# Patient Record
Sex: MALE | Race: WHITE | HISPANIC OR LATINO | Employment: UNEMPLOYED | URBAN - METROPOLITAN AREA
[De-identification: names, ages, dates, MRNs, and addresses within clinical notes are randomized per-mention and may not be internally consistent; named-entity substitution may affect disease eponyms.]

---

## 2017-01-09 ENCOUNTER — COMMUNICATION - HEALTHEAST (OUTPATIENT)
Dept: INTERNAL MEDICINE | Facility: CLINIC | Age: 47
End: 2017-01-09

## 2017-01-09 ENCOUNTER — OFFICE VISIT - HEALTHEAST (OUTPATIENT)
Dept: INTERNAL MEDICINE | Facility: CLINIC | Age: 47
End: 2017-01-09

## 2017-01-09 DIAGNOSIS — F07.81 POST CONCUSSION SYNDROME: ICD-10-CM

## 2017-01-09 DIAGNOSIS — S83.207S UNSPECIFIED TEAR OF UNSPECIFIED MENISCUS, CURRENT INJURY, LEFT KNEE, SEQUELA: ICD-10-CM

## 2017-01-09 DIAGNOSIS — S46.211S BICEPS TENDON RUPTURE, RIGHT, SEQUELA: ICD-10-CM

## 2017-01-09 DIAGNOSIS — I10 HYPERTENSION: ICD-10-CM

## 2017-01-09 DIAGNOSIS — F31.60 BIPOLAR 1 DISORDER, MIXED (H): ICD-10-CM

## 2017-01-09 ASSESSMENT — MIFFLIN-ST. JEOR: SCORE: 1767.32

## 2017-01-10 ENCOUNTER — HOSPITAL ENCOUNTER (OUTPATIENT)
Dept: PHYSICAL THERAPY | Age: 47
Setting detail: THERAPIES SERIES
Discharge: STILL ADMITTED OR ADMINISTRATIVE | End: 2017-01-10
Attending: NURSE PRACTITIONER

## 2017-01-10 ENCOUNTER — HOSPITAL ENCOUNTER (OUTPATIENT)
Dept: SPEECH THERAPY | Age: 47
Setting detail: THERAPIES SERIES
Discharge: STILL ADMITTED OR ADMINISTRATIVE | End: 2017-01-10
Attending: NURSE PRACTITIONER

## 2017-01-10 DIAGNOSIS — F43.10 PTSD (POST-TRAUMATIC STRESS DISORDER): ICD-10-CM

## 2017-01-10 DIAGNOSIS — R47.89 WORD FINDING PROBLEM: ICD-10-CM

## 2017-01-10 DIAGNOSIS — R42 DIZZINESS: ICD-10-CM

## 2017-01-10 DIAGNOSIS — R41.89 COGNITIVE AND BEHAVIORAL CHANGES: ICD-10-CM

## 2017-01-10 DIAGNOSIS — F07.81 POST CONCUSSION SYNDROME: ICD-10-CM

## 2017-01-10 DIAGNOSIS — S09.90XS HEADACHES DUE TO OLD HEAD INJURY: ICD-10-CM

## 2017-01-10 DIAGNOSIS — R46.89 COGNITIVE AND BEHAVIORAL CHANGES: ICD-10-CM

## 2017-01-10 DIAGNOSIS — G44.309 HEADACHES DUE TO OLD HEAD INJURY: ICD-10-CM

## 2017-01-17 ENCOUNTER — HOSPITAL ENCOUNTER (OUTPATIENT)
Dept: SPEECH THERAPY | Age: 47
Setting detail: THERAPIES SERIES
Discharge: STILL ADMITTED OR ADMINISTRATIVE | End: 2017-01-17
Attending: NURSE PRACTITIONER

## 2017-01-17 DIAGNOSIS — R47.89 WORD FINDING PROBLEM: ICD-10-CM

## 2017-01-17 DIAGNOSIS — R41.89 COGNITIVE AND BEHAVIORAL CHANGES: ICD-10-CM

## 2017-01-17 DIAGNOSIS — R46.89 COGNITIVE AND BEHAVIORAL CHANGES: ICD-10-CM

## 2017-01-24 ENCOUNTER — HOSPITAL ENCOUNTER (OUTPATIENT)
Dept: SPEECH THERAPY | Age: 47
Setting detail: THERAPIES SERIES
Discharge: STILL ADMITTED OR ADMINISTRATIVE | End: 2017-01-24
Attending: NURSE PRACTITIONER

## 2017-01-24 ENCOUNTER — AMBULATORY - HEALTHEAST (OUTPATIENT)
Dept: LAB | Facility: CLINIC | Age: 47
End: 2017-01-24

## 2017-01-24 DIAGNOSIS — R46.89 COGNITIVE AND BEHAVIORAL CHANGES: ICD-10-CM

## 2017-01-24 DIAGNOSIS — R41.89 COGNITIVE AND BEHAVIORAL CHANGES: ICD-10-CM

## 2017-01-24 DIAGNOSIS — Z79.899 ENCOUNTER FOR LONG-TERM (CURRENT) USE OF OTHER MEDICATIONS: ICD-10-CM

## 2017-01-25 ENCOUNTER — COMMUNICATION - HEALTHEAST (OUTPATIENT)
Dept: INTERNAL MEDICINE | Facility: CLINIC | Age: 47
End: 2017-01-25

## 2017-01-31 ENCOUNTER — HOSPITAL ENCOUNTER (OUTPATIENT)
Dept: SPEECH THERAPY | Age: 47
Setting detail: THERAPIES SERIES
Discharge: STILL ADMITTED OR ADMINISTRATIVE | End: 2017-01-31
Attending: NURSE PRACTITIONER

## 2017-01-31 DIAGNOSIS — R46.89 COGNITIVE AND BEHAVIORAL CHANGES: ICD-10-CM

## 2017-01-31 DIAGNOSIS — R41.89 COGNITIVE AND BEHAVIORAL CHANGES: ICD-10-CM

## 2017-02-06 ENCOUNTER — AMBULATORY - HEALTHEAST (OUTPATIENT)
Dept: ADMINISTRATIVE | Facility: REHABILITATION | Age: 47
End: 2017-02-06

## 2017-02-06 ENCOUNTER — OFFICE VISIT - HEALTHEAST (OUTPATIENT)
Dept: INTERNAL MEDICINE | Facility: CLINIC | Age: 47
End: 2017-02-06

## 2017-02-06 ENCOUNTER — RECORDS - HEALTHEAST (OUTPATIENT)
Dept: ADMINISTRATIVE | Facility: OTHER | Age: 47
End: 2017-02-06

## 2017-02-06 DIAGNOSIS — F07.81 POST CONCUSSION SYNDROME: ICD-10-CM

## 2017-02-06 DIAGNOSIS — S83.207S UNSPECIFIED TEAR OF UNSPECIFIED MENISCUS, CURRENT INJURY, LEFT KNEE, SEQUELA: ICD-10-CM

## 2017-02-06 DIAGNOSIS — G89.21 CHRONIC PAIN DUE TO TRAUMA: ICD-10-CM

## 2017-02-06 DIAGNOSIS — M25.511 RIGHT SHOULDER PAIN: ICD-10-CM

## 2017-02-06 DIAGNOSIS — M54.2 CERVICALGIA: ICD-10-CM

## 2017-02-06 DIAGNOSIS — M75.41 SHOULDER IMPINGEMENT SYNDROME, RIGHT: ICD-10-CM

## 2017-02-06 ASSESSMENT — MIFFLIN-ST. JEOR: SCORE: 1776.39

## 2017-02-10 ENCOUNTER — OFFICE VISIT - HEALTHEAST (OUTPATIENT)
Dept: PHYSICAL THERAPY | Facility: REHABILITATION | Age: 47
End: 2017-02-10

## 2017-02-10 DIAGNOSIS — M53.82 DECREASED ROM OF INTERVERTEBRAL DISCS OF CERVICAL SPINE: ICD-10-CM

## 2017-02-10 DIAGNOSIS — M89.9 SCAPULAR DYSFUNCTION: ICD-10-CM

## 2017-02-10 DIAGNOSIS — M25.611 DECREASED ROM OF RIGHT SHOULDER: ICD-10-CM

## 2017-02-10 DIAGNOSIS — M62.81 MUSCLE WEAKNESS (GENERALIZED): ICD-10-CM

## 2017-02-10 DIAGNOSIS — M54.2 CERVICALGIA: ICD-10-CM

## 2017-02-10 DIAGNOSIS — G89.29 CHRONIC RIGHT SHOULDER PAIN: ICD-10-CM

## 2017-02-10 DIAGNOSIS — M25.511 CHRONIC RIGHT SHOULDER PAIN: ICD-10-CM

## 2017-02-13 ENCOUNTER — OFFICE VISIT - HEALTHEAST (OUTPATIENT)
Dept: PHYSICAL THERAPY | Facility: REHABILITATION | Age: 47
End: 2017-02-13

## 2017-02-13 DIAGNOSIS — G89.29 CHRONIC RIGHT SHOULDER PAIN: ICD-10-CM

## 2017-02-13 DIAGNOSIS — M89.9 SCAPULAR DYSFUNCTION: ICD-10-CM

## 2017-02-13 DIAGNOSIS — M62.81 MUSCLE WEAKNESS (GENERALIZED): ICD-10-CM

## 2017-02-13 DIAGNOSIS — M54.2 CERVICALGIA: ICD-10-CM

## 2017-02-13 DIAGNOSIS — M53.82 DECREASED ROM OF INTERVERTEBRAL DISCS OF CERVICAL SPINE: ICD-10-CM

## 2017-02-13 DIAGNOSIS — M25.611 DECREASED ROM OF RIGHT SHOULDER: ICD-10-CM

## 2017-02-13 DIAGNOSIS — M25.511 CHRONIC RIGHT SHOULDER PAIN: ICD-10-CM

## 2017-02-14 ENCOUNTER — HOSPITAL ENCOUNTER (OUTPATIENT)
Dept: SPEECH THERAPY | Age: 47
Setting detail: THERAPIES SERIES
Discharge: STILL ADMITTED OR ADMINISTRATIVE | End: 2017-02-14
Attending: NURSE PRACTITIONER

## 2017-02-14 DIAGNOSIS — R41.89 COGNITIVE AND BEHAVIORAL CHANGES: ICD-10-CM

## 2017-02-14 DIAGNOSIS — R46.89 COGNITIVE AND BEHAVIORAL CHANGES: ICD-10-CM

## 2017-02-16 ENCOUNTER — OFFICE VISIT - HEALTHEAST (OUTPATIENT)
Dept: PHYSICAL THERAPY | Facility: REHABILITATION | Age: 47
End: 2017-02-16

## 2017-02-16 DIAGNOSIS — M54.2 CERVICALGIA: ICD-10-CM

## 2017-02-16 DIAGNOSIS — M62.81 MUSCLE WEAKNESS (GENERALIZED): ICD-10-CM

## 2017-02-16 DIAGNOSIS — M53.82 DECREASED ROM OF INTERVERTEBRAL DISCS OF CERVICAL SPINE: ICD-10-CM

## 2017-02-16 DIAGNOSIS — G89.29 CHRONIC RIGHT SHOULDER PAIN: ICD-10-CM

## 2017-02-16 DIAGNOSIS — M25.611 DECREASED ROM OF RIGHT SHOULDER: ICD-10-CM

## 2017-02-16 DIAGNOSIS — M25.511 CHRONIC RIGHT SHOULDER PAIN: ICD-10-CM

## 2017-02-16 DIAGNOSIS — M89.9 SCAPULAR DYSFUNCTION: ICD-10-CM

## 2017-02-20 ENCOUNTER — OFFICE VISIT - HEALTHEAST (OUTPATIENT)
Dept: PHYSICAL THERAPY | Facility: REHABILITATION | Age: 47
End: 2017-02-20

## 2017-02-20 DIAGNOSIS — M53.82 DECREASED ROM OF INTERVERTEBRAL DISCS OF CERVICAL SPINE: ICD-10-CM

## 2017-02-20 DIAGNOSIS — M54.2 CERVICALGIA: ICD-10-CM

## 2017-02-20 DIAGNOSIS — M62.81 MUSCLE WEAKNESS (GENERALIZED): ICD-10-CM

## 2017-02-20 DIAGNOSIS — G89.29 CHRONIC RIGHT SHOULDER PAIN: ICD-10-CM

## 2017-02-20 DIAGNOSIS — M25.511 CHRONIC RIGHT SHOULDER PAIN: ICD-10-CM

## 2017-02-20 DIAGNOSIS — M25.611 DECREASED ROM OF RIGHT SHOULDER: ICD-10-CM

## 2017-02-20 DIAGNOSIS — M89.9 SCAPULAR DYSFUNCTION: ICD-10-CM

## 2017-02-23 ENCOUNTER — COMMUNICATION - HEALTHEAST (OUTPATIENT)
Dept: INTERNAL MEDICINE | Facility: CLINIC | Age: 47
End: 2017-02-23

## 2017-02-23 ENCOUNTER — OFFICE VISIT - HEALTHEAST (OUTPATIENT)
Dept: PHYSICAL THERAPY | Facility: REHABILITATION | Age: 47
End: 2017-02-23

## 2017-02-23 ENCOUNTER — HOSPITAL ENCOUNTER (OUTPATIENT)
Dept: SPEECH THERAPY | Age: 47
Setting detail: THERAPIES SERIES
Discharge: STILL ADMITTED OR ADMINISTRATIVE | End: 2017-02-23
Attending: NURSE PRACTITIONER

## 2017-02-23 DIAGNOSIS — R47.89 WORD FINDING PROBLEM: ICD-10-CM

## 2017-02-23 DIAGNOSIS — M89.9 SCAPULAR DYSFUNCTION: ICD-10-CM

## 2017-02-23 DIAGNOSIS — R41.89 COGNITIVE AND BEHAVIORAL CHANGES: ICD-10-CM

## 2017-02-23 DIAGNOSIS — M54.2 CERVICALGIA: ICD-10-CM

## 2017-02-23 DIAGNOSIS — M53.82 DECREASED ROM OF INTERVERTEBRAL DISCS OF CERVICAL SPINE: ICD-10-CM

## 2017-02-23 DIAGNOSIS — M25.611 DECREASED ROM OF RIGHT SHOULDER: ICD-10-CM

## 2017-02-23 DIAGNOSIS — G89.29 CHRONIC RIGHT SHOULDER PAIN: ICD-10-CM

## 2017-02-23 DIAGNOSIS — M62.81 MUSCLE WEAKNESS (GENERALIZED): ICD-10-CM

## 2017-02-23 DIAGNOSIS — M25.511 CHRONIC RIGHT SHOULDER PAIN: ICD-10-CM

## 2017-02-23 DIAGNOSIS — R46.89 COGNITIVE AND BEHAVIORAL CHANGES: ICD-10-CM

## 2017-02-24 ENCOUNTER — HOSPITAL ENCOUNTER (OUTPATIENT)
Dept: NEUROLOGY | Facility: CLINIC | Age: 47
Setting detail: THERAPIES SERIES
Discharge: STILL ADMITTED OR ADMINISTRATIVE | End: 2017-02-24
Attending: NURSE PRACTITIONER

## 2017-02-24 DIAGNOSIS — G31.84 COGNITIVE IMPAIRMENT, MILD, SO STATED: ICD-10-CM

## 2017-02-24 DIAGNOSIS — G89.21 CHRONIC PAIN DUE TO TRAUMA: ICD-10-CM

## 2017-02-24 DIAGNOSIS — S09.90XS HEADACHES DUE TO OLD HEAD INJURY: ICD-10-CM

## 2017-02-24 DIAGNOSIS — F07.81 POST CONCUSSION SYNDROME: ICD-10-CM

## 2017-02-24 DIAGNOSIS — F31.11 BIPOLAR I DISORDER, MOST RECENT EPISODE (OR CURRENT) MANIC, MILD (H): ICD-10-CM

## 2017-02-24 DIAGNOSIS — G44.309 HEADACHES DUE TO OLD HEAD INJURY: ICD-10-CM

## 2017-02-24 DIAGNOSIS — S06.9X1S: ICD-10-CM

## 2017-02-24 DIAGNOSIS — F43.10 PTSD (POST-TRAUMATIC STRESS DISORDER): ICD-10-CM

## 2017-02-24 DIAGNOSIS — F31.60 BIPOLAR 1 DISORDER, MIXED (H): ICD-10-CM

## 2017-02-24 DIAGNOSIS — F41.1 ANXIETY STATE: ICD-10-CM

## 2017-02-27 ENCOUNTER — OFFICE VISIT - HEALTHEAST (OUTPATIENT)
Dept: PHYSICAL THERAPY | Facility: REHABILITATION | Age: 47
End: 2017-02-27

## 2017-02-27 DIAGNOSIS — M54.2 CERVICALGIA: ICD-10-CM

## 2017-02-27 DIAGNOSIS — M25.511 CHRONIC RIGHT SHOULDER PAIN: ICD-10-CM

## 2017-02-27 DIAGNOSIS — M62.81 MUSCLE WEAKNESS (GENERALIZED): ICD-10-CM

## 2017-02-27 DIAGNOSIS — M25.611 DECREASED ROM OF RIGHT SHOULDER: ICD-10-CM

## 2017-02-27 DIAGNOSIS — M53.82 DECREASED ROM OF INTERVERTEBRAL DISCS OF CERVICAL SPINE: ICD-10-CM

## 2017-02-27 DIAGNOSIS — M89.9 SCAPULAR DYSFUNCTION: ICD-10-CM

## 2017-02-27 DIAGNOSIS — G89.29 CHRONIC RIGHT SHOULDER PAIN: ICD-10-CM

## 2017-03-02 ENCOUNTER — OFFICE VISIT - HEALTHEAST (OUTPATIENT)
Dept: PHYSICAL THERAPY | Facility: REHABILITATION | Age: 47
End: 2017-03-02

## 2017-03-02 DIAGNOSIS — M53.82 DECREASED ROM OF INTERVERTEBRAL DISCS OF CERVICAL SPINE: ICD-10-CM

## 2017-03-02 DIAGNOSIS — G89.29 CHRONIC RIGHT SHOULDER PAIN: ICD-10-CM

## 2017-03-02 DIAGNOSIS — M25.511 CHRONIC RIGHT SHOULDER PAIN: ICD-10-CM

## 2017-03-02 DIAGNOSIS — M54.2 CERVICALGIA: ICD-10-CM

## 2017-03-02 DIAGNOSIS — M25.611 DECREASED ROM OF RIGHT SHOULDER: ICD-10-CM

## 2017-03-02 DIAGNOSIS — M62.81 MUSCLE WEAKNESS (GENERALIZED): ICD-10-CM

## 2017-03-02 DIAGNOSIS — M89.9 SCAPULAR DYSFUNCTION: ICD-10-CM

## 2017-03-06 ENCOUNTER — COMMUNICATION - HEALTHEAST (OUTPATIENT)
Dept: INTERNAL MEDICINE | Facility: CLINIC | Age: 47
End: 2017-03-06

## 2017-03-06 ENCOUNTER — OFFICE VISIT - HEALTHEAST (OUTPATIENT)
Dept: PHYSICAL THERAPY | Facility: REHABILITATION | Age: 47
End: 2017-03-06

## 2017-03-06 ENCOUNTER — OFFICE VISIT - HEALTHEAST (OUTPATIENT)
Dept: INTERNAL MEDICINE | Facility: CLINIC | Age: 47
End: 2017-03-06

## 2017-03-06 ENCOUNTER — AMBULATORY - HEALTHEAST (OUTPATIENT)
Dept: INTERNAL MEDICINE | Facility: CLINIC | Age: 47
End: 2017-03-06

## 2017-03-06 DIAGNOSIS — S06.9X9S: ICD-10-CM

## 2017-03-06 DIAGNOSIS — Z01.818 PREOP EXAM FOR INTERNAL MEDICINE: ICD-10-CM

## 2017-03-06 DIAGNOSIS — M89.9 SCAPULAR DYSFUNCTION: ICD-10-CM

## 2017-03-06 DIAGNOSIS — M22.40 CHONDROMALACIA OF PATELLA, UNSPECIFIED LATERALITY: ICD-10-CM

## 2017-03-06 DIAGNOSIS — M25.611 DECREASED ROM OF RIGHT SHOULDER: ICD-10-CM

## 2017-03-06 DIAGNOSIS — M25.511 CHRONIC RIGHT SHOULDER PAIN: ICD-10-CM

## 2017-03-06 DIAGNOSIS — I89.0 LYMPHEDEMA: ICD-10-CM

## 2017-03-06 DIAGNOSIS — M54.2 CERVICALGIA: ICD-10-CM

## 2017-03-06 DIAGNOSIS — G89.29 CHRONIC RIGHT SHOULDER PAIN: ICD-10-CM

## 2017-03-06 DIAGNOSIS — M53.82 DECREASED ROM OF INTERVERTEBRAL DISCS OF CERVICAL SPINE: ICD-10-CM

## 2017-03-06 DIAGNOSIS — M62.81 MUSCLE WEAKNESS (GENERALIZED): ICD-10-CM

## 2017-03-06 ASSESSMENT — MIFFLIN-ST. JEOR: SCORE: 1821.75

## 2017-03-08 ENCOUNTER — AMBULATORY - HEALTHEAST (OUTPATIENT)
Dept: VASCULAR SURGERY | Facility: CLINIC | Age: 47
End: 2017-03-08

## 2017-03-08 DIAGNOSIS — G57.90 ILIOINGUINAL NEURALGIA: ICD-10-CM

## 2017-03-08 DIAGNOSIS — I89.0 ACQUIRED LYMPHEDEMA OF LEG: ICD-10-CM

## 2017-03-09 ENCOUNTER — COMMUNICATION - HEALTHEAST (OUTPATIENT)
Dept: VASCULAR SURGERY | Facility: CLINIC | Age: 47
End: 2017-03-09

## 2017-03-10 ENCOUNTER — COMMUNICATION - HEALTHEAST (OUTPATIENT)
Dept: INTERNAL MEDICINE | Facility: CLINIC | Age: 47
End: 2017-03-10

## 2017-03-10 DIAGNOSIS — T78.40XA ALLERGY: ICD-10-CM

## 2017-03-13 ENCOUNTER — HOSPITAL ENCOUNTER (OUTPATIENT)
Dept: NEUROLOGY | Facility: CLINIC | Age: 47
Setting detail: THERAPIES SERIES
Discharge: STILL ADMITTED OR ADMINISTRATIVE | End: 2017-03-13
Attending: NURSE PRACTITIONER

## 2017-03-13 DIAGNOSIS — F09 COGNITIVE DISORDER: ICD-10-CM

## 2017-03-14 ENCOUNTER — OFFICE VISIT - HEALTHEAST (OUTPATIENT)
Dept: PHYSICAL THERAPY | Facility: REHABILITATION | Age: 47
End: 2017-03-14

## 2017-03-14 DIAGNOSIS — M54.2 CERVICALGIA: ICD-10-CM

## 2017-03-14 DIAGNOSIS — G89.29 CHRONIC RIGHT SHOULDER PAIN: ICD-10-CM

## 2017-03-14 DIAGNOSIS — M89.9 SCAPULAR DYSFUNCTION: ICD-10-CM

## 2017-03-14 DIAGNOSIS — M53.82 DECREASED ROM OF INTERVERTEBRAL DISCS OF CERVICAL SPINE: ICD-10-CM

## 2017-03-14 DIAGNOSIS — M62.81 MUSCLE WEAKNESS (GENERALIZED): ICD-10-CM

## 2017-03-14 DIAGNOSIS — M25.511 CHRONIC RIGHT SHOULDER PAIN: ICD-10-CM

## 2017-03-14 DIAGNOSIS — M25.611 DECREASED ROM OF RIGHT SHOULDER: ICD-10-CM

## 2017-03-16 ENCOUNTER — OFFICE VISIT - HEALTHEAST (OUTPATIENT)
Dept: PHYSICAL THERAPY | Facility: REHABILITATION | Age: 47
End: 2017-03-16

## 2017-03-16 DIAGNOSIS — M89.9 SCAPULAR DYSFUNCTION: ICD-10-CM

## 2017-03-16 DIAGNOSIS — M25.511 CHRONIC RIGHT SHOULDER PAIN: ICD-10-CM

## 2017-03-16 DIAGNOSIS — M62.81 MUSCLE WEAKNESS (GENERALIZED): ICD-10-CM

## 2017-03-16 DIAGNOSIS — M25.611 DECREASED ROM OF RIGHT SHOULDER: ICD-10-CM

## 2017-03-16 DIAGNOSIS — M53.82 DECREASED ROM OF INTERVERTEBRAL DISCS OF CERVICAL SPINE: ICD-10-CM

## 2017-03-16 DIAGNOSIS — G89.29 CHRONIC RIGHT SHOULDER PAIN: ICD-10-CM

## 2017-03-16 DIAGNOSIS — M54.2 CERVICALGIA: ICD-10-CM

## 2017-03-22 ENCOUNTER — RECORDS - HEALTHEAST (OUTPATIENT)
Dept: ADMINISTRATIVE | Facility: OTHER | Age: 47
End: 2017-03-22

## 2017-03-28 ENCOUNTER — AMBULATORY - HEALTHEAST (OUTPATIENT)
Dept: ADMINISTRATIVE | Facility: REHABILITATION | Age: 47
End: 2017-03-28

## 2017-03-28 DIAGNOSIS — M17.12 PRIMARY OSTEOARTHRITIS OF LEFT KNEE: ICD-10-CM

## 2017-03-29 ENCOUNTER — RECORDS - HEALTHEAST (OUTPATIENT)
Dept: ADMINISTRATIVE | Facility: OTHER | Age: 47
End: 2017-03-29

## 2017-03-30 ENCOUNTER — OFFICE VISIT - HEALTHEAST (OUTPATIENT)
Dept: VASCULAR SURGERY | Facility: CLINIC | Age: 47
End: 2017-03-30

## 2017-03-30 DIAGNOSIS — Z96.652 STATUS POST TOTAL LEFT KNEE REPLACEMENT: ICD-10-CM

## 2017-03-30 DIAGNOSIS — M79.89 LEFT LEG SWELLING: ICD-10-CM

## 2017-03-30 DIAGNOSIS — Q82.0 PRIMARY (CONGENITAL) LYMPHEDEMA: ICD-10-CM

## 2017-03-31 ENCOUNTER — OFFICE VISIT - HEALTHEAST (OUTPATIENT)
Dept: PHYSICAL THERAPY | Facility: REHABILITATION | Age: 47
End: 2017-03-31

## 2017-03-31 DIAGNOSIS — R26.9 ABNORMALITY OF GAIT: ICD-10-CM

## 2017-03-31 DIAGNOSIS — M25.462 PAIN AND SWELLING OF LEFT KNEE: ICD-10-CM

## 2017-03-31 DIAGNOSIS — M79.89 LEFT LEG SWELLING: ICD-10-CM

## 2017-03-31 DIAGNOSIS — M62.81 MUSCLE WEAKNESS (GENERALIZED): ICD-10-CM

## 2017-03-31 DIAGNOSIS — Z96.652 STATUS POST TOTAL LEFT KNEE REPLACEMENT: ICD-10-CM

## 2017-03-31 DIAGNOSIS — M25.562 PAIN AND SWELLING OF LEFT KNEE: ICD-10-CM

## 2017-04-03 ENCOUNTER — OFFICE VISIT - HEALTHEAST (OUTPATIENT)
Dept: INTERNAL MEDICINE | Facility: CLINIC | Age: 47
End: 2017-04-03

## 2017-04-03 ENCOUNTER — COMMUNICATION - HEALTHEAST (OUTPATIENT)
Dept: INTERNAL MEDICINE | Facility: CLINIC | Age: 47
End: 2017-04-03

## 2017-04-03 DIAGNOSIS — F43.10 PTSD (POST-TRAUMATIC STRESS DISORDER): ICD-10-CM

## 2017-04-03 DIAGNOSIS — Z96.652 STATUS POST TOTAL LEFT KNEE REPLACEMENT: ICD-10-CM

## 2017-04-03 DIAGNOSIS — S06.9X9S: ICD-10-CM

## 2017-04-03 ASSESSMENT — MIFFLIN-ST. JEOR: SCORE: 1858.04

## 2017-04-04 ENCOUNTER — COMMUNICATION - HEALTHEAST (OUTPATIENT)
Dept: INTERNAL MEDICINE | Facility: CLINIC | Age: 47
End: 2017-04-04

## 2017-04-05 ENCOUNTER — OFFICE VISIT - HEALTHEAST (OUTPATIENT)
Dept: PHYSICAL THERAPY | Facility: REHABILITATION | Age: 47
End: 2017-04-05

## 2017-04-05 DIAGNOSIS — M25.462 PAIN AND SWELLING OF LEFT KNEE: ICD-10-CM

## 2017-04-05 DIAGNOSIS — M25.611 DECREASED ROM OF RIGHT SHOULDER: ICD-10-CM

## 2017-04-05 DIAGNOSIS — Z96.652 STATUS POST TOTAL LEFT KNEE REPLACEMENT: ICD-10-CM

## 2017-04-05 DIAGNOSIS — M54.2 CERVICALGIA: ICD-10-CM

## 2017-04-05 DIAGNOSIS — R26.9 ABNORMALITY OF GAIT: ICD-10-CM

## 2017-04-05 DIAGNOSIS — M89.9 SCAPULAR DYSFUNCTION: ICD-10-CM

## 2017-04-05 DIAGNOSIS — M53.82 DECREASED ROM OF INTERVERTEBRAL DISCS OF CERVICAL SPINE: ICD-10-CM

## 2017-04-05 DIAGNOSIS — M79.89 LEFT LEG SWELLING: ICD-10-CM

## 2017-04-05 DIAGNOSIS — G89.29 CHRONIC RIGHT SHOULDER PAIN: ICD-10-CM

## 2017-04-05 DIAGNOSIS — M25.562 PAIN AND SWELLING OF LEFT KNEE: ICD-10-CM

## 2017-04-05 DIAGNOSIS — M62.81 MUSCLE WEAKNESS (GENERALIZED): ICD-10-CM

## 2017-04-05 DIAGNOSIS — M25.511 CHRONIC RIGHT SHOULDER PAIN: ICD-10-CM

## 2017-04-07 ENCOUNTER — COMMUNICATION - HEALTHEAST (OUTPATIENT)
Dept: INTERNAL MEDICINE | Facility: CLINIC | Age: 47
End: 2017-04-07

## 2017-04-07 ENCOUNTER — OFFICE VISIT - HEALTHEAST (OUTPATIENT)
Dept: PHYSICAL THERAPY | Facility: REHABILITATION | Age: 47
End: 2017-04-07

## 2017-04-07 ENCOUNTER — COMMUNICATION - HEALTHEAST (OUTPATIENT)
Dept: NURSING | Facility: CLINIC | Age: 47
End: 2017-04-07

## 2017-04-07 DIAGNOSIS — M79.89 LEFT LEG SWELLING: ICD-10-CM

## 2017-04-07 DIAGNOSIS — M62.81 MUSCLE WEAKNESS (GENERALIZED): ICD-10-CM

## 2017-04-07 DIAGNOSIS — M54.2 CERVICALGIA: ICD-10-CM

## 2017-04-07 DIAGNOSIS — M54.12 BRACHIAL NEURITIS OR RADICULITIS: ICD-10-CM

## 2017-04-07 DIAGNOSIS — M25.562 PAIN AND SWELLING OF LEFT KNEE: ICD-10-CM

## 2017-04-07 DIAGNOSIS — G89.29 CHRONIC RIGHT SHOULDER PAIN: ICD-10-CM

## 2017-04-07 DIAGNOSIS — R26.9 ABNORMALITY OF GAIT: ICD-10-CM

## 2017-04-07 DIAGNOSIS — M53.82 DECREASED ROM OF INTERVERTEBRAL DISCS OF CERVICAL SPINE: ICD-10-CM

## 2017-04-07 DIAGNOSIS — M25.462 PAIN AND SWELLING OF LEFT KNEE: ICD-10-CM

## 2017-04-07 DIAGNOSIS — M89.9 SCAPULAR DYSFUNCTION: ICD-10-CM

## 2017-04-07 DIAGNOSIS — M25.511 CHRONIC RIGHT SHOULDER PAIN: ICD-10-CM

## 2017-04-07 DIAGNOSIS — M25.611 DECREASED ROM OF RIGHT SHOULDER: ICD-10-CM

## 2017-04-07 DIAGNOSIS — Z96.652 STATUS POST TOTAL LEFT KNEE REPLACEMENT: ICD-10-CM

## 2017-04-10 ENCOUNTER — COMMUNICATION - HEALTHEAST (OUTPATIENT)
Dept: ADMINISTRATIVE | Facility: CLINIC | Age: 47
End: 2017-04-10

## 2017-04-10 ENCOUNTER — OFFICE VISIT - HEALTHEAST (OUTPATIENT)
Dept: PHYSICAL THERAPY | Facility: REHABILITATION | Age: 47
End: 2017-04-10

## 2017-04-10 ENCOUNTER — COMMUNICATION - HEALTHEAST (OUTPATIENT)
Dept: NURSING | Facility: CLINIC | Age: 47
End: 2017-04-10

## 2017-04-10 DIAGNOSIS — Z96.652 STATUS POST TOTAL LEFT KNEE REPLACEMENT: ICD-10-CM

## 2017-04-10 DIAGNOSIS — R26.9 ABNORMALITY OF GAIT: ICD-10-CM

## 2017-04-10 DIAGNOSIS — G89.29 CHRONIC RIGHT SHOULDER PAIN: ICD-10-CM

## 2017-04-10 DIAGNOSIS — M79.89 LEFT LEG SWELLING: ICD-10-CM

## 2017-04-10 DIAGNOSIS — M89.9 SCAPULAR DYSFUNCTION: ICD-10-CM

## 2017-04-10 DIAGNOSIS — M25.611 DECREASED ROM OF RIGHT SHOULDER: ICD-10-CM

## 2017-04-10 DIAGNOSIS — M25.562 PAIN AND SWELLING OF LEFT KNEE: ICD-10-CM

## 2017-04-10 DIAGNOSIS — M53.82 DECREASED ROM OF INTERVERTEBRAL DISCS OF CERVICAL SPINE: ICD-10-CM

## 2017-04-10 DIAGNOSIS — M25.511 CHRONIC RIGHT SHOULDER PAIN: ICD-10-CM

## 2017-04-10 DIAGNOSIS — M54.2 CERVICALGIA: ICD-10-CM

## 2017-04-10 DIAGNOSIS — M25.462 PAIN AND SWELLING OF LEFT KNEE: ICD-10-CM

## 2017-04-10 DIAGNOSIS — M62.81 MUSCLE WEAKNESS (GENERALIZED): ICD-10-CM

## 2017-04-13 ENCOUNTER — OFFICE VISIT - HEALTHEAST (OUTPATIENT)
Dept: PHYSICAL THERAPY | Facility: REHABILITATION | Age: 47
End: 2017-04-13

## 2017-04-13 DIAGNOSIS — G89.29 CHRONIC RIGHT SHOULDER PAIN: ICD-10-CM

## 2017-04-13 DIAGNOSIS — M89.9 SCAPULAR DYSFUNCTION: ICD-10-CM

## 2017-04-13 DIAGNOSIS — M25.562 PAIN AND SWELLING OF LEFT KNEE: ICD-10-CM

## 2017-04-13 DIAGNOSIS — M79.89 LEFT LEG SWELLING: ICD-10-CM

## 2017-04-13 DIAGNOSIS — M53.82 DECREASED ROM OF INTERVERTEBRAL DISCS OF CERVICAL SPINE: ICD-10-CM

## 2017-04-13 DIAGNOSIS — Z96.652 STATUS POST TOTAL LEFT KNEE REPLACEMENT: ICD-10-CM

## 2017-04-13 DIAGNOSIS — M62.81 MUSCLE WEAKNESS (GENERALIZED): ICD-10-CM

## 2017-04-13 DIAGNOSIS — M25.462 PAIN AND SWELLING OF LEFT KNEE: ICD-10-CM

## 2017-04-13 DIAGNOSIS — M25.611 DECREASED ROM OF RIGHT SHOULDER: ICD-10-CM

## 2017-04-13 DIAGNOSIS — R26.9 ABNORMALITY OF GAIT: ICD-10-CM

## 2017-04-13 DIAGNOSIS — M25.511 CHRONIC RIGHT SHOULDER PAIN: ICD-10-CM

## 2017-04-13 DIAGNOSIS — M54.2 CERVICALGIA: ICD-10-CM

## 2017-04-17 ENCOUNTER — COMMUNICATION - HEALTHEAST (OUTPATIENT)
Dept: PHYSICAL THERAPY | Facility: REHABILITATION | Age: 47
End: 2017-04-17

## 2017-04-20 ENCOUNTER — OFFICE VISIT - HEALTHEAST (OUTPATIENT)
Dept: PHYSICAL THERAPY | Facility: REHABILITATION | Age: 47
End: 2017-04-20

## 2017-04-20 DIAGNOSIS — M25.562 PAIN AND SWELLING OF LEFT KNEE: ICD-10-CM

## 2017-04-20 DIAGNOSIS — M62.81 MUSCLE WEAKNESS (GENERALIZED): ICD-10-CM

## 2017-04-20 DIAGNOSIS — M54.2 CERVICALGIA: ICD-10-CM

## 2017-04-20 DIAGNOSIS — M25.462 PAIN AND SWELLING OF LEFT KNEE: ICD-10-CM

## 2017-04-20 DIAGNOSIS — M89.9 SCAPULAR DYSFUNCTION: ICD-10-CM

## 2017-04-20 DIAGNOSIS — M25.511 CHRONIC RIGHT SHOULDER PAIN: ICD-10-CM

## 2017-04-20 DIAGNOSIS — Z96.652 STATUS POST TOTAL LEFT KNEE REPLACEMENT: ICD-10-CM

## 2017-04-20 DIAGNOSIS — G89.29 CHRONIC RIGHT SHOULDER PAIN: ICD-10-CM

## 2017-04-20 DIAGNOSIS — M53.82 DECREASED ROM OF INTERVERTEBRAL DISCS OF CERVICAL SPINE: ICD-10-CM

## 2017-04-20 DIAGNOSIS — M79.89 LEFT LEG SWELLING: ICD-10-CM

## 2017-04-20 DIAGNOSIS — M25.611 DECREASED ROM OF RIGHT SHOULDER: ICD-10-CM

## 2017-04-20 DIAGNOSIS — R26.9 ABNORMALITY OF GAIT: ICD-10-CM

## 2017-04-24 ENCOUNTER — OFFICE VISIT - HEALTHEAST (OUTPATIENT)
Dept: PHYSICAL THERAPY | Facility: REHABILITATION | Age: 47
End: 2017-04-24

## 2017-04-24 DIAGNOSIS — M89.9 SCAPULAR DYSFUNCTION: ICD-10-CM

## 2017-04-24 DIAGNOSIS — M54.2 CERVICALGIA: ICD-10-CM

## 2017-04-24 DIAGNOSIS — M62.81 MUSCLE WEAKNESS (GENERALIZED): ICD-10-CM

## 2017-04-24 DIAGNOSIS — M25.562 PAIN AND SWELLING OF LEFT KNEE: ICD-10-CM

## 2017-04-24 DIAGNOSIS — R26.9 ABNORMALITY OF GAIT: ICD-10-CM

## 2017-04-24 DIAGNOSIS — M25.511 CHRONIC RIGHT SHOULDER PAIN: ICD-10-CM

## 2017-04-24 DIAGNOSIS — M25.611 DECREASED ROM OF RIGHT SHOULDER: ICD-10-CM

## 2017-04-24 DIAGNOSIS — M53.82 DECREASED ROM OF INTERVERTEBRAL DISCS OF CERVICAL SPINE: ICD-10-CM

## 2017-04-24 DIAGNOSIS — M79.89 LEFT LEG SWELLING: ICD-10-CM

## 2017-04-24 DIAGNOSIS — M25.462 PAIN AND SWELLING OF LEFT KNEE: ICD-10-CM

## 2017-04-24 DIAGNOSIS — Z96.652 STATUS POST TOTAL LEFT KNEE REPLACEMENT: ICD-10-CM

## 2017-04-24 DIAGNOSIS — G89.29 CHRONIC RIGHT SHOULDER PAIN: ICD-10-CM

## 2017-04-27 ENCOUNTER — OFFICE VISIT - HEALTHEAST (OUTPATIENT)
Dept: PHYSICAL THERAPY | Facility: REHABILITATION | Age: 47
End: 2017-04-27

## 2017-04-27 DIAGNOSIS — R26.9 ABNORMALITY OF GAIT: ICD-10-CM

## 2017-04-27 DIAGNOSIS — M62.81 MUSCLE WEAKNESS (GENERALIZED): ICD-10-CM

## 2017-04-27 DIAGNOSIS — M25.511 CHRONIC RIGHT SHOULDER PAIN: ICD-10-CM

## 2017-04-27 DIAGNOSIS — M25.562 PAIN AND SWELLING OF LEFT KNEE: ICD-10-CM

## 2017-04-27 DIAGNOSIS — Z96.652 STATUS POST TOTAL LEFT KNEE REPLACEMENT: ICD-10-CM

## 2017-04-27 DIAGNOSIS — M79.89 LEFT LEG SWELLING: ICD-10-CM

## 2017-04-27 DIAGNOSIS — G89.29 CHRONIC RIGHT SHOULDER PAIN: ICD-10-CM

## 2017-04-27 DIAGNOSIS — M89.9 SCAPULAR DYSFUNCTION: ICD-10-CM

## 2017-04-27 DIAGNOSIS — M54.2 CERVICALGIA: ICD-10-CM

## 2017-04-27 DIAGNOSIS — M25.611 DECREASED ROM OF RIGHT SHOULDER: ICD-10-CM

## 2017-04-27 DIAGNOSIS — M53.82 DECREASED ROM OF INTERVERTEBRAL DISCS OF CERVICAL SPINE: ICD-10-CM

## 2017-04-27 DIAGNOSIS — M25.462 PAIN AND SWELLING OF LEFT KNEE: ICD-10-CM

## 2017-05-01 ENCOUNTER — OFFICE VISIT - HEALTHEAST (OUTPATIENT)
Dept: PHYSICAL THERAPY | Facility: REHABILITATION | Age: 47
End: 2017-05-01

## 2017-05-01 ENCOUNTER — OFFICE VISIT - HEALTHEAST (OUTPATIENT)
Dept: INTERNAL MEDICINE | Facility: CLINIC | Age: 47
End: 2017-05-01

## 2017-05-01 ENCOUNTER — COMMUNICATION - HEALTHEAST (OUTPATIENT)
Dept: INTERNAL MEDICINE | Facility: CLINIC | Age: 47
End: 2017-05-01

## 2017-05-01 DIAGNOSIS — M25.511 CHRONIC RIGHT SHOULDER PAIN: ICD-10-CM

## 2017-05-01 DIAGNOSIS — Z96.652 STATUS POST TOTAL LEFT KNEE REPLACEMENT: ICD-10-CM

## 2017-05-01 DIAGNOSIS — M79.89 LEFT LEG SWELLING: ICD-10-CM

## 2017-05-01 DIAGNOSIS — T78.40XA ALLERGY: ICD-10-CM

## 2017-05-01 DIAGNOSIS — R26.9 ABNORMALITY OF GAIT: ICD-10-CM

## 2017-05-01 DIAGNOSIS — M25.462 PAIN AND SWELLING OF LEFT KNEE: ICD-10-CM

## 2017-05-01 DIAGNOSIS — M53.82 DECREASED ROM OF INTERVERTEBRAL DISCS OF CERVICAL SPINE: ICD-10-CM

## 2017-05-01 DIAGNOSIS — J01.40 ACUTE NON-RECURRENT PANSINUSITIS: ICD-10-CM

## 2017-05-01 DIAGNOSIS — M25.562 PAIN AND SWELLING OF LEFT KNEE: ICD-10-CM

## 2017-05-01 DIAGNOSIS — M54.2 CERVICALGIA: ICD-10-CM

## 2017-05-01 DIAGNOSIS — M25.611 DECREASED ROM OF RIGHT SHOULDER: ICD-10-CM

## 2017-05-01 DIAGNOSIS — M62.81 MUSCLE WEAKNESS (GENERALIZED): ICD-10-CM

## 2017-05-01 DIAGNOSIS — M54.12 BRACHIAL NEURITIS OR RADICULITIS: ICD-10-CM

## 2017-05-01 DIAGNOSIS — G89.29 CHRONIC RIGHT SHOULDER PAIN: ICD-10-CM

## 2017-05-01 DIAGNOSIS — M89.9 SCAPULAR DYSFUNCTION: ICD-10-CM

## 2017-05-01 DIAGNOSIS — M19.019 AC (ACROMIOCLAVICULAR) ARTHRITIS: ICD-10-CM

## 2017-05-01 ASSESSMENT — MIFFLIN-ST. JEOR: SCORE: 1839.89

## 2017-05-03 ENCOUNTER — COMMUNICATION - HEALTHEAST (OUTPATIENT)
Dept: INTERNAL MEDICINE | Facility: CLINIC | Age: 47
End: 2017-05-03

## 2017-05-04 ENCOUNTER — OFFICE VISIT - HEALTHEAST (OUTPATIENT)
Dept: PHYSICAL THERAPY | Facility: REHABILITATION | Age: 47
End: 2017-05-04

## 2017-05-04 DIAGNOSIS — M25.511 CHRONIC RIGHT SHOULDER PAIN: ICD-10-CM

## 2017-05-04 DIAGNOSIS — M53.82 DECREASED ROM OF INTERVERTEBRAL DISCS OF CERVICAL SPINE: ICD-10-CM

## 2017-05-04 DIAGNOSIS — M25.462 PAIN AND SWELLING OF LEFT KNEE: ICD-10-CM

## 2017-05-04 DIAGNOSIS — R26.9 ABNORMALITY OF GAIT: ICD-10-CM

## 2017-05-04 DIAGNOSIS — M79.89 LEFT LEG SWELLING: ICD-10-CM

## 2017-05-04 DIAGNOSIS — Z96.652 STATUS POST TOTAL LEFT KNEE REPLACEMENT: ICD-10-CM

## 2017-05-04 DIAGNOSIS — M25.562 PAIN AND SWELLING OF LEFT KNEE: ICD-10-CM

## 2017-05-04 DIAGNOSIS — M25.611 DECREASED ROM OF RIGHT SHOULDER: ICD-10-CM

## 2017-05-04 DIAGNOSIS — G89.29 CHRONIC RIGHT SHOULDER PAIN: ICD-10-CM

## 2017-05-04 DIAGNOSIS — M89.9 SCAPULAR DYSFUNCTION: ICD-10-CM

## 2017-05-04 DIAGNOSIS — M54.2 CERVICALGIA: ICD-10-CM

## 2017-05-04 DIAGNOSIS — M62.81 MUSCLE WEAKNESS (GENERALIZED): ICD-10-CM

## 2017-05-09 ENCOUNTER — OFFICE VISIT - HEALTHEAST (OUTPATIENT)
Dept: PHYSICAL THERAPY | Facility: REHABILITATION | Age: 47
End: 2017-05-09

## 2017-05-09 DIAGNOSIS — M54.2 CERVICALGIA: ICD-10-CM

## 2017-05-09 DIAGNOSIS — M89.9 SCAPULAR DYSFUNCTION: ICD-10-CM

## 2017-05-09 DIAGNOSIS — G89.29 CHRONIC RIGHT SHOULDER PAIN: ICD-10-CM

## 2017-05-09 DIAGNOSIS — M25.462 PAIN AND SWELLING OF LEFT KNEE: ICD-10-CM

## 2017-05-09 DIAGNOSIS — M25.511 CHRONIC RIGHT SHOULDER PAIN: ICD-10-CM

## 2017-05-09 DIAGNOSIS — Z96.652 STATUS POST TOTAL LEFT KNEE REPLACEMENT: ICD-10-CM

## 2017-05-09 DIAGNOSIS — M62.81 MUSCLE WEAKNESS (GENERALIZED): ICD-10-CM

## 2017-05-09 DIAGNOSIS — R26.9 ABNORMALITY OF GAIT: ICD-10-CM

## 2017-05-09 DIAGNOSIS — M53.82 DECREASED ROM OF INTERVERTEBRAL DISCS OF CERVICAL SPINE: ICD-10-CM

## 2017-05-09 DIAGNOSIS — M25.611 DECREASED ROM OF RIGHT SHOULDER: ICD-10-CM

## 2017-05-09 DIAGNOSIS — M25.562 PAIN AND SWELLING OF LEFT KNEE: ICD-10-CM

## 2017-05-09 DIAGNOSIS — M79.89 LEFT LEG SWELLING: ICD-10-CM

## 2017-05-10 ENCOUNTER — COMMUNICATION - HEALTHEAST (OUTPATIENT)
Dept: INTERNAL MEDICINE | Facility: CLINIC | Age: 47
End: 2017-05-10

## 2017-05-10 DIAGNOSIS — I10 ESSENTIAL HYPERTENSION: ICD-10-CM

## 2017-05-11 ENCOUNTER — OFFICE VISIT - HEALTHEAST (OUTPATIENT)
Dept: PHYSICAL THERAPY | Facility: REHABILITATION | Age: 47
End: 2017-05-11

## 2017-05-11 ENCOUNTER — OFFICE VISIT - HEALTHEAST (OUTPATIENT)
Dept: VASCULAR SURGERY | Facility: CLINIC | Age: 47
End: 2017-05-11

## 2017-05-11 DIAGNOSIS — M25.611 DECREASED ROM OF RIGHT SHOULDER: ICD-10-CM

## 2017-05-11 DIAGNOSIS — M25.462 PAIN AND SWELLING OF LEFT KNEE: ICD-10-CM

## 2017-05-11 DIAGNOSIS — M25.562 PAIN AND SWELLING OF LEFT KNEE: ICD-10-CM

## 2017-05-11 DIAGNOSIS — R26.9 ABNORMALITY OF GAIT: ICD-10-CM

## 2017-05-11 DIAGNOSIS — M54.2 CERVICALGIA: ICD-10-CM

## 2017-05-11 DIAGNOSIS — M25.511 CHRONIC RIGHT SHOULDER PAIN: ICD-10-CM

## 2017-05-11 DIAGNOSIS — Q82.0 PRIMARY (CONGENITAL) LYMPHEDEMA: ICD-10-CM

## 2017-05-11 DIAGNOSIS — G57.90 ILIOINGUINAL NEURALGIA: ICD-10-CM

## 2017-05-11 DIAGNOSIS — M62.81 MUSCLE WEAKNESS (GENERALIZED): ICD-10-CM

## 2017-05-11 DIAGNOSIS — M89.9 SCAPULAR DYSFUNCTION: ICD-10-CM

## 2017-05-11 DIAGNOSIS — M79.89 LEFT LEG SWELLING: ICD-10-CM

## 2017-05-11 DIAGNOSIS — M53.82 DECREASED ROM OF INTERVERTEBRAL DISCS OF CERVICAL SPINE: ICD-10-CM

## 2017-05-11 DIAGNOSIS — G89.29 CHRONIC RIGHT SHOULDER PAIN: ICD-10-CM

## 2017-05-11 DIAGNOSIS — Z96.652 STATUS POST TOTAL LEFT KNEE REPLACEMENT: ICD-10-CM

## 2017-05-18 ENCOUNTER — COMMUNICATION - HEALTHEAST (OUTPATIENT)
Dept: INTERNAL MEDICINE | Facility: CLINIC | Age: 47
End: 2017-05-18

## 2017-05-18 ENCOUNTER — OFFICE VISIT - HEALTHEAST (OUTPATIENT)
Dept: PHYSICAL THERAPY | Facility: REHABILITATION | Age: 47
End: 2017-05-18

## 2017-05-18 DIAGNOSIS — G89.29 CHRONIC RIGHT SHOULDER PAIN: ICD-10-CM

## 2017-05-18 DIAGNOSIS — M89.9 SCAPULAR DYSFUNCTION: ICD-10-CM

## 2017-05-18 DIAGNOSIS — M25.562 PAIN AND SWELLING OF LEFT KNEE: ICD-10-CM

## 2017-05-18 DIAGNOSIS — Z96.652 STATUS POST TOTAL LEFT KNEE REPLACEMENT: ICD-10-CM

## 2017-05-18 DIAGNOSIS — M54.12 BRACHIAL NEURITIS OR RADICULITIS: ICD-10-CM

## 2017-05-18 DIAGNOSIS — M25.511 CHRONIC RIGHT SHOULDER PAIN: ICD-10-CM

## 2017-05-18 DIAGNOSIS — M79.89 LEFT LEG SWELLING: ICD-10-CM

## 2017-05-18 DIAGNOSIS — R26.9 ABNORMALITY OF GAIT: ICD-10-CM

## 2017-05-18 DIAGNOSIS — M62.81 MUSCLE WEAKNESS (GENERALIZED): ICD-10-CM

## 2017-05-18 DIAGNOSIS — M25.611 DECREASED ROM OF RIGHT SHOULDER: ICD-10-CM

## 2017-05-18 DIAGNOSIS — M25.462 PAIN AND SWELLING OF LEFT KNEE: ICD-10-CM

## 2017-05-18 DIAGNOSIS — M53.82 DECREASED ROM OF INTERVERTEBRAL DISCS OF CERVICAL SPINE: ICD-10-CM

## 2017-05-18 DIAGNOSIS — M54.2 CERVICALGIA: ICD-10-CM

## 2017-05-23 ENCOUNTER — OFFICE VISIT - HEALTHEAST (OUTPATIENT)
Dept: PHYSICAL THERAPY | Facility: REHABILITATION | Age: 47
End: 2017-05-23

## 2017-05-23 DIAGNOSIS — M25.511 CHRONIC RIGHT SHOULDER PAIN: ICD-10-CM

## 2017-05-23 DIAGNOSIS — G89.29 CHRONIC RIGHT SHOULDER PAIN: ICD-10-CM

## 2017-05-23 DIAGNOSIS — M25.611 DECREASED ROM OF RIGHT SHOULDER: ICD-10-CM

## 2017-05-23 DIAGNOSIS — M54.2 CERVICALGIA: ICD-10-CM

## 2017-05-23 DIAGNOSIS — M62.81 MUSCLE WEAKNESS (GENERALIZED): ICD-10-CM

## 2017-05-23 DIAGNOSIS — R26.9 ABNORMALITY OF GAIT: ICD-10-CM

## 2017-05-23 DIAGNOSIS — M89.9 SCAPULAR DYSFUNCTION: ICD-10-CM

## 2017-05-23 DIAGNOSIS — M53.82 DECREASED ROM OF INTERVERTEBRAL DISCS OF CERVICAL SPINE: ICD-10-CM

## 2017-05-23 DIAGNOSIS — M25.462 PAIN AND SWELLING OF LEFT KNEE: ICD-10-CM

## 2017-05-23 DIAGNOSIS — M79.89 LEFT LEG SWELLING: ICD-10-CM

## 2017-05-23 DIAGNOSIS — Z96.652 STATUS POST TOTAL LEFT KNEE REPLACEMENT: ICD-10-CM

## 2017-05-23 DIAGNOSIS — M25.562 PAIN AND SWELLING OF LEFT KNEE: ICD-10-CM

## 2017-05-24 ENCOUNTER — OFFICE VISIT - HEALTHEAST (OUTPATIENT)
Dept: INTERNAL MEDICINE | Facility: CLINIC | Age: 47
End: 2017-05-24

## 2017-05-24 ENCOUNTER — COMMUNICATION - HEALTHEAST (OUTPATIENT)
Dept: ADMINISTRATIVE | Facility: CLINIC | Age: 47
End: 2017-05-24

## 2017-05-24 ENCOUNTER — COMMUNICATION - HEALTHEAST (OUTPATIENT)
Dept: SCHEDULING | Facility: CLINIC | Age: 47
End: 2017-05-24

## 2017-05-24 ENCOUNTER — COMMUNICATION - HEALTHEAST (OUTPATIENT)
Dept: NURSING | Facility: CLINIC | Age: 47
End: 2017-05-24

## 2017-05-24 DIAGNOSIS — R19.7 ACUTE DIARRHEA: ICD-10-CM

## 2017-05-24 ASSESSMENT — MIFFLIN-ST. JEOR: SCORE: 1826.28

## 2017-05-25 ENCOUNTER — OFFICE VISIT - HEALTHEAST (OUTPATIENT)
Dept: PHYSICAL THERAPY | Facility: REHABILITATION | Age: 47
End: 2017-05-25

## 2017-05-25 DIAGNOSIS — M89.9 SCAPULAR DYSFUNCTION: ICD-10-CM

## 2017-05-25 DIAGNOSIS — M54.2 CERVICALGIA: ICD-10-CM

## 2017-05-25 DIAGNOSIS — R26.9 ABNORMALITY OF GAIT: ICD-10-CM

## 2017-05-25 DIAGNOSIS — M25.462 PAIN AND SWELLING OF LEFT KNEE: ICD-10-CM

## 2017-05-25 DIAGNOSIS — M25.611 DECREASED ROM OF RIGHT SHOULDER: ICD-10-CM

## 2017-05-25 DIAGNOSIS — M62.81 MUSCLE WEAKNESS (GENERALIZED): ICD-10-CM

## 2017-05-25 DIAGNOSIS — M25.511 CHRONIC RIGHT SHOULDER PAIN: ICD-10-CM

## 2017-05-25 DIAGNOSIS — M53.82 DECREASED ROM OF INTERVERTEBRAL DISCS OF CERVICAL SPINE: ICD-10-CM

## 2017-05-25 DIAGNOSIS — G89.29 CHRONIC RIGHT SHOULDER PAIN: ICD-10-CM

## 2017-05-25 DIAGNOSIS — Z96.652 STATUS POST TOTAL LEFT KNEE REPLACEMENT: ICD-10-CM

## 2017-05-25 DIAGNOSIS — M25.562 PAIN AND SWELLING OF LEFT KNEE: ICD-10-CM

## 2017-05-25 DIAGNOSIS — M79.89 LEFT LEG SWELLING: ICD-10-CM

## 2017-05-26 ENCOUNTER — AMBULATORY - HEALTHEAST (OUTPATIENT)
Dept: LAB | Facility: CLINIC | Age: 47
End: 2017-05-26

## 2017-05-26 ENCOUNTER — RECORDS - HEALTHEAST (OUTPATIENT)
Dept: ADMINISTRATIVE | Facility: OTHER | Age: 47
End: 2017-05-26

## 2017-05-26 DIAGNOSIS — R19.7 ACUTE DIARRHEA: ICD-10-CM

## 2017-05-30 ENCOUNTER — OFFICE VISIT - HEALTHEAST (OUTPATIENT)
Dept: PHYSICAL THERAPY | Facility: REHABILITATION | Age: 47
End: 2017-05-30

## 2017-05-30 DIAGNOSIS — M54.2 CERVICALGIA: ICD-10-CM

## 2017-05-30 DIAGNOSIS — M79.89 LEFT LEG SWELLING: ICD-10-CM

## 2017-05-30 DIAGNOSIS — R26.9 ABNORMALITY OF GAIT: ICD-10-CM

## 2017-05-30 DIAGNOSIS — M89.9 SCAPULAR DYSFUNCTION: ICD-10-CM

## 2017-05-30 DIAGNOSIS — M53.82 DECREASED ROM OF INTERVERTEBRAL DISCS OF CERVICAL SPINE: ICD-10-CM

## 2017-05-30 DIAGNOSIS — M62.81 MUSCLE WEAKNESS (GENERALIZED): ICD-10-CM

## 2017-05-30 DIAGNOSIS — Z96.652 STATUS POST TOTAL LEFT KNEE REPLACEMENT: ICD-10-CM

## 2017-05-30 DIAGNOSIS — M25.511 CHRONIC RIGHT SHOULDER PAIN: ICD-10-CM

## 2017-05-30 DIAGNOSIS — M25.462 PAIN AND SWELLING OF LEFT KNEE: ICD-10-CM

## 2017-05-30 DIAGNOSIS — G89.29 CHRONIC RIGHT SHOULDER PAIN: ICD-10-CM

## 2017-05-30 DIAGNOSIS — M25.611 DECREASED ROM OF RIGHT SHOULDER: ICD-10-CM

## 2017-05-30 DIAGNOSIS — M25.562 PAIN AND SWELLING OF LEFT KNEE: ICD-10-CM

## 2017-05-31 ENCOUNTER — COMMUNICATION - HEALTHEAST (OUTPATIENT)
Dept: INTERNAL MEDICINE | Facility: CLINIC | Age: 47
End: 2017-05-31

## 2017-06-01 ENCOUNTER — OFFICE VISIT - HEALTHEAST (OUTPATIENT)
Dept: PHYSICAL THERAPY | Facility: REHABILITATION | Age: 47
End: 2017-06-01

## 2017-06-01 DIAGNOSIS — M25.611 DECREASED ROM OF RIGHT SHOULDER: ICD-10-CM

## 2017-06-01 DIAGNOSIS — M25.511 CHRONIC RIGHT SHOULDER PAIN: ICD-10-CM

## 2017-06-01 DIAGNOSIS — M54.2 CERVICALGIA: ICD-10-CM

## 2017-06-01 DIAGNOSIS — M25.462 PAIN AND SWELLING OF LEFT KNEE: ICD-10-CM

## 2017-06-01 DIAGNOSIS — M53.82 DECREASED ROM OF INTERVERTEBRAL DISCS OF CERVICAL SPINE: ICD-10-CM

## 2017-06-01 DIAGNOSIS — M79.89 LEFT LEG SWELLING: ICD-10-CM

## 2017-06-01 DIAGNOSIS — Z96.652 STATUS POST TOTAL LEFT KNEE REPLACEMENT: ICD-10-CM

## 2017-06-01 DIAGNOSIS — M62.81 MUSCLE WEAKNESS (GENERALIZED): ICD-10-CM

## 2017-06-01 DIAGNOSIS — M25.562 PAIN AND SWELLING OF LEFT KNEE: ICD-10-CM

## 2017-06-01 DIAGNOSIS — G89.29 CHRONIC RIGHT SHOULDER PAIN: ICD-10-CM

## 2017-06-01 DIAGNOSIS — R26.9 ABNORMALITY OF GAIT: ICD-10-CM

## 2017-06-01 DIAGNOSIS — M89.9 SCAPULAR DYSFUNCTION: ICD-10-CM

## 2017-06-05 ENCOUNTER — OFFICE VISIT - HEALTHEAST (OUTPATIENT)
Dept: PHYSICAL THERAPY | Facility: REHABILITATION | Age: 47
End: 2017-06-05

## 2017-06-05 DIAGNOSIS — M53.82 DECREASED ROM OF INTERVERTEBRAL DISCS OF CERVICAL SPINE: ICD-10-CM

## 2017-06-05 DIAGNOSIS — M25.462 PAIN AND SWELLING OF LEFT KNEE: ICD-10-CM

## 2017-06-05 DIAGNOSIS — M89.9 SCAPULAR DYSFUNCTION: ICD-10-CM

## 2017-06-05 DIAGNOSIS — Z96.652 STATUS POST TOTAL LEFT KNEE REPLACEMENT: ICD-10-CM

## 2017-06-05 DIAGNOSIS — R26.9 ABNORMALITY OF GAIT: ICD-10-CM

## 2017-06-05 DIAGNOSIS — M62.81 MUSCLE WEAKNESS (GENERALIZED): ICD-10-CM

## 2017-06-05 DIAGNOSIS — M54.2 CERVICALGIA: ICD-10-CM

## 2017-06-05 DIAGNOSIS — M79.89 LEFT LEG SWELLING: ICD-10-CM

## 2017-06-05 DIAGNOSIS — M25.611 DECREASED ROM OF RIGHT SHOULDER: ICD-10-CM

## 2017-06-05 DIAGNOSIS — M25.562 PAIN AND SWELLING OF LEFT KNEE: ICD-10-CM

## 2017-06-05 DIAGNOSIS — G89.29 CHRONIC RIGHT SHOULDER PAIN: ICD-10-CM

## 2017-06-05 DIAGNOSIS — M25.511 CHRONIC RIGHT SHOULDER PAIN: ICD-10-CM

## 2017-06-11 ENCOUNTER — COMMUNICATION - HEALTHEAST (OUTPATIENT)
Dept: INTERNAL MEDICINE | Facility: CLINIC | Age: 47
End: 2017-06-11

## 2017-06-11 DIAGNOSIS — R52 PAIN: ICD-10-CM

## 2017-06-12 ENCOUNTER — OFFICE VISIT - HEALTHEAST (OUTPATIENT)
Dept: PHYSICAL THERAPY | Facility: REHABILITATION | Age: 47
End: 2017-06-12

## 2017-06-12 DIAGNOSIS — M53.82 DECREASED ROM OF INTERVERTEBRAL DISCS OF CERVICAL SPINE: ICD-10-CM

## 2017-06-12 DIAGNOSIS — M25.462 PAIN AND SWELLING OF LEFT KNEE: ICD-10-CM

## 2017-06-12 DIAGNOSIS — M25.562 PAIN AND SWELLING OF LEFT KNEE: ICD-10-CM

## 2017-06-12 DIAGNOSIS — Z96.652 STATUS POST TOTAL LEFT KNEE REPLACEMENT: ICD-10-CM

## 2017-06-12 DIAGNOSIS — M54.2 CERVICALGIA: ICD-10-CM

## 2017-06-12 DIAGNOSIS — R26.9 ABNORMALITY OF GAIT: ICD-10-CM

## 2017-06-12 DIAGNOSIS — M89.9 SCAPULAR DYSFUNCTION: ICD-10-CM

## 2017-06-12 DIAGNOSIS — M79.89 LEFT LEG SWELLING: ICD-10-CM

## 2017-06-12 DIAGNOSIS — M25.511 CHRONIC RIGHT SHOULDER PAIN: ICD-10-CM

## 2017-06-12 DIAGNOSIS — M25.611 DECREASED ROM OF RIGHT SHOULDER: ICD-10-CM

## 2017-06-12 DIAGNOSIS — G89.29 CHRONIC RIGHT SHOULDER PAIN: ICD-10-CM

## 2017-06-12 DIAGNOSIS — M62.81 MUSCLE WEAKNESS (GENERALIZED): ICD-10-CM

## 2017-06-14 ENCOUNTER — HOSPITAL ENCOUNTER (OUTPATIENT)
Dept: NEUROLOGY | Facility: CLINIC | Age: 47
Setting detail: THERAPIES SERIES
Discharge: STILL ADMITTED OR ADMINISTRATIVE | End: 2017-06-14
Attending: NURSE PRACTITIONER

## 2017-06-15 ENCOUNTER — OFFICE VISIT - HEALTHEAST (OUTPATIENT)
Dept: PHYSICAL THERAPY | Facility: REHABILITATION | Age: 47
End: 2017-06-15

## 2017-06-15 DIAGNOSIS — M62.81 MUSCLE WEAKNESS (GENERALIZED): ICD-10-CM

## 2017-06-15 DIAGNOSIS — M25.562 PAIN AND SWELLING OF LEFT KNEE: ICD-10-CM

## 2017-06-15 DIAGNOSIS — Z96.652 STATUS POST TOTAL LEFT KNEE REPLACEMENT: ICD-10-CM

## 2017-06-15 DIAGNOSIS — R26.9 ABNORMALITY OF GAIT: ICD-10-CM

## 2017-06-15 DIAGNOSIS — M25.462 PAIN AND SWELLING OF LEFT KNEE: ICD-10-CM

## 2017-06-15 DIAGNOSIS — M79.89 LEFT LEG SWELLING: ICD-10-CM

## 2017-06-16 ENCOUNTER — AMBULATORY - HEALTHEAST (OUTPATIENT)
Dept: ADMINISTRATIVE | Facility: REHABILITATION | Age: 47
End: 2017-06-16

## 2017-06-16 DIAGNOSIS — M25.562 LEFT KNEE PAIN: ICD-10-CM

## 2017-06-26 ENCOUNTER — OFFICE VISIT - HEALTHEAST (OUTPATIENT)
Dept: INTERNAL MEDICINE | Facility: CLINIC | Age: 47
End: 2017-06-26

## 2017-06-26 ENCOUNTER — OFFICE VISIT - HEALTHEAST (OUTPATIENT)
Dept: PHYSICAL THERAPY | Facility: REHABILITATION | Age: 47
End: 2017-06-26

## 2017-06-26 DIAGNOSIS — F31.81 BIPOLAR 2 DISORDER (H): ICD-10-CM

## 2017-06-26 DIAGNOSIS — M25.462 PAIN AND SWELLING OF LEFT KNEE: ICD-10-CM

## 2017-06-26 DIAGNOSIS — M25.562 PAIN AND SWELLING OF LEFT KNEE: ICD-10-CM

## 2017-06-26 DIAGNOSIS — R26.9 ABNORMALITY OF GAIT: ICD-10-CM

## 2017-06-26 DIAGNOSIS — F07.81 POST CONCUSSION SYNDROME: ICD-10-CM

## 2017-06-26 DIAGNOSIS — G89.4 CHRONIC PAIN SYNDROME: ICD-10-CM

## 2017-06-26 DIAGNOSIS — M79.89 LEFT LEG SWELLING: ICD-10-CM

## 2017-06-26 DIAGNOSIS — Z96.652 STATUS POST TOTAL LEFT KNEE REPLACEMENT: ICD-10-CM

## 2017-06-26 DIAGNOSIS — M62.81 MUSCLE WEAKNESS (GENERALIZED): ICD-10-CM

## 2017-06-26 DIAGNOSIS — I10 HYPERTENSION: ICD-10-CM

## 2017-06-26 ASSESSMENT — MIFFLIN-ST. JEOR: SCORE: 1855.77

## 2017-07-03 ENCOUNTER — OFFICE VISIT - HEALTHEAST (OUTPATIENT)
Dept: PHYSICAL THERAPY | Facility: REHABILITATION | Age: 47
End: 2017-07-03

## 2017-07-03 DIAGNOSIS — M79.89 LEFT LEG SWELLING: ICD-10-CM

## 2017-07-03 DIAGNOSIS — M62.81 MUSCLE WEAKNESS (GENERALIZED): ICD-10-CM

## 2017-07-03 DIAGNOSIS — R26.9 ABNORMALITY OF GAIT: ICD-10-CM

## 2017-07-03 DIAGNOSIS — M25.462 PAIN AND SWELLING OF LEFT KNEE: ICD-10-CM

## 2017-07-03 DIAGNOSIS — M25.562 PAIN AND SWELLING OF LEFT KNEE: ICD-10-CM

## 2017-07-03 DIAGNOSIS — Z96.652 STATUS POST TOTAL LEFT KNEE REPLACEMENT: ICD-10-CM

## 2017-07-06 ENCOUNTER — OFFICE VISIT - HEALTHEAST (OUTPATIENT)
Dept: PHYSICAL THERAPY | Facility: REHABILITATION | Age: 47
End: 2017-07-06

## 2017-07-06 DIAGNOSIS — Z96.652 STATUS POST TOTAL LEFT KNEE REPLACEMENT: ICD-10-CM

## 2017-07-06 DIAGNOSIS — M62.81 MUSCLE WEAKNESS (GENERALIZED): ICD-10-CM

## 2017-07-06 DIAGNOSIS — M79.89 LEFT LEG SWELLING: ICD-10-CM

## 2017-07-06 DIAGNOSIS — M25.462 PAIN AND SWELLING OF LEFT KNEE: ICD-10-CM

## 2017-07-06 DIAGNOSIS — R26.9 ABNORMALITY OF GAIT: ICD-10-CM

## 2017-07-06 DIAGNOSIS — M25.562 PAIN AND SWELLING OF LEFT KNEE: ICD-10-CM

## 2017-07-17 ENCOUNTER — RECORDS - HEALTHEAST (OUTPATIENT)
Dept: ADMINISTRATIVE | Facility: OTHER | Age: 47
End: 2017-07-17

## 2017-07-17 ENCOUNTER — AMBULATORY - HEALTHEAST (OUTPATIENT)
Dept: ADMINISTRATIVE | Facility: REHABILITATION | Age: 47
End: 2017-07-17

## 2017-07-17 DIAGNOSIS — M25.511 RIGHT SHOULDER PAIN: ICD-10-CM

## 2017-07-23 ENCOUNTER — COMMUNICATION - HEALTHEAST (OUTPATIENT)
Dept: INTERNAL MEDICINE | Facility: CLINIC | Age: 47
End: 2017-07-23

## 2017-07-24 ENCOUNTER — OFFICE VISIT - HEALTHEAST (OUTPATIENT)
Dept: INTERNAL MEDICINE | Facility: CLINIC | Age: 47
End: 2017-07-24

## 2017-07-24 DIAGNOSIS — I10 HYPERTENSION: ICD-10-CM

## 2017-07-24 DIAGNOSIS — G89.4 CHRONIC PAIN SYNDROME: ICD-10-CM

## 2017-07-24 ASSESSMENT — MIFFLIN-ST. JEOR: SCORE: 1818.91

## 2017-08-18 ENCOUNTER — COMMUNICATION - HEALTHEAST (OUTPATIENT)
Dept: INTERNAL MEDICINE | Facility: CLINIC | Age: 47
End: 2017-08-18

## 2017-08-21 ENCOUNTER — OFFICE VISIT - HEALTHEAST (OUTPATIENT)
Dept: INTERNAL MEDICINE | Facility: CLINIC | Age: 47
End: 2017-08-21

## 2017-08-21 DIAGNOSIS — K21.9 GASTROESOPHAGEAL REFLUX DISEASE WITHOUT ESOPHAGITIS: ICD-10-CM

## 2017-08-21 DIAGNOSIS — G89.4 CHRONIC PAIN SYNDROME: ICD-10-CM

## 2017-08-22 ENCOUNTER — OFFICE VISIT - HEALTHEAST (OUTPATIENT)
Dept: PHYSICAL THERAPY | Facility: REHABILITATION | Age: 47
End: 2017-08-22

## 2017-08-22 DIAGNOSIS — M89.9 SCAPULAR DYSFUNCTION: ICD-10-CM

## 2017-08-22 DIAGNOSIS — M25.511 CHRONIC RIGHT SHOULDER PAIN: ICD-10-CM

## 2017-08-22 DIAGNOSIS — Z96.652 STATUS POST TOTAL LEFT KNEE REPLACEMENT: ICD-10-CM

## 2017-08-22 DIAGNOSIS — M62.81 MUSCLE WEAKNESS (GENERALIZED): ICD-10-CM

## 2017-08-22 DIAGNOSIS — G89.29 CHRONIC RIGHT SHOULDER PAIN: ICD-10-CM

## 2017-08-22 DIAGNOSIS — M25.462 PAIN AND SWELLING OF LEFT KNEE: ICD-10-CM

## 2017-08-22 DIAGNOSIS — M25.562 PAIN AND SWELLING OF LEFT KNEE: ICD-10-CM

## 2017-08-24 ENCOUNTER — AMBULATORY - HEALTHEAST (OUTPATIENT)
Dept: ADMINISTRATIVE | Facility: REHABILITATION | Age: 47
End: 2017-08-24

## 2017-08-24 DIAGNOSIS — Z98.890 S/P LEFT KNEE SURGERY: ICD-10-CM

## 2017-08-26 ENCOUNTER — RECORDS - HEALTHEAST (OUTPATIENT)
Dept: ADMINISTRATIVE | Facility: OTHER | Age: 47
End: 2017-08-26

## 2017-08-30 ENCOUNTER — OFFICE VISIT - HEALTHEAST (OUTPATIENT)
Dept: PHYSICAL THERAPY | Facility: REHABILITATION | Age: 47
End: 2017-08-30

## 2017-08-30 DIAGNOSIS — M89.9 SCAPULAR DYSFUNCTION: ICD-10-CM

## 2017-08-30 DIAGNOSIS — Z96.652 STATUS POST TOTAL LEFT KNEE REPLACEMENT: ICD-10-CM

## 2017-08-30 DIAGNOSIS — M25.511 CHRONIC RIGHT SHOULDER PAIN: ICD-10-CM

## 2017-08-30 DIAGNOSIS — M25.462 PAIN AND SWELLING OF LEFT KNEE: ICD-10-CM

## 2017-08-30 DIAGNOSIS — M62.81 MUSCLE WEAKNESS (GENERALIZED): ICD-10-CM

## 2017-08-30 DIAGNOSIS — G89.29 CHRONIC RIGHT SHOULDER PAIN: ICD-10-CM

## 2017-08-30 DIAGNOSIS — M25.562 PAIN AND SWELLING OF LEFT KNEE: ICD-10-CM

## 2017-09-01 ENCOUNTER — OFFICE VISIT - HEALTHEAST (OUTPATIENT)
Dept: PHYSICAL THERAPY | Facility: REHABILITATION | Age: 47
End: 2017-09-01

## 2017-09-01 DIAGNOSIS — M25.462 PAIN AND SWELLING OF LEFT KNEE: ICD-10-CM

## 2017-09-01 DIAGNOSIS — Z96.652 STATUS POST TOTAL LEFT KNEE REPLACEMENT: ICD-10-CM

## 2017-09-01 DIAGNOSIS — M62.81 MUSCLE WEAKNESS (GENERALIZED): ICD-10-CM

## 2017-09-01 DIAGNOSIS — M25.511 CHRONIC RIGHT SHOULDER PAIN: ICD-10-CM

## 2017-09-01 DIAGNOSIS — G89.29 CHRONIC RIGHT SHOULDER PAIN: ICD-10-CM

## 2017-09-01 DIAGNOSIS — M25.562 PAIN AND SWELLING OF LEFT KNEE: ICD-10-CM

## 2017-09-01 DIAGNOSIS — M89.9 SCAPULAR DYSFUNCTION: ICD-10-CM

## 2017-09-06 ENCOUNTER — OFFICE VISIT - HEALTHEAST (OUTPATIENT)
Dept: PHYSICAL THERAPY | Facility: REHABILITATION | Age: 47
End: 2017-09-06

## 2017-09-06 ENCOUNTER — COMMUNICATION - HEALTHEAST (OUTPATIENT)
Dept: INTERNAL MEDICINE | Facility: CLINIC | Age: 47
End: 2017-09-06

## 2017-09-06 DIAGNOSIS — M25.511 CHRONIC RIGHT SHOULDER PAIN: ICD-10-CM

## 2017-09-06 DIAGNOSIS — M89.9 SCAPULAR DYSFUNCTION: ICD-10-CM

## 2017-09-06 DIAGNOSIS — M25.462 PAIN AND SWELLING OF LEFT KNEE: ICD-10-CM

## 2017-09-06 DIAGNOSIS — G89.29 CHRONIC RIGHT SHOULDER PAIN: ICD-10-CM

## 2017-09-06 DIAGNOSIS — Z96.652 STATUS POST TOTAL LEFT KNEE REPLACEMENT: ICD-10-CM

## 2017-09-06 DIAGNOSIS — M25.562 PAIN AND SWELLING OF LEFT KNEE: ICD-10-CM

## 2017-09-06 DIAGNOSIS — M62.81 MUSCLE WEAKNESS (GENERALIZED): ICD-10-CM

## 2017-09-08 ENCOUNTER — COMMUNICATION - HEALTHEAST (OUTPATIENT)
Dept: INTERNAL MEDICINE | Facility: CLINIC | Age: 47
End: 2017-09-08

## 2017-09-12 ENCOUNTER — OFFICE VISIT - HEALTHEAST (OUTPATIENT)
Dept: PHYSICAL THERAPY | Facility: REHABILITATION | Age: 47
End: 2017-09-12

## 2017-09-12 DIAGNOSIS — M25.562 PAIN AND SWELLING OF LEFT KNEE: ICD-10-CM

## 2017-09-12 DIAGNOSIS — M62.81 MUSCLE WEAKNESS (GENERALIZED): ICD-10-CM

## 2017-09-12 DIAGNOSIS — M89.9 SCAPULAR DYSFUNCTION: ICD-10-CM

## 2017-09-12 DIAGNOSIS — G89.29 CHRONIC RIGHT SHOULDER PAIN: ICD-10-CM

## 2017-09-12 DIAGNOSIS — M25.462 PAIN AND SWELLING OF LEFT KNEE: ICD-10-CM

## 2017-09-12 DIAGNOSIS — Z96.652 STATUS POST TOTAL LEFT KNEE REPLACEMENT: ICD-10-CM

## 2017-09-12 DIAGNOSIS — M25.511 CHRONIC RIGHT SHOULDER PAIN: ICD-10-CM

## 2017-09-14 ENCOUNTER — OFFICE VISIT - HEALTHEAST (OUTPATIENT)
Dept: PHYSICAL THERAPY | Facility: REHABILITATION | Age: 47
End: 2017-09-14

## 2017-09-14 DIAGNOSIS — M25.562 PAIN AND SWELLING OF LEFT KNEE: ICD-10-CM

## 2017-09-14 DIAGNOSIS — M62.81 MUSCLE WEAKNESS (GENERALIZED): ICD-10-CM

## 2017-09-14 DIAGNOSIS — M25.462 PAIN AND SWELLING OF LEFT KNEE: ICD-10-CM

## 2017-09-14 DIAGNOSIS — Z96.652 STATUS POST TOTAL LEFT KNEE REPLACEMENT: ICD-10-CM

## 2017-09-14 DIAGNOSIS — G89.29 CHRONIC RIGHT SHOULDER PAIN: ICD-10-CM

## 2017-09-14 DIAGNOSIS — M89.9 SCAPULAR DYSFUNCTION: ICD-10-CM

## 2017-09-14 DIAGNOSIS — M25.511 CHRONIC RIGHT SHOULDER PAIN: ICD-10-CM

## 2017-09-18 ENCOUNTER — OFFICE VISIT - HEALTHEAST (OUTPATIENT)
Dept: INTERNAL MEDICINE | Facility: CLINIC | Age: 47
End: 2017-09-18

## 2017-09-18 DIAGNOSIS — G89.4 CHRONIC PAIN SYNDROME: ICD-10-CM

## 2017-09-18 DIAGNOSIS — Z23 NEED FOR INFLUENZA VACCINATION: ICD-10-CM

## 2017-09-18 DIAGNOSIS — M54.12 BRACHIAL NEURITIS OR RADICULITIS: ICD-10-CM

## 2017-09-18 DIAGNOSIS — S83.207S UNSPECIFIED TEAR OF UNSPECIFIED MENISCUS, CURRENT INJURY, LEFT KNEE, SEQUELA: ICD-10-CM

## 2017-09-18 DIAGNOSIS — Q82.0 PRIMARY (CONGENITAL) LYMPHEDEMA: ICD-10-CM

## 2017-09-18 DIAGNOSIS — M75.41 SHOULDER IMPINGEMENT SYNDROME, RIGHT: ICD-10-CM

## 2017-09-18 DIAGNOSIS — M62.08 DIASTASIS RECTI: ICD-10-CM

## 2017-09-19 ENCOUNTER — COMMUNICATION - HEALTHEAST (OUTPATIENT)
Dept: INTERNAL MEDICINE | Facility: CLINIC | Age: 47
End: 2017-09-19

## 2017-09-20 ENCOUNTER — OFFICE VISIT - HEALTHEAST (OUTPATIENT)
Dept: PHYSICAL THERAPY | Facility: REHABILITATION | Age: 47
End: 2017-09-20

## 2017-09-20 DIAGNOSIS — M25.511 CHRONIC RIGHT SHOULDER PAIN: ICD-10-CM

## 2017-09-20 DIAGNOSIS — M62.81 MUSCLE WEAKNESS (GENERALIZED): ICD-10-CM

## 2017-09-20 DIAGNOSIS — Z96.652 STATUS POST TOTAL LEFT KNEE REPLACEMENT: ICD-10-CM

## 2017-09-20 DIAGNOSIS — M25.462 PAIN AND SWELLING OF LEFT KNEE: ICD-10-CM

## 2017-09-20 DIAGNOSIS — M89.9 SCAPULAR DYSFUNCTION: ICD-10-CM

## 2017-09-20 DIAGNOSIS — G89.29 CHRONIC RIGHT SHOULDER PAIN: ICD-10-CM

## 2017-09-20 DIAGNOSIS — M25.562 PAIN AND SWELLING OF LEFT KNEE: ICD-10-CM

## 2017-09-21 ENCOUNTER — COMMUNICATION - HEALTHEAST (OUTPATIENT)
Dept: INTERNAL MEDICINE | Facility: CLINIC | Age: 47
End: 2017-09-21

## 2017-09-22 ENCOUNTER — OFFICE VISIT - HEALTHEAST (OUTPATIENT)
Dept: PHYSICAL THERAPY | Facility: REHABILITATION | Age: 47
End: 2017-09-22

## 2017-09-22 DIAGNOSIS — M25.562 PAIN AND SWELLING OF LEFT KNEE: ICD-10-CM

## 2017-09-22 DIAGNOSIS — G89.29 CHRONIC RIGHT SHOULDER PAIN: ICD-10-CM

## 2017-09-22 DIAGNOSIS — M25.462 PAIN AND SWELLING OF LEFT KNEE: ICD-10-CM

## 2017-09-22 DIAGNOSIS — Z96.652 STATUS POST TOTAL LEFT KNEE REPLACEMENT: ICD-10-CM

## 2017-09-22 DIAGNOSIS — M62.81 MUSCLE WEAKNESS (GENERALIZED): ICD-10-CM

## 2017-09-22 DIAGNOSIS — M89.9 SCAPULAR DYSFUNCTION: ICD-10-CM

## 2017-09-22 DIAGNOSIS — M25.511 CHRONIC RIGHT SHOULDER PAIN: ICD-10-CM

## 2017-09-25 ENCOUNTER — RECORDS - HEALTHEAST (OUTPATIENT)
Dept: ADMINISTRATIVE | Facility: OTHER | Age: 47
End: 2017-09-25

## 2017-09-26 ENCOUNTER — OFFICE VISIT - HEALTHEAST (OUTPATIENT)
Dept: PHYSICAL THERAPY | Facility: REHABILITATION | Age: 47
End: 2017-09-26

## 2017-09-26 DIAGNOSIS — M25.562 PAIN AND SWELLING OF LEFT KNEE: ICD-10-CM

## 2017-09-26 DIAGNOSIS — G89.29 CHRONIC RIGHT SHOULDER PAIN: ICD-10-CM

## 2017-09-26 DIAGNOSIS — M25.511 CHRONIC RIGHT SHOULDER PAIN: ICD-10-CM

## 2017-09-26 DIAGNOSIS — Z96.652 STATUS POST TOTAL LEFT KNEE REPLACEMENT: ICD-10-CM

## 2017-09-26 DIAGNOSIS — M89.9 SCAPULAR DYSFUNCTION: ICD-10-CM

## 2017-09-26 DIAGNOSIS — M62.81 MUSCLE WEAKNESS (GENERALIZED): ICD-10-CM

## 2017-09-26 DIAGNOSIS — M25.462 PAIN AND SWELLING OF LEFT KNEE: ICD-10-CM

## 2017-09-27 ENCOUNTER — OFFICE VISIT - HEALTHEAST (OUTPATIENT)
Dept: VASCULAR SURGERY | Facility: CLINIC | Age: 47
End: 2017-09-27

## 2017-09-27 DIAGNOSIS — Q82.0 PRIMARY (CONGENITAL) LYMPHEDEMA: ICD-10-CM

## 2017-10-03 ENCOUNTER — RECORDS - HEALTHEAST (OUTPATIENT)
Dept: ADMINISTRATIVE | Facility: OTHER | Age: 47
End: 2017-10-03

## 2017-10-03 ENCOUNTER — OFFICE VISIT - HEALTHEAST (OUTPATIENT)
Dept: PHYSICAL THERAPY | Facility: REHABILITATION | Age: 47
End: 2017-10-03

## 2017-10-03 DIAGNOSIS — M25.462 PAIN AND SWELLING OF LEFT KNEE: ICD-10-CM

## 2017-10-03 DIAGNOSIS — M89.9 SCAPULAR DYSFUNCTION: ICD-10-CM

## 2017-10-03 DIAGNOSIS — Z96.652 STATUS POST TOTAL LEFT KNEE REPLACEMENT: ICD-10-CM

## 2017-10-03 DIAGNOSIS — M62.81 MUSCLE WEAKNESS (GENERALIZED): ICD-10-CM

## 2017-10-03 DIAGNOSIS — M25.511 CHRONIC RIGHT SHOULDER PAIN: ICD-10-CM

## 2017-10-03 DIAGNOSIS — R26.9 ABNORMALITY OF GAIT: ICD-10-CM

## 2017-10-03 DIAGNOSIS — M25.562 PAIN AND SWELLING OF LEFT KNEE: ICD-10-CM

## 2017-10-03 DIAGNOSIS — G89.29 CHRONIC RIGHT SHOULDER PAIN: ICD-10-CM

## 2017-10-05 ENCOUNTER — OFFICE VISIT - HEALTHEAST (OUTPATIENT)
Dept: PHYSICAL THERAPY | Facility: REHABILITATION | Age: 47
End: 2017-10-05

## 2017-10-05 DIAGNOSIS — M25.462 PAIN AND SWELLING OF LEFT KNEE: ICD-10-CM

## 2017-10-05 DIAGNOSIS — M62.81 MUSCLE WEAKNESS (GENERALIZED): ICD-10-CM

## 2017-10-05 DIAGNOSIS — M25.562 PAIN AND SWELLING OF LEFT KNEE: ICD-10-CM

## 2017-10-05 DIAGNOSIS — M89.9 SCAPULAR DYSFUNCTION: ICD-10-CM

## 2017-10-05 DIAGNOSIS — G89.29 CHRONIC RIGHT SHOULDER PAIN: ICD-10-CM

## 2017-10-05 DIAGNOSIS — R26.9 ABNORMALITY OF GAIT: ICD-10-CM

## 2017-10-05 DIAGNOSIS — Z96.652 STATUS POST TOTAL LEFT KNEE REPLACEMENT: ICD-10-CM

## 2017-10-05 DIAGNOSIS — M25.511 CHRONIC RIGHT SHOULDER PAIN: ICD-10-CM

## 2017-10-06 ENCOUNTER — RECORDS - HEALTHEAST (OUTPATIENT)
Dept: ADMINISTRATIVE | Facility: OTHER | Age: 47
End: 2017-10-06

## 2017-10-10 ENCOUNTER — OFFICE VISIT - HEALTHEAST (OUTPATIENT)
Dept: PHYSICAL THERAPY | Facility: REHABILITATION | Age: 47
End: 2017-10-10

## 2017-10-10 DIAGNOSIS — M25.562 PAIN AND SWELLING OF LEFT KNEE: ICD-10-CM

## 2017-10-10 DIAGNOSIS — G89.29 CHRONIC RIGHT SHOULDER PAIN: ICD-10-CM

## 2017-10-10 DIAGNOSIS — M79.89 LEFT LEG SWELLING: ICD-10-CM

## 2017-10-10 DIAGNOSIS — M89.9 SCAPULAR DYSFUNCTION: ICD-10-CM

## 2017-10-10 DIAGNOSIS — R26.9 ABNORMALITY OF GAIT: ICD-10-CM

## 2017-10-10 DIAGNOSIS — Z96.652 STATUS POST TOTAL LEFT KNEE REPLACEMENT: ICD-10-CM

## 2017-10-10 DIAGNOSIS — M25.511 CHRONIC RIGHT SHOULDER PAIN: ICD-10-CM

## 2017-10-10 DIAGNOSIS — M62.81 MUSCLE WEAKNESS (GENERALIZED): ICD-10-CM

## 2017-10-10 DIAGNOSIS — M25.462 PAIN AND SWELLING OF LEFT KNEE: ICD-10-CM

## 2017-10-12 ENCOUNTER — OFFICE VISIT - HEALTHEAST (OUTPATIENT)
Dept: PHYSICAL THERAPY | Facility: REHABILITATION | Age: 47
End: 2017-10-12

## 2017-10-12 DIAGNOSIS — M25.562 PAIN AND SWELLING OF LEFT KNEE: ICD-10-CM

## 2017-10-12 DIAGNOSIS — M25.511 CHRONIC RIGHT SHOULDER PAIN: ICD-10-CM

## 2017-10-12 DIAGNOSIS — M62.81 MUSCLE WEAKNESS (GENERALIZED): ICD-10-CM

## 2017-10-12 DIAGNOSIS — M89.9 SCAPULAR DYSFUNCTION: ICD-10-CM

## 2017-10-12 DIAGNOSIS — R26.9 ABNORMALITY OF GAIT: ICD-10-CM

## 2017-10-12 DIAGNOSIS — G89.29 CHRONIC RIGHT SHOULDER PAIN: ICD-10-CM

## 2017-10-12 DIAGNOSIS — M25.462 PAIN AND SWELLING OF LEFT KNEE: ICD-10-CM

## 2017-10-12 DIAGNOSIS — Z96.652 STATUS POST TOTAL LEFT KNEE REPLACEMENT: ICD-10-CM

## 2017-10-16 ENCOUNTER — COMMUNICATION - HEALTHEAST (OUTPATIENT)
Dept: INTERNAL MEDICINE | Facility: CLINIC | Age: 47
End: 2017-10-16

## 2017-10-16 ENCOUNTER — OFFICE VISIT - HEALTHEAST (OUTPATIENT)
Dept: INTERNAL MEDICINE | Facility: CLINIC | Age: 47
End: 2017-10-16

## 2017-10-16 ENCOUNTER — COMMUNICATION - HEALTHEAST (OUTPATIENT)
Dept: SCHEDULING | Facility: CLINIC | Age: 47
End: 2017-10-16

## 2017-10-16 DIAGNOSIS — G89.4 CHRONIC PAIN SYNDROME: ICD-10-CM

## 2017-10-16 DIAGNOSIS — I10 ESSENTIAL HYPERTENSION: ICD-10-CM

## 2017-10-16 DIAGNOSIS — F43.10 PTSD (POST-TRAUMATIC STRESS DISORDER): ICD-10-CM

## 2017-10-16 DIAGNOSIS — M54.12 BRACHIAL NEURITIS OR RADICULITIS: ICD-10-CM

## 2017-10-17 ENCOUNTER — RECORDS - HEALTHEAST (OUTPATIENT)
Dept: ADMINISTRATIVE | Facility: OTHER | Age: 47
End: 2017-10-17

## 2017-10-19 ENCOUNTER — OFFICE VISIT - HEALTHEAST (OUTPATIENT)
Dept: PHYSICAL THERAPY | Facility: REHABILITATION | Age: 47
End: 2017-10-19

## 2017-10-19 DIAGNOSIS — G89.29 CHRONIC RIGHT SHOULDER PAIN: ICD-10-CM

## 2017-10-19 DIAGNOSIS — M25.462 PAIN AND SWELLING OF LEFT KNEE: ICD-10-CM

## 2017-10-19 DIAGNOSIS — Z96.652 STATUS POST TOTAL LEFT KNEE REPLACEMENT: ICD-10-CM

## 2017-10-19 DIAGNOSIS — M89.9 SCAPULAR DYSFUNCTION: ICD-10-CM

## 2017-10-19 DIAGNOSIS — R26.9 ABNORMALITY OF GAIT: ICD-10-CM

## 2017-10-19 DIAGNOSIS — M25.562 PAIN AND SWELLING OF LEFT KNEE: ICD-10-CM

## 2017-10-19 DIAGNOSIS — M62.81 MUSCLE WEAKNESS (GENERALIZED): ICD-10-CM

## 2017-10-19 DIAGNOSIS — M25.511 CHRONIC RIGHT SHOULDER PAIN: ICD-10-CM

## 2017-10-24 ENCOUNTER — RECORDS - HEALTHEAST (OUTPATIENT)
Dept: ADMINISTRATIVE | Facility: OTHER | Age: 47
End: 2017-10-24

## 2017-10-26 ENCOUNTER — OFFICE VISIT - HEALTHEAST (OUTPATIENT)
Dept: PHYSICAL THERAPY | Facility: REHABILITATION | Age: 47
End: 2017-10-26

## 2017-10-26 DIAGNOSIS — M89.9 SCAPULAR DYSFUNCTION: ICD-10-CM

## 2017-10-26 DIAGNOSIS — M25.562 PAIN AND SWELLING OF LEFT KNEE: ICD-10-CM

## 2017-10-26 DIAGNOSIS — G89.29 CHRONIC RIGHT SHOULDER PAIN: ICD-10-CM

## 2017-10-26 DIAGNOSIS — M25.462 PAIN AND SWELLING OF LEFT KNEE: ICD-10-CM

## 2017-10-26 DIAGNOSIS — M62.81 MUSCLE WEAKNESS (GENERALIZED): ICD-10-CM

## 2017-10-26 DIAGNOSIS — M25.511 CHRONIC RIGHT SHOULDER PAIN: ICD-10-CM

## 2017-10-27 ENCOUNTER — RECORDS - HEALTHEAST (OUTPATIENT)
Dept: ADMINISTRATIVE | Facility: OTHER | Age: 47
End: 2017-10-27

## 2017-10-30 ENCOUNTER — OFFICE VISIT - HEALTHEAST (OUTPATIENT)
Dept: PHYSICAL THERAPY | Facility: REHABILITATION | Age: 47
End: 2017-10-30

## 2017-10-30 DIAGNOSIS — M89.9 SCAPULAR DYSFUNCTION: ICD-10-CM

## 2017-10-30 DIAGNOSIS — Z96.652 STATUS POST TOTAL LEFT KNEE REPLACEMENT: ICD-10-CM

## 2017-10-30 DIAGNOSIS — R26.9 ABNORMALITY OF GAIT: ICD-10-CM

## 2017-10-30 DIAGNOSIS — M62.81 MUSCLE WEAKNESS (GENERALIZED): ICD-10-CM

## 2017-10-30 DIAGNOSIS — M25.511 CHRONIC RIGHT SHOULDER PAIN: ICD-10-CM

## 2017-10-30 DIAGNOSIS — G89.29 CHRONIC RIGHT SHOULDER PAIN: ICD-10-CM

## 2017-11-03 ENCOUNTER — COMMUNICATION - HEALTHEAST (OUTPATIENT)
Dept: INTERNAL MEDICINE | Facility: CLINIC | Age: 47
End: 2017-11-03

## 2017-11-08 ENCOUNTER — COMMUNICATION - HEALTHEAST (OUTPATIENT)
Dept: INTERNAL MEDICINE | Facility: CLINIC | Age: 47
End: 2017-11-08

## 2017-11-08 DIAGNOSIS — I10 ESSENTIAL HYPERTENSION: ICD-10-CM

## 2017-11-13 ENCOUNTER — OFFICE VISIT - HEALTHEAST (OUTPATIENT)
Dept: INTERNAL MEDICINE | Facility: CLINIC | Age: 47
End: 2017-11-13

## 2017-11-13 DIAGNOSIS — S06.9X9S TRAUMATIC BRAIN INJURY WITH LOSS OF CONSCIOUSNESS, SEQUELA (H): ICD-10-CM

## 2017-11-13 DIAGNOSIS — F41.0 PANIC ATTACKS: ICD-10-CM

## 2017-11-13 DIAGNOSIS — F43.10 PTSD (POST-TRAUMATIC STRESS DISORDER): ICD-10-CM

## 2017-11-13 DIAGNOSIS — F11.90 CHRONIC, CONTINUOUS USE OF OPIOIDS: ICD-10-CM

## 2017-11-13 DIAGNOSIS — M54.12 BRACHIAL NEURITIS OR RADICULITIS: ICD-10-CM

## 2017-11-13 DIAGNOSIS — F31.81 BIPOLAR 2 DISORDER (H): ICD-10-CM

## 2017-11-13 DIAGNOSIS — G89.4 CHRONIC PAIN SYNDROME: ICD-10-CM

## 2017-11-13 DIAGNOSIS — M54.12 C7 RADICULOPATHY: ICD-10-CM

## 2017-11-13 DIAGNOSIS — G56.21 ENTRAPMENT OF RIGHT ULNAR NERVE: ICD-10-CM

## 2017-11-13 DIAGNOSIS — F41.1 GENERALIZED ANXIETY DISORDER: ICD-10-CM

## 2017-11-17 ENCOUNTER — RECORDS - HEALTHEAST (OUTPATIENT)
Dept: ADMINISTRATIVE | Facility: OTHER | Age: 47
End: 2017-11-17

## 2017-11-21 ENCOUNTER — RECORDS - HEALTHEAST (OUTPATIENT)
Dept: ADMINISTRATIVE | Facility: OTHER | Age: 47
End: 2017-11-21

## 2017-11-21 ENCOUNTER — COMMUNICATION - HEALTHEAST (OUTPATIENT)
Dept: INTERNAL MEDICINE | Facility: CLINIC | Age: 47
End: 2017-11-21

## 2017-11-22 ENCOUNTER — HOSPITAL ENCOUNTER (OUTPATIENT)
Dept: INTERVENTIONAL RADIOLOGY/VASCULAR | Facility: CLINIC | Age: 47
Discharge: HOME | End: 2017-11-22
Attending: ORTHOPAEDIC SURGERY

## 2017-12-07 ENCOUNTER — HOSPITAL ENCOUNTER (OUTPATIENT)
Dept: INTERVENTIONAL RADIOLOGY/VASCULAR | Facility: CLINIC | Age: 47
Discharge: HOME | End: 2017-12-07
Attending: ORTHOPAEDIC SURGERY

## 2017-12-07 DIAGNOSIS — M54.2 NECK PAIN: ICD-10-CM

## 2017-12-07 ASSESSMENT — MIFFLIN-ST. JEOR: SCORE: 1859.45

## 2017-12-11 ENCOUNTER — OFFICE VISIT - HEALTHEAST (OUTPATIENT)
Dept: INTERNAL MEDICINE | Facility: CLINIC | Age: 47
End: 2017-12-11

## 2017-12-11 DIAGNOSIS — F31.81 BIPOLAR 2 DISORDER (H): ICD-10-CM

## 2017-12-11 DIAGNOSIS — G89.4 CHRONIC PAIN SYNDROME: ICD-10-CM

## 2017-12-11 DIAGNOSIS — F11.90 CHRONIC, CONTINUOUS USE OF OPIOIDS: ICD-10-CM

## 2017-12-11 ASSESSMENT — MIFFLIN-ST. JEOR: SCORE: 1878.22

## 2017-12-12 ENCOUNTER — RECORDS - HEALTHEAST (OUTPATIENT)
Dept: ADMINISTRATIVE | Facility: OTHER | Age: 47
End: 2017-12-12

## 2017-12-18 ENCOUNTER — COMMUNICATION - HEALTHEAST (OUTPATIENT)
Dept: INTERNAL MEDICINE | Facility: CLINIC | Age: 47
End: 2017-12-18

## 2017-12-18 DIAGNOSIS — Z00.00 HEALTHCARE MAINTENANCE: ICD-10-CM

## 2018-01-03 ENCOUNTER — RECORDS - HEALTHEAST (OUTPATIENT)
Dept: ADMINISTRATIVE | Facility: OTHER | Age: 48
End: 2018-01-03

## 2018-01-08 ENCOUNTER — OFFICE VISIT - HEALTHEAST (OUTPATIENT)
Dept: INTERNAL MEDICINE | Facility: CLINIC | Age: 48
End: 2018-01-08

## 2018-01-08 DIAGNOSIS — Z79.899 ENCOUNTER FOR MEDICATION MANAGEMENT: ICD-10-CM

## 2018-01-08 DIAGNOSIS — G89.4 CHRONIC PAIN SYNDROME: ICD-10-CM

## 2018-01-08 DIAGNOSIS — G56.21 ENTRAPMENT OF RIGHT ULNAR NERVE: ICD-10-CM

## 2018-01-08 DIAGNOSIS — M54.12 C7 RADICULOPATHY: ICD-10-CM

## 2018-01-24 ENCOUNTER — COMMUNICATION - HEALTHEAST (OUTPATIENT)
Dept: INTERNAL MEDICINE | Facility: CLINIC | Age: 48
End: 2018-01-24

## 2018-01-24 DIAGNOSIS — Z79.899 MEDICATION MANAGEMENT: ICD-10-CM

## 2018-02-05 ENCOUNTER — OFFICE VISIT - HEALTHEAST (OUTPATIENT)
Dept: INTERNAL MEDICINE | Facility: CLINIC | Age: 48
End: 2018-02-05

## 2018-02-05 DIAGNOSIS — G56.21 ENTRAPMENT OF RIGHT ULNAR NERVE: ICD-10-CM

## 2018-02-05 DIAGNOSIS — M54.12 C7 RADICULOPATHY: ICD-10-CM

## 2018-02-05 DIAGNOSIS — G89.4 CHRONIC PAIN SYNDROME: ICD-10-CM

## 2018-02-05 DIAGNOSIS — F11.90 CHRONIC, CONTINUOUS USE OF OPIOIDS: ICD-10-CM

## 2018-02-07 ENCOUNTER — COMMUNICATION - HEALTHEAST (OUTPATIENT)
Dept: INTERNAL MEDICINE | Facility: CLINIC | Age: 48
End: 2018-02-07

## 2018-02-07 DIAGNOSIS — I10 HYPERTENSION: ICD-10-CM

## 2018-02-26 ENCOUNTER — HOSPITAL ENCOUNTER (OUTPATIENT)
Dept: NEUROLOGY | Facility: CLINIC | Age: 48
Setting detail: THERAPIES SERIES
Discharge: STILL ADMITTED OR ADMINISTRATIVE | End: 2018-02-26
Attending: NURSE PRACTITIONER

## 2018-02-26 DIAGNOSIS — F06.70 MILD NEUROCOGNITIVE DISORDER DUE TO MULTIPLE ETIOLOGIES: ICD-10-CM

## 2018-03-05 ENCOUNTER — OFFICE VISIT - HEALTHEAST (OUTPATIENT)
Dept: INTERNAL MEDICINE | Facility: CLINIC | Age: 48
End: 2018-03-05

## 2018-03-05 DIAGNOSIS — G89.4 CHRONIC PAIN SYNDROME: ICD-10-CM

## 2018-03-05 DIAGNOSIS — M54.12 C7 RADICULOPATHY: ICD-10-CM

## 2018-03-05 DIAGNOSIS — S06.9X9S TRAUMATIC BRAIN INJURY WITH LOSS OF CONSCIOUSNESS, SEQUELA (H): ICD-10-CM

## 2018-03-05 DIAGNOSIS — F31.81 BIPOLAR 2 DISORDER (H): ICD-10-CM

## 2018-03-05 DIAGNOSIS — M54.12 CERVICAL RADICULOPATHY AT C8: ICD-10-CM

## 2018-03-13 ENCOUNTER — RECORDS - HEALTHEAST (OUTPATIENT)
Dept: ADMINISTRATIVE | Facility: OTHER | Age: 48
End: 2018-03-13

## 2018-03-16 ENCOUNTER — COMMUNICATION - HEALTHEAST (OUTPATIENT)
Dept: SCHEDULING | Facility: CLINIC | Age: 48
End: 2018-03-16

## 2018-03-28 ENCOUNTER — AMBULATORY - HEALTHEAST (OUTPATIENT)
Dept: INTENSIVE CARE | Facility: CLINIC | Age: 48
End: 2018-03-28

## 2018-03-28 DIAGNOSIS — M54.2 NECK PAIN: ICD-10-CM

## 2018-04-02 ENCOUNTER — COMMUNICATION - HEALTHEAST (OUTPATIENT)
Dept: INTERNAL MEDICINE | Facility: CLINIC | Age: 48
End: 2018-04-02

## 2018-04-02 ENCOUNTER — OFFICE VISIT - HEALTHEAST (OUTPATIENT)
Dept: INTERNAL MEDICINE | Facility: CLINIC | Age: 48
End: 2018-04-02

## 2018-04-02 DIAGNOSIS — G89.4 CHRONIC PAIN SYNDROME: ICD-10-CM

## 2018-04-02 DIAGNOSIS — F11.90 CHRONIC, CONTINUOUS USE OF OPIOIDS: ICD-10-CM

## 2018-04-02 DIAGNOSIS — I10 ESSENTIAL HYPERTENSION: ICD-10-CM

## 2018-04-03 ENCOUNTER — RECORDS - HEALTHEAST (OUTPATIENT)
Dept: ADMINISTRATIVE | Facility: OTHER | Age: 48
End: 2018-04-03

## 2018-04-05 ENCOUNTER — HOSPITAL ENCOUNTER (OUTPATIENT)
Dept: INTERVENTIONAL RADIOLOGY/VASCULAR | Facility: CLINIC | Age: 48
Discharge: HOME | End: 2018-04-05
Admitting: RADIOLOGY

## 2018-04-05 DIAGNOSIS — M54.2 NECK PAIN: ICD-10-CM

## 2018-04-05 LAB
HGB BLD-MCNC: 13 G/DL (ref 14–18)
INR PPP: 0.89 (ref 0.9–1.1)
PLATELET # BLD AUTO: 281 THOU/UL (ref 140–440)

## 2018-04-05 ASSESSMENT — MIFFLIN-ST. JEOR: SCORE: 1868.69

## 2018-04-20 ENCOUNTER — COMMUNICATION - HEALTHEAST (OUTPATIENT)
Dept: INTERNAL MEDICINE | Facility: CLINIC | Age: 48
End: 2018-04-20

## 2018-04-20 ENCOUNTER — RECORDS - HEALTHEAST (OUTPATIENT)
Dept: ADMINISTRATIVE | Facility: OTHER | Age: 48
End: 2018-04-20

## 2018-04-20 DIAGNOSIS — T78.40XA ALLERGY: ICD-10-CM

## 2018-04-30 ENCOUNTER — OFFICE VISIT - HEALTHEAST (OUTPATIENT)
Dept: INTERNAL MEDICINE | Facility: CLINIC | Age: 48
End: 2018-04-30

## 2018-04-30 DIAGNOSIS — S06.9X9S TRAUMATIC BRAIN INJURY WITH LOSS OF CONSCIOUSNESS, SEQUELA (H): ICD-10-CM

## 2018-04-30 DIAGNOSIS — G89.4 CHRONIC PAIN SYNDROME: ICD-10-CM

## 2018-04-30 DIAGNOSIS — F31.81 BIPOLAR 2 DISORDER (H): ICD-10-CM

## 2018-04-30 DIAGNOSIS — M54.12 CERVICAL RADICULOPATHY AT C8: ICD-10-CM

## 2018-04-30 DIAGNOSIS — F11.90 CHRONIC, CONTINUOUS USE OF OPIOIDS: ICD-10-CM

## 2018-04-30 DIAGNOSIS — I10 ESSENTIAL HYPERTENSION: ICD-10-CM

## 2018-04-30 LAB
AMPHETAMINES UR QL SCN: ABNORMAL
BARBITURATES UR QL: ABNORMAL
BENZODIAZ UR QL: ABNORMAL
CANNABINOIDS UR QL SCN: ABNORMAL
COCAINE UR QL: ABNORMAL
CREAT UR-MCNC: 94.4 MG/DL
METHADONE UR QL SCN: ABNORMAL
OPIATES UR QL SCN: ABNORMAL
OXYCODONE UR QL: ABNORMAL
PCP UR QL SCN: ABNORMAL

## 2018-05-01 ENCOUNTER — COMMUNICATION - HEALTHEAST (OUTPATIENT)
Dept: INTERNAL MEDICINE | Facility: CLINIC | Age: 48
End: 2018-05-01

## 2018-05-04 ENCOUNTER — COMMUNICATION - HEALTHEAST (OUTPATIENT)
Dept: INTERNAL MEDICINE | Facility: CLINIC | Age: 48
End: 2018-05-04

## 2018-05-04 DIAGNOSIS — R06.2 WHEEZING: ICD-10-CM

## 2018-05-09 ENCOUNTER — COMMUNICATION - HEALTHEAST (OUTPATIENT)
Dept: INTERNAL MEDICINE | Facility: CLINIC | Age: 48
End: 2018-05-09

## 2018-05-09 ENCOUNTER — RECORDS - HEALTHEAST (OUTPATIENT)
Dept: ADMINISTRATIVE | Facility: OTHER | Age: 48
End: 2018-05-09

## 2018-05-09 DIAGNOSIS — T78.40XA ALLERGY: ICD-10-CM

## 2018-05-15 ENCOUNTER — HOSPITAL ENCOUNTER (OUTPATIENT)
Dept: NEUROLOGY | Facility: CLINIC | Age: 48
Setting detail: THERAPIES SERIES
Discharge: STILL ADMITTED OR ADMINISTRATIVE | End: 2018-05-15
Attending: NURSE PRACTITIONER

## 2018-05-25 ENCOUNTER — OFFICE VISIT - HEALTHEAST (OUTPATIENT)
Dept: INTERNAL MEDICINE | Facility: CLINIC | Age: 48
End: 2018-05-25

## 2018-05-25 DIAGNOSIS — F11.90 CHRONIC, CONTINUOUS USE OF OPIOIDS: ICD-10-CM

## 2018-05-25 DIAGNOSIS — G89.4 CHRONIC PAIN SYNDROME: ICD-10-CM

## 2018-05-25 DIAGNOSIS — F31.81 BIPOLAR 2 DISORDER (H): ICD-10-CM

## 2018-05-25 DIAGNOSIS — I10 ESSENTIAL HYPERTENSION: ICD-10-CM

## 2018-05-25 DIAGNOSIS — M54.12 CERVICAL RADICULOPATHY AT C8: ICD-10-CM

## 2018-05-25 DIAGNOSIS — S06.9X9S TRAUMATIC BRAIN INJURY WITH LOSS OF CONSCIOUSNESS, SEQUELA (H): ICD-10-CM

## 2018-05-30 ENCOUNTER — COMMUNICATION - HEALTHEAST (OUTPATIENT)
Dept: VASCULAR SURGERY | Facility: CLINIC | Age: 48
End: 2018-05-30

## 2018-06-12 ENCOUNTER — RECORDS - HEALTHEAST (OUTPATIENT)
Dept: ADMINISTRATIVE | Facility: OTHER | Age: 48
End: 2018-06-12

## 2018-06-21 ENCOUNTER — OFFICE VISIT - HEALTHEAST (OUTPATIENT)
Dept: INTERNAL MEDICINE | Facility: CLINIC | Age: 48
End: 2018-06-21

## 2018-06-21 DIAGNOSIS — F31.81 BIPOLAR 2 DISORDER (H): ICD-10-CM

## 2018-06-21 DIAGNOSIS — S06.9X9S TRAUMATIC BRAIN INJURY WITH LOSS OF CONSCIOUSNESS, SEQUELA (H): ICD-10-CM

## 2018-06-21 DIAGNOSIS — G89.4 CHRONIC PAIN SYNDROME: ICD-10-CM

## 2018-06-21 DIAGNOSIS — I10 ESSENTIAL HYPERTENSION: ICD-10-CM

## 2018-06-21 DIAGNOSIS — G56.21 ENTRAPMENT OF RIGHT ULNAR NERVE: ICD-10-CM

## 2018-07-10 ENCOUNTER — COMMUNICATION - HEALTHEAST (OUTPATIENT)
Dept: INTERNAL MEDICINE | Facility: CLINIC | Age: 48
End: 2018-07-10

## 2018-07-10 DIAGNOSIS — R52 PAIN: ICD-10-CM

## 2018-07-19 ENCOUNTER — OFFICE VISIT - HEALTHEAST (OUTPATIENT)
Dept: INTERNAL MEDICINE | Facility: CLINIC | Age: 48
End: 2018-07-19

## 2018-07-19 DIAGNOSIS — G89.4 CHRONIC PAIN SYNDROME: ICD-10-CM

## 2018-07-19 DIAGNOSIS — F31.81 BIPOLAR 2 DISORDER (H): ICD-10-CM

## 2018-07-19 DIAGNOSIS — S06.9X9S TRAUMATIC BRAIN INJURY WITH LOSS OF CONSCIOUSNESS, SEQUELA (H): ICD-10-CM

## 2018-07-23 ENCOUNTER — COMMUNICATION - HEALTHEAST (OUTPATIENT)
Dept: SCHEDULING | Facility: CLINIC | Age: 48
End: 2018-07-23

## 2018-07-25 ENCOUNTER — COMMUNICATION - HEALTHEAST (OUTPATIENT)
Dept: INTERNAL MEDICINE | Facility: CLINIC | Age: 48
End: 2018-07-25

## 2018-07-25 DIAGNOSIS — I10 HYPERTENSION: ICD-10-CM

## 2018-08-23 ENCOUNTER — OFFICE VISIT - HEALTHEAST (OUTPATIENT)
Dept: INTERNAL MEDICINE | Facility: CLINIC | Age: 48
End: 2018-08-23

## 2018-08-23 DIAGNOSIS — Z79.899 CONTROLLED SUBSTANCE AGREEMENT SIGNED: ICD-10-CM

## 2018-08-23 DIAGNOSIS — F11.90 CHRONIC, CONTINUOUS USE OF OPIOIDS: ICD-10-CM

## 2018-08-23 DIAGNOSIS — G89.4 CHRONIC PAIN SYNDROME: ICD-10-CM

## 2018-09-05 ENCOUNTER — COMMUNICATION - HEALTHEAST (OUTPATIENT)
Dept: INTERNAL MEDICINE | Facility: CLINIC | Age: 48
End: 2018-09-05

## 2018-09-05 DIAGNOSIS — R06.2 WHEEZING: ICD-10-CM

## 2018-09-20 ENCOUNTER — COMMUNICATION - HEALTHEAST (OUTPATIENT)
Dept: INTERNAL MEDICINE | Facility: CLINIC | Age: 48
End: 2018-09-20

## 2018-09-20 ENCOUNTER — OFFICE VISIT - HEALTHEAST (OUTPATIENT)
Dept: INTERNAL MEDICINE | Facility: CLINIC | Age: 48
End: 2018-09-20

## 2018-09-20 DIAGNOSIS — I10 ESSENTIAL HYPERTENSION: ICD-10-CM

## 2018-09-20 DIAGNOSIS — Z12.5 ENCOUNTER FOR SCREENING FOR MALIGNANT NEOPLASM OF PROSTATE: ICD-10-CM

## 2018-09-20 DIAGNOSIS — G89.4 CHRONIC PAIN SYNDROME: ICD-10-CM

## 2018-09-20 DIAGNOSIS — F11.90 CHRONIC, CONTINUOUS USE OF OPIOIDS: ICD-10-CM

## 2018-09-20 DIAGNOSIS — E55.9 VITAMIN D DEFICIENCY: ICD-10-CM

## 2018-09-20 LAB
ALBUMIN SERPL-MCNC: 4.2 G/DL (ref 3.5–5)
ALP SERPL-CCNC: 123 U/L (ref 45–120)
ALT SERPL W P-5'-P-CCNC: 52 U/L (ref 0–45)
ANION GAP SERPL CALCULATED.3IONS-SCNC: 10 MMOL/L (ref 5–18)
AST SERPL W P-5'-P-CCNC: 29 U/L (ref 0–40)
BILIRUB DIRECT SERPL-MCNC: <0.1 MG/DL
BILIRUB SERPL-MCNC: 0.3 MG/DL (ref 0–1)
BUN SERPL-MCNC: 11 MG/DL (ref 8–22)
CALCIUM SERPL-MCNC: 9.6 MG/DL (ref 8.5–10.5)
CHLORIDE BLD-SCNC: 103 MMOL/L (ref 98–107)
CHOLEST SERPL-MCNC: 183 MG/DL
CO2 SERPL-SCNC: 26 MMOL/L (ref 22–31)
CREAT SERPL-MCNC: 0.87 MG/DL (ref 0.7–1.3)
ERYTHROCYTE [DISTWIDTH] IN BLOOD BY AUTOMATED COUNT: 11.9 % (ref 11–14.5)
FASTING STATUS PATIENT QL REPORTED: NO
GFR SERPL CREATININE-BSD FRML MDRD: >60 ML/MIN/1.73M2
GLUCOSE BLD-MCNC: 98 MG/DL (ref 70–125)
HCT VFR BLD AUTO: 40.7 % (ref 40–54)
HDLC SERPL-MCNC: 36 MG/DL
HGB BLD-MCNC: 14.2 G/DL (ref 14–18)
LDLC SERPL CALC-MCNC: 78 MG/DL
LDLC SERPL CALC-MCNC: ABNORMAL MG/DL
MCH RBC QN AUTO: 28.8 PG (ref 27–34)
MCHC RBC AUTO-ENTMCNC: 34.8 G/DL (ref 32–36)
MCV RBC AUTO: 83 FL (ref 80–100)
PLATELET # BLD AUTO: 316 THOU/UL (ref 140–440)
PMV BLD AUTO: 7.4 FL (ref 7–10)
POTASSIUM BLD-SCNC: 4.7 MMOL/L (ref 3.5–5)
PROT SERPL-MCNC: 7 G/DL (ref 6–8)
PSA SERPL-MCNC: 0.5 NG/ML (ref 0–2.5)
RBC # BLD AUTO: 4.92 MILL/UL (ref 4.4–6.2)
SODIUM SERPL-SCNC: 139 MMOL/L (ref 136–145)
TRIGL SERPL-MCNC: 594 MG/DL
TSH SERPL DL<=0.005 MIU/L-ACNC: 0.53 UIU/ML (ref 0.3–5)
WBC: 8.4 THOU/UL (ref 4–11)

## 2018-09-21 ENCOUNTER — COMMUNICATION - HEALTHEAST (OUTPATIENT)
Dept: INTERNAL MEDICINE | Facility: CLINIC | Age: 48
End: 2018-09-21

## 2018-09-21 LAB — 25(OH)D3 SERPL-MCNC: 34.9 NG/ML (ref 30–80)

## 2018-10-12 ENCOUNTER — COMMUNICATION - HEALTHEAST (OUTPATIENT)
Dept: INTERNAL MEDICINE | Facility: CLINIC | Age: 48
End: 2018-10-12

## 2018-10-19 ENCOUNTER — OFFICE VISIT - HEALTHEAST (OUTPATIENT)
Dept: INTERNAL MEDICINE | Facility: CLINIC | Age: 48
End: 2018-10-19

## 2018-10-19 DIAGNOSIS — G89.4 CHRONIC PAIN SYNDROME: ICD-10-CM

## 2018-10-19 DIAGNOSIS — F31.81 BIPOLAR 2 DISORDER (H): ICD-10-CM

## 2018-10-19 DIAGNOSIS — I10 ESSENTIAL HYPERTENSION: ICD-10-CM

## 2018-10-19 DIAGNOSIS — F11.90 CHRONIC, CONTINUOUS USE OF OPIOIDS: ICD-10-CM

## 2018-10-31 ENCOUNTER — OFFICE VISIT - HEALTHEAST (OUTPATIENT)
Dept: VASCULAR SURGERY | Facility: CLINIC | Age: 48
End: 2018-10-31

## 2018-10-31 DIAGNOSIS — L90.5 SCAR CONDITION AND FIBROSIS OF SKIN: ICD-10-CM

## 2018-10-31 DIAGNOSIS — Q82.0 PRIMARY (CONGENITAL) LYMPHEDEMA: ICD-10-CM

## 2018-10-31 DIAGNOSIS — G57.90 ILIOINGUINAL NEURALGIA: ICD-10-CM

## 2018-10-31 ASSESSMENT — MIFFLIN-ST. JEOR: SCORE: 1881.85

## 2018-11-01 ENCOUNTER — OFFICE VISIT - HEALTHEAST (OUTPATIENT)
Dept: PHARMACY | Facility: CLINIC | Age: 48
End: 2018-11-01

## 2018-11-01 DIAGNOSIS — G89.4 CHRONIC PAIN SYNDROME: ICD-10-CM

## 2018-11-05 ENCOUNTER — COMMUNICATION - HEALTHEAST (OUTPATIENT)
Dept: PHARMACY | Facility: CLINIC | Age: 48
End: 2018-11-05

## 2018-11-14 ENCOUNTER — COMMUNICATION - HEALTHEAST (OUTPATIENT)
Dept: INTERNAL MEDICINE | Facility: CLINIC | Age: 48
End: 2018-11-14

## 2018-11-14 DIAGNOSIS — I10 ESSENTIAL HYPERTENSION: ICD-10-CM

## 2018-11-18 ENCOUNTER — COMMUNICATION - HEALTHEAST (OUTPATIENT)
Dept: INTERNAL MEDICINE | Facility: CLINIC | Age: 48
End: 2018-11-18

## 2018-11-18 ENCOUNTER — COMMUNICATION - HEALTHEAST (OUTPATIENT)
Dept: PHARMACY | Facility: CLINIC | Age: 48
End: 2018-11-18

## 2018-11-18 DIAGNOSIS — G89.4 CHRONIC PAIN SYNDROME: ICD-10-CM

## 2018-11-18 DIAGNOSIS — T78.40XA ALLERGY: ICD-10-CM

## 2018-11-19 ENCOUNTER — COMMUNICATION - HEALTHEAST (OUTPATIENT)
Dept: NURSING | Facility: CLINIC | Age: 48
End: 2018-11-19

## 2018-11-20 ENCOUNTER — RECORDS - HEALTHEAST (OUTPATIENT)
Dept: ADMINISTRATIVE | Facility: OTHER | Age: 48
End: 2018-11-20

## 2018-11-20 ENCOUNTER — COMMUNICATION - HEALTHEAST (OUTPATIENT)
Dept: PALLIATIVE MEDICINE | Facility: OTHER | Age: 48
End: 2018-11-20

## 2018-11-20 ENCOUNTER — HOSPITAL ENCOUNTER (OUTPATIENT)
Dept: PALLIATIVE MEDICINE | Facility: OTHER | Age: 48
Discharge: HOME | End: 2018-11-20
Attending: INTERNAL MEDICINE

## 2018-11-20 DIAGNOSIS — F11.90 CHRONIC, CONTINUOUS USE OF OPIOIDS: ICD-10-CM

## 2018-11-20 DIAGNOSIS — G89.4 CHRONIC PAIN SYNDROME: ICD-10-CM

## 2018-11-20 DIAGNOSIS — M54.12 CERVICAL RADICULOPATHY AT C8: ICD-10-CM

## 2018-11-20 ASSESSMENT — MIFFLIN-ST. JEOR: SCORE: 1881.85

## 2018-11-23 ENCOUNTER — OFFICE VISIT - HEALTHEAST (OUTPATIENT)
Dept: INTERNAL MEDICINE | Facility: CLINIC | Age: 48
End: 2018-11-23

## 2018-11-23 DIAGNOSIS — I10 ESSENTIAL HYPERTENSION: ICD-10-CM

## 2018-11-23 DIAGNOSIS — S06.9X9S TRAUMATIC BRAIN INJURY WITH LOSS OF CONSCIOUSNESS, SEQUELA (H): ICD-10-CM

## 2018-11-23 DIAGNOSIS — Z76.89 ENCOUNTER TO ESTABLISH CARE WITH NEW DOCTOR: ICD-10-CM

## 2018-11-23 DIAGNOSIS — F43.10 PTSD (POST-TRAUMATIC STRESS DISORDER): ICD-10-CM

## 2018-11-23 DIAGNOSIS — F11.90 CHRONIC, CONTINUOUS USE OF OPIOIDS: ICD-10-CM

## 2018-11-23 DIAGNOSIS — Z79.899 MEDICATION MANAGEMENT: ICD-10-CM

## 2018-11-23 DIAGNOSIS — T78.40XA ALLERGY: ICD-10-CM

## 2018-11-23 LAB — URATE SERPL-MCNC: 6.1 MG/DL (ref 3–8)

## 2018-11-23 ASSESSMENT — MIFFLIN-ST. JEOR: SCORE: 1869.37

## 2018-11-24 LAB — ARUP MISCELLANEOUS TEST: NORMAL

## 2018-11-25 LAB
MISCELLANEOUS TEST DEPT. - HE HISTORICAL: NORMAL
PERFORMING LAB: NORMAL
SPECIMEN STATUS: NORMAL
TEST NAME: NORMAL

## 2018-11-26 ENCOUNTER — COMMUNICATION - HEALTHEAST (OUTPATIENT)
Dept: INTERNAL MEDICINE | Facility: CLINIC | Age: 48
End: 2018-11-26

## 2018-11-26 ENCOUNTER — AMBULATORY - HEALTHEAST (OUTPATIENT)
Dept: INTERNAL MEDICINE | Facility: CLINIC | Age: 48
End: 2018-11-26

## 2018-11-26 ENCOUNTER — COMMUNICATION - HEALTHEAST (OUTPATIENT)
Dept: NURSING | Facility: CLINIC | Age: 48
End: 2018-11-26

## 2018-11-26 DIAGNOSIS — S06.9X9S TRAUMATIC BRAIN INJURY WITH LOSS OF CONSCIOUSNESS, SEQUELA (H): ICD-10-CM

## 2018-11-29 ENCOUNTER — COMMUNICATION - HEALTHEAST (OUTPATIENT)
Dept: NURSING | Facility: CLINIC | Age: 48
End: 2018-11-29

## 2018-11-29 ENCOUNTER — COMMUNICATION - HEALTHEAST (OUTPATIENT)
Dept: PALLIATIVE MEDICINE | Facility: OTHER | Age: 48
End: 2018-11-29

## 2018-11-29 DIAGNOSIS — G89.4 CHRONIC PAIN SYNDROME: ICD-10-CM

## 2018-12-02 ENCOUNTER — COMMUNICATION - HEALTHEAST (OUTPATIENT)
Dept: INTERNAL MEDICINE | Facility: CLINIC | Age: 48
End: 2018-12-02

## 2018-12-02 DIAGNOSIS — Z00.00 HEALTHCARE MAINTENANCE: ICD-10-CM

## 2018-12-04 ENCOUNTER — COMMUNICATION - HEALTHEAST (OUTPATIENT)
Dept: NURSING | Facility: CLINIC | Age: 48
End: 2018-12-04

## 2018-12-04 ENCOUNTER — AMBULATORY - HEALTHEAST (OUTPATIENT)
Dept: NURSING | Facility: CLINIC | Age: 48
End: 2018-12-04

## 2018-12-04 DIAGNOSIS — Z71.89 COUNSELING AND COORDINATION OF CARE: ICD-10-CM

## 2018-12-05 ENCOUNTER — COMMUNICATION - HEALTHEAST (OUTPATIENT)
Dept: CARE COORDINATION | Facility: CLINIC | Age: 48
End: 2018-12-05

## 2018-12-05 ENCOUNTER — HOME CARE/HOSPICE - HEALTHEAST (OUTPATIENT)
Dept: HOME HEALTH SERVICES | Facility: HOME HEALTH | Age: 48
End: 2018-12-05

## 2018-12-05 DIAGNOSIS — F31.81 BIPOLAR 2 DISORDER (H): ICD-10-CM

## 2018-12-05 DIAGNOSIS — S06.9X9S TRAUMATIC BRAIN INJURY WITH LOSS OF CONSCIOUSNESS, SEQUELA (H): ICD-10-CM

## 2018-12-05 DIAGNOSIS — F43.10 PTSD (POST-TRAUMATIC STRESS DISORDER): ICD-10-CM

## 2018-12-05 DIAGNOSIS — G89.4 CHRONIC PAIN SYNDROME: ICD-10-CM

## 2018-12-12 ENCOUNTER — AMBULATORY - HEALTHEAST (OUTPATIENT)
Dept: NURSING | Facility: CLINIC | Age: 48
End: 2018-12-12

## 2018-12-13 ENCOUNTER — AMBULATORY - HEALTHEAST (OUTPATIENT)
Dept: NURSING | Facility: CLINIC | Age: 48
End: 2018-12-13

## 2018-12-13 ENCOUNTER — COMMUNICATION - HEALTHEAST (OUTPATIENT)
Dept: TELEHEALTH | Facility: CLINIC | Age: 48
End: 2018-12-13

## 2018-12-13 ENCOUNTER — COMMUNICATION - HEALTHEAST (OUTPATIENT)
Dept: NURSING | Facility: CLINIC | Age: 48
End: 2018-12-13

## 2018-12-18 ENCOUNTER — COMMUNICATION - HEALTHEAST (OUTPATIENT)
Dept: INTERNAL MEDICINE | Facility: CLINIC | Age: 48
End: 2018-12-18

## 2018-12-18 DIAGNOSIS — Z79.899 MEDICATION MANAGEMENT: ICD-10-CM

## 2018-12-19 ENCOUNTER — AMBULATORY - HEALTHEAST (OUTPATIENT)
Dept: NURSING | Facility: CLINIC | Age: 48
End: 2018-12-19

## 2018-12-21 ENCOUNTER — HOSPITAL ENCOUNTER (OUTPATIENT)
Dept: PALLIATIVE MEDICINE | Facility: OTHER | Age: 48
Discharge: HOME | End: 2018-12-21
Attending: ANESTHESIOLOGY

## 2018-12-21 DIAGNOSIS — M75.41 SHOULDER IMPINGEMENT SYNDROME, RIGHT: ICD-10-CM

## 2018-12-21 DIAGNOSIS — G89.4 CHRONIC PAIN SYNDROME: ICD-10-CM

## 2018-12-21 ASSESSMENT — MIFFLIN-ST. JEOR: SCORE: 1868.24

## 2018-12-26 ENCOUNTER — OFFICE VISIT - HEALTHEAST (OUTPATIENT)
Dept: INTERNAL MEDICINE | Facility: CLINIC | Age: 48
End: 2018-12-26

## 2018-12-26 DIAGNOSIS — F11.90 CHRONIC, CONTINUOUS USE OF OPIOIDS: ICD-10-CM

## 2018-12-26 DIAGNOSIS — I10 ESSENTIAL HYPERTENSION: ICD-10-CM

## 2018-12-26 DIAGNOSIS — F43.10 PTSD (POST-TRAUMATIC STRESS DISORDER): ICD-10-CM

## 2018-12-26 DIAGNOSIS — R06.2 WHEEZING: ICD-10-CM

## 2018-12-26 DIAGNOSIS — S06.9X9S TRAUMATIC BRAIN INJURY WITH LOSS OF CONSCIOUSNESS, SEQUELA (H): ICD-10-CM

## 2018-12-26 DIAGNOSIS — R52 PAIN: ICD-10-CM

## 2018-12-26 ASSESSMENT — MIFFLIN-ST. JEOR: SCORE: 1859.17

## 2018-12-27 ENCOUNTER — COMMUNICATION - HEALTHEAST (OUTPATIENT)
Dept: NURSING | Facility: CLINIC | Age: 48
End: 2018-12-27

## 2018-12-27 ENCOUNTER — COMMUNICATION - HEALTHEAST (OUTPATIENT)
Dept: INTERNAL MEDICINE | Facility: CLINIC | Age: 48
End: 2018-12-27

## 2018-12-27 DIAGNOSIS — S06.9X9S TRAUMATIC BRAIN INJURY WITH LOSS OF CONSCIOUSNESS, SEQUELA (H): ICD-10-CM

## 2018-12-28 ENCOUNTER — COMMUNICATION - HEALTHEAST (OUTPATIENT)
Dept: INTERNAL MEDICINE | Facility: CLINIC | Age: 48
End: 2018-12-28

## 2018-12-28 ENCOUNTER — COMMUNICATION - HEALTHEAST (OUTPATIENT)
Dept: NURSING | Facility: CLINIC | Age: 48
End: 2018-12-28

## 2018-12-28 ENCOUNTER — AMBULATORY - HEALTHEAST (OUTPATIENT)
Dept: NURSING | Facility: CLINIC | Age: 48
End: 2018-12-28

## 2018-12-28 DIAGNOSIS — S06.9X9S TRAUMATIC BRAIN INJURY WITH LOSS OF CONSCIOUSNESS, SEQUELA (H): ICD-10-CM

## 2019-01-09 ENCOUNTER — AMBULATORY - HEALTHEAST (OUTPATIENT)
Dept: PALLIATIVE MEDICINE | Facility: OTHER | Age: 49
End: 2019-01-09

## 2019-01-09 ENCOUNTER — COMMUNICATION - HEALTHEAST (OUTPATIENT)
Dept: INTERNAL MEDICINE | Facility: CLINIC | Age: 49
End: 2019-01-09

## 2019-01-09 ENCOUNTER — COMMUNICATION - HEALTHEAST (OUTPATIENT)
Dept: PALLIATIVE MEDICINE | Facility: OTHER | Age: 49
End: 2019-01-09

## 2019-01-09 DIAGNOSIS — M75.41 SHOULDER IMPINGEMENT SYNDROME, RIGHT: ICD-10-CM

## 2019-01-09 DIAGNOSIS — G89.4 CHRONIC PAIN SYNDROME: ICD-10-CM

## 2019-01-10 ENCOUNTER — COMMUNICATION - HEALTHEAST (OUTPATIENT)
Dept: NURSING | Facility: CLINIC | Age: 49
End: 2019-01-10

## 2019-01-10 ENCOUNTER — COMMUNICATION - HEALTHEAST (OUTPATIENT)
Dept: CARE COORDINATION | Facility: CLINIC | Age: 49
End: 2019-01-10

## 2019-01-15 ENCOUNTER — COMMUNICATION - HEALTHEAST (OUTPATIENT)
Dept: PALLIATIVE MEDICINE | Facility: OTHER | Age: 49
End: 2019-01-15

## 2019-01-15 DIAGNOSIS — G89.4 CHRONIC PAIN SYNDROME: ICD-10-CM

## 2019-01-22 ENCOUNTER — HOSPITAL ENCOUNTER (OUTPATIENT)
Dept: PALLIATIVE MEDICINE | Facility: OTHER | Age: 49
Discharge: HOME | End: 2019-01-22
Attending: ANESTHESIOLOGY

## 2019-01-22 DIAGNOSIS — G89.4 CHRONIC PAIN SYNDROME: ICD-10-CM

## 2019-01-22 DIAGNOSIS — M54.12 CERVICAL RADICULOPATHY AT C8: ICD-10-CM

## 2019-01-22 DIAGNOSIS — M75.41 SHOULDER IMPINGEMENT SYNDROME, RIGHT: ICD-10-CM

## 2019-01-22 ASSESSMENT — MIFFLIN-ST. JEOR: SCORE: 1859.17

## 2019-01-23 ENCOUNTER — OFFICE VISIT - HEALTHEAST (OUTPATIENT)
Dept: INTERNAL MEDICINE | Facility: CLINIC | Age: 49
End: 2019-01-23

## 2019-01-23 DIAGNOSIS — G89.4 CHRONIC PAIN SYNDROME: ICD-10-CM

## 2019-01-23 DIAGNOSIS — F41.1 GENERALIZED ANXIETY DISORDER: ICD-10-CM

## 2019-01-23 DIAGNOSIS — F11.90 CHRONIC, CONTINUOUS USE OF OPIOIDS: ICD-10-CM

## 2019-01-23 DIAGNOSIS — J01.00 ACUTE MAXILLARY SINUSITIS, RECURRENCE NOT SPECIFIED: ICD-10-CM

## 2019-01-23 DIAGNOSIS — F31.81 BIPOLAR 2 DISORDER (H): ICD-10-CM

## 2019-01-23 DIAGNOSIS — S06.9X9S TRAUMATIC BRAIN INJURY WITH LOSS OF CONSCIOUSNESS, SEQUELA (H): ICD-10-CM

## 2019-01-23 DIAGNOSIS — I10 ESSENTIAL HYPERTENSION: ICD-10-CM

## 2019-01-24 ENCOUNTER — COMMUNICATION - HEALTHEAST (OUTPATIENT)
Dept: PALLIATIVE MEDICINE | Facility: OTHER | Age: 49
End: 2019-01-24

## 2019-01-24 DIAGNOSIS — M75.41 SHOULDER IMPINGEMENT SYNDROME, RIGHT: ICD-10-CM

## 2019-01-25 ENCOUNTER — COMMUNICATION - HEALTHEAST (OUTPATIENT)
Dept: NURSING | Facility: CLINIC | Age: 49
End: 2019-01-25

## 2019-01-29 ENCOUNTER — COMMUNICATION - HEALTHEAST (OUTPATIENT)
Dept: NURSING | Facility: CLINIC | Age: 49
End: 2019-01-29

## 2019-01-30 ENCOUNTER — OFFICE VISIT - HEALTHEAST (OUTPATIENT)
Dept: PHYSICAL THERAPY | Facility: REHABILITATION | Age: 49
End: 2019-01-30

## 2019-01-30 DIAGNOSIS — M79.18 MYOFASCIAL PAIN: ICD-10-CM

## 2019-01-30 DIAGNOSIS — M54.12 CERVICAL RADICULITIS: ICD-10-CM

## 2019-01-30 DIAGNOSIS — M54.2 CERVICALGIA: ICD-10-CM

## 2019-01-30 DIAGNOSIS — G89.29 CHRONIC RIGHT SHOULDER PAIN: ICD-10-CM

## 2019-01-30 DIAGNOSIS — M25.511 CHRONIC RIGHT SHOULDER PAIN: ICD-10-CM

## 2019-01-31 ENCOUNTER — COMMUNICATION - HEALTHEAST (OUTPATIENT)
Dept: NURSING | Facility: CLINIC | Age: 49
End: 2019-01-31

## 2019-01-31 ENCOUNTER — COMMUNICATION - HEALTHEAST (OUTPATIENT)
Dept: PALLIATIVE MEDICINE | Facility: OTHER | Age: 49
End: 2019-01-31

## 2019-01-31 DIAGNOSIS — G89.4 CHRONIC PAIN SYNDROME: ICD-10-CM

## 2019-01-31 DIAGNOSIS — M75.41 SHOULDER IMPINGEMENT SYNDROME, RIGHT: ICD-10-CM

## 2019-02-01 ENCOUNTER — OFFICE VISIT - HEALTHEAST (OUTPATIENT)
Dept: PHYSICAL THERAPY | Facility: REHABILITATION | Age: 49
End: 2019-02-01

## 2019-02-01 DIAGNOSIS — M25.511 CHRONIC RIGHT SHOULDER PAIN: ICD-10-CM

## 2019-02-01 DIAGNOSIS — M79.18 MYOFASCIAL PAIN: ICD-10-CM

## 2019-02-01 DIAGNOSIS — G89.29 CHRONIC RIGHT SHOULDER PAIN: ICD-10-CM

## 2019-02-01 DIAGNOSIS — M54.2 CERVICALGIA: ICD-10-CM

## 2019-02-01 DIAGNOSIS — M54.12 CERVICAL RADICULITIS: ICD-10-CM

## 2019-02-06 ENCOUNTER — OFFICE VISIT - HEALTHEAST (OUTPATIENT)
Dept: PHYSICAL THERAPY | Facility: REHABILITATION | Age: 49
End: 2019-02-06

## 2019-02-06 DIAGNOSIS — G89.29 CHRONIC RIGHT SHOULDER PAIN: ICD-10-CM

## 2019-02-06 DIAGNOSIS — M54.2 CERVICALGIA: ICD-10-CM

## 2019-02-06 DIAGNOSIS — M54.12 CERVICAL RADICULITIS: ICD-10-CM

## 2019-02-06 DIAGNOSIS — M79.18 MYOFASCIAL PAIN: ICD-10-CM

## 2019-02-06 DIAGNOSIS — M25.511 CHRONIC RIGHT SHOULDER PAIN: ICD-10-CM

## 2019-02-07 ENCOUNTER — COMMUNICATION - HEALTHEAST (OUTPATIENT)
Dept: NURSING | Facility: CLINIC | Age: 49
End: 2019-02-07

## 2019-02-15 ENCOUNTER — OFFICE VISIT - HEALTHEAST (OUTPATIENT)
Dept: PHYSICAL THERAPY | Facility: REHABILITATION | Age: 49
End: 2019-02-15

## 2019-02-15 DIAGNOSIS — M79.18 MYOFASCIAL PAIN: ICD-10-CM

## 2019-02-15 DIAGNOSIS — M54.12 CERVICAL RADICULITIS: ICD-10-CM

## 2019-02-15 DIAGNOSIS — M25.511 CHRONIC RIGHT SHOULDER PAIN: ICD-10-CM

## 2019-02-15 DIAGNOSIS — M54.2 CERVICALGIA: ICD-10-CM

## 2019-02-15 DIAGNOSIS — G89.29 CHRONIC RIGHT SHOULDER PAIN: ICD-10-CM

## 2019-02-18 ENCOUNTER — COMMUNICATION - HEALTHEAST (OUTPATIENT)
Dept: PALLIATIVE MEDICINE | Facility: OTHER | Age: 49
End: 2019-02-18

## 2019-02-18 DIAGNOSIS — G89.4 CHRONIC PAIN SYNDROME: ICD-10-CM

## 2019-02-19 ENCOUNTER — OFFICE VISIT - HEALTHEAST (OUTPATIENT)
Dept: PHYSICAL THERAPY | Facility: REHABILITATION | Age: 49
End: 2019-02-19

## 2019-02-19 DIAGNOSIS — M25.511 CHRONIC RIGHT SHOULDER PAIN: ICD-10-CM

## 2019-02-19 DIAGNOSIS — G89.29 CHRONIC RIGHT SHOULDER PAIN: ICD-10-CM

## 2019-02-19 DIAGNOSIS — M79.18 MYOFASCIAL PAIN: ICD-10-CM

## 2019-02-19 DIAGNOSIS — M54.12 CERVICAL RADICULITIS: ICD-10-CM

## 2019-02-19 DIAGNOSIS — M54.2 CERVICALGIA: ICD-10-CM

## 2019-02-26 ENCOUNTER — COMMUNICATION - HEALTHEAST (OUTPATIENT)
Dept: NURSING | Facility: CLINIC | Age: 49
End: 2019-02-26

## 2019-02-27 ENCOUNTER — COMMUNICATION - HEALTHEAST (OUTPATIENT)
Dept: INTERNAL MEDICINE | Facility: CLINIC | Age: 49
End: 2019-02-27

## 2019-02-27 DIAGNOSIS — F43.10 PTSD (POST-TRAUMATIC STRESS DISORDER): ICD-10-CM

## 2019-02-27 DIAGNOSIS — M54.12 CERVICAL RADICULOPATHY AT C8: ICD-10-CM

## 2019-03-01 ENCOUNTER — OFFICE VISIT - HEALTHEAST (OUTPATIENT)
Dept: PHYSICAL THERAPY | Facility: REHABILITATION | Age: 49
End: 2019-03-01

## 2019-03-01 DIAGNOSIS — M25.511 CHRONIC RIGHT SHOULDER PAIN: ICD-10-CM

## 2019-03-01 DIAGNOSIS — G89.29 CHRONIC RIGHT SHOULDER PAIN: ICD-10-CM

## 2019-03-01 DIAGNOSIS — M54.12 CERVICAL RADICULITIS: ICD-10-CM

## 2019-03-01 DIAGNOSIS — M54.2 CERVICALGIA: ICD-10-CM

## 2019-03-01 DIAGNOSIS — M79.18 MYOFASCIAL PAIN: ICD-10-CM

## 2019-03-03 ENCOUNTER — OFFICE VISIT - HEALTHEAST (OUTPATIENT)
Dept: FAMILY MEDICINE | Facility: CLINIC | Age: 49
End: 2019-03-03

## 2019-03-03 DIAGNOSIS — H69.91 DYSFUNCTION OF RIGHT EUSTACHIAN TUBE: ICD-10-CM

## 2019-03-03 DIAGNOSIS — J01.01 ACUTE RECURRENT MAXILLARY SINUSITIS: ICD-10-CM

## 2019-03-03 DIAGNOSIS — K05.30 PERIODONTITIS: ICD-10-CM

## 2019-03-04 ENCOUNTER — COMMUNICATION - HEALTHEAST (OUTPATIENT)
Dept: PALLIATIVE MEDICINE | Facility: OTHER | Age: 49
End: 2019-03-04

## 2019-03-04 ENCOUNTER — COMMUNICATION - HEALTHEAST (OUTPATIENT)
Dept: TELEHEALTH | Facility: CLINIC | Age: 49
End: 2019-03-04

## 2019-03-04 ENCOUNTER — COMMUNICATION - HEALTHEAST (OUTPATIENT)
Dept: NURSING | Facility: CLINIC | Age: 49
End: 2019-03-04

## 2019-03-04 ENCOUNTER — OFFICE VISIT - HEALTHEAST (OUTPATIENT)
Dept: INTERNAL MEDICINE | Facility: CLINIC | Age: 49
End: 2019-03-04

## 2019-03-04 DIAGNOSIS — J32.4 CHRONIC PANSINUSITIS: ICD-10-CM

## 2019-03-04 DIAGNOSIS — M75.41 SHOULDER IMPINGEMENT SYNDROME, RIGHT: ICD-10-CM

## 2019-03-04 DIAGNOSIS — J01.00 ACUTE MAXILLARY SINUSITIS, RECURRENCE NOT SPECIFIED: ICD-10-CM

## 2019-03-04 DIAGNOSIS — F11.90 CHRONIC, CONTINUOUS USE OF OPIOIDS: ICD-10-CM

## 2019-03-04 DIAGNOSIS — G89.4 CHRONIC PAIN SYNDROME: ICD-10-CM

## 2019-03-04 DIAGNOSIS — Z71.89 COUNSELING AND COORDINATION OF CARE: ICD-10-CM

## 2019-03-04 LAB
ALBUMIN SERPL-MCNC: 4.2 G/DL (ref 3.5–5)
ALP SERPL-CCNC: 91 U/L (ref 45–120)
ALT SERPL W P-5'-P-CCNC: 46 U/L (ref 0–45)
ANION GAP SERPL CALCULATED.3IONS-SCNC: 12 MMOL/L (ref 5–18)
AST SERPL W P-5'-P-CCNC: 30 U/L (ref 0–40)
BILIRUB SERPL-MCNC: 0.2 MG/DL (ref 0–1)
BUN SERPL-MCNC: 18 MG/DL (ref 8–22)
CALCIUM SERPL-MCNC: 9.8 MG/DL (ref 8.5–10.5)
CHLORIDE BLD-SCNC: 105 MMOL/L (ref 98–107)
CO2 SERPL-SCNC: 22 MMOL/L (ref 22–31)
CREAT SERPL-MCNC: 1.07 MG/DL (ref 0.7–1.3)
ERYTHROCYTE [DISTWIDTH] IN BLOOD BY AUTOMATED COUNT: 12.5 % (ref 11–14.5)
GFR SERPL CREATININE-BSD FRML MDRD: >60 ML/MIN/1.73M2
GLUCOSE BLD-MCNC: 102 MG/DL (ref 70–125)
HCT VFR BLD AUTO: 39.8 % (ref 40–54)
HGB BLD-MCNC: 13.6 G/DL (ref 14–18)
MCH RBC QN AUTO: 29.2 PG (ref 27–34)
MCHC RBC AUTO-ENTMCNC: 34 G/DL (ref 32–36)
MCV RBC AUTO: 86 FL (ref 80–100)
PLATELET # BLD AUTO: 396 THOU/UL (ref 140–440)
PMV BLD AUTO: 6.9 FL (ref 7–10)
POTASSIUM BLD-SCNC: 4 MMOL/L (ref 3.5–5)
PROT SERPL-MCNC: 7 G/DL (ref 6–8)
RBC # BLD AUTO: 4.65 MILL/UL (ref 4.4–6.2)
SODIUM SERPL-SCNC: 139 MMOL/L (ref 136–145)
URATE SERPL-MCNC: 3.4 MG/DL (ref 3–8)
WBC: 8.9 THOU/UL (ref 4–11)

## 2019-03-04 ASSESSMENT — MIFFLIN-ST. JEOR: SCORE: 1791.41

## 2019-03-05 ENCOUNTER — OFFICE VISIT - HEALTHEAST (OUTPATIENT)
Dept: PHYSICAL THERAPY | Facility: REHABILITATION | Age: 49
End: 2019-03-05

## 2019-03-05 DIAGNOSIS — G89.29 CHRONIC RIGHT SHOULDER PAIN: ICD-10-CM

## 2019-03-05 DIAGNOSIS — M25.511 CHRONIC RIGHT SHOULDER PAIN: ICD-10-CM

## 2019-03-05 DIAGNOSIS — M54.12 CERVICAL RADICULITIS: ICD-10-CM

## 2019-03-05 DIAGNOSIS — M79.18 MYOFASCIAL PAIN: ICD-10-CM

## 2019-03-05 DIAGNOSIS — M54.2 CERVICALGIA: ICD-10-CM

## 2019-03-06 ENCOUNTER — COMMUNICATION - HEALTHEAST (OUTPATIENT)
Dept: PALLIATIVE MEDICINE | Facility: OTHER | Age: 49
End: 2019-03-06

## 2019-03-06 DIAGNOSIS — G89.4 CHRONIC PAIN SYNDROME: ICD-10-CM

## 2019-03-08 ENCOUNTER — OFFICE VISIT - HEALTHEAST (OUTPATIENT)
Dept: PHYSICAL THERAPY | Facility: REHABILITATION | Age: 49
End: 2019-03-08

## 2019-03-08 DIAGNOSIS — M54.12 CERVICAL RADICULITIS: ICD-10-CM

## 2019-03-08 DIAGNOSIS — G89.29 CHRONIC RIGHT SHOULDER PAIN: ICD-10-CM

## 2019-03-08 DIAGNOSIS — M54.2 CERVICALGIA: ICD-10-CM

## 2019-03-08 DIAGNOSIS — M25.511 CHRONIC RIGHT SHOULDER PAIN: ICD-10-CM

## 2019-03-08 DIAGNOSIS — M79.18 MYOFASCIAL PAIN: ICD-10-CM

## 2019-03-12 ENCOUNTER — HOSPITAL ENCOUNTER (OUTPATIENT)
Dept: CT IMAGING | Facility: CLINIC | Age: 49
Discharge: HOME | End: 2019-03-12
Attending: INTERNAL MEDICINE

## 2019-03-12 ENCOUNTER — OFFICE VISIT - HEALTHEAST (OUTPATIENT)
Dept: PHYSICAL THERAPY | Facility: REHABILITATION | Age: 49
End: 2019-03-12

## 2019-03-12 DIAGNOSIS — M25.511 CHRONIC RIGHT SHOULDER PAIN: ICD-10-CM

## 2019-03-12 DIAGNOSIS — M54.12 CERVICAL RADICULITIS: ICD-10-CM

## 2019-03-12 DIAGNOSIS — M54.2 CERVICALGIA: ICD-10-CM

## 2019-03-12 DIAGNOSIS — J01.00 ACUTE MAXILLARY SINUSITIS, RECURRENCE NOT SPECIFIED: ICD-10-CM

## 2019-03-12 DIAGNOSIS — M79.18 MYOFASCIAL PAIN: ICD-10-CM

## 2019-03-12 DIAGNOSIS — G89.29 CHRONIC RIGHT SHOULDER PAIN: ICD-10-CM

## 2019-03-13 ENCOUNTER — COMMUNICATION - HEALTHEAST (OUTPATIENT)
Dept: INTERNAL MEDICINE | Facility: CLINIC | Age: 49
End: 2019-03-13

## 2019-03-13 ENCOUNTER — COMMUNICATION - HEALTHEAST (OUTPATIENT)
Dept: TELEHEALTH | Facility: CLINIC | Age: 49
End: 2019-03-13

## 2019-03-14 ENCOUNTER — COMMUNICATION - HEALTHEAST (OUTPATIENT)
Dept: NURSING | Facility: CLINIC | Age: 49
End: 2019-03-14

## 2019-03-14 ENCOUNTER — AMBULATORY - HEALTHEAST (OUTPATIENT)
Dept: CARE COORDINATION | Facility: CLINIC | Age: 49
End: 2019-03-14

## 2019-03-15 ENCOUNTER — COMMUNICATION - HEALTHEAST (OUTPATIENT)
Dept: NURSING | Facility: CLINIC | Age: 49
End: 2019-03-15

## 2019-03-18 ENCOUNTER — COMMUNICATION - HEALTHEAST (OUTPATIENT)
Dept: PALLIATIVE MEDICINE | Facility: OTHER | Age: 49
End: 2019-03-18

## 2019-03-18 DIAGNOSIS — G89.4 CHRONIC PAIN SYNDROME: ICD-10-CM

## 2019-03-19 ENCOUNTER — OFFICE VISIT - HEALTHEAST (OUTPATIENT)
Dept: PHYSICAL THERAPY | Facility: REHABILITATION | Age: 49
End: 2019-03-19

## 2019-03-19 DIAGNOSIS — G89.29 CHRONIC RIGHT SHOULDER PAIN: ICD-10-CM

## 2019-03-19 DIAGNOSIS — M54.12 CERVICAL RADICULITIS: ICD-10-CM

## 2019-03-19 DIAGNOSIS — M79.18 MYOFASCIAL PAIN: ICD-10-CM

## 2019-03-19 DIAGNOSIS — M25.511 CHRONIC RIGHT SHOULDER PAIN: ICD-10-CM

## 2019-03-19 DIAGNOSIS — M54.2 CERVICALGIA: ICD-10-CM

## 2019-03-22 ENCOUNTER — OFFICE VISIT - HEALTHEAST (OUTPATIENT)
Dept: PHYSICAL THERAPY | Facility: REHABILITATION | Age: 49
End: 2019-03-22

## 2019-03-22 ENCOUNTER — COMMUNICATION - HEALTHEAST (OUTPATIENT)
Dept: PALLIATIVE MEDICINE | Facility: OTHER | Age: 49
End: 2019-03-22

## 2019-03-22 DIAGNOSIS — G89.29 CHRONIC RIGHT SHOULDER PAIN: ICD-10-CM

## 2019-03-22 DIAGNOSIS — G89.4 CHRONIC PAIN SYNDROME: ICD-10-CM

## 2019-03-22 DIAGNOSIS — M79.18 MYOFASCIAL PAIN: ICD-10-CM

## 2019-03-22 DIAGNOSIS — M25.511 CHRONIC RIGHT SHOULDER PAIN: ICD-10-CM

## 2019-03-22 DIAGNOSIS — M54.12 CERVICAL RADICULITIS: ICD-10-CM

## 2019-03-22 DIAGNOSIS — M54.2 CERVICALGIA: ICD-10-CM

## 2019-03-25 ENCOUNTER — COMMUNICATION - HEALTHEAST (OUTPATIENT)
Dept: NURSING | Facility: CLINIC | Age: 49
End: 2019-03-25

## 2019-03-25 ENCOUNTER — COMMUNICATION - HEALTHEAST (OUTPATIENT)
Dept: INTERNAL MEDICINE | Facility: CLINIC | Age: 49
End: 2019-03-25

## 2019-03-25 DIAGNOSIS — S06.9X9A TRAUMATIC BRAIN INJURY WITH LOSS OF CONSCIOUSNESS (H): ICD-10-CM

## 2019-03-26 ENCOUNTER — OFFICE VISIT - HEALTHEAST (OUTPATIENT)
Dept: PHYSICAL THERAPY | Facility: REHABILITATION | Age: 49
End: 2019-03-26

## 2019-03-26 DIAGNOSIS — M25.511 CHRONIC RIGHT SHOULDER PAIN: ICD-10-CM

## 2019-03-26 DIAGNOSIS — G89.29 CHRONIC RIGHT SHOULDER PAIN: ICD-10-CM

## 2019-03-26 DIAGNOSIS — M54.2 CERVICALGIA: ICD-10-CM

## 2019-03-26 DIAGNOSIS — M79.18 MYOFASCIAL PAIN: ICD-10-CM

## 2019-03-26 DIAGNOSIS — M54.12 CERVICAL RADICULITIS: ICD-10-CM

## 2019-03-27 ENCOUNTER — COMMUNICATION - HEALTHEAST (OUTPATIENT)
Dept: NURSING | Facility: CLINIC | Age: 49
End: 2019-03-27

## 2019-03-28 ENCOUNTER — HOSPITAL ENCOUNTER (OUTPATIENT)
Dept: PALLIATIVE MEDICINE | Facility: OTHER | Age: 49
Discharge: HOME | End: 2019-03-28
Attending: ANESTHESIOLOGY

## 2019-03-28 DIAGNOSIS — G89.4 CHRONIC PAIN SYNDROME: ICD-10-CM

## 2019-03-28 DIAGNOSIS — M54.12 CERVICAL RADICULOPATHY AT C8: ICD-10-CM

## 2019-03-28 DIAGNOSIS — M75.41 SHOULDER IMPINGEMENT SYNDROME, RIGHT: ICD-10-CM

## 2019-03-28 ASSESSMENT — MIFFLIN-ST. JEOR: SCORE: 1791.13

## 2019-03-29 ENCOUNTER — OFFICE VISIT - HEALTHEAST (OUTPATIENT)
Dept: PHYSICAL THERAPY | Facility: REHABILITATION | Age: 49
End: 2019-03-29

## 2019-03-29 ENCOUNTER — COMMUNICATION - HEALTHEAST (OUTPATIENT)
Dept: INTERNAL MEDICINE | Facility: CLINIC | Age: 49
End: 2019-03-29

## 2019-03-29 DIAGNOSIS — M79.18 MYOFASCIAL PAIN: ICD-10-CM

## 2019-03-29 DIAGNOSIS — M25.511 CHRONIC RIGHT SHOULDER PAIN: ICD-10-CM

## 2019-03-29 DIAGNOSIS — F43.10 PTSD (POST-TRAUMATIC STRESS DISORDER): ICD-10-CM

## 2019-03-29 DIAGNOSIS — G89.29 CHRONIC RIGHT SHOULDER PAIN: ICD-10-CM

## 2019-03-29 DIAGNOSIS — M54.2 CERVICALGIA: ICD-10-CM

## 2019-03-29 DIAGNOSIS — M54.12 CERVICAL RADICULITIS: ICD-10-CM

## 2019-04-01 ENCOUNTER — COMMUNICATION - HEALTHEAST (OUTPATIENT)
Dept: TELEHEALTH | Facility: CLINIC | Age: 49
End: 2019-04-01

## 2019-04-01 ENCOUNTER — COMMUNICATION - HEALTHEAST (OUTPATIENT)
Dept: NURSING | Facility: CLINIC | Age: 49
End: 2019-04-01

## 2019-04-02 ENCOUNTER — OFFICE VISIT - HEALTHEAST (OUTPATIENT)
Dept: PHYSICAL THERAPY | Facility: REHABILITATION | Age: 49
End: 2019-04-02

## 2019-04-02 DIAGNOSIS — G89.29 CHRONIC RIGHT SHOULDER PAIN: ICD-10-CM

## 2019-04-02 DIAGNOSIS — M54.2 CERVICALGIA: ICD-10-CM

## 2019-04-02 DIAGNOSIS — M79.18 MYOFASCIAL PAIN: ICD-10-CM

## 2019-04-02 DIAGNOSIS — M25.511 CHRONIC RIGHT SHOULDER PAIN: ICD-10-CM

## 2019-04-02 DIAGNOSIS — M54.12 CERVICAL RADICULITIS: ICD-10-CM

## 2019-04-03 ENCOUNTER — COMMUNICATION - HEALTHEAST (OUTPATIENT)
Dept: PALLIATIVE MEDICINE | Facility: OTHER | Age: 49
End: 2019-04-03

## 2019-04-04 ENCOUNTER — OFFICE VISIT - HEALTHEAST (OUTPATIENT)
Dept: INTERNAL MEDICINE | Facility: CLINIC | Age: 49
End: 2019-04-04

## 2019-04-04 ENCOUNTER — AMBULATORY - HEALTHEAST (OUTPATIENT)
Dept: NURSING | Facility: CLINIC | Age: 49
End: 2019-04-04

## 2019-04-04 ENCOUNTER — COMMUNICATION - HEALTHEAST (OUTPATIENT)
Dept: NURSING | Facility: CLINIC | Age: 49
End: 2019-04-04

## 2019-04-04 DIAGNOSIS — J01.00 ACUTE MAXILLARY SINUSITIS, RECURRENCE NOT SPECIFIED: ICD-10-CM

## 2019-04-04 DIAGNOSIS — S06.9X9S TRAUMATIC BRAIN INJURY WITH LOSS OF CONSCIOUSNESS, SEQUELA (H): ICD-10-CM

## 2019-04-04 DIAGNOSIS — F41.1 GENERALIZED ANXIETY DISORDER: ICD-10-CM

## 2019-04-04 DIAGNOSIS — F31.81 BIPOLAR 2 DISORDER (H): ICD-10-CM

## 2019-04-04 DIAGNOSIS — Q82.0 PRIMARY (CONGENITAL) LYMPHEDEMA: ICD-10-CM

## 2019-04-04 DIAGNOSIS — I10 ESSENTIAL HYPERTENSION: ICD-10-CM

## 2019-04-04 DIAGNOSIS — F11.90 CHRONIC, CONTINUOUS USE OF OPIOIDS: ICD-10-CM

## 2019-04-04 ASSESSMENT — MIFFLIN-ST. JEOR: SCORE: 1774.97

## 2019-04-05 ENCOUNTER — OFFICE VISIT - HEALTHEAST (OUTPATIENT)
Dept: PHYSICAL THERAPY | Facility: REHABILITATION | Age: 49
End: 2019-04-05

## 2019-04-05 DIAGNOSIS — M54.12 CERVICAL RADICULITIS: ICD-10-CM

## 2019-04-05 DIAGNOSIS — M79.18 MYOFASCIAL PAIN: ICD-10-CM

## 2019-04-05 DIAGNOSIS — M54.2 CERVICALGIA: ICD-10-CM

## 2019-04-05 DIAGNOSIS — M25.511 CHRONIC RIGHT SHOULDER PAIN: ICD-10-CM

## 2019-04-05 DIAGNOSIS — G89.29 CHRONIC RIGHT SHOULDER PAIN: ICD-10-CM

## 2019-04-09 ENCOUNTER — OFFICE VISIT - HEALTHEAST (OUTPATIENT)
Dept: PHYSICAL THERAPY | Facility: REHABILITATION | Age: 49
End: 2019-04-09

## 2019-04-09 DIAGNOSIS — G89.29 CHRONIC RIGHT SHOULDER PAIN: ICD-10-CM

## 2019-04-09 DIAGNOSIS — M79.18 MYOFASCIAL PAIN: ICD-10-CM

## 2019-04-09 DIAGNOSIS — M54.2 CERVICALGIA: ICD-10-CM

## 2019-04-09 DIAGNOSIS — M54.12 CERVICAL RADICULITIS: ICD-10-CM

## 2019-04-09 DIAGNOSIS — M25.511 CHRONIC RIGHT SHOULDER PAIN: ICD-10-CM

## 2019-04-12 ENCOUNTER — OFFICE VISIT - HEALTHEAST (OUTPATIENT)
Dept: PHYSICAL THERAPY | Facility: REHABILITATION | Age: 49
End: 2019-04-12

## 2019-04-12 ENCOUNTER — COMMUNICATION - HEALTHEAST (OUTPATIENT)
Dept: PALLIATIVE MEDICINE | Facility: OTHER | Age: 49
End: 2019-04-12

## 2019-04-12 DIAGNOSIS — M54.2 CERVICALGIA: ICD-10-CM

## 2019-04-12 DIAGNOSIS — M75.41 SHOULDER IMPINGEMENT SYNDROME, RIGHT: ICD-10-CM

## 2019-04-12 DIAGNOSIS — M54.12 CERVICAL RADICULITIS: ICD-10-CM

## 2019-04-12 DIAGNOSIS — G89.29 CHRONIC RIGHT SHOULDER PAIN: ICD-10-CM

## 2019-04-12 DIAGNOSIS — M79.18 MYOFASCIAL PAIN: ICD-10-CM

## 2019-04-12 DIAGNOSIS — M25.511 CHRONIC RIGHT SHOULDER PAIN: ICD-10-CM

## 2019-04-15 ENCOUNTER — AMBULATORY - HEALTHEAST (OUTPATIENT)
Dept: NURSING | Facility: CLINIC | Age: 49
End: 2019-04-15

## 2019-04-16 ENCOUNTER — OFFICE VISIT - HEALTHEAST (OUTPATIENT)
Dept: PHYSICAL THERAPY | Facility: REHABILITATION | Age: 49
End: 2019-04-16

## 2019-04-16 DIAGNOSIS — G89.29 CHRONIC RIGHT SHOULDER PAIN: ICD-10-CM

## 2019-04-16 DIAGNOSIS — M54.12 CERVICAL RADICULITIS: ICD-10-CM

## 2019-04-16 DIAGNOSIS — M79.18 MYOFASCIAL PAIN: ICD-10-CM

## 2019-04-16 DIAGNOSIS — M25.511 CHRONIC RIGHT SHOULDER PAIN: ICD-10-CM

## 2019-04-16 DIAGNOSIS — M54.2 CERVICALGIA: ICD-10-CM

## 2019-04-17 ENCOUNTER — COMMUNICATION - HEALTHEAST (OUTPATIENT)
Dept: NURSING | Facility: CLINIC | Age: 49
End: 2019-04-17

## 2019-04-17 ENCOUNTER — COMMUNICATION - HEALTHEAST (OUTPATIENT)
Dept: TELEHEALTH | Facility: CLINIC | Age: 49
End: 2019-04-17

## 2019-04-18 ENCOUNTER — AMBULATORY - HEALTHEAST (OUTPATIENT)
Dept: NURSING | Facility: CLINIC | Age: 49
End: 2019-04-18

## 2019-04-18 ENCOUNTER — COMMUNICATION - HEALTHEAST (OUTPATIENT)
Dept: NURSING | Facility: CLINIC | Age: 49
End: 2019-04-18

## 2019-04-19 ENCOUNTER — COMMUNICATION - HEALTHEAST (OUTPATIENT)
Dept: PALLIATIVE MEDICINE | Facility: OTHER | Age: 49
End: 2019-04-19

## 2019-04-19 DIAGNOSIS — G89.4 CHRONIC PAIN SYNDROME: ICD-10-CM

## 2019-04-23 ENCOUNTER — COMMUNICATION - HEALTHEAST (OUTPATIENT)
Dept: INTERNAL MEDICINE | Facility: CLINIC | Age: 49
End: 2019-04-23

## 2019-04-23 DIAGNOSIS — S06.9X9A TRAUMATIC BRAIN INJURY WITH LOSS OF CONSCIOUSNESS (H): ICD-10-CM

## 2019-04-24 ENCOUNTER — AMBULATORY - HEALTHEAST (OUTPATIENT)
Dept: NURSING | Facility: CLINIC | Age: 49
End: 2019-04-24

## 2019-04-24 ENCOUNTER — OFFICE VISIT - HEALTHEAST (OUTPATIENT)
Dept: PHYSICAL THERAPY | Facility: REHABILITATION | Age: 49
End: 2019-04-24

## 2019-04-24 DIAGNOSIS — M79.18 MYOFASCIAL PAIN: ICD-10-CM

## 2019-04-24 DIAGNOSIS — M54.2 CERVICALGIA: ICD-10-CM

## 2019-04-24 DIAGNOSIS — M25.511 CHRONIC RIGHT SHOULDER PAIN: ICD-10-CM

## 2019-04-24 DIAGNOSIS — M54.12 CERVICAL RADICULITIS: ICD-10-CM

## 2019-04-24 DIAGNOSIS — G89.29 CHRONIC RIGHT SHOULDER PAIN: ICD-10-CM

## 2019-04-26 ENCOUNTER — OFFICE VISIT - HEALTHEAST (OUTPATIENT)
Dept: PHYSICAL THERAPY | Facility: REHABILITATION | Age: 49
End: 2019-04-26

## 2019-04-26 DIAGNOSIS — M54.12 CERVICAL RADICULITIS: ICD-10-CM

## 2019-04-26 DIAGNOSIS — G89.29 CHRONIC RIGHT SHOULDER PAIN: ICD-10-CM

## 2019-04-26 DIAGNOSIS — M25.511 CHRONIC RIGHT SHOULDER PAIN: ICD-10-CM

## 2019-04-26 DIAGNOSIS — M54.2 CERVICALGIA: ICD-10-CM

## 2019-04-26 DIAGNOSIS — M79.18 MYOFASCIAL PAIN: ICD-10-CM

## 2019-04-30 ENCOUNTER — COMMUNICATION - HEALTHEAST (OUTPATIENT)
Dept: PALLIATIVE MEDICINE | Facility: OTHER | Age: 49
End: 2019-04-30

## 2019-04-30 ENCOUNTER — OFFICE VISIT - HEALTHEAST (OUTPATIENT)
Dept: PHYSICAL THERAPY | Facility: REHABILITATION | Age: 49
End: 2019-04-30

## 2019-04-30 DIAGNOSIS — G89.4 CHRONIC PAIN SYNDROME: ICD-10-CM

## 2019-04-30 DIAGNOSIS — M75.41 SHOULDER IMPINGEMENT SYNDROME, RIGHT: ICD-10-CM

## 2019-04-30 DIAGNOSIS — M79.18 MYOFASCIAL PAIN: ICD-10-CM

## 2019-04-30 DIAGNOSIS — M54.12 CERVICAL RADICULITIS: ICD-10-CM

## 2019-04-30 DIAGNOSIS — M54.2 CERVICALGIA: ICD-10-CM

## 2019-04-30 DIAGNOSIS — M25.511 CHRONIC RIGHT SHOULDER PAIN: ICD-10-CM

## 2019-04-30 DIAGNOSIS — G89.29 CHRONIC RIGHT SHOULDER PAIN: ICD-10-CM

## 2019-05-03 ENCOUNTER — AMBULATORY - HEALTHEAST (OUTPATIENT)
Dept: NURSING | Facility: CLINIC | Age: 49
End: 2019-05-03

## 2019-05-07 ENCOUNTER — OFFICE VISIT - HEALTHEAST (OUTPATIENT)
Dept: PHYSICAL THERAPY | Facility: REHABILITATION | Age: 49
End: 2019-05-07

## 2019-05-07 DIAGNOSIS — M79.18 MYOFASCIAL PAIN: ICD-10-CM

## 2019-05-07 DIAGNOSIS — M54.12 CERVICAL RADICULITIS: ICD-10-CM

## 2019-05-07 DIAGNOSIS — M54.2 CERVICALGIA: ICD-10-CM

## 2019-05-07 DIAGNOSIS — M25.511 CHRONIC RIGHT SHOULDER PAIN: ICD-10-CM

## 2019-05-07 DIAGNOSIS — G89.29 CHRONIC RIGHT SHOULDER PAIN: ICD-10-CM

## 2019-05-10 ENCOUNTER — OFFICE VISIT - HEALTHEAST (OUTPATIENT)
Dept: PHYSICAL THERAPY | Facility: REHABILITATION | Age: 49
End: 2019-05-10

## 2019-05-10 DIAGNOSIS — G89.29 CHRONIC RIGHT SHOULDER PAIN: ICD-10-CM

## 2019-05-10 DIAGNOSIS — M79.18 MYOFASCIAL PAIN: ICD-10-CM

## 2019-05-10 DIAGNOSIS — M25.511 CHRONIC RIGHT SHOULDER PAIN: ICD-10-CM

## 2019-05-10 DIAGNOSIS — M54.2 CERVICALGIA: ICD-10-CM

## 2019-05-10 DIAGNOSIS — M54.12 CERVICAL RADICULITIS: ICD-10-CM

## 2019-05-14 ENCOUNTER — OFFICE VISIT - HEALTHEAST (OUTPATIENT)
Dept: PHYSICAL THERAPY | Facility: REHABILITATION | Age: 49
End: 2019-05-14

## 2019-05-14 ENCOUNTER — AMBULATORY - HEALTHEAST (OUTPATIENT)
Dept: NURSING | Facility: CLINIC | Age: 49
End: 2019-05-14

## 2019-05-14 ENCOUNTER — COMMUNICATION - HEALTHEAST (OUTPATIENT)
Dept: INTERNAL MEDICINE | Facility: CLINIC | Age: 49
End: 2019-05-14

## 2019-05-14 DIAGNOSIS — G89.29 CHRONIC RIGHT SHOULDER PAIN: ICD-10-CM

## 2019-05-14 DIAGNOSIS — M79.18 MYOFASCIAL PAIN: ICD-10-CM

## 2019-05-14 DIAGNOSIS — M54.12 CERVICAL RADICULITIS: ICD-10-CM

## 2019-05-14 DIAGNOSIS — R06.2 WHEEZING: ICD-10-CM

## 2019-05-14 DIAGNOSIS — M25.511 CHRONIC RIGHT SHOULDER PAIN: ICD-10-CM

## 2019-05-14 DIAGNOSIS — M54.2 CERVICALGIA: ICD-10-CM

## 2019-05-15 ENCOUNTER — COMMUNICATION - HEALTHEAST (OUTPATIENT)
Dept: PALLIATIVE MEDICINE | Facility: OTHER | Age: 49
End: 2019-05-15

## 2019-05-15 DIAGNOSIS — G89.4 CHRONIC PAIN SYNDROME: ICD-10-CM

## 2019-05-15 DIAGNOSIS — M54.12 CERVICAL RADICULOPATHY AT C8: ICD-10-CM

## 2019-05-17 ENCOUNTER — OFFICE VISIT - HEALTHEAST (OUTPATIENT)
Dept: PHYSICAL THERAPY | Facility: REHABILITATION | Age: 49
End: 2019-05-17

## 2019-05-17 ENCOUNTER — COMMUNICATION - HEALTHEAST (OUTPATIENT)
Dept: NURSING | Facility: CLINIC | Age: 49
End: 2019-05-17

## 2019-05-17 DIAGNOSIS — M54.2 CERVICALGIA: ICD-10-CM

## 2019-05-17 DIAGNOSIS — G89.29 CHRONIC RIGHT SHOULDER PAIN: ICD-10-CM

## 2019-05-17 DIAGNOSIS — M54.12 CERVICAL RADICULITIS: ICD-10-CM

## 2019-05-17 DIAGNOSIS — M25.511 CHRONIC RIGHT SHOULDER PAIN: ICD-10-CM

## 2019-05-17 DIAGNOSIS — M79.18 MYOFASCIAL PAIN: ICD-10-CM

## 2019-05-21 ENCOUNTER — AMBULATORY - HEALTHEAST (OUTPATIENT)
Dept: NURSING | Facility: CLINIC | Age: 49
End: 2019-05-21

## 2019-05-21 ENCOUNTER — OFFICE VISIT - HEALTHEAST (OUTPATIENT)
Dept: PHYSICAL THERAPY | Facility: REHABILITATION | Age: 49
End: 2019-05-21

## 2019-05-21 DIAGNOSIS — M25.511 CHRONIC RIGHT SHOULDER PAIN: ICD-10-CM

## 2019-05-21 DIAGNOSIS — M54.2 CERVICALGIA: ICD-10-CM

## 2019-05-21 DIAGNOSIS — M54.12 CERVICAL RADICULITIS: ICD-10-CM

## 2019-05-21 DIAGNOSIS — G89.29 CHRONIC RIGHT SHOULDER PAIN: ICD-10-CM

## 2019-05-21 DIAGNOSIS — M79.18 MYOFASCIAL PAIN: ICD-10-CM

## 2019-05-22 ENCOUNTER — COMMUNICATION - HEALTHEAST (OUTPATIENT)
Dept: INTERNAL MEDICINE | Facility: CLINIC | Age: 49
End: 2019-05-22

## 2019-05-22 DIAGNOSIS — S06.9X9A TRAUMATIC BRAIN INJURY WITH LOSS OF CONSCIOUSNESS (H): ICD-10-CM

## 2019-05-24 ENCOUNTER — COMMUNICATION - HEALTHEAST (OUTPATIENT)
Dept: NURSING | Facility: CLINIC | Age: 49
End: 2019-05-24

## 2019-05-24 ENCOUNTER — OFFICE VISIT - HEALTHEAST (OUTPATIENT)
Dept: PHYSICAL THERAPY | Facility: REHABILITATION | Age: 49
End: 2019-05-24

## 2019-05-24 DIAGNOSIS — M79.18 MYOFASCIAL PAIN: ICD-10-CM

## 2019-05-24 DIAGNOSIS — M54.2 CERVICALGIA: ICD-10-CM

## 2019-05-24 DIAGNOSIS — M54.12 CERVICAL RADICULITIS: ICD-10-CM

## 2019-05-24 DIAGNOSIS — G89.29 CHRONIC RIGHT SHOULDER PAIN: ICD-10-CM

## 2019-05-24 DIAGNOSIS — M25.511 CHRONIC RIGHT SHOULDER PAIN: ICD-10-CM

## 2019-05-26 ENCOUNTER — COMMUNICATION - HEALTHEAST (OUTPATIENT)
Dept: INTERNAL MEDICINE | Facility: CLINIC | Age: 49
End: 2019-05-26

## 2019-05-26 DIAGNOSIS — T78.40XA ALLERGY: ICD-10-CM

## 2019-05-28 ENCOUNTER — AMBULATORY - HEALTHEAST (OUTPATIENT)
Dept: NURSING | Facility: CLINIC | Age: 49
End: 2019-05-28

## 2019-05-28 ENCOUNTER — AMBULATORY - HEALTHEAST (OUTPATIENT)
Dept: PALLIATIVE MEDICINE | Facility: OTHER | Age: 49
End: 2019-05-28

## 2019-05-28 ENCOUNTER — COMMUNICATION - HEALTHEAST (OUTPATIENT)
Dept: PALLIATIVE MEDICINE | Facility: OTHER | Age: 49
End: 2019-05-28

## 2019-05-28 ENCOUNTER — OFFICE VISIT - HEALTHEAST (OUTPATIENT)
Dept: PHYSICAL THERAPY | Facility: REHABILITATION | Age: 49
End: 2019-05-28

## 2019-05-28 DIAGNOSIS — M79.18 MYOFASCIAL PAIN: ICD-10-CM

## 2019-05-28 DIAGNOSIS — M54.12 CERVICAL RADICULITIS: ICD-10-CM

## 2019-05-28 DIAGNOSIS — M54.12 CERVICAL RADICULOPATHY AT C8: ICD-10-CM

## 2019-05-28 DIAGNOSIS — M54.2 CERVICALGIA: ICD-10-CM

## 2019-05-28 DIAGNOSIS — M25.511 CHRONIC RIGHT SHOULDER PAIN: ICD-10-CM

## 2019-05-28 DIAGNOSIS — G89.29 CHRONIC RIGHT SHOULDER PAIN: ICD-10-CM

## 2019-06-03 ENCOUNTER — COMMUNICATION - HEALTHEAST (OUTPATIENT)
Dept: INTERNAL MEDICINE | Facility: CLINIC | Age: 49
End: 2019-06-03

## 2019-06-03 ENCOUNTER — COMMUNICATION - HEALTHEAST (OUTPATIENT)
Dept: PALLIATIVE MEDICINE | Facility: OTHER | Age: 49
End: 2019-06-03

## 2019-06-03 DIAGNOSIS — M75.41 SHOULDER IMPINGEMENT SYNDROME, RIGHT: ICD-10-CM

## 2019-06-03 DIAGNOSIS — M54.12 CERVICAL RADICULOPATHY AT C8: ICD-10-CM

## 2019-06-04 ENCOUNTER — COMMUNICATION - HEALTHEAST (OUTPATIENT)
Dept: NURSING | Facility: CLINIC | Age: 49
End: 2019-06-04

## 2019-06-04 ENCOUNTER — OFFICE VISIT - HEALTHEAST (OUTPATIENT)
Dept: PHYSICAL THERAPY | Facility: REHABILITATION | Age: 49
End: 2019-06-04

## 2019-06-04 ENCOUNTER — AMBULATORY - HEALTHEAST (OUTPATIENT)
Dept: NURSING | Facility: CLINIC | Age: 49
End: 2019-06-04

## 2019-06-04 DIAGNOSIS — M79.18 MYOFASCIAL PAIN: ICD-10-CM

## 2019-06-04 DIAGNOSIS — M54.12 CERVICAL RADICULITIS: ICD-10-CM

## 2019-06-04 DIAGNOSIS — M54.2 CERVICALGIA: ICD-10-CM

## 2019-06-04 DIAGNOSIS — G89.29 CHRONIC RIGHT SHOULDER PAIN: ICD-10-CM

## 2019-06-04 DIAGNOSIS — M25.511 CHRONIC RIGHT SHOULDER PAIN: ICD-10-CM

## 2019-06-07 ENCOUNTER — OFFICE VISIT - HEALTHEAST (OUTPATIENT)
Dept: PHYSICAL THERAPY | Facility: REHABILITATION | Age: 49
End: 2019-06-07

## 2019-06-07 DIAGNOSIS — M54.2 CERVICALGIA: ICD-10-CM

## 2019-06-07 DIAGNOSIS — M25.511 CHRONIC RIGHT SHOULDER PAIN: ICD-10-CM

## 2019-06-07 DIAGNOSIS — M54.12 CERVICAL RADICULITIS: ICD-10-CM

## 2019-06-07 DIAGNOSIS — G89.29 CHRONIC RIGHT SHOULDER PAIN: ICD-10-CM

## 2019-06-07 DIAGNOSIS — M79.18 MYOFASCIAL PAIN: ICD-10-CM

## 2019-06-10 ENCOUNTER — COMMUNICATION - HEALTHEAST (OUTPATIENT)
Dept: PALLIATIVE MEDICINE | Facility: OTHER | Age: 49
End: 2019-06-10

## 2019-06-10 ENCOUNTER — HOSPITAL ENCOUNTER (OUTPATIENT)
Dept: PALLIATIVE MEDICINE | Facility: OTHER | Age: 49
Discharge: HOME | End: 2019-06-10
Attending: ANESTHESIOLOGY

## 2019-06-10 DIAGNOSIS — M75.41 SHOULDER IMPINGEMENT SYNDROME, RIGHT: ICD-10-CM

## 2019-06-10 DIAGNOSIS — G89.4 CHRONIC PAIN SYNDROME: ICD-10-CM

## 2019-06-10 DIAGNOSIS — M54.12 CERVICAL RADICULOPATHY AT C8: ICD-10-CM

## 2019-06-10 ASSESSMENT — MIFFLIN-ST. JEOR: SCORE: 1772.98

## 2019-06-11 ENCOUNTER — COMMUNICATION - HEALTHEAST (OUTPATIENT)
Dept: NURSING | Facility: CLINIC | Age: 49
End: 2019-06-11

## 2019-06-12 ENCOUNTER — COMMUNICATION - HEALTHEAST (OUTPATIENT)
Dept: NURSING | Facility: CLINIC | Age: 49
End: 2019-06-12

## 2019-06-13 ENCOUNTER — COMMUNICATION - HEALTHEAST (OUTPATIENT)
Dept: INTERNAL MEDICINE | Facility: CLINIC | Age: 49
End: 2019-06-13

## 2019-06-13 DIAGNOSIS — I10 ESSENTIAL HYPERTENSION: ICD-10-CM

## 2019-06-17 ENCOUNTER — COMMUNICATION - HEALTHEAST (OUTPATIENT)
Dept: NURSING | Facility: CLINIC | Age: 49
End: 2019-06-17

## 2019-06-18 ENCOUNTER — COMMUNICATION - HEALTHEAST (OUTPATIENT)
Dept: INTERNAL MEDICINE | Facility: CLINIC | Age: 49
End: 2019-06-18

## 2019-06-18 DIAGNOSIS — F11.90 CHRONIC, CONTINUOUS USE OF OPIOIDS: ICD-10-CM

## 2019-06-21 ENCOUNTER — OFFICE VISIT - HEALTHEAST (OUTPATIENT)
Dept: PALLIATIVE MEDICINE | Facility: OTHER | Age: 49
End: 2019-06-21

## 2019-06-21 ENCOUNTER — COMMUNICATION - HEALTHEAST (OUTPATIENT)
Dept: INTERNAL MEDICINE | Facility: CLINIC | Age: 49
End: 2019-06-21

## 2019-06-21 ENCOUNTER — COMMUNICATION - HEALTHEAST (OUTPATIENT)
Dept: PALLIATIVE MEDICINE | Facility: OTHER | Age: 49
End: 2019-06-21

## 2019-06-21 ENCOUNTER — OFFICE VISIT - HEALTHEAST (OUTPATIENT)
Dept: PHYSICAL THERAPY | Facility: REHABILITATION | Age: 49
End: 2019-06-21

## 2019-06-21 ENCOUNTER — AMBULATORY - HEALTHEAST (OUTPATIENT)
Dept: NURSING | Facility: CLINIC | Age: 49
End: 2019-06-21

## 2019-06-21 ENCOUNTER — COMMUNICATION - HEALTHEAST (OUTPATIENT)
Dept: NURSING | Facility: CLINIC | Age: 49
End: 2019-06-21

## 2019-06-21 DIAGNOSIS — M79.18 MYOFASCIAL PAIN: ICD-10-CM

## 2019-06-21 DIAGNOSIS — G89.29 CHRONIC RIGHT SHOULDER PAIN: ICD-10-CM

## 2019-06-21 DIAGNOSIS — M54.2 CERVICALGIA: ICD-10-CM

## 2019-06-21 DIAGNOSIS — G89.4 CHRONIC PAIN SYNDROME: ICD-10-CM

## 2019-06-21 DIAGNOSIS — M54.12 CERVICAL RADICULITIS: ICD-10-CM

## 2019-06-21 DIAGNOSIS — M54.12 CERVICAL RADICULOPATHY AT C8: ICD-10-CM

## 2019-06-21 DIAGNOSIS — M75.41 SHOULDER IMPINGEMENT SYNDROME, RIGHT: ICD-10-CM

## 2019-06-21 DIAGNOSIS — S06.9X9A TRAUMATIC BRAIN INJURY WITH LOSS OF CONSCIOUSNESS (H): ICD-10-CM

## 2019-06-21 DIAGNOSIS — M25.511 CHRONIC RIGHT SHOULDER PAIN: ICD-10-CM

## 2019-06-27 ENCOUNTER — COMMUNICATION - HEALTHEAST (OUTPATIENT)
Dept: NURSING | Facility: CLINIC | Age: 49
End: 2019-06-27

## 2019-06-28 ENCOUNTER — COMMUNICATION - HEALTHEAST (OUTPATIENT)
Dept: NURSING | Facility: CLINIC | Age: 49
End: 2019-06-28

## 2019-06-28 ENCOUNTER — OFFICE VISIT - HEALTHEAST (OUTPATIENT)
Dept: INTERNAL MEDICINE | Facility: CLINIC | Age: 49
End: 2019-06-28

## 2019-06-28 DIAGNOSIS — Z71.89 COUNSELING AND COORDINATION OF CARE: ICD-10-CM

## 2019-06-28 DIAGNOSIS — F11.90 CHRONIC, CONTINUOUS USE OF OPIOIDS: ICD-10-CM

## 2019-06-28 DIAGNOSIS — F41.1 GENERALIZED ANXIETY DISORDER: ICD-10-CM

## 2019-06-28 DIAGNOSIS — F43.10 PTSD (POST-TRAUMATIC STRESS DISORDER): ICD-10-CM

## 2019-06-28 DIAGNOSIS — I10 ESSENTIAL HYPERTENSION: ICD-10-CM

## 2019-06-28 DIAGNOSIS — J01.00 ACUTE MAXILLARY SINUSITIS, RECURRENCE NOT SPECIFIED: ICD-10-CM

## 2019-06-28 DIAGNOSIS — G89.4 CHRONIC PAIN SYNDROME: ICD-10-CM

## 2019-06-28 LAB
ALBUMIN SERPL-MCNC: 3.8 G/DL (ref 3.5–5)
ALP SERPL-CCNC: 130 U/L (ref 45–120)
ALT SERPL W P-5'-P-CCNC: 55 U/L (ref 0–45)
AMPHETAMINES UR QL SCN: ABNORMAL
ANION GAP SERPL CALCULATED.3IONS-SCNC: 10 MMOL/L (ref 5–18)
AST SERPL W P-5'-P-CCNC: 33 U/L (ref 0–40)
BARBITURATES UR QL: ABNORMAL
BENZODIAZ UR QL: ABNORMAL
BILIRUB SERPL-MCNC: 0.1 MG/DL (ref 0–1)
BUN SERPL-MCNC: 16 MG/DL (ref 8–22)
CALCIUM SERPL-MCNC: 9.6 MG/DL (ref 8.5–10.5)
CANNABINOIDS UR QL SCN: ABNORMAL
CHLORIDE BLD-SCNC: 100 MMOL/L (ref 98–107)
CO2 SERPL-SCNC: 26 MMOL/L (ref 22–31)
COCAINE UR QL: ABNORMAL
CREAT SERPL-MCNC: 1.06 MG/DL (ref 0.7–1.3)
CREAT UR-MCNC: 140.6 MG/DL
ERYTHROCYTE [DISTWIDTH] IN BLOOD BY AUTOMATED COUNT: 10.8 % (ref 11–14.5)
GFR SERPL CREATININE-BSD FRML MDRD: >60 ML/MIN/1.73M2
GLUCOSE BLD-MCNC: 82 MG/DL (ref 70–125)
HCT VFR BLD AUTO: 37.5 % (ref 40–54)
HGB BLD-MCNC: 12.8 G/DL (ref 14–18)
MCH RBC QN AUTO: 29.2 PG (ref 27–34)
MCHC RBC AUTO-ENTMCNC: 34.2 G/DL (ref 32–36)
MCV RBC AUTO: 86 FL (ref 80–100)
OPIATES UR QL SCN: ABNORMAL
OXYCODONE UR QL: ABNORMAL
PCP UR QL SCN: ABNORMAL
PLATELET # BLD AUTO: 427 THOU/UL (ref 140–440)
PMV BLD AUTO: 6.1 FL (ref 7–10)
POTASSIUM BLD-SCNC: 3.9 MMOL/L (ref 3.5–5)
PROT SERPL-MCNC: 6.3 G/DL (ref 6–8)
RBC # BLD AUTO: 4.38 MILL/UL (ref 4.4–6.2)
SODIUM SERPL-SCNC: 136 MMOL/L (ref 136–145)
TSH SERPL DL<=0.005 MIU/L-ACNC: 0.5 UIU/ML (ref 0.3–5)
WBC: 9.1 THOU/UL (ref 4–11)

## 2019-06-28 ASSESSMENT — MIFFLIN-ST. JEOR: SCORE: 1756.54

## 2019-06-29 ENCOUNTER — AMBULATORY - HEALTHEAST (OUTPATIENT)
Dept: INTERNAL MEDICINE | Facility: CLINIC | Age: 49
End: 2019-06-29

## 2019-06-29 DIAGNOSIS — D64.9 ANEMIA, UNSPECIFIED TYPE: ICD-10-CM

## 2019-07-05 ENCOUNTER — OFFICE VISIT - HEALTHEAST (OUTPATIENT)
Dept: PHYSICAL THERAPY | Facility: REHABILITATION | Age: 49
End: 2019-07-05

## 2019-07-05 DIAGNOSIS — M54.2 CERVICALGIA: ICD-10-CM

## 2019-07-05 DIAGNOSIS — M25.511 CHRONIC RIGHT SHOULDER PAIN: ICD-10-CM

## 2019-07-05 DIAGNOSIS — M79.18 MYOFASCIAL PAIN: ICD-10-CM

## 2019-07-05 DIAGNOSIS — G89.29 CHRONIC RIGHT SHOULDER PAIN: ICD-10-CM

## 2019-07-05 DIAGNOSIS — M54.12 CERVICAL RADICULITIS: ICD-10-CM

## 2019-07-09 ENCOUNTER — OFFICE VISIT - HEALTHEAST (OUTPATIENT)
Dept: PHYSICAL THERAPY | Facility: REHABILITATION | Age: 49
End: 2019-07-09

## 2019-07-09 DIAGNOSIS — G89.29 CHRONIC RIGHT SHOULDER PAIN: ICD-10-CM

## 2019-07-09 DIAGNOSIS — M54.2 CERVICALGIA: ICD-10-CM

## 2019-07-09 DIAGNOSIS — M54.12 CERVICAL RADICULITIS: ICD-10-CM

## 2019-07-09 DIAGNOSIS — M25.511 CHRONIC RIGHT SHOULDER PAIN: ICD-10-CM

## 2019-07-09 DIAGNOSIS — M79.18 MYOFASCIAL PAIN: ICD-10-CM

## 2019-07-11 ENCOUNTER — COMMUNICATION - HEALTHEAST (OUTPATIENT)
Dept: CARE COORDINATION | Facility: CLINIC | Age: 49
End: 2019-07-11

## 2019-07-16 ENCOUNTER — AMBULATORY - HEALTHEAST (OUTPATIENT)
Dept: NURSING | Facility: CLINIC | Age: 49
End: 2019-07-16

## 2019-07-16 ENCOUNTER — OFFICE VISIT - HEALTHEAST (OUTPATIENT)
Dept: PHYSICAL THERAPY | Facility: REHABILITATION | Age: 49
End: 2019-07-16

## 2019-07-16 DIAGNOSIS — M79.18 MYOFASCIAL PAIN: ICD-10-CM

## 2019-07-16 DIAGNOSIS — G89.29 CHRONIC RIGHT SHOULDER PAIN: ICD-10-CM

## 2019-07-16 DIAGNOSIS — M54.12 CERVICAL RADICULITIS: ICD-10-CM

## 2019-07-16 DIAGNOSIS — M25.511 CHRONIC RIGHT SHOULDER PAIN: ICD-10-CM

## 2019-07-16 DIAGNOSIS — M54.2 CERVICALGIA: ICD-10-CM

## 2019-07-19 ENCOUNTER — COMMUNICATION - HEALTHEAST (OUTPATIENT)
Dept: PALLIATIVE MEDICINE | Facility: OTHER | Age: 49
End: 2019-07-19

## 2019-07-19 ENCOUNTER — OFFICE VISIT - HEALTHEAST (OUTPATIENT)
Dept: PHYSICAL THERAPY | Facility: REHABILITATION | Age: 49
End: 2019-07-19

## 2019-07-19 DIAGNOSIS — M54.12 CERVICAL RADICULOPATHY AT C8: ICD-10-CM

## 2019-07-19 DIAGNOSIS — G89.4 CHRONIC PAIN SYNDROME: ICD-10-CM

## 2019-07-19 DIAGNOSIS — G89.29 CHRONIC RIGHT SHOULDER PAIN: ICD-10-CM

## 2019-07-19 DIAGNOSIS — M25.511 CHRONIC RIGHT SHOULDER PAIN: ICD-10-CM

## 2019-07-19 DIAGNOSIS — M75.41 SHOULDER IMPINGEMENT SYNDROME, RIGHT: ICD-10-CM

## 2019-07-19 DIAGNOSIS — M79.18 MYOFASCIAL PAIN: ICD-10-CM

## 2019-07-19 DIAGNOSIS — M54.2 CERVICALGIA: ICD-10-CM

## 2019-07-19 DIAGNOSIS — M54.12 CERVICAL RADICULITIS: ICD-10-CM

## 2019-07-23 ENCOUNTER — OFFICE VISIT - HEALTHEAST (OUTPATIENT)
Dept: PHYSICAL THERAPY | Facility: REHABILITATION | Age: 49
End: 2019-07-23

## 2019-07-23 ENCOUNTER — AMBULATORY - HEALTHEAST (OUTPATIENT)
Dept: NURSING | Facility: CLINIC | Age: 49
End: 2019-07-23

## 2019-07-23 DIAGNOSIS — M79.18 MYOFASCIAL PAIN: ICD-10-CM

## 2019-07-23 DIAGNOSIS — M54.12 CERVICAL RADICULITIS: ICD-10-CM

## 2019-07-23 DIAGNOSIS — M25.511 CHRONIC RIGHT SHOULDER PAIN: ICD-10-CM

## 2019-07-23 DIAGNOSIS — M54.2 CERVICALGIA: ICD-10-CM

## 2019-07-23 DIAGNOSIS — G89.29 CHRONIC RIGHT SHOULDER PAIN: ICD-10-CM

## 2019-07-25 ENCOUNTER — COMMUNICATION - HEALTHEAST (OUTPATIENT)
Dept: INTERNAL MEDICINE | Facility: CLINIC | Age: 49
End: 2019-07-25

## 2019-07-25 DIAGNOSIS — S06.9X9A TRAUMATIC BRAIN INJURY WITH LOSS OF CONSCIOUSNESS (H): ICD-10-CM

## 2019-07-29 ENCOUNTER — COMMUNICATION - HEALTHEAST (OUTPATIENT)
Dept: PALLIATIVE MEDICINE | Facility: OTHER | Age: 49
End: 2019-07-29

## 2019-07-30 ENCOUNTER — AMBULATORY - HEALTHEAST (OUTPATIENT)
Dept: NURSING | Facility: CLINIC | Age: 49
End: 2019-07-30

## 2019-07-30 ENCOUNTER — OFFICE VISIT - HEALTHEAST (OUTPATIENT)
Dept: PHYSICAL THERAPY | Facility: REHABILITATION | Age: 49
End: 2019-07-30

## 2019-07-30 DIAGNOSIS — M54.12 CERVICAL RADICULITIS: ICD-10-CM

## 2019-07-30 DIAGNOSIS — M25.511 CHRONIC RIGHT SHOULDER PAIN: ICD-10-CM

## 2019-07-30 DIAGNOSIS — M54.2 CERVICALGIA: ICD-10-CM

## 2019-07-30 DIAGNOSIS — G89.29 CHRONIC RIGHT SHOULDER PAIN: ICD-10-CM

## 2019-07-30 DIAGNOSIS — M79.18 MYOFASCIAL PAIN: ICD-10-CM

## 2019-07-31 ENCOUNTER — COMMUNICATION - HEALTHEAST (OUTPATIENT)
Dept: NEUROLOGY | Facility: CLINIC | Age: 49
End: 2019-07-31

## 2019-08-01 ENCOUNTER — COMMUNICATION - HEALTHEAST (OUTPATIENT)
Dept: NURSING | Facility: CLINIC | Age: 49
End: 2019-08-01

## 2019-08-05 ENCOUNTER — AMBULATORY - HEALTHEAST (OUTPATIENT)
Dept: PALLIATIVE MEDICINE | Facility: OTHER | Age: 49
End: 2019-08-05

## 2019-08-06 ENCOUNTER — AMBULATORY - HEALTHEAST (OUTPATIENT)
Dept: NURSING | Facility: CLINIC | Age: 49
End: 2019-08-06

## 2019-08-12 ENCOUNTER — OFFICE VISIT - HEALTHEAST (OUTPATIENT)
Dept: PHYSICAL THERAPY | Facility: REHABILITATION | Age: 49
End: 2019-08-12

## 2019-08-12 ENCOUNTER — AMBULATORY - HEALTHEAST (OUTPATIENT)
Dept: NURSING | Facility: CLINIC | Age: 49
End: 2019-08-12

## 2019-08-12 DIAGNOSIS — M79.18 MYOFASCIAL PAIN: ICD-10-CM

## 2019-08-12 DIAGNOSIS — M54.2 CERVICALGIA: ICD-10-CM

## 2019-08-12 DIAGNOSIS — M54.12 CERVICAL RADICULITIS: ICD-10-CM

## 2019-08-12 DIAGNOSIS — G89.29 CHRONIC RIGHT SHOULDER PAIN: ICD-10-CM

## 2019-08-12 DIAGNOSIS — M25.511 CHRONIC RIGHT SHOULDER PAIN: ICD-10-CM

## 2019-08-14 ENCOUNTER — OFFICE VISIT - HEALTHEAST (OUTPATIENT)
Dept: PHYSICAL THERAPY | Facility: REHABILITATION | Age: 49
End: 2019-08-14

## 2019-08-14 ENCOUNTER — COMMUNICATION - HEALTHEAST (OUTPATIENT)
Dept: PALLIATIVE MEDICINE | Facility: OTHER | Age: 49
End: 2019-08-14

## 2019-08-14 DIAGNOSIS — G89.4 CHRONIC PAIN SYNDROME: ICD-10-CM

## 2019-08-14 DIAGNOSIS — G89.29 CHRONIC RIGHT SHOULDER PAIN: ICD-10-CM

## 2019-08-14 DIAGNOSIS — M75.41 SHOULDER IMPINGEMENT SYNDROME, RIGHT: ICD-10-CM

## 2019-08-14 DIAGNOSIS — M54.12 CERVICAL RADICULITIS: ICD-10-CM

## 2019-08-14 DIAGNOSIS — M54.2 CERVICALGIA: ICD-10-CM

## 2019-08-14 DIAGNOSIS — M25.511 CHRONIC RIGHT SHOULDER PAIN: ICD-10-CM

## 2019-08-14 DIAGNOSIS — M79.18 MYOFASCIAL PAIN: ICD-10-CM

## 2019-08-19 ENCOUNTER — OFFICE VISIT - HEALTHEAST (OUTPATIENT)
Dept: INTERNAL MEDICINE | Facility: CLINIC | Age: 49
End: 2019-08-19

## 2019-08-19 DIAGNOSIS — F11.90 CHRONIC, CONTINUOUS USE OF OPIOIDS: ICD-10-CM

## 2019-08-19 DIAGNOSIS — I10 ESSENTIAL HYPERTENSION: ICD-10-CM

## 2019-08-19 DIAGNOSIS — D50.0 IRON DEFICIENCY ANEMIA DUE TO CHRONIC BLOOD LOSS: ICD-10-CM

## 2019-08-19 DIAGNOSIS — F41.1 GENERALIZED ANXIETY DISORDER: ICD-10-CM

## 2019-08-19 DIAGNOSIS — K21.00 GASTROESOPHAGEAL REFLUX DISEASE WITH ESOPHAGITIS: ICD-10-CM

## 2019-08-19 LAB
ERYTHROCYTE [DISTWIDTH] IN BLOOD BY AUTOMATED COUNT: 10.7 % (ref 11–14.5)
ERYTHROCYTE [SEDIMENTATION RATE] IN BLOOD BY WESTERGREN METHOD: 12 MM/HR (ref 0–15)
HCT VFR BLD AUTO: 39.6 % (ref 40–54)
HGB BLD-MCNC: 13.3 G/DL (ref 14–18)
MCH RBC QN AUTO: 29 PG (ref 27–34)
MCHC RBC AUTO-ENTMCNC: 33.5 G/DL (ref 32–36)
MCV RBC AUTO: 86 FL (ref 80–100)
PLATELET # BLD AUTO: 489 THOU/UL (ref 140–440)
PMV BLD AUTO: 6.5 FL (ref 7–10)
RBC # BLD AUTO: 4.58 MILL/UL (ref 4.4–6.2)
WBC: 8.1 THOU/UL (ref 4–11)

## 2019-08-19 ASSESSMENT — MIFFLIN-ST. JEOR: SCORE: 1745.77

## 2019-08-20 LAB
C REACTIVE PROTEIN LHE: 0.3 MG/DL (ref 0–0.8)
FERRITIN SERPL-MCNC: 54 NG/ML (ref 27–300)
IRON SATN MFR SERPL: 10 % (ref 20–50)
IRON SERPL-MCNC: 39 UG/DL (ref 42–175)
TIBC SERPL-MCNC: 382 UG/DL (ref 313–563)
TRANSFERRIN SERPL-MCNC: 305 MG/DL (ref 212–360)

## 2019-08-23 ENCOUNTER — COMMUNICATION - HEALTHEAST (OUTPATIENT)
Dept: PALLIATIVE MEDICINE | Facility: OTHER | Age: 49
End: 2019-08-23

## 2019-08-23 DIAGNOSIS — M54.12 CERVICAL RADICULOPATHY AT C8: ICD-10-CM

## 2019-08-27 ENCOUNTER — OFFICE VISIT - HEALTHEAST (OUTPATIENT)
Dept: PHYSICAL THERAPY | Facility: REHABILITATION | Age: 49
End: 2019-08-27

## 2019-08-27 DIAGNOSIS — M54.2 CERVICALGIA: ICD-10-CM

## 2019-08-27 DIAGNOSIS — M25.511 CHRONIC RIGHT SHOULDER PAIN: ICD-10-CM

## 2019-08-27 DIAGNOSIS — G89.29 CHRONIC RIGHT SHOULDER PAIN: ICD-10-CM

## 2019-08-27 DIAGNOSIS — M54.12 CERVICAL RADICULITIS: ICD-10-CM

## 2019-08-27 DIAGNOSIS — M79.18 MYOFASCIAL PAIN: ICD-10-CM

## 2019-08-29 ENCOUNTER — COMMUNICATION - HEALTHEAST (OUTPATIENT)
Dept: VASCULAR SURGERY | Facility: CLINIC | Age: 49
End: 2019-08-29

## 2019-08-29 ENCOUNTER — AMBULATORY - HEALTHEAST (OUTPATIENT)
Dept: VASCULAR SURGERY | Facility: CLINIC | Age: 49
End: 2019-08-29

## 2019-08-29 DIAGNOSIS — Q82.0 PRIMARY (CONGENITAL) LYMPHEDEMA: ICD-10-CM

## 2019-08-30 ENCOUNTER — RECORDS - HEALTHEAST (OUTPATIENT)
Dept: ADMINISTRATIVE | Facility: OTHER | Age: 49
End: 2019-08-30

## 2019-08-30 ENCOUNTER — OFFICE VISIT - HEALTHEAST (OUTPATIENT)
Dept: PHYSICAL THERAPY | Facility: REHABILITATION | Age: 49
End: 2019-08-30

## 2019-08-30 ENCOUNTER — COMMUNICATION - HEALTHEAST (OUTPATIENT)
Dept: INTERNAL MEDICINE | Facility: CLINIC | Age: 49
End: 2019-08-30

## 2019-08-30 ENCOUNTER — AMBULATORY - HEALTHEAST (OUTPATIENT)
Dept: NURSING | Facility: CLINIC | Age: 49
End: 2019-08-30

## 2019-08-30 DIAGNOSIS — M79.18 MYOFASCIAL PAIN: ICD-10-CM

## 2019-08-30 DIAGNOSIS — G89.29 CHRONIC RIGHT SHOULDER PAIN: ICD-10-CM

## 2019-08-30 DIAGNOSIS — M54.2 CERVICALGIA: ICD-10-CM

## 2019-08-30 DIAGNOSIS — M25.511 CHRONIC RIGHT SHOULDER PAIN: ICD-10-CM

## 2019-08-30 DIAGNOSIS — S06.9X9A TRAUMATIC BRAIN INJURY WITH LOSS OF CONSCIOUSNESS (H): ICD-10-CM

## 2019-08-30 DIAGNOSIS — M54.12 CERVICAL RADICULITIS: ICD-10-CM

## 2019-09-03 ENCOUNTER — OFFICE VISIT - HEALTHEAST (OUTPATIENT)
Dept: PHYSICAL THERAPY | Facility: REHABILITATION | Age: 49
End: 2019-09-03

## 2019-09-03 DIAGNOSIS — M54.2 CERVICALGIA: ICD-10-CM

## 2019-09-03 DIAGNOSIS — G89.29 CHRONIC RIGHT SHOULDER PAIN: ICD-10-CM

## 2019-09-03 DIAGNOSIS — M25.511 CHRONIC RIGHT SHOULDER PAIN: ICD-10-CM

## 2019-09-03 DIAGNOSIS — M54.12 CERVICAL RADICULITIS: ICD-10-CM

## 2019-09-03 DIAGNOSIS — M79.18 MYOFASCIAL PAIN: ICD-10-CM

## 2019-09-05 ENCOUNTER — COMMUNICATION - HEALTHEAST (OUTPATIENT)
Dept: INTERNAL MEDICINE | Facility: CLINIC | Age: 49
End: 2019-09-05

## 2019-09-05 ENCOUNTER — COMMUNICATION - HEALTHEAST (OUTPATIENT)
Dept: PALLIATIVE MEDICINE | Facility: OTHER | Age: 49
End: 2019-09-05

## 2019-09-05 DIAGNOSIS — M54.12 CERVICAL RADICULOPATHY AT C8: ICD-10-CM

## 2019-09-05 DIAGNOSIS — M75.41 SHOULDER IMPINGEMENT SYNDROME, RIGHT: ICD-10-CM

## 2019-09-05 DIAGNOSIS — G89.4 CHRONIC PAIN SYNDROME: ICD-10-CM

## 2019-09-05 DIAGNOSIS — F07.81 POST CONCUSSION SYNDROME: ICD-10-CM

## 2019-09-05 DIAGNOSIS — S06.9X9A TRAUMATIC BRAIN INJURY WITH LOSS OF CONSCIOUSNESS (H): ICD-10-CM

## 2019-09-06 ENCOUNTER — OFFICE VISIT - HEALTHEAST (OUTPATIENT)
Dept: PHYSICAL THERAPY | Facility: REHABILITATION | Age: 49
End: 2019-09-06

## 2019-09-06 DIAGNOSIS — M54.2 CERVICALGIA: ICD-10-CM

## 2019-09-06 DIAGNOSIS — M54.12 CERVICAL RADICULITIS: ICD-10-CM

## 2019-09-06 DIAGNOSIS — G89.29 CHRONIC RIGHT SHOULDER PAIN: ICD-10-CM

## 2019-09-06 DIAGNOSIS — M79.18 MYOFASCIAL PAIN: ICD-10-CM

## 2019-09-06 DIAGNOSIS — M25.511 CHRONIC RIGHT SHOULDER PAIN: ICD-10-CM

## 2019-09-09 ENCOUNTER — COMMUNICATION - HEALTHEAST (OUTPATIENT)
Dept: INTERNAL MEDICINE | Facility: CLINIC | Age: 49
End: 2019-09-09

## 2019-09-10 ENCOUNTER — COMMUNICATION - HEALTHEAST (OUTPATIENT)
Dept: INTERNAL MEDICINE | Facility: CLINIC | Age: 49
End: 2019-09-10

## 2019-09-10 DIAGNOSIS — F07.81 POST CONCUSSION SYNDROME: ICD-10-CM

## 2019-09-10 DIAGNOSIS — S06.9X9A TRAUMATIC BRAIN INJURY WITH LOSS OF CONSCIOUSNESS (H): ICD-10-CM

## 2019-09-13 ENCOUNTER — OFFICE VISIT - HEALTHEAST (OUTPATIENT)
Dept: INTERNAL MEDICINE | Facility: CLINIC | Age: 49
End: 2019-09-13

## 2019-09-13 ENCOUNTER — OFFICE VISIT - HEALTHEAST (OUTPATIENT)
Dept: PHYSICAL THERAPY | Facility: REHABILITATION | Age: 49
End: 2019-09-13

## 2019-09-13 ENCOUNTER — COMMUNICATION - HEALTHEAST (OUTPATIENT)
Dept: INTERNAL MEDICINE | Facility: CLINIC | Age: 49
End: 2019-09-13

## 2019-09-13 ENCOUNTER — COMMUNICATION - HEALTHEAST (OUTPATIENT)
Dept: PALLIATIVE MEDICINE | Facility: OTHER | Age: 49
End: 2019-09-13

## 2019-09-13 DIAGNOSIS — M54.12 CERVICAL RADICULITIS: ICD-10-CM

## 2019-09-13 DIAGNOSIS — F07.81 POST CONCUSSION SYNDROME: ICD-10-CM

## 2019-09-13 DIAGNOSIS — M79.18 MYOFASCIAL PAIN: ICD-10-CM

## 2019-09-13 DIAGNOSIS — J01.00 ACUTE MAXILLARY SINUSITIS, RECURRENCE NOT SPECIFIED: ICD-10-CM

## 2019-09-13 DIAGNOSIS — G89.4 CHRONIC PAIN SYNDROME: ICD-10-CM

## 2019-09-13 DIAGNOSIS — M25.511 CHRONIC RIGHT SHOULDER PAIN: ICD-10-CM

## 2019-09-13 DIAGNOSIS — G89.29 CHRONIC RIGHT SHOULDER PAIN: ICD-10-CM

## 2019-09-13 DIAGNOSIS — M54.12 CERVICAL RADICULOPATHY AT C8: ICD-10-CM

## 2019-09-13 DIAGNOSIS — F11.90 CHRONIC, CONTINUOUS USE OF OPIOIDS: ICD-10-CM

## 2019-09-13 DIAGNOSIS — F41.1 GENERALIZED ANXIETY DISORDER: ICD-10-CM

## 2019-09-13 DIAGNOSIS — I10 ESSENTIAL HYPERTENSION: ICD-10-CM

## 2019-09-13 DIAGNOSIS — S06.9X9A TRAUMATIC BRAIN INJURY WITH LOSS OF CONSCIOUSNESS (H): ICD-10-CM

## 2019-09-13 DIAGNOSIS — M54.2 CERVICALGIA: ICD-10-CM

## 2019-09-13 DIAGNOSIS — S06.9X9S TRAUMATIC BRAIN INJURY WITH LOSS OF CONSCIOUSNESS, SEQUELA (H): ICD-10-CM

## 2019-09-13 DIAGNOSIS — D50.0 IRON DEFICIENCY ANEMIA DUE TO CHRONIC BLOOD LOSS: ICD-10-CM

## 2019-09-13 LAB
ANION GAP SERPL CALCULATED.3IONS-SCNC: 12 MMOL/L (ref 5–18)
BUN SERPL-MCNC: 13 MG/DL (ref 8–22)
C REACTIVE PROTEIN LHE: 0.4 MG/DL (ref 0–0.8)
CALCIUM SERPL-MCNC: 9.7 MG/DL (ref 8.5–10.5)
CHLORIDE BLD-SCNC: 100 MMOL/L (ref 98–107)
CO2 SERPL-SCNC: 23 MMOL/L (ref 22–31)
CREAT SERPL-MCNC: 0.87 MG/DL (ref 0.7–1.3)
ERYTHROCYTE [DISTWIDTH] IN BLOOD BY AUTOMATED COUNT: 10.7 % (ref 11–14.5)
ERYTHROCYTE [SEDIMENTATION RATE] IN BLOOD BY WESTERGREN METHOD: 10 MM/HR (ref 0–15)
FERRITIN SERPL-MCNC: 41 NG/ML (ref 27–300)
GFR SERPL CREATININE-BSD FRML MDRD: >60 ML/MIN/1.73M2
GLUCOSE BLD-MCNC: 137 MG/DL (ref 70–125)
HCT VFR BLD AUTO: 38 % (ref 40–54)
HGB BLD-MCNC: 12.8 G/DL (ref 14–18)
IRON SATN MFR SERPL: 20 % (ref 20–50)
IRON SERPL-MCNC: 71 UG/DL (ref 42–175)
MCH RBC QN AUTO: 29.3 PG (ref 27–34)
MCHC RBC AUTO-ENTMCNC: 33.6 G/DL (ref 32–36)
MCV RBC AUTO: 87 FL (ref 80–100)
PLATELET # BLD AUTO: 417 THOU/UL (ref 140–440)
PMV BLD AUTO: 6.4 FL (ref 7–10)
POTASSIUM BLD-SCNC: 4.1 MMOL/L (ref 3.5–5)
RBC # BLD AUTO: 4.35 MILL/UL (ref 4.4–6.2)
RHEUMATOID FACT SERPL-ACNC: <15 IU/ML (ref 0–30)
SODIUM SERPL-SCNC: 135 MMOL/L (ref 136–145)
TIBC SERPL-MCNC: 351 UG/DL (ref 313–563)
TRANSFERRIN SERPL-MCNC: 281 MG/DL (ref 212–360)
URATE SERPL-MCNC: 2.2 MG/DL (ref 3–8)
WBC: 8.3 THOU/UL (ref 4–11)

## 2019-09-13 ASSESSMENT — MIFFLIN-ST. JEOR: SCORE: 1747.18

## 2019-09-16 LAB
ANA SER QL: 0 U
B BURGDOR IGG+IGM SER QL: <0.01 INDEX VALUE

## 2019-09-17 ENCOUNTER — COMMUNICATION - HEALTHEAST (OUTPATIENT)
Dept: INTERNAL MEDICINE | Facility: CLINIC | Age: 49
End: 2019-09-17

## 2019-09-17 LAB — CCP AB SER IA-ACNC: <0.5 U/ML

## 2019-09-24 ENCOUNTER — OFFICE VISIT - HEALTHEAST (OUTPATIENT)
Dept: PHYSICAL THERAPY | Facility: REHABILITATION | Age: 49
End: 2019-09-24

## 2019-09-24 DIAGNOSIS — G89.29 CHRONIC RIGHT SHOULDER PAIN: ICD-10-CM

## 2019-09-24 DIAGNOSIS — M54.2 CERVICALGIA: ICD-10-CM

## 2019-09-24 DIAGNOSIS — M54.12 CERVICAL RADICULITIS: ICD-10-CM

## 2019-09-24 DIAGNOSIS — M25.511 CHRONIC RIGHT SHOULDER PAIN: ICD-10-CM

## 2019-09-24 DIAGNOSIS — M79.18 MYOFASCIAL PAIN: ICD-10-CM

## 2019-09-26 ENCOUNTER — COMMUNICATION - HEALTHEAST (OUTPATIENT)
Dept: SCHEDULING | Facility: CLINIC | Age: 49
End: 2019-09-26

## 2019-09-26 ENCOUNTER — HOSPITAL ENCOUNTER (OUTPATIENT)
Dept: PALLIATIVE MEDICINE | Facility: OTHER | Age: 49
Discharge: HOME | End: 2019-09-26
Attending: ANESTHESIOLOGY

## 2019-09-26 DIAGNOSIS — M75.41 SHOULDER IMPINGEMENT SYNDROME, RIGHT: ICD-10-CM

## 2019-09-26 DIAGNOSIS — M54.12 CERVICAL RADICULOPATHY AT C8: ICD-10-CM

## 2019-09-26 DIAGNOSIS — J01.90 ACUTE SINUSITIS, RECURRENCE NOT SPECIFIED, UNSPECIFIED LOCATION: ICD-10-CM

## 2019-09-26 DIAGNOSIS — G89.4 CHRONIC PAIN SYNDROME: ICD-10-CM

## 2019-09-26 ASSESSMENT — MIFFLIN-ST. JEOR: SCORE: 1747.18

## 2019-09-27 ENCOUNTER — HOSPITAL ENCOUNTER (OUTPATIENT)
Dept: NEUROLOGY | Facility: CLINIC | Age: 49
Setting detail: THERAPIES SERIES
Discharge: STILL ADMITTED OR ADMINISTRATIVE | End: 2019-09-27
Attending: PSYCHIATRY & NEUROLOGY

## 2019-09-27 DIAGNOSIS — F06.70 MILD NEUROCOGNITIVE DISORDER DUE TO MULTIPLE ETIOLOGIES: ICD-10-CM

## 2019-10-01 ENCOUNTER — OFFICE VISIT - HEALTHEAST (OUTPATIENT)
Dept: PHYSICAL THERAPY | Facility: REHABILITATION | Age: 49
End: 2019-10-01

## 2019-10-01 DIAGNOSIS — G89.29 CHRONIC RIGHT SHOULDER PAIN: ICD-10-CM

## 2019-10-01 DIAGNOSIS — M54.2 CERVICALGIA: ICD-10-CM

## 2019-10-01 DIAGNOSIS — M79.18 MYOFASCIAL PAIN: ICD-10-CM

## 2019-10-01 DIAGNOSIS — M54.12 CERVICAL RADICULITIS: ICD-10-CM

## 2019-10-01 DIAGNOSIS — M25.511 CHRONIC RIGHT SHOULDER PAIN: ICD-10-CM

## 2019-10-02 ENCOUNTER — COMMUNICATION - HEALTHEAST (OUTPATIENT)
Dept: TELEHEALTH | Facility: CLINIC | Age: 49
End: 2019-10-02

## 2019-10-02 ENCOUNTER — COMMUNICATION - HEALTHEAST (OUTPATIENT)
Dept: INTERNAL MEDICINE | Facility: CLINIC | Age: 49
End: 2019-10-02

## 2019-10-02 DIAGNOSIS — J01.00 ACUTE MAXILLARY SINUSITIS, RECURRENCE NOT SPECIFIED: ICD-10-CM

## 2019-10-03 ENCOUNTER — COMMUNICATION - HEALTHEAST (OUTPATIENT)
Dept: NURSING | Facility: CLINIC | Age: 49
End: 2019-10-03

## 2019-10-04 ENCOUNTER — OFFICE VISIT - HEALTHEAST (OUTPATIENT)
Dept: PHYSICAL THERAPY | Facility: REHABILITATION | Age: 49
End: 2019-10-04

## 2019-10-04 DIAGNOSIS — M54.2 CERVICALGIA: ICD-10-CM

## 2019-10-04 DIAGNOSIS — M54.12 CERVICAL RADICULITIS: ICD-10-CM

## 2019-10-04 DIAGNOSIS — G89.29 CHRONIC RIGHT SHOULDER PAIN: ICD-10-CM

## 2019-10-04 DIAGNOSIS — M79.18 MYOFASCIAL PAIN: ICD-10-CM

## 2019-10-04 DIAGNOSIS — M25.511 CHRONIC RIGHT SHOULDER PAIN: ICD-10-CM

## 2019-10-07 ENCOUNTER — COMMUNICATION - HEALTHEAST (OUTPATIENT)
Dept: INTERNAL MEDICINE | Facility: CLINIC | Age: 49
End: 2019-10-07

## 2019-10-07 DIAGNOSIS — J01.00 ACUTE MAXILLARY SINUSITIS, RECURRENCE NOT SPECIFIED: ICD-10-CM

## 2019-10-08 ENCOUNTER — COMMUNICATION - HEALTHEAST (OUTPATIENT)
Dept: NURSING | Facility: CLINIC | Age: 49
End: 2019-10-08

## 2019-10-10 ENCOUNTER — COMMUNICATION - HEALTHEAST (OUTPATIENT)
Dept: PALLIATIVE MEDICINE | Facility: OTHER | Age: 49
End: 2019-10-10

## 2019-10-10 DIAGNOSIS — G89.4 CHRONIC PAIN SYNDROME: ICD-10-CM

## 2019-10-10 DIAGNOSIS — M54.12 CERVICAL RADICULOPATHY AT C8: ICD-10-CM

## 2019-10-11 ENCOUNTER — OFFICE VISIT - HEALTHEAST (OUTPATIENT)
Dept: PHYSICAL THERAPY | Facility: REHABILITATION | Age: 49
End: 2019-10-11

## 2019-10-11 ENCOUNTER — COMMUNICATION - HEALTHEAST (OUTPATIENT)
Dept: NURSING | Facility: CLINIC | Age: 49
End: 2019-10-11

## 2019-10-11 ENCOUNTER — AMBULATORY - HEALTHEAST (OUTPATIENT)
Dept: INTERNAL MEDICINE | Facility: CLINIC | Age: 49
End: 2019-10-11

## 2019-10-11 ENCOUNTER — COMMUNICATION - HEALTHEAST (OUTPATIENT)
Dept: INTERNAL MEDICINE | Facility: CLINIC | Age: 49
End: 2019-10-11

## 2019-10-11 DIAGNOSIS — M54.2 CERVICALGIA: ICD-10-CM

## 2019-10-11 DIAGNOSIS — M79.18 MYOFASCIAL PAIN: ICD-10-CM

## 2019-10-11 DIAGNOSIS — M25.511 CHRONIC RIGHT SHOULDER PAIN: ICD-10-CM

## 2019-10-11 DIAGNOSIS — M54.12 CERVICAL RADICULITIS: ICD-10-CM

## 2019-10-11 DIAGNOSIS — G89.29 CHRONIC RIGHT SHOULDER PAIN: ICD-10-CM

## 2019-10-11 DIAGNOSIS — J01.00 ACUTE MAXILLARY SINUSITIS, RECURRENCE NOT SPECIFIED: ICD-10-CM

## 2019-10-16 ENCOUNTER — COMMUNICATION - HEALTHEAST (OUTPATIENT)
Dept: INTERNAL MEDICINE | Facility: CLINIC | Age: 49
End: 2019-10-16

## 2019-10-16 ENCOUNTER — COMMUNICATION - HEALTHEAST (OUTPATIENT)
Dept: NURSING | Facility: CLINIC | Age: 49
End: 2019-10-16

## 2019-10-16 DIAGNOSIS — S06.9X9A TRAUMATIC BRAIN INJURY WITH LOSS OF CONSCIOUSNESS (H): ICD-10-CM

## 2019-10-18 ENCOUNTER — OFFICE VISIT - HEALTHEAST (OUTPATIENT)
Dept: INTERNAL MEDICINE | Facility: CLINIC | Age: 49
End: 2019-10-18

## 2019-10-18 DIAGNOSIS — I10 ESSENTIAL HYPERTENSION: ICD-10-CM

## 2019-10-18 DIAGNOSIS — D50.0 IRON DEFICIENCY ANEMIA DUE TO CHRONIC BLOOD LOSS: ICD-10-CM

## 2019-10-18 DIAGNOSIS — S06.9X9A TRAUMATIC BRAIN INJURY WITH LOSS OF CONSCIOUSNESS (H): ICD-10-CM

## 2019-10-18 DIAGNOSIS — F41.1 GENERALIZED ANXIETY DISORDER: ICD-10-CM

## 2019-10-18 DIAGNOSIS — Z79.899 MEDICATION MANAGEMENT: ICD-10-CM

## 2019-10-18 DIAGNOSIS — Z11.4 ENCOUNTER FOR SCREENING FOR HIV: ICD-10-CM

## 2019-10-18 DIAGNOSIS — J01.00 ACUTE MAXILLARY SINUSITIS, RECURRENCE NOT SPECIFIED: ICD-10-CM

## 2019-10-18 DIAGNOSIS — F11.90 CHRONIC, CONTINUOUS USE OF OPIOIDS: ICD-10-CM

## 2019-10-18 LAB
AMPHETAMINES UR QL SCN: ABNORMAL
BARBITURATES UR QL: ABNORMAL
BENZODIAZ UR QL: ABNORMAL
CANNABINOIDS UR QL SCN: ABNORMAL
COCAINE UR QL: ABNORMAL
CREAT UR-MCNC: 75.3 MG/DL
ERYTHROCYTE [DISTWIDTH] IN BLOOD BY AUTOMATED COUNT: 10.6 % (ref 11–14.5)
FERRITIN SERPL-MCNC: 49 NG/ML (ref 27–300)
HCT VFR BLD AUTO: 35.4 % (ref 40–54)
HGB BLD-MCNC: 12 G/DL (ref 14–18)
HIV 1+2 AB+HIV1 P24 AG SERPL QL IA: NEGATIVE
MCH RBC QN AUTO: 29.4 PG (ref 27–34)
MCHC RBC AUTO-ENTMCNC: 33.9 G/DL (ref 32–36)
MCV RBC AUTO: 87 FL (ref 80–100)
OPIATES UR QL SCN: ABNORMAL
OXYCODONE UR QL: ABNORMAL
PCP UR QL SCN: ABNORMAL
PLATELET # BLD AUTO: 458 THOU/UL (ref 140–440)
PMV BLD AUTO: 6.4 FL (ref 7–10)
RBC # BLD AUTO: 4.09 MILL/UL (ref 4.4–6.2)
WBC: 12.8 THOU/UL (ref 4–11)

## 2019-10-18 ASSESSMENT — MIFFLIN-ST. JEOR: SCORE: 1739.25

## 2019-10-21 ENCOUNTER — COMMUNICATION - HEALTHEAST (OUTPATIENT)
Dept: INTERNAL MEDICINE | Facility: CLINIC | Age: 49
End: 2019-10-21

## 2019-10-22 ENCOUNTER — OFFICE VISIT - HEALTHEAST (OUTPATIENT)
Dept: PHYSICAL THERAPY | Facility: REHABILITATION | Age: 49
End: 2019-10-22

## 2019-10-22 ENCOUNTER — AMBULATORY - HEALTHEAST (OUTPATIENT)
Dept: NURSING | Facility: CLINIC | Age: 49
End: 2019-10-22

## 2019-10-22 DIAGNOSIS — G89.29 CHRONIC RIGHT SHOULDER PAIN: ICD-10-CM

## 2019-10-22 DIAGNOSIS — M25.511 CHRONIC RIGHT SHOULDER PAIN: ICD-10-CM

## 2019-10-22 DIAGNOSIS — M79.18 MYOFASCIAL PAIN: ICD-10-CM

## 2019-10-22 DIAGNOSIS — M54.12 CERVICAL RADICULITIS: ICD-10-CM

## 2019-10-22 DIAGNOSIS — M54.2 CERVICALGIA: ICD-10-CM

## 2019-10-25 ENCOUNTER — COMMUNICATION - HEALTHEAST (OUTPATIENT)
Dept: NURSING | Facility: CLINIC | Age: 49
End: 2019-10-25

## 2019-10-25 ENCOUNTER — COMMUNICATION - HEALTHEAST (OUTPATIENT)
Dept: INTERNAL MEDICINE | Facility: CLINIC | Age: 49
End: 2019-10-25

## 2019-10-25 ENCOUNTER — OFFICE VISIT - HEALTHEAST (OUTPATIENT)
Dept: PHYSICAL THERAPY | Facility: REHABILITATION | Age: 49
End: 2019-10-25

## 2019-10-25 DIAGNOSIS — G89.29 CHRONIC RIGHT SHOULDER PAIN: ICD-10-CM

## 2019-10-25 DIAGNOSIS — M54.12 CERVICAL RADICULITIS: ICD-10-CM

## 2019-10-25 DIAGNOSIS — M54.2 CERVICALGIA: ICD-10-CM

## 2019-10-25 DIAGNOSIS — M25.511 CHRONIC RIGHT SHOULDER PAIN: ICD-10-CM

## 2019-10-28 ENCOUNTER — COMMUNICATION - HEALTHEAST (OUTPATIENT)
Dept: PALLIATIVE MEDICINE | Facility: OTHER | Age: 49
End: 2019-10-28

## 2019-10-28 DIAGNOSIS — M75.41 SHOULDER IMPINGEMENT SYNDROME, RIGHT: ICD-10-CM

## 2019-10-28 DIAGNOSIS — G89.4 CHRONIC PAIN SYNDROME: ICD-10-CM

## 2019-10-28 DIAGNOSIS — M54.12 CERVICAL RADICULOPATHY AT C8: ICD-10-CM

## 2019-10-29 ENCOUNTER — OFFICE VISIT - HEALTHEAST (OUTPATIENT)
Dept: PHYSICAL THERAPY | Facility: REHABILITATION | Age: 49
End: 2019-10-29

## 2019-10-29 DIAGNOSIS — M25.511 CHRONIC RIGHT SHOULDER PAIN: ICD-10-CM

## 2019-10-29 DIAGNOSIS — G89.29 CHRONIC RIGHT SHOULDER PAIN: ICD-10-CM

## 2019-10-29 DIAGNOSIS — M54.12 CERVICAL RADICULITIS: ICD-10-CM

## 2019-10-29 DIAGNOSIS — M79.18 MYOFASCIAL PAIN: ICD-10-CM

## 2019-10-29 DIAGNOSIS — M54.2 CERVICALGIA: ICD-10-CM

## 2019-11-01 ENCOUNTER — AMBULATORY - HEALTHEAST (OUTPATIENT)
Dept: NURSING | Facility: CLINIC | Age: 49
End: 2019-11-01

## 2019-11-01 ENCOUNTER — COMMUNICATION - HEALTHEAST (OUTPATIENT)
Dept: INTERNAL MEDICINE | Facility: CLINIC | Age: 49
End: 2019-11-01

## 2019-11-01 ENCOUNTER — OFFICE VISIT - HEALTHEAST (OUTPATIENT)
Dept: PHYSICAL THERAPY | Facility: REHABILITATION | Age: 49
End: 2019-11-01

## 2019-11-01 DIAGNOSIS — M54.2 CERVICALGIA: ICD-10-CM

## 2019-11-01 DIAGNOSIS — M25.511 CHRONIC RIGHT SHOULDER PAIN: ICD-10-CM

## 2019-11-01 DIAGNOSIS — G89.29 CHRONIC RIGHT SHOULDER PAIN: ICD-10-CM

## 2019-11-01 DIAGNOSIS — J01.00 ACUTE MAXILLARY SINUSITIS, RECURRENCE NOT SPECIFIED: ICD-10-CM

## 2019-11-01 DIAGNOSIS — M79.18 MYOFASCIAL PAIN: ICD-10-CM

## 2019-11-01 DIAGNOSIS — M54.12 CERVICAL RADICULITIS: ICD-10-CM

## 2019-11-02 ENCOUNTER — COMMUNICATION - HEALTHEAST (OUTPATIENT)
Dept: INTERNAL MEDICINE | Facility: CLINIC | Age: 49
End: 2019-11-02

## 2019-11-02 DIAGNOSIS — J01.00 ACUTE MAXILLARY SINUSITIS, RECURRENCE NOT SPECIFIED: ICD-10-CM

## 2019-11-05 ENCOUNTER — COMMUNICATION - HEALTHEAST (OUTPATIENT)
Dept: INTERNAL MEDICINE | Facility: CLINIC | Age: 49
End: 2019-11-05

## 2019-11-05 DIAGNOSIS — J01.00 ACUTE MAXILLARY SINUSITIS, RECURRENCE NOT SPECIFIED: ICD-10-CM

## 2019-11-08 ENCOUNTER — COMMUNICATION - HEALTHEAST (OUTPATIENT)
Dept: INTERNAL MEDICINE | Facility: CLINIC | Age: 49
End: 2019-11-08

## 2019-11-08 DIAGNOSIS — F43.10 PTSD (POST-TRAUMATIC STRESS DISORDER): ICD-10-CM

## 2019-11-12 ENCOUNTER — OFFICE VISIT - HEALTHEAST (OUTPATIENT)
Dept: PALLIATIVE MEDICINE | Facility: OTHER | Age: 49
End: 2019-11-12

## 2019-11-12 ENCOUNTER — COMMUNICATION - HEALTHEAST (OUTPATIENT)
Dept: PALLIATIVE MEDICINE | Facility: OTHER | Age: 49
End: 2019-11-12

## 2019-11-12 DIAGNOSIS — G89.4 CHRONIC PAIN SYNDROME: ICD-10-CM

## 2019-11-12 DIAGNOSIS — M54.12 CERVICAL RADICULOPATHY AT C8: ICD-10-CM

## 2019-11-15 ENCOUNTER — COMMUNICATION - HEALTHEAST (OUTPATIENT)
Dept: INTERNAL MEDICINE | Facility: CLINIC | Age: 49
End: 2019-11-15

## 2019-11-15 DIAGNOSIS — S06.9X9A TRAUMATIC BRAIN INJURY WITH LOSS OF CONSCIOUSNESS (H): ICD-10-CM

## 2019-11-15 DIAGNOSIS — J01.00 ACUTE MAXILLARY SINUSITIS, RECURRENCE NOT SPECIFIED: ICD-10-CM

## 2019-11-21 ENCOUNTER — AMBULATORY - HEALTHEAST (OUTPATIENT)
Dept: NURSING | Facility: CLINIC | Age: 49
End: 2019-11-21

## 2019-11-22 ENCOUNTER — COMMUNICATION - HEALTHEAST (OUTPATIENT)
Dept: NURSING | Facility: CLINIC | Age: 49
End: 2019-11-22

## 2019-11-22 ENCOUNTER — AMBULATORY - HEALTHEAST (OUTPATIENT)
Dept: PALLIATIVE MEDICINE | Facility: OTHER | Age: 49
End: 2019-11-22

## 2019-11-26 ENCOUNTER — COMMUNICATION - HEALTHEAST (OUTPATIENT)
Dept: PALLIATIVE MEDICINE | Facility: OTHER | Age: 49
End: 2019-11-26

## 2019-11-26 DIAGNOSIS — G89.4 CHRONIC PAIN SYNDROME: ICD-10-CM

## 2019-11-26 DIAGNOSIS — M54.12 CERVICAL RADICULOPATHY AT C8: ICD-10-CM

## 2019-12-04 ENCOUNTER — OFFICE VISIT - HEALTHEAST (OUTPATIENT)
Dept: PHYSICAL THERAPY | Facility: REHABILITATION | Age: 49
End: 2019-12-04

## 2019-12-04 DIAGNOSIS — M79.18 MYOFASCIAL PAIN: ICD-10-CM

## 2019-12-04 DIAGNOSIS — M25.511 CHRONIC RIGHT SHOULDER PAIN: ICD-10-CM

## 2019-12-04 DIAGNOSIS — M54.12 CERVICAL RADICULITIS: ICD-10-CM

## 2019-12-04 DIAGNOSIS — M54.2 CERVICALGIA: ICD-10-CM

## 2019-12-04 DIAGNOSIS — G89.29 CHRONIC RIGHT SHOULDER PAIN: ICD-10-CM

## 2019-12-05 ENCOUNTER — COMMUNICATION - HEALTHEAST (OUTPATIENT)
Dept: NURSING | Facility: CLINIC | Age: 49
End: 2019-12-05

## 2019-12-05 ENCOUNTER — COMMUNICATION - HEALTHEAST (OUTPATIENT)
Dept: INTERNAL MEDICINE | Facility: CLINIC | Age: 49
End: 2019-12-05

## 2019-12-05 DIAGNOSIS — Z00.00 HEALTHCARE MAINTENANCE: ICD-10-CM

## 2019-12-05 DIAGNOSIS — F43.10 PTSD (POST-TRAUMATIC STRESS DISORDER): ICD-10-CM

## 2019-12-06 ENCOUNTER — COMMUNICATION - HEALTHEAST (OUTPATIENT)
Dept: SCHEDULING | Facility: CLINIC | Age: 49
End: 2019-12-06

## 2019-12-06 DIAGNOSIS — J01.00 ACUTE MAXILLARY SINUSITIS, RECURRENCE NOT SPECIFIED: ICD-10-CM

## 2019-12-09 ENCOUNTER — COMMUNICATION - HEALTHEAST (OUTPATIENT)
Dept: INTERNAL MEDICINE | Facility: CLINIC | Age: 49
End: 2019-12-09

## 2019-12-11 ENCOUNTER — COMMUNICATION - HEALTHEAST (OUTPATIENT)
Dept: INTERNAL MEDICINE | Facility: CLINIC | Age: 49
End: 2019-12-11

## 2019-12-11 DIAGNOSIS — T78.40XA ALLERGY: ICD-10-CM

## 2019-12-12 ENCOUNTER — OFFICE VISIT - HEALTHEAST (OUTPATIENT)
Dept: INTERNAL MEDICINE | Facility: CLINIC | Age: 49
End: 2019-12-12

## 2019-12-12 DIAGNOSIS — J01.00 ACUTE MAXILLARY SINUSITIS, RECURRENCE NOT SPECIFIED: ICD-10-CM

## 2019-12-12 DIAGNOSIS — Q82.0 PRIMARY (CONGENITAL) LYMPHEDEMA: ICD-10-CM

## 2019-12-12 DIAGNOSIS — D50.0 IRON DEFICIENCY ANEMIA DUE TO CHRONIC BLOOD LOSS: ICD-10-CM

## 2019-12-12 DIAGNOSIS — S06.9X9S TRAUMATIC BRAIN INJURY WITH LOSS OF CONSCIOUSNESS, SEQUELA (H): ICD-10-CM

## 2019-12-12 DIAGNOSIS — F41.1 GENERALIZED ANXIETY DISORDER: ICD-10-CM

## 2019-12-12 LAB
ERYTHROCYTE [DISTWIDTH] IN BLOOD BY AUTOMATED COUNT: 11.7 % (ref 11–14.5)
HCT VFR BLD AUTO: 40.4 % (ref 40–54)
HGB BLD-MCNC: 13.4 G/DL (ref 14–18)
MCH RBC QN AUTO: 28.7 PG (ref 27–34)
MCHC RBC AUTO-ENTMCNC: 33.2 G/DL (ref 32–36)
MCV RBC AUTO: 86 FL (ref 80–100)
PLATELET # BLD AUTO: 392 THOU/UL (ref 140–440)
PMV BLD AUTO: 6.8 FL (ref 7–10)
RBC # BLD AUTO: 4.68 MILL/UL (ref 4.4–6.2)
WBC: 13.8 THOU/UL (ref 4–11)

## 2019-12-12 ASSESSMENT — MIFFLIN-ST. JEOR: SCORE: 1702.96

## 2019-12-12 ASSESSMENT — PATIENT HEALTH QUESTIONNAIRE - PHQ9: SUM OF ALL RESPONSES TO PHQ QUESTIONS 1-9: 23

## 2019-12-13 LAB
ALBUMIN SERPL-MCNC: 3.6 G/DL (ref 3.5–5)
ALP SERPL-CCNC: 177 U/L (ref 45–120)
ALT SERPL W P-5'-P-CCNC: 29 U/L (ref 0–45)
ANION GAP SERPL CALCULATED.3IONS-SCNC: 10 MMOL/L (ref 5–18)
AST SERPL W P-5'-P-CCNC: 17 U/L (ref 0–40)
BILIRUB SERPL-MCNC: 0.2 MG/DL (ref 0–1)
BUN SERPL-MCNC: 18 MG/DL (ref 8–22)
CALCIUM SERPL-MCNC: 9.8 MG/DL (ref 8.5–10.5)
CHLORIDE BLD-SCNC: 104 MMOL/L (ref 98–107)
CO2 SERPL-SCNC: 25 MMOL/L (ref 22–31)
CREAT SERPL-MCNC: 0.89 MG/DL (ref 0.7–1.3)
GFR SERPL CREATININE-BSD FRML MDRD: >60 ML/MIN/1.73M2
GLUCOSE BLD-MCNC: 96 MG/DL (ref 70–125)
POTASSIUM BLD-SCNC: 4.8 MMOL/L (ref 3.5–5)
PROT SERPL-MCNC: 6.9 G/DL (ref 6–8)
SODIUM SERPL-SCNC: 139 MMOL/L (ref 136–145)

## 2019-12-18 ENCOUNTER — OFFICE VISIT - HEALTHEAST (OUTPATIENT)
Dept: PALLIATIVE MEDICINE | Facility: OTHER | Age: 49
End: 2019-12-18

## 2019-12-18 DIAGNOSIS — M54.12 CERVICAL RADICULOPATHY AT C8: ICD-10-CM

## 2019-12-18 DIAGNOSIS — G89.4 CHRONIC PAIN SYNDROME: ICD-10-CM

## 2019-12-19 ENCOUNTER — COMMUNICATION - HEALTHEAST (OUTPATIENT)
Dept: INTERNAL MEDICINE | Facility: CLINIC | Age: 49
End: 2019-12-19

## 2019-12-19 DIAGNOSIS — J01.00 ACUTE MAXILLARY SINUSITIS, RECURRENCE NOT SPECIFIED: ICD-10-CM

## 2019-12-23 ENCOUNTER — COMMUNICATION - HEALTHEAST (OUTPATIENT)
Dept: NURSING | Facility: CLINIC | Age: 49
End: 2019-12-23

## 2019-12-26 ENCOUNTER — COMMUNICATION - HEALTHEAST (OUTPATIENT)
Dept: INTERNAL MEDICINE | Facility: CLINIC | Age: 49
End: 2019-12-26

## 2019-12-26 ENCOUNTER — OFFICE VISIT - HEALTHEAST (OUTPATIENT)
Dept: VASCULAR SURGERY | Facility: CLINIC | Age: 49
End: 2019-12-26

## 2019-12-26 DIAGNOSIS — G57.92 ILIOINGUINAL NEURALGIA OF LEFT SIDE: ICD-10-CM

## 2019-12-26 DIAGNOSIS — J01.00 ACUTE MAXILLARY SINUSITIS, RECURRENCE NOT SPECIFIED: ICD-10-CM

## 2019-12-26 DIAGNOSIS — L90.5 SCAR CONDITION AND FIBROSIS OF SKIN: ICD-10-CM

## 2019-12-26 DIAGNOSIS — Q82.0 PRIMARY (CONGENITAL) LYMPHEDEMA: ICD-10-CM

## 2019-12-30 ENCOUNTER — HOSPITAL ENCOUNTER (OUTPATIENT)
Dept: PALLIATIVE MEDICINE | Facility: OTHER | Age: 49
Discharge: HOME | End: 2019-12-30
Attending: ANESTHESIOLOGY

## 2019-12-30 DIAGNOSIS — G89.4 CHRONIC PAIN SYNDROME: ICD-10-CM

## 2019-12-30 DIAGNOSIS — M54.12 CERVICAL RADICULOPATHY AT C8: ICD-10-CM

## 2019-12-30 ASSESSMENT — MIFFLIN-ST. JEOR: SCORE: 1704.94

## 2019-12-31 ENCOUNTER — OFFICE VISIT - HEALTHEAST (OUTPATIENT)
Dept: INTERNAL MEDICINE | Facility: CLINIC | Age: 49
End: 2019-12-31

## 2019-12-31 DIAGNOSIS — J01.00 ACUTE MAXILLARY SINUSITIS, RECURRENCE NOT SPECIFIED: ICD-10-CM

## 2019-12-31 DIAGNOSIS — F43.10 PTSD (POST-TRAUMATIC STRESS DISORDER): ICD-10-CM

## 2019-12-31 DIAGNOSIS — I10 ESSENTIAL HYPERTENSION: ICD-10-CM

## 2019-12-31 DIAGNOSIS — M77.8 TENDONITIS OF WRIST, LEFT: ICD-10-CM

## 2019-12-31 ASSESSMENT — MIFFLIN-ST. JEOR: SCORE: 1688.22

## 2020-01-02 LAB
6MAM UR QL: NOT DETECTED
7AMINOCLONAZEPAM UR QL: NOT DETECTED
A-OH ALPRAZ UR QL: NOT DETECTED
ALPRAZ UR QL: NOT DETECTED
AMPHET UR QL SCN: PRESENT
BARBITURATES UR QL: NOT DETECTED
BUPRENORPHINE UR QL: NOT DETECTED
BZE UR QL: NOT DETECTED
CARBOXYTHC UR QL: NOT DETECTED
CARISOPRODOL UR QL: NOT DETECTED
CLONAZEPAM UR QL: NOT DETECTED
CODEINE UR QL: NOT DETECTED
CREAT UR-MCNC: 30.8 MG/DL (ref 20–400)
DIAZEPAM UR QL: NOT DETECTED
ETHYL GLUCURONIDE UR QL: NOT DETECTED
FENTANYL UR QL: NOT DETECTED
HYDROCODONE UR QL: NOT DETECTED
HYDROMORPHONE UR QL: NOT DETECTED
LORAZEPAM UR QL: NOT DETECTED
MDA UR QL: NOT DETECTED
MDEA UR QL: NOT DETECTED
MDMA UR QL: NOT DETECTED
ME-PHENIDATE UR QL: NOT DETECTED
MEPERIDINE UR QL: NOT DETECTED
METHADONE UR QL: NOT DETECTED
METHAMPHET UR QL: NOT DETECTED
MIDAZOLAM UR QL SCN: NOT DETECTED
MORPHINE UR QL: NOT DETECTED
NORBUPRENORPHINE UR QL CFM: NOT DETECTED
NORDIAZEPAM UR QL: NOT DETECTED
NORFENTANYL UR QL: NOT DETECTED
NORHYDROCODONE UR QL CFM: NOT DETECTED
NOROXYCODONE UR QL CFM: PRESENT
NOROXYMORPHONE UR QL SCN: NOT DETECTED
OXAZEPAM UR QL: NOT DETECTED
OXYCODONE UR QL: PRESENT
OXYMORPHONE UR QL: NOT DETECTED
PATHOLOGY STUDY: NORMAL
PCP UR QL: NOT DETECTED
PHENTERMINE UR QL: NOT DETECTED
PROPOXYPH UR QL: NOT DETECTED
SERVICE CMNT-IMP: NORMAL
TAPENTADOL UR QL SCN: NOT DETECTED
TAPENTADOL UR QL SCN: NOT DETECTED
TEMAZEPAM UR QL: PRESENT
TRAMADOL UR QL: NOT DETECTED
ZOLPIDEM UR QL: NOT DETECTED

## 2020-01-08 ENCOUNTER — RECORDS - HEALTHEAST (OUTPATIENT)
Dept: ADMINISTRATIVE | Facility: OTHER | Age: 50
End: 2020-01-08

## 2020-01-10 ENCOUNTER — COMMUNICATION - HEALTHEAST (OUTPATIENT)
Dept: PALLIATIVE MEDICINE | Facility: OTHER | Age: 50
End: 2020-01-10

## 2020-01-10 DIAGNOSIS — M54.12 CERVICAL RADICULOPATHY AT C8: ICD-10-CM

## 2020-01-14 ENCOUNTER — COMMUNICATION - HEALTHEAST (OUTPATIENT)
Dept: PALLIATIVE MEDICINE | Facility: OTHER | Age: 50
End: 2020-01-14

## 2020-01-14 DIAGNOSIS — M54.12 CERVICAL RADICULOPATHY AT C8: ICD-10-CM

## 2020-01-14 DIAGNOSIS — G89.4 CHRONIC PAIN SYNDROME: ICD-10-CM

## 2020-01-17 ENCOUNTER — COMMUNICATION - HEALTHEAST (OUTPATIENT)
Dept: PALLIATIVE MEDICINE | Facility: OTHER | Age: 50
End: 2020-01-17

## 2020-01-17 ENCOUNTER — COMMUNICATION - HEALTHEAST (OUTPATIENT)
Dept: INTERNAL MEDICINE | Facility: CLINIC | Age: 50
End: 2020-01-17

## 2020-01-17 DIAGNOSIS — J01.00 ACUTE MAXILLARY SINUSITIS, RECURRENCE NOT SPECIFIED: ICD-10-CM

## 2020-01-20 ENCOUNTER — COMMUNICATION - HEALTHEAST (OUTPATIENT)
Dept: PALLIATIVE MEDICINE | Facility: OTHER | Age: 50
End: 2020-01-20

## 2020-01-20 DIAGNOSIS — G89.4 CHRONIC PAIN SYNDROME: ICD-10-CM

## 2020-01-20 DIAGNOSIS — M54.12 CERVICAL RADICULOPATHY AT C8: ICD-10-CM

## 2020-01-21 ENCOUNTER — COMMUNICATION - HEALTHEAST (OUTPATIENT)
Dept: NURSING | Facility: CLINIC | Age: 50
End: 2020-01-21

## 2020-01-24 ENCOUNTER — COMMUNICATION - HEALTHEAST (OUTPATIENT)
Dept: NURSING | Facility: CLINIC | Age: 50
End: 2020-01-24

## 2020-01-24 ENCOUNTER — COMMUNICATION - HEALTHEAST (OUTPATIENT)
Dept: INTERNAL MEDICINE | Facility: CLINIC | Age: 50
End: 2020-01-24

## 2020-01-24 DIAGNOSIS — S06.9X9S TRAUMATIC BRAIN INJURY WITH LOSS OF CONSCIOUSNESS, SEQUELA (H): ICD-10-CM

## 2020-01-27 ENCOUNTER — OFFICE VISIT - HEALTHEAST (OUTPATIENT)
Dept: INTERNAL MEDICINE | Facility: CLINIC | Age: 50
End: 2020-01-27

## 2020-01-27 ENCOUNTER — COMMUNICATION - HEALTHEAST (OUTPATIENT)
Dept: PALLIATIVE MEDICINE | Facility: OTHER | Age: 50
End: 2020-01-27

## 2020-01-27 DIAGNOSIS — F31.81 BIPOLAR 2 DISORDER (H): ICD-10-CM

## 2020-01-27 DIAGNOSIS — G57.92 ILIOINGUINAL NEURALGIA OF LEFT SIDE: ICD-10-CM

## 2020-01-27 DIAGNOSIS — F41.1 GENERALIZED ANXIETY DISORDER: ICD-10-CM

## 2020-01-27 DIAGNOSIS — I10 ESSENTIAL HYPERTENSION: ICD-10-CM

## 2020-01-27 DIAGNOSIS — G89.4 CHRONIC PAIN SYNDROME: ICD-10-CM

## 2020-01-27 DIAGNOSIS — J01.00 ACUTE MAXILLARY SINUSITIS, RECURRENCE NOT SPECIFIED: ICD-10-CM

## 2020-01-27 ASSESSMENT — MIFFLIN-ST. JEOR: SCORE: 1677.45

## 2020-01-28 ENCOUNTER — AMBULATORY - HEALTHEAST (OUTPATIENT)
Dept: NURSING | Facility: CLINIC | Age: 50
End: 2020-01-28

## 2020-01-28 LAB — LITHIUM SERPL-SCNC: 0.5 MMOL/L (ref 0.6–1.2)

## 2020-02-04 ENCOUNTER — COMMUNICATION - HEALTHEAST (OUTPATIENT)
Dept: NURSING | Facility: CLINIC | Age: 50
End: 2020-02-04

## 2020-02-04 ENCOUNTER — HOSPITAL ENCOUNTER (OUTPATIENT)
Dept: CT IMAGING | Facility: CLINIC | Age: 50
Discharge: HOME | End: 2020-02-04
Attending: INTERNAL MEDICINE

## 2020-02-04 DIAGNOSIS — G57.92 ILIOINGUINAL NEURALGIA OF LEFT SIDE: ICD-10-CM

## 2020-02-07 ENCOUNTER — COMMUNICATION - HEALTHEAST (OUTPATIENT)
Dept: INTERNAL MEDICINE | Facility: CLINIC | Age: 50
End: 2020-02-07

## 2020-02-11 ENCOUNTER — OFFICE VISIT - HEALTHEAST (OUTPATIENT)
Dept: PALLIATIVE MEDICINE | Facility: OTHER | Age: 50
End: 2020-02-11

## 2020-02-11 DIAGNOSIS — M54.12 CERVICAL RADICULOPATHY AT C8: ICD-10-CM

## 2020-02-12 ENCOUNTER — COMMUNICATION - HEALTHEAST (OUTPATIENT)
Dept: INTERNAL MEDICINE | Facility: CLINIC | Age: 50
End: 2020-02-12

## 2020-02-12 ENCOUNTER — COMMUNICATION - HEALTHEAST (OUTPATIENT)
Dept: PALLIATIVE MEDICINE | Facility: OTHER | Age: 50
End: 2020-02-12

## 2020-02-12 DIAGNOSIS — J01.00 ACUTE MAXILLARY SINUSITIS, RECURRENCE NOT SPECIFIED: ICD-10-CM

## 2020-02-12 DIAGNOSIS — G89.4 CHRONIC PAIN SYNDROME: ICD-10-CM

## 2020-02-17 ENCOUNTER — RECORDS - HEALTHEAST (OUTPATIENT)
Dept: ADMINISTRATIVE | Facility: OTHER | Age: 50
End: 2020-02-17

## 2020-02-18 ENCOUNTER — HOSPITAL ENCOUNTER (OUTPATIENT)
Dept: PALLIATIVE MEDICINE | Facility: OTHER | Age: 50
Discharge: HOME | End: 2020-02-18
Attending: ANESTHESIOLOGY

## 2020-02-18 DIAGNOSIS — F11.90 CHRONIC, CONTINUOUS USE OF OPIOIDS: ICD-10-CM

## 2020-02-18 DIAGNOSIS — M54.12 CERVICAL RADICULOPATHY AT C8: ICD-10-CM

## 2020-02-18 DIAGNOSIS — G89.4 CHRONIC PAIN SYNDROME: ICD-10-CM

## 2020-02-19 ENCOUNTER — RECORDS - HEALTHEAST (OUTPATIENT)
Dept: ADMINISTRATIVE | Facility: OTHER | Age: 50
End: 2020-02-19

## 2020-02-20 ENCOUNTER — RECORDS - HEALTHEAST (OUTPATIENT)
Dept: ADMINISTRATIVE | Facility: OTHER | Age: 50
End: 2020-02-20

## 2020-02-21 ENCOUNTER — COMMUNICATION - HEALTHEAST (OUTPATIENT)
Dept: INTERNAL MEDICINE | Facility: CLINIC | Age: 50
End: 2020-02-21

## 2020-02-21 DIAGNOSIS — S06.9X9S TRAUMATIC BRAIN INJURY WITH LOSS OF CONSCIOUSNESS, SEQUELA (H): ICD-10-CM

## 2020-02-21 DIAGNOSIS — J01.00 ACUTE MAXILLARY SINUSITIS, RECURRENCE NOT SPECIFIED: ICD-10-CM

## 2020-02-25 ENCOUNTER — HOSPITAL ENCOUNTER (OUTPATIENT)
Dept: ADMINISTRATIVE | Facility: OTHER | Age: 50
Discharge: HOME | End: 2020-02-25

## 2020-02-26 ENCOUNTER — OFFICE VISIT - HEALTHEAST (OUTPATIENT)
Dept: INTERNAL MEDICINE | Facility: CLINIC | Age: 50
End: 2020-02-26

## 2020-02-26 DIAGNOSIS — F41.1 GENERALIZED ANXIETY DISORDER: ICD-10-CM

## 2020-02-26 DIAGNOSIS — K83.8 DILATED CBD, ACQUIRED: ICD-10-CM

## 2020-02-26 DIAGNOSIS — S06.9X9S TRAUMATIC BRAIN INJURY WITH LOSS OF CONSCIOUSNESS, SEQUELA (H): ICD-10-CM

## 2020-02-26 DIAGNOSIS — F11.90 CHRONIC, CONTINUOUS USE OF OPIOIDS: ICD-10-CM

## 2020-02-26 DIAGNOSIS — K04.7 DENTAL ABSCESS: ICD-10-CM

## 2020-02-26 DIAGNOSIS — F31.81 BIPOLAR 2 DISORDER (H): ICD-10-CM

## 2020-02-26 ASSESSMENT — MIFFLIN-ST. JEOR: SCORE: 1687.65

## 2020-02-28 LAB
6MAM UR QL: NOT DETECTED
7AMINOCLONAZEPAM UR QL: NOT DETECTED
A-OH ALPRAZ UR QL: NOT DETECTED
ALPRAZ UR QL: NOT DETECTED
AMPHET UR QL SCN: PRESENT
BARBITURATES UR QL: NOT DETECTED
BUPRENORPHINE UR QL: NOT DETECTED
BZE UR QL: NOT DETECTED
CARBOXYTHC UR QL: NOT DETECTED
CARISOPRODOL UR QL: NOT DETECTED
CLONAZEPAM UR QL: NOT DETECTED
CODEINE UR QL: NOT DETECTED
CREAT UR-MCNC: 114.2 MG/DL (ref 20–400)
DIAZEPAM UR QL: NOT DETECTED
ETHYL GLUCURONIDE UR QL: NOT DETECTED
FENTANYL UR QL: NOT DETECTED
HYDROCODONE UR QL: NOT DETECTED
HYDROMORPHONE UR QL: NOT DETECTED
LORAZEPAM UR QL: NOT DETECTED
MDA UR QL: NOT DETECTED
MDEA UR QL: NOT DETECTED
MDMA UR QL: NOT DETECTED
ME-PHENIDATE UR QL: NOT DETECTED
MEPERIDINE UR QL: NOT DETECTED
METHADONE UR QL: NOT DETECTED
METHAMPHET UR QL: NOT DETECTED
MIDAZOLAM UR QL SCN: NOT DETECTED
MORPHINE UR QL: NOT DETECTED
NORBUPRENORPHINE UR QL CFM: NOT DETECTED
NORDIAZEPAM UR QL: NOT DETECTED
NORFENTANYL UR QL: NOT DETECTED
NORHYDROCODONE UR QL CFM: NOT DETECTED
NOROXYCODONE UR QL CFM: PRESENT
NOROXYMORPHONE UR QL SCN: PRESENT
OXAZEPAM UR QL: NOT DETECTED
OXYCODONE UR QL: PRESENT
OXYMORPHONE UR QL: NOT DETECTED
PATHOLOGY STUDY: NORMAL
PCP UR QL: NOT DETECTED
PHENTERMINE UR QL: NOT DETECTED
PROPOXYPH UR QL: NOT DETECTED
SERVICE CMNT-IMP: NORMAL
TAPENTADOL UR QL SCN: NOT DETECTED
TAPENTADOL UR QL SCN: NOT DETECTED
TEMAZEPAM UR QL: PRESENT
TRAMADOL UR QL: NOT DETECTED
ZOLPIDEM UR QL: NOT DETECTED

## 2020-03-02 ENCOUNTER — COMMUNICATION - HEALTHEAST (OUTPATIENT)
Dept: PALLIATIVE MEDICINE | Facility: OTHER | Age: 50
End: 2020-03-02

## 2020-03-02 DIAGNOSIS — F11.90 CHRONIC, CONTINUOUS USE OF OPIOIDS: ICD-10-CM

## 2020-03-09 ENCOUNTER — HOSPITAL ENCOUNTER (OUTPATIENT)
Dept: MRI IMAGING | Facility: HOSPITAL | Age: 50
Discharge: HOME | End: 2020-03-09
Attending: SURGERY

## 2020-03-09 DIAGNOSIS — K82.9 DISEASE OF GALLBLADDER, UNSPECIFIED: ICD-10-CM

## 2020-03-09 ASSESSMENT — MIFFLIN-ST. JEOR: SCORE: 1684.54

## 2020-03-13 ENCOUNTER — COMMUNICATION - HEALTHEAST (OUTPATIENT)
Dept: PALLIATIVE MEDICINE | Facility: OTHER | Age: 50
End: 2020-03-13

## 2020-03-13 DIAGNOSIS — F11.90 CHRONIC, CONTINUOUS USE OF OPIOIDS: ICD-10-CM

## 2020-03-13 DIAGNOSIS — M54.12 CERVICAL RADICULOPATHY AT C8: ICD-10-CM

## 2020-03-13 DIAGNOSIS — G89.4 CHRONIC PAIN SYNDROME: ICD-10-CM

## 2020-03-16 ENCOUNTER — RECORDS - HEALTHEAST (OUTPATIENT)
Dept: ADMINISTRATIVE | Facility: OTHER | Age: 50
End: 2020-03-16

## 2020-03-18 ENCOUNTER — COMMUNICATION - HEALTHEAST (OUTPATIENT)
Dept: INTERNAL MEDICINE | Facility: CLINIC | Age: 50
End: 2020-03-18

## 2020-03-20 ENCOUNTER — AMBULATORY - HEALTHEAST (OUTPATIENT)
Dept: INTERNAL MEDICINE | Facility: CLINIC | Age: 50
End: 2020-03-20

## 2020-03-20 DIAGNOSIS — R19.7 DIARRHEA OF PRESUMED INFECTIOUS ORIGIN: ICD-10-CM

## 2020-03-24 ENCOUNTER — COMMUNICATION - HEALTHEAST (OUTPATIENT)
Dept: INTERNAL MEDICINE | Facility: CLINIC | Age: 50
End: 2020-03-24

## 2020-03-24 DIAGNOSIS — S06.9X9S TRAUMATIC BRAIN INJURY WITH LOSS OF CONSCIOUSNESS, SEQUELA (H): ICD-10-CM

## 2020-03-25 ENCOUNTER — COMMUNICATION - HEALTHEAST (OUTPATIENT)
Dept: NURSING | Facility: CLINIC | Age: 50
End: 2020-03-25

## 2020-04-09 ENCOUNTER — COMMUNICATION - HEALTHEAST (OUTPATIENT)
Dept: NURSING | Facility: CLINIC | Age: 50
End: 2020-04-09

## 2020-04-10 ENCOUNTER — OFFICE VISIT - HEALTHEAST (OUTPATIENT)
Dept: PALLIATIVE MEDICINE | Facility: OTHER | Age: 50
End: 2020-04-10

## 2020-04-10 DIAGNOSIS — F11.90 CHRONIC, CONTINUOUS USE OF OPIOIDS: ICD-10-CM

## 2020-04-14 ENCOUNTER — COMMUNICATION - HEALTHEAST (OUTPATIENT)
Dept: INTERNAL MEDICINE | Facility: CLINIC | Age: 50
End: 2020-04-14

## 2020-04-23 ENCOUNTER — AMBULATORY - HEALTHEAST (OUTPATIENT)
Dept: LAB | Facility: CLINIC | Age: 50
End: 2020-04-23

## 2020-04-23 DIAGNOSIS — R19.7 DIARRHEA OF PRESUMED INFECTIOUS ORIGIN: ICD-10-CM

## 2020-04-23 LAB
C DIFF TOX B STL QL: NEGATIVE
RIBOTYPE 027/NAP1/BI: NORMAL

## 2020-04-27 ENCOUNTER — COMMUNICATION - HEALTHEAST (OUTPATIENT)
Dept: PALLIATIVE MEDICINE | Facility: OTHER | Age: 50
End: 2020-04-27

## 2020-04-27 ENCOUNTER — COMMUNICATION - HEALTHEAST (OUTPATIENT)
Dept: INTERNAL MEDICINE | Facility: CLINIC | Age: 50
End: 2020-04-27

## 2020-04-27 DIAGNOSIS — F11.90 CHRONIC, CONTINUOUS USE OF OPIOIDS: ICD-10-CM

## 2020-04-30 ENCOUNTER — OFFICE VISIT - HEALTHEAST (OUTPATIENT)
Dept: PALLIATIVE MEDICINE | Facility: OTHER | Age: 50
End: 2020-04-30

## 2020-04-30 ENCOUNTER — AMBULATORY - HEALTHEAST (OUTPATIENT)
Dept: PALLIATIVE MEDICINE | Facility: OTHER | Age: 50
End: 2020-04-30

## 2020-04-30 DIAGNOSIS — G89.4 CHRONIC PAIN SYNDROME: ICD-10-CM

## 2020-05-01 ENCOUNTER — OFFICE VISIT - HEALTHEAST (OUTPATIENT)
Dept: INTERNAL MEDICINE | Facility: CLINIC | Age: 50
End: 2020-05-01

## 2020-05-01 ENCOUNTER — COMMUNICATION - HEALTHEAST (OUTPATIENT)
Dept: NURSING | Facility: CLINIC | Age: 50
End: 2020-05-01

## 2020-05-01 ENCOUNTER — COMMUNICATION - HEALTHEAST (OUTPATIENT)
Dept: INTERNAL MEDICINE | Facility: CLINIC | Age: 50
End: 2020-05-01

## 2020-05-01 ENCOUNTER — COMMUNICATION - HEALTHEAST (OUTPATIENT)
Dept: PALLIATIVE MEDICINE | Facility: OTHER | Age: 50
End: 2020-05-01

## 2020-05-01 DIAGNOSIS — F11.90 CHRONIC, CONTINUOUS USE OF OPIOIDS: ICD-10-CM

## 2020-05-01 DIAGNOSIS — F43.10 PTSD (POST-TRAUMATIC STRESS DISORDER): ICD-10-CM

## 2020-05-01 DIAGNOSIS — S06.9X9S TRAUMATIC BRAIN INJURY WITH LOSS OF CONSCIOUSNESS, SEQUELA (H): ICD-10-CM

## 2020-05-01 DIAGNOSIS — K21.00 GASTROESOPHAGEAL REFLUX DISEASE WITH ESOPHAGITIS: ICD-10-CM

## 2020-05-01 DIAGNOSIS — J01.00 ACUTE MAXILLARY SINUSITIS, RECURRENCE NOT SPECIFIED: ICD-10-CM

## 2020-05-01 DIAGNOSIS — M54.12 CERVICAL RADICULOPATHY AT C8: ICD-10-CM

## 2020-05-01 DIAGNOSIS — K04.7 DENTAL ABSCESS: ICD-10-CM

## 2020-05-01 DIAGNOSIS — R19.7 DIARRHEA OF PRESUMED INFECTIOUS ORIGIN: ICD-10-CM

## 2020-05-01 DIAGNOSIS — F31.81 BIPOLAR 2 DISORDER (H): ICD-10-CM

## 2020-05-01 DIAGNOSIS — M54.41 ACUTE RIGHT-SIDED LOW BACK PAIN WITH RIGHT-SIDED SCIATICA: ICD-10-CM

## 2020-05-01 DIAGNOSIS — K83.8 DILATED CBD, ACQUIRED: ICD-10-CM

## 2020-05-05 ENCOUNTER — HOSPITAL ENCOUNTER (OUTPATIENT)
Dept: PALLIATIVE MEDICINE | Facility: OTHER | Age: 50
Discharge: HOME | End: 2020-05-05
Attending: ANESTHESIOLOGY

## 2020-05-05 DIAGNOSIS — M54.12 CERVICAL RADICULOPATHY AT C8: ICD-10-CM

## 2020-05-05 DIAGNOSIS — G89.4 CHRONIC PAIN SYNDROME: ICD-10-CM

## 2020-05-05 DIAGNOSIS — F11.90 CHRONIC, CONTINUOUS USE OF OPIOIDS: ICD-10-CM

## 2020-05-11 ENCOUNTER — COMMUNICATION - HEALTHEAST (OUTPATIENT)
Dept: NURSING | Facility: CLINIC | Age: 50
End: 2020-05-11

## 2020-05-13 ENCOUNTER — COMMUNICATION - HEALTHEAST (OUTPATIENT)
Dept: INTERNAL MEDICINE | Facility: CLINIC | Age: 50
End: 2020-05-13

## 2020-05-13 DIAGNOSIS — J01.00 ACUTE MAXILLARY SINUSITIS, RECURRENCE NOT SPECIFIED: ICD-10-CM

## 2020-05-14 ENCOUNTER — COMMUNICATION - HEALTHEAST (OUTPATIENT)
Dept: INTERNAL MEDICINE | Facility: CLINIC | Age: 50
End: 2020-05-14

## 2020-05-14 DIAGNOSIS — M54.12 CERVICAL RADICULOPATHY AT C8: ICD-10-CM

## 2020-05-14 DIAGNOSIS — G89.4 CHRONIC PAIN SYNDROME: ICD-10-CM

## 2020-05-19 ENCOUNTER — COMMUNICATION - HEALTHEAST (OUTPATIENT)
Dept: PALLIATIVE MEDICINE | Facility: OTHER | Age: 50
End: 2020-05-19

## 2020-05-19 DIAGNOSIS — M19.019 ACROMIOCLAVICULAR JOINT ARTHRITIS, UNSPECIFIED LATERALITY: ICD-10-CM

## 2020-05-20 ENCOUNTER — COMMUNICATION - HEALTHEAST (OUTPATIENT)
Dept: PALLIATIVE MEDICINE | Facility: OTHER | Age: 50
End: 2020-05-20

## 2020-05-20 DIAGNOSIS — M19.019 ACROMIOCLAVICULAR JOINT ARTHRITIS, UNSPECIFIED LATERALITY: ICD-10-CM

## 2020-05-21 ENCOUNTER — COMMUNICATION - HEALTHEAST (OUTPATIENT)
Dept: PALLIATIVE MEDICINE | Facility: OTHER | Age: 50
End: 2020-05-21

## 2020-05-21 ENCOUNTER — COMMUNICATION - HEALTHEAST (OUTPATIENT)
Dept: INTERNAL MEDICINE | Facility: CLINIC | Age: 50
End: 2020-05-21

## 2020-05-21 DIAGNOSIS — G89.4 CHRONIC PAIN SYNDROME: ICD-10-CM

## 2020-05-21 DIAGNOSIS — M54.12 CERVICAL RADICULOPATHY AT C8: ICD-10-CM

## 2020-05-21 DIAGNOSIS — F11.90 CHRONIC, CONTINUOUS USE OF OPIOIDS: ICD-10-CM

## 2020-05-27 ENCOUNTER — RECORDS - HEALTHEAST (OUTPATIENT)
Dept: ADMINISTRATIVE | Facility: OTHER | Age: 50
End: 2020-05-27

## 2020-05-27 ENCOUNTER — COMMUNICATION - HEALTHEAST (OUTPATIENT)
Dept: PALLIATIVE MEDICINE | Facility: OTHER | Age: 50
End: 2020-05-27

## 2020-05-27 DIAGNOSIS — M19.019 ACROMIOCLAVICULAR JOINT ARTHRITIS, UNSPECIFIED LATERALITY: ICD-10-CM

## 2020-05-29 ENCOUNTER — COMMUNICATION - HEALTHEAST (OUTPATIENT)
Dept: NURSING | Facility: CLINIC | Age: 50
End: 2020-05-29

## 2020-05-30 ENCOUNTER — AMBULATORY - HEALTHEAST (OUTPATIENT)
Dept: PALLIATIVE MEDICINE | Facility: OTHER | Age: 50
End: 2020-05-30

## 2020-05-30 DIAGNOSIS — M19.019 ACROMIOCLAVICULAR JOINT ARTHRITIS, UNSPECIFIED LATERALITY: ICD-10-CM

## 2020-06-01 ENCOUNTER — OFFICE VISIT - HEALTHEAST (OUTPATIENT)
Dept: PALLIATIVE MEDICINE | Facility: OTHER | Age: 50
End: 2020-06-01

## 2020-06-01 DIAGNOSIS — M19.019 ACROMIOCLAVICULAR JOINT ARTHRITIS, UNSPECIFIED LATERALITY: ICD-10-CM

## 2020-06-01 DIAGNOSIS — F11.90 CHRONIC, CONTINUOUS USE OF OPIOIDS: ICD-10-CM

## 2020-06-02 ENCOUNTER — OFFICE VISIT - HEALTHEAST (OUTPATIENT)
Dept: INTERNAL MEDICINE | Facility: CLINIC | Age: 50
End: 2020-06-02

## 2020-06-02 DIAGNOSIS — M62.81 GENERALIZED MUSCLE WEAKNESS: ICD-10-CM

## 2020-07-01 ENCOUNTER — HOSPITAL ENCOUNTER (OUTPATIENT)
Dept: PALLIATIVE MEDICINE | Facility: OTHER | Age: 50
Discharge: HOME | End: 2020-07-01
Attending: ANESTHESIOLOGY

## 2020-07-01 ENCOUNTER — COMMUNICATION - HEALTHEAST (OUTPATIENT)
Dept: NURSING | Facility: CLINIC | Age: 50
End: 2020-07-01

## 2020-07-01 DIAGNOSIS — G89.4 CHRONIC PAIN SYNDROME: ICD-10-CM

## 2020-07-15 ENCOUNTER — COMMUNICATION - HEALTHEAST (OUTPATIENT)
Dept: NURSING | Facility: CLINIC | Age: 50
End: 2020-07-15

## 2020-08-04 ENCOUNTER — COMMUNICATION - HEALTHEAST (OUTPATIENT)
Dept: NURSING | Facility: CLINIC | Age: 50
End: 2020-08-04

## 2020-08-18 ENCOUNTER — RECORDS - HEALTHEAST (OUTPATIENT)
Dept: ADMINISTRATIVE | Facility: OTHER | Age: 50
End: 2020-08-18

## 2020-08-25 ENCOUNTER — RECORDS - HEALTHEAST (OUTPATIENT)
Dept: ADMINISTRATIVE | Facility: OTHER | Age: 50
End: 2020-08-25

## 2020-08-28 ENCOUNTER — RECORDS - HEALTHEAST (OUTPATIENT)
Dept: ADMINISTRATIVE | Facility: OTHER | Age: 50
End: 2020-08-28

## 2020-09-14 ENCOUNTER — COMMUNICATION - HEALTHEAST (OUTPATIENT)
Dept: SCHEDULING | Facility: CLINIC | Age: 50
End: 2020-09-14

## 2020-09-14 ENCOUNTER — COMMUNICATION - HEALTHEAST (OUTPATIENT)
Dept: NURSING | Facility: CLINIC | Age: 50
End: 2020-09-14

## 2020-09-18 ENCOUNTER — COMMUNICATION - HEALTHEAST (OUTPATIENT)
Dept: INTERNAL MEDICINE | Facility: CLINIC | Age: 50
End: 2020-09-18

## 2020-10-07 ENCOUNTER — COMMUNICATION - HEALTHEAST (OUTPATIENT)
Dept: VASCULAR SURGERY | Facility: CLINIC | Age: 50
End: 2020-10-07

## 2020-10-08 ENCOUNTER — AMBULATORY - HEALTHEAST (OUTPATIENT)
Dept: VASCULAR SURGERY | Facility: CLINIC | Age: 50
End: 2020-10-08

## 2020-10-08 DIAGNOSIS — Q82.0 PRIMARY (CONGENITAL) LYMPHEDEMA: ICD-10-CM

## 2020-10-13 ENCOUNTER — COMMUNICATION - HEALTHEAST (OUTPATIENT)
Dept: OTHER | Facility: CLINIC | Age: 50
End: 2020-10-13

## 2020-10-14 ENCOUNTER — COMMUNICATION - HEALTHEAST (OUTPATIENT)
Dept: OTHER | Facility: CLINIC | Age: 50
End: 2020-10-14

## 2020-10-16 ENCOUNTER — AMBULATORY - HEALTHEAST (OUTPATIENT)
Dept: PALLIATIVE MEDICINE | Facility: OTHER | Age: 50
End: 2020-10-16

## 2020-10-21 ENCOUNTER — COMMUNICATION - HEALTHEAST (OUTPATIENT)
Dept: NURSING | Facility: CLINIC | Age: 50
End: 2020-10-21

## 2020-10-28 ENCOUNTER — AMBULATORY - HEALTHEAST (OUTPATIENT)
Dept: OTHER | Facility: CLINIC | Age: 50
End: 2020-10-28

## 2020-10-29 ENCOUNTER — RECORDS - HEALTHEAST (OUTPATIENT)
Dept: ADMINISTRATIVE | Facility: OTHER | Age: 50
End: 2020-10-29

## 2020-10-29 ENCOUNTER — AMBULATORY - HEALTHEAST (OUTPATIENT)
Dept: VASCULAR SURGERY | Facility: CLINIC | Age: 50
End: 2020-10-29

## 2020-11-02 ENCOUNTER — DOCUMENTATION ONLY (OUTPATIENT)
Dept: OTHER | Facility: CLINIC | Age: 50
End: 2020-11-02

## 2020-11-02 ENCOUNTER — AMBULATORY - HEALTHEAST (OUTPATIENT)
Dept: OTHER | Facility: CLINIC | Age: 50
End: 2020-11-02

## 2020-11-02 ENCOUNTER — COMMUNICATION - HEALTHEAST (OUTPATIENT)
Dept: OTHER | Facility: CLINIC | Age: 50
End: 2020-11-02

## 2020-11-03 ENCOUNTER — COMMUNICATION - HEALTHEAST (OUTPATIENT)
Dept: INTERNAL MEDICINE | Facility: CLINIC | Age: 50
End: 2020-11-03

## 2020-11-03 ENCOUNTER — RECORDS - HEALTHEAST (OUTPATIENT)
Dept: ADMINISTRATIVE | Facility: OTHER | Age: 50
End: 2020-11-03

## 2020-11-03 DIAGNOSIS — I10 ESSENTIAL HYPERTENSION: ICD-10-CM

## 2020-11-04 ENCOUNTER — HOSPITAL ENCOUNTER (OUTPATIENT)
Dept: PALLIATIVE MEDICINE | Facility: OTHER | Age: 50
Discharge: HOME | End: 2020-11-04
Attending: ANESTHESIOLOGY

## 2020-11-04 DIAGNOSIS — M19.019 ACROMIOCLAVICULAR JOINT ARTHRITIS, UNSPECIFIED LATERALITY: ICD-10-CM

## 2020-11-04 DIAGNOSIS — F41.0 PANIC ATTACKS: ICD-10-CM

## 2020-11-04 DIAGNOSIS — M54.12 CERVICAL RADICULOPATHY AT C8: ICD-10-CM

## 2020-11-04 ASSESSMENT — MIFFLIN-ST. JEOR: SCORE: 1698.14

## 2020-11-06 ENCOUNTER — COMMUNICATION - HEALTHEAST (OUTPATIENT)
Dept: PALLIATIVE MEDICINE | Facility: OTHER | Age: 50
End: 2020-11-06

## 2020-11-06 DIAGNOSIS — G89.4 CHRONIC PAIN SYNDROME: ICD-10-CM

## 2020-11-09 ENCOUNTER — COMMUNICATION - HEALTHEAST (OUTPATIENT)
Dept: NURSING | Facility: CLINIC | Age: 50
End: 2020-11-09

## 2020-11-10 ENCOUNTER — COMMUNICATION - HEALTHEAST (OUTPATIENT)
Dept: INTERNAL MEDICINE | Facility: CLINIC | Age: 50
End: 2020-11-10

## 2020-11-11 ENCOUNTER — AMBULATORY - HEALTHEAST (OUTPATIENT)
Dept: VASCULAR SURGERY | Facility: CLINIC | Age: 50
End: 2020-11-11

## 2020-11-11 DIAGNOSIS — Q82.0 PRIMARY (CONGENITAL) LYMPHEDEMA: ICD-10-CM

## 2020-11-11 DIAGNOSIS — L90.5 SCAR CONDITION AND FIBROSIS OF SKIN: ICD-10-CM

## 2020-11-13 ENCOUNTER — OFFICE VISIT - HEALTHEAST (OUTPATIENT)
Dept: INTERNAL MEDICINE | Facility: CLINIC | Age: 50
End: 2020-11-13

## 2020-11-13 DIAGNOSIS — F51.01 PRIMARY INSOMNIA: ICD-10-CM

## 2020-11-13 DIAGNOSIS — E79.0 ELEVATED URIC ACID IN BLOOD: ICD-10-CM

## 2020-11-13 DIAGNOSIS — F11.90 CHRONIC, CONTINUOUS USE OF OPIOIDS: ICD-10-CM

## 2020-11-13 DIAGNOSIS — F41.1 GENERALIZED ANXIETY DISORDER: ICD-10-CM

## 2020-11-13 DIAGNOSIS — Z98.890 S/P LUMBAR LAMINECTOMY: ICD-10-CM

## 2020-11-13 DIAGNOSIS — G62.9 NEUROPATHY: ICD-10-CM

## 2020-11-13 DIAGNOSIS — Z98.890 HISTORY OF CERVICAL DISCECTOMY: ICD-10-CM

## 2020-11-13 DIAGNOSIS — F43.10 PTSD (POST-TRAUMATIC STRESS DISORDER): ICD-10-CM

## 2020-11-13 DIAGNOSIS — I10 ESSENTIAL HYPERTENSION: ICD-10-CM

## 2020-11-13 DIAGNOSIS — F31.81 BIPOLAR 2 DISORDER (H): ICD-10-CM

## 2020-11-13 DIAGNOSIS — S06.9X9S TRAUMATIC BRAIN INJURY WITH LOSS OF CONSCIOUSNESS, SEQUELA (H): ICD-10-CM

## 2020-11-13 DIAGNOSIS — Q82.0 PRIMARY (CONGENITAL) LYMPHEDEMA: ICD-10-CM

## 2020-11-13 ASSESSMENT — ANXIETY QUESTIONNAIRES
7. FEELING AFRAID AS IF SOMETHING AWFUL MIGHT HAPPEN: SEVERAL DAYS
GAD7 TOTAL SCORE: 17
3. WORRYING TOO MUCH ABOUT DIFFERENT THINGS: NEARLY EVERY DAY
5. BEING SO RESTLESS THAT IT IS HARD TO SIT STILL: MORE THAN HALF THE DAYS
6. BECOMING EASILY ANNOYED OR IRRITABLE: NEARLY EVERY DAY
2. NOT BEING ABLE TO STOP OR CONTROL WORRYING: MORE THAN HALF THE DAYS
1. FEELING NERVOUS, ANXIOUS, OR ON EDGE: NEARLY EVERY DAY
IF YOU CHECKED OFF ANY PROBLEMS ON THIS QUESTIONNAIRE, HOW DIFFICULT HAVE THESE PROBLEMS MADE IT FOR YOU TO DO YOUR WORK, TAKE CARE OF THINGS AT HOME, OR GET ALONG WITH OTHER PEOPLE: VERY DIFFICULT
4. TROUBLE RELAXING: NEARLY EVERY DAY

## 2020-11-13 ASSESSMENT — MIFFLIN-ST. JEOR: SCORE: 1721.96

## 2020-11-16 ENCOUNTER — COMMUNICATION - HEALTHEAST (OUTPATIENT)
Dept: PALLIATIVE MEDICINE | Facility: OTHER | Age: 50
End: 2020-11-16

## 2020-11-16 DIAGNOSIS — M54.12 CERVICAL RADICULOPATHY AT C8: ICD-10-CM

## 2020-11-16 DIAGNOSIS — M19.019 ACROMIOCLAVICULAR JOINT ARTHRITIS, UNSPECIFIED LATERALITY: ICD-10-CM

## 2020-11-17 ENCOUNTER — AMBULATORY - HEALTHEAST (OUTPATIENT)
Dept: OTHER | Facility: CLINIC | Age: 50
End: 2020-11-17

## 2020-11-19 ENCOUNTER — COMMUNICATION - HEALTHEAST (OUTPATIENT)
Dept: INTERNAL MEDICINE | Facility: CLINIC | Age: 50
End: 2020-11-19

## 2020-11-19 DIAGNOSIS — K59.01 SLOW TRANSIT CONSTIPATION: ICD-10-CM

## 2020-11-19 DIAGNOSIS — F31.81 BIPOLAR 2 DISORDER (H): ICD-10-CM

## 2020-11-19 DIAGNOSIS — F41.1 GENERALIZED ANXIETY DISORDER: ICD-10-CM

## 2020-11-19 DIAGNOSIS — S06.9X9S TRAUMATIC BRAIN INJURY WITH LOSS OF CONSCIOUSNESS, SEQUELA (H): ICD-10-CM

## 2020-11-23 ENCOUNTER — COMMUNICATION - HEALTHEAST (OUTPATIENT)
Dept: PALLIATIVE MEDICINE | Facility: OTHER | Age: 50
End: 2020-11-23

## 2020-11-23 DIAGNOSIS — G89.4 CHRONIC PAIN SYNDROME: ICD-10-CM

## 2020-11-30 ENCOUNTER — COMMUNICATION - HEALTHEAST (OUTPATIENT)
Dept: NURSING | Facility: CLINIC | Age: 50
End: 2020-11-30

## 2020-11-30 ENCOUNTER — COMMUNICATION - HEALTHEAST (OUTPATIENT)
Dept: PALLIATIVE MEDICINE | Facility: OTHER | Age: 50
End: 2020-11-30

## 2020-12-02 ENCOUNTER — RECORDS - HEALTHEAST (OUTPATIENT)
Dept: ADMINISTRATIVE | Facility: OTHER | Age: 50
End: 2020-12-02

## 2020-12-04 ENCOUNTER — OFFICE VISIT - HEALTHEAST (OUTPATIENT)
Dept: PALLIATIVE MEDICINE | Facility: OTHER | Age: 50
End: 2020-12-04

## 2020-12-04 ENCOUNTER — COMMUNICATION - HEALTHEAST (OUTPATIENT)
Dept: INTERNAL MEDICINE | Facility: CLINIC | Age: 50
End: 2020-12-04

## 2020-12-04 DIAGNOSIS — G89.4 CHRONIC PAIN SYNDROME: ICD-10-CM

## 2020-12-04 DIAGNOSIS — S06.9X9S TRAUMATIC BRAIN INJURY WITH LOSS OF CONSCIOUSNESS, SEQUELA (H): ICD-10-CM

## 2020-12-14 ENCOUNTER — COMMUNICATION - HEALTHEAST (OUTPATIENT)
Dept: SCHEDULING | Facility: CLINIC | Age: 50
End: 2020-12-14

## 2020-12-14 DIAGNOSIS — F43.10 PTSD (POST-TRAUMATIC STRESS DISORDER): ICD-10-CM

## 2020-12-14 DIAGNOSIS — F31.81 BIPOLAR 2 DISORDER (H): ICD-10-CM

## 2020-12-18 ENCOUNTER — COMMUNICATION - HEALTHEAST (OUTPATIENT)
Dept: NURSING | Facility: CLINIC | Age: 50
End: 2020-12-18

## 2020-12-21 ENCOUNTER — OFFICE VISIT - HEALTHEAST (OUTPATIENT)
Dept: INTERNAL MEDICINE | Facility: CLINIC | Age: 50
End: 2020-12-21

## 2020-12-21 ENCOUNTER — COMMUNICATION - HEALTHEAST (OUTPATIENT)
Dept: PALLIATIVE MEDICINE | Facility: OTHER | Age: 50
End: 2020-12-21

## 2020-12-21 DIAGNOSIS — E79.0 ELEVATED URIC ACID IN BLOOD: ICD-10-CM

## 2020-12-21 DIAGNOSIS — G62.9 NEUROPATHY: ICD-10-CM

## 2020-12-21 DIAGNOSIS — F41.0 PANIC ATTACKS: ICD-10-CM

## 2020-12-21 DIAGNOSIS — Z98.890 S/P LUMBAR LAMINECTOMY: ICD-10-CM

## 2020-12-21 DIAGNOSIS — Z98.890 HISTORY OF CERVICAL DISCECTOMY: ICD-10-CM

## 2020-12-21 DIAGNOSIS — G89.4 CHRONIC PAIN SYNDROME: ICD-10-CM

## 2020-12-21 DIAGNOSIS — Q82.0 PRIMARY (CONGENITAL) LYMPHEDEMA: ICD-10-CM

## 2020-12-21 ASSESSMENT — PATIENT HEALTH QUESTIONNAIRE - PHQ9: SUM OF ALL RESPONSES TO PHQ QUESTIONS 1-9: 10

## 2020-12-22 ENCOUNTER — COMMUNICATION - HEALTHEAST (OUTPATIENT)
Dept: PALLIATIVE MEDICINE | Facility: OTHER | Age: 50
End: 2020-12-22

## 2020-12-23 ENCOUNTER — COMMUNICATION - HEALTHEAST (OUTPATIENT)
Dept: INTERNAL MEDICINE | Facility: CLINIC | Age: 50
End: 2020-12-23

## 2020-12-24 ENCOUNTER — COMMUNICATION - HEALTHEAST (OUTPATIENT)
Dept: PALLIATIVE MEDICINE | Facility: OTHER | Age: 50
End: 2020-12-24

## 2020-12-28 ENCOUNTER — COMMUNICATION - HEALTHEAST (OUTPATIENT)
Dept: PALLIATIVE MEDICINE | Facility: OTHER | Age: 50
End: 2020-12-28

## 2020-12-28 DIAGNOSIS — G89.4 CHRONIC PAIN SYNDROME: ICD-10-CM

## 2020-12-28 DIAGNOSIS — Z87.820 HISTORY OF TRAUMATIC BRAIN INJURY: ICD-10-CM

## 2020-12-29 ENCOUNTER — COMMUNICATION - HEALTHEAST (OUTPATIENT)
Dept: INTERNAL MEDICINE | Facility: CLINIC | Age: 50
End: 2020-12-29

## 2020-12-29 DIAGNOSIS — F31.81 BIPOLAR II DISORDER (H): ICD-10-CM

## 2020-12-31 ENCOUNTER — COMMUNICATION - HEALTHEAST (OUTPATIENT)
Dept: ADMINISTRATIVE | Facility: CLINIC | Age: 50
End: 2020-12-31

## 2021-01-03 ENCOUNTER — RECORDS - HEALTHEAST (OUTPATIENT)
Dept: ADMINISTRATIVE | Facility: OTHER | Age: 51
End: 2021-01-03

## 2021-01-04 ENCOUNTER — RECORDS - HEALTHEAST (OUTPATIENT)
Dept: ADMINISTRATIVE | Facility: OTHER | Age: 51
End: 2021-01-04

## 2021-01-05 ENCOUNTER — RECORDS - HEALTHEAST (OUTPATIENT)
Dept: ADMINISTRATIVE | Facility: OTHER | Age: 51
End: 2021-01-05

## 2021-01-06 ENCOUNTER — RECORDS - HEALTHEAST (OUTPATIENT)
Dept: ADMINISTRATIVE | Facility: OTHER | Age: 51
End: 2021-01-06

## 2021-01-06 ENCOUNTER — COMMUNICATION - HEALTHEAST (OUTPATIENT)
Dept: PALLIATIVE MEDICINE | Facility: OTHER | Age: 51
End: 2021-01-06

## 2021-01-06 DIAGNOSIS — G89.4 CHRONIC PAIN SYNDROME: ICD-10-CM

## 2021-01-11 ENCOUNTER — HOSPITAL ENCOUNTER (OUTPATIENT)
Dept: PALLIATIVE MEDICINE | Facility: OTHER | Age: 51
Discharge: HOME | End: 2021-01-11
Attending: ANESTHESIOLOGY

## 2021-01-11 DIAGNOSIS — G89.4 CHRONIC PAIN SYNDROME: ICD-10-CM

## 2021-01-11 ASSESSMENT — MIFFLIN-ST. JEOR: SCORE: 1693.61

## 2021-01-12 ENCOUNTER — HOSPITAL ENCOUNTER (OUTPATIENT)
Dept: ADMINISTRATIVE | Facility: OTHER | Age: 51
Discharge: HOME | End: 2021-01-12

## 2021-01-14 LAB
6MAM UR QL: NOT DETECTED
7AMINOCLONAZEPAM UR QL: NOT DETECTED
A-OH ALPRAZ UR QL: NOT DETECTED
ALPRAZ UR QL: NOT DETECTED
AMPHET UR QL SCN: PRESENT
BARBITURATES UR QL: NOT DETECTED
BUPRENORPHINE UR QL: NOT DETECTED
BZE UR QL: NOT DETECTED
CARBOXYTHC UR QL: NOT DETECTED
CARISOPRODOL UR QL: NOT DETECTED
CLONAZEPAM UR QL: NOT DETECTED
CODEINE UR QL: NOT DETECTED
CREAT UR-MCNC: <20 MG/DL (ref 20–400)
DIAZEPAM UR QL: NOT DETECTED
ETHYL GLUCURONIDE UR QL: NOT DETECTED
FENTANYL UR QL: NOT DETECTED
HYDROCODONE UR QL: NOT DETECTED
HYDROMORPHONE UR QL: NOT DETECTED
LORAZEPAM UR QL: NOT DETECTED
MDA UR QL: NOT DETECTED
MDEA UR QL: NOT DETECTED
MDMA UR QL: NOT DETECTED
ME-PHENIDATE UR QL: NOT DETECTED
MEPERIDINE UR QL: NOT DETECTED
METHADONE UR QL: NOT DETECTED
METHAMPHET UR QL: NOT DETECTED
MIDAZOLAM UR QL SCN: NOT DETECTED
MORPHINE UR QL: NOT DETECTED
NORBUPRENORPHINE UR QL CFM: NOT DETECTED
NORDIAZEPAM UR QL: PRESENT
NORFENTANYL UR QL: NOT DETECTED
NORHYDROCODONE UR QL CFM: NOT DETECTED
NOROXYCODONE UR QL CFM: PRESENT
NOROXYMORPHONE UR QL SCN: PRESENT
OXAZEPAM UR QL: PRESENT
OXYCODONE UR QL: PRESENT
OXYMORPHONE UR QL: NOT DETECTED
PATHOLOGY STUDY: ABNORMAL
PCP UR QL: NOT DETECTED
PHENTERMINE UR QL: NOT DETECTED
PROPOXYPH UR QL: NOT DETECTED
SERVICE CMNT-IMP: ABNORMAL
TAPENTADOL UR QL SCN: NOT DETECTED
TAPENTADOL UR QL SCN: NOT DETECTED
TEMAZEPAM UR QL: PRESENT
TRAMADOL UR QL: NOT DETECTED
ZOLPIDEM UR QL: NOT DETECTED

## 2021-01-21 ENCOUNTER — COMMUNICATION - HEALTHEAST (OUTPATIENT)
Dept: INTERNAL MEDICINE | Facility: CLINIC | Age: 51
End: 2021-01-21

## 2021-01-21 ENCOUNTER — COMMUNICATION - HEALTHEAST (OUTPATIENT)
Dept: FAMILY MEDICINE | Facility: CLINIC | Age: 51
End: 2021-01-21

## 2021-01-21 DIAGNOSIS — K59.01 SLOW TRANSIT CONSTIPATION: ICD-10-CM

## 2021-01-21 DIAGNOSIS — K21.00 GASTROESOPHAGEAL REFLUX DISEASE WITH ESOPHAGITIS: ICD-10-CM

## 2021-01-21 DIAGNOSIS — R79.89 LOW VITAMIN D LEVEL: ICD-10-CM

## 2021-01-21 DIAGNOSIS — K59.00 CONSTIPATION: ICD-10-CM

## 2021-01-25 ENCOUNTER — OFFICE VISIT - HEALTHEAST (OUTPATIENT)
Dept: INTERNAL MEDICINE | Facility: CLINIC | Age: 51
End: 2021-01-25

## 2021-01-25 DIAGNOSIS — M19.019 ACROMIOCLAVICULAR JOINT ARTHRITIS, UNSPECIFIED LATERALITY: ICD-10-CM

## 2021-01-25 DIAGNOSIS — G89.4 CHRONIC PAIN SYNDROME: ICD-10-CM

## 2021-01-25 DIAGNOSIS — G62.9 NEUROPATHY: ICD-10-CM

## 2021-01-25 DIAGNOSIS — F31.81 BIPOLAR 2 DISORDER (H): ICD-10-CM

## 2021-01-25 DIAGNOSIS — M54.12 CERVICAL RADICULOPATHY AT C8: ICD-10-CM

## 2021-01-25 DIAGNOSIS — I89.0 LYMPHEDEMA OF BOTH LOWER EXTREMITIES: ICD-10-CM

## 2021-01-25 DIAGNOSIS — K59.01 SLOW TRANSIT CONSTIPATION: ICD-10-CM

## 2021-01-27 ENCOUNTER — COMMUNICATION - HEALTHEAST (OUTPATIENT)
Dept: NURSING | Facility: CLINIC | Age: 51
End: 2021-01-27

## 2021-01-29 ENCOUNTER — COMMUNICATION - HEALTHEAST (OUTPATIENT)
Dept: FAMILY MEDICINE | Facility: CLINIC | Age: 51
End: 2021-01-29

## 2021-01-29 DIAGNOSIS — M19.019 ACROMIOCLAVICULAR JOINT ARTHRITIS, UNSPECIFIED LATERALITY: ICD-10-CM

## 2021-01-29 DIAGNOSIS — G62.9 NEUROPATHY: ICD-10-CM

## 2021-01-29 DIAGNOSIS — M54.12 CERVICAL RADICULOPATHY AT C8: ICD-10-CM

## 2021-01-29 DIAGNOSIS — F31.81 BIPOLAR 2 DISORDER (H): ICD-10-CM

## 2021-02-01 ENCOUNTER — COMMUNICATION - HEALTHEAST (OUTPATIENT)
Dept: PALLIATIVE MEDICINE | Facility: OTHER | Age: 51
End: 2021-02-01

## 2021-02-01 DIAGNOSIS — G89.4 CHRONIC PAIN SYNDROME: ICD-10-CM

## 2021-02-02 ENCOUNTER — COMMUNICATION - HEALTHEAST (OUTPATIENT)
Dept: NURSING | Facility: CLINIC | Age: 51
End: 2021-02-02

## 2021-02-03 ENCOUNTER — COMMUNICATION - HEALTHEAST (OUTPATIENT)
Dept: FAMILY MEDICINE | Facility: CLINIC | Age: 51
End: 2021-02-03

## 2021-02-03 DIAGNOSIS — F31.81 BIPOLAR 2 DISORDER (H): ICD-10-CM

## 2021-02-11 ENCOUNTER — COMMUNICATION - HEALTHEAST (OUTPATIENT)
Dept: INTERNAL MEDICINE | Facility: CLINIC | Age: 51
End: 2021-02-11

## 2021-02-11 DIAGNOSIS — F31.81 BIPOLAR 2 DISORDER (H): ICD-10-CM

## 2021-02-15 ENCOUNTER — COMMUNICATION - HEALTHEAST (OUTPATIENT)
Dept: PALLIATIVE MEDICINE | Facility: OTHER | Age: 51
End: 2021-02-15

## 2021-02-15 DIAGNOSIS — G89.4 CHRONIC PAIN SYNDROME: ICD-10-CM

## 2021-02-15 DIAGNOSIS — M19.019 ACROMIOCLAVICULAR JOINT ARTHRITIS, UNSPECIFIED LATERALITY: ICD-10-CM

## 2021-02-22 ENCOUNTER — RECORDS - HEALTHEAST (OUTPATIENT)
Dept: ADMINISTRATIVE | Facility: OTHER | Age: 51
End: 2021-02-22

## 2021-02-26 ENCOUNTER — COMMUNICATION - HEALTHEAST (OUTPATIENT)
Dept: ADMINISTRATIVE | Facility: CLINIC | Age: 51
End: 2021-02-26

## 2021-02-26 DIAGNOSIS — F11.90 CHRONIC, CONTINUOUS USE OF OPIOIDS: ICD-10-CM

## 2021-02-26 DIAGNOSIS — Z98.890 HISTORY OF CERVICAL DISCECTOMY: ICD-10-CM

## 2021-02-26 DIAGNOSIS — Z98.890 S/P LUMBAR LAMINECTOMY: ICD-10-CM

## 2021-03-05 ENCOUNTER — COMMUNICATION - HEALTHEAST (OUTPATIENT)
Dept: INTERNAL MEDICINE | Facility: CLINIC | Age: 51
End: 2021-03-05

## 2021-03-05 DIAGNOSIS — F31.81 BIPOLAR 2 DISORDER (H): ICD-10-CM

## 2021-03-08 ENCOUNTER — HOSPITAL ENCOUNTER (OUTPATIENT)
Dept: PALLIATIVE MEDICINE | Facility: OTHER | Age: 51
Discharge: HOME | End: 2021-03-08
Attending: ANESTHESIOLOGY

## 2021-03-08 DIAGNOSIS — G89.4 CHRONIC PAIN SYNDROME: ICD-10-CM

## 2021-03-08 DIAGNOSIS — M75.41 SHOULDER IMPINGEMENT SYNDROME, RIGHT: ICD-10-CM

## 2021-03-08 DIAGNOSIS — M19.019 ACROMIOCLAVICULAR JOINT ARTHRITIS, UNSPECIFIED LATERALITY: ICD-10-CM

## 2021-03-08 ASSESSMENT — MIFFLIN-ST. JEOR: SCORE: 1738.97

## 2021-03-19 ENCOUNTER — OFFICE VISIT - HEALTHEAST (OUTPATIENT)
Dept: INTERNAL MEDICINE | Facility: CLINIC | Age: 51
End: 2021-03-19

## 2021-03-19 DIAGNOSIS — M75.41 SHOULDER IMPINGEMENT SYNDROME, RIGHT: ICD-10-CM

## 2021-03-19 DIAGNOSIS — T40.2X5A CONSTIPATION DUE TO OPIOID THERAPY: ICD-10-CM

## 2021-03-19 DIAGNOSIS — K59.01 SLOW TRANSIT CONSTIPATION: ICD-10-CM

## 2021-03-19 DIAGNOSIS — M54.12 CERVICAL RADICULOPATHY AT C8: ICD-10-CM

## 2021-03-19 DIAGNOSIS — G62.9 NEUROPATHY: ICD-10-CM

## 2021-03-19 DIAGNOSIS — K59.03 CONSTIPATION DUE TO OPIOID THERAPY: ICD-10-CM

## 2021-03-19 DIAGNOSIS — I89.0 LYMPHEDEMA OF BOTH LOWER EXTREMITIES: ICD-10-CM

## 2021-03-19 ASSESSMENT — MIFFLIN-ST. JEOR: SCORE: 1748.04

## 2021-04-05 ENCOUNTER — COMMUNICATION - HEALTHEAST (OUTPATIENT)
Dept: INTERNAL MEDICINE | Facility: CLINIC | Age: 51
End: 2021-04-05

## 2021-04-05 DIAGNOSIS — F31.81 BIPOLAR 2 DISORDER (H): ICD-10-CM

## 2021-04-12 ENCOUNTER — COMMUNICATION - HEALTHEAST (OUTPATIENT)
Dept: PALLIATIVE MEDICINE | Facility: OTHER | Age: 51
End: 2021-04-12

## 2021-04-12 DIAGNOSIS — G89.4 CHRONIC PAIN SYNDROME: ICD-10-CM

## 2021-04-13 ENCOUNTER — COMMUNICATION - HEALTHEAST (OUTPATIENT)
Dept: INTERNAL MEDICINE | Facility: CLINIC | Age: 51
End: 2021-04-13

## 2021-04-13 ENCOUNTER — COMMUNICATION - HEALTHEAST (OUTPATIENT)
Dept: PALLIATIVE MEDICINE | Facility: OTHER | Age: 51
End: 2021-04-13

## 2021-04-13 DIAGNOSIS — G57.90 ILIOINGUINAL NEURALGIA, UNSPECIFIED LATERALITY: ICD-10-CM

## 2021-04-13 DIAGNOSIS — G62.9 NEUROPATHY: ICD-10-CM

## 2021-04-13 DIAGNOSIS — M19.019 ACROMIOCLAVICULAR JOINT ARTHRITIS, UNSPECIFIED LATERALITY: ICD-10-CM

## 2021-04-15 ENCOUNTER — COMMUNICATION - HEALTHEAST (OUTPATIENT)
Dept: PALLIATIVE MEDICINE | Facility: OTHER | Age: 51
End: 2021-04-15

## 2021-04-19 ENCOUNTER — HOSPITAL ENCOUNTER (OUTPATIENT)
Dept: PALLIATIVE MEDICINE | Facility: OTHER | Age: 51
Discharge: HOME | End: 2021-04-19
Attending: ANESTHESIOLOGY

## 2021-04-19 DIAGNOSIS — M19.019 ACROMIOCLAVICULAR JOINT ARTHRITIS, UNSPECIFIED LATERALITY: ICD-10-CM

## 2021-04-19 DIAGNOSIS — Z98.890 S/P LUMBAR LAMINECTOMY: ICD-10-CM

## 2021-04-19 DIAGNOSIS — F41.0 PANIC ATTACKS: ICD-10-CM

## 2021-04-19 DIAGNOSIS — F11.90 CHRONIC, CONTINUOUS USE OF OPIOIDS: ICD-10-CM

## 2021-04-19 DIAGNOSIS — Z98.890 HISTORY OF CERVICAL DISCECTOMY: ICD-10-CM

## 2021-04-19 DIAGNOSIS — G89.4 CHRONIC PAIN SYNDROME: ICD-10-CM

## 2021-04-19 ASSESSMENT — MIFFLIN-ST. JEOR: SCORE: 1775.26

## 2021-04-22 ENCOUNTER — RECORDS - HEALTHEAST (OUTPATIENT)
Dept: ADMINISTRATIVE | Facility: OTHER | Age: 51
End: 2021-04-22

## 2021-04-23 ENCOUNTER — COMMUNICATION - HEALTHEAST (OUTPATIENT)
Dept: PALLIATIVE MEDICINE | Facility: OTHER | Age: 51
End: 2021-04-23

## 2021-04-29 ENCOUNTER — COMMUNICATION - HEALTHEAST (OUTPATIENT)
Dept: INTERNAL MEDICINE | Facility: CLINIC | Age: 51
End: 2021-04-29

## 2021-04-29 DIAGNOSIS — F31.81 BIPOLAR 2 DISORDER (H): ICD-10-CM

## 2021-04-30 ENCOUNTER — OFFICE VISIT - HEALTHEAST (OUTPATIENT)
Dept: INTERNAL MEDICINE | Facility: CLINIC | Age: 51
End: 2021-04-30

## 2021-04-30 DIAGNOSIS — I89.0 LYMPHEDEMA OF BOTH LOWER EXTREMITIES: ICD-10-CM

## 2021-04-30 DIAGNOSIS — F43.10 PTSD (POST-TRAUMATIC STRESS DISORDER): ICD-10-CM

## 2021-04-30 DIAGNOSIS — F31.81 BIPOLAR 2 DISORDER (H): ICD-10-CM

## 2021-04-30 DIAGNOSIS — I89.0 LYMPHEDEMA OF LEFT LEG: ICD-10-CM

## 2021-04-30 DIAGNOSIS — G89.4 CHRONIC PAIN SYNDROME: ICD-10-CM

## 2021-04-30 DIAGNOSIS — Z98.890 S/P LUMBAR LAMINECTOMY: ICD-10-CM

## 2021-04-30 ASSESSMENT — MIFFLIN-ST. JEOR: SCORE: 1794.53

## 2021-05-04 ENCOUNTER — COMMUNICATION - HEALTHEAST (OUTPATIENT)
Dept: INTERNAL MEDICINE | Facility: CLINIC | Age: 51
End: 2021-05-04

## 2021-05-04 ENCOUNTER — COMMUNICATION - HEALTHEAST (OUTPATIENT)
Dept: PALLIATIVE MEDICINE | Facility: OTHER | Age: 51
End: 2021-05-04

## 2021-05-04 DIAGNOSIS — G89.4 CHRONIC PAIN SYNDROME: ICD-10-CM

## 2021-05-05 ENCOUNTER — RECORDS - HEALTHEAST (OUTPATIENT)
Dept: ADMINISTRATIVE | Facility: OTHER | Age: 51
End: 2021-05-05

## 2021-05-05 ENCOUNTER — COMMUNICATION - HEALTHEAST (OUTPATIENT)
Dept: PALLIATIVE MEDICINE | Facility: OTHER | Age: 51
End: 2021-05-05

## 2021-05-05 DIAGNOSIS — M19.019 ACROMIOCLAVICULAR JOINT ARTHRITIS, UNSPECIFIED LATERALITY: ICD-10-CM

## 2021-05-07 ENCOUNTER — COMMUNICATION - HEALTHEAST (OUTPATIENT)
Dept: INTERNAL MEDICINE | Facility: CLINIC | Age: 51
End: 2021-05-07

## 2021-05-07 DIAGNOSIS — I89.0 LYMPHEDEMA OF BOTH LOWER EXTREMITIES: ICD-10-CM

## 2021-05-11 ENCOUNTER — COMMUNICATION - HEALTHEAST (OUTPATIENT)
Dept: PALLIATIVE MEDICINE | Facility: OTHER | Age: 51
End: 2021-05-11

## 2021-05-14 ENCOUNTER — RECORDS - HEALTHEAST (OUTPATIENT)
Dept: INTERNAL MEDICINE | Facility: CLINIC | Age: 51
End: 2021-05-14

## 2021-05-17 ENCOUNTER — RECORDS - HEALTHEAST (OUTPATIENT)
Dept: ADMINISTRATIVE | Facility: OTHER | Age: 51
End: 2021-05-17

## 2021-05-17 ENCOUNTER — HOSPITAL ENCOUNTER (OUTPATIENT)
Dept: PALLIATIVE MEDICINE | Facility: OTHER | Age: 51
Discharge: HOME | End: 2021-05-17
Attending: ANESTHESIOLOGY
Payer: COMMERCIAL

## 2021-05-17 DIAGNOSIS — F41.0 PANIC ATTACKS: ICD-10-CM

## 2021-05-17 DIAGNOSIS — M19.019 ACROMIOCLAVICULAR JOINT ARTHRITIS, UNSPECIFIED LATERALITY: ICD-10-CM

## 2021-05-17 DIAGNOSIS — G89.4 CHRONIC PAIN SYNDROME: ICD-10-CM

## 2021-05-17 ASSESSMENT — MIFFLIN-ST. JEOR: SCORE: 1784.33

## 2021-05-18 ENCOUNTER — COMMUNICATION - HEALTHEAST (OUTPATIENT)
Dept: PALLIATIVE MEDICINE | Facility: OTHER | Age: 51
End: 2021-05-18

## 2021-05-19 ENCOUNTER — OFFICE VISIT - HEALTHEAST (OUTPATIENT)
Dept: INTERNAL MEDICINE | Facility: CLINIC | Age: 51
End: 2021-05-19

## 2021-05-19 DIAGNOSIS — R79.89 LOW VITAMIN D LEVEL: ICD-10-CM

## 2021-05-19 DIAGNOSIS — R60.1 GENERALIZED EDEMA: ICD-10-CM

## 2021-05-19 DIAGNOSIS — I89.0 LYMPHEDEMA OF BOTH LOWER EXTREMITIES: ICD-10-CM

## 2021-05-19 DIAGNOSIS — I89.0 LYMPHEDEMA OF LEFT LEG: ICD-10-CM

## 2021-05-24 ENCOUNTER — HOSPITAL ENCOUNTER (OUTPATIENT)
Dept: MRI IMAGING | Facility: HOSPITAL | Age: 51
Discharge: HOME | End: 2021-05-24
Attending: PHYSICAL MEDICINE & REHABILITATION
Payer: COMMERCIAL

## 2021-05-24 ENCOUNTER — RECORDS - HEALTHEAST (OUTPATIENT)
Dept: PALLIATIVE MEDICINE | Facility: OTHER | Age: 51
End: 2021-05-24

## 2021-05-24 DIAGNOSIS — G95.9 MYELOPATHY (H): ICD-10-CM

## 2021-05-24 DIAGNOSIS — M54.50 LUMBAR SPINE PAIN: ICD-10-CM

## 2021-05-25 ENCOUNTER — COMMUNICATION - HEALTHEAST (OUTPATIENT)
Dept: INTERNAL MEDICINE | Facility: CLINIC | Age: 51
End: 2021-05-25

## 2021-05-25 DIAGNOSIS — R52 PAIN: ICD-10-CM

## 2021-05-25 DIAGNOSIS — R50.9 FEVER: ICD-10-CM

## 2021-05-26 ASSESSMENT — PATIENT HEALTH QUESTIONNAIRE - PHQ9: SUM OF ALL RESPONSES TO PHQ QUESTIONS 1-9: 23

## 2021-05-26 NOTE — PROGRESS NOTES
[facility] Daily Progress     Patient Name: Bola Lyon  Date: 3/22/2019  Visit #: 11      PTA visit #:       Referral Diagnosis: Chronic pain      Referring provider: Dmitriy Cramer*     Visit Diagnosis:     ICD-10-CM    1. Chronic right shoulder pain M25.511     G89.29    2. Cervicalgia M54.2    3. Cervical radiculitis M54.12    4. Myofascial pain M79.18          Assessment:     Patient is benefitting from skilled physical therapy and is making steady progress toward functional goals.  Patient is appropriate to continue with skilled physical therapy intervention, as indicated by initial plan of care.  Improving the blood flow to his spinal cord may improve his sx. I will look more into this in the coming visits as well. Overall he is still showing good improvement from initial evaluation.        Goal Status:  Pt. will demonstrate/verbalize independence in self-management of condition in : 12 weeks - IMPROVING TOWARDS GOAL  Pt. will report decreased intensity, frequency of : Pain;in 12 weeks;Comment  Comment:: decrease pain from 6-8/10 to 4-7/10 with ADLs. - IMPROVING TOWARDS GOAL  Pt. will decrease use of medication for pain for improved quality of life in : 12 weeks - SLOW IMPROVEMENT  Pt. will have improved quality of sleep: waking less times/night;getting 75-90% of required amount;in 12 weeks - IMPROVING TOWARDS GOAL  Patient will return to: exercise;leisure;in 12 weeks;Comment  Comment: lifting weights withtout dropping anything with B hands - SLOW PROGRESSION  Patient Turn Head: for driving;for conversation;with less pain;with less difficulty;in 12 weeks;Comment  Comment: increase C-rot to >50 deg B and T-rot to >40 deg. - HAS NOT SHOWN IMPROVEMENT IN THESE ROM MEASUREMENTS  Patient will reach / maintain arm movement: forward;overhead;behind;for home chores;for dressing;with less pain;with less difficulty;in 12 weeks - SHOWING IMPROVEMENT   Patient will decrease : NDI score;by _  points;for improved quality of function;for improved quality of life;in 12 weeks  by ___ points: 15 -  NOT TESTED        Plan / Patient Education:     Plan to con't with manual therapy to decrease fascial tension/tone to normalize ROM/decrease inflammation/decrease mm tone and improve proprioception.      Subjective:     Pain Ratin now but more at a 6               The area in his R brachial plexus is still his main area. It is still forking down his arm and back to the scap. He is having some good days at times.   When he looks to the R he will get the feeling of the when he angles but up/down while rotating.     *Pt was 25' late for appt.     Objective:     Neural, art fascial tensions.      Treatment Today   3/22/2019   TREATMENT MINUTES COMMENTS   Evaluation     Self-care/ Home management     Manual therapy 30 Fascial release using Strain-Counterstrain of JUG-N, B cervical spinal vein E-art  MFR: brachial plexus to ulnar nerve   Neuromuscular Re-education     Therapeutic Activity     Therapeutic Exercises     Gait training     Modality__________________                Total 30    Blank areas are intentional and mean the treatment did not include these items.       Jb Ochoa  3/22/2019

## 2021-05-27 VITALS — WEIGHT: 214 LBS | BODY MASS INDEX: 33.59 KG/M2 | HEIGHT: 67 IN

## 2021-05-27 ASSESSMENT — PATIENT HEALTH QUESTIONNAIRE - PHQ9: SUM OF ALL RESPONSES TO PHQ QUESTIONS 1-9: 10

## 2021-05-27 NOTE — PROGRESS NOTES
[facility] Daily Progress     Patient Name: Bola Lyon  Date: 4/5/2019  Visit #: 14  PTA visit #:       Referral Diagnosis: Chronic pain      Referring provider: Dmitriy Cramer*     Visit Diagnosis:     ICD-10-CM    1. Chronic right shoulder pain M25.511     G89.29    2. Cervicalgia M54.2    3. Cervical radiculitis M54.12    4. Myofascial pain M79.18          Assessment:     Patient is benefitting from skilled physical therapy and is making steady progress toward functional goals.  Patient is appropriate to continue with skilled physical therapy intervention, as indicated by initial plan of care.  He continues to be habitiually late for his appointments and this has been discussed with him multiple times. He will try to make it more on time going forward.   Overall he does still have improvement in his sx but his current sx are not changing much with regards to area of pain.        Goal Status:  Pt. will demonstrate/verbalize independence in self-management of condition in : 12 weeks - IMPROVING TOWARDS GOAL  Pt. will report decreased intensity, frequency of : Pain;in 12 weeks;Comment  Comment:: decrease pain from 6-8/10 to 4-7/10 with ADLs. - IMPROVING TOWARDS GOAL  Pt. will decrease use of medication for pain for improved quality of life in : 12 weeks - SLOW IMPROVEMENT  Pt. will have improved quality of sleep: waking less times/night;getting 75-90% of required amount;in 12 weeks - IMPROVING TOWARDS GOAL  Patient will return to: exercise;leisure;in 12 weeks;Comment  Comment: lifting weights withtout dropping anything with B hands - SLOW PROGRESSION  Patient Turn Head: for driving;for conversation;with less pain;with less difficulty;in 12 weeks;Comment  Comment: increase C-rot to >50 deg B and T-rot to >40 deg. - HAS NOT SHOWN IMPROVEMENT IN THESE ROM MEASUREMENTS  Patient will reach / maintain arm movement: forward;overhead;behind;for home chores;for dressing;with less pain;with less  "difficulty;in 12 weeks - SHOWING IMPROVEMENT   Patient will decrease : NDI score;by _ points;for improved quality of function;for improved quality of life;in 12 weeks  by ___ points: 15 -  NOT TESTED        Plan / Patient Education:     Plan to con't with manual therapy to decrease fascial tension/tone to normalize ROM/decrease inflammation/decrease mm tone and improve proprioception.      Subjective:     Pain Rating: mid-high 7 due to the anxiety he is having  He did have some dental work done. He is taking meds for a dental infection as well. They did some cavity filling as well. He does feel like it affected his pain after all of this work..   He did see MD yesterday and they have switched up some more of his meds. He isn't going to have to have ear tubes.   There are some \"cold flashes\" in the arms and is still getting the radiating that he always have.   He has been wearing a compression stockings for his L knee due to some swelling.       *Pt was 40' late for appt today but due to an opening after his appt is able to be seen for longer period.      Objective:     Art fascial tensions.      Treatment Today   4/5/2019   TREATMENT MINUTES COMMENTS   Evaluation     Self-care/ Home management     Manual therapy 25 Fascial release using Strain-Counterstrain of B brachial plexus/cervical/scap/cranial-Art  MFR: R brachial plexus, jugular foramen   Neuromuscular Re-education 15 Recip inhib of art fascia   Therapeutic Activity     Therapeutic Exercises 15 AA/PROM to BUE, C-T spine, ribs, cranium   Gait training     Modality__________________                Total 55    Blank areas are intentional and mean the treatment did not include these items.       Jb Ochoa  4/5/2019  "

## 2021-05-27 NOTE — PROGRESS NOTES
[facility] Daily Progress     Patient Name: Bola Lyon  Date: 4/16/2019  Visit #: 18  PTA visit #:       Referral Diagnosis: Chronic pain      Referring provider: Dmitriy Cramer*     Visit Diagnosis:     ICD-10-CM    1. Chronic right shoulder pain M25.511     G89.29    2. Cervicalgia M54.2    3. Cervical radiculitis M54.12    4. Myofascial pain M79.18          Assessment:     Patient is benefitting from skilled physical therapy and is making steady progress toward functional goals.  Patient is appropriate to continue with skilled physical therapy intervention, as indicated by initial plan of care.  He does continue to feel better since evaluation.      Goal Status:  Pt. will demonstrate/verbalize independence in self-management of condition in : 12 weeks - IMPROVING TOWARDS GOAL  Pt. will report decreased intensity, frequency of : Pain;in 12 weeks;Comment  Comment:: decrease pain from 6-8/10 to 4-7/10 with ADLs. - IMPROVING TOWARDS GOAL  Pt. will decrease use of medication for pain for improved quality of life in : 12 weeks - SLOW IMPROVEMENT  Pt. will have improved quality of sleep: waking less times/night;getting 75-90% of required amount;in 12 weeks - IMPROVING TOWARDS GOAL  Patient will return to: exercise;leisure;in 12 weeks;Comment  Comment: lifting weights withtout dropping anything with B hands - SLOW PROGRESSION  Patient Turn Head: for driving;for conversation;with less pain;with less difficulty;in 12 weeks;Comment  Comment: increase C-rot to >50 deg B and T-rot to >40 deg. - HAS NOT SHOWN IMPROVEMENT IN THESE ROM MEASUREMENTS  Patient will reach / maintain arm movement: forward;overhead;behind;for home chores;for dressing;with less pain;with less difficulty;in 12 weeks - SHOWING IMPROVEMENT   Patient will decrease : NDI score;by _ points;for improved quality of function;for improved quality of life;in 12 weeks  by ___ points: 15 -  NOT TESTED        Plan / Patient Education:  "    Plan to con't with manual therapy to decrease fascial tension/tone to normalize ROM/decrease inflammation/decrease mm tone and improve proprioception.      Subjective:     Pain Ratin   He has been having some problems with some swelling in his legs and is due to get some new compression stockings pretty soon. The L knee does lock on him at times he has been noticing.   When he moves his head to the R (head to shoulder motion) he has been really feeling in the ulnar nerve area in the R hand. It is a shock down into the fingers.   He does still feel like the deltoid area is still very rigid and hard.   Since the last visit the upper trap area has been really overactive. He does feel like he has been more active as well with more \"fluid stretching\".     Objective:     BERNARDO, MS fascial tensions.      Treatment Today   2019   TREATMENT MINUTES COMMENTS   Evaluation     Self-care/ Home management     Manual therapy 30 Fascial release using Strain-Counterstrain of RUE/upper thoracic/scap-LV, R hand/wrist (P)-MS    Neuromuscular Re-education 15 Recip inhib of LV, MS fascia   Therapeutic Activity     Therapeutic Exercises     Gait training     Modality__________________                Total 45    Blank areas are intentional and mean the treatment did not include these items.       Jb Ochoa  2019  "

## 2021-05-27 NOTE — PROGRESS NOTES
"Assessment/Plan:     Problem List Items Addressed This Visit     Chronic pain syndrome - Primary (Chronic)    Relevant Medications    oxyCODONE (ROXICODONE) 5 MG immediate release tablet    Cervical radiculopathy at C8 (Chronic)    Relevant Medications    oxyCODONE (OXYCONTIN) 10 mg 12 hr tablet (Start on 4/18/2019)    baclofen (LIORESAL) 10 MG tablet    Shoulder impingement syndrome, right    Relevant Medications    ketamine HCl (KETAMINE, BULK,) 100 % Powd            No Follow-up on file.    Patient Instructions   PLAN:   Baclofen may use 10 mg one and one-half tab three times a day    Discussed Ketamine, 60 mg lozenges, may try 3/4 of lozenge per dose, after one week up to whole lozenge, to see if tolerated and helpful for pain    May decrease the oxycodone 5 mg as tolerated, next Oxycontin will decrease to 10 mg twice a day    Discussed Frequency Specific Microcurrent, may help with neck pain, shoulder muscle spasms, and traumatic brain injury, may call [facility] [phone], charging $60 /session    Return in 8 weeks        Subjective:       49 y.o. male presents for evaluation of pain involving neck, shoulders, radiating into hands and fingers, back pain.    Since last seen the physical therapist he feels is been the best it is ever worked with since football injuries.  He feels is been helpful for her neck, some of the nerve pain.    He notes his mood seems to be getting better, he will be starting group therapy as well as his individual therapy, notes that he is more involved in Buddhism and his victor m.    He has made significant changes in his diet, has lost 19 pounds which he notes will be less forces on his joints.  He is increased walking and time on flexibility.    Reviews concerns continue for his right \"brachial plexus, pain that radiates into his clavicle, around his shoulder blade, into his ear.    Pain continues to radiate down to both hands to his fourth and fifth fingers.    He " continues working on the lymphedema, will follow up with the clinic.    With addition of Topamax twice a day he has decreased gabapentin to 3 times a day and feels that the helpful regimen.    Baclofen 10 mg 1-1/2 3 times a day is been helpful for spasms.    He has been on OxyContin 15 mg twice a day, oxycodone 5 mg 5 tablets a day.  Feels he is ready to decrease his opioids again.    Is been using ketamine 60 mg lozenges one half up to 7 times a day.  He has decreased the medical cannabis due to cost.     is reviewed as expected      Current Outpatient Medications:      acetaminophen (TYLENOL) 650 MG CR tablet, Take 1,300 mg by mouth every 8 (eight) hours as needed for pain., Disp: , Rfl:      albuterol (VENTOLIN HFA) 90 mcg/actuation inhaler, INHALE 2 PUFFS BY MOUTH EVERY 6 HOURS AS NEEDED FOR WHEEZING, Disp: 18 each, Rfl: 3     allopurinol (ZYLOPRIM) 300 MG tablet, Take 1 tablet (300 mg total) by mouth daily., Disp: 90 tablet, Rfl: 3     baclofen (LIORESAL) 10 MG tablet, One and one-half tab three times a day, Disp: 126 tablet, Rfl: 3     calcium carbonate-vitamin D3 (CALTRATE 600 PLUS D3) 600 mg(1,500mg) -400 unit per tablet, TAKE THREE TABLETS BY MOUTH ONCE DAILY, Disp: 90 tablet, Rfl: 6     cetirizine (ZYRTEC) 10 MG tablet, TAKE ONE TABLET BY MOUTH ONCE DAILY, Disp: 90 tablet, Rfl: 3     dextroamphetamine-amphetamine (ADDERALL) 30 mg Tab, Take 30 mg by mouth daily., Disp: 30 tablet, Rfl: 0     DULoxetine (CYMBALTA) 30 MG capsule, Take 1 capsule (30 mg total) by mouth 3 (three) times a day., Disp: , Rfl: 0     fexofenadine (ALLEGRA) 180 MG tablet, Take 1 tablet (180 mg total) by mouth daily., Disp: 30 tablet, Rfl: 11     fluticasone (FLONASE) 50 mcg/actuation nasal spray, USE 2 SPRAY(S) IN EACH NOSTRIL ONCE DAILY., Disp: 18.2 g, Rfl: 5     gabapentin (NEURONTIN) 400 MG capsule, Take 1 capsule (400 mg total) by mouth 2 (two) times a day., Disp: 360 capsule, Rfl: 3     hydroCHLOROthiazide (HYDRODIURIL) 25 MG  tablet, Take 1 tablet (25 mg total) by mouth daily., Disp: 90 tablet, Rfl: 3     ibuprofen (ADVIL,MOTRIN) 400 MG tablet, Take 800 mg by mouth 2 (two) times a day., Disp: , Rfl:      ketamine HCl (KETAMINE, BULK,) 100 % Powd, 60 mg amarjit  Dissolve 3/4 to -1 amarjit up to 7 times daily, Disp: 90 Bottle, Rfl: 2     lamoTRIgine (LAMICTAL) 25 MG tablet, Take 25 mg by mouth 2 (two) times a day. , Disp: , Rfl: 0     lidocaine HCl 3 % Crea, Apply topically to affected areas twice daily, Disp: 1 Tube, Rfl: 5     losartan (COZAAR) 100 MG tablet, Take 1 tablet (100 mg total) by mouth daily., Disp: 90 tablet, Rfl: 5     Medical Cannabis, Use 1 Units As Directed. Take as instructed by the medical cannabis dispensary. The provider who enrolled the patient in the medical cannabis registry and certified this  patient will maintain ownership and liability of all provider reporting and registration requirements., Disp: , Rfl:      meloxicam (MOBIC) 15 MG tablet, Take 1 tablet (15 mg total) by mouth daily., Disp: 90 tablet, Rfl: 3     metoprolol succinate (TOPROL-XL) 100 MG 24 hr tablet, Take two tablets (200 mg total) daily., Disp: 180 tablet, Rfl: 3     montelukast (SINGULAIR) 10 mg tablet, Take 1 tablet (10 mg total) by mouth at bedtime., Disp: 30 tablet, Rfl: 11     NARCAN 4 mg/actuation nasal spray, , Disp: , Rfl: 0     nicotine (NICODERM CQ) 21 mg/24 hr, Place 1 patch on the skin daily., Disp: 30 patch, Rfl: 1     omeprazole (PRILOSEC) 40 MG capsule, TAKE 1 CAPSULE (40 MG TOTAL) BY MOUTH DAILY, please do a PA if needed, Disp: 90 capsule, Rfl: prn     oxyCODONE (OXYCONTIN) 15 mg 12 hr tablet, Take 1 tablet (15 mg total) by mouth every 12 (twelve) hours. Fill on 11/19/18, Disp: 60 tablet, Rfl: 0     prazosin (MINIPRESS) 2 MG capsule, Take 1 capsule (2 mg total) by mouth at bedtime., Disp: 90 capsule, Rfl: 3     predniSONE (DELTASONE) 10 mg tablet, Take 10 mg by mouth daily., Disp: , Rfl:      pseudoephedrine (SUDAFED) 120 mg 12 hr  "tablet, TAKE ONE TABLET BY MOUTH TWICE DAILY AS NEEDED FOR  CONGESTION, Disp: 180 tablet, Rfl: 5     QUEtiapine (SEROQUEL) 200 MG tablet, Take 200 mg by mouth bedtime., Disp: , Rfl:      QUEtiapine (SEROQUEL) 50 MG tablet, Take 50 mg by mouth 4 (four) times a day., Disp: , Rfl:      ranitidine (ZANTAC) 300 MG tablet, TAKE ONE TABLET BY MOUTH ONCE DAILY AT BEDTIME, Disp: 30 tablet, Rfl: 17     temazepam (RESTORIL) 30 mg capsule, , Disp: , Rfl: 1     THERA-M 9 mg iron-400 mcg Tab tablet, TAKE ONE TABLET BY MOUTH ONCE DAILY, Disp: 30 tablet, Rfl: 10     topiramate (TOPAMAX) 100 MG tablet, Take 1 tablet (100 mg total) by mouth 3 (three) times a day., Disp: 90 tablet, Rfl: 11     busPIRone (BUSPAR) 10 MG tablet, Take 1.5 tablets (15 mg total) by mouth 3 (three) times a day., Disp: 135 tablet, Rfl: 3     hydrOXYzine HCl (ATARAX) 25 MG tablet, Take 1 tablet (25 mg total) by mouth 4 (four) times a day., Disp: 120 tablet, Rfl: 3     [START ON 4/18/2019] oxyCODONE (OXYCONTIN) 10 mg 12 hr tablet, Take 1 tablet (10 mg total) by mouth every 12 (twelve) hours., Disp: 30 each, Rfl: 0     oxyCODONE (ROXICODONE) 5 MG immediate release tablet, Two tabs morning, one mid-day, two bedtime, Disp: 70 tablet, Rfl: 0    Current Facility-Administered Medications:      cyanocobalamin injection 1,000 mcg, 1,000 mcg, Intramuscular, Q30 Days, Devon Lund MD, 1,000 mcg at 03/04/19 1612           Objective:     Vitals:    03/28/19 1148   BP: 125/86   Pulse: 80   Resp: 18   Weight: 212 lb (96.2 kg)   Height: 5' 8\" (1.727 m)   PainSc:   7       Is alert, clear sensorium.  Thought process tight logical.  He has his journals which he contracts his information.    We discussed the plan to change the OxyContin to 10 mg twice a day, and will gradually decrease the opioids as able.    He will adjust the ketamine for optimal effect.    I discussed the modality of frequency specific microcurrent which may help with some of his symptoms.  Resources " were provided.    Time spent more than 25 minutes face-to-face, 50% counseling condition and coordination of treatment plan      This note has been dictated using voice recognition software. Any grammatical or context distortions are unintentional and inherent to the software

## 2021-05-27 NOTE — PROGRESS NOTES
Will meet with patient on 4/4/19 during SW visit to ensure patient is understanding of CG and SW roles.

## 2021-05-27 NOTE — PROGRESS NOTES
[facility] Daily Progress     Patient Name: Bola Lyon  Date: 4/9/2019  Visit #: 16  PTA visit #:       Referral Diagnosis: Chronic pain      Referring provider: Dmitriy Cramer*     Visit Diagnosis:     ICD-10-CM    1. Chronic right shoulder pain M25.511     G89.29    2. Cervicalgia M54.2    3. Cervical radiculitis M54.12    4. Myofascial pain M79.18          Assessment:     Patient is benefitting from skilled physical therapy and is making steady progress toward functional goals.  Patient is appropriate to continue with skilled physical therapy intervention, as indicated by initial plan of care.  He was on time today. He is still feeling like his mobility is improved overall. There was good release of fascial tensions with treatment today.      Goal Status:  Pt. will demonstrate/verbalize independence in self-management of condition in : 12 weeks - IMPROVING TOWARDS GOAL  Pt. will report decreased intensity, frequency of : Pain;in 12 weeks;Comment  Comment:: decrease pain from 6-8/10 to 4-7/10 with ADLs. - IMPROVING TOWARDS GOAL  Pt. will decrease use of medication for pain for improved quality of life in : 12 weeks - SLOW IMPROVEMENT  Pt. will have improved quality of sleep: waking less times/night;getting 75-90% of required amount;in 12 weeks - IMPROVING TOWARDS GOAL  Patient will return to: exercise;leisure;in 12 weeks;Comment  Comment: lifting weights withtout dropping anything with B hands - SLOW PROGRESSION  Patient Turn Head: for driving;for conversation;with less pain;with less difficulty;in 12 weeks;Comment  Comment: increase C-rot to >50 deg B and T-rot to >40 deg. - HAS NOT SHOWN IMPROVEMENT IN THESE ROM MEASUREMENTS  Patient will reach / maintain arm movement: forward;overhead;behind;for home chores;for dressing;with less pain;with less difficulty;in 12 weeks - SHOWING IMPROVEMENT   Patient will decrease : NDI score;by _ points;for improved quality of function;for improved  quality of life;in 12 weeks  by ___ points: 15 -  NOT TESTED        Plan / Patient Education:     Plan to con't with manual therapy to decrease fascial tension/tone to normalize ROM/decrease inflammation/decrease mm tone and improve proprioception.      Subjective:     Pain Ratin   Overall he does seem to be having a good week. The weekend went well as well.    He is still having the same pains in his arm/scap area R>L.     Objective:     Neural, MS fascial tensions.      Treatment Today   2019   TREATMENT MINUTES COMMENTS   Evaluation     Self-care/ Home management     Manual therapy 25 Fascial release using Strain-Counterstrain of B AINT-N, B thoracic/lumbar F (P)-MS, R SCAPAD (P)-MS, R MET (P)-MS, B cranail dura-N.   Neuromuscular Re-education 15 Recip inhib of neural, MS fascia   Therapeutic Activity     Therapeutic Exercises 15 AA/PROM to BUE, C-T-L spine, ribs, cranium   Gait training     Modality__________________                Total 55    Blank areas are intentional and mean the treatment did not include these items.       Jb cOhoa  2019

## 2021-05-27 NOTE — TELEPHONE ENCOUNTER
Controlled Substance Refill Request  Medication Name:   Requested Prescriptions     Pending Prescriptions Disp Refills     dextroamphetamine-amphetamine (ADDERALL) 30 mg Tab 30 tablet 0     Sig: Take 30 mg by mouth daily.     Date Last Fill: 02/27/19  Pharmacy: [facility] [address] [place] [address] ( listed)      Submit electronically to pharmacy  Controlled Substance Agreement Date Scanned:   Encounter-Level CSA Scan Date - 08/21/2017:    Scan on 8/24/2017  7:50 AM (below)    Scan on 8/23/2017  7:29 AM (below)             Encounter-Level CSA Scan Date - 06/27/2016:    Scan on 6/30/2016  8:08 AM (below)         Last office visit with prescriber/PCP: 3/4/2019 Devon Lund MD OR same dept: 3/4/2019 Devon Lund MD OR same specialty: 3/4/2019 Devon Lund MD  Last physical: Visit date not found Last MTM visit: Visit date not found

## 2021-05-27 NOTE — TELEPHONE ENCOUNTER
Per provider: ok to increase ketamine use to 9 troches per day as needed.  Will cue new RX with updated instructions for use.

## 2021-05-27 NOTE — PROGRESS NOTES
ASSESSMENT:  1. Acute maxillary sinusitis, recurrence not specified  Much better.  CT scan reviewed.  No infectious component.  Possibly allergic or inflammatory component.  Status post 2 surgeries.  Fast metabolizer.  Push Singulair.  Add back prednisone.  Push fexofenadine.  Push Sudafed  - montelukast (SINGULAIR) 10 mg tablet; Take 1 tablet (10 mg total) by mouth 2 (two) times a day.  Dispense: 60 tablet; Refill: 11  - predniSONE (DELTASONE) 10 mg tablet; Take 10 mg by mouth daily.  Dispense: 30 tablet; Refill: 11  - fexofenadine (ALLEGRA) 180 MG tablet; Take 1 tablet (180 mg total) by mouth 2 (two) times a day.  Dispense: 60 tablet; Refill: 11  - pseudoephedrine (SUDAFED) 120 mg 12 hr tablet; Take 1 tablet (120 mg total) by mouth 3 (three) times a day.  Dispense: 90 tablet; Refill: 5    2. Primary (congenital) lymphedema  Continues to improve.  Weight decreasing and reviewed.  Continue to attempt taper of gabapentin    3. Chronic, continuous use of opioids  Continue to encourage decrease in opioids.  Push topiramate.  Fast metabolizer.  Replace meloxicam with nabumetone.  Spread out allopurinol.  - predniSONE (DELTASONE) 10 mg tablet; Take 10 mg by mouth daily.  Dispense: 30 tablet; Refill: 11  - topiramate (TOPAMAX) 100 MG tablet; Take 1 tablet (100 mg total) by mouth 4 (four) times a day.  Dispense: 120 tablet; Refill: 11  - nabumetone (RELAFEN) 500 MG tablet; Take 1 tablet (500 mg total) by mouth 4 (four) times a day.  Dispense: 120 tablet; Refill: 1  - allopurinol (ZYLOPRIM) 300 MG tablet; Take 0.5 tablets (150 mg total) by mouth 2 (two) times a day.  Dispense: 90 tablet; Refill: 3    4. Essential hypertension  Stable    5. Generalized anxiety disorder  Would like to taper gabapentin.  May need increase in Lamictal to compensate    6. Bipolar 2 disorder (H)  As above    7. Traumatic brain injury with loss of consciousness, sequela (H)  B12 shots last approximately 1 week.  Increase frequency for the next few  months  - cyanocobalamin injection 1,000 mcg        PLAN:  Patient Instructions   Resume Prednisone 10 mgs each morning for sinus congestion and joint pain, which helped before.  The question is what is correct    Increase Montelukast to 10 mgs twice a day to help sinus congestion      Stop claritin and loratadine  and zyrtec    Increase sudafed to 3 times a day    Increase allegra fexofenadine to twice a day    Use afrin to right nostril at night, every night to keep open, or twice a day    Increase Vitamin B 12 shots to every week    Decrease Gabapentin to twice a day.  Further taper to your psychiatrist, but I would like to taper it off for better swelling control    Increase Topiramate to 100 mg 4 times a day for a total of 400 mgs daily, to help pain and mood stabilization and decrease appetite    Stop Meloxicam    Begin Nabumetone 500 mgs 4 times a day as stronger anti inflammatory to replace Meloxicam    Stop Ibuprofen because we are adding Nabumetone    I suggest that you could increase the Lamictal to 50 mgs twice a day to make up for the decrease in Gabapentin    You can take tylenol as needed    Increase allopurinol to twice a day but just one half pill twice a day      No orders of the defined types were placed in this encounter.    Medications Discontinued During This Encounter   Medication Reason     ranitidine (ZANTAC) 300 MG tablet Therapy completed     cetirizine (ZYRTEC) 10 MG tablet      ibuprofen (ADVIL,MOTRIN) 400 MG tablet      meloxicam (MOBIC) 15 MG tablet      cyanocobalamin injection 1,000 mcg      montelukast (SINGULAIR) 10 mg tablet      predniSONE (DELTASONE) 10 mg tablet      topiramate (TOPAMAX) 100 MG tablet      fexofenadine (ALLEGRA) 180 MG tablet      pseudoephedrine (SUDAFED) 120 mg 12 hr tablet      allopurinol (ZYLOPRIM) 300 MG tablet      Administrations This Visit     cyanocobalamin injection 1,000 mcg     Admin Date  04/04/2019 Action  Given Dose  1000 mcg  "Route  Intramuscular Administered By  Bernadette Guallpa CMA              Return in about 6 weeks (around 2019) for for a full review of everything we did today.    CHIEF COMPLAINT:  Chief Complaint   Patient presents with     Follow-up       HISTORY OF PRESENT ILLNESS:  Bola is a 49 y.o. male presenting to the clinic today for review of medication    Topiramate was increased to 3 times daily.  He notes improved pain control with this.  He feels that the meloxicam wears off after just a few hours.  He feels that the allopurinol lasts half the day.  He stopped the prednisone and can feel a major difference in his joint pain    He was treated for sinus infection with extended antibiotics and steroids.  He still has plugging of his right nares.  CT scan was done and was normal and this was reviewed.  He still feels congested with clicking and popping in his right ear    Mood has been good.  He feels the B12 shot lifts him, but only lasts about a week    REVIEW OF SYSTEMS:   Decreased weight.  Decreased appetite.  Improved mental clarity.  Decreased leg swelling.  Continued ear clicking and popping.  Improved pain all other systems are negative.    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2 kg)     Body mass index is 31.69 kg/m .     Was 232 in " november    PHYSICAL EXAM:  Constitutional:  Reveals a pleasant energetic talkative man.  Vitals:  Per nursing notes.    He walks carefully to the exam bench with some leg pain.  Scar over left knee  Cardiac: Regular rate and rhythm without murmurs, rubs, or gallops.     Legs trace edema on left  Palpation of the radial pulse regular.  Lungs: Clear.  Respiratory effort normal.    Neurologic:  Cranial nerves II-XII intact.     Psychiatric:  Mood appropriate, memory intact.       ADDITIONAL HISTORY SUMMARIZED (2): Reviewed ENT notes and pain clinic notes  CARE EVERYWHERE/ EXTRA INFORMATION (1): None.   RADIOLOGY TESTS (1): CT of sinus  LABS (1): Update today  CARDIOLOGY/MEDICINE TESTS (1):   INDEPENDENT REVIEW (2 each): None.     Total data points:4    Time in: 1151 am  Time out: 12:47 PM    The visit lasted a total of 56 minutes face to face with the patient. Over 50% of the time was spent counseling and educating the patient about medications, medication adjustments, medication side effects, and lab testing.        MEDICATIONS:  Current Outpatient Medications   Medication Sig Dispense Refill     acetaminophen (TYLENOL) 650 MG CR tablet Take 1,300 mg by mouth every 8 (eight) hours as needed for pain.       albuterol (VENTOLIN HFA) 90 mcg/actuation inhaler INHALE 2 PUFFS BY MOUTH EVERY 6 HOURS AS NEEDED FOR WHEEZING 18 each 3     allopurinol (ZYLOPRIM) 300 MG tablet Take 0.5 tablets (150 mg total) by mouth 2 (two) times a day. 90 tablet 3     baclofen (LIORESAL) 10 MG tablet One and one-half tab three times a day 126 tablet 3     busPIRone (BUSPAR) 10 MG tablet Take 1.5 tablets (15 mg total) by mouth 3 (three) times a day. 135 tablet 3     calcium carbonate-vitamin D3 (CALTRATE 600 PLUS D3) 600 mg(1,500mg) -400 unit per tablet TAKE THREE TABLETS BY MOUTH ONCE DAILY 90 tablet 6     chlorhexidine (PERIDEX) 0.12 % solution Apply 15 mL to the mouth or throat 2 (two) times a day.       dextroamphetamine-amphetamine  (ADDERALL) 30 mg Tab Take 30 mg by mouth daily. 30 tablet 0     DULoxetine (CYMBALTA) 30 MG capsule Take 1 capsule (30 mg total) by mouth 3 (three) times a day.  0     fexofenadine (ALLEGRA) 180 MG tablet Take 1 tablet (180 mg total) by mouth 2 (two) times a day. 60 tablet 11     fluticasone (FLONASE) 50 mcg/actuation nasal spray USE 2 SPRAY(S) IN EACH NOSTRIL ONCE DAILY. 18.2 g 5     gabapentin (NEURONTIN) 400 MG capsule Take 1 capsule (400 mg total) by mouth 2 (two) times a day. 360 capsule 3     hydroCHLOROthiazide (HYDRODIURIL) 25 MG tablet Take 1 tablet (25 mg total) by mouth daily. 90 tablet 3     hydrOXYzine HCl (ATARAX) 25 MG tablet Take 1 tablet (25 mg total) by mouth 4 (four) times a day. 120 tablet 3     ketamine HCl (KETAMINE, BULK,) 100 % Powd 60 mg amarjit  Dissolve 3/4 to -1 amarjit up to 7 times daily 90 Bottle 2     lamoTRIgine (LAMICTAL) 25 MG tablet Take 25 mg by mouth 2 (two) times a day.   0     lidocaine HCl 3 % Crea Apply topically to affected areas twice daily 1 Tube 5     losartan (COZAAR) 100 MG tablet Take 1 tablet (100 mg total) by mouth daily. 90 tablet 5     Medical Cannabis Use 1 Units As Directed. Take as instructed by the medical cannabis dispensary. The provider who enrolled the patient in the medical cannabis registry and certified this  patient will maintain ownership and liability of all provider reporting and registration requirements.       metoprolol succinate (TOPROL-XL) 100 MG 24 hr tablet Take two tablets (200 mg total) daily. 180 tablet 3     montelukast (SINGULAIR) 10 mg tablet Take 1 tablet (10 mg total) by mouth 2 (two) times a day. 60 tablet 11     NARCAN 4 mg/actuation nasal spray   0     nicotine (NICODERM CQ) 21 mg/24 hr Place 1 patch on the skin daily. 30 patch 1     omeprazole (PRILOSEC) 40 MG capsule TAKE 1 CAPSULE (40 MG TOTAL) BY MOUTH DAILY, please do a PA if needed 90 capsule prn     [START ON 4/18/2019] oxyCODONE (OXYCONTIN) 10 mg 12 hr tablet Take 1 tablet  (10 mg total) by mouth every 12 (twelve) hours. 30 each 0     oxyCODONE (OXYCONTIN) 15 mg 12 hr tablet Take 1 tablet (15 mg total) by mouth every 12 (twelve) hours. Fill on 11/19/18 60 tablet 0     oxyCODONE (ROXICODONE) 5 MG immediate release tablet Two tabs morning, one mid-day, two bedtime 70 tablet 0     prazosin (MINIPRESS) 2 MG capsule Take 1 capsule (2 mg total) by mouth at bedtime. 90 capsule 3     predniSONE (DELTASONE) 10 mg tablet Take 10 mg by mouth daily. 30 tablet 11     pseudoephedrine (SUDAFED) 120 mg 12 hr tablet Take 1 tablet (120 mg total) by mouth 3 (three) times a day. 90 tablet 5     QUEtiapine (SEROQUEL) 200 MG tablet Take 200 mg by mouth bedtime.       QUEtiapine (SEROQUEL) 50 MG tablet Take 50 mg by mouth 4 (four) times a day.       temazepam (RESTORIL) 30 mg capsule   1     THERA-M 9 mg iron-400 mcg Tab tablet TAKE ONE TABLET BY MOUTH ONCE DAILY 30 tablet 10     topiramate (TOPAMAX) 100 MG tablet Take 1 tablet (100 mg total) by mouth 4 (four) times a day. 120 tablet 11     nabumetone (RELAFEN) 500 MG tablet Take 1 tablet (500 mg total) by mouth 4 (four) times a day. 120 tablet 1     Current Facility-Administered Medications   Medication Dose Route Frequency Provider Last Rate Last Dose     cyanocobalamin injection 1,000 mcg  1,000 mcg Intramuscular Q7 Days Devon Lund MD   1,000 mcg at 04/04/19 1250

## 2021-05-27 NOTE — PROGRESS NOTES
Assessment    The pt was present for meeting with SW. Pt was 43 minutes late, SW scheduled f/u for 4/18/19. Pt has been struggling with the loss with a friend and experiencing insomnia lately, pt has a meeting with his therapist this afternoon. Pt was agreeable to SW connecting with his therapist to discuss needs.     At this time, pt is not eligible for any [facility] programs to provide assist with navigating housing, staying organized, etc. SW would like to discuss with pt s therapist if they feel mental health case management would be appropriate and if they would be willing to refer the pt for this.    SW clarified if pt felt he was able to manage his medications on his own or if he would like assist from [facility] RN. Pt reported at this time he does feel he s able to manage them independently.    Action Plan:    will:  1) Meet the pt again on 4/18/19  2) F/U with pt s psychotherapist      Care Guide will:  Care Guide Delegation:   1)  Due Date:  None at this time       Delegation:      Subjective     The pt was 43 minutes late to the appt. Pt reported struggling with the loss of a friend recently and experiencing insomnia the last week. SW empathized with pt and explained that due to time limitation and information SW would like to cover, SW will need to reschedule, pt understood. SW scheduled f/u for 4/18/19.     During today s appt, SW clarified with pt what CCC s role is (per pt s emails, pt seemed confused as to how CCC has been assisting and the what the role is).     Pt updated SW at the end of February he had lost his Medicare waiver benefit for MA. Due to the pt not having MA, pt does not qualify for ARMHS or PCA anymore which changes the recommendations SW had from the last meeting.    SW asked pt if he would be willing to allow SW to discuss needs further with his therapist, pt was agreeable to this and signed BRANDON. SW will f/u with pt s therapist to discuss if they feel mental health case  management would be appropriate or not---SW unsure as pt would need to be experiencing serious and persistent mental illness to qualify; therapist will have better insight as to if this is the case and if MH case management is appropriate. Pt is meeting his therapist today, SW encouraged the pt to bring this up during his meeting and let them know SW will be calling.    Pt updated CCC that he s addressed his dental needs.    Objective      Appearance: Casually and appropriately dressed, well-groomed, normal gait.      Behavior: Open      Speech: rambling, fast      Emotion/Affect: anxious, pleasant      Thought Processes: tangential       Orientation: observed x4      Memory: impaired       General Intellectual Abilities: adequate       Judgment: impaired       Insight: adequate     Clinical Recommendations: The pt would benefit from more assistance with follow through on tasks and assist with navigating needs, however, pt does not qualify for any [facility] programs that provide these services. Pt has already explored waiver services with the [facility] and didn t feel it was a good fit (could spend down to qualify if pt was willing to do this) as they reported he would also need to accept services for more than applying for housing and managing health care appts.

## 2021-05-27 NOTE — PROGRESS NOTES
Met with patient in clinic today. SW was present to discuss housing and answer insurance questions. Patient was given information from [employer]. CG will offer support with applications for wait lists and field questions for SW. Pt will have his roommates assist with using [email or web address].    Patient asked many questions and seemed to comprehend that the wait time for housing will be one year or longer. He is currently staying in [place] and willing to stay where he is until he can move into [address] housing.     Next outreach: 5/17/19

## 2021-05-27 NOTE — TELEPHONE ENCOUNTER
Patient calls with multiple questions about pain medications.  Ultimately that he has increased to 9 troches per day of ketamine.  He reports that this has been effective while still allowing him to reduce use of oxycodone.

## 2021-05-27 NOTE — PROGRESS NOTES
Assessment    The pt, CG and SW present for meeting with pt to discuss housing. SW reviewed current housing market and how to utilize [employer] to assist with navigating housing. Pt reported his roommates will assist him with using [employer] on-going. SW discussed having [facility] support with completing housing applications and any other supportive documentation for getting into housing, pt was agreeable to this idea.    SW will create a new goal around housing and will end the goal around case management. Pt reported he does not feel he needs additional supportive services in the community at this time--feels supported through his roommates and family; denied need for PCA and RN services.       Action Plan:    will:  1) Available as needed       Care Guide will:  Care Guide Delegation:   1)  Due Date:  None at this time       Delegation:      Subjective     The pt and CG were present for meeting with  to review housing again--SW had discussed on 12/12/18. The pt is hoping to live in the [facility] and would be open to [place], [place], [facility], [place], etc. Pt would be open to the [facility] area, would not be open to the [place] area.     SW reviewed the different types of housing with the pt and reviewed how to use [employer]. SW printed off current listings today and educated the pt to contact each option he s interested in to get the application.    Pt updated TAMI that pt no longer has to pay premiums for insurance, has been working with the [facility]. Pt reported the [facility] is still contacting him to discuss MA--[facility] told him he would be eligible for MA in June, wanting the pt to fill out the combined application. SW reviewed benefits that come with having MA--PCA, in-home RN, ARMHS, etc. Pt reported he doesn t feel he needs these services and doesn t want to pursue MA at this time if getting these services is the reason.     SW discussed goals,  will end goal around case management as pt is feeling he doesn t need this level of support. Pt reported there is no immediate rush to move out and his roommates can help him with navigating [email or web address].     SW will create a housing goal for continued CCC support with this need.      Objective      Appearance: Casually and appropriately dressed, well-groomed, normal gait.      Behavior: Open      Speech: Ordinary, clear      Emotion/Affect: Calm, pleasant       Thought Processes: tangential at times      Orientation: observed x3      Memory: adequate, some impairment      General Intellectual Abilities:       Judgment: adequate      Insight: adequate     Clinical Recommendations: The pt has a good support system at this time and feels that he is not needing more community supports.     Goals updated:    Goals        Patient Stated      II want to get on some affordable housing wailists within the next  6 months. (pt-stated)      Action steps to achieve this goal  1.  I will work with my friends and family to find affordable housing options with open waitlists to apply for.  2.  I will let my care guide know if I need any assistance with applying for affordable housing options I find.    Date goal set: 4/18/19

## 2021-05-27 NOTE — PROGRESS NOTES
[facility] Daily Progress     Patient Name: Bola Lyon  Date: 3/26/2019  Visit #: 12   PTA visit #:       Referral Diagnosis: Chronic pain      Referring provider: Dmitriy Cramer*     Visit Diagnosis:     ICD-10-CM    1. Chronic right shoulder pain M25.511     G89.29    2. Cervicalgia M54.2    3. Cervical radiculitis M54.12    4. Myofascial pain M79.18          Assessment:     Patient is benefitting from skilled physical therapy and is making steady progress toward functional goals.  Patient is appropriate to continue with skilled physical therapy intervention, as indicated by initial plan of care.  He really does seem to be doing much better. He is having much more infrequent spasming of his arms but is still having some in his upper trap/scalenes/levator scap. He is much more tolerant to palpation now as well compared to evaluation. His pain has improved and is not as intense and frequent as it was at evaluation.         Goal Status:  Pt. will demonstrate/verbalize independence in self-management of condition in : 12 weeks - IMPROVING TOWARDS GOAL  Pt. will report decreased intensity, frequency of : Pain;in 12 weeks;Comment  Comment:: decrease pain from 6-8/10 to 4-7/10 with ADLs. - IMPROVING TOWARDS GOAL  Pt. will decrease use of medication for pain for improved quality of life in : 12 weeks - SLOW IMPROVEMENT  Pt. will have improved quality of sleep: waking less times/night;getting 75-90% of required amount;in 12 weeks - IMPROVING TOWARDS GOAL  Patient will return to: exercise;leisure;in 12 weeks;Comment  Comment: lifting weights withtout dropping anything with B hands - SLOW PROGRESSION  Patient [place]: for driving;for conversation;with less pain;with less difficulty;in 12 weeks;Comment  Comment: increase C-rot to >50 deg B and T-rot to >40 deg. - HAS NOT SHOWN IMPROVEMENT IN THESE ROM MEASUREMENTS  Patient will reach / maintain arm movement: forward;overhead;behind;for home  "chores;for dressing;with less pain;with less difficulty;in 12 weeks - SHOWING IMPROVEMENT   Patient will decrease : NDI score;by _ points;for improved quality of function;for improved quality of life;in 12 weeks  by ___ points: 15 -  NOT TESTED        Plan / Patient Education:     Plan to con't with manual therapy to decrease fascial tension/tone to normalize ROM/decrease inflammation/decrease mm tone and improve proprioception.      Subjective:     Pain Ratin now but more at a 6               The biggest issue is still in the brachial plexus area. He feels like he feels a \"brick\" in the area of the upper trap area at the base of his neck. He can feel some pain going up towards his jaw/behind his ear.    The pain he has been having down the arm/shoulder blade has been more stable instead of getting worse from working on it in PT.     *Pt was 15' late for appt.     Objective:     Neural, visc, MS fascial tensions.      Treatment Today   3/26/2019   TREATMENT MINUTES COMMENTS   Evaluation     Self-care/ Home management     Manual therapy 40 Fascial release using Strain-Counterstrain of upper rib myochain-MS, B upper pleural anterior-V, lower cervical gray-N B   Neuromuscular Re-education     Therapeutic Activity     Therapeutic Exercises     Gait training     Modality__________________                Total 40    Blank areas are intentional and mean the treatment did not include these items.       Jb Ochoa  3/26/2019  "

## 2021-05-27 NOTE — PATIENT INSTRUCTIONS - HE
Resume Prednisone 10 mgs each morning for sinus congestion and joint pain, which helped before.  The question is what is correct    Increase Montelukast to 10 mgs twice a day to help sinus congestion      Stop claritin and loratadine  and zyrtec    Increase sudafed to 3 times a day    Increase allegra fexofenadine to twice a day    Use afrin to right nostril at night, every night to keep open, or twice a day    Increase Vitamin B 12 shots to every week    Decrease Gabapentin to twice a day.  Further taper to your psychiatrist, but I would like to taper it off for better swelling control    Increase Topiramate to 100 mg 4 times a day for a total of 400 mgs daily, to help pain and mood stabilization and decrease appetite    Stop Meloxicam    Begin Nabumetone 500 mgs 4 times a day as stronger anti inflammatory to replace Meloxicam    Stop Ibuprofen because we are adding Nabumetone    I suggest that you could increase the Lamictal to 50 mgs twice a day to make up for the decrease in Gabapentin    You can take tylenol as needed    Increase allopurinol to twice a day but just one half pill twice a day

## 2021-05-27 NOTE — PROGRESS NOTES
[facility] Daily Progress     Patient Name: Bola Lyon  Date: 4/2/2019  Visit #: 13  PTA visit #:       Referral Diagnosis: Chronic pain      Referring provider: Devon Lund MD     Visit Diagnosis:     ICD-10-CM    1. Chronic right shoulder pain M25.511     G89.29    2. Cervicalgia M54.2    3. Cervical radiculitis M54.12    4. Myofascial pain M79.18          Assessment:     Patient is benefitting from skilled physical therapy and is making steady progress toward functional goals.  Patient is appropriate to continue with skilled physical therapy intervention, as indicated by initial plan of care.  He did have a return of his arterial tensions which may be due to spinal arteries.       Goal Status:  Pt. will demonstrate/verbalize independence in self-management of condition in : 12 weeks - IMPROVING TOWARDS GOAL  Pt. will report decreased intensity, frequency of : Pain;in 12 weeks;Comment  Comment:: decrease pain from 6-8/10 to 4-7/10 with ADLs. - IMPROVING TOWARDS GOAL  Pt. will decrease use of medication for pain for improved quality of life in : 12 weeks - SLOW IMPROVEMENT  Pt. will have improved quality of sleep: waking less times/night;getting 75-90% of required amount;in 12 weeks - IMPROVING TOWARDS GOAL  Patient will return to: exercise;leisure;in 12 weeks;Comment  Comment: lifting weights withtout dropping anything with B hands - SLOW PROGRESSION  Patient Turn [place]: for driving;for conversation;with less pain;with less difficulty;in 12 weeks;Comment  Comment: increase C-rot to >50 deg B and T-rot to >40 deg. - HAS NOT SHOWN IMPROVEMENT IN THESE ROM MEASUREMENTS  Patient will reach / maintain arm movement: forward;overhead;behind;for home chores;for dressing;with less pain;with less difficulty;in 12 weeks - SHOWING IMPROVEMENT   Patient will decrease : NDI score;by _ points;for improved quality of function;for improved quality of life;in 12 weeks  by ___ points: 15 -  NOT TESTED         Plan / Patient Education:     Plan to con't with manual therapy to decrease fascial tension/tone to normalize ROM/decrease inflammation/decrease mm tone and improve proprioception.      Subjective:     Pain Rating: mid-high 7 due to the anxiety he is having  He hasnt' been sleeping but he has been really nervous and anxious about the dental work he is having done after today's visit. Last night was really difficult for him.   He is still having the main area of pain being in the brachial plexus.   He is able to work through the pain better than he has in the past which is good for him.    *Pt was 35' late for appt.     Objective:     Art fascial tensions.      Treatment Today   4/2/2019   TREATMENT MINUTES COMMENTS   Evaluation     Self-care/ Home management     Manual therapy 25 Fascial release using Strain-Counterstrain of R brachial plexus/cervical/scap-Art, AS-A.    Neuromuscular Re-education     Therapeutic Activity     Therapeutic Exercises     Gait training     Modality__________________                Total 25    Blank areas are intentional and mean the treatment did not include these items.       Jb Ochoa  4/2/2019

## 2021-05-27 NOTE — PROGRESS NOTES
[facility] Daily Progress     Patient Name: Bola Lyon  Date: 4/12/2019  Visit #: 17  PTA visit #:       Referral Diagnosis: Chronic pain      Referring provider: Dmitriy Cramer*     Visit Diagnosis:     ICD-10-CM    1. Chronic right shoulder pain M25.511     G89.29    2. Cervicalgia M54.2    3. Cervical radiculitis M54.12    4. Myofascial pain M79.18          Assessment:     Patient is benefitting from skilled physical therapy and is making steady progress toward functional goals.  Patient is appropriate to continue with skilled physical therapy intervention, as indicated by initial plan of care.        Goal Status:  Pt. will demonstrate/verbalize independence in self-management of condition in : 12 weeks - IMPROVING TOWARDS GOAL  Pt. will report decreased intensity, frequency of : Pain;in 12 weeks;Comment  Comment:: decrease pain from 6-8/10 to 4-7/10 with ADLs. - IMPROVING TOWARDS GOAL  Pt. will decrease use of medication for pain for improved quality of life in : 12 weeks - SLOW IMPROVEMENT  Pt. will have improved quality of sleep: waking less times/night;getting 75-90% of required amount;in 12 weeks - IMPROVING TOWARDS GOAL  Patient will return to: exercise;leisure;in 12 weeks;Comment  Comment: lifting weights withtout dropping anything with B hands - SLOW PROGRESSION  Patient Turn [place]: for driving;for conversation;with less pain;with less difficulty;in 12 weeks;Comment  Comment: increase C-rot to >50 deg B and T-rot to >40 deg. - HAS NOT SHOWN IMPROVEMENT IN THESE ROM MEASUREMENTS  Patient will reach / maintain arm movement: forward;overhead;behind;for home chores;for dressing;with less pain;with less difficulty;in 12 weeks - SHOWING IMPROVEMENT   Patient will decrease : NDI score;by _ points;for improved quality of function;for improved quality of life;in 12 weeks  by ___ points: 15 -  NOT TESTED        Plan / Patient Education:     Plan to con't with manual therapy to decrease  "fascial tension/tone to normalize ROM/decrease inflammation/decrease mm tone and improve proprioception.      Subjective:     Pain Ratin   It has been going alright.   The area that does still bother are the same areas of the \"3 pathways\".   The area that he does get some symptoms in his scap area can go down his lats and even wrap around his hip at times.     Objective:     Art, visc, MS fascial tensions.    C-rot: 34/50  R shoulder flexion: 159 deg    Treatment Today   2019   TREATMENT MINUTES COMMENTS   Evaluation     Self-care/ Home management     Manual therapy 25 Fascial release using Strain-Counterstrain of B heart valves-V, B upper thoracic-art, B TMJ myochain-MS   Neuromuscular Re-education 15 Recip inhib of visc, art, MS fascia   Therapeutic Activity     Therapeutic Exercises 15 AA/PROM to BUE, C-T-L spine, ribs, cranium   Gait training     Modality__________________                Total 55    Blank areas are intentional and mean the treatment did not include these items.       Jb Ochoa  2019  "

## 2021-05-27 NOTE — PROGRESS NOTES
Left message with appt reminder for tomorrow's SW/CG dual visit at 10 AM. Pt was reminded to arrive on time.

## 2021-05-27 NOTE — PATIENT INSTRUCTIONS - HE
PLAN:   Baclofen may use 10 mg one and one-half tab three times a day    Discussed Ketamine, 60 mg lozenges, may try 3/4 of lozenge per dose, after one week up to whole lozenge, to see if tolerated and helpful for pain    May decrease the oxycodone 5 mg as tolerated, next Oxycontin will decrease to 10 mg twice a day    Discussed Frequency Specific Microcurrent, may help with neck pain, shoulder muscle spasms, and traumatic brain injury, may call [facility] [phone], charging $60 /session    Return in 8 weeks

## 2021-05-27 NOTE — PROGRESS NOTES
[facility] Daily Progress     Patient Name: Bola Lyon  Date: 3/29/2019  Visit #: 13  PTA visit #:       Referral Diagnosis: Chronic pain      Referring provider: Dmitriy Cramer*     Visit Diagnosis:     ICD-10-CM    1. Chronic right shoulder pain M25.511     G89.29    2. Cervicalgia M54.2    3. Cervical radiculitis M54.12    4. Myofascial pain M79.18          Assessment:     Patient is benefitting from skilled physical therapy and is making steady progress toward functional goals.  Patient is appropriate to continue with skilled physical therapy intervention, as indicated by initial plan of care.  He did feel better after treatment today. He does continue to be chronically late to his appointments and he may get more out of our visits is he is more punctual.      Goal Status:  Pt. will demonstrate/verbalize independence in self-management of condition in : 12 weeks - IMPROVING TOWARDS GOAL  Pt. will report decreased intensity, frequency of : Pain;in 12 weeks;Comment  Comment:: decrease pain from 6-8/10 to 4-7/10 with ADLs. - IMPROVING TOWARDS GOAL  Pt. will decrease use of medication for pain for improved quality of life in : 12 weeks - SLOW IMPROVEMENT  Pt. will have improved quality of sleep: waking less times/night;getting 75-90% of required amount;in 12 weeks - IMPROVING TOWARDS GOAL  Patient will return to: exercise;leisure;in 12 weeks;Comment  Comment: lifting weights withtout dropping anything with B hands - SLOW PROGRESSION  Patient Turn [place]: for driving;for conversation;with less pain;with less difficulty;in 12 weeks;Comment  Comment: increase C-rot to >50 deg B and T-rot to >40 deg. - HAS NOT SHOWN IMPROVEMENT IN THESE ROM MEASUREMENTS  Patient will reach / maintain arm movement: forward;overhead;behind;for home chores;for dressing;with less pain;with less difficulty;in 12 weeks - SHOWING IMPROVEMENT   Patient will decrease : NDI score;by _ points;for improved quality of  function;for improved quality of life;in 12 weeks  by ___ points: 15 -  NOT TESTED        Plan / Patient Education:     Plan to con't with manual therapy to decrease fascial tension/tone to normalize ROM/decrease inflammation/decrease mm tone and improve proprioception.      Subjective:     Pain Rating: low 7              The pain is still on the R brachial plexus. It is still going in the same areas  He does still have L brachial plexus soreness from the deltoid down into the biceps. This isn't as defined as it is on the R side with a pattern.   He did meet with Dr. Cramer yesterday as well and they did discuss everything with regards to treatment. He is going to see about frequency treatments in the next few weeks.     *Pt was 25' late for appt.     Objective:     Art, neural fascial tensions.      Treatment Today   3/29/2019   TREATMENT MINUTES COMMENTS   Evaluation     Self-care/ Home management     Manual therapy 30 Fascial release using Strain-Counterstrain of R scap/upper thoracic-Art  MFR: scalenes, brachial plexus.    Neuromuscular Re-education     Therapeutic Activity     Therapeutic Exercises     Gait training     Modality__________________                Total 30    Blank areas are intentional and mean the treatment did not include these items.       Jb Ochoa  3/29/2019

## 2021-05-28 ASSESSMENT — ANXIETY QUESTIONNAIRES: GAD7 TOTAL SCORE: 17

## 2021-05-28 NOTE — TELEPHONE ENCOUNTER
Prior Authorization Request  Who s requesting: Dr Dmitriy Cramer  - Jackeline ARAYA CMA  Pharmacy Name and Location:   [facility]  Medication Name:   Oxycontin ER 10mg tabs  Insurance Plan:  [employer]  Insurance Member ID Number:   [id number]  Informed patient that prior authorizations can take up to 10 business days for response:   Yes  Okay to leave a detailed message: Yes

## 2021-05-28 NOTE — PROGRESS NOTES
Attempt 1: Care Guide called patient.  If this patient is returning my call, please transfer to [facility] at ext [phone].

## 2021-05-28 NOTE — TELEPHONE ENCOUNTER
Refill Approved    Rx renewed per Medication Renewal Policy. Medication was last renewed on 12/26/18  #18  R-3.    Last OV 4/4/19    Dhara Valdes, [facility] Connection Triage/Med Refill 5/14/2019     Requested Prescriptions   Pending Prescriptions Disp Refills     albuterol (PROAIR HFA;PROVENTIL HFA;VENTOLIN HFA) 90 mcg/actuation inhaler [Pharmacy Med Name: ALBUTEROL HFA 90MCG  AER] 18 each 3     Sig: INHALE 2 PUFFS BY MOUTH EVERY 6 HOURS AS NEEDED FOR WHEEZING       Albuterol/Levalbuterol Refill Protocol Passed - 5/14/2019  9:46 AM        Passed - PCP or prescribing provider visit in last year     Last office visit with prescriber/PCP: 4/4/2019 Deovn Lund MD OR same dept: 4/4/2019 Devon Lund MD OR same specialty: 4/4/2019 Devon Lund MD Last physical: Visit date not found       Next appt within 3 mo: Visit date not found  Next physical within 3 mo: Visit date not found  Prescriber OR PCP: Devon Lund MD  Last diagnosis associated with med order: 1. Wheezing  - albuterol (PROAIR HFA;PROVENTIL HFA;VENTOLIN HFA) 90 mcg/actuation inhaler [Pharmacy Med Name: ALBUTEROL HFA 90MCG  AER]; INHALE 2 PUFFS BY MOUTH EVERY 6 HOURS AS NEEDED FOR WHEEZING  Dispense: 18 each; Refill: 3    If protocol passes may refill for 6 months if within 3 months of last provider visit (or a total of 9 months). If patient requesting >1 inhaler per month refill x 6 months and have patient make appointment with provider.

## 2021-05-28 NOTE — PROGRESS NOTES
[facility] Daily Progress     Patient Name: Bola Lyon  Date: 4/26/2019  Visit #: 19  PTA visit #:       Referral Diagnosis: Chronic pain      Referring provider: Dmitriy Cramer*     Visit Diagnosis:     ICD-10-CM    1. Chronic right shoulder pain M25.511     G89.29    2. Cervicalgia M54.2    3. Cervical radiculitis M54.12    4. Myofascial pain M79.18          Assessment:     Patient is benefitting from skilled physical therapy and is making steady progress toward functional goals.  Patient is appropriate to continue with skilled physical therapy intervention, as indicated by initial plan of care.  There was good release of fascial tensions with treatment today. He did report feeling improved blood flow with treatment today into his L hand/fingers.       Goal Status:  Pt. will demonstrate/verbalize independence in self-management of condition in : 12 weeks - IMPROVING TOWARDS GOAL  Pt. will report decreased intensity, frequency of : Pain;in 12 weeks;Comment  Comment:: decrease pain from 6-8/10 to 4-7/10 with ADLs. - IMPROVING TOWARDS GOAL  Pt. will decrease use of medication for pain for improved quality of life in : 12 weeks - SLOW IMPROVEMENT  Pt. will have improved quality of sleep: waking less times/night;getting 75-90% of required amount;in 12 weeks - IMPROVING TOWARDS GOAL  Patient will return to: exercise;leisure;in 12 weeks;Comment  Comment: lifting weights withtout dropping anything with B hands - SLOW PROGRESSION  Patient Turn [place]: for driving;for conversation;with less pain;with less difficulty;in 12 weeks;Comment  Comment: increase C-rot to >50 deg B and T-rot to >40 deg. - HAS NOT SHOWN IMPROVEMENT IN THESE ROM MEASUREMENTS  Patient will reach / maintain arm movement: forward;overhead;behind;for home chores;for dressing;with less pain;with less difficulty;in 12 weeks - SHOWING IMPROVEMENT   Patient will decrease : NDI score;by _ points;for improved quality of function;for  improved quality of life;in 12 weeks  by ___ points: 15 -  NOT TESTED        Plan / Patient Education:     Plan to con't with manual therapy to decrease fascial tension/tone to normalize ROM/decrease inflammation/decrease mm tone and improve proprioception.      Subjective:     Pain Ratin   He did feel like the last Rx got at some areas that were troublesome. He does seem to have difficulty with straightening up his fingers/hands.   The central areas of pain are near the clavicle/first rib especially with neck rotation (R>L).    Pt 20' late for appt. He states there was bad traffic today    Objective:     MS, art fascial tensions.      Treatment Today   2019   TREATMENT MINUTES COMMENTS   Evaluation     Self-care/ Home management     Manual therapy 25 Fascial release using Strain-Counterstrain of eye myochain-MS, DCFP/A-MS, lower cervical/scap-Art   Neuromuscular Re-education 15 Recip inhib of MS, art fascia   Therapeutic Activity     Therapeutic Exercises     Gait training     Modality__________________                Total 40    Blank areas are intentional and mean the treatment did not include these items.       Jb Ochoa  2019

## 2021-05-28 NOTE — TELEPHONE ENCOUNTER
Called for refill of Oxycodone.  Wants Dr to know th Ketamine is not effective. Will need other refills next week, but will call for those.

## 2021-05-28 NOTE — PROGRESS NOTES
[facility] Daily Progress     Patient Name: Bola Lyon  Date: 5/7/2019  Visit #: 20  PTA visit #:       Referral Diagnosis: Chronic pain      Referring provider: Dmitriy Cramer*     Visit Diagnosis:     ICD-10-CM    1. Chronic right shoulder pain M25.511     G89.29    2. Cervicalgia M54.2    3. Cervical radiculitis M54.12    4. Myofascial pain M79.18          Assessment:     Patient is benefitting from skilled physical therapy and is making steady progress toward functional goals.  Patient is appropriate to continue with skilled physical therapy intervention, as indicated by initial plan of care.  He did have good release of fascial tensions with treatment today. His ROM was much worse than it has been in previous visits - possibly due to decreasing his pain meds but this should improve as his body regulates and gets used to less narcotics on board.     Goal Status:  Pt. will demonstrate/verbalize independence in self-management of condition in : 12 weeks - IMPROVING TOWARDS GOAL  Pt. will report decreased intensity, frequency of : Pain;in 12 weeks;Comment  Comment:: decrease pain from 6-8/10 to 4-7/10 with ADLs. - IMPROVING TOWARDS GOAL  Pt. will decrease use of medication for pain for improved quality of life in : 12 weeks - SLOW IMPROVEMENT  Pt. will have improved quality of sleep: waking less times/night;getting 75-90% of required amount;in 12 weeks - IMPROVING TOWARDS GOAL  Patient will return to: exercise;leisure;in 12 weeks;Comment  Comment: lifting weights withtout dropping anything with B hands - SLOW PROGRESSION  Patient Turn [place]: for driving;for conversation;with less pain;with less difficulty;in 12 weeks;Comment  Comment: increase C-rot to >50 deg B and T-rot to >40 deg. - HAS NOT SHOWN IMPROVEMENT IN THESE ROM MEASUREMENTS  Patient will reach / maintain arm movement: forward;overhead;behind;for home chores;for dressing;with less pain;with less difficulty;in 12 weeks - SHOWING  IMPROVEMENT   Patient will decrease : NDI score;by _ points;for improved quality of function;for improved quality of life;in 12 weeks  by ___ points: 15 -  NOT TESTED        Plan / Patient Education:     Plan to con't with manual therapy to decrease fascial tension/tone to normalize ROM/decrease inflammation/decrease mm tone and improve proprioception.      Subjective:     Pain Ratin   He is still reducing his meds periodically. It is tough to go down to 20 mg from 30 mg of Oxycontin. He is also working on adjusting other meds as well with his next visit with Dr. Cramer.   He does feel like his lymphedema has been acting up more on the LLE.   The areas of pain in the arms and upper traps are painful stiff. It is still both but R>L. Neck mobility does struggle as well.     Pt 20' late for appt    Objective:     Neural, art fascial tensions.    C-rot: 1330 deg     Treatment Today   2019   TREATMENT MINUTES COMMENTS   Evaluation     Self-care/ Home management     Manual therapy 25 Fascial release using Strain-Counterstrain of B thoracic pre/cervical post-gang distal gray-N, R upper posterior thoracic-A   Neuromuscular Re-education 15 Recip inhib of neural, art fascia   Therapeutic Activity     Therapeutic Exercises     Gait training     Modality__________________                Total 40    Blank areas are intentional and mean the treatment did not include these items.       Jb Ochoa  2019

## 2021-05-28 NOTE — PROGRESS NOTES
[facility] Daily Progress     Patient Name: Bola Lyon  Date: 5/10/2019  Visit #: 20  PTA visit #:       Referral Diagnosis: Chronic pain      Referring provider: Dmitriy Cramer*     Visit Diagnosis:     ICD-10-CM    1. Chronic right shoulder pain M25.511     G89.29    2. Cervicalgia M54.2    3. Cervical radiculitis M54.12    4. Myofascial pain M79.18          Assessment:     Patient is benefitting from skilled physical therapy and is making steady progress toward functional goals.  Patient is appropriate to continue with skilled physical therapy intervention, as indicated by initial plan of care.  He did have less guarding overall today from the last Rx. He is improving with his fascial tensions and getting into a proprioceptive routine would be very good for him. .     Goal Status:  Pt. will demonstrate/verbalize independence in self-management of condition in : 12 weeks - IMPROVING TOWARDS GOAL  Pt. will report decreased intensity, frequency of : Pain;in 12 weeks;Comment  Comment:: decrease pain from 6-8/10 to 4-7/10 with ADLs. - IMPROVING TOWARDS GOAL  Pt. will decrease use of medication for pain for improved quality of life in : 12 weeks - SLOW IMPROVEMENT  Pt. will have improved quality of sleep: waking less times/night;getting 75-90% of required amount;in 12 weeks - IMPROVING TOWARDS GOAL  Patient will return to: exercise;leisure;in 12 weeks;Comment  Comment: lifting weights withtout dropping anything with B hands - SLOW PROGRESSION  Patient Turn [place]: for driving;for conversation;with less pain;with less difficulty;in 12 weeks;Comment  Comment: increase C-rot to >50 deg B and T-rot to >40 deg. - HAS NOT SHOWN IMPROVEMENT IN THESE ROM MEASUREMENTS  Patient will reach / maintain arm movement: forward;overhead;behind;for home chores;for dressing;with less pain;with less difficulty;in 12 weeks - SHOWING IMPROVEMENT   Patient will decrease : NDI score;by _ points;for improved quality of  "function;for improved quality of life;in 12 weeks  by ___ points: 15 -  NOT TESTED        Plan / Patient Education:     Plan to con't with manual therapy to decrease fascial tension/tone to normalize ROM/decrease inflammation/decrease mm tone and improve proprioception.      Subjective:     Pain Ratin     This week has been an odd week and has been really tough. \"An outlier week or spike week.\" His mobility is really tough. It did improve after the last Rx session and he has been working on stretching and moving as much as he can.   He has been having \"cold\" nerve pain from his R upper trap down into his scapula.      Pt 15' late for appt    Objective:     Neural, LV fascial tensions.      Treatment Today   5/10/2019   TREATMENT MINUTES COMMENTS   Evaluation     Self-care/ Home management     Manual therapy 25 Fascial release using Strain-Counterstrain of B cervical anterior EPI-LV, SVE-LV cervical, R DS-N, SA-N  MFR: posterior brachial plexus.    Neuromuscular Re-education 15 Recip inhib of neural, LV fascia   Therapeutic Activity     Therapeutic Exercises     Gait training     Modality__________________                Total 40    Blank areas are intentional and mean the treatment did not include these items.       Jb Ochoa  5/10/2019  "

## 2021-05-28 NOTE — PROGRESS NOTES
[facility] Daily Progress     Patient Name: Bola Lyon  Date: 4/30/2019  Visit #: 20  PTA visit #:       Referral Diagnosis: Chronic pain      Referring provider: Dmitriy Cramer*     Visit Diagnosis:     ICD-10-CM    1. Chronic right shoulder pain M25.511     G89.29    2. Cervicalgia M54.2    3. Cervical radiculitis M54.12    4. Myofascial pain M79.18          Assessment:     Patient is benefitting from skilled physical therapy and is making steady progress toward functional goals.  Patient is appropriate to continue with skilled physical therapy intervention, as indicated by initial plan of care.  With work into his spinal vessels it should help to decrease his hypersensitivity and overactive neural response to stimulus.        Goal Status:  Pt. will demonstrate/verbalize independence in self-management of condition in : 12 weeks - IMPROVING TOWARDS GOAL  Pt. will report decreased intensity, frequency of : Pain;in 12 weeks;Comment  Comment:: decrease pain from 6-8/10 to 4-7/10 with ADLs. - IMPROVING TOWARDS GOAL  Pt. will decrease use of medication for pain for improved quality of life in : 12 weeks - SLOW IMPROVEMENT  Pt. will have improved quality of sleep: waking less times/night;getting 75-90% of required amount;in 12 weeks - IMPROVING TOWARDS GOAL  Patient will return to: exercise;leisure;in 12 weeks;Comment  Comment: lifting weights withtout dropping anything with B hands - SLOW PROGRESSION  Patient Turn [place]: for driving;for conversation;with less pain;with less difficulty;in 12 weeks;Comment  Comment: increase C-rot to >50 deg B and T-rot to >40 deg. - HAS NOT SHOWN IMPROVEMENT IN THESE ROM MEASUREMENTS  Patient will reach / maintain arm movement: forward;overhead;behind;for home chores;for dressing;with less pain;with less difficulty;in 12 weeks - SHOWING IMPROVEMENT   Patient will decrease : NDI score;by _ points;for improved quality of function;for improved quality of life;in 12  weeks  by ___ points: 15 -  NOT TESTED        Plan / Patient Education:     Plan to con't with manual therapy to decrease fascial tension/tone to normalize ROM/decrease inflammation/decrease mm tone and improve proprioception.      Subjective:     Pain Ratin   His pain has been pretty bad through the weekend. He is still reducing his OxyContin and has been relying more on the Ketamine. He feels like this change has contributed to his increased pain levels. He is also increasing his medicinal marijuana more as well which he is mostly only able to use at night to not use it while he is driving.     Pt 20' late for appt    Objective:     LV, art fascial tensions.      Treatment Today   2019   TREATMENT MINUTES COMMENTS   Evaluation     Self-care/ Home management     Manual therapy 25 Fascial release using Strain-Counterstrain of B cervical MED-A, B cervical spinal F/E-A, B cervical MED-LV   Neuromuscular Re-education 15 Recip inhib of LV, art fascia   Therapeutic Activity     Therapeutic Exercises     Gait training     Modality__________________                Total 40    Blank areas are intentional and mean the treatment did not include these items.       Jb Ochoa  2019

## 2021-05-28 NOTE — TELEPHONE ENCOUNTER
Central PA team  [phone]  Pool: HE PA MED ([id number])          PA has been initiated.       PA form completed and faxed insurance via Cover My Meds     Key:  [id number]     Medication:  OxyCONTIN 10MG OR T12A    Insurance:  [employer]        Response will be received via fax and may take up to 5-10 business days depending on plan

## 2021-05-28 NOTE — PROGRESS NOTES
[facility] Daily Progress     Patient Name: Bola Loyn  Date: 4/24/2019  Visit #: 19  PTA visit #:       Referral Diagnosis: Chronic pain      Referring provider: Dmitriy Cramer*     Visit Diagnosis:     ICD-10-CM    1. Chronic right shoulder pain M25.511     G89.29    2. Cervicalgia M54.2    3. Cervical radiculitis M54.12    4. Myofascial pain M79.18          Assessment:     Patient is benefitting from skilled physical therapy and is making steady progress toward functional goals.  Patient is appropriate to continue with skilled physical therapy intervention, as indicated by initial plan of care.  There was good release of fascial tensions with treatment today. He did report feeling improved blood flow with treatment today into his L hand/fingers.       Goal Status:  Pt. will demonstrate/verbalize independence in self-management of condition in : 12 weeks - IMPROVING TOWARDS GOAL  Pt. will report decreased intensity, frequency of : Pain;in 12 weeks;Comment  Comment:: decrease pain from 6-8/10 to 4-7/10 with ADLs. - IMPROVING TOWARDS GOAL  Pt. will decrease use of medication for pain for improved quality of life in : 12 weeks - SLOW IMPROVEMENT  Pt. will have improved quality of sleep: waking less times/night;getting 75-90% of required amount;in 12 weeks - IMPROVING TOWARDS GOAL  Patient will return to: exercise;leisure;in 12 weeks;Comment  Comment: lifting weights withtout dropping anything with B hands - SLOW PROGRESSION  Patient Turn [place]: for driving;for conversation;with less pain;with less difficulty;in 12 weeks;Comment  Comment: increase C-rot to >50 deg B and T-rot to >40 deg. - HAS NOT SHOWN IMPROVEMENT IN THESE ROM MEASUREMENTS  Patient will reach / maintain arm movement: forward;overhead;behind;for home chores;for dressing;with less pain;with less difficulty;in 12 weeks - SHOWING IMPROVEMENT   Patient will decrease : NDI score;by _ points;for improved quality of function;for  "improved quality of life;in 12 weeks  by ___ points: 15 -  NOT TESTED        Plan / Patient Education:     Plan to con't with manual therapy to decrease fascial tension/tone to normalize ROM/decrease inflammation/decrease mm tone and improve proprioception.      Subjective:     Pain Ratin   He is still feeling like he is feeling good improvement overall. There is continuing to be less \"posing\" with his mm activation.   The work on the hand last week was really \"rejuvenating\" and feels the difference between the right hand to the left hand.     Pt 15' late for appt.     Objective:     MS fascial tensions.      Treatment Today   2019   TREATMENT MINUTES COMMENTS   Evaluation     Self-care/ Home management     Manual therapy 30 Fascial release using Strain-Counterstrain of LUE/wrist/hand (P)-MS, UET3-MS L   Neuromuscular Re-education 15 Recip inhib of MS fascia   Therapeutic Activity     Therapeutic Exercises     Gait training     Modality__________________                Total 45    Blank areas are intentional and mean the treatment did not include these items.       Jb Ochoa  2019  "

## 2021-05-28 NOTE — TELEPHONE ENCOUNTER
Controlled Substance Refill Request  Medication Name:   Requested Prescriptions     Pending Prescriptions Disp Refills     dextroamphetamine-amphetamine (ADDERALL) 30 mg Tab 30 tablet 0     Sig: Take 30 mg by mouth daily.     Date Last Fill: 3/25/19  Pharmacy: [facility] [address]      Submit electronically to pharmacy  Controlled Substance Agreement Date Scanned:   Encounter-Level CSA Scan Date - 08/21/2017:    Scan on 8/24/2017  7:50 AM (below)    Scan on 8/23/2017  7:29 AM (below)             Encounter-Level CSA Scan Date - 06/27/2016:    Scan on 6/30/2016  8:08 AM (below)         Last office visit with prescriber/PCP: 4/4/2019 Devon Lund MD OR same dept: 4/4/2019 Devon Lund MD OR same specialty: 4/4/2019 Devon Lund MD  Last physical: Visit date not found Last MTM visit: Visit date not found      Patient reports he has 1 day left of medication.  Please send a new prescription asap!

## 2021-05-28 NOTE — PROGRESS NOTES
[facility] Daily Progress     Patient Name: Bola Lyon  Date: 5/17/2019  Visit #: 22  PTA visit #:       Referral Diagnosis: Chronic pain      Referring provider: Dmitriy Cramer*     Visit Diagnosis:     ICD-10-CM    1. Chronic right shoulder pain M25.511     G89.29    2. Cervicalgia M54.2    3. Cervical radiculitis M54.12    4. Myofascial pain M79.18          Assessment:     Patient is benefitting from skilled physical therapy and is making steady progress toward functional goals.  Patient is appropriate to continue with skilled physical therapy intervention, as indicated by initial plan of care.  He did feel a lot better after treatment today. There was god release of spinal artery tension as well as distal gray rami of his sympathetic chain.     Goal Status:  Pt. will demonstrate/verbalize independence in self-management of condition in : 12 weeks - IMPROVING TOWARDS GOAL  Pt. will report decreased intensity, frequency of : Pain;in 12 weeks;Comment  Comment:: decrease pain from 6-8/10 to 4-7/10 with ADLs. - IMPROVING TOWARDS GOAL  Pt. will decrease use of medication for pain for improved quality of life in : 12 weeks - SLOW IMPROVEMENT  Pt. will have improved quality of sleep: waking less times/night;getting 75-90% of required amount;in 12 weeks - IMPROVING TOWARDS GOAL  Patient will return to: exercise;leisure;in 12 weeks;Comment  Comment: lifting weights withtout dropping anything with B hands - SLOW PROGRESSION  Patient Turn [place]: for driving;for conversation;with less pain;with less difficulty;in 12 weeks;Comment  Comment: increase C-rot to >50 deg B and T-rot to >40 deg. - HAS NOT SHOWN IMPROVEMENT IN THESE ROM MEASUREMENTS  Patient will reach / maintain arm movement: forward;overhead;behind;for home chores;for dressing;with less pain;with less difficulty;in 12 weeks - SHOWING IMPROVEMENT   Patient will decrease : NDI score;by _ points;for improved quality of function;for improved  quality of life;in 12 weeks  by ___ points: 15 -  NOT TESTED        Plan / Patient Education:     Plan to con't with manual therapy to decrease fascial tension/tone to normalize ROM/decrease inflammation/decrease mm tone and improve proprioception.      Subjective:     Pain Rating: low 6 at best and upper 6 at worst prior to the last 2 weeks. Now it is 7 at best to a low 8 in the last 2 weeks for pain range. .   He is still having problems with the same areas he has for a while.   He was told that when he sleeps he does have a RUE/RLE muscle movement which is quite weird.     Pt 30' late for appt    Objective:     LV, art fascial tensions.      Treatment Today   5/17/2019   TREATMENT MINUTES COMMENTS   Evaluation     Self-care/ Home management     Manual therapy 25 Fascial release using Strain-Counterstrain of TC-A R, B anterior cranial-A, B cranial/cervical-LV   Neuromuscular Re-education     Therapeutic Activity     Therapeutic Exercises     Gait training     Modality__________________                Total 25    Blank areas are intentional and mean the treatment did not include these items.       Jb Ochoa  5/17/2019

## 2021-05-29 ENCOUNTER — RECORDS - HEALTHEAST (OUTPATIENT)
Dept: ADMINISTRATIVE | Facility: CLINIC | Age: 51
End: 2021-05-29

## 2021-05-29 NOTE — TELEPHONE ENCOUNTER
Controlled Substance Refill Request  Medication Name:   Requested Prescriptions     Pending Prescriptions Disp Refills     dextroamphetamine-amphetamine (ADDERALL) 30 mg Tab 30 tablet 0     Sig: Take 30 mg by mouth daily.     Date Last Fill: 05/23/2019  Pharmacy: [facility] [place]      Submit electronically to pharmacy  Controlled Substance Agreement Date Scanned:   Encounter-Level CSA Scan Date - 08/21/2017:    Scan on 8/24/2017  7:50 AM (below)    Scan on 8/23/2017  7:29 AM (below)             Encounter-Level CSA Scan Date - 06/27/2016:    Scan on 6/30/2016  8:08 AM (below)         Last office visit with prescriber/PCP: 4/4/2019 Devon Lund MD OR same dept: 4/4/2019 Devon Lund MD OR same specialty: 4/4/2019 Devon Lund MD  Last physical: Visit date not found Last MTM visit: Visit date not found

## 2021-05-29 NOTE — TELEPHONE ENCOUNTER
RN cannot approve Refill Request    RN can NOT refill this medication med is not covered by policy/route to provider.    Jb Sanchez, [facility] Triage/Med Refill 6/18/2019    Requested Prescriptions   Pending Prescriptions Disp Refills     nabumetone (RELAFEN) 500 MG tablet [Pharmacy Med Name: NABUMETONE 500MG    TAB] 120 tablet 1     Sig: TAKE 1 TABLET BY MOUTH 4 TIMES DAILY       There is no refill protocol information for this order

## 2021-05-29 NOTE — PROGRESS NOTES
[facility] Certification Request    June 4, 2019      Patient: Bola Lyon  MR Number: [id number]  YOB: 1970  Date of Visit: 6/4/2019      Dear Dmitriy Kirk*:    Thank you for this referral.   We are seeing Bola Lyon for Physical Therapy of chronic pain.    Medicare and/or Medicaid requires physician review and approval of the treatment plan. Please review the plan of care and verify that you agree with the therapy plan of care by co-signing this note.      Plan of Care  Authorization / Certification Start Date: 06/04/19  Authorization / Certification End Date: 09/03/19  Communication with: Referral Source  Patient Related Instruction: Nature of Condition;Treatment plan and rationale;Self Care instruction;Basis of treatment;Body mechanics;Posture;Precautions;Next steps;Expected outcome  Times per Week: 2  Number of Weeks: 12  Number of Visits: 20  Discharge Planning: dischareg with [facility] for self-management of sx.   Therapeutic Exercise: ROM;Strengthening;Stretching  Neuromuscular Reeducation: kinesio tape;posture;balance/proprioception;TNE;core  Manual Therapy: soft tissue mobilization;myofascial release;joint mobilization;muscle energy;strain counterstrain        Goal Status:  Pt. will demonstrate/verbalize independence in self-management of condition in : 12 weeks - IMPROVING TOWARDS GOAL  Pt. will report decreased intensity, frequency of : Pain;in 12 weeks;Comment  Comment:: decrease pain from 6-8/10 to 4-7/10 with ADLs. - IMPROVING TOWARDS GOAL  Pt. will decrease use of medication for pain for improved quality of life in : 12 weeks - SLOW IMPROVEMENT  Pt. will have improved quality of sleep: waking less times/night;getting 75-90% of required amount;in 12 weeks - IMPROVING TOWARDS GOAL  Patient will return to: exercise;leisure;in 12 weeks;Comment  Comment: lifting weights withtout dropping anything with B hands - SLOW PROGRESSION  Patient [place]: for  driving;for conversation;with less pain;with less difficulty;in 12 weeks;Comment  Comment: increase C-rot to >50 deg B and T-rot to >40 deg. - HAS NOT SHOWN IMPROVEMENT IN THESE ROM MEASUREMENTS  Patient will reach / maintain arm movement: forward;overhead;behind;for home chores;for dressing;with less pain;with less difficulty;in 12 weeks - SHOWING IMPROVEMENT   Patient will decrease : NDI score;by _ points;for improved quality of function;for improved quality of life;in 12 weeks  by ___ points: 15 -  NOT TESTED          If you have any questions or concerns, please don't hesitate to call.    Sincerely,      Jb Ochoa PT, ATC        Physician recommendation:     ___ Follow therapist's recommendation        ___ Modify therapy      *Physician co-signature indicates they certify the need for these services furnished within this plan and while under their care.       [facility] Daily Progress     Patient Name: Bola Lyon  Date: 6/4/2019  Visit #: 26  PTA visit #:       Referral Diagnosis: Chronic pain      Referring provider: Dmitriy Cramer*     Visit Diagnosis:     ICD-10-CM    1. Chronic right shoulder pain M25.511     G89.29    2. Cervicalgia M54.2    3. Cervical radiculitis M54.12    4. Myofascial pain M79.18          Assessment:     Patient is benefitting from skilled physical therapy and is making steady progress toward functional goals.  Patient is appropriate to continue with skilled physical therapy intervention, as indicated by initial plan of care.  He does show improvement overall but it is slow which is to be expected with his history of chronic pain as well as his decreasing his pain meds.     Goal Status:  Pt. will demonstrate/verbalize independence in self-management of condition in : 12 weeks - IMPROVING TOWARDS GOAL  Pt. will report decreased intensity, frequency of : Pain;in 12 weeks;Comment  Comment:: decrease pain from 6-8/10 to 4-7/10 with ADLs. - IMPROVING TOWARDS  "GOAL  Pt. will decrease use of medication for pain for improved quality of life in : 12 weeks - SLOW IMPROVEMENT  Pt. will have improved quality of sleep: waking less times/night;getting 75-90% of required amount;in 12 weeks - IMPROVING TOWARDS GOAL  Patient will return to: exercise;leisure;in 12 weeks;Comment  Comment: lifting weights withtout dropping anything with B hands - SLOW PROGRESSION  Patient Turn [place]: for driving;for conversation;with less pain;with less difficulty;in 12 weeks;Comment  Comment: increase C-rot to >50 deg B and T-rot to >40 deg. - HAS NOT SHOWN IMPROVEMENT IN THESE ROM MEASUREMENTS  Patient will reach / maintain arm movement: forward;overhead;behind;for home chores;for dressing;with less pain;with less difficulty;in 12 weeks - SHOWING IMPROVEMENT   Patient will decrease : NDI score;by _ points;for improved quality of function;for improved quality of life;in 12 weeks  by ___ points: 15 -  NOT TESTED        Plan / Patient Education:     Plan to con't with manual therapy to decrease fascial tension/tone to normalize ROM/decrease inflammation/decrease mm tone and improve proprioception.      Subjective:     Pain Ratin-8 pain range in the last week. This was more improved prior to recent stressors.   He really does feel like treatment thus far has been helpful during his reduction of his meds (they are getting closer to being cut in half since starting PT). He has been really stressed lately with changes in insurance, mental health, etc which has caused his pain levels to be a little higher. Prior to this he was really doing much better with sleep patterns, etc.   He hasn't had any of the Oxycodone since last Thursday (today is Tuesday).   He is not having the \"\" posing/mm spasms like he was when first starting PT.   With treatments he does have a good couple days with less pain and more function.     Pt 20' late for appt    Objective:     Neural, MS fascial tensions.    C-rot: " 30/30 (35/45 at eval - was measured at 34/50 on 4/12/19)  T-rot: 40/41 (27/40 at eval)  C-flex: 20 (5 at eval)  C-ext: 42 (25 at eval)    Treatment Today   6/4/2019   TREATMENT MINUTES COMMENTS   Evaluation     Self-care/ Home management     Manual therapy 25 Fascial release using Strain-Counterstrain of B rib outflare (P)-MS, B brachial plexus-N, B DCFA/P-MS   Neuromuscular Re-education     Therapeutic Activity     Therapeutic Exercises 15 AA/PROM to C-T spine, UE's ribs,    Gait training     Modality__________________                Total 40    Blank areas are intentional and mean the treatment did not include these items.       Jb Ochoa  6/4/2019

## 2021-05-29 NOTE — TELEPHONE ENCOUNTER
Refill Approved    Rx renewed per Medication Renewal Policy. Medication was last renewed on 11/23/18, last OV 4/4/19.    Rasheeda Hull, [facility] Triage/Med Refill 5/26/2019     Requested Prescriptions   Pending Prescriptions Disp Refills     fluticasone propionate (FLONASE) 50 mcg/actuation nasal spray [Pharmacy Med Name: FLUTICASONE 50MCG   SPR]  5     Sig: USE 2 SPRAY(S) IN EACH NOSTRIL ONCE DAILY       Nasal Steroid Refill Protocol Passed - 5/26/2019 10:06 AM        Passed - Patient has had office visit/physical in last 2 years     Last office visit with prescriber/PCP: 10/19/2018 OR same dept: 4/4/2019 Devon Lund MD OR same specialty: 4/4/2019 Devon Lund MD Last physical: Visit date not found Last MTM visit: Visit date not found    Next appt within 3 mo: Visit date not found  Next physical within 3 mo: Visit date not found  Prescriber OR PCP: Abena Gentile MD  Last diagnosis associated with med order: 1. Allergy  - fluticasone propionate (FLONASE) 50 mcg/actuation nasal spray [Pharmacy Med Name: FLUTICASONE 50MCG   SPR]; USE 2 SPRAY(S) IN EACH NOSTRIL ONCE DAILY.; Refill: 5     If protocol passes may refill for 12 months if within 3 months of last provider visit (or a total of 15 months).

## 2021-05-29 NOTE — TELEPHONE ENCOUNTER
Prior Authorization Request  Who s requesting:  Dmitriy Cramer MD/ Becca Figueroa CMA  Pharmacy Name and Location: [facility]  Medication Name: Oxycodone 10mg  Insurance Plan: [place] Medicaid  Insurance Member ID Number:  [id number]  Informed patient that prior authorizations can take up to 10 business days for response:   Yes  Okay to leave a detailed message: Yes
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
## 2021-05-29 NOTE — PROGRESS NOTES
Attempt 1: Care Guide called patient.  If this patient is returning my call, please transfer to [facility] at ext [phone].

## 2021-05-29 NOTE — PROGRESS NOTES
[facility] Daily Progress     Patient Name: Bola Lyon  Date: 5/28/2019  Visit #: 25  PTA visit #:       Referral Diagnosis: Chronic pain      Referring provider: Dmitriy Cramer*     Visit Diagnosis:     ICD-10-CM    1. Chronic right shoulder pain M25.511     G89.29    2. Cervicalgia M54.2    3. Cervical radiculitis M54.12    4. Myofascial pain M79.18          Assessment:     Patient is benefitting from skilled physical therapy and is making steady progress toward functional goals.  Patient is appropriate to continue with skilled physical therapy intervention, as indicated by initial plan of care.  There was good release of dural tensions with treatmnet today.     Goal Status:  Pt. will demonstrate/verbalize independence in self-management of condition in : 12 weeks - IMPROVING TOWARDS GOAL  Pt. will report decreased intensity, frequency of : Pain;in 12 weeks;Comment  Comment:: decrease pain from 6-8/10 to 4-7/10 with ADLs. - IMPROVING TOWARDS GOAL  Pt. will decrease use of medication for pain for improved quality of life in : 12 weeks - SLOW IMPROVEMENT  Pt. will have improved quality of sleep: waking less times/night;getting 75-90% of required amount;in 12 weeks - IMPROVING TOWARDS GOAL  Patient will return to: exercise;leisure;in 12 weeks;Comment  Comment: lifting weights withtout dropping anything with B hands - SLOW PROGRESSION  Patient Turn Head: for driving;for conversation;with less pain;with less difficulty;in 12 weeks;Comment  Comment: increase C-rot to >50 deg B and T-rot to >40 deg. - HAS NOT SHOWN IMPROVEMENT IN THESE ROM MEASUREMENTS  Patient will reach / maintain arm movement: forward;overhead;behind;for home chores;for dressing;with less pain;with less difficulty;in 12 weeks - SHOWING IMPROVEMENT   Patient will decrease : NDI score;by _ points;for improved quality of function;for improved quality of life;in 12 weeks  by ___ points: 15 -  NOT TESTED        Plan / Patient  "Education:     Plan to con't with manual therapy to decrease fascial tension/tone to normalize ROM/decrease inflammation/decrease mm tone and improve proprioception.      Subjective:     Pain Ratin today    He hasn't been sleeping much lately. He feels like it is a dual effect of his pain and the mental issues. He has been out of Ketamine as well and is up to get a new Rx today.   He has still been really stressed with this insurance issues he is having and all the running around.   There are some \"pins and needles\" into both arms and up into his pecs.     Pt 30' late for appt    Objective:     Neural fascial tensions.        Treatment Today   2019   TREATMENT MINUTES COMMENTS   Evaluation     Self-care/ Home management     Manual therapy 25 Fascial release using Strain-Counterstrain of cranial dura-N, OM   Neuromuscular Re-education     Therapeutic Activity     Therapeutic Exercises     Gait training     Modality__________________                Total 25    Blank areas are intentional and mean the treatment did not include these items.       Jb Ochoa  2019  "

## 2021-05-29 NOTE — PROGRESS NOTES
Received vm from patient:     has a new insurance with premium being taken out.  He was given social security disability for his TBI so [employer] will be helping with insurance social security disability.     Patient states he will follow up next week.    Next outreach: 6/11/19

## 2021-05-29 NOTE — TELEPHONE ENCOUNTER
Who is calling:  Patient  Reason for Call:  He has a new insurance but the [employer] is still the HMO.  He was given social security disability for his TBI so [employer] will be helping with insurance social security disability.   He then wanted to check his appointment date.  Next he was asking to be transferred to [facility].  No follow up needed.  FYI  Date of last appointment with primary care: NA  Okay to leave a detailed message: No call back needed.       
Spontaneous, unlabored and symmetrical

## 2021-05-29 NOTE — PROGRESS NOTES
[facility] Daily Progress     Patient Name: Bola Lyon  Date: 5/24/2019  Visit #: 24  PTA visit #:       Referral Diagnosis: Chronic pain      Referring provider: Devon Lund MD     Visit Diagnosis:     ICD-10-CM    1. Chronic right shoulder pain M25.511     G89.29    2. Cervicalgia M54.2    3. Cervical radiculitis M54.12    4. Myofascial pain M79.18          Assessment:     Patient is benefitting from skilled physical therapy and is making steady progress toward functional goals.  Patient is appropriate to continue with skilled physical therapy intervention, as indicated by initial plan of care.  He is overall spasming less but there is still the same distribution of pain. There was good release of fascial tensions with treatment.     Goal Status:  Pt. will demonstrate/verbalize independence in self-management of condition in : 12 weeks - IMPROVING TOWARDS GOAL  Pt. will report decreased intensity, frequency of : Pain;in 12 weeks;Comment  Comment:: decrease pain from 6-8/10 to 4-7/10 with ADLs. - IMPROVING TOWARDS GOAL  Pt. will decrease use of medication for pain for improved quality of life in : 12 weeks - SLOW IMPROVEMENT  Pt. will have improved quality of sleep: waking less times/night;getting 75-90% of required amount;in 12 weeks - IMPROVING TOWARDS GOAL  Patient will return to: exercise;leisure;in 12 weeks;Comment  Comment: lifting weights withtout dropping anything with B hands - SLOW PROGRESSION  Patient Turn Head: for driving;for conversation;with less pain;with less difficulty;in 12 weeks;Comment  Comment: increase C-rot to >50 deg B and T-rot to >40 deg. - HAS NOT SHOWN IMPROVEMENT IN THESE ROM MEASUREMENTS  Patient will reach / maintain arm movement: forward;overhead;behind;for home chores;for dressing;with less pain;with less difficulty;in 12 weeks - SHOWING IMPROVEMENT   Patient will decrease : NDI score;by _ points;for improved quality of function;for improved quality of  life;in 12 weeks  by ___ points: 15 -  NOT TESTED        Plan / Patient Education:     Plan to con't with manual therapy to decrease fascial tension/tone to normalize ROM/decrease inflammation/decrease mm tone and improve proprioception.      Subjective:     Pain Ratin today    He did get a letter from social security that his insurance is going to change how it is done but it shouldn't change his coverage.       Pt 20' late for appt    Objective:     Neural, art fascial tensions.        Treatment Today   2019   TREATMENT MINUTES COMMENTS   Evaluation     Self-care/ Home management     Manual therapy 40 Fascial release using Strain-Counterstrain of LISA-MARSHAL, B cervical MED-A,, B cervical SAF-A   Neuromuscular Re-education     Therapeutic Activity     Therapeutic Exercises     Gait training     Modality__________________                Total 40    Blank areas are intentional and mean the treatment did not include these items.       Jb Ochoa  2019

## 2021-05-29 NOTE — PROGRESS NOTES
Pt currently at PT appt. CHW will reach out to patient at next available day. MyChart and phone call communication have gone unanswered as of late.

## 2021-05-29 NOTE — PROGRESS NOTES
Unable to reach patient, patient is in an appt currently. Patient will be contact within one week.

## 2021-05-29 NOTE — PROGRESS NOTES
[facility] Daily Progress     Patient Name: Bola Lyon  Date: 6/21/2019  Visit #: 28  PTA visit #:       Referral Diagnosis: Chronic pain      Referring provider: Dmitriy Cramer*     Visit Diagnosis:     ICD-10-CM    1. Chronic right shoulder pain M25.511     G89.29    2. Cervicalgia M54.2    3. Cervical radiculitis M54.12    4. Myofascial pain M79.18          Assessment:     Patient is benefitting from skilled physical therapy and is making steady progress toward functional goals.  Patient is appropriate to continue with skilled physical therapy intervention, as indicated by initial plan of care.  He does feel better after treatments. He did have good release of fascial tensions treated today.     Goal Status:  Pt. will demonstrate/verbalize independence in self-management of condition in : 12 weeks - IMPROVING TOWARDS GOAL  Pt. will report decreased intensity, frequency of : Pain;in 12 weeks;Comment  Comment:: decrease pain from 6-8/10 to 4-7/10 with ADLs. - IMPROVING TOWARDS GOAL  Pt. will decrease use of medication for pain for improved quality of life in : 12 weeks - SLOW IMPROVEMENT  Pt. will have improved quality of sleep: waking less times/night;getting 75-90% of required amount;in 12 weeks - IMPROVING TOWARDS GOAL  Patient will return to: exercise;leisure;in 12 weeks;Comment  Comment: lifting weights withtout dropping anything with B hands - SLOW PROGRESSION  Patient Turn Head: for driving;for conversation;with less pain;with less difficulty;in 12 weeks;Comment  Comment: increase C-rot to >50 deg B and T-rot to >40 deg. - HAS NOT SHOWN IMPROVEMENT IN THESE ROM MEASUREMENTS  Patient will reach / maintain arm movement: forward;overhead;behind;for home chores;for dressing;with less pain;with less difficulty;in 12 weeks - SHOWING IMPROVEMENT   Patient will decrease : NDI score;by _ points;for improved quality of function;for improved quality of life;in 12 weeks  by ___ points: 15 -   NOT TESTED        Plan / Patient Education:     Plan to con't with manual therapy to decrease fascial tension/tone to normalize ROM/decrease inflammation/decrease mm tone and improve proprioception.      Subjective:     Pain Ratin-8 pain range .  He did see Dr. Cramer. He is back on a good regimen of his pain medicine management that was lacking.   He did see his Psychiatrist as well and he thinks that he is under a manic episode right now. This is causing him to be late. They did change his meds and hopefully this will help with his time management.     Pt 25' late for appt    Objective:     LV, MS, art fascial tensions.      Treatment Today   2019   TREATMENT MINUTES COMMENTS   Evaluation     Self-care/ Home management     Manual therapy 30 Fascial release using Strain-Counterstrain of R arterial row-A, R humerus (P)-MS, R AINT-LV   Neuromuscular Re-education     Therapeutic Activity     Therapeutic Exercises     Gait training     Modality__________________                Total 30    Blank areas are intentional and mean the treatment did not include these items.       Jb Ochoa  2019

## 2021-05-29 NOTE — PATIENT INSTRUCTIONS - HE
PLAN:    Oxycontin 10 mg twice a day, to end of June    Oxycodone 5 mg two morning, one mid-day, two bedtime to end of June    Ketamine change to 120 mg lozenge, one-half lozenge up to 11 times in a day, #120 will be at least 22 day supply      Call for refills before end of June    Return in 8 weeks

## 2021-05-29 NOTE — TELEPHONE ENCOUNTER
The patient called and left a 5 min long message stating he Is trying to get back on track, saw a psychiatrist who adjusted his manohar stablizer meds. He is out of ketamine and was told he cant fill them now. Wants a call to [facility] to give ok to fill ketamine now

## 2021-05-29 NOTE — PROGRESS NOTES
[facility] Daily Progress     Patient Name: Bola Lyon  Date: 5/21/2019  Visit #: 23  PTA visit #:       Referral Diagnosis: Chronic pain      Referring provider: Dmitriy Cramer*     Visit Diagnosis:     ICD-10-CM    1. Chronic right shoulder pain M25.511     G89.29    2. Cervicalgia M54.2    3. Cervical radiculitis M54.12    4. Myofascial pain M79.18          Assessment:     Patient is benefitting from skilled physical therapy and is making steady progress toward functional goals.  Patient is appropriate to continue with skilled physical therapy intervention, as indicated by initial plan of care.  His ROM is much improved from the last visit we checked this.   He continues to be late for appointments which does affect our overall treatments due to his complexity.    I do wonder if he would benefit from work with Andie ([facility]) for PNE to help him understand his chronic pain journey. I can begin this work but it may be better for him to receive this elsewhere so we can continue to manual work.     Goal Status:  Pt. will demonstrate/verbalize independence in self-management of condition in : 12 weeks - IMPROVING TOWARDS GOAL  Pt. will report decreased intensity, frequency of : Pain;in 12 weeks;Comment  Comment:: decrease pain from 6-8/10 to 4-7/10 with ADLs. - IMPROVING TOWARDS GOAL  Pt. will decrease use of medication for pain for improved quality of life in : 12 weeks - SLOW IMPROVEMENT  Pt. will have improved quality of sleep: waking less times/night;getting 75-90% of required amount;in 12 weeks - IMPROVING TOWARDS GOAL  Patient will return to: exercise;leisure;in 12 weeks;Comment  Comment: lifting weights withtout dropping anything with B hands - SLOW PROGRESSION  Patient Turn Head: for driving;for conversation;with less pain;with less difficulty;in 12 weeks;Comment  Comment: increase C-rot to >50 deg B and T-rot to >40 deg. - HAS NOT SHOWN IMPROVEMENT IN THESE ROM MEASUREMENTS  Patient will  "reach / maintain arm movement: forward;overhead;behind;for home chores;for dressing;with less pain;with less difficulty;in 12 weeks - SHOWING IMPROVEMENT   Patient will decrease : NDI score;by _ points;for improved quality of function;for improved quality of life;in 12 weeks  by ___ points: 15 -  NOT TESTED        Plan / Patient Education:     Plan to con't with manual therapy to decrease fascial tension/tone to normalize ROM/decrease inflammation/decrease mm tone and improve proprioception.      Subjective:     Pain Ratin today    He sees Dr. Cramer coming up this week.   He is still having some bilateral hand/arm feelings. He does see improvement after having treatment and it does seem to be maintaining and isn't worsening like it was for a while.   He is still working out/stretching at home everyday.   The nighttime \"episodes\" he was talking about before he is still doing per his roommates.   The sensations are still \"icy\" and not the \"stinger-jaime\" feeling he was having before.     Pt 20' late for appt    Objective:     Neural, art fascial tensions.    C-rot:     Treatment Today   2019   TREATMENT MINUTES COMMENTS   Evaluation     Self-care/ Home management     Manual therapy 40 Fascial release using Strain-Counterstrain of B cervical gray-N, PANDAE-Art   Neuromuscular Re-education     Therapeutic Activity     Therapeutic Exercises     Gait training     Modality__________________                Total 40    Blank areas are intentional and mean the treatment did not include these items.       Jb Ochoa  2019  "

## 2021-05-29 NOTE — TELEPHONE ENCOUNTER
Refill Approved    Rx renewed per Medication Renewal Policy. Medication was last renewed on 5/25/2018 for 90/5.  Last OV 4/4/2019  New PCP Dr. Ashely CARROLL [facility] Connection Triage/Med Refill 6/14/2019     Requested Prescriptions   Pending Prescriptions Disp Refills     losartan (COZAAR) 100 MG tablet [Pharmacy Med Name: LOSARTAN 100MG   TAB] 30 tablet 17     Sig: TAKE 1 TABLET BY MOUTH ONCE DAILY       Angiotensin Receptor Blocker Protocol Passed - 6/13/2019  1:11 PM        Passed - PCP or prescribing provider visit in past 12 months       Last office visit with prescriber/PCP: 10/19/2018 Abena Gentile MD OR same dept: 4/4/2019 Devon Lund MD OR same specialty: 4/4/2019 Devon Lund MD  Last physical: 3/6/2017 Last MTM visit: Visit date not found   Next visit within 3 mo: Visit date not found  Next physical within 3 mo: Visit date not found  Prescriber OR PCP: Abena Gentile MD  Last diagnosis associated with med order: 1. Essential hypertension  - losartan (COZAAR) 100 MG tablet [Pharmacy Med Name: LOSARTAN 100MG   TAB]; TAKE 1 TABLET BY MOUTH ONCE DAILY  Dispense: 30 tablet; Refill: 17    If protocol passes may refill for 12 months if within 3 months of last provider visit (or a total of 15 months).             Passed - Serum potassium within the past 12 months     Lab Results   Component Value Date    Potassium 4.0 03/04/2019             Passed - Blood pressure filed in past 12 months     BP Readings from Last 1 Encounters:   06/10/19 103/65             Passed - Serum creatinine within the past 12 months     Creatinine   Date Value Ref Range Status   03/04/2019 1.07 0.70 - 1.30 mg/dL Final

## 2021-05-29 NOTE — PROGRESS NOTES
Informed patient of [facility] 8 waitlist opening via his [employer] email. CHW will meet with patient on 6/14 if time allows or pt desires assistance navigating the [email or web address] site.    Next outreach: 6/14/19

## 2021-05-29 NOTE — TELEPHONE ENCOUNTER
Central PA team  [phone]  Pool: [facility] PA MED ([id number])          PA has been initiated.       PA form completed and faxed insurance via Cover My Meds     Key:   [id number] - PA Case ID: [id number]     Medication:  Oxycodone er 10     Insurance:  [employer] Part D        Response will be received via fax and may take up to 5-10 business days depending on plan

## 2021-05-29 NOTE — TELEPHONE ENCOUNTER
Controlled Substance Refill Request  Medication Name:   Requested Prescriptions     Pending Prescriptions Disp Refills     dextroamphetamine-amphetamine (ADDERALL) 30 mg Tab 30 tablet 0     Sig: Take 30 mg by mouth daily.     Date Last Fill: 4/25/2019  Pharmacy: [facility] [address] [place] [address]       Submit electronically to pharmacy  Controlled Substance Agreement Date Scanned:   Encounter-Level CSA Scan Date - 08/21/2017:    Scan on 8/24/2017  7:50 AM (below)    Scan on 8/23/2017  7:29 AM (below)             Encounter-Level CSA Scan Date - 06/27/2016:    Scan on 6/30/2016  8:08 AM (below)         Last office visit with prescriber/PCP: 4/4/2019 Devon Lund MD OR same dept: 4/4/2019 Devon Lund MD OR same specialty: 4/4/2019 Devon Lund MD  Last physical: Visit date not found Last MTM visit: Visit date not found

## 2021-05-29 NOTE — PROGRESS NOTES
[facility] Daily Progress     Patient Name: Bola Lyon  Date: 6/7/2019  Visit #: 27  PTA visit #:       Referral Diagnosis: Chronic pain      Referring provider: Devon Lund MD     Visit Diagnosis:     ICD-10-CM    1. Chronic right shoulder pain M25.511     G89.29    2. Cervicalgia M54.2    3. Cervical radiculitis M54.12    4. Myofascial pain M79.18          Assessment:     Patient is benefitting from skilled physical therapy and is making steady progress toward functional goals.  Patient is appropriate to continue with skilled physical therapy intervention, as indicated by initial plan of care.  He does have a good understanding of how narcotic use can affect his current pain levels and by decreasing them he is likely to have more pain until his body gets used to this.     Goal Status:  Pt. will demonstrate/verbalize independence in self-management of condition in : 12 weeks - IMPROVING TOWARDS GOAL  Pt. will report decreased intensity, frequency of : Pain;in 12 weeks;Comment  Comment:: decrease pain from 6-8/10 to 4-7/10 with ADLs. - IMPROVING TOWARDS GOAL  Pt. will decrease use of medication for pain for improved quality of life in : 12 weeks - SLOW IMPROVEMENT  Pt. will have improved quality of sleep: waking less times/night;getting 75-90% of required amount;in 12 weeks - IMPROVING TOWARDS GOAL  Patient will return to: exercise;leisure;in 12 weeks;Comment  Comment: lifting weights withtout dropping anything with B hands - SLOW PROGRESSION  Patient [place]: for driving;for conversation;with less pain;with less difficulty;in 12 weeks;Comment  Comment: increase C-rot to >50 deg B and T-rot to >40 deg. - HAS NOT SHOWN IMPROVEMENT IN THESE ROM MEASUREMENTS  Patient will reach / maintain arm movement: forward;overhead;behind;for home chores;for dressing;with less pain;with less difficulty;in 12 weeks - SHOWING IMPROVEMENT   Patient will decrease : NDI score;by _ points;for improved  quality of function;for improved quality of life;in 12 weeks  by ___ points: 15 -  NOT TESTED        Plan / Patient Education:     Plan to con't with manual therapy to decrease fascial tension/tone to normalize ROM/decrease inflammation/decrease mm tone and improve proprioception.      Subjective:     Pain Ratin-8 pain range in the last week.  He is seeing Dr. Cramer on Monday.  After his visits he does seem to drop his pain levels by about a point. With all of his current stressors he feels like he isn't able to maintain that level. He feels he was able to maintain this much better about a month ago prior to these stressors.     Pt 20' late for appt    Objective:     LV, art fascial tensions.      Treatment Today   2019   TREATMENT MINUTES COMMENTS   Evaluation     Self-care/ Home management     Manual therapy 25 Fascial release using Strain-Counterstrain of B cranial/upper cerv-LV, B cervical-Art   Neuromuscular Re-education     Therapeutic Activity     Therapeutic Exercises 15 AA/PROM to C-T spine, UE's ribs  Discussed narcotic use and opioid induced hyeralgesia and how this can be affecting his pain levels at this time.    Gait training     Modality__________________                Total 40    Blank areas are intentional and mean the treatment did not include these items.       Jb Ochoa  2019

## 2021-05-30 VITALS — HEIGHT: 67 IN | BODY MASS INDEX: 35.31 KG/M2 | WEIGHT: 225 LBS

## 2021-05-30 VITALS — WEIGHT: 224 LBS | BODY MASS INDEX: 35.16 KG/M2 | HEIGHT: 67 IN

## 2021-05-30 VITALS — BODY MASS INDEX: 36.41 KG/M2 | WEIGHT: 232 LBS | HEIGHT: 67 IN

## 2021-05-30 VITALS — HEIGHT: 67 IN | BODY MASS INDEX: 35.79 KG/M2 | WEIGHT: 228 LBS

## 2021-05-30 VITALS — HEIGHT: 67 IN | BODY MASS INDEX: 33.59 KG/M2 | WEIGHT: 214 LBS

## 2021-05-30 VITALS — WEIGHT: 225 LBS | BODY MASS INDEX: 35.77 KG/M2

## 2021-05-30 VITALS — WEIGHT: 212 LBS | BODY MASS INDEX: 33.27 KG/M2 | HEIGHT: 67 IN

## 2021-05-30 NOTE — PROGRESS NOTES
[facility] Daily Progress     Patient Name: Bola Lyon  Date: 7/16/2019  Visit #: 30  PTA visit #:       Referral Diagnosis: Chronic pain      Referring provider: Dmitriy Cramer*     Visit Diagnosis:     ICD-10-CM    1. Chronic right shoulder pain M25.511     G89.29    2. Cervicalgia M54.2    3. Cervical radiculitis M54.12    4. Myofascial pain M79.18          Assessment:     Patient is benefitting from skilled physical therapy and is making steady progress toward functional goals.  Patient is appropriate to continue with skilled physical therapy intervention, as indicated by initial plan of care.  He did have a good release of fascial tensions with treatment.     Goal Status:  Pt. will demonstrate/verbalize independence in self-management of condition in : 12 weeks - IMPROVING TOWARDS GOAL  Pt. will report decreased intensity, frequency of : Pain;in 12 weeks;Comment  Comment:: decrease pain from 6-8/10 to 4-7/10 with ADLs. - IMPROVING TOWARDS GOAL  Pt. will decrease use of medication for pain for improved quality of life in : 12 weeks - SLOW IMPROVEMENT  Pt. will have improved quality of sleep: waking less times/night;getting 75-90% of required amount;in 12 weeks - IMPROVING TOWARDS GOAL  Patient will return to: exercise;leisure;in 12 weeks;Comment  Comment: lifting weights withtout dropping anything with B hands - SLOW PROGRESSION  Patient [place]: for driving;for conversation;with less pain;with less difficulty;in 12 weeks;Comment  Comment: increase C-rot to >50 deg B and T-rot to >40 deg. - HAS NOT SHOWN IMPROVEMENT IN THESE ROM MEASUREMENTS  Patient will reach / maintain arm movement: forward;overhead;behind;for home chores;for dressing;with less pain;with less difficulty;in 12 weeks - SHOWING IMPROVEMENT   Patient will decrease : NDI score;by _ points;for improved quality of function;for improved quality of life;in 12 weeks  by ___ points: 15 -  NOT TESTED        Plan / Patient  Education:     Plan to con't with manual therapy to decrease fascial tension/tone to normalize ROM/decrease inflammation/decrease mm tone and improve proprioception.      Subjective:     Pain Rating:    He did do some yard work for his dad and recently he has been more in the 7 to 7.5 range.  It is in both sides in his deltoid and into his shoulder blades.   He has been pulling weeds and doing some raking with his yard work.     Pt 25' late for appt    Objective:     Art fascial tensions.      Treatment Today   7/16/2019   TREATMENT MINUTES COMMENTS   Evaluation     Self-care/ Home management     Manual therapy 30 Fascial release using Strain-Counterstrain of B lower cervical/upper thor/scap/shoulder-Art, B cervical MED-A   Neuromuscular Re-education     Therapeutic Activity     Therapeutic Exercises     Gait training     Modality__________________                Total 30    Blank areas are intentional and mean the treatment did not include these items.       Jb Ochoa  7/16/2019

## 2021-05-30 NOTE — PROGRESS NOTES
[facility] Daily Progress     Patient Name: Bola Lyon  Date: 7/30/2019  Visit #: 32  PTA visit #:       Referral Diagnosis: Chronic pain      Referring provider: Dmitriy Cramer*     Visit Diagnosis:     ICD-10-CM    1. Chronic right shoulder pain M25.511     G89.29    2. Cervicalgia M54.2    3. Cervical radiculitis M54.12    4. Myofascial pain M79.18          Assessment:     Patient is benefitting from skilled physical therapy and is making steady progress toward functional goals.  Patient is appropriate to continue with skilled physical therapy intervention, as indicated by initial plan of care.  He felt much better in his R hand after traetment today. There was very good release of superficial fascial tensions.      Goal Status:  Pt. will demonstrate/verbalize independence in self-management of condition in : 12 weeks - IMPROVING TOWARDS GOAL  Pt. will report decreased intensity, frequency of : Pain;in 12 weeks;Comment  Comment:: decrease pain from 6-8/10 to 4-7/10 with ADLs. - IMPROVING TOWARDS GOAL  Pt. will decrease use of medication for pain for improved quality of life in : 12 weeks - SLOW IMPROVEMENT  Pt. will have improved quality of sleep: waking less times/night;getting 75-90% of required amount;in 12 weeks - IMPROVING TOWARDS GOAL  Patient will return to: exercise;leisure;in 12 weeks;Comment  Comment: lifting weights withtout dropping anything with B hands - SLOW PROGRESSION  Patient [place]: for driving;for conversation;with less pain;with less difficulty;in 12 weeks;Comment  Comment: increase C-rot to >50 deg B and T-rot to >40 deg. - HAS NOT SHOWN IMPROVEMENT IN THESE ROM MEASUREMENTS  Patient will reach / maintain arm movement: forward;overhead;behind;for home chores;for dressing;with less pain;with less difficulty;in 12 weeks - SHOWING IMPROVEMENT   Patient will decrease : NDI score;by _ points;for improved quality of function;for improved quality of life;in 12  "weeks  by ___ points: 15 -  NOT TESTED        Plan / Patient Education:     Plan to con't with manual therapy to decrease fascial tension/tone to normalize ROM/decrease inflammation/decrease mm tone and improve proprioception.      Subjective:     Pain Rating:    He has really felt like his \" pose\" is substantially limited. It is only active from the top of his deltoids up into his neck on both sides.   He is feeling more going on in the nerves in his hands.      Pt 40' late for appt    Objective:     MS fascial tensions.      Treatment Today   7/30/2019   TREATMENT MINUTES COMMENTS   Evaluation     Self-care/ Home management     Manual therapy 15 Fascial release using Strain-Counterstrain of RUE chain 1-SF   Neuromuscular Re-education     Therapeutic Activity     Therapeutic Exercises     Gait training     Modality__________________                Total 15    Blank areas are intentional and mean the treatment did not include these items.       Jb Ochoa  7/30/2019  "

## 2021-05-30 NOTE — PROGRESS NOTES
Met with patient to discuss goal and action steps.    Assisted patient with applying to the [facility] Waitlist via  [email or web address]    Patient will need to provide annual income information to complete application process. Patient agrees to get this information into his application.     Patient talked about his insurance complications. CHW suggested a visit with SW to discuss his plan. Also gave patient [employer] contact info. Offered patient a visit on 7/16 at 11:00 AM.     Patient asked about success stories from [facility]. Pt wants to ensure this program is right for him and agreed to think of other goals to work on for himself besides housing.    Goals Addressed                 This Visit's Progress      II want to get on some affordable housing wailists within the next  6 months. (pt-stated)        Action steps to achieve this goal  1.  I will work with my friends and family to find affordable housing options with open waitlists to apply for.  2.  I will let my care guide know if I need any assistance with applying for affordable housing options I find.  3.  I have applied to the [facility] housing waitlist and will let my CCC team know if I need any assistance.    Date goal set: 4/18/19    Updated 6/28/19            Next outreach: 7/29/19

## 2021-05-30 NOTE — PROGRESS NOTES
Clinic Care Coordination Contact    Pt was NS1 for CCC SW visit to discuss insurance and check in on progress with housing.    [facility] Community Health Worker Delegation:  Due: at next outreach  Delegation: Please ask pt if they would like to reschedule.

## 2021-05-30 NOTE — PROGRESS NOTES
[facility] Daily Progress     Patient Name: Bola Lyon  Date: 7/9/2019  Visit #: 29  PTA visit #:       Referral Diagnosis: Chronic pain      Referring provider: Dmitriy Cramer*     Visit Diagnosis:     ICD-10-CM    1. Chronic right shoulder pain M25.511     G89.29    2. Cervicalgia M54.2    3. Cervical radiculitis M54.12    4. Myofascial pain M79.18          Assessment:     Patient is benefitting from skilled physical therapy and is making steady progress toward functional goals.  Patient is appropriate to continue with skilled physical therapy intervention, as indicated by initial plan of care.  There was good release of fascial tensions with treatment.     Goal Status:  Pt. will demonstrate/verbalize independence in self-management of condition in : 12 weeks - IMPROVING TOWARDS GOAL  Pt. will report decreased intensity, frequency of : Pain;in 12 weeks;Comment  Comment:: decrease pain from 6-8/10 to 4-7/10 with ADLs. - IMPROVING TOWARDS GOAL  Pt. will decrease use of medication for pain for improved quality of life in : 12 weeks - SLOW IMPROVEMENT  Pt. will have improved quality of sleep: waking less times/night;getting 75-90% of required amount;in 12 weeks - IMPROVING TOWARDS GOAL  Patient will return to: exercise;leisure;in 12 weeks;Comment  Comment: lifting weights withtout dropping anything with B hands - SLOW PROGRESSION  Patient [place]: for driving;for conversation;with less pain;with less difficulty;in 12 weeks;Comment  Comment: increase C-rot to >50 deg B and T-rot to >40 deg. - HAS NOT SHOWN IMPROVEMENT IN THESE ROM MEASUREMENTS  Patient will reach / maintain arm movement: forward;overhead;behind;for home chores;for dressing;with less pain;with less difficulty;in 12 weeks - SHOWING IMPROVEMENT   Patient will decrease : NDI score;by _ points;for improved quality of function;for improved quality of life;in 12 weeks  by ___ points: 15 -  NOT TESTED        Plan / Patient  "Education:     Plan to con't with manual therapy to decrease fascial tension/tone to normalize ROM/decrease inflammation/decrease mm tone and improve proprioception.      Subjective:     Pain Ratin-7 pain range .  He is probably about a .5 point better with the pain levels lately. He is starting to feel his mental health meds taking more effect now. He is starting to decrease a little of his \"manic-ness\".     Pt 30' late for appt    Objective:     MS, art fascial tensions.      Treatment Today   2019   TREATMENT MINUTES COMMENTS   Evaluation     Self-care/ Home management     Manual therapy 30 Fascial release using Strain-Counterstrain of B thoracic ALL-MS, R cervical/scap/UE-Art   Neuromuscular Re-education     Therapeutic Activity     Therapeutic Exercises     Gait training     Modality__________________                Total 30    Blank areas are intentional and mean the treatment did not include these items.       Jb Ochoa  2019  "

## 2021-05-30 NOTE — PROGRESS NOTES
[facility] Daily Progress     Patient Name: Bola Lyon  Date: 7/5/2019  Visit #: 28  PTA visit #:       Referral Diagnosis: Chronic pain      Referring provider: Dmitriy Cramer*     Visit Diagnosis:     ICD-10-CM    1. Chronic right shoulder pain M25.511     G89.29    2. Cervicalgia M54.2    3. Cervical radiculitis M54.12    4. Myofascial pain M79.18          Assessment:     Patient is benefitting from skilled physical therapy and is making steady progress toward functional goals.  Patient is appropriate to continue with skilled physical therapy intervention, as indicated by initial plan of care.  He did feel improvement in his breathing after today's treatment. There was moderate release of fascial tensions treated today.       Goal Status:  Pt. will demonstrate/verbalize independence in self-management of condition in : 12 weeks - IMPROVING TOWARDS GOAL  Pt. will report decreased intensity, frequency of : Pain;in 12 weeks;Comment  Comment:: decrease pain from 6-8/10 to 4-7/10 with ADLs. - IMPROVING TOWARDS GOAL  Pt. will decrease use of medication for pain for improved quality of life in : 12 weeks - SLOW IMPROVEMENT  Pt. will have improved quality of sleep: waking less times/night;getting 75-90% of required amount;in 12 weeks - IMPROVING TOWARDS GOAL  Patient will return to: exercise;leisure;in 12 weeks;Comment  Comment: lifting weights withtout dropping anything with B hands - SLOW PROGRESSION  Patient [place]: for driving;for conversation;with less pain;with less difficulty;in 12 weeks;Comment  Comment: increase C-rot to >50 deg B and T-rot to >40 deg. - HAS NOT SHOWN IMPROVEMENT IN THESE ROM MEASUREMENTS  Patient will reach / maintain arm movement: forward;overhead;behind;for home chores;for dressing;with less pain;with less difficulty;in 12 weeks - SHOWING IMPROVEMENT   Patient will decrease : NDI score;by _ points;for improved quality of function;for improved quality of  "life;in 12 weeks  by ___ points: 15 -  NOT TESTED        Plan / Patient Education:     Plan to con't with manual therapy to decrease fascial tension/tone to normalize ROM/decrease inflammation/decrease mm tone and improve proprioception.      Subjective:     Pain Ratin-8 pain range .  He had to get his meds readjusted again. He was really off from everything and was feeling really \"manic\" lately. He is hoping the change kicks in a little better soon.   He does feel like he is worse off when not being seen for quite some time (last visit was ). He did mow and that hurt his R shoulder blade area which makes it hard to take a deep breathe.     Pt 20' late for appt    Objective:     LV, neural, art fascial tensions.      Treatment Today   2019   TREATMENT MINUTES COMMENTS   Evaluation     Self-care/ Home management     Manual therapy 40 Fascial release using Strain-Counterstrain of B cerv MED-A/LV, R scap/upper thor-A, R BPA/P-N   Neuromuscular Re-education     Therapeutic Activity     Therapeutic Exercises     Gait training     Modality__________________                Total 40    Blank areas are intentional and mean the treatment did not include these items.       Jb Ochoa  2019  "

## 2021-05-30 NOTE — PROGRESS NOTES
ASSESSMENT:  1. Chronic pain syndrome  Reviewed changes.  Trial of low-dose amitriptyline.  Recommend increase Lamictal.  Split prednisone but continue  - Drugs of Abuse 1,Urine  - amitriptyline (ELAVIL) 25 MG tablet; Take 1 tablet (25 mg total) by mouth at bedtime.  Dispense: 30 tablet; Refill: 11    2. Essential hypertension  Stable.  Refill meds.  Update labs  - metoprolol succinate (TOPROL-XL) 100 MG 24 hr tablet; Take two tablets (200 mg total) daily.  Dispense: 180 tablet; Refill: 3  - Comprehensive Metabolic Panel  - HM2(CBC w/o Differential)    3. Generalized anxiety disorder  Stable.  Agree with Lamictal.  Consider increased dosage  - lamoTRIgine (LAMICTAL) 100 MG tablet; Take 1 tablet (100 mg total) by mouth daily.  - Thyroid Stimulating Hormone (TSH)    4. Acute maxillary sinusitis, recurrence not specified  Symptoms excellent.  Split prednisone  - predniSONE (DELTASONE) 5 MG tablet; Take 5 mg by mouth 2 (two) times a day.  Dispense: 60 tablet; Refill: 11    5. Chronic, continuous use of opioids  Continue to encourage taper  - predniSONE (DELTASONE) 5 MG tablet; Take 5 mg by mouth 2 (two) times a day.  Dispense: 60 tablet; Refill: 11  - oxytocin, bulk, Powd; Use 20 Units As Directed 3 (three) times a day as needed (anxiety or pain). Please supply 20 tabs as trial  Dispense: 1 g; Refill: 0    6. PTSD (post-traumatic stress disorder)  Discontinue gabapentin completely.  Trial of oxytocin lozenges        PLAN:  Patient Instructions   Allegra for sinus is twice a day    Change the prednisone from 10 mgs once a day to 5 mgs twice a day    Stop Gabapentin completely to help decrease and eliminate leg swelling    Discuss the use and dose of Adderall with Dr Almanzar     Amitriptyline 25 mgs at bedtime for better sleep, better mood, and pain control    I like the increase in Lamotrigine, but I think the proper dose for you might be 100 mgs TWICE a day    Oxytocin lozenges as needed for pain    Update blood  tests          Orders Placed This Encounter   Procedures     Drugs of Abuse 1,Urine     Comprehensive Metabolic Panel     HM2(CBC w/o Differential)     Thyroid Stimulating Hormone (TSH)     Medications Discontinued During This Encounter   Medication Reason     metoprolol succinate (TOPROL-XL) 100 MG 24 hr tablet Reorder     lamoTRIgine (LAMICTAL) 25 MG tablet      predniSONE (DELTASONE) 10 mg tablet      gabapentin (NEURONTIN) 400 MG capsule      Administrations This Visit     cyanocobalamin injection 1,000 mcg     Admin Date  06/28/2019 Action  Given Dose  1000 mcg Route  Intramuscular Administered By  Tejal Hernandez LPN              Return in about 2 months (around 8/28/2019) for for a full review of everything we did today.    CHIEF COMPLAINT:  Chief Complaint   Patient presents with     Follow-up     Medication Management     HISTORY OF PRESENT ILLNESS:  Jan is a 49 y o male presenting to the clinic today for follow up for multiple conditions. His last visit was 4/4/19.   Mental Health Issues: He has diagnoses of Bipolar II disorder, anxiety, and PTSD. He is followed by psychiatry and says his psychiatrist has recently told him he is in a manic episode. He did not recognize it as he says he has only had three episodes since diagnosis. The episode was exacerbated by family issues, insurance issues, and dealing with case workers. His psychiatrist increased lamotrigine to 100mg once daily, which he has been taking for the past 10 days. He has noticed some benefit to the increase. He says his sleep is poor but denies PTSD nightmares. He continues on prazosin 2mg for the nightmares and temazepam 30mg for sleep. He is also taking Seroquel at bedtime.   ADHD: He has been taking Adderall since March and notices a benefit. He says he is better able to concentrate and particularly notices improvement in mind racing.   Sinus Issues: At his last visit, prednisone 10mg was initiated to help with chronic sinus congestion. He  "is also taking montelukast 10mg twice daily, fexofenadine 180mg twice daily, and pseudoephedrine 120mg three times daily. He is also using Afrin. He reports his congestion is improved.   Chronic Pain: [facility]. He reports pain in his shoulders, right elbow, and left knee. He continues on gabapentin 400mg twice daily, which also helps with mood. He is also taking topiramate 100mg four times daily and nabumetone 500mg four times daily. Dr. Cramer increased ketamine recently and is working with him to decrease oxycontin and oxycodone.   Gout: He continues on allopurinol 150mg twice daily. He denies gout flares.   Edema: Left knee. Improved, though still present.   Joint Pain: Prednisone 10mg daily is helping. He does feel an increase would be helpful.   REVIEW OF SYSTEMS:   He is having increased pain from a tooth issue. He feels B12 injections have been helpful for nerve pain and will have another today.   All other systems are negative.        PFSH:      TOBACCO USE:  Social History     Tobacco Use   Smoking Status Current Some Day Smoker     Packs/day: 0.50     Years: 6.00     Pack years: 3.00     Types: Cigarettes     Start date: 2009     Last attempt to quit: 2017     Years since quittin.3   Smokeless Tobacco Former User       VITALS:  Vitals:    19 1247   BP: 108/62   Patient Site: Left Arm   Patient Position: Sitting   Cuff Size: Adult Large   Pulse: 70   Resp: 16   SpO2: 98%   Weight: 204 lb 6 oz (92.7 kg)   Height: 5' 8\" (1.727 m)     Wt Readings from Last 3 Encounters:   19 204 lb 6 oz (92.7 kg)   06/10/19 208 lb (94.3 kg)   19 208 lb 7 oz (94.5 kg)     Body mass index is 31.08 kg/m .    PHYSICAL EXAM:  Constitutional: Reveals an alert, talkative man with tattoos. Vitals: Per nursing notes.  Neurologic: Cranial nerves II-XII intact.   Psychiatric: Mood appropriate, memory intact.       ADDITIONAL HISTORY SUMMARIZED (2): Pain clinic notes " reviewed  DECISION TO OBTAIN EXTRA INFORMATION (1): None.   RADIOLOGY TESTS (1): None.  LABS (1): Labs ordered.  MEDICINE TESTS (1): None.  INDEPENDENT REVIEW (2 each): None.   The visit lasted a total of 38 minutes face to face with the patient. Over 50% of the time was spent counseling and educating the patient about mental health issues.  IBao, am scribing for and in the presence of, Dr. Lund.  I, Dr Lund, personally performed the services described in this documentation, as scribed by Bao Spangler in my presence, and it is both accurate and complete.  Total data points:    MEDICATIONS:  Current Outpatient Medications   Medication Sig Dispense Refill     acetaminophen (TYLENOL) 650 MG CR tablet Take 1,300 mg by mouth every 8 (eight) hours as needed for pain.       albuterol (PROAIR HFA;PROVENTIL HFA;VENTOLIN HFA) 90 mcg/actuation inhaler INHALE 2 PUFFS BY MOUTH EVERY 6 HOURS AS NEEDED FOR WHEEZING 3 Inhaler 3     allopurinol (ZYLOPRIM) 300 MG tablet Take 0.5 tablets (150 mg total) by mouth 2 (two) times a day. 90 tablet 3     amitriptyline (ELAVIL) 25 MG tablet Take 1 tablet (25 mg total) by mouth at bedtime. 30 tablet 11     baclofen (LIORESAL) 10 MG tablet One and one-half tab three times a day 126 tablet 3     busPIRone (BUSPAR) 10 MG tablet Take 1.5 tablets (15 mg total) by mouth 3 (three) times a day. 135 tablet 3     calcium carbonate-vitamin D3 (CALTRATE 600 PLUS D3) 600 mg(1,500mg) -400 unit per tablet TAKE THREE TABLETS BY MOUTH ONCE DAILY 90 tablet 6     chlorhexidine (PERIDEX) 0.12 % solution Apply 15 mL to the mouth or throat 2 (two) times a day.       dextroamphetamine-amphetamine (ADDERALL) 30 mg Tab Take 30 mg by mouth daily. 30 tablet 0     DULoxetine (CYMBALTA) 30 MG capsule Take 1 capsule (30 mg total) by mouth 3 (three) times a day.  0     fexofenadine (ALLEGRA) 180 MG tablet Take 1 tablet (180 mg total) by mouth 2 (two) times a day. 60 tablet 11     fluticasone (FLONASE) 50  mcg/actuation nasal spray USE 2 SPRAY(S) IN EACH NOSTRIL ONCE DAILY. 18.2 g 5     fluticasone propionate (FLONASE) 50 mcg/actuation nasal spray USE 2 SPRAY(S) IN EACH NOSTRIL ONCE DAILY 18.2 g 5     hydroCHLOROthiazide (HYDRODIURIL) 25 MG tablet Take 1 tablet (25 mg total) by mouth daily. 90 tablet 3     hydrOXYzine HCl (ATARAX) 25 MG tablet Take 1 tablet (25 mg total) by mouth 4 (four) times a day. 120 tablet 3     ketamine HCl (KETAMINE, BULK, MISC) DISSOLVE 1 LOZENGE UNDER THE TONGUE UP TO NINE TIMES DAILY AS NEEDED FOR PAIN  2     ketamine HCl (KETAMINE, BULK,) 100 % Powd Ketamine 120 mg amarjit, dissolve one amarjit under the tongue up to 11 times daily prn pain 120 Bottle 2     lamoTRIgine (LAMICTAL) 100 MG tablet Take 1 tablet (100 mg total) by mouth daily.       lidocaine HCl 3 % Crea Apply topically to affected areas twice daily 1 Tube 5     losartan (COZAAR) 100 MG tablet Take 1 tablet (100 mg total) by mouth daily. 90 tablet 3     Medical Cannabis Use 1 Units As Directed. Take as instructed by the medical cannabis dispensary. The provider who enrolled the patient in the medical cannabis registry and certified this  patient will maintain ownership and liability of all provider reporting and registration requirements.       metoprolol succinate (TOPROL-XL) 100 MG 24 hr tablet Take two tablets (200 mg total) daily. 180 tablet 3     montelukast (SINGULAIR) 10 mg tablet Take 1 tablet (10 mg total) by mouth 2 (two) times a day. 60 tablet 11     nabumetone (RELAFEN) 500 MG tablet TAKE 1 TABLET BY MOUTH 4 TIMES DAILY 120 tablet 1     NARCAN 4 mg/actuation nasal spray   0     nicotine (NICODERM CQ) 21 mg/24 hr Place 1 patch on the skin daily. 30 patch 1     omeprazole (PRILOSEC) 40 MG capsule TAKE 1 CAPSULE (40 MG TOTAL) BY MOUTH DAILY, please do a PA if needed 90 capsule prn     oxyCODONE (OXYCONTIN) 10 mg 12 hr tablet Take 1 tablet (10 mg total) by mouth every 12 (twelve) hours. 42 each 0     oxyCODONE  (ROXICODONE) 5 MG immediate release tablet Two tabs morning, one mid-day, two bedtime 100 tablet 0     oxytocin, bulk, Powd Use 20 Units As Directed 3 (three) times a day as needed (anxiety or pain). Please supply 20 tabs as trial 1 g 0     prazosin (MINIPRESS) 2 MG capsule Take 1 capsule (2 mg total) by mouth at bedtime. 90 capsule 3     predniSONE (DELTASONE) 5 MG tablet Take 5 mg by mouth 2 (two) times a day. 60 tablet 11     pseudoephedrine (SUDAFED) 120 mg 12 hr tablet Take 1 tablet (120 mg total) by mouth 3 (three) times a day. 90 tablet 5     QUEtiapine (SEROQUEL) 200 MG tablet Take 200 mg by mouth bedtime.       QUEtiapine (SEROQUEL) 50 MG tablet Take 50 mg by mouth 4 (four) times a day.       temazepam (RESTORIL) 30 mg capsule   1     THERA-M 9 mg iron-400 mcg Tab tablet TAKE ONE TABLET BY MOUTH ONCE DAILY 30 tablet 10     topiramate (TOPAMAX) 100 MG tablet Take 1 tablet (100 mg total) by mouth 4 (four) times a day. 120 tablet 11     Current Facility-Administered Medications   Medication Dose Route Frequency Provider Last Rate Last Dose     cyanocobalamin injection 1,000 mcg  1,000 mcg Intramuscular Q7 Days Devon Lund MD   1,000 mcg at 06/28/19 1344       Total data points:3

## 2021-05-30 NOTE — PATIENT INSTRUCTIONS - HE
Allegra for sinus is twice a day    Change the prednisone from 10 mgs once a day to 5 mgs twice a day    Stop Gabapentin completely to help decrease and eliminate leg swelling    Discuss the use and dose of Adderall with Dr Almanzar     Amitriptyline 25 mgs at bedtime for better sleep, better mood, and pain control    I like the increase in Lamotrigine, but I think the proper dose for you might be 100 mgs TWICE a day    Oxytocin lozenges as needed for pain    Update blood tests

## 2021-05-30 NOTE — PROGRESS NOTES
[facility] Daily Progress     Patient Name: Bola Lyon  Date: 7/23/2019  Visit #: 32  PTA visit #:       Referral Diagnosis: Chronic pain      Referring provider: Dmitriy Cramer*     Visit Diagnosis:     ICD-10-CM    1. Chronic right shoulder pain M25.511     G89.29    2. Cervicalgia M54.2    3. Cervical radiculitis M54.12    4. Myofascial pain M79.18          Assessment:     Patient is benefitting from skilled physical therapy and is making steady progress toward functional goals.  Patient is appropriate to continue with skilled physical therapy intervention, as indicated by initial plan of care.  He con't to be late for appts and was stuck in traffic today. He does tend to respond well to treatments despite not having full treatment times.     Goal Status:  Pt. will demonstrate/verbalize independence in self-management of condition in : 12 weeks - IMPROVING TOWARDS GOAL  Pt. will report decreased intensity, frequency of : Pain;in 12 weeks;Comment  Comment:: decrease pain from 6-8/10 to 4-7/10 with ADLs. - IMPROVING TOWARDS GOAL  Pt. will decrease use of medication for pain for improved quality of life in : 12 weeks - SLOW IMPROVEMENT  Pt. will have improved quality of sleep: waking less times/night;getting 75-90% of required amount;in 12 weeks - IMPROVING TOWARDS GOAL  Patient will return to: exercise;leisure;in 12 weeks;Comment  Comment: lifting weights withtout dropping anything with B hands - SLOW PROGRESSION  Patient Turn [place]: for driving;for conversation;with less pain;with less difficulty;in 12 weeks;Comment  Comment: increase C-rot to >50 deg B and T-rot to >40 deg. - HAS NOT SHOWN IMPROVEMENT IN THESE ROM MEASUREMENTS  Patient will reach / maintain arm movement: forward;overhead;behind;for home chores;for dressing;with less pain;with less difficulty;in 12 weeks - SHOWING IMPROVEMENT   Patient will decrease : NDI score;by _ points;for improved quality of function;for improved  quality of life;in 12 weeks  by ___ points: 15 -  NOT TESTED        Plan / Patient Education:     Plan to con't with manual therapy to decrease fascial tension/tone to normalize ROM/decrease inflammation/decrease mm tone and improve proprioception.      Subjective:     Pain Rating:    He is feeling it from his neck down into the shoulder blades today. It was 6.5 to 7 with his pain levels lately.   He does see Dr. Cramer in a week and a half.      Pt 40' late for appt    Objective:     MS fascial tensions.      Treatment Today   7/23/2019   TREATMENT MINUTES COMMENTS   Evaluation     Self-care/ Home management     Manual therapy 15 Fascial release using Strain-Counterstrain of R UEC7-MS, B upper thor F (P)-MS   MFR: thoracic inlet release   Neuromuscular Re-education     Therapeutic Activity     Therapeutic Exercises     Gait training     Modality__________________                Total 15    Blank areas are intentional and mean the treatment did not include these items.       Jb Ochoa  7/23/2019

## 2021-05-30 NOTE — TELEPHONE ENCOUNTER
Controlled Substance Refill Request  Medication Name:   Requested Prescriptions     Pending Prescriptions Disp Refills     dextroamphetamine-amphetamine (ADDERALL) 30 mg Tab 30 tablet 0     Sig: Take 30 mg by mouth daily.     Date Last Fill: 6/21/2019  Pharmacy: [id number]      Submit electronically to pharmacy  Controlled Substance Agreement Date Scanned:   Encounter-Level CSA Scan Date - 08/21/2017:    Scan on 8/24/2017  7:50 AM (below)    Scan on 8/23/2017  7:29 AM (below)             Encounter-Level CSA Scan Date - 06/27/2016:    Scan on 6/30/2016  8:08 AM (below)         Last office visit with prescriber/PCP: 6/28/2019 Devon Lund MD OR same dept: 6/28/2019 Devon Lund MD OR same specialty: 6/28/2019 Devon Lund MD  Last physical: Visit date not found Last MTM visit: Visit date not found

## 2021-05-30 NOTE — PROGRESS NOTES
[facility] Daily Progress     Patient Name: Bola Lyon  Date: 7/19/2019  Visit #: 31  PTA visit #:       Referral Diagnosis: Chronic pain      Referring provider: Dmitriy Cramer*     Visit Diagnosis:     ICD-10-CM    1. Chronic right shoulder pain M25.511     G89.29    2. Cervicalgia M54.2    3. Cervical radiculitis M54.12    4. Myofascial pain M79.18          Assessment:     Patient is benefitting from skilled physical therapy and is making steady progress toward functional goals.  Patient is appropriate to continue with skilled physical therapy intervention, as indicated by initial plan of care.  He would likely benefit from further education on PNE concepts but does respond well to fascial counterstrain work. He will need to be more punctual to begin more of the PNE work.     Goal Status:  Pt. will demonstrate/verbalize independence in self-management of condition in : 12 weeks - IMPROVING TOWARDS GOAL  Pt. will report decreased intensity, frequency of : Pain;in 12 weeks;Comment  Comment:: decrease pain from 6-8/10 to 4-7/10 with ADLs. - IMPROVING TOWARDS GOAL  Pt. will decrease use of medication for pain for improved quality of life in : 12 weeks - SLOW IMPROVEMENT  Pt. will have improved quality of sleep: waking less times/night;getting 75-90% of required amount;in 12 weeks - IMPROVING TOWARDS GOAL  Patient will return to: exercise;leisure;in 12 weeks;Comment  Comment: lifting weights withtout dropping anything with B hands - SLOW PROGRESSION  Patient Turn [place]: for driving;for conversation;with less pain;with less difficulty;in 12 weeks;Comment  Comment: increase C-rot to >50 deg B and T-rot to >40 deg. - HAS NOT SHOWN IMPROVEMENT IN THESE ROM MEASUREMENTS  Patient will reach / maintain arm movement: forward;overhead;behind;for home chores;for dressing;with less pain;with less difficulty;in 12 weeks - SHOWING IMPROVEMENT   Patient will decrease : NDI score;by _ points;for improved  quality of function;for improved quality of life;in 12 weeks  by ___ points: 15 -  NOT TESTED        Plan / Patient Education:     Plan to con't with manual therapy to decrease fascial tension/tone to normalize ROM/decrease inflammation/decrease mm tone and improve proprioception.      Subjective:     Pain Rating:    The shoulder blades are painful today but the pain levels are lower today. It is more the 6.5 to 7 level today.     Pt 25' late for appt    Objective:     Art, LV fascial tensions.      Treatment Today   7/19/2019   TREATMENT MINUTES COMMENTS   Evaluation     Self-care/ Home management     Manual therapy 30 Fascial release using Strain-Counterstrain of B scap/sternal-LV, B BR-A, R HUME (P)-MS    Neuromuscular Re-education     Therapeutic Activity     Therapeutic Exercises     Gait training     Modality__________________                Total 30    Blank areas are intentional and mean the treatment did not include these items.       Jb Ochoa  7/19/2019

## 2021-05-31 ENCOUNTER — RECORDS - HEALTHEAST (OUTPATIENT)
Dept: ADMINISTRATIVE | Facility: CLINIC | Age: 51
End: 2021-05-31

## 2021-05-31 VITALS — HEIGHT: 67 IN | BODY MASS INDEX: 36.34 KG/M2 | WEIGHT: 231.5 LBS

## 2021-05-31 VITALS — BODY MASS INDEX: 35.04 KG/M2 | WEIGHT: 231.2 LBS | HEIGHT: 68 IN

## 2021-05-31 VITALS — BODY MASS INDEX: 36.25 KG/M2 | WEIGHT: 228 LBS

## 2021-05-31 VITALS — WEIGHT: 229 LBS | BODY MASS INDEX: 34.82 KG/M2

## 2021-05-31 VITALS — BODY MASS INDEX: 35.56 KG/M2 | WEIGHT: 233.9 LBS

## 2021-05-31 VITALS — BODY MASS INDEX: 36.04 KG/M2 | WEIGHT: 226.7 LBS

## 2021-05-31 VITALS — WEIGHT: 227.25 LBS | BODY MASS INDEX: 34.55 KG/M2

## 2021-05-31 VITALS — WEIGHT: 228 LBS | BODY MASS INDEX: 36.25 KG/M2

## 2021-05-31 VITALS — BODY MASS INDEX: 35.06 KG/M2 | HEIGHT: 67 IN | WEIGHT: 223.38 LBS

## 2021-05-31 VITALS — HEIGHT: 68 IN | BODY MASS INDEX: 34.41 KG/M2 | WEIGHT: 227.06 LBS

## 2021-05-31 VITALS — WEIGHT: 220.6 LBS | BODY MASS INDEX: 35.07 KG/M2

## 2021-05-31 NOTE — PROGRESS NOTES
[facility] Daily Progress     Patient Name: Bola Lyon  Date: 8/14/2019  Visit #: 33  PTA visit #:       Referral Diagnosis: Chronic pain      Referring provider: Devon Lund MD     Visit Diagnosis:     ICD-10-CM    1. Chronic right shoulder pain M25.511     G89.29    2. Cervicalgia M54.2    3. Cervical radiculitis M54.12    4. Myofascial pain M79.18          Assessment:     Patient is benefitting from skilled physical therapy and is making steady progress toward functional goals.  Patient is appropriate to continue with skilled physical therapy intervention, as indicated by initial plan of care.  He did feel better after treatment and was able to tolerate more palpation in his abdomen without as much pain.       Goal Status:  Pt. will demonstrate/verbalize independence in self-management of condition in : 12 weeks - IMPROVING TOWARDS GOAL  Pt. will report decreased intensity, frequency of : Pain;in 12 weeks;Comment  Comment:: decrease pain from 6-8/10 to 4-7/10 with ADLs. - IMPROVING TOWARDS GOAL  Pt. will decrease use of medication for pain for improved quality of life in : 12 weeks - SLOW IMPROVEMENT  Pt. will have improved quality of sleep: waking less times/night;getting 75-90% of required amount;in 12 weeks - IMPROVING TOWARDS GOAL  Patient will return to: exercise;leisure;in 12 weeks;Comment  Comment: lifting weights withtout dropping anything with B hands - SLOW PROGRESSION  Patient [place]: for driving;for conversation;with less pain;with less difficulty;in 12 weeks;Comment  Comment: increase C-rot to >50 deg B and T-rot to >40 deg. - HAS NOT SHOWN IMPROVEMENT IN THESE ROM MEASUREMENTS  Patient will reach / maintain arm movement: forward;overhead;behind;for home chores;for dressing;with less pain;with less difficulty;in 12 weeks - SHOWING IMPROVEMENT   Patient will decrease : NDI score;by _ points;for improved quality of function;for improved quality of life;in 12 weeks  by  ___ points: 15 -  NOT TESTED         Plan / Patient Education:     Plan to con't with manual therapy to decrease fascial tension/tone to normalize ROM/decrease inflammation/decrease mm tone and improve proprioception.      Subjective:     Pain Ratin-8  He is still feeling it at the spot in his abdomen from before. If not for this he would be lower in his pain levels down to 6-7. The same area of the shoulder blades has been sore in the T3-5 area in his back.     Pt 15' late for appt    Objective:     Art, neural, LV fascial tensions.      Treatment Today   2019   TREATMENT MINUTES COMMENTS   Evaluation     Self-care/ Home management     Manual therapy 45 Fascial release using Strain-Counterstrain of B lower vagus-N, B lower thoracic anterior EPI-LV, B upper thor-A   Neuromuscular Re-education     Therapeutic Activity     Therapeutic Exercises     Gait training     Modality__________________                Total 45    Blank areas are intentional and mean the treatment did not include these items.       Jb Ochoa  2019

## 2021-05-31 NOTE — TELEPHONE ENCOUNTER
Patient would like a new order for compression stockings sent to [facility]. He thinks he needs to be remeasured as he has lost about 28 lbs since was last fitted.

## 2021-05-31 NOTE — PROGRESS NOTES
Clinic Care Coordination Contact    Follow Up Progress Note      Assessment:     Pt has Medicare for primary insurance both Part A and B, reported he has a [employer] plan as secondary for now, was unable to clarify when/if it secondary will end. TAMI informed pt the secondary will likely end at the end of the year as this is when most Medicare supplements are re-newed. TAMI [place] on [place], pt shows as inactive.     SW attempted to look up pt s information on Medicare.gov, website reporting no records found--pt may not be in the system yet as pt is reporting Part A became active July 1st, 2019. Pt reported that he is able to see his providers still and has not been having issues coverage for medical appointments.     SW asked if pt is struggling with medications since the insurance change, pt reported he has been. TAMI discussed the [facility] Extra Help program, pt reported he has not applied. TAMI assisted with applying for Extra Help and requested the [facility] send his information to the [facility] to be reviewed for Medicare Savings. TAMI educated the pt to be on the lookout for letters from both the [facility] and the [facility] for notice of approval or denial.    TAMI checked in on housing, pt reported he has not called the options provided at last TAMI meeting and thinks he has the listings at home. TAMI informed pt listings may be outdated now, TAMI ran a new search on [email or web address] and printed off options available today and encouraged pt to call the numbers and get applications. TAMI reviewed waitlist process with pt for housing.    Pt was unable to update TAMI if he got on the waitlist for [facility] Section 8 Voucher, pt thinks he has letters at home about this but has not opened them yet. TAMI encouraged the pt to do this.    TAMI created new goal around assisting the pt with applying for a Medicare supplement plan during open enrollment.      Goals addressed this encounter:   Goals Addressed                 This Visit's Progress       Patient  Stated      Psychosocial (pt-stated)        Goal Statement- I would like assistance applying for a Medicare supplement during open enrollment (after October 15th and before December 7th).     Action steps to achieve this goal  1.  I will request an appointment with the  during the open enrollment period.   2.  I will bring my Medicare card to the appointment with the .     Date goal set: 8/6/19                 Intervention/Education provided during outreach: Education on Medicare supplement, [employer] Extra Help program for prescription drug coverage and Medicare Savings program through the [facility].          Plan: Pt will receive notice via the mail regarding approval or denial for [employer]SA Extra Help, pt will receive mail from [facility] regarding eligibility for Medicare Savings.    Pt will meet with  during Medicare open enrollment to apply for Medicare supplement.    Pt will contact the housing listings from [email or web address] SW provided during today's visit.     Pt will open mail from [facility] to learn if he got on the waitlist for the [facility] Section 8 Voucher waitlist.     Care Coordinator will follow up: meet with pt again during Medicare open enrollment

## 2021-05-31 NOTE — PROGRESS NOTES
[facility] Daily Progress     Patient Name: Bola Lyon  Date: 8/12/2019  Visit #: 32  PTA visit #:       Referral Diagnosis: Chronic pain      Referring provider: Devon Lund MD     Visit Diagnosis:     ICD-10-CM    1. Chronic right shoulder pain M25.511     G89.29    2. Cervicalgia M54.2    3. Cervical radiculitis M54.12    4. Myofascial pain M79.18          Assessment:     Patient is benefitting from skilled physical therapy and is making steady progress toward functional goals.  Patient is appropriate to continue with skilled physical therapy intervention, as indicated by initial plan of care.  He continues to feel improvement after treatments. It was nice to have more time today as he was not as late as he has been in the past.       Goal Status:  Pt. will demonstrate/verbalize independence in self-management of condition in : 12 weeks - IMPROVING TOWARDS GOAL  Pt. will report decreased intensity, frequency of : Pain;in 12 weeks;Comment  Comment:: decrease pain from 6-8/10 to 4-7/10 with ADLs. - IMPROVING TOWARDS GOAL  Pt. will decrease use of medication for pain for improved quality of life in : 12 weeks - SLOW IMPROVEMENT  Pt. will have improved quality of sleep: waking less times/night;getting 75-90% of required amount;in 12 weeks - IMPROVING TOWARDS GOAL  Patient will return to: exercise;leisure;in 12 weeks;Comment  Comment: lifting weights withtout dropping anything with B hands - SLOW PROGRESSION  Patient [place]: for driving;for conversation;with less pain;with less difficulty;in 12 weeks;Comment  Comment: increase C-rot to >50 deg B and T-rot to >40 deg. - HAS NOT SHOWN IMPROVEMENT IN THESE ROM MEASUREMENTS  Patient will reach / maintain arm movement: forward;overhead;behind;for home chores;for dressing;with less pain;with less difficulty;in 12 weeks - SHOWING IMPROVEMENT   Patient will decrease : NDI score;by _ points;for improved quality of function;for improved quality  of life;in 12 weeks  by ___ points: 15 -  NOT TESTED        Plan / Patient Education:     Plan to con't with manual therapy to decrease fascial tension/tone to normalize ROM/decrease inflammation/decrease mm tone and improve proprioception.      Subjective:     Pain Rating:    He re-aggravated his abdominal pain last week and did end up having to to the ED. He was having some major issues with his abdomen. This did cause him to miss his last 2 appts.   He was really dehydrated and had very low levels of his electrolytes. This was affecting his nerve pains as well.   In the last 2-3 days he has been feeling a little better.   He can still tell that he is really sore in his biceps and in his shoulder blades.     Pt 15' late for appt    Objective:     MS fascial tensions.      Treatment Today   8/12/2019   TREATMENT MINUTES COMMENTS   Evaluation     Self-care/ Home management     Manual therapy 45 Fascial release using Strain-Counterstrain of B cervical/upper thoracic disk (C)-MS, B abd sphincters-V, MR-V    Neuromuscular Re-education     Therapeutic Activity     Therapeutic Exercises     Gait training     Modality__________________                Total 45    Blank areas are intentional and mean the treatment did not include these items.       Jb Ochoa  8/12/2019

## 2021-05-31 NOTE — TELEPHONE ENCOUNTER
Faxed over compression order to Jess, on the note sent - indicated the patient may need to be re measured due to his weight loss of 28 lbs

## 2021-05-31 NOTE — PATIENT INSTRUCTIONS - HE
We need to explore the low hemoglobin and find out why  Low iron means we need to check your stomach and esophagus and colon   If low vitamin B 12 we do shots, but you are already on them      Stop zantac    Add Proton pump inhibitor to treat acid reflux and possible stomach bleeding    Stop Nabumetone for now    No aspirin, or aleve, or advil    Tylenol is fine    Renew medical cannabis    No improvement on oxytocin    Stop HCTZ  Hydrochlorothiazide.  I think you will have no more leg swelling from fluid now that you are off the gabapentin, and it lower potassium and it raises uric acid.  Take it as needed for leg swelling    Increase allopurinol to 300 mgs twice a day    Continue iron pill but increase to twice a day

## 2021-05-31 NOTE — TELEPHONE ENCOUNTER
Medication: Adderall  Last Date Filled 7/26/19   pulled: YES    Only PCP Prescribing? : NO  Taken as prescribed from physician notes? YES    CSA in last year: YES  Random Utox in last year: NO  (if any of the above answer NO - schedule with PCP)     Opioids + benzodiazepines? NO  (if the above answer YES - schedule with PCP every 6 months)     Is patient on the Executive Review Team? NO      All responses suggest: Refilling prescription

## 2021-05-31 NOTE — TELEPHONE ENCOUNTER
Controlled Substance Refill Request  Medication Name:   Requested Prescriptions     Pending Prescriptions Disp Refills     dextroamphetamine-amphetamine (ADDERALL) 30 mg Tab 30 tablet 0     Sig: Take 30 mg by mouth daily.     Date Last Fill: 7/26/19  Pharmacy: [id number]      Submit electronically to pharmacy  Controlled Substance Agreement Date Scanned:   Encounter-Level CSA Scan Date - 08/21/2017:    Scan on 8/24/2017  7:50 AM (below)    Scan on 8/23/2017  7:29 AM (below)             Encounter-Level CSA Scan Date - 06/27/2016:    Scan on 6/30/2016  8:08 AM (below)         Last office visit with prescriber/PCP: 8/19/2019 Devon Lund MD OR same dept: 8/19/2019 Devon Lund MD OR same specialty: 8/19/2019 Devon Lund MD  Last physical: Visit date not found Last MTM visit: Visit date not found      Please expedite as patient is out of above medication.

## 2021-05-31 NOTE — PROGRESS NOTES
Called and spoke with patient. He has asked if he can call back in 10 minutes.    Patient returned call and schedule MSW appt for 8/6/19 at 2:00 PM to discuss insurance plan and housing follow up.

## 2021-05-31 NOTE — PROGRESS NOTES
ASSESSMENT:  1. Iron deficiency anemia due to chronic blood loss  New.  Highly suspicious for gastric bleeding with upper abdominal pain, high-dose anti-inflammatories, diarrhea, and worsening reflux.  Check etiology of anemia but begin treatment focused on iron.  Will need endoscopy presuming positive  - Ferritin  - Iron and Transferrin Iron Binding Capacity  - Erythrocyte Sedimentation Rate  - C-Reactive Protein  - HM2(CBC w/o Differential)  - Ambulatory Referral for Upper GI Endoscopy  - Ambulatory referral for Colonoscopy    2. Chronic, continuous use of opioids  Trial of increased allopurinol.  - allopurinol (ZYLOPRIM) 300 MG tablet; Take 1 tablet (300 mg total) by mouth 2 (two) times a day.  Dispense: 180 tablet; Refill: 3    3. Essential hypertension  Off gabapentin, suspect edema will not recur.  Trial off hydrochlorothiazide to minimize hypokalemia and hyperuricemia  - hydroCHLOROthiazide (HYDRODIURIL) 25 MG tablet; Take 1 tablet (25 mg total) by mouth daily as needed (leg swelling).  Dispense: 90 tablet; Refill: 3    4. Generalized anxiety disorder  Suggest increase Lamictal to twice daily    5. Gastroesophageal reflux disease with esophagitis  Increase intensity of treatment  - pantoprazole (PROTONIX) 40 MG tablet; Take 1 tablet (40 mg total) by mouth 2 (two) times a day.  Dispense: 60 tablet; Refill: 11  - Ambulatory Referral for Upper GI Endoscopy        PLAN:  Patient Instructions   We need to explore the low hemoglobin and find out why  Low iron means we need to check your stomach and esophagus and colon   If low vitamin B 12 we do shots, but you are already on them      Stop zantac    Add Proton pump inhibitor to treat acid reflux and possible stomach bleeding    Stop Nabumetone for now    No aspirin, or aleve, or advil    Tylenol is fine    Renew medical cannabis    No improvement on oxytocin    Stop HCTZ  Hydrochlorothiazide.  I think you will have no more leg swelling from fluid now that you are  off the gabapentin, and it lower potassium and it raises uric acid.  Take it as needed for leg swelling    Increase allopurinol to 300 mgs twice a day    Continue iron pill but increase to twice a day       Orders Placed This Encounter   Procedures     Ferritin     Iron and Transferrin Iron Binding Capacity     Erythrocyte Sedimentation Rate     C-Reactive Protein     HM2(CBC w/o Differential)     Ambulatory Referral for Upper GI Endoscopy     Referral Priority:   Routine     Referral Type:   Diagnostic Imaging     Referral Reason:   Evaluation and Treatment     Requested Specialty:   Gastroenterology     Number of Visits Requested:   1     Ambulatory referral for Colonoscopy     Referral Priority:   Routine     Referral Type:   Colonoscopy     Referral Reason:   Evaluation and Treatment     Requested Specialty:   Gastroenterology     Number of Visits Requested:   1     Medications Discontinued During This Encounter   Medication Reason     fluticasone propionate (FLONASE) 50 mcg/actuation nasal spray Duplicate order     ketamine HCl (KETAMINE, BULK, MISC) Duplicate order     oxytocin, bulk, Powd Therapy completed     nabumetone (RELAFEN) 500 MG tablet      omeprazole (PRILOSEC) 40 MG capsule      ranitidine (ZANTAC) 300 MG tablet      allopurinol (ZYLOPRIM) 300 MG tablet      hydroCHLOROthiazide (HYDRODIURIL) 25 MG tablet      Administrations This Visit     cyanocobalamin injection 1,000 mcg     Admin Date  08/19/2019 Action  Given Dose  1000 mcg Route  Intramuscular Administered By  Tejal Brennan CMA              Return in about 3 weeks (around 9/9/2019) for for a full review of medications and lab testing.      CHIEF COMPLAINT:  Chief Complaint   Patient presents with     Follow-up     Medication Management     CSA signed for Adderall        HISTORY OF PRESENT ILLNESS:  Bola is a 49 y.o. male presenting to the clinic today to follow up from an ED visit and a medication check. He went to the ED on 8/9 with  complaints of abdominal pain. He thinks he pulled an abdominal muscle while doing yard work in early July. He never gave it a good chance to heal and continued to aggravate it through activity. He has been seeing a physical therapist to work on the muscle strain. His abdominal pain is generally stable but occasionally gets worse in the evening. Eating seems to make it worse, so he is trying to eat more bland foods. He denies nausea or vomiting. He acknowledges that he could have an ulcer because he has been experiencing a lot of reflux.     Anxiety/PTSD: He would like to get back on medical cannabis. He noticed it to be helpful in the evenings in the past, but he has not had it for the past couple months. He takes it mostly for PTSD. He thinks amitriptyline helps his mood and sleep, but it makes him groggy in the morning if he takes it too late at night. He is taking Lamictal once daily. He is scheduled to meet with his psychiatrist in September and will discuss increasing to twice daily then.     GERD: He has been taking Zantac 300 mg at bedtime, but he does not think it has been working. His acid reflux has been bothering him quite a bit. He has not been taking omeprazole because his insurance would not cover it. He recalls his mother telling him he had problems with acid as a child as well. He is not taking aspirin.     Diarrhea: He thinks he had heat exhaustion two weeks ago. He had diarrhea and cramps that lasted for six days after doing some work outside in the heat. He just started the magnesium supplement one week ago, which was after his bout of diarrhea.     Anemia: His hemoglobin was noted to be a little low recently. He has never had an ulcer before. He denies blood in the stool or vomiting blood. He has been taking an iron supplement.     Pain: He has the sensation that his uric acid levels are starting to build up. He has been experiencing more pain in his ankles and knees. He did not notice any  "improvement with the oxytocin.    Health Maintenance: He completed [facility] last summer because he has a family history of colon cancer in his grandmother.     REVIEW OF SYSTEMS:   He has been taking Tylenol arthritis for during the day. He has been taking iron, magnesium, and potassium supplements. His leg swelling did not seem to change after stopping gabapentin. He thinks he is retaining fluid in bilateral legs.  He has a rotten tooth and think it has contributed to his sinus infections and pain. He has been getting vitamin B12 shots and has noticed some benefit. He has been taking a generic Mylanta. All other systems are negative.    PFSH:  His grandmother had colon cancer. His younger sister has iron deficiency anemia. He has three hernia meshes.     TOBACCO USE:  Social History     Tobacco Use   Smoking Status Current Some Day Smoker     Packs/day: 0.50     Years: 6.00     Pack years: 3.00     Types: Cigarettes     Start date: 2009     Last attempt to quit: 2017     Years since quittin.4   Smokeless Tobacco Former User       VITALS:  Vitals:    19 1449   BP: 104/70   Patient Site: Left Arm   Patient Position: Sitting   Cuff Size: Adult Large   Pulse: (!) 102   SpO2: 98%   Weight: 202 lb (91.6 kg)   Height: 5' 8\" (1.727 m)     Wt Readings from Last 3 Encounters:   19 202 lb (91.6 kg)   19 204 lb 6 oz (92.7 kg)   06/10/19 208 lb (94.3 kg)     Body mass index is 30.71 kg/m .    PHYSICAL EXAM:  Constitutional:  Reveals an alert, pleasant, talkative man. Affect appropriate. Does not appear acutely ill.  Vitals:  Per nursing notes.  Cardiac:  Legs without edema.   Abdomen: Tender in mid epigastric region. No signs of liver disease. No jaundice. No striae. No spider veins or varicosities on abdomen. Liver not palpable.   Neurologic:  Cranial nerves II-XII intact.  Psychiatric:  Mood appropriate, memory intact.       MEDICATIONS:  Current Outpatient Medications   Medication Sig " Dispense Refill     acetaminophen (TYLENOL) 650 MG CR tablet Take 1,300 mg by mouth every 8 (eight) hours as needed for pain.       albuterol (PROAIR HFA;PROVENTIL HFA;VENTOLIN HFA) 90 mcg/actuation inhaler INHALE 2 PUFFS BY MOUTH EVERY 6 HOURS AS NEEDED FOR WHEEZING 3 Inhaler 3     allopurinol (ZYLOPRIM) 300 MG tablet Take 1 tablet (300 mg total) by mouth 2 (two) times a day. 180 tablet 3     amitriptyline (ELAVIL) 25 MG tablet Take 1 tablet (25 mg total) by mouth at bedtime. 30 tablet 11     baclofen (LIORESAL) 10 MG tablet One and one-half tab three times a day 126 tablet 3     busPIRone (BUSPAR) 10 MG tablet Take 1.5 tablets (15 mg total) by mouth 3 (three) times a day. 135 tablet 3     calcium carbonate-vitamin D3 (CALTRATE 600 PLUS D3) 600 mg(1,500mg) -400 unit per tablet TAKE THREE TABLETS BY MOUTH ONCE DAILY 90 tablet 6     chlorhexidine (PERIDEX) 0.12 % solution Apply 15 mL to the mouth or throat 2 (two) times a day.       dextroamphetamine-amphetamine (ADDERALL) 30 mg Tab Take 30 mg by mouth daily. 30 tablet 0     DULoxetine (CYMBALTA) 30 MG capsule Take 1 capsule (30 mg total) by mouth 3 (three) times a day.  0     fexofenadine (ALLEGRA) 180 MG tablet Take 1 tablet (180 mg total) by mouth 2 (two) times a day. 60 tablet 11     fluticasone (FLONASE) 50 mcg/actuation nasal spray USE 2 SPRAY(S) IN EACH NOSTRIL ONCE DAILY. 18.2 g 5     hydroCHLOROthiazide (HYDRODIURIL) 25 MG tablet Take 1 tablet (25 mg total) by mouth daily as needed (leg swelling). 90 tablet 3     hydrOXYzine HCl (ATARAX) 25 MG tablet Take 1 tablet (25 mg total) by mouth 4 (four) times a day. 120 tablet 3     ketamine HCl (KETAMINE, BULK,) 100 % Powd Ketamine 120 mg amarjit, dissolve one amarjit under the tongue up to 11 times daily prn pain 120 Bottle 2     lamoTRIgine (LAMICTAL) 100 MG tablet Take 1 tablet (100 mg total) by mouth daily.       lidocaine HCl 3 % Crea Apply topically to affected areas twice daily 1 Tube 5     losartan (COZAAR)  100 MG tablet Take 1 tablet (100 mg total) by mouth daily. 90 tablet 3     Medical Cannabis Use 1 Units As Directed. Take as instructed by the medical cannabis dispensary. The provider who enrolled the patient in the medical cannabis registry and certified this  patient will maintain ownership and liability of all provider reporting and registration requirements.       metoprolol succinate (TOPROL-XL) 100 MG 24 hr tablet Take two tablets (200 mg total) daily. 180 tablet 3     montelukast (SINGULAIR) 10 mg tablet Take 1 tablet (10 mg total) by mouth 2 (two) times a day. 60 tablet 11     NARCAN 4 mg/actuation nasal spray   0     nicotine (NICODERM CQ) 21 mg/24 hr Place 1 patch on the skin daily. 30 patch 1     oxyCODONE (OXYCONTIN) 10 mg 12 hr tablet Take 1 tablet (10 mg total) by mouth every 12 (twelve) hours. 42 each 0     oxyCODONE (ROXICODONE) 5 MG immediate release tablet Two tabs morning, one mid-day, two bedtime 100 tablet 0     potassium 99 mg Tab Take by mouth daily.       potassium bicarb/mag combo 21 (MAGNESIUM FIZZ-PLUS ORAL) Take 500 mg by mouth daily.       prazosin (MINIPRESS) 2 MG capsule Take 1 capsule (2 mg total) by mouth at bedtime. 90 capsule 3     predniSONE (DELTASONE) 5 MG tablet Take 5 mg by mouth 2 (two) times a day. 60 tablet 11     pseudoephedrine (SUDAFED) 120 mg 12 hr tablet Take 1 tablet (120 mg total) by mouth 3 (three) times a day. 90 tablet 5     QUEtiapine (SEROQUEL) 200 MG tablet Take 200 mg by mouth bedtime.       QUEtiapine (SEROQUEL) 50 MG tablet Take 50 mg by mouth 4 (four) times a day.       temazepam (RESTORIL) 30 mg capsule Take 30 mg by mouth at bedtime.         1     THERA-M 9 mg iron-400 mcg Tab tablet TAKE ONE TABLET BY MOUTH ONCE DAILY 30 tablet 10     topiramate (TOPAMAX) 100 MG tablet Take 1 tablet (100 mg total) by mouth 4 (four) times a day. 120 tablet 11     pantoprazole (PROTONIX) 40 MG tablet Take 1 tablet (40 mg total) by mouth 2 (two) times a day. 60 tablet 11      Current Facility-Administered Medications   Medication Dose Route Frequency Provider Last Rate Last Dose     cyanocobalamin injection 1,000 mcg  1,000 mcg Intramuscular Q7 Days Devon Lund MD   1,000 mcg at 08/19/19 1449       ADDITIONAL HISTORY SUMMARIZED (2): Reviewed 8/9 ED note regarding abdominal pain.   DECISION TO OBTAIN EXTRA INFORMATION (1): None.   RADIOLOGY TESTS (1): None.  LABS (1): Labs from June and August reviewed - anemic. Labs ordered.   MEDICINE TESTS (1): No previous endoscopy or colonoscopy.  Cardiac function normal  INDEPENDENT REVIEW (2 each): None.     The visit lasted a total of 38 minutes face to face with the patient. Over 50% of the time was spent counseling and educating the patient about his abdominal pain, anxiety, and GERD.    IMontrell, am scribing for and in the presence of, Dr. Lund.    I, Dr. Lund, personally performed the services described in this documentation, as scribed by Montrell Reaves in my presence, and it is both accurate and complete.    Total data points: 4

## 2021-05-31 NOTE — PROGRESS NOTES
[facility] Daily Progress     Patient Name: Bola Lyon  Date: 8/27/2019  Visit #: 34  PTA visit #:       Referral Diagnosis: Chronic pain      Referring provider: Dmitriy Cramer*     Visit Diagnosis:     ICD-10-CM    1. Chronic right shoulder pain M25.511     G89.29    2. Cervicalgia M54.2    3. Cervical radiculitis M54.12    4. Myofascial pain M79.18          Assessment:     Patient is benefitting from skilled physical therapy and is making steady progress toward functional goals.  Patient is appropriate to continue with skilled physical therapy intervention, as indicated by initial plan of care.  He did have a significant drop in his L scap after work on the cartilage of the SC joint. There was good relaese of fascial tensions which should help to decrease tone in ANS due to gray/white rami communicante releases.     Goal Status:  Pt. will demonstrate/verbalize independence in self-management of condition in : 12 weeks - IMPROVING TOWARDS GOAL  Pt. will report decreased intensity, frequency of : Pain;in 12 weeks;Comment  Comment:: decrease pain from 6-8/10 to 4-7/10 with ADLs. - IMPROVING TOWARDS GOAL  Pt. will decrease use of medication for pain for improved quality of life in : 12 weeks - SLOW IMPROVEMENT  Pt. will have improved quality of sleep: waking less times/night;getting 75-90% of required amount;in 12 weeks - IMPROVING TOWARDS GOAL  Patient will return to: exercise;leisure;in 12 weeks;Comment  Comment: lifting weights withtout dropping anything with B hands - SLOW PROGRESSION  Patient Turn [place]: for driving;for conversation;with less pain;with less difficulty;in 12 weeks;Comment  Comment: increase C-rot to >50 deg B and T-rot to >40 deg. - HAS NOT SHOWN IMPROVEMENT IN THESE ROM MEASUREMENTS  Patient will reach / maintain arm movement: forward;overhead;behind;for home chores;for dressing;with less pain;with less difficulty;in 12 weeks - SHOWING IMPROVEMENT   Patient will  "decrease : NDI score;by _ points;for improved quality of function;for improved quality of life;in 12 weeks  by ___ points: 15 -  NOT TESTED         Plan / Patient Education:     Plan to con't with manual therapy to decrease fascial tension/tone to normalize ROM/decrease inflammation/decrease mm tone and improve proprioception.      Subjective:     Pain Ratin-8  He has felt like we have gotten him into a good enough \"mode\" to allow him to do things that he wouldn't have been able to do before. He is able to help his dad at their basement/garage. He feels he was able to work through some of the pain better even though there was pain.   He did miss having an appt last week.  He is going to see a gastrologist to check out his esophagus and colon to see if they can find the reason for his lowering RBC/hematocrit/hemoglobin levels.     Objective:     Art, neural, MS fascial tensions.    Elevated L scap into brachial plexus region.   Treatment Today   2019   TREATMENT MINUTES COMMENTS   Evaluation     Self-care/ Home management     Manual therapy 25 Fascial release using Strain-Counterstrain of B thor gray and white rami-N, SCI (C)-MS, B upper thor-A   Neuromuscular Re-education 15 Recip inhib of neural, art, MS fascia   Therapeutic Activity     Therapeutic Exercises 15 AA/PROM to AMYE, C-T spine, ribs   Gait training     Modality__________________                Total 55    Blank areas are intentional and mean the treatment did not include these items.       Jb Ochoa  2019  "

## 2021-05-31 NOTE — PROGRESS NOTES
[facility] Daily Progress     Patient Name: Bola Lyon  Date: 9/3/2019  Visit #: 34  PTA visit #:       Referral Diagnosis: Chronic pain      Referring provider: Dmitriy Cramer*     Visit Diagnosis:     ICD-10-CM    1. Chronic right shoulder pain M25.511     G89.29    2. Cervicalgia M54.2    3. Cervical radiculitis M54.12    4. Myofascial pain M79.18          Assessment:     Patient is benefitting from skilled physical therapy and is making steady progress toward functional goals.  Patient is appropriate to continue with skilled physical therapy intervention, as indicated by initial plan of care.  He did have good release of cervical mesentery. This should help to affect his overall pain due to the deep cervical fascia tension.       Goal Status:  Pt. will demonstrate/verbalize independence in self-management of condition in : 12 weeks - IMPROVING TOWARDS GOAL  Pt. will report decreased intensity, frequency of : Pain;in 12 weeks;Comment  Comment:: decrease pain from 6-8/10 to 4-7/10 with ADLs. - IMPROVING TOWARDS GOAL  Pt. will decrease use of medication for pain for improved quality of life in : 12 weeks - SLOW IMPROVEMENT  Pt. will have improved quality of sleep: waking less times/night;getting 75-90% of required amount;in 12 weeks - IMPROVING TOWARDS GOAL  Patient will return to: exercise;leisure;in 12 weeks;Comment  Comment: lifting weights withtout dropping anything with B hands - SLOW PROGRESSION  Patient Turn [place]: for driving;for conversation;with less pain;with less difficulty;in 12 weeks;Comment  Comment: increase C-rot to >50 deg B and T-rot to >40 deg. - HAS NOT SHOWN IMPROVEMENT IN THESE ROM MEASUREMENTS  Patient will reach / maintain arm movement: forward;overhead;behind;for home chores;for dressing;with less pain;with less difficulty;in 12 weeks - SHOWING IMPROVEMENT   Patient will decrease : NDI score;by _ points;for improved quality of function;for improved quality of  "life;in 12 weeks  by ___ points: 15 -  NOT TESTED         Plan / Patient Education:     Plan to con't with manual therapy to decrease fascial tension/tone to normalize ROM/decrease inflammation/decrease mm tone and improve proprioception.      Subjective:     Pain Ratin.5-7.5 range  He has had some difficulties sleeping still.   He feels like the sx are different on each side. On the left side it does seem to affect his 1st rib area and into his arm and hand (not as bad in middle finger as other fingers).   His right side he is more sore just on the lateral neck and goes into his R scap. The arm is more affected on the triceps side vs biceps on the left side.   He does still have a \"drunk\" type feeling on the LLE where it isn't that great.     Objective:     MS, visc fascial tensions.        Treatment Today   9/3/2019   TREATMENT MINUTES COMMENTS   Evaluation     Self-care/ Home management     Manual therapy 25 Fascial release using Strain-Counterstrain of B cerv/upper thor MES-V, B cranial dura-N, B TMJ myochain-MS, B DCFA/P-MS  Dural tube release   Neuromuscular Re-education 15 Recip inhib of visc, MS, neural fascia   Therapeutic Activity     Therapeutic Exercises 15 AA/PROM to BUE, C-T-L spine, ribs   Gait training     Modality__________________                Total 55    Blank areas are intentional and mean the treatment did not include these items.       Jb Ochoa  9/3/2019  "

## 2021-05-31 NOTE — PROGRESS NOTES
[facility] Re-Certification Request    August 30, 2019      Patient: Bola Lyon  MR Number: [id number]  YOB: 1970  Date of Visit: 8/30/2019      Dear Dmitriy Kirk*:    As you may recall, we have been seeing Bola Lyon for Physical Therapy for chronic pain.    For therapy to continue, Medicare and/or Medicaid requires periodic physician review of the treatment plan. Please review the summary of the patient's progress and our plan for continued therapy, and verify  that you agree therapy should continue by entering certification dates at the bottom of this note and co-signing this note.    Plan of Care  Authorization / Certification Start Date: 08/30/19  Authorization / Certification End Date: 11/29/19  Communication with: Referral Source  Patient Related Instruction: Nature of Condition;Treatment plan and rationale;Self Care instruction;Basis of treatment;Body mechanics;Posture;Precautions;Next steps;Expected outcome  Times per Week: 1-2  Number of Weeks: 12  Number of Visits: 20  Discharge Planning: dischareg with [facility] for self-management of sx.   Therapeutic Exercise: ROM;Strengthening;Stretching  Neuromuscular Reeducation: kinesio tape;posture;balance/proprioception;TNE;core  Manual Therapy: soft tissue mobilization;myofascial release;joint mobilization;muscle energy;strain counterstrain        Goal Status:  Pt. will demonstrate/verbalize independence in self-management of condition in : 12 weeks - IMPROVING TOWARDS GOAL  Pt. will report decreased intensity, frequency of : Pain;in 12 weeks;Comment  Comment:: decrease pain from 6-8/10 to 4-7/10 with ADLs. - IMPROVING TOWARDS GOAL  Pt. will decrease use of medication for pain for improved quality of life in : 12 weeks - SLOW IMPROVEMENT  Pt. will have improved quality of sleep: waking less times/night;getting 75-90% of required amount;in 12 weeks - IMPROVING TOWARDS GOAL  Patient will return to: exercise;leisure;in  12 weeks;Comment  Comment: lifting weights withtout dropping anything with B hands - SLOW PROGRESSION  Patient Turn [place]: for driving;for conversation;with less pain;with less difficulty;in 12 weeks;Comment  Comment: increase C-rot to >50 deg B and T-rot to >40 deg. - HAS NOT SHOWN IMPROVEMENT IN THESE ROM MEASUREMENTS  Patient will reach / maintain arm movement: forward;overhead;behind;for home chores;for dressing;with less pain;with less difficulty;in 12 weeks - SHOWING IMPROVEMENT   Patient will decrease : NDI score;by _ points;for improved quality of function;for improved quality of life;in 12 weeks  by ___ points: 15 -  NOT TESTED         If you have any questions or concerns, please don't hesitate to call.    Sincerely,      Jb Ochoa PT, ATC        Physician recommendation:     ___ Follow therapist's recommendation        ___ Modify therapy      *Physician co-signature indicates they certify the need for these services furnished within this plan and while under their care.             [facility] Daily Progress     Patient Name: Bola Lyon  Date: 8/30/2019  Visit #: 34  PTA visit #:       Referral Diagnosis: Chronic pain      Referring provider: Dmitriy Cramer*     Visit Diagnosis:     ICD-10-CM    1. Chronic right shoulder pain M25.511     G89.29    2. Cervicalgia M54.2    3. Cervical radiculitis M54.12    4. Myofascial pain M79.18          Assessment:     Patient is benefitting from skilled physical therapy and is making steady progress toward functional goals.  Patient is appropriate to continue with skilled physical therapy intervention, as indicated by initial plan of care.  He did have good release of mesentery today which does have connections from pelvis to C-spine. His ROM was steady today but has measured higher than this in previous visits. His subjective assessment is much better than it used to be and he does seem to be in a better frame of mind regarding his pain  "now. .      Goal Status:  Pt. will demonstrate/verbalize independence in self-management of condition in : 12 weeks - IMPROVING TOWARDS GOAL  Pt. will report decreased intensity, frequency of : Pain;in 12 weeks;Comment  Comment:: decrease pain from 6-8/10 to 4-7/10 with ADLs. - IMPROVING TOWARDS GOAL  Pt. will decrease use of medication for pain for improved quality of life in : 12 weeks - SLOW IMPROVEMENT  Pt. will have improved quality of sleep: waking less times/night;getting 75-90% of required amount;in 12 weeks - IMPROVING TOWARDS GOAL  Patient will return to: exercise;leisure;in 12 weeks;Comment  Comment: lifting weights withtout dropping anything with B hands - SLOW PROGRESSION  Patient Turn [place]: for driving;for conversation;with less pain;with less difficulty;in 12 weeks;Comment  Comment: increase C-rot to >50 deg B and T-rot to >40 deg. - HAS NOT SHOWN IMPROVEMENT IN THESE ROM MEASUREMENTS  Patient will reach / maintain arm movement: forward;overhead;behind;for home chores;for dressing;with less pain;with less difficulty;in 12 weeks - SHOWING IMPROVEMENT   Patient will decrease : NDI score;by _ points;for improved quality of function;for improved quality of life;in 12 weeks  by ___ points: 15 -  NOT TESTED         Plan / Patient Education:     Plan to con't with manual therapy to decrease fascial tension/tone to normalize ROM/decrease inflammation/decrease mm tone and improve proprioception.      Subjective:     Pain Ratin-8.5 pain range  Lately he has been doing ok. He really feels like overall he is doing much better with everything he has going on.   He con't to have less \"posing\" in his UE's and back.   There has been some low BP and decreased iron which is affecting him too with his musculoskeletal system.   Sleep has been affected due to his pain. This is a problem for both his legs and his arms/neck. The areas that have had surgery are \"affecting me.\" There is just pressure in his joints.   He " "does feel like his \"arthritis\" has been a problem for him.   He is really doing good with trying to decrease some of his meds and has been in the process of switching things around. He feels like PT has really helped him do this and allowed him to do this.     Objective:     MS, visc fascial tensions.    C-rot: 32/42     T-rot:  35/40  C-flex/ext:  15/35    B shoulder abduction: 133/130       Treatment Today   8/30/2019   TREATMENT MINUTES COMMENTS   Evaluation     Self-care/ Home management     Manual therapy 25 Fascial release using Strain-Counterstrain of B MES-V (complete), R knee (C)-MS  Dural tube release   Neuromuscular Re-education 15 Recip inhib of visc, MS fascia   Therapeutic Activity     Therapeutic Exercises 15 AA/PROM to BUE, C-T-L spine, ribs   Gait training     Modality__________________                Total 55    Blank areas are intentional and mean the treatment did not include these items.       Jb Ochoa  8/30/2019  "

## 2021-06-01 VITALS — WEIGHT: 226.25 LBS | BODY MASS INDEX: 34.4 KG/M2

## 2021-06-01 VITALS — BODY MASS INDEX: 34.84 KG/M2 | WEIGHT: 229.13 LBS

## 2021-06-01 VITALS — WEIGHT: 229.56 LBS | BODY MASS INDEX: 34.9 KG/M2

## 2021-06-01 VITALS — WEIGHT: 228 LBS | BODY MASS INDEX: 34.67 KG/M2

## 2021-06-01 VITALS — BODY MASS INDEX: 34.72 KG/M2 | WEIGHT: 229.1 LBS | HEIGHT: 68 IN

## 2021-06-01 VITALS — WEIGHT: 233.19 LBS | BODY MASS INDEX: 35.46 KG/M2

## 2021-06-01 VITALS — WEIGHT: 229.13 LBS | BODY MASS INDEX: 34.84 KG/M2

## 2021-06-01 VITALS — BODY MASS INDEX: 35.05 KG/M2 | WEIGHT: 230.5 LBS

## 2021-06-01 VITALS — WEIGHT: 233.13 LBS | BODY MASS INDEX: 35.45 KG/M2

## 2021-06-01 NOTE — TELEPHONE ENCOUNTER
Patient called to follow up on orders. Informed pt that order has been sent to [facility] and provided with phone number.

## 2021-06-01 NOTE — PROGRESS NOTES
[facility] Daily Progress     Patient Name: Bola Lyon  Date: 9/6/2019  Visit #: 34  PTA visit #:       Referral Diagnosis: Chronic pain      Referring provider: Dmitriy Cramer*     Visit Diagnosis:     ICD-10-CM    1. Chronic right shoulder pain M25.511     G89.29    2. Cervicalgia M54.2    3. Cervical radiculitis M54.12    4. Myofascial pain M79.18          Assessment:     Patient is benefitting from skilled physical therapy and is making steady progress toward functional goals.  Patient is appropriate to continue with skilled physical therapy intervention, as indicated by initial plan of care.  He did have good release of fascial tensions with treatment today.        Goal Status:  Pt. will demonstrate/verbalize independence in self-management of condition in : 12 weeks - IMPROVING TOWARDS GOAL  Pt. will report decreased intensity, frequency of : Pain;in 12 weeks;Comment  Comment:: decrease pain from 6-8/10 to 4-7/10 with ADLs. - IMPROVING TOWARDS GOAL  Pt. will decrease use of medication for pain for improved quality of life in : 12 weeks - SLOW IMPROVEMENT  Pt. will have improved quality of sleep: waking less times/night;getting 75-90% of required amount;in 12 weeks - IMPROVING TOWARDS GOAL  Patient will return to: exercise;leisure;in 12 weeks;Comment  Comment: lifting weights withtout dropping anything with B hands - SLOW PROGRESSION  Patient [place]: for driving;for conversation;with less pain;with less difficulty;in 12 weeks;Comment  Comment: increase C-rot to >50 deg B and T-rot to >40 deg. - HAS NOT SHOWN IMPROVEMENT IN THESE ROM MEASUREMENTS  Patient will reach / maintain arm movement: forward;overhead;behind;for home chores;for dressing;with less pain;with less difficulty;in 12 weeks - SHOWING IMPROVEMENT   Patient will decrease : NDI score;by _ points;for improved quality of function;for improved quality of life;in 12 weeks  by ___ points: 15 -  NOT TESTED         Plan /  "Patient Education:     Plan to con't with manual therapy to decrease fascial tension/tone to normalize ROM/decrease inflammation/decrease mm tone and improve proprioception.      Subjective:     Pain Ratin.5-7.5 range  He is more frustrated today with all of his pains. The arms are getting more tired and get some \"nerve fatigue\" more lately.   He is seeing his MD next Friday.     Objective:     Neural, MS fascial tensions.      Treatment Today   2019   TREATMENT MINUTES COMMENTS   Evaluation     Self-care/ Home management     Manual therapy 40 Fascial release using Strain-Counterstrain of BUE/brachial plexus-N, L knee (C)-MS    Neuromuscular Re-education     Therapeutic Activity     Therapeutic Exercises     Gait training     Modality__________________                Total 40    Blank areas are intentional and mean the treatment did not include these items.       Jb Ochoa  2019  "

## 2021-06-01 NOTE — PROGRESS NOTES
"Assessment/Plan:     Problem List Items Addressed This Visit     Chronic pain syndrome - Primary (Chronic)    Relevant Medications    oxyCODONE (ROXICODONE) 5 MG immediate release tablet    Cervical radiculopathy at C8 (Chronic)    Relevant Medications    oxyCODONE (OXYCONTIN) 10 mg 12 hr tablet    Shoulder impingement syndrome, right    Relevant Medications    ketamine HCl (KETAMINE, BULK,) 100 % Powd (Start on 10/6/2019)            No follow-ups on file.    Patient Instructions   PLAN:    Oxycontin 10 mg three times a day, may fill     Oxycodone 5 mg two morning, one mid-day, two bedime, two weeks supply of each    Resume ketamine    Discussed electromagnetic therapy, may call Dr. Jayson Murphy [phone]    Return in 8 weeks        Subjective:       49 y.o. male presents for evaluation of multiple pain generators.    He canceled day of an appointment on .  He arrives late today and seen later in the day.    Reviews in  he was in the midst of a \"manic episode\".  He describes not having one like that has since 5 years ago and he was .  He did not have a psychiatrist drinking then.  Reports subsequently diagnosed as bipolar type II.  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  He also describes interactions with his mother who is bipolar type I who were not supportive.  He also describes increased pain, associated with PTSD symptoms over the last year.  He was dealing at that time with pain in his \"C8 and ulnar area\".  He was having an injection to [facility].  Surgery had been offered to \"clean up\" so he declined.    Reviews his psychiatrist \"Dr. SKELTON\" made adjustments with medications increasing lamotrigine to 200 mg.  He continued Depakote.  He increase Seroquel to 50 mg 3 times a day and 200 bedtime.  He continues with his talk therapy once a week [facility].    Reviews frustrations with " "opioid refills.  He had been placed on smaller increments of refills given the canceled appointment.  There were apparently communication issues and is gone without his oxycodone on the last week.  Thus for a few days he increased his OxyContin for a few days.  He had been using OxyContin 10 mg 3 times a day and oxycodone 5 mg 2 in the morning 1 in the middle the day 2 at bedtime located that is done relatively well.  He is also run out of the ketamine over this week.  Thus he has been struggling.    He gone to the emergency room having's \"sprain\" abdominal muscles helping his father do some lifting.  He has been evaluated through his primary care provider with concerns for an ulcer and found to have reflux and polyps were removed.    Dr. Lund also did some studies concerning his knee and gout with some inflammatory markers.    He continues with his physical therapist  They are making some progress with \"nerves\".      Current Outpatient Medications:      baclofen (LIORESAL) 10 MG tablet, One and one-half tab three times a day, Disp: 126 tablet, Rfl: 3     [START ON 10/6/2019] ketamine HCl (KETAMINE, BULK,) 100 % Powd, Ketamine 120 mg amarjit, dissolve one amarjit under the tongue up to 11 times daily prn pain, Disp: 120 Bottle, Rfl: 2     Medical Cannabis, Use 1 Units As Directed. Take as instructed by the medical cannabis dispensary. The provider who enrolled the patient in the medical cannabis registry and certified this  patient will maintain ownership and liability of all provider reporting and registration requirements., Disp: , Rfl:      NARCAN 4 mg/actuation nasal spray, , Disp: , Rfl: 0     oxyCODONE (OXYCONTIN) 10 mg 12 hr tablet, Take 1 tablet (10 mg total) by mouth 3 (three) times a day., Disp: 42 each, Rfl: 0     oxyCODONE (ROXICODONE) 5 MG immediate release tablet, Two tabs morning, one mid-day, two bedtime, Disp: 70 tablet, Rfl: 0     acetaminophen (TYLENOL) 650 MG CR tablet, Take 1,300 mg by mouth " every 8 (eight) hours as needed for pain., Disp: , Rfl:      albuterol (PROAIR HFA;PROVENTIL HFA;VENTOLIN HFA) 90 mcg/actuation inhaler, INHALE 2 PUFFS BY MOUTH EVERY 6 HOURS AS NEEDED FOR WHEEZING, Disp: 3 Inhaler, Rfl: 3     allopurinol (ZYLOPRIM) 300 MG tablet, Take 1 tablet (300 mg total) by mouth 2 (two) times a day., Disp: 180 tablet, Rfl: 3     amitriptyline (ELAVIL) 25 MG tablet, Take 1 tablet (25 mg total) by mouth at bedtime., Disp: 30 tablet, Rfl: 11     amoxicillin-clavulanate (AUGMENTIN) 875-125 mg per tablet, Take 1 tablet by mouth 2 (two) times a day for 7 days., Disp: 14 tablet, Rfl: 0     busPIRone (BUSPAR) 10 MG tablet, Take 1.5 tablets (15 mg total) by mouth 3 (three) times a day., Disp: 135 tablet, Rfl: 3     calcium carbonate-vitamin D3 (CALTRATE 600 PLUS D3) 600 mg(1,500mg) -400 unit per tablet, TAKE THREE TABLETS BY MOUTH ONCE DAILY, Disp: 90 tablet, Rfl: 6     chlorhexidine (PERIDEX) 0.12 % solution, Apply 15 mL to the mouth or throat 2 (two) times a day., Disp: , Rfl:      dextroamphetamine-amphetamine (ADDERALL) 30 mg Tab, Once a day, Disp: 30 tablet, Rfl: 0     dextroamphetamine-amphetamine 20 mg Tab, Take 20 mg by mouth daily. Take with 10 mg tab for a total of 30 mg daily, Disp: 30 tablet, Rfl: 0     DULoxetine (CYMBALTA) 30 MG capsule, Take 1 capsule (30 mg total) by mouth 3 (three) times a day., Disp: , Rfl: 0     fexofenadine (ALLEGRA) 180 MG tablet, Take 1 tablet (180 mg total) by mouth 2 (two) times a day., Disp: 60 tablet, Rfl: 11     fluticasone (FLONASE) 50 mcg/actuation nasal spray, USE 2 SPRAY(S) IN EACH NOSTRIL ONCE DAILY., Disp: 18.2 g, Rfl: 5     hydroCHLOROthiazide (HYDRODIURIL) 25 MG tablet, Take 1 tablet (25 mg total) by mouth daily as needed (leg swelling)., Disp: 90 tablet, Rfl: 3     hydrOXYzine HCl (ATARAX) 25 MG tablet, Take 1 tablet (25 mg total) by mouth 4 (four) times a day., Disp: 120 tablet, Rfl: 3     lamoTRIgine (LAMICTAL) 100 MG tablet, Take 1 tablet (100 mg  total) by mouth daily., Disp: , Rfl:      lidocaine HCl 3 % Crea, Apply topically to affected areas twice daily, Disp: 1 Tube, Rfl: 5     losartan (COZAAR) 100 MG tablet, Take 1 tablet (100 mg total) by mouth daily., Disp: 90 tablet, Rfl: 3     metoprolol succinate (TOPROL-XL) 100 MG 24 hr tablet, Take two tablets (200 mg total) daily., Disp: 180 tablet, Rfl: 3     montelukast (SINGULAIR) 10 mg tablet, Take 1 tablet (10 mg total) by mouth 2 (two) times a day., Disp: 60 tablet, Rfl: 11     nabumetone (RELAFEN) 500 MG tablet, Take 1 tablet (500 mg total) by mouth 2 (two) times a day., Disp: 120 tablet, Rfl: 1     nicotine (NICODERM CQ) 21 mg/24 hr, Place 1 patch on the skin daily., Disp: 30 patch, Rfl: 1     pantoprazole (PROTONIX) 40 MG tablet, Take 1 tablet (40 mg total) by mouth 2 (two) times a day., Disp: 60 tablet, Rfl: 11     potassium 99 mg Tab, Take by mouth daily., Disp: , Rfl:      potassium bicarb/mag combo 21 (MAGNESIUM FIZZ-PLUS ORAL), Take 500 mg by mouth daily., Disp: , Rfl:      prazosin (MINIPRESS) 2 MG capsule, Take 1 capsule (2 mg total) by mouth at bedtime., Disp: 90 capsule, Rfl: 3     predniSONE (DELTASONE) 10 mg tablet, Take 10 mg by mouth 2 (two) times a day., Disp: 60 tablet, Rfl: 11     pseudoephedrine (SUDAFED) 120 mg 12 hr tablet, Take 1 tablet (120 mg total) by mouth 3 (three) times a day., Disp: 90 tablet, Rfl: 5     QUEtiapine (SEROQUEL) 200 MG tablet, Take 200 mg by mouth bedtime., Disp: , Rfl:      QUEtiapine (SEROQUEL) 50 MG tablet, Take 50 mg by mouth 4 (four) times a day., Disp: , Rfl:      temazepam (RESTORIL) 30 mg capsule, Take 30 mg by mouth at bedtime.    , Disp: , Rfl: 1     THERA-M 9 mg iron-400 mcg Tab tablet, TAKE ONE TABLET BY MOUTH ONCE DAILY, Disp: 30 tablet, Rfl: 10     topiramate (TOPAMAX) 100 MG tablet, Take 1 tablet (100 mg total) by mouth 4 (four) times a day., Disp: 120 tablet, Rfl: 11    Current Facility-Administered Medications:      cyanocobalamin injection  "1,000 mcg, 1,000 mcg, Intramuscular, Q7 Days, Devon Lund MD, 1,000 mcg at 09/13/19 1352  On exam he is alert, clear sensorium.  He is wearing shorts, with compression stocking over his entire left leg.  He did not bring his notes that he typically brings to organize his thoughts and was quite dysphoric and disorganized, and distressed about that.           Objective:     Vitals:    09/26/19 1119   BP: 125/65   Pulse: 72   Weight: 202 lb 5 oz (91.8 kg)   Height: 5' 8\" (1.727 m)   PainSc:   7   PainLoc: Neck       Plan: He will return to the previous opioid regimen which she had some stability.    We will resume the ketamine as well.    Reviewed the role of electromagnetic therapy to augment his above approaches.    Time spent more than 15 minutes face-to-face, more than 50% count about above condition coordination treatment plan          This note has been dictated using voice recognition software. Any grammatical or context distortions are unintentional and inherent to the software  "

## 2021-06-01 NOTE — TELEPHONE ENCOUNTER
Medication Request  Medication name:   1. dextroamphetamine-amphetamine (ADDERALL) 10 mg Tabs, 30 tabs for 1xs daily     2. dextroamphetamine-amphetamine (ADDERALL) 20 mg Tabs 30 Tabs for 1 xs daily       Pharmacy Name and Location:   [facility] [address] [place] [address].    Reason for request:   30 Mg tabs are on back order and insurance will NOT cover 90 tabs . Please send 2 new scripts to replace current - [facility] if Needed .     When did you use medication last?:    NA    Patient offered appointment:  No , Pharmacy Request       Okay to leave a detailed message: yes

## 2021-06-01 NOTE — PATIENT INSTRUCTIONS - HE
PLAN:    Oxycontin 10 mg three times a day, may fill 9/28    Oxycodone 5 mg two morning, one mid-day, two bedime, two weeks supply of each    Resume ketamine    Discussed electromagnetic therapy, may call Dr. Jayson Murphy [phone]    Return in 8 weeks

## 2021-06-01 NOTE — TELEPHONE ENCOUNTER
I spoke with the pharmacy.  There is a  back order on generic immediate release Adderall doses.  The 30 mg strength is still affected.  They do however have the ability to order and have stock of the extended release Adderall 30 mg capsules, if we decided to switch to that in the future to decrease pill burden.

## 2021-06-01 NOTE — PROGRESS NOTES
[facility] Daily Progress     Patient Name: Bola Lyon  Date: 9/13/2019  Visit #: 35  PTA visit #:       Referral Diagnosis: Chronic pain      Referring provider: Dmitriy Cramer*     Visit Diagnosis:     ICD-10-CM    1. Chronic right shoulder pain M25.511     G89.29    2. Cervicalgia M54.2    3. Cervical radiculitis M54.12    4. Myofascial pain M79.18          Assessment:     Patient is benefitting from skilled physical therapy and is making steady progress toward functional goals.  Patient is appropriate to continue with skilled physical therapy intervention, as indicated by initial plan of care.  He did have good release of fascial tensions with treatment today.        Goal Status:  Pt. will demonstrate/verbalize independence in self-management of condition in : 12 weeks - IMPROVING TOWARDS GOAL  Pt. will report decreased intensity, frequency of : Pain;in 12 weeks;Comment  Comment:: decrease pain from 6-8/10 to 4-7/10 with ADLs. - IMPROVING TOWARDS GOAL  Pt. will decrease use of medication for pain for improved quality of life in : 12 weeks - SLOW IMPROVEMENT  Pt. will have improved quality of sleep: waking less times/night;getting 75-90% of required amount;in 12 weeks - IMPROVING TOWARDS GOAL  Patient will return to: exercise;leisure;in 12 weeks;Comment  Comment: lifting weights withtout dropping anything with B hands - SLOW PROGRESSION  Patient Turn Head: for driving;for conversation;with less pain;with less difficulty;in 12 weeks;Comment  Comment: increase C-rot to >50 deg B and T-rot to >40 deg. - HAS NOT SHOWN IMPROVEMENT IN THESE ROM MEASUREMENTS  Patient will reach / maintain arm movement: forward;overhead;behind;for home chores;for dressing;with less pain;with less difficulty;in 12 weeks - SHOWING IMPROVEMENT   Patient will decrease : NDI score;by _ points;for improved quality of function;for improved quality of life;in 12 weeks  by ___ points: 15 -  NOT TESTED         Plan /  "Patient Education:     Plan to con't with manual therapy to decrease fascial tension/tone to normalize ROM/decrease inflammation/decrease mm tone and improve proprioception.      Subjective:     Pain Ratin.5-7.5 range  Things are going alright. He has had some anxiety from his scans since he was sick. They did find a few polyps in his stomach and esophagus which showed reflux. There was one lower in his bowel as well. This one was pre-cancerous.   He is also being tested for Lyme, Lupus, RA, etc.   He has not been able to really rest at all with all of this anxiety going on. This does affect his overall pain. He has been icing his knee at least every other night. The knee has really been bothering him from the knee joint and some \"nerves\" down his legs. He feels like these are \"growing pains\".      Objective:     Neural, MS, LV fascial tensions.      Treatment Today   2019   TREATMENT MINUTES COMMENTS   Evaluation     Self-care/ Home management     Manual therapy 25 Fascial release using Strain-Counterstrain of LLE/lumbopelvic-LV, B cerv sinu-vert-N, B cerv disk (C)-MS    Neuromuscular Re-education 15 Recip inhib of MS, neural, LV fascia   Therapeutic Activity     Therapeutic Exercises 15 AA/PROM to C-T-L spine, LLE.    Gait training     Modality__________________                Total 55    Blank areas are intentional and mean the treatment did not include these items.       Jb Ochoa  2019  "

## 2021-06-01 NOTE — PROGRESS NOTES
[facility] Daily Progress     Patient Name: Bola Lyon  Date: 10/1/2019  Visit #: 36  PTA visit #:       Referral Diagnosis: Chronic pain      Referring provider: Dmitriy Cramer*     Visit Diagnosis:     ICD-10-CM    1. Chronic right shoulder pain M25.511     G89.29    2. Cervicalgia M54.2    3. Cervical radiculitis M54.12    4. Myofascial pain M79.18          Assessment:     Patient is benefitting from skilled physical therapy and is making steady progress toward functional goals.  Patient is appropriate to continue with skilled physical therapy intervention, as indicated by initial plan of care.  There was good release of fascial tensions treated today which should improve his cervical pain and shoulder pain. .         Goal Status:  Pt. will demonstrate/verbalize independence in self-management of condition in : 12 weeks - IMPROVING TOWARDS GOAL  Pt. will report decreased intensity, frequency of : Pain;in 12 weeks;Comment  Comment:: decrease pain from 6-8/10 to 4-7/10 with ADLs. - IMPROVING TOWARDS GOAL  Pt. will decrease use of medication for pain for improved quality of life in : 12 weeks - SLOW IMPROVEMENT  Pt. will have improved quality of sleep: waking less times/night;getting 75-90% of required amount;in 12 weeks - IMPROVING TOWARDS GOAL  Patient will return to: exercise;leisure;in 12 weeks;Comment  Comment: lifting weights withtout dropping anything with B hands - SLOW PROGRESSION  Patient Turn [place]: for driving;for conversation;with less pain;with less difficulty;in 12 weeks;Comment  Comment: increase C-rot to >50 deg B and T-rot to >40 deg. - HAS NOT SHOWN IMPROVEMENT IN THESE ROM MEASUREMENTS  Patient will reach / maintain arm movement: forward;overhead;behind;for home chores;for dressing;with less pain;with less difficulty;in 12 weeks - SHOWING IMPROVEMENT   Patient will decrease : NDI score;by _ points;for improved quality of function;for improved quality of life;in 12  weeks  by ___ points: 15 -  NOT TESTED         Plan / Patient Education:     Plan to con't with manual therapy to decrease fascial tension/tone to normalize ROM/decrease inflammation/decrease mm tone and improve proprioception.      Subjective:     Pain Ratin.5-7.5 range  He is fighting a sinus and upper respiratory infection.   He did see Dr. Cramer recently and is good with everything with him.   The neck/1st rib and collar bone area is painful. He is still getting his BUE radicular type symptoms as well.   The little work on the knee did help it quite a bit. It is still pretty painful.     Pt 30' late for appt today    Objective:     MS fascial tensions.      Treatment Today   10/1/2019   TREATMENT MINUTES COMMENTS   Evaluation     Self-care/ Home management     Manual therapy 25 Fascial release using Strain-Counterstrain of B TMJ myochain-MS, L ENDO-SF, AR2-3-MS   Neuromuscular Re-education     Therapeutic Activity     Therapeutic Exercises     Gait training     Modality__________________                Total 25    Blank areas are intentional and mean the treatment did not include these items.       Jb Ochoa  10/1/2019

## 2021-06-01 NOTE — PROGRESS NOTES
Pt is being seen today by Dmitriy Cramer MD, for refills and f/u of 7-constant, shooting, throbbing multisite pain.   F8    Ketamine called to [facility]

## 2021-06-01 NOTE — TELEPHONE ENCOUNTER
Medication Question or Clarification  Who is calling: Patient  What medication are you calling about? (include dose and sig)   dextroamphetamine-amphetamine (ADDERALL) 30 mg Tab 30 tablet 0 8/30/2019     Sig - Route: Take 30 mg by mouth daily. - Oral    Who prescribed the medication?: Darlene Post  What is your question/concern?: patient states that Adderall  30 mg is  Back order pharmacy can fill Adderall 20 mg and 10 mg  Requesting  For provider suggestion. Please advise   Pharmacy: [facility] # [id number]  Okay to leave a detailed message?: No  Site CMT - Please call the pharmacy to obtain any additional needed information.

## 2021-06-01 NOTE — TELEPHONE ENCOUNTER
Medication Question or Clarification  Who is calling: Patient  What medication are you calling about? (include dose and sig)   dextroamphetamine-amphetamine (ADDERALL) 10 mg Tab tablet 90 tablet 0 9/13/2019 9/12/2020    Sig - Route: Take 1 tablet by mouth 3 (three) times a day. - Oral    Sent to pharmacy as: dextroamphetamine-amphetamine (ADDERALL) 10 mg Tab tablet    Earliest Fill Date: 9/13/2019    Notes to Pharmacy: Due to lack of availability of 30 mg tabs      Who prescribed the medication?:   Devon Lund MD  What is your question/concern?:   Pharmacy states insurance will not cover three, 10 mg tablets per day of above medication.  Please send script for 20 mg tablets.  Patient already has the 10 mg tablets of above medication for once daily.  Patient needs the 20 mg once daily to make the 30 mg total.  Pharmacy cannot get the 30 mg tablets at this time.  Pharmacy: [facility] #[id number]  Okay to leave a detailed message?: Yes  Site CMT - Please call the pharmacy to obtain any additional needed information.    Please expedite per pharmacy request.

## 2021-06-01 NOTE — TELEPHONE ENCOUNTER
Central PA team  [phone]  Pool: [facility] PA MED ([id number])          PA has been initiated.       PA form completed and faxed insurance via Cover My Meds     Key:  [id number] - PA Case ID: [id number]     Medication:    Amphetamine-Dextroamphetamine 10MG tablets    Insurance:  Aetna Medicare        Response will be received via fax and may take up to 5-10 business days depending on plan

## 2021-06-01 NOTE — PROGRESS NOTES
[facility] Daily Progress     Patient Name: Bola Lyon  Date: 9/24/2019  Visit #: 35  PTA visit #:       Referral Diagnosis: Chronic pain      Referring provider: Dmitriy Cramer*     Visit Diagnosis:     ICD-10-CM    1. Chronic right shoulder pain M25.511     G89.29    2. Cervicalgia M54.2    3. Cervical radiculitis M54.12    4. Myofascial pain M79.18          Assessment:     Patient is benefitting from skilled physical therapy and is making steady progress toward functional goals.  Patient is appropriate to continue with skilled physical therapy intervention, as indicated by initial plan of care.  He is doing alright overall at the moment. He does seem to be more anxious lately with his medication struggles which is brining up his pain levels. He was doing better prior to this.         Goal Status:  Pt. will demonstrate/verbalize independence in self-management of condition in : 12 weeks - IMPROVING TOWARDS GOAL  Pt. will report decreased intensity, frequency of : Pain;in 12 weeks;Comment  Comment:: decrease pain from 6-8/10 to 4-7/10 with ADLs. - IMPROVING TOWARDS GOAL  Pt. will decrease use of medication for pain for improved quality of life in : 12 weeks - SLOW IMPROVEMENT  Pt. will have improved quality of sleep: waking less times/night;getting 75-90% of required amount;in 12 weeks - IMPROVING TOWARDS GOAL  Patient will return to: exercise;leisure;in 12 weeks;Comment  Comment: lifting weights withtout dropping anything with B hands - SLOW PROGRESSION  Patient Turn [place]: for driving;for conversation;with less pain;with less difficulty;in 12 weeks;Comment  Comment: increase C-rot to >50 deg B and T-rot to >40 deg. - HAS NOT SHOWN IMPROVEMENT IN THESE ROM MEASUREMENTS  Patient will reach / maintain arm movement: forward;overhead;behind;for home chores;for dressing;with less pain;with less difficulty;in 12 weeks - SHOWING IMPROVEMENT   Patient will decrease : NDI score;by _ points;for  "improved quality of function;for improved quality of life;in 12 weeks  by ___ points: 15 -  NOT TESTED         Plan / Patient Education:     Plan to con't with manual therapy to decrease fascial tension/tone to normalize ROM/decrease inflammation/decrease mm tone and improve proprioception.      Subjective:     Pain Ratin.5-7.5 range  He is seeing Dr. Cramer later this week. He did see his PCP earlier this week.   He has still not with his intermediate meds which he has been without for almost 1 week. He only has his morning and later night meds. He does feel like this is affecting his overall pain levels.   He is still presenting with his nerve pains down his arms. His L arm is still having pains down his biceps which he feels is more after his manic episodes.   In his back he does get pains down around the shoulder blades.   He is still not doing his \"posing\". He does feel like his hands are tight and add to his overall neural stimulation.  He does get fatigue in his hands by the end of the day.   There also seems to be more \"coming up\" with his LLE swelling     Objective:     Neural, LV fascial tensions.      Treatment Today   2019   TREATMENT MINUTES COMMENTS   Evaluation     Self-care/ Home management     Manual therapy 25 Fascial release using Strain-Counterstrain of B upper vagus-N, B cervical MED-LV, B cervical SVE-LV   L fibular nerve release   Neuromuscular Re-education 15 Recip inhib of neural, LV fascia   Therapeutic Activity     Therapeutic Exercises 15 AA/PROM to C-T-L spine, LLE.    Gait training     Modality__________________                Total 55    Blank areas are intentional and mean the treatment did not include these items.       Jb Ochoa  2019  "

## 2021-06-01 NOTE — TELEPHONE ENCOUNTER
Fax received from [facility], they have started the Prior Authorization Process via Cover My Meds    CoverMyMeds Key: JORDAN    Medication Name:   montelukast (SINGULAIR) 10 mg tablet 60 tablet 11 4/4/2019 4/3/2020    Sig - Route: Take 1 tablet (10 mg total) by mouth 2 (two) times a day. - Oral    Sent to pharmacy as: montelukast (SINGULAIR) 10 mg tablet    E-Prescribing Status: Receipt confirmed by pharmacy (4/4/2019 12:46 PM CDT)          Insurance Plan: AETNA Part D  PBM:   Patient ID: [id number]    Please complete the PA process

## 2021-06-01 NOTE — TELEPHONE ENCOUNTER
Central PA team  [phone]  Pool: [facility] PA MED ([id number])          PA has been initiated.       PA form completed and faxed insurance via Cover My Meds     Key:  JORDAN     Medication:  montelukast     Insurance:  aetna part d        Response will be received via fax and may take up to 5-10 business days depending on plan

## 2021-06-01 NOTE — TELEPHONE ENCOUNTER
Prior Authorization Request  Who s requesting:  Pharmacy  Pharmacy Name and Location: [facility]  Medication Name: Amphetamin/dextro combo 10 mg  Insurance Plan: unknown  Insurance Member ID Number:  unknown  Informed patient that prior authorizations can take up to 10 business days for response:   No  Okay to leave a detailed message: Yes    10 mg tablets need prior authorization, max 2/day or DO 20 mg 1.5 daily

## 2021-06-01 NOTE — PATIENT INSTRUCTIONS - HE
Use Trish in future for any medication issues    You had a colon polyp, so repeat colonoscopy in 3 years    Mild anti inflammatory that will help with inflammation but least risk of stomach ulcer or bleeding    Continue pantoprazole twice a day to treat acid heartburn and esophagus and hiatal hernia for 2-4 months, then we will try once a day    Add back the Nabumetone twice a day.  Non steroid anti inflammatory    Update blood tests    Increase prednisone to 10 mgs twice a day.  Steroid anti inflammatory for better inflammation control    email me when you get home how much HCTZ you are taking

## 2021-06-01 NOTE — PROGRESS NOTES
ASSESSMENT:  1. Iron deficiency anemia due to chronic blood loss  Reviewed confirmed iron deficiency, mild esophagitis, no ulcer, and colon polyp.  Reviewed endoscopy, colonoscopy, and biopsies.  Recheck now on iron supplements.  Continue high-dose PPI.  Discussed risks and benefits of anti-inflammatories  - HM2(CBC w/o Differential)  - Iron and Transferrin Iron Binding Capacity  - Ferritin    2. Traumatic brain injury with loss of consciousness, sequela (H)  Discussed with patient and Pharm.D.  Reviewed notes.  Continue Adderall at 30 mg twice daily the best we can given some shortages at the pharmacy.  Discussed use of 10 mg 3 times daily    3. Essential hypertension  Stable.  Recheck kidney tests.  Confirm that he should be off hydrochlorothiazide  - Basic Metabolic Panel    4. Generalized anxiety disorder  Worsened with recent events    5. Chronic, continuous use of opioids  No improvement with increase allopurinol.  Marked worsening off nabumetone.  Increase prednisone.  Repeat nabumetone.  - Uric Acid  - predniSONE (DELTASONE) 10 mg tablet; Take 10 mg by mouth 2 (two) times a day.  Dispense: 60 tablet; Refill: 11  - nabumetone (RELAFEN) 500 MG tablet; Take 1 tablet (500 mg total) by mouth 2 (two) times a day.  Dispense: 120 tablet; Refill: 1    6. Chronic pain syndrome  Update evaluation for underlying inflammatory diseases  - C-Reactive Protein  - Erythrocyte Sedimentation Rate  - Lyme Antibody Cascade  - Antinuclear Antibodies Screen (TAD)  - CCP Antibodies  - Rheumatoid Factor Quant    7. Acute maxillary sinusitis, recurrence not specified  Trial of higher dose prednisone  - predniSONE (DELTASONE) 10 mg tablet; Take 10 mg by mouth 2 (two) times a day.  Dispense: 60 tablet; Refill: 11    8. Traumatic brain injury with loss of consciousness (H)  Discussed    9. Post concussion syndrome  Discussed with Pharm.D.  - dextroamphetamine-amphetamine (ADDERALL) 10 mg Tab tablet; Take 1 tablet by mouth 3 (three) times  a day.  Dispense: 90 tablet; Refill: 0      10.  Gastroesophageal reflux disease.  Markedly better on high-dose proton pump inhibitor.  Continue for at least a few more months then trial of once daily    PLAN:  Patient Instructions   Use Trish in future for any medication issues    You had a colon polyp, so repeat colonoscopy in 3 years    Mild anti inflammatory that will help with inflammation but least risk of stomach ulcer or bleeding    Continue pantoprazole twice a day to treat acid heartburn and esophagus and hiatal hernia for 2-4 months, then we will try once a day    Add back the Nabumetone twice a day.  Non steroid anti inflammatory    Update blood tests    Increase prednisone to 10 mgs twice a day.  Steroid anti inflammatory for better inflammation control    email me when you get home how much HCTZ you are taking          Orders Placed This Encounter   Procedures     Uric Acid     HM2(CBC w/o Differential)     Iron and Transferrin Iron Binding Capacity     Ferritin     Basic Metabolic Panel     C-Reactive Protein     Erythrocyte Sedimentation Rate     Lyme Antibody Cascade     Antinuclear Antibodies Screen (TAD)     CCP Antibodies     Rheumatoid Factor Quant     Medications Discontinued During This Encounter   Medication Reason     predniSONE (DELTASONE) 5 MG tablet      dextroamphetamine-amphetamine (ADDERALL) 10 mg Tab tablet      Administrations This Visit     cyanocobalamin injection 1,000 mcg     Admin Date  09/13/2019 Action  Given Dose  1000 mcg Route  Intramuscular Administered By  Gurwinder Kendrick CMA              Return in about 4 weeks (around 10/11/2019) for for a full review of everything we did today, and contact me through [employer].      CHIEF COMPLAINT:  Chief Complaint   Patient presents with     Follow-up       HISTORY OF PRESENT ILLNESS:  Bola is a 49 y.o. male presenting to the clinic today for three week follow up to mediation management     Medication Management: Jan is very pissed  "and upset about all that he has had to go through to get his controlled substance medication. He was very compliant with the paperwork with his pharmacist but he has been getting ridiculous push back. He notes that it was very bad timing because of his mental state.His he ketamine is doing a good job he just gets frustrated because when he has a big day planned he has to hold off because it builds up to drowsiness. The cannabis people did not send him his link to renew.    Arthritis: He would like to know what medications he is on because he has been suffering pain from arthritis. He said it feels like someone is going to explode his left knee or it will blow up on him. He thinks he has uric and lactic acid in his knee joint. He was told to stop the nabumetone. The uric acid has been keeping him up at night and he takes his medication twice a day. He does not think it is helping and would like an increase. B12 keeps him going and is doing good for him. He gets a lactic feel because he believes his nerves are over firing.      Health Maintenance: He does not want a flu shot because he feels his body is not up to it.     REVIEW OF SYSTEMS:   Denies ulcers   All other systems are negative.    PFSH:  He sees his psychiatrist on the  of this month as well as seeing Dr. Cramer  for oxy reduction  He goes to [facility] twice a week  His grand mother had colon cancer  His father had lymphoma in the lower stomach     TOBACCO USE:  Social History     Tobacco Use   Smoking Status Current Some Day Smoker     Packs/day: 0.50     Years: 6.00     Pack years: 3.00     Types: Cigarettes     Start date: 2009     Last attempt to quit: 2017     Years since quittin.5   Smokeless Tobacco Former User       VITALS:  Vitals:    19 1250   BP: 124/82   Patient Site: Left Arm   Patient Position: Sitting   Cuff Size: Adult Large   Pulse: 98   SpO2: 98%   Weight: 202 lb 5 oz (91.8 kg)   Height: 5' 8\" (1.727 m) "     Wt Readings from Last 3 Encounters:   09/13/19 202 lb 5 oz (91.8 kg)   08/19/19 202 lb (91.6 kg)   06/28/19 204 lb 6 oz (92.7 kg)     Body mass index is 30.76 kg/m .    PHYSICAL EXAM:  Constitutional:  Reveals alert, talkative male. Initially emotionally heightened,and anxious appearing. Vitals:  Per nursing notes.          MEDICATIONS:  Current Outpatient Medications   Medication Sig Dispense Refill     acetaminophen (TYLENOL) 650 MG CR tablet Take 1,300 mg by mouth every 8 (eight) hours as needed for pain.       albuterol (PROAIR HFA;PROVENTIL HFA;VENTOLIN HFA) 90 mcg/actuation inhaler INHALE 2 PUFFS BY MOUTH EVERY 6 HOURS AS NEEDED FOR WHEEZING 3 Inhaler 3     allopurinol (ZYLOPRIM) 300 MG tablet Take 1 tablet (300 mg total) by mouth 2 (two) times a day. 180 tablet 3     amitriptyline (ELAVIL) 25 MG tablet Take 1 tablet (25 mg total) by mouth at bedtime. 30 tablet 11     busPIRone (BUSPAR) 10 MG tablet Take 1.5 tablets (15 mg total) by mouth 3 (three) times a day. 135 tablet 3     calcium carbonate-vitamin D3 (CALTRATE 600 PLUS D3) 600 mg(1,500mg) -400 unit per tablet TAKE THREE TABLETS BY MOUTH ONCE DAILY 90 tablet 6     chlorhexidine (PERIDEX) 0.12 % solution Apply 15 mL to the mouth or throat 2 (two) times a day.       dextroamphetamine-amphetamine (ADDERALL) 30 mg Tab Once a day 30 tablet 0     DULoxetine (CYMBALTA) 30 MG capsule Take 1 capsule (30 mg total) by mouth 3 (three) times a day.  0     fexofenadine (ALLEGRA) 180 MG tablet Take 1 tablet (180 mg total) by mouth 2 (two) times a day. 60 tablet 11     fluticasone (FLONASE) 50 mcg/actuation nasal spray USE 2 SPRAY(S) IN EACH NOSTRIL ONCE DAILY. 18.2 g 5     hydroCHLOROthiazide (HYDRODIURIL) 25 MG tablet Take 1 tablet (25 mg total) by mouth daily as needed (leg swelling). 90 tablet 3     ketamine HCl (KETAMINE, BULK,) 100 % Powd Ketamine 120 mg amarjit, dissolve one amarjit under the tongue up to 11 times daily prn pain 120 Bottle 2     lamoTRIgine  (LAMICTAL) 100 MG tablet Take 1 tablet (100 mg total) by mouth daily.       lidocaine HCl 3 % Crea Apply topically to affected areas twice daily 1 Tube 5     losartan (COZAAR) 100 MG tablet Take 1 tablet (100 mg total) by mouth daily. 90 tablet 3     Medical Cannabis Use 1 Units As Directed. Take as instructed by the medical cannabis dispensary. The provider who enrolled the patient in the medical cannabis registry and certified this  patient will maintain ownership and liability of all provider reporting and registration requirements.       metoprolol succinate (TOPROL-XL) 100 MG 24 hr tablet Take two tablets (200 mg total) daily. 180 tablet 3     montelukast (SINGULAIR) 10 mg tablet Take 1 tablet (10 mg total) by mouth 2 (two) times a day. 60 tablet 11     NARCAN 4 mg/actuation nasal spray   0     nicotine (NICODERM CQ) 21 mg/24 hr Place 1 patch on the skin daily. 30 patch 1     oxyCODONE (ROXICODONE) 5 MG immediate release tablet Two tabs morning, one mid-day, two bedtime 70 tablet 0     pantoprazole (PROTONIX) 40 MG tablet Take 1 tablet (40 mg total) by mouth 2 (two) times a day. 60 tablet 11     potassium 99 mg Tab Take by mouth daily.       potassium bicarb/mag combo 21 (MAGNESIUM FIZZ-PLUS ORAL) Take 500 mg by mouth daily.       prazosin (MINIPRESS) 2 MG capsule Take 1 capsule (2 mg total) by mouth at bedtime. 90 capsule 3     predniSONE (DELTASONE) 10 mg tablet Take 10 mg by mouth 2 (two) times a day. 60 tablet 11     pseudoephedrine (SUDAFED) 120 mg 12 hr tablet Take 1 tablet (120 mg total) by mouth 3 (three) times a day. 90 tablet 5     QUEtiapine (SEROQUEL) 200 MG tablet Take 200 mg by mouth bedtime.       QUEtiapine (SEROQUEL) 50 MG tablet Take 50 mg by mouth 4 (four) times a day.       temazepam (RESTORIL) 30 mg capsule Take 30 mg by mouth at bedtime.         1     THERA-M 9 mg iron-400 mcg Tab tablet TAKE ONE TABLET BY MOUTH ONCE DAILY 30 tablet 10     topiramate (TOPAMAX) 100 MG tablet Take 1 tablet  (100 mg total) by mouth 4 (four) times a day. 120 tablet 11     baclofen (LIORESAL) 10 MG tablet One and one-half tab three times a day 126 tablet 3     dextroamphetamine-amphetamine 20 mg Tab Take 20 mg by mouth daily. Take with 10 mg tab for a total of 30 mg daily 30 tablet 0     hydrOXYzine HCl (ATARAX) 25 MG tablet Take 1 tablet (25 mg total) by mouth 4 (four) times a day. 120 tablet 3     nabumetone (RELAFEN) 500 MG tablet Take 1 tablet (500 mg total) by mouth 2 (two) times a day. 120 tablet 1     [START ON 9/19/2019] oxyCODONE (OXYCONTIN) 10 mg 12 hr tablet Take 1 tablet (10 mg total) by mouth every 12 (twelve) hours for 21 days. 42 each 0     Current Facility-Administered Medications   Medication Dose Route Frequency Provider Last Rate Last Dose     cyanocobalamin injection 1,000 mcg  1,000 mcg Intramuscular Q7 Days Devon Lund MD   1,000 mcg at 09/13/19 1352       ADDITIONAL HISTORY SUMMARIZED (2): Reviewed Mediation Management 9/10/2019   Reviewed colonoscopy Results 9/10/19  Reviewed Pharmacist note 11/1/18  Reviewed 4/4/2019 Internal Med Encounter   Reviewed 1/23/2019 Internal Med Encounter   DECISION TO OBTAIN EXTRA INFORMATION (1): None.   RADIOLOGY TESTS (1): None.  LABS (1): Labs reviewed and ordered.  MEDICINE TESTS (1): None.  INDEPENDENT REVIEW (2 each): None.     The visit lasted a total of 42 minutes face to face with the patient. Over 50% of the time was spent counseling and educating the patient about medication and arthritis management.    IEzequiel, am scribing for and in the presence of, Dr. Lund.    I, Dr. Lund, personally performed the services described in this documentation, as scribed by Ezequiel Khan in my presence, and it is both accurate and complete.    Total data points:2

## 2021-06-01 NOTE — TELEPHONE ENCOUNTER
Medication Request  Medication name:    1. Alternative Antibiotic     Pharmacy Name and Location: amoxicillin-clavulanate (AUGMENTIN) 875-125 mg per tablet    Reason for request: Current Antibiotic for URI and Acute sinusitis, recurrence not specified, unspecified location. Is not working . Patient states he still has symptoms.     When did you use medication last?:  10/02/19    Patient offered appointment:  yes     Okay to leave a detailed message: yes  Please call Pt. With update ASAP and have covering provider address if needed.

## 2021-06-01 NOTE — TELEPHONE ENCOUNTER
Please review, previously cued for incorrect date.  Oxycodone cued, patient is needing oxycontin.  Requested Prescriptions     Pending Prescriptions Disp Refills     oxyCODONE (OXYCONTIN) 10 mg 12 hr tablet 42 each 0     Sig: Take 1 tablet (10 mg total) by mouth every 12 (twelve) hours for 21 days.     Signed Prescriptions Disp Refills     baclofen (LIORESAL) 10 MG tablet 126 tablet 3     Sig: One and one-half tab three times a day     Authorizing Provider: JAYME MULLEN     oxyCODONE (OXYCONTIN) 10 mg 12 hr tablet 42 each 0     Sig: Take 1 tablet (10 mg total) by mouth every 12 (twelve) hours for 21 days.     Authorizing Provider: JAYME MULLEN

## 2021-06-01 NOTE — TELEPHONE ENCOUNTER
Spoke with pharmacist and cancelled adderall script below.  Pharmacist understanding.  Confirmed that pharmacy does not have 30 mg of Adderall in stock.  Pended script for 20 mg dose.

## 2021-06-01 NOTE — TELEPHONE ENCOUNTER
The patient was seen on 9/13/19.    This week his symptoms just got worse    Sinus pain and congestion    The congestion is green.    The drainage is thick.    He is also coughing up thick green mucus.    He is not able to check a temp but he is getting the sweats.    Has a sinus headache.    Is having to mouth breath    Says his tonsils are red and his throat is sore    Has ear pressure    The patient is wondering if he be treated over the phone?    Joslyn Cruz, RN   [facility]ication Refill and Triage Nurse  2:30 PM  9/26/2019      Reason for Disposition    Sinus congestion (pressure, fullness) present > 10 days    Protocols used: SINUS PAIN AND CONGESTION-A-OH

## 2021-06-01 NOTE — PROGRESS NOTES
"  Assessment / Impression   1.  Bipolar disorder  2.  Cognitive disorder multifactorial in etiology  3.  History of closed head injury secondary to contact sport involvement  4.  Other medical problems as noted  5.  Other psychiatric difficulties as previously noted      Plan:   1.  I would like to speak with the patient's treating psychiatrist and Dr. Lund  2.  Once I had conversation with above physicians, I will contact the patient by phone for further consultation  3.  Follow-up in 3 months    Long conversation held with the patient who presents unaccompanied today.  The patient is clearly at the very least hypomanic today and he informs me that he is receiving therapy with 2 mood stabilizing therapies both quetiapine and lamotrigine.  Lamotrigine has been recently up titrated.  The patient presented for consideration of reassessment regarding his cognitive condition.  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  At the time of prior assessment 18 months ago we believe cognitive impairments were largely secondary to ongoing challenges with mental illness.  Today the patient appears much less stable than that noted previously and I informed him that I was reluctant to seriously consider diagnostic assessment at this time.  I would, on the other hand, wish to speak with his doctors regarding current thoughts with respect to treatment.  Of interest, the patient is receiving psychostimulant therapy to manage \"attention\" (Adderall) and I would like to understand this strategy as well.    Total time for evaluation 30 minutes with majority time spent counseling.    Subjective:     HPI: Bola Lyon is a 49 y.o. male with above-noted diagnoses who presents for follow-up.  As best as can be determined, the patient appears to be presenting for consideration of yet " "another cognitive assessment.  I would refer the reader to my intake assessment for details of the patient's past medical history.  At the time of last assessment, we believe the patient to be experiencing cognitive difficulties secondary to ongoing mental health challenges.  Today the patient appears more unstable than that noted 18 months ago and he reports ongoing efforts to stabilize mood with higher doses of lamotrigine.  He also is currently receiving therapy with quetiapine.  Presently he reports from a cognitive perspective what he loosely refers to his \"mind blocks\" that are becoming more frequent than that noted previously.  This was a complaint noted at the time of intake assessment.  With respect to mood he reports his mood to be \"not good\" but he denies depression referring more to irritability and difficulty with mental focus focus.  He offers a slough of medical concerns including recently torn tendons, recent diagnosis of sinusitis, etc.  Given his pressured speech and some loosening of associations all of this is a bit difficult to follow.  Overall he appears to be medically stable and cognitively capable of attending to at least basic conversation.  He reports that emotionally he is had a very \"tough year\" particularly as it relates to his mental health challenges I understand he is currently living with his parents.  He is not been actively employed since 2016 or 2017 by report.          Review of Systems:          As noted.  The patient denies persistent insomnia and increased energy levels associate the loss of mental focus        Objective:   There were no vitals filed for this visit.    No results found for this or any previous visit (from the past 24 hour(s)).    Physical Exam: Patient is casually dressed and seated for evaluation.  He brings with him a large satchel filled with I believe medical records that he constantly shuffles and organizes during the interview.  Most notable today is " pressured speech with some loosening of associations noted.  Frequent fidgeting type movements are also noted.  I note no evidence of bizarre ideations or delusions and he denies visual as well as auditory hallucinations.  No thoughts of self-harm identified.  Mood is irritable at this time.  He is able to attend to conversation and he does allow for this examiner to direct him as he requires that I interrupt his conversation so as to attempt to engage him in a more structured interview.  Short-term recall appears to be reasonably good.  Insight appears to be somewhat  preserved.

## 2021-06-02 VITALS — BODY MASS INDEX: 34.75 KG/M2 | HEIGHT: 68 IN | WEIGHT: 229.25 LBS

## 2021-06-02 VITALS — WEIGHT: 227 LBS | HEIGHT: 68 IN | BODY MASS INDEX: 34.4 KG/M2

## 2021-06-02 VITALS — BODY MASS INDEX: 34.71 KG/M2 | WEIGHT: 229 LBS | HEIGHT: 68 IN

## 2021-06-02 VITALS — HEIGHT: 68 IN | WEIGHT: 212 LBS | BODY MASS INDEX: 32.13 KG/M2

## 2021-06-02 VITALS — HEIGHT: 68 IN | WEIGHT: 227 LBS | BODY MASS INDEX: 34.4 KG/M2

## 2021-06-02 VITALS — BODY MASS INDEX: 32.14 KG/M2 | WEIGHT: 212.06 LBS | HEIGHT: 68 IN

## 2021-06-02 VITALS — WEIGHT: 208 LBS | BODY MASS INDEX: 31.52 KG/M2 | HEIGHT: 68 IN

## 2021-06-02 VITALS — BODY MASS INDEX: 31.59 KG/M2 | HEIGHT: 68 IN | WEIGHT: 208.44 LBS

## 2021-06-02 VITALS — WEIGHT: 212.8 LBS | BODY MASS INDEX: 32.36 KG/M2

## 2021-06-02 VITALS — HEIGHT: 68 IN | WEIGHT: 232 LBS | BODY MASS INDEX: 35.16 KG/M2

## 2021-06-02 VITALS — WEIGHT: 232 LBS | BODY MASS INDEX: 35.28 KG/M2

## 2021-06-02 VITALS — WEIGHT: 232 LBS | HEIGHT: 68 IN | BODY MASS INDEX: 35.16 KG/M2

## 2021-06-02 NOTE — TELEPHONE ENCOUNTER
Controlled Substance Refill Request  Medication:   Requested Prescriptions     Pending Prescriptions Disp Refills     SUDOGEST 12-HOUR 120 mg 12 hr tablet [Pharmacy Med Name: SUDOGEST 12 HOUR    TAB] 90 tablet 5     Sig: TAKE 1 TABLET BY MOUTH THREE TIMES DAILY     Date Last Fill: 4/4/19 #90 tablets with 5 refills  Pharmacy: [facility]   Submit electronically to pharmacy  Controlled Substance Agreement on File:   Encounter-Level CSA Scan Date - 08/21/2017:    Scan on 8/24/2017  7:50 AM  Scan on 8/23/2017  7:29 AM           Encounter-Level CSA Scan Date - 06/27/2016:    Scan on 6/30/2016  8:08 AM       Last office visit: 10/18/2019 Devon Lund MD Jill Thielen, RN  Triage Nurse Advisor

## 2021-06-02 NOTE — PROGRESS NOTES
[facility] Daily Progress     Patient Name: Bola Lyon  Date: 10/22/2019  Visit #: 36  PTA visit #:       Referral Diagnosis: Chronic pain      Referring provider: Dmitriy Cramer*     Visit Diagnosis:     ICD-10-CM    1. Chronic right shoulder pain M25.511     G89.29    2. Cervicalgia M54.2    3. Cervical radiculitis M54.12    4. Myofascial pain M79.18          Assessment:     Patient is benefitting from skilled physical therapy and is making steady progress toward functional goals.  Patient is appropriate to continue with skilled physical therapy intervention, as indicated by initial plan of care.  He did have good release of fascial tensions with traetment today.         Goal Status:  Pt. will demonstrate/verbalize independence in self-management of condition in : 12 weeks - IMPROVING TOWARDS GOAL  Pt. will report decreased intensity, frequency of : Pain;in 12 weeks;Comment  Comment:: decrease pain from 6-8/10 to 4-7/10 with ADLs. - IMPROVING TOWARDS GOAL  Pt. will decrease use of medication for pain for improved quality of life in : 12 weeks - SLOW IMPROVEMENT  Pt. will have improved quality of sleep: waking less times/night;getting 75-90% of required amount;in 12 weeks - IMPROVING TOWARDS GOAL  Patient will return to: exercise;leisure;in 12 weeks;Comment  Comment: lifting weights withtout dropping anything with B hands - SLOW PROGRESSION  Patient [place]: for driving;for conversation;with less pain;with less difficulty;in 12 weeks;Comment  Comment: increase C-rot to >50 deg B and T-rot to >40 deg. - HAS NOT SHOWN IMPROVEMENT IN THESE ROM MEASUREMENTS  Patient will reach / maintain arm movement: forward;overhead;behind;for home chores;for dressing;with less pain;with less difficulty;in 12 weeks - SHOWING IMPROVEMENT   Patient will decrease : NDI score;by _ points;for improved quality of function;for improved quality of life;in 12 weeks  by ___ points: 15 -  NOT TESTED         Plan /  "Patient Education:     Plan to con't with manual therapy to decrease fascial tension/tone to normalize ROM/decrease inflammation/decrease mm tone and improve proprioception.      Subjective:     Pain Ratin-7.5 range  He ran into a problem last week with getting his dental package set up.   He is still holding pretty good with his 7-7.5 pain range.   The hands and elbow have been more painful. He is seeing some \"twitching\" at nights a couple days a week in his forearms and thumb which is new.   He is doing some exercises with the ATC after football practices which is going pretty good.     Pt 15' late for appt today    Objective:     MS, LV fascial tensions.      Treatment Today   10/22/2019   TREATMENT MINUTES COMMENTS   Evaluation     Self-care/ Home management     Manual therapy 40 Fascial release using Strain-Counterstrain of BUE/scap/sternal-LV, R wrist (P)-MS    Neuromuscular Re-education     Therapeutic Activity     Therapeutic Exercises     Gait training     Modality__________________                Total 40    Blank areas are intentional and mean the treatment did not include these items.       Jb Ochoa  10/22/2019  "

## 2021-06-02 NOTE — PROGRESS NOTES
[facility] Daily Progress     Patient Name: Bola Lyon  Date: 10/4/2019  Visit #: 36  PTA visit #:       Referral Diagnosis: Chronic pain      Referring provider: Dmitriy Cramer*     Visit Diagnosis:     ICD-10-CM    1. Chronic right shoulder pain M25.511     G89.29    2. Cervicalgia M54.2    3. Cervical radiculitis M54.12    4. Myofascial pain M79.18          Assessment:     Patient is benefitting from skilled physical therapy and is making steady progress toward functional goals.  Patient is appropriate to continue with skilled physical therapy intervention, as indicated by initial plan of care.  Work today should help to improve more central sensitization.          Goal Status:  Pt. will demonstrate/verbalize independence in self-management of condition in : 12 weeks - IMPROVING TOWARDS GOAL  Pt. will report decreased intensity, frequency of : Pain;in 12 weeks;Comment  Comment:: decrease pain from 6-8/10 to 4-7/10 with ADLs. - IMPROVING TOWARDS GOAL  Pt. will decrease use of medication for pain for improved quality of life in : 12 weeks - SLOW IMPROVEMENT  Pt. will have improved quality of sleep: waking less times/night;getting 75-90% of required amount;in 12 weeks - IMPROVING TOWARDS GOAL  Patient will return to: exercise;leisure;in 12 weeks;Comment  Comment: lifting weights withtout dropping anything with B hands - SLOW PROGRESSION  Patient Turn [place]: for driving;for conversation;with less pain;with less difficulty;in 12 weeks;Comment  Comment: increase C-rot to >50 deg B and T-rot to >40 deg. - HAS NOT SHOWN IMPROVEMENT IN THESE ROM MEASUREMENTS  Patient will reach / maintain arm movement: forward;overhead;behind;for home chores;for dressing;with less pain;with less difficulty;in 12 weeks - SHOWING IMPROVEMENT   Patient will decrease : NDI score;by _ points;for improved quality of function;for improved quality of life;in 12 weeks  by ___ points: 15 -  NOT TESTED         Plan /  "Patient Education:     Plan to con't with manual therapy to decrease fascial tension/tone to normalize ROM/decrease inflammation/decrease mm tone and improve proprioception.      Subjective:     Pain Ratin.5-7.5 range  He is still fighting this sinus infection/upper respiratory issues and is now on a second antibiotic.   He does feel like things are turning around and feeling better. The pain down the arms is getting better.    He does feel some \"cold\" feelings into his hands at times.   There is still bad pain but it isn't \"extreme\" as it has been in the past.     Pt 30' late for appt today    Objective:     MS, neural fascial tensions.      Treatment Today   10/4/2019   TREATMENT MINUTES COMMENTS   Evaluation     Self-care/ Home management     Manual therapy 25 Fascial release using Strain-Counterstrain of B cranial dura-N, L brachial plexus-N, L DCFP-MS   Neuromuscular Re-education     Therapeutic Activity     Therapeutic Exercises     Gait training     Modality__________________                Total 25    Blank areas are intentional and mean the treatment did not include these items.       Jb Ochoa  10/4/2019  "

## 2021-06-02 NOTE — PROGRESS NOTES
[facility] Daily Progress     Patient Name: Bola Lyon  Date: 10/25/2019  Visit #: 36  PTA visit #:       Referral Diagnosis: Chronic pain      Referring provider: Dmitriy Cramer*     Visit Diagnosis:     ICD-10-CM    1. Chronic right shoulder pain M25.511     G89.29    2. Cervicalgia M54.2    3. Cervical radiculitis M54.12          Assessment:     Patient is benefitting from skilled physical therapy and is making steady progress toward functional goals.  Patient is appropriate to continue with skilled physical therapy intervention, as indicated by initial plan of care.  He con't to have good results with treatment. With him starting to get more squared away with his meds this should help to improve symptoms further.          Goal Status:  Pt. will demonstrate/verbalize independence in self-management of condition in : 12 weeks - IMPROVING TOWARDS GOAL  Pt. will report decreased intensity, frequency of : Pain;in 12 weeks;Comment  Comment:: decrease pain from 6-8/10 to 4-7/10 with ADLs. - IMPROVING TOWARDS GOAL  Pt. will decrease use of medication for pain for improved quality of life in : 12 weeks - SLOW IMPROVEMENT  Pt. will have improved quality of sleep: waking less times/night;getting 75-90% of required amount;in 12 weeks - IMPROVING TOWARDS GOAL  Patient will return to: exercise;leisure;in 12 weeks;Comment  Comment: lifting weights withtout dropping anything with B hands - SLOW PROGRESSION  Patient Turn [place]: for driving;for conversation;with less pain;with less difficulty;in 12 weeks;Comment  Comment: increase C-rot to >50 deg B and T-rot to >40 deg. - HAS NOT SHOWN IMPROVEMENT IN THESE ROM MEASUREMENTS  Patient will reach / maintain arm movement: forward;overhead;behind;for home chores;for dressing;with less pain;with less difficulty;in 12 weeks - SHOWING IMPROVEMENT   Patient will decrease : NDI score;by _ points;for improved quality of function;for improved quality of life;in 12  weeks  by ___ points: 15 -  NOT TESTED         Plan / Patient Education:     Plan to con't with manual therapy to decrease fascial tension/tone to normalize ROM/decrease inflammation/decrease mm tone and improve proprioception.      Subjective:     Pain Ratin  He did feel good after the last Rx. He hasn't had the twitching in his arms since the last Rx.   There are some same areas of pain but it is better from the last Rx.     Pt 20' late for appt today    Objective:     MS fascial tensions.      Treatment Today   10/25/2019   TREATMENT MINUTES COMMENTS   Evaluation     Self-care/ Home management     Manual therapy 40 Fascial release using Strain-Counterstrain of BUE/scap-N, R hand (P)-MS     Neuromuscular Re-education     Therapeutic Activity     Therapeutic Exercises     Gait training     Modality__________________                Total 40    Blank areas are intentional and mean the treatment did not include these items.       Jb Ochoa  10/25/2019

## 2021-06-02 NOTE — PATIENT INSTRUCTIONS - HE
Stop Losartan for blood pressure    Decrease metoprolol to one pill in the morning    Continue Levofloxacin antibiotic for another 2 weeks    Increase Nabumetone to 2 pills twice a day for joint pain    I am glad you are on 200 Lamictal, and I want you even higher.  Max is 400 mgs    Update blood tests    Update urine    Refill adderall 30 mgs

## 2021-06-02 NOTE — PROGRESS NOTES
"CHW received message from patient  \"Hi Magalis. Could you please call me tomorrow, Wednesday, 10/16/19, at your convenience, anytime from 10am-2:30pm! I really, really need to speak with you! When I was recently enrolled into Medicare, my Dental plan was dropped. I just recently found a Dentist, [facility], that works specifically, with patients like me: Special Needs, Disorders, Disabilities, ECT...Just real briefly Magalis, I am feeling absolutely, HORRIBLE!!  know I am sick and that I am one not to complain. So if I do, I AM Really Sick! I'm on my 3rd round of Antibiotics now. And even with my Pain Mngt Regime, I still feel miserable. My Sinus Infection will not let up. Here is what's FASTLY, Deteriorating and feeding into my Sinus Infection: My Severly Untreated, Periodontal/Ulcer Gum Disease; Cracked Crown an Molar Root Canal Repair; Front Tooth Veneer cracked off/Root Canal Repair. My upper gums, alexei. the Molar and Front Tooth Root areas, which are deep into my Sinus Cavity are AFFECTED BY ALL THIS!!   Please leave me a msg if I don't  in time Magalis. Sincerely...Jan [phone] \"      CHW attempted to call patient twice today, left voicemail to return call and replied via [facility] to assist patient  "

## 2021-06-02 NOTE — PROGRESS NOTES
Scheduled Follow Up Call: Attempt 1 Community Health Worker called and left a message for the patient. If the patient is returning my call, please transfer the patient to [facility] at ext. [phone].    LMTCB- f/u phone call with chw  NEXT OUTREACH: 10/25/19

## 2021-06-02 NOTE — PROGRESS NOTES
"Scheduled Follow Up Call: Attempt 1 Community Health Worker called and left a message for the patient. If the patient is returning my call, please transfer the patient to [facility] at ext. [phone].    CHW was forwarded a message from patient that was sent po previous CHW.    CHW returned call to patient today and left voicemail.  Plan: to schedule appointment in October during open enrollment for patient to meet with [facility] SW to go over supplemental plans.    Next Outreach: 10/14/19      Message below received 10/2/19 from pt.  \"Maurilio Douglas. Hope you are doing well. I have been extremely busy as well as working thru so many obstacles that I have encountered along my path of self-care this year. As you can see in the system, my Medicare Insurance information has now been entered!! Also, I have been APPROVED for the [employer]ED Rx Savings Program, you signed me up for. [facility] is my Rx Plan provider. Thank You so much for all your help Stella! Could you please let me know when you would have the time to meet with me to point me in the right direction in choosing a Supplemental Plan for Dental, Vision, ECT...Like Open Enrollment??? Afternoons are best, and Tue, Thur, Fri are best. Gave a good week Stella. And Thank You again for all of your help!!  Sincerely..#44 Jan Lyon. \"    "

## 2021-06-02 NOTE — PROGRESS NOTES
[facility] Daily Progress     Patient Name: Bola Lyon  Date: 10/29/2019  Visit #: 37  PTA visit #:       Referral Diagnosis: Chronic pain      Referring provider: Dmitriy Cramer*     Visit Diagnosis:     ICD-10-CM    1. Chronic right shoulder pain M25.511     G89.29    2. Cervicalgia M54.2    3. Cervical radiculitis M54.12    4. Myofascial pain M79.18          Assessment:     Patient is benefitting from skilled physical therapy and is making steady progress toward functional goals.  Patient is appropriate to continue with skilled physical therapy intervention, as indicated by initial plan of care.  There was good release of fascial tensions treated today. This should help to improve radicular sx working in his C-spine and subclavian veins. .          Goal Status:  Pt. will demonstrate/verbalize independence in self-management of condition in : 12 weeks - IMPROVING TOWARDS GOAL  Pt. will report decreased intensity, frequency of : Pain;in 12 weeks;Comment  Comment:: decrease pain from 6-8/10 to 4-7/10 with ADLs. - IMPROVING TOWARDS GOAL  Pt. will decrease use of medication for pain for improved quality of life in : 12 weeks - SLOW IMPROVEMENT  Pt. will have improved quality of sleep: waking less times/night;getting 75-90% of required amount;in 12 weeks - IMPROVING TOWARDS GOAL  Patient will return to: exercise;leisure;in 12 weeks;Comment  Comment: lifting weights withtout dropping anything with B hands - SLOW PROGRESSION  Patient Turn [place]: for driving;for conversation;with less pain;with less difficulty;in 12 weeks;Comment  Comment: increase C-rot to >50 deg B and T-rot to >40 deg. - HAS NOT SHOWN IMPROVEMENT IN THESE ROM MEASUREMENTS  Patient will reach / maintain arm movement: forward;overhead;behind;for home chores;for dressing;with less pain;with less difficulty;in 12 weeks - SHOWING IMPROVEMENT   Patient will decrease : NDI score;by _ points;for improved quality of function;for  improved quality of life;in 12 weeks  by ___ points: 15 -  NOT TESTED         Plan / Patient Education:     Plan to con't with manual therapy to decrease fascial tension/tone to normalize ROM/decrease inflammation/decrease mm tone and improve proprioception.      Subjective:     Pain Ratin  He is sore in the most of the usual areas today. The mid-back area has also been more flared lately.   The last Rx was really helpful in the forearms and hands.     Pt 30' late for appt today    Objective:     LV fascial tensions.      Treatment Today   10/29/2019   TREATMENT MINUTES COMMENTS   Evaluation     Self-care/ Home management     Manual therapy 25 Fascial release using Strain-Counterstrain of B cervical/sternal/scap-LV   Neuromuscular Re-education     Therapeutic Activity     Therapeutic Exercises     Gait training     Modality__________________                Total 25    Blank areas are intentional and mean the treatment did not include these items.       Jb Ochoa  10/29/2019

## 2021-06-02 NOTE — PROGRESS NOTES
CHW received voicemail from patient 10/24/19- CHW sent [facility] message to patient per request to reschedule. CHW provided open times for Tuesday 10/29 to see TAMI Hutchinsie.  Jan apologized for missed appt this week as her overslept as he has not been sleeping well at night.    Note from [facility] SW 10/22/19  Pt was NS1 for [facility] SW visit to assist with navigating Medicare supplements.   was also going to discuss pt connecting with the [facility] ([facility]) Mentoring Program.  [facility] CHW delegation:  Due: at next outreach  Delegation: Please ask pt if he would like to reschedule.     Next Outreach: 10/30/19

## 2021-06-02 NOTE — PROGRESS NOTES
[facility] Daily Progress     Patient Name: Bola Lyon  Date: 11/1/2019  Visit #: 37  PTA visit #:       Referral Diagnosis: Chronic pain      Referring provider: Dmitriy Cramer*     Visit Diagnosis:     ICD-10-CM    1. Chronic right shoulder pain M25.511     G89.29    2. Cervicalgia M54.2    3. Cervical radiculitis M54.12    4. Myofascial pain M79.18          Assessment:     Patient is benefitting from skilled physical therapy and is making steady progress toward functional goals.  Patient is appropriate to continue with skilled physical therapy intervention, as indicated by initial plan of care.  Treatment into his neck should con't to help with his UE symptoms and there was good release of fascial tensions.          Goal Status:  Pt. will demonstrate/verbalize independence in self-management of condition in : 12 weeks - IMPROVING TOWARDS GOAL  Pt. will report decreased intensity, frequency of : Pain;in 12 weeks;Comment  Comment:: decrease pain from 6-8/10 to 4-7/10 with ADLs. - IMPROVING TOWARDS GOAL  Pt. will decrease use of medication for pain for improved quality of life in : 12 weeks - SLOW IMPROVEMENT  Pt. will have improved quality of sleep: waking less times/night;getting 75-90% of required amount;in 12 weeks - IMPROVING TOWARDS GOAL  Patient will return to: exercise;leisure;in 12 weeks;Comment  Comment: lifting weights withtout dropping anything with B hands - SLOW PROGRESSION  Patient Turn [place]: for driving;for conversation;with less pain;with less difficulty;in 12 weeks;Comment  Comment: increase C-rot to >50 deg B and T-rot to >40 deg. - HAS NOT SHOWN IMPROVEMENT IN THESE ROM MEASUREMENTS  Patient will reach / maintain arm movement: forward;overhead;behind;for home chores;for dressing;with less pain;with less difficulty;in 12 weeks - SHOWING IMPROVEMENT   Patient will decrease : NDI score;by _ points;for improved quality of function;for improved quality of life;in 12  weeks  by ___ points: 15 -  NOT TESTED         Plan / Patient Education:     Plan to con't with manual therapy to decrease fascial tension/tone to normalize ROM/decrease inflammation/decrease mm tone and improve proprioception.      Subjective:     Pain Ratin range  He is going to get his dental insurance and appts set up soon.   His arms are still a problem for him. His neck is still sore down into the first rib area.   There has been some relief with the treatments even though it is still sore/painful.     Pt 30' late for appt today    Objective:     MS, art fascial tensions.      Treatment Today   2019   TREATMENT MINUTES COMMENTS   Evaluation     Self-care/ Home management     Manual therapy 25 Fascial release using Strain-Counterstrain of BUE LF-MS, B cerv PLL-MS, B lower cerv-Art   Neuromuscular Re-education     Therapeutic Activity     Therapeutic Exercises     Gait training     Modality__________________                Total 25    Blank areas are intentional and mean the treatment did not include these items.       Jb Ochoa  2019

## 2021-06-02 NOTE — PROGRESS NOTES
ASSESSMENT:  1. Essential hypertension  Blood pressure too low.  Trial of lower dose metoprolol.  Trial off losartan.  Continue prazosin  - metoprolol succinate (TOPROL-XL) 100 MG 24 hr tablet; Take 1 tablet (100 mg total) by mouth daily.  Dispense: 90 tablet; Refill: 3    2. Medication management  Update routine screening  - Drugs of Abuse 1,Urine    3. Traumatic brain injury with loss of consciousness (H)  Clarify and refill Adderall  - dextroamphetamine-amphetamine (ADDERALL) 30 mg Tab; Take 0.5 tab (15mg) BID  Dispense: 30 tablet; Refill: 0    4. Acute maxillary sinusitis, recurrence not specified  Reviewed email exchange and initiation of levofloxacin.  Continue for another 2 weeks until teeth can be fixed.  Improved  - levoFLOXacin (LEVAQUIN) 750 MG tablet; Take 1 tablet (750 mg total) by mouth daily for 14 days.  Dispense: 14 tablet; Refill: 0    5. Encounter for screening for HIV  - HIV Antigen/Antibody Screening Cascade    6. Iron deficiency anemia due to chronic blood loss  Recheck now on twice daily iron supplements  - HM2(CBC w/o Differential)  - Ferritin    7. Generalized anxiety disorder  Clarify dose of Lamictal.  Encouraged to increase further through psychiatry  - lamoTRIgine (LAMICTAL) 100 MG tablet; Take 2 tablets (200 mg total) by mouth daily.    8. Chronic, continuous use of opioids  Balance possible GI bleeding.  Current stomach symptoms minimal.  Increase nabumetone.  Reviewed dose of narcotics a year ago versus now  - nabumetone (RELAFEN) 500 MG tablet; Take 2 tablets (1,000 mg total) by mouth 2 (two) times a day.  Dispense: 120 tablet; Refill: 11        PLAN:  Patient Instructions   Stop Losartan for blood pressure    Decrease metoprolol to one pill in the morning    Continue Levofloxacin antibiotic for another 2 weeks    Increase Nabumetone to 2 pills twice a day for joint pain    I am glad you are on 200 Lamictal, and I want you even higher.  Max is 400 mgs    Update blood  tests    Update urine    Refill adderall 30 mgs          Orders Placed This Encounter   Procedures     Influenza, Seasonal Quad, PF =/> 6months     Drugs of Abuse 1,Urine     HIV Antigen/Antibody Screening Cascade     HM2(CBC w/o Differential)     Ferritin     Medications Discontinued During This Encounter   Medication Reason     dextroamphetamine-amphetamine 20 mg Tab Duplicate order     losartan (COZAAR) 100 MG tablet      dextroamphetamine-amphetamine (ADDERALL) 30 mg Tab Reorder     levoFLOXacin (LEVAQUIN) 750 MG tablet Reorder     metoprolol succinate (TOPROL-XL) 100 MG 24 hr tablet      lamoTRIgine (LAMICTAL) 100 MG tablet      nabumetone (RELAFEN) 500 MG tablet      Administrations This Visit     cyanocobalamin injection 1,000 mcg     Admin Date  10/18/2019 Action  Given Dose  1000 mcg Route  Intramuscular Administered By  Bernadette Guallpa CMA              Return in about 4 weeks (around 11/15/2019) for for a full review of everything we did today.    CHIEF COMPLAINT:  Chief Complaint   Patient presents with     Follow-up     Medication Management       HISTORY OF PRESENT ILLNESS:  Bola is a 49 y.o. male presenting to the clinic today for a medication management follow up.     Mouth Infection: He meets with Magalis, the community health person, to help go with insurance. When he switched to medicare, his dental insurance was dropped. He has a paradental infection. He was suppose to get surgery to clean infection, but insurance dropped. The mouth rinse has helped. He started 750 MG of Levaquin on October 11, 2019 and this has been helping. He has not scheduled appointment yet. He likes going to his dentist at [facility] because they help with his PTSD. His upper respiratory problems have been getting better.With this infection he gets extreme light headedness all day long. He wakes up and is light headed until he goes to bed. He has been getting cold chills.     In Home doctor: He has his doctor home  appointment. His doctor was not prepared.The doctor did not review his chart. He will see the doctor again in a few weeks. He sees this at home doctor with his TBI. He was trying to review his medical health with him.      Blood pressure: His blood pressure has been dropping recently. He took a urine test that he would like Dr. Lund to look at. He takes all his blood pressure medications in the morning. He denies checking his blood pressure at home. He can started if needed.    Knee: He has lymphedema on his left knee. He has been doing his physical therapy.     Joint Pain: This has been going on for a little over 7 weeks. He feels like he has arthritis. He has been doing NanoAntibiotics. His joints ache many of the day.     Oxycontin: He planes to reduce the amount of oxycontin after his mouth surgery. He says in the last year he has reduced the amount of oxycontin taken by half.       REVIEW OF SYSTEMS:   All other systems are negative.    PFSH:  He has a bad mouth infection that he has been struggling with.     Past Medical History:   Diagnosis Date     AC (acromioclavicular) arthritis 10/24/2011     Aftercare following surgery of the musculoskeletal system 4/25/2012     Anxiety Disorder NOS     Created by Conversion      Bipolar 2 disorder (H)      Cervical Radiculopathy     Created by Conversion      Claustrophobia      Disturbance of skin sensation 11/30/2011     Encounter for chronic pain management 2/11/2015     GERD (gastroesophageal reflux disease)      Hypertension     Created by Conversion      Impingement Of The Right Shoulder     Created by Conversion       Lymphedema 02/11/2015    left     Pain in joint, shoulder region 10/10/2011     Panic attacks      PTSD (post-traumatic stress disorder)      TBI (traumatic brain injury) (H)     post concussion syndrome       Family History   Problem Relation Age of Onset     Hypertension Father      Lymphoma Father      Bipolar disorder Mother      Diabetes  Maternal Grandmother      Diabetes Maternal Grandfather      Colon cancer Paternal Grandmother      Diabetes Paternal Grandmother      Diabetes Paternal Grandfather      Anesthesia problems Neg Hx        Past Surgical History:   Procedure Laterality Date     ANTERIOR / POSTERIOR COMBINED FUSION CERVICAL SPINE  , redo in     4 levels     HAND SURGERY Left      HERNIA REPAIR Left 2006     HERNIA REPAIR Right 2008    and middle     KNEE ARTHROSCOPY  2014     NASAL POLYP SURGERY  2014    and septal repair, Dr. Arcos     AL ARTHRODESIS,ANKLE,OPEN Right     Ankle fusion     AL SHLDR ARTHROSCOP,SURG,W/ROTAT CUFF REPR Right 2015    Procedure: RIGHT SHOULDER ARTHROSCOPY, ROTATOR CUFF REPAIR, DECOMPRESSION, DEBRIDEMENT, DISTAL CLAVICLE EXCISION;  Surgeon: Pilo Bruce MD;  Location: [facility];  Service: Orthopedics     REPLACEMENT TOTAL KNEE Left 2017     SHOULDER ARTHROSCOPY DISTAL CLAVICLE EXCISION AND OPEN ROTATOR CUFF REPAIR Bilateral  and      VENTRAL HERNIA REPAIR         Social History     Socioeconomic History     Marital status:      Spouse name: Not on file     Number of children: Not on file     Years of education: Not on file     Highest education level: Not on file   Occupational History     Not on file   Social Needs     Financial resource strain: Not on file     Food insecurity:     Worry: Not on file     Inability: Not on file     Transportation needs:     Medical: Not on file     Non-medical: Not on file   Tobacco Use     Smoking status: Current Some Day Smoker     Packs/day: 0.50     Years: 6.00     Pack years: 3.00     Types: Cigarettes     Start date: 2009     Last attempt to quit: 2017     Years since quittin.6     Smokeless tobacco: Former User   Substance and Sexual Activity     Alcohol use: No     Drug use: No     Sexual activity: Not Currently     Partners: Female   Lifestyle     Physical activity:     Days per week: Not on  "file     Minutes per session: Not on file     Stress: Not on file   Relationships     Social connections:     Talks on phone: Not on file     Gets together: Not on file     Attends Anglican service: Not on file     Active member of club or organization: Not on file     Attends meetings of clubs or organizations: Not on file     Relationship status: Not on file     Intimate partner violence:     Fear of current or ex partner: Not on file     Emotionally abused: Not on file     Physically abused: Not on file     Forced sexual activity: Not on file   Other Topics Concern     Not on file   Social History Narrative    He is .  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  He smokes and does not drink alcohol.  He is now on disability due to his brain injury and bipolar disease.       TOBACCO USE:  Social History     Tobacco Use   Smoking Status Current Some Day Smoker     Packs/day: 0.50     Years: 6.00     Pack years: 3.00     Types: Cigarettes     Start date: 2009     Last attempt to quit: 2017     Years since quittin.6   Smokeless Tobacco Former User       VITALS:  Vitals:    10/18/19 1226   BP: 90/50   Patient Site: Left Arm   Patient Position: Sitting   Cuff Size: Adult Large   Pulse: (!) 58   Resp: 16   SpO2: 96%   Weight: 200 lb 9 oz (91 kg)   Height: 5' 8\" (1.727 m)     Wt Readings from Last 3 Encounters:   10/18/19 200 lb 9 oz (91 kg)   19 202 lb 5 oz (91.8 kg)   19 202 lb 5 oz (91.8 kg)     Body mass index is 30.5 kg/m .    PHYSICAL EXAM:  General: Alert, no acute distress.   Eyes: PERRL, EOMI, Conjunctivae clear.  Ears: TMs are without erythema, pus or fluid. Position and landmarks are normal.    Nose: Clear.    Throat: Oropharynx is moist and clear, without tonsillar hypertrophy, asymmetry, exudate or lesions.  Neck: Supple without lymphadenopathy or tenderness. No thyromegaly or nodules.  Lungs: Clear to " auscultation bilaterally. No wheezes, rhonchi, or rales. No prolongation of expiratory phase. No tachypnea, retractions, or increased work of breathing.  Cardiac: Regular rate and rhythm, no murmur audible.  Abdomen: Soft, nontender, nondistended. Bowel sounds present. No hepatosplenomegaly or mass palpable.  Musculoskeletal: Normal ROM. No tenderness in the extremities.  Skin: Clear without rashes or lesions.    ADDITIONAL HISTORY SUMMARIZED (2): Reviewed email exchanges and pain clinic notes  DECISION TO OBTAIN EXTRA INFORMATION (1): None.   RADIOLOGY TESTS (1): CT scan showed mild frontal disease of sinus but no maxillary  LABS (1): Labs ordered.  MEDICINE TESTS (1): None.  INDEPENDENT REVIEW (2 each): None.       The visit lasted a total of 25 minutes that I spent face to face with the patient. Over 50% of the time was spent counseling and educating the patient about wellness.    I, Dasha Morocho, am scribing for and in the presence of, Dr. Lund.    I, Dr. Lund, personally performed the services described in this documentation, as scribed by Dasha Morocho in my presence, and it is both accurate and complete.    MEDICATIONS:  Current Outpatient Medications   Medication Sig Dispense Refill     acetaminophen (TYLENOL) 650 MG CR tablet Take 1,300 mg by mouth every 8 (eight) hours as needed for pain.       albuterol (PROAIR HFA;PROVENTIL HFA;VENTOLIN HFA) 90 mcg/actuation inhaler INHALE 2 PUFFS BY MOUTH EVERY 6 HOURS AS NEEDED FOR WHEEZING 3 Inhaler 3     allopurinol (ZYLOPRIM) 300 MG tablet Take 1 tablet (300 mg total) by mouth 2 (two) times a day. 180 tablet 3     amitriptyline (ELAVIL) 25 MG tablet Take 1 tablet (25 mg total) by mouth at bedtime. 30 tablet 11     baclofen (LIORESAL) 10 MG tablet One and one-half tab three times a day 126 tablet 3     busPIRone (BUSPAR) 10 MG tablet Take 1.5 tablets (15 mg total) by mouth 3 (three) times a day. 135 tablet 3     calcium carbonate-vitamin D3 (CALTRATE 600  PLUS D3) 600 mg(1,500mg) -400 unit per tablet TAKE THREE TABLETS BY MOUTH ONCE DAILY 90 tablet 6     chlorhexidine (PERIDEX) 0.12 % solution Apply 15 mL to the mouth or throat 2 (two) times a day. 473 mL 3     DULoxetine (CYMBALTA) 30 MG capsule Take 1 capsule (30 mg total) by mouth 3 (three) times a day.  0     fexofenadine (ALLEGRA) 180 MG tablet Take 1 tablet (180 mg total) by mouth 2 (two) times a day. 60 tablet 11     fluticasone (FLONASE) 50 mcg/actuation nasal spray USE 2 SPRAY(S) IN EACH NOSTRIL ONCE DAILY. 18.2 g 5     hydroCHLOROthiazide (HYDRODIURIL) 25 MG tablet Take 1 tablet (25 mg total) by mouth daily as needed (leg swelling). 90 tablet 3     ketamine HCl (KETAMINE, BULK,) 100 % Powd Ketamine 120 mg amarjit, dissolve one amarjit under the tongue up to 11 times daily prn pain 120 Bottle 2     lamoTRIgine (LAMICTAL) 100 MG tablet Take 2 tablets (200 mg total) by mouth daily.       lidocaine HCl 3 % Crea Apply topically to affected areas twice daily 1 Tube 5     Medical Cannabis Use 1 Units As Directed. Take as instructed by the medical cannabis dispensary. The provider who enrolled the patient in the medical cannabis registry and certified this  patient will maintain ownership and liability of all provider reporting and registration requirements.       metoprolol succinate (TOPROL-XL) 100 MG 24 hr tablet Take 1 tablet (100 mg total) by mouth daily. 90 tablet 3     montelukast (SINGULAIR) 10 mg tablet Take 1 tablet (10 mg total) by mouth 2 (two) times a day. 60 tablet 11     nabumetone (RELAFEN) 500 MG tablet Take 2 tablets (1,000 mg total) by mouth 2 (two) times a day. 120 tablet 11     NARCAN 4 mg/actuation nasal spray   0     nicotine (NICODERM CQ) 21 mg/24 hr Place 1 patch on the skin daily. 30 patch 1     oxyCODONE (OXYCONTIN) 10 mg 12 hr tablet Take 1 tablet (10 mg total) by mouth 3 (three) times a day for 14 days. 42 each 0     oxyCODONE (ROXICODONE) 5 MG immediate release tablet Two tabs morning,  one mid-day, two bedtime 80 tablet 0     pantoprazole (PROTONIX) 40 MG tablet Take 1 tablet (40 mg total) by mouth 2 (two) times a day. 60 tablet 11     potassium 99 mg Tab Take by mouth daily.       potassium bicarb/mag combo 21 (MAGNESIUM FIZZ-PLUS ORAL) Take 500 mg by mouth daily.       prazosin (MINIPRESS) 2 MG capsule Take 1 capsule (2 mg total) by mouth at bedtime. 90 capsule 3     predniSONE (DELTASONE) 10 mg tablet Take 10 mg by mouth 2 (two) times a day. 60 tablet 11     pseudoephedrine (SUDAFED) 120 mg 12 hr tablet Take 1 tablet (120 mg total) by mouth 3 (three) times a day. 90 tablet 5     QUEtiapine (SEROQUEL) 200 MG tablet Take 200 mg by mouth bedtime.       QUEtiapine (SEROQUEL) 50 MG tablet Take 50 mg by mouth 4 (four) times a day.       temazepam (RESTORIL) 30 mg capsule Take 30 mg by mouth at bedtime.         1     THERA-M 9 mg iron-400 mcg Tab tablet TAKE ONE TABLET BY MOUTH ONCE DAILY 30 tablet 10     topiramate (TOPAMAX) 100 MG tablet Take 1 tablet (100 mg total) by mouth 4 (four) times a day. 120 tablet 11     dextroamphetamine-amphetamine (ADDERALL) 30 mg Tab Take 0.5 tab (15mg) BID 30 tablet 0     hydrOXYzine HCl (ATARAX) 25 MG tablet Take 1 tablet (25 mg total) by mouth 4 (four) times a day. 120 tablet 3     levoFLOXacin (LEVAQUIN) 750 MG tablet Take 1 tablet (750 mg total) by mouth daily for 14 days. 14 tablet 0     Current Facility-Administered Medications   Medication Dose Route Frequency Provider Last Rate Last Dose     cyanocobalamin injection 1,000 mcg  1,000 mcg Intramuscular Q7 Days Devon Lund MD   1,000 mcg at 10/18/19 1329       Total data points:4

## 2021-06-02 NOTE — TELEPHONE ENCOUNTER
RN cannot approve Refill Request    RN can NOT refill this medication med is not covered by policy/route to provider. Last office visit: 10/18/2019 Devon Lund MD Last Physical: Visit date not found Last MTM visit: Visit date not found Last visit same specialty: 10/18/2019 Devon Lund MD.  Next visit within 3 mo: Visit date not found  Next physical within 3 mo: Visit date not found      Lisette Alegria, [facility] Triage/Med Refill 11/1/2019    Requested Prescriptions   Pending Prescriptions Disp Refills     levoFLOXacin (LEVAQUIN) 750 MG tablet 14 tablet 0     Sig: Take 1 tablet (750 mg total) by mouth daily for 14 days.       There is no refill protocol information for this order

## 2021-06-02 NOTE — PROGRESS NOTES
Clinic Care Coordination Contact    Pt was NS1 for [facility] SW visit to assist with navigating Medicare supplements.    SW was also going to discuss pt connecting with the [facility] ([facility]) Mentoring Program.    [facility] CHW delegation:  Due: at next outreach  Delegation: Please ask pt if he would like to reschedule.   
I would recommend scheduling an appt earlier than 11 as with the Medicare enrollment visits they have been taking more than an hour--maybe he could try for 9 AM?
no abrasions, no jaundice, no lesions, no pruritis, and no rashes.

## 2021-06-02 NOTE — TELEPHONE ENCOUNTER
Patient called to check the status of Levaquin refill.  RN advised him was sent 10/18/19 to [facility].  He will follow up with pharmacy.  Loree Viera RN, [facility] Med Refill/Triage, 10/21/2019 3:15 PM

## 2021-06-02 NOTE — PROGRESS NOTES
CHW received message from Jan bland regarding setting up a follow up appointment with [facility] TAMI Jules  CHW set up [facility] TAMI 10/22/19 @ 11am at the [facility] to discuss disability.  CHW will plan to connect with patient this week to discuss and update current CCC goals.  Current Goals:  1. I want to get on some affordable housing wailists     within the next  6 months.  2. I would like assistance with applying for Medicare     Supplements during open enrollment.    Next Outreach: 10/14/19

## 2021-06-02 NOTE — TELEPHONE ENCOUNTER
Medication Request  Medication name: chlorhexidine (PERIDEX) 0.12 % solution  Pharmacy Name and Location: [facility]  Reason for request: Patient requesting a refill, reports he is out of medication and medication is listed as historical. Please advise asap!  When did you use medication last?: Saturday  Patient offered appointment:  patient declined  Okay to leave a detailed message: yes

## 2021-06-02 NOTE — PROGRESS NOTES
[facility] Daily Progress     Patient Name: Bola Lyon  Date: 10/11/2019  Visit #: 36  PTA visit #:       Referral Diagnosis: Chronic pain      Referring provider: Dmitriy Cramer*     Visit Diagnosis:     ICD-10-CM    1. Chronic right shoulder pain M25.511     G89.29    2. Cervicalgia M54.2    3. Cervical radiculitis M54.12    4. Myofascial pain M79.18          Assessment:     Patient is benefitting from skilled physical therapy and is making steady progress toward functional goals.  Patient is appropriate to continue with skilled physical therapy intervention, as indicated by initial plan of care.  He is likely having a popping of his fibular nerve over his fibular head in his knee. He did have release of cevical mm tone with work done today.        Goal Status:  Pt. will demonstrate/verbalize independence in self-management of condition in : 12 weeks - IMPROVING TOWARDS GOAL  Pt. will report decreased intensity, frequency of : Pain;in 12 weeks;Comment  Comment:: decrease pain from 6-8/10 to 4-7/10 with ADLs. - IMPROVING TOWARDS GOAL  Pt. will decrease use of medication for pain for improved quality of life in : 12 weeks - SLOW IMPROVEMENT  Pt. will have improved quality of sleep: waking less times/night;getting 75-90% of required amount;in 12 weeks - IMPROVING TOWARDS GOAL  Patient will return to: exercise;leisure;in 12 weeks;Comment  Comment: lifting weights withtout dropping anything with B hands - SLOW PROGRESSION  Patient [place]: for driving;for conversation;with less pain;with less difficulty;in 12 weeks;Comment  Comment: increase C-rot to >50 deg B and T-rot to >40 deg. - HAS NOT SHOWN IMPROVEMENT IN THESE ROM MEASUREMENTS  Patient will reach / maintain arm movement: forward;overhead;behind;for home chores;for dressing;with less pain;with less difficulty;in 12 weeks - SHOWING IMPROVEMENT   Patient will decrease : NDI score;by _ points;for improved quality of function;for  improved quality of life;in 12 weeks  by ___ points: 15 -  NOT TESTED         Plan / Patient Education:     Plan to con't with manual therapy to decrease fascial tension/tone to normalize ROM/decrease inflammation/decrease mm tone and improve proprioception.      Subjective:     Pain Ratin range  He is feeling better with this round of antibiotics with his sinus infection.   He is having a rough time with his mouth lately. There are some periodontal things going on that are seeming to give him cold sweats and even making his joints hurt more.  He is using a medicated rinse but that isn't helping.   Even with that feeling he is overall feeling pretty good. His neck has been pretty stiff lately. It is also sore from his elbow down to his wrist on both sides.   He did hear a little pop in his L knee and the nerve feeling on the outside of the knee is sore again.     Pt 25' late for appt today    Objective:     Art, neural fascial tensions.      Treatment Today   10/11/2019   TREATMENT MINUTES COMMENTS   Evaluation     Self-care/ Home management     Manual therapy 30 Fascial release using Strain-Counterstrain of B cervical-A, B cerv/upper thor post-gang-N   Neuromuscular Re-education     Therapeutic Activity     Therapeutic Exercises     Gait training     Modality__________________                Total 30    Blank areas are intentional and mean the treatment did not include these items.       Jb Ochoa  10/11/2019

## 2021-06-02 NOTE — TELEPHONE ENCOUNTER
Controlled Substance Refill Request  Medication Name:   Requested Prescriptions     Pending Prescriptions Disp Refills     dextroamphetamine-amphetamine (ADDERALL) 30 mg Tab 30 tablet 0     Sig: Once a day     Date Last Fill: 9/10/19  Pharmacy: [id number]      Submit electronically to pharmacy  Controlled Substance Agreement Date Scanned:   Encounter-Level CSA Scan Date - 08/21/2017:    Scan on 8/24/2017  7:50 AM  Scan on 8/23/2017  7:29 AM           Encounter-Level CSA Scan Date - 06/27/2016:    Scan on 6/30/2016  8:08 AM       Last office visit with prescriber/PCP: 9/13/2019 Devon Lund MD OR same dept: 9/13/2019 Devon Lund MD OR same specialty: 9/13/2019 Devon Lund MD  Last physical: Visit date not found Last MTM visit: Visit date not found        Patient stated that he was informed by the pharmacy that they have this medication dose on stock.

## 2021-06-02 NOTE — TELEPHONE ENCOUNTER
Fine    Clarify complete sig with pharmacy and set up for me, as I do not think we ordered this initially

## 2021-06-03 ENCOUNTER — RECORDS - HEALTHEAST (OUTPATIENT)
Dept: INTERNAL MEDICINE | Facility: CLINIC | Age: 51
End: 2021-06-03

## 2021-06-03 VITALS
BODY MASS INDEX: 29.18 KG/M2 | HEIGHT: 68 IN | SYSTOLIC BLOOD PRESSURE: 100 MMHG | HEART RATE: 76 BPM | OXYGEN SATURATION: 98 % | DIASTOLIC BLOOD PRESSURE: 62 MMHG | WEIGHT: 192.56 LBS | RESPIRATION RATE: 18 BRPM

## 2021-06-03 VITALS — DIASTOLIC BLOOD PRESSURE: 72 MMHG | SYSTOLIC BLOOD PRESSURE: 112 MMHG | WEIGHT: 191 LBS | BODY MASS INDEX: 29.04 KG/M2

## 2021-06-03 VITALS
OXYGEN SATURATION: 98 % | HEIGHT: 67 IN | HEART RATE: 68 BPM | SYSTOLIC BLOOD PRESSURE: 98 MMHG | WEIGHT: 194.56 LBS | BODY MASS INDEX: 30.54 KG/M2 | DIASTOLIC BLOOD PRESSURE: 56 MMHG

## 2021-06-03 VITALS — HEIGHT: 68 IN | WEIGHT: 193 LBS | BODY MASS INDEX: 29.25 KG/M2

## 2021-06-03 VITALS
HEART RATE: 60 BPM | DIASTOLIC BLOOD PRESSURE: 64 MMHG | TEMPERATURE: 98.9 F | WEIGHT: 193 LBS | SYSTOLIC BLOOD PRESSURE: 120 MMHG | RESPIRATION RATE: 18 BRPM | BODY MASS INDEX: 29.35 KG/M2

## 2021-06-03 VITALS
BODY MASS INDEX: 30.4 KG/M2 | DIASTOLIC BLOOD PRESSURE: 50 MMHG | HEART RATE: 58 BPM | SYSTOLIC BLOOD PRESSURE: 90 MMHG | WEIGHT: 200.56 LBS | HEIGHT: 68 IN | RESPIRATION RATE: 16 BRPM | OXYGEN SATURATION: 96 %

## 2021-06-03 VITALS
OXYGEN SATURATION: 98 % | HEART RATE: 98 BPM | HEIGHT: 68 IN | BODY MASS INDEX: 30.66 KG/M2 | SYSTOLIC BLOOD PRESSURE: 124 MMHG | DIASTOLIC BLOOD PRESSURE: 82 MMHG | WEIGHT: 202.31 LBS

## 2021-06-03 VITALS — WEIGHT: 202 LBS | BODY MASS INDEX: 30.62 KG/M2 | HEIGHT: 68 IN

## 2021-06-03 VITALS — BODY MASS INDEX: 30.66 KG/M2 | HEIGHT: 68 IN | WEIGHT: 202.31 LBS

## 2021-06-03 VITALS — HEIGHT: 68 IN | BODY MASS INDEX: 30.98 KG/M2 | WEIGHT: 204.38 LBS

## 2021-06-03 NOTE — TELEPHONE ENCOUNTER
FYI - Status Update  Who is Calling: Patient  Update: Still waiting for supplemental dental insurance to begin on 11.18.2019 - patient requesting a refill while he waits for insurance and needs this medication to cover until his oral health while waiting for the root canals and dental work to be completed.   Patient's last dose will be Sunday of current prescription.   Okay to leave a detailed message?:  Yes

## 2021-06-03 NOTE — TELEPHONE ENCOUNTER
Patient Returning Call  Reason for call:  Patient is returning call  Information relayed to patient:  Message below questioning how the patient was taking his medication was asked of the patient.     The patient stated he was taking the Sudogest 12 hour, 1 tablet two times per day.      Patient stated he has taken the 24 hour type in the past as well.  Patient stated he requested the 12 hour tablet due to cost.      Patient stated he is fine with which ever form Devon Lund MD would like him to take.    Patient has additional questions:  No  If YES, what are your questions/concerns:  N/a  Okay to leave a detailed message?: No

## 2021-06-03 NOTE — TELEPHONE ENCOUNTER
Refill Approved    Rx renewed per Medication Renewal Policy. Medication was last renewed on 3/29/19.    Amairani Crespo, [facility] Triage/Med Refill 11/8/2019     Requested Prescriptions   Pending Prescriptions Disp Refills     hydrOXYzine HCl (ATARAX) 25 MG tablet [Pharmacy Med Name: HYDROXYZINE HCL 25MG TAB] 120 tablet 3     Sig: TAKE 1 TABLET BY MOUTH 4 TIMES DAILY       Antihistamine Refill Protocol Passed - 11/8/2019  4:09 PM        Passed - Patient has had office visit/physical in last year     Last office visit with prescriber/PCP: 10/18/2019 Devon Lund MD OR same dept: 10/18/2019 Devon Lund MD OR same specialty: 10/18/2019 Devon Lund MD  Last physical: Visit date not found Last MTM visit: Visit date not found   Next visit within 3 mo: Visit date not found  Next physical within 3 mo: Visit date not found  Prescriber OR PCP: Devon Lund MD  Last diagnosis associated with med order: 1. PTSD (post-traumatic stress disorder)  - hydrOXYzine HCl (ATARAX) 25 MG tablet [Pharmacy Med Name: HYDROXYZINE HCL 25MG TAB]; TAKE 1 TABLET BY MOUTH 4 TIMES DAILY  Dispense: 120 tablet; Refill: 3    If protocol passes may refill for 12 months if within 3 months of last provider visit (or a total of 15 months).

## 2021-06-03 NOTE — TELEPHONE ENCOUNTER
Last dose will be Sunday 11.7.2019    Controlled Substance Refill Request  Medication Name:   Requested Prescriptions     Pending Prescriptions Disp Refills     dextroamphetamine-amphetamine (ADDERALL) 30 mg Tab 30 tablet 0     Sig: Take 0.5 tab (15mg) BID     Date Last Fill: 10.18.2019  Pharmacy: [id number]     Submit electronically to pharmacy  Controlled Substance Agreement Date Scanned:   Encounter-Level CSA Scan Date - 08/21/2017:    Scan on 8/24/2017  7:50 AM  Scan on 8/23/2017  7:29 AM           Encounter-Level CSA Scan Date - 06/27/2016:    Scan on 6/30/2016  8:08 AM       Last office visit with prescriber/PCP: 10/18/2019 Devon Lund MD OR same dept: 10/18/2019 Devon Lund MD OR same specialty: 10/18/2019 Devon Lund MD  Last physical: Visit date not found Last MTM visit: Visit date not found

## 2021-06-03 NOTE — TELEPHONE ENCOUNTER
Medication Question or Clarification  Who is calling: Pharmacy: Jam[facility] from [facility]  What medication are you calling about? (include dose and sig)   SUDOGEST 12-HOUR 120 mg 12 hr tablet 90 tablet 5 11/4/2019  No   Sig: TAKE 1 TABLET BY MOUTH THREE TIMES DAILY   Sent to pharmacy as: Sudogest 12-hour 120 mg tablet,extended release (pseudoephedrine)   E-Prescribing Status: Receipt confirmed by pharmacy (11/4/2019  5:40 PM CST)     Who prescribed the medication?: Devon Lund MD   What is your question/concern?: Caller stated he needs the sig clarified. Caller stated the dose is usually prescribed for a 1 tab twice a day dose and not a 1 tab three times a day. Caller stated the patient has filled it once with them before for 1 tab twice a day. Caller just needs a confirmation that Devon Lund MD intended to send over the three times a day dose. Please send in a new Rx if appropriate.  Pharmacy: [facility] [address]  Okay to leave a detailed message?: No  Site CMT - Please call the pharmacy to obtain any additional needed information.

## 2021-06-03 NOTE — PROGRESS NOTES
CHW reached out to patient today for outreach call.  Patient was unable talk as he was at a [facility] service, Jan also stated that he had cancelled his appointment at the pain center due to service lasting longer than expected today. He plans to reschedule pain center appointment soon.    CHW and patient will connect again next week to discuss and update goals.    Next Outreach:11/27/19

## 2021-06-03 NOTE — TELEPHONE ENCOUNTER
RN cannot approve Refill Request    RN can NOT refill this medication med is not covered by policy/route to provider. Last office visit: 10/18/2019 Devon Lund MD Last Physical: Visit date not found Last MTM visit: Visit date not found Last visit same specialty: 10/18/2019 Devon Lund MD.  Next visit within 3 mo: Visit date not found  Next physical within 3 mo: Visit date not found      Niki Polo, [facility] Triage/Med Refill 11/15/2019    Requested Prescriptions   Pending Prescriptions Disp Refills     levoFLOXacin (LEVAQUIN) 750 MG tablet 14 tablet 0     Sig: Take 1 tablet (750 mg total) by mouth daily for 14 days.       There is no refill protocol information for this order

## 2021-06-04 VITALS
TEMPERATURE: 98.1 F | OXYGEN SATURATION: 98 % | WEIGHT: 192.19 LBS | HEIGHT: 67 IN | BODY MASS INDEX: 30.17 KG/M2 | DIASTOLIC BLOOD PRESSURE: 60 MMHG | SYSTOLIC BLOOD PRESSURE: 100 MMHG | HEART RATE: 76 BPM | RESPIRATION RATE: 18 BRPM

## 2021-06-04 VITALS
SYSTOLIC BLOOD PRESSURE: 84 MMHG | WEIGHT: 194.44 LBS | RESPIRATION RATE: 16 BRPM | DIASTOLIC BLOOD PRESSURE: 60 MMHG | HEIGHT: 67 IN | HEART RATE: 66 BPM | OXYGEN SATURATION: 98 % | BODY MASS INDEX: 30.52 KG/M2

## 2021-06-04 VITALS
SYSTOLIC BLOOD PRESSURE: 120 MMHG | BODY MASS INDEX: 31.43 KG/M2 | WEIGHT: 200.25 LBS | HEIGHT: 67 IN | DIASTOLIC BLOOD PRESSURE: 60 MMHG | OXYGEN SATURATION: 99 % | RESPIRATION RATE: 20 BRPM | HEART RATE: 84 BPM

## 2021-06-04 VITALS — WEIGHT: 195 LBS | BODY MASS INDEX: 30.61 KG/M2 | HEIGHT: 67 IN

## 2021-06-04 VITALS — HEIGHT: 67 IN | BODY MASS INDEX: 30.07 KG/M2

## 2021-06-04 VITALS — BODY MASS INDEX: 30.13 KG/M2 | HEIGHT: 67 IN | WEIGHT: 192 LBS

## 2021-06-04 VITALS — BODY MASS INDEX: 30.56 KG/M2 | HEIGHT: 67 IN

## 2021-06-04 NOTE — TELEPHONE ENCOUNTER
"Triage call:   Treating chronic sinus infection and severe peridontal bacterial infection in his mouth-    Dental insurance and medicare tomorrow- certified disabled     Levofloxacin- states that he needs a refill of this medication     Reports that he is very sick with the infection- fever, chills (hot and cold)- swollen glands- nauseated- discharge from his gums- two root canals infected- inflamed sinus cavity- both ears through his eye - sees PCP every month- has had for 7 weeks. Yellow thick mucous. Fatigue and achy     Offered to triage patient's symptoms but patient declined stating \"Dr FERNANDEZ has been treating me for this and he has told me to just call for refills. This has been going on for quite a while.\"    PCP please advise- RX attached to this encounter for provider.      Irma Belcher RN BSBA [facility] Triage/Med Refill 12/6/2019 1:55 PM    Reason for Disposition    Nursing judgment    Protocols used: NO PROTOCOL AVAILABLE - INFORMATION ONLY-A-[place]      "

## 2021-06-04 NOTE — PROGRESS NOTES
ASSESSMENT:  1. Tendonitis of wrist, left  Initially suspect simple tendinitis with overuse of left wrist, however with sustained levofloxacin use risk of tendon rupture increased.  Conservative treatment for a few more days then referral directly to hand surgery.  We will let them get MRI if needed  - Ambulatory referral to Orthopedic Surgery    2. PTSD (post-traumatic stress disorder)  Stable.  Continue prazosin  - prazosin (MINIPRESS) 2 MG capsule; Take 1 capsule (2 mg total) by mouth at bedtime.  Dispense: 30 capsule; Refill: 11    3. Essential hypertension  Stable  - hydroCHLOROthiazide (HYDRODIURIL) 25 MG tablet; Take 1 tablet (25 mg total) by mouth daily as needed (leg swelling).  Dispense: 30 tablet; Refill: 11    4. Acute maxillary sinusitis, recurrence not specified  Continue until dental work completed within the next week  - levoFLOXacin (LEVAQUIN) 750 MG tablet; Take 1 tablet (750 mg total) by mouth daily for 14 days.  Dispense: 14 tablet; Refill: 0        PLAN:  Patient Instructions   Tendonitis or bursitis    With Levofloxacin I worry about tendon rupture as part of this     Most direct way to sort all this out would be to see a hand surgeon for exam and MRI, [facility] [place]    Wear wrist splint    CBD cream can help on wrist               Orders Placed This Encounter   Procedures     Ambulatory referral to Orthopedic Surgery     Referral Priority:   Routine     Referral Type:   Surgical     Referral Reason:   Evaluation and Treatment     Requested Specialty:   [facility]     Number of Visits Requested:   1     Medications Discontinued During This Encounter   Medication Reason     prazosin (MINIPRESS) 2 MG capsule Reorder     hydroCHLOROthiazide (HYDRODIURIL) 25 MG tablet Reorder     levoFLOXacin (LEVAQUIN) 750 MG tablet Reorder     Administrations This Visit     cyanocobalamin injection 1,000 mcg     Admin Date  12/31/2019 Action  Given Dose  1000 mcg Route  Intramuscular  "Administered By  Heladio Monique CMA              Return for for a full review of everything we did today.      CHIEF COMPLAINT:  Chief Complaint   Patient presents with     Wrist Injury     Wrist Pain     Wears brace , ices at bed time, States that pain meds are well controlled per his doc and he agrees     Joint Swelling     Facial Pain     Sinus Problem       HISTORY OF PRESENT ILLNESS:  Bola is a 49 y.o. male presenting to the clinic today for a possible wrist injury and pain. He states the week before  he was doing yard work and was chopping ice with spade shuffle.Jan confirms he did that for about four hours going off of adrenaline and he admits a little anger. Immediately following activity he did not feel discomfort but into the night his arm became stiff and the writ became \"swollen like a bicep\". For relief, Jan has been wearing an old brace ,and  icing at night. He does not believe the area is sprained and thinks something more is going on. He confirms tomorrow will be two weeks since the pain began. At this point he cannot control how his fingers move with twsiting the radius.     REVIEW OF SYSTEMS:   Admits to wrist pain.   All other systems are negative.    PFSH:  Tomorrow starts his supplemental [employer] plan and Jan notes he wants to get on the book and everything worked up.     TOBACCO USE:  Social History     Tobacco Use   Smoking Status Current Some Day Smoker     Packs/day: 0.50     Years: 11.00     Pack years: 5.50     Types: Cigarettes     Start date: 2009     Last attempt to quit: 2017     Years since quittin.8   Smokeless Tobacco Former User       VITALS:  Vitals:    19 1459   BP: 98/56   Patient Site: Left Arm   Patient Position: Sitting   Cuff Size: Adult Large   Pulse: 68   SpO2: 98%   Weight: 194 lb 9 oz (88.3 kg)   Height: 5' 6.5\" (1.689 m)     Wt Readings from Last 3 Encounters:   19 194 lb 9 oz (88.3 kg)   19 193 lb (87.5 kg) "   12/26/19 193 lb (87.5 kg)     Body mass index is 30.93 kg/m .    PHYSICAL EXAM:  Constitutional:  Reveals alert, pleasant male. Affect appropriate.  Vitals:  Per nursing notes.  HEENT: Atraumatic.  Right wrist: Visibly swollen on radial side of forearm . Radial side of wrist very slightly swollen. About 10cm along wrist going proximally visibly swollen, tender, raised, and elevated.   Ulnar side and non tender. Positive finkelstein's test.   Neurologic:  Cranial nerves II-XII intact.     Psychiatric:  Mood appropriate, memory intact.       MEDICATIONS:  Current Outpatient Medications   Medication Sig Dispense Refill     acetaminophen (TYLENOL) 650 MG CR tablet Take 1,300 mg by mouth every 8 (eight) hours as needed for pain.       albuterol (PROAIR HFA;PROVENTIL HFA;VENTOLIN HFA) 90 mcg/actuation inhaler INHALE 2 PUFFS BY MOUTH EVERY 6 HOURS AS NEEDED FOR WHEEZING 3 Inhaler 3     allopurinol (ZYLOPRIM) 300 MG tablet Take 1 tablet (300 mg total) by mouth 2 (two) times a day. 180 tablet 3     amitriptyline (ELAVIL) 25 MG tablet Take 1 tablet (25 mg total) by mouth at bedtime. 30 tablet 11     baclofen (LIORESAL) 10 MG tablet One and one-half tab three times a day 126 tablet 3     busPIRone (BUSPAR) 10 MG tablet TAKE 1 & 1/2 (ONE & ONE-HALF) TABLETS BY MOUTH THREE TIMES DAILY 135 tablet 3     calcium carbonate-vitamin D3 (CALTRATE 600 PLUS D3) 600 mg(1,500mg) -400 unit per tablet TAKE THREE TABLETS BY MOUTH ONCE DAILY 90 tablet 6     chlorhexidine (PERIDEX) 0.12 % solution RINSE WITH 15ML FOR 30 SECONDS AND SPIT, USE TWICE DAILY AFTER BRUSHING AND FLOSSING . 473 mL 11     dextroamphetamine-amphetamine (ADDERALL) 30 mg Tab Take 0.5 tab (15mg) BID 30 tablet 0     DULoxetine (CYMBALTA) 30 MG capsule Take 1 capsule (30 mg total) by mouth 3 (three) times a day.  0     fexofenadine (ALLEGRA) 180 MG tablet Take 1 tablet (180 mg total) by mouth 2 (two) times a day. 60 tablet 11     fluticasone propionate (FLONASE) 50  mcg/actuation nasal spray USE 2 SPRAY(S) IN EACH NOSTRIL ONCE DAILY 18.2 g 4     hydrOXYzine HCl (ATARAX) 25 MG tablet TAKE 1 TABLET BY MOUTH 4 TIMES DAILY 360 tablet 3     ketamine HCl (KETAMINE SL) 120 mg.       lamoTRIgine (LAMICTAL) 100 MG tablet Take 0.5 tablets (50 mg total) by mouth 2 (two) times a day.  0     lidocaine HCl 3 % Crea Apply topically to affected areas twice daily 1 Tube 5     lithium 300 MG capsule Take 300 mg by mouth 2 (two) times a day.  1     Medical Cannabis Use 1 Units As Directed. Take as instructed by the medical cannabis dispensary. The provider who enrolled the patient in the medical cannabis registry and certified this  patient will maintain ownership and liability of all provider reporting and registration requirements.       metoprolol succinate (TOPROL-XL) 100 MG 24 hr tablet Take 1 tablet (100 mg total) by mouth daily. 90 tablet 3     montelukast (SINGULAIR) 10 mg tablet Take 1 tablet (10 mg total) by mouth 2 (two) times a day. 60 tablet 11     nabumetone (RELAFEN) 500 MG tablet Take 2 tablets (1,000 mg total) by mouth 2 (two) times a day. 120 tablet 11     NARCAN 4 mg/actuation nasal spray   0     nicotine (NICODERM CQ) 21 mg/24 hr Place 1 patch on the skin daily. 30 patch 1     oxyCODONE (OXYCONTIN) 10 mg 12 hr tablet Take 1 tablet (10 mg total) by mouth 3 (three) times a day for 14 days. 42 each 0     oxyCODONE (ROXICODONE) 5 MG immediate release tablet Two tabs morning, one mid-day, two bedtime 70 tablet 0     pantoprazole (PROTONIX) 40 MG tablet Take 1 tablet (40 mg total) by mouth 2 (two) times a day. 60 tablet 11     potassium 99 mg Tab Take by mouth daily.       predniSONE (DELTASONE) 10 mg tablet Take 10 mg by mouth 2 (two) times a day. 60 tablet 11     pseudoephedrine (SUDOGEST 12-HOUR) 120 mg 12 hr tablet Take 1 tablet (120 mg total) by mouth every 12 (twelve) hours. 180 tablet 3     QUEtiapine (SEROQUEL) 200 MG tablet Take 200 mg by mouth bedtime.       QUEtiapine  (SEROQUEL) 50 MG tablet Take 50 mg by mouth 4 (four) times a day.       temazepam (RESTORIL) 30 mg capsule Take 30 mg by mouth at bedtime.         1     THERA-M 9 mg iron-400 mcg Tab tablet TAKE 1 TABLET BY MOUTH ONCE DAILY 60 tablet 2     topiramate (TOPAMAX) 100 MG tablet Take 1 tablet (100 mg total) by mouth 4 (four) times a day. 120 tablet 11     hydroCHLOROthiazide (HYDRODIURIL) 25 MG tablet Take 1 tablet (25 mg total) by mouth daily as needed (leg swelling). 30 tablet 11     [START ON 1/3/2020] levoFLOXacin (LEVAQUIN) 750 MG tablet Take 1 tablet (750 mg total) by mouth daily for 14 days. 14 tablet 0     prazosin (MINIPRESS) 2 MG capsule Take 1 capsule (2 mg total) by mouth at bedtime. 30 capsule 11     Current Facility-Administered Medications   Medication Dose Route Frequency Provider Last Rate Last Dose     cyanocobalamin injection 1,000 mcg  1,000 mcg Intramuscular Q7 Days Devon Lund MD   1,000 mcg at 12/31/19 1603     ADDITIONAL HISTORY SUMMARIZED (2): None.  DECISION TO OBTAIN EXTRA INFORMATION (1): None.   RADIOLOGY TESTS (1): None.  LABS (1): None.  MEDICINE TESTS (1): None.  INDEPENDENT REVIEW (2 each): None.     The visit lasted a total of 26 minutes face to face with the patient. Over 50% of the time was spent counseling and educating the patient about wrist injury.    I, Ezequiel Khan, am scribing for and in the presence of, Dr. Lund.    I, Dr. Lund, personally performed the services described in this documentation, as scribed by Ezequiel Khan in my presence, and it is both accurate and complete.    Total data points: 0

## 2021-06-04 NOTE — PATIENT INSTRUCTIONS - HE
"Call the clinic at [phone] for compression stocking refills when needed.    Swelling in the legs can be caused by many reasons. No matter what the reason, treatment usually includes some type of compression. You should wear your compression socks as much as you can. Your compression should be put on first thing in the morning. Take the compression off at night. It is especially important to wear them with long periods of sitting/standing, long car rides or if you will be flying. Going without compression for even brief periods of time can be damaging to your legs and your health.  Compression socks should get replaced usually every 4-6 months. They do not need to be worn at night while in bed. If we have seen you in the last year, we can refill your sock prescription, otherwise your primary care provider is able to refill them for you. Call us with any problems or questions.   Compression Velcro may need to be replaced every 9-12 months.  Often the liners and socks need to be replaced every three or four months.  The neoprene velcro may last up to 2 years.  If the velcro becomes worn a tailor may be able to repair it for you inexpensively.    If you do a lot of standing, it is good to do calf raises to help keep the blood pumping. If you sit a lot at work, it is good to get up periodically to walk around. Elevation of the foot of your bed 4-6\" helps the blood return back to where it is needed.   Please call us if you have any questions [phone]    Thank you for choosing [facility].      [facility]  [facility]  [address], [address]  [place], [place].  Phone: [phone]              "

## 2021-06-04 NOTE — PATIENT INSTRUCTIONS - HE
Tendonitis or bursitis    With Levofloxacin I worry about tendon rupture as part of this     Most direct way to sort all this out would be to see a hand surgeon for exam and MRI, [facility] at [place]    Wear wrist splint    CBD cream can help on wrist

## 2021-06-04 NOTE — TELEPHONE ENCOUNTER
RN cannot approve Refill Request    RN can NOT refill this medication med is not covered by policy/route to provider. Last office visit: 12/12/2019 Devon Lund MD Last Physical: Visit date not found Last MTM visit: Visit date not found Last visit same specialty: 12/12/2019 Devon Lund MD.  Next visit within 3 mo: Visit date not found  Next physical within 3 mo: Visit date not found      Radha Eubanks, [facility] Triage/Med Refill 12/19/2019    Requested Prescriptions   Pending Prescriptions Disp Refills     levoFLOXacin (LEVAQUIN) 750 MG tablet 14 tablet 0     Sig: Take 1 tablet (750 mg total) by mouth daily for 14 days.       There is no refill protocol information for this order

## 2021-06-04 NOTE — PATIENT INSTRUCTIONS - HE
PLAN:    May fill Oxycodone and oxycontin 12/31    Discussed you are working Dr. Jayson Murphy for electromagnetic therapy    You will see Dr. Lund tomorrow , and may see Orthopedics to evaluate left arm pan     Return in 8 weeks

## 2021-06-04 NOTE — PROGRESS NOTES
ASSESSMENT:  1. Acute maxillary sinusitis, recurrence not specified  Continue Levaquin continuously x2 months until dental work can be accomplished    2. Traumatic brain injury with loss of consciousness, sequela (H)  Refill Adderall  - dextroamphetamine-amphetamine (ADDERALL) 30 mg Tab; Take 0.5 tab (15mg) BID  Dispense: 30 tablet; Refill: 0    3. Iron deficiency anemia due to chronic blood loss  Continue iron and recheck labs  - HM2(CBC w/o Differential)    4. Generalized anxiety disorder  Seems better with initiation of lithium.  I would prefer Lamictal for chronic pain also.  Will resume at lower dose    5. Primary (congenital) lymphedema  Worsening.  Clarify that he is taking hydrochlorothiazide.  Recheck labs.  Question side effect of lithium, nabumetone, or others  - Comprehensive Metabolic Panel        PLAN:  Patient Instructions   Could you take a lower dose of Lamictal (50 mgs daily) with the Lithium.  I believe it was helping your nerve pain a bit    Add back Lamictal one half pill of 100 (50) each morning to help with pain.  Watch for too sleepy    Did the lamictal help your nerve pain?  If so I want it back     Update me in 1-2 weeks if helpful, and if so, I will recommend 50 twice a day    Update blood work          Orders Placed This Encounter   Procedures     HM2(CBC w/o Differential)     Comprehensive Metabolic Panel     Medications Discontinued During This Encounter   Medication Reason     dextroamphetamine-amphetamine (ADDERALL) 30 mg Tab Reorder     Administrations This Visit     cyanocobalamin injection 1,000 mcg     Admin Date  12/12/2019 Action  Given Dose  1000 mcg Route  Intramuscular Administered By  Tejal Hernandez LPN              Return in about 6 weeks (around 1/23/2020) for for a full review of everything we did today.      CHIEF COMPLAINT:  Chief Complaint   Patient presents with     Follow-up     4 wk ck       HISTORY OF PRESENT ILLNESS:  Bola is a 49 y.o. male presenting to the  clinic today to follow up on his sinusitis, bipolar disorder, and edema.    Sinusitis: He has continued to deal with a persistent sinus infection related to an infected tooth. He states he only has about four hours every day that he feels well enough to run errands and get things done, otherwise he feels nauseous, sick, and fatigued the rest of the day. Every day is exactly the same. He is finalizing some insurance details but hopes they will be able to schedule his required dental procedures soon. They are planning to pull a couple teeth, clear the infection, and then put in implants. He thinks a lot of his other symptoms could be related to this infection, so he is very excited to get rid of it.     Bipolar Disorder Type II: He met with his psychiatrist, and they decided to start him on lithium instead of Lamictal. His mother takes lithium. This has been a good move for him. He felt like the lithium helped stabilize his mood and helped with frustration. He could feel the difference within 7-10 days. He is taking one pill of the lithium twice daily. He states it is possible that the Lamictal was helping his pain, but he cannot say for sure.     Edema: He is going to see Dr. Vásquez soon as his lymphedema has been worse. He is experiencing a hot and swollen sensation in his left groin, behind his knee, and in his calf. It is a very similar feeling to when he had cellulitis in the past, which was after his TKA in 2017. His compression stockings do not fit, but he is going to  new ones. He is back to taking HCTZ once daily.     Elevated Uric Acid: He thinks the allopurinol has helped his knee and ankle pain. He has had more pain lately, but he thinks it is related to his infection.     REVIEW OF SYSTEMS:   His ears and head get veery hot at night, like he has a fever. He also develops sweats. He does not drink, but his eyes feel pressurized, like they would with a hangover. All other systems are  "negative.    PFSH:  He does not drink. His mother has bipolar type 1.     TOBACCO USE:  Social History     Tobacco Use   Smoking Status Current Some Day Smoker     Packs/day: 0.50     Years: 6.00     Pack years: 3.00     Types: Cigarettes     Start date: 2009     Last attempt to quit: 2017     Years since quittin.8   Smokeless Tobacco Former User       VITALS:  Vitals:    19 1616   BP: 100/62   Patient Site: Left Arm   Patient Position: Sitting   Cuff Size: Adult Large   Pulse: 76   Resp: 18   SpO2: 98%   Weight: 192 lb 9 oz (87.3 kg)   Height: 5' 8\" (1.727 m)     Wt Readings from Last 3 Encounters:   19 192 lb 9 oz (87.3 kg)   19 191 lb (86.6 kg)   10/18/19 200 lb 9 oz (91 kg)     Body mass index is 29.28 kg/m .    PHYSICAL EXAM:  Constitutional:  Reveals an alert, pleasant, talkative male. Affect appropriate. Does not appear acutely ill.  Vitals:  Per nursing notes.  Neurologic:  Cranial nerves II-XII intact.     Psychiatric:  Mood appropriate, memory intact.     MEDICATIONS:  Current Outpatient Medications   Medication Sig Dispense Refill     acetaminophen (TYLENOL) 650 MG CR tablet Take 1,300 mg by mouth every 8 (eight) hours as needed for pain.       albuterol (PROAIR HFA;PROVENTIL HFA;VENTOLIN HFA) 90 mcg/actuation inhaler INHALE 2 PUFFS BY MOUTH EVERY 6 HOURS AS NEEDED FOR WHEEZING 3 Inhaler 3     allopurinol (ZYLOPRIM) 300 MG tablet Take 1 tablet (300 mg total) by mouth 2 (two) times a day. 180 tablet 3     amitriptyline (ELAVIL) 25 MG tablet Take 1 tablet (25 mg total) by mouth at bedtime. 30 tablet 11     baclofen (LIORESAL) 10 MG tablet One and one-half tab three times a day 126 tablet 3     busPIRone (BUSPAR) 10 MG tablet TAKE 1 & 1/2 (ONE & ONE-HALF) TABLETS BY MOUTH THREE TIMES DAILY 135 tablet 3     calcium carbonate-vitamin D3 (CALTRATE 600 PLUS D3) 600 mg(1,500mg) -400 unit per tablet TAKE THREE TABLETS BY MOUTH ONCE DAILY 90 tablet 6     chlorhexidine (PERIDEX) 0.12 " % solution Apply 15 mL to the mouth or throat 2 (two) times a day. 473 mL 3     [START ON 12/16/2019] dextroamphetamine-amphetamine (ADDERALL) 30 mg Tab Take 0.5 tab (15mg) BID 30 tablet 0     DULoxetine (CYMBALTA) 30 MG capsule Take 1 capsule (30 mg total) by mouth 3 (three) times a day.  0     fexofenadine (ALLEGRA) 180 MG tablet Take 1 tablet (180 mg total) by mouth 2 (two) times a day. 60 tablet 11     fluticasone propionate (FLONASE) 50 mcg/actuation nasal spray USE 2 SPRAY(S) IN EACH NOSTRIL ONCE DAILY 18.2 g 4     hydroCHLOROthiazide (HYDRODIURIL) 25 MG tablet Take 1 tablet (25 mg total) by mouth daily as needed (leg swelling). 90 tablet 3     hydrOXYzine HCl (ATARAX) 25 MG tablet TAKE 1 TABLET BY MOUTH 4 TIMES DAILY 360 tablet 3     ketamine HCl (KETAMINE, BULK,) 100 % Powd Ketamine 120 mg amarjit, dissolve 1 amarjit under the tongue up to 11 times daily as needed 120 Bottle 2     lamoTRIgine (LAMICTAL) 100 MG tablet Take 2 tablets (200 mg total) by mouth daily.       levoFLOXacin (LEVAQUIN) 750 MG tablet Take 1 tablet (750 mg total) by mouth daily for 14 days. 14 tablet 0     lidocaine HCl 3 % Crea Apply topically to affected areas twice daily 1 Tube 5     lithium 300 MG capsule Take 300 mg by mouth 2 (two) times a day.  1     Medical Cannabis Use 1 Units As Directed. Take as instructed by the medical cannabis dispensary. The provider who enrolled the patient in the medical cannabis registry and certified this  patient will maintain ownership and liability of all provider reporting and registration requirements.       metoprolol succinate (TOPROL-XL) 100 MG 24 hr tablet Take 1 tablet (100 mg total) by mouth daily. 90 tablet 3     montelukast (SINGULAIR) 10 mg tablet Take 1 tablet (10 mg total) by mouth 2 (two) times a day. 60 tablet 11     nabumetone (RELAFEN) 500 MG tablet Take 2 tablets (1,000 mg total) by mouth 2 (two) times a day. 120 tablet 11     NARCAN 4 mg/actuation nasal spray   0     nicotine  (NICODERM CQ) 21 mg/24 hr Place 1 patch on the skin daily. 30 patch 1     oxyCODONE (OXYCONTIN) 10 mg 12 hr tablet Take 1 tablet (10 mg total) by mouth 3 (three) times a day for 14 days. 42 each 0     oxyCODONE (ROXICODONE) 5 MG immediate release tablet Two tabs morning, one mid-day, two bedtime 70 tablet 0     pantoprazole (PROTONIX) 40 MG tablet Take 1 tablet (40 mg total) by mouth 2 (two) times a day. 60 tablet 11     potassium 99 mg Tab Take by mouth daily.       potassium bicarb/mag combo 21 (MAGNESIUM FIZZ-PLUS ORAL) Take 500 mg by mouth daily.       prazosin (MINIPRESS) 2 MG capsule Take 1 capsule (2 mg total) by mouth at bedtime. 90 capsule 3     predniSONE (DELTASONE) 10 mg tablet Take 10 mg by mouth 2 (two) times a day. 60 tablet 11     pseudoephedrine (SUDOGEST 12-HOUR) 120 mg 12 hr tablet Take 1 tablet (120 mg total) by mouth every 12 (twelve) hours. 180 tablet 3     QUEtiapine (SEROQUEL) 200 MG tablet Take 200 mg by mouth bedtime.       QUEtiapine (SEROQUEL) 50 MG tablet Take 50 mg by mouth 4 (four) times a day.       temazepam (RESTORIL) 30 mg capsule Take 30 mg by mouth at bedtime.         1     THERA-M 9 mg iron-400 mcg Tab tablet TAKE 1 TABLET BY MOUTH ONCE DAILY 60 tablet 2     topiramate (TOPAMAX) 100 MG tablet Take 1 tablet (100 mg total) by mouth 4 (four) times a day. 120 tablet 11     Current Facility-Administered Medications   Medication Dose Route Frequency Provider Last Rate Last Dose     cyanocobalamin injection 1,000 mcg  1,000 mcg Intramuscular Q7 Days Devon Lund MD   1,000 mcg at 12/12/19 1611       ADDITIONAL HISTORY SUMMARIZED (2): None.  DECISION TO OBTAIN EXTRA INFORMATION (1): None.   RADIOLOGY TESTS (1): None.  LABS (1): Labs from September and October reviewed. Labs ordered.   MEDICINE TESTS (1): None.  INDEPENDENT REVIEW (2 each): None.     The visit lasted a total of 19 minutes face to face with the patient. Over 50% of the time was spent counseling and educating the  patient about his sinusitis, pain, and bipolar.    I, Montrell Reaves, am scribing for and in the presence of, Dr. Lund.    I, Dr. Lund, personally performed the services described in this documentation, as scribed by Montrell Reaves in my presence, and it is both accurate and complete.    Total data points: 1

## 2021-06-04 NOTE — PROGRESS NOTES
Date of Service: 12/26/19    Date last seen:  10/31/18    PCP: Devon Lund MD    Impression:   1. Left leg swelling and testicular swelling - stable  2. Left leg primary lymphedema superimposed on secondary lymphedema-doing well   3. Testicular pain with ilioinguinal neuralgia s/p hernia repair - stable    Plan:   1. Questions were answered.   2. Bandaging at night to keep left leg swelling down as needed.  3. Continue under armour compression trunks and compression socks. Will call when due for new compression.  4. Knows when to use antibiotics.  5. Follow up 1 year or when needed.  If any problems to let us know.   6. Applauded exercise and weight loss.     Time spent with patient 25 minutes with greater than 50% time in consultation, education and coordination of care.   ---------------------------------------------------------------------------------------------------------------------     Chief Complaint: Left leg swelling and left lower abdomen testicular swelling and pain     History of Present Illness:   Bola Lyon returns to the [facility] for eft leg swelling with testicular swelling and left lower abdominal pain due to lymphedema with ilioinguinal neuralgia on the left side after surgery.  Treatment has included MLD, education, compression bandaging, lymphatic exercise, range of motion work and exercise. He continues to use compression trunks and compression stockings with exercise and self massage.   He has been working out more with martial arts and has cut back on his use of pop.  The groin swelling is under good control.  He needs new compression and wants to order it after January 1st.  He has lost over 30 pounds.  He has had problems with his teeth and is undergoing treatment for infection and gingivitis.  He has not been able to exercise as regularly as he would like.  There has been no new numbness, tingling, weakness, masses, rashes, shortness  of breath or chest pain. There have not been any new areas of ulceration. There has been no new fevers. There are no new or changing skin lesions. His lymphatic studies showed hyperplasia of the left side lymphatics.       Past Medical History:   Diagnosis Date     AC (acromioclavicular) arthritis 10/24/2011     Aftercare following surgery of the musculoskeletal system 4/25/2012     Anxiety Disorder NOS     Created by Conversion      Bipolar 2 disorder (H)      Cervical Radiculopathy     Created by Conversion      Claustrophobia      Disturbance of skin sensation 11/30/2011     Encounter for chronic pain management 2/11/2015     GERD (gastroesophageal reflux disease)      Hypertension     Created by Conversion      Impingement Of The Right Shoulder     Created by Conversion       Lymphedema 02/11/2015    left     Pain in joint, shoulder region 10/10/2011     Panic attacks      PTSD (post-traumatic stress disorder)      TBI (traumatic brain injury) (H)     post concussion syndrome       Past Surgical History:   Procedure Laterality Date     ANTERIOR / POSTERIOR COMBINED FUSION CERVICAL SPINE  2013, redo in 2014    4 levels     HAND SURGERY Left 1997     HERNIA REPAIR Left 2006     HERNIA REPAIR Right 2008    and middle     KNEE ARTHROSCOPY  2014     NASAL POLYP SURGERY  2014    and septal repair, Dr. Arcos     CA ARTHRODESIS,ANKLE,OPEN Right 2007    Ankle fusion     CA SHLDR ARTHROSCOP,SURG,W/ROTAT CUFF REPR Right 11/25/2015    Procedure: RIGHT SHOULDER ARTHROSCOPY, ROTATOR CUFF REPAIR, DECOMPRESSION, DEBRIDEMENT, DISTAL CLAVICLE EXCISION;  Surgeon: Pilo Bruce MD;  Location: [facility] [place];  Service: Orthopedics     REPLACEMENT TOTAL KNEE Left 03/20/2017     SHOULDER ARTHROSCOPY DISTAL CLAVICLE EXCISION AND OPEN ROTATOR CUFF REPAIR Bilateral 2005 and 2013     VENTRAL HERNIA REPAIR  2008       Current Outpatient Medications   Medication Sig Dispense Refill     acetaminophen (TYLENOL) 650 MG CR tablet Take  1,300 mg by mouth every 8 (eight) hours as needed for pain.       albuterol (PROAIR HFA;PROVENTIL HFA;VENTOLIN HFA) 90 mcg/actuation inhaler INHALE 2 PUFFS BY MOUTH EVERY 6 HOURS AS NEEDED FOR WHEEZING 3 Inhaler 3     allopurinol (ZYLOPRIM) 300 MG tablet Take 1 tablet (300 mg total) by mouth 2 (two) times a day. 180 tablet 3     amitriptyline (ELAVIL) 25 MG tablet Take 1 tablet (25 mg total) by mouth at bedtime. 30 tablet 11     baclofen (LIORESAL) 10 MG tablet One and one-half tab three times a day 126 tablet 3     busPIRone (BUSPAR) 10 MG tablet TAKE 1 & 1/2 (ONE & ONE-HALF) TABLETS BY MOUTH THREE TIMES DAILY 135 tablet 3     calcium carbonate-vitamin D3 (CALTRATE 600 PLUS D3) 600 mg(1,500mg) -400 unit per tablet TAKE THREE TABLETS BY MOUTH ONCE DAILY 90 tablet 6     chlorhexidine (PERIDEX) 0.12 % solution Apply 15 mL to the mouth or throat 2 (two) times a day. 473 mL 3     dextroamphetamine-amphetamine (ADDERALL) 30 mg Tab Take 0.5 tab (15mg) BID 30 tablet 0     DULoxetine (CYMBALTA) 30 MG capsule Take 1 capsule (30 mg total) by mouth 3 (three) times a day.  0     fexofenadine (ALLEGRA) 180 MG tablet Take 1 tablet (180 mg total) by mouth 2 (two) times a day. 60 tablet 11     fluticasone propionate (FLONASE) 50 mcg/actuation nasal spray USE 2 SPRAY(S) IN EACH NOSTRIL ONCE DAILY 18.2 g 4     hydroCHLOROthiazide (HYDRODIURIL) 25 MG tablet Take 1 tablet (25 mg total) by mouth daily as needed (leg swelling). 90 tablet 3     hydrOXYzine HCl (ATARAX) 25 MG tablet TAKE 1 TABLET BY MOUTH 4 TIMES DAILY 360 tablet 3     ketamine HCl (KETAMINE SL) 120 mg.       lamoTRIgine (LAMICTAL) 100 MG tablet Take 0.5 tablets (50 mg total) by mouth 2 (two) times a day.  0     levoFLOXacin (LEVAQUIN) 750 MG tablet Take 1 tablet (750 mg total) by mouth daily for 14 days. 14 tablet 0     lidocaine HCl 3 % Crea Apply topically to affected areas twice daily 1 Tube 5     lithium 300 MG capsule Take 300 mg by mouth 2 (two) times a day.  1      Medical Cannabis Use 1 Units As Directed. Take as instructed by the medical cannabis dispensary. The provider who enrolled the patient in the medical cannabis registry and certified this  patient will maintain ownership and liability of all provider reporting and registration requirements.       metoprolol succinate (TOPROL-XL) 100 MG 24 hr tablet Take 1 tablet (100 mg total) by mouth daily. 90 tablet 3     montelukast (SINGULAIR) 10 mg tablet Take 1 tablet (10 mg total) by mouth 2 (two) times a day. 60 tablet 11     nabumetone (RELAFEN) 500 MG tablet Take 2 tablets (1,000 mg total) by mouth 2 (two) times a day. 120 tablet 11     NARCAN 4 mg/actuation nasal spray   0     nicotine (NICODERM CQ) 21 mg/24 hr Place 1 patch on the skin daily. 30 patch 1     oxyCODONE (OXYCONTIN) 10 mg 12 hr tablet Take 1 tablet (10 mg total) by mouth 3 (three) times a day for 14 days. 42 each 0     oxyCODONE (ROXICODONE) 5 MG immediate release tablet Two tabs morning, one mid-day, two bedtime 70 tablet 0     pantoprazole (PROTONIX) 40 MG tablet Take 1 tablet (40 mg total) by mouth 2 (two) times a day. 60 tablet 11     potassium 99 mg Tab Take by mouth daily.       prazosin (MINIPRESS) 2 MG capsule Take 1 capsule (2 mg total) by mouth at bedtime. 90 capsule 3     predniSONE (DELTASONE) 10 mg tablet Take 10 mg by mouth 2 (two) times a day. 60 tablet 11     pseudoephedrine (SUDOGEST 12-HOUR) 120 mg 12 hr tablet Take 1 tablet (120 mg total) by mouth every 12 (twelve) hours. 180 tablet 3     QUEtiapine (SEROQUEL) 200 MG tablet Take 200 mg by mouth bedtime.       QUEtiapine (SEROQUEL) 50 MG tablet Take 50 mg by mouth 4 (four) times a day.       temazepam (RESTORIL) 30 mg capsule Take 30 mg by mouth at bedtime.         1     THERA-M 9 mg iron-400 mcg Tab tablet TAKE 1 TABLET BY MOUTH ONCE DAILY 60 tablet 2     topiramate (TOPAMAX) 100 MG tablet Take 1 tablet (100 mg total) by mouth 4 (four) times a day. 120 tablet 11     Current  Facility-Administered Medications   Medication Dose Route Frequency Provider Last Rate Last Dose     cyanocobalamin injection 1,000 mcg  1,000 mcg Intramuscular Q7 Days Devon Lund MD   1,000 mcg at 12/12/19 1611       No Known Allergies    History     Social History     Marital status:      Spouse name: N/A     Number of children: N/A     Years of education: N/A     Occupational History     Not on file.     Social History Main Topics     Smoking status: Current Every Day Smoker     Packs/day: 0.50     Years: 6.00     Types: Cigarettes     Start date: 8/20/2009     Smokeless tobacco: Former User     Alcohol use: No     Drug use: No     Sexual activity: Not Currently     Partners: Female     Other Topics Concern     Not on file     Social History Narrative    He is . He is currently staying with his parents as he is recuperating from multiple recent surgeries. 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 He rarely drinks alcohol and has been cutting back on his cigarette use.           Family History   Problem Relation Age of Onset     Hypertension Father      Lymphoma Father      Bipolar disorder Mother      Diabetes Maternal Grandmother      Diabetes Maternal Grandfather      Colon cancer Paternal Grandmother      Diabetes Paternal Grandmother      Diabetes Paternal Grandfather      Anesthesia problems Neg Hx        Review of Systems:    Bola MCDONALD Camron no new numbess, tingling or weakness, redness or rashes, fevers, new masses, abdominal bloating or discomfort, unexplained weight loss, new ulcers, shortness of breath and chest pain  Full 12 point review of systems was completed.    Labs:    I personally reviewed the following lab results today and those on care everywhere, if indicated    Lab Results   Component Value Date    SEDRATE 10 09/13/2019         Lab Results   Component Value Date    CRP 0.4 09/13/2019           Lab Results    Component Value Date    CREATININE 0.89 12/12/2019      No results found for: HGBA1C        Lab Results   Component Value Date    BUN 18 12/12/2019              Lab Results   Component Value Date    ALBUMIN 3.6 12/12/2019       Vitamin D, Total (25-Hydroxy)   Date Value Ref Range Status   09/20/2018 34.9 30.0 - 80.0 ng/mL Final       Lab Results   Component Value Date    TSH 0.50 06/28/2019     Lab Results   Component Value Date    WBC 13.8 (H) 12/12/2019    HGB 13.4 (L) 12/12/2019    HCT 40.4 12/12/2019    MCV 86 12/12/2019     12/12/2019     Imaging:    I personally reviewed the following imaging results today and those on care everywhere, if indicated    Nothing new to review.    Physical Exam:  Vitals:    12/26/19 1142   BP: 120/64   Pulse: 60   Resp: 18   Temp: 98.9  F (37.2  C)     BMI 29.35 decreased 193 pounds    Circumferential measures:    Vasc Edema 3/30/2017 5/11/2017 9/27/2017 10/31/2018 12/26/2019   Right just above MTP 23 23 23.5 22.7 22.8   Right Ankle 25 23.2 23.3 23.9 22   Right Widest Calf 39.6 40.5 42.0 41.6 37.7   Right Thigh Up 10cm 46 46 52.5 51.2 45.4   Left - just above MTP 24 23 24.0 23.4 24.5   Left Ankle 26.5 24 23.8 24.2 22.7   Left Widest Calf 44 41 43.0 43.1 39   Left Thigh Up 10cm 55.5 52.5 54.5 51.1 47.4     Measures down with weight loss.    General:  49 y.o. male in no apparent distress.      Psych:  Alert and oriented x 3.  Cooperative. Affect normal.    HEENT: Atraumatic and normocephalic.    Abdominal:  Normal bowel sounds.  No masses, tenderness, guarding or rigidity.  No inguinal lymphadenopathy, but continued slight left inguinal scarring and fullness palpated.  This has decreased and is under excellent control.    Musculoskeletal: Range of motion knees and ankles are normal bilaterally. No pain to palpation.  No rubor or calor.     Neuromuscular skeletal:  Sensation slightly decreased to pinprick and light touch in the left leg which is old and unchanged.  Strength  testing is normal in hip flexion, hip extension, knee flexion/extension and ankle dorsiflexion and great toe extension bilaterally.        Vascular: Dorsalis pedis and posterior tibialis pulses are strong and equal bilaterally. There are no significant telangietasias, medial ankle venous flares, venous varicosities  and spider veins .      Integumentary: Skin of the bilateral lower extremities is normal. Nails are thickened.     Yaa Vásquez MD, ABWMS, FACCWS, [facility]  Medical Director Wound Care and Lymphedema  [facility]  [phone]

## 2021-06-04 NOTE — PROGRESS NOTES
"Assessment/Plan:     Problem List Items Addressed This Visit     Chronic pain syndrome - Primary (Chronic)    Relevant Medications    oxyCODONE (ROXICODONE) 5 MG immediate release tablet (Start on 12/31/2019)    Other Relevant Orders    Pain Management Drug Panel, Urine    Cervical radiculopathy at C8 (Chronic)    Relevant Medications    oxyCODONE (OXYCONTIN) 10 mg 12 hr tablet (Start on 12/31/2019)            No follow-ups on file.    Patient Instructions   PLAN:    May fill Oxycodone and oxycontin 12/31    Discussed you are working Dr. Jayson Murphy for electromagnetic therapy    You will see Dr. Lund tomorrow , and may see Orthopedics to evaluate left arm pan     Return in 8 weeks        Subjective:       49 y.o. male presents for management of multiple pain generators.    He describes is doing well with his \"normal pain\" with a good set up.  Describes the physical therapy has been very helpful over the last year, particularly noticed changes in his neck and upper arms.    More recently he has been focusing a dental concerns, his left top incisor notes has an infection, will be having surgery on that in a couple weeks to cleared up.  He notes ulcers in his lower gumline infection related to chewing tobacco and will be having gingivitis addressed.  He has been on antibiotics for several weeks.  He is aware the dental infections make his overall pain worse.    Another issue is 10 days ago was helping his parents move some boxes, describes using ice .  Demonstrates this flared up his history of right arm ulnar nerve impingement, and left forearm is significantly flared up.  He has had a history of a C6 impingement noting pinch  has been impaired on the left.  He describes it is been inflamed, feels there is some soft tissue problems.   See his primary care provider tomorrow, and then would like to see orthopedics.    He has been active in the lymphedema clinic, demonstrates has lower extremity " compression garments.    He continues to try to eat well and has lost 25 pounds monitoring his diet.    He reports with the flared up upper extremity pain he used his medications couple times more often.  He has been using oxycodone 10 mg 3 times a day extended release, and oxycodone 5 mg 5 times in a day.  He is 2 tablets remaining of each of them, would be due for refill on 1/1.      Current Outpatient Medications:      acetaminophen (TYLENOL) 650 MG CR tablet, Take 1,300 mg by mouth every 8 (eight) hours as needed for pain., Disp: , Rfl:      albuterol (PROAIR HFA;PROVENTIL HFA;VENTOLIN HFA) 90 mcg/actuation inhaler, INHALE 2 PUFFS BY MOUTH EVERY 6 HOURS AS NEEDED FOR WHEEZING, Disp: 3 Inhaler, Rfl: 3     allopurinol (ZYLOPRIM) 300 MG tablet, Take 1 tablet (300 mg total) by mouth 2 (two) times a day., Disp: 180 tablet, Rfl: 3     amitriptyline (ELAVIL) 25 MG tablet, Take 1 tablet (25 mg total) by mouth at bedtime., Disp: 30 tablet, Rfl: 11     baclofen (LIORESAL) 10 MG tablet, One and one-half tab three times a day, Disp: 126 tablet, Rfl: 3     busPIRone (BUSPAR) 10 MG tablet, TAKE 1 & 1/2 (ONE & ONE-HALF) TABLETS BY MOUTH THREE TIMES DAILY, Disp: 135 tablet, Rfl: 3     calcium carbonate-vitamin D3 (CALTRATE 600 PLUS D3) 600 mg(1,500mg) -400 unit per tablet, TAKE THREE TABLETS BY MOUTH ONCE DAILY, Disp: 90 tablet, Rfl: 6     chlorhexidine (PERIDEX) 0.12 % solution, RINSE WITH 15ML FOR 30 SECONDS AND SPIT, USE TWICE DAILY AFTER BRUSHING AND FLOSSING ., Disp: 473 mL, Rfl: 11     dextroamphetamine-amphetamine (ADDERALL) 30 mg Tab, Take 0.5 tab (15mg) BID, Disp: 30 tablet, Rfl: 0     DULoxetine (CYMBALTA) 30 MG capsule, Take 1 capsule (30 mg total) by mouth 3 (three) times a day., Disp: , Rfl: 0     fexofenadine (ALLEGRA) 180 MG tablet, Take 1 tablet (180 mg total) by mouth 2 (two) times a day., Disp: 60 tablet, Rfl: 11     fluticasone propionate (FLONASE) 50 mcg/actuation nasal spray, USE 2 SPRAY(S) IN EACH NOSTRIL  ONCE DAILY, Disp: 18.2 g, Rfl: 4     hydroCHLOROthiazide (HYDRODIURIL) 25 MG tablet, Take 1 tablet (25 mg total) by mouth daily as needed (leg swelling)., Disp: 90 tablet, Rfl: 3     hydrOXYzine HCl (ATARAX) 25 MG tablet, TAKE 1 TABLET BY MOUTH 4 TIMES DAILY, Disp: 360 tablet, Rfl: 3     ketamine HCl (KETAMINE SL), 120 mg., Disp: , Rfl:      lamoTRIgine (LAMICTAL) 100 MG tablet, Take 0.5 tablets (50 mg total) by mouth 2 (two) times a day., Disp: , Rfl: 0     levoFLOXacin (LEVAQUIN) 750 MG tablet, Take 1 tablet (750 mg total) by mouth daily for 14 days., Disp: 14 tablet, Rfl: 0     lidocaine HCl 3 % Crea, Apply topically to affected areas twice daily, Disp: 1 Tube, Rfl: 5     lithium 300 MG capsule, Take 300 mg by mouth 2 (two) times a day., Disp: , Rfl: 1     Medical Cannabis, Use 1 Units As Directed. Take as instructed by the medical cannabis dispensary. The provider who enrolled the patient in the medical cannabis registry and certified this  patient will maintain ownership and liability of all provider reporting and registration requirements., Disp: , Rfl:      metoprolol succinate (TOPROL-XL) 100 MG 24 hr tablet, Take 1 tablet (100 mg total) by mouth daily., Disp: 90 tablet, Rfl: 3     montelukast (SINGULAIR) 10 mg tablet, Take 1 tablet (10 mg total) by mouth 2 (two) times a day., Disp: 60 tablet, Rfl: 11     nabumetone (RELAFEN) 500 MG tablet, Take 2 tablets (1,000 mg total) by mouth 2 (two) times a day., Disp: 120 tablet, Rfl: 11     NARCAN 4 mg/actuation nasal spray, , Disp: , Rfl: 0     nicotine (NICODERM CQ) 21 mg/24 hr, Place 1 patch on the skin daily., Disp: 30 patch, Rfl: 1     [START ON 12/31/2019] oxyCODONE (OXYCONTIN) 10 mg 12 hr tablet, Take 1 tablet (10 mg total) by mouth 3 (three) times a day for 14 days., Disp: 42 each, Rfl: 0     [START ON 12/31/2019] oxyCODONE (ROXICODONE) 5 MG immediate release tablet, Two tabs morning, one mid-day, two bedtime, Disp: 70 tablet, Rfl: 0     pantoprazole  "(PROTONIX) 40 MG tablet, Take 1 tablet (40 mg total) by mouth 2 (two) times a day., Disp: 60 tablet, Rfl: 11     potassium 99 mg Tab, Take by mouth daily., Disp: , Rfl:      predniSONE (DELTASONE) 10 mg tablet, Take 10 mg by mouth 2 (two) times a day., Disp: 60 tablet, Rfl: 11     pseudoephedrine (SUDOGEST 12-HOUR) 120 mg 12 hr tablet, Take 1 tablet (120 mg total) by mouth every 12 (twelve) hours., Disp: 180 tablet, Rfl: 3     QUEtiapine (SEROQUEL) 200 MG tablet, Take 200 mg by mouth bedtime., Disp: , Rfl:      QUEtiapine (SEROQUEL) 50 MG tablet, Take 50 mg by mouth 4 (four) times a day., Disp: , Rfl:      temazepam (RESTORIL) 30 mg capsule, Take 30 mg by mouth at bedtime.    , Disp: , Rfl: 1     THERA-M 9 mg iron-400 mcg Tab tablet, TAKE 1 TABLET BY MOUTH ONCE DAILY, Disp: 60 tablet, Rfl: 2     topiramate (TOPAMAX) 100 MG tablet, Take 1 tablet (100 mg total) by mouth 4 (four) times a day., Disp: 120 tablet, Rfl: 11     prazosin (MINIPRESS) 2 MG capsule, Take 1 capsule (2 mg total) by mouth at bedtime., Disp: 90 capsule, Rfl: 3    Current Facility-Administered Medications:      cyanocobalamin injection 1,000 mcg, 1,000 mcg, Intramuscular, Q7 Days, Devon Lund MD, 1,000 mcg at 12/12/19 1611  Is alert with a clear sensorium good eye contact.  Thought process tight logical.  Affect is fairly full range.    Left forearm indeed appears edematous and erythematous, brachioradialis.           Objective:     Vitals:    12/30/19 1542   BP: 146/84   Pulse: 85   Weight: 193 lb (87.5 kg)   Height: 5' 8\" (1.727 m)   PainSc:   8   PainLoc: Neck       Time spent more than 15 minutes face-to-face, 50% count about above condition coordination treatment plan          This note has been dictated using voice recognition software. Any grammatical or context distortions are unintentional and inherent to the software  "

## 2021-06-04 NOTE — TELEPHONE ENCOUNTER
Patient states today is the last dose of levofloxacin requesting for refill as soon as possible.  Refill Request  Did you contact pharmacy: No  Medication name:   Requested Prescriptions     Pending Prescriptions Disp Refills     levoFLOXacin (LEVAQUIN) 750 MG tablet 14 tablet 0     Sig: Take 1 tablet (750 mg total) by mouth daily for 14 days.   Who prescribed the medication: Devon Lund MD  Pharmacy Name and Location: [facility] # [id number]  Is patient out of medication: Yes  Patient notified refills processed in 72 hours:  yes  Okay to leave a detailed message: no

## 2021-06-04 NOTE — PROGRESS NOTES
[facility] Re-Certification Request    December 4, 2019      Patient: Bola Lyon  MR Number: [id number]  YOB: 1970  Date of Visit: 12/4/2019      Dear Dmitriy Kirk*:    As you may recall, we have been seeing Bola Lyon for Physical Therapy of chronic pain/cervical pain with radiculopathy.    For therapy to continue, Medicare and/or Medicaid requires periodic physician review of the treatment plan. Please review the summary of the patient's progress and our plan for continued therapy, and verify  that you agree therapy should continue by entering certification dates at the bottom of this note and co-signing this note.    Plan of Care  Authorization / Certification Start Date: 11/29/19  Authorization / Certification End Date: 02/28/20  Communication with: Referral Source  Patient Related Instruction: Nature of Condition;Treatment plan and rationale;Self Care instruction;Basis of treatment;Posture;Body mechanics;Precautions;Next steps;Expected outcome  Times per Week: 1-2  Number of Weeks: 12  Number of Visits: 20  Discharge Planning: discharge with [facility] once goals are met.   Therapeutic Exercise: ROM;Stretching;Strengthening  Neuromuscular Reeducation: kinesio tape;posture;balance/proprioception;TNE;core  Manual Therapy: soft tissue mobilization;myofascial release;joint mobilization;muscle energy;strain counterstrain      Goal Status:  Pt. will demonstrate/verbalize independence in self-management of condition in : 12 weeks - IMPROVING TOWARDS GOAL  Pt. will report decreased intensity, frequency of : Pain;in 12 weeks;Comment  Comment:: decrease pain from 6-8/10 to 4-7/10 with ADLs. - IMPROVING TOWARDS GOAL  Pt. will decrease use of medication for pain for improved quality of life in : 12 weeks - SLOW IMPROVEMENT  Pt. will have improved quality of sleep: waking less times/night;getting 75-90% of required amount;in 12 weeks - IMPROVING TOWARDS GOAL  Patient will return  to: exercise;leisure;in 12 weeks;Comment  Comment: lifting weights withtout dropping anything with B hands - SLOW PROGRESSION  Patient Turn Head: for driving;for conversation;with less pain;with less difficulty;in 12 weeks;Comment  Comment: increase C-rot to >50 deg B and T-rot to >40 deg. - HAS NOT SHOWN IMPROVEMENT IN THESE ROM MEASUREMENTS  Patient will reach / maintain arm movement: forward;overhead;behind;for home chores;for dressing;with less pain;with less difficulty;in 12 weeks - SHOWING IMPROVEMENT   Patient will decrease : NDI score;by _ points;for improved quality of function;for improved quality of life;in 12 weeks  by ___ points: 15 -  NOT TESTED         If you have any questions or concerns, please don't hesitate to call.    Sincerely,      Jb Ochoa PT, ATC        Physician recommendation:     ___ Follow therapist's recommendation        ___ Modify therapy      *Physician co-signature indicates they certify the need for these services furnished within this plan and while under their care.        [facility] Daily Progress     Patient Name: Bola Lyon  Date: 12/4/2019  Visit #: 37  PTA visit #:       Referral Diagnosis: Chronic pain      Referring provider: Dmitriy Cramer*     Visit Diagnosis:     ICD-10-CM    1. Chronic right shoulder pain M25.511     G89.29    2. Cervicalgia M54.2    3. Cervical radiculitis M54.12    4. Myofascial pain M79.18          Assessment:     Patient is benefitting from skilled physical therapy and is making steady progress toward functional goals.  Patient is appropriate to continue with skilled physical therapy intervention, as indicated by initial plan of care.  He did go backwards with not being seen due to access issues. He did seem to maintain much of his ROM from the last time it was checked which is greater than at initial evaluation.     Goal Status:  Pt. will demonstrate/verbalize independence in self-management of condition in : 12  "weeks - IMPROVING TOWARDS GOAL  Pt. will report decreased intensity, frequency of : Pain;in 12 weeks;Comment  Comment:: decrease pain from 6-8/10 to 4-7/10 with ADLs. - IMPROVING TOWARDS GOAL  Pt. will decrease use of medication for pain for improved quality of life in : 12 weeks - SLOW IMPROVEMENT  Pt. will have improved quality of sleep: waking less times/night;getting 75-90% of required amount;in 12 weeks - IMPROVING TOWARDS GOAL  Patient will return to: exercise;leisure;in 12 weeks;Comment  Comment: lifting weights withtout dropping anything with B hands - SLOW PROGRESSION  Patient Turn Head: for driving;for conversation;with less pain;with less difficulty;in 12 weeks;Comment  Comment: increase C-rot to >50 deg B and T-rot to >40 deg. - HAS NOT SHOWN IMPROVEMENT IN THESE ROM MEASUREMENTS  Patient will reach / maintain arm movement: forward;overhead;behind;for home chores;for dressing;with less pain;with less difficulty;in 12 weeks - SHOWING IMPROVEMENT   Patient will decrease : NDI score;by _ points;for improved quality of function;for improved quality of life;in 12 weeks  by ___ points: 15 -  NOT TESTED         Plan / Patient Education:     Plan to con't with manual therapy to decrease fascial tension/tone to normalize ROM/decrease inflammation/decrease mm tone and improve proprioception.      Subjective:     Pain Ratin.5 to 8.5 pain range lately.    He is still on antibiotics due to the sinus/mouth issues. He has still been really sick and lack of energy from this.   He can feel it all in his head/neck. He can feel the heat in his head and even into his LLE.   He is still not getting the \"posing\" he was having at the initial evaluation.  The pain levels are definitely up more since he has been having this infection going on.  He feels more stiff due to this as well.     Objective:     art fascial tensions.    C-rot:  (R/L)  T-rot:  (R/L)  Cflex: 25  Cext: 32   Shoulder abduction: 138/137 " (R/L)    Treatment Today   12/4/2019   TREATMENT MINUTES COMMENTS   Evaluation     Self-care/ Home management     Manual therapy 40 Fascial release using Strain-Counterstrain of B cervical/scap/sternal-Art   Neuromuscular Re-education     Therapeutic Activity     Therapeutic Exercises     Gait training     Modality__________________                Total 40    Blank areas are intentional and mean the treatment did not include these items.       Jb Ochoa  12/4/2019

## 2021-06-04 NOTE — TELEPHONE ENCOUNTER
RN cannot approve Refill Request    RN can NOT refill this medication med is not covered by policy/route to provider. Last office visit: 10/18/2019 Devon Lund MD Last Physical: Visit date not found Last MTM visit: Visit date not found Last visit same specialty: 10/18/2019 Devon Lund MD.  Next visit within 3 mo: Visit date not found  Next physical within 3 mo: Visit date not found      Grisel Edwards, [facility] Triage/Med Refill 12/6/2019    Requested Prescriptions   Pending Prescriptions Disp Refills     THERA-M 9 mg iron-400 mcg Tab tablet [Pharmacy Med Name: THERA-M TAB] 60 tablet 2     Sig: TAKE 1 TABLET BY MOUTH ONCE DAILY       There is no refill protocol information for this order        busPIRone (BUSPAR) 10 MG tablet [Pharmacy Med Name: BUSPIRONE HCL 10MG  TAB] 135 tablet 3     Sig: TAKE 1 & 1/2 (ONE & ONE-HALF) TABLETS BY MOUTH THREE TIMES DAILY       Tricyclics/Misc Antidepressant/Antianxiety Meds Refill Protocol Passed - 12/5/2019  5:03 PM        Passed - PCP or prescribing provider visit in last year     Last office visit with prescriber/PCP: 10/18/2019 Devon Lund MD OR same dept: 10/18/2019 Devon Lund MD OR same specialty: 10/18/2019 Devon Lund MD  Last physical: Visit date not found Last MTM visit: Visit date not found   Next visit within 3 mo: Visit date not found  Next physical within 3 mo: Visit date not found  Prescriber OR PCP: Devon Lund MD  Last diagnosis associated with med order: 1. Healthcare maintenance  - THERA-M 9 mg iron-400 mcg Tab tablet [Pharmacy Med Name: THERA-M TAB]; TAKE 1 TABLET BY MOUTH ONCE DAILY  Dispense: 60 tablet; Refill: 2    2. PTSD (post-traumatic stress disorder)  - busPIRone (BUSPAR) 10 MG tablet [Pharmacy Med Name: BUSPIRONE HCL 10MG  TAB]; TAKE 1 & 1/2 (ONE & ONE-HALF) TABLETS BY MOUTH THREE TIMES DAILY  Dispense: 135 tablet; Refill: 3    If protocol passes may refill for 12 months if within 3 months of  last provider visit (or a total of 15 months).

## 2021-06-04 NOTE — TELEPHONE ENCOUNTER
Requested medication sent by PCP   Disp Refills Start End     levoFLOXacin (LEVAQUIN) 750 MG tablet 14 tablet 0 12/6/2019 12/20/2019    Sig - Route: Take 1 tablet (750 mg total) by mouth daily for 14 days. - Oral    Sent to pharmacy as: levoFLOXacin 750 mg tablet (Levaquin)    E-Prescribing Status: Receipt confirmed by pharmacy (12/6/2019  2:49 PM CST)      Closing encounter at this time    Irma Belcher RN BSBA [facility] Triage/Med Refill 12/9/2019 7:13 AM

## 2021-06-04 NOTE — PROGRESS NOTES
Scheduled Follow Up Call: Attempt 1 Community Health Worker called and left a message for the patient. If the patient is returning my call, please transfer the patient to [facility] at ext. [phone].    CHW returned call to Jan today after receiving a voicemail from him on 12/21/2019.    Patient was into see his PCP Dr. Lund on 12/12/2019. PCP recommended  f/u visit in 6 weeks around 1/23/2020. Visit with PCP not scheduled at this time.    Patient is currently attending PT outpatient appointments 2x(Tuesdays/Fridays) weekly with [facility] for shoulder pain.    Upcoming appts:  12/26 [facility]  [facility]/30 [facility]    Next Outreach: 1/7/2020  Plan: Update CCC goal progression

## 2021-06-04 NOTE — PROGRESS NOTES
Scheduled Follow Up Call: Attempt 1 Community Health Worker called and left a message for the patient. If the patient is returning my call, please transfer the patient to [facility] at ext. [phone].    Following up with patient on [facility] appt, questions/concerns.    Next Outreach: 12/18/19

## 2021-06-04 NOTE — TELEPHONE ENCOUNTER
RN cannot approve Refill Request    RN can NOT refill this medication med is not covered by policy/route to provider     . Last office visit: 12/12/2019 Devon Lund MD Last Physical: Visit date not found Last MTM visit: Visit date not found Last visit same specialty: 12/12/2019 Devon Lund MD.  Next visit within 3 mo: Visit date not found  Next physical within 3 mo: Visit date not found      Mary Jo Ramirez, [facility] Triage/Med Refill 12/28/2019    Requested Prescriptions   Pending Prescriptions Disp Refills     chlorhexidine (PERIDEX) 0.12 % solution [Pharmacy Med Name: Chlorhexidine Gluconate 0.12 % Mouth/Throat Solution] 473 mL 0     Sig: RINSE WITH 15ML FOR 30 SECONDS AND SPIT, USE TWICE DAILY AFTER BRUSHING AND FLOSSING .       There is no refill protocol information for this order

## 2021-06-04 NOTE — PATIENT INSTRUCTIONS - HE
Could you take a lower dose of Lamictal (50 mgs daily) with the Lithium.  I believe it was helping your nerve pain a bit    Add back Lamictal one half pill of 100 (50) each morning to help with pain.  Watch for too sleepy    Did the lamictal help your nerve pain?  If so I want it back     Update me in 1-2 weeks if helpful, and if so, I will recommend 50 twice a day    Update blood work

## 2021-06-04 NOTE — TELEPHONE ENCOUNTER
Medication Question or Clarification  Who is calling: Pharmacy: [facility]  What medication are you calling about? (include dose and sig)   levoFLOXacin (LEVAQUIN) 750 MG tablet 14 tablet 0 12/6/2019 12/20/2019 No   Sig - Route: Take 1 tablet (750 mg total) by mouth daily for 14 days. - Oral     Who prescribed the medication?: Devon Lund MD   What is your question/concern?: Pharmacy note - While reviewing this medication, it appears that the patient has been using it almost continuously since October, DX acute sinusitis. Please clarify.  Pharmacy: [id number]  Okay to leave a detailed message?: Yes  Site CMT - Please call the pharmacy to obtain any additional needed information.

## 2021-06-05 ENCOUNTER — RECORDS - HEALTHEAST (OUTPATIENT)
Dept: SCHEDULING | Facility: CLINIC | Age: 51
End: 2021-06-05

## 2021-06-05 ENCOUNTER — RECORDS - HEALTHEAST (OUTPATIENT)
Dept: ADMINISTRATIVE | Facility: OTHER | Age: 51
End: 2021-06-05

## 2021-06-05 ENCOUNTER — HEALTH MAINTENANCE LETTER (OUTPATIENT)
Age: 51
End: 2021-06-05

## 2021-06-05 VITALS
SYSTOLIC BLOOD PRESSURE: 116 MMHG | BODY MASS INDEX: 33.94 KG/M2 | HEIGHT: 67 IN | HEART RATE: 74 BPM | WEIGHT: 216.25 LBS | RESPIRATION RATE: 20 BRPM | OXYGEN SATURATION: 97 % | DIASTOLIC BLOOD PRESSURE: 68 MMHG

## 2021-06-05 VITALS — HEIGHT: 67 IN | WEIGHT: 194 LBS | BODY MASS INDEX: 30.45 KG/M2

## 2021-06-05 VITALS — WEIGHT: 212 LBS | HEIGHT: 67 IN | BODY MASS INDEX: 33.27 KG/M2

## 2021-06-05 VITALS
HEART RATE: 74 BPM | BODY MASS INDEX: 32.33 KG/M2 | WEIGHT: 206 LBS | DIASTOLIC BLOOD PRESSURE: 60 MMHG | SYSTOLIC BLOOD PRESSURE: 108 MMHG | HEIGHT: 67 IN | OXYGEN SATURATION: 98 %

## 2021-06-05 VITALS — WEIGHT: 204 LBS | HEIGHT: 67 IN | BODY MASS INDEX: 32.02 KG/M2

## 2021-06-05 DIAGNOSIS — K21.9 ESOPHAGEAL REFLUX: ICD-10-CM

## 2021-06-05 DIAGNOSIS — R22.1 SWELLING, MASS, OR LUMP IN HEAD AND NECK: ICD-10-CM

## 2021-06-05 DIAGNOSIS — D37.09 NEOPLASM OF UNCERTAIN BEHAVIOR OF LIP, ORAL CAVITY, AND PHARYNX: ICD-10-CM

## 2021-06-05 DIAGNOSIS — R22.0 SWELLING, MASS, OR LUMP IN HEAD AND NECK: ICD-10-CM

## 2021-06-05 DIAGNOSIS — D37.05 NEOPLASM OF UNCERTAIN BEHAVIOR OF LIP, ORAL CAVITY, AND PHARYNX: ICD-10-CM

## 2021-06-05 DIAGNOSIS — D37.01 NEOPLASM OF UNCERTAIN BEHAVIOR OF LIP, ORAL CAVITY, AND PHARYNX: ICD-10-CM

## 2021-06-05 NOTE — PROGRESS NOTES
Jan:  I am responding for Dr. Lund since he is out of the clinic.  There is no evidence for hernia by CT scan.  The gallbladder and bile ducts are distended.  This is a nonspecific finding. I would advise that you review results in greater detail with Dr. Lund next week.    Chris Benoit MD

## 2021-06-05 NOTE — PROGRESS NOTES
Clinic Care Coordination Contact    Follow Up Progress Note      Assessment: Pt has Medicare and a BCBS supplement, pt reported his father (who has the same name) also has a BCBS supplement but then switched plans and cancelled the BCBS plan. BCBS mistook pt for his father and cancelled his plan as well. Pt has called [facility] and corrected this a week ago and all of this is active again.    Pt did qualify for Extra Help for prescription drug coverage from the [facility] and had this, however, since pt's plans got mixed up with his father his Extra Help program got cancelled as well. Pt has been trying to verify what the status of his Extra Help is now that is BCBS is active again (has tried calling the [facility], [facility] access hospital and Medicare and has not been able to get help). TAMI assisted with contacting the Senior Linkage Line (SLL) to check pt's Extra Help status via a Medicare Specialist.     SLL worker suggested following up with the SSA to learn more, however, due to time limitation SW was unable to do this during the visit. Worker informed pt she would follow up and report back to the pt, pt in agreement. SW informed pt to update the CHW when he hears from the SLL worker, otherwise, SW will call and check-in in 1 week to review what the SLL worker found out, pt understood.     SW informed pt that pt may need to re-apply, if pt needs assist with this, SW can assist, pt understood.    Pt also asked about applying for Medicare Savings and had discussed this with the [facility]. SW encouraged pt to apply and informed pt if he needs assist, the FRW can help, pt understood. Pt has worked with the Critical access hospital before and is familiar with the process, SW will wait to see if pt finds he needs additional assist from the FRW.     Goals addressed this encounter:   Goals Addressed                 This Visit's Progress       Patient Stated      Financial Wellbeing (pt-stated)        Goal Statement- I would like assistance navigating the status of my Medicare  Extra Help prescription coverage within the next month.    Action steps to achieve this goal  1.  I will follow up with the Senior Linkage Line (SLL) worker when they call me and update me on what the SSA told them.  2.  I will update the CCC team on what the SLL worker updated me with when they contact me/follow up with the  when they contact me on 02/04/2020.    Date goal set:  01/28/2020          COMPLETED: Psychosocial (pt-stated)        Goal Statement- I would like assistance applying for a Medicare supplement during open enrollment (after October 15th and before December 7th).     Action steps to achieve this goal  1.  I will request an appointment with the  during the open enrollment period. Scheduled 10/22 @ 11am  2.  I will bring my Medicare card to the appointment with the .     Date goal set: 8/6/19    Updated: 10/8/19  Updated: NS 10/22, CHW attempting to reschedule             Intervention/Education provided during outreach: Assist navigating SSA Extra Help status.          Plan: Pt will f/u with the SLL worker to learn what the SSA says about his Extra Help; Pt will update CCC what he finds out.    Care Coordinator will follow up in 1 week.

## 2021-06-05 NOTE — TELEPHONE ENCOUNTER
Central PA team  [phone]  Pool: HE PA MED ([id number])          PA has been initiated.       PA form completed and faxed insurance via Cover My Meds     Key:  [id number]      Medication:  oxycontin 10mg    Insurance:  [employer]        Response will be received via fax and may take up to 5-10 business days depending on plan

## 2021-06-05 NOTE — TELEPHONE ENCOUNTER
Medication being requested: Baclofen  Last visit date: 12/30 Provider: RAYNE  Next visit date: 02/18 Provider: RAYNE  Expected follow up: 8 wks  MTM visit ([facility]) date: N/A  Pertinent between visit information about requested medication (telephone, mychart, prior authorization, concerns): None apparent  Last date prescribed: 09/13 with 3 RF  Provider responsible: BE  Spoke with patient: No  Script being sent to provider - dates and quantity:   Requested Prescriptions     Pending Prescriptions Disp Refills     baclofen (LIORESAL) 10 MG tablet [Pharmacy Med Name: Baclofen 10 MG Oral Tablet] 126 tablet 3     Sig: TAKE 1 & 1/2 (ONE & ONE-HALF) TABLETS BY MOUTH THREE TIMES DAILY     Pharmacy cued: [facility]

## 2021-06-05 NOTE — TELEPHONE ENCOUNTER
Bridge to appointment, 4.5 tabs for 22 days sent to pharmacy, #[phone], covering for provider out of office.     Mallorie Null, CNP

## 2021-06-05 NOTE — PROGRESS NOTES
Received call from patient, after he received [facility] [employer] [facility].  CHW assisted patient with scheduling follow up visit with [facility] SW on   Tuesday 1/28/2020 @ 9:00am    Patient has expressed frustration with his recent situation with his prescription saving plan and the affect of the [employer] error with his insurance.    Patient has PCP appt 1/27/2020    Next Outreach: 1 week,f/u delegation from CCC appt 1/31/2020

## 2021-06-05 NOTE — TELEPHONE ENCOUNTER
Prior Authorization Request  Who s requesting:  Pharmacy/Cramer  Pharmacy Name and Location: [facility]  Medication Name: oxycontin 10mg, 3/day  Insurance Plan:   Insurance Member ID Number:    CoverMyMeds Key: [id number]  Informed patient that prior authorizations can take up to 10 business days for response:   Yes  Okay to leave a detailed message: Yes

## 2021-06-05 NOTE — TELEPHONE ENCOUNTER
RN cannot approve Refill Request    RN can NOT refill this medication med is not covered by policy/route to provider. Last office visit: 12/31/2019 Devon Lund MD Last Physical: Visit date not found Last MTM visit: Visit date not found Last visit same specialty: 12/31/2019 Devon Lund MD.  Next visit within 3 mo: Visit date not found  Next physical within 3 mo: Visit date not found      Joslyn Cruz, [facility] Triage/Med Refill 1/17/2020    Requested Prescriptions   Pending Prescriptions Disp Refills     levoFLOXacin (LEVAQUIN) 750 MG tablet 14 tablet 0     Sig: Take 1 tablet (750 mg total) by mouth daily for 14 days.       There is no refill protocol information for this order

## 2021-06-05 NOTE — PROGRESS NOTES
CHW sent patient MyChart message to patient today,   Patient missed 1/21/2020 appt at clinic.    CCC Plan:  Reschedule PCP appointment  Discuss CCC Goal Progression, schedule f/u  visit if desired.    Next Outreach: 1/29/2020

## 2021-06-05 NOTE — PROGRESS NOTES
ASSESSMENT:  1. Generalized anxiety disorder  Benefit with addition of lithium per psychiatrist.  Better with resumption of Lamictal.  Continue to push Lamictal with goal dose approximately 150 mg twice daily  - lamoTRIgine (LAMICTAL) 100 MG tablet; Take 1 tablet (100 mg total) by mouth 2 (two) times a day.  Dispense: 60 tablet; Refill: 11    2. Acute maxillary sinusitis, recurrence not specified  Dental insurance fell through with insurance snafu.  Continue levofloxacin and prednisone until we can get this fixed  - levoFLOXacin (LEVAQUIN) 750 MG tablet; Take 1 tablet (750 mg total) by mouth daily for 14 days.  Dispense: 14 tablet; Refill: 0    3. Ilioinguinal neuralgia of left side  Previous history of bilateral inguinal and umbilical hernia repair with feeling of new hernia.  Check CT scan to be definitive especially with complaints of testicular retraction  - CT Abdomen Pelvis With Oral Without IV Contrast; Future  - Ambulatory referral to General Surgery    4. Bipolar 2 disorder (H)  Check lithium level per psychiatry request  - Lithium    5. Essential hypertension  Resume, standardized, and increase intensity  - hydroCHLOROthiazide (HYDRODIURIL) 25 MG tablet; Take 1 tablet (25 mg total) by mouth 2 (two) times a day at 9am and 6pm.  Dispense: 60 tablet; Refill: 11      Tendinitis.  Still strongly suspicious of rupture.  Orthopedic note reviewed    PLAN:  Patient Instructions   You are now on Lamictal 50 mgs twice a day.   Increase now to Lamictal 100 mgs twice a day, and after 2 weeks of that, we can increase to 150 mgs twice     CT of the lower abdomen to examine hernias and testicle    Go back and see Dr Daly after the CT scan    Take HCTZ but increase to twice a day while you are on the Prednisone twice     Refill levofloxacin    Blood tests up to date    I still think the tendon ruptured      Orders Placed This Encounter   Procedures     CT Abdomen Pelvis With Oral Without IV Contrast     Standing Status:  "  Future     Standing Expiration Date:   1/26/2021     Order Specific Question:   Can the procedure be changed per Radiologist protocol?     Answer:   Yes     Order Specific Question:   If this is a diagnostic procedure, have the patient's age and recent imaging history been considered?     Answer:   Yes     Lapel     Ambulatory referral to General Surgery     Referral Priority:   Routine     Referral Type:   Surgical     Referral Reason:   Evaluation and Treatment     Referred to Provider:   Leodan Daly MD     Requested Specialty:   General Surgery     Number of Visits Requested:   1     Medications Discontinued During This Encounter   Medication Reason     levoFLOXacin (LEVAQUIN) 750 MG tablet Reorder     lamoTRIgine (LAMICTAL) 100 MG tablet      hydroCHLOROthiazide (HYDRODIURIL) 25 MG tablet      Administrations This Visit     cyanocobalamin injection 1,000 mcg     Admin Date  01/27/2020 Action  Given Dose  1000 mcg Route  Intramuscular Administered By  Tejal Hernandez LPN              Return in about 4 weeks (around 2/24/2020) for for a full review of medications and lab testing.      CHIEF COMPLAINT:  Chief Complaint   Patient presents with     Follow-up     Medication Management       HISTORY OF PRESENT ILLNESS:  Bola is a 50 y.o. male presenting to the clinic today for medication management    Wrist Pain  He followed up at [facility] about his left wrist on 1/8/2020. He had an xray performed. There was no bone damage, but it showed that the ligament and tendon were crossed and became inflamed. He was given a cortisone in the area. This helped with the pain. He says that he also has pain in his left forearm and elbow.    Anxiety:  His is in need of a refill for his 50 mg Lamictal twice daily. He increased to this dose about two weeks ago. He says the improvement was \"better than minimal, but not night and day\". It has been helpful for his disposition and for some of his pain. He says that " "lithium has been very helpful compared to Lamictal. He takes amitriptyline daily since he has been having more problems with falling asleep. He says that it has been helpful.     Hernias:  The patient says he has three hernia patches. One right, one left, and one mid. He says the having the left sided one fixed caused his lymphemia. He also reports nerve damage from that surgery. The patient says his left testicle has been in \"protective mode\" lately. He says that it seems to sit higher up than usual and straining while lifting things causes it to pull up higher. He is concerned that this could have been caused by tear in the mesh used to repair the hernias. He has had three hernia surgeries in the past.     REVIEW OF SYSTEMS:   He is taking 25 mg HCTZ daily.   He is taking prednisone twice daily. He does not think he is ready to go back down to once per day until after he has dental work done.   All other systems are negative.    PFSH:  His father is retired.   He has had three hernia surgeries from between  and .     TOBACCO USE:  Social History     Tobacco Use   Smoking Status Current Some Day Smoker     Packs/day: 0.50     Years: 11.00     Pack years: 5.50     Types: Cigarettes     Start date: 2009     Last attempt to quit: 2017     Years since quittin.9   Smokeless Tobacco Former User       VITALS:  Vitals:    20 1559   BP: 100/60   Patient Site: Left Arm   Patient Position: Sitting   Cuff Size: Adult Large   Pulse: 76   Resp: 18   Temp: 98.1  F (36.7  C)   TempSrc: Tympanic   SpO2: 98%   Weight: 192 lb 3 oz (87.2 kg)   Height: 5' 6.5\" (1.689 m)     Wt Readings from Last 3 Encounters:   20 192 lb 3 oz (87.2 kg)   19 194 lb 9 oz (88.3 kg)   19 193 lb (87.5 kg)     Body mass index is 30.56 kg/m .    PHYSICAL EXAM:  Constitutional:  Reveals alert, talkative man.  Vitals:  Per nursing notes.  Neurologic:  Cranial nerves II-XII intact.     Psychiatric:  Mood appropriate, " memory intact.     MEDICATIONS:  Current Outpatient Medications   Medication Sig Dispense Refill     acetaminophen (TYLENOL) 650 MG CR tablet Take 1,300 mg by mouth every 8 (eight) hours as needed for pain.       albuterol (PROAIR HFA;PROVENTIL HFA;VENTOLIN HFA) 90 mcg/actuation inhaler INHALE 2 PUFFS BY MOUTH EVERY 6 HOURS AS NEEDED FOR WHEEZING 3 Inhaler 3     allopurinol (ZYLOPRIM) 300 MG tablet Take 1 tablet (300 mg total) by mouth 2 (two) times a day. 180 tablet 3     amitriptyline (ELAVIL) 25 MG tablet Take 1 tablet (25 mg total) by mouth at bedtime. 30 tablet 11     baclofen (LIORESAL) 10 MG tablet TAKE 1 & 1/2 (ONE & ONE-HALF) TABLETS BY MOUTH THREE TIMES DAILY 126 tablet 3     busPIRone (BUSPAR) 10 MG tablet TAKE 1 & 1/2 (ONE & ONE-HALF) TABLETS BY MOUTH THREE TIMES DAILY 135 tablet 3     calcium carbonate-vitamin D3 (CALTRATE 600 PLUS D3) 600 mg(1,500mg) -400 unit per tablet TAKE THREE TABLETS BY MOUTH ONCE DAILY 90 tablet 6     chlorhexidine (PERIDEX) 0.12 % solution RINSE WITH 15ML FOR 30 SECONDS AND SPIT, USE TWICE DAILY AFTER BRUSHING AND FLOSSING . 473 mL 11     dextroamphetamine-amphetamine (ADDERALL) 30 mg Tab Take 0.5 tab (15mg) BID 30 tablet 0     DULoxetine (CYMBALTA) 30 MG capsule Take 1 capsule (30 mg total) by mouth 3 (three) times a day.  0     fexofenadine (ALLEGRA) 180 MG tablet Take 1 tablet (180 mg total) by mouth 2 (two) times a day. 60 tablet 11     fluticasone propionate (FLONASE) 50 mcg/actuation nasal spray USE 2 SPRAY(S) IN EACH NOSTRIL ONCE DAILY 18.2 g 4     hydroCHLOROthiazide (HYDRODIURIL) 25 MG tablet Take 1 tablet (25 mg total) by mouth 2 (two) times a day at 9am and 6pm. 60 tablet 11     hydrOXYzine HCl (ATARAX) 25 MG tablet TAKE 1 TABLET BY MOUTH 4 TIMES DAILY 360 tablet 3     ketamine HCl (KETAMINE SL) 120 mg.       ketamine HCl (KETAMINE, BULK,) 100 % Powd Ketamine 120 mg amarjit, dissolve 1 amarjit under the tongue up to 11 times daily as needed 120 Bottle 2     lamoTRIgine  (LAMICTAL) 100 MG tablet Take 1 tablet (100 mg total) by mouth 2 (two) times a day. 60 tablet 11     levoFLOXacin (LEVAQUIN) 750 MG tablet Take 1 tablet (750 mg total) by mouth daily for 14 days. 14 tablet 0     lidocaine HCl 3 % Crea Apply topically to affected areas twice daily 1 Tube 5     lithium 300 MG capsule Take 300 mg by mouth 2 (two) times a day.  1     Medical Cannabis Use 1 Units As Directed. Take as instructed by the medical cannabis dispensary. The provider who enrolled the patient in the medical cannabis registry and certified this  patient will maintain ownership and liability of all provider reporting and registration requirements.       metoprolol succinate (TOPROL-XL) 100 MG 24 hr tablet Take 1 tablet (100 mg total) by mouth daily. 90 tablet 3     montelukast (SINGULAIR) 10 mg tablet Take 1 tablet (10 mg total) by mouth 2 (two) times a day. 60 tablet 11     nabumetone (RELAFEN) 500 MG tablet Take 2 tablets (1,000 mg total) by mouth 2 (two) times a day. 120 tablet 11     NARCAN 4 mg/actuation nasal spray   0     nicotine (NICODERM CQ) 21 mg/24 hr Place 1 patch on the skin daily. 30 patch 1     oxyCODONE (OXYCONTIN) 10 mg 12 hr tablet Take 1 tablet (10 mg total) by mouth 3 (three) times a day for 14 days. 42 each 0     oxyCODONE (ROXICODONE) 10 mg immediate release tablet Two tabs morning, one/half tablet mid-day, two bedtime. Max 4.5 tabs per day 99 tablet 0     pantoprazole (PROTONIX) 40 MG tablet Take 1 tablet (40 mg total) by mouth 2 (two) times a day. 60 tablet 11     potassium 99 mg Tab Take by mouth daily.       prazosin (MINIPRESS) 2 MG capsule Take 1 capsule (2 mg total) by mouth at bedtime. 30 capsule 11     predniSONE (DELTASONE) 10 mg tablet Take 10 mg by mouth 2 (two) times a day. 60 tablet 11     pseudoephedrine (SUDOGEST 12-HOUR) 120 mg 12 hr tablet Take 1 tablet (120 mg total) by mouth every 12 (twelve) hours. 180 tablet 3     QUEtiapine (SEROQUEL) 200 MG tablet Take 200 mg by mouth  bedtime.       QUEtiapine (SEROQUEL) 50 MG tablet Take 50 mg by mouth 4 (four) times a day.       temazepam (RESTORIL) 30 mg capsule Take 30 mg by mouth at bedtime.         1     THERA-M 9 mg iron-400 mcg Tab tablet TAKE 1 TABLET BY MOUTH ONCE DAILY 60 tablet 2     topiramate (TOPAMAX) 100 MG tablet Take 1 tablet (100 mg total) by mouth 4 (four) times a day. 120 tablet 11     Current Facility-Administered Medications   Medication Dose Route Frequency Provider Last Rate Last Dose     cyanocobalamin injection 1,000 mcg  1,000 mcg Intramuscular Q7 Days Devon Lund MD   1,000 mcg at 01/27/20 1603       ADDITIONAL HISTORY SUMMARIZED (2): Reviewed orthopedic note regarding tendinitis and injection with normal x-rays  DECISION TO OBTAIN EXTRA INFORMATION (1): None.   RADIOLOGY TESTS (1): CT abdomen pelvis ordered.   LABS (1): Labs ordered.  MEDICINE TESTS (1): None.  INDEPENDENT REVIEW (2 each): None.     The visit lasted a total of 28 minutes face to face with the patient. Over 50% of the time was spent counseling and educating the patient about wrist pain, anxiety, and hernias.    I, Frank Jackson, am scribing for and in the presence of, Dr. Lund.    I, Dr. Lund, personally performed the services described in this documentation, as scribed by Frank Jackson in my presence, and it is both accurate and complete.    Total data points:4

## 2021-06-05 NOTE — TELEPHONE ENCOUNTER
Patient had to reschedule his 1/20/20 appointment due to a conflict.           Controlled Substance Refill Request  Medication Name:   Requested Prescriptions     Pending Prescriptions Disp Refills     dextroamphetamine-amphetamine (ADDERALL) 30 mg Tab 30 tablet 0     Sig: Take 0.5 tab (15mg) BID     Date Last Fill: 12/16/19  Is patient out of medication?:  Yes  Patient notified refills processed within 3 business days:  Yes  Requested Pharmacy: [facility]  Submit electronically to pharmacy  Controlled Substance Agreement on file:   Encounter-Level CSA Scan Date - 12/30/2019:    Scan on 12/30/2019  5:20 PM by Amairani Moeller: Psychiatric NARCOTIC AGREEMENT           Encounter-Level CSA Scan Date - 11/20/2018:    Scan on 12/27/2018  3:43 PM: CHRONIC PAIN MANAGEMENT           Encounter-Level CSA Scan Date - 08/21/2017:    Scan on 8/24/2017  7:50 AM  Scan on 8/23/2017  7:29 AM           Encounter-Level CSA Scan Date - 06/27/2016:    Scan on 6/30/2016  8:08 AM        Last office visit:  12/31/19

## 2021-06-05 NOTE — PROGRESS NOTES
Clinic Care Coordination Contact    Follow Up Progress Note      Assessment: Pt came to appt in-person vs phone due to the pt reporting he needed assist with paperwork.      Pt reported he found out what had happened with his Extra Help is that the SSA reviewed pt's gross income vs adjusted and pt did not qualify based on gross. Pt had consulted with the Senior Linkage Line ([facility]) and had discussed MA-EPD as an option and pt reported he is interested in this program. Pt was mailed the Certain Population application and the Combined Application to apply for MA-EPD per the [facility] worker's recommendation and SNAP per [facility] worker's recommendation.     Due to pt reporting he needs to get the Certain Pop. Submitted ASAP for MA-EPD, SW assisted with completing the application and educated pt on what supporting documents to provide. Pt was unable to clarify what his gross income was for FRW referral for Combined application assist, SW informed pt to call CHW and report income and CHW will f/u with FRW to determine if pt is eligible to apply.     Pt reported he was also needing dental resources, SW printed off a list of dental resources that accept MA and sliding fee scale.     SW educated pt that once his MA-EPD is active, he will be eligible for community services such as [facility] (SW had recommended this service in the past, however, since pt did not have MA pt was not eligible). TAMI recommended pt reach out to CHW and request a meeting with SW to review any community service needs if he feels he's needing additional support, pt understood.    Goals addressed this encounter:   Goals Addressed                 This Visit's Progress       Patient Stated      COMPLETED: Financial Wellbeing (pt-stated)        Goal Statement- I would like assistance navigating the status of my Medicare Extra Help prescription coverage within the next month.    Action steps to achieve this goal  1.  I will follow up with the Senior Linkage Line  (SLL) worker when they call me and update me on what the SSA told them.  2.  I will update the CCC team on what the SLL worker updated me with when they contact me/follow up with the  when they contact me on 02/04/2020.    Date goal set:  01/28/2020               Intervention/Education provided during outreach: Assist pt with paperwork for MA-EPD.      Plan: Pt will connect with the county to turn in the Certain Pop. Application, pt will f/u with CHW to report gross income for FRW to determine if pt should apply for SNAP.    Care Coordinator will follow up as needed.

## 2021-06-05 NOTE — PATIENT INSTRUCTIONS - HE
You are now on Lamictal 50 mgs twice a day.   Increase now to Lamictal 100 mgs twice a day, and after 2 weeks of that, we can increase to 150 mgs twice     CT of the lower abdomen to examine hernias and testicle    Go back and see Dr Daly after the CT scan    Take HCTZ but increase to twice a day while you are on the Prednisone twice     Refill levofloxacin    Blood tests up to date    I still think the tendon ruptured

## 2021-06-06 NOTE — PATIENT INSTRUCTIONS - HE
I agree with MRCP on march 9    If abnormal and no stones, you will need endoscopic ultrasound    If stones, you need your gallbladder out from Dr Daly    See Urologist about bladder and testicle march 16    Stop Levofloxacin    Begin Augmentin 875 mgs twice a day for dental and sinus    recertify for cannibis    Increase Lamotrigine from 100 mgs twice a day to 150 mgs twice a day    Assume that Dr bonilla will adjust your Lithium

## 2021-06-06 NOTE — TELEPHONE ENCOUNTER
Patient left a message stating the he went to  the Rx from Dr. Cramer and they did not have Oxycodone 20mg in stock.  Pharmacist (Jamari) told him that they have 10mg in stock sand would like to pick them up right away in the morning when the pharmacy opens at 0900.  Patient is requesting a new Rx be sent in for 10mg tablets instead of 20mg tablets.    Dr. Cramer, I have cued up the 10mg tablets please review and approve or change as appropriate.    Requested Prescriptions     Pending Prescriptions Disp Refills     oxyCODONE (ROXICODONE) 10 mg immediate release tablet 48 tablet 0     Sig: Take 2 tablets (20 mg total) by mouth 4 (four) times a day for 6 days.

## 2021-06-06 NOTE — PROGRESS NOTES
Rounding completed on patient. Plan of care reviewed with patient. Side rails up. Call light within reach.

## 2021-06-06 NOTE — PROGRESS NOTES
Prior to having MRI patient is anxious. Patient informed writer he does not want to be in the machine and wants to be sedated prior. Patient is connected to the vital sign machines and sedation is started. Informed Dr. Acuna. Patient was ready to enter MRI machine after one hour of sedation. MD was informed during the process and informed of medications given. Patient was awake but relax with movement of hands and feet. Patient would speak out during MRI to make sure writer was giving medications. Patient is reassured. Vitals sign are documented on the paper record. End tidal was recorded at times, patient oxygen saturations and respirations within normal limits. When patient in the machine, did ask about pain. Patient would let writer know when he was having discomforts. Patient did express he has a hard time going to sleep with sedation. Informed radiologist. After MRI, patient was thankful and was glad he was able to complete the procedure.

## 2021-06-06 NOTE — TELEPHONE ENCOUNTER
Medication Request  Medication name:   levoFLOXacin (LEVAQUIN) 750 MG tablet 14 tablet 0 1/27/2020 2/10/2020 No   Sig - Route: Take 1 tablet (750 mg total) by mouth daily for 14 days. - Oral   Sent to pharmacy as: levoFLOXacin 750 mg tablet (Levaquin)   E-Prescribing Status: Receipt confirmed by pharmacy (1/27/2020  4:35 PM CST)       Requested Pharmacy: [facility]  Reason for request: I have a sinus cavity and peridonal infection and I need to have this treated until my dental insurance starts in March.    When did you use medication last?:  2/10/20  Patient offered appointment:  patient declined  Okay to leave a detailed message: yes

## 2021-06-06 NOTE — TELEPHONE ENCOUNTER
FYI - Status Update  Who is Calling: Patient  Update:  Dr. Lund is keeping patient on antibiotic until his dental insurance problem is resolved.  He will a refill of this by 2/22/20.  Okay to leave a detailed message?:  No return call needed

## 2021-06-06 NOTE — TELEPHONE ENCOUNTER
Controlled Substance Refill Request  Medication Name:   Requested Prescriptions     Pending Prescriptions Disp Refills     dextroamphetamine-amphetamine (ADDERALL) 30 mg Tab 30 tablet 0     Sig: Take 0.5 tab (15mg) BID     Date Last Fill: 1/24/20  Is patient out of medication?: No, One days left  Patient notified refills processed within 3 business days:  Yes  Requested Pharmacy: [facility]  Submit electronically to pharmacy  Controlled Substance Agreement on file:   Encounter-Level CSA Scan Date - 02/18/2020:    Scan on 2/19/2020 10:23 AM by Amairani Moeller: [facility] NARCOTIC AGREEMENT           Encounter-Level CSA Scan Date - 12/30/2019:    Scan on 12/30/2019  5:20 PM by Amairani Moeller: [facility] NARCOTIC AGREEMENT           Encounter-Level CSA Scan Date - 11/20/2018:    Scan on 12/27/2018  3:43 PM: CHRONIC PAIN MANAGEMENT           Encounter-Level CSA Scan Date - 08/21/2017:    Scan on 8/24/2017  7:50 AM  Scan on 8/23/2017  7:29 AM           Encounter-Level CSA Scan Date - 06/27/2016:    Scan on 6/30/2016  8:08 AM        Last office visit:  1/27/20

## 2021-06-06 NOTE — PROGRESS NOTES
Assessment/Plan:     Problem List Items Addressed This Visit     Chronic pain syndrome - Primary (Chronic)    Relevant Medications    ketamine HCl (KETAMINE, BULK,) 100 % Powd    ketamine (KETALAR) 150 mg/mL nasal spray    Other Relevant Orders    Pain Management Drug Panel, Urine    Chronic, continuous use of opioids (Chronic)    Relevant Medications    oxyCODONE (ROXICODONE) 10 mg immediate release tablet    Cervical radiculopathy at C8 (Chronic)    Relevant Medications    ketamine HCl (KETAMINE, BULK,) 100 % Powd            No follow-ups on file.    Patient Instructions   PLAN:    Oxycodone 10 mg two tabs four times a day, two week supplies    May take extra Baclofen after dinner for increased pain    Ketamine lozenges one six times a day    Ketamine nasal spray one to two sprays in nose up to six times a day, to allow mouth to heal    Continue work with doctors for abdominal pain    Continue work with therapy    Return in 8 weeks        Subjective:       50 y.o. male presents for management of multiple pain generators, with traumatic brain injury and cognitive impairment, posttraumatic stress disorder.    He arrives late, as he went to the [facility].  He describes when he gets overwhelmed, to his TBI symptoms, he can make mistakes and knows he recently went there to see a doctor.    There are significant issues around his insurance being canceled, OxyContin not being covered, we have changed oxycodone 10 mg 2 tablets 4 times a day.  With this there is been some reasonable control.    He has been working with a dentist so with a broken tooth, working to establish that to be addressed.  Describes has developed some changes in his mouth, shows irritation under his tongue, as such when he uses the ketamine lozenge up to 11 times a day there is been irritation.    He has been working with orthopedics over his left knee.  There is an injury this spring as he was chipping ice and twisted.  He is  wearing a left knee brace.  Is relatively swollen.  He had an x-ray and told symptoms are most likely to a sprain of the quadriceps.  Notes the patella is not tracking well and is given some exercises to strengthen this.  He will follow-up in 3 months.    Review swelling his left arm, working with another orthopedist as he has had some left ligament problems.    He is also been actively involved with abdominal concerns.  He has had a history of inguinal surgery and mesh placement.  Describes beginning to have his left testicle and penis having some retraction.  Also concerned about gallbladder symptoms.  It is radiating into his shoulder and low back.  He is concerned as his father had lymphoma..    He continues seeing a psychotherapist through the [facility], sees her tomorrow.     is reviewed, as expected.      Current Outpatient Medications:      acetaminophen (TYLENOL) 650 MG CR tablet, Take 1,300 mg by mouth every 8 (eight) hours as needed for pain., Disp: , Rfl:      albuterol (PROAIR HFA;PROVENTIL HFA;VENTOLIN HFA) 90 mcg/actuation inhaler, INHALE 2 PUFFS BY MOUTH EVERY 6 HOURS AS NEEDED FOR WHEEZING, Disp: 3 Inhaler, Rfl: 3     allopurinol (ZYLOPRIM) 300 MG tablet, Take 1 tablet (300 mg total) by mouth 2 (two) times a day., Disp: 180 tablet, Rfl: 3     amitriptyline (ELAVIL) 25 MG tablet, Take 1 tablet (25 mg total) by mouth at bedtime., Disp: 30 tablet, Rfl: 11     baclofen (LIORESAL) 10 MG tablet, TAKE 1 & 1/2 (ONE & ONE-HALF) TABLETS BY MOUTH THREE TIMES DAILY, Disp: 126 tablet, Rfl: 3     busPIRone (BUSPAR) 10 MG tablet, TAKE 1 & 1/2 (ONE & ONE-HALF) TABLETS BY MOUTH THREE TIMES DAILY, Disp: 135 tablet, Rfl: 3     calcium carbonate-vitamin D3 (CALTRATE 600 PLUS D3) 600 mg(1,500mg) -400 unit per tablet, TAKE THREE TABLETS BY MOUTH ONCE DAILY, Disp: 90 tablet, Rfl: 6     chlorhexidine (PERIDEX) 0.12 % solution, RINSE WITH 15ML FOR 30 SECONDS AND SPIT, USE TWICE DAILY AFTER BRUSHING AND FLOSSING .,  Disp: 473 mL, Rfl: 11     dextroamphetamine-amphetamine (ADDERALL) 30 mg Tab, Take 0.5 tab (15mg) BID, Disp: 30 tablet, Rfl: 0     DULoxetine (CYMBALTA) 30 MG capsule, Take 1 capsule (30 mg total) by mouth 3 (three) times a day., Disp: , Rfl: 0     hydroCHLOROthiazide (HYDRODIURIL) 25 MG tablet, Take 1 tablet (25 mg total) by mouth 2 (two) times a day at 9am and 6pm., Disp: 60 tablet, Rfl: 11     hydrOXYzine HCl (ATARAX) 25 MG tablet, TAKE 1 TABLET BY MOUTH 4 TIMES DAILY, Disp: 360 tablet, Rfl: 3     ketamine HCl (KETAMINE SL), 120 mg., Disp: , Rfl:      ketamine HCl (KETAMINE, BULK,) 100 % Powd, Ketamine 120 mg amarjit, dissolve 1 amarjit under the tongue up to 6 times daily as needed, Disp: 120 Bottle, Rfl: 2     lamoTRIgine (LAMICTAL) 100 MG tablet, Take 1 tablet (100 mg total) by mouth 2 (two) times a day., Disp: 60 tablet, Rfl: 11     levoFLOXacin (LEVAQUIN) 750 MG tablet, Take 1 tablet (750 mg total) by mouth daily for 10 days., Disp: 10 tablet, Rfl: 0     lidocaine HCl 3 % Crea, Apply topically to affected areas twice daily, Disp: 1 Tube, Rfl: 5     lithium 300 MG capsule, Take 300 mg by mouth 2 (two) times a day., Disp: , Rfl: 1     Medical Cannabis, Use 1 Units As Directed. Take as instructed by the medical cannabis dispensary. The provider who enrolled the patient in the medical cannabis registry and certified this  patient will maintain ownership and liability of all provider reporting and registration requirements., Disp: , Rfl:      metoprolol succinate (TOPROL-XL) 100 MG 24 hr tablet, Take 1 tablet (100 mg total) by mouth daily., Disp: 90 tablet, Rfl: 3     montelukast (SINGULAIR) 10 mg tablet, Take 1 tablet (10 mg total) by mouth 2 (two) times a day., Disp: 60 tablet, Rfl: 11     nabumetone (RELAFEN) 500 MG tablet, Take 2 tablets (1,000 mg total) by mouth 2 (two) times a day., Disp: 120 tablet, Rfl: 11     NARCAN 4 mg/actuation nasal spray, , Disp: , Rfl: 0     nicotine (NICODERM CQ) 21 mg/24 hr, Place  1 patch on the skin daily., Disp: 30 patch, Rfl: 1     oxyCODONE (ROXICODONE) 10 mg immediate release tablet, Take 2 tablets (20 mg total) by mouth 4 (four) times a day for 6 days., Disp: 48 tablet, Rfl: 0     pantoprazole (PROTONIX) 40 MG tablet, Take 1 tablet (40 mg total) by mouth 2 (two) times a day., Disp: 60 tablet, Rfl: 11     potassium 99 mg Tab, Take by mouth daily., Disp: , Rfl:      prazosin (MINIPRESS) 2 MG capsule, Take 1 capsule (2 mg total) by mouth at bedtime., Disp: 30 capsule, Rfl: 11     predniSONE (DELTASONE) 10 mg tablet, Take 10 mg by mouth 2 (two) times a day., Disp: 60 tablet, Rfl: 11     pseudoephedrine (SUDOGEST 12-HOUR) 120 mg 12 hr tablet, Take 1 tablet (120 mg total) by mouth every 12 (twelve) hours., Disp: 180 tablet, Rfl: 3     QUEtiapine (SEROQUEL) 200 MG tablet, Take 200 mg by mouth bedtime., Disp: , Rfl:      QUEtiapine (SEROQUEL) 50 MG tablet, Take 50 mg by mouth 4 (four) times a day., Disp: , Rfl:      temazepam (RESTORIL) 30 mg capsule, Take 30 mg by mouth at bedtime.    , Disp: , Rfl: 1     THERA-M 9 mg iron-400 mcg Tab tablet, TAKE 1 TABLET BY MOUTH ONCE DAILY, Disp: 60 tablet, Rfl: 2     topiramate (TOPAMAX) 100 MG tablet, Take 1 tablet (100 mg total) by mouth 4 (four) times a day., Disp: 120 tablet, Rfl: 11     fexofenadine (ALLEGRA) 180 MG tablet, Take 1 tablet (180 mg total) by mouth 2 (two) times a day., Disp: 60 tablet, Rfl: 11     fluticasone propionate (FLONASE) 50 mcg/actuation nasal spray, USE 2 SPRAY(S) IN EACH NOSTRIL ONCE DAILY, Disp: 18.2 g, Rfl: 4     ketamine (KETALAR) 150 mg/mL nasal spray, 1 spray into each nostril every 4 (four) hours as needed., Disp: 30 mL, Rfl: 3     oxyCODONE (ROXICODONE) 10 mg immediate release tablet, Take 2 tablets (20 mg total) by mouth 4 (four) times a day., Disp: 112 tablet, Rfl: 0     Oxycodone HCl 20 mg Tab, Take 20 mg by mouth 4 (four) times a day., Disp: 40 each, Rfl: 0    Current Facility-Administered Medications:       "cyanocobalamin injection 1,000 mcg, 1,000 mcg, Intramuscular, Q7 Days, Devon Lund MD, 1,000 mcg at 01/27/20 1603  He is alert with a clear sensorium.  Quite verbal, clearly overwhelmed and distracted with the recent stressors.  He keeps a written notes for him to be more organized.         Objective:     Vitals:    02/18/20 1607   BP: (!) 89/55   Pulse: 84   Height: 5' 6.5\" (1.689 m)   PainSc:   9   PainLoc: Back         Dav, discussed changing from the ketamine lozenges to some ketamine nasal spray to allow some of his tongue lesions to heal.    He describes in the afternoon he tends to be more overwhelmed with pain after a day of activity, rather than increase in the opioids he will try and extra baclofen at that time to help relax and settle.    We will continue actively working with his other providers for the problems above.    Time spent more than 25 minutes face-to-face, 50% counseling about above condition coordination treatment plan          This note has been dictated using voice recognition software. Any grammatical or context distortions are unintentional and inherent to the software  "

## 2021-06-06 NOTE — PROGRESS NOTES
Patient is here for a follow up appointment with Dr. Cramer. Patient has back, abdominal, neck, bilateral shoulders, left knee, nerve pain from neck to both hands,jaw and mouth pain, describes the pain as constant and intermittent, sharp and rates the pain as 9/10. Patient wants to talk about update on his abdominal pain. Patient needs refills for Oxycodone. Completed CSA/ UDT.

## 2021-06-06 NOTE — TELEPHONE ENCOUNTER
Completed 3/2-3/17 - chronic pain - covering for Dr. Cramer. Thank you  Requested Prescriptions     Signed Prescriptions Disp Refills     oxyCODONE (ROXICODONE) 10 mg immediate release tablet 112 tablet 0     Sig: Take 2 tablets (20 mg total) by mouth 4 (four) times a day for 14 days.     Authorizing Provider: IGNACIO PAZ

## 2021-06-06 NOTE — PATIENT INSTRUCTIONS - HE
PLAN:    Oxycodone 10 mg two tabs four times a day, two week supplies    May take extra Baclofen after dinner for increased pain    Ketamine lozenges one six times a day    Ketamine nasal spray one to two sprays in nose up to six times a day, to allow mouth to heal    Continue work with doctors for abdominal pain    Continue work with therapy    Return in 8 weeks

## 2021-06-06 NOTE — H&P (VIEW-ONLY)
ASSESSMENT:  1. Dilated cbd, acquired  Reviewed dilated ducts by CT scan and presence of gallbladder without stones.  Subjective feeling of blockage.  Reviewed surgical note, referral to GI, GI note, and plans for MRCP.  Liver function tests normal    2. Traumatic brain injury with loss of consciousness, sequela (H)  Mood slowly improving despite ongoing infection, dental work, and new abdominal trauma.  Push lamotrigine.  Expect psychiatry will push lithium  - lamoTRIgine (LAMICTAL) 150 MG tablet; Take 1 tablet (150 mg total) by mouth 2 (two) times a day.  Dispense: 60 tablet; Refill: 11    3. Bipolar 2 disorder (H)  Push lamotrigine.  Leave lithium for psychiatry  - lamoTRIgine (LAMICTAL) 150 MG tablet; Take 1 tablet (150 mg total) by mouth 2 (two) times a day.  Dispense: 60 tablet; Refill: 11    4. Dental abscess  Dental conversation later this week.  Would change levofloxacin to Augmentin  - amoxicillin-clavulanate (AUGMENTIN) 875-125 mg per tablet; Take 1 tablet by mouth 2 (two) times a day.  Dispense: 60 tablet; Refill: 0    5. Generalized anxiety disorder  - lamoTRIgine (LAMICTAL) 150 MG tablet; Take 1 tablet (150 mg total) by mouth 2 (two) times a day.  Dispense: 60 tablet; Refill: 11    6. Chronic, continuous use of opioids  Clarify topiramate.  Adjust prescription.  No long-term opiates the last few weeks  - topiramate (TOPAMAX) 200 MG tablet; Take 1 tablet (200 mg total) by mouth 2 (two) times a day.  Dispense: 60 tablet; Refill: 11        PLAN:  Patient Instructions   I agree with MRCP on march 9    If abnormal and no stones, you will need endoscopic ultrasound    If stones, you need your gallbladder out from Dr Daly    See Urologist about bladder and testicle march 16    Stop Levofloxacin    Begin Augmentin 875 mgs twice a day for dental and sinus    recertify for cannibis    Increase Lamotrigine from 100 mgs twice a day to 150 mgs twice a day    Assume that Dr bonilla will adjust your  Lithium      No orders of the defined types were placed in this encounter.    Medications Discontinued During This Encounter   Medication Reason     ketamine HCl (KETAMINE SL) Duplicate order     levoFLOXacin (LEVAQUIN) 750 MG tablet      lamoTRIgine (LAMICTAL) 100 MG tablet      topiramate (TOPAMAX) 100 MG tablet        Return in about 4 weeks (around 3/25/2020) for for a full review of medications and lab testing.      CHIEF COMPLAINT:  Chief Complaint   Patient presents with     Follow-up       HISTORY OF PRESENT ILLNESS:  Bola is a 50 y.o. male presenting to the clinic today for a follow up.   Bile Ducts:  The patient had a CT performed on 2/4/2020. He says that the doctor that performed his CT called him the day after the scan to say that he had dilated bile ducts and a decompressed urinary bladder. He is following up with a gastroenterologist and a urologist in the upcoming weeks. He is also planning on getting an MR MRCP with contrast.    Infection:  He wants to know if the antibiotic he is taking for a periodontal infection would be better treated with another medication. He is currently taking levofloxacin which helped with his sinus infection, but did not fix the periodontal infection.     Medical Cannabis:  He says that he needs a recertification for his medical cannabis. The patient says he notices a difference in his pain when he takes it because it is longer acting than oxycodone. He says the pain this helps with is mostly in his neck. He says the medical cannabis also helps with his PTSD.     Bipolar Disorder:  The patient reports feeling more manic lately because of his recent problems with his gallbladder. He is currently taking 300 mg lithium twice per day. He also takes 100 mg lamotrigine twice a day. He would like to increase the dose of these medications to help with his soy. He takes 200 mg topiramate twice per day.     REVIEW OF SYSTEMS:   He is going to meet with the gastroenterologist  "soon.   All other systems are negative.    PFSH:  His father had bladder cancer.     TOBACCO USE:  Social History     Tobacco Use   Smoking Status Current Some Day Smoker     Packs/day: 0.50     Years: 11.00     Pack years: 5.50     Types: Cigarettes     Start date: 8/20/2009     Last attempt to quit: 2/21/2017     Years since quitting: 3.0   Smokeless Tobacco Former User       VITALS:  Vitals:    02/26/20 1543   BP: (!) 84/60   Patient Site: Left Arm   Patient Position: Sitting   Cuff Size: Adult Large   Pulse: 66   Resp: 16   SpO2: 98%   Weight: 194 lb 7 oz (88.2 kg)   Height: 5' 6.5\" (1.689 m)     Wt Readings from Last 3 Encounters:   02/26/20 194 lb 7 oz (88.2 kg)   01/27/20 192 lb 3 oz (87.2 kg)   12/31/19 194 lb 9 oz (88.3 kg)     Body mass index is 30.91 kg/m .    PHYSICAL EXAM:  Constitutional:  Reveals an alert, pleasant, talkative man.  Vitals:  Per nursing notes.  Neurologic:  Cranial nerves II-XII intact.     Psychiatric:  Mood appropriate, memory intact.     MEDICATIONS:  Current Outpatient Medications   Medication Sig Dispense Refill     acetaminophen (TYLENOL) 650 MG CR tablet Take 1,300 mg by mouth every 8 (eight) hours as needed for pain.       albuterol (PROAIR HFA;PROVENTIL HFA;VENTOLIN HFA) 90 mcg/actuation inhaler INHALE 2 PUFFS BY MOUTH EVERY 6 HOURS AS NEEDED FOR WHEEZING 3 Inhaler 3     allopurinol (ZYLOPRIM) 300 MG tablet Take 1 tablet (300 mg total) by mouth 2 (two) times a day. 180 tablet 3     amitriptyline (ELAVIL) 25 MG tablet Take 1 tablet (25 mg total) by mouth at bedtime. 30 tablet 11     baclofen (LIORESAL) 10 MG tablet TAKE 1 & 1/2 (ONE & ONE-HALF) TABLETS BY MOUTH THREE TIMES DAILY 126 tablet 3     busPIRone (BUSPAR) 10 MG tablet TAKE 1 & 1/2 (ONE & ONE-HALF) TABLETS BY MOUTH THREE TIMES DAILY 135 tablet 3     calcium carbonate-vitamin D3 (CALTRATE 600 PLUS D3) 600 mg(1,500mg) -400 unit per tablet TAKE THREE TABLETS BY MOUTH ONCE DAILY 90 tablet 6     chlorhexidine (PERIDEX) 0.12 " % solution RINSE WITH 15ML FOR 30 SECONDS AND SPIT, USE TWICE DAILY AFTER BRUSHING AND FLOSSING . 473 mL 11     dextroamphetamine-amphetamine (ADDERALL) 30 mg Tab Take 0.5 tab (15mg) BID 30 tablet 0     DULoxetine (CYMBALTA) 30 MG capsule Take 1 capsule (30 mg total) by mouth 3 (three) times a day.  0     fexofenadine (ALLEGRA) 180 MG tablet Take 1 tablet (180 mg total) by mouth 2 (two) times a day. 60 tablet 11     fluticasone propionate (FLONASE) 50 mcg/actuation nasal spray USE 2 SPRAY(S) IN EACH NOSTRIL ONCE DAILY 18.2 g 4     hydroCHLOROthiazide (HYDRODIURIL) 25 MG tablet Take 1 tablet (25 mg total) by mouth 2 (two) times a day at 9am and 6pm. 60 tablet 11     hydrOXYzine HCl (ATARAX) 25 MG tablet TAKE 1 TABLET BY MOUTH 4 TIMES DAILY 360 tablet 3     ketamine (KETALAR) 150 mg/mL nasal spray 1 spray into each nostril every 4 (four) hours as needed. 30 mL 3     ketamine HCl (KETAMINE, BULK,) 100 % Powd Ketamine 120 mg amarjit, dissolve 1 amarjit under the tongue up to 6 times daily as needed 120 Bottle 2     lamoTRIgine (LAMICTAL) 150 MG tablet Take 1 tablet (150 mg total) by mouth 2 (two) times a day. 60 tablet 11     lidocaine HCl 3 % Crea Apply topically to affected areas twice daily 1 Tube 5     lithium 300 MG capsule Take 300 mg by mouth 2 (two) times a day.  1     Medical Cannabis Use 1 Units As Directed. Take as instructed by the medical cannabis dispensary. The provider who enrolled the patient in the medical cannabis registry and certified this  patient will maintain ownership and liability of all provider reporting and registration requirements.       metoprolol succinate (TOPROL-XL) 100 MG 24 hr tablet Take 1 tablet (100 mg total) by mouth daily. 90 tablet 3     montelukast (SINGULAIR) 10 mg tablet Take 1 tablet (10 mg total) by mouth 2 (two) times a day. 60 tablet 11     nabumetone (RELAFEN) 500 MG tablet Take 2 tablets (1,000 mg total) by mouth 2 (two) times a day. 120 tablet 11     NARCAN 4  mg/actuation nasal spray   0     nicotine (NICODERM CQ) 21 mg/24 hr Place 1 patch on the skin daily. 30 patch 1     oxyCODONE (ROXICODONE) 10 mg immediate release tablet Take 2 tablets (20 mg total) by mouth 4 (four) times a day. 112 tablet 0     Oxycodone HCl 20 mg Tab Take 20 mg by mouth 4 (four) times a day. 40 each 0     pantoprazole (PROTONIX) 40 MG tablet Take 1 tablet (40 mg total) by mouth 2 (two) times a day. 60 tablet 11     potassium 99 mg Tab Take by mouth daily.       prazosin (MINIPRESS) 2 MG capsule Take 1 capsule (2 mg total) by mouth at bedtime. 30 capsule 11     predniSONE (DELTASONE) 10 mg tablet Take 10 mg by mouth 2 (two) times a day. 60 tablet 11     pseudoephedrine (SUDOGEST 12-HOUR) 120 mg 12 hr tablet Take 1 tablet (120 mg total) by mouth every 12 (twelve) hours. 180 tablet 3     QUEtiapine (SEROQUEL) 200 MG tablet Take 200 mg by mouth bedtime.       QUEtiapine (SEROQUEL) 50 MG tablet Take 50 mg by mouth 4 (four) times a day.       temazepam (RESTORIL) 30 mg capsule Take 30 mg by mouth at bedtime.         1     THERA-M 9 mg iron-400 mcg Tab tablet TAKE 1 TABLET BY MOUTH ONCE DAILY 60 tablet 2     topiramate (TOPAMAX) 200 MG tablet Take 1 tablet (200 mg total) by mouth 2 (two) times a day. 60 tablet 11     amoxicillin-clavulanate (AUGMENTIN) 875-125 mg per tablet Take 1 tablet by mouth 2 (two) times a day. 60 tablet 0     oxyCODONE (ROXICODONE) 10 mg immediate release tablet Take 2 tablets (20 mg total) by mouth 4 (four) times a day for 6 days. 48 tablet 0     Current Facility-Administered Medications   Medication Dose Route Frequency Provider Last Rate Last Dose     cyanocobalamin injection 1,000 mcg  1,000 mcg Intramuscular Q7 Days Devon Lund MD   1,000 mcg at 01/27/20 1603       ADDITIONAL HISTORY SUMMARIZED (2): Reviewed 2/19/2020 visit to [facility]. He was instructed to get an MRCP to investigate his dilated bile ducts.   DECISION TO OBTAIN EXTRA INFORMATION (1): None.    RADIOLOGY TESTS (1): Reviewed 2/4/2020 CT abdomen pelvis. He has dilated proper and common bile ducts and a distended gallbladder. He has a decompressed urinary bladder.  LABS (1): Old labs reviewed.   MEDICINE TESTS (1): None.  INDEPENDENT REVIEW (2 each): None.     The visit lasted a total of 27 minutes face to face with the patient. Over 50% of the time was spent counseling and educating the patient about infections, medical cannabis, bipolar disorder, and .    IFrank, am scribing for and in the presence of, Dr. Lund.    IDr. Lund, personally performed the services described in this documentation, as scribed by Frank Jackson in my presence, and it is both accurate and complete.    Total data points:4

## 2021-06-06 NOTE — TELEPHONE ENCOUNTER
Please call pt back    Has questions about results of most recent MR- results    Pt very concerned:  [phone] VEDA

## 2021-06-06 NOTE — TELEPHONE ENCOUNTER
Patient left a message stating that he was returning someone's, but also would like to check on refills of Ketamine Nasal spray and Oxycodone 10mg.    RN returned call to let patient know that there were refills at [facility] for Ketamine and the Oxycodone was sent to [facility] for a  tomorrow.    Patient also wanted to ask Dr. Cramer to call [facility] to let them know that he can  the Ketamine Nasal spray as he needs it instead of only every 25 days which was recommended.  Patient does not have insurance so that is not a factor, the pharmacist needs to get approval as to how often the patient is allowed to refill the Rx.  Also, patient is not able to take the lozenges, per patient Dr. Cramer is aware of this.  Patient is requesting that Dr. Cramer approves 3 sprays every 4 hours instead of 1-2 sprays instead of taking lozenges since he is not able to.    Dr. Cramer, please advise regarding how often refills are able to happen as well as recommendations for dosage.

## 2021-06-06 NOTE — PROGRESS NOTES
ASSESSMENT:  1. Dilated cbd, acquired  Reviewed dilated ducts by CT scan and presence of gallbladder without stones.  Subjective feeling of blockage.  Reviewed surgical note, referral to GI, GI note, and plans for MRCP.  Liver function tests normal    2. Traumatic brain injury with loss of consciousness, sequela (H)  Mood slowly improving despite ongoing infection, dental work, and new abdominal trauma.  Push lamotrigine.  Expect psychiatry will push lithium  - lamoTRIgine (LAMICTAL) 150 MG tablet; Take 1 tablet (150 mg total) by mouth 2 (two) times a day.  Dispense: 60 tablet; Refill: 11    3. Bipolar 2 disorder (H)  Push lamotrigine.  Leave lithium for psychiatry  - lamoTRIgine (LAMICTAL) 150 MG tablet; Take 1 tablet (150 mg total) by mouth 2 (two) times a day.  Dispense: 60 tablet; Refill: 11    4. Dental abscess  Dental conversation later this week.  Would change levofloxacin to Augmentin  - amoxicillin-clavulanate (AUGMENTIN) 875-125 mg per tablet; Take 1 tablet by mouth 2 (two) times a day.  Dispense: 60 tablet; Refill: 0    5. Generalized anxiety disorder  - lamoTRIgine (LAMICTAL) 150 MG tablet; Take 1 tablet (150 mg total) by mouth 2 (two) times a day.  Dispense: 60 tablet; Refill: 11    6. Chronic, continuous use of opioids  Clarify topiramate.  Adjust prescription.  No long-term opiates the last few weeks  - topiramate (TOPAMAX) 200 MG tablet; Take 1 tablet (200 mg total) by mouth 2 (two) times a day.  Dispense: 60 tablet; Refill: 11        PLAN:  Patient Instructions   I agree with MRCP on march 9    If abnormal and no stones, you will need endoscopic ultrasound    If stones, you need your gallbladder out from Dr aDly    See Urologist about bladder and testicle march 16    Stop Levofloxacin    Begin Augmentin 875 mgs twice a day for dental and sinus    recertify for cannibis    Increase Lamotrigine from 100 mgs twice a day to 150 mgs twice a day    Assume that Dr bonilla will adjust your  Lithium      No orders of the defined types were placed in this encounter.    Medications Discontinued During This Encounter   Medication Reason     ketamine HCl (KETAMINE SL) Duplicate order     levoFLOXacin (LEVAQUIN) 750 MG tablet      lamoTRIgine (LAMICTAL) 100 MG tablet      topiramate (TOPAMAX) 100 MG tablet        Return in about 4 weeks (around 3/25/2020) for for a full review of medications and lab testing.      CHIEF COMPLAINT:  Chief Complaint   Patient presents with     Follow-up       HISTORY OF PRESENT ILLNESS:  Bola is a 50 y.o. male presenting to the clinic today for a follow up.   Bile Ducts:  The patient had a CT performed on 2/4/2020. He says that the doctor that performed his CT called him the day after the scan to say that he had dilated bile ducts and a decompressed urinary bladder. He is following up with a gastroenterologist and a urologist in the upcoming weeks. He is also planning on getting an MR MRCP with contrast.    Infection:  He wants to know if the antibiotic he is taking for a periodontal infection would be better treated with another medication. He is currently taking levofloxacin which helped with his sinus infection, but did not fix the periodontal infection.     Medical Cannabis:  He says that he needs a recertification for his medical cannabis. The patient says he notices a difference in his pain when he takes it because it is longer acting than oxycodone. He says the pain this helps with is mostly in his neck. He says the medical cannabis also helps with his PTSD.     Bipolar Disorder:  The patient reports feeling more manic lately because of his recent problems with his gallbladder. He is currently taking 300 mg lithium twice per day. He also takes 100 mg lamotrigine twice a day. He would like to increase the dose of these medications to help with his soy. He takes 200 mg topiramate twice per day.     REVIEW OF SYSTEMS:   He is going to meet with the gastroenterologist  "soon.   All other systems are negative.    PFSH:  His father had bladder cancer.     TOBACCO USE:  Social History     Tobacco Use   Smoking Status Current Some Day Smoker     Packs/day: 0.50     Years: 11.00     Pack years: 5.50     Types: Cigarettes     Start date: 8/20/2009     Last attempt to quit: 2/21/2017     Years since quitting: 3.0   Smokeless Tobacco Former User       VITALS:  Vitals:    02/26/20 1543   BP: (!) 84/60   Patient Site: Left Arm   Patient Position: Sitting   Cuff Size: Adult Large   Pulse: 66   Resp: 16   SpO2: 98%   Weight: 194 lb 7 oz (88.2 kg)   Height: 5' 6.5\" (1.689 m)     Wt Readings from Last 3 Encounters:   02/26/20 194 lb 7 oz (88.2 kg)   01/27/20 192 lb 3 oz (87.2 kg)   12/31/19 194 lb 9 oz (88.3 kg)     Body mass index is 30.91 kg/m .    PHYSICAL EXAM:  Constitutional:  Reveals an alert, pleasant, talkative man.  Vitals:  Per nursing notes.  Neurologic:  Cranial nerves II-XII intact.     Psychiatric:  Mood appropriate, memory intact.     MEDICATIONS:  Current Outpatient Medications   Medication Sig Dispense Refill     acetaminophen (TYLENOL) 650 MG CR tablet Take 1,300 mg by mouth every 8 (eight) hours as needed for pain.       albuterol (PROAIR HFA;PROVENTIL HFA;VENTOLIN HFA) 90 mcg/actuation inhaler INHALE 2 PUFFS BY MOUTH EVERY 6 HOURS AS NEEDED FOR WHEEZING 3 Inhaler 3     allopurinol (ZYLOPRIM) 300 MG tablet Take 1 tablet (300 mg total) by mouth 2 (two) times a day. 180 tablet 3     amitriptyline (ELAVIL) 25 MG tablet Take 1 tablet (25 mg total) by mouth at bedtime. 30 tablet 11     baclofen (LIORESAL) 10 MG tablet TAKE 1 & 1/2 (ONE & ONE-HALF) TABLETS BY MOUTH THREE TIMES DAILY 126 tablet 3     busPIRone (BUSPAR) 10 MG tablet TAKE 1 & 1/2 (ONE & ONE-HALF) TABLETS BY MOUTH THREE TIMES DAILY 135 tablet 3     calcium carbonate-vitamin D3 (CALTRATE 600 PLUS D3) 600 mg(1,500mg) -400 unit per tablet TAKE THREE TABLETS BY MOUTH ONCE DAILY 90 tablet 6     chlorhexidine (PERIDEX) 0.12 " % solution RINSE WITH 15ML FOR 30 SECONDS AND SPIT, USE TWICE DAILY AFTER BRUSHING AND FLOSSING . 473 mL 11     dextroamphetamine-amphetamine (ADDERALL) 30 mg Tab Take 0.5 tab (15mg) BID 30 tablet 0     DULoxetine (CYMBALTA) 30 MG capsule Take 1 capsule (30 mg total) by mouth 3 (three) times a day.  0     fexofenadine (ALLEGRA) 180 MG tablet Take 1 tablet (180 mg total) by mouth 2 (two) times a day. 60 tablet 11     fluticasone propionate (FLONASE) 50 mcg/actuation nasal spray USE 2 SPRAY(S) IN EACH NOSTRIL ONCE DAILY 18.2 g 4     hydroCHLOROthiazide (HYDRODIURIL) 25 MG tablet Take 1 tablet (25 mg total) by mouth 2 (two) times a day at 9am and 6pm. 60 tablet 11     hydrOXYzine HCl (ATARAX) 25 MG tablet TAKE 1 TABLET BY MOUTH 4 TIMES DAILY 360 tablet 3     ketamine (KETALAR) 150 mg/mL nasal spray 1 spray into each nostril every 4 (four) hours as needed. 30 mL 3     ketamine HCl (KETAMINE, BULK,) 100 % Powd Ketamine 120 mg amarjit, dissolve 1 amarjit under the tongue up to 6 times daily as needed 120 Bottle 2     lamoTRIgine (LAMICTAL) 150 MG tablet Take 1 tablet (150 mg total) by mouth 2 (two) times a day. 60 tablet 11     lidocaine HCl 3 % Crea Apply topically to affected areas twice daily 1 Tube 5     lithium 300 MG capsule Take 300 mg by mouth 2 (two) times a day.  1     Medical Cannabis Use 1 Units As Directed. Take as instructed by the medical cannabis dispensary. The provider who enrolled the patient in the medical cannabis registry and certified this  patient will maintain ownership and liability of all provider reporting and registration requirements.       metoprolol succinate (TOPROL-XL) 100 MG 24 hr tablet Take 1 tablet (100 mg total) by mouth daily. 90 tablet 3     montelukast (SINGULAIR) 10 mg tablet Take 1 tablet (10 mg total) by mouth 2 (two) times a day. 60 tablet 11     nabumetone (RELAFEN) 500 MG tablet Take 2 tablets (1,000 mg total) by mouth 2 (two) times a day. 120 tablet 11     NARCAN 4  mg/actuation nasal spray   0     nicotine (NICODERM CQ) 21 mg/24 hr Place 1 patch on the skin daily. 30 patch 1     oxyCODONE (ROXICODONE) 10 mg immediate release tablet Take 2 tablets (20 mg total) by mouth 4 (four) times a day. 112 tablet 0     Oxycodone HCl 20 mg Tab Take 20 mg by mouth 4 (four) times a day. 40 each 0     pantoprazole (PROTONIX) 40 MG tablet Take 1 tablet (40 mg total) by mouth 2 (two) times a day. 60 tablet 11     potassium 99 mg Tab Take by mouth daily.       prazosin (MINIPRESS) 2 MG capsule Take 1 capsule (2 mg total) by mouth at bedtime. 30 capsule 11     predniSONE (DELTASONE) 10 mg tablet Take 10 mg by mouth 2 (two) times a day. 60 tablet 11     pseudoephedrine (SUDOGEST 12-HOUR) 120 mg 12 hr tablet Take 1 tablet (120 mg total) by mouth every 12 (twelve) hours. 180 tablet 3     QUEtiapine (SEROQUEL) 200 MG tablet Take 200 mg by mouth bedtime.       QUEtiapine (SEROQUEL) 50 MG tablet Take 50 mg by mouth 4 (four) times a day.       temazepam (RESTORIL) 30 mg capsule Take 30 mg by mouth at bedtime.         1     THERA-M 9 mg iron-400 mcg Tab tablet TAKE 1 TABLET BY MOUTH ONCE DAILY 60 tablet 2     topiramate (TOPAMAX) 200 MG tablet Take 1 tablet (200 mg total) by mouth 2 (two) times a day. 60 tablet 11     amoxicillin-clavulanate (AUGMENTIN) 875-125 mg per tablet Take 1 tablet by mouth 2 (two) times a day. 60 tablet 0     oxyCODONE (ROXICODONE) 10 mg immediate release tablet Take 2 tablets (20 mg total) by mouth 4 (four) times a day for 6 days. 48 tablet 0     Current Facility-Administered Medications   Medication Dose Route Frequency Provider Last Rate Last Dose     cyanocobalamin injection 1,000 mcg  1,000 mcg Intramuscular Q7 Days eDvon Lund MD   1,000 mcg at 01/27/20 1603       ADDITIONAL HISTORY SUMMARIZED (2): Reviewed 2/19/2020 visit to [facility]. He was instructed to get an MRCP to investigate his dilated bile ducts.   DECISION TO OBTAIN EXTRA INFORMATION (1): None.    RADIOLOGY TESTS (1): Reviewed 2/4/2020 CT abdomen pelvis. He has dilated proper and common bile ducts and a distended gallbladder. He has a decompressed urinary bladder.  LABS (1): Old labs reviewed.   MEDICINE TESTS (1): None.  INDEPENDENT REVIEW (2 each): None.     The visit lasted a total of 27 minutes face to face with the patient. Over 50% of the time was spent counseling and educating the patient about infections, medical cannabis, bipolar disorder, and .    IFrank, am scribing for and in the presence of, Dr. Lund.    IDr. Lund, personally performed the services described in this documentation, as scribed by Frank Jackson in my presence, and it is both accurate and complete.    Total data points:4

## 2021-06-07 NOTE — TELEPHONE ENCOUNTER
Who is calling:  Patient   Reason for Call:  Patient states  ordered  Stool test for him patient is going to come clinic to   Stool test kit  Today . Please see the telephone encounter form 03/18/20  Date of last appointment with primary care: 02/26/20  Okay to leave a detailed message: No

## 2021-06-07 NOTE — TELEPHONE ENCOUNTER
Controlled Substance Refill Request  Medication Name:   Requested Prescriptions     Pending Prescriptions Disp Refills     dextroamphetamine-amphetamine (ADDERALL) 30 mg Tab 30 tablet 0     Sig: Take 0.5 tab (15mg) BID     Date Last Fill: 2/24/2020  Requested Pharmacy: [id number]  Submit electronically to pharmacy  Controlled Substance Agreement on file:   Encounter-Level CSA Scan Date - 02/18/2020:    Scan on 2/19/2020 10:23 AM by Amairani Moeller: [facility] NARCOTIC AGREEMENT           Encounter-Level CSA Scan Date - 12/30/2019:    Scan on 12/30/2019  5:20 PM by Amairani Moeller: [facility] NARCOTIC AGREEMENT           Encounter-Level CSA Scan Date - 11/20/2018:    Scan on 12/27/2018  3:43 PM: [facility]           Encounter-Level CSA Scan Date - 08/21/2017:    Scan on 8/24/2017  7:50 AM  Scan on 8/23/2017  7:29 AM           Encounter-Level CSA Scan Date - 06/27/2016:    Scan on 6/30/2016  8:08 AM        Last office visit:  2/26/2020

## 2021-06-07 NOTE — PROGRESS NOTES
Patient replied to CHW's [place] message that was sent last month, Jan states he is not doing very well. Patient has televisit with PCP 5/1/2020 @3pm    CHW sent [employer] message today to Jan, requesting to set up a time for a phone visit with CCC TAMI Milian.  Plan to schedule f/u visit patient and update CCC goals

## 2021-06-07 NOTE — PROGRESS NOTES
Patient would like the video invitation sent by: Text to cell phone: [phone]      ASSESSMENT:  1. Acute right-sided low back pain with right-sided sciatica  Very difficult in this man with new pain, probable narcotic induced hyperalgesia, and chronic narcotic use already on high-dose anticonvulsants and anti-inflammatories.  Trial of oxcarbazepine.  Agree with structural approach through [facility].  Consider lumbar hernia versus herniated lumbar disc  - OXcarbazepine (TRILEPTAL) 300 MG tablet; Take 1 tablet (300 mg total) by mouth 2 (two) times a day.  Dispense: 60 tablet; Refill: 11    2. Traumatic brain injury with loss of consciousness, sequela (H)  Routine refill  - dextroamphetamine-amphetamine (ADDERALL) 30 mg Tab; Take 0.5 tab (15mg) BID  Dispense: 30 tablet; Refill: 0    3. Acute maxillary sinusitis, recurrence not specified  Refill prednisone.  - predniSONE (DELTASONE) 10 mg tablet; Take 10 mg by mouth 2 (two) times a day.  Dispense: 60 tablet; Refill: 11    4. Chronic, continuous use of opioids  - predniSONE (DELTASONE) 10 mg tablet; Take 10 mg by mouth 2 (two) times a day.  Dispense: 60 tablet; Refill: 11    5. Bipolar 2 disorder (H)  Trial of oxcarbazepine.  Continue lamotrigine  - OXcarbazepine (TRILEPTAL) 300 MG tablet; Take 1 tablet (300 mg total) by mouth 2 (two) times a day.  Dispense: 60 tablet; Refill: 11    6. Gastroesophageal reflux disease with esophagitis  Resume pantoprazole  - pantoprazole (PROTONIX) 40 MG tablet; Take 1 tablet (40 mg total) by mouth 2 (two) times a day.  Dispense: 60 tablet; Refill: 11    7. Dental abscess  Resume antibiotics.  Metronidazole to treat anaerobes and due to diarrhea despite negative C. difficile.  Doxycycline  - metroNIDAZOLE (FLAGYL) 500 MG tablet; Take 1 tablet (500 mg total) by mouth 2 (two) times a day for 21 days.  Dispense: 42 tablet; Refill: 0  - doxycycline (MONODOX) 100 MG capsule; Take 1 capsule (100 mg total) by mouth 2 (two) times  a day.  Dispense: 60 capsule; Refill: 1    8. Diarrhea of presumed infectious origin  Resume proton pump inhibitors since cessation did not help    9. Dilated cbd, acquired  Trial of ursodiol to detoxify bile  - ursodioL (ACTIGALL) 300 mg capsule; Take 1 capsule (300 mg total) by mouth 2 (two) times a day.  Dispense: 60 capsule; Refill: 11      Diarrhea discussed.  C. difficile negative.    Preventive Health Care:      PLAN:  Patient Instructions   4 hepatobiliary toxicity begin ursodiol 300 mg twice daily    Resume antibiotics for dental abscess and diarrhea  Metronidazole 500 mg twice daily    Doxycycline 100 mg twice daily    Resume pantoprazole    Oxcarbazepine 300 mg twice daily for neuropathy and anxiety    Refill prednisone and Adderall    See [facility] for presumed MRI to evaluate possible lumbar hernia versus ruptured disc    May need sedation intravenously for MRI    May need support for missed appointments    Video follow-up 1 month      No orders of the defined types were placed in this encounter.    Return in about 4 weeks (around 5/29/2020) for a phone/video follow up visit.    CHIEF COMPLAINT:  Chief Complaint   Patient presents with     Follow-up     medication check, gum and mouth infection, lower abdominal pain, Gallbladder pain       HISTORY OF PRESENT ILLNESS:  Bola is a 50 y.o. male calling in to the clinic today via video phone, and his car, parked.  When we talked last he was suffering from diarrhea.  He was advised to discontinue his antibiotics completely.  C. difficile was checked and was negative.  With cessation of antibiotics his dental infection has worsened    He suffered a new back injury a few weeks ago.  He feels a large bulge in his lower lumbar area feels shooting pain down into his big toe.  This is worse on the right but he feels it slightly on the left also    Mood has been anxious    REVIEW OF SYSTEMS:   Worsening acid reflux.  No improvement in diarrhea off  "pantoprazole.  Worsening signs symptoms to worsening pain.  All other systems are negative.    PFSH:  Visits his parents to help with chores when he injured his back.  Played football at [employer]    TOBACCO USE:  Social History     Tobacco Use   Smoking Status Current Some Day Smoker     Packs/day: 0.50     Years: 11.00     Pack years: 5.50     Types: Cigarettes     Start date: 8/20/2009     Last attempt to quit: 2/21/2017     Years since quitting: 3.1   Smokeless Tobacco Former User       VITALS:  Stated Blood Pressure    Stated Pulse    Stated Weight     PHYSICAL EXAM:  (observations via Video)  Missing left front tooth by video.  Hypomanic.  Sitting in car in [employer] lot.  No shortness of breath      ADDITIONAL HISTORY SUMMARIZED (2): Reviewed pain clinic note and email exchanges  CARE EVERYWHERE/ EXTRA INFORMATION (1):   RADIOLOGY TESTS (1): MRI lumbar spine.  CT scan showing elevated bile ducts  LABS (1): C. difficile negative  CARDIOLOGY/MEDICINE TESTS (1):   INDEPENDENT REVIEW (2 each):     Total data points: 4    Video Start time: 4:35 PM  Video End time: 5:16 PM    The visit lasted a total of 41 minutes face to face    CA intake time  12:46 minutes      The patient has been notified of following:     \"This video visit will be conducted via a call between you and your physician/provider. We have found that certain health care needs can be provided without the need for an in-person physical exam.  This service lets us provide the care you need with a video conversation.  If a prescription is necessary we can send it directly to your pharmacy.  If lab work is needed we can place an order for that and you can then stop by our lab to have the test done at a later time.    Video visits are billed at different rates depending on your insurance coverage. Please reach out to your insurance provider with any questions.    If during the course of the call the physician/provider feels a video " "visit is not appropriate, you will not be charged for this service.\"    Patient has given verbal consent to a Video visit? Yes    Patient would like to receive their AVS by AVS Preference: [employer]ach[employer].        Video-Visit Details    Type of service:  Video Visit    Originating Location (pt. Location): His car    Distant Location (provider location):  [facility] INTERNAL MEDICINE     Mode of Communication:  Video Conference via [employer]    Devon Lund MD    "

## 2021-06-07 NOTE — TELEPHONE ENCOUNTER
"Medication being requested: baclofen 10 mg  Last visit date: 2/18 Provider: RAYNE  Next visit date: 5/5 Provider: RAYNE  Expected follow up: 8 weeks  MTM visit ([facility]) date: no  Pertinent between visit information about requested medication (telephone, mychart, prior authorization, concerns): recently increased   Last date prescribed: 1/10  Provider responsible: BE  Spoke with patient: no  Script being sent to provider - dates and quantity: please adjust if ok with changing the dosing as patient is asking for a dose increase with \"the current situation that I have discussed with Dr. Cramer\"  Pharmacy cued: [facility]      "

## 2021-06-07 NOTE — TELEPHONE ENCOUNTER
Medication Request    Medication name:   amoxicillin-clavulanate (AUGMENTIN) 875-125 mg per tablet  60 tablet  0  2/26/2020  3/27/2020  --    Sig - Route: Take 1 tablet by mouth 2 (two) times a day.        Requested Pharmacy: [address]      Reason for request:   Patient states per his interpretation of the My chart that his PCP may change his antibiotic therapy to a different med do to possible side effects and possibly causing an intestinal bacteria infection.    When did you use medication last?:  04/02/2020    Patient offered appointment:  patient declined     Okay to leave a detailed message: yes    Please call patient with a plan of care and treatment options.

## 2021-06-07 NOTE — PATIENT INSTRUCTIONS - HE
4 hepatobiliary toxicity begin ursodiol 300 mg twice daily    Resume antibiotics for dental abscess and diarrhea  Metronidazole 500 mg twice daily    Doxycycline 100 mg twice daily    Resume pantoprazole    Oxcarbazepine 300 mg twice daily for neuropathy and anxiety    Refill prednisone and Adderall    See [facility] for presumed MRI to evaluate possible lumbar hernia versus ruptured disc    May need sedation intravenously for MRI    May need support for missed appointments    Video follow-up 1 month

## 2021-06-07 NOTE — PROGRESS NOTES
"And left 2 messages today indicating that he was out of ketamine for several weeks and needed a refill.  Also described he had back injury over the last 2 weeks.  Describes having a lump.  I did call him back.  Reviewed he should contact our nursing staff directly for refills of ketamine and I sent that in.  He described having a \"knot\" which he attributes to disc, familiar when he has had disc problems in his spine.  He has worked with Dr. Samayoa he in the past to [facility].  He sees Dr. Lund tomorrow.  We were interrupted in the call and when I returned got his voicemail.  I instructed him to directly call Dr. Danita lorenzo at [facility] to see how he would like to proceed in terms of any imaging, injections, physical therapy, with further assessment.  "

## 2021-06-08 NOTE — PROGRESS NOTES
Speech Language/Pathology  Speech Therapy Outpatient Daily Visit Note    Bola Lyon  1970     [id number]    Session 2 of 6  Interventions provided during this session:speech/language 55 minutes    Subjective  Patient presents as alert and cooperative during this session.  An  wasnot required  for this session.  Patient reports generalized chronic pain 6/10    Pt came to session with report from eye doctor that he saw on 01/12/2017 - educated pt on vision vs. Visuoperception.     Objective  V/E strategies   - Educated about strategies for verbal expression/word-finding x9. Gave written instruction and discussed functional implementation.    - Pt indicated that he notices he is holding his breath when searching for correct word (which was also reportedly noticed by PT); unclear if this is due to chronic pain or as a secondary behavior out of frustration.     Cognitive resources:   -Educated pt on the concept of conservation of cognitive resources. Gave general advice about sleep hygiene, structure, and maximizing treatment of mental health and physical concerns for the best cognitive outcome.     Assessment  Pt motivated for improvement; he does ask a lot of questions about prognosis and appears to accept that this tx is focused on compensation (rather than remediation) of cognitive skills.     Plan   Current goals remain appropriate

## 2021-06-08 NOTE — TELEPHONE ENCOUNTER
Can patient be changed to 60 mg capsules? 30 mg capsule require him to get 120 per month, he is exceeding what the insurance allows per month. If he is changed to 60 mg capsule he would have a better change of getting coverage.

## 2021-06-08 NOTE — PROGRESS NOTES
"Speech Language/Pathology  Speech Therapy Outpatient Daily Visit Note    Bola Lyon  1970     [id number]    Session 5 of 6  Interventions provided during this session:speech/language 45 minutes (pt was 15 minutes late, but called to notify)    Subjective  Patient presents as motivated and engaged during this session.  An  was not required  for this session.  Patient reports chronic pain.    Objective  Pt c/o difficulty with irritability, especially when frustrated about the things he calls his \"buttons\" (confusion, lack of clarity, not being able to explain himself well). Discussed contributing factors, particularly anxiety. Framed the discussion in terms of cognitive skills and how structure is helpful. Demonstrated breathing, visualization, progressive relaxation exercises, gave written education about them. Also practiced \"scripting\" how to talk about these moments with others (e.g. In a socially appropriate manner). Incorporated education about TBI into this discussion; pt verbalized understanding of education provided.     Goal setting - pt has been working on the 4 areas identified at previous appt, but did not bring in written plan.     Assessment  POC is appropriate.     Plan   Current goals remain appropriate        "

## 2021-06-08 NOTE — PROGRESS NOTES
"Speech Language/Pathology  Speech Therapy Outpatient Daily Visit Note    Bola Lyon  1970     [id number]    Session 4 of 6  Interventions provided during this session:speech/language 55 minutes    Subjective  Patient presents as motivated and engaged during this session.  An  was not required  for this session.  Patient reports chronic pain (multiple orthopedic sites)    Objective  Pt reports that, the more he participates in therapy, the more he realizes he has been compensating for cognitive difficulty for several years (more-so in the last 1.5 years). He brainstormed memory techniques he already uses x3   - re-stating what he heard   - breaking info into smaller pieces   - being more descriptive (when he can't find the exact right word)   - txpist identified x2 more, then discussed the potential drawbacks to some of these techniques    Goal setting   - Pt cued to set goals x2 to address his desire for \"[place]-based\" work   - Cognitive practice - demonstrated activities x3 (Spot it, puzzles, finding differences among pictures). Encouraged pt to schedule time daily.    W/E   - Pt reports he is \"stressed out\" whenever he needs to write because he \"can't put the words together.\"   - Practiced some short phrases - functional    External Aids   - Pt reports he is using a paper planner well, despite the fact that he reports keeping it up to date is \"hard.\"    Assessment  Pt continues to be engaged in tx; mildy distracted by/perseverative on tx for orthopedic injuries - encouraged him to discuss recommendations with surgeon he will see for his right shoulder next week.     Plan   Current goals remain appropriate        "

## 2021-06-08 NOTE — PROGRESS NOTES
"Speech Language/Pathology  Speech Therapy Outpatient Daily Visit Note    Bola Lyon  1970     [id number]    Session 3 of 6  Interventions provided during this session:speech/language 55 minutes    Subjective  Patient presents as motivated and engaged during this session.  An  was not required  for this session.  Patient reports chronic pain     Objective  Pt reports increase in anxiety and insomnia. Spent entire session introducing structure to help pt prioritize the energy he invests in different stressors. Started by brainstorming a list of 11 \"stressors\" or situations pt feels he needs to address. Then discussed strategy for choosing priorities, based primarily on the things pt can actually do something about. Chose 4, then made a written list (a single sheet of paper folded into 4 quadrants), where pt was instructed to identify x2 action items. Pt was sent home with this tool to complete at home. Did provide some resources for first calls for pursuing disability (e.g. Disability Linkage Line).     Assessment  Pt engaged in tx tasks, but anxiety is a barrier.     Plan   Current goals remain appropriate        "

## 2021-06-08 NOTE — PROGRESS NOTES
Patient would like to be contacted via the following phone number [phone]    ASSESSMENT:  1. Generalized muscle weakness  Heading to the hospital for assessment    Preventive Health Care:      PLAN:  There are no Patient Instructions on file for this visit.    No orders of the defined types were placed in this encounter.      No follow-ups on file.    CHIEF COMPLAINT:  Chief Complaint   Patient presents with     Fall     Has fallen down 2 times in the past week Mom and roommate are concerned,Also patient is stating he has bad nerve pain in back radiating to arm's and leg's       HISTORY OF PRESENT ILLNESS:  Bola is a 50 y.o. male calling in to the clinic today via phone.  He was contacted at 3:29 PM, a woman answered reporting that the paramedics were there and taking him to the hospital due to extreme weakness.  No further conversation    REVIEW OF SYSTEMS:     All other systems are negative.    PFSH:  Reviewed phone note with concerns of mother and roommate    TOBACCO USE:  Social History     Tobacco Use   Smoking Status Current Some Day Smoker     Packs/day: 0.50     Years: 11.00     Pack years: 5.50     Types: Cigarettes     Start date: 8/20/2009     Last attempt to quit: 2/21/2017     Years since quitting: 3.2   Smokeless Tobacco Former User   Tobacco Comment    0.5 pack per day       VITALS:  Stated Blood Pressure  N/A  Stated Pulse  N/A  Stated Weight N/A    PHYSICAL EXAM:  (observations via phone)        ADDITIONAL HISTORY SUMMARIZED (2):   CARE EVERYWHERE/ EXTRA INFORMATION (1):   RADIOLOGY TESTS (1):   LABS (1):   CARDIOLOGY/MEDICINE TESTS (1):   INDEPENDENT REVIEW (2 each):     Total data points:      The visit lasted a total of 2 minutes   CA intake time  13:25 minutes    Telephone Consent was completed by  staff and confirmed by Jocy Parker CMA  2:09 PM  6/2/2020      I have reviewed and updated the patient's Past Medical History, Social History, Family History as  appropriate.    MEDICATIONS: Reviewed and updated per CA and MD  Current Outpatient Medications   Medication Sig Dispense Refill     acetaminophen (TYLENOL) 650 MG CR tablet Take 1,300 mg by mouth every 8 (eight) hours as needed for pain.       albuterol (PROAIR HFA;PROVENTIL HFA;VENTOLIN HFA) 90 mcg/actuation inhaler INHALE 2 PUFFS BY MOUTH EVERY 6 HOURS AS NEEDED FOR WHEEZING 3 Inhaler 3     allopurinol (ZYLOPRIM) 300 MG tablet Take 1 tablet (300 mg total) by mouth 2 (two) times a day. 180 tablet 3     amitriptyline (ELAVIL) 25 MG tablet Take 2 tablets (50 mg total) by mouth at bedtime. 180 tablet 3     baclofen (LIORESAL) 10 MG tablet TAKE 1 & 1/2 (ONE & ONE-HALF) TABLETS BY MOUTH THREE TIMES DAILY 90 tablet 3     busPIRone (BUSPAR) 10 MG tablet TAKE 1 & 1/2 (ONE & ONE-HALF) TABLETS BY MOUTH THREE TIMES DAILY 135 tablet 0     calcium carbonate-vitamin D3 (CALTRATE 600 PLUS D3) 600 mg(1,500mg) -400 unit per tablet TAKE THREE TABLETS BY MOUTH ONCE DAILY 90 tablet 6     chlorhexidine (PERIDEX) 0.12 % solution RINSE WITH 15ML FOR 30 SECONDS AND SPIT, USE TWICE DAILY AFTER BRUSHING AND FLOSSING . 473 mL 11     dextroamphetamine-amphetamine (ADDERALL) 30 mg Tab Take 0.5 tab (15mg) BID 30 tablet 0     diazePAM (VALIUM) 5 MG tablet One tab every four hours as needed 42 tablet 1     doxycycline (MONODOX) 100 MG capsule Take 1 capsule (100 mg total) by mouth 2 (two) times a day. 60 capsule 1     DULoxetine (CYMBALTA) 60 MG capsule Take 1 capsule (60 mg total) by mouth 2 (two) times a day. 60 capsule 11     eszopiclone (LUNESTA) 3 mg tablet Take 3 mg by mouth at bedtime.        fluticasone propionate (FLONASE) 50 mcg/actuation nasal spray USE 2 SPRAY(S) IN EACH NOSTRIL ONCE DAILY 18.2 g 4     hydroCHLOROthiazide (HYDRODIURIL) 25 MG tablet Take 1 tablet (25 mg total) by mouth 2 (two) times a day at 9am and 6pm. 60 tablet 11     hydrOXYzine HCl (ATARAX) 25 MG tablet TAKE 1 TABLET BY MOUTH 4 TIMES DAILY 360 tablet 3      ketamine (KETALAR) 150 mg/mL nasal spray 4 sprays into each nostril every 4 (four) hours as needed. 60 mL 3     lamoTRIgine (LAMICTAL) 150 MG tablet Take 1 tablet (150 mg total) by mouth 2 (two) times a day. 60 tablet 11     lidocaine HCl 3 % Crea Apply topically to affected areas twice daily 1 Tube 5     lithium 300 MG capsule Take 300 mg by mouth 2 (two) times a day.  1     Medical Cannabis Use 1 Units As Directed. Take as instructed by the medical cannabis dispensary. The provider who enrolled the patient in the medical cannabis registry and certified this  patient will maintain ownership and liability of all provider reporting and registration requirements.       metoprolol succinate (TOPROL-XL) 100 MG 24 hr tablet Take 1 tablet (100 mg total) by mouth daily. 90 tablet 3     montelukast (SINGULAIR) 10 mg tablet Take 1 tablet by mouth twice daily 180 tablet 3     nabumetone (RELAFEN) 500 MG tablet Take 2 tablets (1,000 mg total) by mouth 2 (two) times a day. 120 tablet 11     naloxone (NARCAN) 4 mg/actuation nasal spray 1 spray (4 mg dose) into one nostril for opioid reversal. Call 911. May repeat if no response in 3 minutes. 1 Box 0     nicotine (NICODERM CQ) 21 mg/24 hr Place 1 patch on the skin daily. 30 patch 1     [START ON 6/6/2020] oxyCODONE (ROXICODONE) 10 mg immediate release tablet Take 2 tablets (20 mg total) by mouth 5 (five) times a day for 14 days. 140 tablet 0     prazosin (MINIPRESS) 2 MG capsule Take 1 capsule (2 mg total) by mouth at bedtime. 30 capsule 11     predniSONE (DELTASONE) 10 mg tablet Take 10 mg by mouth 2 (two) times a day. 60 tablet 11     QUEtiapine (SEROQUEL) 200 MG tablet Take 200 mg by mouth bedtime.       QUEtiapine (SEROQUEL) 50 MG tablet Take 50 mg by mouth 4 (four) times a day.       temazepam (RESTORIL) 30 mg capsule Take 30 mg by mouth at bedtime.         1     ursodioL (ACTIGALL) 300 mg capsule Take 1 capsule (300 mg total) by mouth 2 (two) times a day. 60 capsule 11      "NARCAN 4 mg/actuation nasal spray 1 spray by Intranasal route as needed for opioid reversal.   0     OXcarbazepine (TRILEPTAL) 300 MG tablet Take 1 tablet (300 mg total) by mouth 2 (two) times a day. 60 tablet 11     pantoprazole (PROTONIX) 40 MG tablet Take 1 tablet (40 mg total) by mouth 2 (two) times a day. 60 tablet 11     potassium 99 mg Tab Take by mouth daily.       pseudoephedrine (SUDOGEST 12-HOUR) 120 mg 12 hr tablet Take 1 tablet (120 mg total) by mouth every 12 (twelve) hours. 180 tablet 3     THERA-M 9 mg iron-400 mcg Tab tablet TAKE 1 TABLET BY MOUTH ONCE DAILY (Patient taking differently: 1 tablet daily. ) 60 tablet 2     topiramate (TOPAMAX) 200 MG tablet Take 1 tablet (200 mg total) by mouth 2 (two) times a day. 60 tablet 11     Current Facility-Administered Medications   Medication Dose Route Frequency Provider Last Rate Last Dose     cyanocobalamin injection 1,000 mcg  1,000 mcg Intramuscular Q7 Days Devon Lund MD   1,000 mcg at 01/27/20 1603           The patient has been notified of following:     \"This telephone visit will be conducted via a call between you and your physician/provider. We have found that certain health care needs can be provided without the need for a physical exam.  This service lets us provide the care you need with a short phone conversation.  If a prescription is necessary we can send it directly to your pharmacy.  If lab work is needed we can place an order for that and you can then stop by our lab to have the test done at a later time.    If during the course of the call the physician/provider feels a telephone visit is not appropriate, you will not be charged for this service.\"   "

## 2021-06-08 NOTE — TELEPHONE ENCOUNTER
Who is calling:  Patient   Reason for Call:  Patient stated he was informed by LORAINE Barajas that a medication for muscle relaxer was going to be sent as well. Patient stated he was informed by the pharmacy that did not receive this. Patient would like a call back.  Date of last appointment with primary care: n/a  Okay to leave a detailed message: Yes  [phone]

## 2021-06-08 NOTE — PROGRESS NOTES
[facility] Daily Progress     Patient Name: Bola Lyon  Date: 2017  Visit #: 2  PTA visit #:  0  Referral Diagnosis: Right shoulder pain, cervicalgia   Referring provider: Abena Gentile MD  Visit Diagnosis:     ICD-10-CM    1. Cervicalgia M54.2    2. Decreased ROM of intervertebral discs of cervical spine M53.82    3. Chronic right shoulder pain M25.511     G89.29    4. Decreased ROM of right shoulder M25.611    5. Scapular dysfunction M89.9    6. Muscle weakness (generalized) M62.81          Assessment:     HEP/POC compliance is  good .  Response to Intervention Pt reports relief of neck pain after SOR. Pt demonstrates increased mobility of the upper thoracic spine with limited mobility mid-thoracic spine most likely due to his fusion from C3-C7.   Patient is benefitting from skilled physical therapy and is making steady progress toward functional goals.  Patient is appropriate to continue with skilled physical therapy intervention, as indicated by initial plan of care.    Goal Status:  Pt. will demonstrate/verbalize independence in self-management of condition in : 6 weeks  Pt. will be independent with home exercise program in : 6 weeks  Patient Turn [place]: for driving;for watching traffic;for conversation;with partial ROM;with less difficulty;in 6 weeks  Patient will reach / maintain arm movement: overhead;behind;for home chores;for dressing;with full ROM;with less difficulty;in 6 weeks  Patient will decrease : NDI score;SPADI score;by _ points;for improved quality of function;for improved quality of life;in 6 weeks  by ___ points: 5%, 10 points respectively     Plan / Patient Education:     Add chin tucks (if patient is not already doing at home), mid-thoracic mobilizations as tolerated, (R) scapular mobilizations, may trial KTape for (R) scapula stabilization and UT inhibition      Subjective:     Pain Ratin  Pt reports more pain in the neck than the right shoulder. Pt reports  pain never seems to go below a 5/10.       Objective:   Moderate TTP suboccipitals, pt reports relief after SOR   Decreased mobility T5-T7 with moderate TTP medial border of (R) scapula     Treatment Today     TREATMENT MINUTES COMMENTS   Evaluation     Self-care/ Home management     Manual therapy 15 -SOR   -Grade II PA mobilizations to T5-T7   -STM (R) thoracic paraspinals along medial border of scapula    Neuromuscular Re-education     Therapeutic Activity     Therapeutic Exercises 10 See exercise flow sheet    Gait training     Modality__________________                Total 25    Blank areas are intentional and mean the treatment did not include these items.       Jackeline Mosher  2/13/2017

## 2021-06-08 NOTE — PROGRESS NOTES
[facility] Certification Request    February 10, 2017      Patient: Bola Lyon  MR Number: [id number]  YOB: 1970  Date of Visit: 2/10/2017      Dear Dr. Bruce and Dr. Gentile:    Thank you for this referral.   We are seeing Bola Lyon for Physical Therapy of Right shoulder pain and cervicalgia.    Medicare and/or Medicaid requires physician review and approval of the treatment plan. Please review the plan of care and verify that you agree with the therapy plan of care by co-signing this note.      Plan of Care  Communication with: Referral Source  Patient Related Instruction: Nature of Condition;Treatment plan and rationale;Self Care instruction;Basis of treatment;Body mechanics;Posture;Precautions;Next steps;Expected outcome  Times per Week: 1-2 times  Number of Weeks: 6 weeks  Number of Visits: up to 12 visits  Therapeutic Exercise: ROM;Stretching;Strengthening  Neuromuscular Reeducation: kinesio tape;posture  Manual Therapy: soft tissue mobilization;myofascial release;muscle energy;joint mobilization  Modalities: electrical stimulation;TENS;cold pack;hot pack  Equipment: theraband    Goals:  Pt. will demonstrate/verbalize independence in self-management of condition in : 6 weeks  Pt. will be independent with home exercise program in : 6 weeks  Patient Turn Head: for driving;for watching traffic;for conversation;with partial ROM;with less difficulty;in 6 weeks  Patient will reach / maintain arm movement: overhead;behind;for home chores;for dressing;with full ROM;with less difficulty;in 6 weeks  Patient will decrease : NDI score;SPADI score;by _ points;for improved quality of function;for improved quality of life;in 6 weeks  by ___ points: 5%, 10 points respectively       If you have any questions or concerns, please don't hesitate to call.    Sincerely,      Jackeline Mosher, PT        Physician recommendation:     ___ Follow therapist's recommendation        ___ Modify  therapy      *Physician co-signature indicates they certify the need for these services furnished within this plan and while under their care.        [facility] Initial Evaluation    Patient Name: Bola Lyon  Date of evaluation: 2/10/2017  Referral Diagnosis: Cervicalgia, Right shoulder pain  Referring provider: Abena Gentile MD; Pilo Bruce MD  Visit Diagnosis:     ICD-10-CM    1. Cervicalgia M54.2    2. Decreased ROM of intervertebral discs of cervical spine M53.82    3. Chronic right shoulder pain M25.511     G89.29    4. Decreased ROM of right shoulder M25.611    5. Scapular dysfunction M89.9    6. Muscle weakness (generalized) M62.81        Assessment:    Bola is a 47 year old male presenting with complaints of neck and right shoulder pain. Pt has history of multiple orthopedic surgeries including 3 cervical surgeries and 3 right shoulder surgeries. His most recent neck surgery was a fusion in May 2014 (per report of patient) and most recent shoulder surgery was a rotator cuff and pectoral repair in November 2015. Examination reveals significant decreased and pain with cervical ROM and moderately limited (R) shoulder ROM with pain. Pt demonstrates poor movement patterns of the right scapula, abnormal posturing with rounded shoulders and mild scapular winging. Pt has diffuse tenderness around the cervical musculature, (R) shoulder, and (R) scapula. Functional limitations include looking down for reading, turning head for driving, sleeping, reaching overhead and behind for home chores and dressing, and lifting. Pt would benefit from skilled therapy in order to decrease pain and improve his mobility, stability, and posture for improved tolerance to his functional activities.   Barriers to achieving goals as noted in the assessment section may affect outcome.  Prognosis to achieve goals is  fair   Pt. is appropriate for skilled PT intervention as outlined in the Plan of Care  (POC).  Pt. is a good candidate for skilled PT services to improve pain levels and function.    Goals:  Pt. will demonstrate/verbalize independence in self-management of condition in : 6 weeks  Pt. will be independent with home exercise program in : 6 weeks  Patient Turn Head: for driving;for watching traffic;for conversation;with partial ROM;with less difficulty;in 6 weeks  Patient will reach / maintain arm movement: overhead;behind;for home chores;for dressing;with full ROM;with less difficulty;in 6 weeks  Patient will decrease : NDI score;SPADI score;by _ points;for improved quality of function;for improved quality of life;in 6 weeks  by ___ points: 5%, 10 points respectively     Patient's expectations/goals are realistic.    Barriers to Learning or Achieving Goals:  Chronicity of the problem.       Plan / Patient Instructions:        Plan of Care:   Communication with: Referral Source  Patient Related Instruction: Nature of Condition;Treatment plan and rationale;Self Care instruction;Basis of treatment;Body mechanics;Posture;Precautions;Next steps;Expected outcome  Times per Week: 1-2 times  Number of Weeks: 6 weeks  Number of Visits: up to 12 visits  Therapeutic Exercise: ROM;Stretching;Strengthening  Neuromuscular Reeducation: kinesio tape;posture  Manual Therapy: soft tissue mobilization;myofascial release;muscle energy;joint mobilization  Modalities: electrical stimulation;TENS;cold pack;hot pack  Equipment: theraband    POC and pathology of condition were reviewed with patient.  Pt. is in agreement with the Plan of Care  A Home Exercise Program (HEP) was initiated today.  Pt. was instructed in exercises by PT and patient was given a handout with detailed instructions.  Plan for next visit: Continue with POC, progress cervical, RC, and scapular stabilization as tolerated. Address cervical and shoulder mobility with manual techniques include deep tissue and mobilizations as tolerated  .   Subjective:      "    History of Present Illness:    Bola is a 47 y.o. male who presents to therapy today with complaints of neck and right shoulder pain. Pt reports he has had two surgeries on the neck. Last surgery was May 2014. Pt states he had a \"quadruple neck fusion and bilateral axillary repair\". Pt reports he did do a year of therapy afterwards and has been doing his home exercise program but feels like in late / his exercises program stopping helping. Pt reports difficulty looking down for reading and using his phone, turning the head left and right for driving, lying flat to fall asleep. Pt has sleeping in a recliner for the past year because of his neck.     Pt reports he has had 3 surgeries on the right shoulder. Most recent was 2015. Pt states it was a RC repair and pectoral repair. History of biceps long head repair on the right shoulder. Pt completed physical therapy for 6 months afterwards. Pt reports he feels like his also reached a point that he hasn't been able to get the stretch he needed. He's tried acupuncture, dry needling, deep tissue which has helped but temporarily. Pt reports difficulty with relaxing because of nerve pain and spasming in the right arm. Pt reports problems pushing or pulling with the right arm, reaching behind, and reaching overhead.     Pt reports he has an MRI of the right shoulder in 2016 which showed nerve damage around his right scapula and he had an EMG which shows nerve damage in the right arm most likley from C6.     As a side note, pt is scheduled for a (L) knee replacement on     Pain Ratin  Pain rating at best: 4  Pain rating at worst: 8    Functional limitations are described as occurring with:   pushing or pulling with right arm  lifting  looking down and turning head  personal cares donning shirt or jacket, combing hair, washing hair and washing body  reaching overhead and behind back  performing routine daily " activities  sleeping    Patient reports benefit from:  movement or exercise , pain medication, Home e-stim machine       Objective:      Note: Items left blank indicates the item was not performed or not indicated at the time of the evaluation.    Patient Outcome Measures :    Shoulder Pain and Disability Index (SPADI) in %: 56.9   Scores range from 0-100%, where a score of 0% represents minimal pain and maximal function. The minimal clincically important difference is a score reduction of 10%.  Neck Disability Score in %: 70   Scores range from 0-100%, where a score of 0% represents minimal pain and maximal function. The minmal clinically important difference is a score reduction of 10%.    Shoulder Examination  1. Cervicalgia     2. Decreased ROM of intervertebral discs of cervical spine     3. Chronic right shoulder pain     4. Decreased ROM of right shoulder     5. Scapular dysfunction     6. Muscle weakness (generalized)       Involved side: Bilateral neck, right shoulder   Posture Observation:  Moderate rounded shoulders bilaterally, moderate scapular protraction with mild (R) scapular winging          Cervical ROM  Date: 2/10/2017     *Indicate scale AROM AROM AROM   Cervical Flexion 27     Cervical Extension 20      Right Left Right Left Right Left   Cervical Sidebending 10 15       Cervical Rotation 25 36       Cervical Protraction      Cervical Retraction      Thoracic Flexion      Thoracic Extension      Thoracic Sidebending         Thoracic Rotation             Shoulder/Elbow ROM    Date: 2/10/2017     Shoulder and Elbow ROM ( )   AROM in degrees AROM in degrees AROM in degrees    Right Left Right Left Right Left   Shoulder Flexion (0-180 ) 150 with pain t/o 150       Shoulder Abduction (0-180 ) 170 with pain t/o 170       Shoulder Extension (0-60 )         Shoulder ER (0-90 )         Shoulder IR (0-70 ) Thumb to (R) buttock         Shoulder IR/Ext         Elbow Flexion (150 )         Elbow Extension  (0 )          PROM in degrees PROM in degrees PROM in degrees    Right Left Right Left Right Left   Shoulder Flexion (0-180 )         Shoulder Abduction (0-180 )         Shoulder Extension (0-60 )         Shoulder ER (0-90 )         Shoulder IR (0-70 )         Elbow Flexion (150 )         Elbow Extension (0 )           Shoulder/Elbow Strength: MMT 5/5, but patient c/o pain (R) shoulder for all MMT     Flexibility: mat to (R) acromion 6 inches, mat to (L) acromion 5 inches indicating pectoral tightness R>L    Palpation: Significant TTP diffusely across (R) and (L) cervical musculature, (R) shoulder musculature as well as along medial border of (R) scapula.     Joint Assessment:  Sternoclavicular Joint Assessment: Not Indicated  Acromioclavicular Joint Assessment: Not Indicated  Scapulothoracic Joint Assessment: Hypermobile., Asymmetrical lowering.  Glenohumeral Joint Assessment:Hypomobile.    Shoulder Special Tests   NT due to time contraints  Impingement Cluster Right (+/-) Left (+/-) Rotator Cuff Tests Right (+/-) Left (+/-)   Coats-Bereket   Drop Arm Sign     Painful Arc   Hornblowers     Infraspinatus Test   ERLS     AC Tests Right (+/-) Left (+/-) IRLS     Active Compression   Labral Tests Right (+/-) Left (+/-)   Crossbody Adduction   Biceps Load Test II     AC Resisted Extension   Jerk Test     GH Instability Tests Right (+/-) Left (+/-) Sharlene Test     Sulcus Sign   Biceps Tests Right (+/-) Left (+/-)   Apprehension   Speed     Relocation   Tammie burnett     Other:   Other:     Other:   Other:         Treatment Today    TREATMENT MINUTES COMMENTS   Evaluation 35 Cervical and (R) shoulder    Self-care/ Home management     Manual therapy     Neuromuscular Re-education     Therapeutic Activity     Therapeutic Exercises     Gait training     Modality__________________                Total 35    Blank areas are intentional and mean the treatment did not include these items.     PT Evaluation Code: (Please list  factors)  Patient History/Comorbidities: Hypertension, anxiety disorder, post-concussion syndrome, chronic pain due to trauma   Examination: Cervical and Thoracic region, (R) shoulder   Clinical Presentation: Symptoms appear to be evolving due to recent discovery of new nerve damage in the (R) UE and scapular region   Clinical Decision Making: Moderate     Patient History/  Comorbidities Examination  (body structures and functions, activity limitations, and/or participation restrictions) Clinical Presentation Clinical Decision Making (Complexity)   No documented Comorbidities or personal factors 1-2 Elements Stable and/or uncomplicated Low   1-2 documented comorbidities or personal factor 3 Elements Evolving clinical presentation with changing characteristics Moderate   3-4 documented comorbidities or personal factors 4 or more Unstable and unpredictable High                Jackeline Mosher  2/10/2017  11:16 AM

## 2021-06-08 NOTE — TELEPHONE ENCOUNTER
[place] APPROVED:    Approval start date: 05/20/2020  Approval end date:  05/20/2021    Pharmacy has been notified of approval and will contact patient when medication is ready for pickup.
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
## 2021-06-08 NOTE — TELEPHONE ENCOUNTER
Left pt detailed message that Dr. Cramer had prescribed baclofen (a muscle relaxer) on 5/5/20.  How is that working for pt?

## 2021-06-08 NOTE — PROGRESS NOTES
CHW sent [employer] message to patient today, plan to f/u with pt and schedule social work follow up phone call with CCC TAMI Milian and patient

## 2021-06-08 NOTE — PROGRESS NOTES
[facility] Daily Progress     Patient Name: Bola Lyon  Date: 2017  Visit #: 3  PTA visit #:  0  Referral Diagnosis: Right shoulder pain, cervicalgia   Referring provider: Abena Gentile MD  Visit Diagnosis:     ICD-10-CM    1. Cervicalgia M54.2    2. Decreased ROM of intervertebral discs of cervical spine M53.82    3. Chronic right shoulder pain M25.511     G89.29    4. Decreased ROM of right shoulder M25.611    5. Scapular dysfunction M89.9    6. Muscle weakness (generalized) M62.81          Assessment:     HEP/POC compliance is  good .  Patient is benefitting from skilled physical therapy and is making steady progress toward functional goals.  Patient is appropriate to continue with skilled physical therapy intervention, as indicated by initial plan of care.    Goal Status:  Pt. will demonstrate/verbalize independence in self-management of condition in : 6 weeks  Pt. will be independent with home exercise program in : 6 weeks  Patient Turn Head: for driving;for watching traffic;for conversation;with partial ROM;with less difficulty;in 6 weeks  Patient will reach / maintain arm movement: overhead;behind;for home chores;for dressing;with full ROM;with less difficulty;in 6 weeks  Patient will decrease : NDI score;SPADI score;by _ points;for improved quality of function;for improved quality of life;in 6 weeks  by ___ points: 5%, 10 points respectively     Plan / Patient Education:     Continue per POC with emphasis on manual therapy to improve mobility restrictions.  Trial KTape for (R) scapula stabilization and UT inhibition      Subjective:     Pain Ratin  Doing exercises regularly and without any questions there.  Finding manual therapy to be helpful.    Objective:     Several palpable releases felt with MFR to thoracic area.  Continued hypomobility noted with central PAs to mid thoracic spine.    Treatment Today     TREATMENT MINUTES COMMENTS   Evaluation     Self-care/ Home  "management     Manual therapy 27 -Prone MFR to posterior thoracic area sup>inferior and medial>lateral   -Prone grade II>III PAs to T5-T8 3x 30 reps each  -L sidelying: R scapulothoracic mobilizations superior<>inferior and medial<>lateral x12 reps each  -Supine SOR 60\" hold, 10\" rest   Neuromuscular Re-education     Therapeutic Activity     Therapeutic Exercises     Gait training     Modality__________________                Total 27    Blank areas are intentional and mean the treatment did not include these items.       John Tello  2/16/2017    "

## 2021-06-08 NOTE — PROGRESS NOTES
CHW sent MyChart today following up with status of f/u phone SW visit.  Currently patient has 1 goal around housing resources.    Patient may be due for reassessment  Next outreach: 6/12/2020

## 2021-06-08 NOTE — PROGRESS NOTES
"   [facility] Follow Up Note    Bola Lyon   46 y.o. male    Date of Visit: 1/9/2017    Chief Complaint   Patient presents with     Follow-up     Subjective  Bola comes in today to discuss his current situation.  He has been established now in the concussion clinic and it's thought that over his life, he has had somewhere between 3-6 concussions.  It's been slowly affect in his train of thought and's ability to function.  That, combined with his chronic left knee tear, his chronic right shoulder impingement, chronic neck pain with cervical radiculopathy at the, and worsening Bipolar disorder has now brought him to the conclusion that he is going to file for disability.  He is currently functioning on 7 oxycodone daily.  We did a urine test in 7 September.  He is planning on doing surgery in March.  He recently had an MRI scan of his brain but I do not have that result.    ROS A comprehensive review of systems was performed and was otherwise negative    Social History:   Social History     Social History Narrative    He is .  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  He smokes and does not drink alcohol.       Medications were reconciled.  Allergies, social and family history, and the problem list were all reviewed and updated.    Old records reviewed:  Please see above.    Exam  General Appearance: Pleasant and alert  Vitals:    01/09/17 1333   BP: 132/72   Pulse: 80   Resp: 16   Weight: 212 lb (96.2 kg)   Height: 5' 6.5\" (1.689 m)      Body mass index is 33.71 kg/(m^2).  Wt Readings from Last 3 Encounters:   01/09/17 212 lb (96.2 kg)   12/27/16 219 lb (99.3 kg)   12/21/16 220 lb (99.8 kg)     HEENT: Sclera are clear.   Lungs: Normal respirations  Abdomen: Soft and nondistended  Extremities: No edema  Skin: No rashes  Neuro: Moves all extremities and has facial symmetry  Gait: Ambulates with a normal " gait    Assessment/Plan  1. Post concussion syndrome  He is currently being having some neuropsych testing done and is pending and getting a .    2. Bipolar 1 disorder, mixed  He is planning to discuss with his psychiatrist the disability as well.    3. Unspecified tear of unspecified meniscus, current injury, left knee, sequela  He is going to be having surgery in March.    4. Biceps tendon rupture, right, sequela  He is can be just dealing with injections for now without further surgery planned.  Oxycodone prescription is renewed and after March and is planned surgery, will hopefully be able to slowly taper him off.      Abena Gentile MD  1/9/2017    Much or all of the text in this note was generated through the use of Dragon Dictate voice-to-text software. Errors in spelling or words which seem out of context are unintentional. Sound alike errors, in particular, may have escaped editing.

## 2021-06-08 NOTE — TELEPHONE ENCOUNTER
RN cannot approve Refill Request    RN can NOT refill this medication med is not covered by policy/route to provider     . Last office visit: 2/26/2020 Devon Lund MD Last Physical: Visit date not found Last MTM visit: Visit date not found Last visit same specialty: 2/26/2020 Devon Lund MD.  Next visit within 3 mo: Visit date not found  Next physical within 3 mo: Visit date not found      Mary Jo Ramirez, [facility] Triage/Med Refill 5/14/2020    Requested Prescriptions   Pending Prescriptions Disp Refills     montelukast (SINGULAIR) 10 mg tablet [Pharmacy Med Name: Montelukast Sodium 10 MG Oral Tablet] 180 tablet 3     Sig: Take 1 tablet by mouth twice daily       There is no refill protocol information for this order

## 2021-06-08 NOTE — PROGRESS NOTES
"   [facility] Follow Up Note    Bola Lyon   47 y.o. male    Date of Visit: 2/6/2017    Chief Complaint   Patient presents with     Medication Management     Follow-up     Subjective  Bola comes in today to discuss his ongoing issues.  He is being treated at the concussion clinic and does have post concussion syndrome.  He is going be applying for disability for this along with his multiple orthopedic injuries and his underlying bipolar disorder.  He is going to be having surgery next month on his knee and eventually may require more surgery on his right shoulder.  He changed pharmacies and for whatever reason [facility] only filled his pseudo-ephedrine for 20 days.  He needs a new prescription for this.  He continues to struggle with a lot of pain and now cannot get injections in his knee as he is can he be having surgery.  He would like to use up to 8 Percocet daily instead of the 7 that he is currently been getting.  He is also would like to explore the idea of medical marijuana in hopes of decreasing his narcotic dependence.    ROS A comprehensive review of systems was performed and was otherwise negative    Social History:   Social History     Social History Narrative    He is .  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  He smokes and does not drink alcohol.       Medications were reconciled.  Allergies, social and family history, and the problem list were all reviewed and updated.    Old records reviewed:  Recent MRI scan done, notes from the concussion clinic    Exam  General Appearance: Pleasant and alert  Vitals:    02/06/17 1115   BP: 132/78   Pulse: 84   Resp: 16   Weight: 214 lb (97.1 kg)   Height: 5' 6.5\" (1.689 m)      Body mass index is 34.02 kg/(m^2).  Wt Readings from Last 3 Encounters:   02/06/17 214 lb (97.1 kg)   01/09/17 212 lb (96.2 kg)   12/27/16 219 lb (99.3 kg)     HEENT: Sclera are clear.   Lungs: " Normal respirations  Abdomen: Soft and nondistended  Extremities: No edema  Skin: No rashes  Neuro: Moves all extremities and has facial symmetry  Gait: Ambulates with a normal gait    Assessment/Plan  1. Post concussion syndrome  Discussed this diagnosis.  He is going to continue to follow-up with them.    2. Shoulder impingement syndrome, right  This continues to be a source of pain.    3. Unspecified tear of unspecified meniscus, current injury, left knee, sequela  This continues to be a source of pain.    4. Chronic pain due to trauma  He fits into chronic pain syndrome due to multiple sources, we'll register him for medical marijuana and he will explore this further.    5. Cervicalgia  He like to do some physical therapy.  He has had previous surgery and feels there is scar tissue built up.  - Ambulatory referral to PT/OT      Abena Gentile MD  2/6/2017    Much or all of the text in this note was generated through the use of Dragon Dictate voice-to-text software. Errors in spelling or words which seem out of context are unintentional. Sound alike errors, in particular, may have escaped editing.

## 2021-06-08 NOTE — TELEPHONE ENCOUNTER
2020 Prior Authorization Request  Who's requesting PA: Pharmacy  Provider: Shoaib  Pharmacy Name, Location & Phone # : [facility] [phone]  Medication Name: diazePAM (VALIUM) 5 MG tablet  Quantity: 21  Days Supply: 7  Medication Instructions: Take 1 tablet (5 mg total) by mouth 3 (three) times a day as needed for other (muscle spasms).  Insurance Plan: [employer] [place] Medicare  Insurance Member ID # : [id number]  CoverMyMeds Key: [id number]  Informed patient that prior authorizations can take up to 10 business days for response: YES  Okay to leave a detailed message: YES    Route to: [facility] PA MED ([id number])

## 2021-06-08 NOTE — TELEPHONE ENCOUNTER
Who is calling:  Patient  Reason for Call:  Pt has a post concussion TBI and suffered a  recent injury hernaited disc on nerves so he is unable to type a DCITS message. Pt has an herniating L4 and L5 and something is out of alignment the injury is now causing bilateral arm pain and limitation specifically right arm.  Pt is experiencing around the clock pain it will spasm to the point where he looks like he has has arthritis or cerebral palsy. This gets him frightenend to where what if this proceeds to chest? Or when will it calm down.  This also affects legs and affects how he walks he is not in good shape its something that is serious and he just wants to make sure that he is not further damaging something that might be permanent, its kind of scary.  He knows you provider gave new nerve medication, but its not working and provider knows he doesnt complain it must be bad if he's bringing it up. Pt has an upcoming appointment at [facility] 05/27 with Dr Jj cronin the surgeon that did his neck fusion. He wants provider to know symptoms he's experiencing are enough to make him worried and it's been painful since the injury 1 month ago.  Please advice  Date of last appointment with primary care: N/A  Okay to leave a detailed message: Yes

## 2021-06-08 NOTE — TELEPHONE ENCOUNTER
So, I am assuming that the oxcarbazepine started last time had no effect on anything?  If it did not help pain or mood, he can stop it    Options to adjust current medication already taking:    We could increase amitriptyline from 25 mg at bedtime to 50 mg at bedtime    We can increase duloxetine from 90 mg daily to 120 mg daily    We could add a muscle relaxant like Flexeril or baclofen if he feels like there is muscle spasm    He can contact Dr. Cramer at the pain clinic, his primary pain specialist, to see if any other medications can be added or adjusted
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
## 2021-06-08 NOTE — PATIENT INSTRUCTIONS - HE
Plan:  We will contact Dr. Lund about ordering an MRI.    Increase ketamine to 4 sprays every 3-4 hours.    Change the oxycodone to 10 mg 5 times in a day due to this time a flareup with your back.  Follow-up with Dr. Cramer in several weeks.

## 2021-06-08 NOTE — TELEPHONE ENCOUNTER
Telephone call is quite distressed.  Significant muscle spasms.  Describes needs some rest.  Has tried baclofen up to 30 mg 3 times a day without benefit and made him nauseous.  Graph he does see his spine surgeons next week.    We discussed other options.  He has taken diazepam in the hospital.  He thinks it was helpful to calm his muscles.  Did not have any respiratory problems he was aware of presumably while on opioids.  We discussed the risk with the opioids.  He is to remains at work from home.  He will have a trial of the diazepam 5 mg every 8 hours as needed, informing his roommates he is taking the medicine in case there is any respiratory suppression.  We will discontinue the baclofen.  He will call in a few days with an update

## 2021-06-08 NOTE — TELEPHONE ENCOUNTER
Spoke with pt and relayed pcp message.      Pt reports the oxcarbazepine had no affect. Pt will stop it.    Pt agreeable to pcp's recommendations, would like to increase the amitriptyline and duloxetine.  Reports he feels liek he is having muscle spasms and would like to try muscle relaxer-appears from chart that Dr. Cramer prescribed baclofen on 5/5/20.  Pt unsure if he is taking this.  Will double check.     Pt will contact Dr. Cramer's office as well, to keep him informed and to further adjust any meds.

## 2021-06-08 NOTE — TELEPHONE ENCOUNTER
Central PA team  [phone]  Pool: [facility] PA MED ([id number])          PA has been initiated.       PA form completed and faxed insurance via Cover My Meds     Key:  [id number] - Rx #: [id number]     Medication:    diazePAM 5MG tablets    Insurance:  [employer] Part D        Response will be received via fax and may take up to 5-10 business days depending on plan

## 2021-06-08 NOTE — TELEPHONE ENCOUNTER
"Patient calls, request for a nurse to call back. \"it is difficult for me to relay my messages due to my TBI so if a nurse could just call me I would really appreciate it\"  Call back to patient: reviewed his recent injury of herniation of two discs, nerve damage thats related.  Patient reports ongoing issues with permanent nerve damage in legs and arms and this is worsening with recent herniation.  Patient reports that he was taking oxcarbazipine which has been stopped(by primary care) as was not effective, he explains increasing muscle spasms to the point of feeling constant tremors of the arms and legs. He reports his muscles are so fatigued from all of the spasms, he explains feelings of coldness so bad that has to get into warm water. He explains difficulty walking with spasms and fatigue. He reports taking up to 9 baclofen daily which still is not managing the spasms. He explains nausea from the constant muscle movement.  Patient is looking for some additional treatment for this, he reports this to be getting increasingly worse for at least a few weeks.  "

## 2021-06-08 NOTE — TELEPHONE ENCOUNTER
Approved 1 week refill until usual provider to address questions upon return.  Verified narcan has been sent to pharmacy due to combo of benzos and opioids.

## 2021-06-09 NOTE — PROGRESS NOTES
Persons accompanying you (the patient) today: Self     How have you been doing since we saw you last? Please note any concerns.  F/u appt from concussion clinic     Please list any surgeries or procedures you have had since we saw you last:  Will have knee replacement this coming up week.     Have you had any falls since your last visit? No    Do you have any pain today? Yes:     With whom do you currently reside? (alone, spouse, family, assisted living, nursing home)  With family in single family home.   If assisted living or nursing home, please note name and location of facility: same

## 2021-06-09 NOTE — PROGRESS NOTES
[facility]   Knee Initial Evaluation    Patient Name: Bola Lyon  Date of evaluation: 3/31/2017  Referral Diagnosis: Left knee replacement   Referring provider: Eliel Fitzgerald MD  Visit Diagnosis:     ICD-10-CM    1. Pain and swelling of left knee M25.562     M25.462    2. Muscle weakness (generalized) M62.81    3. Abnormality of gait R26.9    4. Left leg swelling M79.89    5. Status post total left knee replacement Z96.652        Assessment:      Impairments in  pain, posture, ROM, joint mobility, strength, motor function, ADL's, gait/locomotion and balance, swelling  Patient's signs and symptoms are consistent with left knee pain, decreased ROM, and decreased strength following a left total knee replacement on 3/20/17.  Barriers to achieving goals as noted in the assessment section may affect outcome.  Prognosis to achieve goals is  good   Skilled PT is required to decrease pain and improve left knee function for return to his functional activities   Pt. is appropriate for skilled PT intervention as outlined in the Plan of Care (POC).  Pt. is a good candidate for skilled PT services to improve pain levels and function.    Goals:  Pt. will demonstrate/verbalize independence in self-management of condition in : 6 weeks  Pt. will be independent with home exercise program in : 6 weeks  Pt. will be able to walk : 20 minutes;with FWB;with less pain;with less difficulty;for household mobility;for community mobility;in 6 weeks  Patient will ascend / descend: stairs;with railing;with recipricol gait;with less pain;with less difficulty;in 6 weeks  Patient [place]: for driving;for watching traffic;for conversation;with partial ROM;with less difficulty;in 6 weeks  Patient will reach / maintain arm movement: overhead;behind;for home chores;for dressing;with less pain;with less difficulty;in 6 weeks  Patient will decrease : NDI score;SPADI score;by _ points;for improved quality of function;for improved  quality of life;in 6 weeks  by ___ points: 5%, 10 points respectively   Pt will: demonstrate (L) knee ROM 0-110 degrees in 6 weeks     Patient's expectations/goals are realistic.    Barriers to Learning or Achieving Goals:  Co-morbidities or other medical factors.  Primary lymphedema resulting in increased swelling throughout (L) LE       Plan / Patient Instructions:      Plan of Care:   Communication with: Referral Source  Patient Related Instruction: Nature of Condition;Treatment plan and rationale;Self Care instruction;Basis of treatment;Body mechanics;Posture;Precautions;Next steps;Expected outcome  Times per Week: 1-2 times  Number of Weeks: 6 weeks  Number of Visits: up to 12 visits  Therapeutic Exercise: ROM;Stretching;Strengthening  Neuromuscular Reeducation: kinesio tape;posture;balance/proprioception  Manual Therapy: soft tissue mobilization;myofascial release;muscle energy;joint mobilization;lymphatic drainage massage  Modalities: electrical stimulation;TENS;cold pack;hot pack  Equipment: theraband    Plan for next visit: Adding (L) knee to current neck and shoulder POC. Will continue to focus on (L) knee ROM and strength. Use of manual therapy for swelling and pain control. Will also continue to address cervical and shoulder pain with exercises and manual therapy as needed      Subjective:        Social information:   Living Situation:Pt is currenlty living with his parents on the main floor. 5 steps into the home with 2 railings. Has assistance    Occupation:unemployed   Work Status:NA   Equipment Available: walker FWW    History of Present Illness:    Bola is a 47 y.o. male who presents to therapy today with complaints of left knee pain s/p left TKA on 3/20/17. Pt was just discharged on 3/29/17. He is currently staying with his parents. Pt reports pain has been significant since surgery. Pt reports pain around the knee from surgery but also pain in the thigh from the swelling. Pt reports significant  "diffculty with transfers, stairs, ambulation, dressing, and sleeping. Pain relief with ice, elevation, rest, and his pain medication. Pt reports he is also using compression bandaging and self-massage to help with his lymphedema. Pt's goal for therapy is to \"increase ROM and decrease pain and swelling\".     Pain Ratin  Pain rating at best: 7  Pain rating at worst: 9    Functional limitations are described as occurring with:   ascending and descending stairs or curbs  personal cares dressing lower body, donning shoes and socks and washing body  performing routine daily activities  sitting > 5 minutes  sleeping  standing > 2 minutes  transitional movements getting in  bed, chair, tub and car and getting out of  bed, chair, tub and car  walking > 5 minutes    Patient reports benefit from:  rest  , pain medication, cold, elevation        Objective:      Note: Items left blank indicates the item was not performed or not indicated at the time of the evaluation.      Knee Examination  1. Pain and swelling of left knee     2. Muscle weakness (generalized)     3. Abnormality of gait     4. Left leg swelling     5. Status post total left knee replacement       Involved Side: Left  Assistive Device: 2WW  Gait Observation: Slow. Pt not fully WB on (L) LE with decreased stance time.   Knee ROM:   Date: 3/31/2017      Knee ROM ( ) AROM in degrees AROM in degrees AROM in degrees    Right Left Right Left Right Left   Knee Flexion (0-130 ) WNL 58       Knee extension (0 ) WNL -7        PROM in degrees PROM in degrees PROM in degrees    Right Left Right Left Right Left   Knee Flexion (0-130 )         Knee extension (0 )           (L) LE visibly swollen throughout compared to (R). Did not measure right due to time constraints   Skin taut and shiny on (L) LE, pitting edema 2+    Date  3/31/2017   Side right left   Toe     10 cm from tip of second toe  24.6   20 cm from tip of second toe  25.6   30 cm from tip of second toe  32.1 "   40 cm from tip of second toe  43.5    50 cm from tip of second toe  43.7   60 cm from tip of second toe  50.3   70 cm from tip of second toe  58                       segment 1 estimated volume 0 [id number]   segment 2 estimated volume 0 [id number]   segment 3 estimated volume 0 [id number]   segment 4 estimated volume 0 [id number]   segment 5 estimated volume 0 [id number]   segment 6 estimated volume 0 [id number]        Total estimated volume 0 [phone]     Palpable (L) quad contraction during quad set. Unable to complete SLR without quad lag.       Treatment Today     TREATMENT MINUTES COMMENTS   Evaluation 15    Self-care/ Home management     Manual therapy     Neuromuscular Re-education     Therapeutic Activity     Therapeutic Exercises 25 educated patient about PT diagnosis, prognosis, POC, and HEP; see exercise flow sheet for HEP   Gait training     Modality__________________                Total 40    Blank areas are intentional and mean the treatment did not include these items.       PT Evaluation Code: (Please list factors)  Patient History/Comorbidities: Lymphedema, anxiety   Examination: Left knee   Clinical Presentation: Stable   Clinical Decision Making: Low     Patient History/  Comorbidities Examination  (body structures and functions, activity limitations, and/or participation restrictions) Clinical Presentation Clinical Decision Making (Complexity)   No documented Comorbidities or personal factors 1-2 Elements Stable and/or uncomplicated Low   1-2 documented comorbidities or personal factor 3 Elements Evolving clinical presentation with changing characteristics Moderate   3-4 documented comorbidities or personal factors 4 or more Unstable and unpredictable High              Jackeline M Nomi  3/31/2017  11:34 AM

## 2021-06-09 NOTE — PROGRESS NOTES
[facility] Daily Progress     Patient Name: Bola Lyon  Date: 4/5/2017  Visit #: 2  PTA visit #:  0  Referral Diagnosis: Left knee replacement   Referring provider: Eliel Fitzgerald MD  Visit Diagnosis:     ICD-10-CM    1. Pain and swelling of left knee M25.562     M25.462    2. Muscle weakness (generalized) M62.81    3. Abnormality of gait R26.9    4. Left leg swelling M79.89    5. Status post total left knee replacement Z96.652    6. Cervicalgia M54.2    7. Decreased ROM of intervertebral discs of cervical spine M53.82    8. Chronic right shoulder pain M25.511     G89.29    9. Decreased ROM of right shoulder M25.611    10. Scapular dysfunction M89.9          Assessment:     HEP/POC compliance is  good .  Response to Intervention Improved (L) knee flexion to 73 degrees today compared to 58 degrees at initial visit.   Patient is benefitting from skilled physical therapy and is making steady progress toward functional goals.  Patient is appropriate to continue with skilled physical therapy intervention, as indicated by initial plan of care.    Goal Status:  Pt. will demonstrate/verbalize independence in self-management of condition in : 6 weeks  Pt. will be independent with home exercise program in : 6 weeks  Pt. will be able to walk : 20 minutes;with FWB;with less pain;with less difficulty;for household mobility;for community mobility;in 6 weeks  Patient will ascend / descend: stairs;with railing;with recipricol gait;with less pain;with less difficulty;in 6 weeks  Patient Turn Head: for driving;for watching traffic;for conversation;with partial ROM;with less difficulty;in 6 weeks  Patient will reach / maintain arm movement: overhead;behind;for home chores;for dressing;with less pain;with less difficulty;in 6 weeks  Patient will decrease : NDI score;SPADI score;by _ points;for improved quality of function;for improved quality of life;in 6 weeks  by ___ points: 5%, 10 points respectively   Pt will:  demonstrate (L) knee ROM 0-110 degrees in 6 weeks     Plan / Patient Education:     Continue with initial plan of care.  Progress with home program as tolerated.  Try SLR next time, will also do some manual lymph drainage massage techniues to (L) knee to help with swelling     Subjective:     Pain Ratin, left knee   Pt reports he saw his surgeons PA this morning. Goes back for another follow up in 4 weeks.   Pt reports feels like his whole leg just feels really tight. Swelling in his lower leg feels better, but feels like the thigh just feels so tight and hard       Objective:   (L) knee flexion: 73 degrees  Added supine knee extension stretch on bolster and gastroc stretch in long sitting to HEP     Treatment Today     TREATMENT MINUTES COMMENTS   Evaluation     Self-care/ Home management     Manual therapy     Neuromuscular Re-education     Therapeutic Activity     Therapeutic Exercises 25 See exercise flow sheet    Gait training     Modality__________________                Total 25    Blank areas are intentional and mean the treatment did not include these items.       Jackeline Mosher  2017

## 2021-06-09 NOTE — PROGRESS NOTES
[facility] Daily Progress     Patient Name: Bola Lyon  Date: 3/2/2017  Visit #: 7  PTA visit #:  0  Referral Diagnosis: Right shoulder pain, cervicalgia   Referring provider: Abena Gentile MD  Visit Diagnosis:     ICD-10-CM    1. Cervicalgia M54.2    2. Decreased ROM of intervertebral discs of cervical spine M53.82    3. Chronic right shoulder pain M25.511     G89.29    4. Decreased ROM of right shoulder M25.611    5. Scapular dysfunction M89.9    6. Muscle weakness (generalized) M62.81          Assessment:     HEP/POC compliance is  good .  Pt reports relief of tension in his UT and (R) medial scapular area immediately after MT. Significant tightness of (R) pectoral region- will plan to spend more time addressing next visit   Patient is benefitting from skilled physical therapy and is making steady progress toward functional goals.  Patient is appropriate to continue with skilled physical therapy intervention, as indicated by initial plan of care.    Goal Status:  Pt. will demonstrate/verbalize independence in self-management of condition in : 6 weeks  Pt. will be independent with home exercise program in : 6 weeks  Patient Turn Head: for driving;for watching traffic;for conversation;with partial ROM;with less difficulty;in 6 weeks  Patient will reach / maintain arm movement: overhead;behind;for home chores;for dressing;with full ROM;with less difficulty;in 6 weeks  Patient will decrease : NDI score;SPADI score;by _ points;for improved quality of function;for improved quality of life;in 6 weeks  by ___ points: 5%, 10 points respectively     Plan / Patient Education:     Continue per POC with emphasis on manual therapy to improve mobility restrictions. Review pectoral stretches next visit. May add cat/cow pose and S/L reach and roll     Subjective:     Pain Ratin  Pt reports his pain has been really bad in the right upper trap and shoulder blade region. Pt reports increased nerve pain  into both arm. Significant trouble sleeping with only a few hours of sleep over the last couple of nights.     Objective:     Several palpable trigger points with spasm along (R) UT. Palpable release during DTM and TPR.   Single trigger point with spasm noted on the superior/medial border of the (R) scapula. Good tolerance to TPR with decreased spasm and tenderness afterwards       Treatment Today     TREATMENT MINUTES COMMENTS   Evaluation     Self-care/ Home management     Manual therapy 20  -STM followed by deep tissue mobilizations and TPR to (R) UT and levator   -TPR to medial border of (R) scapula       Neuromuscular Re-education     Therapeutic Activity     Therapeutic Exercises 5 Added (B) median nerve glide to HEP    Gait training     Modality__________________                Total 25    Blank areas are intentional and mean the treatment did not include these items.       Jackeline Mosher  3/2/2017

## 2021-06-09 NOTE — PROGRESS NOTES
"Speech Language/Pathology  Speech Therapy Outpatient Daily Visit Note    oBla Lyon  1970     [id number]    Session 6 of 6  Interventions provided during this session:speech/language 50 minutes    Subjective  Patient presents as engaged during this session.  An  was not required  for this session.  Patient reports chronic pain.  Pt also newly reported today that he is experiencing some tinnitus. Encouraged him to see an ENT, if it is not bearable, but did tell him that it is relatively common in brain injury.     Objective  Structure: Reviewed developing a structured time of day/week for both cognitive practice and pt's other goals (e.g. Spiritual reading). He indicated he also getting some help from his  to identify and structure his readings (e.g. The order).     Discussed several symptoms pt is experiencing and attempted to identify which are related to anxiety and which may represent cognitive deficits. Had discussed breathing exercises last session and pt mentioned this to his psycho-therapist and she gave pt several handouts with background and direction. We reviewed these.     Information processing: Pt reports that his parents sometimes ask open-ended ?s or seek clarification when pt is talking and he identified this as a source of frustration (which, can lead to irritability or outbursts for him). We discussed way to explain to his parents that giving more limited options (e.g. Options of two, more leading ?s) may be helpful. He reports he has already talked to them about answering more directly (e.g. Straight \"yes\" or \"no\").     V/E: Reviewed word-finding strategies previously discussed, especially \"being concise\" (e.g. Getting to the point quickly) and the social implications of tangential or press of speech.     Assessment  Goals met    Plan    Discontinue speech therapy due to goals met        "

## 2021-06-09 NOTE — PROGRESS NOTES
"[facility] Follow Up Note    Bola Lyon   47 y.o. male    Date of Visit: 4/3/2017    Chief Complaint   Patient presents with     Medication Management     Subjective  Bola comes in today after he had a left total knee replacement last month.  His surgery did go well and he did spend some time in the inpatient rehab facility there at [facility] in [place].  He has seen Dr. Vásquez due to his lymphedema and is finishing a course of antibiotics.  They started him on MS Contin 15 mg 3 times daily in addition to his oxycodone for which he is taking between 10-12 tablets daily.  He now needs to get his pain medications from me.  The goal is to eventually get him off of chronic pain medications now that he has had his surgeries performed.  They gave him some lorazepam due to panic attacks and he does not wish to take this long-term.    ROS A comprehensive review of systems was performed and was otherwise negative    Social History:   Social History     Social History Narrative    He is .  He has been a  and also a counselor, and worked with [employer].  He smokes and does not drink alcohol.       Medications were reconciled.  Allergies, social and family history, and the problem list were all reviewed and updated.      Exam  General Appearance: Pleasant and alert  Vitals:    04/03/17 1343   BP: 120/80   Pulse: 80   Resp: 14   Weight: (!) 232 lb (105.2 kg)   Height: 5' 6.5\" (1.689 m)      Body mass index is 36.88 kg/(m^2).  Wt Readings from Last 3 Encounters:   04/03/17 (!) 232 lb (105.2 kg)   03/13/17 (!) 225 lb (102.1 kg)   03/06/17 (!) 224 lb (101.6 kg)     HEENT: Sclera are clear.   Lungs: Normal respirations  Abdomen: Soft and nondistended  Extremities: Left leg is swollen compared to the right, discoloration of the left foot due to reabsorbing blood  Skin: No rashes  Neuro: Moves all extremities and has facial symmetry  Gait: Ambulates with a " normal gait    Assessment/Plan  1. Status post total left knee replacement  Doing well after knee replacement.  Will have him go to OxyContin 20 mg every 8 hours instead of the MS Contin and continue with oxycodone as needed.  We will slowly wean him from these medications over the next few months.    2. PTSD (post-traumatic stress disorder)  Try him on BuSpar instead of lorazepam.    3. Traumatic brain injury with loss of consciousness, unspecified duration, sequela  Is working with the brain injury clinic.      Abena Gentile MD  4/3/2017    Much or all of the text in this note was generated through the use of Dragon Dictate voice-to-text software. Errors in spelling or words which seem out of context are unintentional. Sound alike errors, in particular, may have escaped editing.

## 2021-06-09 NOTE — PROGRESS NOTES
[facility] Outpatient Consultation    Assessment / Impression:   1.  Cognitive disorder NOS  2.  History of multiple closed head injuries secondary to participation in football  3.  Bipolar 1 disorder  4.  Generalized anxiety disorder  5.  Panic disorder without agoraphobia  6.  Alcohol dependence currently in remission  7.  PTSD  8.  Primary lymphedema  9.  Chondromalacia patella  10.  History of meniscal injury  11.  Impingement of right shoulder  12.  Rupture of proximal bicipital tendon of left arm  13.  Spinal stenosis and cervical  radiculopathy, status post cervical reconstruction in 2014 with C4 and C5 corpectomies, decompression of C5-C6, C4 through C7 and spinal fusion  14.  History of chronic pain  15.  Status post multiple orthopedic procedures secondary to injury sustained secondary to football participation including ankle arthrodesis, nasal polyp surgery, knee arthroscopic, bilateral hernia repair, shoulder arthroscopy, distal clavicle excision and open rotator cuff repair bilaterally, left hand surgery   16.  DJD  17.  Hypertension  18.  Pseudobulbar affect per medical record  19.  History of prior tobacco abuse      Plan:   1.  Repeat neuropsychometric testing in one year  2.  Continue intensive mental health treatment  3.  Consider further diagnostic testing depending on clinical circumstances    A long conversation was held with the patient regarding his multiple concerns today.  I explained to the patient that his neuropsychometric testing was largely invalid secondary to variable effort.  Certainly, he performed quite well in a number of cognitive domains.  His history exam and results of neuropsychometric testing are a bit more suggestive of a psychiatric basis for many of his concerns.  During the course of evaluation I developed the distinct impression that many of the patient's personality traits and struggles with anxiety and mood are, in fact, bearing on his current clinical  "condition.  I did, however, mention to the patient that the exact interaction between primary mental illness and traumatic brain injury is unknown and that it is quite likely that we will gain clarity in time regarding the exact etiologies of his cognitive concerns.  In the meantime, it is imperative that he continue to intensively manage his mental health challenges.  In addition, continuing a wellness program is of utmost importance as well.  I did discuss with the patient the option of additional diagnostic testing however I believe that the utility of such tests given what we currently know about the patient is questionable and therefore likely not advisable.  Certainly all of this could change depending on the patient's clinical circumstances moving forward.    With respect to the patient's application for Social Security disability, I advised that it would likely be more appropriate to approach the matter of disability on a mental health basis as opposed to an acquired cognitive basis.    Total time for this evaluation was 1 hour with the majority of time spent in counseling.  All questions answered.    Subjective:   HPI: Bola Lyon is a 47 y.o. male with above-noted past medical history who presents for further cognitive evaluation.  I would refer the reader to multiple prior notes regarding the history of present illness.  In brief, the patient reports having developed over the past 2 years subjective changes in cognition that he is concerned may be related to multiple concussions he sustained during his years playing football.  He proved to be a difficult historian but he would appear that he believes his cognitive capabilities proved to be variable over time.  He reports \"becoming more repetitive\" in speech and having \"robotic\" speech.  It is unclear whether these problems are perceived as worsening over time.  It is also unclear whether there is any other concern that the patient presents with other " "than understanding the nature of these perceived changes.  I specifically asked the patient whether this visit and his interest in being seen in our clinic had anything to do with his application for disability.  To this he initially responded \"no\" but later seemed to indicate that this evaluation may be, on his part, sought out to assist in disability application.    Past Medical History:   As above    Social History:   As noted previously.  Born in [place] the patient completed a high school education.  He describes himself as a very focused and determined student and athlete.  He apparently spent time in [facility] following an assault charge prior to entering college.  He did play football at [employer] during which he sustained multiple concussions.  He reports having achieved and maintained the record for the most caries (58 to be exact) during a single game.  He suffered significant trauma as a result of his sports participation resulting in a need for multiple orthopedic procedures.  He graduated in 1994.    Following college, the patient worked as an athletic  an admissions counselor.  By the late 1990s he was experiencing increasing levels of anxiety and possibly depression.  Alcohol use became more frequent resulting in at least 2 DWIs.  A brief marriage I believe ended in divorce.  The patient entered chemical dependency treatment.  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  The patient has been unemployed over the past 2 years during which time he is undergone a number of surgeries including very extensive cervical surgery as outlined above.  At this point in time I believe he is attempting to obtain Social Security disability on a mental health basis.    Past Psychiatric History:   The patient denies lifelong mental health problems however it would appear that difficulties with dental health may have surfaced during adolescence.  These problems have been more actively " treated since graduating from college.  The patient is currently seeing a psychotherapist and psychiatrist with diagnoses as listed above.  He is quite proactive in seeking out help for these challenges.  He remains sober.    Family History:   Remarkable for manic depressive illness in the patient's mother and maternal grandmother.  There is also a history of alcoholism in her grandfather    Review of Systems:  Pertinent items are noted in HPI.    Objective:   The patient is a well-developed middle-aged male who appears in no acute physical distress.  I note the patient to be intensely focused imprecise in his use of language during the course of interview.    Vitals:    03/13/17 1505 03/13/17 1506 03/13/17 1507   BP: (!) 153/91 (!) 150/97 151/90   Pulse: 82 82 83   Weight: (!) 225 lb (102.1 kg)       Physical Exam:    Largely deferred.  The patient demonstrates no evidence of acute physical distress.    Neurological Exam:     Largely deferred however no gross focality noted at this time.  The patient demonstrates no evidence of focal weakness or incoordination.  I note no evidence of significant cranial nerve abnormalities, peripheral tonal abnormalities.  No tremors are noted.  No fasciculations.    Cognitive Evaluation:     The patient was neatly groomed casually dressed and on time for his evaluation.  Despite reports noted above I noted no clear evidence of difficulties with speech or language.  Thoughts appear to generate fairly normally and I noted the patient to demonstrate at least functional attentional capabilities during the course of exam.  Many of his responses to questions appeared overly deliberate and precise and I noted rather intense focus during the course of evaluation.  No fluctuation in level of consciousness or distractibility.  The patient notes his mood to be under good control and he denies significant anxiety at this time.  I noted no unusual ideation.  He denies delusions as well as  hallucinations.  No unusual or more unusual speech or motor activity.    Medications:  Scheduled Meds:  Continuous Infusions:  PRN Meds:.    Evangelist Almanzar MD  3/13/2017

## 2021-06-09 NOTE — PROGRESS NOTES
CHW sent patient Mychart message checking in.  Plan: schedule re-assessment    Next outreach within 2 weeks= 7/13/2020

## 2021-06-09 NOTE — PROGRESS NOTES
[facility] Daily Progress     Patient Name: Bola Lyon  Date: 2017  Visit #: 6  PTA visit #:  0  Referral Diagnosis: Right shoulder pain, cervicalgia   Referring provider: Abena Gentile MD  Visit Diagnosis:     ICD-10-CM    1. Cervicalgia M54.2    2. Decreased ROM of intervertebral discs of cervical spine M53.82    3. Chronic right shoulder pain M25.511     G89.29    4. Decreased ROM of right shoulder M25.611    5. Scapular dysfunction M89.9    6. Muscle weakness (generalized) M62.81          Assessment:     HEP/POC compliance is  good .  Pt reports relief of tension in his UT and (R) medial scapular area immediately after MT. Significant tightness of (R) pectoral region- will plan to spend more time addressing next visit   Patient is benefitting from skilled physical therapy and is making steady progress toward functional goals.  Patient is appropriate to continue with skilled physical therapy intervention, as indicated by initial plan of care.    Goal Status:  Pt. will demonstrate/verbalize independence in self-management of condition in : 6 weeks  Pt. will be independent with home exercise program in : 6 weeks  Patient Turn Head: for driving;for watching traffic;for conversation;with partial ROM;with less difficulty;in 6 weeks  Patient will reach / maintain arm movement: overhead;behind;for home chores;for dressing;with full ROM;with less difficulty;in 6 weeks  Patient will decrease : NDI score;SPADI score;by _ points;for improved quality of function;for improved quality of life;in 6 weeks  by ___ points: 5%, 10 points respectively     Plan / Patient Education:     Continue per POC with emphasis on manual therapy to improve mobility restrictions. Review pectoral stretches next visit. May add cat/cow pose and S/L reach and roll     Subjective:     Pain Ratin  Pt reports just overall soreness today in the right upper trap, shoulder blade region, and pectoral/biceps area.      Objective:     Several palpable trigger points with spasm along (B) UT and levator. Palpable release during DTM and TPR.   Single trigger point with spasm noted on the medial boarder of the (R) scapula. Good tolerance to TPR with decreased spasm and tenderness afterwards       Treatment Today     TREATMENT MINUTES COMMENTS   Evaluation     Self-care/ Home management     Manual therapy 25  -STM followed by deep tissue mobilizations and TPR to (R) UT and levator   -TPR to medial border of (R) scapula   -Horizontal MFR to pectorals in supine   -Long axis distraction to (R) UE at 120 degrees flexion with MFR to (R) pectorals    Neuromuscular Re-education     Therapeutic Activity     Therapeutic Exercises      Gait training     Modality__________________                Total 25    Blank areas are intentional and mean the treatment did not include these items.       Jackeline Mosher  2/27/2017

## 2021-06-09 NOTE — PROGRESS NOTES
[facility] Daily Progress     Patient Name: Bola Lyon  Date: 3/14/2017  Visit #: 9  PTA visit #:  0  Referral Diagnosis: Right shoulder pain, cervicalgia   Referring provider: Abena Gentile MD  Visit Diagnosis:     ICD-10-CM    1. Cervicalgia M54.2    2. Decreased ROM of intervertebral discs of cervical spine M53.82    3. Chronic right shoulder pain M25.511     G89.29    4. Decreased ROM of right shoulder M25.611    5. Scapular dysfunction M89.9    6. Muscle weakness (generalized) M62.81          Assessment:     HEP/POC compliance is  good .  Overall limited progress since starting therapy. Pt presenting today with less pain compared to previous session. Reports overall improvement in pain around his right scapular region. Continues to demonstrate significant tenderness and trigger points along the (R) UT, rhomboids, and (R) pectoral area    Patient is benefitting from skilled physical therapy and is making steady progress toward functional goals.  Patient is appropriate to continue with skilled physical therapy intervention, as indicated by initial plan of care.    Goal Status:  Pt. will demonstrate/verbalize independence in self-management of condition in : 6 weeks  Pt. will be independent with home exercise program in : 6 weeks  Patient Turn Head: for driving;for watching traffic;for conversation;with partial ROM;with less difficulty;in 6 weeks  Patient will reach / maintain arm movement: overhead;behind;for home chores;for dressing;with full ROM;with less difficulty;in 6 weeks  Patient will decrease : NDI score;SPADI score;by _ points;for improved quality of function;for improved quality of life;in 6 weeks  by ___ points: 5%, 10 points respectively     Plan / Patient Education:     Continue per POC with emphasis on manual therapy to improve mobility restrictions.   Reassess progress NV       Subjective:     Pain Ratin   Pt reports the area around his right shoulder blade feels okay,  not worse. Pt reports some pain around the right upper trap area. Pt reports his right pec area feels more agitated than normal. Pt feels like maybe he has been overdoing some of his exercises at home.      Objective:     Significant TTP to (R) pectoral major tendon with palpable fibrosis and scar tissue. Mild softening after MFR and muscle play to pectoralis major  Significant TTP to (R) rhomboids along medial border of scapula with palpable trigger points and visible spasm. Palpable release with TPR       Treatment Today     TREATMENT MINUTES COMMENTS   Evaluation     Self-care/ Home management     Manual therapy 20  -MFR to (R) pectoral followed by muscle play and TPR to pectoral major tendon    -TPR to medial border of (R) scapula       Neuromuscular Re-education 5 KTape to (R) scapula. Twp I strips starting at inferior border of scapula to shoulder    Therapeutic Activity     Therapeutic Exercises      Gait training     Modality__________________                Total 25    Blank areas are intentional and mean the treatment did not include these items.       Jackeline Mosher  3/14/2017

## 2021-06-09 NOTE — PROGRESS NOTES
Preoperative Consultation    Bola Lyon   47 y.o.  male    Date of visit: 3/7/2017    This is a preoperative consultation requested by Dr. Zheng in preparation for knee replacement surgery on March 20 at [facility].    HPI:  Bola has been having chronic problems with left knee pain and has failed conservative management.  He has end-stage arthritis and is now scheduled for a left knee replacement.  He is on chronic narcotics because of his pain.  He also needs to have shoulder surgery eventually which contributes to his chronic pain.  We had discussed medical cannabis and he still has not gotten a hold of the Department of Health to pursue this although he has been certified to have chronic pain.  He has been working with brain injured clinic at [facility] and has learned he has a pseudobulbar affect and a moderate TBI.  He is planning on quitting smoking and would like to have nicotine patches.  He has chronic lymphedema in the past has required antibiotics prior to surgery to avoid cellulitis.      Review of systems:  A comprehensive review of systems was performed and was otherwise negative    No Known Allergies    Recent anticoagulant use: no    Personal or family history of clotting disorder: no    Prolonged steroid use in the past year: no    Past difficulty with anesthesia:  no    Family history of difficulty with anesthesia: no    Current cardiopulmonary symptoms: no    Patient Active Problem List   Diagnosis     Anxiety Disorder NOS     Shoulder impingement syndrome, right     Hypertension     Cervical Radiculopathy     Chondromalacia of patella     Acquired lymphedema of leg     Ilioinguinal neuralgia     Lymphedema     Biceps tendon rupture     Unspecified tear of unspecified meniscus, current injury, left knee, sequela     AC (acromioclavicular) arthritis     Synovitis of knee     PTSD (post-traumatic stress disorder)     Post concussion syndrome     Bipolar 1 disorder, mixed      Chronic pain due to trauma     Traumatic brain injury with loss of consciousness       Past Surgical History:   Procedure Laterality Date     ANTERIOR / POSTERIOR COMBINED FUSION CERVICAL SPINE  2013, redo in 2014     HAND SURGERY Left 1997     HERNIA REPAIR Left 2006     HERNIA REPAIR Right 2008    and middle     KNEE ARTHROSCOPY  2014     NASAL POLYP SURGERY  2014     MD ARTHRODESIS,ANKLE,OPEN  2007    Description: Ankle Arthrodesis Right;  Recorded: 02/24/2012;     MD SHLDR ARTHROSCOP,SURG,W/ROTAT CUFF REPR Right 11/25/2015    Procedure: RIGHT SHOULDER ARTHROSCOPY, ROTATOR CUFF REPAIR, DECOMPRESSION, DEBRIDEMENT, DISTAL CLAVICLE EXCISION;  Surgeon: Pilo Bruce MD;  Location: [facility];  Service: Orthopedics     SHOULDER ARTHROSCOPY DISTAL CLAVICLE EXCISION AND OPEN ROTATOR CUFF REPAIR Bilateral 2005 and 2013     Family History   Problem Relation Age of Onset     Hypertension Father      Lymphoma Father      Bipolar disorder Mother      Diabetes Maternal Grandmother      Diabetes Maternal Grandfather      Colon cancer Paternal Grandmother      Diabetes Paternal Grandmother      Diabetes Paternal Grandfather      Anesthesia problems Neg Hx      Social History   Substance Use Topics     Smoking status: Current Every Day Smoker     Packs/day: 0.50     Years: 6.00     Types: Cigarettes     Start date: 8/20/2009     Smokeless tobacco: Former User     Alcohol use No     Social History     Social History Narrative    He is .  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  He smokes and does not drink alcohol.     Current Medications:  Current Outpatient Prescriptions   Medication Sig     amLODIPine (NORVASC) 10 MG tablet TAKE 1 TABLET (10 MG TOTAL) BY MOUTH DAILY.     calcium carbonate-vitamin D3 (CALTRATE 600 PLUS D3) 600 mg(1,500mg) -400 unit per tablet Take 3 tablets by mouth daily.     cetirizine (ZYRTEC) 10 MG tablet Take 1 tablet (10  "mg total) by mouth daily.     divalproex (DEPAKOTE) 500 MG 24 hr tablet Take 500 mg by mouth 2 (two) times a day.      fluticasone (FLONASE) 50 mcg/actuation nasal spray 2 SPRAYS EACH NOSTROL ONCE DAILY     gabapentin (NEURONTIN) 400 MG capsule Take 1,600 mg by mouth daily.     hydrOXYzine (ATARAX) 25 MG tablet Take 1 tablet (25 mg total) by mouth 4 (four) times a day.     ketoconazole (NIZORAL) 2 % cream Apply topically 2 (two) times a day.     losartan (COZAAR) 50 MG tablet TAKE 1 TABLET (50 MG TOTAL) BY MOUTH DAILY.     meloxicam (MOBIC) 15 MG tablet TAKE 1 TABLET (15 MG TOTAL) BY MOUTH DAILY.     metoprolol succinate (TOPROL-XL) 100 MG 24 hr tablet TAKE 2 TABLETS (200 MG TOTAL) BY MOUTH DAILY.     nystatin (MYCOSTATIN) powder Apply to affected area 3 times daily     omeprazole (PRILOSEC) 40 MG capsule TAKE 1 CAPSULE (40 MG TOTAL) BY MOUTH DAILY.     oxyCODONE (ROXICODONE) 5 MG immediate release tablet Take 1-2 tablets (5-10 mg total) by mouth every 4 (four) hours as needed for pain.     pseudoephedrine (SUDAFED) 120 mg 12 hr tablet Take 1 tablet (120 mg total) by mouth 2 (two) times a day as needed for congestion.     QUEtiapine (SEROQUEL) 200 MG tablet Take 200 mg by mouth bedtime.     QUEtiapine (SEROQUEL) 50 MG tablet Take 50 mg by mouth 4 (four) times a day.     THEREMS-M 27-0.4 mg Tab 1 daily     traZODone (DESYREL) 50 MG tablet Take  mg by mouth bedtime as needed.      lidocaine (LIDODERM) 5 % 1-3 patches daily     nicotine (NICODERM CQ) 14 mg/24 hr Place 1 patch on the skin daily.       Physical Exam:    General Appearance:   Pleasant and alert    Vitals:    03/06/17 1043   BP: 136/70   Pulse: 68   Resp: 10   Weight: (!) 224 lb (101.6 kg)   Height: 5' 6.5\" (1.689 m)     Body mass index is 35.61 kg/(m^2).    EYES: Eyelids, conjunctiva, and sclera were normal. Pupils were normal.   HEAD, EARS, NOSE, MOUTH, AND THROAT: Head is atraumatic, ears and canals are normal with normal tympanic membranes.  Nose " mouth and throat are without lesions.  NECK: Neck appearance was normal. There were no neck masses and the thyroid was not enlarged.  RESPIRATORY: Normal respirations.  Lung sounds were equal bilaterally.  CARDIOVASCULAR: Heart rate and rhythm were normal.  S1 and S2 were normal and there were no extra sounds or murmurs. There was no peripheral edema.  GASTROINTESTINAL: The abdomen was soft and nondistended.     MUSCULOSKELETAL: Skeletal configuration was normal and muscle mass was normal for age. Joint appearance was overall normal.  LYMPHATIC: There were no enlarged nodes palpable.  SKIN/HAIR/NAILS: Skin color was normal.  No rashes.  NEUROLOGIC: The patient was alert and oriented.  Speech was normal.  There is no facial asymmetry.   PSYCHIATRIC:  Mood and affect were normal.       Results for orders placed or performed in visit on 03/06/17   Basic Metabolic Panel   Result Value Ref Range    Sodium 137 136 - 145 mmol/L    Potassium 3.9 3.5 - 5.0 mmol/L    Chloride 100 98 - 107 mmol/L    CO2 28 22 - 31 mmol/L    Anion Gap, Calculation 9 5 - 18 mmol/L    Glucose 95 70 - 125 mg/dL    Calcium 9.5 8.5 - 10.5 mg/dL    BUN 12 8 - 22 mg/dL    Creatinine 0.92 0.70 - 1.30 mg/dL    GFR MDRD Af Amer >60 >60 mL/min/1.73m2    GFR MDRD Non Af Amer >60 >60 mL/min/1.73m2   HM2(CBC w/o Differential)   Result Value Ref Range    WBC 8.4 4.0 - 11.0 thou/uL    RBC 4.69 4.40 - 6.20 mill/uL    Hemoglobin 13.8 (L) 14.0 - 18.0 g/dL    Hematocrit 40.2 40.0 - 54.0 %    MCV 86 80 - 100 fL    MCH 29.5 27.0 - 34.0 pg    MCHC 34.4 32.0 - 36.0 g/dL    RDW 11.9 11.0 - 14.5 %    Platelets 282 140 - 440 thou/uL    MPV 7.0 7.0 - 10.0 fL       Assessment and Plan:  Bola Lyon was seen in preoperative consultation in preparation for replacement surgery.  This is a intermediate risk surgery and the patient has no increased risk for major cardiac complications based on exam and history and appears to be medically stable and an acceptable candidate  for the proposed surgery/procedure with appropriate anesthesia.    I have recommended the following medication adjustments preoperatively:    Patient Instructions     Hold losartan the AM of surgery, take rest of your prescription medications.    Hold supplements, NSAIDs for 1 week prior to surgery.    Ask Dr. Vásquez about antibiotics for lymphedema prior to surgery.      Also discussed during this visit:    1. Preop exam for internal medicine  He may need preoperative antibiotics, will ask Dr. Vásquez regarding his lymphedema and prophylactic recommendations.  - Basic Metabolic Panel  - HM2(CBC w/o Differential)    2. Traumatic brain injury with loss of consciousness, unspecified duration, sequela  Continues to follow at [place].    3. Chondromalacia of patella, unspecified laterality  End-stage arthritis as well, knee replacement surgery should help relieve a lot of his pain issues.    4. Lymphedema  As above.        Abena Gentile MD  Internal Medicine  [facility]  Contact me at [phone]    Much or all of the text in this note was generated through the use of Dragon Dictate voice-to-text software. Errors in spelling or words which seem out of context are unintentional. Sound alike errors, in particular, may have escaped editing.

## 2021-06-09 NOTE — PROGRESS NOTES
[facility] Daily Progress     Patient Name: Bola Lyon  Date: 3/16/2017  Visit #: 10  PTA visit #:  0  Referral Diagnosis: Right shoulder pain, cervicalgia   Referring provider: Abena Gentile MD  Visit Diagnosis:     ICD-10-CM    1. Cervicalgia M54.2    2. Decreased ROM of intervertebral discs of cervical spine M53.82    3. Chronic right shoulder pain M25.511     G89.29    4. Decreased ROM of right shoulder M25.611    5. Scapular dysfunction M89.9    6. Muscle weakness (generalized) M62.81          Assessment:     HEP/POC compliance is  good .  Improved cervical and shoulder ROM since starting therapy. Significant improvement in NDI score, minimal improvement in SPADI score.   Patient is benefitting from skilled physical therapy and is making steady progress toward functional goals.  Patient is appropriate to continue with skilled physical therapy intervention, as indicated by initial plan of care.    Goal Status:  Pt. will demonstrate/verbalize independence in self-management of condition in : 6 weeks  Pt. will be independent with home exercise program in : 6 weeks  Patient Turn [place]: for driving;for watching traffic;for conversation;with partial ROM;with less difficulty;in 6 weeks  Patient will reach / maintain arm movement: overhead;behind;for home chores;for dressing;with full ROM;with less difficulty;in 6 weeks  Patient will decrease : NDI score;SPADI score;by _ points;for improved quality of function;for improved quality of life;in 6 weeks  by ___ points: 5%, 10 points respectively     Plan / Patient Education:     Pt is scheduled for a left TKA on 3/20/17. Pt will return to the clinic in 2-3 weeks to continue POC for his shoulder and neck and begin therapy for his left knee.       Subjective:     Pain Ratin   Pt reports he feels like the most progress has been made around his right scapular region. Pt reports his right upper trap continues to feel very tight and limited his range  of motion.     Shoulder Pain and Disability Index (SPADI) in %: 51.5 (56.9% at initial evaluation)   Neck Disability Score in %: 52 (70% at initial evaluation)        Objective:   Cervical ROM  Date: 2/10/2017 3/16/2017    *Indicate scale AROM AROM AROM   Cervical Flexion 27 25    Cervical Extension 20 25     Right Left Right Left Right Left   Cervical Sidebending 10 15 15 17     Cervical Rotation 25 36 40 35     Cervical Protraction      Cervical Retraction      Thoracic Flexion      Thoracic Extension      Thoracic Sidebending         Thoracic Rotation             Shoulder/Elbow ROM    Date: 2/10/2017 3/16/2017    Shoulder and Elbow ROM ( )   AROM in degrees AROM in degrees AROM in degrees    Right Left Right Left Right Left   Shoulder Flexion (0-180 ) 150 with pain t/o 150 166 with pain t/o 166     Shoulder Abduction (0-180 ) 170 with pain t/o 170 140 with pain t/o 140     Shoulder Extension (0-60 )         Shoulder ER (0-90 )         Shoulder IR (0-70 ) Thumb to (R) buttock   Same      Shoulder IR/Ext         Elbow Flexion (150 )         Elbow Extension (0 )          PROM in degrees PROM in degrees PROM in degrees    Right Left Right Left Right Left   Shoulder Flexion (0-180 )         Shoulder Abduction (0-180 )         Shoulder Extension (0-60 )         Shoulder ER (0-90 )         Shoulder IR (0-70 )         Elbow Flexion (150 )         Elbow Extension (0 )               Treatment Today     TREATMENT MINUTES COMMENTS   Evaluation     Self-care/ Home management     Manual therapy 10 STM and TPR to (R) UT and levator    Neuromuscular Re-education      Therapeutic Activity     Therapeutic Exercises 13 ROM and outcome measure reassessment    Gait training     Modality__________________                Total 23    Blank areas are intentional and mean the treatment did not include these items.       Jackeline Mosher  3/16/2017

## 2021-06-09 NOTE — PROGRESS NOTES
[facility] Daily Progress     Patient Name: Bola Lyon  Date: 3/6/2017  Visit #: 8  PTA visit #:  0  Referral Diagnosis: Right shoulder pain, cervicalgia   Referring provider: Abena Gentile MD  Visit Diagnosis:     ICD-10-CM    1. Cervicalgia M54.2    2. Decreased ROM of intervertebral discs of cervical spine M53.82    3. Chronic right shoulder pain M25.511     G89.29    4. Decreased ROM of right shoulder M25.611    5. Scapular dysfunction M89.9    6. Muscle weakness (generalized) M62.81          Assessment:     HEP/POC compliance is  good .  Significant tenderness and fibrosis/scarring of (R) pectoral major tendon. Pt reports moderate relief of pain after MT to area. Also palpable release of (R) rhomboids with MT   Patient is benefitting from skilled physical therapy and is making steady progress toward functional goals.  Patient is appropriate to continue with skilled physical therapy intervention, as indicated by initial plan of care.    Goal Status:  Pt. will demonstrate/verbalize independence in self-management of condition in : 6 weeks  Pt. will be independent with home exercise program in : 6 weeks  Patient Turn [place]: for driving;for watching traffic;for conversation;with partial ROM;with less difficulty;in 6 weeks  Patient will reach / maintain arm movement: overhead;behind;for home chores;for dressing;with full ROM;with less difficulty;in 6 weeks  Patient will decrease : NDI score;SPADI score;by _ points;for improved quality of function;for improved quality of life;in 6 weeks  by ___ points: 5%, 10 points respectively     Plan / Patient Education:     Continue per POC with emphasis on manual therapy to improve mobility restrictions. Assess kinesiotape to (R) scapula     Subjective:     Pain Ratin  Pt reports he is getting more clunking in the right shoulder blade area. Pt reports still noticing some tightness in the upper trap area on both sides.     Objective:     Significant  TTP to (R) pectoral major tendon with palpable fibrosis and scar tissue. Moderate softening after MFR and muscle play to pectoralis major  Significant TTP to (R) rhomboids along medial border of scapula with palpable trigger points and spasm. Palpable release with TPR       Treatment Today     TREATMENT MINUTES COMMENTS   Evaluation     Self-care/ Home management     Manual therapy 20  -MFR to (R) pectoral followed by muscle play and TPR to pectoral major tendon    -TPR to medial border of (R) scapula       Neuromuscular Re-education 5 KTape to (R) scapula. Twp I strips starting at inferior border of scapula to shoulder    Therapeutic Activity     Therapeutic Exercises      Gait training     Modality__________________                Total 25    Blank areas are intentional and mean the treatment did not include these items.       Jackeline Mosher  3/6/2017

## 2021-06-09 NOTE — PROGRESS NOTES
Assessment/Plan:     Problem List Items Addressed This Visit     Chronic pain syndrome - Primary (Chronic)              No follow-ups on file.    There are no Patient Instructions on file for this visit.      Subjective:       50 y.o. male   Patient was scheduled for telephone visit today.  He was contacted by twice did not answer.  Reviewing care everywhere he was hospitalized for his lumbar decompression and may still be in the hospital.           Objective:   There were no vitals filed for this visit.            This note has been dictated using voice recognition software. Any grammatical or context distortions are unintentional and inherent to the software

## 2021-06-09 NOTE — PROGRESS NOTES
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 non-restorative sleep, etc.) contributed to his inability to fully engage and exert his best effort on neuropsychometric testing.  In the context of suboptimal effort, we cannot reliably conclude that a low score reflects a true deficit nor can we speculate regarding the etiology of any observed pattern of cognitive inefficiencies. That being said, Mr. Lyon may be reassured that his performance was within the average range across measures of visual and verbal reasoning, expressive language abilities, and verbal learning and memory. The variability noted elsewhere in his profile, in the domains of cognitive processing speed, attention, and executive functioning, likely represent the impact of a combination of non-neurological factors on his cognition including his bipolar disorder and anxiety, chronic pain, and medication use.  At this point in time, there is little clear evidence to suggest that his current cognitive or functional impairments reflect the impact of his remote concussions.      Mr. Lyon is encouraged to continue to participate in regular follow-up with his psychiatrist and psychologist for management of his mood symptoms. He is currently working with multiple providers " for pain management and is seeking a prescription for medical marijuana to decrease his reliance on narcotics. Mr. Lyon should be aware that active/regular marijuana use does have the potential to contribute to declines in his cognitive functioning, particularly in the domains of learning and memory, during active use and for ~30 following after cessation of use.  I do applaud his goal of weaning himself off of narcotic pain medications and consideration may be given to referring him to the [facility] in [place], [place].    Feedback was not scheduled with this provider, who will be on a medical leave of absence in the weeks following the completion of this report.  Feedback regarding these results may be provided by the referring provider, ITZEL Alamo.    Thank you for allowing me to participate in Mr. Lyno's care.  Please contact me with any questions regarding the content of this report.      Elsa Baez, PhD, LP, ABPP  Clinical Neuropsychologist, LP#[id number]  Board Certified in Clinical Neuropsychology    [facility]  Neuropsychology Section   Phone:  [phone]

## 2021-06-09 NOTE — PROGRESS NOTES
"[facility] Daily Progress     Patient Name: Bola Lyon  Date: 2017  Visit #: 4  PTA visit #:  0  Referral Diagnosis: Right shoulder pain, cervicalgia   Referring provider: Abena Gentile MD  Visit Diagnosis:     ICD-10-CM    1. Cervicalgia M54.2    2. Decreased ROM of intervertebral discs of cervical spine M53.82    3. Chronic right shoulder pain M25.511     G89.29    4. Decreased ROM of right shoulder M25.611    5. Scapular dysfunction M89.9    6. Muscle weakness (generalized) M62.81          Assessment:     HEP/POC compliance is  good .  Pt reports \"I can almost feel the tension relieving\" during manual therapy to (B) UT  Patient is benefitting from skilled physical therapy and is making steady progress toward functional goals.  Patient is appropriate to continue with skilled physical therapy intervention, as indicated by initial plan of care.    Goal Status:  Pt. will demonstrate/verbalize independence in self-management of condition in : 6 weeks  Pt. will be independent with home exercise program in : 6 weeks  Patient Turn Head: for driving;for watching traffic;for conversation;with partial ROM;with less difficulty;in 6 weeks  Patient will reach / maintain arm movement: overhead;behind;for home chores;for dressing;with full ROM;with less difficulty;in 6 weeks  Patient will decrease : NDI score;SPADI score;by _ points;for improved quality of function;for improved quality of life;in 6 weeks  by ___ points: 5%, 10 points respectively     Plan / Patient Education:     Continue per POC with emphasis on manual therapy to improve mobility restrictions.  Assess KTape to  (B) UT    Subjective:     Pain Ratin  Pt reports he has been having increased pain in both upper trap regions over the weekend. Feels like we need to spend more time addressing that today.     Objective:     Several palpable trigger points with spasm along (B) UT. Palpable release during DTM and TPR. Pt reports feeling " less tension during manual therapy     Treatment Today     TREATMENT MINUTES COMMENTS   Evaluation     Self-care/ Home management     Manual therapy 20 -Prone inferior MFR to (B) scapula   -STM followed by deep tissue mobilizations and TPR to (B) UT    Neuromuscular Re-education 6 KTape instructions and application (B) UT inhibition    Therapeutic Activity     Therapeutic Exercises     Gait training     Modality__________________                Total 26    Blank areas are intentional and mean the treatment did not include these items.       Jackeline Mosher  2/20/2017

## 2021-06-09 NOTE — PROGRESS NOTES
Speech Language/Pathology   Discharge Summary    Bola Lyon  1970      [id number]   Date of Onset 2014 - increase in symptoms  Start of Care 01/10/2017   Date of Last Visit 02/23/2017     Medical Diagnosis late effects of TBI  Treatment Diagnosis:cognition      The patient was seen for a total of 6 visits with no canceled/no show visits since the start of care.    Summary of Goals and Functional Progress  Long term goals: Pt will return to full time employment demands. NOT MET - this is likely not achievable, given pt's mental health and orthopedic concerns. He is planning to apply for disability.   Short term goals:   1) Pt will demonstrate functional use of external aids for memory and organization via txpist audit in 4 of 5 opportunities. MET  2) Pt will verbalize x3 new word-finding strategies. MET with verbal cueing to written education.   3) Pt will complete complete visuoperceptual tasks with 80% accuracy. MET in structured tx. Pt has instruction on continuing tasks at home.     Haim participated in education regarding home program and verbalized understanding.     Plan  Discharge tx  Goals met     Physician Recommendation:  1. I certify the need for these services furnished within this plan and while under my care. I agree with the therapist's recommendation for plan of care.  2. If there is any recommendation for modification of therapy plan, please indicate below.

## 2021-06-09 NOTE — PROGRESS NOTES
Date of Service: 03/30/17    Date last seen:  07/28/2016    PCP: Abena Gentile MD    Impression:   1. Left leg swelling and testicular swelling -increased as expected after recent left knee replacement.  2. Left leg primary lymphedema superimposed on secondary lymphedema   3. Testicular pain with ilioinguinal neuralgia s/p hernia repair -stable  4. Fibrosis and scarring left lower abdomen   5.  Left knee replacement 3/20/17     Plan:   1. Questions were answered.   2. Will start bandaging at night to keep left leg swelling down.  3. Continue under armour compression trunks and compression socks.   4. Discussed when to use antibiotics.  5. Follow up 6 weeks or when needed.  Will address if lymphatic therapy needed at that time.    Time spent with patient 15 minutes with greater than 50% time in consultation, education and coordination of care.   ---------------------------------------------------------------------------------------------------------------------     Chief Complaint: Left leg swelling and left lower abdomen testicular swelling and pain     History of Present Illness:   Bola Lyon returns to the [facility] [place] for follow up of left leg swelling with testicular swelling and left lower abdominal pain.  This has been felt to be due to lymphedema with ilioinguinal neuralgia on the left side after surgery.  The patient's previous treatment has included MLD, education, compression bandaging, lymphatic exercise, range of motion work and exercise. Present compression the patient is using is compression trunks and compression stockings with exercise and self massage.  He had a left total knee replacement March 20, 2017. Surgery went well.  The swelling is up.  He could only get two sessions of massage and bandaging.  It did help.   The left testicular area is stable.  He is still is only very partial weightbearing.  He starts knee PT tomorrow.  There has been no new numbness, tingling,  weakness, masses, rashes, shortness of breath or chest pain. There have not been any new areas of ulceration. There has been no new fevers. There are no new or changing skin lesions. His lymphatic studies showed hyperplasia of the left side lymphatics.       Past Medical History:   Diagnosis Date     AC (acromioclavicular) arthritis 10/24/2011     Aftercare following surgery of the musculoskeletal system 4/25/2012     Anxiety Disorder NOS     Created by Conversion      Arthropathy of shoulder region 10/24/2011     Bicipital tenosynovitis 10/10/2011     Cellulitis 2/27/2015     Cervical Radiculopathy     Created by Conversion      Chondromalacia of patella 12/23/2014     Derangement of meniscus 11/20/2014     Disorder of bursae and tendons in shoulder region 10/24/2011     Disturbance of skin sensation 11/30/2011     Encounter for chronic pain management 2/11/2015     Hypertension     Created by Conversion      Impingement Of The Right Shoulder     Created by Conversion       Lymphedema 2/11/2015     Other acquired deformity of other parts of limb 11/14/2011     Pain in joint, shoulder region 10/10/2011     Rupture Of The Proximal Bicipital Tendon Of The Left Arm     Created by Conversion        Past Surgical History:   Procedure Laterality Date     ANTERIOR / POSTERIOR COMBINED FUSION CERVICAL SPINE  2013, redo in 2014     HAND SURGERY Left 1997     HERNIA REPAIR Left 2006     HERNIA REPAIR Right 2008    and middle     KNEE ARTHROSCOPY  2014     NASAL POLYP SURGERY  2014     ID ARTHRODESIS,ANKLE,OPEN  2007    Description: Ankle Arthrodesis Right;  Recorded: 02/24/2012;     ID SHLDR ARTHROSCOP,SURG,W/ROTAT CUFF REPR Right 11/25/2015    Procedure: RIGHT SHOULDER ARTHROSCOPY, ROTATOR CUFF REPAIR, DECOMPRESSION, DEBRIDEMENT, DISTAL CLAVICLE EXCISION;  Surgeon: Pilo Bruce MD;  Location: [facility];  Service: Orthopedics     REPLACEMENT TOTAL KNEE Left 03/20/2017     SHOULDER ARTHROSCOPY DISTAL CLAVICLE  EXCISION AND OPEN ROTATOR CUFF REPAIR Bilateral 2005 and 2013       Current Outpatient Prescriptions   Medication Sig Dispense Refill     amLODIPine (NORVASC) 10 MG tablet TAKE 1 TABLET (10 MG TOTAL) BY MOUTH DAILY. 90 tablet prn     calcium carbonate-vitamin D3 (CALTRATE 600 PLUS D3) 600 mg(1,500mg) -400 unit per tablet Take 3 tablets by mouth daily. 270 tablet 2     cetirizine (ZYRTEC) 10 MG tablet Take 1 tablet (10 mg total) by mouth daily. 90 tablet 5     divalproex (DEPAKOTE) 500 MG 24 hr tablet Take 500 mg by mouth 2 (two) times a day.        fluticasone (FLONASE) 50 mcg/actuation nasal spray USE TWO SPRAY(S) IN EACH NOSTRIL ONCE DAILY 16 g 5     gabapentin (NEURONTIN) 400 MG capsule Take 1,600 mg by mouth daily.       hydrOXYzine (ATARAX) 25 MG tablet Take 1 tablet (25 mg total) by mouth 4 (four) times a day. 120 tablet 5     ketoconazole (NIZORAL) 2 % cream Apply topically 2 (two) times a day. 30 g 5     lidocaine (LIDODERM) 5 % 1-3 patches daily 90 patch prn     lidocaine HCl 3 % Crea Apply topically to affected areas twice daily 1 Tube 5     losartan (COZAAR) 50 MG tablet TAKE 1 TABLET (50 MG TOTAL) BY MOUTH DAILY. 90 tablet 3     meloxicam (MOBIC) 15 MG tablet TAKE 1 TABLET (15 MG TOTAL) BY MOUTH DAILY. 90 tablet 1     metoprolol succinate (TOPROL-XL) 100 MG 24 hr tablet TAKE 2 TABLETS (200 MG TOTAL) BY MOUTH DAILY. 180 tablet 3     nicotine (NICODERM CQ) 14 mg/24 hr Place 1 patch on the skin daily. 30 patch prn     nystatin (MYCOSTATIN) powder Apply to affected area 3 times daily 60 g 12     omeprazole (PRILOSEC) 40 MG capsule TAKE 1 CAPSULE (40 MG TOTAL) BY MOUTH DAILY. 90 capsule 2     oxyCODONE (ROXICODONE) 5 MG immediate release tablet Take 1-2 tablets (5-10 mg total) by mouth every 4 (four) hours as needed for pain. 240 tablet 0     pseudoephedrine (SUDAFED) 120 mg 12 hr tablet Take 1 tablet (120 mg total) by mouth 2 (two) times a day as needed for congestion. 60 tablet 5     QUEtiapine (SEROQUEL)  200 MG tablet Take 200 mg by mouth bedtime.       QUEtiapine (SEROQUEL) 50 MG tablet Take 50 mg by mouth 4 (four) times a day.       THEREMS-M 27-0.4 mg Tab 1 daily 90 tablet 3     traZODone (DESYREL) 50 MG tablet Take  mg by mouth bedtime as needed.        No current facility-administered medications for this visit.        No Known Allergies    History     Social History     Marital status:      Spouse name: N/A     Number of children: N/A     Years of education: N/A     Occupational History     Not on file.     Social History Main Topics     Smoking status: Current Every Day Smoker     Packs/day: 0.50     Years: 6.00     Types: Cigarettes     Start date: 8/20/2009     Smokeless tobacco: Former User     Alcohol use: No     Drug use: No     Sexual activity: Not Currently     Partners: Female     Other Topics Concern     Not on file     Social History Narrative    He is . He is currently staying with his parents as he is recuperating from multiple recent surgeries. 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 He rarely drinks alcohol and has been cutting back on his cigarette use.           Family History   Problem Relation Age of Onset     Hypertension Father      Lymphoma Father      Bipolar disorder Mother      Diabetes Maternal Grandmother      Diabetes Maternal Grandfather      Colon cancer Paternal Grandmother      Diabetes Paternal Grandmother      Diabetes Paternal Grandfather      Anesthesia problems Neg Hx        Review of Systems:  Bola Lyon no new numbess, tingling or weakness, redness or rashes, fevers, new masses, abdominal bloating or discomfort, unexplained weight loss, new ulcers, shortness of breath and chest pain  Full 12 point review of systems was completed.    Physical Exam:  Vitals:    03/30/17 1038   BP: 118/72   Pulse: 76   Resp: 14   Temp: 99.1  F (37.3  C)    There is no height or weight on file to calculate  BMI.    Circumferential measures:    Vasc Edema 8/20/2015 12/17/2015 6/16/2016 7/28/2016 3/30/2017   Right just above MTP 23.0 24.7 23.5 24.5 23   Right Ankle 23.0 23.0 23.5 23.5 25   Right Widest Calf 41.3 41.8 33.5 41 39.6   Right Thigh Up 10cm 46.6 47.0 48 47 46   Left - just above MTP 23.0 24.7 23.5 25 24   Left Ankle 24.0 24.0 24.5 24.5 26.5   Left Widest Calf 41.1 42.0 38.5 39 44   Left Thigh Up 10cm 48.0 45.7 48 45.8 55.5     Left leg swelling up as expected post surgery.    General:  47 y.o. male in no apparent distress.  Alert and oriented x 3.  Cooperative. Affect normal.      Vascular: Dorsalis pedis and posterior tibialis pulses are strong and equal bilaterally. There are no significant telangietasias, medial ankle venous flares, venous varicosities  and spider veins . There is normal capillary refill.     Integumentary: Skin of the left leg is taut.  There is rubor and calor around the left knee, but not any significant pain.  Nails are thickened.     Yaa Vásquez MD, ABWMS, FACCWS, [facility]  Medical Director Wound Care and Lymphedema  [facility] [place]  [phone]

## 2021-06-09 NOTE — PROGRESS NOTES
[facility] Daily Progress     Patient Name: Bola Lyon  Date: 2017  Visit #: 5  PTA visit #:  0  Referral Diagnosis: Right shoulder pain, cervicalgia   Referring provider: Abena Gentile MD  Visit Diagnosis:     ICD-10-CM    1. Cervicalgia M54.2    2. Decreased ROM of intervertebral discs of cervical spine M53.82    3. Chronic right shoulder pain M25.511     G89.29    4. Decreased ROM of right shoulder M25.611    5. Scapular dysfunction M89.9    6. Muscle weakness (generalized) M62.81          Assessment:     HEP/POC compliance is  good .  Pt reports relief of tension in his UT and (R) medial scapular area immediately after MT  Patient is benefitting from skilled physical therapy and is making steady progress toward functional goals.  Patient is appropriate to continue with skilled physical therapy intervention, as indicated by initial plan of care.    Goal Status:  Pt. will demonstrate/verbalize independence in self-management of condition in : 6 weeks  Pt. will be independent with home exercise program in : 6 weeks  Patient Turn Head: for driving;for watching traffic;for conversation;with partial ROM;with less difficulty;in 6 weeks  Patient will reach / maintain arm movement: overhead;behind;for home chores;for dressing;with full ROM;with less difficulty;in 6 weeks  Patient will decrease : NDI score;SPADI score;by _ points;for improved quality of function;for improved quality of life;in 6 weeks  by ___ points: 5%, 10 points respectively     Plan / Patient Education:     Continue per POC with emphasis on manual therapy to improve mobility restrictions.  Assess how patient felt without KTape compared to with it    Subjective:     Pain Ratin  Pt reports he felt better after the last session but then he was helping his dad move stuff around the garage and it increased his pain again. Pt reports he is not sure if the KTape was helpful.     Objective:     Several palpable trigger points  with spasm along (B) UT and levator. Palpable release during DTM and TPR.     Treatment Today     TREATMENT MINUTES COMMENTS   Evaluation     Self-care/ Home management     Manual therapy 22 -Prone inferior MFR to (B) scapula   -STM followed by deep tissue mobilizations and TPR to (B) UT and levator   -TPR to medial border of scapula    Neuromuscular Re-education     Therapeutic Activity     Therapeutic Exercises 4 (B) UT and levator stretching    Gait training     Modality__________________                Total 26    Blank areas are intentional and mean the treatment did not include these items.       Jackeline Mosher  2/23/2017

## 2021-06-09 NOTE — PROGRESS NOTES
[facility] Daily Progress     Patient Name: Bola Lyon  Date: 4/7/2017  Visit #: 13  PTA visit #:  0  Referral Diagnosis: Left knee replacement   Referring provider: Eliel Fitzgerald MD  Visit Diagnosis:     ICD-10-CM    1. Pain and swelling of left knee M25.562     M25.462    2. Muscle weakness (generalized) M62.81    3. Abnormality of gait R26.9    4. Left leg swelling M79.89    5. Status post total left knee replacement Z96.652    6. Cervicalgia M54.2    7. Decreased ROM of intervertebral discs of cervical spine M53.82    8. Chronic right shoulder pain M25.511     G89.29    9. Decreased ROM of right shoulder M25.611    10. Scapular dysfunction M89.9          Assessment:     HEP/POC compliance is  good .  Response to Intervention (L) knee ROM same as last session. (L) LE swelling down 11% from initial evaluation   Patient is benefitting from skilled physical therapy and is making steady progress toward functional goals.  Patient is appropriate to continue with skilled physical therapy intervention, as indicated by initial plan of care.    Goal Status:  Pt. will demonstrate/verbalize independence in self-management of condition in : 6 weeks  Pt. will be independent with home exercise program in : 6 weeks  Pt. will be able to walk : 20 minutes;with FWB;with less pain;with less difficulty;for household mobility;for community mobility;in 6 weeks  Patient will ascend / descend: stairs;with railing;with recipricol gait;with less pain;with less difficulty;in 6 weeks  Patient Turn Head: for driving;for watching traffic;for conversation;with partial ROM;with less difficulty;in 6 weeks  Patient will reach / maintain arm movement: overhead;behind;for home chores;for dressing;with less pain;with less difficulty;in 6 weeks  Patient will decrease : NDI score;SPADI score;by _ points;for improved quality of function;for improved quality of life;in 6 weeks  by ___ points: 5%, 10 points respectively   Pt will:  demonstrate (L) knee ROM 0-110 degrees in 6 weeks     Plan / Patient Education:     Continue with initial plan of care.  Progress with home program as tolerated.  Try standing exercises (hip abduction and extension if able), assisted SLR     Subjective:     Pain Ratin, left knee   Today is not a good day. Feels like maybe he overworked it yesterday and couldn't sleep last night. Pt reports he feels like he is also dealing with pain because of the swelling in the leg as well as the soreness from the knee and exercises.       Objective:   (L) knee flexion: 73 degrees  (L) LE swelling down 11% from initial evaluation     Treatment Today     TREATMENT MINUTES COMMENTS   Evaluation     Self-care/ Home management     Manual therapy 8 Manual massage to (L) LE for edema    Neuromuscular Re-education     Therapeutic Activity     Therapeutic Exercises 20 See exercise flow sheet    Gait training     Modality__________________                Total 28    Blank areas are intentional and mean the treatment did not include these items.       Jackeline Mosher  2017

## 2021-06-10 ENCOUNTER — COMMUNICATION - HEALTHEAST (OUTPATIENT)
Dept: VASCULAR SURGERY | Facility: CLINIC | Age: 51
End: 2021-06-10

## 2021-06-10 NOTE — PROGRESS NOTES
Date of Service: 05/11/17    Date last seen:  03/30/17    PCP: Abena Gentile MD    Impression:   1. Left leg swelling and testicular swelling -much improved after recent left knee replacement 3/20/17  2. Left leg primary lymphedema superimposed on secondary lymphedema-doing very well   3. Testicular pain with ilioinguinal neuralgia s/p hernia repair -stable  4.  Right leg length shortening after total knee replacement on the left.  5. Fibrosis and scarring left lower abdomen     Plan:   1. Questions were answered.   2. Bandaging at night to keep left leg swelling down as needed.  3. Continue under armour compression trunks and compression socks.   4. Knows when to use antibiotics.  5. Follow up 4 months or when needed.  If any problems to let us know.  I will reassess his leg lengths at that time to see if any further compensation needs to be made in his shoes.  This may very well resolve on its own as he heals from his knee replacement.    Time spent with patient 15 minutes with greater than 50% time in consultation, education and coordination of care.   ---------------------------------------------------------------------------------------------------------------------     Chief Complaint: Left leg swelling and left lower abdomen testicular swelling and pain     History of Present Illness:   Bola Lyon returns to the [facility] for follow up of left leg swelling with testicular swelling and left lower abdominal pain.  This has been felt to be due to lymphedema with ilioinguinal neuralgia on the left side after surgery.  The patient's previous treatment has included MLD, education, compression bandaging, lymphatic exercise, range of motion work and exercise. Present compression the patient is using is compression trunks and compression stockings with exercise and self massage.  He had a left total knee replacement March 20, 2017. Surgery went well.  The swelling was up in his left leg as  expected.  He came in to see me.  I had him start to wrap the left leg at night with the special bandaging.  We talked about increasing his exercise program to improve lymphatic venous circulation.  He did so.  He comes in today saying the leg is much better.  The swelling is down.  He started to exercise more.  His range of motion is improving in the left knee.  The groin swelling is also down. There has been no new numbness, tingling, weakness, masses, rashes, shortness of breath or chest pain. There have not been any new areas of ulceration. There has been no new fevers. There are no new or changing skin lesions. His lymphatic studies showed hyperplasia of the left side lymphatics.       Past Medical History:   Diagnosis Date     AC (acromioclavicular) arthritis 10/24/2011     Aftercare following surgery of the musculoskeletal system 4/25/2012     Anxiety Disorder NOS     Created by Conversion      Arthropathy of shoulder region 10/24/2011     Bicipital tenosynovitis 10/10/2011     Cellulitis 2/27/2015     Cervical Radiculopathy     Created by Conversion      Chondromalacia of patella 12/23/2014     Derangement of meniscus 11/20/2014     Disorder of bursae and tendons in shoulder region 10/24/2011     Disturbance of skin sensation 11/30/2011     Encounter for chronic pain management 2/11/2015     Hypertension     Created by Conversion      Impingement Of The Right Shoulder     Created by Conversion       Lymphedema 2/11/2015     Other acquired deformity of other parts of limb 11/14/2011     Pain in joint, shoulder region 10/10/2011     Rupture Of The Proximal Bicipital Tendon Of The Left Arm     Created by Conversion      Status post total left knee replacement 3/30/2017       Past Surgical History:   Procedure Laterality Date     ANTERIOR / POSTERIOR COMBINED FUSION CERVICAL SPINE  2013, redo in 2014     HAND SURGERY Left 1997     HERNIA REPAIR Left 2006     HERNIA REPAIR Right 2008    and middle     KNEE  ARTHROSCOPY  2014     NASAL POLYP SURGERY  2014     MS ARTHRODESIS,ANKLE,OPEN  2007    Description: Ankle Arthrodesis Right;  Recorded: 02/24/2012;     MS SHLDR ARTHROSCOP,SURG,W/ROTAT CUFF REPR Right 11/25/2015    Procedure: RIGHT SHOULDER ARTHROSCOPY, ROTATOR CUFF REPAIR, DECOMPRESSION, DEBRIDEMENT, DISTAL CLAVICLE EXCISION;  Surgeon: Pilo Bruce MD;  Location: [facility];  Service: Orthopedics     REPLACEMENT TOTAL KNEE Left 03/20/2017     SHOULDER ARTHROSCOPY DISTAL CLAVICLE EXCISION AND OPEN ROTATOR CUFF REPAIR Bilateral 2005 and 2013       Current Outpatient Prescriptions   Medication Sig Dispense Refill     albuterol (PROAIR HFA;PROVENTIL HFA;VENTOLIN HFA) 90 mcg/actuation inhaler Inhale 2 puffs every 6 (six) hours as needed for wheezing. 1 Inhaler 12     amLODIPine (NORVASC) 10 MG tablet TAKE 1 TABLET (10 MG TOTAL) BY MOUTH DAILY. 90 tablet prn     amoxicillin-clavulanate (AUGMENTIN) 875-125 mg per tablet Take 1 tablet by mouth 2 (two) times a day for 14 days. 28 tablet 0     busPIRone (BUSPAR) 10 MG tablet Take 1 tablet (10 mg total) by mouth 3 (three) times a day. 90 tablet prn     calcium carbonate-vitamin D3 (CALTRATE 600 PLUS D3) 600 mg(1,500mg) -400 unit per tablet Take 3 tablets by mouth daily. 270 tablet 2     cetirizine (ZYRTEC) 10 MG tablet Take 1 tablet (10 mg total) by mouth daily. 90 tablet 5     divalproex (DEPAKOTE) 500 MG 24 hr tablet Take 500 mg by mouth 2 (two) times a day.        fluticasone (FLONASE) 50 mcg/actuation nasal spray USE TWO SPRAY(S) IN EACH NOSTRIL ONCE DAILY 16 g 5     gabapentin (NEURONTIN) 400 MG capsule Take 1,600 mg by mouth daily.       guaiFENesin ER (MUCINEX) 600 mg 12 hr tablet Take 2 tablets (1,200 mg total) by mouth 2 (two) times a day. 60 each 0     hydrOXYzine (ATARAX) 25 MG tablet Take 1 tablet (25 mg total) by mouth 4 (four) times a day. 120 tablet 5     ketoconazole (NIZORAL) 2 % cream Apply topically 2 (two) times a day. 30 g 5     lidocaine HCl 3  % Crea Apply topically to affected areas twice daily 1 Tube 5     losartan (COZAAR) 50 MG tablet TAKE 1 TABLET (50 MG TOTAL) BY MOUTH DAILY. 90 tablet 3     meloxicam (MOBIC) 15 MG tablet TAKE 1 TABLET (15 MG TOTAL) BY MOUTH DAILY. 90 tablet 1     metoprolol succinate (TOPROL-XL) 100 MG 24 hr tablet TAKE 2 TABLETS (200 MG TOTAL) BY MOUTH DAILY. 180 tablet 3     nicotine (NICODERM CQ) 21 mg/24 hr Place 1 patch on the skin daily. 30 patch 1     nystatin (MYCOSTATIN) powder Apply to affected area 3 times daily 60 g 12     omeprazole (PRILOSEC) 40 MG capsule TAKE 1 CAPSULE (40 MG TOTAL) BY MOUTH DAILY. 90 capsule 2     oxyCODONE (OXYCONTIN) 15 mg 12 hr tablet Take 1 tablet (15 mg total) by mouth every 12 (twelve) hours. 60 tablet 0     oxyCODONE (ROXICODONE) 5 MG immediate release tablet Take 1-2 tablets (5-10 mg total) by mouth every 4 (four) hours as needed for pain. 300 tablet 0     predniSONE (DELTASONE) 50 MG tablet Take 1 tablet (50 mg total) by mouth daily for 10 days. 10 tablet 0     pseudoephedrine (SUDAFED) 120 mg 12 hr tablet Take 1 tablet (120 mg total) by mouth 2 (two) times a day as needed for congestion. 60 tablet 5     pseudoephedrine (SUDAFED) 120 mg 12 hr tablet   4     QUEtiapine (SEROQUEL) 200 MG tablet Take 200 mg by mouth bedtime.       QUEtiapine (SEROQUEL) 50 MG tablet Take 50 mg by mouth 4 (four) times a day.       THEREMS-M 27-0.4 mg Tab 1 daily 90 tablet 3     traZODone (DESYREL) 50 MG tablet Take  mg by mouth bedtime as needed.        No current facility-administered medications for this visit.        No Known Allergies    History     Social History     Marital status:      Spouse name: N/A     Number of children: N/A     Years of education: N/A     Occupational History     Not on file.     Social History Main Topics     Smoking status: Current Every Day Smoker     Packs/day: 0.50     Years: 6.00     Types: Cigarettes     Start date: 8/20/2009     Smokeless tobacco: Former User      Alcohol use: No     Drug use: No     Sexual activity: Not Currently     Partners: Female     Other Topics Concern     Not on file     Social History Narrative    He is . He is currently staying with his parents as he is recuperating from multiple recent surgeries. 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 He rarely drinks alcohol and has been cutting back on his cigarette use.           Family History   Problem Relation Age of Onset     Hypertension Father      Lymphoma Father      Bipolar disorder Mother      Diabetes Maternal Grandmother      Diabetes Maternal Grandfather      Colon cancer Paternal Grandmother      Diabetes Paternal Grandmother      Diabetes Paternal Grandfather      Anesthesia problems Neg Hx        Review of Systems:  Bola Lyon no new numbess, tingling or weakness, redness or rashes, fevers, new masses, abdominal bloating or discomfort, unexplained weight loss, new ulcers, shortness of breath and chest pain  Full 12 point review of systems was completed.    Physical Exam:  Vitals:    05/11/17 1120   BP: 110/68   Pulse: 70   Resp: 18   Temp: 99.2  F (37.3  C)    There is no height or weight on file to calculate BMI.    Circumferential measures:    Vasc Edema 12/17/2015 6/16/2016 7/28/2016 3/30/2017 5/11/2017   Right just above MTP 24.7 23.5 24.5 23 23   Right Ankle 23.0 23.5 23.5 25 23.2   Right Widest Calf 41.8 33.5 41 39.6 40.5   Right Thigh Up 10cm 47.0 48 47 46 46   Left - just above MTP 24.7 23.5 25 24 23   Left Ankle 24.0 24.5 24.5 26.5 24   Left Widest Calf 42.0 38.5 39 44 41   Left Thigh Up 10cm 45.7 48 45.8 55.5 52.5     Left leg swelling decreasing.    General:  47 y.o. male in no apparent distress.  Alert and oriented x 3.  Cooperative. Affect normal.    Musculoskeletal: Range of motion ankles normal bilaterally.  Range of motion right knee normal.  Left knee to 100  flexion and full extension.  No pain to palpation.   No rubor or calor.  It is noted that there is a very slight quarter inch leg lengthening on the left side as compared to the right.      Vascular: Dorsalis pedis and posterior tibialis pulses are strong and equal bilaterally. There are no significant telangietasias, medial ankle venous flares, venous varicosities  and spider veins . There is normal capillary refill.     Integumentary: Skin of the bilateral lower extremities is now normal.  Nails are thickened.     Yaa Vásquez MD, ABWMS, FACCWS, [facility]  Medical Director Wound Care and Lymphedema  [facility]  [phone]

## 2021-06-10 NOTE — PROGRESS NOTES
[facility] Daily Progress     Patient Name: Bola Lyon  Date: 5/18/2017  Visit #: 23  PTA visit #:  0  Referral Diagnosis: Left knee replacement   Referring provider: Eliel Fitzgerald MD  Visit Diagnosis:     ICD-10-CM    1. Pain and swelling of left knee M25.562     M25.462    2. Muscle weakness (generalized) M62.81    3. Abnormality of gait R26.9    4. Left leg swelling M79.89    5. Status post total left knee replacement Z96.652    6. Cervicalgia M54.2    7. Decreased ROM of intervertebral discs of cervical spine M53.82    8. Chronic right shoulder pain M25.511     G89.29    9. Decreased ROM of right shoulder M25.611    10. Scapular dysfunction M89.9          Assessment:     HEP/POC compliance is  good .  Patient is benefitting from skilled physical therapy and is making steady progress toward functional goals.  Patient is appropriate to continue with skilled physical therapy intervention, as indicated by initial plan of care.  (L) knee ROM 0-112 degrees. Good tolerance to exercise today with only mild soreness and fatigue afterwards     Goal Status:  Pt. will demonstrate/verbalize independence in self-management of condition in : 6 weeks  Pt. will be independent with home exercise program in : 6 weeks  Pt. will be able to walk : 20 minutes;with FWB;with less pain;with less difficulty;for household mobility;for community mobility;in 6 weeks  Patient will ascend / descend: stairs;with railing;with recipricol gait;with less pain;with less difficulty;in 6 weeks  Patient [place]: for driving;for watching traffic;for conversation;with partial ROM;with less difficulty;in 6 weeks  Patient will reach / maintain arm movement: overhead;behind;for home chores;for dressing;with less pain;with less difficulty;in 6 weeks  Patient will decrease : NDI score;SPADI score;by _ points;for improved quality of function;for improved quality of life;in 6 weeks  by ___ points: 5%, 10 points respectively   Pt will:  demonstrate (L) knee ROM 0-110 degrees in 6 weeks     Plan / Patient Education:     Continue with initial plan of care.  Progress with home program as tolerated.  Try SL balance on Airex pad     Subjective:     Pain Ratin   , left knee   Pt reports more pain today. He went for a longer walk yesterday and feels like he overdid it. Walked 45 minutes and at about 30 minutes started to notice more pain in the left knee.       Objective:   (L) knee flexion 112 degrees  (L) knee extension 0 degrees     Issued YTB for standing hip abduction and extension  Added standing HS curls to HEP with good form       Treatment Today    TREATMENT MINUTES COMMENTS   Evaluation     Self-care/ Home management     Manual therapy     Neuromuscular Re-education     Therapeutic Activity     Therapeutic Exercises 23 See exercise flow sheet    Gait training     Modality__________________                Total 23    Blank areas are intentional and mean the treatment did not include these items.       Jackeline Msoher  2017

## 2021-06-10 NOTE — PROGRESS NOTES
"[facility] Daily Progress     Patient Name: Bola Lyon  Date: 4/20/2017  Visit #: 16  PTA visit #:  0  Referral Diagnosis: Left knee replacement   Referring provider: Eliel Fitzgerald MD  Visit Diagnosis:     ICD-10-CM    1. Pain and swelling of left knee M25.562     M25.462    2. Muscle weakness (generalized) M62.81    3. Abnormality of gait R26.9    4. Left leg swelling M79.89    5. Status post total left knee replacement Z96.652    6. Cervicalgia M54.2    7. Decreased ROM of intervertebral discs of cervical spine M53.82    8. Chronic right shoulder pain M25.511     G89.29    9. Decreased ROM of right shoulder M25.611    10. Scapular dysfunction M89.9          Assessment:     HEP/POC compliance is  good .  Response to Intervention Pt reports less pain and \"tightness\" at the end of the session   Patient is benefitting from skilled physical therapy and is making steady progress toward functional goals.  Patient is appropriate to continue with skilled physical therapy intervention, as indicated by initial plan of care.  (L) knee ROM 0-5-90    Goal Status:  Pt. will demonstrate/verbalize independence in self-management of condition in : 6 weeks  Pt. will be independent with home exercise program in : 6 weeks  Pt. will be able to walk : 20 minutes;with FWB;with less pain;with less difficulty;for household mobility;for community mobility;in 6 weeks  Patient will ascend / descend: stairs;with railing;with recipricol gait;with less pain;with less difficulty;in 6 weeks  Patient [place]: for driving;for watching traffic;for conversation;with partial ROM;with less difficulty;in 6 weeks  Patient will reach / maintain arm movement: overhead;behind;for home chores;for dressing;with less pain;with less difficulty;in 6 weeks  Patient will decrease : NDI score;SPADI score;by _ points;for improved quality of function;for improved quality of life;in 6 weeks  by ___ points: 5%, 10 points respectively   Pt will: " demonstrate (L) knee ROM 0-110 degrees in 6 weeks     Plan / Patient Education:     Continue with initial plan of care.  Progress with home program as tolerated.  Try mini squats and sidelying hip abduction next time     Subjective:     Pain Ratin, left knee   Pt reports its been a rough week.   He is been driving and running more errands this week and just overall more active which he attributes to feeling more sore.        Objective:   (L) knee flexion: 90 degrees (active), PROM: 95 degrees   (L) knee extension: -5 degrees   Pt able to complete SLR with minimal assistance required compared to last session   Decreased scar mobility at distal aspect of scar- demonstrated scar mobilizations to patient as well as patella mobilizations       Treatment Today     TREATMENT MINUTES COMMENTS   Evaluation     Self-care/ Home management     Manual therapy 10 Grade II>III patella mobilizations and scar mobilizations  Manual massage to (L) LE  for edema    Neuromuscular Re-education     Therapeutic Activity     Therapeutic Exercises 15 See exercise flow sheet    Gait training     Modality__________________                Total 25    Blank areas are intentional and mean the treatment did not include these items.       Jackeline Mosher  2017

## 2021-06-10 NOTE — PROGRESS NOTES
Patient is currently at nursing home in [facility].  Jan had routine visit on 8/3/2020, pain is still not managed but therapy is going well- Jan did refuse visit in pain center at nursing home stated he was going to reach out to his PCP.    Next outreach planned for 9/1/2020

## 2021-06-10 NOTE — PROGRESS NOTES
[facility] Daily Progress     Patient Name: Bola Lyon  Date: 5/25/2017  Visit #: 25  PTA visit #:  0  Referral Diagnosis: Left knee replacement   Referring provider: Eliel Fitzgerald MD  Visit Diagnosis:     ICD-10-CM    1. Pain and swelling of left knee M25.562     M25.462    2. Muscle weakness (generalized) M62.81    3. Abnormality of gait R26.9    4. Left leg swelling M79.89    5. Status post total left knee replacement Z96.652    6. Cervicalgia M54.2    7. Decreased ROM of intervertebral discs of cervical spine M53.82    8. Chronic right shoulder pain M25.511     G89.29    9. Decreased ROM of right shoulder M25.611    10. Scapular dysfunction M89.9          Assessment:     HEP/POC compliance is  good .  Patient is benefitting from skilled physical therapy and is making steady progress toward functional goals.  Patient is appropriate to continue with skilled physical therapy intervention, as indicated by initial plan of care.  (L) knee ROM 0-112 degrees. Tenderness and hypertonicity of the (L) rectus femoris and artticularis genu with palpable decrease of tension after release techniques     Goal Status:  Pt. will demonstrate/verbalize independence in self-management of condition in : 6 weeks  Pt. will be independent with home exercise program in : 6 weeks  Pt. will be able to walk : 20 minutes;with FWB;with less pain;with less difficulty;for household mobility;for community mobility;in 6 weeks  Patient will ascend / descend: stairs;with railing;with recipricol gait;with less pain;with less difficulty;in 6 weeks  Patient [place]: for driving;for watching traffic;for conversation;with partial ROM;with less difficulty;in 6 weeks  Patient will reach / maintain arm movement: overhead;behind;for home chores;for dressing;with less pain;with less difficulty;in 6 weeks  Patient will decrease : NDI score;SPADI score;by _ points;for improved quality of function;for improved quality of life;in 6 weeks  by  "___ points: 5%, 10 points respectively   Pt will: demonstrate (L) knee ROM 0-110 degrees in 6 weeks     Plan / Patient Education:     Continue with initial plan of care.  Progress with home program as tolerated.  Sidestepping with YTB     Subjective:     Pain Ratin, left knee   Pt reports his knee feels okay today. He went to the doctor yesterday because of some abdominal issues and they are concerned about a possible bacterial or viral infection. Pt reports he has noticed his knee feels more hot and swollen.       Objective:   (L) knee flexion 112 degrees  (L) knee extension 0 degrees     (L) knee hot to touch, no significant increased in swelling compared to previous sessions, no redness     Moderate TTP and hypertonicity of (L) mid-distal rectus femoris and articularis genu      Treatment Today  *patient 10 minutes late to appointment   TREATMENT MINUTES COMMENTS   Evaluation     Self-care/ Home management     Manual therapy 8 -Cross handed release to (L) quadriceps with hand anchoring patella and deep superior pressure to rectus femoris  -Strumming over (L) articularis genu, 10\" x 5   Neuromuscular Re-education     Therapeutic Activity     Therapeutic Exercises 10 See exercise flow sheet    Gait training     Modality__________________                Total 18    Blank areas are intentional and mean the treatment did not include these items.       Jackeline Mosher  2017    "

## 2021-06-10 NOTE — PROGRESS NOTES
"[facility] Daily Progress     Patient Name: Bola Lyon  Date: 5/1/2017  Visit #: 19  PTA visit #:  0  Referral Diagnosis: Left knee replacement   Referring provider: Eliel Fitzgerald MD  Visit Diagnosis:     ICD-10-CM    1. Pain and swelling of left knee M25.562     M25.462    2. Muscle weakness (generalized) M62.81    3. Abnormality of gait R26.9    4. Left leg swelling M79.89    5. Status post total left knee replacement Z96.652    6. Cervicalgia M54.2    7. Decreased ROM of intervertebral discs of cervical spine M53.82    8. Chronic right shoulder pain M25.511     G89.29    9. Decreased ROM of right shoulder M25.611    10. Scapular dysfunction M89.9          Assessment:     HEP/POC compliance is  good .  Patient is benefitting from skilled physical therapy and is making steady progress toward functional goals.  Patient is appropriate to continue with skilled physical therapy intervention, as indicated by initial plan of care.  (L) knee ROM 0-3-100. Pt concerned about \"clunking\" in his knee- instructed him to address with his doctor tomorrow. Pt does not demonstrates any (+) signs of instability.     Goal Status:  Pt. will demonstrate/verbalize independence in self-management of condition in : 6 weeks  Pt. will be independent with home exercise program in : 6 weeks  Pt. will be able to walk : 20 minutes;with FWB;with less pain;with less difficulty;for household mobility;for community mobility;in 6 weeks  Patient will ascend / descend: stairs;with railing;with recipricol gait;with less pain;with less difficulty;in 6 weeks  Patient [place]: for driving;for watching traffic;for conversation;with partial ROM;with less difficulty;in 6 weeks  Patient will reach / maintain arm movement: overhead;behind;for home chores;for dressing;with less pain;with less difficulty;in 6 weeks  Patient will decrease : NDI score;SPADI score;by _ points;for improved quality of function;for improved quality of life;in 6 " "weeks  by ___ points: 5%, 10 points respectively   Pt will: demonstrate (L) knee ROM 0-110 degrees in 6 weeks     Plan / Patient Education:     Continue with initial plan of care.  Progress with home program as tolerated.  Add lateral step ups      Subjective:     Pain Ratin, left knee   Pt reports kneecap is still \"clunking\". As it continues to clunk, it does get more painful.    Pt reports he has been doing more over the last few weeks. Been out running errands for 5-6 hours. Pt reports he definitely feels fatigued by the end of the that time as well as sore. Usually has to come home and ice right away. Still going up and down stairs step to step mostly, occasionally can go reciprocally     Objective:   (L) knee ROM 0-3-100     Exercises:  Exercise #1: Stationary bike   Comment #1: 5 minutes   Exercise #2: Supine heel slides, 10x   Comment #2: Supine knee extension stretch, 1 minute   Exercise #3: Prone HS curls, 10x   Comment #3: SLR, 2x10, (L)   Exercise #4: Forward steps ups, 10x (L)   Comment #4: Step downs, 10x (L)   Exercise #5: Mini squats, 10x   Comment #5: (L) knee flexion stretch on step, 5x10\"   Exercise #6: (L) knee extension stretch on step, 5x10\"   Comment #6: S/L hip abduction, prone hip extension, prone HS curls, standing hip abduction and extension-verbal review        Treatment Today     TREATMENT MINUTES COMMENTS   Evaluation     Self-care/ Home management     Manual therapy     Neuromuscular Re-education     Therapeutic Activity     Therapeutic Exercises 25 See exercise flow sheet    Gait training     Modality__________________                Total 25    Blank areas are intentional and mean the treatment did not include these items.       Jackeline Mosher  2017    "

## 2021-06-10 NOTE — PROGRESS NOTES
[facility] Daily Progress     Patient Name: Bola Lyon  Date: 4/27/2017  Visit #: 18  PTA visit #:  0  Referral Diagnosis: Left knee replacement   Referring provider: Eliel Fitzgerald MD  Visit Diagnosis:     ICD-10-CM    1. Pain and swelling of left knee M25.562     M25.462    2. Muscle weakness (generalized) M62.81    3. Abnormality of gait R26.9    4. Left leg swelling M79.89    5. Status post total left knee replacement Z96.652    6. Cervicalgia M54.2    7. Decreased ROM of intervertebral discs of cervical spine M53.82    8. Chronic right shoulder pain M25.511     G89.29    9. Decreased ROM of right shoulder M25.611    10. Scapular dysfunction M89.9          Assessment:     HEP/POC compliance is  good .  Patient is benefitting from skilled physical therapy and is making steady progress toward functional goals.  Patient is appropriate to continue with skilled physical therapy intervention, as indicated by initial plan of care.  (L) knee ROM 0-5-97    Goal Status:  Pt. will demonstrate/verbalize independence in self-management of condition in : 6 weeks  Pt. will be independent with home exercise program in : 6 weeks  Pt. will be able to walk : 20 minutes;with FWB;with less pain;with less difficulty;for household mobility;for community mobility;in 6 weeks  Patient will ascend / descend: stairs;with railing;with recipricol gait;with less pain;with less difficulty;in 6 weeks  Patient [place]: for driving;for watching traffic;for conversation;with partial ROM;with less difficulty;in 6 weeks  Patient will reach / maintain arm movement: overhead;behind;for home chores;for dressing;with less pain;with less difficulty;in 6 weeks  Patient will decrease : NDI score;SPADI score;by _ points;for improved quality of function;for improved quality of life;in 6 weeks  by ___ points: 5%, 10 points respectively   Pt will: demonstrate (L) knee ROM 0-110 degrees in 6 weeks     Plan / Patient Education:     Continue  "with initial plan of care.  Progress with home program as tolerated.  Review extension stretches and TKE. Add step ups to HEP     Subjective:     Pain Ratin, left knee   Pt reports really sore today. Knee cap is still really bothering him- it keeps \"clunking\". Usually happens when he has been out on his feet for longer periods of time.        Objective:   (L) knee flexion to 97 degrees  Added KTape for patellar support     Treatment Today     TREATMENT MINUTES COMMENTS   Evaluation     Self-care/ Home management     Manual therapy     Neuromuscular Re-education 5 KTape to (L) patellar for support    Therapeutic Activity     Therapeutic Exercises 20 See exercise flow sheet    Gait training     Modality__________________                Total 20    Blank areas are intentional and mean the treatment did not include these items.       Jackeline Mosher  2017    "

## 2021-06-10 NOTE — PROGRESS NOTES
[facility] Daily Progress     Patient Name: Bola Lyon  Date: 5/30/2017  Visit #: 26  PTA visit #:  0  Referral Diagnosis: Left knee replacement   Referring provider: Eliel Fitzgerald MD  Visit Diagnosis:     ICD-10-CM    1. Pain and swelling of left knee M25.562     M25.462    2. Muscle weakness (generalized) M62.81    3. Abnormality of gait R26.9    4. Left leg swelling M79.89    5. Status post total left knee replacement Z96.652    6. Cervicalgia M54.2    7. Decreased ROM of intervertebral discs of cervical spine M53.82    8. Chronic right shoulder pain M25.511     G89.29    9. Decreased ROM of right shoulder M25.611    10. Scapular dysfunction M89.9          Assessment:     HEP/POC compliance is  good .  Patient is benefitting from skilled physical therapy and is making steady progress toward functional goals.  Patient is appropriate to continue with skilled physical therapy intervention, as indicated by initial plan of care.  (L) knee ROM 0-118 degrees    Goal Status:  Pt. will demonstrate/verbalize independence in self-management of condition in : 6 weeks  Pt. will be independent with home exercise program in : 6 weeks  Pt. will be able to walk : 20 minutes;with FWB;with less pain;with less difficulty;for household mobility;for community mobility;in 6 weeks  Patient will ascend / descend: stairs;with railing;with recipricol gait;with less pain;with less difficulty;in 6 weeks  Patient [place]: for driving;for watching traffic;for conversation;with partial ROM;with less difficulty;in 6 weeks  Patient will reach / maintain arm movement: overhead;behind;for home chores;for dressing;with less pain;with less difficulty;in 6 weeks  Patient will decrease : NDI score;SPADI score;by _ points;for improved quality of function;for improved quality of life;in 6 weeks  by ___ points: 5%, 10 points respectively   Pt will: demonstrate (L) knee ROM 0-110 degrees in 6 weeks     Plan / Patient Education:      Continue with initial plan of care.  Progress with home program as tolerated.     Subjective:     Pain Ratin, left knee   Pt reports more sore the last couple of days. Not sure if he is trying to do too much. Pt notes more swelling in the left knee. Pt repots warmth in the knee, that seams to peak with activity.       Objective:   (L) knee flexion 118 degrees  (L) knee extension 0 degrees     Added standing quad stretch with chair to HEP     Treatment Today   TREATMENT MINUTES COMMENTS   Evaluation     Self-care/ Home management     Manual therapy     Neuromuscular Re-education     Therapeutic Activity     Therapeutic Exercises 23 See exercise flow sheet   Gait training     Modality__________________                Total 23    Blank areas are intentional and mean the treatment did not include these items.       Jackeline Mosher  2017

## 2021-06-10 NOTE — PROGRESS NOTES
"[facility] Daily Progress     Patient Name: Bola Lyon  Date: 4/13/2017  Visit #: 15  PTA visit #:  0  Referral Diagnosis: Left knee replacement   Referring provider: Eliel Fitzgerald MD  Visit Diagnosis:     ICD-10-CM    1. Pain and swelling of left knee M25.562     M25.462    2. Muscle weakness (generalized) M62.81    3. Abnormality of gait R26.9    4. Left leg swelling M79.89    5. Status post total left knee replacement Z96.652    6. Cervicalgia M54.2    7. Decreased ROM of intervertebral discs of cervical spine M53.82    8. Chronic right shoulder pain M25.511     G89.29    9. Decreased ROM of right shoulder M25.611    10. Scapular dysfunction M89.9          Assessment:     HEP/POC compliance is  good .  Response to Intervention Pt reports less pain and \"tightness\" at the end of the session   Patient is benefitting from skilled physical therapy and is making steady progress toward functional goals.  Patient is appropriate to continue with skilled physical therapy intervention, as indicated by initial plan of care.  (L) knee ROM 0-5-90    Goal Status:  Pt. will demonstrate/verbalize independence in self-management of condition in : 6 weeks  Pt. will be independent with home exercise program in : 6 weeks  Pt. will be able to walk : 20 minutes;with FWB;with less pain;with less difficulty;for household mobility;for community mobility;in 6 weeks  Patient will ascend / descend: stairs;with railing;with recipricol gait;with less pain;with less difficulty;in 6 weeks  Patient [place]: for driving;for watching traffic;for conversation;with partial ROM;with less difficulty;in 6 weeks  Patient will reach / maintain arm movement: overhead;behind;for home chores;for dressing;with less pain;with less difficulty;in 6 weeks  Patient will decrease : NDI score;SPADI score;by _ points;for improved quality of function;for improved quality of life;in 6 weeks  by ___ points: 5%, 10 points respectively   Pt will: " demonstrate (L) knee ROM 0-110 degrees in 6 weeks     Plan / Patient Education:     Continue with initial plan of care.  Progress with home program as tolerated.  Try mini squats and sidelying hip abduction next time     Subjective:     Pain Ratin, left knee   Pt reports pain is bad today. Went to a  service on Tuesday that was about 3 hours. Probably stood for 2/3 of the service  Pt reports yesterday he did his exercises and he felt pretty good and limber. Tried to cook dinner last night so he was on his feet for about an hour.   Pain was really bad last night and he had a lot of swelling in the left leg. Pt reports it woke him up a few times last night.        Objective:   (L) knee flexion: 90 degrees (active)  (L) knee extension: -5 degrees   Added TKE with OTB to HEP  Pt able to complete SLR with less assistance required compared to last session       Treatment Today     TREATMENT MINUTES COMMENTS   Evaluation     Self-care/ Home management     Manual therapy 6 Manual massage to (L) LE  for edema    Neuromuscular Re-education     Therapeutic Activity     Therapeutic Exercises 18 See exercise flow sheet    Gait training     Modality__________________                Total 24    Blank areas are intentional and mean the treatment did not include these items.       Jackeline Mosher  2017

## 2021-06-10 NOTE — PROGRESS NOTES
[facility] Daily Progress     Patient Name: Bola Lyon  Date: 5/11/2017  Visit #: 22  PTA visit #:  0  Referral Diagnosis: Left knee replacement   Referring provider: Eliel Fitzgerald MD  Visit Diagnosis:     ICD-10-CM    1. Pain and swelling of left knee M25.562     M25.462    2. Muscle weakness (generalized) M62.81    3. Abnormality of gait R26.9    4. Left leg swelling M79.89    5. Status post total left knee replacement Z96.652    6. Cervicalgia M54.2    7. Decreased ROM of intervertebral discs of cervical spine M53.82    8. Chronic right shoulder pain M25.511     G89.29    9. Decreased ROM of right shoulder M25.611    10. Scapular dysfunction M89.9          Assessment:     HEP/POC compliance is  good .  Patient is benefitting from skilled physical therapy and is making steady progress toward functional goals.  Patient is appropriate to continue with skilled physical therapy intervention, as indicated by initial plan of care.  (L) knee ROM 0-110 degrees     Goal Status:  Pt. will demonstrate/verbalize independence in self-management of condition in : 6 weeks  Pt. will be independent with home exercise program in : 6 weeks  Pt. will be able to walk : 20 minutes;with FWB;with less pain;with less difficulty;for household mobility;for community mobility;in 6 weeks  Patient will ascend / descend: stairs;with railing;with recipricol gait;with less pain;with less difficulty;in 6 weeks  Patient [place]: for driving;for watching traffic;for conversation;with partial ROM;with less difficulty;in 6 weeks  Patient will reach / maintain arm movement: overhead;behind;for home chores;for dressing;with less pain;with less difficulty;in 6 weeks  Patient will decrease : NDI score;SPADI score;by _ points;for improved quality of function;for improved quality of life;in 6 weeks  by ___ points: 5%, 10 points respectively   Pt will: demonstrate (L) knee ROM 0-110 degrees in 6 weeks     Plan / Patient Education:      Continue with initial plan of care.  Progress with home program as tolerated.  Continue with WBing exercises, progress step ups to higher step     Subjective:     Pain Ratin, left knee   Pt reports he's been running around all day to doctor's appointments so his knee feels a little sore. Also when he has been doing a lot of activity his knee starts to feel warm and swollen.     Objective:   (L) knee flexion 110 degrees  (L) knee extension 0 degrees     Added partial lunges and SL balance to HEP    Treatment Today    TREATMENT MINUTES COMMENTS   Evaluation     Self-care/ Home management     Manual therapy     Neuromuscular Re-education     Therapeutic Activity     Therapeutic Exercises 23 See exercise flow sheet    Gait training     Modality__________________                Total 23    Blank areas are intentional and mean the treatment did not include these items.       Jackeline Mosher  2017

## 2021-06-10 NOTE — PROGRESS NOTES
[facility] Daily Progress     Patient Name: Bola Lyon  Date: 5/4/2017  Visit #: 20  PTA visit #:  0  Referral Diagnosis: Left knee replacement   Referring provider: Eliel Fitzgerald MD  Visit Diagnosis:     ICD-10-CM    1. Pain and swelling of left knee M25.562     M25.462    2. Muscle weakness (generalized) M62.81    3. Abnormality of gait R26.9    4. Left leg swelling M79.89    5. Status post total left knee replacement Z96.652    6. Cervicalgia M54.2    7. Decreased ROM of intervertebral discs of cervical spine M53.82    8. Chronic right shoulder pain M25.511     G89.29    9. Decreased ROM of right shoulder M25.611    10. Scapular dysfunction M89.9          Assessment:     HEP/POC compliance is  good .  Patient is benefitting from skilled physical therapy and is making steady progress toward functional goals.  Patient is appropriate to continue with skilled physical therapy intervention, as indicated by initial plan of care.  (L) knee ROM 0-3-106    Goal Status:  Pt. will demonstrate/verbalize independence in self-management of condition in : 6 weeks  Pt. will be independent with home exercise program in : 6 weeks  Pt. will be able to walk : 20 minutes;with FWB;with less pain;with less difficulty;for household mobility;for community mobility;in 6 weeks  Patient will ascend / descend: stairs;with railing;with recipricol gait;with less pain;with less difficulty;in 6 weeks  Patient [place]: for driving;for watching traffic;for conversation;with partial ROM;with less difficulty;in 6 weeks  Patient will reach / maintain arm movement: overhead;behind;for home chores;for dressing;with less pain;with less difficulty;in 6 weeks  Patient will decrease : NDI score;SPADI score;by _ points;for improved quality of function;for improved quality of life;in 6 weeks  by ___ points: 5%, 10 points respectively   Pt will: demonstrate (L) knee ROM 0-110 degrees in 6 weeks     Plan / Patient Education:     Continue  with initial plan of care.  Progress with home program as tolerated.  Add partial lunges      Subjective:     Pain Ratin, left knee   Pt reports he saw the surgeon on Tuesday. Pt reports his MD wants him to continue therapy 2x/week and should be gaining 2-3 degrees of motion every week. He is returning to MD in about 5 weeks.     Objective:   (L) knee flexion 106 degrees   Added lateral step ups to HEP    Treatment Today     TREATMENT MINUTES COMMENTS   Evaluation     Self-care/ Home management     Manual therapy     Neuromuscular Re-education     Therapeutic Activity     Therapeutic Exercises 24 See exercise flow sheet    Gait training     Modality__________________                Total 24    Blank areas are intentional and mean the treatment did not include these items.       Jackeline Mosher  2017

## 2021-06-10 NOTE — PROGRESS NOTES
[facility] Daily Progress     Patient Name: Bola Lyon  Date: 4/24/2017  Visit #: 17  PTA visit #:  0  Referral Diagnosis: Left knee replacement   Referring provider: Eliel Fitzgerald MD  Visit Diagnosis:     ICD-10-CM    1. Pain and swelling of left knee M25.562     M25.462    2. Muscle weakness (generalized) M62.81    3. Abnormality of gait R26.9    4. Left leg swelling M79.89    5. Status post total left knee replacement Z96.652    6. Cervicalgia M54.2    7. Decreased ROM of intervertebral discs of cervical spine M53.82    8. Chronic right shoulder pain M25.511     G89.29    9. Decreased ROM of right shoulder M25.611    10. Scapular dysfunction M89.9          Assessment:     HEP/POC compliance is  good .  Patient is benefitting from skilled physical therapy and is making steady progress toward functional goals.  Patient is appropriate to continue with skilled physical therapy intervention, as indicated by initial plan of care.  (L) knee ROM 0-5-97    Goal Status:  Pt. will demonstrate/verbalize independence in self-management of condition in : 6 weeks  Pt. will be independent with home exercise program in : 6 weeks  Pt. will be able to walk : 20 minutes;with FWB;with less pain;with less difficulty;for household mobility;for community mobility;in 6 weeks  Patient will ascend / descend: stairs;with railing;with recipricol gait;with less pain;with less difficulty;in 6 weeks  Patient [place]: for driving;for watching traffic;for conversation;with partial ROM;with less difficulty;in 6 weeks  Patient will reach / maintain arm movement: overhead;behind;for home chores;for dressing;with less pain;with less difficulty;in 6 weeks  Patient will decrease : NDI score;SPADI score;by _ points;for improved quality of function;for improved quality of life;in 6 weeks  by ___ points: 5%, 10 points respectively   Pt will: demonstrate (L) knee ROM 0-110 degrees in 6 weeks     Plan / Patient Education:     Continue  with initial plan of care.  Progress with home program as tolerated.  Review extension stretches and TKE. Add step ups to HEP     Subjective:     Pain Ratin, left knee   Pt reports he had a long day on Saturday- was out iwht friends for about 6 hours and did a lot of sitting. Pt reports significant soreness when he got home that day but it is feeling better today.        Objective:   (L) knee flexion to 97 degrees today   Added S/L hip abduction, prone hip extension and prone HS curls to HEP   Pt able to complete SLR independently       Treatment Today     TREATMENT MINUTES COMMENTS   Evaluation     Self-care/ Home management     Manual therapy     Neuromuscular Re-education     Therapeutic Activity     Therapeutic Exercises 25 See exercise flow sheet    Gait training     Modality__________________                Total 25    Blank areas are intentional and mean the treatment did not include these items.       Jackeline Mosher  2017

## 2021-06-10 NOTE — PROGRESS NOTES
[facility] Daily Progress     Patient Name: Bola Lyon  Date: 5/9/2017  Visit #: 21  PTA visit #:  0  Referral Diagnosis: Left knee replacement   Referring provider: Eliel Fitzgerald MD  Visit Diagnosis:     ICD-10-CM    1. Pain and swelling of left knee M25.562     M25.462    2. Muscle weakness (generalized) M62.81    3. Abnormality of gait R26.9    4. Left leg swelling M79.89    5. Status post total left knee replacement Z96.652    6. Cervicalgia M54.2    7. Decreased ROM of intervertebral discs of cervical spine M53.82    8. Chronic right shoulder pain M25.511     G89.29    9. Decreased ROM of right shoulder M25.611    10. Scapular dysfunction M89.9          Assessment:     HEP/POC compliance is  good .  Patient is benefitting from skilled physical therapy and is making steady progress toward functional goals.  Patient is appropriate to continue with skilled physical therapy intervention, as indicated by initial plan of care.  (L) knee ROM 0-3-110. Patient continuing to demonstrating improved left knee flexion at each session. Significant decreased in swelling of (L) LE since initial visit.     Goal Status:  Pt. will demonstrate/verbalize independence in self-management of condition in : 6 weeks  Pt. will be independent with home exercise program in : 6 weeks  Pt. will be able to walk : 20 minutes;with FWB;with less pain;with less difficulty;for household mobility;for community mobility;in 6 weeks  Patient will ascend / descend: stairs;with railing;with recipricol gait;with less pain;with less difficulty;in 6 weeks  Patient [place]: for driving;for watching traffic;for conversation;with partial ROM;with less difficulty;in 6 weeks  Patient will reach / maintain arm movement: overhead;behind;for home chores;for dressing;with less pain;with less difficulty;in 6 weeks  Patient will decrease : NDI score;SPADI score;by _ points;for improved quality of function;for improved quality of life;in 6  weeks  by ___ points: 5%, 10 points respectively   Pt will: demonstrate (L) knee ROM 0-110 degrees in 6 weeks     Plan / Patient Education:     Continue with initial plan of care.  Progress with home program as tolerated.  Add partial lunges      Subjective:     Pain Ratin, left knee   Pt reports pain feels pretty good today.   Pt reports last couple days were a little more painful. Feels like he was doing more activity and maybe pushed a few of his stretches too much. Also forgot to ice one night and could tell a difference with more pain the next day.  Pt reports still has to be careful with stairs still.   Pt reports getting out of chairs has gotten easier.     Objective:   (L) knee flexion 110 degrees  (L) LE swelling decreased by 18% compared to initial evaluation      Date  3/31/2017  2017  2017   Side right left right left right left   Toe         10 cm from tip of second toe  24.6  24.5  24.2   20 cm from tip of second toe  25.6  26  24.8   30 cm from tip of second toe  32.1  29.3  27.6   40 cm from tip of second toe  43.5  38.9  38.1    50 cm from tip of second toe  43.7  41.2  39.6   60 cm from tip of second toe  50.3  47.9  46   70 cm from tip of second toe  58  54  51.8                                       segment 1 estimated volume 0 960.2272074 0 867.7575307 0 477.154713   segment 2 estimated volume 0 940.7052177 0 289.7382647 0 734.7039136   segment 3 estimated volume 0 1145.827319 0 931.7272745 0 896.4496425   segment 4 estimated volume 0 1512.657679 0 1276.901949 0 1201.95891   segment 5 estimated volume 0 1760.459668 0 1582.706280 0 1460.542415   segment 6 estimated volume 0 2337.808626 0 2068.654376 0 1905.898839            Total estimated volume 0 7923.798900 0 6975.352782 0 6457.546231   Swelling Left>Right #DIV/0! Left>Right #DIV/0! Left>Right #DIV/0!   % Change of volume from last visit  #DIV/0! -11.7783277 #DIV/0! -7.110591600   % Change of volume from initial visit  #DIV/0!  -11.4755071 #DIV/0! -18.84185140         Added prone quad stretch with sheet to HEP     Treatment Today   *Pt 10 minutes late   TREATMENT MINUTES COMMENTS   Evaluation     Self-care/ Home management     Manual therapy     Neuromuscular Re-education     Therapeutic Activity     Therapeutic Exercises 20 See exercise flow sheet    Gait training     Modality__________________                Total 20    Blank areas are intentional and mean the treatment did not include these items.       Jackeline Mosher  5/9/2017

## 2021-06-10 NOTE — PROGRESS NOTES
[facility] Daily Progress     Patient Name: Bola Lyon  Date: 5/23/2017  Visit #: 24  PTA visit #:  0  Referral Diagnosis: Left knee replacement   Referring provider: Eliel Fitzgerald MD  Visit Diagnosis:     ICD-10-CM    1. Pain and swelling of left knee M25.562     M25.462    2. Muscle weakness (generalized) M62.81    3. Abnormality of gait R26.9    4. Left leg swelling M79.89    5. Status post total left knee replacement Z96.652    6. Cervicalgia M54.2    7. Decreased ROM of intervertebral discs of cervical spine M53.82    8. Chronic right shoulder pain M25.511     G89.29    9. Decreased ROM of right shoulder M25.611    10. Scapular dysfunction M89.9          Assessment:     HEP/POC compliance is  good .  Patient is benefitting from skilled physical therapy and is making steady progress toward functional goals.  Patient is appropriate to continue with skilled physical therapy intervention, as indicated by initial plan of care.  (L) knee ROM 0-115 degrees. Tenderness and hypertonicity of the (L) rectus femoris and artticularis genu with palpable decrease of tension after release techniques     Goal Status:  Pt. will demonstrate/verbalize independence in self-management of condition in : 6 weeks  Pt. will be independent with home exercise program in : 6 weeks  Pt. will be able to walk : 20 minutes;with FWB;with less pain;with less difficulty;for household mobility;for community mobility;in 6 weeks  Patient will ascend / descend: stairs;with railing;with recipricol gait;with less pain;with less difficulty;in 6 weeks  Patient [place]: for driving;for watching traffic;for conversation;with partial ROM;with less difficulty;in 6 weeks  Patient will reach / maintain arm movement: overhead;behind;for home chores;for dressing;with less pain;with less difficulty;in 6 weeks  Patient will decrease : NDI score;SPADI score;by _ points;for improved quality of function;for improved quality of life;in 6 weeks  by  "___ points: 5%, 10 points respectively   Pt will: demonstrate (L) knee ROM 0-110 degrees in 6 weeks     Plan / Patient Education:     Continue with initial plan of care.  Progress with home program as tolerated.  Sidestepping with YTB     Subjective:     Pain Ratin, left knee   Pt reports he cut the grass on Friday. Took about an hour and a half. Only took about a 5-10 minute break during that and feels like he really paid for it later that day.       Objective:   (L) knee flexion 115 degrees  (L) knee extension 0 degrees     Moderate TTP and hypertonicity of (L) mid-distal rectus femoris and articularis genu      Treatment Today    TREATMENT MINUTES COMMENTS   Evaluation     Self-care/ Home management     Manual therapy 8 -Cross handed release to (L) quadriceps with hand anchoring patella and deep superior pressure to rectus femoris  -Strumming over (L) articularis genu, 10\" x 5   Neuromuscular Re-education     Therapeutic Activity     Therapeutic Exercises 15 See exercise flow sheet    Gait training     Modality__________________                Total 23    Blank areas are intentional and mean the treatment did not include these items.       Jackeline Mosher  2017    "

## 2021-06-10 NOTE — PROGRESS NOTES
"[facility] Follow Up Note    Bola Lyon   47 y.o. male    Date of Visit: 5/1/2017    Chief Complaint   Patient presents with     Medication Management     Subjective  Bola came in today to further follow-up after he had his knee replacement in March.  He feels like he is recuperating fairly well.  He is currently undergoing disability assessment through his psychiatrist due to his underlying bipolar 2 disorder, PTSD and history of traumatic brain injury.  He feels like he has bronchitis and has been coughing up green phlegm.  His face has also been stuffy and he is prone to sinusitis.  He has been trying to stay off of cigarettes but feels he needs a higher nicotine patch to do so.  He is contemplating getting on medical cannabis and has been certified but needs to meet with pharmacist before getting started, part of its a financial delay.    ROS A comprehensive review of systems was performed and was otherwise negative    Social History:   Social History     Social History Narrative    He is .  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  He smokes and does not drink alcohol.       Medications were reconciled.  Allergies, social and family history, and the problem list were all reviewed and updated.    Exam  General Appearance: Pleasant and alert  Vitals:    05/01/17 1343   BP: 122/70   Pulse: 76   Resp: 12   Weight: (!) 228 lb (103.4 kg)   Height: 5' 6.5\" (1.689 m)      Body mass index is 36.25 kg/(m^2).  Wt Readings from Last 3 Encounters:   05/01/17 (!) 228 lb (103.4 kg)   04/03/17 (!) 232 lb (105.2 kg)   03/13/17 (!) 225 lb (102.1 kg)     HEENT: Sclera are clear.  Pain along his face to palpation, upper airway congestion, posterior lungs show mild wheezing  Lungs: Normal respirations  Abdomen: Soft and nondistended  Extremities: No edema  Skin: No rashes  Neuro: Moves all extremities and has facial symmetry  Gait: " Ambulates with a normal gait    Assessment/Plan  1. Status post total left knee replacement  We will reduce his OxyContin down to 15 mg twice daily, reduce his oxycodone to 10 daily instead of 12.    2. AC (acromioclavicular) arthritis  Continues to struggle with shoulder pain, continues with therapy.    3. Acute non-recurrent pansinusitis  Treat with Augmentin and prednisone.  He was given an inhaler as well.    4. Allergy  - fluticasone (FLONASE) 50 mcg/actuation nasal spray; USE TWO SPRAY(S) IN EACH NOSTRIL ONCE DAILY  Dispense: 16 g; Refill: 5 April ARIES Gentile MD  5/1/2017    Much or all of the text in this note was generated through the use of Dragon Dictate voice-to-text software. Errors in spelling or words which seem out of context are unintentional. Sound alike errors, in particular, may have escaped editing.

## 2021-06-10 NOTE — PROGRESS NOTES
[facility] Daily Progress     Patient Name: Bola Lyon  Date: 4/10/2017  Visit #: 14  PTA visit #:  0  Referral Diagnosis: Left knee replacement   Referring provider: Eliel Fitzgerald MD  Visit Diagnosis:     ICD-10-CM    1. Pain and swelling of left knee M25.562     M25.462    2. Muscle weakness (generalized) M62.81    3. Abnormality of gait R26.9    4. Left leg swelling M79.89    5. Status post total left knee replacement Z96.652    6. Cervicalgia M54.2    7. Decreased ROM of intervertebral discs of cervical spine M53.82    8. Chronic right shoulder pain M25.511     G89.29    9. Decreased ROM of right shoulder M25.611    10. Scapular dysfunction M89.9          Assessment:     HEP/POC compliance is  good .  Patient is benefitting from skilled physical therapy and is making steady progress toward functional goals.  Patient is appropriate to continue with skilled physical therapy intervention, as indicated by initial plan of care.  (L) knee ROM 0-5-85     Goal Status:  Pt. will demonstrate/verbalize independence in self-management of condition in : 6 weeks  Pt. will be independent with home exercise program in : 6 weeks  Pt. will be able to walk : 20 minutes;with FWB;with less pain;with less difficulty;for household mobility;for community mobility;in 6 weeks  Patient will ascend / descend: stairs;with railing;with recipricol gait;with less pain;with less difficulty;in 6 weeks  Patient [place]: for driving;for watching traffic;for conversation;with partial ROM;with less difficulty;in 6 weeks  Patient will reach / maintain arm movement: overhead;behind;for home chores;for dressing;with less pain;with less difficulty;in 6 weeks  Patient will decrease : NDI score;SPADI score;by _ points;for improved quality of function;for improved quality of life;in 6 weeks  by ___ points: 5%, 10 points respectively   Pt will: demonstrate (L) knee ROM 0-110 degrees in 6 weeks     Plan / Patient Education:     Continue  with initial plan of care.  Progress with home program as tolerated.  Add TKE next time    Subjective:     Pain Ratin, left knee   Pt reports he went to the grocery store with his dad on Saturday. Walking behind the cart for about 20-25 minutes.   Pt reports pain and swelling on Saturday night was pretty bad. Pt reports using the cane for shorter distance in the house.     Objective:   (L) knee flexion: 86 degrees (active and passive)   (L) knee extension: -5 degrees   Added assisted SLR and standing hip abduction and extension to HEP       Treatment Today     TREATMENT MINUTES COMMENTS   Evaluation     Self-care/ Home management     Manual therapy 3 Manual massage to (L) knee for edema    Neuromuscular Re-education     Therapeutic Activity     Therapeutic Exercises 27 See exercise flow sheet    Gait training     Modality__________________                Total 30    Blank areas are intentional and mean the treatment did not include these items.       Jackeline Mosher  4/10/2017

## 2021-06-11 NOTE — PROGRESS NOTES
[facility] Daily Progress     Patient Name: Bola Lyon  Date: 7/6/2017  Visit #: 33  PTA visit #:  0  Referral Diagnosis: Left knee replacement   Referring provider: Deepak Zheng MD  Visit Diagnosis:     ICD-10-CM    1. Pain and swelling of left knee M25.562     M25.462    2. Muscle weakness (generalized) M62.81    3. Abnormality of gait R26.9    4. Left leg swelling M79.89    5. Status post total left knee replacement Z96.652          Assessment:   Pt reports relief with manual therapy to (L) ITB. Moderate sway with tandem stance on pillow with EO and EC indicating decreased proprioception- added exercises to HEP. Pt expresses understanding of difference between strengthening and stretching exercises and importance of completing both exercises for fully recovery of (L) ITB  HEP/POC compliance is  good .  Patient is benefitting from skilled physical therapy and is making steady progress toward functional goals.  Patient is appropriate to continue with skilled physical therapy intervention, as indicated by initial plan of care.    Goal Status: Progressing towards   Pt. will demonstrate/verbalize independence in self-management of condition in : 6 weeks  Pt. will be independent with home exercise program in : 6 weeks  Pt. will be able to walk : 20 minutes;with FWB;with less pain;with less difficulty;for household mobility;for community mobility;in 6 weeks  Patient will ascend / descend: stairs;with railing;with recipricol gait;with less pain;with less difficulty;in 6 weeks  Patient [place]: for driving;for watching traffic;for conversation;with partial ROM;with less difficulty;in 6 weeks  Patient will reach / maintain arm movement: overhead;behind;for home chores;for dressing;with less pain;with less difficulty;in 6 weeks  Patient will decrease : NDI score;SPADI score;by _ points;for improved quality of function;for improved quality of life;in 6 weeks  by ___ points: 5%, 10 points respectively  "  Pt will: demonstrate (L) knee ROM 0-110 degrees in 6 weeks     Plan / Patient Education:     Continue with initial plan of care.  Progress with home program as tolerated.  Plan next visit:  Review HEP, review balance exercises and progress to dynamic balance exercises      Subjective:     Pt reports he feels like he is maybe doing too much. Last session, he had down some yard work before and then came to the session and felt really sore afterwards. Still feels like the left leg is \"asleep\". Wondering if he should be doing some stretching instead of just all of the strengthening exercises for his ITB    Objective:   Significant TTP and hypertonicity of (R) mid-distal ITB    Added tandem stance on pillow with EO and EC to HEP- patient demonstrating mod sway during both exercises during the session   Reviewed supine ITB stretch- pt reports he hasn't been doing for the last 2-3 weeks. Clarified and reviewed difference between stretching and strengthening exercises     Treatment Today   TREATMENT MINUTES COMMENTS   Evaluation     Self-care/ Home management     Manual therapy 8 -IANSM to (L) ITB with Rockblades. Gliding>grade IV-VI for relaxation   -CFM to distal (L) ITB    Neuromuscular Re-education     Therapeutic Activity     Therapeutic Exercises 17 See exercise flow sheet   Gait training     Modality__________________                Total 25    Blank areas are intentional and mean the treatment did not include these items.         Jackeline Mosher, PT, DPT   7/6/2017    [facility] Discharge Summary  Patient Name: Bola Lyon  Date: 8/8/2017  Referral Diagnosis: Left knee replacement  Referring provider:  Deepak Zheng MD  Visit Diagnosis:   1. Pain and swelling of left knee     2. Muscle weakness (generalized)     3. Abnormality of gait     4. Left leg swelling     5. Status post total left knee replacement         Goals:  Pt. will demonstrate/verbalize independence in self-management of " condition in : 6 weeks  Pt. will be independent with home exercise program in : 6 weeks  Pt. will be able to walk : 20 minutes;with FWB;with less pain;with less difficulty;for household mobility;for community mobility;in 6 weeks; MET  Patient will ascend / descend: stairs;with railing;with recipricol gait;with less pain;with less difficulty;in 6 weeks; MET  Patient [place]: for driving;for watching traffic;for conversation;with partial ROM;with less difficulty;in 6 weeks  Patient will reach / maintain arm movement: overhead;behind;for home chores;for dressing;with less pain;with less difficulty;in 6 weeks  Patient will decrease : NDI score;SPADI score;by _ points;for improved quality of function;for improved quality of life;in 6 weeks  by ___ points: 5%, 10 points respectively   Pt will: demonstrate (L) knee ROM 0-110 degrees in 6 weeks; MET    Patient was seen for 33 visits from 2/10/17 to 7/6/17 with 4 cancelled appointments.  Pt was progressing well towards his goals at the time of the last session and was independent with his home exercise program focusing on ITB stretching and gluteal medius strengthening to improve overall knee stability and should continue with this program independently. The patient cancelled his last two scheduled appointments and has not called to reschedule at this time.     Therapy will be discontinued at this time.  The patient will need a new referral to resume.    Thank you for your referral.  Jackeline Mosher  8/8/2017  1:37 PM

## 2021-06-11 NOTE — PROGRESS NOTES
Patient is currently at [facility] in [place]-  Patient was in the ED 8/7 and was hospitalized at [facility] 8/18-8/25 with urinary retention.  Jan's last office visit with 9/4/2020    Next outreach/chart review will be in 1 month

## 2021-06-11 NOTE — PROGRESS NOTES
[facility] Daily Progress     Patient Name: oBla Lyon  Date: 6/12/2017  Visit #: 29  PTA visit #:  0  Referral Diagnosis: Left knee replacement   Referring provider: Eliel Fitzgerald MD  Visit Diagnosis:     ICD-10-CM    1. Pain and swelling of left knee M25.562     M25.462    2. Muscle weakness (generalized) M62.81    3. Abnormality of gait R26.9    4. Left leg swelling M79.89    5. Status post total left knee replacement Z96.652    6. Cervicalgia M54.2    7. Decreased ROM of intervertebral discs of cervical spine M53.82    8. Chronic right shoulder pain M25.511     G89.29    9. Decreased ROM of right shoulder M25.611    10. Scapular dysfunction M89.9          Assessment:     HEP/POC compliance is  good .  Patient is benefitting from skilled physical therapy and is making steady progress toward functional goals.  Patient is appropriate to continue with skilled physical therapy intervention, as indicated by initial plan of care.  (L) knee ROM 0-118 degrees. Patient demonstrates good strength of the (L) LE, though he still lacks full strength of the gluteal medius and tiffany which may be contributing to increased tension of the left ITB.     Goal Status: Progressing towards   Pt. will demonstrate/verbalize independence in self-management of condition in : 6 weeks  Pt. will be independent with home exercise program in : 6 weeks  Pt. will be able to walk : 20 minutes;with FWB;with less pain;with less difficulty;for household mobility;for community mobility;in 6 weeks  Patient will ascend / descend: stairs;with railing;with recipricol gait;with less pain;with less difficulty;in 6 weeks  Patient [place]: for driving;for watching traffic;for conversation;with partial ROM;with less difficulty;in 6 weeks  Patient will reach / maintain arm movement: overhead;behind;for home chores;for dressing;with less pain;with less difficulty;in 6 weeks  Patient will decrease : NDI score;SPADI score;by _ points;for  "improved quality of function;for improved quality of life;in 6 weeks  by ___ points: 5%, 10 points respectively   Pt will: demonstrate (L) knee ROM 0-110 degrees in 6 weeks     Plan / Patient Education:     Continue with initial plan of care.  Progress with home program as tolerated.     Subjective:     Pain Ratin, left knee   Pt reports pain on the outside of the knee is still the worst. Pt reports it feels like something snapping over the bone and then reports it starts to feel \"shocky, almost like as if you had hit your funny bone\". Also feeling really full feeling in the left leg, worst from the knee down. Pt has been walking daily. Still feels like he can only walk about 30 minutes and then feels like he is pushing it too. much.     Objective:   (L) knee flexion 118 degrees  (L) knee extension 0 degrees     (L) LE MMT   Knee extension 5/5  Knee flexion 5/5   Hip extension 4/5  Hip abduction: 4-/5    Significant TTP and hypertonicity of (R) mid-distal ITB  Moderate TTP and hypertonicity of (R) mid-distal HS     Exercises:  Exercise #1: Stationary bike   Comment #1: 5 minutes, level 2    Exercise #2: SLR, HEP  Comment #2: PROM (L) knee in prone x 4 minutes   Exercise #3: Partial lunges, HEP  Comment #3: SL balance, HEP  Exercise #4: Forward step ups- HEP  Comment #4: Lateral step ups- review  Exercise #5: Standing HS curls,  HEP  Comment #5: Standing hip abduction and extension, HEP  Exercise #6: SL balance on Airex, 30\" humberto  Comment #6: Sidestepping, 10 steps left and right x 2 with YTB  Exercise #7: Standing (L) quad stretch with chair, HEP    Patient also doing knee flexion stretch on a step, ITB stretch, HS stretch, and prone quad stretch for HEP     Treatment Today   TREATMENT MINUTES COMMENTS   Evaluation     Self-care/ Home management     Manual therapy 10 -IANSM to (L) ITB with Rockblades. Gliding>grade IV-VI for relaxation   -CFM to distal (L) ITB    Neuromuscular Re-education 5 KTape for (L) ITB " inhibition    Therapeutic Activity     Therapeutic Exercises 10 See exercise flow sheet   Gait training     Modality__________________                Total 25    Blank areas are intentional and mean the treatment did not include these items.       Jackeline Mosher  6/12/2017

## 2021-06-11 NOTE — PROGRESS NOTES
[facility] Daily Progress     Patient Name: Bola Lyon  Date: 6/5/2017  Visit #: 28  PTA visit #:  0  Referral Diagnosis: Left knee replacement   Referring provider: Eliel Fitzgerald MD  Visit Diagnosis:     ICD-10-CM    1. Pain and swelling of left knee M25.562     M25.462    2. Muscle weakness (generalized) M62.81    3. Abnormality of gait R26.9    4. Left leg swelling M79.89    5. Status post total left knee replacement Z96.652    6. Cervicalgia M54.2    7. Decreased ROM of intervertebral discs of cervical spine M53.82    8. Chronic right shoulder pain M25.511     G89.29    9. Decreased ROM of right shoulder M25.611    10. Scapular dysfunction M89.9          Assessment:     HEP/POC compliance is  good .  Patient is benefitting from skilled physical therapy and is making steady progress toward functional goals.  Patient is appropriate to continue with skilled physical therapy intervention, as indicated by initial plan of care.  (L) knee ROM 0-118 degrees    Goal Status:  Pt. will demonstrate/verbalize independence in self-management of condition in : 6 weeks  Pt. will be independent with home exercise program in : 6 weeks  Pt. will be able to walk : 20 minutes;with FWB;with less pain;with less difficulty;for household mobility;for community mobility;in 6 weeks  Patient will ascend / descend: stairs;with railing;with recipricol gait;with less pain;with less difficulty;in 6 weeks  Patient [place]: for driving;for watching traffic;for conversation;with partial ROM;with less difficulty;in 6 weeks  Patient will reach / maintain arm movement: overhead;behind;for home chores;for dressing;with less pain;with less difficulty;in 6 weeks  Patient will decrease : NDI score;SPADI score;by _ points;for improved quality of function;for improved quality of life;in 6 weeks  by ___ points: 5%, 10 points respectively   Pt will: demonstrate (L) knee ROM 0-110 degrees in 6 weeks     Plan / Patient Education:      Continue with initial plan of care.  Progress with home program as tolerated.     Subjective:     Pain Ratin, left knee   Pt reports he mowed the lawn on Friday. [place] like he is knee was more pliable and moved better then when he tried it the first time. Pt reports still had some of the increased warmth and swelling in left knee afterwards. Pt reports he was still able to do his exercises and walking over the weekend, but reports that his knee definitely feels sore today.     Objective:   (L) knee flexion 118 degrees  (L) knee extension 0 degrees     Added sidestepping with YTB to HEP     Significant TTP and hypertonicity of (R) distal ITB    Treatment Today *pt was 15 minutes late to appointment   TREATMENT MINUTES COMMENTS   Evaluation     Self-care/ Home management     Manual therapy 5 CFM to distal (R) ITB    Neuromuscular Re-education     Therapeutic Activity     Therapeutic Exercises 10 See exercise flow sheet   Gait training     Modality__________________                Total 15    Blank areas are intentional and mean the treatment did not include these items.       Jackeline Mosher  2017

## 2021-06-11 NOTE — PROGRESS NOTES
[facility] Daily Progress     Patient Name: Bola Lyon  Date: 6/15/2017  Visit #: 30  PTA visit #:  0  Referral Diagnosis: Left knee replacement   Referring provider: Deepak Zheng MD  Visit Diagnosis:     ICD-10-CM    1. Pain and swelling of left knee M25.562     M25.462    2. Muscle weakness (generalized) M62.81    3. Abnormality of gait R26.9    4. Left leg swelling M79.89    5. Status post total left knee replacement Z96.652          Assessment:     HEP/POC compliance is  good .  Patient is benefitting from skilled physical therapy and is making steady progress toward functional goals.  Patient is appropriate to continue with skilled physical therapy intervention, as indicated by initial plan of care.  Added theraband around the knees to squats for increased gluteal medius activation.     Goal Status: Progressing towards   Pt. will demonstrate/verbalize independence in self-management of condition in : 6 weeks  Pt. will be independent with home exercise program in : 6 weeks  Pt. will be able to walk : 20 minutes;with FWB;with less pain;with less difficulty;for household mobility;for community mobility;in 6 weeks  Patient will ascend / descend: stairs;with railing;with recipricol gait;with less pain;with less difficulty;in 6 weeks  Patient [place]: for driving;for watching traffic;for conversation;with partial ROM;with less difficulty;in 6 weeks  Patient will reach / maintain arm movement: overhead;behind;for home chores;for dressing;with less pain;with less difficulty;in 6 weeks  Patient will decrease : NDI score;SPADI score;by _ points;for improved quality of function;for improved quality of life;in 6 weeks  by ___ points: 5%, 10 points respectively   Pt will: demonstrate (L) knee ROM 0-110 degrees in 6 weeks     Plan / Patient Education:     Continue with initial plan of care.  Progress with home program as tolerated.  Will continue per MDs new PT orders 2x/week for 8 more weeks. Will  focus on gluteal strengthening in weightbearing to decreased tension/restriction of (R) ITB      Subjective:     Pain Ratin, left knee   Pt reports he saw the MD on Tuesday. Wants him to continue therapy 2x/week for 8 more weeks.     Objective:   (L) knee flexion 118 degrees  (L) knee extension 0 degrees       Significant TTP and hypertonicity of (R) mid-distal ITB    Added OTB to squats and bridge with A/L leg extension to HEP    Treatment Today   TREATMENT MINUTES COMMENTS   Evaluation     Self-care/ Home management     Manual therapy 12 -IANSM to (L) ITB with Rockblades. Gliding>grade IV-VI for relaxation   -CFM to distal (L) ITB    Neuromuscular Re-education     Therapeutic Activity     Therapeutic Exercises 15 See exercise flow sheet   Gait training     Modality__________________                Total 27    Blank areas are intentional and mean the treatment did not include these items.       Jackeline Mosher  6/15/2017

## 2021-06-11 NOTE — PROGRESS NOTES
Persons accompanying you (the patient) today: Self    How have you been doing since we saw you last? Please note any concerns.  10 weeks post left knee replacement.  Working on right shoulder and ruptured bicep. Working with psychiatrist and therapist with increased ptsd.  Insomnia.     Please list any surgeries or procedures you have had since we saw you last:  Left knee total replacement    Have you had any falls since your last visit? No    Do you have any pain today? Yes: Chronic pain. Knee surgery    With whom do you currently reside? (alone, spouse, family, assisted living, nursing home)  Home  If assisted living or nursing home, please note name and location of facility:              Pt. Is more alert at this time and playing on his tablet.   Provided patient with an orange slushie

## 2021-06-11 NOTE — PROGRESS NOTES
[facility] Daily Progress     Patient Name: Bola Lyon  Date: 6/26/2017  Visit #: 31  PTA visit #:  0  Referral Diagnosis: Left knee replacement   Referring provider: Deepak Zheng MD  Visit Diagnosis:     ICD-10-CM    1. Pain and swelling of left knee M25.562     M25.462    2. Muscle weakness (generalized) M62.81    3. Abnormality of gait R26.9    4. Left leg swelling M79.89    5. Status post total left knee replacement Z96.652          Assessment:     HEP/POC compliance is  good .  Patient is benefitting from skilled physical therapy and is making steady progress toward functional goals.  Patient is appropriate to continue with skilled physical therapy intervention, as indicated by initial plan of care.  Patient reports decreased knee pain after manual therapy to (L) ITB    Goal Status: Progressing towards   Pt. will demonstrate/verbalize independence in self-management of condition in : 6 weeks  Pt. will be independent with home exercise program in : 6 weeks  Pt. will be able to walk : 20 minutes;with FWB;with less pain;with less difficulty;for household mobility;for community mobility;in 6 weeks  Patient will ascend / descend: stairs;with railing;with recipricol gait;with less pain;with less difficulty;in 6 weeks  Patient [place]: for driving;for watching traffic;for conversation;with partial ROM;with less difficulty;in 6 weeks  Patient will reach / maintain arm movement: overhead;behind;for home chores;for dressing;with less pain;with less difficulty;in 6 weeks  Patient will decrease : NDI score;SPADI score;by _ points;for improved quality of function;for improved quality of life;in 6 weeks  by ___ points: 5%, 10 points respectively   Pt will: demonstrate (L) knee ROM 0-110 degrees in 6 weeks     Plan / Patient Education:     Continue with initial plan of care.  Progress with home program as tolerated.  Sidestepping with squats and FW/BW monster walking     Subjective:     Pain Rating:  6, left knee   Pt reports he has still been doing his daily walks for 30 minutes   He has had the feeling of being full and heavy in the left leg.     Objective:   Significant TTP and hypertonicity of (R) mid-distal ITB    Progressed sidestepping to OTB for HEP    Treatment Today   TREATMENT MINUTES COMMENTS   Evaluation     Self-care/ Home management     Manual therapy 12 -IANSM to (L) ITB with Rockblades. Gliding>grade IV-VI for relaxation   -CFM to distal (L) ITB    Neuromuscular Re-education     Therapeutic Activity     Therapeutic Exercises 15 See exercise flow sheet   Gait training     Modality__________________                Total 27    Blank areas are intentional and mean the treatment did not include these items.       Jackeline Mosher  6/26/2017

## 2021-06-11 NOTE — PROGRESS NOTES
[facility] Daily Progress     Patient Name: Bola Lyon  Date: 6/1/2017  Visit #: 27  PTA visit #:  0  Referral Diagnosis: Left knee replacement   Referring provider: Eliel Fitzgerald MD  Visit Diagnosis:     ICD-10-CM    1. Pain and swelling of left knee M25.562     M25.462    2. Muscle weakness (generalized) M62.81    3. Abnormality of gait R26.9    4. Left leg swelling M79.89    5. Status post total left knee replacement Z96.652    6. Cervicalgia M54.2    7. Decreased ROM of intervertebral discs of cervical spine M53.82    8. Chronic right shoulder pain M25.511     G89.29    9. Decreased ROM of right shoulder M25.611    10. Scapular dysfunction M89.9          Assessment:     HEP/POC compliance is  good .  Patient is benefitting from skilled physical therapy and is making steady progress toward functional goals.  Patient is appropriate to continue with skilled physical therapy intervention, as indicated by initial plan of care.  (L) knee ROM 0-119 degrees. Mild decrease in hypertonicity of (R) ITB and HS distally after manual therapy     Goal Status:  Pt. will demonstrate/verbalize independence in self-management of condition in : 6 weeks  Pt. will be independent with home exercise program in : 6 weeks  Pt. will be able to walk : 20 minutes;with FWB;with less pain;with less difficulty;for household mobility;for community mobility;in 6 weeks  Patient will ascend / descend: stairs;with railing;with recipricol gait;with less pain;with less difficulty;in 6 weeks  Patient [place]: for driving;for watching traffic;for conversation;with partial ROM;with less difficulty;in 6 weeks  Patient will reach / maintain arm movement: overhead;behind;for home chores;for dressing;with less pain;with less difficulty;in 6 weeks  Patient will decrease : NDI score;SPADI score;by _ points;for improved quality of function;for improved quality of life;in 6 weeks  by ___ points: 5%, 10 points respectively   Pt will:  demonstrate (L) knee ROM 0-110 degrees in 6 weeks     Plan / Patient Education:     Continue with initial plan of care.  Progress with home program as tolerated.     Subjective:     Pain Ratin, left knee   Pt reports knee is feeling a little better today. Still feeling the warmth in the left knee, seems to be associated with increased activity. Can do up and down stairs reciprocally but still some difficulty. Has been walking 30 minutes around his neighborhood.     Objective:   (L) knee flexion 119 degrees  (L) knee extension 0 degrees     Mild-mod sway R-L tandem stance. Mod-significant sway L-R tandem stance   Mod-significant sway SL stance on Airex pad   Added tandem stance to HEP    Significant TTP and hypertonicity of (R) distal ITB and HS     Treatment Today   TREATMENT MINUTES COMMENTS   Evaluation     Self-care/ Home management     Manual therapy 8 IANSM with Rockblades to (R) distal ITB and HS, grade IV-VI for relaxation    Neuromuscular Re-education     Therapeutic Activity     Therapeutic Exercises 18 See exercise flow sheet   Gait training     Modality__________________                Total 26    Blank areas are intentional and mean the treatment did not include these items.       Jackeline Mosher  2017

## 2021-06-11 NOTE — PROGRESS NOTES
"[facility] Follow Up Note    Bola Lyon   47 y.o. male    Date of Visit: 6/26/2017    Chief Complaint   Patient presents with     Medication Management     Follow-up     Subjective  Bola comes in today for follow-up of his chronic pain problems.  He has yet to get into the registry for medical cannabis.  He has been having problems with his email account as it has been frozen.  He is feeling overwhelmed with the paperwork that he is going through with regards to applying for disability due to his bipolar disease and underlying post concussion syndrome with brain injury.  He does have a  that is helping him through this process.  He continues to recover from previous surgeries, latest knee replacement was done earlier this year.  He is decreasing his narcotic use and is hoping to get off of them completely with the help of medical cannabis.    ROS A comprehensive review of systems was performed and was otherwise negative    Social History:   Social History     Social History Narrative    He is .  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  He smokes and does not drink alcohol.       Medications were reconciled.  Allergies, social and family history, and the problem list were all reviewed and updated.    Old records reviewed: Dr. Reese records    Exam  General Appearance: Pleasant and alert  Vitals:    06/26/17 1041   BP: 130/82   Pulse: 70   Resp: (!) 97   Weight: (!) 231 lb 8 oz (105 kg)   Height: 5' 6.5\" (1.689 m)      Body mass index is 36.81 kg/(m^2).  Wt Readings from Last 3 Encounters:   06/26/17 (!) 231 lb 8 oz (105 kg)   06/14/17 (!) 228 lb (103.4 kg)   05/24/17 (!) 225 lb (102.1 kg)     HEENT: Sclera are clear.   Lungs: Normal respirations  Abdomen: Soft and nondistended  Extremities: No edema  Skin: No rashes  Neuro: Moves all extremities and has facial symmetry  Gait: Ambulates with a normal " gait    Assessment/Plan  1. Chronic pain syndrome  Continue with the OxyContin, but will reduce the oxycodone down to 7 tablets maximum per day.  Next month, will plan on reducing the OxyContin from 15 down to 10 mg twice daily and consider decreasing the amount of oxycodone use per day as well.  He is going to let me know if we need to re-certify him for medical cannabis as he has not been able to complete the process due to email problems.  - oxyCODONE (OXYCONTIN) 15 mg 12 hr tablet; Take 1 tablet (15 mg total) by mouth every 12 (twelve) hours.  Dispense: 60 tablet; Refill: 0    2. Hypertension  Well-controlled.  - metoprolol succinate (TOPROL-XL) 100 MG 24 hr tablet; TAKE 2 TABLETS (200 MG TOTAL) BY MOUTH DAILY.  Dispense: 180 tablet; Refill: 3    3. Post concussion syndrome  He follows up with the concussion clinic.    4. Bipolar 2 disorder  Main reason for his disability, but the brain injury and chronic pain issues also contribute.          Abena Gentile MD  6/26/2017    Much or all of the text in this note was generated through the use of Dragon Dictate voice-to-text software. Errors in spelling or words which seem out of context are unintentional. Sound alike errors, in particular, may have escaped editing.

## 2021-06-11 NOTE — PROGRESS NOTES
[facility] Daily Progress     Patient Name: Bola Lyon  Date: 7/3/2017  Visit #: 32  PTA visit #:  0  Referral Diagnosis: Left knee replacement   Referring provider: Deepak Zheng MD  Visit Diagnosis:     ICD-10-CM    1. Pain and swelling of left knee M25.562     M25.462    2. Muscle weakness (generalized) M62.81    3. Abnormality of gait R26.9    4. Left leg swelling M79.89    5. Status post total left knee replacement Z96.652          Assessment:   PT was able to complete each exercise with little to no discomfort.  When he did feel discomfort it was when he got too low in his squat so PT was educated on how to not go as low.  PT will continue to benefit with strengthening exercises that involve multiple areas of the body especially glutes, quad, and core strength.    HEP/POC compliance is  good .  Patient is benefitting from skilled physical therapy and is making steady progress toward functional goals.  Patient is appropriate to continue with skilled physical therapy intervention, as indicated by initial plan of care.  Patient reports decreased knee pain after manual therapy to (L) ITB    Goal Status: Progressing towards   Pt. will demonstrate/verbalize independence in self-management of condition in : 6 weeks  Pt. will be independent with home exercise program in : 6 weeks  Pt. will be able to walk : 20 minutes;with FWB;with less pain;with less difficulty;for household mobility;for community mobility;in 6 weeks  Patient will ascend / descend: stairs;with railing;with recipricol gait;with less pain;with less difficulty;in 6 weeks  Patient [place]: for driving;for watching traffic;for conversation;with partial ROM;with less difficulty;in 6 weeks  Patient will reach / maintain arm movement: overhead;behind;for home chores;for dressing;with less pain;with less difficulty;in 6 weeks  Patient will decrease : NDI score;SPADI score;by _ points;for improved quality of function;for improved  quality of life;in 6 weeks  by ___ points: 5%, 10 points respectively   Pt will: demonstrate (L) knee ROM 0-110 degrees in 6 weeks     Plan / Patient Education:     Continue with initial plan of care.  Progress with home program as tolerated.  Plan next visit:  Review HEP, Side stepping with squat, monster walks FW and BW      Subjective:     PT reports that he has been hurting and he attributes the pain to the SL bridge.  During the exercise he feels discomfort in his neck and ankle. He reports good adherence to his exercise program.  He stated that he is worried about his left legs proprioception and numbness that is a result of the surgery.  PT stated that his goal is to be able to hike and hunt in the fall.        Objective:   Significant TTP and hypertonicity of (R) mid-distal ITB    PT was able to complete one set of each HEP in gym with moderate fatigue     Treatment Today   TREATMENT MINUTES COMMENTS   Evaluation     Self-care/ Home management     Manual therapy 10 -IANSM to (L) ITB with Rockblades. Gliding>grade IV-VI for relaxation   -CFM to distal (L) ITB    Neuromuscular Re-education     Therapeutic Activity     Therapeutic Exercises 20 See exercise flow sheet   Gait training     Modality__________________                Total 30    Blank areas are intentional and mean the treatment did not include these items.     Car Frias SPT    I attest that I was present in the room, making skilled assessments and directing the service provided today.  I was responsible for the assessment and treatment of the patient.  During this time, I was not engaged in treating another patient or doing other tasks    Jackeline Mosher, PT, DPT   7/3/2017

## 2021-06-11 NOTE — PROGRESS NOTES
"  Assessment / Impression   1.  Cognitive disorder NOS likely multifactorial in etiology  2.  History of multiple concussions  3.  Bipolar 1 disorder  4.  Generalized anxiety disorder  5.  Panic disorder without agoraphobia  6.  Alcohol dependence currently in remission  7.  PTSD      Plan:   1.  Continue wellness program  2.  Reattempt neuropsychometric testing February 2018    A long conversation held with the patient present today.  The patient continues his disability application.  He has been scheduled for neuropsychometric testing in February of next year.  As to the matter of the etiology of his cognitive complaints, it is reasonable to believe that a multifactorial process including effects of mental illness and repeated traumatic brain injuries is operative.  I should mention that prior neuropsychometric testing was invalid but did tend to support the idea of the mental illness was a greater player presently.  I explained to the patient the absence of effective treatments and limited utility of further diagnostic testing.  Because of this, we have agreed to proceed with the an attempt to repeat neuropsychometric testing early next year.  In the meantime we continue with wellness programming and mental health follow-up.    Total time for evaluation one hour with majority of time spent in counseling.      Subjective:     HPI: Bola Lyon is a 47 y.o. male with above-noted diagnoses who returns for follow-up.  The patient appears to be fairly stable with respect to his emotional and cognitive as well as functional status.  He continues to be plagued by what he perceives to be significant changes in cognition including \"pauses\" in speech and thought processing, robotic speech and frustration intolerance.  Clearly changes have been experienced by this patient given his background.  Again, it is uncertain as to whether these changes are principally secondary to mental health challenges, multiple past " concussions, adverse effects of current treatment or combination of any of these 3 or all of these 3 factors.  As above          Review of Systems:          As noted        Objective:     Vitals:    06/14/17 1342   BP: 152/85   Pulse: 81   Weight: (!) 228 lb (103.4 kg)       No results found for this or any previous visit (from the past 24 hour(s)).    Physical Exam: Largely deferred.  The patient is neatly groomed casually dressed and seated for evaluation.  He remains engaged in determined patient in the exam room.  I do note what appears to be some abnormality of speech teofilo and stress as noted previously.  The patient does remain socially appropriate and attentive.  Affect is a bit constricted however.

## 2021-06-12 NOTE — TELEPHONE ENCOUNTER
Jan called in because he is in need of compression stockings.     He had a neck injury which caused some paralysis in his arms and legs, currently using a walker and going to PT 3 times a day, making improvements.    When he previously saw Dr. Vásquez, he was having swelling only in the left leg, but now since his injury he is having bilateral leg swelling.  He has also lost 25lbs since he last saw Dr. Vásquez.    He is not currently wearing any compression. He would like to get stockings through [employer]. He has an appointment to see Dr. Vásquez in December. He thinks he will be able to put stockings on/off independently.

## 2021-06-12 NOTE — PROGRESS NOTES
[facility] Daily Progress     Patient Name: Bola Lyon  Date: 9/6/2017  Visit #: 4  PTA visit #:  0  Referral Diagnosis: Right shoulder pain, left knee s/p TKA  Referring provider: Pilo Bruce MD; Deepak Zheng MD  Visit Diagnosis:     ICD-10-CM    1. Chronic right shoulder pain M25.511     G89.29    2. Scapular dysfunction M89.9    3. Muscle weakness (generalized) M62.81    4. Pain and swelling of left knee M25.562     M25.462    5. Status post total left knee replacement Z96.652          Assessment:     HEP/POC compliance is  good .  Response to Intervention Palpable decrease in tone and spasm of (R) periscapular and cervical musculuatre after trigger point release.   Patient is benefitting from skilled physical therapy and is making steady progress toward functional goals.  Patient is appropriate to continue with skilled physical therapy intervention, as indicated by initial plan of care.    Goal Status:  Pt. will demonstrate/verbalize independence in self-management of condition in : 6 weeks  Pt. will be independent with home exercise program in : 6 weeks  Patient will reach / maintain arm movement: overhead;behind;for dressing;for home chores;with full ROM;with less pain;with less difficulty;in 6 weeks  Comment: pain < 3/10  Patient will be able to: gripping;holding;for lifting;for writing;for dressing;for housework;for meal preparation;with less pain;with no difficulty;in 6 weeks;Comment  Comment: pain < 3/10  Patient will decrease : SPADI score;by _ points;for improved quality of function;for improved quality of life;in 6 weeks  by ___ points: 8    Plan / Patient Education:     Continue with initial plan of care.  Progress with home program as tolerated.  Assess KTape, add prone rows and ball circles on the wall    Subjective:     Pt reports he has been doing his neck stretches and he feels like they help that day but then he wakes up the next morning and feels like he is back to  square one.     Objective:   Significant TTP and hypertonicity of (R) UT, scalenes, levator, and rhomboids      Treatment Today     TREATMENT MINUTES COMMENTS   Evaluation     Self-care/ Home management     Manual therapy 16 -SOR  -TPR to (R) UT and scalenes with patient in supine   -TPR to (R) levator scapula and rhomboids with patient prone   Neuromuscular Re-education 8 KTape instructions and application to (R) UT and rhomboids    Therapeutic Activity     Therapeutic Exercises     Gait training     Modality__________________                Total 24    Blank areas are intentional and mean the treatment did not include these items.       Jackeline Mosher  9/6/2017

## 2021-06-12 NOTE — TELEPHONE ENCOUNTER
Central PA team  [phone]  Pool: [facility] ([id number])          PA has been initiated.       PA form completed and faxed insurance via Cover My Meds     Key:  [id number]     Medication:  Lidocaine patches     Insurance:  [employer] medicare        Response will be received via fax and may take up to 5-10 business days depending on plan

## 2021-06-12 NOTE — PROGRESS NOTES
[facility] Daily Progress     Patient Name: Bola Lyon  Date: 8/30/2017  Visit #: 2  PTA visit #:  0  Referral Diagnosis: Right shoulder pain, left knee s/p TKA  Referring provider: Pilo Bruce MD; Deepak Zheng MD  Visit Diagnosis:     ICD-10-CM    1. Chronic right shoulder pain M25.511     G89.29    2. Scapular dysfunction M89.9    3. Muscle weakness (generalized) M62.81    4. Pain and swelling of left knee M25.562     M25.462    5. Status post total left knee replacement Z96.652          Assessment:     HEP/POC compliance is  good .  Response to Intervention Significant increased in neural tension on the (R) UE, but good tolerance to manual and self nerve glides with reports of relief afterwards   Patient is benefitting from skilled physical therapy and is making steady progress toward functional goals.  Patient is appropriate to continue with skilled physical therapy intervention, as indicated by initial plan of care.    Goal Status:  Pt. will demonstrate/verbalize independence in self-management of condition in : 6 weeks  Pt. will be independent with home exercise program in : 6 weeks  Patient will reach / maintain arm movement: overhead;behind;for dressing;for home chores;with full ROM;with less pain;with less difficulty;in 6 weeks  Comment: pain < 3/10  Patient will be able to: gripping;holding;for lifting;for writing;for dressing;for housework;for meal preparation;with less pain;with no difficulty;in 6 weeks;Comment  Comment: pain < 3/10  Patient will decrease : SPADI score;by _ points;for improved quality of function;for improved quality of life;in 6 weeks  by ___ points: 8    Plan / Patient Education:     Continue with initial plan of care.  Progress with home program as tolerated.  Review nerve glides, add stretches for cervical spine     Subjective:     Pt reports shoulder feels about the same. Noticing he is dropping things a lot with the right hand       Objective:   Pt reports  increased median nerve tension during initial ER of shoulder. Pt reports relief of tension from hand and forearm after manual nerve glides.   Significant TTP and scar tissue along (R) pectoral     Treatment Today     TREATMENT MINUTES COMMENTS   Evaluation     Self-care/ Home management     Manual therapy 10 TPR/CFM to (R) pectoral region   TPR to (R) superior aspect of scapula    Neuromuscular Re-education 15 Manual median nerve glides focusing on shoulder ER and elbow extension  Medial nerve slider x 10   Radial nerve slider x 10    Therapeutic Activity     Therapeutic Exercises 5 Reviewed exercises from previous episodes of care    Gait training     Modality__________________                Total 30    Blank areas are intentional and mean the treatment did not include these items.       Jackeline Mosher  8/30/2017

## 2021-06-12 NOTE — PROGRESS NOTES
[facility] Daily Progress     Patient Name: oBla Lyon  Date: 9/1/2017  Visit #: 3  PTA visit #:  0  Referral Diagnosis: Right shoulder pain, left knee s/p TKA  Referring provider: Pilo Bruce MD; Deepak Zheng MD  Visit Diagnosis:     ICD-10-CM    1. Chronic right shoulder pain M25.511     G89.29    2. Scapular dysfunction M89.9    3. Muscle weakness (generalized) M62.81    4. Pain and swelling of left knee M25.562     M25.462    5. Status post total left knee replacement Z96.652          Assessment:     HEP/POC compliance is  good .  Response to Intervention Palpable decrease in tone and spasm of (R) levator scapula and rhomobids after trigger point release. Pt initially demonstrating poor activation of rhomboids and LT with prone I exercises, but improved after cues were given.   Patient is benefitting from skilled physical therapy and is making steady progress toward functional goals.  Patient is appropriate to continue with skilled physical therapy intervention, as indicated by initial plan of care.    Goal Status:  Pt. will demonstrate/verbalize independence in self-management of condition in : 6 weeks  Pt. will be independent with home exercise program in : 6 weeks  Patient will reach / maintain arm movement: overhead;behind;for dressing;for home chores;with full ROM;with less pain;with less difficulty;in 6 weeks  Comment: pain < 3/10  Patient will be able to: gripping;holding;for lifting;for writing;for dressing;for housework;for meal preparation;with less pain;with no difficulty;in 6 weeks;Comment  Comment: pain < 3/10  Patient will decrease : SPADI score;by _ points;for improved quality of function;for improved quality of life;in 6 weeks  by ___ points: 8    Plan / Patient Education:     Continue with initial plan of care.  Progress with home program as tolerated.  Review prone I's, add prone rows and ball circles on wall     Subjective:     Pt reports the fingers in his right hand  "were numb the next morning after the day we worked on his shoulder but it did go away.     Objective:   Significant TTP and hypertonicity (B) suboccipitals, (R) levator scapula, and (R) rhomboids  Increased UT activation during prone I's but patient able to correct with verbal and tactile cues     Treatment Today     TREATMENT MINUTES COMMENTS   Evaluation     Self-care/ Home management     Manual therapy 20 -SOR  -TPR to (R) levator scapula and rhomboids    Neuromuscular Re-education     Therapeutic Activity     Therapeutic Exercises 10 Seated cervical rotation stretch with towel x 30\" (B)  Seated cervical flexion stretch with towel x 30\"   Prone I's x 8   Gait training     Modality__________________                Total 30    Blank areas are intentional and mean the treatment did not include these items.       Jackeline Mosher  9/1/2017    "

## 2021-06-12 NOTE — PROGRESS NOTES
[facility] Daily Progress     Patient Name: Bola Lyon  Date: 9/12/2017  Visit #: 5  PTA visit #:  0  Referral Diagnosis: Right shoulder pain, left knee s/p TKA  Referring provider: Pilo Bruce MD; Deepak Zheng MD  Visit Diagnosis:     ICD-10-CM    1. Chronic right shoulder pain M25.511     G89.29    2. Scapular dysfunction M89.9    3. Muscle weakness (generalized) M62.81    4. Pain and swelling of left knee M25.562     M25.462    5. Status post total left knee replacement Z96.652          Assessment:     HEP/POC compliance is  good .  Response to Intervention Palpable decrease in tone and spasm of (R) periscapular musculuatre after trigger point release.   Patient is benefitting from skilled physical therapy and is making steady progress toward functional goals.  Patient is appropriate to continue with skilled physical therapy intervention, as indicated by initial plan of care.    Goal Status:  Pt. will demonstrate/verbalize independence in self-management of condition in : 6 weeks  Pt. will be independent with home exercise program in : 6 weeks  Patient will reach / maintain arm movement: overhead;behind;for dressing;for home chores;with full ROM;with less pain;with less difficulty;in 6 weeks  Comment: pain < 3/10  Patient will be able to: gripping;holding;for lifting;for writing;for dressing;for housework;for meal preparation;with less pain;with no difficulty;in 6 weeks;Comment  Comment: pain < 3/10  Patient will decrease : SPADI score;by _ points;for improved quality of function;for improved quality of life;in 6 weeks  by ___ points: 8    Plan / Patient Education:     Continue with initial plan of care.  Progress with home program as tolerated.  Continue with manual therapy to (R) shoulder and upper back. May trial Rockblade tools next time. Assess Ktape to (R) shoulder blade    Subjective:   Pain level: 6/10, right shoulder/upper back   Pt reports he is having more spasm in the upper  "back area between the spine and right shoulder blade.     Objective:   Significant TTP and hypertonicity of (R) levator and rhomboids     HEP: Prone I's and Quadruped A/L arm lifts      Treatment Today     TREATMENT MINUTES COMMENTS   Evaluation     Self-care/ Home management     Manual therapy 14  -TPR to (R) levator scapula and rhomboids with patient L S/L and then prone   Neuromuscular Re-education 5 KTape instructions and application to (R) scapula for retraction    Therapeutic Activity     Therapeutic Exercises 8 -Cat/camel, 3x each direction hold 5\"  -Quadruped A/L arm lifts x 10    Gait training     Modality__________________                Total 27    Blank areas are intentional and mean the treatment did not include these items.       Jackeline Mosher  9/12/2017    "

## 2021-06-12 NOTE — PROGRESS NOTES
Clinic Care Coordination Contact  [facility]/Voicemail       Clinical Data: Care Coordinator Outreach  Outreach attempted x 2.  Left message on patient's voicemail with call back information and requested return call.  Plan: Care Coordinator will send unable to contact letter with care coordinator contact information via [employer]. Care Coordinator will try to reach patient again in 2 weeks.  11/23

## 2021-06-12 NOTE — TELEPHONE ENCOUNTER
Refill Request  Did you contact pharmacy: Yes  Medication name:   Requested Prescriptions     Pending Prescriptions Disp Refills     hydroCHLOROthiazide (HYDRODIURIL) 25 MG tablet 60 tablet 11     Sig: Take 1 tablet (25 mg total) by mouth 2 (two) times a day at 9am and 6pm.     Who prescribed the medication: Devon Lund MD   Requested Pharmacy: [facility]  Is patient out of medication: Yes  Patient notified refills processed in 3 business days:  no  Okay to leave a detailed message: yes  [phone]    FYI: Patient stated that he is in a facility where they only use [place] [facility] for medications. Patient stated that he would like a prescription sent to [place]. Patient stated that he haven't been needing to use this medication for some time and now he needs this medication because he's got swelling on both legs. Patient stated that he is scheduled to see Devon Lund MD next week.

## 2021-06-12 NOTE — PROGRESS NOTES
"[facility] Follow Up Note    Bola Lyon   47 y.o. male    Date of Visit: 2017    Chief Complaint   Patient presents with     Med check     Subjective  Bola comes in today to discuss his ongoing chronic pain issues.  His certification for medical cannabis has  and he needs to be recertified.  He is now able to afford the program and he would like to try it.  He continues to struggle with postoperative pain issues, chronic pain syndrome and problems related to his brain injury.  He would like to get off of the OxyContin and oxycodone at some time but needs a substitute.  He is in the process of getting disability for his brain injuries.    ROS A comprehensive review of systems was performed and was otherwise negative    Social History:   Social History     Social History Narrative    He is .  He has been a  and also a counselor, and worked with [employer].  He smokes and does not drink alcohol.       Medications were reconciled.  Allergies, social and family history, and the problem list were all reviewed and updated.    Exam  General Appearance: Pleasant and alert  Vitals:    17 1217   BP: 132/62   Patient Site: Left Arm   Patient Position: Sitting   Cuff Size: Adult Large   Pulse: 76   Weight: (!) 223 lb 6 oz (101.3 kg)   Height: 5' 6.5\" (1.689 m)      Body mass index is 35.51 kg/(m^2).  Wt Readings from Last 3 Encounters:   17 (!) 223 lb 6 oz (101.3 kg)   17 (!) 231 lb 8 oz (105 kg)   17 (!) 228 lb (103.4 kg)     HEENT: Sclera are clear.   Lungs: Normal respirations  Abdomen: Soft and nondistended  Extremities: No edema  Skin: No rashes  Neuro: Moves all extremities and has facial symmetry  Gait: Ambulates with a normal gait    Assessment/Plan  1. Chronic pain syndrome  Meds are renewed.  Recertification for medical cannabis under a different email was done.  - oxyCODONE (OXYCONTIN) 15 mg 12 hr tablet; Take " 1 tablet (15 mg total) by mouth every 12 (twelve) hours.  Dispense: 60 tablet; Refill: 0    2. Hypertension  Blood pressure is currently stable.  - metoprolol succinate (TOPROL-XL) 100 MG 24 hr tablet; TAKE 2 TABLETS (200 MG TOTAL) BY MOUTH DAILY.  Dispense: 180 tablet; Refill: 3          April D MD [place]  7/24/2017    Much or all of the text in this note was generated through the use of Dragon Dictate voice-to-text software. Errors in spelling or words which seem out of context are unintentional. Sound alike errors, in particular, may have escaped editing.

## 2021-06-12 NOTE — TELEPHONE ENCOUNTER
I called Jan on 10/13 after receiving a message from Dr. Vásquez's RN, Loree Lundberg, that he had an order in to come get fit for compression stockings. I called his number listed in Epic and left a VM with the scheduling line for him to call back to get an appnt set up.

## 2021-06-12 NOTE — PROGRESS NOTES
S: Patient was seen in [place] to be measured for closed toe knee high compression stockings 20-30 mmHg but the patient insists he has been wearing open toe thigh highs so this order modification was requested.    O: Goal is to evaluate and measure patient for a compression garment that will help control his lymphedema. Observations show there is swelling present from lymphatic fluid. The expected outcome with compliant use is to reduce swelling and control the patient s condition.     A: Measurements were taken for thigh highs he used to wear- Medi Plus thigh highs 20-30 mmHe. He would like one pair in black and one pair in beige. Once Dr. Vásquez gives approval and once Jan calls back to confirm the address he would like his items shipped to, they will be ordered from [facility] for drop shipping at his address. He is happy with this plan and is familiar with use, care, and how the garments should fit/feel.    P: Jan is to call with any other concerns or questions.  Goal is to maintain a home program.

## 2021-06-12 NOTE — TELEPHONE ENCOUNTER
I called Jan on 11/2/2020 since I did not get a call from him with his address he would like his stocking shipped to. I left a VM and let him know that I heard back from Dr. Vásquez and that she wants him in closed toe unless he agrees to checking his feet twice per day to ensure he doesn't have any redness or ulcers forming at the base of his toes. I told him why it would be safer for him to wear closed toe. I asked him to call me back with his address he would like his stockings shipped to, as well as his preference for closed or open toe.

## 2021-06-12 NOTE — PROGRESS NOTES
"Bola Lyon is a 50 y.o. male who is being evaluated via a billable video visit.      The patient has been notified of following:     \"This video visit will be conducted via a call between you and your physician/provider. We have found that certain health care needs can be provided without the need for an in-person physical exam.  This service lets us provide the care you need with a video conversation.  If a prescription is necessary we can send it directly to your pharmacy.  If lab work is needed we can place an order for that and you can then stop by our lab to have the test done at a later time.    Video visits are billed at different rates depending on your insurance coverage. Please reach out to your insurance provider with any questions.    If during the course of the call the physician/provider feels a video visit is not appropriate, you will not be charged for this service.\"    Patient has given verbal consent to a Video visit? Yes  How would you like to obtain your AVS? AVS Preference: MyChart.  If dropped by the video visit, the video invitation should be sent to: Text to cell phone: [phone]  Will anyone else be joining your video visit? No    Patient is here for a follow up appointment with Dr. Cramer. Patient has neck, low back, bilateral legs but right leg is worse pain, describes the pain as constant, aching and shooting,rates the pain as 10/10. Patient needs refills for baclofen, gabapentin and Vistaril.  CSA and UDT are deferred due to COVID 19. Functionality is 7. Patient states their pain interferes with sleep, work, ADL's and relationships.    Mary Jo Irvin LPN  "

## 2021-06-12 NOTE — PATIENT INSTRUCTIONS - HE
PLAN:  Dr. Cramer to contact Jamari Da Silva NP about ongoing management.    Follow-up Dr. Cramer in 8 weeks.

## 2021-06-12 NOTE — TELEPHONE ENCOUNTER
I called Jan on 10/14 after receiving a VM from him last night to get an appnt scheduled. I called him back and left another VM in return with the scheduling line for him to call back and get an appnt set up.

## 2021-06-12 NOTE — PROGRESS NOTES
Clinic Care Coordination Contact  [facility]/Voicemail       Clinical Data: Care Coordinator Outreach  Outreach attempted x 1.  Left message on patient's voicemail with call back information and requested return call.  Plan: Care Coordinator follow up on [facility] and to update goal progression Care Coordinator will try to reach patient again in 10 business days.  11/3/2020

## 2021-06-12 NOTE — PROGRESS NOTES
"Assessment/Plan:     Problem List Items Addressed This Visit     AC (acromioclavicular) arthritis (Chronic)    Cervical radiculopathy at C8 (Chronic)              No follow-ups on file.    There are no Patient Instructions on file for this visit.      Subjective:       50 y.o. male Bola Lyon is a 50 y.o. male who is being evaluated via a billable video visit.      The patient has been notified of following:     \"This video visit will be conducted via a call between you and your physician/provider. We have found that certain health care needs can be provided without the need for an in-person physical exam.  This service lets us provide the care you need with a video conversation.  If a prescription is necessary we can send it directly to your pharmacy.  If lab work is needed we can place an order for that and you can then stop by our lab to have the test done at a later time.    Video visits are billed at different rates depending on your insurance coverage. Please reach out to your insurance provider with any questions.    If during the course of the call the physician/provider feels a video visit is not appropriate, you will not be charged for this service.\"    Patient has given verbal consent to a Video visit? yes  How would you like to obtain your AVS?   If dropped by the video visit, the video invitation should be sent to:   Will anyone else be joining your video visit?         Video Start Time:     Additional provider notes:       Video-Visit Details    Type of service:  Video Visit    Video End Time (time video stopped):   Originating Location (pt. Location):     Distant Location (provider location):  [facility]     Platform used for Video Visit:         Bola had called last week to review his circumstances and reviews it again today.    Describes in early June he had fallen out of bed and landed on his right side.  He was concerned he had damage to his neck with a history of " "cervical fusion and history of lumbar problems.  He follow-up with [facility] orthopedics.  He ended up needing a another surgery at the cervical area and eventually lumbar area.  He described doing well in physical therapy first 5 weeks then began to deteriorate.  Describes being found there was a problem with his cervical surgery with compression.  He did return to surgery.  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  He describes he is trying to walk with a walker and a cane and can do some by himself but will need a wheelchair for longer term activity.  He has been doing outpatient physical therapy at [place].  He left the hospital and went to a group home recently.    Reviews concerns with his previous history of PTSD, has been in touch with his psychiatrist Dr. Sheridan who has been adjusting Seroquel lithium and has had lamotrigine added back.    He describes presently is on hydromorphone 4 mg 3 times a day and 2 to 4 mg at bedtime.  Ports each dose last 2 hours.  This was used as initially he was the only agent he can have IV and down the G-tube.  He is now eating well.  Describes wakes every night with pain.  Has significant pain down his legs.  He has developed lymphedema in his right leg now both are swollen and red and he is working with Dr. Vásquez with compression stockings.    He has had some rigidity in his back for which he uses diazepam 5 mg 3 times a day.    Describes he has not yet resumed ketamine which is been helpful for the past, in part because of the different pharmacies used.      Current Outpatient Medications:      acetaminophen (TYLENOL) 650 MG CR tablet, Take 1,300 mg by mouth every 8 (eight) hours as needed for pain., Disp: , Rfl:      albuterol (PROAIR HFA;PROVENTIL HFA;VENTOLIN HFA) 90 mcg/actuation inhaler, INHALE 2 PUFFS BY MOUTH EVERY 6 HOURS AS NEEDED FOR WHEEZING, Disp: 3 Inhaler, Rfl: 3     baclofen (LIORESAL) 10 MG tablet, TAKE 1 & 1/2 (ONE & ONE-HALF) TABLETS BY MOUTH " THREE TIMES DAILY, Disp: 90 tablet, Rfl: 3     calcium citrate (CALCITRATE) 200 mg (950 mg) tablet, Take 950 mg by mouth 2 (two) times a day., Disp: , Rfl:      dextroamphetamine-amphetamine (ADDERALL) 30 mg Tab, Take 0.5 tab (15mg) BID, Disp: 30 tablet, Rfl: 0     diazePAM (VALIUM) 5 MG tablet, One tab every four hours as needed, Disp: 42 tablet, Rfl: 1     ergocalciferol (ERGOCALCIFEROL) 1,250 mcg (50,000 unit) capsule, Take 50,000 Units by mouth every 7 days., Disp: , Rfl:      fexofenadine (ALLEGRA) 180 MG tablet, Take 180 mg by mouth daily as needed., Disp: , Rfl:      gabapentin (NEURONTIN) 300 MG capsule, Take 2 capsules (600mg) by mouth every afternoon and 2 caps (600mg) before bed., Disp: 120 capsule, Rfl: 2     HYDROmorphone (DILAUDID) 2 MG tablet, Take 1 tab (2 mg) for pain 1-5/10 and 2 tabs (4mg) for pain 6-10 and one time overnight between 8002-7009. Hold if sedated, AMS, Disp: , Rfl:      HYDROmorphone (DILAUDID) 4 MG tablet, Take 1 tablet by mouth 3 times daily., Disp: , Rfl:      hydrOXYzine pamoate (VISTARIL) 50 MG capsule, Take 1 capsule (50 mg total) by mouth every 6 (six) hours as needed., Disp: 60 capsule, Rfl: 0     lamoTRIgine (LAMICTAL) 150 MG tablet, Take 1 tablet (150 mg total) by mouth 2 (two) times a day., Disp: 60 tablet, Rfl: 11     lansoprazole (PREVACID) 30 MG capsule, Take 30 mg by mouth daily., Disp: , Rfl:      lidocaine (LIDODERM) 5 %, Apply 1 patch topically to painful area of skin once daily and remove per schedule., Disp: , Rfl:      lithium 300 MG capsule, Take 300 mg by mouth 2 (two) times a day., Disp: , Rfl: 1     methocarbamoL (ROBAXIN) 750 MG tablet, Take 750 mg by mouth every 4 (four) hours., Disp: , Rfl:      miscellaneous medical supply Misc, For home use., Disp: , Rfl:      miscellaneous medical supply Misc, Hospital bed with mattress and 1/2 rails. Semi-electric bed. Length of need 99 months. Bed extender:no. Group 1 mattress, Disp: , Rfl:      polyethylene glycol  "(GLYCOLAX) 17 gram/dose powder, Take 17 g by mouth daily., Disp: , Rfl:      QUEtiapine (SEROQUEL) 200 MG tablet, Take 200 mg by mouth bedtime., Disp: , Rfl:      QUEtiapine (SEROQUEL) 50 MG tablet, Take 50 mg by mouth 4 (four) times a day., Disp: , Rfl:      senna-docusate (PERICOLACE) 8.6-50 mg tablet, Take 2 tablets by mouth 2 (two) times a day., Disp: , Rfl:      zolpidem (AMBIEN) 10 mg tablet, Take 10 mg by mouth at bedtime., Disp: , Rfl:      hydroCHLOROthiazide (HYDRODIURIL) 25 MG tablet, Take 1 tablet (25 mg total) by mouth 2 (two) times a day at 9am and 6pm., Disp: 60 tablet, Rfl: 11     metoprolol succinate (TOPROL-XL) 100 MG 24 hr tablet, Take 1 tablet (100 mg total) by mouth daily., Disp: 90 tablet, Rfl: 3     nicotine (NICODERM CQ) 21 mg/24 hr, Place 1 patch on the skin daily., Disp: 30 patch, Rfl: 1     nicotine polacrilex (COMMIT) 2 MG lozenge, Apply 2 mg to cheek every 2 (two) hours as needed., Disp: , Rfl:      oxyCODONE (ROXICODONE) 10 mg immediate release tablet, Take 2 tablets (20 mg total) by mouth 5 (five) times a day for 14 days., Disp: 140 tablet, Rfl: 0    Current Facility-Administered Medications:      cyanocobalamin injection 1,000 mcg, 1,000 mcg, Intramuscular, Q7 Days, Devon Lund MD, 1,000 mcg at 01/27/20 1603    Video he is alert, wearing a cervical collar, clear sensorium thought process is logical.  Affect is congruent.  He is not quite as rapid verbal he has other times he is seen.                 Objective:     Vitals:    11/04/20 1407   Weight: 195 lb (88.5 kg)   Height: 5' 7\" (1.702 m)   PainSc: 10-Worst pain ever   PainLoc: Neck         Assessment: Previous chronic pain, now with cervical fusion after cervical myelopathy and lumbar stenosis.    Reports a lengthy recovery now is moved to a group home where he will continue with physical therapy.    He reports inadequate pain control, noting prior to the surgery had been taking  oxycodone 10 mg 8  times a day.   " reviewed.    Plan: I will discuss with his nurse practitioner Jamari Da Silva the ongoing plan for management, pain.  Patient prefers a longer acting opioid.  We will also see if he can resume ketamine which should provide some benefit.    Total time more than 25 minutes.      11/5, discussed with Jamari Da Silva, patient is a longer out of his patient is no longer under his care given he has moved into the group home.  He understands Dr. Lund the primary care doctors involved.    He reports that pain for the call was started by the pain service while he was in the hospital.  The did not make changes, pain complaints were not a limiting factor in his recovery.    I have sent in a prescription for oxycodone 10 mg 4 times a day.    Discussed with the patient yesterday ketamine, he is concerned that may be complicated to do with the pharmacies involved.    Attempted to call patient, his voicemail is not set up.    This note has been dictated using voice recognition software. Any grammatical or context distortions are unintentional and inherent to the software

## 2021-06-12 NOTE — TELEPHONE ENCOUNTER
2020 Prior Authorization Request  Who's requesting PA: Pharmacy  Provider: Shoaib  Pharmacy Name, Location & Phone # : [facility] - [place] - [address]  [phone]  Medication Name: Lidocaine 5% patches, 1/day  Quantity: 30  Refills: 4  Days Supply: 30  Medication Instructions: Apply 1 patch topically to painful area of skin once daily and remove per schedule  Insurance Plan: [employer] [place] MEDIC[employer]VANTAGE  Insurance Member ID & GRP # : [id number]   CoverMyMeds Key: [id number]  Informed patient that prior authorizations can take up to 10 business days for response: YES  Okay to leave a detailed message: YES    Route to: [facility] PA MED ([id number])

## 2021-06-12 NOTE — PROGRESS NOTES
[facility]   Initial Evaluation    Patient Name: Bola Lyon  Date of evaluation: 8/22/2017  Referral Diagnosis:  Right shoulder pain, left knee pain s/p TKA  Referring provider: Pilo Bruce MD; Deepak Zheng MD   Visit Diagnosis:     ICD-10-CM    1. Chronic right shoulder pain M25.511     G89.29    2. Scapular dysfunction M89.9    3. Muscle weakness (generalized) M62.81    4. Pain and swelling of left knee M25.562     M25.462    5. Status post total left knee replacement Z96.652        Assessment:    Bola is a 47 year old male presenting with complaints of right shoulder and left knee pain. Pt's pain is chronic in nature with previous history of multiple surgeries on the right shoulder and s/p left TKA on 3/20/17. Examination reveals painful right shoulder ROM with scapular dyskinesia. Pt also demonstrate significantly decreased cervical ROM with tenderness and increased tone of the (R) UT and levator. Mild limitations in (R) shoulder strength. Pt also demonstrates mild decreased (L) knee ROM with mild decrease in (L) gluteal strength. Due to impairments, the patient has increased difficulty with reaching overhead or behind, gripping or holding objects, lifting, and walking > 1 mile. Pt will benefit from skilled therapy in order to improve impairments and improve overall tolerance to his functional activities.   Barriers to achieving goals as noted in the assessment section may affect outcome.  Prognosis to achieve goals is  fair   Pt. is appropriate for skilled PT intervention as outlined in the Plan of Care (POC).  Pt. is a good candidate for skilled PT services to improve pain levels and function.    Goals:  Pt. will demonstrate/verbalize independence in self-management of condition in : 6 weeks  Pt. will be independent with home exercise program in : 6 weeks  Patient will reach / maintain arm movement: overhead;behind;for dressing;for home chores;with full ROM;with less pain;with  less difficulty;in 6 weeks  Comment: pain < 3/10  Patient will be able to: gripping;holding;for lifting;for writing;for dressing;for housework;for meal preparation;with less pain;with no difficulty;in 6 weeks;Comment  Comment: pain < 3/10  Patient will decrease : SPADI score;by _ points;for improved quality of function;for improved quality of life;in 6 weeks  by ___ points: 8    Patient's expectations/goals are realistic.    Barriers to Learning or Achieving Goals:  Chronicity of the problem.  Co-morbidities or other medical factors.  Chronic pain syndrome, primary lymphedema, PTSD       Plan / Patient Instructions:        Plan of Care:   Communication with: Referral Source  Patient Related Instruction: Treatment plan and rationale;Nature of Condition;Basis of treatment;Self Care instruction;Body mechanics;Posture;Precautions;Next steps;Expected outcome  Times per Week: 2  Number of Weeks: 6  Number of Visits: 12  Therapeutic Exercise: ROM;Strengthening;Stretching  Neuromuscular Reeducation: posture;balance/proprioception;kinesio tape;TNE;core  Manual Therapy: soft tissue mobilization;myofascial release;joint mobilization;muscle energy  Modalities: TENS;electrical stimulation;cold pack;hot pack  Equipment: theraband    Plan for next visit: Add RC strengthening and scapular stabilization exercises next time (TB ER, ball circles on wall, supine SA punches), cervical ROM and flexibility, manual therapy to (R) shoulder as needed for pain management, gluteal strengthening for (L) knee      Subjective:         Social information:   Living Situation:single family home   Occupation:unemployed   Work Status:NA   Equipment Available: None    History of Present Illness:    Boal is a 47 y.o. male who presents to therapy today with complaints of right shoulder pain. Pain is chronic in nature with previous history of a RC repair, biceps tendon repair, and pectoral repair. Pt has had previous PT for shoulder pain, but recently  having difficulty managing pain on his own. He did receive injections in the right shoulder at the end of July. Pt reports he feels like the pain is a little more dull since the injections, but those usually only last about 3 months.   Most recent MRI showed signs of impingement of the right RC musculature as well as some of the nerves   Pt c/o numbness and tingling in the right thumb, index, and middle finger   Pt reports he can't reach behind his back. Difficulty with trying to wash or do his hair or washing his back. Also difficult to reach overhead.  Pt reports weakness with his . Does notice he drops things more frequently with his right hand. States his hands cramps up if he is using it a lot.     Pt reports the knee has been feeling overall good. Does get pain and more tired after walking or standing long periods of time.   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 Walking probably about 2.5 miles on  and Sundays and having to do some squatting. Notices significant fatigue in the left leg by the end of his shifts.       Pain Ratin  Pain rating at best: 3  Pain rating at worst: 7  Pain description: aching, burning, dull, numbness and tingling    Functional limitations are described as occurring with:   gripping, holding and manipulating fingers  lifting  looking up, looking down and turning head  personal cares donning shirt or jacket, combing hair, washing hair and washing body  reaching overhead and behind back  performing routine daily activities  walking > 1 mile    Patient reports benefit from:  anti-inflammatory, pain medication, cold, TENS       Objective:      Patient Outcome Measures :    Shoulder Pain and Disability Index (SPADI) in %: 46.9   Scores range from 0-100%, where a score of 0% represents minimal pain and maximal function. The minimal clincically important difference is a score reduction  of 10%.  Lower Extremity Functional Scale (_/80): 41   Scores range from 0-80, where a score of 80 represents maximum function. The minimal clinically important difference is a positive change of 9 points.    Examination  1. Chronic right shoulder pain     2. Scapular dysfunction     3. Muscle weakness (generalized)     4. Pain and swelling of left knee     5. Status post total left knee replacement       Involved side: Right shoulder, left knee   Posture Observation:      Cervical:  Decreased natural cervical lordosis   Shoulder/Thoracic complex: Severe right scapular protraction  Mild left scapular protraction   Moderate right scapular winging      Cervical ROM  Date: 8/22/2017     *Indicate scale AROM AROM AROM   Cervical Flexion Mod restrict, tight     Cervical Extension Mod restrict, tight and mild pain      Right Left Right Left Right Left   Cervical Sidebending Max restrict, pain Mod restrict, pain       Cervical Rotation Mod restrict, pain Min restrict, pain       Cervical Protraction      Cervical Retraction      Thoracic Flexion      Thoracic Extension      Thoracic Sidebending         Thoracic Rotation           Shoulder/Elbow ROM  Date: 8/22/2017     Shoulder and Elbow ROM ( )   AROM in degrees AROM in degrees AROM in degrees    Right Left Right Left Right Left   Shoulder Flexion (0-180 ) WNL, pain at 90 to end range WNL       Shoulder Abduction (0-180 ) WNL, pain at 90 to end range WNL       Shoulder Extension (0-60 )         Shoulder ER (0-90 ) Hand to back of head, pain Hand to T2       Shoulder IR (0-70 ) Hand to (R) buttock, pain Thumb to T7       Shoulder IR/EXT         Elbow Flexion (150 )         Elbow Extension (0 )          PROM in degrees PROM in degrees PROM in degrees    Right Left Right Left Right Left   Shoulder Flexion (0-180 ) WNL        Shoulder Abduction (0-180 ) WNL        Shoulder Extension (0-60 )         Shoulder ER (0-90 ) WNL        Shoulder IR (0-70 ) WNL        Elbow Flexion  (150 )         Elbow Extension (0 )           Shoulder/Elbow Strength  Date: 8/22/2017     Shoulder/Elbow Strength (/5)  Manual Muscle Test (MMT) MMT MMT MMT    Right Left Right Left Right Left   Shoulder Flexion 5- 5       Supraspinatus         Shoulder Abduction 5 5       Shoulder Extension         Shoulder External Rotation 5- 5       Shoulder Internal Rotation 5 5       Elbow Flexion         Elbow Extension         Other:         Other:           (L)  strength: 48 kg  (R)  strength: 60 kg, with pain     Subjectively diminished sensation (R) C5, C7, C8, and T1 dermatome    Palpation: Significant TTP medial border of (R) scapula, (R) UT and levator, (R) subscapular, infraspinatus, and teres minor as well as (R) pectorals       Knee ROM:    Date: 8/22/2017      Knee ROM ( ) AROM in degrees AROM in degrees AROM in degrees    Right Left Right Left Right Left   Knee Flexion (0-130 )  115       Knee extension (0 )  0        PROM in degrees PROM in degrees PROM in degrees    Right Left Right Left Right Left   Knee Flexion (0-130 )         Knee extension (0 )           (L) glute med 4+/5   (L) glute max 4/5          Treatment Today     TREATMENT MINUTES COMMENTS   Evaluation 37    Self-care/ Home management     Manual therapy     Neuromuscular Re-education     Therapeutic Activity     Therapeutic Exercises 8 Educated patient about PT diagnosis, prognosis, POC, and HEP; see exercise flow sheet    Gait training     Modality__________________                Total 45    Blank areas are intentional and mean the treatment did not include these items.     PT Evaluation Code: (Please list factors)  Patient History/Comorbidities: Chronicity of problem, chronic pain syndrome, primary lymphedema, PTSD  Examination: Right shoulder, left knee   Clinical Presentation: Stable  Clinical Decision Making: Low    Patient History/  Comorbidities Examination  (body structures and functions, activity limitations, and/or participation  restrictions) Clinical Presentation Clinical Decision Making (Complexity)   No documented Comorbidities or personal factors 1-2 Elements Stable and/or uncomplicated Low   1-2 documented comorbidities or personal factor 3 Elements Evolving clinical presentation with changing characteristics Moderate   3-4 documented comorbidities or personal factors 4 or more Unstable and unpredictable High              Jackeline Mosher  8/22/2017  2:06 PM

## 2021-06-12 NOTE — PROGRESS NOTES
[facility] Follow Up Note    Bola Lyon   47 y.o. male    Date of Visit: 8/21/2017    Chief Complaint   Patient presents with     Follow-up     Subjective  Bola comes in today to discuss his chronic pain issues.  He is trying to get registered with the medical cannabis and is also in the process of getting long-term disability due to his history of brain injury and chronic physical impairments.  He continues to use OxyContin and oxycodone until he can get on medical cannabis and is then hoping to be able to decrease his use.  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    ROS A comprehensive review of systems was performed and was otherwise negative    Social History:   Social History     Social History Narrative    He is .  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  He smokes and does not drink alcohol.       Medications were reconciled.  Allergies, social and family history, and the problem list were all reviewed and updated.    Exam  General Appearance: Pleasant and alert  Vitals:    08/21/17 1125   BP: 126/72   Patient Site: Left Arm   Patient Position: Sitting   Cuff Size: Adult Regular   Pulse: 78   Weight: 220 lb 9.6 oz (100.1 kg)      Body mass index is 35.07 kg/(m^2).  Wt Readings from Last 3 Encounters:   08/21/17 220 lb 9.6 oz (100.1 kg)   07/24/17 (!) 223 lb 6 oz (101.3 kg)   06/26/17 (!) 231 lb 8 oz (105 kg)     HEENT: Sclera are clear.   Lungs: Normal respirations  Abdomen: Soft and nondistended  Extremities: No edema  Skin: No rashes  Neuro: Moves all extremities and has facial symmetry  Gait: Ambulates with a normal gait    Assessment/Plan  1. Chronic pain syndrome  His medications are renewed, he is in the process of getting on medical cannabis, new CSA is signed.  - oxyCODONE (OXYCONTIN) 15 mg 12 hr tablet; Take 1 tablet (15 mg total) by mouth every 12  (twelve) hours.  Dispense: 60 tablet; Refill: 0  - Pain Clinic Survey, Urine    2. Gastroesophageal reflux disease without esophagitis  We will do a prior authorization if necessary.  - omeprazole (PRILOSEC) 40 MG capsule; TAKE 1 CAPSULE (40 MG TOTAL) BY MOUTH DAILY, please do a PA if needed  Dispense: 90 capsule; Refill: prn          Abena Gentile MD  8/21/2017    Much or all of the text in this note was generated through the use of Dragon Dictate voice-to-text software. Errors in spelling or words which seem out of context are unintentional. Sound alike errors, in particular, may have escaped editing.

## 2021-06-13 NOTE — PROGRESS NOTES
[facility] Daily Progress     Patient Name: Bola Montoya  Date: 9/26/2017  Visit #: 9/12 with updated order for 2x/week for 8 weeks  PTA visit #:  0  Referral Diagnosis: Right shoulder pain, left knee s/p TKA  Referring provider: Pilo Bruce MD; Deepak Zheng MD  Visit Diagnosis:     ICD-10-CM    1. Chronic right shoulder pain M25.511     G89.29    2. Scapular dysfunction M89.9    3. Muscle weakness (generalized) M62.81    4. Pain and swelling of left knee M25.562     M25.462    5. Status post total left knee replacement Z96.652          Assessment:     HEP/POC compliance is  good .  Response to Intervention Slight reduction in knee flexion AROM and educated pt to increase hold times on stretch for knee flexion. Tolerated MT with tenderness and hypertonicity still noted. Updated order to continue 2x/week for 8 weeks.  Patient is benefitting from skilled physical therapy and is making steady progress toward functional goals.  Patient is appropriate to continue with skilled physical therapy intervention, as indicated by initial plan of care.    Goal Status:  Pt. will demonstrate/verbalize independence in self-management of condition in : 6 weeks  Pt. will be independent with home exercise program in : 6 weeks  Patient will reach / maintain arm movement: overhead;behind;for dressing;for home chores;with full ROM;with less pain;with less difficulty;in 6 weeks  Comment: pain < 3/10  Patient will be able to: gripping;holding;for lifting;for writing;for dressing;for housework;for meal preparation;with less pain;with no difficulty;in 6 weeks;Comment  Comment: pain < 3/10  Patient will decrease : SPADI score;by _ points;for improved quality of function;for improved quality of life;in 6 weeks  by ___ points: 8    Plan / Patient Education:     Continue with initial plan of care.  Progress with home program as tolerated.  Continue with use of Rockblades to (R) shoulder/cervical region. Review prone  I's and ball cirlces on wall. Add SA wall slides  Continue to address (R) scapular stability as tolerated   Also manual therapy cervical spine to improve ROM and mobility (patient is fused from C3-7, so limited cervical ROM contributes to his shoulder pain)   L knee mobilizations. Patient had a (L) TKA on 3/20/17 and continues to have some limited ROM and weakness.       Subjective:   Pain level: 6/10, right shoulder/upper back   Pt has new order from [facility] to continue at 2x/week for 8 weeks. He feels the pain is stuck at about 6x/week. He feels the instrument assisted STM helps maintain his ROM. New exercises are going well. Is going to follow up with Spine next week about nerve damage from his neck. Is still having knee issues and would like to start that again.    Objective:   Significant TTP and tone along medial border of (R) scapula as well as along (R) pectoral region with hypertonicity noted    HEP: patient already completeing TB pull-downs, rows, IR and ER at home. Added median and radial nerve glides, cervical flexion and rotation stretches with at towel, prone I's and ball circles on wall  CURRENT KNEE HEP: stretch IT band with towel, hamstring stretch, knee flexion in standing along with hip flexor stretch in standing, squats    Knee flexion/ext: 3-109 deg     Treatment Today   TREATMENT MINUTES COMMENTS   Evaluation     Self-care/ Home management     Manual therapy 18  IANSM with Rockblades to (R) UT, levator, and along medial border of (R) scapular in prone Grade II feathering > Grade III-IV for relaxation     IANSM with Rockblades to (R) pectorals in supine, Grade II feathering > Grade III_IV for relaxation followed by CFM/MFR to pectorals    Neuromuscular Re-education 0- not today KTape instructions and application to (R) scapula for retraction    Therapeutic Activity     Therapeutic Exercises 10 -verbal review of HEP  -increased time for subjective as this is a new provider for the pt with  significant PMH   Gait training     Modality__________________                Total 28    Blank areas are intentional and mean the treatment did not include these items.       Jackeline Zuniga  9/26/2017

## 2021-06-13 NOTE — PROGRESS NOTES
[facility] Follow Up Note    Bola Lyon   47 y.o. male    Date of Visit: 9/18/2017    Chief Complaint   Patient presents with     Follow-up     medication follow up     Paperwork     Subjective  Bola comes in today for follow-up.  He is in the process of getting disability approved.  He has underlying traumatic brain injury, bipolar disorder and has multiple physical ailments as well.  He has had multiple shoulder, cervical spine and knee surgeries and has chronic lymphedema and gets chronic pain issues associated with all of these surgeries and underlying osteoarthritis.  He is unable to do a lot of physical activity because of this and hold her normal job along with his underlying brain injury and mental illness.  He is on chronic narcotics and is in the process of getting medical cannabis approved when he can afford to pay for the registration fee to help treat the pain-all of this renders it very challenging for him to hold down a normal job.  He is concerned about a bulge she discovered in his abdomen with certain positions.    ROS A comprehensive review of systems was performed and was otherwise negative    Social History:   Social History     Social History Narrative    He is .  He has been a  and also a counselor, and worked with [employer].  He smokes and does not drink alcohol.       Medications were reconciled.  Allergies, social and family history, and the problem list were all reviewed and updated.      Exam  General Appearance: Pleasant and alert  There were no vitals filed for this visit.   There is no height or weight on file to calculate BMI.  Wt Readings from Last 3 Encounters:   08/21/17 220 lb 9.6 oz (100.1 kg)   07/24/17 (!) 223 lb 6 oz (101.3 kg)   06/26/17 (!) 231 lb 8 oz (105 kg)     HEENT: Sclera are clear.   Lungs: Normal respirations  Abdomen: Soft and nondistended, diastasis of the abdominal muscles.  Extremities: No  edema  Skin: No rashes  Neuro: Moves all extremities and has facial symmetry  Gait: Ambulates with a normal gait    Assessment/Plan  1. Chronic pain syndrome  Medications are renewed.  Once he gets on the medical cannabis, hopefully will be able to reduce his narcotics.  - oxyCODONE (OXYCONTIN) 15 mg 12 hr tablet; Take 1 tablet (15 mg total) by mouth every 12 (twelve) hours.  Dispense: 60 tablet; Refill: 0    2. Need for influenza vaccination  - Influenza, Seasonal,Quad Inj, 36+ MOS    3. Cervical Radiculopathy  As above, continues to get occasional neck pain.    4. Shoulder impingement syndrome, right  As above, continues to require chronic narcotics and is being looked at regarding medical cannabis.    5. Primary (congenital) lymphedema  He continues to see Dr. Vásquez.    6. Unspecified tear of unspecified meniscus, current injury, left knee, sequela  Follows up with ortho.    7. Diastasis recti  He was reassured.          Abena Gentile MD  9/18/2017    Much or all of the text in this note was generated through the use of Dragon Dictate voice-to-text software. Errors in spelling or words which seem out of context are unintentional. Sound alike errors, in particular, may have escaped editing.

## 2021-06-13 NOTE — TELEPHONE ENCOUNTER
Refill request: oxycontin, diazepam and hydroxyzine  Patient is in a long term care facility.  [facility] [place]""  Ph: [phone]  Fax: [phone]    Last ov: 11/4 with [facility], to follow up in 8 weeks and provider to contact NP about ongoing pain management.    UDT/CSA - 2/25/2020    Oxycodone 10 mg last filled on 11/5-10 days  Gabapentin 300 mg last filled on 11/4-27 days    Please review,who is managing the pain medications at this time.

## 2021-06-13 NOTE — TELEPHONE ENCOUNTER
Patient calling back, sounds as though he is wanting a refill of hydrocodone 60 mg ER.  Unclear what medication patient is to be taking at this time.  Unclear if patient to be taking oxycontin or hydrocodone ER.

## 2021-06-13 NOTE — TELEPHONE ENCOUNTER
Orders being requested:   Order/Prescription for:    lamoTRIgine (LAMICTAL) 150 MG tablet 60 tablet 11 2/26/2020 2/25/2021    Sig - Route: Take 1 tablet (150 mg total) by mouth 2 (two) times a day. - Oral    Sent to pharmacy as: lamoTRIgine 150 mg tablet (LaMICtal)    E-Prescribing Status: Receipt confirmed by pharmacy (2/26/2020  4:45 PM CST)      polyethylene glycol (GLYCOLAX) 17 gram/dose powder   8/26/2020     Sig - Route: Take 17 g by mouth daily. - Oral    Class: Historical Med        Reason service is needed/diagnosis:   Patient Medication Administration Record    When are orders needed by: As soon as possible.    Where to send Orders:   Fax: Attn: Nursing, [phone]     [facility] [phone]    Okay to leave detailed message?  Yes

## 2021-06-13 NOTE — PROGRESS NOTES
[facility] Daily Progress     Patient Name: Bola Montoya  Date: 10/3/2017  Visit #: 9/12 with updated order for 2x/week for 8 weeks  PTA visit #:  0  Referral Diagnosis: Right shoulder pain, left knee s/p TKA  Referring provider: Pilo Bruce MD; Deepak Zheng MD  Visit Diagnosis:     ICD-10-CM    1. Chronic right shoulder pain M25.511     G89.29    2. Scapular dysfunction M89.9    3. Muscle weakness (generalized) M62.81    4. Pain and swelling of left knee M25.562     M25.462    5. Status post total left knee replacement Z96.652    6. Abnormality of gait R26.9          Assessment:     HEP/POC compliance is  good .  Patient demonstrates understanding/independence with home program.  Response to Intervention Continued with KTape and MFR with instrument assisted to address restrictions.  Patient is benefitting from skilled physical therapy and is making steady progress toward functional goals.  Patient is appropriate to continue with skilled physical therapy intervention, as indicated by initial plan of care.    Goal Status:  Pt. will demonstrate/verbalize independence in self-management of condition in : 6 weeks  Pt. will be independent with home exercise program in : 6 weeks  Patient will reach / maintain arm movement: overhead;behind;for dressing;for home chores;with full ROM;with less pain;with less difficulty;in 6 weeks  Comment: pain < 3/10  Patient will be able to: gripping;holding;for lifting;for writing;for dressing;for housework;for meal preparation;with less pain;with no difficulty;in 6 weeks;Comment  Comment: pain < 3/10  Patient will decrease : SPADI score;by _ points;for improved quality of function;for improved quality of life;in 6 weeks  by ___ points: 8    Plan / Patient Education:     Continue with initial plan of care.  Progress with home program as tolerated.  Continue with use of Rockblades to (R) shoulder/cervical region. Review prone I's and ball cirlces on wall. Add  SA wall slides  Continue to address (R) scapular stability as tolerated   Also manual therapy cervical spine to improve ROM and mobility (patient is fused from C3-7, so limited cervical ROM contributes to his shoulder pain)   L knee mobilizations. Patient had a (L) TKA on 3/20/17 and continues to have some limited ROM and weakness.     Address knee flexion limitations- try in prone at next session      Subjective:   Pain level: 6/10, right shoulder/upper back   Pt arrived 10 min late.  R arm pain has increased recently- over the weekend the elbow is flared up and causes nerve pain. He has had an EMG that did show nerve damage.    Objective:   Significant TTP and tone along medial border of (R) scapula as well as along (R) pectoral region with hypertonicity noted    HEP: patient already completeing TB pull-downs, rows, IR and ER at home. Added median and radial nerve glides, cervical flexion and rotation stretches with at towel, prone I's and ball circles on wall  CURRENT KNEE HEP: stretch IT band with towel, hamstring stretch, knee flexion in standing along with hip flexor stretch in standing, squats    Knee flexion/ext: 3-109 deg     Treatment Today   TREATMENT MINUTES COMMENTS   Evaluation     Self-care/ Home management     Manual therapy 9  IANSM with Rockblades to (R) UT, levator, and along medial border of (R) scapular in prone Grade II feathering > Grade III-IV for relaxation     IANSM with Rockblades to (R) pectorals in supine, Grade II feathering > Grade III-IV for relaxation followed by inter-layed gliding    Neuromuscular Re-education 5 KTape instructions and application to (R) scapula for retraction with Y strip    Therapeutic Activity     Therapeutic Exercises 4 -UBE x4 min F, WL 3.5   Gait training     Modality__________________                Total 18    Blank areas are intentional and mean the treatment did not include these items.       Jackeline Zuniga, PT, DPT  10/3/2017

## 2021-06-13 NOTE — PROGRESS NOTES
[facility] Daily Progress     Patient Name: Bola Montoya  Date: 10/10/2017  Visit #: 11/12 with updated order for 2x/week for 8 weeks on 9/26/17  PTA visit #:  0  Referral Diagnosis: Right shoulder pain, left knee s/p TKA  Referring provider: Pilo Bruce MD; Deepak Zheng MD  Visit Diagnosis:     ICD-10-CM    1. Chronic right shoulder pain M25.511     G89.29    2. Scapular dysfunction M89.9    3. Muscle weakness (generalized) M62.81    4. Pain and swelling of left knee M25.562     M25.462    5. Status post total left knee replacement Z96.652    6. Abnormality of gait R26.9    7. Left leg swelling M79.89          Assessment:     HEP/POC compliance is  good .  Patient demonstrates understanding/independence with home program.  Response to Intervention Spent increased time discussing POC to determine pt's current goals with PT. Modified R UE nerve glides and continued with IASTM to R UE/cervical region to address impairments.  Patient is benefitting from skilled physical therapy and is making steady progress toward functional goals.  Patient is appropriate to continue with skilled physical therapy intervention, as indicated by initial plan of care.    Goal Status:  Pt. will demonstrate/verbalize independence in self-management of condition in : 6 weeks  Pt. will be independent with home exercise program in : 6 weeks  Patient will reach / maintain arm movement: overhead;behind;for dressing;for home chores;with full ROM;with less pain;with less difficulty;in 6 weeks  Comment: pain < 3/10  Patient will be able to: gripping;holding;for lifting;for writing;for dressing;for housework;for meal preparation;with less pain;with no difficulty;in 6 weeks;Comment  Comment: pain < 3/10  Patient will decrease : SPADI score;by _ points;for improved quality of function;for improved quality of life;in 6 weeks  by ___ points: 8    Plan / Patient Education:     Continue with initial plan of care.  Progress with  home program as tolerated.  Continue with use of Rockblades to (R) shoulder/cervical region. Review HEP for shoulder and add SA wall slides  Continue to address (R) scapular stability as tolerated   Also manual therapy cervical spine to improve ROM and mobility (patient is fused from C3-7, so limited cervical ROM contributes to his shoulder pain)     L knee mobilizations. Patient had a (L) TKA on 3/20/17 and continues to have some limited ROM and weakness- pt to continue with HEP and ice daily.        Subjective:   Pain level: 7/10, right shoulder/upper back; 6/10 L knee pain    *Pt arrived 6 min late.    Pt is not doing well today. His knee has been really sore since this past weekend. Was walking for 2-3 hours each day. He feels he has been overstretching it.     Had his follow up with spine doctor who feels he might have impingement in his neck that is causing his nerve symptoms.     Objective:     HEP: patient already completeing TB pull-downs, rows, IR , ER, median and radial nerve glides (sliders and tensioners), cervical flexion and rotation stretches with at towel, prone I's and ball circles on wall    CURRENT KNEE HEP: stretch IT band with towel, hamstring stretch, knee flexion in standing along with hip flexor stretch in standing, squats, bridges- SL , abdominal set with twist with legs in straddle    Knee flexion/ext: 3-115 deg (120 deg PROM)    MMT: humberto knee flexion: 5/5 ; 4+/5 hip ext L, 5/5 R    Mild limitations in patellar medial glide mobility     Treatment Today   TREATMENT MINUTES COMMENTS   Evaluation     Self-care/ Home management     Manual therapy 10  IANSM with Rockblades to (R) UT, cervical paraspinals and lateral shoulder: Grade II feathering > Grade III-IV for relaxation    Neuromuscular Re-education 0- not today KTape instructions and application to (R) scapula for retraction with Y strip    Therapeutic Activity     Therapeutic Exercises 15 -see exercise flow sheet  -bike x7 min,  WL4.0  -discussed POC- pt to continue with knee exercises as above and start icing daily; discussed POC for R shoulder/neck- pt to continue follow up with spine specialist- in PT will work on nerve glides, stretches and addressing soft tissue restrictions       Gait training     Modality__________________                Total 25    Blank areas are intentional and mean the treatment did not include these items.       Jackeline Zuniga, PT, DPT  10/10/2017

## 2021-06-13 NOTE — TELEPHONE ENCOUNTER
Patient called to discuss opioid medication.  Has been off the hydromorphone  for 2 weeks.  Using oxycodone 10 mg 4 times a day.    Reports that this does not last dose to dose.  Recalls prior to these injuries was using 5 mg for breakthrough.  Ports using 10 mg long-acting 4 times a day does not feel would be adequate.    We discussed the plan to use oxycodone 15 mg extended release 4 times a day and assess.  He is concerned he wakes several times during the night and would need breakthrough or a higher dose at bedtime and this will be monitored.

## 2021-06-13 NOTE — TELEPHONE ENCOUNTER
Call from Aleisha, director of nursing at [facility], asking if Dr Cramer plans to continue Dilaudid 4 mg three times a day and one during the night for pain level 6-10 and Dilaudid 2 mg x 1 during the night prn for pain up to level 5. Difficult to understand message. Tried to call back and got vm. Pt also left a msg and states they need a hard copy at the home if Dilaudid is to be ordered.

## 2021-06-13 NOTE — PROGRESS NOTES
[facility] Daily Progress     Patient Name: Bola Montoya  Date: 10/5/2017  Visit #: 10/12 with updated order for 2x/week for 8 weeks  PTA visit #:  0  Referral Diagnosis: Right shoulder pain, left knee s/p TKA  Referring provider: Pilo Bruce MD; Deepak Zheng MD  Visit Diagnosis:     ICD-10-CM    1. Chronic right shoulder pain M25.511     G89.29    2. Scapular dysfunction M89.9    3. Muscle weakness (generalized) M62.81    4. Pain and swelling of left knee M25.562     M25.462    5. Status post total left knee replacement Z96.652    6. Abnormality of gait R26.9          Assessment:     HEP/POC compliance is  good .  Patient demonstrates understanding/independence with home program.  Response to Intervention Improvements in L knee flexion after manual stretches.  Patient is benefitting from skilled physical therapy and is making steady progress toward functional goals.  Patient is appropriate to continue with skilled physical therapy intervention, as indicated by initial plan of care.    Goal Status:  Pt. will demonstrate/verbalize independence in self-management of condition in : 6 weeks  Pt. will be independent with home exercise program in : 6 weeks  Patient will reach / maintain arm movement: overhead;behind;for dressing;for home chores;with full ROM;with less pain;with less difficulty;in 6 weeks  Comment: pain < 3/10  Patient will be able to: gripping;holding;for lifting;for writing;for dressing;for housework;for meal preparation;with less pain;with no difficulty;in 6 weeks;Comment  Comment: pain < 3/10  Patient will decrease : SPADI score;by _ points;for improved quality of function;for improved quality of life;in 6 weeks  by ___ points: 8    Plan / Patient Education:     Continue with initial plan of care.  Progress with home program as tolerated.  Continue with use of Rockblades to (R) shoulder/cervical region. Review prone I's and ball cirlces on wall. Add SA wall slides  Continue  to address (R) scapular stability as tolerated   Also manual therapy cervical spine to improve ROM and mobility (patient is fused from C3-7, so limited cervical ROM contributes to his shoulder pain)   L knee mobilizations. Patient had a (L) TKA on 3/20/17 and continues to have some limited ROM and weakness.  ADD clamshell        Subjective:   Pain level: 6/10, right shoulder/upper back; 5/10 L knee pain    Pt still trying to get the knee loosened up with the exercises. Pt was seen by Dr. De Guzman and will likely get an injection next week. They wanted him to follow up with another cervical spine specialist (Dr. Galvan).    Objective:   Significant TTP and tone along medial border of (R) scapula as well as along (R) pectoral region with hypertonicity noted    HEP: patient already completeing TB pull-downs, rows, IR and ER at home. Added median and radial nerve glides, cervical flexion and rotation stretches with at towel, prone I's and ball circles on wall  CURRENT KNEE HEP: stretch IT band with towel, hamstring stretch, knee flexion in standing along with hip flexor stretch in standing, squats, bridges, abdominal set with twist with legs in straddle    Knee flexion/ext: 3-115 deg (120 deg PROM)    Mild limitations in patellar medial glide mobility     Treatment Today   TREATMENT MINUTES COMMENTS   Evaluation     Self-care/ Home management     Manual therapy 0- not today  IANSM with Rockblades to (R) UT, levator, and along medial border of (R) scapular in prone Grade II feathering > Grade III-IV for relaxation     IANSM with Rockblades to (R) pectorals in supine, Grade II feathering > Grade III-IV for relaxation followed by inter-layed gliding    Neuromuscular Re-education 0- not today KTape instructions and application to (R) scapula for retraction with Y strip    Therapeutic Activity     Therapeutic Exercises 28 -see exercise flow sheet  -bike x7 min, WL4.0  -verbal review of HEP  -objective measures taken     Gait  training     Modality__________________                Total 28    Blank areas are intentional and mean the treatment did not include these items.       Jackeline Zuniga, PT, DPT  10/5/2017

## 2021-06-13 NOTE — PROGRESS NOTES
[facility] sent patient MyChart message today to check in with patient while he is at [facility].  Patient has one goal around housing which continues to be on hold.    Next [facility] outreach 1/7/2020

## 2021-06-13 NOTE — TELEPHONE ENCOUNTER
Requested Prescriptions     Pending Prescriptions Disp Refills     oxyCODONE (OXYCONTIN) 20 mg 12 hr tablet 40 each 0     Sig: Take 1 tablet (20 mg total) by mouth 4 (four) times a day for 10 days.     Removed ambien

## 2021-06-13 NOTE — PROGRESS NOTES
Patient would like the video invitation sent by: Text to cell phone: [phone]       ASSESSMENT:  1. Neuropathy  Better with topiramate.  Year ago was on 200 twice daily.  Push dose.  Decrease gabapentin due to swelling    2. Chronic pain syndrome  Stable on narcotics through pain clinic.  Push topiramate    3. S/P lumbar laminectomy  In contact with surgeons regarding worsening of back pain  - topiramate (TOPAMAX) 100 MG tablet; Take 1 tablet (100 mg total) by mouth 2 (two) times a day.  Dispense: 60 tablet; Refill: 11    4. History of cervical discectomy  - topiramate (TOPAMAX) 100 MG tablet; Take 1 tablet (100 mg total) by mouth 2 (two) times a day.  Dispense: 60 tablet; Refill: 11    5. Elevated uric acid in blood  Push allopurinol, was on 600 mg 1 year ago prior to the hospital  - allopurinoL (ZYLOPRIM) 300 MG tablet; Take 1 tablet (300 mg total) by mouth 2 (two) times a day.  Dispense: 180 tablet; Refill: 3    6. Panic attacks  Would use topiramate and decrease gabapentin  - topiramate (TOPAMAX) 100 MG tablet; Take 1 tablet (100 mg total) by mouth 2 (two) times a day.  Dispense: 60 tablet; Refill: 11  - gabapentin (NEURONTIN) 300 MG capsule; Take 1 capsule (300 mg total) by mouth 2 (two) times a day.  Dispense: 120 capsule; Refill: 2    7. Primary (congenital) lymphedema  Minimize gabapentin due to swelling tendency    Preventive Health Care:      PLAN:  Patient Instructions   Increase topiramate to 100 mg twice daily    Increase allopurinol to 300 mg twice daily    Decrease gabapentin to 300 mg twice daily    No orders of the defined types were placed in this encounter.    Return in about 4 weeks (around 1/18/2021) for a phone/video follow up visit.      CHIEF COMPLAINT:  Chief Complaint   Patient presents with     Follow-up     Medication Management       HISTORY OF PRESENT ILLNESS:  Bola is a 50 y.o. male contacting the clinic today via video for review of medication.  Last month, amitriptyline,  prazosin, topiramate, and allopurinol were resumed at slightly lower doses.  Pain and sleeping have improved.  He wishes higher doses for pain.  1 year ago he was taking topiramate 200 mg twice daily    Gabapentin has been started through the pain left for panic attacks.  I had eliminated this before due to his tendency to worsen his lymphedema.  He thinks his ankles and knees are more swollen than normal    He thinks his back pain is starting to worsen and wonders whether he needs surgery again.  He has contacted the pain clinic    REVIEW OF SYSTEMS:   Swelling of knees and ankles.  Slight swelling of feet.  All other systems are negative.    PFSH:  Social History     Social History Narrative    He is .  He has been a  and also a counselor, and worked with [employer].  He smokes and does not drink alcohol.  He is now on disability due to his brain injury and bipolar disease.     Still in assisted living    TOBACCO USE:  Social History     Tobacco Use   Smoking Status Current Some Day Smoker     Packs/day: 0.50     Years: 11.00     Pack years: 5.50     Types: Cigarettes     Start date: 8/20/2009     Last attempt to quit: 2/21/2017     Years since quitting: 3.8   Smokeless Tobacco Former User   Tobacco Comment    0.5 pack per day       VITALS:  There were no vitals filed for this visit.  Wt Readings from Last 3 Encounters:   11/13/20 200 lb 4 oz (90.8 kg)   11/04/20 195 lb (88.5 kg)   03/09/20 192 lb (87.1 kg)       PHYSICAL EXAM:  (observations via Video)  Question intoxication.  Slow to answer.  Certainly not manic.      ADDITIONAL HISTORY SUMMARIZED (2): Reviewed pain clinic notes regarding gabapentin  CARE EVERYWHERE/ EXTRA INFORMATION (1):   RADIOLOGY TESTS (1):   LABS (1): Updated last month  CARDIOLOGY/MEDICINE TESTS (1):   INDEPENDENT REVIEW (2 each):     Total data points: 3    Video Start time: 2:30 PM  Video End time: 2:56 PM    The visit lasted a total  of 26 minutes     CA Intake Time: 5 MIN    I have reviewed and updated the patient's Past Medical History, Social History, Family History as appropriate.    MEDICATIONS: Reviewed and updated per CA and MD  Current Outpatient Medications   Medication Sig Dispense Refill     acetaminophen (TYLENOL) 650 MG CR tablet Take 3 tablets (1,950 mg total) by mouth 2 (two) times a day. 540 tablet 3     albuterol (PROAIR HFA;PROVENTIL HFA;VENTOLIN HFA) 90 mcg/actuation inhaler INHALE 2 PUFFS BY MOUTH EVERY 6 HOURS AS NEEDED FOR WHEEZING 3 Inhaler 3     allopurinoL (ZYLOPRIM) 300 MG tablet Take 1 tablet (300 mg total) by mouth 2 (two) times a day. 180 tablet 3     amitriptyline (ELAVIL) 25 MG tablet Take 1 tablet (25 mg total) by mouth at bedtime. 90 tablet 3     baclofen (LIORESAL) 10 MG tablet TAKE 1 & 1/2 (ONE & ONE-HALF) TABLETS BY MOUTH THREE TIMES DAILY 90 tablet 3     calcium citrate (CALCITRATE) 200 mg (950 mg) tablet Take 950 mg by mouth 2 (two) times a day.       dextroamphetamine-amphetamine (ADDERALL) 30 mg Tab Take 0.5 tab (15mg) BID 30 tablet 0     diazePAM (VALIUM) 5 MG tablet Take 1 tablet (5 mg total) by mouth 3 (three) times a day. One tab every four hours as needed 42 tablet 1     ergocalciferol (ERGOCALCIFEROL) 1,250 mcg (50,000 unit) capsule Take 50,000 Units by mouth every 7 days.       fexofenadine (ALLEGRA) 180 MG tablet Take 180 mg by mouth daily as needed.       gabapentin (NEURONTIN) 300 MG capsule Take 1 capsule (300 mg total) by mouth 2 (two) times a day. 120 capsule 2     hydroCHLOROthiazide (HYDRODIURIL) 25 MG tablet Take 1 tablet (25 mg total) by mouth 2 (two) times a day at 9am and 6pm. 60 tablet 11     hydrOXYzine pamoate (VISTARIL) 50 MG capsule Take 1 capsule (50 mg total) by mouth every 6 (six) hours as needed. 60 capsule 0     lamoTRIgine (LAMICTAL) 150 MG tablet Take 1 tablet (150 mg total) by mouth 2 (two) times a day. 60 tablet 11     lansoprazole (PREVACID) 30 MG capsule Take 30 mg by  mouth daily.       lidocaine (LIDODERM) 5 % Apply 1 patch topically to painful area of skin once daily and remove per schedule. 30 patch 4     lithium 300 MG capsule Take 300 mg by mouth 2 (two) times a day.  1     medical cannabis (Patient's own supply) by Other route see administration instructions. Take as instructed by the medical cannabis dispensary.  0     metoprolol succinate (TOPROL-XL) 100 MG 24 hr tablet Take 1 tablet (100 mg total) by mouth daily. 90 tablet 3     miscellaneous medical supply Misc For home use.       miscellaneous medical supply [facility] bed with mattress and 1/2 rails. Semi-electric bed. Length of need 99 months. Bed extender:no. Group 1 mattress       naloxone (NARCAN) 4 mg/actuation nasal spray 1 spray (4 mg dose) into one nostril for opioid reversal. Call 911. May repeat if no response in 3 minutes. 1 Box 0     nicotine (NICODERM CQ) 21 mg/24 hr Place 1 patch on the skin daily. 30 patch 1     nicotine polacrilex (COMMIT) 2 MG lozenge Apply 2 mg to cheek every 2 (two) hours as needed.       oxyCODONE (OXYCONTIN) 20 mg 12 hr tablet Take 1 tablet (20 mg total) by mouth 4 (four) times a day. 40 each 0     polyethylene glycol (GLYCOLAX) 17 gram/dose powder Take 17 g by mouth daily. 595 g 11     prazosin (MINIPRESS) 2 MG capsule Take 1 capsule (2 mg total) by mouth at bedtime. 90 capsule 3     QUEtiapine (SEROQUEL) 200 MG tablet Take 200 mg by mouth bedtime.       QUEtiapine (SEROQUEL) 50 MG tablet Take 50 mg by mouth 4 (four) times a day.       senna-docusate (PERICOLACE) 8.6-50 mg tablet Take 2 tablets by mouth 2 (two) times a day.       topiramate (TOPAMAX) 100 MG tablet Take 1 tablet (100 mg total) by mouth 2 (two) times a day. 60 tablet 11     zolpidem (AMBIEN) 10 mg tablet Take 10 mg by mouth at bedtime.       Current Facility-Administered Medications   Medication Dose Route Frequency Provider Last Rate Last Admin     cyanocobalamin injection 1,000 mcg  1,000 mcg Intramuscular Q30  "Devon Mascorro MD   1,000 mcg at 11/13/20 2536         The patient has been notified of following:     \"This video visit will be conducted via a call between you and your physician/provider. We have found that certain health care needs can be provided without the need for an in-person physical exam.  This service lets us provide the care you need with a video conversation.  If a prescription is necessary we can send it directly to your pharmacy.  If lab work is needed we can place an order for that and you can then stop by our lab to have the test done at a later time.    Video visits are billed at different rates depending on your insurance coverage. Please reach out to your insurance provider with any questions.    If during the course of the call the physician/provider feels a video visit is not appropriate, you will not be charged for this service.\"    Patient has given verbal consent to a Video visit? Yes  How would you like to obtain your AVS? AVS Preference: MyChart.  If dropped by the video visit, the video invitation should be sent to: Text to cell phone: [phone]  Will anyone else be joining your video visit? No     Video-Visit Details    Type of service:  Video Visit    Originating Location (pt. Location): Assisted Living    Distant Location (provider location):   [facility]     Platform used for Video Visit: [facility]        Devon Lund MD    "

## 2021-06-13 NOTE — PROGRESS NOTES
[facility] Daily Progress     Patient Name: Bola Lyon  Date: 9/22/2017  Visit #: 8  PTA visit #:  0  Referral Diagnosis: Right shoulder pain, left knee s/p TKA  Referring provider: Pilo Bruce MD; Deepak Zheng MD  Visit Diagnosis:     ICD-10-CM    1. Chronic right shoulder pain M25.511     G89.29    2. Scapular dysfunction M89.9    3. Muscle weakness (generalized) M62.81    4. Pain and swelling of left knee M25.562     M25.462    5. Status post total left knee replacement Z96.652          Assessment:     HEP/POC compliance is  good .  Response to Intervention Pt reports relief of pain along medial border of (R) scapula after IANSM with Rockblades. Good tolerance to DTM and CFM to (R) pectoral region to help break up significant scar tissue. Pt tolerated additional scapular stabilization exercsies well with cues required to maintain proper positioning of scapula throughout   Patient is benefitting from skilled physical therapy and is making steady progress toward functional goals.  Patient is appropriate to continue with skilled physical therapy intervention, as indicated by initial plan of care.    Goal Status:  Pt. will demonstrate/verbalize independence in self-management of condition in : 6 weeks  Pt. will be independent with home exercise program in : 6 weeks  Patient will reach / maintain arm movement: overhead;behind;for dressing;for home chores;with full ROM;with less pain;with less difficulty;in 6 weeks  Comment: pain < 3/10  Patient will be able to: gripping;holding;for lifting;for writing;for dressing;for housework;for meal preparation;with less pain;with no difficulty;in 6 weeks;Comment  Comment: pain < 3/10  Patient will decrease : SPADI score;by _ points;for improved quality of function;for improved quality of life;in 6 weeks  by ___ points: 8    Plan / Patient Education:     Continue with initial plan of care.  Progress with home program as tolerated.  Continue with use of  Rockblades to (R) shoulder/cervical region. Review prone I's and ball cirlces on wall. Add SA wall slides  Continue to address (R) scapular stability as tolerated   Also manual therapy cervical spine to improve ROM and mobility (patient is fused from C3-7, so limited cervical ROM contributes to his shoulder pain)   Address left knee as needed. Patient had a (L) TKA on 3/20/17 and continues to have some limited ROM and weakness.     Subjective:   Pain level: 6/10, right shoulder/upper back   Pt reports he does feel parth the shoulder is slowly improving. In more pain today because he was doing some yardwork this morning.     Objective:   Significant TTP and tone along medial border of (R) scapula as well as along (R) pectoral region   Cues during exercises to maintain scapular retraction to prevent winging of (R) scapula     HEP: patient already completeing TB pull-downs, rows, IR and ER at home. Added median and radial nerve glides, cervical flexion and rotation stretches with at towel, prone I's and ball circles on wall      Treatment Today   TREATMENT MINUTES COMMENTS   Evaluation     Self-care/ Home management     Manual therapy 12  IANSM with Rockblades to (R) UT, levator, and along medial border of (R) scapular in prone Grade II feathering > Grade III-IV for relaxation     IANSM with Rockblades to (R) pectorals in supine, Grade II feathering > Grade III_IV for relaxation followed by CFM to pectorals    Neuromuscular Re-education 5 KTape instructions and application to (R) scapula for retraction    Therapeutic Activity     Therapeutic Exercises 8 Prone I's x 10 with cues to avoid excessive UT activation   Ball circles on wall, 10x CW 10x CCW (repeated 2x) cues to maintain shoulder retraction to prevent winging.    Gait training     Modality__________________                Total 25    Blank areas are intentional and mean the treatment did not include these items.       Jackeline Mosher  9/22/2017

## 2021-06-13 NOTE — PROGRESS NOTES
Date of Service: 09/27/17    Date last seen:  05/11/17    PCP: Abena Gentile MD    Impression:   1. Left leg swelling and testicular swelling - stable  2. Left leg primary lymphedema superimposed on secondary lymphedema-doing very well   3. Testicular pain with ilioinguinal neuralgia s/p hernia repair -stable    Plan:   1. Questions were answered.   2. Bandaging at night to keep left leg swelling down as needed.  3. Continue under armour compression trunks and compression socks.  He will call when he needs new compression ordered.  4. Knows when to use antibiotics.  5. Follow up 8 months or when needed.  If any problems to let us know.     Time spent with patient 15 minutes with greater than 50% time in consultation, education and coordination of care.   ---------------------------------------------------------------------------------------------------------------------     Chief Complaint: Left leg swelling and left lower abdomen testicular swelling and pain     History of Present Illness:   Bola Lyon returns to the [facility] for follow up of left leg swelling with testicular swelling and left lower abdominal pain.  This has been felt to be due to lymphedema with ilioinguinal neuralgia on the left side after surgery.  The patient's previous treatment has included MLD, education, compression bandaging, lymphatic exercise, range of motion work and exercise. Present compression the patient is using is compression trunks and compression stockings with exercise and self massage.  He had a left total knee replacement March 20, 2017. Surgery went well. He is now fully recovered from the knee replacement.   The swelling was up in his left leg as expected and is now under good control.   The groin swelling is under good control. There has been no new numbness, tingling, weakness, masses, rashes, shortness of breath or chest pain. There have not been any new areas of ulceration. There has been no  new fevers. There are no new or changing skin lesions. His lymphatic studies showed hyperplasia of the left side lymphatics.       Past Medical History:   Diagnosis Date     AC (acromioclavicular) arthritis 10/24/2011     Aftercare following surgery of the musculoskeletal system 4/25/2012     Anxiety Disorder NOS     Created by Conversion      Arthropathy of shoulder region 10/24/2011     Bicipital tenosynovitis 10/10/2011     Cellulitis 2/27/2015     Cervical Radiculopathy     Created by Conversion      Chondromalacia of patella 12/23/2014     Derangement of meniscus 11/20/2014     Disorder of bursae and tendons in shoulder region 10/24/2011     Disturbance of skin sensation 11/30/2011     Encounter for chronic pain management 2/11/2015     Hypertension     Created by Conversion      Impingement Of The Right Shoulder     Created by Conversion       Lymphedema 2/11/2015     Other acquired deformity of other parts of limb 11/14/2011     Pain in joint, shoulder region 10/10/2011     Rupture Of The Proximal Bicipital Tendon Of The Left Arm     Created by Conversion      Status post total left knee replacement 3/30/2017       Past Surgical History:   Procedure Laterality Date     ANTERIOR / POSTERIOR COMBINED FUSION CERVICAL SPINE  2013, redo in 2014     HAND SURGERY Left 1997     HERNIA REPAIR Left 2006     HERNIA REPAIR Right 2008    and middle     KNEE ARTHROSCOPY  2014     NASAL POLYP SURGERY  2014     [place] ARTHRODESIS,ANKLE,OPEN  2007    Description: Ankle Arthrodesis Right;  Recorded: 02/24/2012;     [place] SHLDR ARTHROSCOP,SURG,W/ROTAT CUFF REPR Right 11/25/2015    Procedure: RIGHT SHOULDER ARTHROSCOPY, ROTATOR CUFF REPAIR, DECOMPRESSION, DEBRIDEMENT, DISTAL CLAVICLE EXCISION;  Surgeon: Pilo Bruce MD;  [facility]: [facility];  Service: Orthopedics     REPLACEMENT TOTAL KNEE Left 03/20/2017     SHOULDER ARTHROSCOPY DISTAL CLAVICLE EXCISION AND OPEN ROTATOR CUFF REPAIR Bilateral 2005 and 2013       Current  Outpatient Prescriptions   Medication Sig Dispense Refill     albuterol (PROAIR HFA;PROVENTIL HFA;VENTOLIN HFA) 90 mcg/actuation inhaler Inhale 2 puffs every 6 (six) hours as needed for wheezing. 1 Inhaler 12     amLODIPine (NORVASC) 10 MG tablet TAKE ONE TABLET BY MOUTH ONCE DAILY 90 tablet 1     calcium carbonate-vitamin D3 (CALTRATE 600 PLUS D3) 600 mg(1,500mg) -400 unit per tablet Take 3 tablets by mouth daily. 270 tablet 2     cetirizine (ZYRTEC) 10 MG tablet Take 1 tablet (10 mg total) by mouth daily. 90 tablet 5     divalproex (DEPAKOTE) 500 MG 24 hr tablet Take 500 mg by mouth 2 (two) times a day.        fluticasone (FLONASE) 50 mcg/actuation nasal spray USE TWO SPRAY(S) IN EACH NOSTRIL ONCE DAILY 16 g 5     gabapentin (NEURONTIN) 400 MG capsule Take 1,600 mg by mouth daily.       ketoconazole (NIZORAL) 2 % cream Apply topically 2 (two) times a day. 30 g 5     lidocaine HCl 3 % Crea Apply topically to affected areas twice daily 1 Tube 5     losartan (COZAAR) 50 MG tablet TAKE 1 TABLET (50 MG TOTAL) BY MOUTH DAILY. 90 tablet 3     meloxicam (MOBIC) 15 MG tablet TAKE ONE TABLET BY MOUTH ONCE DAILY 90 tablet 5     metoprolol succinate (TOPROL-XL) 100 MG 24 hr tablet TAKE 2 TABLETS (200 MG TOTAL) BY MOUTH DAILY. 180 tablet 3     nicotine (NICODERM CQ) 21 mg/24 hr Place 1 patch on the skin daily. 30 patch 1     nystatin (MYCOSTATIN) powder Apply to affected area 3 times daily 60 g 12     omeprazole (PRILOSEC) 40 MG capsule TAKE 1 CAPSULE (40 MG TOTAL) BY MOUTH DAILY, please do a PA if needed 90 capsule prn     oxyCODONE (OXYCONTIN) 15 mg 12 hr tablet Take 1 tablet (15 mg total) by mouth every 12 (twelve) hours. 60 tablet 0     oxyCODONE (ROXICODONE) 5 MG immediate release tablet Take 1-2 tablets (5-10 mg total) by mouth every 4 (four) hours as needed for pain. 210 tablet 0     pseudoephedrine (SUDAFED) 120 mg 12 hr tablet Take 1 tablet (120 mg total) by mouth 2 (two) times a day as needed for congestion. 60  tablet 5     QUEtiapine (SEROQUEL) 200 MG tablet Take 200 mg by mouth bedtime.       QUEtiapine (SEROQUEL) 50 MG tablet Take 50 mg by mouth 4 (four) times a day.       ranitidine (ZANTAC) 300 MG tablet Take 1 tablet (300 mg total) by mouth at bedtime. 90 tablet 5     THEREMS-M 27-0.4 mg Tab 1 daily 90 tablet 3     vortioxetine (TRINTELLIX) 10 mg Tab tablet Take 10 mg by mouth daily with breakfast.       zaleplon (SONATA) 10 MG capsule Take 10 mg by mouth at bedtime.       No current facility-administered medications for this visit.        No Known Allergies    History     Social History     Marital status:      Spouse name: N/A     Number of children: N/A     Years of education: N/A     Occupational History     Not on file.     Social History Main Topics     Smoking status: Current Every Day Smoker     Packs/day: 0.50     Years: 6.00     Types: Cigarettes     Start date: 8/20/2009     Smokeless tobacco: Former User     Alcohol use: No     Drug use: No     Sexual activity: Not Currently     Partners: Female     Other Topics Concern     Not on file     Social History Narrative    He is . He is currently staying with his parents as he is recuperating from multiple recent surgeries. 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 He rarely drinks alcohol and has been cutting back on his cigarette use.           Family History   Problem Relation Age of Onset     Hypertension Father      Lymphoma Father      Bipolar disorder Mother      Diabetes Maternal Grandmother      Diabetes Maternal Grandfather      Colon cancer Paternal Grandmother      Diabetes Paternal Grandmother      Diabetes Paternal Grandfather      Anesthesia problems Neg Hx        Review of Systems:  Bola Lyon no new numbess, tingling or weakness, redness or rashes, fevers, new masses, abdominal bloating or discomfort, unexplained weight loss, new ulcers, shortness of breath and chest  pain  Full 12 point review of systems was completed.    Physical Exam:  Vitals:    09/27/17 1311   BP: 136/79   Pulse: 66   Resp: 16   Temp: 98.8  F (37.1  C)     BMI 35.07    Circumferential measures:    Vasc Edema 6/16/2016 7/28/2016 3/30/2017 5/11/2017 9/27/2017   Right just above MTP 23.5 24.5 23 23 23.5   Right Ankle 23.5 23.5 25 23.2 23.3   Right Widest Calf 33.5 41 39.6 40.5 42.0   Right Thigh Up 10cm 48 47 46 46 52.5   Left - just above MTP 23.5 25 24 23 24.0   Left Ankle 24.5 24.5 26.5 24 23.8   Left Widest Calf 38.5 39 44 41 43.0   Left Thigh Up 10cm 48 45.8 55.5 52.5 54.5     Left leg swelling under good control.    General:  47 y.o. male in no apparent distress.  Alert and oriented x 3.  Cooperative. Affect normal.    Musculoskeletal: Range of motion knees and ankles are normal bilaterally. No pain to palpation.  No rubor or calor.     Neuromuscular skeletal:  Sensation decreased to pinprick and light touch in the left leg which is old and unchanged.  Strength testing is normal in hip flexion, hip extension, knee flexion/extension and ankle dorsiflexion and great toe extension bilaterally.  No shortening noticed today.      Vascular: Dorsalis pedis and posterior tibialis pulses are strong and equal bilaterally. There are no significant telangietasias, medial ankle venous flares, venous varicosities  and spider veins .  There is normal capillary refill.     Integumentary: Skin of the bilateral lower extremities is normal.  Nails are thickened.     Yaa Vásquez MD, ABWMS, FACCWS, [facility]  Medical Director Wound Care and Lymphedema  [facility]  [phone]

## 2021-06-13 NOTE — TELEPHONE ENCOUNTER
Medication being requested: Ambien zolpidem and Oxycontin   Last visit date: 11/4/2020.  Provider: BE  Next visit date: 1/11/2021.  Provider: BE  Expected follow up: 8 weeks  MTM visit (Pain Center) date: n/a  UDT date: 2/2020  Agreement date: 2/2020   (Last fill date; name; strength; provider; MME; quantity):  Oxycontin Er 20 mg 12/4/2020 for 40 tabs and 10 days; Ambien 11/2/2020 for 30 tabs and 30 days   Pertinent between visit information about requested medication (telephone, mychart, prior authorization, concerns, comments): Patient goes to [facility] Approved for Lidocaine 5% patch; [facility] appt  Script being sent to provider by nurse- dates and quantity: Oxycontin Er 20 mg 40 tabs for 10 days and Ambien 30 tabs for 30 days  Pharmacy cued: [facility]   Standing orders for withdrawal protocol implemented: n/a

## 2021-06-13 NOTE — TELEPHONE ENCOUNTER
Call from Heather at [facility] pharmacy for refill of Hydromorphone 4 mg. Currently transitioning to Oxycodone.

## 2021-06-13 NOTE — PROGRESS NOTES
ASSESSMENT:  1. Neuropathy  Reviewed complicated 6 months, neck surgery x3, and lumbar surgery x1.  3 intensify treatment for generalized neuropathy but specific focal pain much improved    2. Chronic, continuous use of opioids  Resume medical cannabis.  Clarify Tylenol.,  Amitriptyline, and allopurinol as supplements as before  - medical cannabis (Patient's own supply); by Other route see administration instructions. Take as instructed by the medical cannabis dispensary.; Refill: 0  - acetaminophen (TYLENOL) 650 MG CR tablet; Take 3 tablets (1,950 mg total) by mouth 2 (two) times a day.  Dispense: 540 tablet; Refill: 3    3. Essential hypertension  Stable    4. Bipolar 2 disorder (H)  Stable on Seroquel and 600 mg of lithium    5. S/P lumbar laminectomy  Reviewed surgery in June  - acetaminophen (TYLENOL) 650 MG CR tablet; Take 3 tablets (1,950 mg total) by mouth 2 (two) times a day.  Dispense: 540 tablet; Refill: 3  - topiramate (TOPAMAX) 50 MG tablet; Take 1 tablet (50 mg total) by mouth 2 (two) times a day.  Dispense: 60 tablet; Refill: 2  - cyanocobalamin injection 1,000 mcg    6. History of cervical discectomy  Reviewed June and August surgery.  Resume B12 shots  - acetaminophen (TYLENOL) 650 MG CR tablet; Take 3 tablets (1,950 mg total) by mouth 2 (two) times a day.  Dispense: 540 tablet; Refill: 3  - topiramate (TOPAMAX) 50 MG tablet; Take 1 tablet (50 mg total) by mouth 2 (two) times a day.  Dispense: 60 tablet; Refill: 2  - cyanocobalamin injection 1,000 mcg    7. Generalized anxiety disorder  Relatively stable all things considered    8. Primary (congenital) lymphedema  .  Caution regarding gabapentin  - prazosin (MINIPRESS) 2 MG capsule; Take 1 capsule (2 mg total) by mouth at bedtime.  Dispense: 90 capsule; Refill: 3    9. PTSD (post-traumatic stress disorder)  Resume prazosin    10. Primary insomnia  Resume prazosin and amitriptyline  - prazosin (MINIPRESS) 2 MG capsule; Take 1 capsule (2 mg total)  by mouth at bedtime.  Dispense: 90 capsule; Refill: 3  - amitriptyline (ELAVIL) 25 MG tablet; Take 1 tablet (25 mg total) by mouth at bedtime.  Dispense: 90 tablet; Refill: 3    11. Elevated uric acid in blood  Resume allopurinol  - allopurinoL (ZYLOPRIM) 300 MG tablet; Take 1 tablet (300 mg total) by mouth daily.  Dispense: 90 tablet; Refill: 3    12. Traumatic brain injury with loss of consciousness, sequela (H)  - cyanocobalamin injection 1,000 mcg    Current assisted living status noted.  Hospital records reviewed.  Medicine list up-to-date    Preventive Health Care: Flu shot done.  Pharm.D. to assist with med reconciliation    PLAN:  Patient Instructions   Resume vitamin B 12 shots every month    No steroids, which can interfere with healing after neck and back surgery    Resume Topiramate 50 mgs twice a day for nerve pain    Resume amitriptyline 25 mgs at bedtime for sleep and nerve pain    Resume Prazosin 2 mgs at bedtime for PTSD and sleep    Resume allopurinol 300 mgs once a day to lower uric acid and help joint pain    I agree with medical cannabis    Refill tylenol    Stop Robaxin because it does not help you          No orders of the defined types were placed in this encounter.    Administrations This Visit     cyanocobalamin injection 1,000 mcg     Admin Date  11/13/2020 Action  Given Dose  1,000 mcg Route  Intramuscular Administered By  Tejal Brennan CMA              Return in about 4 weeks (around 12/11/2020) for a phone/video follow up visit.    [facility]    CHIEF COMPLAINT:  Chief Complaint   Patient presents with     Hospital Visit Follow Up     Fall 6/1 - admitted to [facility]  6/2 - Spinal Cord injury with surgeries and subsequant infection       HISTORY OF PRESENT ILLNESS:  Bola is a 50 y.o. male presenting to the clinic today for complicated review.  Since we last talked he was in the hospital 2 times.  He has had neck surgery twice and lumbar surgery once.  Decompressive  "laminectomies were performed.  He has had marked improvement in his radicular neck and back pain but still has neuropathy and pain.  Several of his medications were discontinued in the hospital including amitriptyline, allopurinol, baclofen, gabapentin, topiramate, steroids, and narcotics were adjusted.  Since he is now back on his own narcotics have been adjusted by Dr. Cramer.  Gabapentin has been resumed.  He does not think Robaxin is helpful.  He has not had a B12 shot in the 6 months    He is sleeping poorly.  Pain seems to be controlled    He lost weight down to 175 pounds but is now increasing again    REVIEW OF SYSTEMS:   Increasing peripheral edema.  Persisting right arm and elbow pain.  Reasonable mood.  Normal bowels.  No difficulty with urination.  All other systems are negative.    PFSH:  Social History     Social History Narrative    He is .  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  He smokes and does not drink alcohol.  He is now on disability due to his brain injury and bipolar disease.     Still in assisted living    TOBACCO USE:  Social History     Tobacco Use   Smoking Status Current Some Day Smoker     Packs/day: 0.50     Years: 11.00     Pack years: 5.50     Types: Cigarettes     Start date: 8/20/2009     Last attempt to quit: 2/21/2017     Years since quitting: 3.7   Smokeless Tobacco Former User   Tobacco Comment    0.5 pack per day       VITALS:  Vitals:    11/13/20 1412   BP: 120/60   Patient Site: Right Arm   Patient Position: Sitting   Cuff Size: Adult Regular   Pulse: 84   Resp: 20   SpO2: 99%   Weight: 200 lb 4 oz (90.8 kg)   Height: 5' 7\" (1.702 m)     Wt Readings from Last 3 Encounters:   11/13/20 200 lb 4 oz (90.8 kg)   11/04/20 195 lb (88.5 kg)   03/09/20 192 lb (87.1 kg)     Body mass index is 31.36 kg/m .    PHYSICAL EXAM:  Constitutional:  Reveals nervous pleasant man in a wheelchair wearing a " mask  Vitals:  Per nursing notes.  Cardiac: Regular rate and rhythm without murmurs, rubs, or gallops.   Palpation of radial pulse regular  Legs no edema  Lungs: Clear.  Respiratory effort normal.  Psychiatric: Anxious but control      ADDITIONAL HISTORY SUMMARIZED (2): Hospital discharge summaries reviewed.  Surgical notes reviewed.  DECISION TO OBTAIN EXTRA INFORMATION (1): Care everywhere accessed  RADIOLOGY TESTS (1): MRI of neck thoracic spine and lumbar reviewed  LABS (1): Up-to-date including lithium level  Cardiology/MEDICINE TESTS (1):   INDEPENDENT REVIEW (2 each): None.     Total data points: 4    Time In: 251 pm  Time Out: 331 p.m.    The visit lasted a total of 40 minutes face to face with the patient. Over 50% of the time was spent counseling and educating the patient about medications, medication adjustments, medication side effects, and lab testing.        MEDICATIONS:  Current Outpatient Medications   Medication Sig Dispense Refill     acetaminophen (TYLENOL) 650 MG CR tablet Take 3 tablets (1,950 mg total) by mouth 2 (two) times a day. 540 tablet 3     albuterol (PROAIR HFA;PROVENTIL HFA;VENTOLIN HFA) 90 mcg/actuation inhaler INHALE 2 PUFFS BY MOUTH EVERY 6 HOURS AS NEEDED FOR WHEEZING 3 Inhaler 3     allopurinoL (ZYLOPRIM) 300 MG tablet Take 1 tablet (300 mg total) by mouth daily. 90 tablet 3     amitriptyline (ELAVIL) 25 MG tablet Take 1 tablet (25 mg total) by mouth at bedtime. 90 tablet 3     baclofen (LIORESAL) 10 MG tablet TAKE 1 & 1/2 (ONE & ONE-HALF) TABLETS BY MOUTH THREE TIMES DAILY 90 tablet 3     calcium citrate (CALCITRATE) 200 mg (950 mg) tablet Take 950 mg by mouth 2 (two) times a day.       dextroamphetamine-amphetamine (ADDERALL) 30 mg Tab Take 0.5 tab (15mg) BID 30 tablet 0     diazePAM (VALIUM) 5 MG tablet One tab every four hours as needed 42 tablet 1     ergocalciferol (ERGOCALCIFEROL) 1,250 mcg (50,000 unit) capsule Take 50,000 Units by mouth every 7 days.       fexofenadine  (ALLEGRA) 180 MG tablet Take 180 mg by mouth daily as needed.       gabapentin (NEURONTIN) 300 MG capsule Take 2 capsules (600mg) by mouth every afternoon and 2 caps (600mg) before bed. 120 capsule 2     hydroCHLOROthiazide (HYDRODIURIL) 25 MG tablet Take 1 tablet (25 mg total) by mouth 2 (two) times a day at 9am and 6pm. 60 tablet 11     HYDROmorphone (DILAUDID) 2 MG tablet Take 1 tab (2 mg) for pain 1-5/10 and 2 tabs (4mg) for pain 6-10 and one time overnight between [id number]. Hold if sedated, AMS       HYDROmorphone (DILAUDID) 4 MG tablet Take 1 tablet by mouth 3 times daily.       hydrOXYzine pamoate (VISTARIL) 50 MG capsule Take 1 capsule (50 mg total) by mouth every 6 (six) hours as needed. 60 capsule 0     lamoTRIgine (LAMICTAL) 150 MG tablet Take 1 tablet (150 mg total) by mouth 2 (two) times a day. 60 tablet 11     lansoprazole (PREVACID) 30 MG capsule Take 30 mg by mouth daily.       lidocaine (LIDODERM) 5 % Apply 1 patch topically to painful area of skin once daily and remove per schedule. 30 patch 4     lithium 300 MG capsule Take 300 mg by mouth 2 (two) times a day.  1     medical cannabis (Patient's own supply) by Other route see administration instructions. Take as instructed by the medical cannabis dispensary.  0     metoprolol succinate (TOPROL-XL) 100 MG 24 hr tablet Take 1 tablet (100 mg total) by mouth daily. 90 tablet 3     miscellaneous medical supply Misc For home use.       miscellaneous medical supply [facility] bed with mattress and 1/2 rails. Semi-electric bed. Length of need 99 months. Bed extender:no. Group 1 mattress       naloxone (NARCAN) 4 mg/actuation nasal spray 1 spray (4 mg dose) into one nostril for opioid reversal. Call 911. May repeat if no response in 3 minutes. 1 Box 0     nicotine (NICODERM CQ) 21 mg/24 hr Place 1 patch on the skin daily. 30 patch 1     nicotine polacrilex (COMMIT) 2 MG lozenge Apply 2 mg to cheek every 2 (two) hours as needed.       oxyCODONE  (ROXICODONE) 10 mg immediate release tablet Take 2 tablets (20 mg total) by mouth 5 (five) times a day for 14 days. 140 tablet 0     oxyCODONE (ROXICODONE) 10 mg immediate release tablet Take 1 tablet (10 mg total) by mouth 4 (four) times a day. 40 tablet 0     polyethylene glycol (GLYCOLAX) 17 gram/dose powder Take 17 g by mouth daily.       prazosin (MINIPRESS) 2 MG capsule Take 1 capsule (2 mg total) by mouth at bedtime. 90 capsule 3     QUEtiapine (SEROQUEL) 200 MG tablet Take 200 mg by mouth bedtime.       QUEtiapine (SEROQUEL) 50 MG tablet Take 50 mg by mouth 4 (four) times a day.       senna-docusate (PERICOLACE) 8.6-50 mg tablet Take 2 tablets by mouth 2 (two) times a day.       topiramate (TOPAMAX) 50 MG tablet Take 1 tablet (50 mg total) by mouth 2 (two) times a day. 60 tablet 2     zolpidem (AMBIEN) 10 mg tablet Take 10 mg by mouth at bedtime.       Current Facility-Administered Medications   Medication Dose Route Frequency Provider Last Rate Last Dose     [START ON 11/14/2020] cyanocobalamin injection 1,000 mcg  1,000 mcg Intramuscular Q30 Days Devon Lund MD   1,000 mcg at 11/13/20 1538

## 2021-06-13 NOTE — PROGRESS NOTES
[facility] Daily Progress     Patient Name: Bola Montoya  Date: 10/12/2017  Visit #: 12/12 with updated order for 2x/week for 8 weeks on 9/26/17  PTA visit #:  0  Referral Diagnosis: Right shoulder pain, left knee s/p TKA  Referring provider: Pilo Bruce MD; Deepak Zheng MD  Visit Diagnosis:     ICD-10-CM    1. Chronic right shoulder pain M25.511     G89.29    2. Scapular dysfunction M89.9    3. Muscle weakness (generalized) M62.81    4. Pain and swelling of left knee M25.562     M25.462    5. Status post total left knee replacement Z96.652    6. Abnormality of gait R26.9          Assessment:     HEP/POC compliance is  good .  Patient demonstrates understanding/independence with home program.  Response to Intervention Scapular weakness noted with prone Is and encouraged daily completion of exercise to address impairment.  Patient is benefitting from skilled physical therapy and is making steady progress toward functional goals.  Patient is appropriate to continue with skilled physical therapy intervention, as indicated by initial plan of care.    Goal Status:  Pt. will demonstrate/verbalize independence in self-management of condition in : 6 weeks  Pt. will be independent with home exercise program in : 6 weeks  Patient will reach / maintain arm movement: overhead;behind;for dressing;for home chores;with full ROM;with less pain;with less difficulty;in 6 weeks  Comment: pain < 3/10  Patient will be able to: gripping;holding;for lifting;for writing;for dressing;for housework;for meal preparation;with less pain;with no difficulty;in 6 weeks;Comment  Comment: pain < 3/10  Patient will decrease : SPADI score;by _ points;for improved quality of function;for improved quality of life;in 6 weeks  by ___ points: 8    Plan / Patient Education:     Continue with initial plan of care.  Progress with home program as tolerated.  Continue with use of Rockblades to (R) shoulder/cervical region. Review  HEP for shoulder and add SA wall slides  Continue to address (R) scapular stability as tolerated   Also manual therapy cervical spine to improve ROM and mobility (patient is fused from C3-7, so limited cervical ROM contributes to his shoulder pain)      Patient had a (L) TKA on 3/20/17 and continues to have some limited ROM and weakness- pt to continue with HEP and ice daily.    Trial supine pec stretch with foam roller and foam rolling to increase thoracic mobility    Communication with: Referral Source  Patient Related Instruction: Treatment plan and rationale;Nature of Condition;Basis of treatment;Self Care instruction;Body mechanics;Posture;Precautions;Next steps;Expected outcome  Times per Week: 1-2  Number of Weeks: 8  Number of Visits: 16  Therapeutic Exercise: ROM;Strengthening;Stretching  Neuromuscular Reeducation: posture;balance/proprioception;kinesio tape;TNE;core  Manual Therapy: soft tissue mobilization;myofascial release;joint mobilization;muscle energy  Modalities: TENS;electrical stimulation;cold pack;hot pack  Equipment: theraband          Subjective:   Pain level: 5/10, right shoulder/upper back; 5/10 L knee pain    Pt is having an MRI next Tuesday for his neck/arm pain. He got some relief with the modifications to the nerve exercises and the instrument assisted STM.    Knee has been feeling a little bit better. Goal: get down to 3/10 pain in both knee and R shoulder/neck/upper back    Objective:     HEP: patient already completeing TB pull-downs, rows, IR , ER, median and radial nerve glides (sliders and tensioners), cervical flexion and rotation stretches with at towel, prone I's and ball circles on wall    CURRENT KNEE HEP: stretch IT band with towel, hamstring stretch, knee flexion in standing along with hip flexor stretch in standing, squats, bridges- SL , abdominal set with twist with legs in straddle    Knee flexion/ext: 3-115 deg (120 deg PROM) on 10/10/17    MMT: humberto knee flexion: 5/5 ;  4+/5 hip ext L, 5/5 R on 10/10/17         Treatment Today   TREATMENT MINUTES COMMENTS   Evaluation     Self-care/ Home management     Manual therapy 10  IANSM with Rockblades to (R) UT, cervical paraspinals, thoracic paraspinals, levator scap, and lateral shoulder: Grade II feathering > Grade III-IV for relaxation    Neuromuscular Re-education 5 -median nerve sliders 20x, radial nerve sliders without wrist flexion and ~30 deg 20x on R  -limited cervical rotation due to fusion   Therapeutic Activity     Therapeutic Exercises 15 -see exercise flow sheet  -prone Is 2x10 reps with cues to increase hold to 3-5 sec  -standing shoulder IR/ER with L4 band, 30x  -verbal review of remaining shoulder/upper back HEP     Gait training     Modality__________________                Total 30    Blank areas are intentional and mean the treatment did not include these items.       Jackeline Zuniga, PT, DPT  10/12/2017

## 2021-06-13 NOTE — PROGRESS NOTES
[facility] Daily Progress     Patient Name: Bola Lyon  Date: 9/20/2017  Visit #: 7  PTA visit #:  0  Referral Diagnosis: Right shoulder pain, left knee s/p TKA  Referring provider: Pilo Bruce MD; Deepak Zheng MD  Visit Diagnosis:     ICD-10-CM    1. Chronic right shoulder pain M25.511     G89.29    2. Scapular dysfunction M89.9    3. Muscle weakness (generalized) M62.81    4. Pain and swelling of left knee M25.562     M25.462    5. Status post total left knee replacement Z96.652          Assessment:     HEP/POC compliance is  good .  Response to Intervention Pt reports relief of pain along medial border of (R) scapula after IANSM with Rockblades. Good tolerance to DTM and CFM to (R) pectoral region to help break up significant scar tissue. Pt tolerated additional scapular stabilization exercsies well with cues required to maintain proper positioning of scapula throughout   Patient is benefitting from skilled physical therapy and is making steady progress toward functional goals.  Patient is appropriate to continue with skilled physical therapy intervention, as indicated by initial plan of care.    Goal Status:  Pt. will demonstrate/verbalize independence in self-management of condition in : 6 weeks  Pt. will be independent with home exercise program in : 6 weeks  Patient will reach / maintain arm movement: overhead;behind;for dressing;for home chores;with full ROM;with less pain;with less difficulty;in 6 weeks  Comment: pain < 3/10  Patient will be able to: gripping;holding;for lifting;for writing;for dressing;for housework;for meal preparation;with less pain;with no difficulty;in 6 weeks;Comment  Comment: pain < 3/10  Patient will decrease : SPADI score;by _ points;for improved quality of function;for improved quality of life;in 6 weeks  by ___ points: 8    Plan / Patient Education:     Continue with initial plan of care.  Progress with home program as tolerated.  Continue with use of  Rockblades to (R) shoulder/cervical region. Review prone I's and ball cirlces on wall. Add SA wall slides     Subjective:   Pain level: 6/10, right shoulder/upper back   Pt reports he is noticing more neurological things like difficulty with writing due to discomfort and weakness in the right hand     Objective:   Significant TTP and tone along medial border of (R) scapula as well as along (R) pectoral region   Significant (R) scapular winging initially during ball circles on wall exercises- able to correct with verbal cues to pull shoulders blades down and back           Treatment Today   TREATMENT MINUTES COMMENTS   Evaluation     Self-care/ Home management     Manual therapy 12  IANSM with Rockblades to (R) UT, levator, and along medial border of (R) scapular in prone Grade II feathering > Grade III-IV for relaxation     IANSM with Rockblades to (R) pectorals in supine, Grade II feathering > Grade III_IV for relaxation followed by DTM and CFM to pectorals    Neuromuscular Re-education 5 KTape instructions and application to (R) scapula for retraction    Therapeutic Activity     Therapeutic Exercises 8 Prone I's x 10 with cues to avoid excessive UT activation   Ball circles on wall, 10x CW 10x CCW (repeated 2x) cues to maintain shoulder retraction to prevent winging.    Gait training     Modality__________________                Total 25    Blank areas are intentional and mean the treatment did not include these items.       Jackeline Mosher  9/20/2017

## 2021-06-13 NOTE — TELEPHONE ENCOUNTER
Patient calling back today, asking about diazepam and who will be prescribing this medication.  He reports needing this for muscle spasms and tightness in the shoulder and upper back.    According to the  this has been currently prescribed by an outside provider.    Please advise.

## 2021-06-13 NOTE — TELEPHONE ENCOUNTER
Patient called inquiring on the status of this request and states that the only medication that Dr. Cramer needs to refill at this time is the Oxycodone. Patient states his psychiatrist prescribes the Zolpidem. Patient states that he is out of Oxycodone and would like this sent to his pharmacy ASAP

## 2021-06-13 NOTE — TELEPHONE ENCOUNTER
Per provider, will continue diazepam.  Please order as appropriate, last time ordered by this provider was quite some time ago.  Will need a new order.

## 2021-06-13 NOTE — TELEPHONE ENCOUNTER
Medication Request  Medication name:   zolpidem (AMBIEN) 10 mg tablet   11/2/2020     Sig - Route: Take 10 mg by mouth at bedtime. - Oral    Class: Historical Med       AND    QUEtiapine (SEROQUEL) 50 MG tablet        Sig - Route: Take 50 mg by mouth 4 (four) times a day. - Oral    Class: Historical Med        Requested Pharmacy: [facility]  Reason for request: Historical medications needs new prescriptions.  When did you use medication last?:  unknown  Patient offered appointment:  N/A - electronic request  Okay to leave a detailed message: yes

## 2021-06-13 NOTE — TELEPHONE ENCOUNTER
I am happy to prescribe quetiapine, zolpidem, and anything other than his narcotics or benzodiazepines    His medication list correct?  If so, set up what ever prescriptions he needs

## 2021-06-13 NOTE — PROGRESS NOTES
S: Bola called and left a message stating he would like his items shipped to him. Rx on-file is current from Dr. Vásquez.     A: No assessment was made. Items will be shipped to his current address verified at [facility]. Items will ship out tomorrow on 11/18/2020    P: Items will be shipped since he is familiar with the use, care, and fit of the items.   Goal is to maintain a home program.

## 2021-06-13 NOTE — PROGRESS NOTES
[facility] Daily Progress     Patient Name: Bola Lyon  Date: 9/14/2017  Visit #: 6  PTA visit #:  0  Referral Diagnosis: Right shoulder pain, left knee s/p TKA  Referring provider: Pilo Bruce MD; Deepak Zheng MD  Visit Diagnosis:     ICD-10-CM    1. Chronic right shoulder pain M25.511     G89.29    2. Scapular dysfunction M89.9    3. Muscle weakness (generalized) M62.81    4. Pain and swelling of left knee M25.562     M25.462    5. Status post total left knee replacement Z96.652          Assessment:     HEP/POC compliance is  good .  Response to Intervention Pt reports relief with use of Rockblades   Patient is benefitting from skilled physical therapy and is making steady progress toward functional goals.  Patient is appropriate to continue with skilled physical therapy intervention, as indicated by initial plan of care.    Goal Status:  Pt. will demonstrate/verbalize independence in self-management of condition in : 6 weeks  Pt. will be independent with home exercise program in : 6 weeks  Patient will reach / maintain arm movement: overhead;behind;for dressing;for home chores;with full ROM;with less pain;with less difficulty;in 6 weeks  Comment: pain < 3/10  Patient will be able to: gripping;holding;for lifting;for writing;for dressing;for housework;for meal preparation;with less pain;with no difficulty;in 6 weeks;Comment  Comment: pain < 3/10  Patient will decrease : SPADI score;by _ points;for improved quality of function;for improved quality of life;in 6 weeks  by ___ points: 8    Plan / Patient Education:     Continue with initial plan of care.  Progress with home program as tolerated.  Continue with use of Rockblades to (R) shoulder/cervical region. Add ball circles on the wall next time     Subjective:   Pain level: 6/10, right shoulder/upper back   Pt reports pain about the same. Did state the kinesiotape was helpful.     Objective:   Significant TTP and hypertonicity of (R)  levator and rhomboids        Treatment Today   *pt 15 minutes late   TREATMENT MINUTES COMMENTS   Evaluation     Self-care/ Home management     Manual therapy 10  IANSM with Rockblades to (R) UT, levator, and rhomboids. Grade II feathering > Grade III-IV for relaxation    Neuromuscular Re-education 5 KTape instructions and application to (R) scapula for retraction    Therapeutic Activity     Therapeutic Exercises     Gait training     Modality__________________                Total 15    Blank areas are intentional and mean the treatment did not include these items.       Jackeline Mosher  9/14/2017

## 2021-06-13 NOTE — PROGRESS NOTES
[facility] Daily Progress     Patient Name: Bola Montoya  Date: 10/30/2017  Visit #: 16 total with updated order for 2x/week for 8 weeks on 9/26/17  PTA visit #:  0  Referral Diagnosis: Right shoulder pain, left knee s/p TKA  Referring provider: Pilo Bruce MD; Deepak Zheng MD  Visit Diagnosis:     ICD-10-CM    1. Chronic right shoulder pain M25.511     G89.29    2. Scapular dysfunction M89.9    3. Muscle weakness (generalized) M62.81    4. Status post total left knee replacement Z96.652    5. Abnormality of gait R26.9          Assessment:     HEP/POC compliance is  poor.  Patient demonstrates understanding/independence with home program.  Response to Intervention Pt will report mild improvements in pain/ROM after MT that lasts for 1-3 hours after treatment session.  Patient is benefitting from skilled physical therapy and is making steady progress toward functional goals.  Patient is appropriate to continue with skilled physical therapy intervention, as indicated by initial plan of care.   Spent increased time discussing importance of arriving on-time for appt. Pt is chronically 5-15 min late to PT appts. Provider discussed how this leaves little time to progress HEP and complete MT. Also, discussed the limited progress with PT recently. Provider is unable to progress HEP and 1-3 hours of relief is minimal after MT completed. Pt will come to appt Thursday, then take 1 week off as he will be receiving an injection. Then, return for 1-2 weeks and see if additional PT is appropriate.    Goal Status:  Pt. will demonstrate/verbalize independence in self-management of condition in : 6 weeks  Pt. will be independent with home exercise program in : 6 weeks  Patient will reach / maintain arm movement: overhead;behind;for dressing;for home chores;with full ROM;with less pain;with less difficulty;in 6 weeks  Comment: pain < 3/10  Patient will be able to: gripping;holding;for lifting;for  "writing;for dressing;for housework;for meal preparation;with less pain;with no difficulty;in 6 weeks;Comment  Comment: pain < 3/10  Patient will decrease : SPADI score;by _ points;for improved quality of function;for improved quality of life;in 6 weeks  by ___ points: 8    Plan / Patient Education:     Continue with initial plan of care.  Progress with home program as tolerated.  Continue with use of Rockblades to (R) shoulder/cervical region. Review HEP for shoulder and add SA wall slides  Reassess goals     Patient had a (L) TKA on 3/20/17 and continues to have some limited ROM and weakness- pt to continue with HEP and ice daily.    Subjective:   Pain level: 7/10, right shoulder/upper back; 5/10 L knee pain    Pt arrived 12 min late. Pt has a lot of unexpected appts and things that will come up during the day. Pt will have an injection on Tuesday, Nov 7. Pt reports \"relief\" after MT for a few hours after. 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 Pt is working 3-4 hours a day.    Objective:     HEP: patient already completeing TB pull-downs, rows, IR , ER, median and radial nerve glides (sliders and tensioners)- REDUCED TO JUST \"waving at ground\" as other nerve glides were increasing symptoms, cervical flexion and rotation stretches with at towel, prone I's and ball circles on wall    CURRENT KNEE HEP: stretch IT band with towel, hamstring stretch, knee flexion in standing along with hip flexor stretch in standing, squats, bridges- SL , abdominal set with twist with legs in straddle    Knee flexion/ext: 3-115 deg (120 deg PROM) on 10/10/17    MMT: humberto knee flexion: 5/5 ; 4+/5 hip ext L, 5/5 R on 10/10/17    Cervical AROM:  Flexion: 20 deg  Ext: 15 deg  Rotation: R 32 deg ;  L 20 deg   Lateral flexion: R 15 deg L 10 deg     Treatment Today *Pt arrived 12 min late.  TREATMENT MINUTES COMMENTS   Evaluation     Self-care/ Home management     Manual therapy 10 -seated STM to R " levator scapula and thoracic paraspinals  -seated L rotation with R unilateral PAs to C1-2 3x10 sec oscillations, grade III   Neuromuscular Re-education     Therapeutic Activity     Therapeutic Exercises 5 -see exercise flow sheet     Gait training     Modality__________________                Total 15    Blank areas are intentional and mean the treatment did not include these items.       Jackeline Zuniga, PT, DPT  10/30/2017     [facility] Discharge Summary    Patient was seen for 16 visits from 8/22/17 to 10/30/17 with several late cancels, late arrivals or no show appts.  Pt had reached his maximum PT visits allowed per calender year. He received approval for 4 additional follow up visits and was contacted to schedule. Pt did not schedule remaining visits. Pt's progress in PT had been limited and see above note for complete assessment.    Therapy will be discontinued at this time.  The patient will need a new referral to resume.    Thank you for your referral.  Jackeline Zuniga, PT, DPT  12/11/2017  1:11 PM

## 2021-06-13 NOTE — PATIENT INSTRUCTIONS - HE
Resume vitamin B 12 shots every month    No steroids, which can interfere with healing after neck and back surgery    Resume Topiramate 50 mgs twice a day for nerve pain    Resume amitriptyline 25 mgs at bedtime for sleep and nerve pain    Resume Prazosin 2 mgs at bedtime for PTSD and sleep    Resume allopurinol 300 mgs once a day to lower uric acid and help joint pain    I agree with medical cannabis    Refill tylenol    Stop Robaxin because it does not help you

## 2021-06-13 NOTE — TELEPHONE ENCOUNTER
According to provider: patient is transitioning from hydromorphone to oxycodone 10 mg four times daily.  Call placed to patient:  Reviewed current plan of care, to be taking oxycodone 10 mg four times daily, patient explains that he is in need of something in addition and when he had previously taken the oxycodone with the oxycontin this worked better.  He also explains that he was taking hydromorphone for not only breakthrough pain but also on a consistent schedule throughout the day but has been without this for approximately 1 week and is struggling.    Please review and send new RX as appropriate, will also need to fax new directions for use the [facility].  [phone]    Please route back to the pool once reviewed and completed.

## 2021-06-13 NOTE — PROGRESS NOTES
[facility] Daily Progress     Patient Name: Bola Montoya  Date: 10/26/2017  Visit #: 12/12 with updated order for 2x/week for 8 weeks on 9/26/17  PTA visit #:  0  Referral Diagnosis: Right shoulder pain, left knee s/p TKA  Referring provider: Pilo Bruce MD; Deepak Zheng MD  Visit Diagnosis:     ICD-10-CM    1. Chronic right shoulder pain M25.511     G89.29    2. Scapular dysfunction M89.9    3. Muscle weakness (generalized) M62.81    4. Pain and swelling of left knee M25.562     M25.462          Assessment:     HEP/POC compliance is  fair .  Patient demonstrates understanding/independence with home program.  Response to Intervention Modifications made to nerve glides to prevent flare up in neural symptoms. Pt continues to be aggrassive with HEP although, provider continues to educate on importance of gentle nerve glides to prevent increase in symptoms.  Patient is benefitting from skilled physical therapy and is making steady progress toward functional goals.  Patient is appropriate to continue with skilled physical therapy intervention, as indicated by initial plan of care.    Goal Status:  Pt. will demonstrate/verbalize independence in self-management of condition in : 6 weeks  Pt. will be independent with home exercise program in : 6 weeks  Patient will reach / maintain arm movement: overhead;behind;for dressing;for home chores;with full ROM;with less pain;with less difficulty;in 6 weeks  Comment: pain < 3/10  Patient will be able to: gripping;holding;for lifting;for writing;for dressing;for housework;for meal preparation;with less pain;with no difficulty;in 6 weeks;Comment  Comment: pain < 3/10  Patient will decrease : SPADI score;by _ points;for improved quality of function;for improved quality of life;in 6 weeks  by ___ points: 8    Plan / Patient Education:     Continue with initial plan of care.  Progress with home program as tolerated.  Continue with use of Rockblades to (R)  "shoulder/cervical region. Review HEP for shoulder and add SA wall slides  Continue to address (R) scapular stability as tolerated   Also manual therapy cervical spine to improve ROM and mobility (patient is fused from C3-7, so limited cervical ROM contributes to his shoulder pain)      Patient had a (L) TKA on 3/20/17 and continues to have some limited ROM and weakness- pt to continue with HEP and ice daily.    Trial supine pec stretch with foam roller and foam rolling to increase thoracic mobility- (standing cat/cow), hooklying thoracic ext        Subjective:   Pain level: 5/10, right shoulder/upper back; 5/10 L knee pain    Pt had an MRI last week and went over results, he reports spurling and decreased foraminal space throughout the cervical and thoracic spine. They are going to try non-surgical approach with an injection next year.    Objective:     HEP: patient already completeing TB pull-downs, rows, IR , ER, median and radial nerve glides (sliders and tensioners)- REDUCED TO JUST \"waving at ground\" as other nerve glides were increasing symptoms, cervical flexion and rotation stretches with at towel, prone I's and ball circles on wall    CURRENT KNEE HEP: stretch IT band with towel, hamstring stretch, knee flexion in standing along with hip flexor stretch in standing, squats, bridges- SL , abdominal set with twist with legs in straddle    Knee flexion/ext: 3-115 deg (120 deg PROM) on 10/10/17    MMT: humberto knee flexion: 5/5 ; 4+/5 hip ext L, 5/5 R on 10/10/17    Cervical AROM:  Flexion: 20 deg  Ext: 15 deg  Rotation: R 30 deg ; L 35 deg   Lateral flexion: R 15 deg L 10 deg         Treatment Today *Pt arrived 17 min late but provider able to accommodate   TREATMENT MINUTES COMMENTS   Evaluation     Self-care/ Home management     Manual therapy 10 -prone STM to R thoracic paraspinals and levator scapula   Neuromuscular Re-education 5 -berve glide modifications- increased neural symptoms with current nerve glides, " reduced to just waving at ground    Therapeutic Activity     Therapeutic Exercises 8 -see exercise flow sheet     Gait training     Modality__________________                Total 23    Blank areas are intentional and mean the treatment did not include these items.       Jackeline Zuniga, PT, DPT  10/26/2017

## 2021-06-13 NOTE — PROGRESS NOTES
CHW sent patient [employer] message today-   Next outreach: 2 weeks= 12/14/2020  Patient is currently staying at [facility]  Goals        Patient Stated      II want to get on some affordable housing wailists within the next  6 months. (pt-stated)      Action steps to achieve this goal  1.  I will work with my friends and family to find affordable housing options with open waitlists to apply for.  2.  I will let my care guide know if I need any assistance with applying for affordable housing options I find.  3.  I have applied to the [facility] housing waitlist and will let my [facility] team know if I need any assistance.    5/29/2020- [employer] message sent to pt, to f/u on scheduling SW appt  Date goal set: 4/18/19    Updated 6/28/19

## 2021-06-13 NOTE — TELEPHONE ENCOUNTER
Patient is currently taking a long acting oxycodone and short acting oxycodone. Hydromorphone no longer being prescribed at this time.

## 2021-06-13 NOTE — PROGRESS NOTES
[facility] Daily Progress     Patient Name: Bola Montoya  Date: 10/19/2017  Visit #: 12/12 with updated order for 2x/week for 8 weeks on 9/26/17  PTA visit #:  0  Referral Diagnosis: Right shoulder pain, left knee s/p TKA  Referring provider: Pilo Bruce MD; Deepak Zheng MD  Visit Diagnosis:     ICD-10-CM    1. Chronic right shoulder pain M25.511     G89.29    2. Scapular dysfunction M89.9    3. Muscle weakness (generalized) M62.81    4. Pain and swelling of left knee M25.562     M25.462    5. Status post total left knee replacement Z96.652    6. Abnormality of gait R26.9          Assessment:     HEP/POC compliance is  good .  Patient demonstrates understanding/independence with home program.  Response to Intervention Severe limitations in thoracic mobility.  Patient is benefitting from skilled physical therapy and is making steady progress toward functional goals.  Patient is appropriate to continue with skilled physical therapy intervention, as indicated by initial plan of care.    Goal Status:  Pt. will demonstrate/verbalize independence in self-management of condition in : 6 weeks  Pt. will be independent with home exercise program in : 6 weeks  Patient will reach / maintain arm movement: overhead;behind;for dressing;for home chores;with full ROM;with less pain;with less difficulty;in 6 weeks  Comment: pain < 3/10  Patient will be able to: gripping;holding;for lifting;for writing;for dressing;for housework;for meal preparation;with less pain;with no difficulty;in 6 weeks;Comment  Comment: pain < 3/10  Patient will decrease : SPADI score;by _ points;for improved quality of function;for improved quality of life;in 6 weeks  by ___ points: 8    Plan / Patient Education:     Continue with initial plan of care.  Progress with home program as tolerated.  Continue with use of Rockblades to (R) shoulder/cervical region. Review HEP for shoulder and add SA wall slides  Continue to address (R)  "scapular stability as tolerated   Also manual therapy cervical spine to improve ROM and mobility (patient is fused from C3-7, so limited cervical ROM contributes to his shoulder pain)      Patient had a (L) TKA on 3/20/17 and continues to have some limited ROM and weakness- pt to continue with HEP and ice daily.    Trial supine pec stretch with foam roller and foam rolling to increase thoracic mobility- (standing cat/cow), hooklying thoracic ext        Subjective:   Pain level: 5/10, right shoulder/upper back; 5/10 L knee pain    Pt missed his last appt - forgot with other sedated MRI and US to the elbow. He received an injection into the R ulnar nerve and it is still sore in that area. Limited neck mobility.     Knee has been sore later in the day and into the next day- maybe \"pushed\" it too far. Goal: get down to 3/10 pain in both knee and R shoulder/neck/upper back    Objective:     HEP: patient already completeing TB pull-downs, rows, IR , ER, median and radial nerve glides (sliders and tensioners), cervical flexion and rotation stretches with at towel, prone I's and ball circles on wall    CURRENT KNEE HEP: stretch IT band with towel, hamstring stretch, knee flexion in standing along with hip flexor stretch in standing, squats, bridges- SL , abdominal set with twist with legs in straddle    Knee flexion/ext: 3-115 deg (120 deg PROM) on 10/10/17    MMT: humberto knee flexion: 5/5 ; 4+/5 hip ext L, 5/5 R on 10/10/17    Cervical AROM:  Flexion: 20 deg  Ext: 15 deg  Rotation: R 20 deg (30 deg after MT) L 30 deg (35 deg after MT)  Lateral flexion: R 15 deg L 10 deg         Treatment Today   TREATMENT MINUTES COMMENTS   Evaluation     Self-care/ Home management     Manual therapy 8 -seated L and R cervical AROM rotation with end range mobilizations, C1-2 unilateral mobs, grade III-IV 2x10 sec oscillations completed 3 times   Neuromuscular Re-education 4 -verbal review to reduce intensity of nerve glides to prevent flare " up   Therapeutic Activity     Therapeutic Exercises 15 -see exercise flow sheet  -trialed S/L reach and roll- arm limited to directly above shoulder and pt unable to obtain any rotation from thoracic spine, trialed modified and pt requiring mod A to obtain any rotation from thoracic spine and reported increased pain along posterior scapula     Gait training     Modality__________________                Total 27    Blank areas are intentional and mean the treatment did not include these items.       Jackeline Zuniga, PT, DPT  10/19/2017

## 2021-06-13 NOTE — PATIENT INSTRUCTIONS - HE
Increase topiramate to 100 mg twice daily    Increase allopurinol to 300 mg twice daily    Decrease gabapentin to 300 mg twice daily

## 2021-06-13 NOTE — PROGRESS NOTES
[facility] Follow Up Note    Bola Lyon   47 y.o. male    Date of Visit: 10/16/2017    Chief Complaint   Patient presents with     Follow-up     medication follow up     Pre-op Exam     for MRI     Subjective  Bola comes in today to go over his chronic pain issues.  He is going to be having cervical MRI scan with IV sedation tomorrow and needs an okay for this as well.  He continues to struggle with cervical radiculopathy, chronic pain disorder related to his biceps tendon rupture with shoulder impingement syndrome, chronic knee problems and underlying postconcussion syndrome with PTSD.  He has been trying to get into medicinal cannabis but it is too expensive for him right now.  He is in the process of getting on disability due to his history of traumatic brain injury and mental illness.  He continues to take 7 oxycodone a day and OxyContin twice daily and his pain is still not as well-controlled as he would like.    ROS A comprehensive review of systems was performed and was otherwise negative    Social History:   Social History     Social History Narrative    He is .  He has been a  and also a counselor, and worked with [employer].  He smokes and does not drink alcohol.       Medications were reconciled.  Allergies, social and family history, and the problem list were all reviewed and updated.    Old records reviewed: Records from [facility]    Exam  General Appearance: Pleasant and alert  Vitals:    10/16/17 1226   BP: 130/82   Patient Site: Left Arm   Patient Position: Sitting   Cuff Size: Adult Regular   Pulse: 71   Weight: (!) 228 lb (103.4 kg)      Body mass index is 36.25 kg/(m^2).  Wt Readings from Last 3 Encounters:   10/16/17 (!) 228 lb (103.4 kg)   08/21/17 220 lb 9.6 oz (100.1 kg)   07/24/17 (!) 223 lb 6 oz (101.3 kg)     HEENT: Sclera are clear.   Lungs: Normal respirations  Cardiac: Regular rate and rhythm  Abdomen: Soft  and nondistended  Extremities: No edema  Skin: No rashes  Neuro: Moves all extremities and has facial symmetry  Gait: Ambulates with a normal gait    Assessment/Plan  1. Hypertension  Blood pressure is stable on medication.  - Basic Metabolic Panel  - HM2(CBC w/o Differential)    2. Cervical Radiculopathy  He is going to be undergoing an MRI scan tomorrow.  He is okay for IV sedation for this.    3. Chronic pain syndrome  Meds were renewed.  - oxyCODONE (OXYCONTIN) 15 mg 12 hr tablet; Take 1 tablet (15 mg total) by mouth every 12 (twelve) hours.  Dispense: 60 tablet; Refill: 0    4. PTSD (post-traumatic stress disorder)  He gets very claustrophobic and needs IV sedation in order to undergo an MRI scan.          Abena Gentile MD  10/16/2017    Much or all of the text in this note was generated through the use of Dragon Dictate voice-to-text software. Errors in spelling or words which seem out of context are unintentional. Sound alike errors, in particular, may have escaped editing.

## 2021-06-13 NOTE — TELEPHONE ENCOUNTER
Pt calling again checking on status.  Will be out of all meds by tomorrow morning.  Please call to let him know once done.

## 2021-06-14 NOTE — TELEPHONE ENCOUNTER
Medication being requested: ZOLPIDEM 10 MG TAB  Last visit date: 11/4/20 Provider: RAYNE  Next visit date: . 01/11/21 Provider: RAYNE  Expected followup: 8 WEEKS  [facility] visit ([facility]) date: NONE  UDT date:2/25/20   (Last fill date; name/strength: ZOLPIDEM 10 MG TAB ; provider BE; 0.50 LME; quantity 30.00):  Pertinent between visit information about requested medication (telephone, mychart, prior authorization, concerns, comments): NONE  Script being sent to provider by nurse- dates and quantity:   Requested Prescriptions     Pending Prescriptions Disp Refills     zolpidem (AMBIEN) 10 mg tablet   0     Sig: Take 1 tablet (10 mg total) by mouth at bedtime.       Pharmacy cued: [facility]  Standing orders for withdrawal protocol implemented: [facility]

## 2021-06-14 NOTE — TELEPHONE ENCOUNTER
Reason for Call:  Other needing prescription  Faxed over please.    Detailed comments: hospital bed E60 case ref [id number]  Fax is [phone]     Phone Number Patient can be reached at: Other phone number:  [phone]  Best Time: any    Can we leave a detailed message on this number?: Yes    Call taken on 12/31/2020 at 4:41 PM by Pamela Behr

## 2021-06-14 NOTE — PROGRESS NOTES
[facility] Follow Up Note    Bola Lyon   47 y.o. male    Date of Visit: 11/13/2017    Chief Complaint   Patient presents with     Follow-up     pt wanting to make sure his record is up to date with all problems listed on problem list for FMLA paperwork     Subjective  Bola over several concerns.  He continues to struggle with a lot of pain issues due to multiple problems.  He is now been diagnosed with some ulnar nerve entrapment and C7 radiculopathy and is under the care of physicians at [facility].  His insurance denied him OxyContin for a while and he was without it for 12 days and describes how miserable that was.  Barely gets through his days while on OxyContin and oxycodone.  He is in the process of applying for long-term disability and Social Security due to his recurrent brain injuries, bipolar and PTSD along with all of his physical ailments.  He is frustrated as he would like to get on medical cannabis but cannot afford it.    ROS A comprehensive review of systems was performed and was otherwise negative    Social History:   Social History     Social History Narrative    He is .  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  He smokes and does not drink alcohol.       Medications were reconciled.  Allergies, social and family history, and the problem list were all reviewed and updated.    Old records reviewed: Records from [facility] including recent MRI scans    Exam  General Appearance: Pleasant and alert  Vitals:    11/13/17 1219   BP: 130/80   Patient Site: Left Arm   Patient Position: Sitting   Cuff Size: Adult Regular   Pulse: 81   Weight: (!) 226 lb 11.2 oz (102.8 kg)      Body mass index is 36.04 kg/(m^2).  Wt Readings from Last 3 Encounters:   11/13/17 (!) 226 lb 11.2 oz (102.8 kg)   10/16/17 (!) 228 lb (103.4 kg)   08/21/17 220 lb 9.6 oz (100.1 kg)     HEENT: Sclera are clear.   Lungs: Normal  respirations  Abdomen: Soft and nondistended  Extremities: No edema  Skin: No rashes  Neuro: Moves all extremities and has facial symmetry  Gait: Ambulates with a normal gait    Assessment/Plan  1. Chronic pain syndrome  We discussed at length and I think he would do well with the referral to see Dr. Cramer to discuss other alternatives to help treat his pain better without increasing narcotics.  He would like to get on the medical cannabis but cannot afford it.  - Ambulatory referral to Pain Clinic  - oxyCODONE (OXYCONTIN) 15 mg 12 hr tablet; Take 1 tablet (15 mg total) by mouth every 12 (twelve) hours.  Dispense: 60 tablet; Refill: 0    2. Chronic, continuous use of opioids  He has an updated CSA and a urine tox screen.  - Ambulatory referral to Pain Clinic    3. Traumatic brain injury with loss of consciousness, sequela  Continues to work with psychiatry and brain injury services.  - Ambulatory referral to Pain Clinic    4. Bipolar 2 disorder  Again, would appreciate Dr. Cramer's expertise in suggesting in helping to manage his pain medication regimen.  - Ambulatory referral to Pain Clinic    5. PTSD (post-traumatic stress disorder)  - Ambulatory referral to Pain Clinic    6. C7 radiculopathy  He remains under the care of orthopedics.    7. Cervical Radiculopathy    8. Entrapment of right ulnar nerve  Been seeing Dr. Gamez.    9. Panic attacks  He wants this added to his list of diagnoses.    10. Generalized anxiety disorder          Abena Gentile MD  11/13/2017    Much or all of the text in this note was generated through the use of Dragon Dictate voice-to-text software. Errors in spelling or words which seem out of context are unintentional. Sound alike errors, in particular, may have escaped editing.

## 2021-06-14 NOTE — TELEPHONE ENCOUNTER
Aleisha calls, the DON from the [facility], patient has returned to this facility from the hospital.  While in the hospital his oxycontin 20mg, four times daily had been reduced as upon admission patient presented with some sedation and unsteady gait.    The hospital then discharged the patient on oxycontin 20 mg three times a day and patient will need this new dosing refilled.    Last ov with BE: 11/04/20  Upcoming apt on 1/11/21 with BE    Please review and cue as requested or as determined appropriate by the provider to cover patient until he is in for follow up apt.      is not currently loading.    Please review and prescribe to Corazon

## 2021-06-14 NOTE — TELEPHONE ENCOUNTER
Reason for Call:  Medication or medication refill:    Do you use a [facility]?  Name of the pharmacy and phone number for the current request: [facility] [place]  FAX:  [phone]  Name of the medication requested:   SENNA   MIRALAX  ERGOCALCIFERO   LANSOPRARDE        Other request:     Can we leave a detailed message on this number? Yes    Phone number patient can be reached at: Cell number on file:    Telephone Information:   Mobile [phone]       Best Time: ANYTIME    Call taken on 1/21/2021 at 4:23 PM by Helen Bowen

## 2021-06-14 NOTE — PROCEDURES
[facility] Radiology Post Procedure    Procedure: Right C7 transforaminal GALLITO    Radiologist: Kevon Warner    Contrast: 1 ml omnipaque  Fluoro Time: 4.5 minutes  Air Kerma: 222 mGy    Medications: Versed 5 mg IV                        Fentanyl 250 mcg IV  Sedation Time: 30 minutes    EBL: minimal  Complications: none    Preliminary findings: (see dictation for full detail)  Technically successful right C7 transforaminal GALLITO    Assess/Plan:   Bedrest for 1-2 hours then discharge home    Kevon Warner

## 2021-06-14 NOTE — TELEPHONE ENCOUNTER
Reason for Call:  Other call back      Detailed comments: ADMITTED TO [facility] ON 01/03/2021  ADMITTED FOR:  INCREASED IN CONFUSION AND HALLUCINATING    Phone Number Patient can be reached at: Other phone number:  QUESTION OR CONCERNS CAN CALL:  [phone]    Best Time: ANYTIME    Can we leave a detailed message on this number?: Yes    Call taken on 1/4/2021 at 4:06 PM by Helen Bowen

## 2021-06-14 NOTE — TELEPHONE ENCOUNTER
"Patient had a visit with provider on 1/25/21.  Did provider intend to send Baclofen, Adderall, Valium and gabapentin? (These prescriptions were set as \"no print\" while others did go to the pharmacy the dame day.  "

## 2021-06-14 NOTE — PROGRESS NOTES
"Assessment/Plan:     Problem List Items Addressed This Visit     Chronic pain syndrome - Primary (Chronic)    Relevant Orders    Pain Management Drug Panel, Urine              No follow-ups on file.    Patient Instructions   PLAN:  Dr. Cramer to discuss with.  Nursing staff Aleisha, about the hospitalization and how you are doing since then.  May consider increasing the OxyContin to 20 mg 4 times a day.    Continue working with physical therapy.    Return to Dr. Cramer in 8 to 12 weeks.        Subjective:       50 y.o. male   Chart reviews discharge summary 1/6, patient has been admitted with mental status changes.  Reported visual hallucinations.  There was concern he was sedated from his OxyContin it was changed from 20 mg 4 times a day to 3 times a day.  He was seen by psychiatry, recommended changes to his try cyclics, prazosin, Adderall, Ambien, and decrease of baclofen.    On 12/21 saw his primary care provider.  Discussed changing from gabapentin to Topamax due to lower extremity swelling.  Was on allopurinol for increased uric acid.    At her last visit early December we changed OxyContin 15 mg 4 times a day to 20 mg 4 times a day as each dose was lasting only 2 to 3 hours.     On interview he reviews having increase in pain, the wheelchair that he is using is unsteady, he described reaching his arm and felt on the right side, arm was extended, injured his right eighth rib.    Reviews he had a few falls.  He reports hospitalization which she attributed to drinking too much coffee and becoming dehydrated, and being sleep deprived staying up to watch movies.  He described having the experience where he is \"seeing little entities\".  It is never happened till that night.  Describes he was moving to demonstrate to staff coming to the room where he was seen these when he fell.  He describes resolving in the hospital.  Inquired about the comment that his falls were to get attention and he described that was " misinterpreted, he was trying to get the attention of the staff to where he was seeing these entities and he fell.    He describes since the OxyContin decreased to 3 times a day, he woke up this morning at 330 in pain.  His rib pain is worse.  When he has to wait 8 to 9 hours with each dose he has significant increase with pain.    He notes that the amitriptyline and Minipress have been stopped and the Adderall was decreased to 15 mg.  Continues on gabapentin 600 mg twice a day.    Reviews his goal is to eventually get out of the wheelchair.  He has been actively addressing himself to physical therapy.    Reports calling the nurse at [phone] this week collaborates.     reviewed, as expected.      Current Outpatient Medications:      acetaminophen (TYLENOL) 650 MG CR tablet, Take 3 tablets (1,950 mg total) by mouth 2 (two) times a day., Disp: 540 tablet, Rfl: 3     albuterol (PROAIR HFA;PROVENTIL HFA;VENTOLIN HFA) 90 mcg/actuation inhaler, INHALE 2 PUFFS BY MOUTH EVERY 6 HOURS AS NEEDED FOR WHEEZING, Disp: 3 Inhaler, Rfl: 3     allopurinoL (ZYLOPRIM) 300 MG tablet, Take 1 tablet (300 mg total) by mouth 2 (two) times a day., Disp: 180 tablet, Rfl: 3     ammonium lactate (LAC-HYDRIN) 12 % lotion, Apply topically., Disp: , Rfl:      baclofen (LIORESAL) 10 MG tablet, TAKE 1 & 1/2 (ONE & ONE-HALF) TABLETS BY MOUTH THREE TIMES DAILY (Patient taking differently: Take 5 mg by mouth 3 (three) times a day. ), Disp: 90 tablet, Rfl: 3     calcium citrate (CALCITRATE) 200 mg (950 mg) tablet, Take 950 mg by mouth 2 (two) times a day., Disp: , Rfl:      dextroamphetamine-amphetamine (ADDERALL) 30 mg Tab, Take 0.5 tab (15mg) BID (Patient taking differently: Take 0.5 tab (15mg) daily), Disp: 30 tablet, Rfl: 0     diazePAM (VALIUM) 5 MG tablet, Take 1 tablet (5 mg total) by mouth 3 (three) times a day. One tab every four hours as needed (Patient taking differently: Take 5 mg by mouth. One tab every four hours as needed), Disp:  42 tablet, Rfl: 1     escitalopram oxalate (LEXAPRO) 10 MG tablet, Take 10 mg by mouth daily., Disp: , Rfl:      fexofenadine (ALLEGRA) 180 MG tablet, Take 180 mg by mouth daily as needed., Disp: , Rfl:      gabapentin (NEURONTIN) 300 MG capsule, Take 1 capsule (300 mg total) by mouth 2 (two) times a day. (Patient taking differently: Take 600 mg by mouth 2 (two) times a day. ), Disp: 120 capsule, Rfl: 2     hydroCHLOROthiazide (HYDRODIURIL) 25 MG tablet, Take 1 tablet (25 mg total) by mouth 2 (two) times a day at 9am and 6pm., Disp: 60 tablet, Rfl: 11     hydrOXYzine pamoate (VISTARIL) 50 MG capsule, Take 1 capsule (50 mg total) by mouth every 6 (six) hours as needed., Disp: 60 capsule, Rfl: 0     lamoTRIgine (LAMICTAL) 150 MG tablet, Take 1 tablet (150 mg total) by mouth 2 (two) times a day., Disp: 60 tablet, Rfl: 11     lansoprazole (PREVACID) 30 MG capsule, Take 30 mg by mouth daily., Disp: , Rfl:      lidocaine (LIDODERM) 5 %, Apply 1 patch topically to painful area of skin once daily and remove per schedule., Disp: 30 patch, Rfl: 4     lithium 300 MG capsule, Take 300 mg by mouth 2 (two) times a day., Disp: , Rfl: 1     miconazole nitrate (CRITIC-AID AF) 2 % Oint ointment, Apply topically., Disp: , Rfl:      miscellaneous medical supply Misc, Wheel chair, Disp: , Rfl:      miscellaneous medical supply Misc, Hospital bed with mattress and 1/2 rails. Semi-electric bed. Length of need 99 months. Bed extender:no. Group 1 mattress, Disp: , Rfl:      nicotine (NICODERM CQ) 21 mg/24 hr, Place 1 patch on the skin daily., Disp: 30 patch, Rfl: 1     nicotine polacrilex (COMMIT) 2 MG lozenge, Apply 2 mg to cheek every 2 (two) hours as needed., Disp: , Rfl:      oxyCODONE (OXYCONTIN) 20 mg 12 hr tablet, Take 1 tablet (20 mg total) by mouth 3 (three) times a day., Disp: 42 each, Rfl: 0     polyethylene glycol (GLYCOLAX) 17 gram/dose powder, Take 17 g by mouth daily., Disp: 595 g, Rfl: 11     QUEtiapine (SEROQUEL) 200 MG  "tablet, Take 200 mg by mouth bedtime., Disp: , Rfl:      QUEtiapine (SEROQUEL) 50 MG tablet, Take 1 tablet (50 mg total) by mouth 4 (four) times a day., Disp: 120 tablet, Rfl: 0     senna-docusate (PERICOLACE) 8.6-50 mg tablet, Take 2 tablets by mouth 2 (two) times a day., Disp: , Rfl:      amitriptyline (ELAVIL) 25 MG tablet, Take 1 tablet (25 mg total) by mouth at bedtime., Disp: 90 tablet, Rfl: 3     ergocalciferol (ERGOCALCIFEROL) 1,250 mcg (50,000 unit) capsule, Take 50,000 Units by mouth every 7 days., Disp: , Rfl:      medical cannabis (Patient's own supply), by Other route see administration instructions. Take as instructed by the medical cannabis dispensary., Disp: , Rfl: 0     metoprolol succinate (TOPROL-XL) 100 MG 24 hr tablet, Take 1 tablet (100 mg total) by mouth daily., Disp: 90 tablet, Rfl: 3     naloxone (NARCAN) 4 mg/actuation nasal spray, 1 spray (4 mg dose) into one nostril for opioid reversal. Call 911. May repeat if no response in 3 minutes., Disp: 1 Box, Rfl: 0     prazosin (MINIPRESS) 2 MG capsule, Take 1 capsule (2 mg total) by mouth at bedtime., Disp: 90 capsule, Rfl: 3     topiramate (TOPAMAX) 100 MG tablet, Take 1 tablet (100 mg total) by mouth 2 (two) times a day., Disp: 60 tablet, Rfl: 11     zolpidem (AMBIEN) 10 mg tablet, Take 1 tablet (10 mg total) by mouth at bedtime., Disp: 30 tablet, Rfl: 2    Current Facility-Administered Medications:      cyanocobalamin injection 1,000 mcg, 1,000 mcg, Intramuscular, Q30 Days, Devon Lund MD, 1,000 mcg at 11/13/20 1538    He is alert with a clear sensorium good eye contact.  Thought process tight logical.  He makes references to notes.  He is actually less verbal and perseverative than other times.  Affect is full range.  He does wince as he stretches or twists regarding his rib.       Objective:     Vitals:    01/11/21 1124   BP: 117/65   Pulse: 80   Weight: 194 lb (88 kg)   Height: 5' 7\" (1.702 m)   PainSc: 10-Worst pain ever "   PainLoc: Neck       Assessment: History of cervical and lumbar laminectomies, cognitive changes since the surgery and recovery.    Comorbid anxiety disorder.    Hospitalization with mental status changes which appear to resolve quickly possibly with the medication changes.  There were several changes made.  He also attributes sleep deprivation and dehydration.    Since the decrease in the [place] [facility] he reports significant flareup with pain.    Plan: I will contact his nurse Aleisha for her observations and we may resume the medication if there are other concerns and he will continue off some of the other medication changes and assess.    Total time 30 minutes.    1/13 addendum discussed with nurse Moraes at his facility he had observed him up.  Sedated for a few days before you went to the hospital.  May have correlated with the increase of the oxycodone to 20 mg 4 times a day.  Appears more clear since he is returned and there are several other changes made.  She had thought he was doing fairly well on the 15 mg 4 times a day.  He is complaining now about the rib fracture.  We discussed the plan to go back to the OxyContin 20 mg 4 times a day observing how he does for sedation or any hallucinations, with a concern for the rib fracture.  If there is any changes she will call      This note has been dictated using voice recognition software. Any grammatical or context distortions are unintentional and inherent to the software

## 2021-06-14 NOTE — TELEPHONE ENCOUNTER
Reason for Call: medication refill    Do you use a [facility]?  No  Name of the pharmacy and phone number for the current request: [facility], Phone: [phone]    Name of the medication requested: -baclofen (LIORESAL) 5 mg tablet  -dextroamphetamine-amphetamine (ADDERALL) 15 mg Tab  -diazePAM (VALIUM) 5 MG tablet  -gabapentin (NEURONTIN) 300 MG capsule    Other request: Heather calling regarding refill requests. These were prescribed on 1/25/21 but doesn't look like it was successfully sent to pharmacy. Did PCP intend to send these or not? Per Heather, ok to send electronically.    Can we leave a detailed message on this number? No call back needed    Phone number patient can be reached at: Cell number on file:    Telephone Information:   Mobile [phone]       Best Time: anytime    Call taken on 1/29/2021 at 12:55 PM by August Samuel

## 2021-06-14 NOTE — PROGRESS NOTES
"[facility] Follow Up Note    Bola Lyon   47 y.o. male    Date of Visit: 12/11/2017    Chief Complaint   Patient presents with     Follow-up     monthly med check     Subjective  Bola comes in today for ongoing issues due to chronic pain.  He had a cervical injection that apparently did not go so well and did not help relieve his neuropathic pain in his right arm.  He is eventually going to need surgery for this.  He has been having some occasion changes with his psychiatrist and is been placed on Lamictal.  There is still not able to get him into the pain clinic for a while.  He thinks his sister is going to be able to help him out with payment on medical cannabis starting next year as this is been a hold up for him.    ROS A comprehensive review of systems was performed and was otherwise negative    Social History:   Social History     Social History Narrative    He is .  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  He smokes and does not drink alcohol.       Medications were reconciled.  Allergies, social and family history, and the problem list were all reviewed and updated.      Exam  General Appearance: Pleasant and alert  Vitals:    12/11/17 1227   BP: 124/88   Patient Site: Left Arm   Patient Position: Sitting   Cuff Size: Adult Regular   Pulse: 100   Weight: (!) 231 lb 3.2 oz (104.9 kg)   Height: 5' 8\" (1.727 m)      Body mass index is 35.15 kg/(m^2).  Wt Readings from Last 3 Encounters:   12/11/17 (!) 231 lb 3.2 oz (104.9 kg)   12/07/17 (!) 227 lb 1 oz (103 kg)   11/13/17 (!) 226 lb 11.2 oz (102.8 kg)     HEENT: Sclera are clear.   Lungs: Normal respirations  Abdomen: Soft and nondistended  Extremities: No edema  Skin: No rashes  Neuro: Moves all extremities and has facial symmetry  Gait: Ambulates with a normal gait    Assessment/Plan  1. Chronic pain syndrome  Conditions were renewed.  We discussed at length " that he needs to use his new sleeping pill with caution due to potentiation of the narcotics and potential for overdose.  He understands this.  - oxyCODONE (OXYCONTIN) 15 mg 12 hr tablet; Take 1 tablet (15 mg total) by mouth every 12 (twelve) hours.  Dispense: 60 tablet; Refill: 0    2. Bipolar 2 disorder  He has been started on Lamictal and continues to work closely with his psychiatrist.    3. Chronic, continuous use of opioids  Up-to-date on CSA and urinary tox.          Abean Gentile MD  12/11/2017    Much or all of the text in this note was generated through the use of Dragon Dictate voice-to-text software. Errors in spelling or words which seem out of context are unintentional. Sound alike errors, in particular, may have escaped editing.

## 2021-06-14 NOTE — PROGRESS NOTES
Bola Lyon is 50 y.o. and presents to clinic today for the following health issues       Patient is here for a follow up appointment with Dr. Cramer. Patient has neck, low back, bilateral legs but right leg is worse pain, describes the pain as constant, sharp, achy, intermittent, radiating, sharp and stabbing,rates the pain as 10/10. No refills are needed. CSA and UDT were completed at today's appointment. Functionality is 8. Patient states their pain interferes with sleep, walking, work, ADL's and relationships.    I faxed signed rx OxyContin to [facility] Nurse Manager at [phone].    Mary Jo Irvin LPN

## 2021-06-14 NOTE — TELEPHONE ENCOUNTER
Requested Prescriptions     Pending Prescriptions Disp Refills     QUEtiapine (SEROQUEL) 50 MG tablet 120 tablet 0     Sig: Take 1 tablet (50 mg total) by mouth 4 (four) times a day.     Last office visit:  12/21/20    Last refill:  12/19/20  Diagnosis: Bipolar II Disorder    Future appointment: 1/25/21 with Dr. Lund

## 2021-06-14 NOTE — PROGRESS NOTES
Bola is a 51 y.o. male contacting the clinic today via video    Patient would like the video invitation sent by: Text to cell phone: [phone] [employer]       ASSESSMENT:  1. Neuropathy  Severe and worsened now off topiramate.  Gabapentin worsening lymphedema.  Trial of alternative medication to allow tapering off gabapentin.  May well retry topiramate since he has been on this for at least 2 years  - phenytoin (DILANTIN KAPSEAL) 100 MG ER capsule; Take 1 capsule (100 mg total) by mouth 2 (two) times a day.  Dispense: 60 capsule; Refill: 11  - gabapentin (NEURONTIN) 300 MG capsule; Take 1 capsule (300 mg total) by mouth 2 (two) times a day.  Dispense: 120 capsule; Refill: 2    2. Slow transit constipation  Okay to refill  - polyethylene glycol (GLYCOLAX) 17 gram/dose powder; Take 17 g by mouth daily.  Dispense: 595 g; Refill: 11  - senna-docusate (PERICOLACE) 8.6-50 mg tablet; Take 2 tablets by mouth 2 (two) times a day.  Dispense: 180 tablet; Refill: 3    3. Chronic pain syndrome  Heartily endorse decrease in OxyContin however this was increased back by pain clinic.  Focus on neuropathy    4. Cervical radiculopathy at C8  Okay for decrease baclofen  - calcium citrate (CALCITRATE) 200 mg (950 mg) tablet; Take 5 tablets (1,000 mg total) by mouth 2 (two) times a day.  Dispense: 180 tablet; Refill: 3  - baclofen (LIORESAL) 5 mg tablet; Take 1 tablet (5 mg total) by mouth 3 (three) times a day.  Dispense: 90 tablet; Refill: 11    5. Acromioclavicular joint arthritis, unspecified laterality  Agree with decrease diazepam through pain clinic  - diazePAM (VALIUM) 5 MG tablet; Take 1 tablet (5 mg total) by mouth every 4 (four) hours as needed.    6. Bipolar 2 disorder (H)  Agree with decreased stimulants.  Ask his primary psychiatrist to review and possibly take over.  Suggest increase further in lamotrigine or lithium  - dextroamphetamine-amphetamine (ADDERALL) 15 mg Tab; Take 15 mg by mouth daily.  Dispense: 30 tablet;  Refill: 0    7.  Lymphedema.  Gabapentin worsening.  Continue twice daily hydrochlorothiazide and taper gabapentin    Preventive Health Care:      PLAN:  Patient Instructions   Medicine list clarified    Decrease gabapentin to 300 mg twice daily    Phenytoin 100 mg twice daily    Consider increase in lamotrigine to 400 mg total daily    Continue Adderall at 15 mg daily    Consider increasing lithium per psychiatry    Clarify cessation of amitriptyline, topiramate, and Minipress    Return in about 4 weeks (around 2/22/2021) for a phone/video follow up visit.      CHIEF COMPLAINT:  Chief Complaint   Patient presents with     Follow-up     1 Month        HISTORY OF PRESENT ILLNESS:  Bola is a 51 y.o. male contacting the clinic today via video for review of hospitalization from January 3 through January 6 at [facility].  He developed hallucinations and was admitted to the hospital.  Adjustments in medications are summarized in the paragraph below    Medication regimen changes: I advise reduction of Oxycontin from four times daily to three times daily as a first step if his medications are responsible for his falls and or mental status changes. Psychiatry went on to advise cessation of Elavil, prazosin, Topamax and Ambien. They also reduced hs Adderall to 15 mg SR daily and his Baclofen to 5 mg TID    Since decreasing OxyContin, they have been increased back to 4 times daily.  He is having no trouble with worsening PTSD or sleep off Elavil and Minipress.  Neuropathy pain is worse off topiramate    He has chronic lymphedema.  We have attempted to taper gabapentin in the past as a contributing factor.  He has been on topiramate for 2 years    He thinks a decrease amount of Adderall is making his thoughts race.  Dose of lithium and lamotrigine and Seroquel are clarified.  He will meet with his psychiatrist next week and I suggest that his psychiatrist take over stimulant or advise clearly.  I would also like to  suggest increase in lamotrigine or lithium      REVIEW OF SYSTEMS:   Chronic constipation.  Poor dentition    PFSH:  Social History     Social History Narrative    He is .  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  He smokes and does not drink alcohol.  He is now on disability due to his brain injury and bipolar disease.     Social Determinants:  Living in [facility].  Chronic pain    TOBACCO USE:  Social History     Tobacco Use   Smoking Status Current Some Day Smoker     Packs/day: 0.50     Years: 11.00     Pack years: 5.50     Types: Cigarettes     Start date: 8/20/2009     Last attempt to quit: 2/21/2017     Years since quitting: 3.9   Smokeless Tobacco Former User   Tobacco Comment    0.5 pack per day       VITALS:  There were no vitals filed for this visit.  Wt Readings from Last 3 Encounters:   01/11/21 194 lb (88 kg)   11/13/20 200 lb 4 oz (90.8 kg)   11/04/20 195 lb (88.5 kg)       PHYSICAL EXAM:  (observations via Video)  Obvious missing teeth top left.  Disheveled.  Otherwise alert      Notes summarized: Hospital discharge summary and pain clinic notes  Labs, x-rays, cardiology, GI tests reviewed: CT of neck.  Labs from hospital  New orders: No orders of the defined types were placed in this encounter.      Independent review of:  Supplemental history by:      Video Start time: 2:13 PM  Video End time: 2:50 PM  Face to face plus orders: 37 minutes  Documentation time: 5 minutes    The visit lasted a total of 42 minutes       MEDICATIONS:   Current Outpatient Medications   Medication Sig Dispense Refill     acetaminophen (TYLENOL) 650 MG CR tablet Take 3 tablets (1,950 mg total) by mouth 2 (two) times a day. 540 tablet 3     albuterol (PROAIR HFA;PROVENTIL HFA;VENTOLIN HFA) 90 mcg/actuation inhaler INHALE 2 PUFFS BY MOUTH EVERY 6 HOURS AS NEEDED FOR WHEEZING 3 Inhaler 3     allopurinoL (ZYLOPRIM) 300 MG tablet Take 1 tablet (300 mg total)  by mouth 2 (two) times a day. 180 tablet 3     ammonium lactate (LAC-HYDRIN) 12 % lotion Apply topically.       baclofen (LIORESAL) 5 mg tablet Take 1 tablet (5 mg total) by mouth 3 (three) times a day. 90 tablet 11     diazePAM (VALIUM) 5 MG tablet Take 1 tablet (5 mg total) by mouth every 4 (four) hours as needed.       ergocalciferol (ERGOCALCIFEROL) 1,250 mcg (50,000 unit) capsule Take 1 capsule (50,000 Units total) by mouth every 7 days. 4 capsule 0     escitalopram oxalate (LEXAPRO) 10 MG tablet Take 10 mg by mouth daily.       fexofenadine (ALLEGRA) 180 MG tablet Take 180 mg by mouth daily as needed.       gabapentin (NEURONTIN) 300 MG capsule Take 1 capsule (300 mg total) by mouth 2 (two) times a day. 120 capsule 2     hydroCHLOROthiazide (HYDRODIURIL) 25 MG tablet Take 1 tablet (25 mg total) by mouth 2 (two) times a day at 9am and 6pm. 60 tablet 11     hydrOXYzine pamoate (VISTARIL) 50 MG capsule Take 1 capsule (50 mg total) by mouth every 6 (six) hours as needed. 60 capsule 0     lamoTRIgine (LAMICTAL) 150 MG tablet Take 1 tablet (150 mg total) by mouth 2 (two) times a day. 60 tablet 11     lansoprazole (PREVACID) 30 MG capsule Take 1 capsule (30 mg total) by mouth daily. 30 capsule 1     lidocaine (LIDODERM) 5 % Apply 1 patch topically to painful area of skin once daily and remove per schedule. 30 patch 4     lithium 300 MG capsule Take 600 mg by mouth 2 (two) times a day.  1     medical cannabis (Patient's own supply) by Other route see administration instructions. Take as instructed by the medical cannabis dispensary.  0     metoprolol succinate (TOPROL-XL) 100 MG 24 hr tablet Take 1 tablet (100 mg total) by mouth daily. 90 tablet 3     miconazole nitrate (CRITIC-AID AF) 2 % Oint ointment Apply topically.       miscellaneous medical supply Misc Wheel chair       miscellaneous medical supply [facility] Hospital bed with mattress and 1/2 rails. Semi-electric bed. Length of need 99 months. Bed extender:no. Group  1 mattress       montelukast (SINGULAIR) 10 mg tablet Take 1 tablet by mouth daily.       naloxone (NARCAN) 4 mg/actuation nasal spray 1 spray (4 mg dose) into one nostril for opioid reversal. Call 911. May repeat if no response in 3 minutes. 1 Box 0     oxyCODONE (OXYCONTIN) 20 mg 12 hr tablet Take 1 tablet (20 mg total) by mouth 4 (four) times a day. 56 each 0     QUEtiapine (SEROQUEL) 200 MG tablet Take 250 mg by mouth at bedtime.        calcium citrate (CALCITRATE) 200 mg (950 mg) tablet Take 5 tablets (1,000 mg total) by mouth 2 (two) times a day. 180 tablet 3     dextroamphetamine-amphetamine (ADDERALL) 15 mg Tab Take 15 mg by mouth daily. 30 tablet 0     phenytoin (DILANTIN KAPSEAL) 100 MG ER capsule Take 1 capsule (100 mg total) by mouth 2 (two) times a day. 60 capsule 11     polyethylene glycol (GLYCOLAX) 17 gram/dose powder Take 17 g by mouth daily. 595 g 11     QUEtiapine (SEROQUEL) 50 MG tablet Take 1 tablet (50 mg total) by mouth 4 (four) times a day. 120 tablet 0     senna-docusate (PERICOLACE) 8.6-50 mg tablet Take 2 tablets by mouth 2 (two) times a day. 180 tablet 3     Current Facility-Administered Medications   Medication Dose Route Frequency Provider Last Rate Last Admin     cyanocobalamin injection 1,000 mcg  1,000 mcg Intramuscular Q30 Days Devon Lund MD   1,000 mcg at 11/13/20 1538         Patient has given verbal consent to a Video visit? Yes  How would you like to obtain your AVS? AVS Preference: MyChart.  If dropped by the video visit, the video invitation should be sent to: Text to cell phone: [phone]   Will anyone else be joining your video visit? No     Video-Visit Details    Type of service:  Video Visit    Originating Location (pt. Location): Home    Distant Location (provider location):   [facility]     Platform used for Video Visit: [facility]          Devon Lund MD

## 2021-06-14 NOTE — TELEPHONE ENCOUNTER
Medication being requested: Ambien/Zolpidem and OxyContin   Last visit date: 11/4/2020.  Provider: BE  Next visit date: 1/11/2021.  Provider: BE  Expected follow up: 8 weeks  MTM visit (Pain Center) date: n/a  UDT date: 2/2020  Agreement date: 2/2020   (Last fill date; name; strength; provider; MME; quantity):  Oxycontin Er 20 mg 12/4/2020 for 40 tabs and 10 days; Ambien 11/2/2020 for 30 tabs and 30 days  Pertinent between visit information about requested medication (telephone, mychart, prior authorization, concerns, comments): Patient goes to [facility] Approved for Lidocaine 5%patch; [facility] appt.  Script being sent to provider by nurse- dates and quantity: Ambien 30  Tabs for 30 days; Oxycontin Er 20 mg 40 tabs for 10 days  Pharmacy cued: [facility]  Standing orders for withdrawal protocol implemented: n/a

## 2021-06-14 NOTE — PROGRESS NOTES
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  Previous neck and shoulder GALLITO's without incident.  Current Rt neck pain with radiation to Rt shoulder and down arm to hand has been present since 2015.  He has worked with ortho, PT, and is on multiple pain medications    History and physical reviewed with no changes    No Known Allergies  Labs: INR 0.96    Exam:   /81 (Patient Position: Sitting)  Pulse 67  Temp 98.3  F (36.8  C) (Oral)   Resp 20  Ht 5' 8\" (1.727 m)  Wt (!) 227 lb 1 oz (103 kg)  SpO2 97%  BMI 34.52 kg/m2  Gen: lying in bed, NAD  Neuro: A/O x4, speech clear. LEYVA, some restricted movement of the RUE related to pain  Cardiac: S1S2  Lungs: clear  Mallampati: 3    Impression: Okay to proceed with planned procedure using moderate IV sedation    Plan: cervical GALLITO procedure its risks, benefits, and alternatives including but not limited to bleeding, infection, nerve or spinal canal compromise, medication or contrast reaction were discussed with patient.  Questions answered and patient gives consent to proceed.    KENDRICK Watkins, CNP        "

## 2021-06-14 NOTE — TELEPHONE ENCOUNTER
Medication being requested: LIDOCAINE PAD 5 %   Last visit date:  11/4/20Provider: BE  Next visit date: 01/11/21 Provider: RAYNE  Expected follow up: 8 WEEKS  MTM visit (Pain Center) date: NONE  Pertinent between visit information about requested medication (telephone, mychart, prior authorization, concerns):   Last date prescribed: 11/6/20  Provider responsible: BE  Spoke with patient: NA  Script being sent to provider - dates and quantity:   Requested Prescriptions     Pending Prescriptions Disp Refills     lidocaine (LIDODERM) 5 % 30 patch 4     Sig: Apply 1 patch topically to painful area of skin once daily and remove per schedule.     Pharmacy cued: [facility]

## 2021-06-14 NOTE — PATIENT INSTRUCTIONS - HE
Medicine list clarified    Decrease gabapentin to 300 mg twice daily    Phenytoin 100 mg twice daily    Consider increase in lamotrigine to 400 mg total daily    Continue Adderall at 15 mg daily    Consider increasing lithium per psychiatry    Clarify cessation of amitriptyline, topiramate, and Minipress

## 2021-06-14 NOTE — PATIENT INSTRUCTIONS - HE
PLAN:  Dr. Cramer to discuss with.  Nursing staff Aleisha, about the hospitalization and how you are doing since then.  May consider increasing the OxyContin to 20 mg 4 times a day.    Continue working with physical therapy.    Return to Dr. Cramer in 8 to 12 weeks.

## 2021-06-14 NOTE — TELEPHONE ENCOUNTER
Heather from [facility] left a message stating that patient needs a refill of Ambien which is not prescribed by this facility.  Heather needs to call the correct providers office.

## 2021-06-14 NOTE — PROGRESS NOTES
CHW has been unable to reach patient at last attempts, CHW sent [employer] message to patient on 12/18/2021.   Care team discussed patient at case review today and will proceed with un-enrolling patient today from . Patient may be referred back to [facility] at any time.  Patient is currently living at [facility].  CHW will resolve episode today, mail patient letter via [employer] and update care team.

## 2021-06-14 NOTE — TELEPHONE ENCOUNTER
----- Message from Devon Lund MD sent at 12/21/2020  2:51 PM CST -----  Please review med changes and contact assisted living

## 2021-06-14 NOTE — TELEPHONE ENCOUNTER
"Patient left message on triage line stating that \"things are going good and his rib is healing well\".  \" I am still having my neck and low back pain and need a refill on my OxyContin 20 mg ER.   Last visit: 1/11/21  Next visit: 3/8/21  Expected follow up: 8-12 weeks   MTJESSE BOCANEGRA  UDT/CSA  1/2021   reviewed: Last filled 1/16, # 56 tablets, 14 day supply, refill due 1/30  Pertinent information:  We discussed the plan to go back to the OxyContin 20 mg 4 times a day.  Pharmacy: [facility] in [place]  Requested Prescriptions     Pending Prescriptions Disp Refills     oxyCODONE (OXYCONTIN) 20 mg 12 hr tablet 56 each 0     Sig: Take 1 tablet (20 mg total) by mouth 4 (four) times a day for 14 days.            "

## 2021-06-15 NOTE — PROGRESS NOTES
[facility] Follow Up Note    Bola Lyon   48 y.o. male    Date of Visit: 2/5/2018    Chief Complaint   Patient presents with     Follow-up     med check, nicotine patch     Subjective  Bola comes in today for follow-up of his chronic pain issues.  He tells me that he has resumed smoking again I would really like to get back on a patch.  Continues to struggle with a lot of arm pain on the right side and has been undergoing some injections and treatment and further evaluation for this.  He remains on chronic narcotics as he is unable to afford medical cannabis.    ROS A comprehensive review of systems was performed and was otherwise negative    Social History:   Social History     Social History Narrative    He is .  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  He smokes and does not drink alcohol.       Medications were reconciled.  Allergies, social and family history, and the problem list were all reviewed and updated.    Old records reviewed: Records from ortho are all reviewed    Exam  General Appearance: Pleasant and alert  Vitals:    02/05/18 1318   BP: 124/82   Patient Site: Left Arm   Patient Position: Sitting   Cuff Size: Adult Regular   Pulse: 74   SpO2: 98%   Weight: (!) 229 lb (103.9 kg)      Body mass index is 34.82 kg/(m^2).  Wt Readings from Last 3 Encounters:   02/05/18 (!) 229 lb (103.9 kg)   01/08/18 (!) 233 lb 14.4 oz (106.1 kg)   12/11/17 (!) 231 lb 3.2 oz (104.9 kg)     HEENT: Sclera are clear.   Lungs: Normal respirations  Abdomen: Soft and nondistended  Extremities: No edema  Skin: No rashes  Neuro: Moves all extremities and has facial symmetry  Gait: Ambulates with a normal gait    Assessment/Plan  1. Chronic pain syndrome  Renewals on his medications were done.  He is hoping once he gets on permanent disability that he will have enough funds to be able to afford medical cannabis.  - oxyCODONE  (OXYCONTIN) 15 mg 12 hr tablet; Take 1 tablet (15 mg total) by mouth every 12 (twelve) hours.  Dispense: 60 tablet; Refill: 0    2. Entrapment of right ulnar nerve  He follows up with ortho.    3. C7 radiculopathy    4. Chronic, continuous use of opioids          Abena Gentile MD  Internal and Geriatric Medicine      Much or all of the text in this note was generated through the use of Dragon Dictate voice-to-text software. Errors in spelling or words which seem out of context are unintentional. Sound alike errors, in particular, may have escaped editing.

## 2021-06-15 NOTE — TELEPHONE ENCOUNTER
Reason for Call:  Medication or medication refill:    Do you use a [facility]?  Name of the pharmacy and phone number for the current request: [facility] and pt is out of meds.  [facility] pharm has tried to contact several calls    Name of the medication requested:   acetaminophen (TYLENOL) 650 MG CR tablet 540 tablet 3 11/13/2020 11/13/2021 No   Sig - Route: Take 3 tablets (1,950 mg total) by mouth 2 (two) times a day. - Oral         Other request: na    Can we leave a detailed message on this number? Yes    Phone number patient can be reached at: Home number on file [phone] (home)    Best Time: any    Call taken on 2/26/2021 at 8:51 AM by Pamela Behr

## 2021-06-15 NOTE — TELEPHONE ENCOUNTER
Patient calls, quite upset that he was notified by the facility nurse at bedtime that he is out of oxycontin 20 mg.  Patient will be notifying the DON of the facility to resolve this issue.  Chart review:  No refill requests have been documented.  Will adjust the RX to reflect a 28 day supply as patient appears to be stable on this dose for some time.  Requested Prescriptions     Pending Prescriptions Disp Refills     oxyCODONE (OXYCONTIN) 20 mg 12 hr tablet 112 each 0     Sig: Take 1 tablet (20 mg total) by mouth 4 (four) times a day.     Signed Prescriptions Disp Refills     diazePAM (VALIUM) 5 MG tablet 60 tablet 2     Sig: Take 1 tablet (5 mg total) by mouth 2 (two) times a day.     Authorizing Provider: JAYME MULLEN [facility]

## 2021-06-15 NOTE — PROGRESS NOTES
Assessment/Plan:     Problem List Items Addressed This Visit     AC (acromioclavicular) arthritis (Chronic)    Relevant Medications    diazePAM (VALIUM) 5 MG tablet (Start on 3/17/2021)    Chronic pain syndrome (Chronic)    Relevant Medications    oxyCODONE (OXYCONTIN) 20 mg 12 hr tablet (Start on 3/17/2021)    Shoulder impingement syndrome, right - Primary    Relevant Medications    memantine (NAMENDA) 10 MG tablet              No follow-ups on file.    Patient Instructions   PLAN:  Namenda to help augment the oxycodone, and help with nerve pain.  10 mg tablets, 1/2 tablet daily for 1 week, one half twice a day for 1 week, then 1 whole tablet twice a day    The Namenda may be helpful with nerve pain, allowing you to decrease or stop the gabapentin, which may be associated with the lower extremity swelling.    Continue with the OxyContin 20 mg 4 times a day, and diazepam 5 mg twice a day.    Continue with your physical therapy and work with Dr. Sipple for injections.    Follow-up with Dr. Cramer in 6 weeks.        Subjective:       51 y.o. male with cervical and lumbar laminectomies, cognitive changes since surgery, comorbid anxiety disorder.    Reviewing care everywhere saw primary care provider recently, noting neuropathy was worse off Topamax, concern for swelling and fluid retention from gabapentin, transitioning to Dilantin.    On interview he indicates he is trying to walk more, has a walker while here here today, uses a cane, goal of for the summer is to be walking more independently.    Pain continues in his legs though may be improving.  They have done imaging from his right side of low back, described as starting to collapse impinging the nerve, working with Dr. Sipple to do injections.    Describes pain in his shoulders, between her shoulder blades down to his fingers.  He is finding less pain and gradually developing a biceps upper arms.  His right rib area is healing.    Describes sleep is disturbed,  he wakes 4 times a night due to pain, hard to lay in a 1 position very long.  He notes a very frustrated and recalls with the oxycodone 20 mg, before his injury and surgeries at 1 time was up to 120 mg daily.    Reviewed concerned he had been in the hospital because of mental status changes and the oxycodone was decreased.  He attributed that to being on amitriptyline and other meds.  Denies any troubles with mental status changes, any risks of falling since that time.    The lymphedema lower extremity is being addressed by decreasing gabapentin changed to diuretics and he is followed up with Dr. Lund next week.    He goes to physical therapy 2-3 times a week encourage [facility].    He indicates the right shoulder which has had surgery has pain in the right AC area.    He does see the psychiatrist next month.  They are making changes in the Adderall.  He has not feel that leads to stimulation or sleep problems.  Continues on lithium and Seroquel.    Is on the diazepam 5 mg twice a day for muscle spasms more so than for anxiety.    He has not taking nonsteroidals.     reviewed, as expected.      Current Outpatient Medications:      acetaminophen (TYLENOL) 650 MG CR tablet, Take 3 tablets (1,950 mg total) by mouth 2 (two) times a day., Disp: 540 tablet, Rfl: 3     albuterol (PROAIR HFA;PROVENTIL HFA;VENTOLIN HFA) 90 mcg/actuation inhaler, INHALE 2 PUFFS BY MOUTH EVERY 6 HOURS AS NEEDED FOR WHEEZING, Disp: 3 Inhaler, Rfl: 3     allopurinoL (ZYLOPRIM) 300 MG tablet, Take 1 tablet (300 mg total) by mouth 2 (two) times a day., Disp: 180 tablet, Rfl: 3     ammonium lactate (LAC-HYDRIN) 12 % lotion, Apply topically., Disp: , Rfl:      baclofen (LIORESAL) 5 mg tablet, Take 1 tablet (5 mg total) by mouth 3 (three) times a day., Disp: 90 tablet, Rfl: 11     calcium citrate (CALCITRATE) 200 mg (950 mg) tablet, Take 5 tablets (1,000 mg total) by mouth 2 (two) times a day., Disp: 180 tablet, Rfl: 3      dextroamphetamine-amphetamine (ADDERALL) 15 mg Tab, Take 15 mg by mouth daily., Disp: 30 tablet, Rfl: 0     ergocalciferol (ERGOCALCIFEROL) 1,250 mcg (50,000 unit) capsule, Take 1 capsule (50,000 Units total) by mouth every 7 days., Disp: 4 capsule, Rfl: 0     escitalopram oxalate (LEXAPRO) 10 MG tablet, Take 10 mg by mouth daily., Disp: , Rfl:      fexofenadine (ALLEGRA) 180 MG tablet, Take 180 mg by mouth daily as needed., Disp: , Rfl:      gabapentin (NEURONTIN) 300 MG capsule, Take 1 capsule (300 mg total) by mouth 2 (two) times a day., Disp: 120 capsule, Rfl: 2     hydroCHLOROthiazide (HYDRODIURIL) 25 MG tablet, Take 1 tablet (25 mg total) by mouth 2 (two) times a day at 9am and 6pm., Disp: 60 tablet, Rfl: 11     lamoTRIgine (LAMICTAL) 150 MG tablet, Take 1 tablet (150 mg total) by mouth 2 (two) times a day., Disp: 60 tablet, Rfl: 11     lansoprazole (PREVACID) 30 MG capsule, Take 1 capsule (30 mg total) by mouth daily., Disp: 30 capsule, Rfl: 1     lidocaine (LIDODERM) 5 %, Apply 1 patch topically to painful area of skin once daily and remove per schedule., Disp: 30 patch, Rfl: 4     lithium 300 MG capsule, Take 600 mg by mouth 2 (two) times a day., Disp: , Rfl: 1     medical cannabis (Patient's own supply), by Other route see administration instructions. Take as instructed by the medical cannabis dispensary., Disp: , Rfl: 0     miconazole nitrate (CRITIC-AID AF) 2 % Oint ointment, Apply topically., Disp: , Rfl:      miscellaneous medical supply Misc, Wheel chair, Disp: , Rfl:      miscellaneous medical supply Misc, Hospital bed with mattress and 1/2 rails. Semi-electric bed. Length of need 99 months. Bed extender:no. Group 1 mattress, Disp: , Rfl:      montelukast (SINGULAIR) 10 mg tablet, Take 1 tablet by mouth daily., Disp: , Rfl:      naloxone (NARCAN) 4 mg/actuation nasal spray, 1 spray (4 mg dose) into one nostril for opioid reversal. Call 911. May repeat if no response in 3 minutes., Disp: 1 Box, Rfl:  "0     phenytoin (DILANTIN KAPSEAL) 100 MG ER capsule, Take 1 capsule (100 mg total) by mouth 2 (two) times a day., Disp: 60 capsule, Rfl: 11     polyethylene glycol (GLYCOLAX) 17 gram/dose powder, Take 17 g by mouth daily., Disp: 595 g, Rfl: 11     QUEtiapine (SEROQUEL) 200 MG tablet, Take 250 mg by mouth at bedtime. , Disp: , Rfl:      QUEtiapine (SEROQUEL) 50 MG tablet, Take 1 tablet (50 mg total) by mouth 4 (four) times a day., Disp: 120 tablet, Rfl: 0     senna-docusate (PERICOLACE) 8.6-50 mg tablet, Take 2 tablets by mouth 2 (two) times a day., Disp: 180 tablet, Rfl: 3     [START ON 3/17/2021] diazePAM (VALIUM) 5 MG tablet, Take 1 tablet (5 mg total) by mouth 2 (two) times a day., Disp: 60 tablet, Rfl: 2     fentaNYL pf (SUBLIMAZE) 50 mcg/mL injection, Infuse 50 mcg into a venous catheter., Disp: , Rfl:      hydrOXYzine pamoate (VISTARIL) 50 MG capsule, Take 1 capsule (50 mg total) by mouth every 6 (six) hours as needed., Disp: 60 capsule, Rfl: 0     memantine (NAMENDA) 10 MG tablet, One-half tab daily for one week, one-half twice a day one week, then one twice a day, Disp: 60 tablet, Rfl: 2     metoprolol succinate (TOPROL-XL) 100 MG 24 hr tablet, Take 1 tablet (100 mg total) by mouth daily., Disp: 90 tablet, Rfl: 3     midazolam (VERSED) 1 mg/mL Soln, Infuse 0.5-6 mg into a venous catheter., Disp: , Rfl:      [START ON 3/17/2021] oxyCODONE (OXYCONTIN) 20 mg 12 hr tablet, Take 1 tablet (20 mg total) by mouth 4 (four) times a day., Disp: 112 each, Rfl: 0    Current Facility-Administered Medications:      cyanocobalamin injection 1,000 mcg, 1,000 mcg, Intramuscular, Q30 Days, Devon Lund MD, 1,000 mcg at 11/13/20 1538    He is alert with clear sensorium.  Good eye contact.  Thought process logical.  Affect is fairly full range.  Walker is present.         Objective:     Vitals:    03/08/21 1058   Weight: 204 lb (92.5 kg)   Height: 5' 7\" (1.702 m)   PainSc:   9       Assessment: History of cervical " lumbar laminectomies, traumatic brain injuries with cognitive impairment.    Reviews frustrations with sleep, and indicates has responded to in been on higher doses of opioids even before the last injury and surgeries.    Plan: Discussed augmenting strategies, I had recommended Namenda in the past though insurance did not cover it.  Reviewed also has neuropathic properties which may help as he is trying to decrease gabapentin with concern for lower extremity edema.    Reviewed its off label use and side effects.    Continue with the oxycodone Contin 20 mg 4 daily.    Follow-up several weeks.    Total time more than 20 minutes.            This note has been dictated using voice recognition software. Any grammatical or context distortions are unintentional and inherent to the software

## 2021-06-15 NOTE — PROGRESS NOTES
Formerly [facility] Follow Up Note    Bola Lyon   47 y.o. male    Date of Visit: 1/8/2018    Chief Complaint   Patient presents with     Follow-up     medication management     Subjective  Bola has underlying traumatic brain injury, bipolar disorder, hypertension and has multiple chronic pain issues including cervical disc disease, ulnar nerve entrapment, ileal inguinal neuralgia, biceps tendon rupture, chronic knee pain and is currently trying to get onto disability which has been a long process for him.  He is followed by psychiatry as well as [facility] and multiple orthopedic specialists.  He is currently undergoing evaluation for treatment of his ulnar entrapment pathology.  He has been unable to get onto the medicinal marijuana program in hopes of decreasing his narcotic use/dependence.    ROS A comprehensive review of systems was performed and was otherwise negative    Social History:   Social History     Social History Narrative    He is .  He has been a  and also a counselor, and worked with [employer].  He smokes and does not drink alcohol.       Medications were reconciled.  Allergies, social and family history, and the problem list were all reviewed and updated.      Exam  General Appearance: Pleasant and alert  Vitals:    01/08/18 1321   BP: 130/84   Patient Site: Left Arm   Patient Position: Sitting   Cuff Size: Adult Regular   Pulse: 72   Weight: (!) 233 lb 14.4 oz (106.1 kg)      Body mass index is 35.56 kg/(m^2).  Wt Readings from Last 3 Encounters:   01/08/18 (!) 233 lb 14.4 oz (106.1 kg)   12/11/17 (!) 231 lb 3.2 oz (104.9 kg)   12/07/17 (!) 227 lb 1 oz (103 kg)     HEENT: Sclera are clear.   Lungs: Normal respirations  Abdomen: Soft and nondistended  Extremities: No edema  Skin: No rashes  Neuro: Moves all extremities and has facial symmetry  Gait: Ambulates with a normal gait    Assessment/Plan  1. Chronic pain  syndrome  Meds are renewed.  CSA was updated last August.  - oxyCODONE (OXYCONTIN) 15 mg 12 hr tablet; Take 1 tablet (15 mg total) by mouth every 12 (twelve) hours.  Dispense: 60 tablet; Refill: 0    2. Encounter for medication management    3. Entrapment of right ulnar nerve  He is to follow up with ortho.    4. C7 radiculopathy  As above.          Abena Gentile MD  1/8/2018    Much or all of the text in this note was generated through the use of Dragon Dictate voice-to-text software. Errors in spelling or words which seem out of context are unintentional. Sound alike errors, in particular, may have escaped editing.

## 2021-06-15 NOTE — TELEPHONE ENCOUNTER
Patient nursing care staff calls, voicemail requesting refill of diazepam.  Please review if patient still taking diazepam.  Last ov with BE: 1/11/21  Diazepam is not mentioned in the after visit plan.

## 2021-06-15 NOTE — PROGRESS NOTES
Pain score: 9  Constant or intermittent: constant and intermittent  What does your pain feel like: throbbing, aching, burning, radiates and exacerbates  Does the pain interfere with:   Work: yes  Walking/distance: yes  Sleep: yes  Daily activities: yes  Relationships/social life: yes  Mood: yes  F= 8    Mary Jo Irvin LPN

## 2021-06-15 NOTE — PATIENT INSTRUCTIONS - HE
PLAN:  Namenda to help augment the oxycodone, and help with nerve pain.  10 mg tablets, 1/2 tablet daily for 1 week, one half twice a day for 1 week, then 1 whole tablet twice a day    The Namenda may be helpful with nerve pain, allowing you to decrease or stop the gabapentin, which may be associated with the lower extremity swelling.    Continue with the OxyContin 20 mg 4 times a day, and diazepam 5 mg twice a day.    Continue with your physical therapy and work with Dr. Sipple for injections.    Follow-up with Dr. Cramer in 6 weeks.

## 2021-06-16 NOTE — TELEPHONE ENCOUNTER
Telephone Encounter by Marline Lindquist at 9/11/2019  1:45 PM     Author: Marline Lindquist Service: -- Author Type: --    Filed: 9/11/2019  1:45 PM Encounter Date: 9/5/2019 Status: Signed    : Marline Lindquist APPROVED:    Approval start date:1/1/19  Approval end date:12/31/19    Pharmacy has been notified of approval and will contact patient when medication is ready for pickup.

## 2021-06-16 NOTE — TELEPHONE ENCOUNTER
Pt is calling in and wondering where we are.  He is out of medication and has been since 4/3/21.    Please call pt to let him know status update.

## 2021-06-16 NOTE — PROGRESS NOTES
NEUROPSYCHOLOGICAL EVALUATION  [facility]: [facility]    NAME: Bola Lyon    YOB: 1970   AGE: 48  EDU: 16  DATE OF EVALUATION: 2/26/2018    REASON FOR REFERRAL:  Mr. Lyon is a 48 y.o., right-handed, male presenting with concerns about cognitive functioning in the context of bipolar I disorder, PTSD, chronic pain secondary to multiple orthopedic injuries, and a series of at least five concussions between 2957-6047. He was referred for updated neurocognitive evaluation by his provider in [facility], Dr. Almanzar to assist with differential diagnosis and care planning.      DIAGNOSTIC SUMMARY:  Unfortunately, there are multiple indicators to suggest that Mr. Lyon gave suboptimal effort during today's evaluation. Thus, the following results are interpreted with extreme caution and very likely underestimate his true cognitive capabilities.  With this caveat in mind, results indicate that Mr. Lyon is a gentleman of estimated average premorbid intellectual functioning whose performance is notable for moderately impaired basic attention, moderately impaired nonverbal memory, mildly impaired complex word list learning, and mild to severe impairments on various aspects of executive functioning (complex attention, deductive reasoning, working memory).  In addition, significant variability was evident on measures of cognitive efficiency, ranging from severely impaired to high average. These weaknesses were evident in the context of otherwise intact (i.e., at least low average) performance on measures of prose learning and memory, nonverbal learning, complex wordlist memory, visuospatial reasoning skills, language skills (confrontation naming, verbal abstraction, phonemic fluency, semantic fluency), and some aspects of executive functioning (visual-spatial planning and organization, inhibition).  Finally, fine motor dexterity was severely impaired for the dominant right hand, but mildly  impaired for the nondominant hand.  Fine motor speed was severely impaired bilaterally. On a self-report questionnaire he endorsed significant elevations on all 9 clinical scales with a profile suggestive of generalized emotional distress.    Again, suboptimal effort makes it difficult to interpret fluctuations in scores, but for comparison purposes, the following is offered as a description of how Mr. Lyon's performance compares to that from a year ago. Performance stability was evident on measures of verbal abstraction (average), confrontation naming (average), phonemic fluency (average), block construction (average), prose learning and memory (average), wordlist memory (average), visuospatial planning and organization (within normal limits), attention and working memory (moderately impaired), and deductive reasoning (moderate to severely impaired). He continues to exhibit highly variable performance on measures of cognitive efficiency (ranging from severely impaired to high average with evidence of both improvements and declines, previously, cognitive efficiency had varied from moderately impaired to average.) Performance improvements were evident on measures of fund of knowledge (from average to high average), nonverbal learning (from mildly impaired to low average), and inhibition (from borderline to average). Finally, performance declines were evident on measures of semantic fluency (from superior to low average), pattern completion (from high average to low average), wordlist learning (from low average to mildly impaired), nonverbal memory (from mild to moderately impaired) and complex attention (from average to mildly impaired). Fine motor dexterity was stable and severely impaired for the dominant right hand, but improved from severe to mild impairment for the nondominant hand.  In contrast, fine motor speed was severely impaired bilaterally representing a significant decline from previous average  performance bilaterally.    Mr. Lyon's current cognitive profile cannot be clearly interpreted due to evidence of suboptimal effort. In addition to poor performance on both stand alone and embedded effort measures, there is notable variability both within current testing and across the two evaluations that reinforce concerns about the validity of Mr. Lyon's scores. In particular, current testing measured cognitive efficiency anywhere from the 84th percentile to less than the 1st percentile. As compared to 2017, one of his scores on a cognitive efficiency task improved three standard deviations, while another declined almost three standard deviations. This level of fluctuation is highly atypical. His memory profile is notable for stronger performance on free recall vs. recognition on one task, which is inconsistent with typical memory retrieval patterns. His current motor profile is notable for severely impaired motor speed in the context of only mildly impaired nondominant hand dexterity. In addition, as compared to last year, there was a notable decline in motor speed bilaterally from previous average to severely impaired performance (although dexterity actually improved for the non-dominant hand). It is highly unusual for motor speed to be worse than dexterity, and for dexterity to improve while speed declines.     Mr. Lyon's reduced effort could be due to a number of factors including mood symptoms, poor sleep, poly-pharmacy, chronic pain as well as fluctuating attention. With that being said, Mr. Lyon does demonstrate evidence of intact performances on at least one measure of learning and memory, cognitive efficiency, language, visual-spatial skills, and executive functioning. Thus, he, his family and providers should be reassured that he does retain the capacity to perform within normal limits across a number of cognitive domains. He just does not do so consistently.     As Dr. Baez previously  articulated, Mr. Lyon's test results with evidence of intact performance across a number of domains (even within the context of suboptimal effort) indicate that there is no clear evidence of cognitive impact of his history of multiple concussions.  Mr. Lyon had been able to successfully work for many years at, per his words, a rather high level in a position that required strong cognitive skills. This work history occurred after his concussions. As was previously highlighted, Mr. 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 Lyon including multiple surgeries for orthopedic injuries, psychosocial stress (a divorce) and new onset of psychiatric symptoms (anxiety, bipolar disorder). Onset of cognitive symptoms so far after head injury is inconsistent with the typical cognitive sequela or recovery course of concussion.  Thus, rather than his history of head injury, it is Mr. Lyon's nonorganic risk factors (as noted above) that provide the most parsimonious explanation for his current day-to-day cognitive and functional difficulties,     Continued mental health interventions (with both psychiatry and psychology), and referral to a comprehensive pain clinic (Dr. Baez previously recommended the [facility] in [place], [place]) are recommended. A pain clinic would be particularly helpful in assisting Mr. Lyon in weaning off of narcotic medication.     DIAGNOSTIC IMPRESSIONS:  No Cognitive Diagnosis  Bipolar Disorder and PTSD (by history)     FEEDBACK:  Mr. Lyon will receive the results of this evaluation from his referring provider at the time of an upcoming follow-up appointment; however, he was encouraged to schedule a formal feedback appointment with me after that appointment should he have any  "additional questions.     Of note, Mr. Lyon asked that a copy of this report be sent to his primary care provider, Dr. Gentile, as well as he psychiatrist and his psychologist.  Release of information was obtained for all of these providers today and reports will be sent out accordingly.    Thank you for allowing me to participate in Mr. Lyon's care.  Please contact me with any questions regarding the content of this report.      --------------------------------EXTENDED REPORT--------------------------------  Verbal consent for neuropsychological testing was received following the provision of information about the nature of the evaluation, and the opportunity to ask questions.     HISTORY OF PRESENTING PROBLEM:  Relevant Background  Mr. Lyon underwent previous neuropsychological evaluation with Dr. Elsa Baez on February 24, 2017. Please see that report for a full and well detailed background of Mr. Lyon's history. But in brief,  \"Mr. 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  He stated that the rest of his injuries resulted in brief alterations in mental status but that he did not lose consciousness...  In addition, he has a history of multiple orthopedic issues presumably related to his football career.  He reported some mild symptoms immediately after ending his college career but stated that over the course of the last several years his symptoms have gotten progressively worse.  He has undergone 16 surgeries many of which were on his lower body including a right ankle fusion, 3 hernia surgeries, as well as multiple upper body surgeries in his upper extremities.  His most notable " "surgery was a quadruple cervical fusion for C3-C7 and he noted that he has had \"a titanium brace\" since 2014.  He has participated regularly in physical therapy for both his knee and his neck injuries and describes extreme physical pain.  His baseline level of pain is a \"6\" on scale of 1-10 and then when he gets to a \"7 or 8\" that is the equivalent of \"excruciating pain for a normal person\" (he reports that he has a very high pain tolerance)... He has also been struggling with worsening anxiety (originally diagnosed as such in 1997) but that diagnosis was revised and his symptoms were felt to be more consistent with bipolar disorder in 2014.  It is at that time when he began experiencing a cyclical pattern of manic and depressive episodes and was initiated on Depakote and Seroquel. Around that same period of time (2014), Mr. Lyon began experiencing concern about declines in his cognition.  He only brought these up to his physician in the last 2 years but feels that they have progressively worsened over time.  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  He had 2 surgeries back to back in 2015 in 2016 that resulted in a leave of absence from his position and he never returned. \"    Current Interview  Mr. Lyon was accompanied today by his parents and was an adequate historian. His parents stayed for the initial part of the interview, but I asked that they leave for the second half of the interview when we covered his psychiatric history. Together they provided the following information as un update over the last year.    With regard to current cognitive symptoms, Mr. Lyon " "indicated that he has experienced progressive declines in his thinking over the last year.  Specifically, he indicated that he feels he is experiencing continued problems with what he calls \"word search,\" in that he knows what he wants to say but his speech is more halting and he must pause more frequently as he cannot figure out how exactly to articulate his thoughts, and when he does, the words do not come out the way he intended.  He feels that he had been very articulate and fluent before but his speech is worse than it was a year ago. He notes that in the past, he used to give presentations to large groups and never had trouble articulating himself. Mr. Lyon also described difficulties with forgetting intended tasks, noting that he \"[has] become a hoarder of little notebooks.\".  He indicates that he has to write everything down or he will lose track of the information. He described continued difficulties with attention noting that he had always been a good listener but now he feels \"like a big field of wheat that is endless,\" noting that in conversations he often loses his train of thought and struggles to stay focused.  Mr. Lyon further described how in the last year, he has reached the point where he has \"avoided and detested,\" being on the phone.  He statted that it is very difficult for him not to be able to see the person he is speaking with, as he has a harder time paying attention and gets increasingly frustrated when having conversations on the phone. Mr. Lyon feels his thinking has been \"off\" for the last 5 years and has been getting progressively worse over time. He noted further that he is becoming increasingly frustrated with himself, particularly when he cannot articulate himself or perform tasks the way he used to.     His  father noted that he generally agrees with his son's descriptions noting that he does seem much more forgetful and is not quite as talkative as he once was.  He described " "for example how they used to talk about football in great detail, but now his son hardly has anything to say about it which is very unusual for him.  At times when they are having conversations, his son seems disinterested and almost looks dazed.  His mother noted that she has observed the biggest changes more with regard to his personality. She described how her son used to be very neat and clean cut, both in his hairstyle and clothing. While there are no problems with self-care per se at this point, he just seems not quite as put together as he once did.  She feels like this is very telling in terms of the way he feels about himself and in general described a concern in that \"his view of himself has changed.\"  She also noted that he tends to get frustrated much more easily and that it can be hard to reason with him as she is never sure whether he is listening or taking it in.  He seems to get very defensive which is not typical for him. She also noted that he has \"become quite the hoarder,\" and while the things that he tends to keep \"are useful, not trash\" including items like \"figurines, knick knacks, books\" and various items that he might be able to sell, his mothers states that they \"have been overrun.\"  She notes that he keeps these items in their basement and she does not know how much longer they can do that, and feels that may encourage him to try and find some other place to store these items.     Mr. Lyon's mother noted that it is hard to tell whether there have been any major thinking changes lately as he has not been living with them recently.  She did note that he often calls to check about details, but she is not sure if he is double checking/ confirming or if he has actually forgotten the information.  When asked about her son's speech, she notes that he \"thinks very hard before he answers.\"  Mr. Lyon's mother acknowledged that she also had trouble speaking on the phone and being frustrated with " "it about 2 years ago and also has a history of bipolar disorder.  Both she and her  agree that Mr. Lyon has been having difficulties with his thinking for the last 5 years. His father expressed concern about \"how much his body can go through... how many surgeries he can have.... how many medications he can take.\" His mother worries about how Mr. Lyon is good to manage in the future and how he would be able to have a home, \"is he that bad, does he need assisted living?\".    Mr. Lyon indicated that for the last 5 months he has been living with his best friend.  He noted that this is an old girlfriend who had recently lost her parents; she was feeling lonely and asked if he could come stay with her.  He notes that he does not really feel this is his home and he does not feel that he has his own home right now.  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 He did note that he does have trouble paying some bills, describing this as the type of intended task he tends to forget. He did note that he has no trouble managing his medications and remembering to take them on a regular basis. Mr. Lyon indicated that he used to love to cook but does not do so now; he is no longer interested.  He noted that he continues to drive with no recent tickets or accidents, but does notice some absentmindedness while driving noting that he will often miss a turn or go to a place that he had been to previously as opposed to where he was supposed to go.  He also noted that he does not like driving in traffic speculating that this activates his claustrophobia as he feels \"boxed in,\".  He further noted that because of his physical difficulties, he can only drive for so long, sometimes only 20 minutes, before he needs a break.    MEDICAL HISTORY:  Mr. Lyon's medical history is significant for   Past Medical History:   Diagnosis Date     AC (acromioclavicular) arthritis 10/24/2011     " Aftercare following surgery of the musculoskeletal system 4/25/2012     Anxiety Disorder NOS     Created by Conversion      Bipolar 2 disorder      Cervical Radiculopathy     Created by Conversion      Claustrophobia      Disturbance of skin sensation 11/30/2011     Encounter for chronic pain management 2/11/2015     GERD (gastroesophageal reflux disease)      Hypertension     Created by Conversion      Impingement Of The Right Shoulder     Created by Conversion       Lymphedema 02/11/2015    left     Pain in joint, shoulder region 10/10/2011     Panic attacks      PTSD (post-traumatic stress disorder)      TBI (traumatic brain injury)     post concussion syndrome     Mr. Lyon has an extensive surgical history as described above. He noted that within the last year, he has had a couple of additional procedures including a left knee replacement, right elbow ulnar nerve block, and a C7-8 nerve block.  As documented in the previous report, he continues to describe a typical pain level of a 6 out of 10 and notes that he is still on pain medications including opiates.  He indicated that he would like to try medical marijuana and has been approved; however, he cannot afford it at this time.    In the last year, Mr. Lyon denies any new stroke, seizure, or head injury with loss of consciousness. Please see the description above regarding his concussion history. Of note, he describes the concussions as occurring between [id number]. He indicated that he has been experiencing progressive difficulties with nerve control in his right arm and notes that he continues to struggle with insomnia.  More recently he has been waking up approximately 4 days a week with a burning sensation in his right arm. He noted that he has long-standing struggles with insomnia and as per the previous report, now continues to only sleep 5-6 hours a night.  He did note that he can only sleep in a recliner, he has been doing so for the last 3 years  "and describes his sleep as \"horrible.\"    Diagnostic studies:  No updated brain imaging has been conducted.    Mr. Lyon's current problem list includes   Patient Active Problem List   Diagnosis     Shoulder impingement syndrome, right     Hypertension     Cervical Radiculopathy     Chondromalacia of patella     Primary (congenital) lymphedema     Ilioinguinal neuralgia     Biceps tendon rupture     Unspecified tear of unspecified meniscus, current injury, left knee, sequela     AC (acromioclavicular) arthritis     PTSD (post-traumatic stress disorder)     Post concussion syndrome     Bipolar 2 disorder     Traumatic brain injury with loss of consciousness     Allergy     Chronic pain syndrome     C7 radiculopathy     Panic attacks     Generalized anxiety disorder     Chronic, continuous use of opioids     Entrapment of right ulnar nerve     Past Surgical History:   Procedure Laterality Date     ANTERIOR / POSTERIOR COMBINED FUSION CERVICAL SPINE  2013, redo in 2014    4 levels     HAND SURGERY Left 1997     HERNIA REPAIR Left 2006     HERNIA REPAIR Right 2008    and middle     KNEE ARTHROSCOPY  2014     NASAL POLYP SURGERY  2014    and septal repair, Dr. Arcos     DE ARTHRODESIS,ANKLE,OPEN Right 2007    Ankle fusion     DE SHLDR ARTHROSCOP,SURG,W/ROTAT CUFF REPR Right 11/25/2015    Procedure: RIGHT SHOULDER ARTHROSCOPY, ROTATOR CUFF REPAIR, DECOMPRESSION, DEBRIDEMENT, DISTAL CLAVICLE EXCISION;  Surgeon: Pilo Bruce MD;  Location: [facility];  Service: Orthopedics     REPLACEMENT TOTAL KNEE Left 03/20/2017     SHOULDER ARTHROSCOPY DISTAL CLAVICLE EXCISION AND OPEN ROTATOR CUFF REPAIR Bilateral 2005 and 2013     VENTRAL HERNIA REPAIR  2008     Current medications include (per medical record):   Current Outpatient Prescriptions:      albuterol (PROAIR HFA;PROVENTIL HFA;VENTOLIN HFA) 90 mcg/actuation inhaler, Inhale 2 puffs every 6 (six) hours as needed for wheezing., Disp: 1 Inhaler, Rfl: 12     " amLODIPine (NORVASC) 10 MG tablet, Take 1 tablet (10 mg total) by mouth daily., Disp: 90 tablet, Rfl: 3     busPIRone (BUSPAR) 10 MG tablet, Take 1 tablet (10 mg total) by mouth 3 (three) times a day as needed., Disp: 90 tablet, Rfl: prn     calcium carbonate-vitamin D3 (CALTRATE 600 PLUS D3) 600 mg(1,500mg) -400 unit per tablet, TAKE THREE TABLETS BY MOUTH ONCE DAILY, Disp: 90 tablet, Rfl: 6     cetirizine (ZYRTEC) 10 MG tablet, Take 1 tablet (10 mg total) by mouth daily., Disp: 90 tablet, Rfl: 5     divalproex (DEPAKOTE) 500 MG 24 hr tablet, Take 500 mg by mouth daily. , Disp: , Rfl:      FLUoxetine (PROZAC) 20 MG capsule, Take 20 mg by mouth daily., Disp: , Rfl:      fluticasone (FLONASE) 50 mcg/actuation nasal spray, USE TWO SPRAY(S) IN EACH NOSTRIL ONCE DAILY, Disp: 16 g, Rfl: 5     gabapentin (NEURONTIN) 400 MG capsule, Take 1,600 mg by mouth daily., Disp: , Rfl:      hydrOXYzine HCl (ATARAX) 25 MG tablet, Take 1 tablet (25 mg total) by mouth 4 (four) times a day., Disp: 120 tablet, Rfl: prn     lamoTRIgine (LAMICTAL) 25 MG tablet, , Disp: , Rfl: 0     lidocaine HCl 3 % Crea, Apply topically to affected areas twice daily, Disp: 1 Tube, Rfl: 5     losartan (COZAAR) 50 MG tablet, TAKE ONE TABLET BY MOUTH ONCE DAILY, Disp: 90 tablet, Rfl: 3     meloxicam (MOBIC) 15 MG tablet, TAKE ONE TABLET BY MOUTH ONCE DAILY, Disp: 90 tablet, Rfl: 5     metoprolol succinate (TOPROL-XL) 100 MG 24 hr tablet, TAKE 2 TABLETS (200 MG TOTAL) BY MOUTH DAILY., Disp: 180 tablet, Rfl: 3     nicotine (NICODERM CQ) 21 mg/24 hr, Place 1 patch on the skin daily., Disp: 30 patch, Rfl: 1     omeprazole (PRILOSEC) 40 MG capsule, TAKE 1 CAPSULE (40 MG TOTAL) BY MOUTH DAILY, please do a PA if needed, Disp: 90 capsule, Rfl: prn     oxyCODONE (OXYCONTIN) 15 mg 12 hr tablet, Take 1 tablet (15 mg total) by mouth every 12 (twelve) hours., Disp: 60 tablet, Rfl: 0     oxyCODONE (ROXICODONE) 5 MG immediate release tablet, Take 1-2 tablets (5-10 mg  "total) by mouth every 4 (four) hours as needed for pain., Disp: 210 tablet, Rfl: 0     pseudoephedrine (SUDAFED) 120 mg 12 hr tablet, TAKE ONE TABLET BY MOUTH TWICE DAILY AS NEEDED FOR  CONGESTION, Disp: 180 tablet, Rfl: 5     QUEtiapine (SEROQUEL) 200 MG tablet, Take 200 mg by mouth bedtime., Disp: , Rfl:      QUEtiapine (SEROQUEL) 50 MG tablet, Take 50 mg by mouth 4 (four) times a day., Disp: , Rfl:      ranitidine (ZANTAC) 300 MG tablet, Take 1 tablet (300 mg total) by mouth at bedtime. (Patient taking differently: Take 300 mg by mouth at bedtime. Hasn't started yet), Disp: 90 tablet, Rfl: 5     THERA-M 9 mg iron-400 mcg Tab tablet, TAKE ONE TABLET BY MOUTH ONCE DAILY, Disp: 30 tablet, Rfl: 10     THEREMS-M 27-0.4 mg Tab, 1 daily, Disp: 90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

## 2021-06-16 NOTE — TELEPHONE ENCOUNTER
Telephone Encounter by Brittany Aranda RN at 3/22/2019  9:44 AM     Author: Brittany Aranda RN Service: -- Author Type: Registered Nurse    Filed: 3/22/2019  9:48 AM Encounter Date: 3/22/2019 Status: Signed    : Brittany Aranda RN (Registered Nurse)       Refill request: oxycodone 5 mg  Last ov:1/22  Next ov:3/28  Cued for: Dr. Cramer      Uds: 11/20/2018  Agreement: 11/20/2018    No red flags

## 2021-06-16 NOTE — TELEPHONE ENCOUNTER
Telephone Encounter by Jenn Baker LPN at 11/4/2019  3:41 PM     Author: Jenn Baker LPN Service: -- Author Type: Licensed Nurse    Filed: 11/4/2019  3:48 PM Encounter Date: 11/2/2019 Status: Signed    : Jenn Baker LPN (Licensed Nurse)       Medication: Sudogest 12 hr  Last Date Filled 5/2/19   pulled: YES               Only PCP Prescribing? : NO  Taken as prescribed from physician notes? YES OV 10/18/19    CSA in last year: YES  Random Utox in last year: YES  (if any of the above answer NO - schedule with PCP)     Opioids + benzodiazepines? YES  (if the above answer YES - schedule with PCP every 6 months)     Is patient on the Executive Review Team? no      All responses suggest: Refilling prescription

## 2021-06-16 NOTE — PROGRESS NOTES
ASSESSMENT:  1. Constipation due to opioid therapy  Continue senna.  Trial of Linzess    2. Slow transit constipation  Trial of Linzess  - senna-docusate (PERICOLACE) 8.6-50 mg tablet; Take 2 tablets by mouth 2 (two) times a day.  Dispense: 180 tablet; Refill: 3  - linaCLOtide (LINZESS) 145 mcg cap capsule; Take 1 capsule (145 mcg total) by mouth daily.  Dispense: 30 capsule; Refill: 11    3. Shoulder impingement syndrome, right  Reviewed pain clinic note.  Agree with Namenda.  Push dosing more rapidly  - memantine (NAMENDA) 10 MG tablet; Take 1 tablet (10 mg total) by mouth 2 (two) times a day.  Dispense: 60 tablet; Refill: 11    4. Cervical radiculopathy at C8  Push Namenda.  Continue Dilantin.  If successful decrease gabapentin  - memantine (NAMENDA) 10 MG tablet; Take 1 tablet (10 mg total) by mouth 2 (two) times a day.  Dispense: 60 tablet; Refill: 11    5. Neuropathy  Decrease gabapentin as able    6. Lymphedema of both lower extremities  Improved with decreased gabapentin      Preventive Health Care:      PLAN:  Patient Instructions   Add Linzess 145 mcg daily for bowel stimulant    If that helps, but not enough, we can increase to 290 mcg which is the max    Increase Memantine to 10 mgs twice a day    If this helps neuropathy, we can decrease gabapentin further           Return in about 4 weeks (around 4/16/2021) for a phone/video follow up visit.    CHIEF COMPLAINT:  Chief Complaint   Patient presents with     Follow-up     Medication Management       HISTORY OF PRESENT ILLNESS:  Bola is a 51 y.o. male presenting to the clinic today for review of medication adjustment.  Primary complaint is constipation.  He feels bloated with upper abdominal pain improved when he finally has a bowel movement.  This is been ongoing for months and he takes 40 mg of oxycodone per day.  Senna has not been completely effective    At our last visit gabapentin was decreased and phenytoin was added.  Pain clinic added Namenda.   "All of this has helped and lymphedema has decreased    Slowly improving back pain    REVIEW OF SYSTEMS:   In assisted living TCU at the moment    PFSH:  Social History     Social History Narrative    He is .  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  He smokes and does not drink alcohol.  He is now on disability due to his brain injury and bipolar disease.     In assisted living TCU    TOBACCO USE:  Social History     Tobacco Use   Smoking Status Current Some Day Smoker     Packs/day: 0.50     Years: 11.00     Pack years: 5.50     Types: Cigarettes     Start date: 2009     Last attempt to quit: 2017     Years since quittin.0   Smokeless Tobacco Former User   Tobacco Comment    0.5 pack per day       VITALS:  Vitals:    21 1619   BP: 108/60   Patient Site: Left Arm   Patient Position: Sitting   Cuff Size: Adult Large   Pulse: 74   SpO2: 98%   Weight: 206 lb (93.4 kg)   Height: 5' 7\" (1.702 m)     Wt Readings from Last 3 Encounters:   21 206 lb (93.4 kg)   21 204 lb (92.5 kg)   21 194 lb (88 kg)     Body mass index is 32.26 kg/m .    PHYSICAL EXAM:  Friendly.  Using a walker    MEDICATIONS:  Current Outpatient Medications   Medication Sig Dispense Refill     acetaminophen (TYLENOL) 650 MG CR tablet Take 3 tablets (1,950 mg total) by mouth 2 (two) times a day. 540 tablet 3     albuterol (PROAIR HFA;PROVENTIL HFA;VENTOLIN HFA) 90 mcg/actuation inhaler INHALE 2 PUFFS BY MOUTH EVERY 6 HOURS AS NEEDED FOR WHEEZING 3 Inhaler 3     allopurinoL (ZYLOPRIM) 300 MG tablet Take 1 tablet (300 mg total) by mouth 2 (two) times a day. 180 tablet 3     ammonium lactate (LAC-HYDRIN) 12 % lotion Apply topically.       baclofen (LIORESAL) 5 mg tablet Take 1 tablet (5 mg total) by mouth 3 (three) times a day. 90 tablet 11     calcium citrate (CALCITRATE) 200 mg (950 mg) tablet Take 5 tablets (1,000 mg total) by mouth 2 (two) " times a day. 180 tablet 3     dextroamphetamine-amphetamine (ADDERALL) 15 mg Tab Take 15 mg by mouth daily. 30 tablet 0     diazePAM (VALIUM) 5 MG tablet Take 1 tablet (5 mg total) by mouth 2 (two) times a day. 60 tablet 2     ergocalciferol (ERGOCALCIFEROL) 1,250 mcg (50,000 unit) capsule Take 1 capsule (50,000 Units total) by mouth every 7 days. 4 capsule 0     escitalopram oxalate (LEXAPRO) 10 MG tablet Take 10 mg by mouth daily.       fentaNYL pf (SUBLIMAZE) 50 mcg/mL injection Infuse 50 mcg into a venous catheter.       fexofenadine (ALLEGRA) 180 MG tablet Take 180 mg by mouth daily as needed.       gabapentin (NEURONTIN) 300 MG capsule Take 1 capsule (300 mg total) by mouth 2 (two) times a day. 120 capsule 2     hydroCHLOROthiazide (HYDRODIURIL) 25 MG tablet Take 1 tablet (25 mg total) by mouth 2 (two) times a day at 9am and 6pm. 60 tablet 11     hydrOXYzine pamoate (VISTARIL) 50 MG capsule Take 1 capsule (50 mg total) by mouth every 6 (six) hours as needed. 60 capsule 0     lamoTRIgine (LAMICTAL) 150 MG tablet Take 1 tablet (150 mg total) by mouth 2 (two) times a day. 60 tablet 11     lansoprazole (PREVACID) 30 MG capsule Take 1 capsule (30 mg total) by mouth daily. 30 capsule 1     LATUDA 40 mg Tab tablet        lidocaine (LIDODERM) 5 % Apply 1 patch topically to painful area of skin once daily and remove per schedule. 30 patch 4     linaCLOtide (LINZESS) 145 mcg cap capsule Take 1 capsule (145 mcg total) by mouth daily. 30 capsule 11     lithium 300 MG capsule Take 600 mg by mouth 2 (two) times a day.  1     medical cannabis (Patient's own supply) by Other route see administration instructions. Take as instructed by the medical cannabis dispensary.  0     memantine (NAMENDA) 10 MG tablet Take 1 tablet (10 mg total) by mouth 2 (two) times a day. 60 tablet 11     metoprolol succinate (TOPROL-XL) 100 MG 24 hr tablet Take 1 tablet (100 mg total) by mouth daily. 90 tablet 3     miconazole nitrate (CRITIC-AID AF)  2 % Oint ointment Apply topically.       midazolam (VERSED) 1 mg/mL Soln Infuse 0.5-6 mg into a venous catheter.       miscellaneous medical supply Misc Wheel chair       miscellaneous medical supply [facility] bed with mattress and 1/2 rails. Semi-electric bed. Length of need 99 months. Bed extender:no. Group 1 mattress       montelukast (SINGULAIR) 10 mg tablet Take 1 tablet by mouth daily.       naloxone (NARCAN) 4 mg/actuation nasal spray 1 spray (4 mg dose) into one nostril for opioid reversal. Call 911. May repeat if no response in 3 minutes. 1 Box 0     oxyCODONE (OXYCONTIN) 20 mg 12 hr tablet Take 1 tablet (20 mg total) by mouth 4 (four) times a day. 112 each 0     phenytoin (DILANTIN KAPSEAL) 100 MG ER capsule Take 1 capsule (100 mg total) by mouth 2 (two) times a day. 60 capsule 11     polyethylene glycol (GLYCOLAX) 17 gram/dose powder Take 17 g by mouth daily. 595 g 11     QUEtiapine (SEROQUEL) 200 MG tablet Take 250 mg by mouth at bedtime.        QUEtiapine (SEROQUEL) 50 MG tablet Take 1 tablet (50 mg total) by mouth 4 (four) times a day. 120 tablet 0     senna-docusate (PERICOLACE) 8.6-50 mg tablet Take 2 tablets by mouth 2 (two) times a day. 180 tablet 3     Current Facility-Administered Medications   Medication Dose Route Frequency Provider Last Rate Last Admin     cyanocobalamin injection 1,000 mcg  1,000 mcg Intramuscular Q30 Days Devon Lund MD   1,000 mcg at 11/13/20 1538         Notes summarized: Reviewed pain clinic note  Labs, x-rays, cardiology, GI tests reviewed: Labs up-to-date  New orders: No orders of the defined types were placed in this encounter.      Independent review of:  Supplemental history by:     Start time:  4:34 PM  End time:  4:46 PM PM  Face to face plus orders: 12 minutes  Documentation time: 2 minutes    The visit lasted a total of 14 minutes       Devon Lund MD

## 2021-06-16 NOTE — TELEPHONE ENCOUNTER
Telephone Encounter by Brittany Aranda RN at 5/15/2019  2:52 PM     Author: Brittany Aranda RN Service: -- Author Type: Registered Nurse    Filed: 5/15/2019  2:58 PM Encounter Date: 5/15/2019 Status: Signed    : Brittany Aranda RN (Registered Nurse)       Medication being requested: oxycodone 5 mg and oxycontin 10 mg 12 hr  Last visit date: 3/28.  Provider: BE  Next visit date: 5/23.  Provider: BE  Expected follow up: 8 weeks  MTM visit (Pain Center) date: no  UDT date: 11/20/2018  Agreement date: 11/20/2018   snipped in:    Red Flags/Comments identified on call: no  Pertinent between visit information about requested medication (telephone, mychart, prior authorization): no  Script being sent to provider by nurse- dates and quantity:     [facility]  Standing orders for withdrawal protocol implemented: NA

## 2021-06-16 NOTE — TELEPHONE ENCOUNTER
Prior Authorization Request  Who s requesting:  Pharmacy  Pharmacy Name and Location: [facility]  Medication Name: oxyCODONE (OXYCONTIN) 20 mg 12 hr tablet  Insurance Plan: BCBS Medicare Advantage  Insurance Member ID Number:  [id number]   CoverMyMeds Key: [id number]  Informed patient that prior authorizations can take up to 10 business days for response:   Yes  Okay to leave a detailed message: Yes

## 2021-06-16 NOTE — TELEPHONE ENCOUNTER
Telephone Encounter by Jenn Baker LPN at 3/24/2020  4:52 PM     Author: Jenn Baker LPN Service: -- Author Type: Licensed Nurse    Filed: 3/24/2020  4:59 PM Encounter Date: 3/24/2020 Status: Signed    : Jenn Baker LPN (Licensed Nurse)       Medication: Adderall  Last Date Filled 2/24/20   pulled: YES      Only PCP Prescribing? : NO  Taken as prescribed from physician notes? YES OV 12/12/19       2. Traumatic brain injury with loss of consciousness, sequela (H)  Refill Adderall  - dextroamphetamine-amphetamine (ADDERALL) 30 mg Tab; Take 0.5 tab (15mg) BID  Dispense: 30 tablet; Refill: 0    CSA in last year: YES  Random Utox in last year: YES  (if any of the above answer NO - schedule with PCP)     Opioids + benzodiazepines? YES  (if the above answer YES - schedule with PCP every 6 months)     Is patient on the Executive Review Team? no      All responses suggest: Refilling prescription

## 2021-06-16 NOTE — TELEPHONE ENCOUNTER
Telephone Encounter by Brittany Aranda RN at 5/3/2019  2:48 PM     Author: Brittany Aranda RN Service: -- Author Type: Registered Nurse    Filed: 5/3/2019  2:51 PM Encounter Date: 4/30/2019 Status: Signed    : Brittany Aranda RN (Registered Nurse)       Refill request: oxycodone 5 mg IR  Please see other encounter for further template information.  Requested Prescriptions     Pending Prescriptions Disp Refills   ? oxyCODONE (ROXICODONE) 5 MG immediate release tablet 70 tablet 0     Sig: Two tabs morning, one mid-day, two bedtime

## 2021-06-16 NOTE — TELEPHONE ENCOUNTER
Telephone Encounter by Tejal Brennan CMA at 1/24/2020  1:04 PM     Author: Tejal Brennan CMA Service: -- Author Type: Certified Medical Assistant    Filed: 1/24/2020  1:11 PM Encounter Date: 1/24/2020 Status: Signed    : Tejal Brennan CMA (Certified Medical Assistant)       Medication: Adderall   Last Date Filled 12/16/19   pulled: YES            Only PCP Prescribing? : YES  Taken as prescribed from physician notes? YES    CSA in last year: YES  Random Utox in last year: YES  (if any of the above answer NO - schedule with PCP)     Opioids + benzodiazepines? YES  (if the above answer YES - schedule with PCP every 6 months)     Is patient on the Executive Review Team? NO      All responses suggest: Scheduling with PCP for further intervention      Scheduled with pcp 1/27

## 2021-06-16 NOTE — TELEPHONE ENCOUNTER
Telephone Encounter by Brittany Aranda RN at 7/19/2019  2:02 PM     Author: Brittany Aranda RN Service: -- Author Type: Registered Nurse    Filed: 7/19/2019  2:11 PM Encounter Date: 7/19/2019 Status: Signed    : Brittany Aranda RN (Registered Nurse)       Medication being requested: oxycodone 10 mg 12 hr and ketamine 120 mg amarjit  Last visit date: 6/10.  Provider: BE  Next visit date: 8/5.  Provider: BE  Expected follow up: 8 weeks  MTM visit (Pain Center) date: no  UDS and CSA - 11/20/18   snipped in:    Pertinent between visit information about requested medication (telephone, mychart, prior authorization, concerns, comments):   Script being sent to provider by nurse- dates and quantity:   Requested Prescriptions     Pending Prescriptions Disp Refills   ? oxyCODONE (OXYCONTIN) 10 mg 12 hr tablet 42 each 0     Sig: Take 1 tablet (10 mg total) by mouth every 12 (twelve) hours.   ? ketamine HCl (KETAMINE, BULK,) 100 % Powd 120 Bottle 2     Sig: Ketamine 120 mg amarjit, dissolve one amarjit under the tongue up to 11 times daily prn pain   ? oxyCODONE (ROXICODONE) 5 MG immediate release tablet 100 tablet 0     Sig: Two tabs morning, one mid-day, two bedtime     [facility]  Standing orders for withdrawal protocol implemented: NA

## 2021-06-16 NOTE — TELEPHONE ENCOUNTER
Telephone Encounter by Ann Walter, RN at 3/18/2019  9:44 AM     Author: Ann Walter RN Service: -- Author Type: Registered Nurse    Filed: 3/18/2019 10:03 AM Encounter Date: 3/18/2019 Status: Signed    : Ann Walter RN (Registered Nurse)       Medication being requested: Oxycontin  Last filled 2/19 # 60 30 day supply  Due 3/21  Last visit date: 1/22 .  Provider:Shoaib  Next visit date: 3/28.  Provider: Shoaib  Expected follow up:  8 weeks  MTM visit (Pain Center) date: na  UDT date: 11/20/18  Agreement date: 11/20/18   snipped in:        Red Flags/Comments identified on call: na  Pertinent between visit information about requested medication (telephone, mychart, prior authorization): na  Script being sent to provider by nurse- dates and quantity:   Requested Prescriptions     Pending Prescriptions Disp Refills   ? oxyCODONE (OXYCONTIN) 15 mg 12 hr tablet 60 tablet 0     Sig: Take 1 tablet (15 mg total) by mouth every 12 (twelve) hours. Fill on 11/19/18     Pharmacy cued: [facility]  Standing orders for withdrawal protocol implemented: na

## 2021-06-16 NOTE — TELEPHONE ENCOUNTER
Telephone Encounter by Aleisha Pyle RN at 6/3/2019  1:43 PM     Author: Aleisha Pyle RN Service: -- Author Type: Registered Nurse    Filed: 6/3/2019  1:48 PM Encounter Date: 6/3/2019 Status: Signed    : Aleisha Pyle RN (Registered Nurse)       Pt calls with request for Ketamine and Oxycontin refills.   States Dr Cramer gave permission for increase in Ketamine to fill every 8 days instead of every 10. Is requesting a refill of Ketamine and states he is out as he overused a bit while waiting for his Oxycodone refill, which appears to have been pended.   Please advise if ok to fill Ketamine today for 8 days.

## 2021-06-16 NOTE — TELEPHONE ENCOUNTER
Telephone Encounter by Ann Walter RN at 3/2/2020  9:12 AM     Author: Ann Walter RN Service: -- Author Type: Registered Nurse    Filed: 3/2/2020  9:36 AM Encounter Date: 3/2/2020 Status: Signed    : Ann Walter RN (Registered Nurse)       Medication being requested:Oxycodone  Last visit date: 2/18/20   Provider: Shoaib  Next visit date:  5/5/20  Provider: Shoaib  Expected follow up: 8 weeks  MTM visit (Pain Center) date: na  UDT date: 2/2020  Agreement date: 2/2020   snipped in:      Pertinent between visit information about requested medication (telephone, mychart, prior authorization, concerns, comments): Oxycodone 10 mg two tabs four times a day, two week supplies Last visit note 2/18.  Script being sent to provider by nurse- dates and quantity:   Requested Prescriptions     Pending Prescriptions Disp Refills   ? oxyCODONE (ROXICODONE) 10 mg immediate release tablet 112 tablet 0     Sig: Take 2 tablets (20 mg total) by mouth 4 (four) times a day for 14 days.     Pharmacy cued: [facility]  Standing orders for withdrawal protocol implemented: balaji

## 2021-06-16 NOTE — TELEPHONE ENCOUNTER
Telephone Encounter by Brittany Aranda RN at 1/27/2020  2:57 PM     Author: Brittany Aranda RN Service: -- Author Type: Registered Nurse    Filed: 1/27/2020  3:16 PM Encounter Date: 1/27/2020 Status: Signed    : Brittany Aranda RN (Registered Nurse)       Patient calls, quite upset and pressured speech on voicemail.  Apparently there is insurance issues and patient insurance was cancelled as though this was his fathers(same name) he is now without insurance and cannot afford oxycontin.  Therefor he has used an increase amount of oxycodone short acting.  Patient reports speaking with someone at this clinic about this entire situation.  There is an encounter on 1/20 that does report this issue as well as provider approving the oxycodone 10 mg 4.5 daily.  Current dosing is oxycodone 5 mg 5 tabs daily (was accompanied by the oxycontin 10 mg three times a day, which patient cannot currently afford)      UDS and CSA - 12/19  Last ov: 12/30  Next ov: 02/18 with BE    PA was approved for oxycontin 10 mg on 1/17(once insurance is re-instated)    Requested Prescriptions     Pending Prescriptions Disp Refills   ? oxyCODONE (ROXICODONE) 10 mg immediate release tablet 70 tablet 0     Sig: Two tabs morning, one/half tablet mid-day, two bedtime     [facility]

## 2021-06-16 NOTE — TELEPHONE ENCOUNTER
Telephone Encounter by Agustina Noriega at 6/10/2019 12:38 PM     Author: Agustina Noriega Service: -- Author Type: --    Filed: 6/10/2019 12:39 PM Encounter Date: 6/10/2019 Status: Signed    : Agustina Noriega APPROVED:    Approval start date:12/30/2018  Approval end date:12/31/2019    Pharmacy has been notified of approval and will contact patient when medication is ready for pickup.

## 2021-06-16 NOTE — TELEPHONE ENCOUNTER
Refill request: oxycontin 20 mg  Last ov with BE: 3/8/21  Follow up in 8 weeks  Next ov with BE: 4/19/21  UDT/CSA - 1/2021     Requested Prescriptions     Pending Prescriptions Disp Refills     oxyCODONE (OXYCONTIN) 20 mg 12 hr tablet 112 each 0     Sig: Take 1 tablet (20 mg total) by mouth 4 (four) times a day.     [facility]

## 2021-06-16 NOTE — TELEPHONE ENCOUNTER
Telephone Encounter by Juana Suggs RN at 3/13/2020  9:43 AM     Author: Juana Suggs RN Service: -- Author Type: Registered Nurse    Filed: 3/13/2020 10:03 AM Encounter Date: 3/13/2020 Status: Signed    : Juana Suggs RN (Registered Nurse)       Medication being requested: Oxycodone 10mg  Last visit date: 2/18/20  Provider: BE  Next visit date: 5/5/20.  Provider: BE  Expected follow up: 8 weeks  MTM visit (Pain Center) date: No  UDT date: 2/2020  Agreement date: 2/2020   snipped in:    Pertinent between visit information about requested medication (telephone, mychart, prior authorization, concerns, comments): No  Script being sent to provider by nurse- dates and quantity:   Requested Prescriptions     Pending Prescriptions Disp Refills   ? oxyCODONE (ROXICODONE) 10 mg immediate release tablet 112 tablet 0     Sig: Take 2 tablets (20 mg total) by mouth 4 (four) times a day for 14 days.     Pharmacy cued: [facility]  Standing orders for withdrawal protocol implemented: NA

## 2021-06-16 NOTE — TELEPHONE ENCOUNTER
Telephone Encounter by Ann Walter RN at 5/28/2019  4:12 PM     Author: Ann Walter RN Service: -- Author Type: Registered Nurse    Filed: 5/28/2019  4:25 PM Encounter Date: 5/28/2019 Status: Signed    : Ann Walter RN (Registered Nurse)       Medication being requested: Oxycontin  Last visit date: 3/28  Provider: Shoaib  Next visit date: 6/10  Provider: Shoaib  Expected follow up: 8 weeks  MTM visit (Pain Center) date: na  UDT date: 11/20/18  Agreement date: 11/20/18   snipped in      Pertinent between visit information about requested medication (telephone, mychart, prior authorization, concerns, comments):na  Script being sent to provider by nurse- dates and quantity:   Requested Prescriptions     Pending Prescriptions Disp Refills   ? oxyCODONE (OXYCONTIN) 10 mg 12 hr tablet 30 each 0     Sig: Take 1 tablet (10 mg total) by mouth every 12 (twelve) hours.   Pharmacy cued: [facility]  Standing orders for withdrawal protocol implemented: balaji

## 2021-06-16 NOTE — TELEPHONE ENCOUNTER
Telephone Encounter by Trupti Sandhu, RN at 8/23/2019 10:39 AM     Author: Trupti Sandhu, RN Service: -- Author Type: Registered Nurse    Filed: 8/23/2019 10:48 AM Encounter Date: 8/23/2019 Status: Signed    : Trupti Sandhu RN (Registered Nurse)       Medication being requested: Oxycontin   Last visit date: 6/10.  Provider: PAZ  Next visit date: 9/26.  Provider: PAZ  Expected follow up: 8 WEEKS  MTM visit (Pain Center) date: NO  UDT date: 6/2019  Agreement date: 11/2018   snipped in:    Pertinent between visit information about requested medication (telephone, mychart, prior authorization, concerns, comments): cx appt with Paz 8/5, In [place] ED ON 8/9 WITH ABDOMINAL PAIN  Script being sent to provider by nurse- dates and quantity:   Requested Prescriptions     Pending Prescriptions Disp Refills   ? oxyCODONE (OXYCONTIN) 10 mg 12 hr tablet 42 each 0     Sig: Take 1 tablet (10 mg total) by mouth every 12 (twelve) hours for 21 days.     Pharmacy cued: [facility]  Standing orders for withdrawal protocol implemented: NA

## 2021-06-16 NOTE — PROGRESS NOTES
Pain score: 8  Constant or intermittent: constant  What does your pain feel like: throbbing, burning, and stinging  Does the pain interfere with:   Work: yes  Walking/distance: yes  Sleep: yes  Daily activities: yes  Relationships/social life: yes  Mood: yes  F= 8    Mary Jo Irvin LPN

## 2021-06-16 NOTE — TELEPHONE ENCOUNTER
Call placed to patient to notify that the medication has been approved.  Call placed to pharmacy to ensure:  1. Medication is in stock: YES  2. PA approval information necessary has been received: YES    Pharmacy staff report that this will go out to the patient today.

## 2021-06-16 NOTE — TELEPHONE ENCOUNTER
Prior Authorization Request  Who s requesting:  Pharmacy  Pharmacy Name and Location: [facility]  Medication Name: Xtampza ER 18 mg  Insurance Plan: BC/ Medicare A and B  Insurance Member ID Number:  [id number]  CoverMyMeds Key: [id number]  Informed patient that prior authorizations can take up to 10 business days for response:   No  Okay to leave a detailed message: Yes

## 2021-06-16 NOTE — TELEPHONE ENCOUNTER
Central PA team  [phone]  Pool: HE PA MED ([id number])          PA has been initiated.       PA form completed and faxed insurance via Cover My Meds     Noriega:  RIAZ GARZA (Noriega: [id number])     Medication:  oxyCODONE (OXYCONTIN) 20 mg 12 hr tablet    Insurance:  [facility] Medicare Advantage        Response will be received via fax and may take up to 5-10 business days depending on plan

## 2021-06-16 NOTE — TELEPHONE ENCOUNTER
Telephone Encounter by Juana Suggs RN at 1/14/2020  9:00 AM     Author: Juana Suggs RN Service: -- Author Type: Registered Nurse    Filed: 1/14/2020  9:07 AM Encounter Date: 1/14/2020 Status: Signed    : Juana Suggs RN (Registered Nurse)       Medication being requested: Oxycontin and Oxycodone  Last visit date: 12/30/19  Provider: BE  Next visit date: 2/18/20.  Provider: BE  Expected follow up: 8 weeks  MTM visit (Pain Center) date: No  UDT date: 12/2019  Agreement date: 12/2019   snipped in:    Pertinent between visit information about requested medication (telephone, mychart, prior authorization, concerns, comments):   Had an appt with Dr. Luevano about left arm on 12/31/19  Script being sent to provider by nurse- dates and quantity:   Requested Prescriptions     Pending Prescriptions Disp Refills   ? oxyCODONE (OXYCONTIN) 10 mg 12 hr tablet 42 each 0     Sig: Take 1 tablet (10 mg total) by mouth 3 (three) times a day for 14 days.   ? oxyCODONE (ROXICODONE) 5 MG immediate release tablet 70 tablet 0     Sig: Two tabs morning, one mid-day, two bedtime     Pharmacy cued: [facility]  Standing orders for withdrawal protocol implemented: SAHRA

## 2021-06-16 NOTE — TELEPHONE ENCOUNTER
Telephone Encounter by Brittany Aranda RN at 9/13/2019  4:04 PM     Author: Brittany Aranda RN Service: -- Author Type: Registered Nurse    Filed: 9/13/2019  4:15 PM Encounter Date: 9/13/2019 Status: Signed    : Brittany Aranda RN (Registered Nurse)       Medication being requested: oxycontin 10 mg 12 hr and baclofen 10 mg  Last visit date: 8/9  Provider: BE  Next visit date: 9/26  Provider: BE  Expected follow up: 8 weeks  [facility] ([facility]) date: np  UDS and CSA - 11/20/18   snipped in:      Pertinent between visit information about requested medication (telephone, mychart, prior authorization, concerns, comments):   Patient is overdue for follow up apt  Script being sent to provider by nurse- dates and quantity:   Requested Prescriptions     Pending Prescriptions Disp Refills   ? baclofen (LIORESAL) 10 MG tablet 126 tablet 3     Sig: One and one-half tab three times a day   ? oxyCODONE (OXYCONTIN) 10 mg 12 hr tablet 42 each 0     Sig: Take 1 tablet (10 mg total) by mouth every 12 (twelve) hours for 21 days.     Pharmacy cued: [facility]  Standing orders for withdrawal protocol implemented: NA

## 2021-06-16 NOTE — TELEPHONE ENCOUNTER
Telephone Encounter by Tejal Hernandez LPN at 11/16/2019 11:58 AM     Author: Tejal Hernandez LPN Service: -- Author Type: Licensed Nurse    Filed: 11/16/2019 12:18 PM Encounter Date: 11/15/2019 Status: Signed    : Tejal Hernandez LPN (Licensed Nurse)       Medication: dextroamphetamine-amphetamine  Last Date Filled 10/18/19   pulled: YES              Only PCP Prescribing? : YES  Taken as prescribed from physician notes? YES    CSA in last year: YES  Random Utox in last year: YES  (if any of the above answer NO - schedule with PCP)     Opioids + benzodiazepines? YES  (if the above answer YES - schedule with PCP every 6 months)     Is patient on the Executive Review Team? No      All responses suggest: Refilling prescription

## 2021-06-16 NOTE — TELEPHONE ENCOUNTER
Telephone Encounter by Gurwinder Kendrick CMA at 9/10/2019 11:20 AM     Author: Gurwinder Kendrick CMA Service: -- Author Type: Certified Medical Assistant    Filed: 9/10/2019 11:36 AM Encounter Date: 9/10/2019 Status: Signed    : Gurwinder Kendrick CMA (Certified Medical Assistant)       Medication: Adderall  Last Date Filled 047/26/19   pulled: YES    Only PCP Prescribing? : YES  Taken as prescribed from physician notes? YES    CSA in last year: YES  Random Utox in last year: YES  (if any of the above answer NO - schedule with PCP)     Opioids + benzodiazepines? NO  (if the above answer YES - schedule with PCP every 6 months)     Is patient on the Executive Review Team? No      All responses suggest: Refilling prescription

## 2021-06-16 NOTE — TELEPHONE ENCOUNTER
"Telephone Encounter by Mary Reina CMA at 2019  3:19 PM     Author: Mary Reina CMA Service: -- Author Type: Certified Medical Assistant    Filed: 2019  3:32 PM Encounter Date: 2019 Status: Signed    : Mary Reina CMA (Certified Medical Assistant)       Medication: Adderall  Last Date Filled 2019 for 30 tablets   pulled: YES           Only PCP Prescribing? : YES for Adderall  Taken as prescribed from physician notes? YES- 3/4/2019- Inattentiveness: He had his dose of Adderall increased to 15 mg twice daily, which has been the \"perfect dosage.\" The dose change made a big difference, and he is very happy with it.     CSA in last year: NO  Random Utox in last year: NO-  2019, pending drug abuse order in chart review  (if any of the above answer NO - schedule with PCP)     Opioids + benzodiazepines? YES  (if the above answer YES - schedule with PCP every 6 months)     Is patient on the Executive Review Team? NO      All responses suggest: Scheduling with PCP for further intervention- appt 2019 with PCP       "

## 2021-06-16 NOTE — TELEPHONE ENCOUNTER
Telephone Encounter by Tejal Brennan CMA at 2/24/2020  1:30 PM     Author: Tejal Brennan CMA Service: -- Author Type: Certified Medical Assistant    Filed: 2/24/2020  1:36 PM Encounter Date: 2/21/2020 Status: Signed    : Tejal Brennan CMA (Certified Medical Assistant)       Medication: Adderall  Last Date Filled 1/24/2020   pulled: YES      Only PCP Prescribing? : YES  Taken as prescribed from physician notes? YES    CSA in last year: YES  Random Utox in last year: Not needed  (if any of the above answer NO - schedule with PCP)     Opioids + benzodiazepines? YES  (if the above answer YES - schedule with PCP every 6 months)     Is patient on the Executive Review Team? NO        All responses suggest: Refilling prescription      Return in about 4 weeks (around 2/24/2020) for for a full review of medications and lab testing.     Scheduled 2/26/2020

## 2021-06-16 NOTE — PATIENT INSTRUCTIONS - HE
Add Linzess 145 mcg daily for bowel stimulant    If that helps, but not enough, we can increase to 290 mcg which is the max    Increase Memantine to 10 mgs twice a day    If this helps neuropathy, we can decrease gabapentin further

## 2021-06-16 NOTE — TELEPHONE ENCOUNTER
Telephone Encounter by Marline Lindquist at 1/17/2020  3:26 PM     Author: Marline Lindquist Service: -- Author Type: --    Filed: 1/17/2020  3:27 PM Encounter Date: 1/17/2020 Status: Signed    : Marline Lindquist APPROVED:    Approval start date: 1/1/2020  Approval end date:  1/17/2021    Pharmacy has been notified of approval and will contact patient when medication is ready for pickup.

## 2021-06-16 NOTE — TELEPHONE ENCOUNTER
Telephone Encounter by Brittany Aranda RN at 11/27/2019  9:06 AM     Author: Brittany Aranda RN Service: -- Author Type: Registered Nurse    Filed: 11/27/2019  9:23 AM Encounter Date: 11/26/2019 Status: Signed    : Brittany Aranda RN (Registered Nurse)       Medication being requested: oxycodone 10 mg 12 hr and oxycodone 5 mg  Last visit date: 9/26  Provider: BE  Next visit date: 12/30  Provider: BE  Expected follow up: 8 weeks  [facility] visit ([facility]) date: no  UDS and CSA - 11/20/18   snipped in:    Pertinent between visit information about requested medication (telephone, mychart, prior authorization, concerns, comments): NA  Script being sent to provider by nurse- dates and quantity:   Requested Prescriptions     Pending Prescriptions Disp Refills   ? oxyCODONE (OXYCONTIN) 10 mg 12 hr tablet 42 each 0     Sig: Take 1 tablet (10 mg total) by mouth 3 (three) times a day for 14 days.   ? oxyCODONE (ROXICODONE) 5 MG immediate release tablet 70 tablet 0     Sig: Two tabs morning, one mid-day, two bedtime     Pharmacy cued: [facility]  Standing orders for withdrawal protocol implemented: SAHRA

## 2021-06-16 NOTE — TELEPHONE ENCOUNTER
Telephone Encounter by Brittany Aranda RN at 11/12/2019  9:33 AM     Author: Brittany Aranda RN Service: -- Author Type: Registered Nurse    Filed: 11/12/2019  9:36 AM Encounter Date: 11/12/2019 Status: Signed    : Brittany Aranda RN (Registered Nurse)       Medication being requested: oxycontin 10 mg and oxycodone 5 mg  Last visit date: 9/26  Provider: BE  Next visit date: 11/22  Provider: BE  Expected follow up: 8 weeks  [facility] visit ([facility]) date: no  UDS and CSA - 11/20/18   snipped in:    Pertinent between visit information about requested medication (telephone, mychart, prior authorization, concerns, comments):   Script being sent to provider by nurse- dates and quantity:   Requested Prescriptions     Pending Prescriptions Disp Refills   ? oxyCODONE (OXYCONTIN) 10 mg 12 hr tablet 42 each 0     Sig: Take 1 tablet (10 mg total) by mouth 3 (three) times a day for 14 days.   ? oxyCODONE (ROXICODONE) 5 MG immediate release tablet 70 tablet 0     Sig: Two tabs morning, one mid-day, two bedtime     [facility] c[facility]  Standing orders for withdrawal protocol implemented: NA

## 2021-06-16 NOTE — TELEPHONE ENCOUNTER
Telephone Encounter by Agustina Noriega at 9/16/2019  8:38 AM     Author: Agustina Noriega Service: -- Author Type: --    Filed: 9/16/2019  8:40 AM Encounter Date: 9/9/2019 Status: Signed    : Agustina Noriega       PA is not needed for medication is it covered.  Gave pharmacy insurance help desk information if they continue to have issues processing claim.

## 2021-06-16 NOTE — TELEPHONE ENCOUNTER
Telephone Encounter by Mary Reina CMA at 3/25/2019  2:34 PM     Author: Mary Reina CMA Service: -- Author Type: Certified Medical Assistant    Filed: 3/25/2019  2:40 PM Encounter Date: 3/25/2019 Status: Signed    : Mary Reina CMA (Certified Medical Assistant)       Medication: Adderall  Last Date Filled 2/27/2019 for 30 tablets   pulled: YES     Only PCP Prescribing? : YES for Adderall  Taken as prescribed from physician notes? YES- 11/23/2018- 4. Traumatic brain injury with loss of consciousness, sequela (H)  Discussed surreptitious use of roommates Adderall.  Formal trial of Adderall with follow-up through psychiatry in 2 weeks  - dextroamphetamine-amphetamine (ADDERALL XR) 10 MG 24 hr capsule; Take 1 capsule (10 mg total) by mouth daily.  Dispense: 10 capsule; Refill: 0    CSA in last year: NO  Random Utox in last year: YES  (if any of the above answer NO - schedule with PCP)     Opioids + benzodiazepines? YES  (if the above answer YES - schedule with PCP every 6 months)     All responses suggest: Refilling prescription

## 2021-06-16 NOTE — TELEPHONE ENCOUNTER
Telephone Encounter by Jenn Baker LPN at 9/6/2019  8:35 AM     Author: Jenn Baker LPN Service: -- Author Type: Licensed Nurse    Filed: 9/6/2019  9:12 AM Encounter Date: 9/5/2019 Status: Signed    : Jenn Baker LPN (Licensed Nurse)        pulled to verify script hasn't been filled. Contacted pharmacy and cancelled Adderall script for 30 mg. Pharmacist confirmed 30 mg is on backorder  Pharmacist suggested sending in 10mg dose.

## 2021-06-16 NOTE — TELEPHONE ENCOUNTER
[facility] PA team  [phone]  Pool: [facility] PA MED ([id number])          PA has been initiated.       PA form completed and faxed insurance via Cover My Meds     Noriega:  RIAZ GARZA (Key: [id number])     Medication:  Xtampza ER 18 mg    Insurance:  [place]        Response will be received via fax and may take up to 5-10 business days depending on plan

## 2021-06-16 NOTE — TELEPHONE ENCOUNTER
Telephone Encounter by Aleisha Pyle RN at 1/20/2020  3:52 PM     Author: Aleisha Pyle RN Service: -- Author Type: Registered Nurse    Filed: 1/20/2020  4:22 PM Encounter Date: 1/20/2020 Status: Signed    : Aleisha Pyle RN (Registered Nurse)       Pt has problem with insurance. States his insurance dropped him. States his father had BC/BS insurance and switched in the new year to a different insurance. BC/BS dropped the pt from insurance, as they share the same first name, but father is Bola Alexandra.  Pt states insurance is trying to fix the problem. Pt couldn't afford to  Oxycontin script as cost is $183 for 2 wk script. States he increased Oxycodone to 9/day while waiting for insurance problem to be resolved. States he has Oxycodone # 34  Tabs left from his last script. Is concerned he'll run out before insurance problem is resolved. Also asking for refill of Ketamine.  Requested Prescriptions     Pending Prescriptions Disp Refills   ? ketamine HCl (KETAMINE, BULK,) 100 % Powd 120 Bottle 2     Sig: Ketamine 120 mg amarjit, dissolve 1 amarjit under the tongue up to 11 times daily as needed         Last appt12/30  Next appt 02/18  Plan was to follow-up in 8 wks  UDS and CSA- 12/2019             Pertinent between visit information about requested medication (telephone, mychart, prior authorization, concerns, comments): As above     Pharmacy cued: [place]  Standing orders for withdrawal protocol implemented: N/A

## 2021-06-16 NOTE — TELEPHONE ENCOUNTER
Telephone Encounter by Brittany Aranda RN at 12/9/2019  8:49 AM     Author: Brittany Aranda RN Service: -- Author Type: Registered Nurse    Filed: 12/9/2019  9:17 AM Encounter Date: 11/26/2019 Status: Signed    : Brittany Aranda RN (Registered Nurse)       Refill request: ketamine 120 mg amarjit    Appears to be a remaining refill on current script  Call placed to [facility]:  Apparently patient filling an old RX from 6/10 and will need a new RX.    No significant chart findings since last template completed.    Requested Prescriptions     Pending Prescriptions Disp Refills   ? ketamine HCl (KETAMINE, BULK,) 100 % Powd 120 Bottle 2     Sig: Ketamine 120 mg amarjit, dissolve 1 amarjit under the tongue up to 11 times daily as needed     Signed Prescriptions Disp Refills   ? oxyCODONE (OXYCONTIN) 10 mg 12 hr tablet 42 each 0     Sig: Take 1 tablet (10 mg total) by mouth 3 (three) times a day for 14 days.     Authorizing Provider: JAYME MULLEN   ? oxyCODONE (ROXICODONE) 5 MG immediate release tablet 70 tablet 0     Sig: Two tabs morning, one mid-day, two bedtime     Authorizing Provider: JAYME MULLEN     [facility]

## 2021-06-16 NOTE — TELEPHONE ENCOUNTER
"Telephone Encounter by Gurwinder Kendrick CMA at 5/23/2019 11:26 AM     Author: Gurwinder Kendrick CMA Service: -- Author Type: Certified Medical Assistant    Filed: 5/23/2019 11:37 AM Encounter Date: 5/22/2019 Status: Signed    : Gurwinder Kendrick CMA (Certified Medical Assistant)       Medication: Adderall  Last Date Filled 4/25/19   pulled: YES    Only PCP Prescribing? : YES  Taken as prescribed from physician notes? YES He had his dose of Adderall increased to 15 mg twice daily, which has been the \"perfect dosage.\" The dose change made a big difference, and he is very happy with it.     CSA in last year: yes 11/20/18  Random Utox in last year: NO  last one was 4/30/18  (if any of the above answer NO - schedule with PCP)     Opioids + benzodiazepines? YES  (if the above answer YES - schedule with PCP every 6 months)     Is patient on the Executive Review Team? No      All responses suggest: pend the med and routing to covering provider             "

## 2021-06-16 NOTE — TELEPHONE ENCOUNTER
Telephone Encounter by Brittany Aranda RN at 4/30/2019  3:55 PM     Author: Brittany Aranda RN Service: -- Author Type: Registered Nurse    Filed: 4/30/2019  4:25 PM Encounter Date: 4/30/2019 Status: Signed    : Brittany Aranda RN (Registered Nurse)       Refill request: oxycontin 15 mg 12 hr  Last ov: 3/28  Next ov: 5/23    Appears patient would have been out of medication for some time.  Unclear why just calling today for a refill.    Plan from last office visit 3/28:  May decrease the oxycodone 5 mg as tolerated, next Oxycontin will decrease to 10 mg twice a day    Appears patient is reducing dosing of this medication.  Will send to provider for review and ordering as appropriate.

## 2021-06-16 NOTE — TELEPHONE ENCOUNTER
Telephone Encounter by Brittany Aranda RN at 10/28/2019  2:25 PM     Author: Brittany Aranda RN Service: -- Author Type: Registered Nurse    Filed: 10/28/2019  2:38 PM Encounter Date: 10/28/2019 Status: Signed    : Brittany Aranda RN (Registered Nurse)       Medication being requested: oxycodone 5 mg and oxycontin 10 mg and ketamine   Last visit date: 9/26  Provider: BE  Next visit date: 11/22.  Provider: BE  Expected follow up: 8 weeks  [facility] visit ([facility]) date: no  UDS and CSA - 11/20/18   snipped in:      Pertinent between visit information about requested medication (telephone, mychart, prior authorization, concerns, comments):     Script being sent to provider by nurse- dates and quantity:   Requested Prescriptions     Pending Prescriptions Disp Refills   ? oxyCODONE (OXYCONTIN) 10 mg 12 hr tablet 42 each 0     Sig: Take 1 tablet (10 mg total) by mouth 3 (three) times a day for 14 days.   ? oxyCODONE (ROXICODONE) 5 MG immediate release tablet 80 tablet 0     Sig: Two tabs morning, one mid-day, two bedtime   ? ketamine HCl (KETAMINE, BULK,) 100 % Powd 120 Bottle 2     Sig: Ketamine 120 mg amarjit, dissolve one amarjit under the tongue up to 11 times daily prn pain     [facility] cued: [facility]  Standing orders for withdrawal protocol implemented: NA

## 2021-06-16 NOTE — TELEPHONE ENCOUNTER
Telephone Encounter by Mary Reina CMA at 4/23/2019  3:05 PM     Author: Mary Reina CMA Service: -- Author Type: Certified Medical Assistant    Filed: 4/23/2019  3:15 PM Encounter Date: 4/23/2019 Status: Signed    : Mary Reina CMA (Certified Medical Assistant)       Medication: Adderall  Last Date Filled 3/28/2019   pulled: YES         Only PCP Prescribing? : YES  Taken as prescribed from physician notes? YES- 11/23/2018- 4. Traumatic brain injury with loss of consciousness, sequela (H)  Discussed surreptitious use of roommates Adderall.  Formal trial of Adderall with follow-up through psychiatry in 2 weeks  - dextroamphetamine-amphetamine (ADDERALL XR) 10 MG 24 hr capsule; Take 1 capsule (10 mg total) by mouth daily.  Dispense: 10 capsule; Refill: 0    CSA in last year: NO  Random Utox in last year: YES- DUE 4/30/2019  (if any of the above answer NO - schedule with PCP)     Opioids + benzodiazepines? YES  (if the above answer YES - schedule with PCP every 6 months)     Is patient on the Executive Review Team? NO      All responses suggest: Refilling prescription with start date of 4/25/2019 per CSA

## 2021-06-16 NOTE — PROGRESS NOTES
"Assessment/Plan:     Problem List Items Addressed This Visit     AC (acromioclavicular) arthritis (Chronic)    Relevant Medications    diazePAM (VALIUM) 5 MG tablet (Start on 5/10/2021)    oxyCODONE myristate (XTAMPZA ER) 18 mg CSpT    Chronic pain syndrome (Chronic)    Relevant Medications    gabapentin (NEURONTIN) 600 MG tablet    Panic attacks (Chronic)    Relevant Medications    hydrOXYzine pamoate (VISTARIL) 50 MG capsule    Chronic, continuous use of opioids (Chronic)    Relevant Medications    acetaminophen (TYLENOL) 650 MG CR tablet    S/P lumbar laminectomy-L3 to S1    Relevant Medications    acetaminophen (TYLENOL) 650 MG CR tablet    History of cervical abmbmjbqiy-T2-V8, repeat C3-C5    Relevant Medications    acetaminophen (TYLENOL) 650 MG CR tablet            No follow-ups on file.    Patient Instructions   PLAN:  Increase gabapentin to 600 mg 4 times a day.    Increase hydroxyzine to 50 mg 4 times a day.    We will increase the Xtampza ER 18 mg every 4 hours or 6 times a day.  Continue this increase until your surgery.    Call if not covered by insurance, discussed may add in some morphine with the extensive if needed.    Follow-up with Dr. Cramer in 4 weeks        Subjective:       51 y.o. male followed for cervical lumbar laminectomies, brain injury, comorbid anxiety disorder, history of shoulder surgery.    Reviews today plan for surgery in a month, told his L4-5 disc is \"destroyed\" with nerve is pinched.  Anticipated fusion.  He describes Dr. Eber bennett and staff say \"how are you walking\" with concern for impingement.  He acknowledges having significant radicular symptoms.    He has recently had epidural steroid injections which helped the pain radiating to his shoulders.  Lumbar injection did not help.    Reviewed frequent indication as his new insurance did not cover OxyContin.  He did prefer stands up.  Over the last 4 days he has been using the 18 mg, no change dose only last 3-1/2 to 4 " hours, whereas the OxyContin 10 the last 6 hours.  Graph reviews taking morphine in the past.    Has been using gabapentin 300 mg 4 times a day has been on much higher doses.    Recalls hydroxyzine in the past was helpful presently taking 25 mg 4 times a day but did better at 50 mg 4 times a day.    He continues on the Namenda and the baclofen.    Reviews the [place] of the agents were associated with his falls with more cognitive changes while in the hospital.     is reviewed, as expected.  Urine drug test January.      Current Outpatient Medications:      albuterol (PROAIR HFA;PROVENTIL HFA;VENTOLIN HFA) 90 mcg/actuation inhaler, INHALE 2 PUFFS BY MOUTH EVERY 6 HOURS AS NEEDED FOR WHEEZING, Disp: 3 Inhaler, Rfl: 3     allopurinoL (ZYLOPRIM) 300 MG tablet, Take 1 tablet (300 mg total) by mouth 2 (two) times a day., Disp: 180 tablet, Rfl: 3     ammonium lactate (LAC-HYDRIN) 12 % lotion, Apply topically., Disp: , Rfl:      baclofen (LIORESAL) 5 mg tablet, Take 1 tablet (5 mg total) by mouth 3 (three) times a day., Disp: 90 tablet, Rfl: 11     calcium citrate (CALCITRATE) 200 mg (950 mg) tablet, Take 5 tablets (1,000 mg total) by mouth 2 (two) times a day., Disp: 180 tablet, Rfl: 3     dextroamphetamine-amphetamine (ADDERALL) 15 mg Tab, Take 15 mg by mouth daily., Disp: 30 tablet, Rfl: 0     ergocalciferol (ERGOCALCIFEROL) 1,250 mcg (50,000 unit) capsule, Take 1 capsule (50,000 Units total) by mouth every 7 days., Disp: 4 capsule, Rfl: 0     escitalopram oxalate (LEXAPRO) 10 MG tablet, Take 10 mg by mouth daily., Disp: , Rfl:      fexofenadine (ALLEGRA) 180 MG tablet, Take 180 mg by mouth daily as needed., Disp: , Rfl:      hydroCHLOROthiazide (HYDRODIURIL) 25 MG tablet, Take 1 tablet (25 mg total) by mouth 2 (two) times a day at 9am and 6pm., Disp: 60 tablet, Rfl: 11     lamoTRIgine (LAMICTAL) 150 MG tablet, Take 1 tablet (150 mg total) by mouth 2 (two) times a day., Disp: 60 tablet, Rfl: 11     lansoprazole  (PREVACID) 30 MG capsule, Take 1 capsule (30 mg total) by mouth daily., Disp: 30 capsule, Rfl: 1     LATUDA 40 mg Tab tablet, , Disp: , Rfl:      lidocaine (LIDODERM) 5 %, Apply 1 patch topically to painful area of skin once daily and remove per schedule., Disp: 30 patch, Rfl: 4     linaCLOtide (LINZESS) 145 mcg cap capsule, Take 1 capsule (145 mcg total) by mouth daily., Disp: 30 capsule, Rfl: 11     lithium 300 MG capsule, Take 600 mg by mouth 2 (two) times a day., Disp: , Rfl: 1     medical cannabis (Patient's own supply), by Other route see administration instructions. Take as instructed by the medical cannabis dispensary., Disp: , Rfl: 0     memantine (NAMENDA) 10 MG tablet, Take 1 tablet (10 mg total) by mouth 2 (two) times a day., Disp: 60 tablet, Rfl: 11     miconazole nitrate (CRITIC-AID AF) 2 % Oint ointment, Apply topically., Disp: , Rfl:      midazolam (VERSED) 1 mg/mL Soln, Infuse 0.5-6 mg into a venous catheter., Disp: , Rfl:      miscellaneous medical supply Misc, Wheel chair, Disp: , Rfl:      miscellaneous medical supply Misc, Hospital bed with mattress and 1/2 rails. Semi-electric bed. Length of need 99 months. Bed extender:no. Group 1 mattress, Disp: , Rfl:      montelukast (SINGULAIR) 10 mg tablet, Take 1 tablet by mouth daily., Disp: , Rfl:      naloxone (NARCAN) 4 mg/actuation nasal spray, 1 spray (4 mg dose) into one nostril for opioid reversal. Call 911. May repeat if no response in 3 minutes., Disp: 1 Box, Rfl: 0     oxyCODONE myristate (XTAMPZA ER) 18 mg CSpT, Take 18 mg by mouth 6 (six) times a day., Disp: 84 each, Rfl: 0     phenytoin (DILANTIN KAPSEAL) 100 MG ER capsule, Take 1 capsule (100 mg total) by mouth 2 (two) times a day., Disp: 60 capsule, Rfl: 11     polyethylene glycol (GLYCOLAX) 17 gram/dose powder, Take 17 g by mouth daily., Disp: 595 g, Rfl: 11     QUEtiapine (SEROQUEL) 200 MG tablet, Take 250 mg by mouth at bedtime. , Disp: , Rfl:      QUEtiapine (SEROQUEL) 50 MG tablet,  "Take 1 tablet (50 mg total) by mouth 4 (four) times a day., Disp: 120 tablet, Rfl: 0     senna-docusate (PERICOLACE) 8.6-50 mg tablet, Take 2 tablets by mouth 2 (two) times a day., Disp: 180 tablet, Rfl: 3     acetaminophen (TYLENOL) 650 MG CR tablet, Take 3 tablets (1,950 mg total) by mouth 2 (two) times a day., Disp: 540 tablet, Rfl: 3     [START ON 5/10/2021] diazePAM (VALIUM) 5 MG tablet, Take 1 tablet (5 mg total) by mouth 2 (two) times a day., Disp: 60 tablet, Rfl: 2     gabapentin (NEURONTIN) 600 MG tablet, Take 1 tablet (600 mg total) by mouth 4 (four) times a day., Disp: 120 tablet, Rfl: 2     hydrOXYzine pamoate (VISTARIL) 50 MG capsule, Take 1 capsule (50 mg total) by mouth 4 (four) times a day., Disp: 120 capsule, Rfl: 1     metoprolol succinate (TOPROL-XL) 100 MG 24 hr tablet, Take 1 tablet (100 mg total) by mouth daily., Disp: 90 tablet, Rfl: 3     oxyCODONE (OXYCONTIN) 20 mg 12 hr tablet, Take 1 tablet (20 mg total) by mouth 4 (four) times a day., Disp: 112 each, Rfl: 0    Current Facility-Administered Medications:      cyanocobalamin injection 1,000 mcg, 1,000 mcg, Intramuscular, Q30 Days, Devon Lund MD, 1,000 mcg at 11/13/20 1538  Is alert with a clear sensorium.  Has a very organized notebook.  Ambulates with 4 wheeled walker.         Objective:     Vitals:    04/19/21 1100   BP: 120/68   Pulse: 80   Weight: 212 lb (96.2 kg)   Height: 5' 7\" (1.702 m)   PainSc:   8   PainLoc: Neck       Discussed strategies to help manage neuropathic pain until his surgery.    We will increase the Xtampza ER to see if that gives better coverage.  If the insurance does not cover may need to add in some lower dose oxycodone or possibly morphine.    Total time more than 25 minutes.            This note has been dictated using voice recognition software. Any grammatical or context distortions are unintentional and inherent to the software  "

## 2021-06-16 NOTE — PROGRESS NOTES
[facility] Follow Up Note    Bola Lyon   48 y.o. male    Date of Visit: 3/5/2018    Chief Complaint   Patient presents with     Follow-up     medications     Subjective  Bola comes in today due to ongoing issues with pain.  He tells me he recently had an EMG showing cervical radiculopathy at C8 and T1.  He has had known other cervical disc disease as well as entrapment of the right ulnar and median nerves.  He also has underlying shoulder pathology.  He continues to require high doses of narcotics in order to function throughout the day.  He has been unable to afford medical cannabis.  He continues to work with both the brain injury center as well as psychiatrist due to underlying TBI, PTSD and bipolar disorder.    ROS A comprehensive review of systems was performed and was otherwise negative    Social History:   Social History     Social History Narrative    He is .  He has been a  and also a counselor, and worked with [employer].  He smokes and does not drink alcohol.       Medications were reconciled.  Allergies, social and family history, and the problem list were all reviewed and updated.      Exam  General Appearance: Pleasant and alert  Vitals:    03/05/18 1303   BP: 106/70   Patient Site: Left Arm   Patient Position: Sitting   Cuff Size: Adult Large   Pulse: 71   SpO2: 96%   Weight: (!) 227 lb 4 oz (103.1 kg)      Body mass index is 34.55 kg/(m^2).  Wt Readings from Last 3 Encounters:   03/05/18 (!) 227 lb 4 oz (103.1 kg)   02/05/18 (!) 229 lb (103.9 kg)   01/08/18 (!) 233 lb 14.4 oz (106.1 kg)     HEENT: Sclera are clear.   Lungs: Normal respirations  Abdomen: Soft and nondistended  Extremities: No edema  Skin: No rashes  Neuro: Moves all extremities and has facial symmetry  Gait: Ambulates with a normal gait    Assessment/Plan  1. Cervical radiculopathy at C8  Continues to follow-up with orthopedics.    2. C7 radiculopathy  As  above.    3. Chronic pain syndrome  Oxycodone and long-acting OxyContin are both renewed, he had a urine toxicology test done last August, controlled substance agreement is up-to-date,  is reviewed.  He is being followed by multiple specialists and therapists he is unable to afford medical cannabis and has exhausted all conservative measures to control his pain.  - oxyCODONE (OXYCONTIN) 15 mg 12 hr tablet; Take 1 tablet (15 mg total) by mouth every 12 (twelve) hours.  Dispense: 60 tablet; Refill: 0    4. Traumatic brain injury with loss of consciousness, sequela  Follows up with [facility] soon.    5. Bipolar 2 disorder  Sees a psychiatrist regularly.          Abena Gentile MD  Internal and Geriatric Medicine      Much or all of the text in this note was generated through the use of Dragon Dictate voice-to-text software. Errors in spelling or words which seem out of context are unintentional. Sound alike errors, in particular, may have escaped editing.

## 2021-06-16 NOTE — TELEPHONE ENCOUNTER
Telephone Encounter by Ann Walter RN at 8/14/2019  9:13 AM     Author: Ann Walter RN Service: -- Author Type: Registered Nurse    Filed: 8/14/2019  9:48 AM Encounter Date: 8/14/2019 Status: Signed    : Ann Wlater RN (Registered Nurse)       Medication being requested: Ketamine, & Oxy  Last visit date: 6/10   Provider: Shoaib  Next visit date: 9/26.  Provider: Shoaib  Expected follow up: 8 weeks  MTM visit (Pain Center) date: na  UDT date: 11/20/18  Agreement date: 11/20/18   snipped in:        Pertinent between visit information about requested medication (telephone, mychart, prior authorization, concerns, comments): cxc'd appointment 8/5. [facility] on 8/9 with abdominal pain  Script being sent to provider by nurse- dates and quantity:   Requested Prescriptions     Pending Prescriptions Disp Refills   ? ketamine HCl (KETAMINE, BULK,) 100 % Powd 120 Bottle 2     Sig: Ketamine 120 mg amarjit, dissolve one amarjit under the tongue up to 11 times daily prn pain   ? oxyCODONE (ROXICODONE) 5 MG immediate release tablet 100 tablet 0     Sig: Two tabs morning, one mid-day, two bedtime     Pharmacy cued: [facility]  Standing orders for withdrawal protocol implemented: balaji

## 2021-06-16 NOTE — TELEPHONE ENCOUNTER
Telephone Encounter by Trupti Sandhu, RN at 9/5/2019 12:00 PM     Author: Trupti Sandhu, RN Service: -- Author Type: Registered Nurse    Filed: 9/5/2019 12:09 PM Encounter Date: 9/5/2019 Status: Signed    : Trupti Sandhu RN (Registered Nurse)       Calling for refill of Oxycodone.    Last appt 6/10,  Next appt 9/26.   cx appt 8/5.    Per

## 2021-06-16 NOTE — TELEPHONE ENCOUNTER
Telephone Encounter by Mary Reina CMA at 2/27/2019 12:01 PM     Author: Mary Reina CMA Service: -- Author Type: Certified Medical Assistant    Filed: 2/27/2019 12:06 PM Encounter Date: 2/27/2019 Status: Signed    : Mary Reina CMA (Certified Medical Assistant)       Medication: Adderall  Last Date Filled 1/23/2019   pulled: YES     Only PCP Prescribing? : YES  Taken as prescribed from physician notes? YES- 1/23/2019- 1. Chronic pain syndrome  Reviewed adjustments.  Improved symptoms with increased topiramate.  Trial of further increase in topiramate.  Improved symptoms with B12 shot.  Continue B12 shot.  Narcotics through pain clinic and Dr. Cramer.  It should be noted that he used to receive 210 oxycodone per month.  He is now receiving 106 every 2-3 weeks and on January 9 received 84 tablets.  He continues to take OxyContin 15 mg twice daily in the background.  Continue low-dose prednisone for at least one more month    CSA in last year: YES  Random Utox in last year: YES due 4/30/2019     Opioids + benzodiazepines? YES  (if the above answer YES - schedule with PCP every 6 months)     All responses suggest: Refilling prescription

## 2021-06-16 NOTE — TELEPHONE ENCOUNTER
Telephone Encounter by Trupti Sandhu RN at 7/1/2019  9:31 AM     Author: Trupti Sandhu RN Service: -- Author Type: Registered Nurse    Filed: 7/1/2019  9:44 AM Encounter Date: 6/21/2019 Status: Signed    : Trupti Sandhu RN (Registered Nurse)       Patient left 2 messages after hours totaling 6 min. Has had a hectic week. Wants Dr To know that the increase strength of the Ketamine does seem to work. Long explanation of need for refills of Oxycodone, and Oxycontin.    Per        Cued refill for appropriate dates.

## 2021-06-16 NOTE — TELEPHONE ENCOUNTER
Telephone Encounter by Agustina Noriega at 5/2/2019  3:08 PM     Author: Agustina Noriega Service: -- Author Type: --    Filed: 5/2/2019  3:09 PM Encounter Date: 4/30/2019 Status: Signed    : Agustina Noriega APPROVED:    Approval start date:02/01/2019  Approval end date:10/01/2019    Pharmacy has been notified of approval and will contact patient when medication is ready for pickup.

## 2021-06-16 NOTE — TELEPHONE ENCOUNTER
Duplicate request, please see the telephone encounter dated 04/13/21. The medication was recently submitted for authorization and unfortunately denied. The provider can appeal or change to the formulary coverage item. If the provider/patient would like to appeal, please see that encounter as it will contain information in regards to the appeal process.

## 2021-06-16 NOTE — PATIENT INSTRUCTIONS - HE
PLAN:  Increase gabapentin to 600 mg 4 times a day.    Increase hydroxyzine to 50 mg 4 times a day.    We will increase the Xtampza ER 18 mg every 4 hours or 6 times a day.  Continue this increase until your surgery.    Call if not covered by insurance, discussed may add in some morphine with the extensive if needed.    Follow-up with Dr. Cramer in 4 weeks

## 2021-06-16 NOTE — TELEPHONE ENCOUNTER
Central PA team  [phone]  Pool: HE PA MED ([id number])          PA has been initiated.       PA form completed and faxed insurance via Cover My Meds     Noriega:  RIAZ GARZA (Noriega: [id number])     Medication:  hydrOXYzine pamoate (VISTARIL) 50 MG capsule    Insurance:  [place] [employer]        Response will be received via fax and may take up to 5-10 business days depending on plan

## 2021-06-16 NOTE — TELEPHONE ENCOUNTER
Patient is calling, voicemail asking the status of the PA, he is currently out of pain medication.

## 2021-06-16 NOTE — TELEPHONE ENCOUNTER
Telephone Encounter by Brittany Aranda RN at 3/27/2020  3:58 PM     Author: Brittany Aranda RN Service: -- Author Type: Registered Nurse    Filed: 3/27/2020  4:01 PM Encounter Date: 3/13/2020 Status: Signed    : Brittany Aranda RN (Registered Nurse)       No significant encounters since last template completed.  Requested Prescriptions     Pending Prescriptions Disp Refills   ? oxyCODONE (ROXICODONE) 10 mg immediate release tablet 112 tablet 0     Sig: Take 2 tablets (20 mg total) by mouth 4 (four) times a day for 14 days.     Signed Prescriptions Disp Refills   ? oxyCODONE (ROXICODONE) 10 mg immediate release tablet 112 tablet 0     Sig: Take 2 tablets (20 mg total) by mouth 4 (four) times a day for 14 days.     Authorizing Provider: JAYME MULLEN   ? ketamine (KETALAR) 150 mg/mL nasal spray 60 mL 3     Sig: 3 sprays into each nostril every 4 (four) hours as needed.     Authorizing Provider: JAYME MULLEN

## 2021-06-16 NOTE — TELEPHONE ENCOUNTER
Telephone Encounter by Brittany Aranda RN at 10/10/2019  3:19 PM     Author: Brittany Aranda RN Service: -- Author Type: Registered Nurse    Filed: 10/10/2019  3:29 PM Encounter Date: 10/10/2019 Status: Signed    : Brittany Aranda RN (Registered Nurse)       Medication being requested: oxycodone 5 mg and oxycontin 10 mg 12 hr  Last visit date: 9/26  Provider: BE  Next visit date: 11/22.  Provider: BE  Expected follow up: 8 weeks  MTM visit (Pain Center) date: no  UDS and CSA - 11/20/18   snipped in:      Pertinent between visit information about requested medication (telephone, mychart, prior authorization, concerns, comments):   Script being sent to provider by nurse- dates and quantity: quantities and fill dates adjusted to match up refills for future fills  Requested Prescriptions     Pending Prescriptions Disp Refills   ? oxyCODONE (OXYCONTIN) 10 mg 12 hr tablet 42 each 0     Sig: Take 1 tablet (10 mg total) by mouth 3 (three) times a day for 14 days.   ? oxyCODONE (ROXICODONE) 5 MG immediate release tablet 80 tablet 0     Sig: Two tabs morning, one mid-day, two bedtime     [facility]  Standing orders for withdrawal protocol implemented: na

## 2021-06-17 NOTE — PROGRESS NOTES
[facility]ventional Ne[facility]oradiology Pre-Sedation Assessment    Bola Lyon is a 48 year old male who presents today for C8 nerve root injection.    HPI: He suffers from PTSD, bipolar disorder and TBI. 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 Previous C7 GALLITO in December 2017. Seen by spine and recommended to have C8 nerve root injection and presents today for this procedure.    History and Physical Reviewed: yes     Past Medical History:   Diagnosis Date     AC (acromioclavicular) arthritis 10/24/2011     Aftercare following surgery of the musculoskeletal system 4/25/2012     Anxiety Disorder NOS     Created by Conversion      Bipolar 2 disorder      Cervical Radiculopathy     Created by Conversion      Claustrophobia      Disturbance of skin sensation 11/30/2011     Encounter for chronic pain management 2/11/2015     GERD (gastroesophageal reflux disease)      Hypertension     Created by Conversion      Impingement Of The Right Shoulder     Created by Conversion       Lymphedema 02/11/2015    left     Pain in joint, shoulder region 10/10/2011     Panic attacks      PTSD (post-traumatic stress disorder)      TBI (traumatic brain injury)     post concussion syndrome       Past Surgical History:   Procedure Laterality Date     ANTERIOR / POSTERIOR COMBINED FUSION CERVICAL SPINE  2013, redo in 2014    4 levels     HAND SURGERY Left 1997     HERNIA REPAIR Left 2006     HERNIA REPAIR Right 2008    and middle     KNEE ARTHROSCOPY  2014     NASAL POLYP SURGERY  2014    and septal repair, Dr. Arcos     RI ARTHRODESIS,ANKLE,OPEN Right 2007    Ankle fusion     RI SHLDR ARTHROSCOP,SURG,W/ROTAT CUFF REPR Right 11/25/2015    Procedure: RIGHT SHOULDER ARTHROSCOPY, ROTATOR CUFF REPAIR, DECOMPRESSION, DEBRIDEMENT, DISTAL CLAVICLE EXCISION;  Surgeon: Pilo Bruce MD;  Location:  "[facility];  Service: Orthopedics     REPLACEMENT TOTAL KNEE Left 03/20/2017     SHOULDER ARTHROSCOPY DISTAL CLAVICLE EXCISION AND OPEN ROTATOR CUFF REPAIR Bilateral 2005 and 2013     VENTRAL HERNIA REPAIR  2008       Allergies:  No Known Allergies    Exam:   /75 (Patient Position: Sitting)  Pulse 73  Temp 99.4  F (37.4  C) (Oral)   Resp 18  Ht 5' 8\" (1.727 m)  Wt (!) 229 lb 1.6 oz (103.9 kg)  SpO2 97%  BMI 34.83 kg/m2  General: awake, resting in bed, expresses concern for claustrophobia   Neuro: alert and oriented, speech is clear, moves all extremities, reports numbness and tingling on right elbow and fingers  Cardiac: regular rate  Lungs: clear  Mallampati score: Class 1. Full visibility of tonsils, uvula, and soft palate  ASA: ASA III A patient with severe systemic disease    Previous reaction to sedation/anesthesia: no  Sedation based on assessment: moderate  NPO since: midnight       Labs:   Lab Results   Component Value Date    HGB 13.0 (L) 04/05/2018     Lab Results   Component Value Date     04/05/2018     Lab Results   Component Value Date    INR 0.89 (L) 04/05/2018    INR 0.96 12/07/2017       Impression: Patient is adequately NPO and medically stable to proceed with planned procedure using moderate intravenous sedation.    Plan: C8 nerve root injection    The procedure its risks, benefits, and alternatives including but not limited to bleeding, infection, medication reaction, spinal nerve or vessel injury were discussed with the patient. Questions answered and the patient gives consent to proceed.      Mariam Baca CNP        "

## 2021-06-17 NOTE — TELEPHONE ENCOUNTER
Telephone Encounter by Denilson Rios at 4/14/2021  9:50 AM     Author: Denilson Rios Service: -- Author Type: Patient Access    Filed: 4/14/2021 10:00 AM Encounter Date: 4/13/2021 Status: Signed    : Denilson Rios (Patient Access)       PRIOR AUTHORIZATION DENIED    Denial Rational: The patient needs to have tried/failed 1 additional preferred medication: Xtampza ER or have medical necessity as to why it is not appropriate for the patient.        Appeal Information: If the provider would like to appeal this denial, please provide a letter of medical necessity and once it has been completed and placed in the patient's chart, notify the Central PA Team ([facility] MED [id number]) and the appeal can be initiated on behalf of the patient and provider.  Please also include any therapies that the patient has tried and their outcomes.    Has the patient tried and failed Xtampza ER    Or is the preferred medication: Xtampza ER not an appropriate therapy choice due another reason, please provide medical clarification as to why not

## 2021-06-17 NOTE — TELEPHONE ENCOUNTER
Telephone Encounter by Tejal Hernandez LPN at 4/5/2021  2:10 PM     Author: Tejal Hernandez LPN Service: -- Author Type: Licensed Nurse    Filed: 4/5/2021  2:53 PM Encounter Date: 4/5/2021 Status: Signed    : Tejal Hernandez LPN (Licensed Nurse)       Requested Prescriptions     Pending Prescriptions Disp Refills   ? dextroamphetamine-amphetamine (ADDERALL) 15 mg Tab 30 tablet 0     Sig: Take 15 mg by mouth daily.     Medication: Adderall  Last Date Filled 3/10/21   pulled: YES            Only PCP Prescribing? : YES  Taken as prescribed from physician notes? YES    CSA in last year: NO  Random Utox in last year: YES  (if any of the above answer NO - schedule with PCP)     Opioids + benzodiazepines? YES  (if the above answer YES - schedule with PCP every 6 months)     Is patient on the Executive Review Team? NO        All responses suggest: PCP to review

## 2021-06-17 NOTE — PROCEDURES
[facility] Radiology Post Procedure    Procedure: C8 nerve root injection    Radiologist: Kevon Warner    Contrast: 1 ml omnipaque 180  Fluoro Time: 7.2 minutes  Air Kerma: 692 mGy    Medications:  Versed 4 mg IV                        Fentanyl 200 mcg IV  Sedation Time: 30 minutes    EBL: minimal  Complications: none     Preliminary findings: (see dictation for full detail)  Technically successful C8 nerve root injection  10 mg dexamethasone and 1 cc 1% lidocaine injected    Assess/Plan:   Routine post procedure monitoring   Bedrest for 2 hours   Discharge when meets criteria    Kevon Warner

## 2021-06-17 NOTE — PROGRESS NOTES
ASSESSMENT:  1. Lymphedema of both lower extremities  Challenge diagnosis.  Suspicious this is fluid retention effects of gabapentin.  Hydrochlorothiazide ineffective.  Increase intensity of diuresis through surgery  - furosemide (LASIX) 40 MG tablet; Take 1 tablet (40 mg total) by mouth 2 (two) times a day.  Dispense: 60 tablet; Refill: 11  - potassium chloride (K-DUR,KLOR-CON) 20 MEQ tablet; Take 1 tablet (20 mEq total) by mouth 2 (two) times a day.  Dispense: 60 tablet; Refill: 11    2. Chronic pain syndrome  Stable on narcotics.  Gabapentin increased by pain clinic.  Recertified for medical cannabis today    3. Bipolar 2 disorder (H)  Stable mood    4. PTSD (post-traumatic stress disorder)  Stable.  Recertify for medical cannabis    5. S/P lumbar laminectomy-L3 to S1  Due for updated MRI and back surgery in the next month or 2    6. Lymphedema of left leg  Chronic left leg lymphedema understandable.  Right leg due to gabapentin  - furosemide (LASIX) 40 MG tablet; Take 1 tablet (40 mg total) by mouth 2 (two) times a day.  Dispense: 60 tablet; Refill: 11  - potassium chloride (K-DUR,KLOR-CON) 20 MEQ tablet; Take 1 tablet (20 mEq total) by mouth 2 (two) times a day.  Dispense: 60 tablet; Refill: 11    Preventive Health Care:      PLAN:  Patient Instructions   Stop hydrochlorothiazide twice daily, since it is too weak    Furosemide 40 mgs twice a day to eliminate swelling, much stronger    Potassium supplement twice a day with each dose of Furosemide    recertify for medical cannabis    You have gained 20 lbs since December since the Gabapentin was increased    Goal weight 197    When you reach 200, decrease to once a day, both furosemide and potassium            Administrations This Visit     cyanocobalamin injection 1,000 mcg     Admin Date  04/30/2021 Action  Given Dose  1,000 mcg Route  Intramuscular Administered By  Tejal Hernandez LPN              Return in about 4 weeks (around 5/28/2021) for a phone/video  "follow up visit.    CHIEF COMPLAINT:  Chief Complaint   Patient presents with     Follow-up     Needs Rec ert of Medical Cannabis     Edema       HISTORY OF PRESENT ILLNESS:  Bola is a 51 y.o. male presenting to the clinic today complaining of edema.  When last seen weight increased several pounds.  He was 196 pounds in December when he was taking gabapentin 600 mg daily.  Since then gabapentin has been increased to 2400 mg daily and weight has increased to current 216 pounds.  He urinates once per night.  He feels bloated in the mid abdomen    Severe back pain will be operated on in the next few months after an updated MRI    REVIEW OF SYSTEMS:   No trouble breathing    PFSH:  Social History     Social History Narrative    He is .  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  He smokes and does not drink alcohol.  He is now on disability due to his brain injury and bipolar disease.     Currently in assisted living    TOBACCO USE:  Social History     Tobacco Use   Smoking Status Current Some Day Smoker     Packs/day: 0.50     Years: 11.00     Pack years: 5.50     Types: Cigarettes     Start date: 2009     Last attempt to quit: 2017     Years since quittin.1   Smokeless Tobacco Former User   Tobacco Comment    0.5 pack per day       VITALS:  Vitals:    21 1449   BP: 116/68   Patient Site: Left Arm   Patient Position: Sitting   Cuff Size: Adult Large   Pulse: 74   Resp: 20   SpO2: 97%   Weight: 216 lb 4 oz (98.1 kg)   Height: 5' 7\" (1.702 m)     Wt Readings from Last 3 Encounters:   21 216 lb 4 oz (98.1 kg)   21 212 lb (96.2 kg)   21 206 lb (93.4 kg)     Body mass index is 33.87 kg/m .    PHYSICAL EXAM:  Bloated abdomen.  2+ edema both legs    MEDICATIONS:  Current Outpatient Medications   Medication Sig Dispense Refill     acetaminophen (TYLENOL) 650 MG CR tablet Take 3 tablets (1,950 mg total) by mouth 2 " (two) times a day. 540 tablet 3     albuterol (PROAIR HFA;PROVENTIL HFA;VENTOLIN HFA) 90 mcg/actuation inhaler INHALE 2 PUFFS BY MOUTH EVERY 6 HOURS AS NEEDED FOR WHEEZING 3 Inhaler 3     allopurinoL (ZYLOPRIM) 300 MG tablet Take 1 tablet (300 mg total) by mouth 2 (two) times a day. 180 tablet 3     ammonium lactate (LAC-HYDRIN) 12 % lotion Apply topically.       baclofen (LIORESAL) 5 mg tablet Take 1 tablet (5 mg total) by mouth 3 (three) times a day. 90 tablet 11     calcium citrate (CALCITRATE) 200 mg (950 mg) tablet Take 5 tablets (1,000 mg total) by mouth 2 (two) times a day. 180 tablet 3     dextroamphetamine-amphetamine (ADDERALL) 15 mg Tab Take 15 mg by mouth daily. 30 tablet 0     [START ON 5/10/2021] diazePAM (VALIUM) 5 MG tablet Take 1 tablet (5 mg total) by mouth 2 (two) times a day. 60 tablet 2     ergocalciferol (ERGOCALCIFEROL) 1,250 mcg (50,000 unit) capsule Take 1 capsule (50,000 Units total) by mouth every 7 days. 4 capsule 0     escitalopram oxalate (LEXAPRO) 10 MG tablet Take 10 mg by mouth daily.       fexofenadine (ALLEGRA) 180 MG tablet Take 180 mg by mouth daily as needed.       gabapentin (NEURONTIN) 600 MG tablet Take 1 tablet (600 mg total) by mouth 4 (four) times a day. 120 tablet 2     hydroCHLOROthiazide (HYDRODIURIL) 25 MG tablet Take 1 tablet (25 mg total) by mouth 2 (two) times a day at 9am and 6pm. 60 tablet 11     hydrOXYzine pamoate (VISTARIL) 50 MG capsule Take 1 capsule (50 mg total) by mouth 4 (four) times a day. 120 capsule 1     lamoTRIgine (LAMICTAL) 150 MG tablet Take 1 tablet (150 mg total) by mouth 2 (two) times a day. 60 tablet 11     lansoprazole (PREVACID) 30 MG capsule Take 1 capsule (30 mg total) by mouth daily. 30 capsule 1     LATUDA 40 mg Tab tablet        lidocaine (LIDODERM) 5 % Apply 1 patch topically to painful area of skin once daily and remove per schedule. 30 patch 4     linaCLOtide (LINZESS) 145 mcg cap capsule Take 1 capsule (145 mcg total) by mouth  daily. 30 capsule 11     lithium 300 MG capsule Take 600 mg by mouth 2 (two) times a day.  1     medical cannabis (Patient's own supply) by Other route see administration instructions. Take as instructed by the medical cannabis dispensary.  0     memantine (NAMENDA) 10 MG tablet Take 1 tablet (10 mg total) by mouth 2 (two) times a day. 60 tablet 11     miconazole nitrate (CRITIC-AID AF) 2 % Oint ointment Apply topically.       midazolam (VERSED) 1 mg/mL Soln Infuse 0.5-6 mg into a venous catheter.       miscellaneous medical supply Misc Wheel chair       miscellaneous medical supply [facility] bed with mattress and 1/2 rails. Semi-electric bed. Length of need 99 months. Bed extender:no. Group 1 mattress       montelukast (SINGULAIR) 10 mg tablet Take 1 tablet by mouth daily.       naloxone (NARCAN) 4 mg/actuation nasal spray 1 spray (4 mg dose) into one nostril for opioid reversal. Call 911. May repeat if no response in 3 minutes. 1 Box 0     oxyCODONE (OXYCONTIN) 20 mg 12 hr tablet Take 1 tablet (20 mg total) by mouth 4 (four) times a day. 112 each 0     oxyCODONE myristate (XTAMPZA ER) 18 mg CSpT Take 18 mg by mouth 6 (six) times a day. 84 each 0     phenytoin (DILANTIN KAPSEAL) 100 MG ER capsule Take 1 capsule (100 mg total) by mouth 2 (two) times a day. 60 capsule 11     polyethylene glycol (GLYCOLAX) 17 gram/dose powder Take 17 g by mouth daily. 595 g 11     QUEtiapine (SEROQUEL) 200 MG tablet Take 250 mg by mouth at bedtime.        QUEtiapine (SEROQUEL) 50 MG tablet Take 1 tablet (50 mg total) by mouth 4 (four) times a day. 120 tablet 0     senna-docusate (PERICOLACE) 8.6-50 mg tablet Take 2 tablets by mouth 2 (two) times a day. 180 tablet 3     furosemide (LASIX) 40 MG tablet Take 1 tablet (40 mg total) by mouth 2 (two) times a day. 60 tablet 11     metoprolol succinate (TOPROL-XL) 100 MG 24 hr tablet Take 1 tablet (100 mg total) by mouth daily. 90 tablet 3     potassium chloride (K-DUR,KLOR-CON) 20 MEQ  tablet Take 1 tablet (20 mEq total) by mouth 2 (two) times a day. 60 tablet 11     Current Facility-Administered Medications   Medication Dose Route Frequency Provider Last Rate Last Admin     cyanocobalamin injection 1,000 mcg  1,000 mcg Intramuscular Q30 Days Devon Lund MD   1,000 mcg at 04/30/21 1529         Notes summarized: Pain clinic  Labs, x-rays, cardiology, GI tests reviewed: Up-to-date  New orders: No orders of the defined types were placed in this encounter.      Independent review of:  Supplemental history by:      Start time:  2:56 PM  End time:  3:25 PM  Face to face plus orders:  29 minutes  Documentation time: 3 minutes    The visit lasted a total of 32 minutes       Devon Lund MD

## 2021-06-17 NOTE — TELEPHONE ENCOUNTER
Jan has been taking Lasix since 4/30/21. Pt was instructed to call back for update. Pt states he needs to increase. It is not having the effects that Dr Lund said should happen. Please call pt with any further questions. If increase is needed, please send new prescription to pts pharmacy.

## 2021-06-17 NOTE — TELEPHONE ENCOUNTER
Reason for Call:  Other call back      Detailed comments: Pt facility requesting a rx for:  Tyelonol  Pt has a fever, aches, headache    Pharm:  [facility] in [place]  Phone:  [phone]  Fax:  [phone]    Phone Number Patient can be reached at: Other phone number:  [phone]    Best Time: anytime    Can we leave a detailed message on this number?: Yes    Call taken on 5/25/2021 at 9:32 AM by Helen Bowen

## 2021-06-17 NOTE — TELEPHONE ENCOUNTER
For Bola Nixonez 1970.    Gabapentin 600 mg tablets 1 3 times a day for 4 days, 1 twice a day for 4 days, 1 daily for days and stop.    Begin now Lyrica 100 mg twice a day, call after 7 days to discuss possibly increasing dose.        Dmitriy Cramer MD

## 2021-06-17 NOTE — PROGRESS NOTES
[facility] Clinic Follow Up Note    Bola Lyon   48 y.o. male    Date of Visit: 4/30/2018    Chief Complaint   Patient presents with     Follow-up     1 mo. med check     Subjective  Bola comes in today to discuss his ongoing chronic pain issues.  He finally has his disability that has gone through and so he can afford to potentially pay for medical cannabis and needs me to update his certification.  He is in the process of filling out paperwork in order to hopefully become a patient at the pain clinic with Dr. Dmitriy Cramer to discuss other pain management options as he would like to get off of chronic opioids.  He recently had a cervical injection done which he thinks was marginally helpful for his C8 radiculopathy.  He continues to work with his psychiatrist and has recently been tried on some Adderall which has helped him to focus.  He is also working with Dr. Almanzar in the brain injury clinic.    ROS A comprehensive review of systems was performed and was otherwise negative.    Social History     Social History Narrative    He is .  He has been a  and also a counselor, and worked with [employer].  He smokes and does not drink alcohol.       Medications were reconciled.  Allergies, social and family history, and the problem list were all reviewed and updated.    Old records reviewed: Interventional radiology records    Exam  General Appearance: Pleasant and alert   Vitals:    04/30/18 1335 04/30/18 1339   BP: 146/86 122/84   Patient Site: Left Arm    Patient Position: Sitting    Cuff Size: Adult Large    Pulse: 92    SpO2: 97%    Weight: (!) 226 lb 4 oz (102.6 kg)       Body mass index is 34.4 kg/(m^2).  Wt Readings from Last 3 Encounters:   04/30/18 (!) 226 lb 4 oz (102.6 kg)   04/05/18 (!) 229 lb 1.6 oz (103.9 kg)   04/02/18 (!) 229 lb 9 oz (104.1 kg)     HEENT: Sclera are clear.   Lungs: Normal respirations  Cardiac: Regular rate and rhythm    Abdomen: Soft and nondistended  Extremities: No edema  Skin: No rashes  Neuro: Moves all extremities and has facial symmetry  Gait: Ambulates with a normal gait    Assessment/Plan  1. Chronic, continuous use of opioids  CSA was updated last August.  Continue to recommend he try to get established with the pain clinic.  He may be a good Suboxone candidate.  He is going to be trying medical cannabis hopefully soon and his certification was updated today.  - Drug Abuse 1+, Urine    2. Hypertension  Stable on medication.    3. Chronic pain syndrome  As above.  - oxyCODONE (OXYCONTIN) 15 mg 12 hr tablet; Take 1 tablet (15 mg total) by mouth every 12 (twelve) hours.  Dispense: 60 tablet; Refill: 0  - Drug Abuse 1+, Urine    4. Traumatic brain injury with loss of consciousness, sequela  Continues to follow-up in the brain injury clinic.    5. Cervical radiculopathy at C8  Recently had an epidural steroid injection with marginal help.    6. Bipolar 2 disorder  He continues to work with his psychiatrist with regards to medications.          Abena Gentile MD  Internal and Geriatric Medicine  [facility]    Much or all of the text in this note was generated through the use of Dragon Dictate voice-to-text software. Errors in spelling or words which seem out of context are unintentional. Sound alike errors, in particular, may have escaped editing.

## 2021-06-17 NOTE — TELEPHONE ENCOUNTER
Telephone Encounter by Denilson Rios at 4/15/2021  3:21 PM     Author: Denilson Rios Service: -- Author Type: Patient Access    Filed: 4/15/2021  3:25 PM Encounter Date: 4/13/2021 Status: Signed    : Denilson Rios (Patient Access)       PRIOR AUTHORIZATION DENIED    Denial Rational: The patient needs to try/failure 1 other drug covered by their formulary. The covered formulary is: Hydroxyzine Hydrochloride (tablet, syrup (generic Atarax)          Appeal Information: If the provider would like to appeal this denial, please provide a letter of medical necessity and once it has been completed and placed in the patient's chart, notify the [facility] ([facility] MED [id number]) and the appeal can be initiated on behalf of the patient and provider.  Please also include any therapies that the patient has tried and their outcomes.

## 2021-06-17 NOTE — PRE-PROCEDURE
Procedure Name: sedation for mri  Date/Time: 5/24/2021 7:50 AM  Written consent obtained?: Yes  Risks and benefits: Risks, benefits and alternatives were discussed  Consent given by: patient  Expected level of sedation: moderate  ASA Class: Class 1- healthy patient  Mallampati: Grade 1- soft palate, uvula, tonsillar pillars, and posterior pharyngeal wall visible  Patient states understanding of procedure being performed: Yes  Patient's understanding of procedure matches consent: Yes  Appropriately NPO: yes  Lungs: lungs clear with good breath sounds bilaterally  Heart: normal heart sounds and rate  History & Physical reviewed: History and physical reviewed and no updates needed  Statement of review: I have reviewed the lab findings, diagnostic data, medications, and the plan for sedation

## 2021-06-17 NOTE — TELEPHONE ENCOUNTER
Trupti Miller # [phone]    Left message stating that Bola was started on Oxymorphone 10 mg three times a day and his Xtampza was ordered to be decreased to 2 times/day which did not get changed in his MAR. He received his Xtampza 6 times/ day along with the Oxymorphone.   She reported that she did an assessment on him and he was alert and oriented and his vital signs were stable  The staff has been instructed to monitor him closely and to call 911 with any changes in his vitals signs or level of consciousness..

## 2021-06-17 NOTE — TELEPHONE ENCOUNTER
Telephone Encounter by Brittany Aranda RN at 5/27/2020  3:28 PM     Author: Brittany Aranda RN Service: -- Author Type: Registered Nurse    Filed: 5/27/2020  3:37 PM Encounter Date: 5/27/2020 Status: Signed    : Brittany Aranda RN (Registered Nurse)       Patient calls, reports update as to the muscle relaxer medication added last week.  He reports that this has only been minimally effective for muscle spasms, wondering if he would be able to take any additional doses throughout the day.    Provider can address increasing this when back in the clinic, at this time patient will need a refill    Medication being requested: diazepam 5 mg  Last visit date: 5/5  Provider: RAYNE  Next visit date: 07/01  Provider: RAYNE  Expected follow up: 8 weeks  MTM visit (Pain Center) date: no  UDT 2/2020  CSA - 2/2020   snipped in:    Pertinent between visit information about requested medication (telephone, mychart, prior authorization, concerns, comments):  Script being sent to provider by nurse- dates and quantity:   Requested Prescriptions     Pending Prescriptions Disp Refills   ? diazePAM (VALIUM) 5 MG tablet 21 tablet 0     Sig: Take 1 tablet (5 mg total) by mouth 3 (three) times a day as needed for other (muscle spasms).     Pharmacy cued: [facility]  Standing orders for withdrawal protocol implemented: SAHRA

## 2021-06-17 NOTE — TELEPHONE ENCOUNTER
Patient left message that Dr Cramer increased his Hydroxyzine to 50 mg 4 times per day and insurance denied. [facility] contacted and they report that JES[facility] [employer] [place] submitted a prior authorization that was denied. Chart review does not show request and denial. [facility] to fax to clinic documentation so that we can proceed with an appeal.  Patient updated.

## 2021-06-17 NOTE — TELEPHONE ENCOUNTER
I see this note that was communicated on 5/12 in the 5/7/2021 telephone encounter     I couldn't find anything from yesterday?

## 2021-06-17 NOTE — TELEPHONE ENCOUNTER
Telephone Encounter by Denilson Rios at 5/19/2021 10:33 AM     Author: Denilson Rios Service: -- Author Type: Patient Access    Filed: 5/19/2021 10:35 AM Encounter Date: 5/18/2021 Status: Signed    : Denilson Rios (Patient Access)       PA APPROVED:    Approval start date: 02/18/21  Approval end date:  05/19/22    Pharmacy has been notified of approval and will contact patient when medication is ready for pickup.

## 2021-06-17 NOTE — PROGRESS NOTES
"Assessment/Plan:     Problem List Items Addressed This Visit     AC (acromioclavicular) arthritis (Chronic)    Relevant Medications    diazePAM (VALIUM) 5 MG tablet (Start on 6/6/2021)    oxyCODONE myristate (XTAMPZA ER) 18 mg CSpT (Start on 5/23/2021)    Chronic pain syndrome (Chronic)    Relevant Medications    pregabalin (LYRICA) 100 MG capsule    lidocaine (LIDODERM) 5 %    oxyMORphone (OPANA) 10 MG tablet    Panic attacks (Chronic)    Relevant Medications    hydrOXYzine pamoate (VISTARIL) 50 MG capsule              No follow-ups on file.    Patient Instructions   PLAN:  Oxymorphone (Opana) 10 mg 3 times a day, Dr. Cramer with update in 1 week    When starting oxymorphone, decrease the Xtampza ER to 18 mg twice a day as needed.    May discontinue the gabapentin and continue with Lyrica to help with nerve pain and see if the gabapentin relates to fluid retention    He had an MRI next week and will follow up with your spine surgeon.    Continue with the diuretics with your primary care provider    Follow-up with Dr. Cramer in 4 weeks            Subjective:       51 y.o. male followed most recently for multiple skeletal surgeries.    Reviewing the record he is actively working with his primary care provider with diuretics.    Today he reviews concern over the last 2 months he has had 17 pound of fluid retention.  He indicates his abdomen where much of this feels center.  He also indicates his joints such as his fingers and knees seem to be swelling.    Presently his feet and ankles do not appear too bad.  He describes it as a \"hindrance\" and painful.    He has tapered down the gabapentin almost off of it.  Have changed to Lyrica which she reports some benefit at 100 mg twice a day.  We discussed whether the gabapentin is also contributing to some fluid retention and will see if the Lyrica may help in the same way without the fluid problems.    He describes sometimes getting some spasms in his R's, feels a sense " "of \"jolts electrical.  Can occur in his shoulders chest.    He is scheduled next week to have MRI of his full spine, and then follow-up with Dr. Tyrel irwin in the L4-5 area.    He describes Dr. Sipple tells him based on his MRI with \"smashed disks\" and most of the high pain tolerance.    He continues with the Xtampza ER 18 mg 6 times a day.  Each dose seems to only be helpful for the first hour then falls off.  And using oxycodone 10 mg immediate release each dosing only help for an hour and a half.  He recalls we have discussed transitioning to oxymorphone.    Reviews hydromorphone has been helpful after his surgeries but again only short acting.     reviewed, as expected.      Current Outpatient Medications:      acetaminophen (TYLENOL) 650 MG CR tablet, Take 3 tablets (1,950 mg total) by mouth 2 (two) times a day. (Patient taking differently: Take 1,950 mg by mouth 2 (two) times a day. OTC), Disp: 540 tablet, Rfl: 3     albuterol (PROAIR HFA;PROVENTIL HFA;VENTOLIN HFA) 90 mcg/actuation inhaler, INHALE 2 PUFFS BY MOUTH EVERY 6 HOURS AS NEEDED FOR WHEEZING, Disp: 3 Inhaler, Rfl: 3     allopurinoL (ZYLOPRIM) 300 MG tablet, Take 1 tablet (300 mg total) by mouth 2 (two) times a day., Disp: 180 tablet, Rfl: 3     ammonium lactate (LAC-HYDRIN) 12 % lotion, Apply topically., Disp: , Rfl:      baclofen (LIORESAL) 5 mg tablet, Take 1 tablet (5 mg total) by mouth 3 (three) times a day., Disp: 90 tablet, Rfl: 11     calcium citrate (CALCITRATE) 200 mg (950 mg) tablet, Take 5 tablets (1,000 mg total) by mouth 2 (two) times a day., Disp: 180 tablet, Rfl: 3     dextroamphetamine-amphetamine (ADDERALL) 15 mg Tab, Take 15 mg by mouth daily., Disp: 30 tablet, Rfl: 0     [START ON 6/6/2021] diazePAM (VALIUM) 5 MG tablet, Take 1 tablet (5 mg total) by mouth 2 (two) times a day., Disp: 60 tablet, Rfl: 2     ergocalciferol (ERGOCALCIFEROL) 1,250 mcg (50,000 unit) capsule, Take 1 capsule (50,000 Units total) by mouth every 7 days., " Disp: 4 capsule, Rfl: 0     escitalopram oxalate (LEXAPRO) 10 MG tablet, Take 10 mg by mouth daily., Disp: , Rfl:      fexofenadine (ALLEGRA) 180 MG tablet, Take 180 mg by mouth daily as needed., Disp: , Rfl:      furosemide (LASIX) 40 MG tablet, Take 1 tablet (40 mg total) by mouth 2 (two) times a day., Disp: 60 tablet, Rfl: 11     gabapentin (NEURONTIN) 600 MG tablet, Take 1 tablet (600 mg total) by mouth 4 (four) times a day., Disp: 120 tablet, Rfl: 2     hydroCHLOROthiazide (HYDRODIURIL) 25 MG tablet, Take 1 tablet (25 mg total) by mouth 2 (two) times a day at 9am and 6pm., Disp: 60 tablet, Rfl: 11     hydrOXYzine pamoate (VISTARIL) 50 MG capsule, Take 1 capsule (50 mg total) by mouth 4 (four) times a day., Disp: 120 capsule, Rfl: 1     lamoTRIgine (LAMICTAL) 150 MG tablet, Take 1 tablet (150 mg total) by mouth 2 (two) times a day., Disp: 60 tablet, Rfl: 11     lansoprazole (PREVACID) 30 MG capsule, Take 1 capsule (30 mg total) by mouth daily., Disp: 30 capsule, Rfl: 1     LATUDA 40 mg Tab tablet, , Disp: , Rfl:      lidocaine (LIDODERM) 5 %, Apply 1 patch topically to painful area of skin once daily and remove per schedule., Disp: 30 patch, Rfl: 4     linaCLOtide (LINZESS) 145 mcg cap capsule, Take 1 capsule (145 mcg total) by mouth daily., Disp: 30 capsule, Rfl: 11     lithium 300 MG capsule, Take 600 mg by mouth 2 (two) times a day., Disp: , Rfl: 1     memantine (NAMENDA) 10 MG tablet, Take 1 tablet (10 mg total) by mouth 2 (two) times a day., Disp: 60 tablet, Rfl: 11     miconazole nitrate (CRITIC-AID AF) 2 % Oint ointment, Apply topically., Disp: , Rfl:      midazolam (VERSED) 1 mg/mL Soln, Infuse 0.5-6 mg into a venous catheter., Disp: , Rfl:      miscellaneous medical supply Misc, Wheel chair, Disp: , Rfl:      miscellaneous medical supply Misc, Hospital bed with mattress and 1/2 rails. Semi-electric bed. Length of need 99 months. Bed extender:no. Group 1 mattress, Disp: , Rfl:      montelukast  (SINGULAIR) 10 mg tablet, Take 1 tablet by mouth daily., Disp: , Rfl:      naloxone (NARCAN) 4 mg/actuation nasal spray, 1 spray (4 mg dose) into one nostril for opioid reversal. Call 911. May repeat if no response in 3 minutes., Disp: 1 Box, Rfl: 0     [START ON 5/23/2021] oxyCODONE myristate (XTAMPZA ER) 18 mg CSpT, Take 18 mg by mouth 6 (six) times a day for 10 days., Disp: 60 each, Rfl: 0     phenytoin (DILANTIN KAPSEAL) 100 MG ER capsule, Take 1 capsule (100 mg total) by mouth 2 (two) times a day., Disp: 60 capsule, Rfl: 11     polyethylene glycol (GLYCOLAX) 17 gram/dose powder, Take 17 g by mouth daily., Disp: 595 g, Rfl: 11     potassium chloride (K-DUR,KLOR-CON) 20 MEQ tablet, Take 1 tablet (20 mEq total) by mouth 2 (two) times a day., Disp: 60 tablet, Rfl: 11     predniSONE (DELTASONE) 20 MG tablet, Take 20 mg by mouth daily for 7 days., Disp: 7 tablet, Rfl: 0     pregabalin (LYRICA) 100 MG capsule, Take 1 capsule (100 mg total) by mouth 2 (two) times a day., Disp: 60 capsule, Rfl: 0     QUEtiapine (SEROQUEL) 200 MG tablet, Take 250 mg by mouth at bedtime. , Disp: , Rfl:      QUEtiapine (SEROQUEL) 50 MG tablet, Take 1 tablet (50 mg total) by mouth 4 (four) times a day., Disp: 120 tablet, Rfl: 0     senna-docusate (PERICOLACE) 8.6-50 mg tablet, Take 2 tablets by mouth 2 (two) times a day., Disp: 180 tablet, Rfl: 3     medical cannabis (Patient's own supply), by Other route see administration instructions. Take as instructed by the medical cannabis dispensary., Disp: , Rfl: 0     metoprolol succinate (TOPROL-XL) 100 MG 24 hr tablet, Take 1 tablet (100 mg total) by mouth daily., Disp: 90 tablet, Rfl: 3     oxyMORphone (OPANA) 10 MG tablet, Take 1 tablet (10 mg total) by mouth 3 (three) times a day., Disp: 30 tablet, Rfl: 0    Current Facility-Administered Medications:      cyanocobalamin injection 1,000 mcg, 1,000 mcg, Intramuscular, Q30 Days, Devon Lund MD, 1,000 mcg at 04/30/21 1529    He is alert,  "clear sensorium.  Thought process is logical.  His left leg is jittery.  He ambulates with 4 wheeled walker.  As noted lower extremities do not appear particularly swollen today though he clearly indicates his abdominal girth is present and distressing.    Asks what can be done now until his surgery to help as he is very uncomfortable and the present regimen is not working.          Objective:     Vitals:    05/17/21 1419   BP: 126/74   Pulse: 87   Weight: 214 lb (97.1 kg)   Height: 5' 7\" (1.702 m)   PainSc:   8   PainLoc: Neck       Plan: We discussed he may not be metabolizing oxycodone well, and is clearly not getting a long-acting benefit out of the Xtampza ER.    Reviewed the role of oxymorphone.  We will start with 10 mg 3 times a day.  We will change the Xtampza ER to 2 times a day as needed for breakthrough.  After observing for 5 to 7 days, some patients require the oxymorphone 4 times a day.    Total time more than 25-minute          This note has been dictated using voice recognition software. Any grammatical or context distortions are unintentional and inherent to the software  "

## 2021-06-17 NOTE — TELEPHONE ENCOUNTER
Prior Authorization Request  Who s requesting:  Pharmacy  Pharmacy Name and Location: [facility] pharmacy  Medication Name: hydrOXYzine Pamoate 50 mg Capsules  Insurance Plan: [employer]  Insurance Member ID Number:  [id number]  CoverMyMeds Key: [id number]  Informed patient that prior authorizations can take up to 10 business days for response:   Yes  Okay to leave a detailed message: Yes

## 2021-06-17 NOTE — TELEPHONE ENCOUNTER
Telephone Encounter by Juana Suggs RN at 11/6/2020  8:58 AM     Author: Juana Suggs RN Service: -- Author Type: Registered Nurse    Filed: 11/6/2020  9:00 AM Encounter Date: 11/6/2020 Status: Signed    : Juana Suggs RN (Registered Nurse)       Patient is calling requesting Gabapentin and Lidocaine patched to be refilled.  Patient just had a visit with Dr. Cramer on 11/4/20 with no significant events since.  Gabapentin was ordered at visit, but Lidocaine shows historical provider and below is the plan from the visit.    Dr. Cramer, please advise.

## 2021-06-17 NOTE — TELEPHONE ENCOUNTER
Telephone Encounter by Jenn Baker LPN at 12/14/2020  9:17 AM     Author: Jenn Baker LPN Service: -- Author Type: Licensed Nurse    Filed: 12/14/2020  9:32 AM Encounter Date: 12/14/2020 Status: Signed    : Jenn Baker LPN (Licensed Nurse)       Review of  shows Zolpidem has been prescribed by Dr. Jamari Da Silva with [facility].  Dr. Lund has not prescribed Zolpidem before. Also from chart notes it appears pt's psychiatrist has been prescribing/adjusting his seroquel dose.     [facility] sent request for below meds, contact [facility] and made them aware PCP does not prescribe either for pt.     [facility] pharmacist stated she had contacted Dr. Da Silva's office who stated pt is no longer seen at their office so Dr. Da Silva will no longer prescribe for him.  Also stated they contacted pt's psychiatrist regarding seroquel and are waiting to hear back    Will route to pcp to advise if he would be willing to fill zolpidem? Should pt have a virtual visit?   below.

## 2021-06-17 NOTE — TELEPHONE ENCOUNTER
Prior Authorization Request  Who s requesting:  Pharmacy  Pharmacy Name and Location: [facility] pharmacy  Medication Name: oxyMORphone HCl 10 mg  Insurance Plan: [employer]icare  Insurance Member ID Number:  [id number]/ [id number]  CoverMyMeds Key: [id number]  Informed patient that prior authorizations can take up to 10 business days for response:   No  Okay to leave a detailed message: Yes

## 2021-06-17 NOTE — TELEPHONE ENCOUNTER
"Pt calls to report he'll have an MRI 5/24, then see Dr Al 6/3 to discuss surgery. Had a follow-up appt with Dr Lund and reports 20lb weight gain. Was started on Lasix. He is asking for \"help for my back pain, structural damage and increased nerve pain.\"  Follow-up with Dr Cramer is 5/17.  "

## 2021-06-17 NOTE — H&P (VIEW-ONLY)
Bola is a 51 y.o. male contacting the clinic today via video, who will use the platform: [employer] for the visit.  Phone # for [employer], or if Amwell drops:   Telephone Information:   Mobile [phone]       ASSESSMENT:  1. Lymphedema of both lower extremities  Refractory to furosemide 40 twice daily and 80 twice daily.  Still possible gabapentin and now Lyrica contributing to edema but with prolonged hospitalizations and surgeries must rule out DVT prior to escalating diuretics  - furosemide (LASIX) 40 MG tablet; Take 2 tablets (80 mg total) by mouth 2 (two) times a day at 9am and 6pm.  - US Venous Legs Bilateral; Future  - Basic Metabolic Panel; Future    2. Low vitamin D level  Refill  - ergocalciferol (ERGOCALCIFEROL) 1,250 mcg (50,000 unit) capsule; Take 1 capsule (50,000 Units total) by mouth every 7 days.  Dispense: 4 capsule; Refill: 11    3. Lymphedema of left leg  Hold furosemide same after phone increase.  Check labs  - furosemide (LASIX) 40 MG tablet; Take 2 tablets (80 mg total) by mouth 2 (two) times a day at 9am and 6pm.    4. Generalized edema   Rule out DVT  - US Venous Legs Bilateral; Future    5.  Spinal stenosis.  Due for MRI next week    Preventive Health Care:      PLAN:  Patient Instructions   Increase furosemide to 80 mg twice daily-done May 4 by phone    Discontinue gabapentin May 17-done    Lyrica noted at 100 mg twice daily    Discontinue phenytoin    Bilateral venous ultrasounds to rule out DVT    Pending results either treat with anticoagulants or increase intensity of diuretics    Basic metabolic profile to reassess kidney function    Prednisone 20 mg daily for 1 week-done May 12    Discontinue hydrochlorothiazide    Refill vitamin D    Return in about 1 week (around 5/26/2021) for a phone/video follow up visit.      CHIEF COMPLAINT:  Chief Complaint   Patient presents with     Follow-up     Fluid retention; lymphedema       HISTORY OF PRESENT ILLNESS:  Bola is a 51 y.o. male  contacting the clinic today via video for complaints of swelling.  When we talked last on  he was placed on furosemide to replace hydrochlorothiazide.  With this he noticed no effect and by phone furosemide dose was increased from 40 mg to 80 mg twice daily.  He again had no effect and on May 12 prednisone was added to see if this was inflammatory.  With all of this he reports that the edema has not improved and has actually worsened, progressing to his lower abdomen    At the same time the pain clinic has tapered off the gabapentin and started on Lyrica.  Pain is no different.  He is scheduled to have an updated MRI of his lumbar spine next week with plans for surgery ASAP    He has undergone surgery in  and August of last year and has been in a TCU.  Although he reports working hard at physical therapy, he has been immobile for large chunks of time    REVIEW OF SYSTEMS:   No acute dyspnea but progressing pressure against breathing    PFSH:  Social History     Social History Narrative    He is .  He has been a  and also a counselor, and worked with [email or web address].  He smokes and does not drink alcohol.  He is now on disability due to his brain injury and bipolar disease.       TOBACCO USE:  Social History     Tobacco Use   Smoking Status Current Some Day Smoker     Packs/day: 0.50     Years: 11.00     Pack years: 5.50     Types: Cigarettes     Start date: 2009     Last attempt to quit: 2017     Years since quittin.2   Smokeless Tobacco Former User   Tobacco Comment    0.5 pack per day       VITALS:  There were no vitals filed for this visit.  Wt Readings from Last 3 Encounters:   21 214 lb (97.1 kg)   21 216 lb 4 oz (98.1 kg)   21 212 lb (96.2 kg)       PHYSICAL EXAM:  (observations via Video)  Irritated.  No shortness of breath    MEDICATIONS:   Current Outpatient Medications   Medication Sig Dispense Refill      acetaminophen (TYLENOL) 650 MG CR tablet Take 3 tablets (1,950 mg total) by mouth 2 (two) times a day. (Patient taking differently: Take 1,950 mg by mouth 2 (two) times a day. OTC) 540 tablet 3     albuterol (PROAIR HFA;PROVENTIL HFA;VENTOLIN HFA) 90 mcg/actuation inhaler INHALE 2 PUFFS BY MOUTH EVERY 6 HOURS AS NEEDED FOR WHEEZING 3 Inhaler 3     allopurinoL (ZYLOPRIM) 300 MG tablet Take 1 tablet (300 mg total) by mouth 2 (two) times a day. 180 tablet 3     ammonium lactate (LAC-HYDRIN) 12 % lotion Apply topically.       baclofen (LIORESAL) 5 mg tablet Take 1 tablet (5 mg total) by mouth 3 (three) times a day. 90 tablet 11     calcium citrate (CALCITRATE) 200 mg (950 mg) tablet Take 5 tablets (1,000 mg total) by mouth 2 (two) times a day. 180 tablet 3     dextroamphetamine-amphetamine (ADDERALL) 15 mg Tab Take 15 mg by mouth daily. 30 tablet 0     [START ON 6/6/2021] diazePAM (VALIUM) 5 MG tablet Take 1 tablet (5 mg total) by mouth 2 (two) times a day. 60 tablet 2     ergocalciferol (ERGOCALCIFEROL) 1,250 mcg (50,000 unit) capsule Take 1 capsule (50,000 Units total) by mouth every 7 days. 4 capsule 11     escitalopram oxalate (LEXAPRO) 10 MG tablet Take 10 mg by mouth daily.       fexofenadine (ALLEGRA) 180 MG tablet Take 180 mg by mouth daily as needed.       furosemide (LASIX) 40 MG tablet Take 2 tablets (80 mg total) by mouth 2 (two) times a day at 9am and 6pm.       hydrOXYzine pamoate (VISTARIL) 50 MG capsule Take 1 capsule (50 mg total) by mouth 4 (four) times a day. 120 capsule 1     lamoTRIgine (LAMICTAL) 150 MG tablet Take 1 tablet (150 mg total) by mouth 2 (two) times a day. 60 tablet 11     lansoprazole (PREVACID) 30 MG capsule Take 1 capsule (30 mg total) by mouth daily. 30 capsule 1     LATUDA 40 mg Tab tablet        lidocaine (LIDODERM) 5 % Apply 1 patch topically to painful area of skin once daily and remove per schedule. 30 patch 4     linaCLOtide (LINZESS) 145 mcg cap capsule Take 1 capsule (145  mcg total) by mouth daily. 30 capsule 11     lithium 300 MG capsule Take 600 mg by mouth 2 (two) times a day.  1     medical cannabis (Patient's own supply) by Other route see administration instructions. Take as instructed by the medical cannabis dispensary.  0     memantine (NAMENDA) 10 MG tablet Take 1 tablet (10 mg total) by mouth 2 (two) times a day. 60 tablet 11     miconazole nitrate (CRITIC-AID AF) 2 % Oint ointment Apply topically.       midazolam (VERSED) 1 mg/mL Soln Infuse 0.5-6 mg into a venous catheter.       miscellaneous medical supply Misc Wheel chair       miscellaneous medical supply [facility] bed with mattress and 1/2 rails. Semi-electric bed. Length of need 99 months. Bed extender:no. Group 1 mattress       montelukast (SINGULAIR) 10 mg tablet Take 1 tablet by mouth daily.       naloxone (NARCAN) 4 mg/actuation nasal spray 1 spray (4 mg dose) into one nostril for opioid reversal. Call 911. May repeat if no response in 3 minutes. 1 Box 0     [START ON 5/23/2021] oxyCODONE myristate (XTAMPZA ER) 18 mg CSpT Take 18 mg by mouth 6 (six) times a day for 10 days. 60 each 0     oxyMORphone (OPANA) 10 MG tablet Take 1 tablet (10 mg total) by mouth 3 (three) times a day. 30 tablet 0     polyethylene glycol (GLYCOLAX) 17 gram/dose powder Take 17 g by mouth daily. 595 g 11     potassium chloride (K-DUR,KLOR-CON) 20 MEQ tablet Take 1 tablet (20 mEq total) by mouth 2 (two) times a day. 60 tablet 11     pregabalin (LYRICA) 100 MG capsule Take 1 capsule (100 mg total) by mouth 2 (two) times a day. 60 capsule 0     QUEtiapine (SEROQUEL) 200 MG tablet Take 250 mg by mouth at bedtime.        QUEtiapine (SEROQUEL) 50 MG tablet Take 1 tablet (50 mg total) by mouth 4 (four) times a day. 120 tablet 0     senna-docusate (PERICOLACE) 8.6-50 mg tablet Take 2 tablets by mouth 2 (two) times a day. 180 tablet 3     metoprolol succinate (TOPROL-XL) 100 MG 24 hr tablet Take 1 tablet (100 mg total) by mouth daily. 90  tablet 3     predniSONE (DELTASONE) 20 MG tablet Take 20 mg by mouth daily for 7 days. 7 tablet 0     Current Facility-Administered Medications   Medication Dose Route Frequency Provider Last Rate Last Admin     cyanocobalamin injection 1,000 mcg  1,000 mcg Intramuscular Q30 Days Devon Lund MD   1,000 mcg at 04/30/21 1529       Outside Notes summarized: Pain clinic notes regarding gabapentin and Lyrica, phone notes regarding furosemide and prednisone  Mychart messages x ,  Interim orders x   Labs, x-rays, cardiology, GI tests reviewed:   New orders:   Orders Placed This Encounter   Procedures     US Venous Legs Bilateral     Standing Status:   Future     Standing Expiration Date:   5/19/2022     Order Specific Question:   Can the procedure be changed per Radiologist protocol?     Answer:   Yes     Basic Metabolic Panel     Standing Status:   Future     Standing Expiration Date:   5/19/2022       Independent review of:    Supplemental history by:      Video Start Time: 5:23 PM  Video End time:  5:48 PM  Face to face plus orders: 25 minutes  Documentation time: 3 minutes    The visit lasted a total of 28 minutes     Patient has given verbal consent to a Video visit?  Yes  How would you like to obtain your AVS?  MyChart  Will anyone else be joining your video visit? No       Video-Visit Details  Type of service:  Video Visit  Originating Location (pt. Location): Home  Distant Location (provider location):   [facility]     Devon Lund MD

## 2021-06-17 NOTE — TELEPHONE ENCOUNTER
Telephone Encounter by Tejal Hernandez LPN at 4/29/2021  3:13 PM     Author: Tejal Hernandez LPN Service: -- Author Type: Licensed Nurse    Filed: 4/29/2021  3:40 PM Encounter Date: 4/29/2021 Status: Signed    : Tejal Hernandez LPN (Licensed Nurse)       Requested Prescriptions     Pending Prescriptions Disp Refills   ? dextroamphetamine-amphetamine (ADDERALL) 15 mg Tab 30 tablet 0     Sig: Take 15 mg by mouth daily.     Medication: Adderall  Last Date Filled 4/6/21   pulled: YES                Only PCP Prescribing? : YES  Taken as prescribed from physician notes? YES    CSA in last year: NO  Random Utox in last year: YES  (if any of the above answer NO - schedule with PCP)     Opioids + benzodiazepines? YES  (if the above answer YES - schedule with PCP every 6 months)     Is patient on the Executive Review Team? No      All responses suggest: Refilling prescription

## 2021-06-17 NOTE — TELEPHONE ENCOUNTER
Central PA team  [phone]  Pool: HE PA MED ([id number])          PA has been initiated.       PA form completed and faxed insurance via Cover My Meds     Noriega:  RIAZ GARZA (Noriega: [id number])     Medication:  hydrOXYzine Pamoate 50 mg Capsules    Insurance:  [facility] Medicare Advantage.        Response will be received via fax and may take up to 5-10 business days depending on plan

## 2021-06-17 NOTE — TELEPHONE ENCOUNTER
Telephone Encounter by Marline Lindquist at 11/10/2020  2:42 PM     Author: Marline Lindquist Service: -- Author Type: --    Filed: 11/10/2020  2:42 PM Encounter Date: 11/6/2020 Status: Signed    : Marline Lindquist APPROVED:    Approval start date: 8/12/2020  Approval end date:  11/1/2021    Pharmacy has been notified of approval and will contact patient when medication is ready for pickup.

## 2021-06-17 NOTE — TELEPHONE ENCOUNTER
Spoke to Jan again.  He states that he misspoke this morning.  He has been taking furosemide 80 mg two times a day.     Yes, care facility was faxed an order for this as well.

## 2021-06-17 NOTE — TELEPHONE ENCOUNTER
I see that Dr. Cramer is changing the gabapentin to Lyrica.  That may help    What dose of the furosemide is he taking now?    Is it on an empty stomach or a full stomach    What happens in the first 30 to 60 minutes after taking the furosemide each time?

## 2021-06-17 NOTE — TELEPHONE ENCOUNTER
See patient's call, concerned about how to help with pain.  He has had a 20 pound weight gain and started on Lasix.    Reviewing the record he and his primary care provider Dr. Lund discussed possible increase weight gain with the gabapentin.    I reviewed with Jan that weight gain can indeed occur with the gabapentin with fluid retention.  It sometimes does not respond very well to Lasix in my experience.  We discussed tapering the gabapentin.  He thinks he tried Lyrica several years ago though insurance did not cover.  Discussed better coverage now.  On gabapentin and Lyrica on the same family, they do not always both lead to peripheral edema.  We will begin tapering the gabapentin and adding in Lyrica.

## 2021-06-17 NOTE — TELEPHONE ENCOUNTER
Increase furosemide to 80 mg twice daily    Continue potassium same dose twice daily    When weight decreases to 200 pounds decrease furosemide to 80 mg once daily and potassium once daily

## 2021-06-17 NOTE — TELEPHONE ENCOUNTER
"Spoke to Jan.    He has been taking furosemide 40 mg at 8:00 a.m. and 5:00 p.m. for 8 to 9 days.    He is taking medication on empty stomach.    Within 20 minutes of taking furosemide he does urinate a moderate amount of urine.  His weight is still at 216.  His back and joints hurt \"very much.\"  "

## 2021-06-17 NOTE — PROGRESS NOTES
Pain score: 8  Constant or intermittent: constant  What does your pain feel like: aching, expands, electrical shocks, pulsating and swelling  Does the pain interfere with:   Work: yes  Walking/distance: yes  Sleep: yes  Daily activities: yes  Relationships/social life: yes  Mood: yes  F= 8    Mary Jo Irvin LPN

## 2021-06-17 NOTE — TELEPHONE ENCOUNTER
I printed this off and Darron will sign it and it will be faxed to [facility] [phone].

## 2021-06-17 NOTE — TELEPHONE ENCOUNTER
Continue furosemide 80 mg twice daily    I would like to try prednisone 20 mg once daily for 7 days to see if any of the swelling is inflammatory instead of fluid    Prescription sent    Please notify his nurses

## 2021-06-17 NOTE — TELEPHONE ENCOUNTER
Telephone Encounter by Tejal Brennan CMA at 5/14/2021  3:13 PM     Author: Tejal Brennan CMA Service: -- Author Type: Certified Medical Assistant    Filed: 5/14/2021  3:16 PM Encounter Date: 5/14/2021 Status: Signed    : Tejal Brennan CMA (Certified Medical Assistant)       Devon Lund MD         4:06 PM  Note     Continue furosemide 80 mg twice daily     I would like to try prednisone 20 mg once daily for 7 days to see if any of the swelling is inflammatory instead of fluid     Prescription sent     Please notify his nurses

## 2021-06-17 NOTE — TELEPHONE ENCOUNTER
Reason for Call:  Other call back      Detailed comments: pt calling stating that the Fursomide is not working  - pt stating still at 216 pounds - urinating only 5-6 times daily  Still dealing with fluid retention and still having pain from that    Wondering if there will a medication change or a new med    Please advise    Phone Number Patient can be reached at: Cell number on file:    Telephone Information:   Mobile [phone]       Best Time: anytime    Can we leave a detailed message on this number?: Yes    Call taken on 5/10/2021 at 1:57 PM by Helen Bowen

## 2021-06-17 NOTE — TELEPHONE ENCOUNTER
Telephone Encounter by Denilson Rios at 5/18/2021  4:21 PM     Author: Denilson Rios Service: -- Author Type: Patient Access    Filed: 5/18/2021  4:24 PM Encounter Date: 5/11/2021 Status: Signed    : Denilson Rios (Patient Access)       [place] APPROVED:    Approval start date:   Approval end date:      Pharmacy has been notified of approval and will contact patient when medication is ready for pickup.

## 2021-06-17 NOTE — TELEPHONE ENCOUNTER
Telephone Encounter by Tejal Hernandez LPN at 3/5/2021  5:31 PM     Author: Tejal Hernandez LPN Service: -- Author Type: Licensed Nurse    Filed: 3/5/2021  5:41 PM Encounter Date: 3/5/2021 Status: Signed    : Tejal Hernandez LPN (Licensed Nurse)       Requested Prescriptions     Pending Prescriptions Disp Refills   ? dextroamphetamine-amphetamine (ADDERALL) 15 mg Tab 30 tablet 0     Sig: Take 15 mg by mouth daily.        Last Date Filled 2/11/21   pulled: YES                  Only PCP Prescribing? : YES  Taken as prescribed from physician notes? YES    CSA in last year: NO  Random Utox in last year: YES  (if any of the above answer NO - schedule with PCP)     Opioids + benzodiazepines? YES  (if the above answer YES - schedule with PCP every 6 months)     Is patient on the Executive Review Team? No        All responses suggest: Refilling prescription

## 2021-06-17 NOTE — PATIENT INSTRUCTIONS - HE
Patient Instructions by Earnest Pelletier MD at 3/3/2019 12:50 PM     Author: Earnest Pelletier MD Service: -- Author Type: Physician    Filed: 3/3/2019  2:16 PM Encounter Date: 3/3/2019 Status: Addendum    : Earnest Pelletier MD (Physician)    Related Notes: Original Note by Earnest Pelletier MD (Physician) filed at 3/3/2019  2:16 PM       - Take the full course of Augmentin as prescribed.   - Take a probiotic while taking this antibiotic. This can be purchased over the counter at your local pharmacy.   - The cetirizine and fluticasone nasal spray that already take / use should be helping with the fluid in your right ear.   - See Dr. Lund for follow up tomorrow. Discuss whether or not an ENT consultation would be appropriate at this time.       Patient Education     Sinusitis (Antibiotic Treatment)    The sinuses are air-filled spaces within the bones of the face. They connect to the inside of the nose. Sinusitis is an inflammation of the tissue that lines the sinuses. Sinusitis can occur during a cold. It can also happen due to allergies to pollens and other particles in the air. Sinusitis can cause symptoms of sinus congestion and a feeling of fullness. A sinus infection causes fever, headache, and facial pain. There is often green or yellow fluid draining from the nose or into the back of the throat (post-nasal drip). You have been given antibiotics to treat this condition.  Home care    Take the full course of antibiotics as instructed. Do not stop taking them, even when you feel better.    Drink plenty of water, hot tea, and other liquids. This may help thin nasal mucus. It also may help your sinuses drain fluids.    Heat may help soothe painful areas of your face. Use a towel soaked in hot water. Or,  the shower and direct the warm spray onto your face. Using a vaporizer along with a menthol rub at night may also help soothe symptoms.     An expectorant with guaifenesin may help thin  nasal mucus and help your sinuses drain fluids.    You can use an over-the-counter decongestant, unless a similar medicine was prescribed to you. Nasal sprays work the fastest. Use one that contains phenylephrine or oxymetazoline. First blow your nose gently. Then use the spray. Do not use these medicines more often than directed on the label. If you do, your symptoms may get worse. You may also take pills that contain pseudoephedrine. Dont use products that combine multiple medicines. This is because side effects may be increased. Read labels. You can also ask the pharmacist for help. (People with high blood pressure should not use decongestants. They can raise blood pressure.)    Over-the-counter antihistamines may help if allergies contributed to your sinusitis.      Do not use nasal rinses or irrigation during an acute sinus infection, unless your healthcare provider tells you to. Rinsing may spread the infection to other areas in your sinuses.    Use acetaminophen or ibuprofen to control pain, unless another pain medicine was prescribed to you. If you have chronic liver or kidney disease or ever had a stomach ulcer, talk with your healthcare provider before using these medicines. (Aspirin should never be taken by anyone under age 18 who is ill with a fever. It may cause severe liver damage.)    Don't smoke. This can make symptoms worse.  Follow-up care  Follow up with your healthcare provider or our staff if you are better in 1 week.  When to seek medical advice  Call your healthcare provider if any of these occur:    Facial pain or headache that gets worse    Stiff neck    Unusual drowsiness or confusion    Swelling of your forehead or eyelids    Vision problems, such as blurred or double vision    Fever of 100.4 F (38 C) or higher, or as directed by your healthcare provider    Seizure    Breathing problems    Symptoms don't go away in 10 days  Prevention  Here are steps you can take to help prevent an  infection:    Keep good hand washing habits.    Dont have close contact with people who have sore throats, colds, or other upper respiratory infections.    Dont smoke, and stay away from secondhand smoke.    Stay up to date with of your vaccines.  Date Last Reviewed: 11/1/2017 2000-2017 [employer]. [address] [place] [address], [place] [address]. All rights reserved. This information is not intended as a substitute for professional medical care. Always follow your healthcare professional's instructions.           Patient Education     Earache, No Infection (Adult)  Earaches can happen without an infection. This occurs when air and fluid build up behind the eardrum causing a feeling of fullness and discomfort and reduced hearing. This is called otitis media with effusion (OME) or serous otitis media. It means there is fluid in the middle ear. It is not the same as acute otitis media, which is typically from infection.  OME can happen when you have a cold if congestion blocks the passage that drains the middle ear. This passage is called the eustachian tube. OME may also occur with nasal allergies or after a bacterial middle ear infection.    The pain or discomfort may come and go. You may hear clicking or popping sounds when you chew or swallow. You may feel that your balance is off. Or you may hear ringing in the ear.  It often takes from several weeks up to 3 months for the fluid to clear on its own. Oral pain relievers and ear drops help if there is pain. Decongestants and antihistamines sometimes help. Antibiotics don't help since there is no infection. Your doctor may prescribe a nasal spray to help reduce swelling in the nose and eustachian tube. This can allow the ear to drain.  If your OME doesn't improve after 3 months, surgery may be used to drain the fluid and insert a small tube in the eardrum to allow continued drainage.  Because the middle ear fluid can become infected, it is important to  watch for signs of an ear infection which may develop later. These signs include increased ear pain, fever, or drainage from the ear.  Home care  The following guidelines will help you care for yourself at home:    You may use over-the-counter medicine as directed to control pain, unless another medicine was prescribed. If you have chronic liver or kidney disease or ever had a stomach ulcer or GI bleeding, talk with your doctor before using these medicines. Aspirin should never be used in anyone under 18 years of age who is ill with a fever. It may cause severe liver damage.    You may use over-the-counter decongestants such as phenylephrine or pseudoephedrine. But they are not always helpful. Don't use nasal spray decongestants more than 3 days. Longer use can make congestion worse. Prescription nasal sprays from your doctor don't typically have those restrictions.    Antihistamines may help if you are also having allergy symptoms.    You may use medicines such as guaifenesin to thin mucus and promote drainage.  Follow-up care  Follow up with your healthcare provider or as advised if you are not feeling better after 3 days.  When to seek medical advice  Call your healthcare provider right away if any of the following occur:    Your ear pain gets worse or does not start to improve     Fever of 100.4 F (38 C) or higher, or as directed by your healthcare provider    Fluid or blood draining from the ear    Headache or sinus pain    Stiff neck    Unusual drowsiness or confusion  Date Last Reviewed: 10/1/2016    9996-2022 [employer]. [address] [place] [address]. All rights reserved. This information is not intended as a substitute for professional medical care. Always follow your healthcare professional's instructions.           Patient Education     Stages of Periodontal Disease  Periodontal disease is an infection of the gums and tissues supporting the teeth. If not treated, it often gets  worse. Bone damage and tooth loss can occur. Regular self-care and dental visits can help prevent or control periodontal disease.     Gingivitis       Periodontitis       Advanced periodontitis      Gingivitis  This is the mildest form of periodontal disease. The gum becomes irritated and swollen (inflamed). The space between the gum and tooth gets deeper, forming a pocket. Gums may become red and may bleed. Or there may be no symptoms. Left untreated, it can progress to periodontitis.  Periodontitis  Infection and inflammation spread to the bone supporting the teeth. Gums may shrink back (recede) from the teeth. Pockets between the teeth can get deeper and harder to clean. Redness, swelling, and bleeding may develop or get worse. Infection begins to destroy the bone. As bone is destroyed, teeth may start to feel loose.  Advanced periodontitis  As periodontitis advances, pockets deepen even more and can fill with pus. Around the roots of the teeth, the gums may start to swell. Bone loss continues. The teeth may feel sensitive to heat or cold and may hurt when brushed. Teeth loosen more. In some cases, teeth may need to be removed to keep the disease from spreading.  Date Last Reviewed: 7/1/2017 2000-2017 [employer]. [address] [place] [address], [place] [address]. All rights reserved. This information is not intended as a substitute for professional medical care. Always follow your healthcare professional's instructions.

## 2021-06-17 NOTE — TELEPHONE ENCOUNTER
Telephone Encounter by Tejal Acosta RN at 5/21/2020  3:50 PM     Author: Tejal Acosta RN Service: -- Author Type: Registered Nurse    Filed: 5/21/2020  4:01 PM Encounter Date: 5/21/2020 Status: Signed    : Tejal Acosta RN (Registered Nurse)       Medication being requested: oxycodone 10  Last visit date: 5/5.  Provider: Dr. MENENDEZ  Next visit date: 7/1.  Provider: BE  Expected follow up: several weeks  [facility] visit ([facility]) date: no  UDT date: 2/20  Agreement date: 2/20   snipped in:    Pertinent between visit information about requested medication (telephone, mychart, prior authorization, concerns, comments): patient stated in phone call that per your conversation with him, he adjusted his dosing of oxycodone to 2 tablets 5xday the quantity should be 140 according to pt and will be out Saturday.  Script being sent to provider by nurse- dates and quantity: this is due 5/24 unchanged from 2 weeks script dated appropriately  Requested Prescriptions     Pending Prescriptions Disp Refills   ? oxyCODONE (ROXICODONE) 10 mg immediate release tablet 140 tablet 0     Sig: Take 2 tablets (20 mg total) by mouth 5 (five) times a day for 14 days.       Pharmacy cued: [facility]

## 2021-06-17 NOTE — TELEPHONE ENCOUNTER
Central PA team  [phone]  Pool: HE PA MED ([id number])          PA has been initiated.       PA form completed and faxed insurance via Cover My Meds     Noriega:  RIAZ GARZA (Noriega: [id number])     Medication:   oxyMORphone HCl 10 mg    Insurance:  [employer] [place]        Response will be received via fax and may take up to 5-10 business days depending on plan

## 2021-06-17 NOTE — PATIENT INSTRUCTIONS - HE
Stop hydrochlorothiazide twice daily, since it is too weak    Furosemide 40 mgs twice a day to eliminate swelling, much stronger    Potassium supplement twice a day with each dose of Furosemide    recertify for medical cannabis    You have gained 20 lbs since December since the Gabapentin was increased    Goal weight 197    When you reach 200, decrease to once a day, both furosemide and potassium

## 2021-06-17 NOTE — PROGRESS NOTES
Bola is a 51 y.o. male contacting the clinic today via video, who will use the platform: Inuvo for the visit.  Phone # for Doximity, or if Amwell drops:   Telephone Information:   Mobile [phone]       ASSESSMENT:  1. Lymphedema of both lower extremities  Refractory to furosemide 40 twice daily and 80 twice daily.  Still possible gabapentin and now Lyrica contributing to edema but with prolonged hospitalizations and surgeries must rule out DVT prior to escalating diuretics  - furosemide (LASIX) 40 MG tablet; Take 2 tablets (80 mg total) by mouth 2 (two) times a day at 9am and 6pm.  - US Venous Legs Bilateral; Future  - Basic Metabolic Panel; Future    2. Low vitamin D level  Refill  - ergocalciferol (ERGOCALCIFEROL) 1,250 mcg (50,000 unit) capsule; Take 1 capsule (50,000 Units total) by mouth every 7 days.  Dispense: 4 capsule; Refill: 11    3. Lymphedema of left leg  Hold furosemide same after phone increase.  Check labs  - furosemide (LASIX) 40 MG tablet; Take 2 tablets (80 mg total) by mouth 2 (two) times a day at 9am and 6pm.    4. Generalized edema   Rule out DVT  - US Venous Legs Bilateral; Future    5.  Spinal stenosis.  Due for MRI next week    Preventive Health Care:      PLAN:  Patient Instructions   Increase furosemide to 80 mg twice daily-done May 4 by phone    Discontinue gabapentin May 17-done    Lyrica noted at 100 mg twice daily    Discontinue phenytoin    Bilateral venous ultrasounds to rule out DVT    Pending results either treat with anticoagulants or increase intensity of diuretics    Basic metabolic profile to reassess kidney function    Prednisone 20 mg daily for 1 week-done May 12    Discontinue hydrochlorothiazide    Refill vitamin D    Return in about 1 week (around 5/26/2021) for a phone/video follow up visit.      CHIEF COMPLAINT:  Chief Complaint   Patient presents with     Follow-up     Fluid retention; lymphedema       HISTORY OF PRESENT ILLNESS:  Bola is a 51 y.o. male  contacting the clinic today via video for complaints of swelling.  When we talked last on  he was placed on furosemide to replace hydrochlorothiazide.  With this he noticed no effect and by phone furosemide dose was increased from 40 mg to 80 mg twice daily.  He again had no effect and on May 12 prednisone was added to see if this was inflammatory.  With all of this he reports that the edema has not improved and has actually worsened, progressing to his lower abdomen    At the same time the pain clinic has tapered off the gabapentin and started on Lyrica.  Pain is no different.  He is scheduled to have an updated MRI of his lumbar spine next week with plans for surgery ASAP    He has undergone surgery in  and August of last year and has been in a TCU.  Although he reports working hard at physical therapy, he has been immobile for large chunks of time    REVIEW OF SYSTEMS:   No acute dyspnea but progressing pressure against breathing    PFSH:  Social History     Social History Narrative    He is .  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  He smokes and does not drink alcohol.  He is now on disability due to his brain injury and bipolar disease.       TOBACCO USE:  Social History     Tobacco Use   Smoking Status Current Some Day Smoker     Packs/day: 0.50     Years: 11.00     Pack years: 5.50     Types: Cigarettes     Start date: 2009     Last attempt to quit: 2017     Years since quittin.2   Smokeless Tobacco Former User   Tobacco Comment    0.5 pack per day       VITALS:  There were no vitals filed for this visit.  Wt Readings from Last 3 Encounters:   21 214 lb (97.1 kg)   21 216 lb 4 oz (98.1 kg)   21 212 lb (96.2 kg)       PHYSICAL EXAM:  (observations via Video)  Irritated.  No shortness of breath    MEDICATIONS:   Current Outpatient Medications   Medication Sig Dispense Refill      acetaminophen (TYLENOL) 650 MG CR tablet Take 3 tablets (1,950 mg total) by mouth 2 (two) times a day. (Patient taking differently: Take 1,950 mg by mouth 2 (two) times a day. OTC) 540 tablet 3     albuterol (PROAIR HFA;PROVENTIL HFA;VENTOLIN HFA) 90 mcg/actuation inhaler INHALE 2 PUFFS BY MOUTH EVERY 6 HOURS AS NEEDED FOR WHEEZING 3 Inhaler 3     allopurinoL (ZYLOPRIM) 300 MG tablet Take 1 tablet (300 mg total) by mouth 2 (two) times a day. 180 tablet 3     ammonium lactate (LAC-HYDRIN) 12 % lotion Apply topically.       baclofen (LIORESAL) 5 mg tablet Take 1 tablet (5 mg total) by mouth 3 (three) times a day. 90 tablet 11     calcium citrate (CALCITRATE) 200 mg (950 mg) tablet Take 5 tablets (1,000 mg total) by mouth 2 (two) times a day. 180 tablet 3     dextroamphetamine-amphetamine (ADDERALL) 15 mg Tab Take 15 mg by mouth daily. 30 tablet 0     [START ON 6/6/2021] diazePAM (VALIUM) 5 MG tablet Take 1 tablet (5 mg total) by mouth 2 (two) times a day. 60 tablet 2     ergocalciferol (ERGOCALCIFEROL) 1,250 mcg (50,000 unit) capsule Take 1 capsule (50,000 Units total) by mouth every 7 days. 4 capsule 11     escitalopram oxalate (LEXAPRO) 10 MG tablet Take 10 mg by mouth daily.       fexofenadine (ALLEGRA) 180 MG tablet Take 180 mg by mouth daily as needed.       furosemide (LASIX) 40 MG tablet Take 2 tablets (80 mg total) by mouth 2 (two) times a day at 9am and 6pm.       hydrOXYzine pamoate (VISTARIL) 50 MG capsule Take 1 capsule (50 mg total) by mouth 4 (four) times a day. 120 capsule 1     lamoTRIgine (LAMICTAL) 150 MG tablet Take 1 tablet (150 mg total) by mouth 2 (two) times a day. 60 tablet 11     lansoprazole (PREVACID) 30 MG capsule Take 1 capsule (30 mg total) by mouth daily. 30 capsule 1     LATUDA 40 mg Tab tablet        lidocaine (LIDODERM) 5 % Apply 1 patch topically to painful area of skin once daily and remove per schedule. 30 patch 4     linaCLOtide (LINZESS) 145 mcg cap capsule Take 1 capsule (145  mcg total) by mouth daily. 30 capsule 11     lithium 300 MG capsule Take 600 mg by mouth 2 (two) times a day.  1     medical cannabis (Patient's own supply) by Other route see administration instructions. Take as instructed by the medical cannabis dispensary.  0     memantine (NAMENDA) 10 MG tablet Take 1 tablet (10 mg total) by mouth 2 (two) times a day. 60 tablet 11     miconazole nitrate (CRITIC-AID AF) 2 % Oint ointment Apply topically.       midazolam (VERSED) 1 mg/mL Soln Infuse 0.5-6 mg into a venous catheter.       miscellaneous medical supply Misc Wheel chair       miscellaneous medical supply [facility] bed with mattress and 1/2 rails. Semi-electric bed. Length of need 99 months. Bed extender:no. Group 1 mattress       montelukast (SINGULAIR) 10 mg tablet Take 1 tablet by mouth daily.       naloxone (NARCAN) 4 mg/actuation nasal spray 1 spray (4 mg dose) into one nostril for opioid reversal. Call 911. May repeat if no response in 3 minutes. 1 Box 0     [START ON 5/23/2021] oxyCODONE myristate (XTAMPZA ER) 18 mg CSpT Take 18 mg by mouth 6 (six) times a day for 10 days. 60 each 0     oxyMORphone (OPANA) 10 MG tablet Take 1 tablet (10 mg total) by mouth 3 (three) times a day. 30 tablet 0     polyethylene glycol (GLYCOLAX) 17 gram/dose powder Take 17 g by mouth daily. 595 g 11     potassium chloride (K-DUR,KLOR-CON) 20 MEQ tablet Take 1 tablet (20 mEq total) by mouth 2 (two) times a day. 60 tablet 11     pregabalin (LYRICA) 100 MG capsule Take 1 capsule (100 mg total) by mouth 2 (two) times a day. 60 capsule 0     QUEtiapine (SEROQUEL) 200 MG tablet Take 250 mg by mouth at bedtime.        QUEtiapine (SEROQUEL) 50 MG tablet Take 1 tablet (50 mg total) by mouth 4 (four) times a day. 120 tablet 0     senna-docusate (PERICOLACE) 8.6-50 mg tablet Take 2 tablets by mouth 2 (two) times a day. 180 tablet 3     metoprolol succinate (TOPROL-XL) 100 MG 24 hr tablet Take 1 tablet (100 mg total) by mouth daily. 90  tablet 3     predniSONE (DELTASONE) 20 MG tablet Take 20 mg by mouth daily for 7 days. 7 tablet 0     Current Facility-Administered Medications   Medication Dose Route Frequency Provider Last Rate Last Admin     cyanocobalamin injection 1,000 mcg  1,000 mcg Intramuscular Q30 Days Devon Lund MD   1,000 mcg at 04/30/21 1529       Outside Notes summarized: Pain clinic notes regarding gabapentin and Lyrica, phone notes regarding furosemide and prednisone  Mychart messages x ,  Interim orders x   Labs, x-rays, cardiology, GI tests reviewed:   New orders:   Orders Placed This Encounter   Procedures     US Venous Legs Bilateral     Standing Status:   Future     Standing Expiration Date:   5/19/2022     Order Specific Question:   Can the procedure be changed per Radiologist protocol?     Answer:   Yes     Basic Metabolic Panel     Standing Status:   Future     Standing Expiration Date:   5/19/2022       Independent review of:    Supplemental history by:      Video Start Time: 5:23 PM  Video End time:  5:48 PM  Face to face plus orders: 25 minutes  Documentation time: 3 minutes    The visit lasted a total of 28 minutes     Patient has given verbal consent to a Video visit?  Yes  How would you like to obtain your AVS?  MyChart  Will anyone else be joining your video visit? No       Video-Visit Details  Type of service:  Video Visit  Originating Location (pt. Location): Home  Distant Location (provider location):   [facility] Internal Medicine     Devon Lund MD

## 2021-06-17 NOTE — PATIENT INSTRUCTIONS - HE
PLAN:  Oxymorphone (Opana) 10 mg 3 times a day, Dr. Cramer with update in 1 week    When starting oxymorphone, decrease the Xtampza ER to 18 mg twice a day as needed.    May discontinue the gabapentin and continue with Lyrica to help with nerve pain and see if the gabapentin relates to fluid retention    He had an MRI next week and will follow up with your spine surgeon.    Continue with the diuretics with your primary care provider    Follow-up with Dr. Cramer in 4 weeks

## 2021-06-17 NOTE — TELEPHONE ENCOUNTER
Reason for Call:  Other call back      Detailed comments: Pt stating taking Lasix for fluid retention and says it is not working - pt not urinating as much as he should be  Pt stating has a lot of fluid retention in his abdomen area, legs and feet    Please advise    Phone Number Patient can be reached at:   Cell number on file:    Telephone Information:   Mobile [phone]       Best Time: anytime    Can we leave a detailed message on this number?: Yes    Call taken on 5/14/2021 at 2:52 PM by Helen Bowen

## 2021-06-17 NOTE — TELEPHONE ENCOUNTER
On May 4 by telephone note I advised increasing the furosemide to 80 mg twice a day due to a report of poor urine response and stable weight    Was that message transmitted to the staff?  Was it done?

## 2021-06-17 NOTE — PATIENT INSTRUCTIONS - HE
Increase furosemide to 80 mg twice daily-done May 4 by phone    Discontinue gabapentin May 17-done    Lyrica noted at 100 mg twice daily    Discontinue phenytoin    Bilateral venous ultrasounds to rule out DVT    Pending results either treat with anticoagulants or increase intensity of diuretics    Basic metabolic profile to reassess kidney function    Prednisone 20 mg daily for 1 week-done May 12    Discontinue hydrochlorothiazide    Refill vitamin D

## 2021-06-17 NOTE — TELEPHONE ENCOUNTER
Refill request: xtampza 18 mg  Last ov with BE: 4/19/21  Follow up in 8 weeks  Last ov with BE: 5/17/21    UDT/CSA - 1/2021     Per : 4/19/21, #84, 14 days    Requested Prescriptions     Pending Prescriptions Disp Refills     oxyCODONE myristate (XTAMPZA ER) 18 mg CSpT 168 each 0     Sig: Take 18 mg by mouth 6 (six) times a day.     [facility]

## 2021-06-17 NOTE — PROGRESS NOTES
[facility] Follow Up Note    Bola Lyon   48 y.o. male    Date of Visit: 4/2/2018    Chief Complaint   Patient presents with     Medication Management     Subjective  Bola in today for follow-up of his chronic medications.  He suffers from PTSD, bipolar disorder and TBI.  He informs me he recently did get full disability.  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  He has been certified for medical cannabis but has not been able to afford it.  He is asking if there is something that would work better for him than his current OxyContin that would last longer, he needs to have a prior authorization from his insurance for this again because his current prior authorization ends at the end of April.    ROS A comprehensive review of systems was performed and was otherwise negative.    Social History     Social History Narrative    He is .  He has been a  and also a counselor, and worked with [employer] maintenance.  He smokes and does not drink alcohol.       Medications were reconciled.  Allergies, social and family history, and the problem list were all reviewed and updated.      Exam  General Appearance: Pleasant and alert   Vitals:    04/02/18 1339   BP: 126/74   Patient Site: Left Arm   Patient Position: Sitting   Cuff Size: Adult Regular   Pulse: (!) 58   Weight: (!) 229 lb 9 oz (104.1 kg)      Body mass index is 34.9 kg/(m^2).  Wt Readings from Last 3 Encounters:   04/02/18 (!) 229 lb 9 oz (104.1 kg)   03/05/18 (!) 227 lb 4 oz (103.1 kg)   02/05/18 (!) 229 lb (103.9 kg)     HEENT: Sclera are clear.   Lungs: Normal  respirations  Abdomen: Soft and nondistended  Extremities: No edema  Skin: No rashes  Neuro: Moves all extremities and has facial symmetry  Gait: Ambulates with a normal gait    Assessment/Plan  1. Chronic, continuous use of opioids  Because it does not appear there is going to be any surgical solution in which we can then get him off chronic opioids I would really like to get another opinion from Dr. Cramer at the pain clinic with regards to his chronic use of medications and if there may be other alternatives such as Suboxone or something else for him.  Will be due for a urine drug screen next visit.  If the pain clinic accepts him, hopefully they will take over his prescribing (previously he was too overwhelmed to fill out the packets due to brain injury and other issues we were dealing with were short staffed).  Is now hoping that he will able to pay for the medical cannabis since he got onto disability.  - Ambulatory referral to Pain Clinic    2. Hypertension  Stable on medication.    3. Chronic pain syndrome  OxyContin and oxycodone are both renewed.  He needs a new prior authorization for the OxyContin.  - oxyCODONE (OXYCONTIN) 15 mg 12 hr tablet; Take 1 tablet (15 mg total) by mouth every 12 (twelve) hours.  Dispense: 60 tablet; Refill: 0  - Ambulatory referral to Pain Clinic          Abena Gentile MD  Internal and Geriatric Medicine  [facility]    Much or all of the text in this note was generated through the use of Dragon Dictate voice-to-text software. Errors in spelling or words which seem out of context are unintentional. Sound alike errors, in particular, may have escaped editing.

## 2021-06-17 NOTE — TELEPHONE ENCOUNTER
Telephone Encounter by Brittany Aranda RN at 4/27/2020 12:19 PM     Author: Brittany Aranda RN Service: -- Author Type: Registered Nurse    Filed: 4/27/2020 12:33 PM Encounter Date: 4/27/2020 Status: Signed    : Brittany Aranda RN (Registered Nurse)       Medication being requested: oxycodone 10 mg IR  Last visit date: 2/18  Provider: BE  Next visit date: 5/5 Provider: BE  Expected follow up: 8 weeks  MTM visit (Pain Center) date: no  UDT 2/2020  CSA - 2/2020   snipped in:    Pertinent between visit information about requested medication (telephone, mychart, prior authorization, concerns, comments): NA  Script being sent to provider by nurse- dates and quantity:   Requested Prescriptions     Pending Prescriptions Disp Refills   ? oxyCODONE (ROXICODONE) 10 mg immediate release tablet 112 tablet 0     Sig: Take 2 tablets (20 mg total) by mouth 4 (four) times a day for 14 days.     Pharmacy cued: [facility]  Standing orders for withdrawal protocol implemented: SAHRA

## 2021-06-17 NOTE — TELEPHONE ENCOUNTER
Spoke to Jan.  Patient advised to continue furosemide 80 mg two times a day and take Prednisone 20 mg, one daily for seven days.  Patient to report back to Dr. Lund if his symptoms do not improve.    Order faxed to care facility at [phone].

## 2021-06-17 NOTE — TELEPHONE ENCOUNTER
Telephone Encounter by Denilson Rios at 4/15/2021  3:15 PM     Author: Denilson Rios Service: -- Author Type: Patient Access    Filed: 4/15/2021  3:19 PM Encounter Date: 4/15/2021 Status: Signed    : Denilson Rios (Patient Access)       PA APPROVED:    Approval start date: 01/15/21  Approval end date:  04/15/22    I called [facility] and was transferred to [facility] that handles the prior authorization. I did leave a voicemail for her with my direct number that the medication has been approved. I called [employer] to check the status and I was informed they were going to approve the request. The Prime Rep finished up put it place while I was on phone and she let me know that she was faxing the approval letter to the [employer]'s fax. The patient may reach out to [facility] directly or they usually do reach out to the patient in regards to delivery.

## 2021-06-18 NOTE — PROGRESS NOTES
Assessment / Impression   1.  Cognitive disorder not otherwise specified, undoubtedly multifactorial in etiology  2.  Mental health diagnoses as noted previously  3.  Other health problems as noted      Plan:   1.  Wellness programming  2.  Focus on continuing to modify and improve mental illness treatments  3.  Follow-up as needed changes in clinical status  4.  Follow-up in 1 year to discuss appropriateness of further evaluation    Long conversation held with the patient who presents unaccompanied today.  I did review results of neuropsychometric testing.  Although variable effort was noted likely invalidating definite conclusions, the patient's testing year to year appears essentially unchanged.  Many aspects of the patient's less than optimal test results could be explained by ongoing mental health challenges as well as adverse effects from medication therapies.  Presently I informed the patient that I was not suspicious regarding the possibility of an emerging neurodegenerative illness here but rather feel fairly confident that mental health challenges and their treatment are playing significant role in the patient's current subjective experiences.  Having said this, however, I impressed on the patient the reality that such conclusions always need to be made with a degree of circumspection realizing that further diagnostic evaluation, depending on clinical circumstances, may be appropriate in the future.  I have invited Bola to call me at any time should he develop new concerns otherwise will get together in 1 year to discuss ongoing treatments and whether further diagnostic assessment is warranted.    Total time for evaluation 45 minutes with majority time spent in counseling.      Subjective:     HPI: Bola Lyon is a 48 y.o. male with above-noted diagnoses who returns for follow-up.  The patient did undergo left total knee replacement in April of this year.  He did undergo neuropsychometric testing  "as well.  Results of current tests remain largely invalid secondary to variable effort but overall test results appear quite similar to those obtained previously.  Although definite conclusions cannot be reached, I informed the patient that I believe that known  health conditions that could impair cognitive functioning are likely largely if not entirely related to test results obtained.  In other words, there does not appear to be strong evidence for neurodegenerative illness at least another this time.          Review of Systems:          No new complaints        Objective:     Vitals:    05/15/18 1007 05/15/18 1010   BP: 150/87 (!) 153/98   Pulse: 81 83       No results found for this or any previous visit (from the past 24 hour(s)).    Physical Exam: As noted previously.  The patient from a cognitive perspective continues to refer to difficulties with concentration described as \"the brakes coming on\" that allow the patient not to be able to continue a train of thought.  He believes that this difficulty was in large part the cause for impaired performance on neuropsychometric testing.  Clearly no evidence of confusion noted.  The patient appears to be doing well with respect to anxiety and depression at this time.              "

## 2021-06-18 NOTE — PROGRESS NOTES
Persons accompanying you (the patient) today: PT by self     How have you been doing since we saw you last? Please note any concerns.  Has some difficulty getting his words out. Getting very frustrated with it.      Please list any recent hospitalizations/surgeries/procedures you've had since we saw you last:   Yea left knee was replaced. Has had some nerve blocks as well 3 of them.   Have you had any falls since your last visit? No    Do you have any pain today? Yes: knee and back

## 2021-06-18 NOTE — PROGRESS NOTES
On license of [facility] Follow Up Note    Bola Lyon   48 y.o. male    Date of Visit: 6/21/2018    Chief Complaint   Patient presents with     Follow-up     Subjective  Bola comes in today to discuss his chronic pain issues.  He is now been on medical cannabis and has a two-week appointment coming up soon.  He feels like it is helping.  He is not yet ready to try to start tapering the opioids.  He is hopefully going to finalize his appointment with Dr. Dmitriy Cramer at the pain clinic with help and guidance regarding his chronic pain meds soon.  He continues to work with his psychiatrist and has been seen at the concussion clinic with Dr. Almanzar as well.  He continues to require nerve blocks due to his ulnar nerve entrapment.  Has an upcoming trip and may be gone up to 3 weeks starting in mid July.    ROS A comprehensive review of systems was performed and was otherwise negative.    Social History     Social History Narrative    He is .  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  He smokes and does not drink alcohol.  He is now on disability due to his brain injury and bipolar disease.       Medications were reconciled.  Allergies, social and family history, and the problem list were all reviewed and updated.    Old records reviewed: As above    Exam  General Appearance: Pleasant and alert   Vitals:    06/21/18 1211   BP: 118/72   Patient Site: Left Arm   Patient Position: Sitting   Cuff Size: Adult Regular   Pulse: 70   SpO2: 97%   Weight: (!) 233 lb 2 oz (105.7 kg)      Body mass index is 35.45 kg/(m^2).  Wt Readings from Last 3 Encounters:   06/21/18 (!) 233 lb 2 oz (105.7 kg)   05/25/18 (!) 228 lb (103.4 kg)   04/30/18 (!) 226 lb 4 oz (102.6 kg)     HEENT: Sclera are clear.   Lungs: Normal respirations  Abdomen: Soft and nondistended  Extremities: No edema  Skin: No rashes  Neuro: Moves all extremities and has facial  symmetry  Gait: Ambulates with a normal gait    Assessment/Plan  1. Chronic pain syndrome  Medical cannabis has recently been added.  He is going to see Dr. Dmitriy Cramer and hopefully can find some other alternative medications to get him off of opioids.  - oxyCODONE (OXYCONTIN) 15 mg 12 hr tablet; Take 1 tablet (15 mg total) by mouth every 12 (twelve) hours.  Dispense: 60 tablet; Refill: 0    2. Bipolar 2 disorder (H)  He continues to work with his psychiatrist.    3. Traumatic brain injury with loss of consciousness, sequela (H)  Is on chronic disability.    4. Entrapment of right ulnar nerve  He continues to get ulnar blocks.    5. Hypertension  Blood pressure is stable on his current medications.          Abena Gentile MD  Internal and Geriatric Medicine  [facility]    Much or all of the text in this note was generated through the use of Dragon Dictate voice-to-text software. Errors in spelling or words which seem out of context are unintentional. Sound alike errors, in particular, may have escaped editing.

## 2021-06-18 NOTE — PROGRESS NOTES
[facility] Follow Up Note    Bola Lyon   48 y.o. male    Date of Visit: 5/25/2018    Chief Complaint   Patient presents with     Medication Management      one month follow up     Subjective  Bola comes in today for refills of medications.  He is very pleased that he has finally gotten an appointment to see if he can get medical cannabis next week.  He also has his paperwork and in order to meet with Dr. Dmitriy Cramer hopefully soon at the pain clinic.  He remains on chronic opioid therapy.  We have been unable to taper him.  He has a lot of potential surgeries and other things that are still in the works with regards to his cervical disc disease, arm disease and knee problems.  Is been working with the brain injury clinic, Dr. Almanzar, he has a psychiatrist and has multiple orthopedic specialist as well.    ROS A comprehensive review of systems was performed and was otherwise negative.    Social History     Social History Narrative    He is .  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  He smokes and does not drink alcohol.       Medications were reconciled.  Allergies, social and family history, and the problem list were all reviewed and updated.      Exam  General Appearance: Pleasant and alert   Vitals:    05/25/18 1327 05/25/18 1343   BP: 148/76 138/78   Patient Site: Left Arm    Patient Position: Sitting    Cuff Size: Adult Large    Pulse: 92    SpO2: 98%    Weight: (!) 228 lb (103.4 kg)       Body mass index is 34.67 kg/(m^2).  Wt Readings from Last 3 Encounters:   05/25/18 (!) 228 lb (103.4 kg)   04/30/18 (!) 226 lb 4 oz (102.6 kg)   04/05/18 (!) 229 lb 1.6 oz (103.9 kg)     HEENT: Sclera are clear.   Lungs: Normal respirations  Cardiac: Regular rate and rhythm   Abdomen: Soft and nondistended  Extremities: No edema  Skin: No rashes  Neuro: Moves all extremities and has facial symmetry  Gait: Ambulates with a normal  gait    Assessment/Plan  1. Chronic, continuous use of opioids  Meds are refilled.  CSA will need to be updated in August.  He has had urine drug testing.  Continue to encourage him to work with medical cannabis to see if we may be able to reduce his opioids and to get established in the pain clinic with Dr. Cramer for help regarding chronic meds and the best choices for him with his other medications and his history of brain injury and psychiatric disorder.    2. Cervical radiculopathy at C8  Has had injections.    3. Traumatic brain injury with loss of consciousness, sequela  Works with the brain injury center and is on disability.    4. Bipolar 2 disorder  He does have a psychiatrist and is on chronic medications.    5. Chronic pain syndrome  - oxyCODONE (OXYCONTIN) 15 mg 12 hr tablet; Take 1 tablet (15 mg total) by mouth every 12 (twelve) hours.  Dispense: 60 tablet; Refill: 0    6. Essential hypertension  Pressure is not at goal.  Will increase his losartan from  mg daily.  - losartan (COZAAR) 100 MG tablet; Take 1 tablet (100 mg total) by mouth daily.  Dispense: 90 tablet; Refill: 5 April D MD Krupa  Internal and Geriatric Medicine  [facility]    Much or all of the text in this note was generated through the use of Dragon Dictate voice-to-text software. Errors in spelling or words which seem out of context are unintentional. Sound alike errors, in particular, may have escaped editing.

## 2021-06-19 NOTE — PROGRESS NOTES
[facility] Follow Up Note    Bola Lyon   48 y.o. male    Date of Visit: 7/19/2018    Chief Complaint   Patient presents with     Follow-up     Subjective  Bola comes in today for chronic pain management.  He has been started on medical cannabis and it is improving his pain but he has yet to try cutting back on his chronic opioids.  He is going to call and make an appointment to see Dr. Dmitriy Cramer for when he returns from a trip that is planned at the end of the month for about 3-6 weeks.  He may be needing to request extra pain medication.    ROS A comprehensive review of systems was performed and was otherwise negative.    Social History     Social History Narrative    He is .  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  He smokes and does not drink alcohol.  He is now on disability due to his brain injury and bipolar disease.       Medications were reconciled.  Allergies, social and family history, and the problem list were all reviewed and updated.    Exam  General Appearance: Pleasant and alert   Vitals:    07/19/18 1034   BP: 110/68   Patient Site: Left Arm   Patient Position: Sitting   Cuff Size: Adult Large   Pulse: 82   SpO2: 97%   Weight: (!) 229 lb 2 oz (103.9 kg)      Body mass index is 34.84 kg/(m^2).  Wt Readings from Last 3 Encounters:   07/19/18 (!) 229 lb 2 oz (103.9 kg)   06/21/18 (!) 233 lb 2 oz (105.7 kg)   05/25/18 (!) 228 lb (103.4 kg)     HEENT: Sclera are clear.   Lungs: Normal respirations  Abdomen: Soft and nondistended  Extremities: No edema  Skin: No rashes  Neuro: Moves all extremities and has facial symmetry  Gait: Ambulates with a normal gait    Assessment/Plan  1. Chronic pain syndrome  Meds are renewed.  He has an active controlled substance agreement.  He is going to be meeting with Dr. Dmitriy Cramer, pain specialist and psychiatrist.  Complicating problems are his chronic bipolar  disorder and traumatic brain injury.  Unsure if he would benefit from ketamine or not.  - oxyCODONE (OXYCONTIN) 15 mg 12 hr tablet; Take 1 tablet (15 mg total) by mouth every 12 (twelve) hours.  Dispense: 60 tablet; Refill: 0    2. Traumatic brain injury with loss of consciousness, sequela (H)  As up with Dr. Almanzar, Dr. Cramer might be able to provide insight with medications best for this.    3. Bipolar 2 disorder (H)  Sees psychiatry.          Abena Gentile MD  Internal and Geriatric Medicine  [facility]    Much or all of the text in this note was generated through the use of Dragon Dictate voice-to-text software. Errors in spelling or words which seem out of context are unintentional. Sound alike errors, in particular, may have escaped editing.

## 2021-06-19 NOTE — LETTER
Letter by Devon Lund MD at      Author: Devon Lund MD Service: -- Author Type: --    Filed:  Encounter Date: 10/18/2019 Status: Signed         [facility]  10/18/19    Patient: Bola Lyon  YOB: 1970  Medical Record Number: [id number]  CSN: [id number]                                                                              Non-opioid Controlled Substance Agreement    I understand that my care provider has prescribed a controlled substance to help manage my condition(s). I am taking this medicine to help me function or work. I know this is strong medicine, and that it can cause serious side effects. Controlled substances can be sedating, addicting and may cause a dependency on the drug. They can affect my ability to drive or think, and cause depression. They need to be taken exactly as prescribed. Combining controlled substances with certain medicines or chemicals (such as cocaine, sedatives and tranquilizers, sleeping pills, meth) can be dangerous or even fatal. Also, if I stop controlled substances suddenly, I may have severe withdrawal symptoms.  If not helpful, I may be asked to stop them.    The risks, benefits, and side effects of these medicine(s) were explained to me. I agree that:    1. I will take part in other treatments as advised by my care team. This may be psychiatry or counseling, physical therapy, behavioral therapy, group treatment or a referral to a pain clinic. I will reduce or stop my medicine when my care team tells me to do so.  2. I will take my medicines as prescribed. I will not change the dose or schedule unless my care team tells me to. There will be no refills if I run out early.  I may be contactedwithout warning and asked to complete a urine drug test or pill count at any time.   3. I will keep all my appointments, and understand this is part of the monitoring of controlled substances. My care team may require an office visit for EVERY  controlled substance refill. If I miss appointments or dont follow instructions, my care team may stop my medicine.  4. I will not ask other providers to prescribe controlled substances, and I will not accept controlled substances from other people. If I need another prescribed controlled substance for a new reason, I will tell my care team within 1 business day.  5. I will use one pharmacy to fill all of my controlled substance prescriptions, and it is up to me to make sure that I do not run out of my medicines on weekends or holidays. If my care team is willing to refill my controlled substance prescription without a visit, I must request refills only during office hours, refills may take up to 3 days to process, and it may take up to 5 to 7 days for my medicine to be mailed and ready at my pharmacy. Prescriptions will not be mailed anywhere except my pharmacy.    6. I am responsible for my prescriptions. If the medicine/prescription is lost or stolen, it will not be replaced. I also agree not to share controlled substance medicines with anyone.          [facility] INTERNAL MEDICINE  10/18/19  Patient:  Bola Lyon  YOB: 1970  Medical Record Number: [id number]  CSN: [id number]    7. I agree to not use ANY illegal or recreational drugs. This includes marijuana, cocaine, bath salts or other drugs. I agree not to use alcohol unless my care team says I may. I agree to give urine samples whenever asked. If I dont give a urine sample, the care team may stop my medicine.    8. If I enroll in the [place] Medical Marijuana program, I will tell my care team. I will also sign an agreement to share my medical records with my care team.    9. I will bring in my list of medicines (or my medicine bottles) each time I come to the clinic.   10. I will tell my care team right away if I become pregnant or have a new medical problem treated outside of my regular clinic.  11. I understand that this medicine can affect my  thinking and judgment. It may be unsafe for me to drive, use machinery and do dangerous tasks. I will not do any of these things until I know how the medicine affects me. If my dose changes, I will wait to see how it affects me. I will contact my care team if I have concerns about medicine side effects.    I understand that if I do not follow any of the conditions above, my prescriptions or treatment may be stopped.      I agree that my provider, clinic care team, and pharmacy may work with any city, state or federal law enforcement agency that investigates the misuse, sale, or other diversion of my controlled medicine. I will allow my provider to discuss my care with or share a copy of this agreement with any other treating provider, pharmacy or emergency room where I receive care. I agree to give up (waive) any right of privacy or confidentiality with respect to these consents.   I have read this agreement and have asked questions about anything I did not understand.    ___________________________________________________________________________  Patient signature - Date/Time  -Bola Lyon                                      ___________________________________________________________________________  Witness signature                                                                    ___________________________________________________________________________  Provider signature- Devon Lund MD

## 2021-06-19 NOTE — LETTER
Letter by Devon Lund MD at      Author: Devon Lund MD Service: -- Author Type: --    Filed:  Encounter Date: 8/19/2019 Status: (Other)         [facility] INTERNAL MEDICINE  08/19/19    Patient: Bola Lyon  YOB: 1970  Medical Record Number: [id number]  CSN: [id number]                                                                              Non-opioid Controlled Substance Agreement    I understand that my care provider has prescribed a controlled substance to help manage my condition(s). I am taking this medicine to help me function or work. I know this is strong medicine, and that it can cause serious side effects. Controlled substances can be sedating, addicting and may cause a dependency on the drug. They can affect my ability to drive or think, and cause depression. They need to be taken exactly as prescribed. Combining controlled substances with certain medicines or chemicals (such as cocaine, sedatives and tranquilizers, sleeping pills, meth) can be dangerous or even fatal. Also, if I stop controlled substances suddenly, I may have severe withdrawal symptoms.  If not helpful, I may be asked to stop them.    The risks, benefits, and side effects of these medicine(s) were explained to me. I agree that:    1. I will take part in other treatments as advised by my care team. This may be psychiatry or counseling, physical therapy, behavioral therapy, group treatment or a referral to a pain clinic. I will reduce or stop my medicine when my care team tells me to do so.  2. I will take my medicines as prescribed. I will not change the dose or schedule unless my care team tells me to. There will be no refills if I run out early.  I may be contactedwithout warning and asked to complete a urine drug test or pill count at any time.   3. I will keep all my appointments, and understand this is part of the monitoring of controlled substances. My care team may require an office visit for EVERY  controlled substance refill. If I miss appointments or dont follow instructions, my care team may stop my medicine.  4. I will not ask other providers to prescribe controlled substances, and I will not accept controlled substances from other people. If I need another prescribed controlled substance for a new reason, I will tell my care team within 1 business day.  5. I will use one pharmacy to fill all of my controlled substance prescriptions, and it is up to me to make sure that I do not run out of my medicines on weekends or holidays. If my care team is willing to refill my controlled substance prescription without a visit, I must request refills only during office hours, refills may take up to 3 days to process, and it may take up to 5 to 7 days for my medicine to be mailed and ready at my pharmacy. Prescriptions will not be mailed anywhere except my pharmacy.    6. I am responsible for my prescriptions. If the medicine/prescription is lost or stolen, it will not be replaced. I also agree not to share controlled substance medicines with anyone.          [facility] INTERNAL MEDICINE  08/19/19  Patient:  Bola Lyon  YOB: 1970  Medical Record Number: [id number]  CSN: [id number]    7. I agree to not use ANY illegal or recreational drugs. This includes marijuana, cocaine, bath salts or other drugs. I agree not to use alcohol unless my care team says I may. I agree to give urine samples whenever asked. If I dont give a urine sample, the care team may stop my medicine.    8. If I enroll in the [place] Medical Marijuana program, I will tell my care team. I will also sign an agreement to share my medical records with my care team.    9. I will bring in my list of medicines (or my medicine bottles) each time I come to the clinic.   10. I will tell my care team right away if I become pregnant or have a new medical problem treated outside of my regular clinic.  11. I understand that this medicine can affect my  thinking and judgment. It may be unsafe for me to drive, use machinery and do dangerous tasks. I will not do any of these things until I know how the medicine affects me. If my dose changes, I will wait to see how it affects me. I will contact my care team if I have concerns about medicine side effects.    I understand that if I do not follow any of the conditions above, my prescriptions or treatment may be stopped.      I agree that my provider, clinic care team, and pharmacy may work with any city, state or federal law enforcement agency that investigates the misuse, sale, or other diversion of my controlled medicine. I will allow my provider to discuss my care with or share a copy of this agreement with any other treating provider, pharmacy or emergency room where I receive care. I agree to give up (waive) any right of privacy or confidentiality with respect to these consents.   I have read this agreement and have asked questions about anything I did not understand.    ___________________________________________________________________________  Patient signature - Date/Time  -Bola Lyon                                      ___________________________________________________________________________  Witness signature                                                                    ___________________________________________________________________________  Provider signature- Devon uLnd MD

## 2021-06-19 NOTE — LETTER
Letter by Agustina Moeller at      Author: Agustina Moeller Service: -- Author Type: --    Filed:  Encounter Date: 7/31/2019 Status: (Other)         Bola MCDONALD Camron  [address] [place] [address]             Dear Bola:    This is a reminder about your upcoming appointment at [facility].  You are scheduled with Dr. Almanzar on September 9th at 11:30AM.    Enclosed may be forms to be filled out specific to your appointment with us, along with added information that may be helpful, such as a map, brochures regarding your appointment, fliers for support groups or activities, also information on [employer] discounts, which includes reduced parking.  Please bring completed forms, insurance cards, photo ID and a current list of medications with you to your appointment.  It will also be helpful to wear glasses and/or hearing aids, if prescribed, as there are more forms to be signed at check-in.    To prepare for your appointment, please obtain prior records from other facilities such as scans on disc, consult reports and/or testing.  This will help our providers ensure a more accurate approach to serve your, or your loved one's needs.  It may be helpful to have family and/or friends to accompany you to your appointment, if possible, given the amount of new information you may receive.    Our clinic is located inside of [facility] on the [facility].  You can park in our parking ramp that is attached to the hospital by the skyway.  Take the skyway across to the hospital.  You will take the first set of elevators down to the [facility], take a right out of the elevators and down the mejia, on the left, you will see our waiting room and the check-in desk.  You will check-in at that desk.  If you have any questions or concerns, please do not hesitate to call.    Sincerely,    The staff at [facility]

## 2021-06-20 NOTE — PROGRESS NOTES
Formerly [facility] Follow Up Note    Bola Lyon   48 y.o. male    Date of Visit: 9/20/2018    Chief Complaint   Patient presents with     Follow-up     meds     Subjective  Bola comes in today for follow-up of chronic medical issues including hypertension and his chronic pain syndrome.  He is still waiting to get in to see Dr. Dmitriy Cramer from the pain clinic and for psychiatric issues.  He is also followed by Dr. Almanzar regarding his traumatic brain injury.  We have been unable to reduce or get him off of opioids and he is hoping that Dr. Cramer can help with that and come up with some alternatives.  He is using some medical cannabis.  Would like to hold off on introduction of anything else until he meets Dr. Cramer.  His underlying bipolar disease complicates his medication and therapy choice.    ROS A comprehensive review of systems was performed and was otherwise negative.    Social History     Social History Narrative    He is .  He has been a  and also a counselor, and worked with [employer].  He smokes and does not drink alcohol.  He is now on disability due to his brain injury and bipolar disease.       Medications were reconciled.  Allergies, social and family history, and the problem list were all reviewed and updated.    Exam  General Appearance: Pleasant and alert   Vitals:    09/20/18 1121   BP: 100/66   Patient Site: Left Arm   Patient Position: Sitting   Cuff Size: Adult Large   Pulse: 82   SpO2: 98%   Weight: (!) 230 lb 8 oz (104.6 kg)      Body mass index is 35.05 kg/(m^2).  Wt Readings from Last 3 Encounters:   09/20/18 (!) 230 lb 8 oz (104.6 kg)   08/23/18 (!) 229 lb 2 oz (103.9 kg)   07/19/18 (!) 229 lb 2 oz (103.9 kg)     HEENT: Sclera are clear.   Lungs: Normal respirations  Abdomen: Soft and nondistended  Extremities: No edema  Skin: No rashes  Neuro: Moves all extremities and has facial symmetry  Gait: Ambulates with a  normal gait    Assessment/Plan  1. Chronic, continuous use of opioids  Updated CSA was done last month.   has been reviewed.  Turns of methods including medical cannabis, multiple therapies have all been done as well and he is going to be establishing with the pain clinic with Dr. Dmitriy Cramer in the near future.  - Ambulatory referral to Pain Clinic    2. Chronic pain syndrome  - oxyCODONE (OXYCONTIN) 15 mg 12 hr tablet; Take 1 tablet (15 mg total) by mouth every 12 (twelve) hours.  Dispense: 60 tablet; Refill: 0  - Ambulatory referral to Pain Clinic    3. Hypertension  Stable on current medications, he is due for labs.  - Basic Metabolic Panel  - HM2(CBC w/o Differential)  - Thyroid Stimulating Hormone (TSH)  - Hepatic Profile  - Lipid Cascade    4. Vitamin D deficiency  - Vitamin D, Total (25-Hydroxy)    5. Encounter for screening for malignant neoplasm of prostate  - PSA (Prostatic-Specific Antigen), Annual Screen          April D MD Krupa  Internal and Geriatric Medicine  [facility]    Much or all of the text in this note was generated through the use of Dragon Dictate voice-to-text software. Errors in spelling or words which seem out of context are unintentional. Sound alike errors, in particular, may have escaped editing.

## 2021-06-20 NOTE — PROGRESS NOTES
[facility] Follow Up Note    Bola Lyon   48 y.o. male    Date of Visit: 8/23/2018    Chief Complaint   Patient presents with     Follow-up     meds     Subjective  Bola has chronic pain syndrome related to multiple surgeries, spine disease with radiculopathy knees and also has underlying bipolar disease and traumatic brain injury.  He is still trying to get established with Dr. Dmitriy Cramer at the pain clinic, he sees a psychiatrist and gets a lot of his medications through them.  His blood pressures been good.  Is a bit disappointed as a long trip he had planned fell through and now he is going to be planning on a weeklong trip next month.  He is using medical cannabis with some relief and is hoping to work with Dr. Cramer getting off of some of the opioids and onto different regimens.    ROS A comprehensive review of systems was performed and was otherwise negative.    Social History     Social History Narrative    He is .  He has been a  and also a counselor, and worked with [employer].  He smokes and does not drink alcohol.  He is now on disability due to his brain injury and bipolar disease.       Medications were reconciled.  Allergies, social and family history, and the problem list were all reviewed and updated.      Exam  General Appearance: Pleasant and alert   Vitals:    08/23/18 1038   BP: 114/76   Patient Site: Left Arm   Patient Position: Sitting   Cuff Size: Adult Large   Pulse: 80   SpO2: 97%   Weight: (!) 229 lb 2 oz (103.9 kg)      Body mass index is 34.84 kg/(m^2).  Wt Readings from Last 3 Encounters:   08/23/18 (!) 229 lb 2 oz (103.9 kg)   07/19/18 (!) 229 lb 2 oz (103.9 kg)   06/21/18 (!) 233 lb 2 oz (105.7 kg)     HEENT: Sclera are clear.   Lungs: Normal respirations  Abdomen: Soft and nondistended  Extremities: No edema  Skin: No rashes  Neuro: Moves all extremities and has facial symmetry  Gait: Ambulates with a  normal gait    Assessment/Plan  1. Chronic pain syndrome  Updated CSA is signed.  He had a U tox couple months ago.  Continues to try and use medical cannabis in hopes of decreasing his opioid dependence and is going to be establishing with Dr. Dmitriy Cramer in the pain clinic soon.  - oxyCODONE (OXYCONTIN) 15 mg 12 hr tablet; Take 1 tablet (15 mg total) by mouth every 12 (twelve) hours.  Dispense: 60 tablet; Refill: 0    2. Chronic, continuous use of opioids    3. Controlled substance agreement signed      Current treatment plan: OxyContin with breakthrough oxycodone, medical cannabis  Reviewed goals of care.  Benefits continue to outweigh the risks of continued use of opioid analgesia.  Prescription monitoring database has been reviewed and shows no aberrant use.  PEG Pain Screening Tool completed and reviewed and patient continues to demonstrate functional improvement as a result of opioid analgesia.  Reviewed side effects from current therapy.  Plan to follow-up in 1 months  Number of refills allowed prior to follow-up visit: 0 April D MD Krupa  Internal and Geriatric Medicine  [facility]    Much or all of the text in this note was generated through the use of Dragon Dictate voice-to-text software. Errors in spelling or words which seem out of context are unintentional. Sound alike errors, in particular, may have escaped editing.

## 2021-06-21 ENCOUNTER — RECORDS - HEALTHEAST (OUTPATIENT)
Dept: ADMINISTRATIVE | Facility: OTHER | Age: 51
End: 2021-06-21

## 2021-06-21 NOTE — LETTER
Letter by Joslyn Zhu CHW at      Author: Joslyn Zhu CHW Service: -- Author Type: --    Filed:  Encounter Date: 11/9/2020 Status: (Other)         November 9, 2020            Bola Lyon  [address] [place] [address]        Dear Bola,    After you enrolled with Clinic Care Coordination we established your goals and how as a Care Guide I can work to keep you connected to your provider and care team, we both agreed that in order to best help give you the access, support and resources you need to reach your goals; and most importantly, for you to get and stay healthy, we would connect monthly.    I have been unable to reach you for 1.5 months.      I am writing to ask you, a family member or your representative to call me at [phone].  If you reach my voicemail, please leave a message with your daytime telephone number and a date and time that I can call you.      Please call me as soon as you receive this letter.  I look forward to speaking with you.          Sincerely,  Magalis Zhu- CHW  Community Health Worker  [phone]

## 2021-06-21 NOTE — PROGRESS NOTES
"Replaced by [facility] Follow Up Note    Bola Lyon   48 y.o. male    Date of Visit: 10/19/2018    Chief Complaint   Patient presents with     Follow-up     meds     Subjective  Bola \"Jan\" comes in today for follow-up of his chronic pain issues with continuous opioid use which is related to knee injuries, osteoarthritis, cervical disc disease, shoulder disease and injuries and also has underlying traumatic brain injury, PTSD and bipolar disorder.  He is on chronic disability related to all of these issues.  He has a hard time focusing and filling out appropriate paperwork and following through on tasks due to feelings of being overwhelmed.  He is quite nervous about my upcoming departure and feels as though he may \"fall through the cracks\" and end up in the hospital on the psychiatric betancourt.  He still does not have an appointment to see Dr. Carmer due to missing \"paperwork\" and he is planning on going there today after this appointment to personally make an appointment and take care of leftover paperwork.    ROS A comprehensive review of systems was performed and was otherwise negative.    Social History     Social History Narrative    He is .  He has been a  and also a counselor, and worked with [employer].  He smokes and does not drink alcohol.  He is now on disability due to his brain injury and bipolar disease.       Medications were reconciled.  Allergies, social and family history, and the problem list were all reviewed and updated.      Exam  General Appearance: Pleasant and alert   Vitals:    10/19/18 0839   BP: 128/82   Patient Site: Left Arm   Patient Position: Sitting   Cuff Size: Adult Large   Pulse: 88   SpO2: 97%   Weight: (!) 233 lb 3 oz (105.8 kg)      Body mass index is 35.46 kg/(m^2).  Wt Readings from Last 3 Encounters:   10/19/18 (!) 233 lb 3 oz (105.8 kg)   09/20/18 (!) 230 lb 8 oz (104.6 kg)   08/23/18 (!) 229 lb 2 oz (103.9 kg) "     HEENT: Sclera are clear.   Lungs: Normal respirations  Abdomen: Soft and nondistended  Extremities: No edema  Skin: No rashes  Neuro: Moves all extremities and has facial symmetry  Gait: Ambulates with a normal gait    Assessment/Plan  1. Chronic, continuous use of opioids  Has an updated CSA and U tox.  Needs updated prior authorization for insurance at the end of the month.  He is going to see our Pharm.D. again and will establish with Dr. Lund upon my departure.  He is going to see Dr. Dmitriy Cramer for consultation and hopefully the pain clinic can take over his prescriptions.  He has failed trials of reducing his opioids but is on medical cannabis and is open to other alternative medications which need a bit more expertise in administering due to his underlying brain injury and psychiatric illness with multiple current medications.  - Ambulatory referral to Medication Management    2. Chronic pain syndrome  - oxyCODONE (OXYCONTIN) 15 mg 12 hr tablet; Take 1 tablet (15 mg total) by mouth every 12 (twelve) hours.  Dispense: 60 tablet; Refill: 0    3. Bipolar 2 disorder (H)  He is followed by Dr. Perez from psychiatry    4. Hypertension  Stable on medication.          Abena Gentile MD  Internal and Geriatric Medicine  [facility]    Much or all of the text in this note was generated through the use of Dragon Dictate voice-to-text software. Errors in spelling or words which seem out of context are unintentional. Sound alike errors, in particular, may have escaped editing.

## 2021-06-21 NOTE — PROGRESS NOTES
Date of Service: 10/31/18    Date last seen:  09/27/17    PCP: Abena Gentile MD    Impression:   1. Left leg swelling and testicular swelling - stable  2. Left leg primary lymphedema superimposed on secondary lymphedema-doing very well   3. Testicular pain with ilioinguinal neuralgia s/p hernia repair - stable    Plan:   1. Questions were answered.   2. Bandaging at night to keep left leg swelling down as needed.  3. Continue under armour compression trunks and compression socks. New compression ordered.  Discussed importance of replacing regularly.  4. Knows when to use antibiotics.  5. Follow up 1 year or when needed.  If any problems to let us know.   6.  Discussed ways to stretch the left inguinal scarred area.    Time spent with patient 25 minutes with greater than 50% time in consultation, education and coordination of care.   ---------------------------------------------------------------------------------------------------------------------     Chief Complaint: Left leg swelling and left lower abdomen testicular swelling and pain     History of Present Illness:   Bola Lyon returns to the [facility] for follow up of left leg swelling with testicular swelling and left lower abdominal pain.  This has been felt to be due to lymphedema with ilioinguinal neuralgia on the left side after surgery.  The patient's previous treatment has included MLD, education, compression bandaging, lymphatic exercise, range of motion work and exercise. He continues to use compression trunks and compression stockings with exercise and self massage.     The groin swelling is under good control.  He still notices tightness in the left groin if he does not continue to stretch it regularly.  There has been no new numbness, tingling, weakness, masses, rashes, shortness of breath or chest pain. There have not been any new areas of ulceration. There has been no new fevers. There  are no new or changing skin lesions. His lymphatic studies showed hyperplasia of the left side lymphatics.       Past Medical History:   Diagnosis Date     AC (acromioclavicular) arthritis 10/24/2011     Aftercare following surgery of the musculoskeletal system 4/25/2012     Anxiety Disorder NOS     Created by Conversion      Bipolar 2 disorder (H)      Cervical Radiculopathy     Created by Conversion      Claustrophobia      Disturbance of skin sensation 11/30/2011     Encounter for chronic pain management 2/11/2015     GERD (gastroesophageal reflux disease)      Hypertension     Created by Conversion      Impingement Of The Right Shoulder     Created by Conversion       Lymphedema 02/11/2015    left     Pain in joint, shoulder region 10/10/2011     Panic attacks      PTSD (post-traumatic stress disorder)      TBI (traumatic brain injury) (H)     post concussion syndrome       Past Surgical History:   Procedure Laterality Date     ANTERIOR / POSTERIOR COMBINED FUSION CERVICAL SPINE  2013, redo in 2014    4 levels     HAND SURGERY Left 1997     HERNIA REPAIR Left 2006     HERNIA REPAIR Right 2008    and middle     KNEE ARTHROSCOPY  2014     NASAL POLYP SURGERY  2014    and septal repair, Dr. Arcos     SD ARTHRODESIS,ANKLE,OPEN Right 2007    Ankle fusion     SD SHLDR ARTHROSCOP,SURG,W/ROTAT CUFF REPR Right 11/25/2015    Procedure: RIGHT SHOULDER ARTHROSCOPY, ROTATOR CUFF REPAIR, DECOMPRESSION, DEBRIDEMENT, DISTAL CLAVICLE EXCISION;  Surgeon: Pilo Bruce MD;  Location: [facility];  Service: Orthopedics     REPLACEMENT TOTAL KNEE Left 03/20/2017     SHOULDER ARTHROSCOPY DISTAL CLAVICLE EXCISION AND OPEN ROTATOR CUFF REPAIR Bilateral 2005 and 2013     VENTRAL HERNIA REPAIR  2008       Current Outpatient Prescriptions   Medication Sig Dispense Refill     acetaminophen (TYLENOL) 650 MG CR tablet Take 1,300 mg by mouth every 8 (eight) hours as needed for pain.       amLODIPine (NORVASC) 10 MG tablet Take 1  tablet (10 mg total) by mouth daily. 90 tablet 3     busPIRone (BUSPAR) 10 MG tablet Take 1 tablet (10 mg total) by mouth 3 (three) times a day as needed. 90 tablet prn     calcium carbonate-vitamin D3 (CALTRATE 600 PLUS D3) 600 mg(1,500mg) -400 unit per tablet TAKE THREE TABLETS BY MOUTH ONCE DAILY 90 tablet 6     cetirizine (ZYRTEC) 10 MG tablet TAKE ONE TABLET BY MOUTH ONCE DAILY 90 tablet 3     FLUoxetine (PROZAC) 20 MG capsule Take 20 mg by mouth daily.       fluticasone (FLONASE) 50 mcg/actuation nasal spray USE TWO SPRAY(S) IN EACH NOSTRIL ONCE DAILY 16 g 5     gabapentin (NEURONTIN) 400 MG capsule Take 1,600 mg by mouth daily.       hydrOXYzine HCl (ATARAX) 25 MG tablet Take 1 tablet (25 mg total) by mouth 4 (four) times a day. 120 tablet prn     ibuprofen (ADVIL,MOTRIN) 400 MG tablet Take 800 mg by mouth 2 (two) times a day.       lamoTRIgine (LAMICTAL) 25 MG tablet Take 25 mg by mouth 2 (two) times a day.   0     losartan (COZAAR) 100 MG tablet Take 1 tablet (100 mg total) by mouth daily. 90 tablet 5     Medical Cannabis Use 1 Units As Directed. Take as instructed by the medical cannabis dispensary. The provider who enrolled the patient in the medical cannabis registry and certified this  patient will maintain ownership and liability of all provider reporting and registration requirements.       meloxicam (MOBIC) 15 MG tablet TAKE ONE TABLET BY MOUTH ONCE DAILY 30 tablet 17     metoprolol succinate (TOPROL-XL) 100 MG 24 hr tablet Take two tablets (200 mg total) daily. 180 tablet 3     NARCAN 4 mg/actuation nasal spray   0     nicotine (NICODERM CQ) 21 mg/24 hr Place 1 patch on the skin daily. 30 patch 1     omeprazole (PRILOSEC) 40 MG capsule TAKE 1 CAPSULE (40 MG TOTAL) BY MOUTH DAILY, please do a PA if needed 90 capsule prn     oxyCODONE (OXYCONTIN) 15 mg 12 hr tablet Take 1 tablet (15 mg total) by mouth every 12 (twelve) hours. 60 tablet 0     oxyCODONE (ROXICODONE) 5 MG immediate release tablet Take  1-2 tablets (5-10 mg total) by mouth every 4 (four) hours as needed for pain. 210 tablet 0     pseudoephedrine (SUDAFED) 120 mg 12 hr tablet TAKE ONE TABLET BY MOUTH TWICE DAILY AS NEEDED FOR  CONGESTION 180 tablet 5     QUEtiapine (SEROQUEL) 200 MG tablet Take 200 mg by mouth bedtime.       QUEtiapine (SEROQUEL) 50 MG tablet Take 50 mg by mouth 4 (four) times a day.       ranitidine (ZANTAC) 300 MG tablet TAKE ONE TABLET BY MOUTH ONCE DAILY AT BEDTIME 30 tablet 17     temazepam (RESTORIL) 30 mg capsule   1     THERA-M 9 mg iron-400 mcg Tab tablet TAKE ONE TABLET BY MOUTH ONCE DAILY 30 tablet 10     THEREMS-M 27-0.4 mg Tab 1 daily 90 tablet 3     VENTOLIN HFA 90 mcg/actuation inhaler INHALE 2 PUFFS BY MOUTH EVERY 6 HOURS AS NEEDED FOR WHEEZING 18 each 3     lidocaine HCl 3 % Crea Apply topically to affected areas twice daily 1 Tube 5     No current facility-administered medications for this visit.        No Known Allergies    History     Social History     Marital status:      Spouse name: N/A     Number of children: N/A     Years of education: N/A     Occupational History     Not on file.     Social History Main Topics     Smoking status: Current Every Day Smoker     Packs/day: 0.50     Years: 6.00     Types: Cigarettes     Start date: 8/20/2009     Smokeless tobacco: Former User     Alcohol use: No     Drug use: No     Sexual activity: Not Currently     Partners: Female     Other Topics Concern     Not on file     Social History Narrative    He is . He is currently staying with his parents as he is recuperating from multiple recent surgeries. 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 He rarely drinks alcohol and has been cutting back on his cigarette use.           Family History   Problem Relation Age of Onset     Hypertension Father      Lymphoma Father      Bipolar disorder Mother      Diabetes Maternal Grandmother      Diabetes Maternal  Grandfather      Colon cancer Paternal Grandmother      Diabetes Paternal Grandmother      Diabetes Paternal Grandfather      Anesthesia problems Neg Hx        Review of Systems:  Bola Lyon no new numbess, tingling or weakness, redness or rashes, fevers, new masses, abdominal bloating or discomfort, unexplained weight loss, new ulcers, shortness of breath and chest pain  Full 12 point review of systems was completed.    Labs:    I personally reviewed the following labs today and those on care everywhere, if indicated    Lab Results   Component Value Date    SEDRATE 3 12/23/2016         Lab Results   Component Value Date    CRP <0.1 02/27/2015           Lab Results   Component Value Date    CREATININE 0.87 09/20/2018      No results found for: HGBA1C        Lab Results   Component Value Date    BUN 11 09/20/2018              Lab Results   Component Value Date    ALBUMIN 4.2 09/20/2018       Vitamin D, Total (25-Hydroxy)   Date Value Ref Range Status   09/20/2018 34.9 30.0 - 80.0 ng/mL Final       Lab Results   Component Value Date    TSH 0.53 09/20/2018     Lab Results   Component Value Date    WBC 8.4 09/20/2018    HGB 14.2 09/20/2018    HCT 40.7 09/20/2018    MCV 83 09/20/2018     09/20/2018     Imaging:    I personally reviewed the following imaging today and those on care everywhere, if indicated    Physical Exam:  Vitals:    10/31/18 1525   BP: 119/66   Pulse: 66   Resp: 24   Temp: 99.1  F (37.3  C)   SpO2: 96%     BMI 35.28 (stable)    Circumferential measures:    Vasc Edema 7/28/2016 3/30/2017 5/11/2017 9/27/2017 10/31/2018   Right just above MTP 24.5 23 23 23.5 22.7   Right Ankle 23.5 25 23.2 23.3 23.9   Right Widest Calf 41 39.6 40.5 42.0 41.6   Right Thigh Up 10cm 47 46 46 52.5 51.2   Left - just above MTP 25 24 23 24.0 23.4   Left Ankle 24.5 26.5 24 23.8 24.2   Left Widest Calf 39 44 41 43.0 43.1   Left Thigh Up 10cm 45.8 55.5 52.5 54.5 51.1     Left leg swelling under good  control.    General:  48 y.o. male in no apparent distress.      Psych:  Alert and oriented x 3.  Cooperative. Affect normal.    Abdominal:  Normal bowel sounds.  No masses, tenderness, guarding or rigidity.  No inguinal lymphadenopathy, but continued slight left inguinal scarring and fullness palpated.      Musculoskeletal: Range of motion knees and ankles are normal bilaterally. No pain to palpation.  No rubor or calor.     Neuromuscular skeletal:  Sensation decreased to pinprick and light touch in the left leg which is old and unchanged.  Strength testing is normal in hip flexion, hip extension, knee flexion/extension and ankle dorsiflexion and great toe extension bilaterally.        Vascular: Dorsalis pedis and posterior tibialis pulses are strong and equal bilaterally. There are no significant telangietasias, medial ankle venous flares, venous varicosities  and spider veins .      Integumentary: Skin of the bilateral lower extremities is normal.  Nails are thickened.     Yaa Vásquez MD, ABWMS, FACCWS, [facility]  Medical Director Wound Care and Lymphedema  [facility]  [phone]

## 2021-06-21 NOTE — LETTER
Letter by Devon Lund MD at      Author: Devon Lund MD Service: -- Author Type: --    Filed:  Encounter Date: 5/7/2021 Status: (Other)         May 12, 2021     Patient: Bola Lyon   YOB: 1970           To Whom it May Concern:    Prednisone 20 mg, one daily for seven days.    If you have any questions or concerns, please don't hesitate to call.    Sincerely,         Electronically signed by Devon Lund MD

## 2021-06-21 NOTE — PROGRESS NOTES
[facility] Follow Up Encounter  Assessment & Plan                                                     1. Chronic pain syndrome  Stable. It appears prior authorization form for OxyContin 15 mg tablets has caused significant anxiety. Reassured patient that PA would be taken care and that he will be able to get his medication on time.  He will be establishing with Dr. Lund in November, however will need a refill prior to that time.  It is the expectation that Dr. Cramer will take over prescribing for his pain, and ongoing Dr. Lund will take care of his other conditions.  He has not yet decreased his opioids since starting medical cannabis, but understands this will likely happen when he meets with Dr. Cramer.  He does continue to have pain now on his lower dose of opioid, however this is tolerable and he is tolerating current pain regimen without notable adverse effects.  In the last year he has had a urine drug screen that is consistent with his current regimen, in addition to updated pain contract prior to Dr. Gentile's departure. He is on NSAIDs, gabapentin, medical cannabis, oxycontin, oxycodone for breakthrough, hydroxyzine, temazepam. Reviewed risks associated with benzodiazepines and opioids when prescribed in conjunction.   Recommended to continue on current regimen at this time however ideally he will start tapering his opioids as discussed due to addition of medical cannabis and may also benefit from alternative regimens such as ketamine which may also safely help with anxiety in an individual with bipolar disorder.  Encouraged him to keep upcoming appointment with Dr. Cramer.   - Follow-up with Dr. Cramer on 11/20/18    Follow Up  Return in about 3 weeks (around 11/20/2018) for Dr. Cramer.    Subjective & Objective                                                       Bola Lyon is a 48 y.o. male coming in for a follow up visit for Medication Therapy Management. He was referred to me from  Abena Gentile MD    Chief Complaint: Anxiety with transitions of care    Medication Adherence/Access: No concern    1. Chronic pain syndrome  Jan reports that he has had anxiety while anticipating his transition between different providers. When asked about current pain, he says that it has been controlled to a tolerable level.  He reports that physical therapy and pain medication taper has helped him get his pain down to less than half than it was following his last knee operation.  Currently using a TENS machine at home.  Currently using 2 oxycodone 5 mg immediate release tablets 3 times daily with meals with an additional tablet at bedtime.  Also taking OxyContin 15 mg every 12 hours.  Reports being able to taper down on these medications significantly over the past few months.  States medical cannabis has been helping with neurological: Especially at bedtime.  Currently using oral drops of Hilda and Tangerine products.  Takes Hilda drops in the morning and in the late afternoon.  Taking tangerine drops once home near bedtime as it makes him feel tired.  Also has vape formulations of both for flares throughout, however tries to be at home when he uses tangerine vape due to tiring effect.  Brings OxyContin prior authorization form with to visit today and reports it has been causing him stress.    PMH: reviewed in EPIC   Allergies/ADRs: reviewed in EPIC   Alcohol: reviewed in EPIC   Tobacco:   History   Smoking Status     Current Some Day Smoker     Packs/day: 0.50     Years: 6.00     Types: Cigarettes     Start date: 8/20/2009     Last attempt to quit: 2/21/2017   Smokeless Tobacco     Former User     Today's Vitals:   Vitals:    11/01/18 1816   BP: 119/66   Pulse: 66   Weight: (!) 232 lb (105.2 kg)     ----------------    Much or all of the text in this note was generated through the use of Dragon Dictate voice-to-text software. Errors in spelling or words which seem out of context are unintentional.  Sound alike errors, in particular, may have escaped editing.    The patient was given a summary of these recommendations as an after visit summary    I spent 30 minutes with this patient today;   All changes were made via collaborative practice agreement with Abena Gentile MD. A copy of the visit note was provided to the patient's provider.    Arpit Hein, Pharmacy Student-PD4    This visit was supervised and precepted by:  Bao Walter, PharmD, BCACP  Medication Management (MTM) Pharmacist  [facility]         Current Outpatient Prescriptions   Medication Sig Dispense Refill     acetaminophen (TYLENOL) 650 MG CR tablet Take 1,300 mg by mouth every 8 (eight) hours as needed for pain.       amLODIPine (NORVASC) 10 MG tablet Take 1 tablet (10 mg total) by mouth daily. 90 tablet 3     busPIRone (BUSPAR) 10 MG tablet Take 1 tablet (10 mg total) by mouth 3 (three) times a day as needed. 90 tablet prn     calcium carbonate-vitamin D3 (CALTRATE 600 PLUS D3) 600 mg(1,500mg) -400 unit per tablet TAKE THREE TABLETS BY MOUTH ONCE DAILY 90 tablet 6     cetirizine (ZYRTEC) 10 MG tablet TAKE ONE TABLET BY MOUTH ONCE DAILY 90 tablet 3     FLUoxetine (PROZAC) 20 MG capsule Take 20 mg by mouth daily.       fluticasone (FLONASE) 50 mcg/actuation nasal spray USE TWO SPRAY(S) IN EACH NOSTRIL ONCE DAILY 16 g 5     gabapentin (NEURONTIN) 400 MG capsule Take 1,600 mg by mouth daily.       hydrOXYzine HCl (ATARAX) 25 MG tablet Take 1 tablet (25 mg total) by mouth 4 (four) times a day. 120 tablet prn     ibuprofen (ADVIL,MOTRIN) 400 MG tablet Take 800 mg by mouth 2 (two) times a day.       lamoTRIgine (LAMICTAL) 25 MG tablet Take 25 mg by mouth 2 (two) times a day.   0     lidocaine HCl 3 % Crea Apply topically to affected areas twice daily 1 Tube 5     losartan (COZAAR) 100 MG tablet Take 1 tablet (100 mg total) by mouth daily. 90 tablet 5     Medical Cannabis Use 1 Units As Directed. Take as  instructed by the medical cannabis dispensary. The provider who enrolled the patient in the medical cannabis registry and certified this  patient will maintain ownership and liability of all provider reporting and registration requirements.       meloxicam (MOBIC) 15 MG tablet TAKE ONE TABLET BY MOUTH ONCE DAILY 30 tablet 17     metoprolol succinate (TOPROL-XL) 100 MG 24 hr tablet Take two tablets (200 mg total) daily. 180 tablet 3     NARCAN 4 mg/actuation nasal spray   0     nicotine (NICODERM CQ) 21 mg/24 hr Place 1 patch on the skin daily. 30 patch 1     omeprazole (PRILOSEC) 40 MG capsule TAKE 1 CAPSULE (40 MG TOTAL) BY MOUTH DAILY, please do a PA if needed 90 capsule prn     oxyCODONE (OXYCONTIN) 15 mg 12 hr tablet Take 1 tablet (15 mg total) by mouth every 12 (twelve) hours. 60 tablet 0     oxyCODONE (ROXICODONE) 5 MG immediate release tablet Take 1-2 tablets (5-10 mg total) by mouth every 4 (four) hours as needed for pain. 210 tablet 0     pseudoephedrine (SUDAFED) 120 mg 12 hr tablet TAKE ONE TABLET BY MOUTH TWICE DAILY AS NEEDED FOR  CONGESTION 180 tablet 5     QUEtiapine (SEROQUEL) 200 MG tablet Take 200 mg by mouth bedtime.       QUEtiapine (SEROQUEL) 50 MG tablet Take 50 mg by mouth 4 (four) times a day.       ranitidine (ZANTAC) 300 MG tablet TAKE ONE TABLET BY MOUTH ONCE DAILY AT BEDTIME 30 tablet 17     temazepam (RESTORIL) 30 mg capsule   1     THERA-M 9 mg iron-400 mcg Tab tablet TAKE ONE TABLET BY MOUTH ONCE DAILY 30 tablet 10     THEREMS-M 27-0.4 mg Tab 1 daily 90 tablet 3     VENTOLIN HFA 90 mcg/actuation inhaler INHALE 2 PUFFS BY MOUTH EVERY 6 HOURS AS NEEDED FOR WHEEZING 18 each 3     No current facility-administered medications for this visit.

## 2021-06-21 NOTE — PROGRESS NOTES
Complaint pain / inflammation on left leg. No numbness or tingling. He continues to wear compression socks.

## 2021-06-21 NOTE — PROGRESS NOTES
ASSESSMENT:  1. Encounter to establish care with new doctor  Health maintenance up-to-date.  Reviewed extensively narcotic use.  Reviewed Pharm.D. and pain clinic note.  Thoughts as below    2. Medication management  Check uric acid to rule out uric acid arthropathy    3. Allergy  Refill  - fluticasone (FLONASE) 50 mcg/actuation nasal spray; USE 2 SPRAY(S) IN EACH NOSTRIL ONCE DAILY.  Dispense: 18.2 g; Refill: 5    4. Traumatic brain injury with loss of consciousness, sequela (H)  Discussed surreptitious use of roommates Adderall.  Formal trial of Adderall with follow-up through psychiatry in 2 weeks  - dextroamphetamine-amphetamine (ADDERALL XR) 10 MG 24 hr capsule; Take 1 capsule (10 mg total) by mouth daily.  Dispense: 10 capsule; Refill: 0    5. Essential hypertension  Stable.  Amlodipine may be contributing to lymphedema.  I would favor diuretic combined with ARB.  Consider topiramate to replace gabapentin    6. Chronic, continuous use of opioids  Rule out uric acid arthropathy.  Trial of topiramate to supplement gabapentin.  Appreciate pain clinic involvement.  Consider trial of Cymbalta to replace Prozac.  Consider amitriptyline.  Other thoughts as below  - Uric Acid  - topiramate (TOPAMAX) 25 MG tablet; Take 1 tablet (25 mg total) by mouth 2 (two) times a day.  Dispense: 60 tablet; Refill: 11  - Ambulatory referral to Care Management (Primary Care)    7. PTSD (post-traumatic stress disorder)  Refill meds.  Trial of prazosin for PTSD dreams  - busPIRone (BUSPAR) 10 MG tablet; Take 1 tablet (10 mg total) by mouth 3 (three) times a day as needed.  Dispense: 90 tablet; Refill: 3  - hydrOXYzine HCl (ATARAX) 25 MG tablet; Take 1 tablet (25 mg total) by mouth 4 (four) times a day.  Dispense: 120 tablet; Refill: 3  - prazosin (MINIPRESS) 1 MG capsule; Take 1 capsule (1 mg total) by mouth at bedtime.  Dispense: 30 capsule; Refill: 11      PLAN:  Patient Instructions   adderall 10 mgs each morning for 10 days, and  then review the effects with your psychiatrist in 2 weeks    Consider using Duloxetine to replace your Fluoxetine, to treat anxiety and depression and pain and nerve pain    Consider using Topiramate to supplement Gabapentin, to treat nerve pain.  topiramate can also help with weight loss.  Begin 25 mgs twice a day, added to the Gabapentin    Consider amitriptyline at night to help deepen sleep and help nerve pain    Consider Prazosin at night for PTSD nightmares and dreams    Consider uric acid in your blood, which may cause gout, or go to injured joints to make that pain worse.  If over 6.0, treat with Allopurinol to lower     Consider Ketamine to help with pain.  I will leave that to Dr Cramer    Enroll in our care management program to help with all the OTHER things like disability, etc    With chronic leg swelling, I hate Amlodipine, and the mixture of Amlodipine and gabapentin could be making swelling worse.  We could change those     Consider Naltrexone, once the narcotics are less, to help nerve type pain      Orders Placed This Encounter   Procedures     Uric Acid     Ambulatory referral to Care Management (Primary Care)     Referral Priority:   Routine     Referral Reason:   Continuity of Care     Number of Visits Requested:   1     Medications Discontinued During This Encounter   Medication Reason     busPIRone (BUSPAR) 10 MG tablet Reorder     fluticasone (FLONASE) 50 mcg/actuation nasal spray Reorder     hydrOXYzine HCl (ATARAX) 25 MG tablet Reorder       Return in about 6 weeks (around 1/4/2019) for for a full review of medications and lab testing, email me next week with an update.    CHIEF COMPLAINT:  Chief Complaint   Patient presents with     Establish Care     Medication Management     Pain management/Narcotics - handled by Dr Cramer      Depression     PHQ 9 = 20       HISTORY OF PRESENT ILLNESS:  Bola is a 48 y.o. male presenting to the clinic today to establish care. He is a former   "[facility] patient.     Pain: He has constant pain, and he was recently seen by Dr. Cramer at the pain clinic, who will be taking over his pain management. He is no longer able to do a lot of things he used to be able to do, which is frustrating. He believes most of his pain is related to previous football injuries that were not properly addressed at the time. He has had 18 surgeries in his life. His pain does fluctuate with the weather. He has been managing it with opioids, and more recently, medical cannabis. He is taking gabapentin but has not tried topiramate or duloxetine. Medical cannabis helps his pain. He has discussed trying ketamine with Dr. Cramer and may try it at an upcoming visit.     ADD: He has a history of moderate TBI and states his brain \"locks up on him.\" He has never taken stimulants regularly, but it has been discussed as an option in the past. He tried taking a piece of his roommate's Adderall on two occasions this past year, which did help his attention deficit. It made him much more focused and helped calm his anxiety. He notes that medical cannabis has also helped his ADD.     PTSD: He continues to deal with anxiety and has PTSD nightmares related to an event that occurred in college. He does see a therapist. He is doing somewhat better now and sleeps okay with his medications. He does not take benzodiazepines on a regular basis. He would be interested in trying prazosin for his nightmares.     Lymphedema: He has some edema in his left leg related to previous surgeries and continues to wear a compression stocking. He is seen by Dr. Sanz at the vascular center twice yearly for this. It is quite painful.     REVIEW OF SYSTEMS:   His sleep has improved. He occasionally gets cramping and seizing in his hands. He has gained some weight in the past few years. All other systems are negative.    PFSH:  He is 100% disabled, which was approved in May. He has had 18 surgeries. He is a former " collegiate football player. He has never had gout. He has a  that he works out with.     Social History     Tobacco Use   Smoking Status Current Some Day Smoker     Packs/day: 0.50     Years: 6.00     Pack years: 3.00     Types: Cigarettes     Start date: 2009     Last attempt to quit: 2017     Years since quittin.7   Smokeless Tobacco Former User       Family History   Problem Relation Age of Onset     Hypertension Father      Lymphoma Father      Bipolar disorder Mother      Diabetes Maternal Grandmother      Diabetes Maternal Grandfather      Colon cancer Paternal Grandmother      Diabetes Paternal Grandmother      Diabetes Paternal Grandfather      Anesthesia problems Neg Hx        Social History     Socioeconomic History     Marital status:      Spouse name: Not on file     Number of children: Not on file     Years of education: Not on file     Highest education level: Not on file   Social Needs     Financial resource strain: Not on file     Food insecurity - worry: Not on file     Food insecurity - inability: Not on file     Transportation needs - medical: Not on file     Transportation needs - non-medical: Not on file   Occupational History     Not on file   Tobacco Use     Smoking status: Current Some Day Smoker     Packs/day: 0.50     Years: 6.00     Pack years: 3.00     Types: Cigarettes     Start date: 2009     Last attempt to quit: 2017     Years since quittin.7     Smokeless tobacco: Former User   Substance and Sexual Activity     Alcohol use: No     Drug use: No     Sexual activity: Not Currently     Partners: Female   Other Topics Concern     Not on file   Social History Narrative    He is .  He has been a  and also a counselor, and worked with [employer].  He smokes and does not drink alcohol.  He is now on disability due to his brain injury and bipolar disease.       Past Surgical History:    Procedure Laterality Date     ANTERIOR / POSTERIOR COMBINED FUSION CERVICAL SPINE  2013, redo in 2014    4 levels     HAND SURGERY Left 1997     HERNIA REPAIR Left 2006     HERNIA REPAIR Right 2008    and middle     KNEE ARTHROSCOPY  2014     NASAL POLYP SURGERY  2014    and septal repair, Dr. Arcos     NH ARTHRODESIS,ANKLE,OPEN Right 2007    Ankle fusion     NH SHLDR ARTHROSCOP,SURG,W/ROTAT CUFF REPR Right 11/25/2015    Procedure: RIGHT SHOULDER ARTHROSCOPY, ROTATOR CUFF REPAIR, DECOMPRESSION, DEBRIDEMENT, DISTAL CLAVICLE EXCISION;  Surgeon: Pilo Bruce MD;  Location: [facility];  Service: Orthopedics     REPLACEMENT TOTAL KNEE Left 03/20/2017     SHOULDER ARTHROSCOPY DISTAL CLAVICLE EXCISION AND OPEN ROTATOR CUFF REPAIR Bilateral 2005 and 2013     VENTRAL HERNIA REPAIR  2008       No Known Allergies    Active Ambulatory Problems     Diagnosis Date Noted     Shoulder impingement syndrome, right      Essential hypertension      Cervical Radiculopathy      Chondromalacia of patella 12/23/2014     Primary (congenital) lymphedema 02/19/2015     Ilioinguinal neuralgia 02/19/2015     Biceps tendon rupture 06/22/2015     Unspecified tear of unspecified meniscus, current injury, left knee, sequela 07/31/2015     AC (acromioclavicular) arthritis 10/24/2011     Scar condition and fibrosis of skin 06/16/2016     PTSD (post-traumatic stress disorder) 08/22/2016     Post concussion syndrome 01/09/2017     Bipolar 2 disorder (H) 01/09/2017     Traumatic brain injury with loss of consciousness (H) 03/06/2017     Allergy 03/13/2017     Chronic pain syndrome 06/26/2017     C7 radiculopathy 11/13/2017     Panic attacks 11/13/2017     Generalized anxiety disorder 11/13/2017     Chronic, continuous use of opioids 12/11/2017     Entrapment of right ulnar nerve 02/05/2018     Cervical radiculopathy at C8 04/30/2018     Resolved Ambulatory Problems     Diagnosis Date Noted     Anxiety Disorder NOS      Rupture Of The  Proximal Bicipital Tendon Of The Left Arm      Tinea Cruris      Sphenoid Sinus Mucocele      Lymphedema 02/11/2015     Encounter for chronic pain management 02/11/2015     Arthropathy of shoulder region 10/24/2011     Aftercare following surgery of the musculoskeletal system 04/25/2012     Bicipital tenosynovitis 10/10/2011     Derangement of meniscus 11/20/2014     Disturbance of skin sensation 11/30/2011     Pain in joint, shoulder region 10/10/2011     Disorder of bursae and tendons in shoulder region 10/24/2011     Other acquired deformity of other parts of limb 11/14/2011     Scar condition and fibrosis of skin 02/19/2015     Scrotal swelling 02/19/2015     Primary (congenital) lymphedema 02/19/2015     Cellulitis 02/26/2015     Lymphedema 02/18/2015     Scar condition and fibrosis of skin 08/20/2015     Encounter for surgical follow-up care 04/25/2012     Chondromalacia 12/23/2014     Coracoid impingement 10/24/2011     Impingement syndrome of shoulder 10/10/2011     Cyst of meniscus of knee 11/20/2014     Hand paresthesia 11/30/2011     Pain in shoulder 10/10/2011     Acquired skeletal deformity 11/14/2011     Acquired lymphedema 09/11/2015     Chondromalacia 07/29/2015     Encounter for surgical follow-up care 04/25/2012     Left groin pain 06/16/2016     Synovitis of knee 07/25/2016     Chronic pain due to trauma 02/06/2017     Left leg swelling 03/30/2017     Status post total left knee replacement 03/30/2017     Past Medical History:   Diagnosis Date     AC (acromioclavicular) arthritis 10/24/2011     Aftercare following surgery of the musculoskeletal system 4/25/2012     Anxiety Disorder NOS      Bipolar 2 disorder (H)      Cervical Radiculopathy      Claustrophobia      Disturbance of skin sensation 11/30/2011     Encounter for chronic pain management 2/11/2015     GERD (gastroesophageal reflux disease)      Hypertension      Impingement Of The Right Shoulder      Lymphedema 02/11/2015     Pain in  "joint, shoulder region 10/10/2011     Panic attacks      PTSD (post-traumatic stress disorder)      TBI (traumatic brain injury) (H)        VITALS:  Vitals:    11/23/18 1127   BP: 136/86   Pulse: 72   Resp: 16   SpO2: 97%   Weight: (!) 229 lb 4 oz (104 kg)   Height: 5' 8\" (1.727 m)     Wt Readings from Last 3 Encounters:   11/23/18 (!) 229 lb 4 oz (104 kg)   11/20/18 (!) 232 lb (105.2 kg)   11/01/18 (!) 232 lb (105.2 kg)     Body mass index is 34.86 kg/m .    PHYSICAL EXAM:  Constitutional:  Reveals an alert, pleasant, talkative, obese man with long hair. Does not appear acutely ill.  Vitals:  Per nursing notes.  Neurologic:  Cranial nerves II-XII intact.     Psychiatric:  Mood appropriate, memory intact.     MEDICATIONS:  Current Outpatient Medications   Medication Sig Dispense Refill     acetaminophen (TYLENOL) 650 MG CR tablet Take 1,300 mg by mouth every 8 (eight) hours as needed for pain.       amLODIPine (NORVASC) 10 MG tablet Take 1 tablet (10 mg total) by mouth daily. 90 tablet 3     busPIRone (BUSPAR) 10 MG tablet Take 1 tablet (10 mg total) by mouth 3 (three) times a day as needed. 90 tablet 3     calcium carbonate-vitamin D3 (CALTRATE 600 PLUS D3) 600 mg(1,500mg) -400 unit per tablet TAKE THREE TABLETS BY MOUTH ONCE DAILY 90 tablet 6     cetirizine (ZYRTEC) 10 MG tablet TAKE ONE TABLET BY MOUTH ONCE DAILY 90 tablet 3     dextroamphetamine-amphetamine (ADDERALL XR) 10 MG 24 hr capsule Take 1 capsule (10 mg total) by mouth daily. 10 capsule 0     FLUoxetine (PROZAC) 20 MG capsule Take 20 mg by mouth daily.       fluticasone (FLONASE) 50 mcg/actuation nasal spray USE 2 SPRAY(S) IN EACH NOSTRIL ONCE DAILY. 18.2 g 5     gabapentin (NEURONTIN) 400 MG capsule Take 1,600 mg by mouth daily.       hydrOXYzine HCl (ATARAX) 25 MG tablet Take 1 tablet (25 mg total) by mouth 4 (four) times a day. 120 tablet 3     ibuprofen (ADVIL,MOTRIN) 400 MG tablet Take 800 mg by mouth 2 (two) times a day.       lamoTRIgine " (LAMICTAL) 25 MG tablet Take 25 mg by mouth 2 (two) times a day.   0     lidocaine HCl 3 % Crea Apply topically to affected areas twice daily 1 Tube 5     losartan (COZAAR) 100 MG tablet Take 1 tablet (100 mg total) by mouth daily. 90 tablet 5     Medical Cannabis Use 1 Units As Directed. Take as instructed by the medical cannabis dispensary. The provider who enrolled the patient in the medical cannabis registry and certified this  patient will maintain ownership and liability of all provider reporting and registration requirements.       meloxicam (MOBIC) 15 MG tablet TAKE ONE TABLET BY MOUTH ONCE DAILY 30 tablet 17     memantine 7-14-21-28 mg C24k Take 1 tablet by mouth daily. 28 each 0     metoprolol succinate (TOPROL-XL) 100 MG 24 hr tablet Take two tablets (200 mg total) daily. 180 tablet 3     NARCAN 4 mg/actuation nasal spray   0     nicotine (NICODERM CQ) 21 mg/24 hr Place 1 patch on the skin daily. 30 patch 1     omeprazole (PRILOSEC) 40 MG capsule TAKE 1 CAPSULE (40 MG TOTAL) BY MOUTH DAILY, please do a PA if needed 90 capsule prn     oxyCODONE (OXYCONTIN) 15 mg 12 hr tablet Take 1 tablet (15 mg total) by mouth every 12 (twelve) hours Fill on 11/19/18. 60 tablet 0     oxyCODONE (ROXICODONE) 5 MG immediate release tablet Take 1-2 tablets (5-10 mg total) by mouth every 4 (four) hours as needed for pain Fill on 11/19/18. 100 tablet 0     prazosin (MINIPRESS) 1 MG capsule Take 1 capsule (1 mg total) by mouth at bedtime. 30 capsule 11     pseudoephedrine (SUDAFED) 120 mg 12 hr tablet TAKE ONE TABLET BY MOUTH TWICE DAILY AS NEEDED FOR  CONGESTION 180 tablet 5     QUEtiapine (SEROQUEL) 200 MG tablet Take 200 mg by mouth bedtime.       QUEtiapine (SEROQUEL) 50 MG tablet Take 50 mg by mouth 4 (four) times a day.       ranitidine (ZANTAC) 300 MG tablet TAKE ONE TABLET BY MOUTH ONCE DAILY AT BEDTIME 30 tablet 17     temazepam (RESTORIL) 30 mg capsule   1     THERA-M 9 mg iron-400 mcg Tab tablet TAKE ONE TABLET BY  MOUTH ONCE DAILY 30 tablet 10     THEREMS-M 27-0.4 mg Tab 1 daily 90 tablet 3     topiramate (TOPAMAX) 25 MG tablet Take 1 tablet (25 mg total) by mouth 2 (two) times a day. 60 tablet 11     VENTOLIN HFA 90 mcg/actuation inhaler INHALE 2 PUFFS BY MOUTH EVERY 6 HOURS AS NEEDED FOR WHEEZING 18 each 3     No current facility-administered medications for this visit.        ADDITIONAL HISTORY SUMMARIZED (2): Reviewed 11/20/2018 Dr. Cramer note regarding pain management. Reviewed 11/1 Trish Walter, PharmD note regarding pain management.   DECISION TO OBTAIN EXTRA INFORMATION (1): None.   RADIOLOGY TESTS (1): None.  LABS (1): Labs from 9/20/2018 reviewed. Labs ordered.   MEDICINE TESTS (1): None.  INDEPENDENT REVIEW (2 each): None.     The visit lasted a total of 45 minutes face to face with the patient. Over 50% of the time was spent counseling and educating the patient about his health history and medications.    IMontrell, am scribing for and in the presence of, Dr. Lund.    IDr. Lund, personally performed the services described in this documentation, as scribed by Montrell Reaves in my presence, and it is both accurate and complete.    Total data points: 3

## 2021-06-21 NOTE — LETTER
Letter by Devon Lund MD at      Author: Devon Lund MD Service: -- Author Type: --    Filed:  Encounter Date: 5/4/2021 Status: (Other)         May 5, 2021     Patient: Bola Lyon   YOB: 1970   Date of Visit: 5/4/2021       To Whom It May Concern:        Increase furosemide to 80 mg twice daily     Continue potassium same dose twice daily     When weight decreases to 200 pounds decrease furosemide to 80 mg once daily and potassium once daily        If you have any questions or concerns, please don't hesitate to call.    Sincerely,        Electronically signed by Devon Lund MD

## 2021-06-21 NOTE — LETTER
Letter by Devon Lund MD at      Author: Devon Lund MD Service: -- Author Type: --    Filed:  Encounter Date: 4/30/2021 Status: (Other)       Non-Opioid Controlled Substance Agreement    This is an agreement between you and your provider regarding safe and appropriate controlled substance prescribing.? Controlled substances are?medicines that can cause physical and mental dependence. The manufacturing, possession and use of these medicines are regulated by law.  We here at [facility] are making a commitment to work with you in your efforts to get better.? To support you in this work, we will help you schedule regular appointments for medicine refills. If we must cancel or change your appointment for any reason, we will make sure you have enough medication to last until your next appointment.      As a Provider, I will:     Listen carefully to your concerns while treating you with dignity.     Recommend a treatment plan that I believe is in your best interest and may involve therapies other than medication.      Review the chance of benefit and the chance of harm of this medicine with you regularly and evaluate the safety and effectiveness of this therapy.       Provide a plan on how to discontinue if the decision is made to stop this medicine.       As a Patient, I understand controlled substances:       Are prescribed by my care provider to help me function or work and manage my condition(s).?    Are strong medicines and can cause serious side effects.      Need to be taken exactly as prescribed.?Combining controlled substances with certain medicines or chemicals (such as illegal drugs, alcohol, sedatives, sleeping pills, and benzodiazepines) can be dangerous or even fatal.? If I stop taking my medicines suddenly, I may have severe withdrawal symptoms.     The risks, benefits, and side effects of these medicine(s) were explained to me. I agree that:    1. I will take part in other  treatments as advised by my care team. This may be psychiatry or counseling, physical therapy, behavioral therapy, group treatment or a referral to specialist.    2. I will keep all my appointments and understand this is part of the monitoring of controlled substance.?My care team may require an office visit for EVERY controlled substance refill. If I miss appointments or dont follow instructions, my care team may stop my medicine    3. I will take my medicines as prescribed. I will not change the dose or schedule unless my care team tells me to. There will be no refills if I run out early.      4. I may be contactedwithout warning and asked to complete a urine drug test or pill count at any time. If I dont give a urine sample or participate in a pill count, the care team may stop my medicine.    5. I will only receive controlled substance prescriptions from this clinic. If treated by another provider, I will let them know I am taking controlled substances and that I have a treatment agreement with this provider. I will inform this care team within one business day if I am given a prescription for any controlled substance by another healthcare provider. This care team may contact other providers and pharmacists about my use of the medicines.     6. It is up to me to make sure that I do not run out of my medicines on weekends or holidays.?If my care team is willing to refill my prescription without a visit, I must request refills only during office hours. Refills may take up to 3 business days to process.  I will use one pharmacy to fill all my controlled substance prescriptions.  I will notify the clinic if any changes are made due to insurance changes or medication availability.    7. I am responsible for my prescriptions. If the medicine/prescription is lost, stolen or destroyed, it will not be replaced.?I also agree not to share controlled substance medicines with anyone.     8. I am aware I should not use any  illegal or recreational drugs. I agree not to drink alcohol unless my care team says I can.     9. If I enroll in the [place] Medical Cannabis program, I will tell my care team.?    10. I will tell my care team right away if I become pregnant, have a new medical problem treated outside of my regular clinic, or have a change in my medications.     11. I understand that this medicine can affect my thinking, judgment and reaction time.? Alcohol and drugs affect the brain and body, which can affect the safety of a persons driving. Being under the influence of alcohol or drugs can affect a persons decision-making, behaviors, personal safety, and the safety of others. Driving while impaired (DWI) can occur if a person is driving, operating, or in physical control of a car, motorcycle, boat, snowmobile, ATV, motorbike, off-road vehicle, or any other motor vehicle ([place] Statute 169[place].20). I understand the risk if I choose to drive or operate machinery  I understand that if I do not follow any of the conditions above, my prescriptions or treatment may be stopped or changed.     I agree that my provider, clinic care team, and pharmacy may work with any city, state or federal law enforcement agency that investigates the misuse, sale, or other diversion of my controlled medicine. I will allow my provider to discuss my care with or share a copy of this agreement with any other treating provider, pharmacy or emergency room where I receive care.?    I have read this agreement and have asked questions about anything I did not understand.    ________________________________________________________  Patient Signature - Bola Lyon     ___________________                   Date     ________________________________________________________  Provider Signature - Devon Lund MD       ___________________                   Date     ________________________________________________________  Witness Signature (required if provider  not present while patient signing)          ___________________                   Date

## 2021-06-21 NOTE — LETTER
Letter by Joslyn Zhu CHW at      Author: Joslyn Zhu CHW Service: -- Author Type: --    Filed:  Encounter Date: 1/27/2021 Status: (Other)       CARE COORDINATION  [facility]  [address] [place] [address]    January 27, 2021    Bola Lyon  [address] [place] [address]      Dear Bola,  I have been unsuccessful in reaching you since our last contact. At this time the Care Coordination team will make no further attempts to reach you, however this does not change your ability to continue receiving care from your providers at your primary care clinic. If you need additional support from a care coordinator in the future please contact Magalis at [phone].    All of us at [facility] are invested in your health and are here to assist you in meeting your goals.     Sincerely,  Magalis NANCE  Community Health Worker  [phone]

## 2021-06-21 NOTE — LETTER
Letter by Devon Lund MD at      Author: Devon Lund MD Service: -- Author Type: --    Filed:  Encounter Date: 4/30/2021 Status: (Other)         April 30, 2021     Patient: Bola Lyon   YOB: 1970   Date of Visit: 4/30/2021       To Whom It May Concern:    New Orders for Bola Lyon       Stop hydrochlorothiazide twice daily, since it is too weak    Furosemide 40 mgs twice a day to eliminate swelling, much stronger    Potassium supplement twice a day with each dose of Furosemide    recertify for medical cannabis    You have gained 20 lbs since December since the Gabapentin was increased    Goal weight 197    When you reach 200, decrease to once a day, both furosemide and potassium        If you have any questions or concerns, please don't hesitate to call.    Sincerely,        Electronically signed by Devon Lund MD

## 2021-06-21 NOTE — PROGRESS NOTES
The Clinic Care Guide called the patient  today at the request of the PCP to discuss possible clinic care coordination enrollment. Clinic care coordination was described to the patient and immediate needs were discussed. The patient agreed to enrollment in clinic care coordination and future appointments were scheduled for an RN care coordination assessment and an enrollment visit with the primary care physician and care guide. The patient was provided with contact information for the clinic care guide.    PCA date 11/29/18
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
## 2021-06-22 ENCOUNTER — RECORDS - HEALTHEAST (OUTPATIENT)
Dept: ADMINISTRATIVE | Facility: OTHER | Age: 51
End: 2021-06-22

## 2021-06-22 ENCOUNTER — HOSPITAL ENCOUNTER (OUTPATIENT)
Dept: PALLIATIVE MEDICINE | Facility: OTHER | Age: 51
Discharge: HOME | End: 2021-06-22
Attending: ANESTHESIOLOGY
Payer: COMMERCIAL

## 2021-06-22 DIAGNOSIS — G47.09 OTHER INSOMNIA: ICD-10-CM

## 2021-06-22 DIAGNOSIS — M54.12 C7 RADICULOPATHY: ICD-10-CM

## 2021-06-22 DIAGNOSIS — G89.4 CHRONIC PAIN SYNDROME: ICD-10-CM

## 2021-06-22 NOTE — PROGRESS NOTES
[facility] SW called [employer] and completed PCA assessment referral. SW emphasized need focused on IADLs. [employer] worker informed SW they will be sending out a letter to the pt stating he was approved for PCA assessment. Pt will be contacted by a community health RN after this letter has been sent out. RN will make 3 attempts to reach the pt to schedule, if unsuccessful then referral will close.    SW called the pt, left VM updating pt with this information.

## 2021-06-22 NOTE — PROGRESS NOTES
Met with patient briefly after [facility] SW visit. Handed patient completed Care Plan and asked him to call with any questions after reviewing it.    Next outreach: 12/27/18

## 2021-06-22 NOTE — PROGRESS NOTES
My Clinic Care Coordination Care Plan    [facility]  [address] [place]  [address]  [phone]    My Preferred Method of Contact:  MyChart    My Primary/Preferred Language:  English    Preferred Learning Style:  Face to face discussion    Emergency Contact: Extended Emergency Contact Information  Primary Emergency Contact: Aminah Lyon   [place]  Mobile Phone: [phone]  Relation: Mother  Secondary Emergency Contact: Rukhsana Lepe   [facility]  Home Phone: [phone]  Relation: Sibling     PCP:  Devon Lund MD  Specialists:    Care Team            Devon Lund MD   [phone] PCP - General, Internal Medicine    Bao Walter, PharmD Pharmacist, Pharmacist    Stella Lutz [facility] Care Coordination Care Guide, [facility] Care Coordination    [facility]-Phone:[phone] Fax:[phone]    Dmitriy Cramer MD   [phone] Physician, Psychiatry    Yaa Vásquez MD   [phone] Physician, Physical Medicine and Rehabilitation    Emily Bridges, Ph.D,   [phone] Psychologist, Psychology    Evangelist Almanzar MD   [phone] Physician, Internal Medicine    Vu De Guzman MD   [phone] Physician, Family Medicine    Stone Sharp DO   [phone] Physician, Pain Medicine    Nessa Perez MD   [phone] Psychiatry    Pilo Bruce MD   [phone] Physician, Orthopedic Surgery    Deepak Zheng MD   [phone] Orthopedic Surgery    Arno Gardner MD   [phone] Orthopedic Surgery    Lindy Whitman   [phone] Psychologist    [facility]    Herbert Zaragoza MD   [phone] Orthopedic Surgery          Hospitalizations and/or ED Visits  Most Recent: 3/22/17     Previous:  3/20/17  Reason:  Knee surgery    Concerns regarding my health: chronic pain management, mental and emotional health, physical health    Advanced Directive/Living Will: The patient was given information  regarding Adanced Directives/Living Will      Bola elected to enroll in the [facility] Care Coordination.  Bola was given a copy of the Clinic Care Coordination brochure and full description of how to access their care team both during clinic hours and after hours.   My Care Guide's Name and Phone Number:  Stella Lutz [phone]  The Care Guide and myself agreed to be in contact every 2 weeks.      Medication Update  The patient's medication list is up to date per David Myhre, RN    Health Maintenance and Immunization Update  The patient's preventive health screenings and immunizations are up to date per David Myhre, RN.    My Emergency Plan  Understanding Anxiety Disorders  Almost everyone gets nervous now and then. It s normal to have knots in your stomach before a test, or for your heart to race on a first date. But an anxiety disorder is much more than a case of nerves. In fact, its symptoms may be overwhelming. But treatment can relieve many of these symptoms. Talking to your healthcare provider is the first step.  What are anxiety disorders?  An anxiety disorder causes intense feelings of panic and fear. These feelings may arise for no apparent reason. And they tend to recur again and again. They may prevent you from coping with life and cause you great distress. As a result, you may avoid anything that triggers your fear. In extreme cases, you may never leave the house. Anxiety disorders may cause other symptoms, such as:    Obsessive thoughts you can t control    Constant nightmares or painful thoughts of the past    Nausea, sweating, and muscle tension    Trouble sleeping or concentrating  What causes anxiety disorders?  Anxiety disorders tend to run in families. For some people, childhood abuse or neglect may play a role. For others, stressful life events or trauma may trigger anxiety disorders. Anxiety can trigger low self-esteem and poor coping skills.     Common anxiety disorders    Panic  disorder. This causes an intense fear of being in danger.    Phobias. These are extreme fears of certain objects, places, or events.    Obsessive-compulsive disorder. This causes you to have unwanted thoughts and urges. You also may perform certain actions over and over.    Posttraumatic stress disorder. This occurs in people who have survived a terrible ordeal. It can cause nightmares and flashbacks about the event.    Generalized anxiety disorder. This causes constant worry that can greatly disrupt your life.   Getting better  You may believe that nothing can help you. Or, you might fear what others may think. But most anxiety symptoms can be eased. Having an anxiety disorder is nothing to be ashamed of. Most people do best with treatment that combines medicine and therapy. These aren t cures. But they can help you live a healthier life.    8304-3719 [employer]. [address] [place] [address]. All rights reserved. This information is not intended as a substitute for professional medical care. Always follow your healthcare professional's instructions.   Understanding Bipolar Disorder  Bipolar disorder is a serious disorder of the brain. It may severely disrupt your life. At times, it may cause you and your loved ones great pain. But there is hope. Although there is no cure, treatment can help control your symptoms. Talk with your healthcare provider or a mental health professional. He or she can offer guidance and support.     What causes bipolar disorder?  The exact causes of bipolar disorder aren t known. It is known that the disease runs in families. Genes that affect nerve cells in the brain may be inherited, but as yet these genes have not been found.  Who does it affect?  Over 5 million adults in this country have bipolar disorder. Most often, it strikes young adults. It can affect children and older adults as well. Bipolar disorder affects both men and women. It can strike people of  all races, cultures, and incomes.  Ups and downs  Bipolar disorder used to be called manic-depressive illness. That is because it causes extreme mood swings. At times the person may feel almost too happy. These times are often followed by great despair. In some cases, both extremes may occur at once. More often, moods shift back and forth. These mood swings may occur just once in a while. Or they may happen 4 or more times a year. Without treatment, they will likely recur throughout life.  Manic episodes  During manic episodes of bipolar disorder, you feel like you re on top of the world. Even the worst news can t bring you down. You ll likely feel as if you can do anything. And sometimes you may try. You may take great risks, thinking you can t be hurt. You may also talk too fast, and your thoughts may race. You may go for days without sleeping. And you might be very active and do a lot of things in a short time. Manic episodes often end in a depression.  Depressive episodes  In depressive episodes, you feel intense sadness and depression. You may also feel worthless, tired, and helpless. Even the things you value most don t give you pleasure. At times you may want to die. You may even think about taking your own life.  Finding help  The first step is to talk to a trusted counselor or healthcare provider. He or she can help you take the next step to treatment. This may involve therapy (also called counseling) and medication.  Are you having suicidal thoughts?  You may be feeling helpless, hopeless, and that you can t go on. You may even have thoughts of suicide. But help is available. There are ways to ease this pain and manage the problems in your life.  If you are thinking about harming or killing yourself, please tell your healthcare provider or someone you care about immediately or go to the nearest crisis walk-in center or emergency room.  You can also call, toll-free,    800-SUICIDE (699-226-7058)      540-980-ESKP ([phone])      Resources    American Psychiatric Association  [phone]  www.healthyminds.org    American Psychological Association  www.apa.org/helpcenter    Anxiety and Depression Association of [place]  www.adaa.org    Mental Health [place]  www.nmha.org    National Center for PTSD  www.ptsd.va.gov    National [place] of Mental Health  www.nimh.nih.gov/topics/fpkip-sgfs-beqh.shtml  National Suicide Prevention Lifeline  465.280.9590 (781-808-CBZI)  Www.suicidepreventionlifeline.org      Managing Chronic Pain: Medicines  Medicines can help you live better with chronic pain. You may use over-the-counter or prescription medicines. It can take some time and trial and error to work out the best treatment plan for you. Work with your health care provider to find the best medicines for you, and to use them safely and effectively.  Tell your health care provider about all medicines you`re taking, including herbs and vitamins.   A part of your treatment plan  Depending on your situation and the type of pain, you may take medicines:    At times when pain is more intense than usual.    For pain relief throughout the day.    Before activities that tend to trigger pain, like going shopping or doing physical therapy.     To decrease sensitivity to pain and help you sleep.  There are 4 major groupings of medicines for the treatment of chronic pain:  Non-opioids. These include the commonly used medicine acetaminophen as well as the non-steroidal anti-inflammatory drugs (NSAIDs), like aspirin and ibuprofen, naproxen sodium, and ketoprofen. These all help control pain but NSAIDs also help relieve inflammation. These drugs are available over-the-counter. Some NSAIDS are available by prescription only.  Acetaminophen can cause liver damage if taken above the recommended dose. NSAIDs may cause stomach problems like bleeding ulcers. Using them over the long-term can cause heart problems and stroke in a very  small number of people. None of these drugs is addictive.  Opioids. This includes drugs, like morphine, oxycodone, codeine, fentanyl, and methadone. Opioids may be used to treat breakthrough pain or severe chronic pain. Opioids are available only by prescription. These medicines may be effective for managing chronic pain. But they are controversial because of their side effects and because they can be addictive.    Adjuvants. This group includes medicines that were originally made to treat other conditions, but were also found to relieve pain. Examples of adjuvant drugs include antidepressants and anticonvulsants.  Antidepressants. These help pain by working on the same brain chemicals that play a role in depression. They also help improve sleep. Tricyclic antidepressants are 1 group of antidepressants used for treating chronic pain caused by nerve injury (neuropathic pain). Examples include amitriptyline, nortriptyline, and desipramine. Serotonin-norepinephrine reuptake inhibitors (SNRIs), like duloxetine and milnacipran, are also used.  Some types of antidepressants are used in low doses for sleep problems. They may also be prescribed if you are very sensitive to pain or some kinds of nerve pain.  Anticonvulsants, developed to prevent seizures, can help certain pain conditions, particularly nerve (neuropathic) pain. Examples include gabapentin and pregabalin.  Other pain medicines    Topical. These medicines, like lidocaine or capsaicin, are applied to the skin to treat pain in one location.    Muscle relaxants. These may be used to stop painful muscle spasms. Drugs like cyclobenzaprine can be sedating.  Taking medicine safely    Take your medicine on time and in the right dose as prescribed.    Tell your health care professional if your medicine doesn't relieve your pain or work for a long enough time, or if you have side effects.    Don't take other people's medicines. They may not be safe for you.  Avoid  alcohol, tobacco, and illegal drugs. These may interact with your medicines causing you harm.  Understanding Post-Traumatic Stress Disorder (PTSD)  You may have post-traumatic stress disorder (PTSD) if you ve been through a traumatic event and are having trouble dealing with it. Such events may include a car crash, rape, domestic violence,  combat, or violent crime. While it is normal to have some anxiety after such an event, it usually goes away in time. But with PTSD, the anxiety is more intense and keeps coming back. And the trauma is relived through nightmares, intrusive memories, and flashbacks (vivid memories that seem real). The symptoms of PTSD can cause problems with relationships and make it hard to cope with daily life. But it can be treated. With help, you can feel better.  How does it feel?  Symptoms of PTSD often start within a few months of the event. Here are some common symptoms:    You startle more easily, and feel anxious and on edge all the time. This can lead to sleep problems. It a can cause you to feel overwhelmed or become angry or upset more easily. Panic attacks (sudden, intense feelings of terror and a strong need to escape from wherever you are) can also occur.    You relive the event through nightmares and flashbacks. During these, you may feel strong emotions and as though you re reliving the event all over again.    You avoid people, places, or activities that remind you of the trauma. You may hold in your feelings and become emotionally numb. It may be hard to concentrate at work or school or to relax with friends. You may be afraid to let people get close to you.  Who does it affect?  Not everyone who survives a trauma will have PTSD. But many will. In fact, millions of people have the condition. PTSD can happen to anyone, but it most often develops after a person feels his or her or another s life is threatened.  You re at risk for PTSD if you have experienced or  witnessed:    A rape or sexual abuse    A mugging or carjacking    A car accident or plane crash    A life-threatening illness    War    Domestic violence    Childhood abuse    Natural disasters such as earthquakes, floods, or hurricanes    The sudden death of a loved one  Finding help  The first step is to talk to a trusted counselor or healthcare provider. He or she can help you take the next step to treatment. This may involve therapy (also called counseling) and medication.  Are you having suicidal thoughts?  You may be feeling helpless, hopeless, and that you can t go on. You may even have thoughts of suicide. But help is available. There are ways to ease this pain and manage the problems in your life.  If you are thinking about harming or killing yourself, please tell your healthcare provider or someone you care about immediately or go to the nearest crisis walk-in center or emergency room.  You can also call, toll-free,    800ShuttersongSUICIDE (373-681-2833)     104-274-DGKC ([phone])      Resources    American Psychiatric Association  [phone]  www.healthyminds.org    American Psychological Association  www.apa.org/helpcenter    Anxiety and Depression Association of [place]  www.adaa.org    Mental Health [place]  www.nmha.org    National Center for PTSD  www.ptsd.va.gov    [employer] [place] of Mental Health  www.nimh.nih.gov/topics/vgifp-wonr-pdcp.shtml  National Suicide Prevention Lifeline  860.825.1848 (755-078-DFYP)  Www.suicidepreventionlifeline.org    All [facility] clinic patients have access to a Nurse 24 hours a day, 7 days a week.  If you have questions or want advice from a Nurse, please know [facility] is here for you.  You can call your clinic and they will connect you or you can call Care Connection at [phone].  [facility] also has Walk In Care clinics in multiple locations.  Call the number listed above for more information about our Walk In Care clinics or visit the [facility] website at  www.[facility].org.    Patient Support  I will ask my emergency contact for help in supporting me in these goals.  Relationship to patient: Sister  Cell # : [phone] , best time to call afternoon

## 2021-06-22 NOTE — PROGRESS NOTES
Patient is a 48 year old man with a history of HTN, Traumatic brain injury with loss of consciousness, generalized anxiety disorder, PTSD, bipolar 2 disorder, primary lymphedema, ilioinguinal neuralgia, chronic pain syndrome, panic attacks, chronic continuous use of opioids, cervical radiculopathy. Patient currently lives with his parents in single family home in [place], but said he spends the majority of his time at his girlfriend's home in [place]. He denied any safety concerns within both of the  homes and denied need for any DME at this time. Patient has a car and is able to transport himself to the grocery store, pharmacy and to medical appointments. He is currently managing his medications independently, but stated this has become more difficult for him over the past couple of months. Patient said he would be supportive of the idea of having in-home RN medication management. [facility] RN will request a referral from his PCP for Home Care medication management. Patient endorsed anxiety, PTSD and a history of bipolar 2 disorder. He stated feels his mental health is stable at this time, and spoke positively about the care he has receives by both his psychiatrist, Dr. Perez and psychotherapist, Lindy Whitman. He stated he would discuss substituting his Seroquel for any alternative antipsychotic medications without the side affect of weight gain at his next appointment with his psychiatrist. 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 He stated [facility] and feels his pain is being managed well. He denied any specific financial concerns at this time, but said he does have difficulty keeping a budget related to TBI. He said he would be open to case management services to assist him with his finances. Patient will meet with [facility] MSW  on 12/12/18 to discuss insurance coverage  concerns and resources for case management. [facility] RN will continue to monitor and will be available as nursing needs arise.     RN Recommendations and Referrals  [facility] : Scheduled to meet with [facility] MSW on 12/12/18 to discuss insurance coverage and case management rescources.   Home Safety Evaluation with Home Care:  Will request request referral for Home Care for complex medicaiton management.   See below for action plan    Action Plan    RN Will  Schedule [facility]  consult  Will add the patient to [facility] RN tracking list  Be available to the patient as nursing needs arise    Care Guide Will  At goal setting visit : Review goals set at [facility] visit and create or add to action plan.    Goals    Goals        Patient Stated      I would like an in-home medication management assistance within the next month.  (pt-stated)      Action steps to achieve this goal  1.  I will speak with the [facility] Care Guide at all outreach telephone calls.   2.  I know the [facility] RN will ask my PCP for a referral for Home Care to assist me with my medications on a regular basis.   3.  I know that Clinic Pharm D and the [facility] RN are available to assist me with my medications if home care medication management is not available to me.   4.  I will provide any necessary documentation, complete any follow up telephone calls and complete any paperwork in a timely manner that may help me achieve this goal.   Date goal set:  12/4/18        I would like to better understand my insurance coverage, and specifically if my insurance would cover deep tissue massage.recommended by my providers .  (pt-stated)      Action steps to achieve this goal  1.  I will speak with my  [facility] Care Guide at all outreach telephone calls.   2.  I will meet with the [facility] MSW to discuss insurance coverage concerns at scheduled appointment.  3. I will continue to talk with representatives from my insurance company if I have questions about my coverage.   4. I will provide  any necessary documentation, complete any follow up telephone calls and complete any paperwork in a timely manner that may assist me in this goal.   Date goal set:  12/4/18        I would like to discuss resources for case management services for myself . (pt-stated)      Action steps to achieve this goal  1.  I will speak with the [facility] Care Guide at all outreach telephone calls.  2.  I will meet with the [facility] MSW regarding resources that may be available to me.   3.  I will provide any necessary documentation and complete any paperwork in a timely manner that may assist me in achieving this goal.     Date goal set:  12/4/18        I would like to have assistance finding resources for sedation dentistry  that would be covered by my  insurance.  (pt-stated)      Action steps to achieve this goal  1.  I will speak with the [facility] Care Guide at all outreach telephone calls.   2.  I will meet with the [facility] MSW regarding resources that may be available to me.   3.  I will provide any necessary documentation, complete any follow up telephone calls and complete any required paperwork in a timely manner that may help me achieve this goal.     Date goal set:  12/4/18            Clinic Care Coordination RN Assessed Needs  Patient Centered Assessment Method-PCAM TOTAL SCORE: 25 (12/4/2018 12:29 PM)    Level 2:  A score of 25-36 indicates that the patient has a moderate initial need for RN or SW intervention at the discretion of the .  The RN will add this patient to her panel and follow closely in partnership with the care guide until stable.  She will reach out to the care guide for support in care coordination needs and graduate the patient to standard care guide outreach when appropriate.      PCAM (Patient Centered Assessment Method)   HEALTH AND WELL-BEING  Other Physical Health Concerns:: HTN,Traumatic brain injury with loss of consciousness, sequela, Anxiety, PTSD, Primary lymphedema, Illioinguinal neuralgia, Bipolar  "2 disorder, Chronic pain syndrome, Panic attacks, Chronic, continuous use of opioids, Cervical radiculopathy  RN Assessment: Physical Health Needs: Mod to severe symptoms or problems that impact on daily life  RN Assessment: Physical Health Problems: Mild impact on mental well-being e.g. \"feeling fed-up\", \"reduced enjoyment\"  Mental Health Concerns: PTSD (Post Traumatic Stress Disorder), Anxiety, Severe Persistent Mental Illness  Other Mental Health Concerns:: Bipolar 2 disorder  RN Assessment:Other Mental Well-Being Concern: Mild problems- don't interfere with function  Lifestyle/Habit Concerns: Diet  RN Assessment: Lifestyle Behaviors: Some mild concern of potential negative impact on well-being  SOCIAL ENVIRONMENT  Home Environment Concerns: Denies concerns that require further investigation  RN Assessment: Home Environment: Consistently safe, supportive, stable, no identified problems  Daily Activities Concerns: Denies concerns that require further investigation  RN Assessment: Daily Activites: Adequate participation with social networks  Social Network Concerns: Denies concerns that require further investigation  RN Assessment: Social Network: Adequate participation with social networks  Financial Status and Service Concerns: Disabled  RN Assessment: Financial Resources: Financially secure, some resource challenges  HEALTH LITERACY AND COMMUNICATION  Understanding of Health and Wellbeing Concerns: Denies concerns that require further investigation  RN Assessment: Health Literacy: Reasonable to good understanding and already engages in managing health or is willing to undertake better management  Engagement Concerns: Adequate communication, with or without minor barriers  RN Assessment: Engagement: Adequate communication, with or without minor barriers  Barriers to Compliance with Medical Recommendations: Mental Illness  SERVICE COORDINATION  Other Services: Other care/services in place with some coordination " barriers  Coordination of Services: Required care/services in place with some coordination barriers  [facility]: 25      Emergency Plan    Understanding Anxiety Disorders  Almost everyone gets nervous now and then. It s normal to have knots in your stomach before a test, or for your heart to race on a first date. But an anxiety disorder is much more than a case of nerves. In fact, its symptoms may be overwhelming. But treatment can relieve many of these symptoms. Talking to your healthcare provider is the first step.  What are anxiety disorders?  An anxiety disorder causes intense feelings of panic and fear. These feelings may arise for no apparent reason. And they tend to recur again and again. They may prevent you from coping with life and cause you great distress. As a result, you may avoid anything that triggers your fear. In extreme cases, you may never leave the house. Anxiety disorders may cause other symptoms, such as:  Obsessive thoughts you can t control  Constant nightmares or painful thoughts of the past  Nausea, sweating, and muscle tension  Trouble sleeping or concentrating  What causes anxiety disorders?  Anxiety disorders tend to run in families. For some people, childhood abuse or neglect may play a role. For others, stressful life events or trauma may trigger anxiety disorders. Anxiety can trigger low self-esteem and poor coping skills.    Common anxiety disorders  Panic disorder. This causes an intense fear of being in danger.  Phobias. These are extreme fears of certain objects, places, or events.  Obsessive-compulsive disorder. This causes you to have unwanted thoughts and urges. You also may perform certain actions over and over.  Posttraumatic stress disorder. This occurs in people who have survived a terrible ordeal. It can cause nightmares and flashbacks about the event.  Generalized anxiety disorder. This causes constant worry that can greatly disrupt your life.   Getting better  You  may believe that nothing can help you. Or, you might fear what others may think. But most anxiety symptoms can be eased. Having an anxiety disorder is nothing to be ashamed of. Most people do best with treatment that combines medicine and therapy. These aren t cures. But they can help you live a healthier life.    [id number] [employer]. [address] [place] [address], [place] [address]. All rights reserved. This information is not intended as a substitute for professional medical care. Always follow your healthcare professional's instructions.   Understanding Bipolar Disorder  Bipolar disorder is a serious disorder of the brain. It may severely disrupt your life. At times, it may cause you and your loved ones great pain. But there is hope. Although there is no cure, treatment can help control your symptoms. Talk with your healthcare provider or a mental health professional. He or she can offer guidance and support.    What causes bipolar disorder?  The exact causes of bipolar disorder aren t known. It is known that the disease runs in families. Genes that affect nerve cells in the brain may be inherited, but as yet these genes have not been found.  Who does it affect?  Over 5 million adults in this country have bipolar disorder. Most often, it strikes young adults. It can affect children and older adults as well. Bipolar disorder affects both men and women. It can strike people of all races, cultures, and incomes.  Ups and downs  Bipolar disorder used to be called manic-depressive illness. That is because it causes extreme mood swings. At times the person may feel almost too happy. These times are often followed by great despair. In some cases, both extremes may occur at once. More often, moods shift back and forth. These mood swings may occur just once in a while. Or they may happen 4 or more times a year. Without treatment, they will likely recur throughout life.  Manic episodes  During manic episodes of  bipolar disorder, you feel like you re on top of the world. Even the worst news can t bring you down. You ll likely feel as if you can do anything. And sometimes you may try. You may take great risks, thinking you can t be hurt. You may also talk too fast, and your thoughts may race. You may go for days without sleeping. And you might be very active and do a lot of things in a short time. Manic episodes often end in a depression.  Depressive episodes  In depressive episodes, you feel intense sadness and depression. You may also feel worthless, tired, and helpless. Even the things you value most don t give you pleasure. At times you may want to die. You may even think about taking your own life.  Finding help  The first step is to talk to a trusted counselor or healthcare provider. He or she can help you take the next step to treatment. This may involve therapy (also called counseling) and medication.  Are you having suicidal thoughts?  You may be feeling helpless, hopeless, and that you can t go on. You may even have thoughts of suicide. But help is available. There are ways to ease this pain and manage the problems in your life.  If you are thinking about harming or killing yourself, please tell your healthcare provider or someone you care about immediately or go to the nearest crisis walk-in center or emergency room.  You can also call, toll-free,  800-SUICIDE (628-661-9116)   098-716-BHGY (224-745-4155)     Resources  American Psychiatric Association  701.567.1523  www.healthyminds.org  American Psychological Association  www.apa.org/helpcenter  Anxiety and Depression Association of [place]  www.adaa.org  Mental Health [place]  www.nmha.org  National Center for PTSD  www.ptsd.va.gov  National [place] of Mental Health  www.nimh.nih.gov/topics/oriss-gtrc-tkff.shtml  National Suicide Prevention Lifeline  274.342.9954 (566-944-HWAW)  Www.suicidepreventionlifeline.org     Managing Chronic Pain: Medicines  Medicines  can help you live better with chronic pain. You may use over-the-counter or prescription medicines. It can take some time and trial and error to work out the best treatment plan for you. Work with your health care provider to find the best medicines for you, and to use them safely and effectively.  Tell your health care provider about all medicines you`re taking, including herbs and vitamins.   A part of your treatment plan  Depending on your situation and the type of pain, you may take medicines:    At times when pain is more intense than usual.    For pain relief throughout the day.    Before activities that tend to trigger pain, like going shopping or doing physical therapy.     To decrease sensitivity to pain and help you sleep.  There are 4 major groupings of medicines for the treatment of chronic pain:  Non-opioids. These include the commonly used medicine acetaminophen as well as the non-steroidal anti-inflammatory drugs (NSAIDs), like aspirin and ibuprofen, naproxen sodium, and ketoprofen. These all help control pain but NSAIDs also help relieve inflammation. These drugs are available over-the-counter. Some NSAIDS are available by prescription only.  Acetaminophen can cause liver damage if taken above the recommended dose. NSAIDs may cause stomach problems like bleeding ulcers. Using them over the long-term can cause heart problems and stroke in a very small number of people. None of these drugs is addictive.  Opioids. This includes drugs, like morphine, oxycodone, codeine, fentanyl, and methadone. Opioids may be used to treat breakthrough pain or severe chronic pain. Opioids are available only by prescription. These medicines may be effective for managing chronic pain. But they are controversial because of their side effects and because they can be addictive.    Adjuvants. This group includes medicines that were originally made to treat other conditions, but were also found to relieve pain. Examples of  adjuvant drugs include antidepressants and anticonvulsants.  Antidepressants. These help pain by working on the same brain chemicals that play a role in depression. They also help improve sleep. Tricyclic antidepressants are 1 group of antidepressants used for treating chronic pain caused by nerve injury (neuropathic pain). Examples include amitriptyline, nortriptyline, and desipramine. Serotonin-norepinephrine reuptake inhibitors (SNRIs), like duloxetine and milnacipran, are also used.  Some types of antidepressants are used in low doses for sleep problems. They may also be prescribed if you are very sensitive to pain or some kinds of nerve pain.  Anticonvulsants, developed to prevent seizures, can help certain pain conditions, particularly nerve (neuropathic) pain. Examples include gabapentin and pregabalin.  Other pain medicines    Topical. These medicines, like lidocaine or capsaicin, are applied to the skin to treat pain in one location.    Muscle relaxants. These may be used to stop painful muscle spasms. Drugs like cyclobenzaprine can be sedating.  Taking medicine safely    Take your medicine on time and in the right dose as prescribed.    Tell your health care professional if your medicine doesn't relieve your pain or work for a long enough time, or if you have side effects.    Don't take other people's medicines. They may not be safe for you.  Avoid alcohol, tobacco, and illegal drugs. These may interact with your medicines causing you harm.  Understanding Post-Traumatic Stress Disorder (PTSD)  You may have post-traumatic stress disorder (PTSD) if you ve been through a traumatic event and are having trouble dealing with it. Such events may include a car crash, rape, domestic violence,  combat, or violent crime. While it is normal to have some anxiety after such an event, it usually goes away in time. But with PTSD, the anxiety is more intense and keeps coming back. And the trauma is relived through  nightmares, intrusive memories, and flashbacks (vivid memories that seem real). The symptoms of PTSD can cause problems with relationships and make it hard to cope with daily life. But it can be treated. With help, you can feel better.  How does it feel?  Symptoms of PTSD often start within a few months of the event. Here are some common symptoms:  You startle more easily, and feel anxious and on edge all the time. This can lead to sleep problems. It a can cause you to feel overwhelmed or become angry or upset more easily. Panic attacks (sudden, intense feelings of terror and a strong need to escape from wherever you are) can also occur.  You relive the event through nightmares and flashbacks. During these, you may feel strong emotions and as though you re reliving the event all over again.  You avoid people, places, or activities that remind you of the trauma. You may hold in your feelings and become emotionally numb. It may be hard to concentrate at work or school or to relax with friends. You may be afraid to let people get close to you.  Who does it affect?  Not everyone who survives a trauma will have PTSD. But many will. In fact, millions of people have the condition. PTSD can happen to anyone, but it most often develops after a person feels his or her or another s life is threatened.  You re at risk for PTSD if you have experienced or witnessed:  A rape or sexual abuse  A mugging or carjacking  A car accident or plane crash  A life-threatening illness  War  Domestic violence  Childhood abuse  Natural disasters such as earthquakes, floods, or hurricanes  The sudden death of a loved one  Finding help  The first step is to talk to a trusted counselor or healthcare provider. He or she can help you take the next step to treatment. This may involve therapy (also called counseling) and medication.  Are you having suicidal thoughts?  You may be feeling helpless, hopeless, and that you can t go on. You may even have  thoughts of suicide. But help is available. There are ways to ease this pain and manage the problems in your life.  If you are thinking about harming or killing yourself, please tell your healthcare provider or someone you care about immediately or go to the nearest crisis walk-in center or emergency room.  You can also call, toll-free,  800-SUICIDE (359-895-7959)   447-591-YQGT ([phone])     Resources  American Psychiatric Association  652.569.4421  www.healthyminds.org  American Psychological Association  www.apa.org/helpcenter  Anxiety and Depression Association of [place]  www.adaa.org  Mental Health [place]  www.nmha.org  National Center for PTSD  www.ptsd.va.gov  National [place] of Mental Health  www.nimh.nih.gov/topics/nbxwp-gucl-catt.shtml  National Suicide Prevention Lifeline  860.358.4866 (424-056-VJJG)  Www.suicidepreventionlifeline.org

## 2021-06-22 NOTE — PROGRESS NOTES
Called patient to ask about his home care referral. Patient states he has about 5 unchecked voicemails on his cellphone. I explained that his home care referral would be  by now. I am sure that patient did not hear me say this. He talked over me frequently. Patient was discussing his insurance vs the county doing the assessment for in-home services. He is pretty certain [facility] told him they would be calling around [place]. Patient will update CG via [facility] or phone call. CG assisted with making a f/u appt with his PCP. Phone lines were down today.    Next outreach: 1/10/19

## 2021-06-22 NOTE — PROGRESS NOTES
"Assessment  The pt was present for meeting with SW to discuss needs for on-going assist with IADLs including paperwork, money management, refilling medications and staying on top of mail. SW discussed waiver case management and services as well and mental health case management and services. The pt is not eligible for waiver at this time due to current insurance and is not interested in changing his insurance. The pt is also not interested in additional mental health workers getting involved as he has reported he will get \"overwhelmed\" and does not want to have more people involved with him in this regard.     SW will refer the pt for PCA services to assist with IADLs, at this time this is the only in-home service the pt can get that could potentially meet some of the pt's needs. RN is also looking into getting in-home RN services for medication management. Please see Subjective section for more detailed info.    Due to pt's TBI, he would benefit from having assist with IADLs and assist with navigating housing. Pt is not open to mental health based services at this time, not eligible for waiver programs through the [facility] at this time. SW will work on getting the pt a PCA and see if he can get approved for PCA for assist with IADLs. SW thinking pt would really benefit from an ILS worker, however, pt is not eligible to get this service covered.      Action Plan:    will:  1) Complete PCA referral  2) F/U with the county on pt's eligiblity for [place] to get waiver--how would his insurance change?  3) Mail pt affordable dental clinic options that offer low-cost sedation      Care Guide will:  Care Guide Delegation:   1)  Due Date:  None at this time       Delegation:    Subjective   The pt was present for meeting with SW to discuss needs for on-going assist with navigating paperwork for MA and other programs, staying on top of medications getting refilled and some IADLs including money management and " "staying on top of mail.    TAMI discussed CADI and TBI [facility] waiver programs with the pt. The pt does not have MA-Disabled insurance at this time and is not eligible for waiver programs due to this. SW discussed this with the pt and changing his insurance but pt did not like the idea of this as he's had a lot of struggles with getting current insurance and \"doesn't want to mess it up.\" SW will follow up with the county to inquire about how pt's insurance would change if he switched to [place]-Disabled.    SW also discussed mental health case management--must be referred by his psychologist or someone who has completed a recent DA. TAMI explained that with this type of case management, however, the pt would usually have to have had a recent inpatient hospital stay related to mental health. TAMI discussed [facility] and educated the pt on this service, however, the pt declined all mental health services TAMI discussed at this time. The pt explained he's seeing a psychologist 1x/week and a psychiatrist regularly and would feel overwhelmed having more mental health workers involved, SW informed pt SW understood.     SW discussed PCA services and educated the pt on this. PCA can provide assist with ADLS and IADLs. Based on what pt reported needs were, the focus was more for IADLs at this time. Pt was agreeable to SW referring the pt for PCA. SW thinking a PCA could assist with managing getting medications refilled, managing mail and possibly assist with money management. SW will complete referral with emphasis on IADLs so the  will be aware of the needs being less physically focused. [facility] website has tasks PCAs can assisted listed as follows:    Activities of daily living (includes eating, toileting, grooming, dressing, bathing, transferring, mobility and positioning)  Complex health-related functions (includes, under state law, functions that can be delegated or assigned by a licensed health care professional to be performed by a " "personal care assistant)  Instrumental activities of daily living (includes meal planning and preparation, managing finances, shopping for essential items, performing essential household chores, communication by telephone and other media and getting around and participating in the community)  Observation and redirection of behavior (includes monitoring of behavior)    SW will advocate for needs based in IADLs.    Due to pt not wanting mental health services such as ARM and not eligible for  case management or waiver, pt has limited options at this time for programs he qualifies for. SW also discussed a representative payee to help with managing money as pt's only income is from the SSA. Pt was unsure of this, seems pt is wanting more assist with this need vs someone fully managing for him.     SW also reviewed housing with the pt. SW educated the pt on the current housing market and process of finding income-based, disabled housing. The pt is needing handicap accessible housing due to his physical conditions. SW educated the pt on the different options--section 8, section 42, public housing, etc. Encouraged the pt to focus on section 8, percentage income based housing. TAMI demonstrated how to use housing link and printed off listings in pt's desired areas including [place], [place], [place] and [place]--many had repeat options listed, very few options with long waitlists at this time. SW encouraged the pt to begin getting on waitlists now if he's wanting to move in Fall. TAMI explained SW can assist with filling the applications out if he needs assist with this. The pt explained finding housing was not a high priority at this time and is able to stay with family for the time being.     TAMI discussed dental as well. The pt explained he had a bad experience before and wanted to work with \"friendly dentists who understand when someone is in pain.\" RN had noted finding affordable options that offer " sedation. TAMI will look into this and mail the pt this info, pt agreeable to this.    TAMI also briefly discussed deep tissue massage pt was requesting. To TAMI's knowledge, this is not covered by MA. TAMI would suggest for pt to work with the pain clinic on this as it is pain related, maybe pt could get [facility] financial assist to cover costs? Pain clinic staff could possibly advocate the need for it and get costs covered.     Objective    Appearance: Casually and appropriately dressed, well groomed, normal gait.      Behavior: Open, cooperative      Speech: Clear      Emotion/Affect: Calm, pleasant      Thought Processes: Linear, tangential at times      Orientation: observed x4      Memory: pt self-reported deficits       General Intellectual Abilities: adequate       Judgment: adequate       Insight: adequate       Clinical Recommendations: Due to pt's TBI, he would benefit from having assist with IADLs and assist with navigating housing. Pt is not open to mental health based services at this time, not eligible for waiver programs through the [facility] at this time. TAMI will work on getting the pt a PCA and see if he can get approved for PCA for assist with IADLs. TAMI thinking pt would really benefit from an ILS worker, however, pt is not eligible to get this service covered.

## 2021-06-22 NOTE — PROGRESS NOTES
"[facility] SW called [facility] intake to consult on pt's MA and eligibility for MA-DX to get waiver. Worker informed SW that pt has MA-Expansion at this time and this has a more \"generous\" income limit than MA-DX (MA-Disabled). The worker reviewed pt's income with SW and informed SW that if the pt were to to switch to MA-Disabled, he'd have to spend down to around $800--$600-$700 spend down. The worker discussed MA-EPD (type of MA where people are still working part time) as an option to qualify for waiver, however, SW discussed pt's inability to work at this time.     SW had already referred the pt to [facility] for PCA assessment, the worker informed SW that there is a change coming beginning in January where the [facility] will be doing all assessments for community services for individuals under 65. Worker suggested to refer to her instead today. SW did this, informed worker SW will call [facility] and cancel PCA referral.    SW called [facility], had to leave a  with medical management.     SW called pt and updated him on the switch from [facility] to [facility].   "

## 2021-06-22 NOTE — TELEPHONE ENCOUNTER
No PA needed, this is a duplicate from 12/28/18.    Amphetamine-dextroamphetamine ER 10mg was denied and patient needed to utilize the 20mg tablets. Physician already changed Rx and this was taken care of.

## 2021-06-22 NOTE — TELEPHONE ENCOUNTER
Left a very long repetative message x 2 about needing refill of Ketamine and Oxycodone. Ketamine is working.  Wants a stronger and more frequent Ketamine. Wants refill of Oxycodone. Takes 7 Oxycodone a day and only got #126 on last refill;. Wants another  #84, because usually get #210 for 30 days.     Per  12/21/2018 2  12/21/2018 Oxycodone Hcl 5 MG Tablet 126 11 [address] [place] [id number] [facility] [phone] 85.91 MME Medicaid [place]

## 2021-06-22 NOTE — PROGRESS NOTES
"Jan reports she is \"good and bad\".  He did not yet get the Namenda which we had requested with trouble with prior authorizations and describes this as a \"headache\".    He did have a visit with Dr. Caputo and review several medications changes were made.  After Harshal inquired about his knee pain and swelling and did a uric Acid test in fact found was elevated and is placed him on allopurinol which is helping.    His Prozac has been transition to Cymbalta 30 mg twice a day and he is tolerating the transition unclear but there is will benefit.    Dr. Caputo and he discussed ADD symptoms and he is started on Adderall which is helping him calm down.    He described there is episodes of spasms in his neck and upper arms particular when he lies back at night.  He demonstrates areas around his right scapula and C8 where his had trigger point injections in the past and has had blocks in his right ulnar nerve.  He has been started on Topamax 25 mg twice a day.  He describes can help for several hours and seems to wear off.    He has been using prazosin which is cut down his nightmares, which used to be nightly, now less than once a week.    They discussed using amitriptyline and he recalls we discussed ketamine.    He has been using medical cannabis, cannot afford to use as much at this time years as he would usually like.    He reviews a TENS unit did not help.    Continues on the opioids with the OxyContin 15 mg twice a day and oxycodone 5 mg 7 and a day    He recalls the previous physical therapy he had was very helpful with it sounds as myofascial elements, [employer], some electrical stimulation and he would like to resume again.      Current Outpatient Medications:      acetaminophen (TYLENOL) 650 MG CR tablet, Take 1,300 mg by mouth every 8 (eight) hours as needed for pain., Disp: , Rfl:      allopurinol (ZYLOPRIM) 300 MG tablet, Take 1 tablet (300 mg total) by mouth daily., Disp: 90 tablet, Rfl: 3     " amLODIPine (NORVASC) 10 MG tablet, Take 1 tablet (10 mg total) by mouth daily., Disp: 90 tablet, Rfl: 3     busPIRone (BUSPAR) 10 MG tablet, Take 1 tablet (10 mg total) by mouth 3 (three) times a day as needed., Disp: 90 tablet, Rfl: 3     calcium carbonate-vitamin D3 (CALTRATE 600 PLUS D3) 600 mg(1,500mg) -400 unit per tablet, TAKE THREE TABLETS BY MOUTH ONCE DAILY, Disp: 90 tablet, Rfl: 6     cetirizine (ZYRTEC) 10 MG tablet, TAKE ONE TABLET BY MOUTH ONCE DAILY, Disp: 90 tablet, Rfl: 3     dextroamphetamine-amphetamine (ADDERALL XR) 10 MG 24 hr capsule, Take 1 capsule (10 mg total) by mouth daily., Disp: 30 capsule, Rfl: 0     DULoxetine (CYMBALTA) 30 MG capsule, Take 30 mg by mouth 2 (two) times a day., Disp: , Rfl: 0     FLUoxetine (PROZAC) 20 MG capsule, Take 20 mg by mouth daily., Disp: , Rfl:      fluticasone (FLONASE) 50 mcg/actuation nasal spray, USE 2 SPRAY(S) IN EACH NOSTRIL ONCE DAILY., Disp: 18.2 g, Rfl: 5     gabapentin (NEURONTIN) 400 MG capsule, Take 1,600 mg by mouth daily., Disp: , Rfl:      hydrOXYzine HCl (ATARAX) 25 MG tablet, Take 1 tablet (25 mg total) by mouth 4 (four) times a day., Disp: 120 tablet, Rfl: 3     ibuprofen (ADVIL,MOTRIN) 400 MG tablet, Take 800 mg by mouth 2 (two) times a day., Disp: , Rfl:      ketamine HCl (KETAMINE, BULK,) 100 % Powd, 20 mg amarjit  One-half twice a day for four days, one-half four times a day four days, then one four times a day, Disp: 30 Bottle, Rfl: 2     lamoTRIgine (LAMICTAL) 25 MG tablet, Take 25 mg by mouth 2 (two) times a day. , Disp: , Rfl: 0     lidocaine HCl 3 % Crea, Apply topically to affected areas twice daily, Disp: 1 Tube, Rfl: 5     losartan (COZAAR) 100 MG tablet, Take 1 tablet (100 mg total) by mouth daily., Disp: 90 tablet, Rfl: 5     Medical Cannabis, Use 1 Units As Directed. Take as instructed by the medical cannabis dispensary. The provider who enrolled the patient in the medical cannabis registry and certified this  patient will  maintain ownership and liability of all provider reporting and registration requirements., Disp: , Rfl:      meloxicam (MOBIC) 15 MG tablet, TAKE ONE TABLET BY MOUTH ONCE DAILY, Disp: 30 tablet, Rfl: 17     memantine 7-14-21-28 mg C24k, Take 1 tablet by mouth daily., Disp: 28 each, Rfl: 0     metoprolol succinate (TOPROL-XL) 100 MG 24 hr tablet, Take two tablets (200 mg total) daily., Disp: 180 tablet, Rfl: 3     NARCAN 4 mg/actuation nasal spray, , Disp: , Rfl: 0     nicotine (NICODERM CQ) 21 mg/24 hr, Place 1 patch on the skin daily., Disp: 30 patch, Rfl: 1     omeprazole (PRILOSEC) 40 MG capsule, TAKE 1 CAPSULE (40 MG TOTAL) BY MOUTH DAILY, please do a PA if needed, Disp: 90 capsule, Rfl: prn     oxyCODONE (OXYCONTIN) 15 mg 12 hr tablet, Take 1 tablet (15 mg total) by mouth every 12 (twelve) hours. Fill on 11/19/18, Disp: 60 tablet, Rfl: 0     oxyCODONE (ROXICODONE) 5 MG immediate release tablet, Take 1-2 tablets (5-10 mg total) by mouth every 4 (four) hours as needed for pain. Fill on 11/19/18, Disp: 126 tablet, Rfl: 0     prazosin (MINIPRESS) 1 MG capsule, Take 1 capsule (1 mg total) by mouth at bedtime., Disp: 30 capsule, Rfl: 11     pseudoephedrine (SUDAFED) 120 mg 12 hr tablet, TAKE ONE TABLET BY MOUTH TWICE DAILY AS NEEDED FOR  CONGESTION, Disp: 180 tablet, Rfl: 5     QUEtiapine (SEROQUEL) 200 MG tablet, Take 200 mg by mouth bedtime., Disp: , Rfl:      QUEtiapine (SEROQUEL) 50 MG tablet, Take 50 mg by mouth 4 (four) times a day., Disp: , Rfl:      ranitidine (ZANTAC) 300 MG tablet, TAKE ONE TABLET BY MOUTH ONCE DAILY AT BEDTIME, Disp: 30 tablet, Rfl: 17     temazepam (RESTORIL) 30 mg capsule, , Disp: , Rfl: 1     THERA-M 9 mg iron-400 mcg Tab tablet, TAKE ONE TABLET BY MOUTH ONCE DAILY, Disp: 30 tablet, Rfl: 10     topiramate (TOPAMAX) 25 MG tablet, Take 1 tablet (25 mg total) by mouth 2 (two) times a day., Disp: 60 tablet, Rfl: 11     VENTOLIN HFA 90 mcg/actuation inhaler, INHALE 2 PUFFS BY MOUTH EVERY 6  "HOURS AS NEEDED FOR WHEEZING, Disp: 18 each, Rfl: 3  Blood pressure 136/85, pulse 66, resp. rate 16, height 5' 8\" (1.727 m), weight (!) 229 lb (103.9 kg).    Score 7.  He is alert with a clear sensorium good eye contact.  He has a number of papers which she refers to to keep his thoughts organized.  Speech is slow, effort to keep his thoughts organized.  Affect is full range.    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    There is comorbid bipolar spectrum disorder posttraumatic stress disorder.    Plan: Referral to physical therapy.    Discussed I would not do some amitriptyline at this time given his cognitive difficulties, the anticholinergic component could be a factor.  He is up getting some of the dual action benefit with the team.    We reviewed given the cost of the medical cannabis, he may get more benefit using ketamine.  Reviewed its off label use, side effects, benefit for pain mood and PTSD.  He is interested in pursuing this.  He believes he could afford this.  He will start with 10 mg twice a day increasing slowly as tolerated.    I will call his therapist and psychiatrist about the use of ketamine as it may affect his mood and mental status.    We reviewed a goal to decrease opioids as able.    Time spent more than 40 minutes face-to-face 50% count about above condition coronation treatment plan  "

## 2021-06-22 NOTE — PROGRESS NOTES
ASSESSMENT:  1. Traumatic brain injury with loss of consciousness, sequela (H)  Doing approximately 25% better.  Fast metabolizer.  Increase Adderall to twice daily.  Trial of B12 shot.  Continue to work on medications for pain control focusing on neuropathic etiology  - dextroamphetamine-amphetamine (ADDERALL XR) 10 MG 24 hr capsule; Take 1 capsule (10 mg total) by mouth 2 (two) times a day.  Dispense: 60 capsule; Refill: 0  - cyanocobalamin injection 1,000 mcg; Inject 1 mL (1,000 mcg total) into the shoulder, thigh, or buttocks every 30 (thirty) days.      2. Wheezing  Refill medication  - albuterol (VENTOLIN HFA) 90 mcg/actuation inhaler; INHALE 2 PUFFS BY MOUTH EVERY 6 HOURS AS NEEDED FOR WHEEZING  Dispense: 18 each; Refill: 3    3. PTSD (post-traumatic stress disorder)  Trial of slightly higher dose.  1 mg very effective  - prazosin (MINIPRESS) 2 MG capsule; Take 1 capsule (2 mg total) by mouth at bedtime.  Dispense: 90 capsule; Refill: 3    4. Chronic, continuous use of opioids  Partial improvement on topiramate but does not last long enough.  Push dosage  - topiramate (TOPAMAX) 50 MG tablet; Take 1 tablet (50 mg total) by mouth 2 (two) times a day.  Dispense: 180 tablet; Refill: 3    5. Essential hypertension  With edema.  Trial of hydrochlorothiazide to replace amlodipine.  Continue allopurinol to prevent flare of gout  - hydroCHLOROthiazide (HYDRODIURIL) 25 MG tablet; Take 1 tablet (25 mg total) by mouth daily.  Dispense: 90 tablet; Refill: 3    6. Pain  Consider alternative anti-inflammatory but not too many changes at this time.  Agree with conversion from Prozac to duloxetine.  Push duloxetine slightly.  Trial of prednisone for inflammatory component.  Consider trial of amantadine, as memantine was hung up in insurance  - meloxicam (MOBIC) 15 MG tablet; Take 1 tablet (15 mg total) by mouth daily.  Dispense: 90 tablet; Refill: 3      Continue coordination through pain clinic also.  Close  follow-up    PLAN:  Patient Instructions   Off Prozac    On Duloxetine 30 mgs twice a day.  Max is 60 mgs twice a day.  Increase duloxetine to 60 am and 30 pm    Stop amlodipine.  Swelling will improve over next 1-2 weeks    Add HCTZ daily for swelling and blood pressure, 25 mgs each morning    Increase Topiramate to 50 mgs twice a day for better nerve pain.  Max is 200 mgs twice a day    Try a vitamin B 12 shot which can help nerve pain    Prednisone, low dose steroid, 10 mgs each morning for a week.  If helpful, continue.  If not helpful, stop it.  That would help inflammation, but not nerve pain    Increase Prazosin to 2 mgs at bedtime for better PTSD sleep    We could change the meloxicam to something else, but not now    Increase the adderall to twice a day to maintain the benefit all day    No amitriptyline for now    There are still meds for pain that changes with weather, cheaper such as Amantadine                      No orders of the defined types were placed in this encounter.    Medications Discontinued During This Encounter   Medication Reason     FLUoxetine (PROZAC) 20 MG capsule      memantine 7-14-21-28 mg C24k      VENTOLIN HFA 90 mcg/actuation inhaler Reorder     dextroamphetamine-amphetamine (ADDERALL XR) 10 MG 24 hr capsule Reorder     allopurinol (ZYLOPRIM) 300 MG tablet Reorder     prazosin (MINIPRESS) 1 MG capsule Reorder     topiramate (TOPAMAX) 25 MG tablet Reorder     meloxicam (MOBIC) 15 MG tablet Reorder     amLODIPine (NORVASC) 10 MG tablet      gabapentin (NEURONTIN) 400 MG capsule      Administrations This Visit     cyanocobalamin injection 1,000 mcg     Admin Date  12/26/2018 Action  Given Dose  1000 mcg Route  Intramuscular Administered By  Mandy Angelo CMA              Return in about 4 weeks (around 1/23/2019) for for a full review of medications and lab testing.      CHIEF COMPLAINT:  Chief Complaint   Patient presents with     Follow-up     Medication Management       HISTORY  OF PRESENT ILLNESS:  Bola is a 48 y.o. male presenting to the clinic today for management of Depression, chronic pain, PTSD, gout, and arthritis.     Depression: He has switched from prozac to cymbalta 30mg two times a day. He will see his psychiatrist again next week. He thinks he could use a little higher dose of cymbalta. He can tell that that the cymbalta is working better for him than the prozac did. His depression and bipolar conditions are stable, however this helps with the neuropathy as well. Usually his neuropathy gets worse throughout the night. He takes gabapentin two times a day too.     Nightmares: In the past month he has had 2 or 3 PTSD related dreams and nightmares. He is on prazosin, and that has been very effective. It took effect for him in a matter of days. His dreams would be severe and wake him in a panic, so having these mostly eliminated has been a great help to him. He sees his psychiatrist on Jan 7th next.     Gout: He has been taking allopurinol for high uric acid and in the past month he has been helped by this a lot. He felt like his knee was a balloon that was about to burst, getting worse throughout the day for him, but now as the day goes on he still feels like the balloon grows every day, but not to the point that it feels like it will burst. He still gets a swollen knee and feels his pulse throbbing in the knee. The worst things for him are getting in and out of a car, and taking stairs.     Pain management: He is anticipating starting Ketamine soon, and a minimal amount of medicinal mariajuana to use in conjunction. He thought topiramate did help too. He could tell however when it peaked, and when it was diminishing, and when it was time for the 2nd dose of the day. He thinks his pain in the last month has improved by about 25% as this medical management has been changed. He has not yet started cutting down on narcotic use, but is anticipating he will be able to with the  "Ketamine and other medication management.     Stimulant: He is taking Adderal that helps him significantly and acts like a \"blanket\" that helps his focus, and his energy, and lessens his pain. However he notices that by the time it is time for his 2nd dose, he feels like he can't get back to the point of effectiveness that the first dose gave him. He wishes there was an extended release or something for him.     Muscle rigidity: He thinks his muscles fire more throughout the day than other people, and this results in him at night having a lot of muscle twitching episodes in which he just holds the twitching muscle and waits for it to pass. It is worse on the right than left, and he thinks this is because the left side of his cervical spine got repaired in a surgery 5 years ago, but the right side never was.     Arthritis: He was on Celebrex before, and now he is on meloxicam. He tolerates it well. He has been on it for 10 years and thinks it works well for him, but that his body may have gotten used to it. He has done well with steroid injection in the past, but has never taken a course of steroids in the past.     REVIEW OF SYSTEMS:   Denies shortness of breath, dyspnea,   Admits to leg edema.   All other systems are negative.    PFSH:  He recently retired.   He denies drinking alcohol.   Reviewed as below.    TOBACCO USE:  Social History     Tobacco Use   Smoking Status Current Some Day Smoker     Packs/day: 0.50     Years: 6.00     Pack years: 3.00     Types: Cigarettes     Start date: 2009     Last attempt to quit: 2017     Years since quittin.8   Smokeless Tobacco Former User       VITALS:  Vitals:    18 1546   BP: 102/70   Patient Site: Left Arm   Patient Position: Sitting   Cuff Size: Adult Regular   Pulse: 68   Resp: 14   SpO2: 97%   Weight: (!) 227 lb (103 kg)   Height: 5' 8\" (1.727 m)     Wt Readings from Last 3 Encounters:   18 (!) 227 lb (103 kg)   18 (!) 229 lb (103.9 kg) "   11/23/18 (!) 229 lb 4 oz (104 kg)     Body mass index is 34.52 kg/m .    PHYSICAL EXAM:  Constitutional:  Reveals an alert oriented talkative man, affect appropriate, does not appear acutely ill. Vitals:  Per nursing notes.  Cardiac:  Regular rate and rhythm without murmurs, rubs, or gallops. Legs without edema. Palpation of the radial pulse regular.  Neurologic: Cranial nerves II-XII, motor strength, sensation, and coordination grossly intact.    MSK :The way he held his neck and arms seemed stiff.    Psychiatric: Pressured speech, animated, good eye contact, enthusiastic, mood appropriate, memory intact.     MEDICATIONS:  Current Outpatient Medications   Medication Sig Dispense Refill     acetaminophen (TYLENOL) 650 MG CR tablet Take 1,300 mg by mouth every 8 (eight) hours as needed for pain.       albuterol (VENTOLIN HFA) 90 mcg/actuation inhaler INHALE 2 PUFFS BY MOUTH EVERY 6 HOURS AS NEEDED FOR WHEEZING 18 each 3     allopurinol (ZYLOPRIM) 300 MG tablet Take 1 tablet (300 mg total) by mouth daily. 90 tablet 3     busPIRone (BUSPAR) 10 MG tablet Take 1 tablet (10 mg total) by mouth 3 (three) times a day as needed. 90 tablet 3     calcium carbonate-vitamin D3 (CALTRATE 600 PLUS D3) 600 mg(1,500mg) -400 unit per tablet TAKE THREE TABLETS BY MOUTH ONCE DAILY 90 tablet 6     cetirizine (ZYRTEC) 10 MG tablet TAKE ONE TABLET BY MOUTH ONCE DAILY 90 tablet 3     dextroamphetamine-amphetamine (ADDERALL XR) 10 MG 24 hr capsule Take 1 capsule (10 mg total) by mouth 2 (two) times a day. 60 capsule 0     DULoxetine (CYMBALTA) 30 MG capsule Take 30 mg by mouth 2 (two) times a day.  0     fluticasone (FLONASE) 50 mcg/actuation nasal spray USE 2 SPRAY(S) IN EACH NOSTRIL ONCE DAILY. 18.2 g 5     gabapentin (NEURONTIN) 400 MG capsule Take 2 capsules (800 mg total) by mouth 2 (two) times a day. 360 capsule 3     ibuprofen (ADVIL,MOTRIN) 400 MG tablet Take 800 mg by mouth 2 (two) times a day.       ketamine HCl (KETAMINE, BULK,)  100 % Powd 20 mg amarjit  One-half twice a day for four days, one-half four times a day four days, then one four times a day 30 Bottle 2     lamoTRIgine (LAMICTAL) 25 MG tablet Take 25 mg by mouth 2 (two) times a day.   0     lidocaine HCl 3 % Crea Apply topically to affected areas twice daily 1 Tube 5     losartan (COZAAR) 100 MG tablet Take 1 tablet (100 mg total) by mouth daily. 90 tablet 5     Medical Cannabis Use 1 Units As Directed. Take as instructed by the medical cannabis dispensary. The provider who enrolled the patient in the medical cannabis registry and certified this  patient will maintain ownership and liability of all provider reporting and registration requirements.       meloxicam (MOBIC) 15 MG tablet Take 1 tablet (15 mg total) by mouth daily. 90 tablet 3     metoprolol succinate (TOPROL-XL) 100 MG 24 hr tablet Take two tablets (200 mg total) daily. 180 tablet 3     NARCAN 4 mg/actuation nasal spray   0     nicotine (NICODERM CQ) 21 mg/24 hr Place 1 patch on the skin daily. 30 patch 1     omeprazole (PRILOSEC) 40 MG capsule TAKE 1 CAPSULE (40 MG TOTAL) BY MOUTH DAILY, please do a PA if needed 90 capsule prn     oxyCODONE (OXYCONTIN) 15 mg 12 hr tablet Take 1 tablet (15 mg total) by mouth every 12 (twelve) hours. Fill on 11/19/18 60 tablet 0     oxyCODONE (ROXICODONE) 5 MG immediate release tablet Take 1-2 tablets (5-10 mg total) by mouth every 4 (four) hours as needed for pain. Fill on 11/19/18 126 tablet 0     prazosin (MINIPRESS) 2 MG capsule Take 1 capsule (2 mg total) by mouth at bedtime. 90 capsule 3     pseudoephedrine (SUDAFED) 120 mg 12 hr tablet TAKE ONE TABLET BY MOUTH TWICE DAILY AS NEEDED FOR  CONGESTION 180 tablet 5     QUEtiapine (SEROQUEL) 200 MG tablet Take 200 mg by mouth bedtime.       QUEtiapine (SEROQUEL) 50 MG tablet Take 50 mg by mouth 4 (four) times a day.       ranitidine (ZANTAC) 300 MG tablet TAKE ONE TABLET BY MOUTH ONCE DAILY AT BEDTIME 30 tablet 17     temazepam  (RESTORIL) 30 mg capsule   1     THERA-M 9 mg iron-400 mcg Tab tablet TAKE ONE TABLET BY MOUTH ONCE DAILY 30 tablet 10     topiramate (TOPAMAX) 50 MG tablet Take 1 tablet (50 mg total) by mouth 2 (two) times a day. 180 tablet 3     hydroCHLOROthiazide (HYDRODIURIL) 25 MG tablet Take 1 tablet (25 mg total) by mouth daily. 90 tablet 3     hydrOXYzine HCl (ATARAX) 25 MG tablet Take 1 tablet (25 mg total) by mouth 4 (four) times a day. 120 tablet 3     predniSONE (DELTASONE) 10 mg tablet Take 10 mg by mouth daily. 30 tablet 2     Current Facility-Administered Medications   Medication Dose Route Frequency Provider Last Rate Last Dose     cyanocobalamin injection 1,000 mcg  1,000 mcg Intramuscular Q30 Days Devon Lund MD   1,000 mcg at 12/26/18 1655       ADDITIONAL HISTORY SUMMARIZED (2): [facility] note reviewed showing plan for management including ketamine, medical marijuana, and PT.   DECISION TO OBTAIN EXTRA INFORMATION (1): None.   RADIOLOGY TESTS (1): None.  LABS (1): 11/23/18, 11/20/18, 9/20/18 labs reviewed.   MEDICINE TESTS (1): None.  INDEPENDENT REVIEW (2 each): None.     The visit lasted a total of 42 minutes face to face with the patient. Over 50% of the time was spent counseling and educating the patient about sleep/PTSD, depression, neuropathy and pain management.     ICar, am scribing for and in the presence of, Dr. Lund.    I, Dr. Ludn, personally performed the services described in this documentation, as scribed by Car Hernandez in my presence, and it is both accurate and complete.    Total data points: 3

## 2021-06-22 NOTE — TELEPHONE ENCOUNTER
[facility], they have started the Prior Authorization Process via Cover My Meds    CoverMyMeds Key: [id number]    Medication Name: Amphetamine-Dextroamphetamine ER 10mg    Insurance Plan: [employer]  PBM: [employer]   Patient ID: [id number]    Please complete the PA process

## 2021-06-23 ENCOUNTER — COMMUNICATION - HEALTHEAST (OUTPATIENT)
Dept: INTERNAL MEDICINE | Facility: CLINIC | Age: 51
End: 2021-06-23

## 2021-06-23 DIAGNOSIS — F31.81 BIPOLAR 2 DISORDER (H): ICD-10-CM

## 2021-06-23 NOTE — PROGRESS NOTES
[facility] Daily Progress     Patient Name: Bola Lyon  Date: 2/6/2019  Visit #: 3      PTA visit #:       Referral Diagnosis: Chronic pain      Referring provider: Dmitriy Cramer*     Visit Diagnosis:     ICD-10-CM    1. Chronic right shoulder pain M25.511     G89.29    2. Cervicalgia M54.2    3. Cervical radiculitis M54.12    4. Myofascial pain M79.18          Assessment:     Patient is benefitting from skilled physical therapy and is making steady progress toward functional goals.  Patient is appropriate to continue with skilled physical therapy intervention, as indicated by initial plan of care.  There was good release of fascial tensions with treatment today. The adverse response he had to last treatment is a good sign of treating needed tensions in the body.     Goal Status:  Pt. will demonstrate/verbalize independence in self-management of condition in : 12 weeks  Pt. will report decreased intensity, frequency of : Pain;in 12 weeks;Comment  Comment:: decrease pain from 6-8/10 to 4-7/10 with ADLs.   Pt. will decrease use of medication for pain for improved quality of life in : 12 weeks  Pt. will have improved quality of sleep: waking less times/night;getting 75-90% of required amount;in 12 weeks  Patient will return to: exercise;leisure;in 12 weeks;Comment  Comment: lifting weights withtout dropping anything with B hands  Patient Turn [place]: for driving;for conversation;with less pain;with less difficulty;in 12 weeks;Comment  Comment: increase C-rot to >50 deg B and T-rot to >40 deg.   Patient will reach / maintain arm movement: forward;overhead;behind;for home chores;for dressing;with less pain;with less difficulty;in 12 weeks    Patient will decrease : NDI score;by _ points;for improved quality of function;for improved quality of life;in 12 weeks  by ___ points: 15       Plan / Patient Education:     Plan to con't with manual therapy to decrease fascial tension/tone to normalize  "ROM/decrease inflammation/decrease mm tone and improve proprioception.      Subjective:     Pain Ratin   He has been a bad /10 lately but knows the reasons. With the changes in the medications he feels like there has been more mixed messages with his nerves. He does get some relaxing which is great but when he \"twitches\" or has everything \"fire\" at times which is painful. He does seem to be getting some relief and does some things around the house done.   He felt great after the last Rx until later that night. He almost felt like there were \"toxins releasing\" after treatments.     Objective:     neural fascial tensions.      Treatment Today   2019   TREATMENT MINUTES COMMENTS   Evaluation     Self-care/ Home management     Manual therapy 25 Fascial release using Strain-Counterstrain of R brachial plexus-N, B thoracic pre/cervical and thoracic post-gang-N, B cranial dura-N   Neuromuscular Re-education 15 Recip inhib of neural fascia   Therapeutic Activity     Therapeutic Exercises 15 AA/PROM to C-T spine, ribs, cranium, BUE   Gait training     Modality__________________                Total 55    Blank areas are intentional and mean the treatment did not include these items.       Jb Ochoa  2019    "

## 2021-06-23 NOTE — PROGRESS NOTES
Communicated with patient via [employer]. See patient emails dated 1/25/19. Patient requested an appt with CG.

## 2021-06-23 NOTE — PROGRESS NOTES
"[facility] Daily Progress     Patient Name: Bola Lyon  Date: 2/1/2019  Visit #: 2  PTA visit #:     Referral Diagnosis: Chronic pain  Referring provider: Dmitriy Cramer*  Visit Diagnosis:     ICD-10-CM    1. Chronic right shoulder pain M25.511     G89.29    2. Cervicalgia M54.2    3. Cervical radiculitis M54.12    4. Myofascial pain M79.18          Assessment:     Patient is benefitting from skilled physical therapy and is making steady progress toward functional goals.  Patient is appropriate to continue with skilled physical therapy intervention, as indicated by initial plan of care.  He did feel improvement post treatment. There was decreased hyperextension with R elbow post treatment. \"It hasn't felt this good in a long time\".     Goal Status:  Pt. will demonstrate/verbalize independence in self-management of condition in : 12 weeks  Pt. will report decreased intensity, frequency of : Pain;in 12 weeks;Comment  Comment:: decrease pain from 6-8/10 to 4-7/10 with ADLs.   Pt. will decrease use of medication for pain for improved quality of life in : 12 weeks  Pt. will have improved quality of sleep: waking less times/night;getting 75-90% of required amount;in 12 weeks  Patient will return to: exercise;leisure;in 12 weeks;Comment  Comment: lifting weights withtout dropping anything with B hands  Patient [place]: for driving;for conversation;with less pain;with less difficulty;in 12 weeks;Comment  Comment: increase C-rot to >50 deg B and T-rot to >40 deg.   Patient will reach / maintain arm movement: forward;overhead;behind;for home chores;for dressing;with less pain;with less difficulty;in 12 weeks    Patient will decrease : NDI score;by _ points;for improved quality of function;for improved quality of life;in 12 weeks  by ___ points: 15       Plan / Patient Education:     Plan to con't with manual therapy to decrease fascial tension/tone to normalize ROM/decrease inflammation/decrease mm " tone and improve proprioception.      Subjective:     Pain Ratin   He feels like much of what we talked about does make sense. He is on the new meds which have been doing a great job so far. He is still trying to mess with them a bit to get the right dosage.   He did have a tough night last night with some really cold/numb feelings in his hands especially the R hand (along ulnar nerve distribution). He also notices more fatigue/burn in the L arm. This is likely due to not having the meds on board      Objective:     MS, neural fascial tensions.   Negative Babinski on L and diminished response on R foot but there was some curling of the toes.     Treatment Today   2019   TREATMENT MINUTES COMMENTS   Evaluation     Self-care/ Home management     Manual therapy 25 Fascial release using Strain-Counterstrain of RUE/brachial plexus-N, B shoulder (P)-MS, RUE (P)-MS, R Rib5/3 E (P)-MS    Neuromuscular Re-education 25 Recip inhib of neural, MS fascia   Therapeutic Activity     Therapeutic Exercises     Gait training     Modality__________________                Total 50    Blank areas are intentional and mean the treatment did not include these items.       Jb Ochoa  2019

## 2021-06-23 NOTE — PROGRESS NOTES
ASSESSMENT:  1. Chronic pain syndrome  Reviewed adjustments.  Improved symptoms with increased topiramate.  Trial of further increase in topiramate.  Improved symptoms with B12 shot.  Continue B12 shot.  Narcotics through pain clinic and Dr. Cramer.  It should be noted that he used to receive 210 oxycodone per month.  He is now receiving 106 every 2-3 weeks and on January 9 received 84 tablets.  He continues to take OxyContin 15 mg twice daily in the background.  Continue low-dose prednisone for at least one more month    2. Essential hypertension  Excellent control with cessation of amlodipine and addition of hydrochlorothiazide.  No hypotension.  Edema resolved    3. Generalized anxiety disorder  Stable with increased duloxetine.  History of bipolar.  Caution with Adderall.  Reviewed insurance limitation and increased from 10 mg once daily to 10 mg twice daily.  To smooth out kisha, increase to 15 mg twice daily but use 30 mg tablet split    4. Chronic, continuous use of opioids  Push topiramate  - topiramate (TOPAMAX) 100 MG tablet; Take 1 tablet (100 mg total) by mouth 2 (two) times a day.  Dispense: 180 tablet; Refill: 3    5.  Sinusitis.  History of chronic sinus with recurrence.  Will treat    Review use of baclofen.  As high metabolizer, suspect if this is successful we may need to push dosage quickly    PLAN:  Patient Instructions   Begin Baclofen 10 mgs 3 times a day for a few days.  If helpful, and no side effects, but not quite enough, contact me and we will increase to either 15 mgs or 20 mgs.  Max is 80-90 per day    Continue the Prednisone 10 mgs daily for one more month    Do B 12 shots monthly     Lets increase Adderall to 30 mgs tab, broken in half twice a day to be 15 mgs twice     Increase Topiramate to 100 mgs twice a day to help nerve pain, and to help curb appetite and helps with weight loss.   We could change to a three times a day schedule    augmentin 875 mgs twice a day for sinus and ear  "infection for 10 days    email me weekly with effect on sinus, and pain, and muscle spasm, and adderall          No orders of the defined types were placed in this encounter.    Medications Discontinued During This Encounter   Medication Reason     DULoxetine (CYMBALTA) 30 MG capsule      dextroamphetamine-amphetamine (ADDERALL) 20 mg Tab      topiramate (TOPAMAX) 50 MG tablet      Administrations This Visit     cyanocobalamin injection 1,000 mcg     Admin Date  01/23/2019 Action  Given Dose  1000 mcg Route  Intramuscular Administered By  Mandy Angelo CMA              Return in about 4 weeks (around 2/20/2019) for for a full review of medications and lab testing, email me next week with an update.      CHIEF COMPLAINT:  Chief Complaint   Patient presents with     Follow-up       HISTORY OF PRESENT ILLNESS:  Bola is a 49 y.o. male presenting to the clinic today for medical management with pain management, insomnia, HTN, weight management and sinus infection.     Stimulant medications: He had a problem with his insurance giving him the prescribed adderall of 20mg vs 20mg of the XR formulation. Then they sent him both prescriptions. So he started with the 20mg. He found it was very helpful and helped him much more than the 10mg did in the past. He still reaches a point when the adderall wears off and he has a \"breaking point\". He wonders if the extended release formulation would help with this. He is a fast metabolizer of medications.     Pain management/muscle spasm: He feels like his muscles and nerves are stuck \"constantly flexing, like he is a  on stage at those competitions.\" He went to pain clinic yesterday and he was recommended to use baclofen for muscle tightness, and ketamine for pain, and decrease oxycodone. With the cost of the ketamine, he can only afford his nighttime medicinal mariajuana. The baclofen is new for him. So far he has taken two doses of it. This morning he noticed he was " feeling calmer. He did not get drowzy on it and has tolerated it well. He thinks he tried muscle relaxers in the past, but he does not remember. He has also increased his topiramate and thinks this gave him added benefit in helping with his pain, and would be interested in a higher dose.     Psych: He is on three times a day 30mg of Cymbalta. He just started that last Friday. He already can tell it has increased the quality of his sleep. He is going to be joining a support group for people with bipolar disorder once a week. He is mentally feeling well, and feeling like more of his old self.     Insomnia: Between his PTSD prazosin increased, sleep medicine, medicinal marajuana, pain medications, and his Cymbalta, he is sleeping well, and getting good restful sleep. He is very pleased about this.     HTN: His blood pressure has been reading roughly 110/70. The swelling in his feet has gotten better. He is still wearing his compression stocking because the cold affects his knee, but the swelling is gone.     Right elbow: He was getting injections for ulnar neuropathy. He received about 6 of them, and they only last so long, but with his medication regimen he no longer needs these. He is going to start doing PT for it instead as this has helped in the past.     Weight management: He has lost another 4 pounds since he last was seen. He is pleased and encouraged by the weight loss and intends to continue it. Topiramate may be helping with this.     Sinus infection: About 3 weeks ago he got a cold. Then he started feeling some ear aches. This has progressed to the point that when he wakes in the morning he has been getting some chicken brothy colored discharge from his ears, and a little darker discharge on the right ear. He is getting acute achiness in the ears, but not as much in the rest of his sinuses. He has had two rhinoplasties in the past and worries this may make it difficult for him to tell if he has a sinus  infection. He feels like his throat is a bit raw from drainage at night.     B12: He got a B12 shot at his lat appointment, and thinks that really helped his joints feel better. They started feeling better about 4-5 days after. He would like to continue monthly B12 shots if he can.     Prednisone: He was given prednisone 10mg daily at his last visit. He felt like using that opened him up and made him less inflamed. He felt good taking that and felt like it got his system freshened up and cleared out. So he stayed on it.     General: On a scale of 1 to 10 for his overall well being with 10 being horrible and 1 being good, he has been as bad as an 8 a few times, and touches down to a 6 on good days. 6 months ago he would put himself at a 7-8. Before than he was at 8-9. His goal for himself would be to be at a 3. Of note, this means he is having less pain with less oxycodone.     REVIEW OF SYSTEMS:   Denies drowsiness, problems sleeping, PTSD symptoms, cough/wheezing,   Admits to sinus congestion, ear discharge, posterior nasal drainage  All other systems are negative.    PFSH:  The cost of his medications is a factor for him, and they are making his budget a bit tight.   He intends to exercise and lose weight.   He will start PT soon for additional exercise.   He will attend support group for bipolar disorder.   Reviewed as below.     TOBACCO USE:  Social History     Tobacco Use   Smoking Status Current Some Day Smoker     Packs/day: 0.50     Years: 6.00     Pack years: 3.00     Types: Cigarettes     Start date: 2009     Last attempt to quit: 2017     Years since quittin.9   Smokeless Tobacco Former User       VITALS:  Vitals:    19 1441   BP: 110/70   Patient Site: Left Arm   Patient Position: Sitting   Cuff Size: Adult Regular   Pulse: 86     Wt Readings from Last 3 Encounters:   19 (!) 227 lb (103 kg)   18 (!) 227 lb (103 kg)   18 (!) 229 lb (103.9 kg)     There is no height or  weight on file to calculate BMI.    PHYSICAL EXAM:  Constitutional:  Reveals an alert oriented man in no acute distress, affect appropriate.  Vitals:  Per nursing notes.  Ears: Right ear canal is a little erythematous, and TM a little distended, left sie is a little scarred without erythema.   Oropharynx: Some posterior nasal drainage, without exudate, erythema,  or thrush.  Neck:  Supple, thyroid not palpable.  Cardiac:  Legs without edema.  Neurologic: Cranial nerves II-XII, motor strength, sensation, and coordination grossly intact.     Psychiatric:  Mood appropriate, memory intact.     QUALITY MEASURES:  The following high BMI interventions were performed this visit: encouragement to exercise and weight loss from baseline weight    MEDICATIONS:  Current Outpatient Medications   Medication Sig Dispense Refill     acetaminophen (TYLENOL) 650 MG CR tablet Take 1,300 mg by mouth every 8 (eight) hours as needed for pain.       albuterol (VENTOLIN HFA) 90 mcg/actuation inhaler INHALE 2 PUFFS BY MOUTH EVERY 6 HOURS AS NEEDED FOR WHEEZING 18 each 3     allopurinol (ZYLOPRIM) 300 MG tablet Take 1 tablet (300 mg total) by mouth daily. 90 tablet 3     baclofen (LIORESAL) 10 MG tablet Take 1 tablet (10 mg total) by mouth 3 (three) times a day. 45 tablet 3     busPIRone (BUSPAR) 10 MG tablet Take 1 tablet (10 mg total) by mouth 3 (three) times a day as needed. 90 tablet 3     calcium carbonate-vitamin D3 (CALTRATE 600 PLUS D3) 600 mg(1,500mg) -400 unit per tablet TAKE THREE TABLETS BY MOUTH ONCE DAILY 90 tablet 6     cetirizine (ZYRTEC) 10 MG tablet TAKE ONE TABLET BY MOUTH ONCE DAILY 90 tablet 3     DULoxetine (CYMBALTA) 30 MG capsule Take 1 capsule (30 mg total) by mouth 3 (three) times a day.  0     fluticasone (FLONASE) 50 mcg/actuation nasal spray USE 2 SPRAY(S) IN EACH NOSTRIL ONCE DAILY. 18.2 g 5     gabapentin (NEURONTIN) 400 MG capsule Take 2 capsules (800 mg total) by mouth 2 (two) times a day. 360 capsule 3      hydroCHLOROthiazide (HYDRODIURIL) 25 MG tablet Take 1 tablet (25 mg total) by mouth daily. 90 tablet 3     ibuprofen (ADVIL,MOTRIN) 400 MG tablet Take 800 mg by mouth 2 (two) times a day.       ketamine HCl (KETAMINE, BULK,) 100 % Powd 60 mg amarjit  One-half tab four to six tabs a tab 90 Bottle 2     lamoTRIgine (LAMICTAL) 25 MG tablet Take 25 mg by mouth 2 (two) times a day.   0     lidocaine HCl 3 % Crea Apply topically to affected areas twice daily 1 Tube 5     losartan (COZAAR) 100 MG tablet Take 1 tablet (100 mg total) by mouth daily. 90 tablet 5     Medical Cannabis Use 1 Units As Directed. Take as instructed by the medical cannabis dispensary. The provider who enrolled the patient in the medical cannabis registry and certified this  patient will maintain ownership and liability of all provider reporting and registration requirements.       meloxicam (MOBIC) 15 MG tablet Take 1 tablet (15 mg total) by mouth daily. 90 tablet 3     metoprolol succinate (TOPROL-XL) 100 MG 24 hr tablet Take two tablets (200 mg total) daily. 180 tablet 3     NARCAN 4 mg/actuation nasal spray   0     nicotine (NICODERM CQ) 21 mg/24 hr Place 1 patch on the skin daily. 30 patch 1     omeprazole (PRILOSEC) 40 MG capsule TAKE 1 CAPSULE (40 MG TOTAL) BY MOUTH DAILY, please do a PA if needed 90 capsule prn     oxyCODONE (OXYCONTIN) 15 mg 12 hr tablet Take 1 tablet (15 mg total) by mouth every 12 (twelve) hours. Fill on 11/19/18 60 tablet 0     oxyCODONE (ROXICODONE) 5 MG immediate release tablet Take 1-2 tablets (5-10 mg total) by mouth every 4 (four) hours as needed for pain. 84 tablet 0     prazosin (MINIPRESS) 2 MG capsule Take 1 capsule (2 mg total) by mouth at bedtime. 90 capsule 3     predniSONE (DELTASONE) 10 mg tablet Take 10 mg by mouth daily. 30 tablet 2     pseudoephedrine (SUDAFED) 120 mg 12 hr tablet TAKE ONE TABLET BY MOUTH TWICE DAILY AS NEEDED FOR  CONGESTION 180 tablet 5     QUEtiapine (SEROQUEL) 200 MG tablet Take 200 mg  by mouth bedtime.       QUEtiapine (SEROQUEL) 50 MG tablet Take 50 mg by mouth 4 (four) times a day.       ranitidine (ZANTAC) 300 MG tablet TAKE ONE TABLET BY MOUTH ONCE DAILY AT BEDTIME 30 tablet 17     temazepam (RESTORIL) 30 mg capsule   1     THERA-M 9 mg iron-400 mcg Tab tablet TAKE ONE TABLET BY MOUTH ONCE DAILY 30 tablet 10     topiramate (TOPAMAX) 100 MG tablet Take 1 tablet (100 mg total) by mouth 2 (two) times a day. 180 tablet 3     amoxicillin-clavulanate (AUGMENTIN) 875-125 mg per tablet Take 1 tablet by mouth 2 (two) times a day for 10 days. 20 tablet 0     dextroamphetamine-amphetamine (ADDERALL) 30 mg Tab Take 30 mg by mouth daily. 30 tablet 0     hydrOXYzine HCl (ATARAX) 25 MG tablet Take 1 tablet (25 mg total) by mouth 4 (four) times a day. 120 tablet 3     Current Facility-Administered Medications   Medication Dose Route Frequency Provider Last Rate Last Dose     cyanocobalamin injection 1,000 mcg  1,000 mcg Intramuscular Q30 Days Devon Lund MD   1,000 mcg at 01/23/19 1528       ADDITIONAL HISTORY SUMMARIZED (2): 1/9/19 telephone encounter notes reviewed for medication insurance coverage issues. 1/22/19 Dr. Cramer pain clinic note reviewed showing plan for PT, referral to Dr. Bey, baclofen, and ketamine for pain management, and decrease oxycodone.   DECISION TO OBTAIN EXTRA INFORMATION (1): None.   RADIOLOGY TESTS (1): None.  LABS (1): 11/23/18, 11/20/18, 9/20/18 labs reviewed.   MEDICINE TESTS (1): None.  INDEPENDENT REVIEW (2 each): None.     The visit lasted a total of 44 minutes face to face with the patient. Over 50% of the time was spent counseling and educating the patient about pain management, insomnia, HTN, weight management and sinus infection.     ICar, am scribing for and in the presence of, Dr. Lund.    I, Dr. Lund, personally performed the services described in this documentation, as scribed by Car Hernandez in my presence, and it is both accurate  and complete.    Total data points: 3

## 2021-06-23 NOTE — PROGRESS NOTES
Patient feeling overwhelmed per his MyChart email. Will move outreach to monthly.    Next outreach: 2/25/19

## 2021-06-23 NOTE — PROGRESS NOTES
Sent patient [employer] message asking about goals. See patient email dated 1/25/19    Next outreach: 2/8/19

## 2021-06-23 NOTE — PATIENT INSTRUCTIONS - HE
PLAN:    Referral placed for PT    You will call Dr. Bey--[facility]  To evaluate arm symptoms    Baclofen for muscle tightness 10 mg three times a day    Continue ketamine, refill for #90 sent    Decrease oxycodone as able with ketamine    If ketamine helpful for mood, anxiety, may be able to decrease cymbalta, buspar    Return in 8 weeks

## 2021-06-23 NOTE — PROGRESS NOTES
[facility]   Cervical Thoracic Initial Evaluation    Patient Name: Bola Lyon  Date of evaluation: 1/30/2019  Referral Diagnosis: chronic pain, neck pain with radiculopathy  Referring provider: Dmitriy Cramer*  Visit Diagnosis:     ICD-10-CM    1. Chronic right shoulder pain M25.511     G89.29    2. Cervicalgia M54.2    3. Cervical radiculitis M54.12    4. Myofascial pain M79.18        Assessment:      Bola Lyon is a 49 y.o. male who presents to PT today with chronic pain of the neck with radicular sx into R>L UE. He is limited with all tasks involving his UE's due to nerve pain, sleeping, and with ROM. He is very strong with manual muscle tests but this does not always equate to functional strength. There is a near clonus of his muscles in his UE's and this may be due to a guarding response vs upper motor neuron deficit.  He does have a significant amount of fascial restriction present which will contribute strongly to his current symptoms. He is appropriate for skilled PT to allow himher to reach all stated goals.     Goals:  Pt. will demonstrate/verbalize independence in self-management of condition in : 12 weeks  Pt. will report decreased intensity, frequency of : Pain;in 12 weeks;Comment  Comment:: decrease pain from 6-8/10 to 4-7/10 with ADLs.   Pt. will decrease use of medication for pain for improved quality of life in : 12 weeks  Pt. will have improved quality of sleep: waking less times/night;getting 75-90% of required amount;in 12 weeks  Patient will return to: exercise;leisure;in 12 weeks;Comment  Comment: lifting weights withtout dropping anything with B hands  Patient [place]: for driving;for conversation;with less pain;with less difficulty;in 12 weeks;Comment  Comment: increase C-rot to >50 deg B and T-rot to >40 deg.   Patient will reach / maintain arm movement: forward;overhead;behind;for home chores;for dressing;with less pain;with less difficulty;in 12  "weeks    Patient will decrease : NDI score;by _ points;for improved quality of function;for improved quality of life;in 12 weeks  by ___ points: 15      Patient's expectations/goals are realistic.    Barriers to Learning or Achieving Goals:  Chronicity of the problem.  Co-morbidities or other medical factors.  history of surgeries, nerve pain       Plan / Patient Instructions:        Plan of Care:   Communication with: Referral Source  Patient Related Instruction: Nature of Condition;Treatment plan and rationale;Self Care instruction;Basis of treatment;Body mechanics;Posture;Next steps;Expected outcome;Precautions  Times per Week: 2  Number of Weeks: 12  Number of Visits: 20  Discharge Planning: discharge wtih I HEP for self-management of sx.   Therapeutic Exercise: ROM;Stretching;Strengthening  Neuromuscular Reeducation: kinesio tape;posture;balance/proprioception;TNE;core  Manual Therapy: soft tissue mobilization;myofascial release;joint mobilization;muscle energy;strain counterstrain      Plan for next visit:  initiate SCS/MT and progress ex as tolerated.     Subjective:         Social information:   Occupation:disabled   Work Status:NA       History of Present Illness:    Bola is a 49 y.o. male who presents to therapy today with complaints of neck pain and R shoulder/deltoid area pain which does go down the arm as well. He is also feeling ulnar nerve symptoms in RUE. The pain reminds him of getting a \"stinger\" from football. There is also some pain in his thoracic spine as well which can cause pain down the arm. He doesn't really get HA's but does get aches along the neck.   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    He has had 18 surgeries in the past. He has had multiple neck surgeries as well as nerve repairs in both brachial plexus. He has had B biceps tendon repairs. R sided repair of his torn pectoral.   He has had multiple nerve blocks in the recent past and " "injections along nerves in his UE's as well. There has been an EMG as well which has confirmed issues with nerves in RUE.   He is disabled and does have some post-concussion syndrome as well Bi-polar. There is also some PTSD.    He does have a  that he works out with 2-3x/week and does work out at home. He is very active with working out/flexibility with this.    There has been PT in the past which he did have more relief with manual treatments. He does have many things at home to help with the pain as well but it doesn't seem to be helping anymore.   Function: sleeping is disturbed and does wake multiple times/night, he has difficulty with using his RUE with nearly all functions, he will drop things with the R side and can occasionally get some problems with the L, problems with scapular winging which brings on more pain/fatigue, difficulty reaching out with the RUE due to the nerve pain.    He does take \"25 meds a day\".   He is starting to cut down on Oxycodone with Ketamine and is adjusting.   He describes their previous level of function as limited with as listed above for quite some time.   It the last 6-7 years is when things have started going down hill for him with multiple surgeries. He is very frustrated with this process and wants to get back to where he was before this.     Pain Ratin  Pain rating at best: 6 with pain meds on board  Pain rating at worst: 8  Pain description: pain, sharp, shooting, weakness and \"throbbing stinger\", dull, ache      Patient reports benefit from:  pain medication, physical therapy, \"nerve relaxing meds\"         Objective:      Note: Items left blank indicates the item was not performed or not indicated at the time of the evaluation.    Patient Outcome Measures :    Neck Disability Score in %: 80     Scores range from 0-100%, where a score of 0% represents minimal pain and maximal function. The minmal clinically important difference is a score reduction of " 10%.    Cervical Thoracic Examination  1. Chronic right shoulder pain     2. Cervicalgia     3. Cervical radiculitis     4. Myofascial pain       Precautions/Restrictions: long history of surgeries, PTSD, bi-polar, hx of concussions/post concussive syndrome  Involved side: Bilateral  Posture Observation:      Standing Posture: very flat T-L spine, FHP, elevated L ACJ.       Cervical ROM:  1/30/2019   Date:      *Indicate scale AROM AROM AROM   Cervical Flexion 5      Cervical Extension 25       Right Left Right Left Right Left   Cervical Sidebending         Cervical Rotation 35 P 45       Cervical Protraction      Cervical Retraction      Thoracic Flexion      Thoracic Extension      Thoracic Sidebending         Thoracic Rotation 27 P 40         Strength   1/30/2019   Date:      Cervical Myotomes/5 Right Left Right Left Right Left   Cervical Flexion (C1-2)         Cervical Sidebending (C3)         Shoulder Elevation (C4) 5 5       Shoulder Abduction (C5) 5 5       Elbow Flexion (C6) 5 5       Elbow Extension (C7) 5 5       Wrist Flexion (C7) 5 5       Wrist Extension (C6) 5 5       Thumb abduction (C8) 5 5       Finger Abduction (T1) 5 5         Sensation   1/30/2019      Reflex Testing  Cervical Dermatomes Right Left UE Reflexes Right Left   Back of the Head (C2)   Biceps (C5-6)     Supraclavicular Fossa (C3)   Brachioradialis (C5-6)     AC Joint (C4)   Triceps (C7-8)     Lateral Biceps (C5)   Nathan s test neg neg   Palmar Thumb (C6) numb numb LE Reflexes     Palmar 3rd Finger (C7)   Patellar (L3-4)     Palmar 5th Finger (C8) Getting numb  Achilles (S1-2)     Ulnar Forearm (T1)   Babinski Response           Palpation: Hypomobile fascia of spine, thorax, abdomen, UE's. Increased tone of paraspinal musculature. Very tender to palpation over brachial plexus B. There was a clonus type response to quick stretching and passive motion bilaterally.     UE Screen: ROM: Flexion: WFL          Abduction: 110/110  Shoulder  IR behind back: SI with pain/L3 (R/L)    Patient with a very measured and flat affect. He did have difficulty finding words at times and did speak slowly.       Treatment Today   1/30/2019   TREATMENT MINUTES COMMENTS   Evaluation 30 high   Self-care/ Home management     Manual therapy     Neuromuscular Re-education     Therapeutic Activity     Therapeutic Exercises 15 Ed on POC/Rx/Fascia/Anatomy    Gait training     Modality__________________                Total 45    Blank areas are intentional and mean the treatment did not include these items.       PT Evaluation Code: (Please list factors)  Patient History/Comorbidities: 4+  Examination: 4+  Clinical Presentation: unstable  Clinical Decision Making: high    Patient History/  Comorbidities Examination  (body structures and functions, activity limitations, and/or participation restrictions) Clinical Presentation Clinical Decision Making (Complexity)   No documented Comorbidities or personal factors 1-2 Elements Stable and/or uncomplicated Low   1-2 documented comorbidities or personal factor 3 Elements Evolving clinical presentation with changing characteristics Moderate   3-4 documented comorbidities or personal factors 4 or more Unstable and unpredictable High          Jb Ochoa PT, ATC  1/30/2019  11:01 AM

## 2021-06-23 NOTE — PROGRESS NOTES
CG sent patient MyChart message for outreach today. I included the following:    Just checking in and wanted to let you know [facility] worker, Dhara has been trying to reach you. She has left two voicemail's, one on 12/20/18 and 12/27/18. I know you said you weren't great about checking your messages.    Dhara can be reached at [phone] if you still wants PCA services. Dhara Mcfarlane our  at [facility] left this info your voicemail as well.

## 2021-06-23 NOTE — TELEPHONE ENCOUNTER
Pharmacy calls requesting direction clarification.  Revised instructions for use  Re-qued for Dr. Cramer

## 2021-06-23 NOTE — TELEPHONE ENCOUNTER
Refill request: oxycodone 5 mg and oxycontin 15 mg  Last ov:12/21  Next ov:01/22  qued for:Dr. Cramer

## 2021-06-23 NOTE — PATIENT INSTRUCTIONS - HE
Begin Baclofen 10 mgs 3 times a day for a few days.  If helpful, and no side effects, but not quite enough, contact me and we will increase to either 15 mgs or 20 mgs.  Max is 80-90 per day    Continue the Prednisone 10 mgs daily for one more month    Do B 12 shots monthly     Lets increase Adderall to 30 mgs tab, broken in half twice a day to be 15 mgs twice     Increase Topiramate to 100 mgs twice a day to help nerve pain, and to help curb appetite and helps with weight loss.   We could change to a three times a day schedule    augmentin 875 mgs twice a day for sinus and ear infection for 10 days    email me weekly with effect on sinus, and pain, and muscle spasm, and adderall

## 2021-06-23 NOTE — PROGRESS NOTES
"Assessment/Plan:     Problem List Items Addressed This Visit     Chronic pain syndrome - Primary (Chronic)    Cervical radiculopathy at C8 (Chronic)    Relevant Medications    baclofen (LIORESAL) 10 MG tablet    Other Relevant Orders    Ambulatory referral to Physical Therapy    Shoulder impingement syndrome, right    Relevant Medications    ketamine HCl (KETAMINE, BULK,) 100 % Powd            No Follow-up on file.    Patient Instructions   PLAN:    Referral placed for PT    You will call Dr. Bey--[facility]  To evaluate arm symptoms    Baclofen for muscle tightness 10 mg three times a day    Continue ketamine, refill for #90 sent    Decrease oxycodone as able with ketamine    If ketamine helpful for mood, anxiety, may be able to decrease cymbalta, buspar    Return in 8 weeks        Subjective:       49 y.o. male presents for evaluation chronic pain with multiple generators, history of traumatic brain injury, and comorbid bipolar disorder.    Reports with her treatment program is had some good days which for him is a score of 6 out of 10.  Week there were some times where it was 7 or 8.  He described going 10 days without a refill of his ketamine due to some communication problems and available ability of pharmac[facility] [place] [facility].  He did talk with the manager and they have come up with a treatment plan.    He is weaned off Prozac, and changes Cymbalta 90 mg daily.    He describes experience where it feels like his upper body and shoulders are not phyllis, describes a \"body building pose\".  He indicates that it is hard for him to release his arm sometimes.  He also notes when he is for example reaching putting on his boots the arms want to have a pronation.  He describes by the end of the day his arms are tired from this.    He notes he has had this sporadically for years since his neck surgery 2010.  He was also before Seroquel, thus it is not clearance extraparametal side effects.  The    He " had seen previously Dr. Danita lorenzo at [facility].    He does not correlated with increased muscle activity, or association with Cymbalta, ketamine or other medications.    He has lost 8 pounds as he is exercising more frequently.    The ketamine has been 60 mg 1/2 lozenge 4-6 times a day.    Reviews doing some physical therapy which is been more of deep tissue which is been of benefit at times.  He would like to return to [facility] physical therapy.  Her graph we discussed a goal to decrease the oxycodone with this dose of ketamine.    Reviewed a trial of baclofen as a different muscle relaxant he then he has been on for this symptom.    Recommend he also return to his previous neck surgeon to see if there are any issues relating to increased motor tone.      Current Outpatient Medications:      acetaminophen (TYLENOL) 650 MG CR tablet, Take 1,300 mg by mouth every 8 (eight) hours as needed for pain., Disp: , Rfl:      albuterol (VENTOLIN HFA) 90 mcg/actuation inhaler, INHALE 2 PUFFS BY MOUTH EVERY 6 HOURS AS NEEDED FOR WHEEZING, Disp: 18 each, Rfl: 3     allopurinol (ZYLOPRIM) 300 MG tablet, Take 1 tablet (300 mg total) by mouth daily., Disp: 90 tablet, Rfl: 3     baclofen (LIORESAL) 10 MG tablet, Take 1 tablet (10 mg total) by mouth 3 (three) times a day., Disp: 45 tablet, Rfl: 3     busPIRone (BUSPAR) 10 MG tablet, Take 1 tablet (10 mg total) by mouth 3 (three) times a day as needed., Disp: 90 tablet, Rfl: 3     calcium carbonate-vitamin D3 (CALTRATE 600 PLUS D3) 600 mg(1,500mg) -400 unit per tablet, TAKE THREE TABLETS BY MOUTH ONCE DAILY, Disp: 90 tablet, Rfl: 6     cetirizine (ZYRTEC) 10 MG tablet, TAKE ONE TABLET BY MOUTH ONCE DAILY, Disp: 90 tablet, Rfl: 3     dextroamphetamine-amphetamine (ADDERALL) 20 mg Tab, Take 10 mg by mouth 2 (two) times a day., Disp: 30 tablet, Rfl: 0     DULoxetine (CYMBALTA) 30 MG capsule, Take 30 mg by mouth 2 (two) times a day., Disp: , Rfl: 0     fluticasone (FLONASE) 50  mcg/actuation nasal spray, USE 2 SPRAY(S) IN EACH NOSTRIL ONCE DAILY., Disp: 18.2 g, Rfl: 5     gabapentin (NEURONTIN) 400 MG capsule, Take 2 capsules (800 mg total) by mouth 2 (two) times a day., Disp: 360 capsule, Rfl: 3     hydroCHLOROthiazide (HYDRODIURIL) 25 MG tablet, Take 1 tablet (25 mg total) by mouth daily., Disp: 90 tablet, Rfl: 3     hydrOXYzine HCl (ATARAX) 25 MG tablet, Take 1 tablet (25 mg total) by mouth 4 (four) times a day., Disp: 120 tablet, Rfl: 3     ibuprofen (ADVIL,MOTRIN) 400 MG tablet, Take 800 mg by mouth 2 (two) times a day., Disp: , Rfl:      ketamine HCl (KETAMINE, BULK,) 100 % Powd, 60 mg amarjit  One-half tab four to six tabs a tab, Disp: 90 Bottle, Rfl: 2     lamoTRIgine (LAMICTAL) 25 MG tablet, Take 25 mg by mouth 2 (two) times a day. , Disp: , Rfl: 0     lidocaine HCl 3 % Crea, Apply topically to affected areas twice daily, Disp: 1 Tube, Rfl: 5     losartan (COZAAR) 100 MG tablet, Take 1 tablet (100 mg total) by mouth daily., Disp: 90 tablet, Rfl: 5     Medical Cannabis, Use 1 Units As Directed. Take as instructed by the medical cannabis dispensary. The provider who enrolled the patient in the medical cannabis registry and certified this  patient will maintain ownership and liability of all provider reporting and registration requirements., Disp: , Rfl:      meloxicam (MOBIC) 15 MG tablet, Take 1 tablet (15 mg total) by mouth daily., Disp: 90 tablet, Rfl: 3     metoprolol succinate (TOPROL-XL) 100 MG 24 hr tablet, Take two tablets (200 mg total) daily., Disp: 180 tablet, Rfl: 3     NARCAN 4 mg/actuation nasal spray, , Disp: , Rfl: 0     nicotine (NICODERM CQ) 21 mg/24 hr, Place 1 patch on the skin daily., Disp: 30 patch, Rfl: 1     omeprazole (PRILOSEC) 40 MG capsule, TAKE 1 CAPSULE (40 MG TOTAL) BY MOUTH DAILY, please do a PA if needed, Disp: 90 capsule, Rfl: prn     oxyCODONE (OXYCONTIN) 15 mg 12 hr tablet, Take 1 tablet (15 mg total) by mouth every 12 (twelve) hours. Fill on  "11/19/18, Disp: 60 tablet, Rfl: 0     oxyCODONE (ROXICODONE) 5 MG immediate release tablet, Take 1-2 tablets (5-10 mg total) by mouth every 4 (four) hours as needed for pain., Disp: 84 tablet, Rfl: 0     prazosin (MINIPRESS) 2 MG capsule, Take 1 capsule (2 mg total) by mouth at bedtime., Disp: 90 capsule, Rfl: 3     predniSONE (DELTASONE) 10 mg tablet, Take 10 mg by mouth daily., Disp: 30 tablet, Rfl: 2     pseudoephedrine (SUDAFED) 120 mg 12 hr tablet, TAKE ONE TABLET BY MOUTH TWICE DAILY AS NEEDED FOR  CONGESTION, Disp: 180 tablet, Rfl: 5     QUEtiapine (SEROQUEL) 200 MG tablet, Take 200 mg by mouth bedtime., Disp: , Rfl:      QUEtiapine (SEROQUEL) 50 MG tablet, Take 50 mg by mouth 4 (four) times a day., Disp: , Rfl:      ranitidine (ZANTAC) 300 MG tablet, TAKE ONE TABLET BY MOUTH ONCE DAILY AT BEDTIME, Disp: 30 tablet, Rfl: 17     temazepam (RESTORIL) 30 mg capsule, , Disp: , Rfl: 1     THERA-M 9 mg iron-400 mcg Tab tablet, TAKE ONE TABLET BY MOUTH ONCE DAILY, Disp: 30 tablet, Rfl: 10     topiramate (TOPAMAX) 50 MG tablet, Take 1 tablet (50 mg total) by mouth 2 (two) times a day., Disp: 180 tablet, Rfl: 3    Current Facility-Administered Medications:      cyanocobalamin injection 1,000 mcg, 1,000 mcg, Intramuscular, Q30 Days, Devon Lund MD, 1,000 mcg at 12/26/18 1655                 Objective:     Vitals:    01/22/19 0803   BP: 116/70   Pulse: 86   Weight: (!) 227 lb (103 kg)   Height: 5' 8\" (1.727 m)   PainSc:   8   PainLoc: Neck       Is alert with a clear sensorium good eye contact.  He speaks rapidly though still though measured and detailed, noting with his traumatic brain injury he needs to be methodical.    There is no cogwheeling in his upper extremities, though may be some overall increased muscle tone.    Time spent more than 25 minutes face-to-face more than 50% count about above condition and coordination of treatment plan        This note has been dictated using voice recognition software. " Any grammatical or context distortions are unintentional and inherent to the software

## 2021-06-23 NOTE — TELEPHONE ENCOUNTER
[facility] received call from [facility] worker Dhara. Dhara informed SW that she has left 2 VMs for the pt--one on 12/20/18 and 12/27/18 and the pt is not returning her calls.     SW called the pt, left him a VM and encouraged the pt to call Dhara back at [phone] if he still wants PCA services.

## 2021-06-23 NOTE — TELEPHONE ENCOUNTER
Patient calls with status update that he is down to 6 tabs of oxycodone per day from 7 per day.  He reports also increasing ketamine as he reduces the oxycodone.  Patient request a refill of oxycodone as well as new directions for use on ketamine.  He reports being advised by [facility] to get new orders to that he is able to increase use over time.    qued up refills for both medications  Please review new instructions for use for oxycodone and ketamine.

## 2021-06-24 ENCOUNTER — COMMUNICATION - HEALTHEAST (OUTPATIENT)
Dept: INTERNAL MEDICINE | Facility: CLINIC | Age: 51
End: 2021-06-24

## 2021-06-24 DIAGNOSIS — K04.7 DENTAL ABSCESS: ICD-10-CM

## 2021-06-24 NOTE — TELEPHONE ENCOUNTER
Left message for patient that we sent a new prescription to the pharmacy for Baclofen with the corrected sig and amount. Patient was told to call and schedule an appt in April with Dr Lund.

## 2021-06-24 NOTE — PATIENT INSTRUCTIONS - HE
Increase Prednisone to 20 mgs twice a day for 4 days, then 20 mgs each morning for a week, then back to 10 mgs daily.  Help inflammation and swelling    Add Montelukast 10 mgs daily to help sinus inflammation    Add Allegra 180 mgs once a day for sinus inflammation    CT of sinus to see exactly what is going on in sinus, and whether this will improve with meds, or surgery    Increase Topiramate to 3 times a day for a total of 300 per day.  Max is 400 per day    Decrease Gabapentin to 1 pill twice a day.  That will help the swelling    B 12 shot today    Update blood tests    Leave Baclofen to Dr green    Refill adderall

## 2021-06-24 NOTE — TELEPHONE ENCOUNTER
Who is calling:  Patient  Reason for Call:  Patient stated he needs to reschedule today's appointment with Celio. Patient stated he just found out one of his close family members just passed away today and he will not be able make to his appointment today with them. Patient stated he would like a call back to reschedule his appointment with Celio.  Date of last appointment with primary care: 3/4/19  Okay to leave a detailed message: Yes

## 2021-06-24 NOTE — PROGRESS NOTES
[facility] RN and SW consulted on pt. SW recommending ending case management goal and meeting with the pt to review PCA services and discuss [facility] again--pt was not open to this in the past.    Pt is not eligible for case management services due to insurance type--pt would have a large spend down to change insurance to [place]-DX to qualify for waiver case management.    RN recommending medication management/in-home RN services to assist with meds.    SW will review this with the pt when SW meets with him next.

## 2021-06-24 NOTE — PROGRESS NOTES
[facility] Daily Progress     Patient Name: Bola Lyon  Date: 2/15/2019  Visit #: 4/10      PTA visit #:       Referral Diagnosis: Chronic pain      Referring provider: Dmitriy Cramer*     Visit Diagnosis:     ICD-10-CM    1. Chronic right shoulder pain M25.511     G89.29    2. Cervicalgia M54.2    3. Cervical radiculitis M54.12    4. Myofascial pain M79.18          Assessment:     Patient is benefitting from skilled physical therapy and is making steady progress toward functional goals.  Patient is appropriate to continue with skilled physical therapy intervention, as indicated by initial plan of care.  He did have good release of periosteal fascial tensions today. He is still painful overall and does still have mm tensions present.     Goal Status:  Pt. will demonstrate/verbalize independence in self-management of condition in : 12 weeks  Pt. will report decreased intensity, frequency of : Pain;in 12 weeks;Comment  Comment:: decrease pain from 6-8/10 to 4-7/10 with ADLs.   Pt. will decrease use of medication for pain for improved quality of life in : 12 weeks  Pt. will have improved quality of sleep: waking less times/night;getting 75-90% of required amount;in 12 weeks  Patient will return to: exercise;leisure;in 12 weeks;Comment  Comment: lifting weights withtout dropping anything with B hands  Patient [place]: for driving;for conversation;with less pain;with less difficulty;in 12 weeks;Comment  Comment: increase C-rot to >50 deg B and T-rot to >40 deg.   Patient will reach / maintain arm movement: forward;overhead;behind;for home chores;for dressing;with less pain;with less difficulty;in 12 weeks    Patient will decrease : NDI score;by _ points;for improved quality of function;for improved quality of life;in 12 weeks  by ___ points: 15       Plan / Patient Education:     Plan to con't with manual therapy to decrease fascial tension/tone to normalize ROM/decrease inflammation/decrease  "mm tone and improve proprioception.      Subjective:     Pain Ratin             This week has been a bad week with his bipolar and PTSD/mental health. He knows that when this is happening it accelerates her nerve issues. It will really cause some more of the \"posing\" and extreme muscle spasms/contraction.   He really does feel good after leaving treatments. It does fade on him after a few hours.    *pt 15' late for appt      Objective:     MS, neural fascial tensions.      Treatment Today   2/15/2019   TREATMENT MINUTES COMMENTS   Evaluation     Self-care/ Home management     Manual therapy 25 Fascial release using Strain-Counterstrain of AINT-N, B flexed ribs (P)-MS, B cervical/thoracic F (P)-MS    Neuromuscular Re-education 15 Recip inhib of neural, MS fascia   Therapeutic Activity     Therapeutic Exercises     Gait training     Modality__________________                Total 40    Blank areas are intentional and mean the treatment did not include these items.       Jb Ochoa  2/15/2019    "

## 2021-06-24 NOTE — TELEPHONE ENCOUNTER
Controlled Substance Refill Request  Medication Name:   Requested Prescriptions     Pending Prescriptions Disp Refills     dextroamphetamine-amphetamine (ADDERALL) 30 mg Tab 30 tablet 0     Sig: Take 30 mg by mouth daily.     Date Last Fill: 1/23/19  Pharmacy: [facility] [address]      Submit electronically to pharmacy  Controlled Substance Agreement Date Scanned:   Encounter-Level CSA Scan Date - 08/21/2017:    Scan on 8/24/2017  7:50 AM (below)    Scan on 8/23/2017  7:29 AM (below)             Encounter-Level CSA Scan Date - 06/27/2016:    Scan on 6/30/2016  8:08 AM (below)         Last office visit with prescriber/PCP: 1/23/2019 Devon Lund MD OR same dept: 1/23/2019 Devon Lund MD OR same specialty: 1/23/2019 Devon Lund MD  Last physical: Visit date not found Last MTM visit: Visit date not found

## 2021-06-24 NOTE — TELEPHONE ENCOUNTER
Medication refill request:  Oxycodone x2    [place]  1/2    [place]  3/28    01/20/2019 2 01/15/2019 Oxycontin Er 15 Mg Tablet 60 30 Br Zaida [id number] Lindsey ([id number]) 0 45.00 MME Medicaid [place]      02/02/2019 2 01/31/2019 Oxycodone Hcl 5 Mg Tablet 84 14 Br Zaida [id number] Lindsey ([id number]) 0 45.00 MME [place]      Routed to Dr Cramer

## 2021-06-24 NOTE — PROGRESS NOTES
ASSESSMENT:  1. Acute maxillary sinusitis, recurrence not specified  Partial improvement after 10 days of Augmentin, worsening, then resumption of Augmentin.  History of football injuries and surgery.  Push aggression.  Add burst of steroids.  Check CT scan for definitive upfront diagnosis and treatment  - predniSONE (DELTASONE) 20 MG tablet; Take 20 mg by mouth 2 (two) times a day for 7 days.  Dispense: 14 tablet; Refill: 0  - montelukast (SINGULAIR) 10 mg tablet; Take 1 tablet (10 mg total) by mouth at bedtime.  Dispense: 30 tablet; Refill: 11  - fexofenadine (ALLEGRA) 180 MG tablet; Take 1 tablet (180 mg total) by mouth daily.  Dispense: 30 tablet; Refill: 11  - CT Sinuses Without Contrast; Future    2. Chronic pain syndrome  Good response with topiramate.  Push twice daily to 3 times daily and to decrease gabapentin.  Continue to work on decreasing narcotics through pain clinic  - gabapentin (NEURONTIN) 400 MG capsule; Take 1 capsule (400 mg total) by mouth 2 (two) times a day.  Dispense: 360 capsule; Refill: 3  - HM2(CBC w/o Differential)  - Comprehensive Metabolic Panel  - Uric Acid    3. Chronic pansinusitis  CT scan with history as above    4. Chronic, continuous use of opioids  Push topiramate to 3 times daily  - topiramate (TOPAMAX) 100 MG tablet; Take 1 tablet (100 mg total) by mouth 3 (three) times a day.  Dispense: 90 tablet; Refill: 11      5.  Fatigue.  This dose of Adderall seems to be perfect per history    PLAN:  Patient Instructions   Increase Prednisone to 20 mgs twice a day for 4 days, then 20 mgs each morning for a week, then back to 10 mgs daily.  Help inflammation and swelling    Add Montelukast 10 mgs daily to help sinus inflammation    Add Allegra 180 mgs once a day for sinus inflammation    CT of sinus to see exactly what is going on in sinus, and whether this will improve with meds, or surgery    Increase Topiramate to 3 times a day for a total of 300 per day.  Max is 400 per  day    Decrease Gabapentin to 1 pill twice a day.  That will help the swelling    B 12 shot today    Update blood tests    Leave Baclofen to Dr green    Refill adderall          Orders Placed This Encounter   Procedures     CT Sinuses Without Contrast     Standing Status:   Future     Standing Expiration Date:   3/3/2020     Order Specific Question:   Can the procedure be changed per Radiologist protocol?     Answer:   Yes     Order Specific Question:   If this is a diagnostic procedure, have the patient's age and recent imaging history been considered?     Answer:   Yes     HM2(CBC w/o Differential)     Comprehensive Metabolic Panel     Uric Acid     Medications Discontinued During This Encounter   Medication Reason     gabapentin (NEURONTIN) 400 MG capsule      topiramate (TOPAMAX) 100 MG tablet      Administrations This Visit     cyanocobalamin injection 1,000 mcg     Admin Date  03/04/2019 Action  Given Dose  1000 mcg Route  Intramuscular Administered By  Tejal Hernandez LPN              Return in about 4 weeks (around 4/1/2019) for for a full review of medications and lab testing.      CHIEF COMPLAINT:  Chief Complaint   Patient presents with     Sinusitis     [facility] 3/3/19     Medication Management       HISTORY OF PRESENT ILLNESS:  Bola is a 49 y.o. male presenting to the clinic today to follow up on recurrent sinusitis, pain, and other medications    Recurrent Sinusitis: He was given a course of Augmentin for the treatment of sinusitis when he was last here on 1/23. He started to feel a little better while he was on the Augmentin but then he started feeling the symptoms returning about two days after his last dose. He has been dealing with sinus symptoms since then and decided to go to the walk in clinic yesterday, where he was prescribed another course of Augmentin. He has had sinus pain, drainage, fever, and now has tooth or jaw pain on the right. He used to get frequent sinus infections. He has had  "two sinus surgeries in the past, which were in the mid 1990's. He has not seen ENT for about 15 years. Some of his problems started after breaking his nose in college. He does have allergy and sinus symptoms throughout the year. He uses Flonase. He is not able to breathe out of his right nostril currently and has pain in his right jaw and ear. He has never taken montelukast, but he has taken antihistamines like Allegra.     Pain: His dose of topiramate was increased to 100 mg twice a day at the last visit, which provides improved relief from nerve pain throughout the day. The baclofen has helped his muscle spasms. He goes to physical therapy twice per week, which helps as well. He takes the topiramate in the morning and in the late afternoon, but he wonders if taking another day before bed would be helpful, because he occasionally still has symptoms during the night. He is also taking gabapentin 800 mg twice daily. He thinks allopurinol and vitamin B12 have also been helpful. He would like another B12 shot today. He continues to follow up with the pain clinic and is down on his narcotic use.     Tooth Pain: He is concerned that he has an abscessed tooth. He has pain in gums around his rear right teeth and states there are \"pockets\" back there. He just got a new dental plan and is hoping to see a dentist in the near future.     Inattentiveness: He had his dose of Adderall increased to 15 mg twice daily, which has been the \"perfect dosage.\" The dose change made a big difference, and he is very happy with it.     REVIEW OF SYSTEMS:   He denies nausea. His lower extremity swelling improved significantly after discontinuing amlodipine and starting HCTZ. It also seemed to help his bloating. He has been able to lose some weight. All other systems are negative.    PFSH:  Reviewed, as below.     TOBACCO USE:  Social History     Tobacco Use   Smoking Status Current Some Day Smoker     Packs/day: 0.50     Years: 6.00     Pack " "years: 3.00     Types: Cigarettes     Start date: 2009     Last attempt to quit: 2017     Years since quittin.0   Smokeless Tobacco Former User       VITALS:  Vitals:    19 1452 19 1532   BP: 118/78    Patient Site: Left Arm    Patient Position: Sitting    Cuff Size: Adult Large    Pulse: 80    Resp: 16    Temp:  99.1  F (37.3  C)   SpO2: 98%    Weight: 212 lb 1 oz (96.2 kg)    Height: 5' 8\" (1.727 m)      Wt Readings from Last 3 Encounters:   19 212 lb 1 oz (96.2 kg)   19 212 lb 12.8 oz (96.5 kg)   19 (!) 227 lb (103 kg)     Body mass index is 32.24 kg/m .    PHYSICAL EXAM:  Constitutional:  Reveals an alert, pleasant, talkative man. Affect appropriate. Does not appear acutely ill.  Vitals:  Per nursing notes.  HEENT: Obvious facial swelling on the right   Oropharynx:  Without posterior nasal drainage or thrush.  Cardiac:  Legs without edema.   Respiratory: lung sounds clear bilateral. No wheezing.   Neurologic:  Cranial nerves II-XII intact.     Psychiatric:  Mood appropriate, memory intact.       MEDICATIONS:  Current Outpatient Medications   Medication Sig Dispense Refill     acetaminophen (TYLENOL) 650 MG CR tablet Take 1,300 mg by mouth every 8 (eight) hours as needed for pain.       albuterol (VENTOLIN HFA) 90 mcg/actuation inhaler INHALE 2 PUFFS BY MOUTH EVERY 6 HOURS AS NEEDED FOR WHEEZING 18 each 3     allopurinol (ZYLOPRIM) 300 MG tablet Take 1 tablet (300 mg total) by mouth daily. 90 tablet 3     amoxicillin-clavulanate (AUGMENTIN) 875-125 mg per tablet Take 1 tablet by mouth 2 (two) times a day for 10 days. 20 tablet 0     baclofen (LIORESAL) 10 MG tablet Take 1 tablet (10 mg total) by mouth 3 (three) times a day. 45 tablet 3     busPIRone (BUSPAR) 10 MG tablet Take 1.5 tablets (15 mg total) by mouth 3 (three) times a day. 135 tablet 0     calcium carbonate-vitamin D3 (CALTRATE 600 PLUS D3) 600 mg(1,500mg) -400 unit per tablet TAKE THREE TABLETS BY MOUTH ONCE " DAILY 90 tablet 6     cetirizine (ZYRTEC) 10 MG tablet TAKE ONE TABLET BY MOUTH ONCE DAILY 90 tablet 3     dextroamphetamine-amphetamine (ADDERALL) 30 mg Tab Take 30 mg by mouth daily. 30 tablet 0     DULoxetine (CYMBALTA) 30 MG capsule Take 1 capsule (30 mg total) by mouth 3 (three) times a day.  0     fexofenadine (ALLEGRA) 180 MG tablet Take 1 tablet (180 mg total) by mouth daily. 30 tablet 11     fluticasone (FLONASE) 50 mcg/actuation nasal spray USE 2 SPRAY(S) IN EACH NOSTRIL ONCE DAILY. 18.2 g 5     gabapentin (NEURONTIN) 400 MG capsule Take 1 capsule (400 mg total) by mouth 2 (two) times a day. 360 capsule 3     hydroCHLOROthiazide (HYDRODIURIL) 25 MG tablet Take 1 tablet (25 mg total) by mouth daily. 90 tablet 3     hydrOXYzine HCl (ATARAX) 25 MG tablet Take 1 tablet (25 mg total) by mouth 4 (four) times a day. 120 tablet 3     ibuprofen (ADVIL,MOTRIN) 400 MG tablet Take 800 mg by mouth 2 (two) times a day.       ketamine HCl (KETAMINE, BULK,) 100 % Powd 60 mg amarjit  Dissolve 1/2-1 amarjit up to 7 times daily 90 Bottle 2     lamoTRIgine (LAMICTAL) 25 MG tablet Take 25 mg by mouth 2 (two) times a day.   0     lidocaine HCl 3 % Crea Apply topically to affected areas twice daily 1 Tube 5     losartan (COZAAR) 100 MG tablet Take 1 tablet (100 mg total) by mouth daily. 90 tablet 5     Medical Cannabis Use 1 Units As Directed. Take as instructed by the medical cannabis dispensary. The provider who enrolled the patient in the medical cannabis registry and certified this  patient will maintain ownership and liability of all provider reporting and registration requirements.       meloxicam (MOBIC) 15 MG tablet Take 1 tablet (15 mg total) by mouth daily. 90 tablet 3     metoprolol succinate (TOPROL-XL) 100 MG 24 hr tablet Take two tablets (200 mg total) daily. 180 tablet 3     montelukast (SINGULAIR) 10 mg tablet Take 1 tablet (10 mg total) by mouth at bedtime. 30 tablet 11     NARCAN 4 mg/actuation nasal spray   0      nicotine (NICODERM CQ) 21 mg/24 hr Place 1 patch on the skin daily. 30 patch 1     omeprazole (PRILOSEC) 40 MG capsule TAKE 1 CAPSULE (40 MG TOTAL) BY MOUTH DAILY, please do a PA if needed 90 capsule prn     oxyCODONE (OXYCONTIN) 15 mg 12 hr tablet Take 1 tablet (15 mg total) by mouth every 12 (twelve) hours. Fill on 11/19/18 60 tablet 0     oxyCODONE (ROXICODONE) 5 MG immediate release tablet Take 1 tablet (5 mg total) by mouth every 4 (four) hours as needed for pain. 84 tablet 0     prazosin (MINIPRESS) 2 MG capsule Take 1 capsule (2 mg total) by mouth at bedtime. 90 capsule 3     predniSONE (DELTASONE) 10 mg tablet Take 10 mg by mouth daily. 30 tablet 2     predniSONE (DELTASONE) 20 MG tablet Take 20 mg by mouth 2 (two) times a day for 7 days. 14 tablet 0     pseudoephedrine (SUDAFED) 120 mg 12 hr tablet TAKE ONE TABLET BY MOUTH TWICE DAILY AS NEEDED FOR  CONGESTION 180 tablet 5     QUEtiapine (SEROQUEL) 200 MG tablet Take 200 mg by mouth bedtime.       QUEtiapine (SEROQUEL) 50 MG tablet Take 50 mg by mouth 4 (four) times a day.       ranitidine (ZANTAC) 300 MG tablet TAKE ONE TABLET BY MOUTH ONCE DAILY AT BEDTIME 30 tablet 17     temazepam (RESTORIL) 30 mg capsule   1     THERA-M 9 mg iron-400 mcg Tab tablet TAKE ONE TABLET BY MOUTH ONCE DAILY 30 tablet 10     topiramate (TOPAMAX) 100 MG tablet Take 1 tablet (100 mg total) by mouth 3 (three) times a day. 90 tablet 11     Current Facility-Administered Medications   Medication Dose Route Frequency Provider Last Rate Last Dose     cyanocobalamin injection 1,000 mcg  1,000 mcg Intramuscular Q30 Days Devon Lund MD   1,000 mcg at 03/04/19 1612       ADDITIONAL HISTORY SUMMARIZED (2): None.  DECISION TO OBTAIN EXTRA INFORMATION (1): None.   RADIOLOGY TESTS (1): CT sinuses ordered.   LABS (1): Labs from September and November reviewed. Labs ordered.   MEDICINE TESTS (1): None.  INDEPENDENT REVIEW (2 each): None.     The visit lasted a total of 25 minutes  face to face with the patient. Over 50% of the time was spent counseling and educating the patient about his pain and recurrent sinusitis.    We, Montrell Reaves, a trained medical scribe, and Tereso Mckeon, a nurse practitioner student, are scribing for and in the presence of, Dr. Lund.    I, Dr. Lund, personally performed the services described in this documentation, as scribed by Montrell Reaves and Tereso Mckeon in my presence, and it is both accurate and complete.    Total data points: 2

## 2021-06-24 NOTE — PROGRESS NOTES
[facility] Daily Progress     Patient Name: Bola Lyon  Date: 3/1/2019  Visit #: 6/10      PTA visit #:       Referral Diagnosis: Chronic pain      Referring provider: Devon Lund MD     Visit Diagnosis:     ICD-10-CM    1. Chronic right shoulder pain M25.511     G89.29    2. Cervicalgia M54.2    3. Cervical radiculitis M54.12    4. Myofascial pain M79.18          Assessment:     Patient is benefitting from skilled physical therapy and is making steady progress toward functional goals.  Patient is appropriate to continue with skilled physical therapy intervention, as indicated by initial plan of care.  He does seem to doing better overall and I believe his pain going up into his brachial plexus area a shift of sx out of his UE and into this area.       Goal Status:  Pt. will demonstrate/verbalize independence in self-management of condition in : 12 weeks  Pt. will report decreased intensity, frequency of : Pain;in 12 weeks;Comment  Comment:: decrease pain from 6-8/10 to 4-7/10 with ADLs.   Pt. will decrease use of medication for pain for improved quality of life in : 12 weeks  Pt. will have improved quality of sleep: waking less times/night;getting 75-90% of required amount;in 12 weeks  Patient will return to: exercise;leisure;in 12 weeks;Comment  Comment: lifting weights withtout dropping anything with B hands  Patient [place]: for driving;for conversation;with less pain;with less difficulty;in 12 weeks;Comment  Comment: increase C-rot to >50 deg B and T-rot to >40 deg.   Patient will reach / maintain arm movement: forward;overhead;behind;for home chores;for dressing;with less pain;with less difficulty;in 12 weeks    Patient will decrease : NDI score;by _ points;for improved quality of function;for improved quality of life;in 12 weeks  by ___ points: 15       Plan / Patient Education:     Plan to con't with manual therapy to decrease fascial tension/tone to normalize ROM/decrease  "inflammation/decrease mm tone and improve proprioception.      Subjective:     Pain Ratin             He got caught with some  and insurance last visit which is why he missed. There were some changes to his insurance for the better.  One big thing he has noticed is he feels like he is more flexible. He feels himself being able to be more dexterous. He is able to compensate better for some of the things he knows aren't there now.   Even with the increase in function he is doing it through the pain he is feeling.   He isn't dealing as much with his elbows. There has been more isolating a small area in the clavicle/trap/1st rib that is burning. This feels like \"someone has a lighter\" in that area. This was more periodic but it does seem to be every day. It is always at night.     Objective:     LV, neural, MS fascial tensions.      Treatment Today   3/1/2019   TREATMENT MINUTES COMMENTS   Evaluation     Self-care/ Home management     Manual therapy 25 Fascial release using Strain-Counterstrain of RUE/brachial plexus-N, R 1st rib E (P)-MS, cervical-LV   Neuromuscular Re-education 15 Recip inhib of neural, LV, MS fascia   Therapeutic Activity     Therapeutic Exercises 13 AA/PROM to RUE, ribs, C-spine   Gait training     Modality__________________                Total 53    Blank areas are intentional and mean the treatment did not include these items.       Jb Ochoa  3/1/2019    "

## 2021-06-24 NOTE — PROGRESS NOTES
[facility] Daily Progress     Patient Name: Bola Lyon  Date: 3/5/2019  Visit #: 7/10      PTA visit #:       Referral Diagnosis: Chronic pain      Referring provider: Dmitriy Cramer*     Visit Diagnosis:     ICD-10-CM    1. Chronic right shoulder pain M25.511     G89.29    2. Cervicalgia M54.2    3. Cervical radiculitis M54.12    4. Myofascial pain M79.18          Assessment:     Patient is benefitting from skilled physical therapy and is making steady progress toward functional goals.  Patient is appropriate to continue with skilled physical therapy intervention, as indicated by initial plan of care.  He con't to be doing better overall. With everything he has going on he con't to be chronically late for appts. He is aware of this and really does try to make it here on time.   He did have good release of fascial tensions with treatment.      Goal Status:  Pt. will demonstrate/verbalize independence in self-management of condition in : 12 weeks  Pt. will report decreased intensity, frequency of : Pain;in 12 weeks;Comment  Comment:: decrease pain from 6-8/10 to 4-7/10 with ADLs.   Pt. will decrease use of medication for pain for improved quality of life in : 12 weeks  Pt. will have improved quality of sleep: waking less times/night;getting 75-90% of required amount;in 12 weeks  Patient will return to: exercise;leisure;in 12 weeks;Comment  Comment: lifting weights withtout dropping anything with B hands  Patient [place]: for driving;for conversation;with less pain;with less difficulty;in 12 weeks;Comment  Comment: increase C-rot to >50 deg B and T-rot to >40 deg.   Patient will reach / maintain arm movement: forward;overhead;behind;for home chores;for dressing;with less pain;with less difficulty;in 12 weeks    Patient will decrease : NDI score;by _ points;for improved quality of function;for improved quality of life;in 12 weeks  by ___ points: 15       Plan / Patient Education:     Plan to  con't with manual therapy to decrease fascial tension/tone to normalize ROM/decrease inflammation/decrease mm tone and improve proprioception.      Subjective:     Pain Ratin               He does have a sinus infection and ear infection. He is also going to have a CT coming up to check out his sinuses.   He is also having some issues with his teeth as well.   He con't to feel like strength/flexibility are still improving. He feels like he is 60% better from starting PT. He is still operating with pain and discomfort though despite this improvement.   The pain level right now at a high 6 as opposed to the 7-8 he was having. The elbow pains have diminished but they are still periodically coming up. It is more every 7-10 days instead of every night/day.   There is a temperature/cold burn in the R upper trap. It can also feel like a stinger type pain. This can limit his R cervical rotation at times.     Objective:     Art, MS fascial tensions.      Treatment Today   3/5/2019   TREATMENT MINUTES COMMENTS   Evaluation     Self-care/ Home management     Manual therapy 30 Fascial release using Strain-Counterstrain of B cervical F (P)-MS, B thoracic E (P)-MS, B cervical-Art   Neuromuscular Re-education     Therapeutic Activity     Therapeutic Exercises     Gait training     Modality__________________                Total 30    Blank areas are intentional and mean the treatment did not include these items.       Jb Ochoa  3/5/2019

## 2021-06-24 NOTE — TELEPHONE ENCOUNTER
Contacted [facility] pharmacy and they state they have refilled Baclofen at the beginning of this month for a 3 month supply and he has 2 refills on file.  Contacted patient and he states he has been taking 1.5 tablets daily per Dr Lund/ Dr Cramer and he only received 45 tablets, please advise.

## 2021-06-24 NOTE — PROGRESS NOTES
[facility] Daily Progress     Patient Name: Bola Lyon  Date: 3/8/2019  Visit #: 8/10      PTA visit #:       Referral Diagnosis: Chronic pain      Referring provider: Dmitriy Cramer*     Visit Diagnosis:     ICD-10-CM    1. Chronic right shoulder pain M25.511     G89.29    2. Cervicalgia M54.2    3. Cervical radiculitis M54.12    4. Myofascial pain M79.18          Assessment:     Patient is benefitting from skilled physical therapy and is making steady progress toward functional goals.  Patient is appropriate to continue with skilled physical therapy intervention, as indicated by initial plan of care.  There was good release of fascial tension with treatment today. He does con't to have diminishing pain intensity which is a very good sign.     Goal Status:  Pt. will demonstrate/verbalize independence in self-management of condition in : 12 weeks  Pt. will report decreased intensity, frequency of : Pain;in 12 weeks;Comment  Comment:: decrease pain from 6-8/10 to 4-7/10 with ADLs.   Pt. will decrease use of medication for pain for improved quality of life in : 12 weeks  Pt. will have improved quality of sleep: waking less times/night;getting 75-90% of required amount;in 12 weeks  Patient will return to: exercise;leisure;in 12 weeks;Comment  Comment: lifting weights withtout dropping anything with B hands  Patient [place]: for driving;for conversation;with less pain;with less difficulty;in 12 weeks;Comment  Comment: increase C-rot to >50 deg B and T-rot to >40 deg.   Patient will reach / maintain arm movement: forward;overhead;behind;for home chores;for dressing;with less pain;with less difficulty;in 12 weeks    Patient will decrease : NDI score;by _ points;for improved quality of function;for improved quality of life;in 12 weeks  by ___ points: 15       Plan / Patient Education:     Plan to con't with manual therapy to decrease fascial tension/tone to normalize ROM/decrease  inflammation/decrease mm tone and improve proprioception.      Subjective:     Pain Ratin at worst now, constant more at a 6 (not a bad 6 like it was before)               The spot on the R upper trap/shoulder is still painful. That point does remain a major issue.   The ulnar nerve is sporadic and will stop and seems to be coming up not at common at night.   He is still doing workouts and is working on making things not as intense. He is working more isometrics and easier moves rather than heavy weights.     Objective:     Art, neural fascial tensions.      Treatment Today   3/8/2019   TREATMENT MINUTES COMMENTS   Evaluation     Self-care/ Home management     Manual therapy 30 Fascial release using Strain-Counterstrain of B arterial row/cranial-Art, RUE-N   Neuromuscular Re-education 15 Recip inhib of neural, art fascia   Therapeutic Activity     Therapeutic Exercises     Gait training     Modality__________________                Total 45    Blank areas are intentional and mean the treatment did not include these items.       Jb Ochoa  3/8/2019

## 2021-06-24 NOTE — PROGRESS NOTES
Patient called via [facility] to cancel his SW visit due to a death in his family. CG to reach out to patient for rescheduling tomorrow 3/15/19.

## 2021-06-24 NOTE — PROGRESS NOTES
"Subjective:   Bola Lyon is a(n) 49 y.o.     or   Other male who presents to [facility] with the following complaint(s):  Sinus Problem (RT side of sinus and Rt ear pain, states congestion, and pressure. states was seen last month ) and Oral Swelling (rt bottom back gums swollen. )    History of Present Illness:  Patient is a difficult historian.   Primary symptom: Sinus problem  Onset: 4 weeks  Progression: Worsening  Congestion: Yes  Nasal discharge: Yes, thick yellow secretions with occasional blood streaks  Dental pain: Yes, right-sided, also concerned about gum swelling in this area  Maxillary sinus pressure: Yes, right-sided  Frontal sinus pressure: Yes, right-sided  Headache: Yes  Fevers: No  Chills: Yes  Additional symptoms: Right ear pressure and \"crinkling\" sound  Home therapies utilized: None  History of sinusitis: Yes  History of nasal / sinus surgery: Yes  Ill contacts: No  Tobacco user / exposure: Yes, current smoker  Additional pertinent history: Was treated for maxillary sinusitis with Augmentin on 1/23/2019. Symptoms improved temporarily but never resolved completely.     The following portions of the patient's history were reviewed and updated as appropriate: allergies, current medications, past family history, past medical history, past social history, past surgical history and problem list.    Review of Systems:   Review of Systems   All other systems reviewed and are negative.    Objective:     Vitals:    03/03/19 1336   BP: 121/75   Patient Site: Right Arm   Patient Position: Sitting   Cuff Size: Adult Regular   Pulse: 81   Temp: 98.9  F (37.2  C)   TempSrc: Oral   SpO2: 98%   Weight: 212 lb 12.8 oz (96.5 kg)     Physical Exam   Constitutional: He is oriented to person, place, and time. He appears well-developed and well-nourished.  Non-toxic appearance. He does not appear ill. No distress.   HENT:   Head: Normocephalic and atraumatic.   Right Ear: External " ear and ear canal normal. A middle ear effusion (serous) is present.   Left Ear: Tympanic membrane, external ear and ear canal normal.   Nose: No mucosal edema or rhinorrhea. Right sinus exhibits maxillary sinus tenderness. Right sinus exhibits no frontal sinus tenderness. Left sinus exhibits no maxillary sinus tenderness and no frontal sinus tenderness.   Mouth/Throat: Uvula is midline. Mucous membranes are dry. No oral lesions. Dental caries (with associated gum recession and inflammation) present. No dental abscesses. No oropharyngeal exudate or posterior oropharyngeal erythema.   Eyes: Conjunctivae and lids are normal.   Neck: Neck supple. No edema and no erythema present.   Cardiovascular: Normal rate, regular rhythm, S1 normal and S2 normal. Exam reveals no gallop and no friction rub.   No murmur heard.  Pulmonary/Chest: Effort normal and breath sounds normal. No stridor. He has no wheezes. He has no rhonchi. He has no rales.   Lymphadenopathy:     He has no cervical adenopathy.   Neurological: He is alert and oriented to person, place, and time. He has normal strength. No cranial nerve deficit or sensory deficit.   Skin: Skin is warm and dry. No pallor.   Psychiatric: His speech is tangential.   Nursing note and vitals reviewed.    Laboratory:  N/A    Radiology:  N/A    Electrocardiogram:  N/A    Assessment/Plan   1. Acute recurrent maxillary sinusitis  - amoxicillin-clavulanate (AUGMENTIN) 875-125 mg per tablet; Take 1 tablet by mouth 2 (two) times a day for 10 days.  Dispense: 20 tablet; Refill: 0    2. Dysfunction of right eustachian tube    3. Periodontitis    - Treating recurrent sinusitis with a 10-day course of Augmentin as listed above. Discussed usual treatments for eustachian tube dysfunction; patient verified that he is using fluticasone nasal spray and taking cetirizine daily. Reassured patient that he does not appear to have a dental abscess at this time, though he does have significant  periodontitis.   - Counseled patient regarding assessment and plan for evaluation and treatment. Questions were answered. See AVS for the specific written instructions and educational handout(s) regarding sinusitis, earache, and periodontal disease that were provided at the conclusion of the visit.   - Discussed signs / symptoms that warrant urgent / emergent medical attention.   - Patient has scheduled an appointment to see Dr. Lund tomorrow and plans to discuss possible ENT referral at that time.     Earnest Pelletier MD

## 2021-06-24 NOTE — PROGRESS NOTES
Shanika from Patient:     Maurilio Douglas..Yep, me again!  Marky..Could you please send Dhara a msg that I'm going to be there for my 2pm appt and would like to see her if possible also, set up a time if she's busy. I can't find her owen email info. When I see her I'll have to get her contact info and save it...thought I did...but apparently I did not. Thank you do very much!!! Ok... hopefully we can meet up today, if not, we'll at least set up a future appt!! Thank You! MIA Douglas.... I'm sorry...I believe I accidentally SENT my msg before completing it...Oops! Thank You for helping me in January and following up. I need to check up with you now, when your schedule permits, to let you know of all the things I have accomplished! Also Stella, the past 2 months have been very overwhelming for me both mentally and physically. And I can update you on those areas as well. When you did not hear from me, I was not ignoring you by all means. I was overwhelmed, and my mind and body were literally just on EMPTY and FRIED! I'm sorry I was unable to communicate that to you at that time. So if you can talk with me after my appointment today, please let me know, that would be great! Or, if not, we can set up another date! Thank you again for your time and efforts Stella! I'm also going to send a msg to Dhara too. Sincerely...MIA SCHROEDER made appt for patient at 2:50 to go over goals and discuss other concerns/immediate needs.    Met with patient before and during rooming process. Assisted with obtaining patient's weight. Patient reports new insurance coverage. This has been added at check in. He should be covered through December. Medicare tells him he will be active with them in July. Patient reports getting the deep tissue therapy at [facility]. PT added to care team. Goal completed. Pt talked in length of how beneficial this has been for him.     Patient was asked if SW appt is needed. Patient stated that he desires to speak  about housing again but there is no rush. His current apartment is fine. He knows the waitlist is quite long. Unsure if case management is an option now that he has [employer]. SW to inquire.    Patient talked about his medication tolerance and ketamine usage.     Next outreach:4/4/19

## 2021-06-24 NOTE — TELEPHONE ENCOUNTER
Medication Request  Medication name: Baclofen 15 mg  Pharmacy Name and Location: [facility]  Reason for request: Patient stated he takes this for his nerve damage and was advised to take 15 mg.  When did you use medication last?:  Patient stated This was last filled 2/6/19 and he is scheduled on Monday for a medication check to discuss.  Okay to leave a detailed message: yes

## 2021-06-24 NOTE — PROGRESS NOTES
[facility] Daily Progress     Patient Name: Bola Lyon  Date: 2/19/2019  Visit #: 5/10      PTA visit #:       Referral Diagnosis: Chronic pain      Referring provider: Dmitriy Cramer*     Visit Diagnosis:     ICD-10-CM    1. Chronic right shoulder pain M25.511     G89.29    2. Cervicalgia M54.2    3. Cervical radiculitis M54.12    4. Myofascial pain M79.18          Assessment:     Patient is benefitting from skilled physical therapy and is making steady progress toward functional goals.  Patient is appropriate to continue with skilled physical therapy intervention, as indicated by initial plan of care.  He did have good release of mm tone and fascial tensions with treatment. He does seem to be improving on his symptoms as well.       Goal Status:  Pt. will demonstrate/verbalize independence in self-management of condition in : 12 weeks  Pt. will report decreased intensity, frequency of : Pain;in 12 weeks;Comment  Comment:: decrease pain from 6-8/10 to 4-7/10 with ADLs.   Pt. will decrease use of medication for pain for improved quality of life in : 12 weeks  Pt. will have improved quality of sleep: waking less times/night;getting 75-90% of required amount;in 12 weeks  Patient will return to: exercise;leisure;in 12 weeks;Comment  Comment: lifting weights withtout dropping anything with B hands  Patient Turn Head: for driving;for conversation;with less pain;with less difficulty;in 12 weeks;Comment  Comment: increase C-rot to >50 deg B and T-rot to >40 deg.   Patient will reach / maintain arm movement: forward;overhead;behind;for home chores;for dressing;with less pain;with less difficulty;in 12 weeks    Patient will decrease : NDI score;by _ points;for improved quality of function;for improved quality of life;in 12 weeks  by ___ points: 15       Plan / Patient Education:     Plan to con't with manual therapy to decrease fascial tension/tone to normalize ROM/decrease inflammation/decrease mm  "tone and improve proprioception.      Subjective:     Pain Ratin             He feels like he has changed his whole routine after starting PT. He isn't doing as much strength training.   There was a little \"twitching\" going on in his R arm yesterday and it wouldn't seem to \"shut off\". He did have it one time last week as well. He was having them much more frequently prior to starting PT (probably every other day). It is just on the R and not the L like it was before.   Today there is bad areas in the R shoulder/upper trap area which does go down into his arm. The left side has alleviated some which is good.     *pt 15' late for appt      Objective:     LV fascial tensions.      Treatment Today   2019   TREATMENT MINUTES COMMENTS   Evaluation     Self-care/ Home management     Manual therapy 25 Fascial release using Strain-Counterstrain of B cervical/scap/RUE/L shoulder-LV    Neuromuscular Re-education 15 Recip inhib of LV fascia   Therapeutic Activity     Therapeutic Exercises     Gait training     Modality__________________                Total 40    Blank areas are intentional and mean the treatment did not include these items.       Jb Ochoa  2019    "

## 2021-06-25 NOTE — TELEPHONE ENCOUNTER
[facility] calls, requesting a refill of xtampza 18 mg.    Per the last ov with [facility], patient to reduce the xtampza to twice daily as needed once started on oxymorphone.    According to the : Oxymorphone sold from the pharmacy on 5/17/21, 30 tabs for a 10 day supply  This would be due at this time a  Please review and advise if still wanting the patient to also get a refill of xtampza 18 mg which per  was last sold on 5/5/21, #108, 18 days  xtampza for 6 times daily has been discontinued at this time.    Will cue oxymorphone as currently prescribed  Please cue xtampza if this is to be ongoing as well.

## 2021-06-25 NOTE — TELEPHONE ENCOUNTER
VM is full. Will have  keep trying to get a hold of him to convert today's appt to video instead as he has Covid.

## 2021-06-25 NOTE — TELEPHONE ENCOUNTER
Left message to call back for: Jan  Information to relay to patient:  Message from Dr. Lund.    Has he had ultrasound of his legs yet?    Do not come to clinic tomorrow since he has Covid pneumonia but change appointment to virtual visit.      Adderall filled today.

## 2021-06-25 NOTE — TELEPHONE ENCOUNTER
Reason for Call:  Other call back      Detailed comments: Pharmacy calling needing a new rx for:  Amphet/demtr 15mg 1 tab dailly #30  Last filled date:  05/03/2021    Phone Number Patient can be reached at: Other phone number:  [phone]    Best Time: anytime    Can we leave a detailed message on this number?: Yes    Call taken on 6/3/2021 at 8:37 AM by Helen Bowen

## 2021-06-25 NOTE — PROGRESS NOTES
[facility] Daily Progress     Patient Name: Bola Lyon  Date: 3/19/2019  Visit #: 10      PTA visit #:       Referral Diagnosis: Chronic pain      Referring provider: Dmitriy Cramer*     Visit Diagnosis:     ICD-10-CM    1. Chronic right shoulder pain M25.511     G89.29    2. Cervicalgia M54.2    3. Cervical radiculitis M54.12    4. Myofascial pain M79.18          Assessment:     Patient is benefitting from skilled physical therapy and is making steady progress toward functional goals.  Patient is appropriate to continue with skilled physical therapy intervention, as indicated by initial plan of care.  He did have good release of fascial tensions with treatment today. This should help to improve his spinal cord sensations and motor planning. He may need arterial work here as well to help wake up these areas.       Goal Status:  Pt. will demonstrate/verbalize independence in self-management of condition in : 12 weeks - IMPROVING TOWARDS GOAL  Pt. will report decreased intensity, frequency of : Pain;in 12 weeks;Comment  Comment:: decrease pain from 6-8/10 to 4-7/10 with ADLs. - IMPROVING TOWARDS GOAL  Pt. will decrease use of medication for pain for improved quality of life in : 12 weeks - SLOW IMPROVEMENT  Pt. will have improved quality of sleep: waking less times/night;getting 75-90% of required amount;in 12 weeks - IMPROVING TOWARDS GOAL  Patient will return to: exercise;leisure;in 12 weeks;Comment  Comment: lifting weights withtout dropping anything with B hands - SLOW PROGRESSION  Patient Turn Head: for driving;for conversation;with less pain;with less difficulty;in 12 weeks;Comment  Comment: increase C-rot to >50 deg B and T-rot to >40 deg. - HAS NOT SHOWN IMPROVEMENT IN THESE ROM MEASUREMENTS  Patient will reach / maintain arm movement: forward;overhead;behind;for home chores;for dressing;with less pain;with less difficulty;in 12 weeks - SHOWING IMPROVEMENT   Patient will decrease :  NDI score;by _ points;for improved quality of function;for improved quality of life;in 12 weeks  by ___ points: 15 -  NOT TESTED        Plan / Patient Education:     Plan to con't with manual therapy to decrease fascial tension/tone to normalize ROM/decrease inflammation/decrease mm tone and improve proprioception.      Subjective:     Pain Ratin at worst now, constant more at a 6 (not a bad 6 like it was before)               He did get the CT back and the infection is gone in his sinuses. The MD did bring up that they are going to pay attention to the nerve pain he is having and his ear.   The big sticking point for him is still the pain in the brachial plexus area. This is still going down his arm to his hand and back into his shoulder blade. It does feel like the ulnar nerve is a major problem.     *Pt was 20' late for appt.     Objective:     LV fascial tensions.      Treatment Today   3/19/2019   TREATMENT MINUTES COMMENTS   Evaluation     Self-care/ Home management     Manual therapy 30 Fascial release using Strain-Counterstrain of B cervical ant/post EPI-LV, B cervical MED-LV, B cervical spinal vein F/E-LV   Neuromuscular Re-education     Therapeutic Activity     Therapeutic Exercises 10 AA/A/PROM to BUE, spine, ribs.    Gait training     Modality__________________                Total 40    Blank areas are intentional and mean the treatment did not include these items.       Jb Ochoa  3/19/2019

## 2021-06-25 NOTE — TELEPHONE ENCOUNTER
Adderall refilled    Chart notes reviewed.  I see that he has not yet had the ultrasounds of his legs that I ordered 2 weeks ago    He tested positive for Covid last week.  I do not think you should be coming to the office.  Chest x-ray on June 1 showed Covid pneumonia    Please contact, establish whether ultrasounds of the legs have been done yet, and convert appointment tomorrow to virtual visit or today

## 2021-06-25 NOTE — TELEPHONE ENCOUNTER
Pharmacist from [facility] calling again for clarification of Xtampza order.    Will requeue for clarity.    Patient Instructions   PLAN:  Oxymorphone (Opana) 10 mg 3 times a day, Dr. Mullen with update in 1 week     When starting oxymorphone, decrease the Xtampza ER to 18 mg twice a day as needed.    Requested Prescriptions     Pending Prescriptions Disp Refills     oxyCODONE myristate (XTAMPZA ER) 18 mg CSpT 28 each 0     Sig: Take 18 mg by mouth 2 (two) times a day as needed (pain).     Signed Prescriptions Disp Refills     oxyMORphone (OPANA) 10 MG tablet 42 tablet 0     Sig: Take 1 tablet (10 mg total) by mouth 3 (three) times a day.     Authorizing Provider: JAYME MULLEN     oxyCODONE myristate (XTAMPZA ER) 18 mg CSpT 28 each 0     Sig: Take 18 mg by mouth 2 (two) times a day for 10 days.     Authorizing Provider: JAYME MULLEN

## 2021-06-25 NOTE — TELEPHONE ENCOUNTER
Completed: 3/21-4/20  Requested Prescriptions     Signed Prescriptions Disp Refills     oxyCODONE (OXYCONTIN) 15 mg 12 hr tablet 60 tablet 0     Sig: Take 1 tablet (15 mg total) by mouth every 12 (twelve) hours. Fill on 11/19/18     Authorizing Provider: IGNACIO PAZ

## 2021-06-25 NOTE — PROGRESS NOTES
Left message for patient about rescheduling SW appt and offered condolences for loss of family member.

## 2021-06-26 NOTE — TELEPHONE ENCOUNTER
Reason for Call:  Patient is calling for refills, he was hospitalized for 3 weeks,  [facility],  and needs them sent to [facility], and also to the head nurse at [facility], fax Aleisha [phone].    Detailed comments:  dextroamphetamine-amphetamine (ADDERALL) 15 mg Tab 30 tablet   Also needs a lidocaine gel  for his gums, after oral surgery.    States they need to be reactivated through pharmacy and head nurse at the group home.    Phone Number Patient can be reached at: Home number on file [phone] (home)    Best Time: any  Can we leave a detailed message on this number?: Yes    Call taken on 6/23/2021 at 3:56 PM by Lamar Jacobo

## 2021-06-26 NOTE — PROGRESS NOTES
Bola Lyon is a 51 y.o. male who is being evaluated via a billable video visit.      How would you like to obtain your AVS? MyChart.  If dropped from the video visit, the video invitation should be resent by: Text to cell phone: [phone]  Will anyone else be joining your video visit? No  Patient has a follow up appointment with Dr. Cramer.  Pain score: 8  Constant or intermittent: constant and intermittent  What does your pain feel like: aching, expands, electrical shocks, shooting, pulsating and swelling  Does the pain interfere with:   Work: yes  Walking/distance: yes  Sleep: yes  Daily activities: yes  Relationships/social life: yes  Mood: yes  F= 8     Mary Jo Irvin LPN

## 2021-06-26 NOTE — PROGRESS NOTES
Progress Notes by Tejal Hernandez LPN at 11/23/2018 11:30 AM     Author: Tejal Hernandez LPN Service: -- Author Type: Licensed Nurse    Filed: 11/23/2018  5:30 PM Encounter Date: 11/23/2018 Status: Signed    : Tejal Hernandez LPN (Licensed Nurse)

## 2021-06-26 NOTE — PATIENT INSTRUCTIONS - HE
PLAN:  Change oxycodone 10 mg to oxycodone IR 15 mg 4 times a day.    Robaxin for muscle spasms 500 mg 3 times a day.      Belsomra for sleep 5 mg at bedtime    You will call Dr. Cramer by Friday afternoon with an update about how these changes have been.    You will call Dr. Ellis to determine next steps for possible back surgery.    You have contacted your psychiatrist to inform him you are off the lithium.    Schedule follow-up with Dr. Cramer in the office in 6 weeks.

## 2021-06-26 NOTE — PROGRESS NOTES
Assessment/Plan:     Problem List Items Addressed This Visit     Chronic pain syndrome (Chronic)    Relevant Medications    lidocaine (LIDODERM) 5 %    methocarbamoL (ROBAXIN) 500 MG tablet    C7 radiculopathy - Primary (Chronic)    Relevant Medications    oxyCODONE 15 mg TbOr    Other insomnia    Relevant Medications    suvorexant (BELSOMRA) 5 mg Tab            No follow-ups on file.    Patient Instructions   PLAN:  Change oxycodone 10 mg to oxycodone IR 15 mg 4 times a day.    Robaxin for muscle spasms 500 mg 3 times a day.      Belsomra for sleep 5 mg at bedtime    You will call Dr. Cramer by Friday afternoon with an update about how these changes have been.    You will call Dr. Ellis to determine next steps for possible back surgery.    You have contacted your psychiatrist to inform him you are off the lithium.    Schedule follow-up with Dr. Cramer in the office in 6 weeks.        Subjective:       51 y.o. male Bola Lyon is a 51 y.o. male who is being evaluated via a billable video visit.      How would you like to obtain your AVS? MyChart.  If dropped from the video visit, the video invitation should be resent by:   Will anyone else be joining your video visit?       Video Start Time:         Subjective   Bola Lyon is 51 y.o. and presents today for the following health issues   HPI   occluding lumbar degenerative changes and radiculopathy.    [facility].  Patient admitted with encephalopathy, including a lithium level above 2.1.  There were variety of medication changes made.  They have decreased his oxycodone to 10 mg 4 times a day.    Patient called today indicating he was discharged from the hospital yesterday and needed to be admitted.    He describes since coming home there have been many changes.  He will be calling ROBERT Daley to review his situation.  He describes having an MRI 5/24 which there were many findings including  "concern for granulation tissue.  His concerns are about his \"structural pain, nerve pain, and spasms.    Describes he is off diuretics and has very little remedy swelling.  One factor may have been discontinuing the Lyrica.    He had also been admitted previously with dehydration, vomiting, and Covid.    He left a message with the psychiatrist that he is off lithium.  Reviewing the record they did increase atypical antipsychotics.    Jan santizo have been using hydroxyzine 50 mg 4 times a day which he thought helped pain.  It appears they decreased to 25 mg 4 times a day using the name Atarax.    He continues on Namenda 10 mg twice a day wonder if that dose could be increased as his \"feet are on fire\".  Reviewed I have used sometimes 40 mg a day though insurance often only covers 10 mg.  We discussed being cautious with recent encephalopathy and that his brain may be sensitive for changes.    He is on oxycodone 10 mg 4 times a day.  Describes gives very little relief.    Prior to going the hospital he had been on the Xtampza ER 18 mg twice a day for 1 week, unsure how helpful.  Previously the oxymorphone 10 mg 3 times a day, each dose did not seem to last as long.    He is no longer on diazepam, has significant muscle spasm.  He had been on baclofen before the hospital.  He did not think it was that helpful.  He does not recall using Robaxin.  We discussed adding that again, though concerned about his brain being sensitive.    He is not sleeping as well.  Trazodone gives him a hangover.  He has not been on Belsomra.  I reviewed that may have less concern for any delirium issues.      Current Outpatient Medications:      acetaminophen (TYLENOL) 650 MG CR tablet, Take 3 tablets (1,950 mg total) by mouth 2 (two) times a day. (Patient taking differently: Take 1,950 mg by mouth 2 (two) times a day. OTC), Disp: 540 tablet, Rfl: 3     albuterol (PROAIR HFA;PROVENTIL HFA;VENTOLIN HFA) 90 mcg/actuation inhaler, INHALE 2 " PUFFS BY MOUTH EVERY 6 HOURS AS NEEDED FOR WHEEZING, Disp: 3 Inhaler, Rfl: 3     allopurinoL (ZYLOPRIM) 300 MG tablet, Take 1 tablet (300 mg total) by mouth 2 (two) times a day., Disp: 180 tablet, Rfl: 3     ammonium lactate (LAC-HYDRIN) 12 % lotion, Apply topically., Disp: , Rfl:      baclofen (LIORESAL) 5 mg tablet, Take 1 tablet (5 mg total) by mouth 3 (three) times a day., Disp: 90 tablet, Rfl: 11     calcium citrate (CALCITRATE) 200 mg (950 mg) tablet, Take 5 tablets (1,000 mg total) by mouth 2 (two) times a day., Disp: 180 tablet, Rfl: 3     ergocalciferol (ERGOCALCIFEROL) 1,250 mcg (50,000 unit) capsule, Take 1 capsule (50,000 Units total) by mouth every 7 days., Disp: 4 capsule, Rfl: 11     escitalopram oxalate (LEXAPRO) 10 MG tablet, Take 10 mg by mouth daily., Disp: , Rfl:      fexofenadine (ALLEGRA) 180 MG tablet, Take 180 mg by mouth daily as needed., Disp: , Rfl:      furosemide (LASIX) 40 MG tablet, Take 2 tablets (80 mg total) by mouth 2 (two) times a day at 9am and 6pm., Disp: , Rfl:      hydrOXYzine pamoate (VISTARIL) 50 MG capsule, Take 1 capsule (50 mg total) by mouth 4 (four) times a day., Disp: 120 capsule, Rfl: 1     lamoTRIgine (LAMICTAL) 150 MG tablet, Take 1 tablet (150 mg total) by mouth 2 (two) times a day., Disp: 60 tablet, Rfl: 11     lansoprazole (PREVACID) 30 MG capsule, Take 1 capsule (30 mg total) by mouth daily., Disp: 30 capsule, Rfl: 1     lidocaine (LIDODERM) 5 %, Apply 1 patch topically to painful area of skin once daily and remove per schedule., Disp: 30 patch, Rfl: 4     linaCLOtide (LINZESS) 145 mcg cap capsule, Take 1 capsule (145 mcg total) by mouth daily., Disp: 30 capsule, Rfl: 11     melatonin 10 mg cap, Take 10 mg by mouth at bedtime., Disp: , Rfl:      memantine (NAMENDA) 10 MG tablet, Take 1 tablet (10 mg total) by mouth 2 (two) times a day., Disp: 60 tablet, Rfl: 11     miconazole nitrate (CRITIC-AID AF) 2 % Oint ointment, Apply topically., Disp: , Rfl:       miscellaneous medical [place] [facility] [place] and 1/2 rails. Semi-electric bed. Length of need 99 months. Bed extender:no. Group 1 mattress, Disp: , Rfl:      montelukast (SINGULAIR) 10 mg tablet, Take 1 tablet by mouth daily., Disp: , Rfl:      naloxone (NARCAN) 4 mg/actuation nasal spray, 1 spray (4 mg dose) into one nostril for opioid reversal. Call 911. May repeat if no response in 3 minutes., Disp: 1 Box, Rfl: 0     nicotine (NICOTROL) 10 mg inhaler, Inhale 10 mg as needed., Disp: , Rfl:      OLANZapine (ZYPREXA) 5 MG tablet, Take 5 mg by mouth 2 (two) times a day., Disp: , Rfl:      phenytoin (DILANTIN) 100 MG ER capsule, Take 100 mg by mouth 2 (two) times a day., Disp: , Rfl:      polyethylene glycol (GLYCOLAX) 17 gram/dose powder, Take 17 g by mouth daily., Disp: 595 g, Rfl: 11     QUEtiapine (SEROQUEL) 200 MG tablet, Take 250 mg by mouth at bedtime. , Disp: , Rfl:      QUEtiapine (SEROQUEL) 50 MG tablet, Take 1 tablet (50 mg total) by mouth 4 (four) times a day., Disp: 120 tablet, Rfl: 0     senna-docusate (PERICOLACE) 8.6-50 mg tablet, Take 2 tablets by mouth 2 (two) times a day., Disp: 180 tablet, Rfl: 3     dextroamphetamine-amphetamine (ADDERALL) 15 mg Tab, Take 15 mg by mouth daily., Disp: 30 tablet, Rfl: 0     diazePAM (VALIUM) 5 MG tablet, Take 1 tablet (5 mg total) by mouth 2 (two) times a day., Disp: 60 tablet, Rfl: 2     LATUDA 40 mg Tab tablet, , Disp: , Rfl:      medical cannabis (Patient's own supply), by Other route see administration instructions. Take as instructed by the medical cannabis dispensary., Disp: , Rfl: 0     methocarbamoL (ROBAXIN) 500 MG tablet, Take 1 tablet (500 mg total) by mouth 3 (three) times a day., Disp: 60 tablet, Rfl: 3     metoprolol succinate (TOPROL-XL) 100 MG 24 hr tablet, Take 1 tablet (100 mg total) by mouth daily., Disp: 90 tablet, Rfl: 3     midazolam (VERSED) 1 mg/mL Soln, Infuse 0.5-6 mg into a venous catheter., Disp: , Rfl:      miscellaneous  "medical supply Misc, Wheel chair, Disp: , Rfl:      oxyCODONE 15 mg TbOr, Take 15 mg by mouth 4 (four) times a day., Disp: 60 tablet, Rfl: 0     potassium chloride (K-DUR,KLOR-CON) 20 MEQ tablet, Take 1 tablet (20 mEq total) by mouth 2 (two) times a day., Disp: 60 tablet, Rfl: 11     pregabalin (LYRICA) 100 MG capsule, Take 1 capsule (100 mg total) by mouth 2 (two) times a day., Disp: 60 capsule, Rfl: 0     suvorexant (BELSOMRA) 5 mg Tab, Take 1 capsule by mouth at bedtime., Disp: 15 tablet, Rfl: 1    Current Facility-Administered Medications:      cyanocobalamin injection 1,000 mcg, 1,000 mcg, Intramuscular, Q30 Days, Devon Lund MD, 1,000 mcg at 04/30/21 1529    By video he is alert, clear sensorium.  Speech may be a little bit slower than usual, thought process linear goal-directed.      Additional provider notes:     Review of Systems        Objective    Vitals - Patient Reported  Pain Score:   8  Pain Loc: Neck(neck, back, hips, shoulders, forearms, wrists)    Physical Exam    Plan: We will increase the oxycodone to 15 mg 4 times a day he would call the end of the week with an update.    Similarly we will try Robaxin, and Belsomra 5 mg at bedtime to start with worsening.    Total time more than 35 minutes          Video-Visit Details    Type of service:  Video Visit    Video End Time (time video stopped):   Originating Location (pt. Location): Home    Distant Location (provider location):  [facility]     Platform [facility] for [facility] Visit[facility]           Objective:     Vitals:    06/22/21 1339   Height: 5' 7\" (1.702 m)   PainSc:   8   PainLoc: Neck                   This note has been dictated using voice recognition software. Any grammatical or context distortions are unintentional and inherent to the software  "

## 2021-06-26 NOTE — TELEPHONE ENCOUNTER
Reason for Call:  Other call back      Detailed comments: Pt stating that the Lidocaine 5% Oral gel is not carried by [facility], they only carry the Lidocaine 2% Oral gel    Please send in new rx for th 2% gel    Phone Number Patient can be reached at:   Cell number on file:    Telephone Information:   Mobile [phone]       Best Time: anytime    Can we leave a detailed message on this number?: Yes    Call taken on 6/24/2021 at 1:24 PM by Helen Bowen

## 2021-06-29 ENCOUNTER — COMMUNICATION - HEALTHEAST (OUTPATIENT)
Dept: PALLIATIVE MEDICINE | Facility: OTHER | Age: 51
End: 2021-06-29

## 2021-06-29 DIAGNOSIS — M54.12 CERVICAL RADICULOPATHY AT C8: ICD-10-CM

## 2021-06-29 NOTE — PROGRESS NOTES
"Progress Notes by Mary Jo Irvin LPN at 5/5/2020  2:40 PM     Author: Mary Jo Irvin LPN Service: -- Author Type: Licensed Nurse    Filed: 5/5/2020  3:22 PM Date of Service: 5/5/2020  2:40 PM Status: Sign when Signing Visit    : Mary Jo Irvin LPN (Licensed Nurse)       Bola Lyon is a 50 y.o. male who is being evaluated via a billable telephone visit.      The patient has been notified of following:     \"This telephone visit will be conducted via a call between you and your physician/provider. We have found that certain health care needs can be provided without the need for a physical exam.  This service lets us provide the care you need with a short phone conversation.  If a prescription is necessary we can send it directly to your pharmacy.  If lab work is needed we can place an order for that and you can then stop by our lab to have the test done at a later time.    Telephone visits are billed at different rates depending on your insurance coverage. During this emergency period, for some insurers they may be billed the same as an in-person visit.  Please reach out to your insurance provider with any questions.    If during the course of the call the physician/provider feels a telephone visit is not appropriate, you will not be charged for this service.\"    Patient has given verbal consent to a Telephone visit? Yes    What phone number would you like to be contacted at? [phone]    Patient would like to receive their AVS by AVS Preference: [place].    Patient is here for a follow up appointment with Dr. Cramer. Patient has low back, neck, right arm and right hand pain, describes the pain as constant, radiating, structural pain that radiates, shocking, \"kalli horse\", sharp hot and cold pain that explodes,rates the pain as 10/10. Patient needs refills for Ketamine, baclofen, Oxycodone. CSA, LEANDRO, NDI, UDT are deferred due to COVID 19. Functionality is 8. Patient states his pain interferes with his " sleep, walking, work, ADL's, and social/relationships.         Mary Jo Irvin LPN

## 2021-06-30 ENCOUNTER — COMMUNICATION - HEALTHEAST (OUTPATIENT)
Dept: INTERNAL MEDICINE | Facility: CLINIC | Age: 51
End: 2021-06-30

## 2021-06-30 ENCOUNTER — AMBULATORY - HEALTHEAST (OUTPATIENT)
Dept: INTERNAL MEDICINE | Facility: CLINIC | Age: 51
End: 2021-06-30

## 2021-06-30 DIAGNOSIS — K59.01 SLOW TRANSIT CONSTIPATION: ICD-10-CM

## 2021-07-03 NOTE — ADDENDUM NOTE
Addendum Note by Elsa Arce MA at 5/15/2018 12:18 PM     Author: Elsa Arce MA Service: -- Author Type: Medical Assistant    Filed: 5/15/2018 12:18 PM Date of Service: 5/15/2018 12:18 PM Status: Signed    : Elsa Arce MA (Medical Assistant)    Encounter addended by: Elsa Arce MA on: 5/15/2018 12:18 PM<BR>     Actions taken: Charge Capture section accepted

## 2021-07-03 NOTE — ADDENDUM NOTE
Addendum Note by Brittany Parra RN at 3/27/2020  4:01 PM     Author: Brittany Parra RN Service: -- Author Type: Registered Nurse    Filed: 3/27/2020  4:01 PM Encounter Date: 3/13/2020 Status: Signed    : Brittany Parra RN (Registered Nurse)    Addended by: BRITTANY PARRA on: 3/27/2020 04:01 PM        Modules accepted: Orders

## 2021-07-03 NOTE — ADDENDUM NOTE
Addendum Note by Olga Zhang at 11/4/2020  1:40 PM     Author: Olga Zhang Service: -- Author Type: --    Filed: 11/9/2020  8:39 AM Date of Service: 11/4/2020  1:40 PM Status: Signed    : Olga Zhang    Encounter addended by: Olga Zhang on: 11/9/2020  8:39 AM      Actions taken: Charge Capture section accepted

## 2021-07-03 NOTE — ADDENDUM NOTE
Addendum Note by Jayme Mullen MD at 5/7/2019  4:29 PM     Author: Jayme Mullen MD Service: -- Author Type: Physician    Filed: 5/7/2019  4:29 PM Encounter Date: 4/30/2019 Status: Signed    : Jayme Mullen MD (Physician)    Addended by: JAYME MULLEN on: 5/7/2019 04:29 PM        Modules accepted: Orders

## 2021-07-03 NOTE — ADDENDUM NOTE
Addendum Note by Jayme Mullen MD at 2/1/2021  1:11 PM     Author: Jayme Mullen MD Service: -- Author Type: Physician    Filed: 2/1/2021  1:11 PM Encounter Date: 2/1/2021 Status: Signed    : Jayme Mullen MD (Physician)    Addended by: JAYME MULLEN on: 2/1/2021 01:11 PM        Modules accepted: Orders

## 2021-07-03 NOTE — ADDENDUM NOTE
Addendum Note by Trupti Sandhu, RN at 5/7/2019  4:19 PM     Author: Trupti Sandhu RN Service: -- Author Type: Registered Nurse    Filed: 5/7/2019  4:19 PM Encounter Date: 4/30/2019 Status: Signed    : Trupti Sandhu RN (Registered Nurse)    Addended by: TRUPTI SANDHU on: 5/7/2019 04:19 PM        Modules accepted: Orders

## 2021-07-03 NOTE — ADDENDUM NOTE
Addendum Note by Dmitriy Cramer MD at 11/4/2020  1:40 PM     Author: Dmitriy Cramer MD Service: -- Author Type: Physician    Filed: 11/5/2020  1:43 PM Date of Service: 11/4/2020  1:40 PM Status: Signed    : Dmitriy Cramer MD (Physician)    Encounter addended by: Dmitriy Cramer MD on: 11/5/2020  1:43 PM      Actions taken: Actions taken from a BestPractice Advisory, Order list   changed, Diagnosis association updated, Clinical Note Signed

## 2021-07-03 NOTE — ADDENDUM NOTE
Addendum Note by Aleisha Henry RN at 6/3/2019  4:03 PM     Author: Aleisha Henry RN Service: -- Author Type: Registered Nurse    Filed: 6/3/2019  4:03 PM Encounter Date: 6/3/2019 Status: Signed    : Aleisha Henry RN (Registered Nurse)    Addended by: ALEISHA HENRY on: 6/3/2019 04:03 PM        Modules accepted: Orders

## 2021-07-03 NOTE — ADDENDUM NOTE
Addendum Note by Jayme Mullen MD at 3/16/2020  4:38 PM     Author: Jayme Mullen MD Service: -- Author Type: Physician    Filed: 3/16/2020  4:38 PM Encounter Date: 3/13/2020 Status: Signed    : Jayme Mullen MD (Physician)    Addended by: JAYME MULLEN on: 3/16/2020 04:38 PM        Modules accepted: Orders

## 2021-07-03 NOTE — ADDENDUM NOTE
Addendum Note by Mary Jo Irvin LPN at 1/11/2021 11:00 AM     Author: Mary Jo Irvin LPN Service: -- Author Type: Licensed Nurse    Filed: 1/13/2021  4:16 PM Date of Service: 1/11/2021 11:00 AM Status: Signed    : Mary Jo Irvin LPN (Licensed Nurse)    Encounter addended by: Mary Jo Irvin LPN on: 1/13/2021  4:16 PM      Actions taken: Clinical Note Signed

## 2021-07-03 NOTE — ADDENDUM NOTE
Addendum Note by Jayme Mullen MD at 3/16/2020  4:37 PM     Author: Jayme Mullen MD Service: -- Author Type: Physician    Filed: 3/16/2020  4:37 PM Encounter Date: 3/13/2020 Status: Signed    : Jayme Mullen MD (Physician)    Addended by: JAYME MULLEN on: 3/16/2020 04:37 PM        Modules accepted: Orders

## 2021-07-03 NOTE — ADDENDUM NOTE
Addendum Note by Dmitriy Cramer MD at 1/11/2021 11:00 AM     Author: Dmitriy Cramer MD Service: -- Author Type: Physician    Filed: 1/13/2021 12:13 PM Date of Service: 1/11/2021 11:00 AM Status: Signed    : Dmitriy Cramer MD (Physician)    Encounter addended by: Dmitriy Cramer MD on: 1/13/2021 12:13 PM      Actions taken: Order list changed, Diagnosis association updated,   Clinical Note Signed

## 2021-07-03 NOTE — ADDENDUM NOTE
Addendum Note by Brittany Parra RN at 12/13/2019 12:25 PM     Author: Brittany Parra RN Service: -- Author Type: Registered Nurse    Filed: 12/13/2019 12:25 PM Encounter Date: 11/26/2019 Status: Signed    : Brittany Parra RN (Registered Nurse)    Addended by: BRITTANY PARRA on: 12/13/2019 12:25 PM        Modules accepted: Orders

## 2021-07-03 NOTE — ADDENDUM NOTE
Addendum Note by Jayme Mullen MD at 11/6/2020  9:19 AM     Author: Jayme Mullen MD Service: -- Author Type: Physician    Filed: 11/6/2020  9:19 AM Encounter Date: 11/6/2020 Status: Signed    : Jayme Mullen MD (Physician)    Addended by: JAYME MULLEN on: 11/6/2020 09:19 AM        Modules accepted: Orders

## 2021-07-03 NOTE — ADDENDUM NOTE
Addendum Note by Brittany Parra RN at 12/9/2019  9:17 AM     Author: Brittany Parra RN Service: -- Author Type: Registered Nurse    Filed: 12/9/2019  9:17 AM Encounter Date: 11/26/2019 Status: Signed    : Brittany Parra RN (Registered Nurse)    Addended by: BRITTANY PARRA on: 12/9/2019 09:17 AM        Modules accepted: Orders

## 2021-07-04 NOTE — TELEPHONE ENCOUNTER
Telephone Encounter by Brittany Aranda RN at 6/29/2021  4:25 PM     Author: Brittany Aranda RN Service: -- Author Type: Registered Nurse    Filed: 6/29/2021  4:29 PM Encounter Date: 6/29/2021 Status: Signed    : Brittany Aranda RN (Registered Nurse)       Patient calls, request for an order for baclofen 5 mg three times a day, he reports there was discussion about restarting this since he has discharged from the hospital.    Requested Prescriptions     Pending Prescriptions Disp Refills   ? baclofen (LIORESAL) 5 mg tablet 90 tablet 11     Sig: Take 1 tablet (5 mg total) by mouth 3 (three) times a day.     [facility]

## 2021-07-04 NOTE — ADDENDUM NOTE
Addendum Note by Brittany Parra RN at 2/17/2021  9:07 AM     Author: Brittany Parra RN Service: -- Author Type: Registered Nurse    Filed: 2/17/2021  9:07 AM Encounter Date: 2/15/2021 Status: Signed    : Brittany Parra RN (Registered Nurse)    Addended by: BRITTANY PARRA on: 2/17/2021 09:07 AM        Modules accepted: Orders

## 2021-07-04 NOTE — TELEPHONE ENCOUNTER
Telephone Encounter by Tejal Hernandez LPN at 6/30/2021  1:43 PM     Author: Tejal Hernandez LPN Service: -- Author Type: Licensed Nurse    Filed: 6/30/2021  1:47 PM Encounter Date: 6/30/2021 Status: Signed    : Tejal Hernandez LPN (Licensed Nurse)       Lidocaine HCL 2% requires PA     CoverMyMeds Key: [id number]

## 2021-07-04 NOTE — ADDENDUM NOTE
Addendum Note by Jayme Mullen MD at 2/15/2021  4:15 PM     Author: Jayme Mullen MD Service: -- Author Type: Physician    Filed: 2/15/2021  4:15 PM Encounter Date: 2/15/2021 Status: Signed    : Jayme Mullen MD (Physician)    Addended by: JAYME MULLEN on: 2/15/2021 04:15 PM        Modules accepted: Orders

## 2021-07-04 NOTE — TELEPHONE ENCOUNTER
Telephone Encounter by Tejal Hernandez LPN at 6/30/2021  1:09 PM     Author: Tejal Hernandez LPN Service: -- Author Type: Licensed Nurse    Filed: 6/30/2021  1:09 PM Encounter Date: 6/30/2021 Status: Signed    : Tejal Hernandez LPN (Licensed Nurse)       ----- Message from Devon Lund MD sent at 6/30/2021 11:44 AM CDT -----  Please call assisted living, see my chart message, resume senna 2 tabs twice daily and increase MiraLAX to twice dailyPrescription sent

## 2021-07-04 NOTE — TELEPHONE ENCOUNTER
Telephone Encounter by Lamar Jacobo HUC at 6/30/2021 12:15 PM     Author: Lamar Jacobo HUC Service: -- Author Type: Coordinator    Filed: 6/30/2021 12:20 PM Encounter Date: 6/30/2021 Status: Signed    : Lamar Jacobo HUC (Coordinator)       Reason for Call:  Patient is having problems with constipation, would like an RX for a suppository daily as needed.  Sent to [facility]     Detailed comments: also would like order faxed to [phone]    Phone Number Patient can be reached at: Other phone number:  [phone]    Best Time:     Can we leave a detailed message on this number?: No call back needed    Call taken on 6/30/2021 at 12:16 PM by Lamar Jacobo

## 2021-07-04 NOTE — TELEPHONE ENCOUNTER
Telephone Encounter by Tejal Hernandez LPN at 6/30/2021  1:11 PM     Author: Tejal Hernandez LPN Service: -- Author Type: Licensed Nurse    Filed: 6/30/2021  1:13 PM Encounter Date: 6/30/2021 Status: Signed    : Tejal Hernandez LPN (Licensed Nurse)       Orders faxed to [facility] per Dr Lund

## 2021-07-04 NOTE — TELEPHONE ENCOUNTER
Telephone Encounter by Lashae Graff LPN at 6/3/2021  9:40 AM     Author: Lashae Graff LPN Service: -- Author Type: Licensed Nurse    Filed: 6/3/2021  9:44 AM Encounter Date: 6/3/2021 Status: Signed    : Lashae Graff LPN (Licensed Nurse)       Medication: Adderall 15 mg  Last Date Filled 4/30/21   pulled: YES         Only PCP Prescribing? : YES  Taken as prescribed from physician notes? YES    CSA in last year: YES  Random Utox in last year: NO  Opioids + benzodiazepines? NO    Is patient on the Executive Review Team? NO      All responses suggest: Refilling prescription

## 2021-07-04 NOTE — LETTER
Letter by Devon Lund MD at      Author: Devon Lund MD Service: -- Author Type: --    Filed:  Encounter Date: 6/10/2021 Status: (Other)         06/10/21      Bola MCDONALD Lyon  [address] [place] [address]    Dear Bola,    As a valued [facility] patient, your healthcare needs are our priority. Our clinic records indicate we have attempted to contact you to reschedule an ultrasounds, but we were not able to reach you by phone. If you wish to schedule your appointment please contact our office at your convenience. If you have already made an appointment, please disregard this letter.   To schedule at [facility] or [facility]: [phone]  To schedule at the vascular center: [phone]    Sincerely,    JESSE López

## 2021-07-04 NOTE — TELEPHONE ENCOUNTER
Telephone Encounter by Jenn Baker LPN at 6/23/2021  4:08 PM     Author: Jenn Baker LPN Service: -- Author Type: Licensed Nurse    Filed: 6/23/2021  4:11 PM Encounter Date: 6/23/2021 Status: Signed    : Jenn Baker LPN (Licensed Nurse)       Medication: Adderall  Last Date Filled 6/3/21   pulled: YES       Only PCP Prescribing? : YES  Taken as prescribed from physician notes? YES    CSA in last year: YES  Random Utox in last year: YES  (if any of the above answer NO - schedule with PCP)     Opioids + benzodiazepines? YES  (if the above answer YES - schedule with PCP every 6 months)     Is patient on the Executive Review Team? no      All responses suggest: Refilling prescription

## 2021-07-04 NOTE — TELEPHONE ENCOUNTER
Telephone Encounter by Denilson Rios at 7/1/2021 10:06 AM     Author: Denilson Rios Service: -- Author Type: Patient Access    Filed: 7/1/2021 10:13 AM Encounter Date: 6/30/2021 Status: Signed    : Denilson Rios (Patient Access)       Prior Authorization Not Needed     Insurance details: Medication was covered and the pharmacy has sent out to the patient on 06/25/21. The patient's Lidocaine 5% Patches also were covered and sent to the patient.    Pharmacy details: [facility] - [place], [place] - [address]: [phone] I called and spoke with Adam Kevin and due to the original medication set on 06/25/2021 for lidocaine (XYLOCAINE) 2 % jelly, the pharmacist working made a judgement call and switched the medication to the Lidocaine Viscous 2% Soln as the viscous solution is ok to use orally and the Lidocaine 2% Jelly is used externally. Adam Kevin just would like me to send a message to the provider so that he is aware and if he has further questions that he may call [facility] [phone] to speak with a Pharmacist.

## 2021-07-04 NOTE — ADDENDUM NOTE
Addendum Note by Jayme Mullen MD at 2/17/2021  9:53 AM     Author: Jayme Mullen MD Service: -- Author Type: Physician    Filed: 2/17/2021  9:53 AM Encounter Date: 2/15/2021 Status: Signed    : Jayme Mullen MD (Physician)    Addended by: JAYME MULLEN on: 2/17/2021 09:53 AM        Modules accepted: Orders

## 2021-07-05 ENCOUNTER — COMMUNICATION - HEALTHEAST (OUTPATIENT)
Dept: PALLIATIVE MEDICINE | Facility: OTHER | Age: 51
End: 2021-07-05

## 2021-07-05 DIAGNOSIS — M54.12 C7 RADICULOPATHY: ICD-10-CM

## 2021-07-05 NOTE — TELEPHONE ENCOUNTER
Telephone Encounter by Aleisha Pyle RN at 7/5/2021  3:11 PM     Author: Aleisha Pyle RN Service: -- Author Type: Registered Nurse    Filed: 7/5/2021  3:28 PM Encounter Date: 7/5/2021 Status: Signed    : Aleisha Pyle RN (Registered Nurse)       Medication being requested: Oxy IR 15 mg  Last visit date: 6/22.  Provider: BE  Next visit date: 8/6.  Provider: BE  Expected follow up: 6 wks  MTM visit (Pain Center) date: N/A  UDT/CSA = 1/12/2021    (Last fill date; name; strength; provider; MME; quantity):  Filled 6/22 for #60 tabs for 15 days  Pertinent between visit information about requested medication (telephone, mychart, prior authorization, concerns, comments):From last office visit:  Change oxycodone 10 mg to oxycodone IR 15 mg 4 times a day.     Robaxin for muscle spasms 500 mg 3 times a day.       Belsomra for sleep 5 mg at bedtime     You will call Dr. Cramer by Friday afternoon with an update about how these changes have been.     You will call Dr. Ellis to determine next steps for possible back surgery.    Pt didn't call with status update last week but states he'll discuss how he's doing when Dr Cramer returns. Plan is for surgical consult 7/8 with Dr Al       Script being sent to provider by nurse- dates and quantity:     Requested Prescriptions     Pending Prescriptions Disp Refills   ? oxyCODONE 15 mg TbOr 60 tablet 0     Sig: Take 15 mg by mouth 4 (four) times a day.       Pharmacy cued: [facility]  Standing orders for withdrawal protocol implemented: N/A

## 2021-07-05 NOTE — TELEPHONE ENCOUNTER
Telephone Encounter by Mary Jo Irvin LPN at 7/5/2021  3:55 PM     Author: Mary Jo Irvin LPN Service: -- Author Type: Licensed Nurse    Filed: 7/5/2021  3:57 PM Encounter Date: 7/5/2021 Status: Signed    : Mary Jo Irvin LPN (Licensed Nurse)       Prior Authorization Request  Whos requesting:  Pharmacy  Pharmacy Name and Location: [facility]  Medication Name: Lidocaine 5% Ointment  Insurance Plan: e-Rewards/ Medicare  Insurance Member ID Number:  [id number]  CoverMyMeds Key: [id number]  Informed patient that prior authorizations can take up to 10 business days for response:   No  Okay to leave a detailed message: Yes

## 2021-07-05 NOTE — TELEPHONE ENCOUNTER
Telephone Encounter by Marline Lindquist at 7/5/2021  3:59 PM     Author: Marline Lindquist Service: -- Author Type: --    Filed: 7/5/2021  3:59 PM Encounter Date: 7/5/2021 Status: Signed    : Marline Lindquist       [place] PA team  [phone]  Pool: [facility] MED ([id number])          PA has been initiated.       PA form completed and faxed insurance via Cover My Meds     Key:  [id number]     Medication:  Lidocaine ointment 5%    Insurance:  [employer] [place]         Response will be received via fax and may take up to 5-10 business days depending on plan

## 2021-07-05 NOTE — TELEPHONE ENCOUNTER
Telephone Encounter by Sarah Herzog CNP at 7/5/2021  4:38 PM     Author: Sarah Herzog CNP Service: -- Author Type: Nurse Practitioner    Filed: 7/5/2021  4:43 PM Encounter Date: 7/5/2021 Status: Signed    : Sarah Herzog CNP (Nurse Practitioner)       Completed Covering provider Fill on 7/7-7/22  Requested Prescriptions     Signed Prescriptions Disp Refills   ? oxyCODONE 15 mg TbOr 60 tablet 0     Sig: Take 15 mg by mouth 4 (four) times a day for 15 days.     Authorizing Provider: SARAH HERZOG

## 2021-07-06 ENCOUNTER — AMBULATORY - HEALTHEAST (OUTPATIENT)
Dept: INTERNAL MEDICINE | Facility: CLINIC | Age: 51
End: 2021-07-06

## 2021-07-06 ENCOUNTER — COMMUNICATION - HEALTHEAST (OUTPATIENT)
Dept: INTERNAL MEDICINE | Facility: CLINIC | Age: 51
End: 2021-07-06

## 2021-07-06 VITALS — BODY MASS INDEX: 33.52 KG/M2 | HEIGHT: 67 IN

## 2021-07-06 DIAGNOSIS — R06.2 WHEEZING: ICD-10-CM

## 2021-07-06 DIAGNOSIS — F41.0 PANIC ATTACKS: ICD-10-CM

## 2021-07-06 NOTE — TELEPHONE ENCOUNTER
Telephone Encounter by Neelam Lott CMA at 7/6/2021  3:50 PM     Author: Neelam Lott CMA Service: -- Author Type: Certified Medical Assistant    Filed: 7/6/2021  3:50 PM Encounter Date: 7/5/2021 Status: Signed    : Neelam Lott CMA (Certified Medical Assistant)       Pt may purchase OTC.

## 2021-07-06 NOTE — TELEPHONE ENCOUNTER
Telephone Encounter by Marline Lindquist at 7/6/2021  3:38 PM     Author: Marline Lindquist Service: -- Author Type: --    Filed: 7/6/2021  3:40 PM Encounter Date: 7/5/2021 Status: Signed    : Marline Lindquist       PRIOR AUTHORIZATION DENIED    Denial Rational: please see below reasoning.  Per call to [facility] pharmacy pharmacist sent out the Lidocaine 2% viscous solution already to patient for his dental abscess that was ordered by Dr. Lund.  2% viscous formulation can be used in the mouth.  Per pharmacy no further action is needed, patient has the correct medication sent to him already.           Appeal Information: This medication was denied. If physician would like to appeal because patient has contraindication or allergy to covered medication please write letter of medical necessity and route back to PA team to initiate.  If no further action is needed please close encounter thank you.

## 2021-07-07 ENCOUNTER — COMMUNICATION - HEALTHEAST (OUTPATIENT)
Dept: INTERNAL MEDICINE | Facility: CLINIC | Age: 51
End: 2021-07-07

## 2021-07-07 DIAGNOSIS — K59.01 SLOW TRANSIT CONSTIPATION: ICD-10-CM

## 2021-07-07 NOTE — TELEPHONE ENCOUNTER
Telephone Encounter by Lamar Jacobo HUC at 7/7/2021 12:43 PM     Author: Lamar Jacobo HUC Service: -- Author Type: Coordinator    Filed: 7/7/2021 12:47 PM Encounter Date: 7/7/2021 Status: Signed    : Lamar Jacobo HUC (Coordinator)       Reason for Call: would like to get an order for the medication   senna-docusate (SENNA PLUS) 8.6-50 mg tablet 120 tablet     NEED to be written as SCHEDULED,not prn , patient was gone for a day, and now they need order sent again.    Detailed comments: Fax to [facility].  And to Aleisha [phone]    Phone Number Patient can be reached at: Other phone number:  [phone]    Best Time: any    Can we leave a detailed message on this number?: Yes    Call taken on 7/7/2021 at 12:43 PM by Lamar Jacobo

## 2021-07-07 NOTE — TELEPHONE ENCOUNTER
Telephone Encounter by Jenn Baker LPN at 7/7/2021  2:24 PM     Author: Jenn Baker LPN Service: -- Author Type: Licensed Nurse    Filed: 7/7/2021  2:31 PM Encounter Date: 7/7/2021 Status: Signed    : Jenn Baker LPN (Licensed Nurse)       Left detailed message for Aleisha to call back.  PCp sent in order for Senna  On 6/30/21, for scheduled two times a day.  This order was sent to [facility].  What more is needed?

## 2021-07-07 NOTE — ADDENDUM NOTE
Addendum Note by Olga Zhang at 6/22/2021  1:40 PM     Author: Olga Zhang Service: -- Author Type: --    Filed: 6/28/2021  2:21 PM Date of Service: 6/22/2021  1:40 PM Status: Signed    : Olga Zhang    Encounter addended by: Olga Zhang on: 6/28/2021  2:21 PM      Actions taken: Charge Capture section accepted

## 2021-07-12 ENCOUNTER — TELEPHONE (OUTPATIENT)
Dept: INTERNAL MEDICINE | Facility: CLINIC | Age: 51
End: 2021-07-12

## 2021-07-12 DIAGNOSIS — F41.1 GENERALIZED ANXIETY DISORDER: ICD-10-CM

## 2021-07-12 DIAGNOSIS — F31.81 BIPOLAR 2 DISORDER (H): Primary | ICD-10-CM

## 2021-07-12 NOTE — TELEPHONE ENCOUNTER
Reason for Call: patient is calling for clarification on medications   OLANZapine (ZYPREXA) 5 MG tablet 60 tablet 11 6/23/2021 6/23/2022 No   Sig - Route: Take 1 tablet (5 mg total) by mouth 2 (two) times a day. - Oral     hydrOXYzine pamoate (VISTARIL) 50 MG capsule 360 capsule 3 7/7/2021 8/7/2021 No   Sig - Route: Take 1 capsule (50 mg total) by mouth 3 (three) times a day. With an additonal 2 PRN 7       [facility] pharmacy.    Detailed comments: states they both should be written as 3 times a day scheduled and up to 2 more prn, and pharmacy does not give him enough.   Pharmacy states they do not have any RX's sent   Patient is currently out of medication. Needs asap.  Phone Number Patient can be reached at: Home number on file [phone] (home)    Best Time:     Can we leave a detailed message on this number? YES    Call taken on 7/12/2021 at 3:55 PM by Lamar Jacobo

## 2021-07-13 ENCOUNTER — TELEPHONE (OUTPATIENT)
Dept: INTERNAL MEDICINE | Facility: CLINIC | Age: 51
End: 2021-07-13

## 2021-07-13 RX ORDER — HYDROXYZINE PAMOATE 50 MG/1
50 CAPSULE ORAL 3 TIMES DAILY
Qty: 100 CAPSULE | Refills: 11 | Status: SHIPPED | OUTPATIENT
Start: 2021-07-13 | End: 2021-09-01

## 2021-07-13 RX ORDER — OLANZAPINE 5 MG/1
5 TABLET ORAL
COMMUNITY
Start: 2021-06-17 | End: 2021-07-13

## 2021-07-13 RX ORDER — OLANZAPINE 5 MG/1
5 TABLET ORAL 3 TIMES DAILY
Qty: 100 TABLET | Refills: 11 | Status: SHIPPED | OUTPATIENT
Start: 2021-07-13 | End: 2021-08-02

## 2021-07-13 RX ORDER — HYDROXYZINE PAMOATE 50 MG/1
50 CAPSULE ORAL
COMMUNITY
Start: 2021-05-17 | End: 2021-07-13

## 2021-07-13 NOTE — TELEPHONE ENCOUNTER
Order being requested:    Baclofen 5 mg, one tablet three times a day for muscle spasms.    Medication was sent pharmacy but order was received at the group home.  Please send written order to fax # [phone].

## 2021-07-13 NOTE — LETTER
July 19, 2021      Bola Lyon  [address] [place] [place] [address]            Order Request          Administer Baclofen 5 mg, one tablet by mouth three times a day for muscle spasms.  to be dispensed Once at 8:00 am , once at 2:00pm, and again at 8:00pm      Sincerely,      Devon Lund MD

## 2021-07-13 NOTE — LETTER
[facility]  [address] [place] [address]  [phone]        July 13, 2021    Regarding:  Bola Lyon  [address] [place] [address]              To Whom It May Concern;    Administer Baclofen 5 mg, one tablet by mouth three times a day for muscle spasms.            Sincerely,        Devon Lund MD

## 2021-07-14 PROBLEM — J44.9 COPD (CHRONIC OBSTRUCTIVE PULMONARY DISEASE) (H): Status: RESOLVED | Noted: 2020-12-21 | Resolved: 2020-12-21

## 2021-07-15 DIAGNOSIS — M48.02 FORAMINAL STENOSIS OF CERVICAL REGION: Primary | ICD-10-CM

## 2021-07-15 RX ORDER — OXYCODONE HYDROCHLORIDE 15 MG/1
15 TABLET ORAL
Qty: 70 TABLET | Refills: 0 | Status: SHIPPED | OUTPATIENT
Start: 2021-07-15 | End: 2021-07-29

## 2021-07-15 NOTE — PROGRESS NOTES
Calls reviewing the significantly struggling with pain.  He has seen Dr. Chau ferguson they are planning a surgical revision in 2 to 4 weeks.    Uses Belsomra 5 mg at night not sleeping well at all.  Uses Robaxin 500 mg 3 times a day and baclofen 5 mg 3 times a day feels that is not adequate for spasms.    Prescribe oxycodone 15 mg 4 times a day, feels each dose wears off after 2 hours.    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    The plan presently is to increase the Belsomra to 10 mg at bedtime for sleep and oxycodone 15 mg immediate release 5 times a day.

## 2021-07-16 NOTE — TELEPHONE ENCOUNTER
Patient is calling to get the letter to state the times the medication is to be dispensed.  Once at 8:00 am , once at 2:00pm, and again at 8:00pm    Refax to Aleisha [phone]

## 2021-07-22 NOTE — LETTER
Letter by Devon Lund MD at      Author: Devon Lund MD Service: -- Author Type: --    Filed:  Encounter Date: 6/30/2021 Status: (Other)         June 30, 2021     Patient: Bola Lyon   YOB: 1970   Date of Visit: 6/30/2021     Medication Orders for Bola Lyon      1.Resume Senna 2 tabs twice daily   2. Increase MiraLAX to twice daily    Prescriptions have been sent to [facility]    If you have any questions or concerns, please don't hesitate to call.    Sincerely,        Electronically signed by Devon Lund MD

## 2021-07-29 ENCOUNTER — TELEPHONE (OUTPATIENT)
Dept: PALLIATIVE MEDICINE | Facility: OTHER | Age: 51
End: 2021-07-29

## 2021-07-29 DIAGNOSIS — M48.02 FORAMINAL STENOSIS OF CERVICAL REGION: ICD-10-CM

## 2021-07-29 DIAGNOSIS — S06.9X9S TRAUMATIC BRAIN INJURY WITH LOSS OF CONSCIOUSNESS, SEQUELA (H): ICD-10-CM

## 2021-07-29 DIAGNOSIS — F31.81 BIPOLAR 2 DISORDER (H): Primary | ICD-10-CM

## 2021-07-29 RX ORDER — DEXTROAMPHETAMINE SACCHARATE, AMPHETAMINE ASPARTATE, DEXTROAMPHETAMINE SULFATE AND AMPHETAMINE SULFATE 3.75; 3.75; 3.75; 3.75 MG/1; MG/1; MG/1; MG/1
15 TABLET ORAL DAILY
COMMUNITY
Start: 2021-06-23 | End: 2021-07-29

## 2021-07-29 RX ORDER — OXYCODONE HYDROCHLORIDE 15 MG/1
15 TABLET ORAL
Qty: 70 TABLET | Refills: 0 | Status: SHIPPED | OUTPATIENT
Start: 2021-08-01 | End: 2021-08-06

## 2021-07-29 RX ORDER — DEXTROAMPHETAMINE SACCHARATE, AMPHETAMINE ASPARTATE, DEXTROAMPHETAMINE SULFATE AND AMPHETAMINE SULFATE 3.75; 3.75; 3.75; 3.75 MG/1; MG/1; MG/1; MG/1
15 TABLET ORAL DAILY
Qty: 90 TABLET | Refills: 0 | Status: SHIPPED | OUTPATIENT
Start: 2021-07-29 | End: 2021-08-25

## 2021-07-29 NOTE — TELEPHONE ENCOUNTER
Medication: Adderall  Last Date Filled 6/30/21   pulled: YES        Only PCP Prescribing? : NO  Taken as prescribed from physician notes? YES    CSA in last year: NO  Random Utox in last year: YES  (if any of the above answer NO - schedule with PCP)     Opioids + benzodiazepines? YES  (if the above answer YES - schedule with PCP every 6 months)     Is patient on the Executive Review Team? NO      All responses suggest: Scheduling with PCP for further intervention     Scheduled with pcp 8/2/21

## 2021-07-29 NOTE — TELEPHONE ENCOUNTER
Medication being requested: Oxycodone  Last visit date: 6/22/21.  Provider: BE  Next visit date: 8/6/21.  Provider: RAYNE  Expected follow up: 6 weeks  MTM visit (Pain Center) date: No  UDT date: 1/2021   Agreement date: 1/2021   (Last fill date; name; strength; provider; MME; quantity):  7/18/21 Oxycodone Hcl 15 Mg Tablet  70.00 14 Br Zaida    Pertinent between visit information about requested medication (telephone, mychart, prior authorization, concerns, comments):   7/15/21 increased to 5 times per day  Script being sent to provider by nurse- dates and quantity:   8/1/21-8/15/21  Pending Prescriptions:                       Disp   Refills    oxyCODONE IR (ROXICODONE) 15 MG tablet    70 tab*0            Sig: Take 1 tablet (15 mg) by mouth 5 times daily for           14 days    Pharmacy cued: [facility]  Standing orders for withdrawal protocol implemented: SAHRA

## 2021-07-29 NOTE — TELEPHONE ENCOUNTER
Reason for Call:  Medication or medication refill:    Do you use a [facility]?  Name of the pharmacy and phone number for the current request:    [facility]     Name of the medication requested:   Adderall    Other request: n/a    Can we leave a detailed message on this number? YES    Phone number patient can be reached at: Cell number on file:    Telephone Information:   Mobile [phone]       Best Time: anytime    Call taken on 7/29/2021 at 11:42 AM by Helen Bowen

## 2021-07-29 NOTE — TELEPHONE ENCOUNTER
Reason for call:  Medication   If this is a refill request, has the caller requested the refill from the pharmacy already? No  Will the patient be using a [facility]? Unknown    Name of the medication requested: Oxycodone 15 mg    Other request: Patient called in stating that he will be out of his oxycodone on Aug 4, 2021 and does not have an appointment with Dr. Cramer until Aug 6, 2021. Is looking for a refill and a call back to discuss the plan.    Phone number to reach patient:  Home number on file [phone] (home)    Best Time:  Anytime    Can we leave a detailed message on this number?  YES    Travel screening: Not Applicable

## 2021-07-31 ENCOUNTER — HEALTH MAINTENANCE LETTER (OUTPATIENT)
Age: 51
End: 2021-07-31

## 2021-08-02 ENCOUNTER — VIRTUAL VISIT (OUTPATIENT)
Dept: INTERNAL MEDICINE | Facility: CLINIC | Age: 51
End: 2021-08-02
Payer: COMMERCIAL

## 2021-08-02 DIAGNOSIS — F31.81 BIPOLAR 2 DISORDER (H): Primary | ICD-10-CM

## 2021-08-02 DIAGNOSIS — K04.7 DENTAL ABSCESS: ICD-10-CM

## 2021-08-02 DIAGNOSIS — G89.4 CHRONIC PAIN SYNDROME: ICD-10-CM

## 2021-08-02 DIAGNOSIS — M54.16 LUMBAR RADICULOPATHY: ICD-10-CM

## 2021-08-02 DIAGNOSIS — I89.0 LYMPHEDEMA OF BOTH LOWER EXTREMITIES: ICD-10-CM

## 2021-08-02 DIAGNOSIS — G62.9 NEUROPATHY: ICD-10-CM

## 2021-08-02 DIAGNOSIS — F41.1 GENERALIZED ANXIETY DISORDER: ICD-10-CM

## 2021-08-02 PROBLEM — K21.00 GASTROESOPHAGEAL REFLUX DISEASE WITH ESOPHAGITIS: Status: ACTIVE | Noted: 2019-08-19

## 2021-08-02 PROBLEM — Z96.652 STATUS POST TOTAL LEFT KNEE REPLACEMENT: Status: ACTIVE | Noted: 2017-03-30

## 2021-08-02 PROBLEM — F11.90 CHRONIC, CONTINUOUS USE OF OPIOIDS: Status: ACTIVE | Noted: 2017-12-11

## 2021-08-02 PROBLEM — I10 ESSENTIAL HYPERTENSION: Status: ACTIVE | Noted: 2017-03-20

## 2021-08-02 PROBLEM — R41.89 COGNITIVE DEFICIT DUE TO OLD HEAD INJURY: Status: ACTIVE | Noted: 2017-03-22

## 2021-08-02 PROBLEM — S09.90XS COGNITIVE DEFICIT DUE TO OLD HEAD INJURY: Status: ACTIVE | Noted: 2017-03-22

## 2021-08-02 PROBLEM — Z98.890 S/P LUMBAR LAMINECTOMY: Status: ACTIVE | Noted: 2020-06-22

## 2021-08-02 PROBLEM — D50.0 IRON DEFICIENCY ANEMIA DUE TO CHRONIC BLOOD LOSS: Status: ACTIVE | Noted: 2019-08-19

## 2021-08-02 PROBLEM — Z87.820 HISTORY OF TRAUMATIC BRAIN INJURY: Status: ACTIVE | Noted: 2017-03-06

## 2021-08-02 PROBLEM — Z98.890 HISTORY OF CERVICAL DISCECTOMY: Status: ACTIVE | Noted: 2020-06-09

## 2021-08-02 PROBLEM — F31.77 BIPOLAR 1 DISORDER, MIXED, PARTIAL REMISSION (H): Status: ACTIVE | Noted: 2021-01-04

## 2021-08-02 PROCEDURE — 99215 OFFICE O/P EST HI 40 MIN: CPT | Mod: 95 | Performed by: INTERNAL MEDICINE

## 2021-08-02 RX ORDER — METHOCARBAMOL 500 MG/1
500 TABLET, FILM COATED ORAL 3 TIMES DAILY
COMMUNITY
Start: 2021-06-22 | End: 2022-01-20

## 2021-08-02 RX ORDER — CYANOCOBALAMIN 1000 UG/ML
1000 INJECTION, SOLUTION INTRAMUSCULAR; SUBCUTANEOUS
COMMUNITY
Start: 2020-11-14 | End: 2021-10-25

## 2021-08-02 RX ORDER — TOPIRAMATE 25 MG/1
25 TABLET, FILM COATED ORAL 3 TIMES DAILY
COMMUNITY
Start: 2021-06-23 | End: 2021-08-02

## 2021-08-02 RX ORDER — TOPIRAMATE 50 MG/1
50 TABLET, FILM COATED ORAL 3 TIMES DAILY
Qty: 270 TABLET | Refills: 3 | Status: SHIPPED | OUTPATIENT
Start: 2021-08-02 | End: 2021-08-25

## 2021-08-02 RX ORDER — LIDOCAINE 50 MG/G
PATCH TOPICAL
COMMUNITY
Start: 2021-06-19 | End: 2022-10-19

## 2021-08-02 RX ORDER — ALLOPURINOL 300 MG/1
300 TABLET ORAL 2 TIMES DAILY
COMMUNITY
Start: 2021-06-23 | End: 2021-09-01

## 2021-08-02 RX ORDER — OLANZAPINE 10 MG/1
10 TABLET ORAL 2 TIMES DAILY
Qty: 60 TABLET | Refills: 11 | Status: SHIPPED | OUTPATIENT
Start: 2021-08-02 | End: 2021-08-25

## 2021-08-02 RX ORDER — LANSOPRAZOLE 30 MG/1
30 CAPSULE, DELAYED RELEASE ORAL DAILY
COMMUNITY
Start: 2020-08-25 | End: 2022-10-31

## 2021-08-02 RX ORDER — POLYETHYLENE GLYCOL 3350 17 G/17G
17 POWDER, FOR SOLUTION ORAL 2 TIMES DAILY
COMMUNITY
Start: 2020-08-26 | End: 2021-12-14

## 2021-08-02 RX ORDER — AMMONIUM LACTATE 12 G/100G
LOTION TOPICAL
COMMUNITY
End: 2023-04-25

## 2021-08-02 RX ORDER — CHLORHEXIDINE GLUCONATE ORAL RINSE 1.2 MG/ML
15 SOLUTION DENTAL 3 TIMES DAILY PRN
Qty: 473 ML | Refills: 3 | Status: SHIPPED | OUTPATIENT
Start: 2021-08-02 | End: 2021-08-25

## 2021-08-02 RX ORDER — MEMANTINE HYDROCHLORIDE 10 MG/1
10 TABLET ORAL 2 TIMES DAILY
COMMUNITY
Start: 2021-03-19 | End: 2021-11-24

## 2021-08-02 RX ORDER — MELATONIN 10 MG
10 CAPSULE ORAL AT BEDTIME
COMMUNITY
Start: 2021-06-17 | End: 2021-09-01

## 2021-08-02 RX ORDER — ERGOCALCIFEROL 1.25 MG/1
50000 CAPSULE ORAL WEEKLY
COMMUNITY
Start: 2021-04-01 | End: 2021-10-25

## 2021-08-02 RX ORDER — MAG HYDROX/ALUMINUM HYD/SIMETH 400-400-40
SUSPENSION, ORAL (FINAL DOSE FORM) ORAL
COMMUNITY
Start: 2021-06-30 | End: 2022-03-24

## 2021-08-02 RX ORDER — MONTELUKAST SODIUM 10 MG/1
1 TABLET ORAL DAILY
COMMUNITY
Start: 2020-09-09

## 2021-08-02 RX ORDER — QUETIAPINE FUMARATE 300 MG/1
600 TABLET, FILM COATED ORAL AT BEDTIME
COMMUNITY
Start: 2021-06-17 | End: 2022-06-30

## 2021-08-02 RX ORDER — AMOXICILLIN 250 MG
2 CAPSULE ORAL 2 TIMES DAILY
COMMUNITY
Start: 2021-06-30 | End: 2021-12-14

## 2021-08-02 RX ORDER — ALBUTEROL SULFATE 90 UG/1
2 AEROSOL, METERED RESPIRATORY (INHALATION) EVERY 6 HOURS PRN
COMMUNITY
Start: 2020-08-25 | End: 2021-09-01

## 2021-08-02 RX ORDER — LAMOTRIGINE 150 MG/1
150 TABLET ORAL 3 TIMES DAILY
COMMUNITY
Start: 2020-10-09 | End: 2022-06-24

## 2021-08-02 RX ORDER — SENNOSIDES 8.6 MG
1950 CAPSULE ORAL 3 TIMES DAILY
COMMUNITY
Start: 2021-04-19 | End: 2022-01-20

## 2021-08-02 RX ORDER — BACLOFEN 5 MG/1
5 TABLET ORAL 3 TIMES DAILY
COMMUNITY
Start: 2021-01-06 | End: 2021-12-17

## 2021-08-02 RX ORDER — HYDROXYZINE HYDROCHLORIDE 25 MG/1
25 TABLET, FILM COATED ORAL PRN
COMMUNITY
End: 2021-08-02

## 2021-08-02 RX ORDER — FEXOFENADINE HCL 180 MG/1
180 TABLET ORAL DAILY PRN
COMMUNITY
Start: 2020-08-25

## 2021-08-02 NOTE — PROGRESS NOTES
Bola is a 51 year old male contacting the clinic today via video, who will use the platform: [employer] for the visit.  Phone # for D[employer], or if Amwell drops:   Telephone Information:   Mobile [phone]        Any new orders be faxed to assisted living:  Attn:  Aleisha [phone]    ASSESSMENT:  DX: 1. Bipolar 2 disorder (H)  Recertify for medical cannabis. Increase topiramate and olanzapine  - medical cannabis (Patient's own supply)  Dispense: 0 Information only  - topiramate (TOPAMAX) 50 MG tablet; Take 1 tablet (50 mg) by mouth 3 times daily  Dispense: 270 tablet; Refill: 3  - OLANZapine (ZYPREXA) 10 MG tablet; Take 1 tablet (10 mg) by mouth 2 times daily Plus 5 mg every 12 hours as needed for anxiety  Dispense: 60 tablet; Refill: 11    2. Generalized anxiety disorder  As above  - medical cannabis (Patient's own supply)  Dispense: 0 Information only  - topiramate (TOPAMAX) 50 MG tablet; Take 1 tablet (50 mg) by mouth 3 times daily  Dispense: 270 tablet; Refill: 3  - OLANZapine (ZYPREXA) 10 MG tablet; Take 1 tablet (10 mg) by mouth 2 times daily Plus 5 mg every 12 hours as needed for anxiety  Dispense: 60 tablet; Refill: 11    3. Chronic pain syndrome  Narcotics through pain clinic. Recertified cannabis. Push topiramate  - medical cannabis (Patient's own supply)  Dispense: 0 Information only  - topiramate (TOPAMAX) 50 MG tablet; Take 1 tablet (50 mg) by mouth 3 times daily  Dispense: 270 tablet; Refill: 3  - Miscellaneous Order for DME - ONLY FOR DME    4. Dental abscess  Refill mouthwash  - chlorhexidine (PERIDEX) 0.12 % solution; Swish and spit 15 mLs in mouth 3 times daily as needed (sore throat)  Dispense: 473 mL; Refill: 3    5. Neuropathy  Push topiramate. Off gabapentin  - medical cannabis (Patient's own supply)  Dispense: 0 Information only  - topiramate (TOPAMAX) 50 MG tablet; Take 1 tablet (50 mg) by mouth 3 times daily  Dispense: 270 tablet; Refill: 3    6. Lymphedema of both lower  extremities  Mostly resolved now off gabapentin    7. Lumbar radiculopathy  Needs surgery. Begin work on mattress  - topiramate (TOPAMAX) 50 MG tablet; Take 1 tablet (50 mg) by mouth 3 times daily  Dispense: 270 tablet; Refill: 3  - Miscellaneous Order for DME - ONLY FOR DME           PLAN:  Patient Instructions   Refill mouthwash    Increase topiramate to 50 mg 3 times daily    Increase olanzapine to 10 mg twice daily    Recertify for medical cannabis    Begin work on mattress for postoperative care for hospital bed    Plan preop when ready             Return in about 4 weeks (around 8/30/2021).    CHIEF COMPLAINT:  Chief Complaint   Patient presents with     Medication Therapy Management         HISTORY OF PRESENT ILLNESS:  Bola is a 51 year old male contacting the clinic today via video for review of medication. He wishes a refill of his mouthwash for persisting thrush after extended antibiotics    He will need lumbar spine surgery with an anterior approach. Surgery date is not yet finalized. He wishes a better mattress for his hospital bed for the anticipated postoperative pain    Lamotrigine was increased and topiramate was added back last month. Peripheral edema has almost completely resolved off gabapentin. No hallucinations or fatigue. Anxiety has been increasing with pain    We discussed medical cannabis. He is due for recertification. This helps with his neuropathy, anxiety, pain, and PTSD    REVIEW OF SYSTEMS:   Occasional 2+ edema. Occasional high blood pressure    PFSH:  Social History     Social History Narrative    He is .  He has been a  and also a counselor, and worked with [employer].  He smokes and does not drink alcohol.  He is now on disability due to his brain injury and bipolar disease.       TOBACCO USE:  History   Smoking Status     Current Some Day Smoker     Packs/day: 0.50     Years: 11.00     Types: Cigarettes, Cigarettes, Cigarettes  "    Start date: 8/20/2009     Last attempt to quit: 2/21/2017   Smokeless Tobacco     Former User     Comment: 0.5 pack per day       VITALS:  There were no vitals filed for this visit.  There were no vitals taken for this visit. Estimated body mass index is 33.52 kg/m  as calculated from the following:    Height as of 6/22/21: 1.702 m (5' 7\").    Weight as of 5/17/21: 97.1 kg (214 lb).    PHYSICAL EXAM:  (observations via Video)  Alert and oriented. Missing some teeth on the left side    MEDICATIONS:   Current Outpatient Medications   Medication Sig Dispense Refill     acetaminophen (TYLENOL) 650 MG CR tablet Take 1,950 mg by mouth 3 times daily       albuterol (PROAIR HFA/PROVENTIL HFA/VENTOLIN HFA) 108 (90 Base) MCG/ACT inhaler Inhale 2 puffs into the lungs every 6 hours as needed       allopurinol (ZYLOPRIM) 300 MG tablet Take 300 mg by mouth 2 times daily       ammonium lactate (LAC-HYDRIN) 12 % external lotion        amphetamine-dextroamphetamine (ADDERALL) 15 MG tablet Take 1 tablet (15 mg) by mouth daily 90 tablet 0     Baclofen (LIORESAL) 5 MG tablet Take 5 mg by mouth 3 times daily       chlorhexidine (PERIDEX) 0.12 % solution Swish and spit 15 mLs in mouth 3 times daily as needed (sore throat) 473 mL 3     cyanocobalamin (CYANOCOBALAMIN) 1000 MCG/ML injection Inject 1,000 mcg into the muscle       ergocalciferol (ERGOCALCIFEROL) 1.25 MG (14677 UT) capsule Take 50,000 Units by mouth once a week       fexofenadine (ALLEGRA) 180 MG tablet Take 180 mg by mouth daily as needed       Glycerin, Laxative, (GLYCERIN, ADULT,) 2 g SUPP Use as directed for episodes of constipation lasting longer than 3 days       hydrOXYzine (VISTARIL) 50 MG capsule Take 1 capsule (50 mg) by mouth 3 times daily 100 capsule 11     lamoTRIgine (LAMICTAL) 150 MG tablet Take 150 mg by mouth 3 times daily       LANsoprazole (PREVACID) 30 MG DR capsule Take 30 mg by mouth daily       lidocaine (LIDODERM) 5 % patch Apply 1 patch topically " to painful area of skin once daily and remove per schedule.       lidocaine (TOPICAINE 5) 5 % anorectal gel Apply 1 inch topically       lidocaine (XYLOCAINE) 2 % external gel Apply 1 inch topically 4 times a day as needed to gums.       medical cannabis (Patient's own supply)  0 Information only      Melatonin 10 MG CAPS Take 10 mg by mouth At Bedtime       memantine (NAMENDA) 10 MG tablet Take 10 mg by mouth 2 times daily       methocarbamol (ROBAXIN) 500 MG tablet Take 500 mg by mouth 3 times daily       mirtazapine (REMERON SOL-TAB) 15 MG disintegrating tablet 1 tablet (15 mg) by Orally disintegrating tablet route At Bedtime 30 tablet 0     montelukast (SINGULAIR) 10 MG tablet Take 1 tablet by mouth daily       naloxone (NARCAN) 4 MG/0.1ML nasal spray 1 spray (4 mg dose) into one nostril for opioid reversal. Call 911. May repeat if no response in 3 minutes.       nicotine (NICOTROL) 10 MG inhaler Inhale 10 mg into the lungs       OLANZapine (ZYPREXA) 10 MG tablet Take 1 tablet (10 mg) by mouth 2 times daily Plus 5 mg every 12 hours as needed for anxiety 60 tablet 11     ORDER FOR DME Equipment being ordered: Walker Wheels () and Walker ()  Treatment Diagnosis: Gait instability 1 each 0     oxyCODONE IR (ROXICODONE) 15 MG tablet Take 1 tablet (15 mg) by mouth 5 times daily for 14 days 70 tablet 0     polyethylene glycol (MIRALAX) 17 GM/Dose powder Take 17 g by mouth 2 times daily       QUEtiapine (SEROQUEL) 300 MG tablet Take 600 mg by mouth At Bedtime       senna-docusate (SENOKOT-S/PERICOLACE) 8.6-50 MG tablet Take 2 tablets by mouth 2 times daily       Suvorexant (BELSOMRA) 10 MG tablet Take 1 tablet (10 mg) by mouth At Bedtime 30 tablet 1     topiramate (TOPAMAX) 50 MG tablet Take 1 tablet (50 mg) by mouth 3 times daily 270 tablet 3       Outside Notes summarized: Pain clinic notes regarding beginning of olanzapine  Labs, x-rays, cardiology, GI tests reviewed:   Recent Labs   Lab Test 12/12/19  5302  10/18/19  1339 09/13/19  1355   HGB 13.4* 12.0* 12.8*     --  135*   POTASSIUM 4.8  --  4.1   CR 0.89  --  0.87     New orders:   Orders Placed This Encounter   Procedures     Miscellaneous Order for DME - ONLY FOR DME       Independent review of:    Supplemental history by:      Video Start Time: 2:12 PM  Video End time:  2:49 PM  Face to face plus orders: 37 minutes  Documentation time:  3 minutes    The visit lasted a total of 40 minutes     Patient has given verbal consent to a Video visit?  Yes  How would you like to obtain your AVS?  MyChart  Will anyone else be joining your video visit? No       Video-Visit Details  Type of service:  Video Visit  Originating Location (pt. Location): Home  Distant Location (provider location):   [facility]     Devon Lund MD  [facility]

## 2021-08-02 NOTE — PATIENT INSTRUCTIONS
Refill mouthwash    Increase topiramate to 50 mg 3 times daily    Increase olanzapine to 10 mg twice daily    Recertify for medical cannabis    Begin work on mattress for postoperative care for hospital bed    Plan preop when ready

## 2021-08-03 ENCOUNTER — TELEPHONE (OUTPATIENT)
Dept: INTERNAL MEDICINE | Facility: CLINIC | Age: 51
End: 2021-08-03

## 2021-08-03 NOTE — TELEPHONE ENCOUNTER
----- Message from Devon Lund MD sent at 8/2/2021  2:47 PM CDT -----  Please communicate med changes and begin work on mattress per note pleaseCommunicate results to nurse as outlined in top of progress note

## 2021-08-06 ENCOUNTER — MEDICAL CORRESPONDENCE (OUTPATIENT)
Dept: HEALTH INFORMATION MANAGEMENT | Facility: CLINIC | Age: 51
End: 2021-08-06

## 2021-08-06 ENCOUNTER — OFFICE VISIT (OUTPATIENT)
Dept: PALLIATIVE MEDICINE | Facility: OTHER | Age: 51
End: 2021-08-06
Payer: COMMERCIAL

## 2021-08-06 VITALS — WEIGHT: 211 LBS | BODY MASS INDEX: 33.12 KG/M2 | HEIGHT: 67 IN

## 2021-08-06 DIAGNOSIS — M48.02 FORAMINAL STENOSIS OF CERVICAL REGION: ICD-10-CM

## 2021-08-06 DIAGNOSIS — G89.4 CHRONIC PAIN SYNDROME: ICD-10-CM

## 2021-08-06 PROCEDURE — 99214 OFFICE O/P EST MOD 30 MIN: CPT | Performed by: ANESTHESIOLOGY

## 2021-08-06 PROCEDURE — G0463 HOSPITAL OUTPT CLINIC VISIT: HCPCS

## 2021-08-06 RX ORDER — OXYCODONE HYDROCHLORIDE 15 MG/1
15 TABLET ORAL
Qty: 84 TABLET | Refills: 0 | Status: SHIPPED | OUTPATIENT
Start: 2021-08-15 | End: 2021-08-25

## 2021-08-06 ASSESSMENT — MIFFLIN-ST. JEOR: SCORE: 1770.72

## 2021-08-06 ASSESSMENT — PAIN SCALES - GENERAL: PAINLEVEL: EXTREME PAIN (9)

## 2021-08-06 NOTE — PROGRESS NOTES
"Patient is here for a follow up appointment with Dr. Dmitriy Cramer.   Pain score: 9  Constant or intermittent: constant and intermittent  What does your pain feel like: \"feels like getting hit in the face constantly\"   Does the pain interfere with:   Work: yes  Walking/distance: yes  Sleep: yes  Daily activities: yes  Relationships/social life: yes  Mood: yes  F= 8    Mary Jo Irvin LPN  "

## 2021-08-06 NOTE — LETTER
"    8/6/2021         RE: Bola Lyon Jr.  [address] [place] [address]        Dear Colleague,    Thank you for referring your patient, Bola Lyon Jr., to the [facility]. Please see a copy of my visit note below.    Patient is here for a follow up appointment with Dr. Dmitriy Cramer.   Pain score: 9  Constant or intermittent: constant and intermittent  What does your pain feel like: \"feels like getting hit in the face constantly\"   Does the pain interfere with:   Work: yes  Walking/distance: yes  Sleep: yes  Daily activities: yes  Relationships/social life: yes  Mood: yes  F= 8    LORAINE Milton   Jan is a 51 year old who presents for the following health issues     Multiple previous joint surgeries, now concern for recent lumbar surgeries, hospitalized with encephalopathy with multiple medical problems.    He reviews recent problem with his white foot \"rolled\", shows pictures of bleeding bottom of his foot and ankle.  Had a fusion 15 years ago.  Had x-ray showing that the screws are in tact.    Concern for bilateral neuropathy.  Describes with his back and known radiculopathy having a \"stinger\" go down his right leg.    He has been using ice and a brace.    He has been using Tylenol up to 5600 mg daily, describes can stay to the limit of 3000 mg daily if he is using the oxycodone 15 mg 5 times a day.  Each dose last about 2 hours of the oxycodone, reviews he was on 20 mg in the past.    He will be seeing his surgeon soon, they are reviewing concerns with the central stenosis and disc disease.  He is trying to limit his weight to 210.    He would like to simplify his medications.  He does not think the baclofen has been helping for spasms, though is taking the methocarbamol 500 mg 3 times a day wonders about increasing.  Continues with Seroquel 70 mg at bedtime thinks that is helping for sleep, does not wish to change that.  Has not noted benefit from " Namenda 10 mg twice a day to help with neuropathic pain and augment the opioids.  Has used Belsomra without benefit.  Graph continues on the Topamax, for neuropathic pain and headaches.  Unclear if he has memory issues.     reviewed, as expected.      Current Outpatient Medications:      acetaminophen (TYLENOL) 650 MG CR tablet, Take 1,950 mg by mouth 3 times daily, Disp: , Rfl:      albuterol (PROAIR HFA/PROVENTIL HFA/VENTOLIN HFA) 108 (90 Base) MCG/ACT inhaler, Inhale 2 puffs into the lungs every 6 hours as needed, Disp: , Rfl:      allopurinol (ZYLOPRIM) 300 MG tablet, Take 300 mg by mouth 2 times daily, Disp: , Rfl:      ammonium lactate (LAC-HYDRIN) 12 % external lotion, , Disp: , Rfl:      amphetamine-dextroamphetamine (ADDERALL) 15 MG tablet, Take 1 tablet (15 mg) by mouth daily, Disp: 90 tablet, Rfl: 0     Baclofen (LIORESAL) 5 MG tablet, Take 5 mg by mouth 3 times daily, Disp: , Rfl:      chlorhexidine (PERIDEX) 0.12 % solution, Swish and spit 15 mLs in mouth 3 times daily as needed (sore throat), Disp: 473 mL, Rfl: 3     cyanocobalamin (CYANOCOBALAMIN) 1000 MCG/ML injection, Inject 1,000 mcg into the muscle, Disp: , Rfl:      ergocalciferol (ERGOCALCIFEROL) 1.25 MG (30289 UT) capsule, Take 50,000 Units by mouth once a week, Disp: , Rfl:      fexofenadine (ALLEGRA) 180 MG tablet, Take 180 mg by mouth daily as needed, Disp: , Rfl:      Glycerin, Laxative, (GLYCERIN, ADULT,) 2 g SUPP, Use as directed for episodes of constipation lasting longer than 3 days, Disp: , Rfl:      hydrOXYzine (VISTARIL) 50 MG capsule, Take 1 capsule (50 mg) by mouth 3 times daily, Disp: 100 capsule, Rfl: 11     lamoTRIgine (LAMICTAL) 150 MG tablet, Take 150 mg by mouth 3 times daily, Disp: , Rfl:      LANsoprazole (PREVACID) 30 MG DR capsule, Take 30 mg by mouth daily, Disp: , Rfl:      lidocaine (LIDODERM) 5 % patch, Apply 1 patch topically to painful area of skin once daily and remove per schedule., Disp: , Rfl:      lidocaine  (TOPICAINE 5) 5 % anorectal gel, Apply 1 inch topically, Disp: , Rfl:      lidocaine (XYLOCAINE) 2 % external gel, Apply 1 inch topically 4 times a day as needed to gums., Disp: , Rfl:      medical cannabis (Patient's own supply), , Disp: 0 Information only, Rfl:      Melatonin 10 MG CAPS, Take 10 mg by mouth At Bedtime, Disp: , Rfl:      memantine (NAMENDA) 10 MG tablet, Take 10 mg by mouth 2 times daily, Disp: , Rfl:      methocarbamol (ROBAXIN) 500 MG tablet, Take 500 mg by mouth 3 times daily, Disp: , Rfl:      mirtazapine (REMERON SOL-TAB) 15 MG disintegrating tablet, 1 tablet (15 mg) by Orally disintegrating tablet route At Bedtime, Disp: 30 tablet, Rfl: 0     montelukast (SINGULAIR) 10 MG tablet, Take 1 tablet by mouth daily, Disp: , Rfl:      naloxone (NARCAN) 4 MG/0.1ML nasal spray, 1 spray (4 mg dose) into one nostril for opioid reversal. Call 911. May repeat if no response in 3 minutes., Disp: , Rfl:      nicotine (NICOTROL) 10 MG inhaler, Inhale 10 mg into the lungs, Disp: , Rfl:      OLANZapine (ZYPREXA) 10 MG tablet, Take 1 tablet (10 mg) by mouth 2 times daily Plus 5 mg every 12 hours as needed for anxiety, Disp: 60 tablet, Rfl: 11     ORDER FOR DME, Equipment being ordered: Walker Wheels () and Walker () Treatment Diagnosis: Gait instability, Disp: 1 each, Rfl: 0     [START ON 8/15/2021] oxyCODONE IR (ROXICODONE) 15 MG tablet, Take 1 tablet (15 mg) by mouth 6 times daily for 14 days, Disp: 84 tablet, Rfl: 0     polyethylene glycol (MIRALAX) 17 GM/Dose powder, Take 17 g by mouth 2 times daily, Disp: , Rfl:      QUEtiapine (SEROQUEL) 300 MG tablet, Take 600 mg by mouth At Bedtime, Disp: , Rfl:      senna-docusate (SENOKOT-S/PERICOLACE) 8.6-50 MG tablet, Take 2 tablets by mouth 2 times daily, Disp: , Rfl:      Suvorexant (BELSOMRA) 10 MG tablet, Take 1 tablet (10 mg) by mouth At Bedtime, Disp: 30 tablet, Rfl: 1     topiramate (TOPAMAX) 50 MG tablet, Take 1 tablet (50 mg) by mouth 3 times  "daily, Disp: 270 tablet, Rfl: 3    He is alert with a clear sensorium good eye contact.  Thought process logical.  Organized with his papers for which she refers including his medication list.    HPI         Review of Systems         Objective    Ht 1.702 m (5' 7\")   Wt 95.7 kg (211 lb)   BMI 33.05 kg/m    Body mass index is 33.05 kg/m .  Physical Exam       Plan: Reviewed the concerns with Tylenol, will make a change to the oxycodone 15 mg 6 times in a day, and will try to address that after his surgery.    We will discontinue some of the medications as above to simplify his regimen, try to minimize anticholinergic medications with the recent encephalopathy.  He had had a episode of lithium toxicity with diuresis, now using Seroquel for mood stabilizer.    Total time more than 30 minutes              Again, thank you for allowing me to participate in the care of your patient.        Sincerely,        JAYME MULLEN MD    "

## 2021-08-06 NOTE — PATIENT INSTRUCTIONS
"PLAN:    Increase the oxycodone to 15 mg immediate release every 4 hours.    Stop melatonin.    Stop Belsomra.    Stop hydroxyzine.    Discussed you have back surgery planned in 2 weeks.    Reviewed for your muscle spasms presently using methocarbamol.    For what you call \"structural pain\" using oxycodone.    For neuropathic pain using Topamax and Namenda.  In the future may consider changing Topamax to zonisamide if there is ongoing concern of memory issues.    Discussed caution with Tylenol, and recommend decrease dose as able.    Follow-up with Dr. Cramer in the office in 6 weeks  "

## 2021-08-07 NOTE — PROGRESS NOTES
"        Gladis Montoya is a 51 year old who presents for the following health issues     Multiple previous joint surgeries, now concern for recent lumbar surgeries, hospitalized with encephalopathy with multiple medical problems.    He reviews recent problem with his white foot \"rolled\", shows pictures of bleeding bottom of his foot and ankle.  Had a fusion 15 years ago.  Had x-ray showing that the screws are in tact.    Concern for bilateral neuropathy.  Describes with his back and known radiculopathy having a \"stinger\" go down his right leg.    He has been using ice and a brace.    He has been using Tylenol up to 5600 mg daily, describes can stay to the limit of 3000 mg daily if he is using the oxycodone 15 mg 5 times a day.  Each dose last about 2 hours of the oxycodone, reviews he was on 20 mg in the past.    He will be seeing his surgeon soon, they are reviewing concerns with the central stenosis and disc disease.  He is trying to limit his weight to 210.    He would like to simplify his medications.  He does not think the baclofen has been helping for spasms, though is taking the methocarbamol 500 mg 3 times a day wonders about increasing.  Continues with Seroquel 70 mg at bedtime thinks that is helping for sleep, does not wish to change that.  Has not noted benefit from Namenda 10 mg twice a day to help with neuropathic pain and augment the opioids.  Has used Belsomra without benefit.  Graph continues on the Topamax, for neuropathic pain and headaches.  Unclear if he has memory issues.     reviewed, as expected.      Current Outpatient Medications:      acetaminophen (TYLENOL) 650 MG CR tablet, Take 1,950 mg by mouth 3 times daily, Disp: , Rfl:      albuterol (PROAIR HFA/PROVENTIL HFA/VENTOLIN HFA) 108 (90 Base) MCG/ACT inhaler, Inhale 2 puffs into the lungs every 6 hours as needed, Disp: , Rfl:      allopurinol (ZYLOPRIM) 300 MG tablet, Take 300 mg by mouth 2 times daily, Disp: , Rfl:      ammonium " lactate (LAC-HYDRIN) 12 % external lotion, , Disp: , Rfl:      amphetamine-dextroamphetamine (ADDERALL) 15 MG tablet, Take 1 tablet (15 mg) by mouth daily, Disp: 90 tablet, Rfl: 0     Baclofen (LIORESAL) 5 MG tablet, Take 5 mg by mouth 3 times daily, Disp: , Rfl:      chlorhexidine (PERIDEX) 0.12 % solution, Swish and spit 15 mLs in mouth 3 times daily as needed (sore throat), Disp: 473 mL, Rfl: 3     cyanocobalamin (CYANOCOBALAMIN) 1000 MCG/ML injection, Inject 1,000 mcg into the muscle, Disp: , Rfl:      ergocalciferol (ERGOCALCIFEROL) 1.25 MG (07397 UT) capsule, Take 50,000 Units by mouth once a week, Disp: , Rfl:      fexofenadine (ALLEGRA) 180 MG tablet, Take 180 mg by mouth daily as needed, Disp: , Rfl:      Glycerin, Laxative, (GLYCERIN, ADULT,) 2 g SUPP, Use as directed for episodes of constipation lasting longer than 3 days, Disp: , Rfl:      hydrOXYzine (VISTARIL) 50 MG capsule, Take 1 capsule (50 mg) by mouth 3 times daily, Disp: 100 capsule, Rfl: 11     lamoTRIgine (LAMICTAL) 150 MG tablet, Take 150 mg by mouth 3 times daily, Disp: , Rfl:      LANsoprazole (PREVACID) 30 MG DR capsule, Take 30 mg by mouth daily, Disp: , Rfl:      lidocaine (LIDODERM) 5 % patch, Apply 1 patch topically to painful area of skin once daily and remove per schedule., Disp: , Rfl:      lidocaine (TOPICAINE 5) 5 % anorectal gel, Apply 1 inch topically, Disp: , Rfl:      lidocaine (XYLOCAINE) 2 % external gel, Apply 1 inch topically 4 times a day as needed to gums., Disp: , Rfl:      medical cannabis (Patient's own supply), , Disp: 0 Information only, Rfl:      Melatonin 10 MG CAPS, Take 10 mg by mouth At Bedtime, Disp: , Rfl:      memantine (NAMENDA) 10 MG tablet, Take 10 mg by mouth 2 times daily, Disp: , Rfl:      methocarbamol (ROBAXIN) 500 MG tablet, Take 500 mg by mouth 3 times daily, Disp: , Rfl:      mirtazapine (REMERON SOL-TAB) 15 MG disintegrating tablet, 1 tablet (15 mg) by Orally disintegrating tablet route At  "Bedtime, Disp: 30 tablet, Rfl: 0     montelukast (SINGULAIR) 10 MG tablet, Take 1 tablet by mouth daily, Disp: , Rfl:      naloxone (NARCAN) 4 MG/0.1ML nasal spray, 1 spray (4 mg dose) into one nostril for opioid reversal. Call 911. May repeat if no response in 3 minutes., Disp: , Rfl:      nicotine (NICOTROL) 10 MG inhaler, Inhale 10 mg into the lungs, Disp: , Rfl:      OLANZapine (ZYPREXA) 10 MG tablet, Take 1 tablet (10 mg) by mouth 2 times daily Plus 5 mg every 12 hours as needed for anxiety, Disp: 60 tablet, Rfl: 11     ORDER FOR DME, Equipment being ordered: Walker Wheels () and Walker () Treatment Diagnosis: Gait instability, Disp: 1 each, Rfl: 0     [START ON 8/15/2021] oxyCODONE IR (ROXICODONE) 15 MG tablet, Take 1 tablet (15 mg) by mouth 6 times daily for 14 days, Disp: 84 tablet, Rfl: 0     polyethylene glycol (MIRALAX) 17 GM/Dose powder, Take 17 g by mouth 2 times daily, Disp: , Rfl:      QUEtiapine (SEROQUEL) 300 MG tablet, Take 600 mg by mouth At Bedtime, Disp: , Rfl:      senna-docusate (SENOKOT-S/PERICOLACE) 8.6-50 MG tablet, Take 2 tablets by mouth 2 times daily, Disp: , Rfl:      Suvorexant (BELSOMRA) 10 MG tablet, Take 1 tablet (10 mg) by mouth At Bedtime, Disp: 30 tablet, Rfl: 1     topiramate (TOPAMAX) 50 MG tablet, Take 1 tablet (50 mg) by mouth 3 times daily, Disp: 270 tablet, Rfl: 3    He is alert with a clear sensorium good eye contact.  Thought process logical.  Organized with his papers for which she refers including his medication list.    HPI         Review of Systems         Objective    Ht 1.702 m (5' 7\")   Wt 95.7 kg (211 lb)   BMI 33.05 kg/m    Body mass index is 33.05 kg/m .  Physical Exam       Plan: Reviewed the concerns with Tylenol, will make a change to the oxycodone 15 mg 6 times in a day, and will try to address that after his surgery.    We will discontinue some of the medications as above to simplify his regimen, try to minimize anticholinergic medications with " the recent encephalopathy.  He had had a episode of lithium toxicity with diuresis, now using Seroquel for mood stabilizer.    Total time more than 30 minutes

## 2021-08-12 ENCOUNTER — TELEPHONE (OUTPATIENT)
Dept: PALLIATIVE MEDICINE | Facility: OTHER | Age: 51
End: 2021-08-12

## 2021-08-12 NOTE — TELEPHONE ENCOUNTER
Aleisha the DON at [facility] in [place] where the patient resides leaves a message on triage line requesting for the orders that were sent by Dr. Cramer from the patient's visit on 8/6/21 be resent as they were misplaced before being able to get them into the chart.  Fax# [phone].  If there any question phone number is [phone]

## 2021-08-13 ENCOUNTER — TELEPHONE (OUTPATIENT)
Dept: PALLIATIVE MEDICINE | Facility: OTHER | Age: 51
End: 2021-08-13

## 2021-08-13 NOTE — TELEPHONE ENCOUNTER
RN returned call to pharmacy and spoke to Jose the pharmacist.  Jose would like confirmation that he is ok to send out the Oxycodone today as the patient called them and said Dr. Cramer said it was ok to send early.  On the Rx it states not to fill until 8/15/21.    Jose would like RN to call pharmacist back at [phone] with confirmation.    Dr. Cramer, please advise.  Also, if patient is able to receive early does the start date change?

## 2021-08-13 NOTE — TELEPHONE ENCOUNTER
Reason for call:  Other   Patient called regarding (reason for call): prescription Oxycodone  Additional comments: pharmacy is calling, patient asking if this can be sent out today instead of 8/15/21    Phone number to reach:  Other phone number:  Beatriz Garcia from [facility] [phone]    Best Time:  any    Can we leave a detailed message on this number?  YES    Travel screening: Not Applicable

## 2021-08-13 NOTE — TELEPHONE ENCOUNTER
Per Dr. Shoaib RIDER returned call to Jose and approved for medication to be sent out due to the nurses at the facility dispensing patient's medication.

## 2021-08-17 DIAGNOSIS — G89.4 CHRONIC PAIN DISORDER: Primary | ICD-10-CM

## 2021-08-17 RX ORDER — ZONISAMIDE 25 MG/1
25 CAPSULE ORAL 2 TIMES DAILY
Qty: 60 CAPSULE | Refills: 1 | Status: SHIPPED | OUTPATIENT
Start: 2021-08-17 | End: 2021-09-29

## 2021-08-20 ENCOUNTER — TELEPHONE (OUTPATIENT)
Dept: INTERNAL MEDICINE | Facility: CLINIC | Age: 51
End: 2021-08-20

## 2021-08-20 NOTE — LETTER
August 25, 2021      Order Request     Bola Lyon can take prescriptions Chlorhexidine and Lidocaine as needed. Bola can administer as needed and may have these 2 medications in his room.         Sincerely,      Devon Lund MD

## 2021-08-20 NOTE — TELEPHONE ENCOUNTER
Patient is requesting letter sent to nurse at his living facility so he can have two medications kept in his room to use when he needs.    The Fax needs to be sent to the attention of Aleisha at     Patient states Dr. Lund has done this for him in the past.      Chlorhexidine 12 hours% solution   Lidocaine 2% gel    The letter needs to indicate that patient can administer as needed.  This will allow him to have the medication in his room and use when needed.     Last Appt with PCP = 8/3/21 Telephone Encounter

## 2021-08-24 ENCOUNTER — TELEPHONE (OUTPATIENT)
Dept: INTERNAL MEDICINE | Facility: CLINIC | Age: 51
End: 2021-08-24

## 2021-08-24 NOTE — TELEPHONE ENCOUNTER
Reason for Call:  Medication or medication refill:    Do you use a [facility]?  Name of the pharmacy and phone number for the current request:  [facility] in [place]    Name of the medication requested: adderal    Other request:   And he has an ifection with gums, in the past you have given him a strong antibiotic and another medicine for this peridontal work. He has the infection sx going on. Would like to get this taken care of before his back surg in 2-3 weeks   Because of his spine surg. He is in a groupd home- so the rx also needs to be faxed to the head nurse, her name is shirley [phone]        Can we leave a detailed message on this number? YES    Phone number patient can be reached at: Cell number on file:    Telephone Information:   Mobile [phone]       Best Time:     Call taken on 8/24/2021 at 12:46 PM by Jenelle Howard

## 2021-08-25 ENCOUNTER — VIRTUAL VISIT (OUTPATIENT)
Dept: INTERNAL MEDICINE | Facility: CLINIC | Age: 51
End: 2021-08-25
Payer: COMMERCIAL

## 2021-08-25 DIAGNOSIS — F31.81 BIPOLAR 2 DISORDER (H): ICD-10-CM

## 2021-08-25 DIAGNOSIS — F41.1 GENERALIZED ANXIETY DISORDER: ICD-10-CM

## 2021-08-25 DIAGNOSIS — S06.9X9S TRAUMATIC BRAIN INJURY WITH LOSS OF CONSCIOUSNESS, SEQUELA (H): ICD-10-CM

## 2021-08-25 DIAGNOSIS — K04.7 DENTAL ABSCESS: ICD-10-CM

## 2021-08-25 DIAGNOSIS — M48.02 FORAMINAL STENOSIS OF CERVICAL REGION: ICD-10-CM

## 2021-08-25 DIAGNOSIS — G62.9 NEUROPATHY: ICD-10-CM

## 2021-08-25 DIAGNOSIS — K05.00 GINGIVITIS, ACUTE: ICD-10-CM

## 2021-08-25 DIAGNOSIS — J01.01 ACUTE RECURRENT MAXILLARY SINUSITIS: Primary | ICD-10-CM

## 2021-08-25 PROCEDURE — 99214 OFFICE O/P EST MOD 30 MIN: CPT | Mod: 95 | Performed by: INTERNAL MEDICINE

## 2021-08-25 RX ORDER — LEVOFLOXACIN 750 MG/1
750 TABLET, FILM COATED ORAL DAILY
Qty: 14 TABLET | Refills: 0 | Status: SHIPPED | OUTPATIENT
Start: 2021-08-25 | End: 2021-09-08

## 2021-08-25 RX ORDER — CHLORHEXIDINE GLUCONATE ORAL RINSE 1.2 MG/ML
15 SOLUTION DENTAL 3 TIMES DAILY PRN
Qty: 473 ML | Refills: 3 | Status: SHIPPED | OUTPATIENT
Start: 2021-08-25 | End: 2022-09-14

## 2021-08-25 RX ORDER — DEXTROAMPHETAMINE SACCHARATE, AMPHETAMINE ASPARTATE, DEXTROAMPHETAMINE SULFATE AND AMPHETAMINE SULFATE 3.75; 3.75; 3.75; 3.75 MG/1; MG/1; MG/1; MG/1
15 TABLET ORAL DAILY
Qty: 90 TABLET | Refills: 0 | Status: SHIPPED | OUTPATIENT
Start: 2021-08-25 | End: 2021-09-24

## 2021-08-25 RX ORDER — OXYCODONE HYDROCHLORIDE 15 MG/1
15 TABLET ORAL
Qty: 84 TABLET | Refills: 0 | Status: SHIPPED | OUTPATIENT
Start: 2021-08-25 | End: 2021-09-09

## 2021-08-25 NOTE — PATIENT INSTRUCTIONS
Refill Adderall    May have chlorhexidine mouthwash in room, to use as he prefers, up to 10 times daily    Levofloxacin 750 mg daily for 14 days or until dental appointment    Discontinue topiramate-done by pain clinic August 17    Discontinue Zyprexa-done by pain clinic August 17

## 2021-08-25 NOTE — TELEPHONE ENCOUNTER
Refill request: oxycodone 15 mg  Last apt with BE:  Next apt with BE:  UDT/CSA = 1/12/2021   Per : 8/6/21, #84 for 14 days  Pending Prescriptions:                       Disp   Refills    oxyCODONE IR (ROXICODONE) 15 MG tablet    84 tab*0            Sig: Take 1 tablet (15 mg) by mouth 6 times daily    Valeria

## 2021-08-25 NOTE — PROGRESS NOTES
Bola is a 51 year old male contacting   the clinic today via video, who will use the platform: [employer]ogi[employer] for the visit.  Phone # for D[employer], or if Amwell drops:   Telephone Information:   Mobile [phone]          ASSESSMENT:  DX: 1. Acute recurrent maxillary sinusitis  Reviewed infections 1 to 2 years ago with minimal response on Augmentin and doxycycline.  Begin levofloxacin until dental work can be accomplished.  Allow use of mouthwash as needed  - levofloxacin (LEVAQUIN) 750 MG tablet; Take 1 tablet (750 mg) by mouth daily for 14 days  Dispense: 14 tablet; Refill: 0    2. Gingivitis, acute  As above  - levofloxacin (LEVAQUIN) 750 MG tablet; Take 1 tablet (750 mg) by mouth daily for 14 days  Dispense: 14 tablet; Refill: 0    3. Bipolar 2 disorder (H)  Refill Adderall.  Mental health improved off topiramate and Zyprexa  - amphetamine-dextroamphetamine (ADDERALL) 15 MG tablet; Take 1 tablet (15 mg) by mouth daily  Dispense: 90 tablet; Refill: 0    4. Traumatic brain injury with loss of consciousness, sequela (H)  - amphetamine-dextroamphetamine (ADDERALL) 15 MG tablet; Take 1 tablet (15 mg) by mouth daily  Dispense: 90 tablet; Refill: 0    5. Dental abscess  - chlorhexidine (PERIDEX) 0.12 % solution; Swish and spit 15 mLs in mouth 3 times daily as needed (sore throat)  Dispense: 473 mL; Refill: 3    6. Generalized anxiety disorder    7. Neuropathy  Adequate relief with initiation of zonisamide through pain clinic on August 17.  Expect dose will be increased         PLAN:  Patient Instructions   Refill Adderall    May have chlorhexidine mouthwash in room, to use as he prefers, up to 10 times daily    Levofloxacin 750 mg daily for 14 days or until dental appointment    Discontinue topiramate-done by pain clinic August 17    Discontinue Zyprexa-done by pain clinic August 17     Return in about 4 weeks (around 9/22/2021) for using a video visit.    CHIEF COMPLAINT:  Chief Complaint   Patient presents with      "Mouth Problem     infection in the gums x 1 week         HISTORY OF PRESENT ILLNESS:  Bola is a 51 year old male contacting the clinic today via video for complaints of recurrent sinus and gingivitis.  He has poor dentition with recurrent infected teeth and poor access to dentistry.  2 years ago we were treating his mouth infection aggressively with antibiotics.  Augmentin and doxycycline were minimally effective.  Levofloxacin was effective and he took approximately 2 months of treatment at that time.  He also uses mouthwash and wants to be able to use this on his own without supervision, possibly up to 10 times daily.  We discussed that there is no worry of side effects on this dose and that is okay    At our last visit Zyprexa was increased as was topiramate.  With this he had increased agitation and confusion.  He spoke to Dr. Cramer about this who changed his medication to zonisamide, which he found immediately effective and resolved his side effects    His back surgery is being delayed by insurance review.  He remains in assisted living    REVIEW OF SYSTEMS:   No peripheral edema    PFSH:  Social History     Social History Narrative    He is .  He has been a  and also a counselor, and worked with [employer].  He smokes and does not drink alcohol.  He is now on disability due to his brain injury and bipolar disease.       TOBACCO USE:  History   Smoking Status     Current Some Day Smoker     Packs/day: 0.50     Years: 11.00     Types: Cigarettes, Cigarettes, Cigarettes     Start date: 8/20/2009     Last attempt to quit: 2/21/2017   Smokeless Tobacco     Former User     Comment: 0.5 pack per day       VITALS:  There were no vitals filed for this visit.  There were no vitals taken for this visit. Estimated body mass index is 33.05 kg/m  as calculated from the following:    Height as of 8/6/21: 1.702 m (5' 7\").    Weight as of 8/6/21: 95.7 kg (211 " lb).    PHYSICAL EXAM:  (observations via Video)  Lying in bed.  Poor dentition visible    MEDICATIONS:   Current Outpatient Medications   Medication Sig Dispense Refill     acetaminophen (TYLENOL) 650 MG CR tablet Take 1,950 mg by mouth 3 times daily       albuterol (PROAIR HFA/PROVENTIL HFA/VENTOLIN HFA) 108 (90 Base) MCG/ACT inhaler Inhale 2 puffs into the lungs every 6 hours as needed       allopurinol (ZYLOPRIM) 300 MG tablet Take 300 mg by mouth 2 times daily       ammonium lactate (LAC-HYDRIN) 12 % external lotion        amphetamine-dextroamphetamine (ADDERALL) 15 MG tablet Take 1 tablet (15 mg) by mouth daily 90 tablet 0     Baclofen (LIORESAL) 5 MG tablet Take 5 mg by mouth 3 times daily       chlorhexidine (PERIDEX) 0.12 % solution Swish and spit 15 mLs in mouth 3 times daily as needed (sore throat) 473 mL 3     cyanocobalamin (CYANOCOBALAMIN) 1000 MCG/ML injection Inject 1,000 mcg into the muscle       ergocalciferol (ERGOCALCIFEROL) 1.25 MG (61774 UT) capsule Take 50,000 Units by mouth once a week       fexofenadine (ALLEGRA) 180 MG tablet Take 180 mg by mouth daily as needed       Glycerin, Laxative, (GLYCERIN, ADULT,) 2 g SUPP Use as directed for episodes of constipation lasting longer than 3 days       hydrOXYzine (VISTARIL) 50 MG capsule Take 1 capsule (50 mg) by mouth 3 times daily 100 capsule 11     lamoTRIgine (LAMICTAL) 150 MG tablet Take 150 mg by mouth 3 times daily       LANsoprazole (PREVACID) 30 MG DR capsule Take 30 mg by mouth daily       levofloxacin (LEVAQUIN) 750 MG tablet Take 1 tablet (750 mg) by mouth daily for 14 days 14 tablet 0     lidocaine (LIDODERM) 5 % patch Apply 1 patch topically to painful area of skin once daily and remove per schedule.       lidocaine (TOPICAINE 5) 5 % anorectal gel Apply 1 inch topically       lidocaine (XYLOCAINE) 2 % external gel Apply 1 inch topically 4 times a day as needed to gums.       medical cannabis (Patient's own supply)  0 Information only       Melatonin 10 MG CAPS Take 10 mg by mouth At Bedtime       memantine (NAMENDA) 10 MG tablet Take 10 mg by mouth 2 times daily       methocarbamol (ROBAXIN) 500 MG tablet Take 500 mg by mouth 3 times daily       mirtazapine (REMERON SOL-TAB) 15 MG disintegrating tablet 1 tablet (15 mg) by Orally disintegrating tablet route At Bedtime 30 tablet 0     montelukast (SINGULAIR) 10 MG tablet Take 1 tablet by mouth daily       naloxone (NARCAN) 4 MG/0.1ML nasal spray 1 spray (4 mg dose) into one nostril for opioid reversal. Call 911. May repeat if no response in 3 minutes.       nicotine (NICOTROL) 10 MG inhaler Inhale 10 mg into the lungs       ORDER FOR DME Equipment being ordered: Walker Wheels () and Walker ()  Treatment Diagnosis: Gait instability 1 each 0     oxyCODONE IR (ROXICODONE) 15 MG tablet Take 1 tablet (15 mg) by mouth 6 times daily 84 tablet 0     polyethylene glycol (MIRALAX) 17 GM/Dose powder Take 17 g by mouth 2 times daily       QUEtiapine (SEROQUEL) 300 MG tablet Take 600 mg by mouth At Bedtime       senna-docusate (SENOKOT-S/PERICOLACE) 8.6-50 MG tablet Take 2 tablets by mouth 2 times daily       Suvorexant (BELSOMRA) 10 MG tablet Take 1 tablet (10 mg) by mouth At Bedtime 30 tablet 1     zonisamide (ZONEGRAN) 25 MG capsule Take 1 capsule (25 mg) by mouth 2 times daily 60 capsule 1       Outside Notes summarized: Pain clinic regarding med changes  Labs, x-rays, cardiology, GI tests reviewed:   Recent Labs   Lab Test 12/12/19  1706 10/18/19  1339 09/13/19  1355   HGB 13.4* 12.0* 12.8*     --  135*   POTASSIUM 4.8  --  4.1   CR 0.89  --  0.87     New orders: No orders of the defined types were placed in this encounter.      Independent review of:    Supplemental history by:      Patient has given verbal consent to a Video visit?  Yes  How would you like to obtain your AVS?  MyChart  Will anyone else be joining your video visit? No       Video-Visit Details  Type of service:  Video  Visit  Originating Location (pt. Location): Home  Distant Location (provider location):   [facility]    Devon Lund MD  [facility]    Video Start Time: 3:12 PM  Video End time:  3:33 PM  Face to face plus orders: 21 minutes  Documentation time:  3 minutes     The visit lasted a total of 24 minutes     Please fax prescriptions to [facility] at [phone].

## 2021-08-26 ENCOUNTER — TELEPHONE (OUTPATIENT)
Dept: INTERNAL MEDICINE | Facility: CLINIC | Age: 51
End: 2021-08-26

## 2021-08-26 NOTE — TELEPHONE ENCOUNTER
----- Message from Devon Lund MD sent at 8/25/2021  3:40 PM CDT -----  Please fax note to [facility] at fax number at bottom of note

## 2021-08-28 ENCOUNTER — NURSE TRIAGE (OUTPATIENT)
Dept: NURSING | Facility: CLINIC | Age: 51
End: 2021-08-28

## 2021-08-28 ENCOUNTER — TELEPHONE (OUTPATIENT)
Dept: INTERNAL MEDICINE | Facility: CLINIC | Age: 51
End: 2021-08-28
Payer: COMMERCIAL

## 2021-08-29 ENCOUNTER — TRANSFERRED RECORDS (OUTPATIENT)
Dept: HEALTH INFORMATION MANAGEMENT | Facility: CLINIC | Age: 51
End: 2021-08-29

## 2021-08-29 LAB
CREATININE (EXTERNAL): 0.86 MG/DL (ref 0.72–1.25)
GFR ESTIMATED (EXTERNAL): >60 ML/MIN/1.73M2
GFR ESTIMATED (IF AFRICAN AMERICAN) (EXTERNAL): >60 ML/MIN/1.73M2
GLUCOSE (EXTERNAL): 100 MG/DL (ref 65–100)
POTASSIUM (EXTERNAL): 4.5 MMOL/L (ref 3.5–5)

## 2021-08-29 NOTE — TELEPHONE ENCOUNTER
Reason for call:  Other   Patient called regarding (reason for call): appointment  Additional comments: Pt needs appt for hypertension per triage needs to be seen by PCP within 3 days     Phone number to reach patient:  Home number on file [phone] (home)    Best Time:  Any     Can we leave a detailed message on this number?  YES    Travel screening: Not Applicable

## 2021-08-29 NOTE — TELEPHONE ENCOUNTER
"\"I had a virtual appt on 8/25 see epic and I am so mad, I forgot to mention my blood pressure concerns. I live in a group home and they check my BP twice/day. I have also had 2 neck and back surgeries in the past year.   I used to take METOPROLOL SUCCINATE PO  I was taken offf of it about a year ago, due to my BP being normal. But it's been high lately  Today it was 162/105, the bottom number is usually in the 90's  I also have been having headaches(rates pain 5/10), palpitations, increased anxiety and hot flashes. I think I need to go back on my BP medication\"  Patient denies other sx at this time  Triaged, gave home care advice and when to seek emergency care if needed.  Also transferred to scheduling for appt with PCP per protocol.  Advised to call back if needed  Nohemy Wen RN [facility]          Reason for Disposition    Systolic BP  >= 160 OR Diastolic >= 100    Additional Information    Negative: Difficult to awaken or acting confused (e.g., disoriented, slurred speech)    Negative: Severe difficulty breathing (e.g., struggling for each breath, speaks in single words)    Negative: [1] Weakness of the face, arm or leg on one side of the body AND [2] new onset    Negative: [1] Numbness (i.e., loss of sensation) of the face, arm or leg on one side of the body AND [2] new onset    Negative: [1] Chest pain lasts > 5 minutes AND [2] history of heart disease  (i.e., heart attack, bypass surgery, angina, angioplasty, CHF)    Negative: [1] Chest pain AND [2] took nitrogylcerin AND [3] pain was not relieved    Negative: Sounds like a life-threatening emergency to the triager    Negative: [1] Systolic BP  >= 160 OR Diastolic >= 100 AND [2] cardiac or neurologic symptoms (e.g., chest pain, difficulty breathing, unsteady gait, blurred vision)    Negative: [1] Systolic BP  >= 200 OR Diastolic >= 120  AND [2] having NO cardiac or neurologic symptoms    Negative: [1] Postpartum < 6 weeks AND [2] BP " Systolic BP  >= 140 OR Diastolic >= 90    Protocols used: HIGH BLOOD PRESSURE-A-AH

## 2021-09-01 ENCOUNTER — OFFICE VISIT (OUTPATIENT)
Dept: INTERNAL MEDICINE | Facility: CLINIC | Age: 51
End: 2021-09-01
Payer: COMMERCIAL

## 2021-09-01 VITALS
HEART RATE: 83 BPM | BODY MASS INDEX: 33.2 KG/M2 | WEIGHT: 212 LBS | SYSTOLIC BLOOD PRESSURE: 120 MMHG | OXYGEN SATURATION: 100 % | DIASTOLIC BLOOD PRESSURE: 78 MMHG

## 2021-09-01 DIAGNOSIS — F11.90 CHRONIC, CONTINUOUS USE OF OPIOIDS: ICD-10-CM

## 2021-09-01 DIAGNOSIS — J45.20 MILD INTERMITTENT ASTHMA WITHOUT COMPLICATION: ICD-10-CM

## 2021-09-01 DIAGNOSIS — Z11.59 NEED FOR HEPATITIS C SCREENING TEST: Primary | ICD-10-CM

## 2021-09-01 DIAGNOSIS — G89.4 CHRONIC PAIN DISORDER: ICD-10-CM

## 2021-09-01 DIAGNOSIS — I10 ESSENTIAL HYPERTENSION: ICD-10-CM

## 2021-09-01 PROBLEM — Z96.652 STATUS POST TOTAL LEFT KNEE REPLACEMENT: Status: RESOLVED | Noted: 2017-03-30 | Resolved: 2021-09-01

## 2021-09-01 PROBLEM — M54.12 CERVICAL MYELOPATHY WITH CERVICAL RADICULOPATHY (H): Status: ACTIVE | Noted: 2021-09-01

## 2021-09-01 PROBLEM — F11.20 OPIOID TYPE DEPENDENCE, ABUSE (H): Status: RESOLVED | Noted: 2021-06-14 | Resolved: 2021-09-01

## 2021-09-01 PROBLEM — S06.9XAA TRAUMATIC BRAIN INJURY (H): Status: ACTIVE | Noted: 2017-03-20

## 2021-09-01 PROBLEM — G95.9 CERVICAL MYELOPATHY WITH CERVICAL RADICULOPATHY (H): Status: ACTIVE | Noted: 2021-09-01

## 2021-09-01 PROBLEM — S06.9XAA TRAUMATIC BRAIN INJURY (H): Status: RESOLVED | Noted: 2017-03-20 | Resolved: 2021-09-01

## 2021-09-01 PROBLEM — Z72.0 TOBACCO ABUSE: Status: ACTIVE | Noted: 2017-03-20

## 2021-09-01 PROBLEM — F31.81 BIPOLAR 2 DISORDER (H): Status: RESOLVED | Noted: 2021-08-02 | Resolved: 2021-09-01

## 2021-09-01 PROBLEM — F11.20 OPIOID TYPE DEPENDENCE, ABUSE (H): Status: ACTIVE | Noted: 2021-06-14

## 2021-09-01 PROCEDURE — 99214 OFFICE O/P EST MOD 30 MIN: CPT | Performed by: INTERNAL MEDICINE

## 2021-09-01 RX ORDER — PHENYTOIN SODIUM 100 MG/1
CAPSULE, EXTENDED RELEASE ORAL
COMMUNITY
Start: 2021-08-27 | End: 2021-10-25

## 2021-09-01 RX ORDER — METOPROLOL TARTRATE 25 MG/1
TABLET, FILM COATED ORAL
COMMUNITY
Start: 2021-08-29 | End: 2021-10-25

## 2021-09-01 RX ORDER — METOPROLOL SUCCINATE 50 MG/1
50 TABLET, EXTENDED RELEASE ORAL DAILY
Qty: 90 TABLET | Refills: 3 | Status: SHIPPED | OUTPATIENT
Start: 2021-09-01 | End: 2024-09-18

## 2021-09-01 RX ORDER — HYDROMORPHONE HYDROCHLORIDE 2 MG/1
TABLET ORAL
COMMUNITY
Start: 2020-11-02 | End: 2021-09-17

## 2021-09-01 RX ORDER — METOPROLOL SUCCINATE 50 MG/1
50 TABLET, EXTENDED RELEASE ORAL DAILY
Qty: 90 TABLET | Refills: 3 | Status: SHIPPED | OUTPATIENT
Start: 2021-09-01 | End: 2021-09-01

## 2021-09-01 RX ORDER — ALBUTEROL SULFATE 90 UG/1
2 AEROSOL, METERED RESPIRATORY (INHALATION) EVERY 6 HOURS PRN
Qty: 6.7 G | Refills: 11 | Status: SHIPPED | OUTPATIENT
Start: 2021-09-01 | End: 2024-09-05

## 2021-09-01 NOTE — PATIENT INSTRUCTIONS
1. Take 2 metoprolol 25 mg tablets twice daily, until you get the new prescription for metoprolol 50 mg XL which is then to be taken once daily.

## 2021-09-01 NOTE — PROGRESS NOTES
ASSESSMENT AND PLAN:    1. Chronic pain disorder  Ongoing management with pain center.     3. Chronic, continuous use of opioids  Ongoing management from pain center    4. Essential hypertension  Will increase his metoprolol to 50 mg XL and follow.     Patient Instructions     1. Take 2 metoprolol 25 mg tablets twice daily, until you get the new prescription for metoprolol 50 mg XL which is then to be taken once daily.      2. Follow up with Dr. Lund as previously planned.     CHIEF COMPLAINT:  hpertension    HISTORY OF PRESENT ILLNESS:  Bola Lyon Jr. is a 51 year old male who feels that his BP is not adequately controlled. Gets feelings of palpitation and malaise.  The recently started metoprolol 12.5 mg is 'not enough'.  No chest pain, or fever, or cough. Denies any focal neurologic symptoms.     REVIEW OF SYSTEMS:   See HPI, all other systems on review are negative.    Past Medical History:   Diagnosis Date     AC (acromioclavicular) arthritis 10/24/2011     Aftercare following surgery of the musculoskeletal system 4/25/2012     Anxiety disorder      Anxiety state, unspecified     Created by Conversion      Arthritis      Biceps tendon rupture, proximal left     Bipolar 2 disorder (H)      Brachial neuritis or radiculitis           Brachial neuritis or radiculitis NOS     Created by Conversion      Cervical radiculopathy at C8 4/30/2018     Claustrophobia      COPD (chronic obstructive pulmonary disease) (H) 12/21/2020     Disturbance of skin sensation 11/30/2011     Encounter for chronic pain management 2/11/2015     GERD (gastroesophageal reflux disease)      Hiatal hernia      Hypertension      Impingement syndrome of right shoulder      Lymphedema 02/11/2015    left     Numbness and tingling      Other affections of shoulder region, not elsewhere classified     Created by Conversion       Other chronic pain      Pain in joint, shoulder region 10/10/2011     Panic attacks      PTSD (post-traumatic  stress disorder)      TBI (traumatic brain injury) (H)     post concussion syndrome     Unspecified essential hypertension     Created by Conversion      Unspecified site of spinal cord injury without evidence of spinal bone injury      History   Smoking Status     Current Some Day Smoker     Packs/day: 0.50     Years: 11.00     Types: Cigarettes, Cigarettes, Cigarettes     Start date: 8/20/2009     Last attempt to quit: 2/21/2017   Smokeless Tobacco     Former User     Comment: 0.5 pack per day     Family History   Problem Relation Age of Onset     Hypertension Father      Lymphoma Father      Bipolar Disorder Mother      Diabetes Maternal Grandmother      Diabetes Maternal Grandfather      Colon Cancer Paternal Grandmother      Diabetes Paternal Grandmother      Diabetes Paternal Grandfather      Anesthesia Reaction No family hx of      Past Surgical History:   Procedure Laterality Date     ANTERIOR / POSTERIOR COMBINED FUSION CERVICAL SPINE  2013, redo in 2014    4 levels     ARTHRODESIS ANKLE  right     ARTHROSCOPY KNEE  2014     C ARTHRODESIS,ANKLE,OPEN Right 2007    Ankle fusion     DECOMPRESSION, FUSION CERVICAL ANTERIOR THREE+ LEVELS, COMBINED  5/5/2014    Procedure: COMBINED DECOMPRESSION, FUSION CERVICAL ANTERIOR THREE+ LEVELS;  Surgeon: Aron Gardner MD;  Location: [facility] OR     EXPLORE SPINE, REMOVE HARDWARE, COMBINED  5/5/2014    Procedure: COMBINED EXPLORE SPINE, REMOVE HARDWARE;  Surgeon: Aron Gardner MD;  Location: [facility] OR     FUSION CERVICAL ANTERIOR THREE+ LEVELS  1/28/2013    Procedure: FUSION CERVICAL ANTERIOR THREE+ LEVELS;  ANTERIOR CERVICAL DISCECTOMY AND FUSION C4-C5, REVISION ANTERIOR CERVICAL DISCECTOMY AND FUSION C5-6, C6-7, RIGHT ANTERIOR ILIAC CREST BONE GRAFT(C-ARM, 3080 TABLE, SYNTHES CSLP SET, TRICORTICAL ALLOGRAFT);  Surgeon: Hermilo Bey MD;  Location: [facility] OR     FUSION CERVICAL ANTERIOR TWO LEVELS       FUSION CERVICAL POSTERIOR THREE+ LEVELS  5/5/2014     Procedure: FUSION CERVICAL POSTERIOR THREE+ LEVELS;  Surgeon: Aron Gardner MD;  Location:  OR     GRAFT BONE FROM ILIAC CREST  1/28/2013    Procedure: GRAFT BONE FROM ILIAC CREST;;  Surgeon: Hermilo Bey MD;  Location:  OR     HAND SURGERY Left 1997     HERNIA REPAIR      x3     HERNIA REPAIR Left 2006     HERNIA REPAIR Right 2008    and middle     HERNIORRHAPHY VENTRAL  2008     IR CERVICAL EPIDURAL STEROID INJECTION  4/5/2018     IR CERVICAL TRANSFORAMINAL EPIDURAL STRD INJ  12/7/2017     NASAL POLYP SURGERY  2014    and septal repair, Dr. Arcos     DE SHLDR ARTHROSCOP,SURG,W/ROTAT CUFF REPR Right 11/25/2015    Procedure: RIGHT SHOULDER ARTHROSCOPY, ROTATOR CUFF REPAIR, DECOMPRESSION, DEBRIDEMENT, DISTAL CLAVICLE EXCISION;  Surgeon: Pilo Bruce MD;  Location: [facility];  Service: Orthopedics     REPLACEMENT TOTAL KNEE Left 03/20/2017     SHOULDER ARTHROSCOPY DISTAL CLAVICLE EXCISION AND OPEN ROTATOR CUFF REPAIR Bilateral 2005 and 2013     SHOULDER SURGERY  left     VITALS:  Vitals:    09/01/21 0951   BP: 120/78   BP Location: Left arm   Patient Position: Sitting   Cuff Size: Adult Large   Pulse: 83   SpO2: 100%   Weight: 96.2 kg (212 lb)     Wt Readings from Last 3 Encounters:   09/01/21 96.2 kg (212 lb)   08/06/21 95.7 kg (211 lb)   04/30/21 98.1 kg (216 lb 4 oz)     PHYSICAL EXAM:  Constitutional:  In NAD, alert and oriented  Cardiac:  S1 S2   Lungs: Clear    DECISION TO OBTAIN OLD RECORDS AND/OR OBTAIN HISTORY FROM SOMEONE OTHER THAN PATIENT, AND/OR ACCESSING CARE EVERYWHERE):  1  Reviewed urgent care note.      REVIEW AND SUMMARIZATION OF OLD RECORDS, AND/OR OBTAINING HISTORY FROM SOMEONE OTHER THAN PATIENT, AND/OR DISCUSSION OF CASE WITH ANOTHER HEALTH CARE PROVIDER:  2 0    REVIEW AND/OR ORDER OF OF CLINICAL LAB TESTS: 1  Reviewed recent lab testing.    REVIEW AND/OR ORDER OF RADIOLOGY TESTS: 1 0.    REVIEW AND/OR ORDER OF MEDICAL TESTS (EKG/ECHO/COLONOSCOPY/EGD): 1 reviewed  recent EKG report    INDEPENDENT  VISUALIZATION OF IMAGE, TRACING, OR SPECIMEN ITSELF (2 EACH):  0     TOTAL: 3    Current Outpatient Medications   Medication Sig Dispense Refill     acetaminophen (TYLENOL) 650 MG CR tablet Take 1,950 mg by mouth 3 times daily       albuterol (PROAIR HFA/PROVENTIL HFA/VENTOLIN HFA) 108 (90 Base) MCG/ACT inhaler Inhale 2 puffs into the lungs every 6 hours as needed       ammonium lactate (LAC-HYDRIN) 12 % external lotion        amphetamine-dextroamphetamine (ADDERALL) 15 MG tablet Take 1 tablet (15 mg) by mouth daily 90 tablet 0     Baclofen (LIORESAL) 5 MG tablet Take 5 mg by mouth 3 times daily       chlorhexidine (PERIDEX) 0.12 % solution Swish and spit 15 mLs in mouth 3 times daily as needed (sore throat) 473 mL 3     cyanocobalamin (CYANOCOBALAMIN) 1000 MCG/ML injection Inject 1,000 mcg into the muscle       fexofenadine (ALLEGRA) 180 MG tablet Take 180 mg by mouth daily as needed       Glycerin, Laxative, (GLYCERIN, ADULT,) 2 g SUPP Use as directed for episodes of constipation lasting longer than 3 days       lamoTRIgine (LAMICTAL) 150 MG tablet Take 150 mg by mouth 3 times daily       LANsoprazole (PREVACID) 30 MG DR capsule Take 30 mg by mouth daily       levofloxacin (LEVAQUIN) 750 MG tablet Take 1 tablet (750 mg) by mouth daily for 14 days 14 tablet 0     lidocaine (LIDODERM) 5 % patch Apply 1 patch topically to painful area of skin once daily and remove per schedule.       lidocaine (TOPICAINE 5) 5 % anorectal gel Apply 1 inch topically       medical cannabis (Patient's own supply)  0 Information only      methocarbamol (ROBAXIN) 500 MG tablet Take 500 mg by mouth 3 times daily       montelukast (SINGULAIR) 10 MG tablet Take 1 tablet by mouth daily       naloxone (NARCAN) 4 MG/0.1ML nasal spray 1 spray (4 mg dose) into one nostril for opioid reversal. Call 911. May repeat if no response in 3 minutes.       nicotine (NICOTROL) 10 MG inhaler Inhale 10 mg into the lungs        ORDER FOR DME Equipment being ordered: Walker Wheels () and Walker ()  Treatment Diagnosis: Gait instability 1 each 0     oxyCODONE IR (ROXICODONE) 15 MG tablet Take 1 tablet (15 mg) by mouth 6 times daily 84 tablet 0     polyethylene glycol (MIRALAX) 17 GM/Dose powder Take 17 g by mouth 2 times daily       QUEtiapine (SEROQUEL) 300 MG tablet Take 600 mg by mouth At Bedtime       senna-docusate (SENOKOT-S/PERICOLACE) 8.6-50 MG tablet Take 2 tablets by mouth 2 times daily       zonisamide (ZONEGRAN) 25 MG capsule Take 1 capsule (25 mg) by mouth 2 times daily 60 capsule 1     ergocalciferol (ERGOCALCIFEROL) 1.25 MG (13182 UT) capsule Take 50,000 Units by mouth once a week       HYDROmorphone (DILAUDID) 2 MG tablet        lidocaine (XYLOCAINE) 2 % external gel Apply 1 inch topically 4 times a day as needed to gums.       memantine (NAMENDA) 10 MG tablet Take 10 mg by mouth 2 times daily       metoprolol tartrate (LOPRESSOR) 25 MG tablet        mirtazapine (REMERON SOL-TAB) 15 MG disintegrating tablet 1 tablet (15 mg) by Orally disintegrating tablet route At Bedtime 30 tablet 0     phenytoin (DILANTIN) 100 MG capsule        Leodan Strange MD  Internal Medicine  [facility]

## 2021-09-07 ENCOUNTER — TELEPHONE (OUTPATIENT)
Dept: INTERNAL MEDICINE | Facility: CLINIC | Age: 51
End: 2021-09-07

## 2021-09-08 ENCOUNTER — TELEPHONE (OUTPATIENT)
Dept: INTERNAL MEDICINE | Facility: CLINIC | Age: 51
End: 2021-09-08

## 2021-09-08 DIAGNOSIS — J01.01 ACUTE RECURRENT MAXILLARY SINUSITIS: ICD-10-CM

## 2021-09-08 DIAGNOSIS — K05.00 GINGIVITIS, ACUTE: ICD-10-CM

## 2021-09-08 RX ORDER — LEVOFLOXACIN 750 MG/1
750 TABLET, FILM COATED ORAL DAILY
Qty: 14 TABLET | Refills: 0 | Status: SHIPPED | OUTPATIENT
Start: 2021-09-08 | End: 2021-09-24

## 2021-09-08 NOTE — TELEPHONE ENCOUNTER
Reason for Call:  Medication or medication refill:    Do you use a [facility]?  Name of the pharmacy and phone number for the current request:  [facility]    Name of the medication requested: pt advised he is supposed to take another round of this med per Dr Lund.  He last saw him on 8/25      levofloxacin (LEVAQUIN) 750 MG tablet 14 tablet 0 8/25/2021 9/8/2021 --   Sig - Route: Take 1 tablet (750 mg) by mouth daily for 14 days - Oral   Sent to pharmacy as: levoFLOXacin 750 MG Oral Tablet (LEVAQUIN)         Other request: yesterday was last day of med.    Can we leave a detailed message on this number? YES    Phone number patient can be reached at: Home number on file [phone] (home)    Best Time: any    Call taken on 9/8/2021 at 3:56 PM by Pam J. Behr

## 2021-09-09 DIAGNOSIS — M48.02 FORAMINAL STENOSIS OF CERVICAL REGION: ICD-10-CM

## 2021-09-09 RX ORDER — OXYCODONE HYDROCHLORIDE 15 MG/1
15 TABLET ORAL
Qty: 84 TABLET | Refills: 0 | Status: SHIPPED | OUTPATIENT
Start: 2021-09-09 | End: 2021-09-17

## 2021-09-09 NOTE — TELEPHONE ENCOUNTER
Refill request: oxycodone 15 mg  Last apt with BE:8/6/21  Next apt with BE: 9/17/21  UDT/CSA = 1/12/2021     Pending Prescriptions:                       Disp   Refills    oxyCODONE IR (ROXICODONE) 15 MG tablet    84 tab*0            Sig: Take 1 tablet (15 mg) by mouth 6 times daily    [facility]

## 2021-09-16 ENCOUNTER — TELEPHONE (OUTPATIENT)
Dept: PALLIATIVE MEDICINE | Facility: OTHER | Age: 51
End: 2021-09-16
Payer: COMMERCIAL

## 2021-09-16 DIAGNOSIS — I10 ESSENTIAL HYPERTENSION: Primary | ICD-10-CM

## 2021-09-16 NOTE — TELEPHONE ENCOUNTER
Reason for Call:  Other call back    Detailed comments: Pt checking in -  Started a BP medication Metoprolol 50mg 1 tab daily  Pt stating some improvement -  Averaged out to:  144/94    Please advise    Phone Number Patient can be reached at: Cell number on file:    Telephone Information:   Mobile [phone]       Best Time: anytime    Can we leave a detailed message on this number? YES    Call taken on 9/16/2021 at 2:15 PM by Helen Bowen

## 2021-09-16 NOTE — TELEPHONE ENCOUNTER
Patient calls, offers a status update:  Increased back pain, upcoming surgery, he is icing the area.  Patient would like to change tommorrows apt to a virtual apt as he reports he is unable to sit for long periods of time both during travel getting here as well as while here for a visit.  Last apt with BE: 8/6/21 this was in clinic(in-person)    Please advise if ok to allow changing to virtual visit

## 2021-09-17 ENCOUNTER — VIRTUAL VISIT (OUTPATIENT)
Dept: PALLIATIVE MEDICINE | Facility: OTHER | Age: 51
End: 2021-09-17
Payer: COMMERCIAL

## 2021-09-17 ENCOUNTER — TELEPHONE (OUTPATIENT)
Dept: PALLIATIVE MEDICINE | Facility: OTHER | Age: 51
End: 2021-09-17

## 2021-09-17 VITALS — BODY MASS INDEX: 32.23 KG/M2 | HEIGHT: 68 IN

## 2021-09-17 DIAGNOSIS — G95.9 CERVICAL MYELOPATHY WITH CERVICAL RADICULOPATHY (H): Primary | ICD-10-CM

## 2021-09-17 DIAGNOSIS — M48.02 FORAMINAL STENOSIS OF CERVICAL REGION: ICD-10-CM

## 2021-09-17 DIAGNOSIS — M54.12 CERVICAL MYELOPATHY WITH CERVICAL RADICULOPATHY (H): Primary | ICD-10-CM

## 2021-09-17 PROCEDURE — 99213 OFFICE O/P EST LOW 20 MIN: CPT | Mod: 95 | Performed by: ANESTHESIOLOGY

## 2021-09-17 RX ORDER — OXYCODONE HYDROCHLORIDE 15 MG/1
TABLET ORAL
Qty: 112 TABLET | Refills: 0 | Status: SHIPPED | OUTPATIENT
Start: 2021-09-17 | End: 2021-09-17

## 2021-09-17 RX ORDER — QUETIAPINE FUMARATE 50 MG/1
100 TABLET, FILM COATED ORAL 4 TIMES DAILY PRN
COMMUNITY
End: 2022-10-24

## 2021-09-17 RX ORDER — OXYCODONE HYDROCHLORIDE 15 MG/1
TABLET ORAL
Qty: 112 TABLET | Refills: 0 | Status: SHIPPED | OUTPATIENT
Start: 2021-09-17 | End: 2021-09-30

## 2021-09-17 ASSESSMENT — PAIN SCALES - GENERAL: PAINLEVEL: EXTREME PAIN (9)

## 2021-09-17 NOTE — LETTER
"    9/17/2021         RE: Bola Lyon Jr.  [address]  [place] [address]        Dear Colleague,    Thank you for referring your patient, Bola Lyon Jr., to the [facility]. Please see a copy of my visit note below.    Jan is a 51 year old who is being evaluated via a billable video visit.      How would you like to obtain your AVS? Mail a copy  If the video visit is dropped, the invitation should be resent by: Text to cell phone: [phone]  Will anyone else be joining your video visit? No  Patient is here for a follow up appointment with Dr. Dmitriy Cramer.   Pain score: 9  Constant or intermittent: constant and intermittent  What does your pain feel like: \"feels like getting hit in the face constantly\", structural pain and stingers  Does the pain interfere with:   Work: yes  Walking/distance: yes  Sleep: yes  Daily activities: yes  Relationships/social life: yes  Mood: yes  F= 8     Mary Jo Irvin LPN    Jan is a 51 year old who is being evaluated via a billable video visit.      How would you like to obtain your AVS?   If the video visit is dropped, the invitation should be resent by:   Will anyone else be joining your video visit?       Video Start Time:         Subjective   Jan is a 51 year old who presents for the following health issues   Most recently for lumbar surgeries and revisions.  HPI     She reports he cannot come in for a face-to-face visit as he has had flareup with his pain, is really been out of bed.    Reviews he can sit more than 10 minutes.  His surgery which had been expected last few months, learned that there were problems with help with surgery was classified as being urgent rather than routine.  Thus it has not been scheduled.  He meets again on 9/27 to review that process.    Over the last 3 to 4 weeks he has been able to walk very little, cannot walk without his walker.  He is up 3 times a night with pain and icing.  He can sit more than 10 " minutes.    He had gone to the emergency room concerned his blood pressure was elevated in the 160s over 105 related to pain.    Reviews having PTSD related to pain and being confined and this flared things up, he did share with the psychiatrist was placed on Seroquel 50 mg 3 times a day.    He has not had a fall, but describes when looking into some of his storage bins when he twisted also flared up pain.    When we last spoke did change Zyprexa and Topamax to zonisamide.  He is unclear how well that is doing now with increased pain.  However he does not feel he is having a delirium, reviews have been having toxicity in his liver.    He continues with oxycodone 15 mg immediate release every 4 hours.  Describes each dose really only lasts about 2 hours and wears off quickly.  Then he is struggling with pain side effect such as constipation.     reviewed, as expected last urine drug test 6/5 as expected.      Current Outpatient Medications:      acetaminophen (TYLENOL) 650 MG CR tablet, Take 1,950 mg by mouth 3 times daily, Disp: , Rfl:      albuterol (PROAIR HFA/PROVENTIL HFA/VENTOLIN HFA) 108 (90 Base) MCG/ACT inhaler, Inhale 2 puffs into the lungs every 6 hours as needed, Disp: 6.7 g, Rfl: 11     ammonium lactate (LAC-HYDRIN) 12 % external lotion, , Disp: , Rfl:      amphetamine-dextroamphetamine (ADDERALL) 15 MG tablet, Take 1 tablet (15 mg) by mouth daily, Disp: 90 tablet, Rfl: 0     Baclofen (LIORESAL) 5 MG tablet, Take 5 mg by mouth 3 times daily, Disp: , Rfl:      chlorhexidine (PERIDEX) 0.12 % solution, Swish and spit 15 mLs in mouth 3 times daily as needed (sore throat), Disp: 473 mL, Rfl: 3     cyanocobalamin (CYANOCOBALAMIN) 1000 MCG/ML injection, Inject 1,000 mcg into the muscle, Disp: , Rfl:      ergocalciferol (ERGOCALCIFEROL) 1.25 MG (12490 UT) capsule, Take 50,000 Units by mouth once a week, Disp: , Rfl:      fexofenadine (ALLEGRA) 180 MG tablet, Take 180 mg by mouth daily as needed, Disp: , Rfl:       Glycerin, Laxative, (GLYCERIN, ADULT,) 2 g SUPP, Use as directed for episodes of constipation lasting longer than 3 days, Disp: , Rfl:      lamoTRIgine (LAMICTAL) 150 MG tablet, Take 150 mg by mouth 3 times daily, Disp: , Rfl:      LANsoprazole (PREVACID) 30 MG DR capsule, Take 30 mg by mouth daily, Disp: , Rfl:      levofloxacin (LEVAQUIN) 750 MG tablet, Take 1 tablet (750 mg) by mouth daily, Disp: 14 tablet, Rfl: 0     lidocaine (LIDODERM) 5 % patch, Apply 1 patch topically to painful area of skin once daily and remove per schedule., Disp: , Rfl:      lidocaine (TOPICAINE 5) 5 % anorectal gel, Apply 1 inch topically, Disp: , Rfl:      lidocaine (XYLOCAINE) 2 % external gel, Apply 1 inch topically 4 times a day as needed to gums., Disp: , Rfl:      memantine (NAMENDA) 10 MG tablet, Take 10 mg by mouth 2 times daily, Disp: , Rfl:      methocarbamol (ROBAXIN) 500 MG tablet, Take 500 mg by mouth 3 times daily, Disp: , Rfl:      metoprolol succinate ER (TOPROL-XL) 50 MG 24 hr tablet, Take 1 tablet (50 mg) by mouth daily, Disp: 90 tablet, Rfl: 3     metoprolol tartrate (LOPRESSOR) 25 MG tablet, , Disp: , Rfl:      mirtazapine (REMERON SOL-TAB) 15 MG disintegrating tablet, 1 tablet (15 mg) by Orally disintegrating tablet route At Bedtime, Disp: 30 tablet, Rfl: 0     montelukast (SINGULAIR) 10 MG tablet, Take 1 tablet by mouth daily, Disp: , Rfl:      naloxone (NARCAN) 4 MG/0.1ML nasal spray, 1 spray (4 mg dose) into one nostril for opioid reversal. Call 911. May repeat if no response in 3 minutes., Disp: , Rfl:      nicotine (NICOTROL) 10 MG inhaler, Inhale 10 mg into the lungs, Disp: , Rfl:      ORDER FOR DME, Equipment being ordered: Walker Wheels () and Walker () Treatment Diagnosis: Gait instability, Disp: 1 each, Rfl: 0     phenytoin (DILANTIN) 100 MG capsule, , Disp: , Rfl:      polyethylene glycol (MIRALAX) 17 GM/Dose powder, Take 17 g by mouth 2 times daily, Disp: , Rfl:      QUEtiapine (SEROQUEL)  300 MG tablet, Take 600 mg by mouth At Bedtime, Disp: , Rfl:      QUEtiapine (SEROQUEL) 50 MG tablet, Take 50 mg by mouth 3 times daily as needed, Disp: , Rfl:      senna-docusate (SENOKOT-S/PERICOLACE) 8.6-50 MG tablet, Take 2 tablets by mouth 2 times daily, Disp: , Rfl:      zonisamide (ZONEGRAN) 25 MG capsule, Take 1 capsule (25 mg) by mouth 2 times daily, Disp: 60 capsule, Rfl: 1     medical cannabis (Patient's own supply), , Disp: 0 Information only, Rfl:      oxyCODONE IR (ROXICODONE) 15 MG tablet, One tablet every three hours 8 am, 11 am, 2pm, 5 pm, 8 pm, 11 pm, 2 am, 5 am, Disp: 112 tablet, Rfl: 0    By video alert, clear sensorium.  Thought process logical.  Affect is directed.  No evidence of respiratory suppression.    Review of Systems         Objective           Vitals:  No vitals were obtained today due to virtual visit.    Physical Exam     Assessment: Patient has been anticipating revision for his lumbar surgery, describes increase blood pressure related to the pain.    Has been concern for episodes of encephalopathy when aggressive with medications including psychotropic medications and opioids.    Plan: We will change the oxycodone to 15 mg every 3 hours presently, until he can be assessed and have his surgery.    Total time more than 20 minutes            Video-Visit Details    Type of service:  Video Visit    Video End Time:    Originating Location (pt. Location): home    Distant Location (provider location):  [facility]     Platform used for Video Visit: video      Again, thank you for allowing me to participate in the care of your patient.        Sincerely,        JAYME MULLEN MD

## 2021-09-17 NOTE — PROGRESS NOTES
"Jan is a 51 year old who is being evaluated via a billable video visit.      How would you like to obtain your AVS? Mail a copy  If the video visit is dropped, the invitation should be resent by: Text to cell phone: [phone]  Will anyone else be joining your video visit? No  Patient is here for a follow up appointment with Dr. Dmitriy Cramer.   Pain score: 9  Constant or intermittent: constant and intermittent  What does your pain feel like: \"feels like getting hit in the face constantly\", structural pain and stingers  Does the pain interfere with:   Work: yes  Walking/distance: yes  Sleep: yes  Daily activities: yes  Relationships/social life: yes  Mood: yes  F= 8     Mary Jo Irvin LPN  "

## 2021-09-17 NOTE — TELEPHONE ENCOUNTER
Patient calls, he would like to ensure that the entire care team is aware that his plan of care changed for a specific medication:  Oxycodone 15 mg changed to read 1 tab every three hours at scheduled times as directed on the current prescription.  In the even that the facility calls, patient would like to ensure that these new orders are clear to whoever may take the phone call.

## 2021-09-18 NOTE — PROGRESS NOTES
Jan is a 51 year old who is being evaluated via a billable video visit.      How would you like to obtain your AVS?   If the video visit is dropped, the invitation should be resent by:   Will anyone else be joining your video visit?       Video Start Time:         Subjective   Jan is a 51 year old who presents for the following health issues   Most recently for lumbar surgeries and revisions.  HPI     She reports he cannot come in for a face-to-face visit as he has had flareup with his pain, is really been out of bed.    Reviews he can sit more than 10 minutes.  His surgery which had been expected last few months, learned that there were problems with help with surgery was classified as being urgent rather than routine.  Thus it has not been scheduled.  He meets again on 9/27 to review that process.    Over the last 3 to 4 weeks he has been able to walk very little, cannot walk without his walker.  He is up 3 times a night with pain and icing.  He can sit more than 10 minutes.    He had gone to the emergency room concerned his blood pressure was elevated in the 160s over 105 related to pain.    Reviews having PTSD related to pain and being confined and this flared things up, he did share with the psychiatrist was placed on Seroquel 50 mg 3 times a day.    He has not had a fall, but describes when looking into some of his storage bins when he twisted also flared up pain.    When we last spoke did change Zyprexa and Topamax to zonisamide.  He is unclear how well that is doing now with increased pain.  However he does not feel he is having a delirium, reviews have been having toxicity in his liver.    He continues with oxycodone 15 mg immediate release every 4 hours.  Describes each dose really only lasts about 2 hours and wears off quickly.  Then he is struggling with pain side effect such as constipation.     reviewed, as expected last urine drug test 6/5 as expected.      Current Outpatient Medications:       acetaminophen (TYLENOL) 650 MG CR tablet, Take 1,950 mg by mouth 3 times daily, Disp: , Rfl:      albuterol (PROAIR HFA/PROVENTIL HFA/VENTOLIN HFA) 108 (90 Base) MCG/ACT inhaler, Inhale 2 puffs into the lungs every 6 hours as needed, Disp: 6.7 g, Rfl: 11     ammonium lactate (LAC-HYDRIN) 12 % external lotion, , Disp: , Rfl:      amphetamine-dextroamphetamine (ADDERALL) 15 MG tablet, Take 1 tablet (15 mg) by mouth daily, Disp: 90 tablet, Rfl: 0     Baclofen (LIORESAL) 5 MG tablet, Take 5 mg by mouth 3 times daily, Disp: , Rfl:      chlorhexidine (PERIDEX) 0.12 % solution, Swish and spit 15 mLs in mouth 3 times daily as needed (sore throat), Disp: 473 mL, Rfl: 3     cyanocobalamin (CYANOCOBALAMIN) 1000 MCG/ML injection, Inject 1,000 mcg into the muscle, Disp: , Rfl:      ergocalciferol (ERGOCALCIFEROL) 1.25 MG (21155 UT) capsule, Take 50,000 Units by mouth once a week, Disp: , Rfl:      fexofenadine (ALLEGRA) 180 MG tablet, Take 180 mg by mouth daily as needed, Disp: , Rfl:      Glycerin, Laxative, (GLYCERIN, ADULT,) 2 g SUPP, Use as directed for episodes of constipation lasting longer than 3 days, Disp: , Rfl:      lamoTRIgine (LAMICTAL) 150 MG tablet, Take 150 mg by mouth 3 times daily, Disp: , Rfl:      LANsoprazole (PREVACID) 30 MG DR capsule, Take 30 mg by mouth daily, Disp: , Rfl:      levofloxacin (LEVAQUIN) 750 MG tablet, Take 1 tablet (750 mg) by mouth daily, Disp: 14 tablet, Rfl: 0     lidocaine (LIDODERM) 5 % patch, Apply 1 patch topically to painful area of skin once daily and remove per schedule., Disp: , Rfl:      lidocaine (TOPICAINE 5) 5 % anorectal gel, Apply 1 inch topically, Disp: , Rfl:      lidocaine (XYLOCAINE) 2 % external gel, Apply 1 inch topically 4 times a day as needed to gums., Disp: , Rfl:      memantine (NAMENDA) 10 MG tablet, Take 10 mg by mouth 2 times daily, Disp: , Rfl:      methocarbamol (ROBAXIN) 500 MG tablet, Take 500 mg by mouth 3 times daily, Disp: , Rfl:      metoprolol  succinate ER (TOPROL-XL) 50 MG 24 hr tablet, Take 1 tablet (50 mg) by mouth daily, Disp: 90 tablet, Rfl: 3     metoprolol tartrate (LOPRESSOR) 25 MG tablet, , Disp: , Rfl:      mirtazapine (REMERON SOL-TAB) 15 MG disintegrating tablet, 1 tablet (15 mg) by Orally disintegrating tablet route At Bedtime, Disp: 30 tablet, Rfl: 0     montelukast (SINGULAIR) 10 MG tablet, Take 1 tablet by mouth daily, Disp: , Rfl:      naloxone (NARCAN) 4 MG/0.1ML nasal spray, 1 spray (4 mg dose) into one nostril for opioid reversal. Call 911. May repeat if no response in 3 minutes., Disp: , Rfl:      nicotine (NICOTROL) 10 MG inhaler, Inhale 10 mg into the lungs, Disp: , Rfl:      ORDER FOR DME, Equipment being ordered: Walker Wheels () and Walker () Treatment Diagnosis: Gait instability, Disp: 1 each, Rfl: 0     phenytoin (DILANTIN) 100 MG capsule, , Disp: , Rfl:      polyethylene glycol (MIRALAX) 17 GM/Dose powder, Take 17 g by mouth 2 times daily, Disp: , Rfl:      QUEtiapine (SEROQUEL) 300 MG tablet, Take 600 mg by mouth At Bedtime, Disp: , Rfl:      QUEtiapine (SEROQUEL) 50 MG tablet, Take 50 mg by mouth 3 times daily as needed, Disp: , Rfl:      senna-docusate (SENOKOT-S/PERICOLACE) 8.6-50 MG tablet, Take 2 tablets by mouth 2 times daily, Disp: , Rfl:      zonisamide (ZONEGRAN) 25 MG capsule, Take 1 capsule (25 mg) by mouth 2 times daily, Disp: 60 capsule, Rfl: 1     medical cannabis (Patient's own supply), , Disp: 0 Information only, Rfl:      oxyCODONE IR (ROXICODONE) 15 MG tablet, One tablet every three hours 8 am, 11 am, 2pm, 5 pm, 8 pm, 11 pm, 2 am, 5 am, Disp: 112 tablet, Rfl: 0    By video alert, clear sensorium.  Thought process logical.  Affect is directed.  No evidence of respiratory suppression.    Review of Systems         Objective           Vitals:  No vitals were obtained today due to virtual visit.    Physical Exam     Assessment: Patient has been anticipating revision for his lumbar surgery, describes  increase blood pressure related to the pain.    Has been concern for episodes of encephalopathy when aggressive with medications including psychotropic medications and opioids.    Plan: We will change the oxycodone to 15 mg every 3 hours presently, until he can be assessed and have his surgery.    Total time more than 20 minutes            Video-Visit Details    Type of service:  Video Visit    Video End Time:    Originating Location (pt. Location): home    Distant Location (provider location):  [facility]     Platform used for Video Visit: video

## 2021-09-18 NOTE — PATIENT INSTRUCTIONS
PLAN:  We will change the oxycodone to 15 mg every 3 hours until you have your surgery.  The surgical team will manage your pain through the postoperative period of recovery        Follow-up Dr. Cramer in 8 weeks

## 2021-09-20 RX ORDER — TRIAMTERENE/HYDROCHLOROTHIAZID 37.5-25 MG
1 TABLET ORAL DAILY
Qty: 30 TABLET | Refills: 11 | Status: SHIPPED | OUTPATIENT
Start: 2021-09-20 | End: 2021-10-25

## 2021-09-20 NOTE — TELEPHONE ENCOUNTER
Prior Authorization Approval    Authorization Effective Date: 6/22/2021  Authorization Expiration Date: 9/20/2022  Medication: oxyCODONE HCl 15MG tablets  Approved Dose/Quantity:   Reference #:     Insurance Company: [employer] - Phone [phone] Fax [phone]  Expected CoPay:       CoPay Card Available:      Foundation Assistance Needed:    Which Pharmacy is filling the prescription (Not needed for infusion/clinic administered): [facility] - [place], [place] - [address]  Pharmacy Notified: Yes  Patient Notified: Yes

## 2021-09-20 NOTE — TELEPHONE ENCOUNTER
I recommend adding triamterene HCTZ once daily    Prescription sent    Please notify the appropriate people

## 2021-09-20 NOTE — TELEPHONE ENCOUNTER
Central Prior Authorization Team   Phone: [phone]    PA Initiation    Medication: oxyCODONE HCl 15MG tablets  Insurance Company: [employer] - Phone [phone] Fax [phone]  Pharmacy Filling the Rx: [facility] - [place], [place] - [address]  Filling Pharmacy Phone: [phone]  Filling Pharmacy Fax:    Start Date: 9/20/2021

## 2021-09-20 NOTE — TELEPHONE ENCOUNTER
If pulse averaging above 80, should increase metoprolol to 100 mgs daily    If averaging below 80, add triamt/hctz 37.5/25 daily

## 2021-09-20 NOTE — TELEPHONE ENCOUNTER
Pt is calling back and he is stating that his pulse is averaging between 78-83.    If a new scripts is sent in then the new med also needs to go to the head nurse of med change.  Just the copy of the script please    [place]  [phone]

## 2021-09-21 NOTE — TELEPHONE ENCOUNTER
Contacted Patient and relayed message below.  Faxed new Rx for Triamterene HCTZ to [facility] @ [phone].

## 2021-09-23 DIAGNOSIS — F31.81 BIPOLAR 2 DISORDER (H): ICD-10-CM

## 2021-09-23 DIAGNOSIS — S06.9X9S TRAUMATIC BRAIN INJURY WITH LOSS OF CONSCIOUSNESS, SEQUELA (H): ICD-10-CM

## 2021-09-23 NOTE — TELEPHONE ENCOUNTER
Medication: Aderall  Last Date Filled 8/25/21   pulled: PROVIDER TO PULL FROM Epic.     Only PCP Prescribing? PROVIDER TO PULL  FROM Epic.  Taken as prescribed from physician notes? YES    CSA in last year: YES  Random Utox in last year: YES  (if any of the above answer NO - schedule with PCP)     Opioids + benzodiazepines? NO  (if the above answer YES - schedule with PCP every 6 months)     Is patient on the Executive Review Team? no      All responses suggest: Refilling prescription

## 2021-09-23 NOTE — TELEPHONE ENCOUNTER
Reason for Call:  Medication or medication refill:Do you use a [facility]?      Name of the pharmacy and phone number for the current request:    [facility]      Name of the medication requested:   Adderall - pt will be out of this medication 09/25/2021      Other request: n/a    Can we leave a detailed message on this number? YES    Phone number patient can be reached at: Cell number on file:    Telephone Information:   Mobile [phone]       Best Time: anytime    Call taken on 9/23/2021 at 2:12 PM by Helen Bowen

## 2021-09-24 ENCOUNTER — TELEPHONE (OUTPATIENT)
Dept: INTERNAL MEDICINE | Facility: CLINIC | Age: 51
End: 2021-09-24

## 2021-09-24 DIAGNOSIS — S06.9X9S TRAUMATIC BRAIN INJURY WITH LOSS OF CONSCIOUSNESS, SEQUELA (H): ICD-10-CM

## 2021-09-24 DIAGNOSIS — J01.01 ACUTE RECURRENT MAXILLARY SINUSITIS: ICD-10-CM

## 2021-09-24 DIAGNOSIS — F31.81 BIPOLAR 2 DISORDER (H): ICD-10-CM

## 2021-09-24 DIAGNOSIS — K05.00 GINGIVITIS, ACUTE: ICD-10-CM

## 2021-09-24 RX ORDER — LEVOFLOXACIN 750 MG/1
750 TABLET, FILM COATED ORAL DAILY
Qty: 14 TABLET | Refills: 0 | Status: SHIPPED | OUTPATIENT
Start: 2021-09-24 | End: 2021-10-06

## 2021-09-24 RX ORDER — DEXTROAMPHETAMINE SACCHARATE, AMPHETAMINE ASPARTATE, DEXTROAMPHETAMINE SULFATE AND AMPHETAMINE SULFATE 3.75; 3.75; 3.75; 3.75 MG/1; MG/1; MG/1; MG/1
15 TABLET ORAL DAILY
Qty: 90 TABLET | Refills: 0 | Status: SHIPPED | OUTPATIENT
Start: 2021-09-24 | End: 2021-09-27

## 2021-09-24 NOTE — TELEPHONE ENCOUNTER
Spoke with pt to get more information.  Pt reports having upcoming lumbar spine surgery with Dr. Sylvester at [facility]. Reports surgery isn't scheduled yet, but he has having a pre op with his surgeon's office on Monday.  Pt reports he was told he couldn't have any dental work until post surgery.  Pt wanting refill of levofloxacin in the meantime.  Routing to pcp to advise

## 2021-09-24 NOTE — TELEPHONE ENCOUNTER
Reason for Call:  Medication or medication refill:    Do you use a [facility]?  Name of the pharmacy and phone number for the current request:    [facility]    Name of the medication requested:   Levofloxacin 750mg 1 tab daily - Pt is out of this medication    Other request: n/a    Can we leave a detailed message on this number? YES    Phone number patient can be reached at: Cell number on file:    Telephone Information:   Mobile [phone]       Best Time: anytime    Call taken on 9/24/2021 at 1:24 PM by Helen Bowen

## 2021-09-24 NOTE — TELEPHONE ENCOUNTER
Reason for Call:  Medication or medication refill:    Do you use a [facility]?  Name of the pharmacy and phone number for the current request:    [facility]    Name of the medication requested:   Adderall 15mg 1 tab daily #30  Last fill date:  08/25/2021      Other request: Because patient is in a Assisted Living he can only have a quanity of 30 Adderall at a time    Can we leave a detailed message on this number? YES    Phone number patient can be reached at: Other phone number:  [phone]    Best Time: anytime    Call taken on 9/24/2021 at 2:04 PM by Helen Bowen

## 2021-09-25 ENCOUNTER — HEALTH MAINTENANCE LETTER (OUTPATIENT)
Age: 51
End: 2021-09-25

## 2021-09-27 RX ORDER — DEXTROAMPHETAMINE SACCHARATE, AMPHETAMINE ASPARTATE, DEXTROAMPHETAMINE SULFATE AND AMPHETAMINE SULFATE 3.75; 3.75; 3.75; 3.75 MG/1; MG/1; MG/1; MG/1
15 TABLET ORAL DAILY
Qty: 30 TABLET | Refills: 0 | Status: SHIPPED | OUTPATIENT
Start: 2021-09-27 | End: 2021-10-25

## 2021-09-29 DIAGNOSIS — G89.4 CHRONIC PAIN DISORDER: ICD-10-CM

## 2021-09-29 DIAGNOSIS — M48.02 FORAMINAL STENOSIS OF CERVICAL REGION: ICD-10-CM

## 2021-09-29 NOTE — TELEPHONE ENCOUNTER
Medication being requested: zonisamide 25 mg  Last visit date: 9/17/2021 Provider: [facility]  Next visit date: no appt scheduled Provider: [facility]  Expected follow up: 8 weeks  MTM visit (Pain Center) date: N/A  Pertinent between visit information about requested medication (telephone, mychart, prior authorization, concerns): medication refill  Last date prescribed: 8/17/2021  Provider responsible: [facility]  Spoke with patient: No  Script being sent to provider - dates and quantity: Pending Prescriptions:                       Disp   Refills    zonisamide (ZONEGRAN) 25 MG capsule       60 cap*1            Sig: Take 1 capsule (25 mg) by mouth 2 times daily    Pharmacy cued: [facility]

## 2021-09-30 RX ORDER — ZONISAMIDE 25 MG/1
25 CAPSULE ORAL 2 TIMES DAILY
Qty: 60 CAPSULE | Refills: 1 | Status: SHIPPED | OUTPATIENT
Start: 2021-09-30 | End: 2021-11-11

## 2021-09-30 RX ORDER — OXYCODONE HYDROCHLORIDE 15 MG/1
TABLET ORAL
Qty: 112 TABLET | Refills: 0 | Status: SHIPPED | OUTPATIENT
Start: 2021-10-05 | End: 2021-10-13

## 2021-09-30 NOTE — TELEPHONE ENCOUNTER
Adding refill of oxycodone 15 mg  No changes noted in the chart since recent refill template  Last dispensed on 9/20/21, 14 days    Pending Prescriptions:                       Disp   Refills    zonisamide (ZONEGRAN) 25 MG capsule       60 cap*1            Sig: Take 1 capsule (25 mg) by mouth 2 times daily    oxyCODONE IR (ROXICODONE) 15 MG tablet    112 ta*0            Sig: One tablet every three hours 8 am, 11 am, 2pm, 5           pm, 8 pm, 11 pm, 2 am, 5 am    isak

## 2021-10-05 DIAGNOSIS — J01.01 ACUTE RECURRENT MAXILLARY SINUSITIS: ICD-10-CM

## 2021-10-05 DIAGNOSIS — K05.00 GINGIVITIS, ACUTE: ICD-10-CM

## 2021-10-05 NOTE — TELEPHONE ENCOUNTER
Reason for Call:  Medication or medication refill:    Do you use a [facility]?  Name of the pharmacy and phone number for the current request:    [facility]    Name of the medication requested:   Levofloxacin  Hydrochlorathiazide    Other request: n/a    Can we leave a detailed message on this number? YES    Phone number patient can be reached at: Cell number on file:    Telephone Information:   Mobile [phone]       Best Time: anytime    Call taken on 10/5/2021 at 4:14 PM by Helen Bowen

## 2021-10-06 RX ORDER — LEVOFLOXACIN 750 MG/1
750 TABLET, FILM COATED ORAL DAILY
Qty: 42 TABLET | Refills: 0 | Status: SHIPPED | OUTPATIENT
Start: 2021-10-06 | End: 2021-11-17

## 2021-10-06 NOTE — TELEPHONE ENCOUNTER
Writer spoke to Jan.  He states he has a periodontal infection that he will have surgery on in six weeks.  He states that he is suppose to take levofloxacin 750 mg daily until his surgery.    Dr. Lund's office note from 8/25/21 notes patient's mouth infection. Patient to be on levofloxacin fourteen days or until dental appointment.    Patient is asking that Dr. Lund order medication for another six weeks.    Patient corrected himself and does not need hydrochlorothiazide filled.

## 2021-10-11 ENCOUNTER — TELEPHONE (OUTPATIENT)
Dept: INTERNAL MEDICINE | Facility: CLINIC | Age: 51
End: 2021-10-11

## 2021-10-11 NOTE — TELEPHONE ENCOUNTER
Reason for Call:  Other call back    Detailed comments: Does PCP want to keep patient on the Levoquin (Gum infection)- if so will need a Refill sent to:  [facility]   Pt will be having spinal surgery in the future (FYI)    Please advise    Phone Number Patient can be reached at: Other phone number:  [phone]    Best Time: anytime    Can we leave a detailed message on this number? YES    Call taken on 10/11/2021 at 10:39 AM by Helen Bowen

## 2021-10-11 NOTE — TELEPHONE ENCOUNTER
Aleisha was unaware of the October 6, 2021 order.  Disregard her message.  Copy of the Levaquin order was faxed to her at [phone].

## 2021-10-13 DIAGNOSIS — M48.02 FORAMINAL STENOSIS OF CERVICAL REGION: ICD-10-CM

## 2021-10-13 RX ORDER — OXYCODONE HYDROCHLORIDE 15 MG/1
TABLET ORAL
Qty: 112 TABLET | Refills: 0 | Status: SHIPPED | OUTPATIENT
Start: 2021-10-19 | End: 2021-11-01

## 2021-10-13 NOTE — TELEPHONE ENCOUNTER
Received call from patient requesting refill oxycodone 15 mg    Last dispensed from pharmacy on 10/3/21    Patient's last office/virtual visit by prescribing provider on 9/17/21  Next office/virtual appointment scheduled for 11/11/21    UDT/CSA = 1/12/2021     Pending Prescriptions:                       Disp   Refills    oxyCODONE IR (ROXICODONE) 15 MG tablet    112 ta*0            Sig: One tablet every three hours 8 am, 11 am, 2pm, 5           pm, 8 pm, 11 pm, 2 am, 5 am    [facility]

## 2021-10-25 ENCOUNTER — VIRTUAL VISIT (OUTPATIENT)
Dept: INTERNAL MEDICINE | Facility: CLINIC | Age: 51
End: 2021-10-25
Payer: COMMERCIAL

## 2021-10-25 DIAGNOSIS — S06.9X9S TRAUMATIC BRAIN INJURY WITH LOSS OF CONSCIOUSNESS, SEQUELA (H): ICD-10-CM

## 2021-10-25 DIAGNOSIS — F31.81 BIPOLAR 2 DISORDER (H): ICD-10-CM

## 2021-10-25 DIAGNOSIS — R79.89 LOW VITAMIN D LEVEL: ICD-10-CM

## 2021-10-25 DIAGNOSIS — F17.209 TOBACCO USE DISORDER, CONTINUOUS: ICD-10-CM

## 2021-10-25 DIAGNOSIS — R60.0 LOCALIZED EDEMA: ICD-10-CM

## 2021-10-25 DIAGNOSIS — I10 ESSENTIAL HYPERTENSION: ICD-10-CM

## 2021-10-25 DIAGNOSIS — Z98.890 S/P LUMBAR LAMINECTOMY: ICD-10-CM

## 2021-10-25 DIAGNOSIS — I89.0 LYMPHEDEMA OF BOTH LOWER EXTREMITIES: ICD-10-CM

## 2021-10-25 DIAGNOSIS — F41.1 GENERALIZED ANXIETY DISORDER: ICD-10-CM

## 2021-10-25 DIAGNOSIS — M54.16 LUMBAR RADICULOPATHY: Primary | ICD-10-CM

## 2021-10-25 PROCEDURE — 99215 OFFICE O/P EST HI 40 MIN: CPT | Mod: 95 | Performed by: INTERNAL MEDICINE

## 2021-10-25 RX ORDER — ALLOPURINOL 300 MG/1
300 TABLET ORAL 2 TIMES DAILY
Qty: 180 TABLET | Refills: 3 | Status: SHIPPED | OUTPATIENT
Start: 2021-10-25 | End: 2022-10-25

## 2021-10-25 RX ORDER — TRIAMTERENE/HYDROCHLOROTHIAZID 37.5-25 MG
1 TABLET ORAL
Qty: 60 TABLET | Refills: 11 | Status: SHIPPED | OUTPATIENT
Start: 2021-10-25 | End: 2021-12-14

## 2021-10-25 RX ORDER — BUPROPION HYDROCHLORIDE 150 MG/1
150 TABLET, EXTENDED RELEASE ORAL 2 TIMES DAILY
Qty: 60 TABLET | Refills: 11 | Status: SHIPPED | OUTPATIENT
Start: 2021-10-25 | End: 2021-11-04

## 2021-10-25 RX ORDER — DEXTROAMPHETAMINE SACCHARATE, AMPHETAMINE ASPARTATE, DEXTROAMPHETAMINE SULFATE AND AMPHETAMINE SULFATE 3.75; 3.75; 3.75; 3.75 MG/1; MG/1; MG/1; MG/1
15 TABLET ORAL DAILY
Qty: 30 TABLET | Refills: 0 | Status: SHIPPED | OUTPATIENT
Start: 2021-10-25 | End: 2022-01-03

## 2021-10-25 RX ORDER — ERGOCALCIFEROL 1.25 MG/1
50000 CAPSULE ORAL WEEKLY
Qty: 12 CAPSULE | Refills: 3 | Status: SHIPPED | OUTPATIENT
Start: 2021-10-25 | End: 2024-09-13

## 2021-10-25 ASSESSMENT — ASTHMA QUESTIONNAIRES
QUESTION_2 LAST FOUR WEEKS HOW OFTEN HAVE YOU HAD SHORTNESS OF BREATH: NOT AT ALL
ACT_TOTALSCORE: 20
QUESTION_1 LAST FOUR WEEKS HOW MUCH OF THE TIME DID YOUR ASTHMA KEEP YOU FROM GETTING AS MUCH DONE AT WORK, SCHOOL OR AT HOME: A LITTLE OF THE TIME
QUESTION_4 LAST FOUR WEEKS HOW OFTEN HAVE YOU USED YOUR RESCUE INHALER OR NEBULIZER MEDICATION (SUCH AS ALBUTEROL): ONE OR TWO TIMES PER DAY
QUESTION_3 LAST FOUR WEEKS HOW OFTEN DID YOUR ASTHMA SYMPTOMS (WHEEZING, COUGHING, SHORTNESS OF BREATH, CHEST TIGHTNESS OR PAIN) WAKE YOU UP AT NIGHT OR EARLIER THAN USUAL IN THE MORNING: NOT AT ALL
QUESTION_5 LAST FOUR WEEKS HOW WOULD YOU RATE YOUR ASTHMA CONTROL: WELL CONTROLLED

## 2021-10-25 NOTE — PROGRESS NOTES
Bola is a 51 year old male contacting the clinic today via video, who will use the platform: [email or web address] for the visit.  Phone # for [employer], or if Amwell drops:   Telephone Information:   Mobile [phone]          ASSESSMENT:  DX: 1. Lumbar radiculopathy  Scheduled for surgery but must be nicotine free.  Add bupropion.  Check cardiac ultrasound due to recurrent retention  - buPROPion (WELLBUTRIN SR) 150 MG 12 hr tablet; Take 1 tablet (150 mg) by mouth 2 times daily  Dispense: 60 tablet; Refill: 11  - allopurinol (ZYLOPRIM) 300 MG tablet; Take 1 tablet (300 mg) by mouth 2 times daily  Dispense: 180 tablet; Refill: 3    2. Bipolar 2 disorder (H)  Quite agitated today.  Caution with resumption of bupropion  - amphetamine-dextroamphetamine (ADDERALL) 15 MG tablet; Take 1 tablet (15 mg) by mouth daily  Dispense: 30 tablet; Refill: 0  - buPROPion (WELLBUTRIN SR) 150 MG 12 hr tablet; Take 1 tablet (150 mg) by mouth 2 times daily  Dispense: 60 tablet; Refill: 11    3. Traumatic brain injury with loss of consciousness, sequela (H)  - amphetamine-dextroamphetamine (ADDERALL) 15 MG tablet; Take 1 tablet (15 mg) by mouth daily  Dispense: 30 tablet; Refill: 0  - buPROPion (WELLBUTRIN SR) 150 MG 12 hr tablet; Take 1 tablet (150 mg) by mouth 2 times daily  Dispense: 60 tablet; Refill: 11    4. Generalized anxiety disorder  Monitor carefully for possible side effects of bupropion    5. Low vitamin D level  - ergocalciferol (ERGOCALCIFEROL) 1.25 MG (82889 UT) capsule; Take 1 capsule (50,000 Units) by mouth once a week  Dispense: 12 capsule; Refill: 3    6. S/P lumbar laminectomy  Scheduled for repeat revision    7. Tobacco use disorder, continuous  Previous success with bupropion.  Previous side effects on Chantix    8. Lymphedema of both lower extremities  Recurrent.  Have resolved completely off gabapentin.  Zonisamide added -Question side effect.  Metoprolol added-question side effect of decreased heart rate.  Increase  intensity of thiazide erratic.  Check echocardiogram  - Echocardiogram Complete; Future    9. Essential hypertension  Effect with addition of thiazide diuretic last month  - triamterene-HCTZ (MAXZIDE-25) 37.5-25 MG tablet; Take 1 tablet by mouth 2 times daily  Dispense: 60 tablet; Refill: 11    10. Localized edema   Duplicate would requested for computer otherwise outlined above  - Echocardiogram Complete; Future       PLAN:  Patient Instructions   Initiate triamterene HCTZ-done September 16    Increase triamterene HCTZ to twice daily    Question possible side effect of zonisamide    Consider decreasing metoprolol that she refuses    Echocardiogram to evaluate left ventricular function due to easy fluid retention    Bupropion 150 mg twice daily    Resume allopurinol 300 mg twice daily-was not on list    Refill Adderall    Orders through nurse     Return in about 3 months (around 1/25/2022) for using a video visit.    CHIEF COMPLAINT:  Chief Complaint   Patient presents with     Medication Therapy Management         HISTORY OF PRESENT ILLNESS:  Bola is a 51 year old male contacting the clinic today via video for request for cleaning cessation.  He used to to [place] and now smokes a few cigarettes per day.  He needs to be nicotine free before he can undergo lumbar revision surgery.  Chantix has caused side effects.  He tolerated Wellbutrin many years ago without worsen anxiety or depression    Other was elevated last month requiring up to the emergency room.  He then saw a colleague who increased metoprolol added emergency room.  This failed to resolve blood pressure and I added triamterene HCTZ by phone.  This quickly improved his blood pressure however he reports increased edema of more than 10 pounds paradoxically.  Other medications started during this time include metoprolol and zonisamide.  Gabapentin had caused lymphedema refractory to other treatments several months ago.  He vociferously objects to adjustment  "of metoprolol and reports that he has damaged lymph vessels causing his swelling    Zonisamide initially helped nerve pain and now no longer does so    REVIEW OF SYSTEMS:   10 pound weight gain    PFSH:  Social History     Social History Narrative    He is .  He has been a  and also a counselor, and worked with [employer].  He smokes and does not drink alcohol.  He is now on disability due to his brain injury and bipolar disease.       TOBACCO USE:  History   Smoking Status     Current Some Day Smoker     Packs/day: 0.50     Years: 11.00     Types: Cigarettes, Cigarettes, Cigarettes     Start date: 8/20/2009     Last attempt to quit: 2/21/2017   Smokeless Tobacco     Former User     Comment: 0.5 pack per day       VITALS:  There were no vitals filed for this visit.  There were no vitals taken for this visit. Estimated body mass index is 32.23 kg/m  as calculated from the following:    Height as of 9/17/21: 1.727 m (5' 8\").    Weight as of 9/1/21: 96.2 kg (212 lb).    PHYSICAL EXAM:  (observations via Video)  Agitated.  Poor dentition    MEDICATIONS:   Current Outpatient Medications   Medication Sig Dispense Refill     acetaminophen (TYLENOL) 650 MG CR tablet Take 1,950 mg by mouth 3 times daily       albuterol (PROAIR HFA/PROVENTIL HFA/VENTOLIN HFA) 108 (90 Base) MCG/ACT inhaler Inhale 2 puffs into the lungs every 6 hours as needed 6.7 g 11     allopurinol (ZYLOPRIM) 300 MG tablet Take 1 tablet (300 mg) by mouth 2 times daily 180 tablet 3     ammonium lactate (LAC-HYDRIN) 12 % external lotion        amphetamine-dextroamphetamine (ADDERALL) 15 MG tablet Take 1 tablet (15 mg) by mouth daily 30 tablet 0     Baclofen (LIORESAL) 5 MG tablet Take 5 mg by mouth 3 times daily       buPROPion (WELLBUTRIN SR) 150 MG 12 hr tablet Take 1 tablet (150 mg) by mouth 2 times daily 60 tablet 11     chlorhexidine (PERIDEX) 0.12 % solution Swish and spit 15 mLs in mouth 3 times daily " as needed (sore throat) 473 mL 3     ergocalciferol (ERGOCALCIFEROL) 1.25 MG (47324 UT) capsule Take 1 capsule (50,000 Units) by mouth once a week 12 capsule 3     fexofenadine (ALLEGRA) 180 MG tablet Take 180 mg by mouth daily as needed       Glycerin, Laxative, (GLYCERIN, ADULT,) 2 g SUPP Use as directed for episodes of constipation lasting longer than 3 days       lamoTRIgine (LAMICTAL) 150 MG tablet Take 150 mg by mouth 3 times daily       LANsoprazole (PREVACID) 30 MG DR capsule Take 30 mg by mouth daily       levofloxacin (LEVAQUIN) 750 MG tablet Take 1 tablet (750 mg) by mouth daily 42 tablet 0     lidocaine (LIDODERM) 5 % patch Apply 1 patch topically to painful area of skin once daily and remove per schedule.       lidocaine (TOPICAINE 5) 5 % anorectal gel Apply 1 inch topically       lidocaine (XYLOCAINE) 2 % external gel Apply 1 inch topically 4 times a day as needed to gums.       medical cannabis (Patient's own supply)  0 Information only      memantine (NAMENDA) 10 MG tablet Take 10 mg by mouth 2 times daily       methocarbamol (ROBAXIN) 500 MG tablet Take 500 mg by mouth 3 times daily       metoprolol succinate ER (TOPROL-XL) 50 MG 24 hr tablet Take 1 tablet (50 mg) by mouth daily 90 tablet 3     montelukast (SINGULAIR) 10 MG tablet Take 1 tablet by mouth daily       naloxone (NARCAN) 4 MG/0.1ML nasal spray 1 spray (4 mg dose) into one nostril for opioid reversal. Call 911. May repeat if no response in 3 minutes.       nicotine (NICOTROL) 10 MG inhaler Inhale 10 mg into the lungs       oxyCODONE IR (ROXICODONE) 15 MG tablet One tablet every three hours 8 am, 11 am, 2pm, 5 pm, 8 pm, 11 pm, 2 am, 5 am 112 tablet 0     polyethylene glycol (MIRALAX) 17 GM/Dose powder Take 17 g by mouth 2 times daily       QUEtiapine (SEROQUEL) 300 MG tablet Take 600 mg by mouth At Bedtime       QUEtiapine (SEROQUEL) 50 MG tablet Take 100 mg by mouth 4 times daily as needed        senna-docusate (SENOKOT-S/PERICOLACE)  8.6-50 MG tablet Take 2 tablets by mouth 2 times daily       triamterene-HCTZ (MAXZIDE-25) 37.5-25 MG tablet Take 1 tablet by mouth 2 times daily 60 tablet 11     zonisamide (ZONEGRAN) 25 MG capsule Take 1 capsule (25 mg) by mouth 2 times daily 60 capsule 1       Outside Notes summarized: Pain clinic.  Emergency room  Labs, x-rays, cardiology, GI tests reviewed: Labs.  No echocardiogram by chart review  Recent Labs   Lab Test 12/12/19  1706   HGB 13.4*      POTASSIUM 4.8   CR 0.89     New orders:   Orders Placed This Encounter   Procedures     Echocardiogram Complete       Independent review of:    Supplemental history by:      Patient has given verbal consent to a Video visit?  Yes  How would you like to obtain your AVS?  MyChart  Will anyone else be joining your video visit? No       Video-Visit Details  Type of service:  Video Visit  Originating Location (pt. Location): Home  Distant Location (provider location):   [facility]    Devon Lund MD  [facility]    Video Start Time: 3:20 PM  Video End time:  4:03 PM  Face to face plus orders: 43 minutes  Documentation time:  3 minutes     The visit lasted a total of 44 minutes

## 2021-10-25 NOTE — LETTER
Re: Bola Lyon .   1970       To Whom it May Concern:    Attached it the most recent visit note and med list from 10/25/21. Please review and compare to what you have on file and adjust accordingly.    Please see patient instructions on page 2 of visit notes for recent medication changes/adjustments.      If any questions feel free to contact the office at [phone]          Electronically Signed by  Devon Lund MD

## 2021-10-25 NOTE — PATIENT INSTRUCTIONS
Initiate triamterene HCTZ-done September 16    Increase triamterene HCTZ to twice daily    Question possible side effect of zonisamide    Consider decreasing metoprolol that she refuses    Echocardiogram to evaluate left ventricular function due to easy fluid retention    Bupropion 150 mg twice daily    Resume allopurinol 300 mg twice daily-was not on list    Refill Adderall    Orders through nurse

## 2021-10-26 ENCOUNTER — TELEPHONE (OUTPATIENT)
Dept: INTERNAL MEDICINE | Facility: CLINIC | Age: 51
End: 2021-10-26

## 2021-10-26 DIAGNOSIS — M54.16 LUMBAR RADICULOPATHY: ICD-10-CM

## 2021-10-26 DIAGNOSIS — S06.9X9S TRAUMATIC BRAIN INJURY WITH LOSS OF CONSCIOUSNESS, SEQUELA (H): ICD-10-CM

## 2021-10-26 DIAGNOSIS — F31.81 BIPOLAR 2 DISORDER (H): ICD-10-CM

## 2021-10-26 DIAGNOSIS — I89.0 LYMPHEDEMA OF BOTH LOWER EXTREMITIES: Primary | ICD-10-CM

## 2021-10-26 RX ORDER — OXYCODONE HYDROCHLORIDE 15 MG/1
TABLET ORAL
Qty: 112 TABLET | Refills: 0 | Status: CANCELLED | OUTPATIENT
Start: 2021-10-26

## 2021-10-26 ASSESSMENT — ASTHMA QUESTIONNAIRES: ACT_TOTALSCORE: 20

## 2021-10-26 NOTE — TELEPHONE ENCOUNTER
"Reason for Call:  Other prescription    Detailed comments: pt has a couple of questions:    Called in and he was questioning his med change from his appt yesterday.  He was already on \"Resume allopurinol 300 mg twice daily-was not on list\".  He was told to report back in one week but he was already on this med and would have nothing to report back since he was already on the medication.  Does this need to be changed for him-increased more?    Please fax all med changes from appointment yesterday to [facility]- fax [phone] . Since they are dosing his meds and he can    Phone Number Patient can be reached at: Cell number on file:    Telephone Information:   Mobile [phone]       Best Time: any    Can we leave a detailed message on this number? YES    Call taken on 10/26/2021 at 4:03 PM by Pam J. Behr    "

## 2021-10-29 ENCOUNTER — TELEPHONE (OUTPATIENT)
Dept: INTERNAL MEDICINE | Facility: CLINIC | Age: 51
End: 2021-10-29

## 2021-10-29 ENCOUNTER — TELEPHONE (OUTPATIENT)
Dept: PALLIATIVE MEDICINE | Facility: OTHER | Age: 51
End: 2021-10-29
Payer: COMMERCIAL

## 2021-10-29 DIAGNOSIS — M48.02 FORAMINAL STENOSIS OF CERVICAL REGION: ICD-10-CM

## 2021-10-29 NOTE — TELEPHONE ENCOUNTER
Reason for Call:  Other prescription    Detailed comments: pt can not lay on back for more than 10 minutes.  He is scheduled for a ECHO on Monday and is hoping to get a med to be able to lay that long.    Fax order to Ale[facility] -charge nurse- fax [phone]     Pharm: [facility] -updated pharm    Phone Number Patient can be reached at: Home number on file [phone] (home)    Best Time: any    Can we leave a detailed message on this number? YES    Call taken on 10/29/2021 at 3:44 PM by Pam J. Behr

## 2021-10-29 NOTE — TELEPHONE ENCOUNTER
Why cannot he lie down?    If pain, he will need to contact the pain clinic to discuss what he can do    If anxiety or claustrophobia, I cannot add a sedative because of the narcotics he is already taking

## 2021-10-29 NOTE — TELEPHONE ENCOUNTER
I am forced to choose a follow-up from the list of choices.  In this case it is not relevant or accurate    The medicine list had to be corrected regarding the allopurinol which he was already on.  That note is for me    The important new medications were the addition of the bupropion and the increase in triamterene HCTZ    Those of the medications that I want follow-up on, are they helping with the edema and has he been able to decrease smoking?

## 2021-10-29 NOTE — TELEPHONE ENCOUNTER
AVS states to follow up in 3months. Pt states he was told to f/u in one week after resuming Allopurinol. He was already taking this twice daily.   Will forward this to PCP for clarification.

## 2021-11-01 ENCOUNTER — MEDICAL CORRESPONDENCE (OUTPATIENT)
Dept: HEALTH INFORMATION MANAGEMENT | Facility: CLINIC | Age: 51
End: 2021-11-01
Payer: COMMERCIAL

## 2021-11-01 RX ORDER — OXYCODONE HYDROCHLORIDE 15 MG/1
TABLET ORAL
Qty: 112 TABLET | Refills: 0 | Status: SHIPPED | OUTPATIENT
Start: 2021-11-01 | End: 2021-11-11

## 2021-11-01 NOTE — TELEPHONE ENCOUNTER
Pending Prescriptions:                       Disp   Refills    oxyCODONE IR (ROXICODONE) 15 MG tablet    112 ta*0            Sig: One tablet every three hours 8 am, 11 am, 2pm, 5           pm, 8 pm, 11 pm, 2 am, 5 am    [facility]

## 2021-11-01 NOTE — TELEPHONE ENCOUNTER
Encounter from 10/29/21:  Patient called on 10/21 for refill for oxyCODONE IR (ROXICODONE) 15 MG tablet and then to check status he called his pharmacy nothing was sent pt tried to call 10/25/, 10/28, and today for refill.  understand phone are down he did call 8 days ago for ref      By Kailyn Gusman        Patient called again today to advise his refill of oxyCODONE IR (ROXICODONE) 15 MG tablet is overdue. Patient stated he called 8 days before it was due to ask for a refill and has called several times since then.     Patient is very upset and would like a call back.     Routing to nursing to advise.     Roma Self, [facility]

## 2021-11-02 NOTE — TELEPHONE ENCOUNTER
Pt is calling back and he is having surg 3 level lumbar surgery.  His 3rd back surgery.  He cant lay that long.  [facility].    If no meds then he will not be able to get a ECHO since he cant lay for that long.

## 2021-11-02 NOTE — TELEPHONE ENCOUNTER
Left message for Bola  Message to relay: The important new medications were the addition of the bupropion and the increase in triamterene HCTZ     Those of the medications that I want follow-up on, are they helping with the edema and has he been able to decrease smoking?

## 2021-11-03 NOTE — TELEPHONE ENCOUNTER
Attempted to contact patient. Left voice mail requesting a call back to Clinic to relay information below.

## 2021-11-04 RX ORDER — FUROSEMIDE 40 MG
40 TABLET ORAL DAILY
Qty: 30 TABLET | Refills: 11 | Status: SHIPPED | OUTPATIENT
Start: 2021-11-04 | End: 2023-01-27

## 2021-11-04 RX ORDER — BUPROPION HYDROCHLORIDE 150 MG/1
150 TABLET, EXTENDED RELEASE ORAL
Qty: 90 TABLET | Refills: 11 | Status: SHIPPED | OUTPATIENT
Start: 2021-11-04 | End: 2021-11-17

## 2021-11-04 NOTE — TELEPHONE ENCOUNTER
Writer spoke to Jan.      #1)  Jan states that smoking cessation is improving but he is still having nicotine cravings.  He is asking for an increase of bupropion.    He will need to have his blood free of nicotine before he can have his surgery.    #2)  Jan states his left leg edema has not changed with use of Maxide.  What is next step?

## 2021-11-04 NOTE — TELEPHONE ENCOUNTER
Contacted patient and relayed message below. No further concerns or questions at this time.    New med orders faxed to [facility] at [phone]

## 2021-11-11 ENCOUNTER — VIRTUAL VISIT (OUTPATIENT)
Dept: PALLIATIVE MEDICINE | Facility: OTHER | Age: 51
End: 2021-11-11
Payer: COMMERCIAL

## 2021-11-11 ENCOUNTER — TELEPHONE (OUTPATIENT)
Dept: PALLIATIVE MEDICINE | Facility: OTHER | Age: 51
End: 2021-11-11

## 2021-11-11 DIAGNOSIS — M48.02 FORAMINAL STENOSIS OF CERVICAL REGION: ICD-10-CM

## 2021-11-11 DIAGNOSIS — G89.4 CHRONIC PAIN DISORDER: ICD-10-CM

## 2021-11-11 PROCEDURE — 99214 OFFICE O/P EST MOD 30 MIN: CPT | Mod: 95 | Performed by: ANESTHESIOLOGY

## 2021-11-11 RX ORDER — OXYCODONE HYDROCHLORIDE 15 MG/1
TABLET ORAL
Qty: 112 TABLET | Refills: 0 | Status: CANCELLED | OUTPATIENT
Start: 2021-11-11

## 2021-11-11 RX ORDER — OXYCODONE HYDROCHLORIDE 15 MG/1
TABLET ORAL
Qty: 112 TABLET | Refills: 0 | Status: SHIPPED | OUTPATIENT
Start: 2021-11-11 | End: 2021-11-22

## 2021-11-11 RX ORDER — ZONISAMIDE 25 MG/1
50 CAPSULE ORAL 3 TIMES DAILY
Qty: 90 CAPSULE | Refills: 1 | Status: SHIPPED | OUTPATIENT
Start: 2021-11-11 | End: 2021-12-17

## 2021-11-11 NOTE — TELEPHONE ENCOUNTER
Received call from patient requesting refill(s) of Oxycodone      Last dispensed from pharmacy on [facility]    Patient's last office/virtual visit by prescribing provider on 11/11/2021  Next office/virtual appointment scheduled for 12/17/2021    Last urine drug screen date 1/12/2021  Current opioid agreement on file (completed within the last year) Yes Date of opioid agreement: 01/12/2021    E-prescribe to [facility] pharmacy

## 2021-11-11 NOTE — LETTER
"    11/11/2021         RE: Bola Lyon Jr.  [address]  [place] [address]        Dear Colleague,    Thank you for referring your patient, Bola Lyon Jr., to the [facility]. Please see a copy of my visit note below.    Jan is a 51 year old who is being evaluated via a billable video visit.      How would you like to obtain your AVS?   If the video visit is dropped, the invitation should be resent by:   Will anyone else be joining your video visit?       Video Start Time:         Subjective   Jan is a 51 year old who presents for the following health issues     For recurrent back surgeries, osteoarthritis    HPI     Jan reviews he has been talking with Dr. Courtney his surgeon.  Was frustrated as the surgery was apparently initially entered as elective rather than more urgent so there are delays in scheduling.  He also notes he needs to be nicotine free for 30 days.  With Dr. Lund he has increased the Wellbutrin to 4 to 50 mg daily and with that he has no craving, has 10 more days to be nicotine free then can be scheduled.    Reviews he has had lymphatic problems with his left lower extremity from his past hernia surgery and leg surgery, he has been spending much more time laying down due to back pain leading to more swelling and is on diuretics.    Describes 8 times a day he has to lay down and be on ice packs for his back, ambulates with a walker.  Review significant pain understanding will have 3 levels of surgery addressed.  Describes it feels a \"power has shut off\" to his legs.    Describes being so limited is activated his PTSD in terms of pain, lack of mobility and confinement.    His psychiatrist did increase his Seroquel to 100 mg 4 times a day and 600 mg at bedtime.    We reviewed his parents are supportive and he talks to several roommates in football bodies there was hardly waiting.    He tries to exercise with [employer] and nonweightbearing as much as he " can.    Reviewed he has done martial arts in the past, which had meditative aspects and he recognizes he could resume that.    He continues on oxycodone 15 mg every 3 hours.  No problem constipation.   reviewed, as expected      Current Outpatient Medications:      oxyCODONE IR (ROXICODONE) 15 MG tablet, One tablet every three hours 8 am, 11 am, 2pm, 5 pm, 8 pm, 11 pm, 2 am, 5 am, Disp: 112 tablet, Rfl: 0     zonisamide (ZONEGRAN) 25 MG capsule, Take 2 capsules (50 mg) by mouth 3 times daily, Disp: 90 capsule, Rfl: 1     acetaminophen (TYLENOL) 650 MG CR tablet, Take 1,950 mg by mouth 3 times daily, Disp: , Rfl:      albuterol (PROAIR HFA/PROVENTIL HFA/VENTOLIN HFA) 108 (90 Base) MCG/ACT inhaler, Inhale 2 puffs into the lungs every 6 hours as needed, Disp: 6.7 g, Rfl: 11     allopurinol (ZYLOPRIM) 300 MG tablet, Take 1 tablet (300 mg) by mouth 2 times daily, Disp: 180 tablet, Rfl: 3     ammonium lactate (LAC-HYDRIN) 12 % external lotion, , Disp: , Rfl:      amphetamine-dextroamphetamine (ADDERALL) 15 MG tablet, Take 1 tablet (15 mg) by mouth daily, Disp: 30 tablet, Rfl: 0     Baclofen (LIORESAL) 5 MG tablet, Take 5 mg by mouth 3 times daily, Disp: , Rfl:      buPROPion (WELLBUTRIN SR) 150 MG 12 hr tablet, Take 1 tablet (150 mg) by mouth 3 times daily, Disp: 90 tablet, Rfl: 11     chlorhexidine (PERIDEX) 0.12 % solution, Swish and spit 15 mLs in mouth 3 times daily as needed (sore throat), Disp: 473 mL, Rfl: 3     ergocalciferol (ERGOCALCIFEROL) 1.25 MG (95327 UT) capsule, Take 1 capsule (50,000 Units) by mouth once a week, Disp: 12 capsule, Rfl: 3     fexofenadine (ALLEGRA) 180 MG tablet, Take 180 mg by mouth daily as needed, Disp: , Rfl:      furosemide (LASIX) 40 MG tablet, Take 1 tablet (40 mg) by mouth daily, Disp: 30 tablet, Rfl: 11     Glycerin, Laxative, (GLYCERIN, ADULT,) 2 g SUPP, Use as directed for episodes of constipation lasting longer than 3 days, Disp: , Rfl:      lamoTRIgine (LAMICTAL) 150 MG  tablet, Take 150 mg by mouth 3 times daily, Disp: , Rfl:      LANsoprazole (PREVACID) 30 MG DR capsule, Take 30 mg by mouth daily, Disp: , Rfl:      levofloxacin (LEVAQUIN) 750 MG tablet, Take 1 tablet (750 mg) by mouth daily, Disp: 42 tablet, Rfl: 0     lidocaine (LIDODERM) 5 % patch, Apply 1 patch topically to painful area of skin once daily and remove per schedule., Disp: , Rfl:      lidocaine (TOPICAINE 5) 5 % anorectal gel, Apply 1 inch topically, Disp: , Rfl:      lidocaine (XYLOCAINE) 2 % external gel, Apply 1 inch topically 4 times a day as needed to gums., Disp: , Rfl:      medical cannabis (Patient's own supply), , Disp: 0 Information only, Rfl:      memantine (NAMENDA) 10 MG tablet, Take 10 mg by mouth 2 times daily, Disp: , Rfl:      methocarbamol (ROBAXIN) 500 MG tablet, Take 500 mg by mouth 3 times daily, Disp: , Rfl:      metoprolol succinate ER (TOPROL-XL) 50 MG 24 hr tablet, Take 1 tablet (50 mg) by mouth daily, Disp: 90 tablet, Rfl: 3     montelukast (SINGULAIR) 10 MG tablet, Take 1 tablet by mouth daily, Disp: , Rfl:      naloxone (NARCAN) 4 MG/0.1ML nasal spray, 1 spray (4 mg dose) into one nostril for opioid reversal. Call 911. May repeat if no response in 3 minutes., Disp: , Rfl:      nicotine (NICOTROL) 10 MG inhaler, Inhale 10 mg into the lungs, Disp: , Rfl:      polyethylene glycol (MIRALAX) 17 GM/Dose powder, Take 17 g by mouth 2 times daily, Disp: , Rfl:      QUEtiapine (SEROQUEL) 300 MG tablet, Take 600 mg by mouth At Bedtime, Disp: , Rfl:      QUEtiapine (SEROQUEL) 50 MG tablet, Take 100 mg by mouth 4 times daily as needed , Disp: , Rfl:      senna-docusate (SENOKOT-S/PERICOLACE) 8.6-50 MG tablet, Take 2 tablets by mouth 2 times daily, Disp: , Rfl:      triamterene-HCTZ (MAXZIDE-25) 37.5-25 MG tablet, Take 1 tablet by mouth 2 times daily, Disp: 60 tablet, Rfl: 11    By video alert, clear sensorium.  Thought process logical.  Affect is constricted and anxious.  No respiratory distress  noted  Review of Systems         Objective           Vitals:  No vitals were obtained today due to virtual visit.    Physical Exam         Plan: We have been using zonisamide to help with anxiety, may work on the DANIEL receptor without respiratory suppression such as benzodiazepines.  We will advance the dose.  Note he has been sensitive to delirium from other medications this will need to be followed.    Total time more than 25 minutes          Video-Visit Details    Type of service:  Video Visit    Video End Time:    Originating Location (pt. Location): home  Distant Location (provider location):  [facility]     Platform used for Video Visit:       Again, thank you for allowing me to participate in the care of your patient.        Sincerely,        JAYME MULLEN MD

## 2021-11-11 NOTE — PATIENT INSTRUCTIONS
PLAN:  Increase zonisamide to 25 mg 2 tablets 3 times a day to see if this helps with anxiety and allow you to cope until your scheduled back surgery.    Continue the oxycodone 15 mg every 3 hours.    Reviewed you have had your Seroquel increased.    You will continue to do some of your martial arts and meditative practices.    Continue good work quitting smoking.    Follow-up with Dr. Cramer in 6 to 8 weeks

## 2021-11-11 NOTE — PROGRESS NOTES
"Jan is a 51 year old who is being evaluated via a billable video visit.      How would you like to obtain your AVS?   If the video visit is dropped, the invitation should be resent by:   Will anyone else be joining your video visit?       Video Start Time:         Subjective   Jan is a 51 year old who presents for the following health issues     For recurrent back surgeries, osteoarthritis    HPI     Jan reviews he has been talking with Dr. Courtney his surgeon.  Was frustrated as the surgery was apparently initially entered as elective rather than more urgent so there are delays in scheduling.  He also notes he needs to be nicotine free for 30 days.  With Dr. Lund he has increased the Wellbutrin to 4 to 50 mg daily and with that he has no craving, has 10 more days to be nicotine free then can be scheduled.    Reviews he has had lymphatic problems with his left lower extremity from his past hernia surgery and leg surgery, he has been spending much more time laying down due to back pain leading to more swelling and is on diuretics.    Describes 8 times a day he has to lay down and be on ice packs for his back, ambulates with a walker.  Review significant pain understanding will have 3 levels of surgery addressed.  Describes it feels a \"power has shut off\" to his legs.    Describes being so limited is activated his PTSD in terms of pain, lack of mobility and confinement.    His psychiatrist did increase his Seroquel to 100 mg 4 times a day and 600 mg at bedtime.    We reviewed his parents are supportive and he talks to several roommates in football bodies there was hardly waiting.    He tries to exercise with [employer] and nonweightbearing as much as he can.    Reviewed he has done martial arts in the past, which had meditative aspects and he recognizes he could resume that.    He continues on oxycodone 15 mg every 3 hours.  No problem constipation.   reviewed, as expected      Current Outpatient Medications: "      oxyCODONE IR (ROXICODONE) 15 MG tablet, One tablet every three hours 8 am, 11 am, 2pm, 5 pm, 8 pm, 11 pm, 2 am, 5 am, Disp: 112 tablet, Rfl: 0     zonisamide (ZONEGRAN) 25 MG capsule, Take 2 capsules (50 mg) by mouth 3 times daily, Disp: 90 capsule, Rfl: 1     acetaminophen (TYLENOL) 650 MG CR tablet, Take 1,950 mg by mouth 3 times daily, Disp: , Rfl:      albuterol (PROAIR HFA/PROVENTIL HFA/VENTOLIN HFA) 108 (90 Base) MCG/ACT inhaler, Inhale 2 puffs into the lungs every 6 hours as needed, Disp: 6.7 g, Rfl: 11     allopurinol (ZYLOPRIM) 300 MG tablet, Take 1 tablet (300 mg) by mouth 2 times daily, Disp: 180 tablet, Rfl: 3     ammonium lactate (LAC-HYDRIN) 12 % external lotion, , Disp: , Rfl:      amphetamine-dextroamphetamine (ADDERALL) 15 MG tablet, Take 1 tablet (15 mg) by mouth daily, Disp: 30 tablet, Rfl: 0     Baclofen (LIORESAL) 5 MG tablet, Take 5 mg by mouth 3 times daily, Disp: , Rfl:      buPROPion (WELLBUTRIN SR) 150 MG 12 hr tablet, Take 1 tablet (150 mg) by mouth 3 times daily, Disp: 90 tablet, Rfl: 11     chlorhexidine (PERIDEX) 0.12 % solution, Swish and spit 15 mLs in mouth 3 times daily as needed (sore throat), Disp: 473 mL, Rfl: 3     ergocalciferol (ERGOCALCIFEROL) 1.25 MG (09263 UT) capsule, Take 1 capsule (50,000 Units) by mouth once a week, Disp: 12 capsule, Rfl: 3     fexofenadine (ALLEGRA) 180 MG tablet, Take 180 mg by mouth daily as needed, Disp: , Rfl:      furosemide (LASIX) 40 MG tablet, Take 1 tablet (40 mg) by mouth daily, Disp: 30 tablet, Rfl: 11     Glycerin, Laxative, (GLYCERIN, ADULT,) 2 g SUPP, Use as directed for episodes of constipation lasting longer than 3 days, Disp: , Rfl:      lamoTRIgine (LAMICTAL) 150 MG tablet, Take 150 mg by mouth 3 times daily, Disp: , Rfl:      LANsoprazole (PREVACID) 30 MG DR capsule, Take 30 mg by mouth daily, Disp: , Rfl:      levofloxacin (LEVAQUIN) 750 MG tablet, Take 1 tablet (750 mg) by mouth daily, Disp: 42 tablet, Rfl: 0     lidocaine  (LIDODERM) 5 % patch, Apply 1 patch topically to painful area of skin once daily and remove per schedule., Disp: , Rfl:      lidocaine (TOPICAINE 5) 5 % anorectal gel, Apply 1 inch topically, Disp: , Rfl:      lidocaine (XYLOCAINE) 2 % external gel, Apply 1 inch topically 4 times a day as needed to gums., Disp: , Rfl:      medical cannabis (Patient's own supply), , Disp: 0 Information only, Rfl:      memantine (NAMENDA) 10 MG tablet, Take 10 mg by mouth 2 times daily, Disp: , Rfl:      methocarbamol (ROBAXIN) 500 MG tablet, Take 500 mg by mouth 3 times daily, Disp: , Rfl:      metoprolol succinate ER (TOPROL-XL) 50 MG 24 hr tablet, Take 1 tablet (50 mg) by mouth daily, Disp: 90 tablet, Rfl: 3     montelukast (SINGULAIR) 10 MG tablet, Take 1 tablet by mouth daily, Disp: , Rfl:      naloxone (NARCAN) 4 MG/0.1ML nasal spray, 1 spray (4 mg dose) into one nostril for opioid reversal. Call 911. May repeat if no response in 3 minutes., Disp: , Rfl:      nicotine (NICOTROL) 10 MG inhaler, Inhale 10 mg into the lungs, Disp: , Rfl:      polyethylene glycol (MIRALAX) 17 GM/Dose powder, Take 17 g by mouth 2 times daily, Disp: , Rfl:      QUEtiapine (SEROQUEL) 300 MG tablet, Take 600 mg by mouth At Bedtime, Disp: , Rfl:      QUEtiapine (SEROQUEL) 50 MG tablet, Take 100 mg by mouth 4 times daily as needed , Disp: , Rfl:      senna-docusate (SENOKOT-S/PERICOLACE) 8.6-50 MG tablet, Take 2 tablets by mouth 2 times daily, Disp: , Rfl:      triamterene-HCTZ (MAXZIDE-25) 37.5-25 MG tablet, Take 1 tablet by mouth 2 times daily, Disp: 60 tablet, Rfl: 11    By video alert, clear sensorium.  Thought process logical.  Affect is constricted and anxious.  No respiratory distress noted  Review of Systems         Objective           Vitals:  No vitals were obtained today due to virtual visit.    Physical Exam         Plan: We have been using zonisamide to help with anxiety, may work on the DANIEL receptor without respiratory suppression such as  benzodiazepines.  We will advance the dose.  Note he has been sensitive to delirium from other medications this will need to be followed.    Total time more than 25 minutes          Video-Visit Details    Type of service:  Video Visit    Video End Time:    Originating Location (pt. Location): home  Distant Location (provider location):  [facility]     Platform used for Video Visit:

## 2021-11-16 ENCOUNTER — TELEPHONE (OUTPATIENT)
Dept: PALLIATIVE MEDICINE | Facility: OTHER | Age: 51
End: 2021-11-16
Payer: COMMERCIAL

## 2021-11-16 DIAGNOSIS — J01.01 ACUTE RECURRENT MAXILLARY SINUSITIS: ICD-10-CM

## 2021-11-16 DIAGNOSIS — S06.9X9S TRAUMATIC BRAIN INJURY WITH LOSS OF CONSCIOUSNESS, SEQUELA (H): ICD-10-CM

## 2021-11-16 DIAGNOSIS — F31.81 BIPOLAR 2 DISORDER (H): ICD-10-CM

## 2021-11-16 DIAGNOSIS — K05.00 GINGIVITIS, ACUTE: ICD-10-CM

## 2021-11-16 DIAGNOSIS — M54.16 LUMBAR RADICULOPATHY: ICD-10-CM

## 2021-11-16 NOTE — TELEPHONE ENCOUNTER
PA Initiation 11/16/2021    Medication: lidocaine 5% patches are non formulary  Insurance Company:  [facility]/ Medicare  Ph[facility]illing the Rx[facility]  Filling Pharmacy Phone:  [phone]  Filling Pharmacy Fax:  [phone]  Start Date:  06/19/2021

## 2021-11-16 NOTE — TELEPHONE ENCOUNTER
Patient calling to request refills of levofloxacin (which he says he takes continually) and Wellbutrin.     He would like to give Dr. Lund an update:   He reports recent increase in Wellbutrin is helping with mood and smoking cessation. He says the diuretic has been helpful as well.

## 2021-11-17 RX ORDER — BUPROPION HYDROCHLORIDE 150 MG/1
150 TABLET, EXTENDED RELEASE ORAL
Qty: 90 TABLET | Refills: 11 | Status: SHIPPED | OUTPATIENT
Start: 2021-11-17 | End: 2022-11-21

## 2021-11-17 RX ORDER — LEVOFLOXACIN 750 MG/1
750 TABLET, FILM COATED ORAL DAILY
Qty: 42 TABLET | Refills: 0 | Status: SHIPPED | OUTPATIENT
Start: 2021-11-17 | End: 2022-05-18

## 2021-11-17 NOTE — TELEPHONE ENCOUNTER
Pending Prescriptions:                       Disp   Refills    levofloxacin (LEVAQUIN) 750 MG tablet     42 tab*0            Sig: Take 1 tablet (750 mg) by mouth daily    buPROPion (WELLBUTRIN SR) 150 MG 12 hr ta*90 tab*11           Sig: Take 1 tablet (150 mg) by mouth 3 times daily

## 2021-11-17 NOTE — TELEPHONE ENCOUNTER
Central Prior Authorization Team   Phone: [phone]      PA Initiation    Medication: lidocaine 5% patches - INITIATED  Insurance Company: [employer] [place] - Phone [phone] Fax [phone]  Pharmacy Filling the Rx: [facility] - [place], [place] - [address]  Filling Pharmacy Phone: [phone]  Filling Pharmacy Fax: [phone]  Start Date: 11/17/2021

## 2021-11-18 ENCOUNTER — TELEPHONE (OUTPATIENT)
Dept: NURSING | Facility: CLINIC | Age: 51
End: 2021-11-18
Payer: COMMERCIAL

## 2021-11-18 ENCOUNTER — TELEPHONE (OUTPATIENT)
Dept: INTERNAL MEDICINE | Facility: CLINIC | Age: 51
End: 2021-11-18
Payer: COMMERCIAL

## 2021-11-18 DIAGNOSIS — F31.81 BIPOLAR 2 DISORDER (H): ICD-10-CM

## 2021-11-18 DIAGNOSIS — S06.9X9S TRAUMATIC BRAIN INJURY WITH LOSS OF CONSCIOUSNESS, SEQUELA (H): ICD-10-CM

## 2021-11-18 DIAGNOSIS — M54.16 LUMBAR RADICULOPATHY: ICD-10-CM

## 2021-11-18 NOTE — TELEPHONE ENCOUNTER
Prior Authorization Request    1. Prior Authorization for the medication buPROPion (WELLBUTRIN SR) 150 MG 12 hr tablet        Requesting Provider: Devon Lund          Pt name: Bola Lyon Jr.        Pt : 1970        Pt MRN: [id number]        Last Office Visit: 10/29/2021           Insurance: Payor: [facility] / Plan: [facility] MEDICARE ADVANTAGE / Product Type: Medicare /         Insurance ID Number: [unfilled]        Prior Auth Contact Phone number:     BIN#:   MUN#:

## 2021-11-18 NOTE — TELEPHONE ENCOUNTER
Caller: Agustina - [facility]  Phone #: [phone]    Caller asks if [facility] has received an order for nicotine testing sent on 11/1/21. KRYSTINA answered yes, order received but it looks like it was not encoded as an actual order.  Please see 11/1 order from Dr. Sylvester.    [facility] called pt Jan and he said he was planning to do this on 11/22 or 11/23 if okay with [facility]. He said that he does not have a surgery date yet as his procedure depends on the result of his nicotine test.    KRYSTINA tried calling Agustina back but number she gave me was not in use. KRYSTINA called [phone] and left her detailed message.     To Clinic: Can this be added as an order on Epic? Please call Agustina back at [phone] to give her an update if this has been entered in Epic.    Rosina Rick RN/[facility] Nurse Advisor

## 2021-11-19 ENCOUNTER — TRANSFERRED RECORDS (OUTPATIENT)
Dept: HEALTH INFORMATION MANAGEMENT | Facility: CLINIC | Age: 51
End: 2021-11-19
Payer: COMMERCIAL

## 2021-11-19 NOTE — TELEPHONE ENCOUNTER
Central Prior Authorization Team   Phone: [phone]      Prior Authorization Approval    Authorization Effective Date: 8/19/2021  Authorization Expiration Date: 11/17/2022  Medication: lidocaine 5% patches - APPROVED  Approved Dose/Quantity: 30 FOR 30  Reference #:     Insurance Company: [employer] [place] - Phone [phone] Fax [phone]  Expected CoPay:       CoPay Card Available:      Foundation Assistance Needed:    Which Pharmacy is filling the prescription (Not needed for infusion/clinic administered): [facility] - [place], [place] - [address]  Pharmacy Notified: Yes  Patient Notified: Yes (**Instructed pharmacy to notify patient when script is ready to /ship.**)

## 2021-11-19 NOTE — TELEPHONE ENCOUNTER
Central Prior Authorization Team   Phone: [phone]    PA Initiation    Medication: buPROPion (WELLBUTRIN SR) 150 MG 12 hr tablet  Insurance Company: [employer] [place] - Phone [phone] Fax [phone]  Pharmacy Filling the Rx: [facility] - [place], [place] - [address]  Filling Pharmacy Phone: [phone]  Filling Pharmacy Fax:    Start Date: 11/19/2021

## 2021-11-22 DIAGNOSIS — M48.02 FORAMINAL STENOSIS OF CERVICAL REGION: ICD-10-CM

## 2021-11-22 DIAGNOSIS — M48.02 FORAMINAL STENOSIS OF CERVICAL REGION: Primary | ICD-10-CM

## 2021-11-22 NOTE — TELEPHONE ENCOUNTER
Director of nursing calls, asking whether or not namenda was to have been discontinued in August, if so this order was not received and patient has been taking this.  Are there any concerns regarding this medication and interaction with other currently prescribed medications.  Please advise and route back.

## 2021-11-22 NOTE — TELEPHONE ENCOUNTER
Received call from patient requesting refill  Oxycodone 15 mg     Last dispensed from pharmacy on 11/14/21    Patient's last office/virtual visit by prescribing provider on 11/11/21  Next office/virtual appointment scheduled for 12/17/21    UDT/CSA = 1/12/2021    Pending Prescriptions:                       Disp   Refills    oxyCODONE IR (ROXICODONE) 15 MG tablet    112 ta*0            Sig: Take 1 tablet (15 mg) by mouth 8 times daily for           14 days One tablet every three hours 8 am, 11 am,           2pm, 5 pm, 8 pm, 11 pm, 2 am, 5 am    [facility]

## 2021-11-23 NOTE — TELEPHONE ENCOUNTER
Prior Authorization Approval    Authorization Effective Date: 8/21/2021  Authorization Expiration Date: 11/19/2022  Medication: buPROPion (WELLBUTRIN SR) 150 MG 12 hr tablet  Approved Dose/Quantity:    Reference #:     Insurance Company: [employer] [place] - Phone [phone] Fax [phone]  Expected CoPay:       CoPay Card Available:      Foundation Assistance Needed:    Which Pharmacy is filling the prescription (Not needed for infusion/clinic administered): [facility] - [place], [place] - [address]  Pharmacy Notified: Yes  Patient Notified: Yes

## 2021-11-24 ENCOUNTER — MEDICAL CORRESPONDENCE (OUTPATIENT)
Dept: HEALTH INFORMATION MANAGEMENT | Facility: CLINIC | Age: 51
End: 2021-11-24
Payer: COMMERCIAL

## 2021-11-24 RX ORDER — OXYCODONE HYDROCHLORIDE 15 MG/1
15 TABLET ORAL
Qty: 112 TABLET | Refills: 0 | Status: SHIPPED | OUTPATIENT
Start: 2021-11-28 | End: 2021-11-30

## 2021-11-24 RX ORDER — MEMANTINE HYDROCHLORIDE 10 MG/1
10 TABLET ORAL 2 TIMES DAILY
Qty: 60 TABLET | Refills: 3 | Status: SHIPPED | OUTPATIENT
Start: 2021-11-24 | End: 2022-02-22

## 2021-11-24 NOTE — TELEPHONE ENCOUNTER
Please review RX in cue and sign if appropriate    Pending Prescriptions:                       Disp   Refills    memantine (NAMENDA) 10 MG tablet          60 tab*3            Sig: Take 1 tablet (10 mg) by mouth 2 times daily    Please route back once completed to this can also be faxed to [facility] facility:  [phone]

## 2021-11-26 LAB — INR (EXTERNAL): 0.9

## 2021-11-30 ENCOUNTER — TELEPHONE (OUTPATIENT)
Dept: PALLIATIVE MEDICINE | Facility: OTHER | Age: 51
End: 2021-11-30
Payer: COMMERCIAL

## 2021-11-30 ENCOUNTER — TELEPHONE (OUTPATIENT)
Dept: INTERNAL MEDICINE | Facility: CLINIC | Age: 51
End: 2021-11-30

## 2021-11-30 DIAGNOSIS — M48.02 FORAMINAL STENOSIS OF CERVICAL REGION: ICD-10-CM

## 2021-11-30 RX ORDER — OXYCODONE HYDROCHLORIDE 15 MG/1
15 TABLET ORAL
Qty: 112 TABLET | Refills: 0 | Status: SHIPPED | OUTPATIENT
Start: 2021-11-30 | End: 2021-12-17

## 2021-11-30 NOTE — TELEPHONE ENCOUNTER
"Received call from patient requesting refill(s) of oxyCODONE IR (ROXICODONE) 15 MG tablet     Last dispensed from pharmacy on 11/14/21 (14 day supply)    Patient stated \"this Rx was due yesterday and it is not there\" and he stated he \"called a week ahead\" to request this.     Patient's last office/virtual visit by prescribing provider on 11/11/21 [facility]    Next office/virtual appointment scheduled for 12/17/21 [facility]    Last urine drug screen date 1/12/21    Current opioid agreement on file (completed within the last year) Yes Date of opioid agreement: 01/11/21    E-prescribe to   [facility] - [place], [place] - [address]  [facility] [address]  Phone: [phone] Fax: [phone]    Will route to nursing pool for review and preparation of prescription(s).     Roma Self [facility]    "

## 2021-11-30 NOTE — TELEPHONE ENCOUNTER
RN spoke to pharmacy and confirmed they do not have the Oxycodone 15mg Rx that was sent and confirmed on 11/24/21.  Re-queued  Pending Prescriptions:                       Disp   Refills    oxyCODONE IR (ROXICODONE) 15 MG tablet    112 ta*0            Sig: Take 1 tablet (15 mg) by mouth 8 times daily for           14 days One tablet every three hours 8 am, 11 am,           2pm, 5 pm, 8 pm, 11 pm, 2 am, 5 am

## 2021-11-30 NOTE — TELEPHONE ENCOUNTER
Reason for Call:  Other call back    Detailed comments: FYI:  Pt having back surgery on 12/01/2021 at [facility]  Surgeon: [facility] - Dr Sylvester andDr Leodan Daly   L3 thru S1    Phone Number Patient can be reached at: Cell number on file:    Telephone Information:   Mobile [phone]       Best Time: anytime    Can we leave a detailed message on this number? NO    Call taken on 11/30/2021 at 3:08 PM by Helen Bowen

## 2021-11-30 NOTE — TELEPHONE ENCOUNTER
Patient calls, status update offered:  He has been approved for back surgery scheduled for 12/1/21.  Area of surgery: L3-S1  He will have pain managed while in the hospital by hospital staff.  Patient asking to have this message passed along to the provider as an FYI

## 2021-12-01 ENCOUNTER — TRANSFERRED RECORDS (OUTPATIENT)
Dept: HEALTH INFORMATION MANAGEMENT | Facility: CLINIC | Age: 51
End: 2021-12-01
Payer: COMMERCIAL

## 2021-12-04 LAB
CREATININE (EXTERNAL): 0.79 MG/DL (ref 0.72–1.25)
GFR ESTIMATED (EXTERNAL): >60 ML/MIN/1.73M2
GFR ESTIMATED (IF AFRICAN AMERICAN) (EXTERNAL): >60 ML/MIN/1.73M2
GLUCOSE (EXTERNAL): 128 MG/DL (ref 65–100)
POTASSIUM (EXTERNAL): 4.4 MMOL/L (ref 3.5–5)

## 2021-12-07 ENCOUNTER — TRANSFERRED RECORDS (OUTPATIENT)
Dept: HEALTH INFORMATION MANAGEMENT | Facility: CLINIC | Age: 51
End: 2021-12-07
Payer: COMMERCIAL

## 2021-12-09 ENCOUNTER — TELEPHONE (OUTPATIENT)
Dept: INTERNAL MEDICINE | Facility: CLINIC | Age: 51
End: 2021-12-09
Payer: COMMERCIAL

## 2021-12-09 DIAGNOSIS — K59.00 CONSTIPATION, UNSPECIFIED CONSTIPATION TYPE: Primary | ICD-10-CM

## 2021-12-09 RX ORDER — POLYETHYLENE GLYCOL 3350 17 G/17G
17 POWDER, FOR SOLUTION ORAL 2 TIMES DAILY PRN
Qty: 510 G | Refills: 3 | Status: SHIPPED | OUTPATIENT
Start: 2021-12-09 | End: 2021-12-14

## 2021-12-09 RX ORDER — BISACODYL 10 MG
10 SUPPOSITORY, RECTAL RECTAL DAILY PRN
Qty: 60 SUPPOSITORY | Refills: 1 | Status: SHIPPED | OUTPATIENT
Start: 2021-12-09

## 2021-12-09 NOTE — TELEPHONE ENCOUNTER
See note from the nursing staff.  The surgical team will be managing postsurgical pain until such time that they inform me that he is considered through the global period.

## 2021-12-09 NOTE — TELEPHONE ENCOUNTER
[facility]ie from the [facility] calls, patient is back to the facility from surgery, he is currently taking oxycodone 10-15 mg every 3 hours based on a pain rating scale:  Rating=4-6 then 10 mg  Rating-7+ then 15 mg  Patient will be needing a refill     Chart review:  There should be oxycodone 15 mg remaining from the RX sent from  on 11/30/21  Will pain clinic be manageing any additional post surgical pain medication or is this to be managed by the surgical team.

## 2021-12-09 NOTE — TELEPHONE ENCOUNTER
Reason for Call:  Other call back    Detailed comments: Pt had back surgery on 12/01/2021 - since that time has had some issues with constipation    The only bowel medication sent home with patient was Senokot - this is not working for patient    Need an order for Miralax or MOM as well as a suppisatory    Fax:  [phone] Raymundo Chandra  Also fax to:  [facility] [employer]  [phone]    Phone Number Patient can be reached at: Other phone number:  [phone]    Best Time: anytime    Can we leave a detailed message on this number? YES    Call taken on 12/9/2021 at 11:55 AM by Helen Bowen

## 2021-12-14 ENCOUNTER — TELEPHONE (OUTPATIENT)
Dept: PALLIATIVE MEDICINE | Facility: OTHER | Age: 51
End: 2021-12-14

## 2021-12-14 ENCOUNTER — VIRTUAL VISIT (OUTPATIENT)
Dept: INTERNAL MEDICINE | Facility: CLINIC | Age: 51
End: 2021-12-14
Payer: COMMERCIAL

## 2021-12-14 DIAGNOSIS — F31.77 BIPOLAR 1 DISORDER, MIXED, PARTIAL REMISSION (H): ICD-10-CM

## 2021-12-14 DIAGNOSIS — I10 ESSENTIAL HYPERTENSION: ICD-10-CM

## 2021-12-14 DIAGNOSIS — K59.03 DRUG-INDUCED CONSTIPATION: Primary | ICD-10-CM

## 2021-12-14 DIAGNOSIS — Z98.890 HX OF DECOMPRESSIVE LUMBAR LAMINECTOMY: ICD-10-CM

## 2021-12-14 PROBLEM — F11.20 OPIOID DEPENDENCE, UNCOMPLICATED (H): Status: ACTIVE | Noted: 2021-12-14

## 2021-12-14 PROBLEM — M54.16 LUMBAR RADICULOPATHY: Status: ACTIVE | Noted: 2021-11-19

## 2021-12-14 PROBLEM — M54.50 CHRONIC LOWER BACK PAIN: Status: ACTIVE | Noted: 2021-11-19

## 2021-12-14 PROBLEM — G89.29 CHRONIC LOWER BACK PAIN: Status: ACTIVE | Noted: 2021-11-19

## 2021-12-14 PROCEDURE — 99214 OFFICE O/P EST MOD 30 MIN: CPT | Performed by: INTERNAL MEDICINE

## 2021-12-14 RX ORDER — LUBIPROSTONE 8 UG/1
8 CAPSULE ORAL 2 TIMES DAILY
Qty: 60 CAPSULE | Refills: 11 | Status: SHIPPED | OUTPATIENT
Start: 2021-12-14 | End: 2022-01-03 | Stop reason: ALTCHOICE

## 2021-12-14 RX ORDER — TRIAMTERENE/HYDROCHLOROTHIAZID 37.5-25 MG
1 TABLET ORAL DAILY
Qty: 30 TABLET | Refills: 11 | Status: SHIPPED | OUTPATIENT
Start: 2021-12-14 | End: 2022-11-20

## 2021-12-14 RX ORDER — AMOXICILLIN 250 MG
2 CAPSULE ORAL 2 TIMES DAILY
Qty: 120 TABLET | Refills: 11 | Status: SHIPPED | OUTPATIENT
Start: 2021-12-14 | End: 2022-05-12

## 2021-12-14 RX ORDER — POLYETHYLENE GLYCOL 3350 17 G/17G
17 POWDER, FOR SOLUTION ORAL 2 TIMES DAILY PRN
Qty: 510 G | Refills: 3 | Status: CANCELLED | OUTPATIENT
Start: 2021-12-14

## 2021-12-14 RX ORDER — POLYETHYLENE GLYCOL 3350 17 G/17G
34 POWDER, FOR SOLUTION ORAL 2 TIMES DAILY
Qty: 2040 G | Refills: 11 | Status: SHIPPED | OUTPATIENT
Start: 2021-12-14 | End: 2022-02-22

## 2021-12-14 NOTE — PROGRESS NOTES
Bola is a 51 year old male contacting the clinic today via video, who will use the platform: [employer] for the visit.  Phone # for [employer], or if Amwell drops:   Telephone Information:   Mobile [phone]          ASSESSMENT:  DX: 1. Drug-induced constipation  On higher doses of narcotics, and with recent lumbar fusion requiring anterior approach.  Add back MiraLAX.  Continue senna.  Add magnesium.  Add medication for opioid-induced constipation either lubiprostone or naloxegol and adjust as needed.  - senna-docusate (SENOKOT-S/PERICOLACE) 8.6-50 MG tablet; Take 2 tablets by mouth 2 times daily  Dispense: 120 tablet; Refill: 11  - polyethylene glycol (MIRALAX) 17 GM/Dose powder; Take 34 g by mouth 2 times daily  Dispense: 2040 g; Refill: 11  - magnesium oxide (MAG-OX) 400 (241.3 Mg) MG tablet; Take 1 tablet (400 mg) by mouth 2 times daily  Dispense: 60 tablet; Refill: 11  - lubiprostone (AMITIZA) 8 MCG capsule; Take 1 capsule (8 mcg) by mouth 2 times daily  Dispense: 60 capsule; Refill: 11  - naloxegol 25 MG TABS tablet; Take 1 tablet (25 mg) by mouth every morning (before breakfast)  Dispense: 30 tablet; Refill: 11    2. Hx of decompressive lumbar laminectomy  Reviewed surgery December 1 and extensive surgery including anterior approach    3. Essential hypertension  Blood pressure stable.  Tolerating metoprolol.  Continue furosemide added in hospital.  Decrease thiazide diuretic due to hyponatremia  - magnesium oxide (MAG-OX) 400 (241.3 Mg) MG tablet; Take 1 tablet (400 mg) by mouth 2 times daily  Dispense: 60 tablet; Refill: 11  - triamterene-HCTZ (MAXZIDE-25) 37.5-25 MG tablet; Take 1 tablet by mouth daily  Dispense: 30 tablet; Refill: 11    4. Bipolar 1 disorder, mixed, partial remission (H)  Stable on bupropion.  Was able to quit smoking         PLAN:  Patient Instructions   Magnesium oxide 400 mg twice daily    Continue Senokot twice daily    Decrease triamterene HCTZ to 1 tablet daily    MiraLAX 2 scoops  twice daily    Naloxegol 25 mg daily or lubiprostone 8 mcg twice daily (whichever insurance covers)    Adjust meds in 48 hours based on response  Send me an update via [employer] in a few days     Return in about 3 months (around 3/14/2022) for using a video visit.     Hospital Follow-up Visit:    Hospital/Nursing Home/IP Rehab Facility: [facility]  Date of Admission: December 1  Date of Discharge: December 6  Reason(s) for Admission: Lumbar laminectomy      Was your hospitalization related to COVID-19? No   Problems taking medications regularly:  None  Medication changes since discharge: None  Problems adhering to non-medication therapy:  None    Discharge summary reviewed  Diagnostic Tests/Treatments reviewed.  Follow up needed:   Update since discharge: fluctuating course. Post Discharge Medication Reconciliation: discharge medications reconciled and changed, per note/orders.  Plan of care communicated with patient           CHIEF COMPLAINT:  Chief Complaint   Patient presents with     Hospital F/U     L3-S1 repair [facility] ED for post op pain     Constipation     since surgery         HISTORY OF PRESENT ILLNESS:  Bola is a 51 year old male contacting the clinic today via video for review of hospitalization.  He was admitted to the hospital December 1 for lumbar laminectomy and multilevel fusion with anterior approach.  Surgery was successful.  Intravenous narcotics were converted over to oral narcotics.    Upon discharge back to his TCU he complains most bitterly of constipation.  Due to surgery he is unable to sit.  He is required rectal suppositories at least 3 times.  He continues on senna.  Prehospital MiraLAX was discontinued    Pain is significant and slightly worse although he does feel a difference in characteristic    He complains of increasing edema mostly around his surgical sites.  In October thiazide diuretic was doubled.  Edema had otherwise mostly resolved after cessation of gabapentin.   "Furosemide was added in the hospital.  Hyponatremia noted in the hospital.  Pain clinic also increased his Maira ride with some improvement    REVIEW OF SYSTEMS:   Decreased weight.  Decreased neuropathy    PFSH:  Social History     Social History Narrative    He is .  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  He smokes and does not drink alcohol.  He is now on disability due to his brain injury and bipolar disease.       TOBACCO USE:  History   Smoking Status     Current Some Day Smoker     Packs/day: 0.50     Years: 11.00     Types: Cigarettes, Cigarettes, Cigarettes     Start date: 8/20/2009     Last attempt to quit: 2/21/2017   Smokeless Tobacco     Former User     Comment: 0.5 pack per day       VITALS:  There were no vitals filed for this visit.  There were no vitals taken for this visit. Estimated body mass index is 32.23 kg/m  as calculated from the following:    Height as of 9/17/21: 1.727 m (5' 8\").    Weight as of 9/1/21: 96.2 kg (212 lb).    PHYSICAL EXAM:  (observations via Video)  Alert and oriented.  1 tooth missing    MEDICATIONS:   Current Outpatient Medications   Medication Sig Dispense Refill     lubiprostone (AMITIZA) 8 MCG capsule Take 1 capsule (8 mcg) by mouth 2 times daily 60 capsule 11     magnesium oxide (MAG-OX) 400 (241.3 Mg) MG tablet Take 1 tablet (400 mg) by mouth 2 times daily 60 tablet 11     naloxegol 25 MG TABS tablet Take 1 tablet (25 mg) by mouth every morning (before breakfast) 30 tablet 11     polyethylene glycol (MIRALAX) 17 GM/Dose powder Take 34 g by mouth 2 times daily 2040 g 11     senna-docusate (SENOKOT-S/PERICOLACE) 8.6-50 MG tablet Take 2 tablets by mouth 2 times daily 120 tablet 11     triamterene-HCTZ (MAXZIDE-25) 37.5-25 MG tablet Take 1 tablet by mouth daily 30 tablet 11     acetaminophen (TYLENOL) 650 MG CR tablet Take 1,950 mg by mouth 3 times daily       albuterol (PROAIR HFA/PROVENTIL " HFA/VENTOLIN HFA) 108 (90 Base) MCG/ACT inhaler Inhale 2 puffs into the lungs every 6 hours as needed 6.7 g 11     allopurinol (ZYLOPRIM) 300 MG tablet Take 1 tablet (300 mg) by mouth 2 times daily 180 tablet 3     ammonium lactate (LAC-HYDRIN) 12 % external lotion        amphetamine-dextroamphetamine (ADDERALL) 15 MG tablet Take 1 tablet (15 mg) by mouth daily 30 tablet 0     Baclofen (LIORESAL) 5 MG tablet Take 5 mg by mouth 3 times daily       bisacodyl (DULCOLAX) 10 MG suppository Place 1 suppository (10 mg) rectally daily as needed for constipation 60 suppository 1     buPROPion (WELLBUTRIN SR) 150 MG 12 hr tablet Take 1 tablet (150 mg) by mouth 3 times daily 90 tablet 11     chlorhexidine (PERIDEX) 0.12 % solution Swish and spit 15 mLs in mouth 3 times daily as needed (sore throat) 473 mL 3     ergocalciferol (ERGOCALCIFEROL) 1.25 MG (71005 UT) capsule Take 1 capsule (50,000 Units) by mouth once a week 12 capsule 3     fexofenadine (ALLEGRA) 180 MG tablet Take 180 mg by mouth daily as needed       furosemide (LASIX) 40 MG tablet Take 1 tablet (40 mg) by mouth daily 30 tablet 11     Glycerin, Laxative, (GLYCERIN, ADULT,) 2 g SUPP Use as directed for episodes of constipation lasting longer than 3 days       lamoTRIgine (LAMICTAL) 150 MG tablet Take 150 mg by mouth 3 times daily       LANsoprazole (PREVACID) 30 MG DR capsule Take 30 mg by mouth daily       levofloxacin (LEVAQUIN) 750 MG tablet Take 1 tablet (750 mg) by mouth daily 42 tablet 0     lidocaine (LIDODERM) 5 % patch Apply 1 patch topically to painful area of skin once daily and remove per schedule.       lidocaine (TOPICAINE 5) 5 % anorectal gel Apply 1 inch topically       lidocaine (XYLOCAINE) 2 % external gel Apply 1 inch topically 4 times a day as needed to gums.       medical cannabis (Patient's own supply)  0 Information only      memantine (NAMENDA) 10 MG tablet Take 1 tablet (10 mg) by mouth 2 times daily 60 tablet 3     methocarbamol (ROBAXIN)  500 MG tablet Take 500 mg by mouth 3 times daily       metoprolol succinate ER (TOPROL-XL) 50 MG 24 hr tablet Take 1 tablet (50 mg) by mouth daily 90 tablet 3     montelukast (SINGULAIR) 10 MG tablet Take 1 tablet by mouth daily       naloxone (NARCAN) 4 MG/0.1ML nasal spray 1 spray (4 mg dose) into one nostril for opioid reversal. Call 911. May repeat if no response in 3 minutes.       nicotine (NICOTROL) 10 MG inhaler Inhale 10 mg into the lungs       oxyCODONE IR (ROXICODONE) 15 MG tablet Take 1 tablet (15 mg) by mouth 8 times daily for 14 days One tablet every three hours 8 am, 11 am, 2pm, 5 pm, 8 pm, 11 pm, 2 am, 5 am 112 tablet 0     QUEtiapine (SEROQUEL) 300 MG tablet Take 600 mg by mouth At Bedtime       QUEtiapine (SEROQUEL) 50 MG tablet Take 100 mg by mouth 4 times daily as needed        zonisamide (ZONEGRAN) 25 MG capsule Take 2 capsules (50 mg) by mouth 3 times daily 90 capsule 1       Outside Notes summarized: Discharge summary and operative note  Labs, x-rays, cardiology, GI tests reviewed: Sodium 130  No results for input(s): HGB, NA, POTASSIUM, CR, A1C, PSA, URIC, B12, TSH in the last 77412 hours.  No results found for: LLVFG30UVA  Lab Results   Component Value Date    VITDT 48 05/05/2014     No components found for: VITD  Lab Results   Component Value Date    CHOL 183 09/20/2018     New orders: No orders of the defined types were placed in this encounter.      Independent review of:    Supplemental history by:      Patient has given verbal consent to a Video visit?  Yes  How would you like to obtain your AVS?  MyChart  Will anyone else be joining your video visit? No       Video-Visit Details  Type of service:  Video Visit  Originating Location (pt. Location): Home  Distant Location (provider location):   [facility]    Devon Lund MD      [facility]    Video Start Time: 2:13 PM  Video End time:  2:50 PM  Face to face plus orders: 37  minutes  Documentation time:  3 minutes     The visit lasted a total of 40 minutes

## 2021-12-14 NOTE — PATIENT INSTRUCTIONS
Magnesium oxide 400 mg twice daily    Continue Senokot twice daily    Decrease triamterene HCTZ to 1 tablet daily    MiraLAX 2 scoops twice daily    Naloxegol 25 mg daily or lubiprostone 8 mcg twice daily (whichever insurance covers)    Adjust meds in 48 hours based on response

## 2021-12-16 ENCOUNTER — TELEPHONE (OUTPATIENT)
Dept: INTERNAL MEDICINE | Facility: CLINIC | Age: 51
End: 2021-12-16
Payer: COMMERCIAL

## 2021-12-16 DIAGNOSIS — K59.03 THERAPEUTIC OPIOID INDUCED CONSTIPATION: Primary | ICD-10-CM

## 2021-12-16 DIAGNOSIS — T40.2X5A THERAPEUTIC OPIOID INDUCED CONSTIPATION: Primary | ICD-10-CM

## 2021-12-16 NOTE — TELEPHONE ENCOUNTER
Medication Question/Problem:    Who is calling?  Jan  What is medication problem:    1) Miralax not filled because it was too soon to fill.   2) Senokot not covered by insurance    3) Mag-Ox is over the counter  4) Naloxegol needs a Prior Authorization    Who prescribed the medication?  Dr. Ashely Montoya was notified of the above information given to writer from [facility].  PA request was sent for naloxegol.      Patient states he very constipated following his back surgery.  He has staples in lower abdomin so any other suggestions from PCP on keeping stool soft and passable with out straining.

## 2021-12-16 NOTE — TELEPHONE ENCOUNTER
Valeria has advised clinic that prescription for naloxegol needs a Prior Authorization.     Please process PA.

## 2021-12-16 NOTE — LETTER
12/16/2021        RE: Bola Lyon Jr.  [address] [place] [address]                    Magnesium oxide 400 mg twice daily     Continue Senokot twice daily     Decrease triamterene HCTZ to 1 tablet daily     MiraLAX 2 scoops twice daily     Naloxegol 25 mg daily or lubiprostone 8 mcg twice daily (whichever insurance covers)     Adjust meds in 48 hours based on response    Sincerely,        Devon Lund MD

## 2021-12-16 NOTE — TELEPHONE ENCOUNTER
PA Initiation    Medication: lubiprostone (MOVANTIK) 25 MG TABS tablet- INITIATED  Insurance Company: [employer] [place] - Phone [phone] Fax [phone]  Pharmacy Filling the Rx: [facility] - [place], [place] - [address]  Filling Pharmacy Phone: [phone]  Filling Pharmacy Fax: [phone]  Start Date: 12/16/2021

## 2021-12-17 ENCOUNTER — VIRTUAL VISIT (OUTPATIENT)
Dept: PALLIATIVE MEDICINE | Facility: OTHER | Age: 51
End: 2021-12-17
Payer: COMMERCIAL

## 2021-12-17 DIAGNOSIS — G89.4 CHRONIC PAIN DISORDER: ICD-10-CM

## 2021-12-17 DIAGNOSIS — M48.02 FORAMINAL STENOSIS OF CERVICAL REGION: ICD-10-CM

## 2021-12-17 PROCEDURE — 99213 OFFICE O/P EST LOW 20 MIN: CPT | Mod: 95 | Performed by: ANESTHESIOLOGY

## 2021-12-17 RX ORDER — OXYCODONE HYDROCHLORIDE 15 MG/1
15 TABLET ORAL
Qty: 112 TABLET | Refills: 0 | Status: SHIPPED | OUTPATIENT
Start: 2021-12-17 | End: 2021-12-27

## 2021-12-17 RX ORDER — ZONISAMIDE 25 MG/1
50 CAPSULE ORAL 4 TIMES DAILY
Qty: 120 CAPSULE | Refills: 3 | Status: SHIPPED | OUTPATIENT
Start: 2021-12-17 | End: 2022-02-22

## 2021-12-17 RX ORDER — LACTULOSE 20 G/30ML
30 SOLUTION ORAL 2 TIMES DAILY
Qty: 1892 ML | Refills: 1 | Status: SHIPPED | OUTPATIENT
Start: 2021-12-17 | End: 2022-02-08

## 2021-12-17 ASSESSMENT — PAIN SCALES - GENERAL: PAINLEVEL: WORST PAIN (10)

## 2021-12-17 NOTE — PROGRESS NOTES
"Jan is a 51 year old who is being evaluated via a billable video visit.      How would you like to obtain your AVS?   If the video visit is dropped, the invitation should be resent by:   Will anyone else be joining your video visit?       Video Start Time:         Subjective   Jan is a 51 year old who presents for the following health issues   For multiple joint surgeries, vertebral surgeries    HPI     Jan reviews home now after his 12/1 revision of his fusion, from L3-S1, and describes having \"straps\" as sophy to his pelvis.    Describes informed that it was more extensive surgery though needed, I describes 12 inch incision anteriorly and 10 posteriorly, doing a 4 level fusion, decompression L34.    Reviews getting out of the hospital for 6 days with concern for Covid.  He then had to go back for some muscle spasms.  Scribes there is times where there is significant irritation to the nerve and spasms that are hard to control, though now down to once a day.  He was initially on hydromorphone in the hospital now back on his baseline oxycodone 15 mg every 3 hours.  Feels that seems to be managing some aside from concern for the nerve pain.  He has tried a variety of antispasm agents of neuropathic agents.    He is presently using zonisamide 50 mg 3 times a day which he thinks might be help and would like to increase that.    Reviews dosage for acetaminophen, discussed would like to limit to 4000 mg daily.      Current Outpatient Medications:      lubiprostone (AMITIZA) 8 MCG capsule, Take 1 capsule (8 mcg) by mouth 2 times daily, Disp: 60 capsule, Rfl: 11     memantine (NAMENDA) 10 MG tablet, Take 1 tablet (10 mg) by mouth 2 times daily, Disp: 60 tablet, Rfl: 3     methocarbamol (ROBAXIN) 500 MG tablet, Take 500 mg by mouth 3 times daily, Disp: , Rfl:      metoprolol succinate ER (TOPROL-XL) 50 MG 24 hr tablet, Take 1 tablet (50 mg) by mouth daily, Disp: 90 tablet, Rfl: 3     montelukast (SINGULAIR) 10 MG tablet, " Take 1 tablet by mouth daily, Disp: , Rfl:      naloxegol 25 MG TABS tablet, Take 1 tablet (25 mg) by mouth every morning (before breakfast), Disp: 30 tablet, Rfl: 11     naloxone (NARCAN) 4 MG/0.1ML nasal spray, 1 spray (4 mg dose) into one nostril for opioid reversal. Call 911. May repeat if no response in 3 minutes., Disp: , Rfl:      oxyCODONE IR (ROXICODONE) 15 MG tablet, Take 1 tablet (15 mg) by mouth 8 times daily One tablet every three hours 8 am, 11 am, 2pm, 5 pm, 8 pm, 11 pm, 2 am, 5 am, Disp: 112 tablet, Rfl: 0     polyethylene glycol (MIRALAX) 17 GM/Dose powder, Take 34 g by mouth 2 times daily, Disp: 2040 g, Rfl: 11     QUEtiapine (SEROQUEL) 300 MG tablet, Take 600 mg by mouth At Bedtime, Disp: , Rfl:      QUEtiapine (SEROQUEL) 50 MG tablet, Take 100 mg by mouth 4 times daily as needed , Disp: , Rfl:      senna-docusate (SENOKOT-S/PERICOLACE) 8.6-50 MG tablet, Take 2 tablets by mouth 2 times daily, Disp: 120 tablet, Rfl: 11     triamterene-HCTZ (MAXZIDE-25) 37.5-25 MG tablet, Take 1 tablet by mouth daily, Disp: 30 tablet, Rfl: 11     zonisamide (ZONEGRAN) 25 MG capsule, Take 2 capsules (50 mg) by mouth 4 times daily, Disp: 120 capsule, Rfl: 3     acetaminophen (TYLENOL) 650 MG CR tablet, Take 1,950 mg by mouth 3 times daily, Disp: , Rfl:      albuterol (PROAIR HFA/PROVENTIL HFA/VENTOLIN HFA) 108 (90 Base) MCG/ACT inhaler, Inhale 2 puffs into the lungs every 6 hours as needed, Disp: 6.7 g, Rfl: 11     allopurinol (ZYLOPRIM) 300 MG tablet, Take 1 tablet (300 mg) by mouth 2 times daily, Disp: 180 tablet, Rfl: 3     ammonium lactate (LAC-HYDRIN) 12 % external lotion, , Disp: , Rfl:      amphetamine-dextroamphetamine (ADDERALL) 15 MG tablet, Take 1 tablet (15 mg) by mouth daily, Disp: 30 tablet, Rfl: 0     bisacodyl (DULCOLAX) 10 MG suppository, Place 1 suppository (10 mg) rectally daily as needed for constipation, Disp: 60 suppository, Rfl: 1     buPROPion (WELLBUTRIN SR) 150 MG 12 hr tablet, Take 1 tablet  (150 mg) by mouth 3 times daily, Disp: 90 tablet, Rfl: 11     chlorhexidine (PERIDEX) 0.12 % solution, Swish and spit 15 mLs in mouth 3 times daily as needed (sore throat), Disp: 473 mL, Rfl: 3     ergocalciferol (ERGOCALCIFEROL) 1.25 MG (18353 UT) capsule, Take 1 capsule (50,000 Units) by mouth once a week, Disp: 12 capsule, Rfl: 3     fexofenadine (ALLEGRA) 180 MG tablet, Take 180 mg by mouth daily as needed, Disp: , Rfl:      furosemide (LASIX) 40 MG tablet, Take 1 tablet (40 mg) by mouth daily, Disp: 30 tablet, Rfl: 11     Glycerin, Laxative, (GLYCERIN, ADULT,) 2 g SUPP, Use as directed for episodes of constipation lasting longer than 3 days, Disp: , Rfl:      lactulose 20 GM/30ML SOLN, Take 30 mLs by mouth 2 times daily For constipation. To replace senokot, magnesium, miralax if covered by insurance, Disp: 1892 mL, Rfl: 1     lamoTRIgine (LAMICTAL) 150 MG tablet, Take 150 mg by mouth 3 times daily, Disp: , Rfl:      LANsoprazole (PREVACID) 30 MG DR capsule, Take 30 mg by mouth daily, Disp: , Rfl:      levofloxacin (LEVAQUIN) 750 MG tablet, Take 1 tablet (750 mg) by mouth daily, Disp: 42 tablet, Rfl: 0     lidocaine (LIDODERM) 5 % patch, Apply 1 patch topically to painful area of skin once daily and remove per schedule., Disp: , Rfl:      lidocaine (TOPICAINE 5) 5 % anorectal gel, Apply 1 inch topically, Disp: , Rfl:      lidocaine (XYLOCAINE) 2 % external gel, Apply 1 inch topically 4 times a day as needed to gums., Disp: , Rfl:     He is alert with a clear sensorium good eye contact.  Thought process logical.  No respiratory distress.    Review of Systems         Objective    Vitals - Patient Reported  Pain Score: Worst Pain (10) (constant, with spasms)        Physical Exam         Total time more than 20 minutes          Video-Visit Details    Type of service:  Video Visit    Video End Time:    Originating Location (pt. Location): Home    Distant Location (provider location):  [facility]      Platform [facility] Visit[facility]

## 2021-12-17 NOTE — PROGRESS NOTES
"Jan is a 51 year old who is being evaluated via a billable video visit. He had \"a four level lumbar fusion\" on 12/01.    How would you like to obtain your AVS? Both  If the video visit is dropped, the invitation should be resent by: text [phone]  Will anyone else be joining your video visit? No            "

## 2021-12-17 NOTE — TELEPHONE ENCOUNTER
Contacted [facility], the TCU and gave verbal orders per message below. Faxed a recent med list copy with a note with these instructions.  Per Nurse, the med changes have already been made she just needs the faxed updated med list.     Faxed med list and note to [phone]

## 2021-12-17 NOTE — TELEPHONE ENCOUNTER
Unable to get through to TCU, Please call 12/17 ASAP       Magnesium oxide 400 mg twice daily     Continue Senokot twice daily     Decrease triamterene HCTZ to 1 tablet daily     MiraLAX 2 scoops twice daily     Naloxegol 25 mg daily or lubiprostone 8 mcg twice daily (whichever insurance covers)     Adjust meds in 48 hours based on response

## 2021-12-17 NOTE — TELEPHONE ENCOUNTER
----- Message from Devon Lund MD sent at 12/14/2021  2:55 PM CST -----  Please call orders to TCU per my note and medicine changes today

## 2021-12-17 NOTE — LETTER
"    12/17/2021         RE: Bola Lyon Jr.  [address]  [place] [address]        Dear Colleague,    Thank you for referring your patient, Bola Lyon Jr., to the [facility]. Please see a copy of my visit note below.    Jan is a 51 year old who is being evaluated via a billable video visit. He had \"a four level lumbar fusion\" on 12/01.    How would you like to obtain your AVS? Both  If the video visit is dropped, the invitation should be resent by: text [phone]  Will anyone else be joining your video visit? No              Jan is a 51 year old who is being evaluated via a billable video visit.      How would you like to obtain your AVS?   If the video visit is dropped, the invitation should be resent by:   Will anyone else be joining your video visit?       Video Start Time:         Subjective   Jan is a 51 year old who presents for the following health issues   For multiple joint surgeries, vertebral surgeries    HPI     Jan reviews home now after his 12/1 revision of his fusion, from L3-S1, and describes having \"straps\" as sophy to his pelvis.    Describes informed that it was more extensive surgery though needed, I describes 12 inch incision anteriorly and 10 posteriorly, doing a 4 level fusion, decompression L34.    Reviews getting out of the hospital for 6 days with concern for Covid.  He then had to go back for some muscle spasms.  Scribes there is times where there is significant irritation to the nerve and spasms that are hard to control, though now down to once a day.  He was initially on hydromorphone in the hospital now back on his baseline oxycodone 15 mg every 3 hours.  Feels that seems to be managing some aside from concern for the nerve pain.  He has tried a variety of antispasm agents of neuropathic agents.    He is presently using zonisamide 50 mg 3 times a day which he thinks might be help and would like to increase that.    Reviews dosage for " acetaminophen, discussed would like to limit to 4000 mg daily.      Current Outpatient Medications:      lubiprostone (AMITIZA) 8 MCG capsule, Take 1 capsule (8 mcg) by mouth 2 times daily, Disp: 60 capsule, Rfl: 11     memantine (NAMENDA) 10 MG tablet, Take 1 tablet (10 mg) by mouth 2 times daily, Disp: 60 tablet, Rfl: 3     methocarbamol (ROBAXIN) 500 MG tablet, Take 500 mg by mouth 3 times daily, Disp: , Rfl:      metoprolol succinate ER (TOPROL-XL) 50 MG 24 hr tablet, Take 1 tablet (50 mg) by mouth daily, Disp: 90 tablet, Rfl: 3     montelukast (SINGULAIR) 10 MG tablet, Take 1 tablet by mouth daily, Disp: , Rfl:      naloxegol 25 MG TABS tablet, Take 1 tablet (25 mg) by mouth every morning (before breakfast), Disp: 30 tablet, Rfl: 11     naloxone (NARCAN) 4 MG/0.1ML nasal spray, 1 spray (4 mg dose) into one nostril for opioid reversal. Call 911. May repeat if no response in 3 minutes., Disp: , Rfl:      oxyCODONE IR (ROXICODONE) 15 MG tablet, Take 1 tablet (15 mg) by mouth 8 times daily One tablet every three hours 8 am, 11 am, 2pm, 5 pm, 8 pm, 11 pm, 2 am, 5 am, Disp: 112 tablet, Rfl: 0     polyethylene glycol (MIRALAX) 17 GM/Dose powder, Take 34 g by mouth 2 times daily, Disp: 2040 g, Rfl: 11     QUEtiapine (SEROQUEL) 300 MG tablet, Take 600 mg by mouth At Bedtime, Disp: , Rfl:      QUEtiapine (SEROQUEL) 50 MG tablet, Take 100 mg by mouth 4 times daily as needed , Disp: , Rfl:      senna-docusate (SENOKOT-S/PERICOLACE) 8.6-50 MG tablet, Take 2 tablets by mouth 2 times daily, Disp: 120 tablet, Rfl: 11     triamterene-HCTZ (MAXZIDE-25) 37.5-25 MG tablet, Take 1 tablet by mouth daily, Disp: 30 tablet, Rfl: 11     zonisamide (ZONEGRAN) 25 MG capsule, Take 2 capsules (50 mg) by mouth 4 times daily, Disp: 120 capsule, Rfl: 3     acetaminophen (TYLENOL) 650 MG CR tablet, Take 1,950 mg by mouth 3 times daily, Disp: , Rfl:      albuterol (PROAIR HFA/PROVENTIL HFA/VENTOLIN HFA) 108 (90 Base) MCG/ACT inhaler, Inhale 2  puffs into the lungs every 6 hours as needed, Disp: 6.7 g, Rfl: 11     allopurinol (ZYLOPRIM) 300 MG tablet, Take 1 tablet (300 mg) by mouth 2 times daily, Disp: 180 tablet, Rfl: 3     ammonium lactate (LAC-HYDRIN) 12 % external lotion, , Disp: , Rfl:      amphetamine-dextroamphetamine (ADDERALL) 15 MG tablet, Take 1 tablet (15 mg) by mouth daily, Disp: 30 tablet, Rfl: 0     bisacodyl (DULCOLAX) 10 MG suppository, Place 1 suppository (10 mg) rectally daily as needed for constipation, Disp: 60 suppository, Rfl: 1     buPROPion (WELLBUTRIN SR) 150 MG 12 hr tablet, Take 1 tablet (150 mg) by mouth 3 times daily, Disp: 90 tablet, Rfl: 11     chlorhexidine (PERIDEX) 0.12 % solution, Swish and spit 15 mLs in mouth 3 times daily as needed (sore throat), Disp: 473 mL, Rfl: 3     ergocalciferol (ERGOCALCIFEROL) 1.25 MG (20191 UT) capsule, Take 1 capsule (50,000 Units) by mouth once a week, Disp: 12 capsule, Rfl: 3     fexofenadine (ALLEGRA) 180 MG tablet, Take 180 mg by mouth daily as needed, Disp: , Rfl:      furosemide (LASIX) 40 MG tablet, Take 1 tablet (40 mg) by mouth daily, Disp: 30 tablet, Rfl: 11     Glycerin, Laxative, (GLYCERIN, ADULT,) 2 g SUPP, Use as directed for episodes of constipation lasting longer than 3 days, Disp: , Rfl:      lactulose 20 GM/30ML SOLN, Take 30 mLs by mouth 2 times daily For constipation. To replace senokot, magnesium, miralax if covered by insurance, Disp: 1892 mL, Rfl: 1     lamoTRIgine (LAMICTAL) 150 MG tablet, Take 150 mg by mouth 3 times daily, Disp: , Rfl:      LANsoprazole (PREVACID) 30 MG DR capsule, Take 30 mg by mouth daily, Disp: , Rfl:      levofloxacin (LEVAQUIN) 750 MG tablet, Take 1 tablet (750 mg) by mouth daily, Disp: 42 tablet, Rfl: 0     lidocaine (LIDODERM) 5 % patch, Apply 1 patch topically to painful area of skin once daily and remove per schedule., Disp: , Rfl:      lidocaine (TOPICAINE 5) 5 % anorectal gel, Apply 1 inch topically, Disp: , Rfl:      lidocaine  (XYLOCAINE) 2 % external gel, Apply 1 inch topically 4 times a day as needed to gums., Disp: , Rfl:     He is alert with a clear sensorium good eye contact.  Thought process logical.  No respiratory distress.    Review of Systems         Objective    Vitals - Patient Reported  Pain Score: Worst Pain (10) (constant, with spasms)        Physical Exam         Total time more than 20 minutes          Video-Visit Details    Type of service:  Video Visit    Video End Time:    Originating Location (pt. Location): Home    Distant Location (provider location):  [facility]     Platform used for Video Visit: [employer]       12/17/21 1013   PEG: A Thee-Item Scale Assessing Pain Intensity and Interference        0 = No pain / No interference    10 = Pain as bad as you can imagine / Completely interferes   What number best describes your pain on average in the past week? 10   What number best describes how, during the past week, pain has interfered with your enjoyment of life? 10   What number best describes how, during the past week, pain has interfered with your general activity? 10   PEG Total Score 10       Again, thank you for allowing me to participate in the care of your patient.        Sincerely,        JAYME MULLEN MD

## 2021-12-17 NOTE — PROGRESS NOTES
12/17/21 1013   PEG: A Thee-Item Scale Assessing Pain Intensity and Interference        0 = No pain / No interference    10 = Pain as bad as you can imagine / Completely interferes   What number best describes your pain on average in the past week? 10   What number best describes how, during the past week, pain has interfered with your enjoyment of life? 10   What number best describes how, during the past week, pain has interfered with your general activity? 10   PEG Total Score 10

## 2021-12-17 NOTE — TELEPHONE ENCOUNTER
PRIOR AUTHORIZATION DENIED    Medication: lubiprostone (MOVANTIK) 25 MG TABS tablet- DENIED    Denial Date: 12/17/2021    Denial Rational: CRITERIA NOT MET. MUST TRY LACTULOSE.          Appeal Information:           Central Prior Authorization Team  Phone: [phone]

## 2021-12-21 ENCOUNTER — TELEPHONE (OUTPATIENT)
Dept: FAMILY MEDICINE | Facility: CLINIC | Age: 51
End: 2021-12-21
Payer: COMMERCIAL

## 2021-12-21 NOTE — TELEPHONE ENCOUNTER
Duplicate request, medication has been denied.  Please see encounter from 12/16 for denial information.   If provider would like to appeal please provide a letter of medical necessity and route original denial from 12/16 back to PA team to initiate appeal request.       December 17, 2021         AM    3:27 PM  Didi Hickey routed this conversation to Devon Lund MD JM    3:01 PM  Joslyn Rodriguez routed this conversation to [facility] Internal Medicine Support Pool       Joslyn Rodriguez    2:59 PM  Note     PRIOR AUTHORIZATION DENIED     Medication: lubiprostone (MOVANTIK) 25 MG TABS tablet- DENIED     Denial Date: 12/17/2021     Denial Rational: CRITERIA NOT MET. MUST TRY LACTULOSE.

## 2021-12-27 ENCOUNTER — TELEPHONE (OUTPATIENT)
Dept: PALLIATIVE MEDICINE | Facility: OTHER | Age: 51
End: 2021-12-27
Payer: COMMERCIAL

## 2021-12-27 DIAGNOSIS — M48.02 FORAMINAL STENOSIS OF CERVICAL REGION: ICD-10-CM

## 2021-12-27 RX ORDER — OXYCODONE HYDROCHLORIDE 15 MG/1
15 TABLET ORAL
Qty: 112 TABLET | Refills: 0 | Status: SHIPPED | OUTPATIENT
Start: 2021-12-31 | End: 2022-01-10

## 2021-12-27 NOTE — TELEPHONE ENCOUNTER
Refill request: oxycodone 15 mg  Last dispensed from pharmacy on 12/17/21-14 days    Patient's last office/virtual visit by prescribing provider on 12/17/21  Next office/virtual appointment scheduled for 2/22/22    UDT/CSA = 1/12/2021    Pending Prescriptions:                       Disp   Refills    oxyCODONE IR (ROXICODONE) 15 MG tablet    112 ta*0            Sig: Take 1 tablet (15 mg) by mouth 8 times daily One           tablet every three hours 8 am, 11 am, 2pm, 5 pm,           8 pm, 11 pm, 2 am, 5 am    [facility] pharmacy

## 2022-01-03 ENCOUNTER — TELEPHONE (OUTPATIENT)
Dept: INTERNAL MEDICINE | Facility: CLINIC | Age: 52
End: 2022-01-03

## 2022-01-03 DIAGNOSIS — K59.03 DRUG-INDUCED CONSTIPATION: ICD-10-CM

## 2022-01-03 DIAGNOSIS — F31.81 BIPOLAR 2 DISORDER (H): ICD-10-CM

## 2022-01-03 DIAGNOSIS — Z98.890 HX OF DECOMPRESSIVE LUMBAR LAMINECTOMY: ICD-10-CM

## 2022-01-03 DIAGNOSIS — T40.2X5A THERAPEUTIC OPIOID INDUCED CONSTIPATION: Primary | ICD-10-CM

## 2022-01-03 DIAGNOSIS — S06.9X9S TRAUMATIC BRAIN INJURY WITH LOSS OF CONSCIOUSNESS, SEQUELA (H): ICD-10-CM

## 2022-01-03 DIAGNOSIS — K59.03 THERAPEUTIC OPIOID INDUCED CONSTIPATION: Primary | ICD-10-CM

## 2022-01-03 RX ORDER — DEXTROAMPHETAMINE SACCHARATE, AMPHETAMINE ASPARTATE, DEXTROAMPHETAMINE SULFATE AND AMPHETAMINE SULFATE 3.75; 3.75; 3.75; 3.75 MG/1; MG/1; MG/1; MG/1
15 TABLET ORAL DAILY
Qty: 30 TABLET | Refills: 0 | Status: SHIPPED | OUTPATIENT
Start: 2022-01-03 | End: 2022-02-04

## 2022-01-03 NOTE — TELEPHONE ENCOUNTER
PA for MOVANTIK was previously denied. Please provide a letter of medical necessity to appeal denial.  Please see encounter from 12/16.

## 2022-01-03 NOTE — TELEPHONE ENCOUNTER
Medication: Adderall 15 mg  Last Date Filled 10/25/21   pulled: PROVIDER TO PULL FROM Epic.   Only PCP Prescribing? PROVIDER TO PULL  FROM Epic.    Taken as prescribed from physician notes?    CSA in last year: YES  Random Utox in last year: YES  (if any of the above answer NO - schedule with PCP)     On opioids in addition to benzodiazepines? NO    Is patient on the Executive Review Team? NO    Patient's last office visit with Dr. Lund was 12/14/21.

## 2022-01-03 NOTE — TELEPHONE ENCOUNTER
Please submit urgent prior authorization for naloxegol.      This had previously been requested and Amitiza was submitted instead.  He has tried and failed lactulose.  The previous recommended alternative to Amitiza is an injectable, ideally he would be able to take an oral pill.

## 2022-01-03 NOTE — TELEPHONE ENCOUNTER
Reason for Call:  Other call back    Detailed comments: pt is report back on his constipation medication.  The only script was Lacculose and that is not working for him.  That is the only script that he was put on but not working at all.  Magnesium was not covered by insurance.  He would like to start something strong please.    [facility]-updated.  He is very upset that no meds are working.    Any new scripts will need info send to Aleisha at [phone] with ok per his facility.    Phone Number Patient can be reached at: Cell number on file:    Telephone Information:   Mobile [phone]       Best Time: any    Can we leave a detailed message on this number? YES    Call taken on 1/3/2022 at 12:39 PM by Pam J. Behr

## 2022-01-03 NOTE — TELEPHONE ENCOUNTER
Reason for Call:  Medication or medication refill:    Do you use a [facility]?  Name of the pharmacy and phone number for the current request:  [facility]    Name of the medication requested:     amphetamine-dextroamphetamine (ADDERALL) 15 MG tablet 30 tablet 0 10/25/2021  No   Sig - Route: Take 1 tablet (15 mg) by mouth daily - Oral         Other request: pt states he has gotten refills since Oct but that is the last one that I see.  He did not know the dosage.    Can we leave a detailed message on this number? YES    Phone number patient can be reached at: Cell number on file:    Telephone Information:   Mobile [phone]       Best Time: any    Call taken on 1/3/2022 at 12:37 PM by Pam J. Behr

## 2022-01-04 RX ORDER — METHYLNALTREXONE BROMIDE 150 MG/1
450 TABLET ORAL DAILY
Qty: 90 TABLET | Refills: 0 | Status: SHIPPED | OUTPATIENT
Start: 2022-01-04 | End: 2022-02-03

## 2022-01-04 NOTE — TELEPHONE ENCOUNTER
PA Initiation with new information    Medication: lubiprostone (MOVANTIK) 25 MG TABS tablet- INITIATED  Insurance Company: [employer] [place] - Phone [phone] Fax [phone]  Pharmacy Filling the Rx: [facility] - [place], [place] - [address]  Filling Pharmacy Phone: [phone]  Filling Pharmacy Fax: [phone]  Start Date: 1/4/2022    Patient has tried/failed lactulose

## 2022-01-05 NOTE — TELEPHONE ENCOUNTER
Prior Authorization Approval    Authorization Effective Date: 1/1/2022  Authorization Expiration Date: 1/4/2023  Medication: lubiprostone (MOVANTIK) 25 MG TABS tablet- APPROVED  Approved Dose/Quantity: 30 FOR 30  Reference #:  [id number]   Insurance Company: [employer] [place] - Phone [phone] Fax [phone]  Expected CoPay:       CoPay Card Available:      Foundation Assistance Needed:    Which Pharmacy is filling the prescription (Not needed for infusion/clinic administered): [facility] - [place], [place] - [address]  Pharmacy Notified: Yes  Patient Notified: Pharmacy will notify patient when ready        Central Prior Authorization Team  Phone: [phone]

## 2022-01-10 DIAGNOSIS — M48.02 FORAMINAL STENOSIS OF CERVICAL REGION: ICD-10-CM

## 2022-01-10 RX ORDER — OXYCODONE HYDROCHLORIDE 15 MG/1
15 TABLET ORAL
Qty: 112 TABLET | Refills: 0 | Status: SHIPPED | OUTPATIENT
Start: 2022-01-14 | End: 2022-01-20

## 2022-01-10 RX ORDER — OXYCODONE HYDROCHLORIDE 15 MG/1
15 TABLET ORAL
Qty: 112 TABLET | Refills: 0 | Status: CANCELLED | OUTPATIENT
Start: 2022-01-10

## 2022-01-10 NOTE — TELEPHONE ENCOUNTER
Next refill request: no changes since last template completed  Pending Prescriptions:                       Disp   Refills    oxyCODONE IR (ROXICODONE) 15 MG tablet    112 ta*0            Sig: Take 1 tablet (15 mg) by mouth 8 times daily One           tablet every three hours 8 am, 11 am, 2pm, 5 pm,           8 pm, 11 pm, 2 am, 5 am    Signed Prescriptions:                        Disp   Refills    oxyCODONE IR (ROXICODONE) 15 MG tablet     112 ta*0        Sig: Take 1 tablet (15 mg) by mouth 8 times daily One           tablet every three hours 8 am, 11 am, 2pm, 5 pm,           8 pm, 11 pm, 2 am, 5 am  Authorizing Provider: JAYME MULLEN

## 2022-01-14 ENCOUNTER — TELEPHONE (OUTPATIENT)
Dept: INTERNAL MEDICINE | Facility: CLINIC | Age: 52
End: 2022-01-14

## 2022-01-19 NOTE — PROGRESS NOTES
Medication Therapy Management (MTM) Encounter    ASSESSMENT:                            Medication Adherence/Access: Pt fairly familiar with his medications, but unable to confirm exactly what he's being administered in the facility. He will have his facility nurse fax an updated MAR.       Chronic Pain: Notes some improvement with recent increase in Zonisamide dose. Thinks as his nerve pain has reduced, he's noticing muscle pain and inflammation more. Reasonable to restart Acetaminophen per pt request. May also benefit from increase in robaxin frequency.       History of TBI:   Bipolar disorder: Some depression/frustration related to pain. Will continue to focus on pain control.       Low Vitamin D: Last level within target range 45-60 for chronic pain and mood support.       Constipation: Continued infrequent bowel movements. Relistor was denied. Continue with Movantik. Will clarify bowel regimen with facility staff. If needed, could increase Lactulose frequency.     Elevated Uric Acid: Denies gout pains. Continue allopurinol.       Lymphedema/hypertension: Last BP at goal <140/90. Pulse at goal .       Asthma: Continued shortness of breath - but more related to mood - see plan above.     Allergies:   Recurrent sinusitis: Stable per pt report.       PLAN:                            -Start Acetaminophen 650 mg CR 2 tabs three times daily, scheduled   -Increase Methocarbamol 500 mg to four times daily - okay to start with current supply   -PharmD to clarify bowel regimen with facility staff.       Follow-up: Dr. Cramer -  2/22/22    Return in about 2 months (around 3/20/2022) for Medication Management Pharmacist, by phone.    SUBJECTIVE/OBJECTIVE:                          Bola Lyon is a 52 year old male called for an initial visit. He was referred to me from Dmitriy Cramer MD.      Reason for visit: Medication Management     Allergies/ADRs: Reviewed in chart  Past Medical History: Reviewed in  "chart  Tobacco: He reports that he has been smoking cigarettes, cigarettes, and cigarettes. He started smoking about 12 years ago. He has a 5.50 pack-year smoking history. He has quit using smokeless tobacco.Tobacco Cessation Action Plan:   Information offered: Patient not interested at this time    Alcohol:  reports no history of alcohol use.        Medication Adherence/Access: Currently in an assisted group home. Has medications managed for him.       Chronic Pain: Prescribed lamotrigine 150 mg 3 times daily, lidocaine 5% patch daily, memantine 10 mg twice daily, methocarbamol 500 mg 3 times daily, oxycodone 15 mg 8 times daily, zonisamide 25 mg 2 caps 4 times daily (increased from three times daily to four times daily at last visit with Dr. Cramer).     Elective spine surgery at [facility] December 2021  Dealing with a lot of discomfort throughout the day.     The recent increase in the Zonisamide was a bit helpful for the nerve pain.   Most of the pain pt is having is post-op pain. \"that specific area is a volcano\"     Has post-op follow-up in a week from today. Will have Xrays and transition to working with PT. For now continuing minimal activity - but finds that it's flaring pain.     Hips are extremely sore. Didn't use Lidocaine patches - didn't find much benefit prior to surgery. But wondering if it would be helpful now. Nerve pain bilaterally in the hips, down the quad and knees. Describes inflammation - a hard lump near the spine. Ices about 10 times per day - can't get to the core topically.     Gets frustrated with trying new things that aren't helpful.     Muscles in the legs are very achy and sore. Feels like he pulled a hamstring constantly.   Robaxin is not helping much with it. Increased about a month ago,     Used to take Acetaminophen arthritis - would like take it again. Used to use 2 tabs Bid.         Bipolar disorder: Prescribed lamotrigine 150 mg 3 times daily, quetiapine 300 mg 2 tabs at " bedtime + quetiapine 50 mg 2 tabs 4 times daily as needed    Has been upset more recently due to increase pain.       History of TBI: Prescribed Adderall 15 mg IR daily, bupropion 150 mg SR 3 times daily      Low Vitamin D: Prescribed Vitamin D2 50,000 units once weekly.     Vitamin D Deficiency Screening Results:  Lab Results   Component Value Date    VITDT 48 05/05/2014        Constipation: Prescribed glycerin suppositories as needed, Lactulose 20 g twice daily, Movantik 25 mg daily,     Not currently using MiraLAX 34 g twice daily, senna-docusate 8.6-50 mg 2 tabs twice daily.     Stool pattern has been 1 formed stool(s) every 2-3 days. Defecation has been difficult.       Elevated Uric Acid: Prescribed allopurinol 300 mg twice daily    Denies gout pains.       Lymphedema/hypertension: Prescribed furosemide 40 mg daily, metoprolol succinate 50 mg daily, triamterene-hydrochlorothiazide 37.5-25 mg daily    BP Readings from Last 3 Encounters:   09/01/21 120/78   04/30/21 116/68   03/19/21 108/60      Pulse Readings from Last 3 Encounters:   09/01/21 83   04/30/21 74   03/19/21 74           Asthma: Prescribed albuterol HFA 90 mcg 2 puffs every 6 hours as needed, montelukast 10 mg daily. Has some shortness of breath daily but attributes to PTSD.       Allergies: Prescribed fexofenadine 180 mg daily, montelukast 10 mg daily    Seasonal allergies.     Recurrent sinusitis: Prescribed levofloxacin 750 mg daily      Today's Vitals: There were no vitals taken for this visit.  ----------------      I spent 39 minutes with this patient today. All changes were made via collaborative practice agreement with Dmitriy Carmer MD. A copy of the visit note was provided to the patient's provider(s).    The patient was sent via [employer] a summary of these recommendations.     Shahriar Alanis PharmD  Medication Therapy Management (MTM) Pharmacist  [facility]      Telemedicine Visit Details  Type of service:   Telephone visit  Start Time: 2:07 PM  End Time: 2:46 PM  Originating Location (patient location): Home  Distant Location (provider location):  [facility]     Medication Therapy Recommendations  Chronic lower back pain    Current Medication: methocarbamol (ROBAXIN) 500 MG tablet (Discontinued)   Rationale: Dose too low - Dosage too low - Effectiveness   Recommendation: Increase Frequency   Status: Accepted per CPA          Current Medication: oxyCODONE IR (ROXICODONE) 15 MG tablet   Rationale: Synergistic therapy - Needs additional medication therapy - Indication   Recommendation: Start Medication - acetaminophen 650 MG CR tablet   Status: Accepted per CPA         Drug-induced constipation    Current Medication: lactulose 20 GM/30ML SOLN   Rationale: Dose too low - Dosage too low - Effectiveness   Recommendation: Increase Frequency   Status: Accepted per CPA

## 2022-01-20 ENCOUNTER — VIRTUAL VISIT (OUTPATIENT)
Dept: PHARMACY | Facility: OTHER | Age: 52
End: 2022-01-20
Payer: COMMERCIAL

## 2022-01-20 DIAGNOSIS — M48.02 FORAMINAL STENOSIS OF CERVICAL REGION: Primary | ICD-10-CM

## 2022-01-20 DIAGNOSIS — Z87.820 HISTORY OF TRAUMATIC BRAIN INJURY: ICD-10-CM

## 2022-01-20 DIAGNOSIS — I10 ESSENTIAL HYPERTENSION: ICD-10-CM

## 2022-01-20 DIAGNOSIS — K59.03 DRUG-INDUCED CONSTIPATION: ICD-10-CM

## 2022-01-20 DIAGNOSIS — R79.89 LOW VITAMIN D LEVEL: ICD-10-CM

## 2022-01-20 DIAGNOSIS — J45.909 ASTHMA, UNSPECIFIED ASTHMA SEVERITY, UNSPECIFIED WHETHER COMPLICATED, UNSPECIFIED WHETHER PERSISTENT: ICD-10-CM

## 2022-01-20 DIAGNOSIS — E79.0 ELEVATED URIC ACID IN BLOOD: ICD-10-CM

## 2022-01-20 DIAGNOSIS — M48.02 FORAMINAL STENOSIS OF CERVICAL REGION: ICD-10-CM

## 2022-01-20 DIAGNOSIS — F31.77 BIPOLAR 1 DISORDER, MIXED, PARTIAL REMISSION (H): ICD-10-CM

## 2022-01-20 DIAGNOSIS — Z98.890 HX OF DECOMPRESSIVE LUMBAR LAMINECTOMY: ICD-10-CM

## 2022-01-20 PROCEDURE — 99605 MTMS BY PHARM NP 15 MIN: CPT | Performed by: PHARMACIST

## 2022-01-20 PROCEDURE — 99607 MTMS BY PHARM ADDL 15 MIN: CPT | Performed by: PHARMACIST

## 2022-01-20 RX ORDER — SENNOSIDES 8.6 MG
1300 CAPSULE ORAL 3 TIMES DAILY
Qty: 168 TABLET | Refills: 1 | Status: SHIPPED | OUTPATIENT
Start: 2022-01-20 | End: 2022-03-24

## 2022-01-20 RX ORDER — METHOCARBAMOL 500 MG/1
500 TABLET, FILM COATED ORAL 4 TIMES DAILY
Qty: 120 TABLET | Refills: 1 | Status: SHIPPED | OUTPATIENT
Start: 2022-01-20 | End: 2022-02-08

## 2022-01-20 RX ORDER — OXYCODONE HYDROCHLORIDE 15 MG/1
15 TABLET ORAL
Qty: 112 TABLET | Refills: 0 | Status: SHIPPED | OUTPATIENT
Start: 2022-01-28 | End: 2022-02-07

## 2022-01-20 NOTE — LETTER
"Recommended To-Do List      Prepared on: 1/20/2022     You can get the best results from your medications by completing the items on this \"To-Do List.\"      Bring your To-Do List when you go to your doctor. And, share it with your family or caregivers.    My To-Do List:  What we talked about: What I should do:   Your medication dosage being too low    Increase how often you take methocarbamol (ROBAXIN) to four times daily.           What we talked about: What I should do:   A new medication that may be of benefit to you    Start taking acetaminophen (TYLENOL) 650 mg CR 2 tablets three times daily, scheduled.           What we talked about: What I should do:   Your medication dosage being too low    Increase how often you take lactulose to 20 gm (30 mL) three times daily.           What we talked about: What I should do:                     "

## 2022-01-20 NOTE — LETTER
January 20, 2022  Bola Lyon .  [address] [place] [address]    Dear Mr. Lyon, [facility]        Thank you for talking with me on Jan 20, 2022 about your health and medications. As a follow-up to our conversation, I have included two documents:      1. Your Recommended To-Do List has steps you should take to get the best results from your medications.  2. Your Medication List will help you keep track of your medications and how to take them.    If you want to talk about these documents, please call Shahriar Alanis PharmD at phone: [phone], Monday-Friday 8-4:30pm.    I look forward to working with you and your doctors to make sure your medications work well for you.    Sincerely,    Shahriar Alanis PharmD

## 2022-01-20 NOTE — LETTER
_  Medication List        Prepared on: 1/20/2022     Bring your Medication List when you go to the doctor, hospital, or   emergency room. And, share it with your family or caregivers.     Note any changes to how you take your medications.  Cross out medications when you no longer use them.    Medication How I take it Why I use it Prescriber   acetaminophen (TYLENOL) 650 MG CR tablet Take 2 tablets (1,300 mg) by mouth 3 times daily Foraminal stenosis of cervical region Dmitriy Cramer MD   albuterol (PROAIR HFA/PROVENTIL HFA/VENTOLIN HFA) 108 (90 Base) MCG/ACT inhaler Inhale 2 puffs into the lungs every 6 hours as needed Mild intermittent asthma without complication Leodan Strange MD   allopurinol (ZYLOPRIM) 300 MG tablet Take 1 tablet (300 mg) by mouth 2 times daily Lumbar Radiculopathy Devon Lund MD   amphetamine-dextroamphetamine (ADDERALL) 15 MG tablet Take 1 tablet (15 mg) by mouth daily Bipolar 2 Disorder (H); Traumatic brain injury with loss of consciousness, sequela (H) Devon Lund MD   bisacodyl (DULCOLAX) 10 MG suppository Place 1 suppository (10 mg) rectally daily as needed for constipation Constipation, unspecified constipation type Ana Rosa Daniels MD   buPROPion (WELLBUTRIN SR) 150 MG 12 hr tablet Take 1 tablet (150 mg) by mouth 3 times daily Bipolar 2 Disorder (H); Traumatic brain injury with loss of consciousness, sequela (H); Lumbar Radiculopathy Devon Lund MD   chlorhexidine (PERIDEX) 0.12 % solution Swish and spit 15 mLs in mouth 3 times daily as needed (sore throat) Dental Abscess Devon Lund MD   ergocalciferol (ERGOCALCIFEROL) 1.25 MG (17593 UT) capsule Take 1 capsule (50,000 Units) by mouth once a week Low vitamin D level Devon Lund MD   fexofenadine (ALLEGRA) 180 MG tablet Take 180 mg by mouth daily as needed Allergies  Devon Lund MD   furosemide (LASIX) 40 MG tablet Take 1 tablet (40 mg) by mouth daily Lymphedema of both lower extremities  Devon Lund MD   Glycerin, Laxative, (GLYCERIN, ADULT,) 2 g SUPP Use as directed for episodes of constipation lasting longer than 3 days Constipation  Devon Lund MD   lactulose 20 GM/30ML SOLN Take 30 mLs by mouth 2 times daily For constipation. To replace senokot, magnesium, miralax if covered by insurance Therapeutic opioid induced constipation Devon Lund MD   lamoTRIgine (LAMICTAL) 150 MG tablet Take 150 mg by mouth 3 times daily Bipolar Disorder Devon Lund MD   LANsoprazole (PREVACID) 30 MG DR capsule Take 30 mg by mouth daily GERD Devon Lund MD   levofloxacin (LEVAQUIN) 750 MG tablet Take 1 tablet (750 mg) by mouth daily Acute recurrent maxillary sinusitis; Gingivitis, Acute Devon Lund MD   lidocaine (LIDODERM) 5 % patch Apply 1 patch topically to painful area of skin once daily and remove per schedule. Chronic Pain  Dmitriy Cramer MD   lidocaine (TOPICAINE 5) 5 % anorectal gel Apply 1 inch topically Chronic Pain Dmitriy Cramer MD   lidocaine (XYLOCAINE) 2 % external gel Apply 1 inch topically 4 times a day as needed to gums. Chronic Pain  Dmitriy Cramer MD   memantine (NAMENDA) 10 MG tablet Take 1 tablet (10 mg) by mouth 2 times daily Foraminal stenosis of cervical region Dmitriy Cramer MD   methocarbamol (ROBAXIN) 500 MG tablet Take 1 tablet (500 mg) by mouth 4 times daily Foraminal stenosis of cervical region Dmitriy Cramer MD   methylnaltrexone (RELISTOR) 150 MG TABS Take 3 tablets (450 mg) by mouth daily Therapeutic opioid induced constipation; Drug-Induced Constipation; Hx of decompressive lumbar laminectomy Devon Lund MD   metoprolol succinate ER (TOPROL-XL) 50 MG 24 hr tablet Take 1 tablet (50 mg) by mouth daily Essential Hypertension Leodan Strange MD   montelukast (SINGULAIR) 10 MG tablet Take 1 tablet by mouth daily  Patient Reported   naloxegol 25 MG TABS tablet Take 1 tablet (25 mg) by mouth every morning (before  breakfast) Drug-Induced Constipation Devon Lund MD   naloxone (NARCAN) 4 MG/0.1ML nasal spray 1 spray (4 mg dose) into one nostril for opioid reversal. Call 911. May repeat if no response in 3 minutes. Chronic continuous opioid use Dmitriy Cramer MD   oxyCODONE IR (ROXICODONE) 15 MG tablet Take 1 tablet (15 mg) by mouth 8 times daily One tablet every three hours 8 am, 11 am, 2pm, 5 pm, 8 pm, 11 pm, 2 am, 5 am Foraminal stenosis of cervical region Dmitriy Cramer MD   polyethylene glycol (MIRALAX) 17 GM/Dose powder Take 34 g by mouth 2 times daily Drug-Induced Constipation Devon Lund MD   QUEtiapine (SEROQUEL) 300 MG tablet Take 600 mg by mouth At Bedtime Bipolar Disorder Devon Lund MD   QUEtiapine (SEROQUEL) 50 MG tablet Take 100 mg by mouth 4 times daily as needed  Anxiety Devon Lund MD   senna-docusate (SENOKOT-S/PERICOLACE) 8.6-50 MG tablet Take 2 tablets by mouth 2 times daily Drug-Induced Constipation Deovn Lund MD   triamterene-HCTZ (MAXZIDE-25) 37.5-25 MG tablet Take 1 tablet by mouth daily Essential Hypertension Devon Lund MD   zonisamide (ZONEGRAN) 25 MG capsule Take 2 capsules (50 mg) by mouth 4 times daily Chronic Pain Disorder Dmitriy Cramer MD         Add new medications, over-the-counter drugs, herbals, vitamins, or  minerals in the blank rows below.    Medication How I take it Why I use it Prescriber                          Allergies:      gabapentin; lyrica [pregabalin]; amitriptyline; topiramate        Side effects I have had:              Other Information:             My notes and questions:

## 2022-01-20 NOTE — PATIENT INSTRUCTIONS
Recommendations from today's MTM visit:                                                    MTM (medication therapy management) is a service provided by a clinical pharmacist designed to help you get the most of out of your medicines.   Today we reviewed what your medicines are for, how to know if they are working, that your medicines are safe and how to make your medicine regimen as easy as possible.      -Start Acetaminophen 650 mg CR 2 tabs three times daily, scheduled   -Increase Methocarbamol 500 mg to four times daily - okay to start with current supply   -PharmD to clarify bowel regimen with facility staff.     Follow-up: Return in about 2 months (around 3/20/2022) for Medication Management Pharmacist, by phone.    It was great to speak with you today.  I value your experience and would be very thankful for your time with providing feedback on our clinic survey. You may receive a survey via email or text message in the next few days.     To schedule another MTM appointment, please call the clinic directly or you may call the MTM scheduling line at [phone] or toll-free at [phone].     My Clinical Pharmacist's contact information:                                                      Please feel free to contact me with any questions or concerns you have.      Shahriar Alanis, [place]  Medication Therapy Management (MTM) Pharmacist  [facility]

## 2022-01-28 ENCOUNTER — TELEPHONE (OUTPATIENT)
Dept: INTERNAL MEDICINE | Facility: CLINIC | Age: 52
End: 2022-01-28

## 2022-01-28 NOTE — TELEPHONE ENCOUNTER
Reason for Call:  Medication or medication refill:    Do you use a [facility]?  Name of the pharmacy and phone number for the current request:    [facility]    Name of the medication requested:   Adderall    Other request: n/a    Can we leave a detailed message on this number? YES    Phone number patient can be reached at: Cell number on file:    Telephone Information:   Mobile [phone]       Best Time: anytime    Call taken on 1/28/2022 at 2:22 PM by Helen Bowen

## 2022-01-31 ENCOUNTER — MEDICAL CORRESPONDENCE (OUTPATIENT)
Dept: HEALTH INFORMATION MANAGEMENT | Facility: CLINIC | Age: 52
End: 2022-01-31
Payer: COMMERCIAL

## 2022-01-31 DIAGNOSIS — G89.4 CHRONIC PAIN SYNDROME: Primary | ICD-10-CM

## 2022-01-31 DIAGNOSIS — M48.02 FORAMINAL STENOSIS OF CERVICAL REGION: ICD-10-CM

## 2022-01-31 RX ORDER — METHOCARBAMOL 750 MG/1
750 TABLET, FILM COATED ORAL 4 TIMES DAILY
Qty: 120 TABLET | Refills: 1 | Status: SHIPPED | OUTPATIENT
Start: 2022-01-31 | End: 2022-03-24

## 2022-01-31 NOTE — TELEPHONE ENCOUNTER
Pt states that when he saw Shahriar on 1/20/22  he couldn't remember how often he took Robaxin, and that the plan was to go up by 1 tablet/day.  He thought it was tid so his new order is for 4/day.  Pt states it should be for 5/day now, as he found that he had been taking it 4/day.  . He would like the amended order faxed to the head nurse, Aleisha, at his facility at [phone]. He would also like an order sent to [facility] with new instructions.    Called [facility] and was told he has had as high as 1000 mg qid ordered in the past by Dr Lund.  Discussed with Dr Cramer and plan is to increase to 750 mg 4 times/day.  Will fax order to Aleisha, nurse at his facility, and queue new order.

## 2022-01-31 NOTE — TELEPHONE ENCOUNTER
Pt leaves voice message stating that his Roboxin was increased to five times a day at his visit with Shahriar, but this has not been changed in his medication list. Also requesting a new prescription with updated directions. Requesting a return phone call.

## 2022-02-03 DIAGNOSIS — F31.81 BIPOLAR 2 DISORDER (H): ICD-10-CM

## 2022-02-03 DIAGNOSIS — S06.9X9S TRAUMATIC BRAIN INJURY WITH LOSS OF CONSCIOUSNESS, SEQUELA (H): ICD-10-CM

## 2022-02-04 DIAGNOSIS — M48.02 FORAMINAL STENOSIS OF CERVICAL REGION: ICD-10-CM

## 2022-02-04 RX ORDER — DEXTROAMPHETAMINE SACCHARATE, AMPHETAMINE ASPARTATE, DEXTROAMPHETAMINE SULFATE AND AMPHETAMINE SULFATE 3.75; 3.75; 3.75; 3.75 MG/1; MG/1; MG/1; MG/1
15 TABLET ORAL DAILY
Qty: 30 TABLET | Refills: 0 | Status: SHIPPED | OUTPATIENT
Start: 2022-02-04 | End: 2022-03-01

## 2022-02-04 NOTE — TELEPHONE ENCOUNTER
Routing refill request to provider for review/approval because:  Controlled substance request    Last Written Prescription Date:  1/3/22  Last Fill Quantity: 30,  # refills: 0   Last office visit provider:  12/14/21     Requested Prescriptions   Pending Prescriptions Disp Refills     amphetamine-dextroamphetamine (ADDERALL) 15 MG tablet 30 tablet 0     Sig: Take 1 tablet (15 mg) by mouth daily       There is no refill protocol information for this order          Irma Mendenhall RN 02/04/22 1:29 PM

## 2022-02-04 NOTE — TELEPHONE ENCOUNTER
Medication: Adderall      CSA in last year: NO    Random Utox in last year: NO  (if any of the above answer NO - schedule with PCP)     On opioids in addition to benzodiazepines? NO  (if the above answer YES - schedule with PCP every 6 months)           PROVIDER TO PULL THE FOLLOWING FROM  :    1. Last date filled?  2.   Only PCP Prescribing?  3.   Taken as prescribed from physician notes?

## 2022-02-07 RX ORDER — OXYCODONE HYDROCHLORIDE 15 MG/1
15 TABLET ORAL
Qty: 112 TABLET | Refills: 0 | Status: SHIPPED | OUTPATIENT
Start: 2022-02-11 | End: 2022-02-22

## 2022-02-07 NOTE — TELEPHONE ENCOUNTER
Received call from patient requesting refill(s) of    OXYCODONE IR (ROXICODONE) 15 MG TABLET     SIG:  TAKE 1 TABLET (15 MG) BY MOUTH 8 TIMES DAILY. ONE TABLET VERY THREE HOURS 8 am, 11 am, 2 pm, 5 pm, 8 pm,  11 pm, 2 am, 5 am.     Last dispensed from pharmacy on   1/26/2022    Patient's last office/virtual visit by prescribing provider on   12/17/2021    Follow up:  NO TIME WAS GIVEN    ED:   NONE    PSYCH:  NONE      Next office/virtual appointment scheduled for   02/22/222    Last urine drug screen date  01/12/2021    Current opioid agreement on file (completed within the last year) No    Date of opioid agreement:   01/12/2021    E-prescribe to [facility] - [place], [place] - [address]  [phone] pharmacy    Will route to [facility] for review and preparation of prescription(s).       Amairani.TRAVON Dickey (AAMA).............. February 7, 2022  10:13 AM

## 2022-02-07 NOTE — TELEPHONE ENCOUNTER
Medication refill information reviewed.     Prescriptions prepped for review.   Pending Prescriptions:                       Disp   Refills    oxyCODONE IR (ROXICODONE) 15 MG tablet    112 ta*0            Sig: Take 1 tablet (15 mg) by mouth 8 times daily for           14 days One tablet every three hours 8 am, 11 am,           2pm, 5 pm, 8 pm, 11 pm, 2 am, 5 am    Will route to provider.

## 2022-02-08 ENCOUNTER — VIRTUAL VISIT (OUTPATIENT)
Dept: INTERNAL MEDICINE | Facility: CLINIC | Age: 52
End: 2022-02-08
Payer: COMMERCIAL

## 2022-02-08 DIAGNOSIS — R27.0 ATAXIA, UNSPECIFIED: ICD-10-CM

## 2022-02-08 DIAGNOSIS — F11.20 OPIOID DEPENDENCE, UNCOMPLICATED (H): ICD-10-CM

## 2022-02-08 DIAGNOSIS — M54.12 CERVICAL MYELOPATHY WITH CERVICAL RADICULOPATHY (H): ICD-10-CM

## 2022-02-08 DIAGNOSIS — I89.0 LYMPHEDEMA OF BOTH LOWER EXTREMITIES: ICD-10-CM

## 2022-02-08 DIAGNOSIS — G62.9 NEUROPATHY: ICD-10-CM

## 2022-02-08 DIAGNOSIS — M10.9 URIC ACID ARTHROPATHY: ICD-10-CM

## 2022-02-08 DIAGNOSIS — Z11.59 ENCOUNTER FOR HEPATITIS C SCREENING TEST FOR LOW RISK PATIENT: ICD-10-CM

## 2022-02-08 DIAGNOSIS — D64.9 ANEMIA, UNSPECIFIED TYPE: ICD-10-CM

## 2022-02-08 DIAGNOSIS — Z12.5 PROSTATE CANCER SCREENING: ICD-10-CM

## 2022-02-08 DIAGNOSIS — Z00.00 PREVENTATIVE HEALTH CARE: ICD-10-CM

## 2022-02-08 DIAGNOSIS — I10 ESSENTIAL HYPERTENSION: ICD-10-CM

## 2022-02-08 DIAGNOSIS — T40.2X5A THERAPEUTIC OPIOID INDUCED CONSTIPATION: Primary | ICD-10-CM

## 2022-02-08 DIAGNOSIS — E55.9 VITAMIN D DEFICIENCY: ICD-10-CM

## 2022-02-08 DIAGNOSIS — K59.03 THERAPEUTIC OPIOID INDUCED CONSTIPATION: Primary | ICD-10-CM

## 2022-02-08 DIAGNOSIS — G95.9 CERVICAL MYELOPATHY WITH CERVICAL RADICULOPATHY (H): ICD-10-CM

## 2022-02-08 PROCEDURE — 99215 OFFICE O/P EST HI 40 MIN: CPT | Mod: 95 | Performed by: INTERNAL MEDICINE

## 2022-02-08 RX ORDER — LACTULOSE 20 G/30ML
60 SOLUTION ORAL 3 TIMES DAILY
Qty: 5400 ML | Refills: 11 | Status: SHIPPED | OUTPATIENT
Start: 2022-02-08 | End: 2022-08-23

## 2022-02-08 RX ORDER — CYANOCOBALAMIN 1000 UG/ML
1000 INJECTION, SOLUTION INTRAMUSCULAR; SUBCUTANEOUS
Status: DISCONTINUED | OUTPATIENT
Start: 2022-02-09 | End: 2022-11-28

## 2022-02-08 NOTE — PROGRESS NOTES
Bola is a 52 year old male contacting the clinic today via video, who will use the platform: cheerapp for the visit.  Phone # for Doximity, or if Amwell drops:   Telephone Information:   Mobile [phone]          ASSESSMENT and PLAN:  1. Therapeutic opioid induced constipation  Insurance refusal leading to lactulose with slight improvement.  We will push dose aggressively.  Would of course also like to decrease narcotics  - lactulose 20 GM/30ML SOLN; Take 60 mLs by mouth 3 times daily For constipation. To replace senokot, magnesium, miralax if covered by insurance  Dispense: 5400 mL; Refill: 11    2. Cervical myelopathy with cervical radiculopathy (H)  Status post surgery last year with persisting pain on narcotics    3. Opioid dependence, uncomplicated (H)  Narcotics through pain clinic    4. Essential hypertension  Recheck labs with attention to hyponatremia  - Comprehensive metabolic panel; Future  - Lipid panel reflex to direct LDL Fasting; Future    5. Lymphedema of both lower extremities  Edema present but controlled  - TSH; Future    6. Preventative health care  - REVIEW OF HEALTH MAINTENANCE PROTOCOL ORDERS    7. Prostate cancer screening  - Prostate Specific Antigen Screen; Future    8. Uric acid arthropathy    9. Vitamin D deficiency  - Vitamin D Deficiency; Future    10. Anemia, unspecified type  - CBC with Platelets & Differential; Future  - Vitamin B12; Future    11. Encounter for hepatitis C screening test for low risk patient  - Hepatitis C antibody; Future    12. Neuropathy  Resume vitamin B-12 injections which were helpful for neuropathy prior to TCU stay  - cyanocobalamin injection 1,000 mcg    13. Ataxia, unspecified   - TSH; Future     Preventive Care Assessed: Update labs as above     Patient Instructions   Increase lactulose to 60 cc twice daily    Continue bupropion 3 times daily    Update labs for health maintenance and follow-up abnormal labs    Resume vitamin B12 shots       "  Medication list carefully reviewed and corrected  Epic Merger Problem list addressed and updated  Return in about 6 weeks (around 3/22/2022) for using a video visit.    CHIEF COMPLAINT:  Chief Complaint   Patient presents with     Medication Follow-up       HISTORY OF PRESENT ILLNESS:  Bola is a 52 year old male contacting the clinic today via video for review of medication.  When we last talked in December we attempted to treat his severe constipation.  Requests for medication were refused by insurance company.  Consultation with Pharm.D. suggested lactulose which she is taking twice daily with slight improvement.  No side effects but still constipated    He has successfully been able to quit smoking with addition of bupropion    Health maintenance and labs were reviewed.  While hospitalized in December for surgery he had both blood loss anemia and hyponatremia    REVIEW OF SYSTEMS:   Peripheral edema under control    PFSH:  Social History     Social History Narrative    He is .  He has been a  and also a counselor, and worked with [employer].  He smokes and does not drink alcohol.  He is now on disability due to his brain injury and bipolar disease.       TOBACCO USE:  History   Smoking Status     Current Some Day Smoker     Packs/day: 0.50     Years: 11.00     Types: Cigarettes, Cigarettes, Cigarettes     Start date: 8/20/2009     Last attempt to quit: 2/21/2017   Smokeless Tobacco     Former User     Comment: 0.5 pack per day       VITALS:  There were no vitals filed for this visit.  There were no vitals taken for this visit. Estimated body mass index is 32.23 kg/m  as calculated from the following:    Height as of 9/17/21: 1.727 m (5' 8\").    Weight as of 9/1/21: 96.2 kg (212 lb).    PHYSICAL EXAM:  (observations via Video)  Alert and oriented.  Missing top left tooth    MEDICATIONS:   Current Outpatient Medications   Medication Sig Dispense Refill     " acetaminophen (TYLENOL) 650 MG CR tablet Take 2 tablets (1,300 mg) by mouth 3 times daily 168 tablet 1     albuterol (PROAIR HFA/PROVENTIL HFA/VENTOLIN HFA) 108 (90 Base) MCG/ACT inhaler Inhale 2 puffs into the lungs every 6 hours as needed 6.7 g 11     allopurinol (ZYLOPRIM) 300 MG tablet Take 1 tablet (300 mg) by mouth 2 times daily 180 tablet 3     ammonium lactate (LAC-HYDRIN) 12 % external lotion        amphetamine-dextroamphetamine (ADDERALL) 15 MG tablet Take 1 tablet (15 mg) by mouth daily 30 tablet 0     bisacodyl (DULCOLAX) 10 MG suppository Place 1 suppository (10 mg) rectally daily as needed for constipation 60 suppository 1     buPROPion (WELLBUTRIN SR) 150 MG 12 hr tablet Take 1 tablet (150 mg) by mouth 3 times daily 90 tablet 11     chlorhexidine (PERIDEX) 0.12 % solution Swish and spit 15 mLs in mouth 3 times daily as needed (sore throat) 473 mL 3     ergocalciferol (ERGOCALCIFEROL) 1.25 MG (55204 UT) capsule Take 1 capsule (50,000 Units) by mouth once a week 12 capsule 3     fexofenadine (ALLEGRA) 180 MG tablet Take 180 mg by mouth daily as needed       furosemide (LASIX) 40 MG tablet Take 1 tablet (40 mg) by mouth daily 30 tablet 11     Glycerin, Laxative, (GLYCERIN, ADULT,) 2 g SUPP Use as directed for episodes of constipation lasting longer than 3 days       lactulose 20 GM/30ML SOLN Take 60 mLs by mouth 3 times daily For constipation. To replace senokot, magnesium, miralax if covered by insurance 5400 mL 11     lamoTRIgine (LAMICTAL) 150 MG tablet Take 150 mg by mouth 3 times daily       LANsoprazole (PREVACID) 30 MG DR capsule Take 30 mg by mouth daily       levofloxacin (LEVAQUIN) 750 MG tablet Take 1 tablet (750 mg) by mouth daily 42 tablet 0     lidocaine (LIDODERM) 5 % patch Apply 1 patch topically to painful area of skin once daily and remove per schedule.       lidocaine (TOPICAINE 5) 5 % anorectal gel Apply 1 inch topically        lidocaine (XYLOCAINE) 2 % external gel Apply 1 inch  topically 4 times a day as needed to gums.       memantine (NAMENDA) 10 MG tablet Take 1 tablet (10 mg) by mouth 2 times daily 60 tablet 3     methocarbamol (ROBAXIN) 750 MG tablet Take 1 tablet (750 mg) by mouth 4 times daily 120 tablet 1     metoprolol succinate ER (TOPROL-XL) 50 MG 24 hr tablet Take 1 tablet (50 mg) by mouth daily 90 tablet 3     montelukast (SINGULAIR) 10 MG tablet Take 1 tablet by mouth daily       naloxone (NARCAN) 4 MG/0.1ML nasal spray 1 spray (4 mg dose) into one nostril for opioid reversal. Call 911. May repeat if no response in 3 minutes.       [START ON 2/11/2022] oxyCODONE IR (ROXICODONE) 15 MG tablet Take 1 tablet (15 mg) by mouth 8 times daily for 14 days One tablet every three hours 8 am, 11 am, 2pm, 5 pm, 8 pm, 11 pm, 2 am, 5 am 112 tablet 0     polyethylene glycol (MIRALAX) 17 GM/Dose powder Take 34 g by mouth 2 times daily 2040 g 11     QUEtiapine (SEROQUEL) 300 MG tablet Take 600 mg by mouth At Bedtime       QUEtiapine (SEROQUEL) 50 MG tablet Take 100 mg by mouth 4 times daily as needed        senna-docusate (SENOKOT-S/PERICOLACE) 8.6-50 MG tablet Take 2 tablets by mouth 2 times daily 120 tablet 11     triamterene-HCTZ (MAXZIDE-25) 37.5-25 MG tablet Take 1 tablet by mouth daily 30 tablet 11     zonisamide (ZONEGRAN) 25 MG capsule Take 2 capsules (50 mg) by mouth 4 times daily 120 capsule 3       Outside Notes summarized: Operative report and hospital discharge summary December  Labs, x-rays, cardiology, GI tests reviewed: Hyponatremia  No results for input(s): HGB, NA, POTASSIUM, CR, A1C, PSA, URIC, B12, TSH in the last 80746 hours.  No results found for: [id number]  Lab Results   Component Value Date    VITDT 48 05/05/2014     No components found for: VITD  Lab Results   Component Value Date    CHOL 183 09/20/2018     New orders:   Orders Placed This Encounter   Procedures     REVIEW OF HEALTH MAINTENANCE PROTOCOL ORDERS     TSH     Comprehensive metabolic panel     Hepatitis C  antibody     Lipid panel reflex to direct LDL Fasting     Prostate Specific Antigen Screen     Vitamin D Deficiency     Vitamin B12     CBC with Platelets & Differential       Independent review of:    Supplemental history by:      Patient has given verbal consent to a Video visit?  Yes  How would you like to obtain your AVS?  MyChart  Will anyone else be joining your video visit? No        Video-Visit Details  Type of service:  Video Visit  Originating Location (pt. Location): Home  Distant Location (provider location):   [facility]    Devon Lund MD      [facility]    Video Start Time: 2:17 PM  Video End time:  2:52 PM  Face to face plus orders: 35 minutes  Documentation time:  3 minutes     The visit lasted a total of 40 minutes

## 2022-02-08 NOTE — LETTER
[facility]  [address] [place] [address] [phone]        February 9, 2022    Regarding:  Bola Lyon   [address] [place] [address]        To Whom It May Concern;     1. Please fax current medication list to [phone] for review     2. Medication Changes from visit 2/8/22 as follows:   Increase lactulose to 60 cc twice daily    Continue bupropion 3 times daily    Update labs for health maintenance and follow-up abnormal labs    Resume vitamin B12 shots    Discontinue previous antimotility medicines    Continue levofloxacin    3. Please have the following labs drawn and fax results to [phone]    Uric Acid - Dx D64.9  Anemia   Vitamin B12 - Dx D64.9  Anemia   Vitamin D - E55.9 Vitamin D Deficiency    PSA - Z12.5 - Prostate Cancer Screen    Lipid Panel - I10 Essential HTN   Comprehensive Metabolic Panel  - I10 Essential HTN   TSH - I89.0 Lymphedema   CBC w/Platlets - Dx D64.9  Anemia   Hepatitis C Antibody- Z11.59 Hepatitis C Screen      Sincerely,        Devon Lund MD

## 2022-02-08 NOTE — PATIENT INSTRUCTIONS
Increase lactulose to 60 cc twice daily    Continue bupropion 3 times daily    Update labs for health maintenance and follow-up abnormal labs    Resume vitamin B12 shots    Discontinue previous antimotility medicines    Continue levofloxacin

## 2022-02-17 PROBLEM — Z98.890 HX OF DECOMPRESSIVE LUMBAR LAMINECTOMY: Status: ACTIVE | Noted: 2021-12-01

## 2022-02-22 ENCOUNTER — VIRTUAL VISIT (OUTPATIENT)
Dept: PALLIATIVE MEDICINE | Facility: OTHER | Age: 52
End: 2022-02-22
Payer: COMMERCIAL

## 2022-02-22 DIAGNOSIS — M54.12 CERVICAL RADICULOPATHY AT C8: Primary | ICD-10-CM

## 2022-02-22 DIAGNOSIS — G89.4 CHRONIC PAIN DISORDER: ICD-10-CM

## 2022-02-22 DIAGNOSIS — M48.02 FORAMINAL STENOSIS OF CERVICAL REGION: ICD-10-CM

## 2022-02-22 PROCEDURE — 99213 OFFICE O/P EST LOW 20 MIN: CPT | Mod: 95 | Performed by: ANESTHESIOLOGY

## 2022-02-22 RX ORDER — ZONISAMIDE 25 MG/1
75 CAPSULE ORAL 4 TIMES DAILY
Qty: 180 CAPSULE | Refills: 3 | Status: SHIPPED | OUTPATIENT
Start: 2022-02-22 | End: 2022-05-06

## 2022-02-22 RX ORDER — HYDROXYZINE HYDROCHLORIDE 25 MG/1
TABLET, FILM COATED ORAL
COMMUNITY
Start: 2022-02-10 | End: 2022-10-19 | Stop reason: ALTCHOICE

## 2022-02-22 RX ORDER — CALCIUM CARBONATE 500 MG/1
TABLET, CHEWABLE ORAL
COMMUNITY
Start: 2022-02-10 | End: 2024-09-05

## 2022-02-22 RX ORDER — OXYCODONE HYDROCHLORIDE 15 MG/1
15 TABLET ORAL
Qty: 112 TABLET | Refills: 0 | Status: SHIPPED | OUTPATIENT
Start: 2022-02-22 | End: 2022-03-07

## 2022-02-22 ASSESSMENT — PAIN SCALES - GENERAL: PAINLEVEL: EXTREME PAIN (8)

## 2022-02-22 NOTE — PROGRESS NOTES
Jan is a 52 year old who is being evaluated via a billable video visit.      How would you like to obtain your AVS?   If the video visit is dropped, the invitation should be resent by:   Will anyone else be joining your video visit?     Video Start Time: 1:07        Subjective   Jan is a 52 year old who presents for the following health issues   Multiple orthopedic surgeries, recent lumbar fusion 2 months ago.    Reviews presently lying on his back on ice.  Saw his surgeon for the second follow-up.  Told the x-rays look good.  Antonino reviewed has been the most challenging recovery is having surgery and was told it has been the most extensive with fusion and straps to his pelvis.  He will be starting physical therapy in the home soon.    He continues with mid back and neck pain as well.    He also sees see a dentist, needs root canals and has peridontal dz.    HPI     He has not had coverage for medications such as Movantik for opiate-induced constipation and has used lactulose, increase the dose seems to help.    Continues on the oxycodone 15 mg every 3 hours notes still this is a challenge but he is doing better for pain coverage.   reviewed as expected.  Last urine drug test in September as expected.    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  His psychiatrist placed him on hydroxyzine 25 mg 3 times a day which helps somewhat.  At our last visit we increase phenacemide to 50 mg 4 times a day which he thinks has had some benefit and no side effects.  We've been cautious with benzodiazepines given his dose of opioid.      Current Outpatient Medications:      acetaminophen (TYLENOL) 650 MG CR tablet, Take 2 tablets (1,300 mg) by mouth 3 times daily, Disp: 168 tablet, Rfl: 1     albuterol (PROAIR HFA/PROVENTIL HFA/VENTOLIN HFA) 108 (90 Base) MCG/ACT inhaler, Inhale 2 puffs into the lungs every 6  hours as needed, Disp: 6.7 g, Rfl: 11     allopurinol (ZYLOPRIM) 300 MG tablet, Take 1 tablet (300 mg) by mouth 2 times daily, Disp: 180 tablet, Rfl: 3     ammonium lactate (LAC-HYDRIN) 12 % external lotion, , Disp: , Rfl:      amphetamine-dextroamphetamine (ADDERALL) 15 MG tablet, Take 1 tablet (15 mg) by mouth daily, Disp: 30 tablet, Rfl: 0     bisacodyl (DULCOLAX) 10 MG suppository, Place 1 suppository (10 mg) rectally daily as needed for constipation, Disp: 60 suppository, Rfl: 1     buPROPion (WELLBUTRIN SR) 150 MG 12 hr tablet, Take 1 tablet (150 mg) by mouth 3 times daily, Disp: 90 tablet, Rfl: 11     CALCIUM ANTACID 500 MG chewable tablet, , Disp: , Rfl:      chlorhexidine (PERIDEX) 0.12 % solution, Swish and spit 15 mLs in mouth 3 times daily as needed (sore throat), Disp: 473 mL, Rfl: 3     ergocalciferol (ERGOCALCIFEROL) 1.25 MG (64855 UT) capsule, Take 1 capsule (50,000 Units) by mouth once a week, Disp: 12 capsule, Rfl: 3     fexofenadine (ALLEGRA) 180 MG tablet, Take 180 mg by mouth daily as needed, Disp: , Rfl:      furosemide (LASIX) 40 MG tablet, Take 1 tablet (40 mg) by mouth daily, Disp: 30 tablet, Rfl: 11     Glycerin, Laxative, (GLYCERIN, ADULT,) 2 g SUPP, Use as directed for episodes of constipation lasting longer than 3 days, Disp: , Rfl:      hydrOXYzine (ATARAX) 25 MG tablet, , Disp: , Rfl:      lactulose 20 GM/30ML SOLN, Take 60 mLs by mouth 3 times daily For constipation. To replace senokot, magnesium, miralax if covered by insurance, Disp: 5400 mL, Rfl: 11     lamoTRIgine (LAMICTAL) 150 MG tablet, Take 150 mg by mouth 3 times daily, Disp: , Rfl:      LANsoprazole (PREVACID) 30 MG DR capsule, Take 30 mg by mouth daily, Disp: , Rfl:      levofloxacin (LEVAQUIN) 750 MG tablet, Take 1 tablet (750 mg) by mouth daily, Disp: 42 tablet, Rfl: 0     lidocaine (LIDODERM) 5 % patch, Apply 1 patch topically to painful area of skin once daily and remove per schedule., Disp: , Rfl:      lidocaine  (XYLOCAINE) 2 % external gel, Apply 1 inch topically 4 times a day as needed to gums., Disp: , Rfl:      methocarbamol (ROBAXIN) 750 MG tablet, Take 1 tablet (750 mg) by mouth 4 times daily, Disp: 120 tablet, Rfl: 1     metoprolol succinate ER (TOPROL-XL) 50 MG 24 hr tablet, Take 1 tablet (50 mg) by mouth daily, Disp: 90 tablet, Rfl: 3     montelukast (SINGULAIR) 10 MG tablet, Take 1 tablet by mouth daily, Disp: , Rfl:      naloxone (NARCAN) 4 MG/0.1ML nasal spray, 1 spray (4 mg dose) into one nostril for opioid reversal. Call 911. May repeat if no response in 3 minutes., Disp: , Rfl:      QUEtiapine (SEROQUEL) 300 MG tablet, Take 600 mg by mouth At Bedtime, Disp: , Rfl:      QUEtiapine (SEROQUEL) 50 MG tablet, Take 100 mg by mouth 4 times daily as needed , Disp: , Rfl:      senna-docusate (SENOKOT-S/PERICOLACE) 8.6-50 MG tablet, Take 2 tablets by mouth 2 times daily, Disp: 120 tablet, Rfl: 11     triamterene-HCTZ (MAXZIDE-25) 37.5-25 MG tablet, Take 1 tablet by mouth daily, Disp: 30 tablet, Rfl: 11     zonisamide (ZONEGRAN) 25 MG capsule, Take 2 capsules (50 mg) by mouth 4 times daily, Disp: 120 capsule, Rfl: 3     lidocaine (TOPICAINE 5) 5 % anorectal gel, Apply 1 inch topically  (Patient not taking: Reported on 2/22/2022), Disp: , Rfl:      oxyCODONE IR (ROXICODONE) 15 MG tablet, Take 1 tablet (15 mg) by mouth 8 times daily One tablet every three hours 8 am, 11 am, 2pm, 5 pm, 8 pm, 11 pm, 2 am, 5 am, Disp: 112 tablet, Rfl: 0    Current Facility-Administered Medications:      cyanocobalamin injection 1,000 mcg, 1,000 mcg, Intramuscular, Q14 Days, Devon Lund MD      Review of Systems         Objective    Vitals - Patient Reported  Pain Score: Extreme Pain (8) (constant with flares)  Pain Loc: Low Back (tailbone most painful now)    He is alert with a clear sensorium good eye contact.  Thought process logical.  Affect is constricted no respiratory distress.    Physical Exam       Assessment: Patient has  had significant pain related to extensive orthopedic surgery.  Has a comorbid anxiety disorder with PTSD and bipolar spectrum.  Has had a history of delirium with polypharmacy.  However he notes significant episodes of anxiety.    Plan: We will increase zonisamide to 75 mg 4 times a day, asking staff to monitor for any cognitive changes.    Total time more than 20 minutes            Video-Visit Details    Type of service:  Video Visit    Video End Time:  1:28    Originating Location (pt. Location): home    Distant Location (provider location):  [facility]     Platform used for Video Visit: Hanna

## 2022-02-22 NOTE — PATIENT INSTRUCTIONS
[facility] Critical access hospital Number:  [phone]    Call with any questions about your care and for scheduling assistance.     Calls are returned Monday through Friday between 8 AM and 4:30 PM. We usually get back to you within 2 business days depending on the issue/request.    If we are prescribing your medications:    For opioid medication refills, call the clinic or send a [facility] message 7 days in advance.  Please include:    Name of requested medication    Name of the pharmacy.    For non-opioid medications, call your pharmacy directly to request a refill. Please allow 3-4 days to be processed.     Per [place] State Law:    All controlled substance prescriptions must be filled within 30 days of being written.      For those controlled substances allowing refills, pickup must occur within 30 days of last fill.      We believe regular attendance is key to your success in our program!      Any time you are unable to keep your appointment we ask that you call us at least 24 hours in advance to cancel.This will allow us to offer the appointment time to another patient.     Multiple missed appointments may lead to dismissal from the clinic.        Plan:    Continue the oxycodone 15 mg every 3 hours.    Increase zonisamide to 75 mg 4 times a day.  Staff to watch for any mental status changes.    Follow-up with Dr. Cramer in 6 to 8 weeks

## 2022-02-22 NOTE — LETTER
2/22/2022         RE: Bola Lyon Jr.  [address]  [place] [address]        Dear Colleague,    Thank you for referring your patient, Bola Lyon Jr., to the [facility]. Please see a copy of my visit note below.    Jan is a 52 year old who is being evaluated via a billable video visit.      How would you like to obtain your AVS? Mychart  If the video visit is dropped, the invitation should be resent by: [phone]  Will anyone else be joining your video visit? No      PEG Score 6/23/2021 12/17/2021 2/22/2022   PEG Total Score 8 10 9.67     UDT/CSA = 1/12/2021    Jan is a 52 year old who is being evaluated via a billable video visit.      How would you like to obtain your AVS?   If the video visit is dropped, the invitation should be resent by:   Will anyone else be joining your video visit?     Video Start Time: 1:07        Subjective   Jan is a 52 year old who presents for the following health issues   Multiple orthopedic surgeries, recent lumbar fusion 2 months ago.    Reviews presently lying on his back on ice.  Saw his surgeon for the second follow-up.  Told the x-rays look good.  Antonino reviewed has been the most challenging recovery is having surgery and was told it has been the most extensive with fusion and straps to his pelvis.  He will be starting physical therapy in the home soon.    He continues with mid back and neck pain as well.    He also sees see a dentist, needs root canals and has peridontal dz.    HPI     He has not had coverage for medications such as Movantik for opiate-induced constipation and has used lactulose, increase the dose seems to help.    Continues on the oxycodone 15 mg every 3 hours notes still this is a challenge but he is doing better for pain coverage.   reviewed as expected.  Last urine drug test in September as expected.    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  His psychiatrist placed him on hydroxyzine 25 mg 3 times a day which helps somewhat.  At our last visit we increase phenacemide to 50 mg 4 times a day which he thinks has had some benefit and no side effects.  We've been cautious with benzodiazepines given his dose of opioid.      Current Outpatient Medications:      acetaminophen (TYLENOL) 650 MG CR tablet, Take 2 tablets (1,300 mg) by mouth 3 times daily, Disp: 168 tablet, Rfl: 1     albuterol (PROAIR HFA/PROVENTIL HFA/VENTOLIN HFA) 108 (90 Base) MCG/ACT inhaler, Inhale 2 puffs into the lungs every 6 hours as needed, Disp: 6.7 g, Rfl: 11     allopurinol (ZYLOPRIM) 300 MG tablet, Take 1 tablet (300 mg) by mouth 2 times daily, Disp: 180 tablet, Rfl: 3     ammonium lactate (LAC-HYDRIN) 12 % external lotion, , Disp: , Rfl:      amphetamine-dextroamphetamine (ADDERALL) 15 MG tablet, Take 1 tablet (15 mg) by mouth daily, Disp: 30 tablet, Rfl: 0     bisacodyl (DULCOLAX) 10 MG suppository, Place 1 suppository (10 mg) rectally daily as needed for constipation, Disp: 60 suppository, Rfl: 1     buPROPion (WELLBUTRIN SR) 150 MG 12 hr tablet, Take 1 tablet (150 mg) by mouth 3 times daily, Disp: 90 tablet, Rfl: 11     CALCIUM ANTACID 500 MG chewable tablet, , Disp: , Rfl:      chlorhexidine (PERIDEX) 0.12 % solution, Swish and spit 15 mLs in mouth 3 times daily as needed (sore throat), Disp: 473 mL, Rfl: 3     ergocalciferol (ERGOCALCIFEROL) 1.25 MG (89868 UT) capsule, Take 1 capsule (50,000 Units) by mouth once a week, Disp: 12 capsule, Rfl: 3     fexofenadine (ALLEGRA) 180 MG tablet, Take 180 mg by mouth daily as needed, Disp: , Rfl:      furosemide (LASIX) 40 MG tablet, Take 1 tablet (40 mg) by mouth daily, Disp: 30 tablet, Rfl: 11     Glycerin, Laxative, (GLYCERIN, ADULT,) 2 g SUPP, Use as directed for episodes of constipation lasting longer than 3 days, Disp: , Rfl:      hydrOXYzine (ATARAX) 25 MG tablet, , Disp: , Rfl:       lactulose 20 GM/30ML SOLN, Take 60 mLs by mouth 3 times daily For constipation. To replace senokot, magnesium, miralax if covered by insurance, Disp: 5400 mL, Rfl: 11     lamoTRIgine (LAMICTAL) 150 MG tablet, Take 150 mg by mouth 3 times daily, Disp: , Rfl:      LANsoprazole (PREVACID) 30 MG DR capsule, Take 30 mg by mouth daily, Disp: , Rfl:      levofloxacin (LEVAQUIN) 750 MG tablet, Take 1 tablet (750 mg) by mouth daily, Disp: 42 tablet, Rfl: 0     lidocaine (LIDODERM) 5 % patch, Apply 1 patch topically to painful area of skin once daily and remove per schedule., Disp: , Rfl:      lidocaine (XYLOCAINE) 2 % external gel, Apply 1 inch topically 4 times a day as needed to gums., Disp: , Rfl:      methocarbamol (ROBAXIN) 750 MG tablet, Take 1 tablet (750 mg) by mouth 4 times daily, Disp: 120 tablet, Rfl: 1     metoprolol succinate ER (TOPROL-XL) 50 MG 24 hr tablet, Take 1 tablet (50 mg) by mouth daily, Disp: 90 tablet, Rfl: 3     montelukast (SINGULAIR) 10 MG tablet, Take 1 tablet by mouth daily, Disp: , Rfl:      naloxone (NARCAN) 4 MG/0.1ML nasal spray, 1 spray (4 mg dose) into one nostril for opioid reversal. Call 911. May repeat if no response in 3 minutes., Disp: , Rfl:      QUEtiapine (SEROQUEL) 300 MG tablet, Take 600 mg by mouth At Bedtime, Disp: , Rfl:      QUEtiapine (SEROQUEL) 50 MG tablet, Take 100 mg by mouth 4 times daily as needed , Disp: , Rfl:      senna-docusate (SENOKOT-S/PERICOLACE) 8.6-50 MG tablet, Take 2 tablets by mouth 2 times daily, Disp: 120 tablet, Rfl: 11     triamterene-HCTZ (MAXZIDE-25) 37.5-25 MG tablet, Take 1 tablet by mouth daily, Disp: 30 tablet, Rfl: 11     zonisamide (ZONEGRAN) 25 MG capsule, Take 2 capsules (50 mg) by mouth 4 times daily, Disp: 120 capsule, Rfl: 3     lidocaine (TOPICAINE 5) 5 % anorectal gel, Apply 1 inch topically  (Patient not taking: Reported on 2/22/2022), Disp: , Rfl:      oxyCODONE IR (ROXICODONE) 15 MG tablet, Take 1 tablet (15 mg) by mouth 8 times daily  One tablet every three hours 8 am, 11 am, 2pm, 5 pm, 8 pm, 11 pm, 2 am, 5 am, Disp: 112 tablet, Rfl: 0    Current Facility-Administered Medications:      cyanocobalamin injection 1,000 mcg, 1,000 mcg, Intramuscular, Q14 Days, Devon Lund MD      Review of Systems         Objective    Vitals - Patient Reported  Pain Score: Extreme Pain (8) (constant with flares)  Pain Loc: Low Back (tailbone most painful now)    He is alert with a clear sensorium good eye contact.  Thought process logical.  Affect is constricted no respiratory distress.    Physical Exam       Assessment: Patient has had significant pain related to extensive orthopedic surgery.  Has a comorbid anxiety disorder with PTSD and bipolar spectrum.  Has had a history of delirium with polypharmacy.  However he notes significant episodes of anxiety.    Plan: We will increase zonisamide to 75 mg 4 times a day, asking staff to monitor for any cognitive changes.    Total time more than 20 minutes            Video-Visit Details    Type of service:  Video Visit    Video End Time:  1:28    Originating Location (pt. Location): home    Distant Location (provider location):  [facility]     Platform used for Video Visit: Doximety      Again, thank you for allowing me to participate in the care of your patient.        Sincerely,        JAYME MULLEN MD

## 2022-02-22 NOTE — PROGRESS NOTES
Jan is a 52 year old who is being evaluated via a billable video visit.      How would you like to obtain your AVS? Mychart  If the video visit is dropped, the invitation should be resent by: [phone]  Will anyone else be joining your video visit? No      PEG Score 6/23/2021 12/17/2021 2/22/2022   PEG Total Score 8 10 9.67     UDT/CSA = 1/12/2021

## 2022-02-25 ENCOUNTER — TELEPHONE (OUTPATIENT)
Dept: INTERNAL MEDICINE | Facility: CLINIC | Age: 52
End: 2022-02-25
Payer: COMMERCIAL

## 2022-02-25 NOTE — TELEPHONE ENCOUNTER
Reason for Call: Request for an order or referral:    Order or referral being requested:   Physical Therapy  2 times weekly for 2 weeks  1 time weekly for 3 weeks    OT - Eval   Medical Social Work - Eval    Severe drug interactions:  Seroquel and Levofloxacin   Hydroxyzine and Zonisamide  Mydroxyzine and Levofloxacin    Date needed: as soon as possible    Has the patient been seen by the PCP for this problem? NO    Additional comments: n/a    Phone number Patient can be reached at:  Other phone number:  [phone]    Best Time:  anytime    Can we leave a detailed message on this number?  YES    Call taken on 2/25/2022 at 3:05 PM by Helen Bowen

## 2022-03-02 ENCOUNTER — MEDICAL CORRESPONDENCE (OUTPATIENT)
Dept: HEALTH INFORMATION MANAGEMENT | Facility: CLINIC | Age: 52
End: 2022-03-02
Payer: COMMERCIAL

## 2022-03-07 ENCOUNTER — TELEPHONE (OUTPATIENT)
Dept: PALLIATIVE MEDICINE | Facility: OTHER | Age: 52
End: 2022-03-07
Payer: COMMERCIAL

## 2022-03-07 DIAGNOSIS — M48.02 FORAMINAL STENOSIS OF CERVICAL REGION: ICD-10-CM

## 2022-03-07 RX ORDER — OXYCODONE HYDROCHLORIDE 15 MG/1
15 TABLET ORAL
Qty: 112 TABLET | Refills: 0 | Status: SHIPPED | OUTPATIENT
Start: 2022-03-07 | End: 2022-04-01

## 2022-03-07 RX ORDER — OXYCODONE HYDROCHLORIDE 15 MG/1
15 TABLET ORAL
Qty: 112 TABLET | Refills: 0 | Status: SHIPPED | OUTPATIENT
Start: 2022-03-21 | End: 2022-04-13

## 2022-03-07 NOTE — TELEPHONE ENCOUNTER
Received call from patient requesting refill(s) of:   OXYCODONE IR (ROXICODONE) 15 MG TABLET     Last dispensed from pharmacy on :   02/23/2022    Patient's last office/virtual visit by prescribing provider on :   02/22/2022    Next office/virtual appointment scheduled for:    NO FUTURE APPOINTMENT AT THIS TIME --  Follow-up with Dr. Cramer in 6 to 8 weeks    Last urine drug screen date :  01/12/2021    Current opioid agreement on file (completed within the last year) Yes Date of opioid agreement:   01/12/2021    E-prescribe to   [facility] - [place], [place] - [address]   pharmacy    Will route to nursing [place] for review and preparation of prescription(s).     TRAVON Bauman ([facility])............. March 7, 2022  9:11 AM

## 2022-03-10 DIAGNOSIS — F31.81 BIPOLAR 2 DISORDER (H): ICD-10-CM

## 2022-03-10 DIAGNOSIS — S06.9X9S TRAUMATIC BRAIN INJURY WITH LOSS OF CONSCIOUSNESS, SEQUELA (H): ICD-10-CM

## 2022-03-11 RX ORDER — DEXTROAMPHETAMINE SACCHARATE, AMPHETAMINE ASPARTATE, DEXTROAMPHETAMINE SULFATE AND AMPHETAMINE SULFATE 3.75; 3.75; 3.75; 3.75 MG/1; MG/1; MG/1; MG/1
15 TABLET ORAL DAILY
Qty: 30 TABLET | Refills: 0 | Status: SHIPPED | OUTPATIENT
Start: 2022-03-11 | End: 2022-04-29

## 2022-03-24 ENCOUNTER — VIRTUAL VISIT (OUTPATIENT)
Dept: PHARMACY | Facility: OTHER | Age: 52
End: 2022-03-24
Payer: COMMERCIAL

## 2022-03-24 DIAGNOSIS — M48.02 FORAMINAL STENOSIS OF CERVICAL REGION: Primary | ICD-10-CM

## 2022-03-24 DIAGNOSIS — I10 ESSENTIAL HYPERTENSION: ICD-10-CM

## 2022-03-24 DIAGNOSIS — K59.03 DRUG-INDUCED CONSTIPATION: ICD-10-CM

## 2022-03-24 DIAGNOSIS — M48.02 FORAMINAL STENOSIS OF CERVICAL REGION: ICD-10-CM

## 2022-03-24 DIAGNOSIS — G89.4 CHRONIC PAIN SYNDROME: ICD-10-CM

## 2022-03-24 DIAGNOSIS — F31.77 BIPOLAR 1 DISORDER, MIXED, PARTIAL REMISSION (H): ICD-10-CM

## 2022-03-24 DIAGNOSIS — R79.89 LOW VITAMIN D LEVEL: ICD-10-CM

## 2022-03-24 DIAGNOSIS — Z98.890 HX OF DECOMPRESSIVE LUMBAR LAMINECTOMY: ICD-10-CM

## 2022-03-24 PROCEDURE — 99607 MTMS BY PHARM ADDL 15 MIN: CPT | Performed by: PHARMACIST

## 2022-03-24 PROCEDURE — 99606 MTMS BY PHARM EST 15 MIN: CPT | Performed by: PHARMACIST

## 2022-03-24 RX ORDER — METHOCARBAMOL 500 MG/1
1000 TABLET, FILM COATED ORAL 4 TIMES DAILY
Qty: 120 TABLET | Refills: 1 | Status: SHIPPED | OUTPATIENT
Start: 2022-03-24 | End: 2022-04-27

## 2022-03-24 RX ORDER — SENNOSIDES 8.6 MG
1300 CAPSULE ORAL 3 TIMES DAILY
Qty: 168 TABLET | Refills: 1 | Status: SHIPPED | OUTPATIENT
Start: 2022-03-24 | End: 2022-04-08

## 2022-03-24 NOTE — TELEPHONE ENCOUNTER
Patient requesting refill acetaminophen (TYLENOL) 650 MG  tablet    Pharmacy [facility] - [place], [place] - [address]      Kailyn Carolina  [facility] Patient Facilitator

## 2022-03-24 NOTE — PATIENT INSTRUCTIONS
Recommendations from today's MTM visit:                                                       1. Increase Methocarbamol to 1000 mg four times daily     2. Start Movantik 25 mg daily for constipation - the prior authorization was approved for this back in January.     Follow-up: Return in about 2 months (around 5/24/2022) for Medication Management Pharmacist, by phone.    It was great to speak with you today.  I value your experience and would be very thankful for your time with providing feedback on our clinic survey. You may receive a survey via email or text message in the next few days.     To schedule another MTM appointment, please call the clinic directly or you may call the MTM scheduling line at [phone] or toll-free at [phone].     My Clinical Pharmacist's contact information:                                                      Please feel free to contact me with any questions or concerns you have.      Shahriar Alanis, PharmD  Medication Therapy Management (MTM) Pharmacist  [facility]

## 2022-03-24 NOTE — PROGRESS NOTES
Medication Therapy Management (MTM) Encounter    ASSESSMENT:                            Medication Adherence/Access: Pt fairly familiar with his medications, but unable to confirm exactly what he's being administered in the facility. He will have his facility nurse fax an updated MAR.       Chronic Pain: Minimal improvement with recent increase in Zonisamide dose, but so far tolerating without adverse effects. Pt is going to be having imaging within the week to address surgical site pain, but continues to have muscle pain and spasms. May benefit from increase dose of Robaxin. IN the past has had doses up to 1 g four times daily from PCP. Continue to monitor for drowsiness with dose increase.      History of TBI:   Bipolar disorder: Some depression/frustration related to pain. Will continue to focus on pain control.       Low Vitamin D: Last level within target range 45-60 for chronic pain and mood support.       Constipation: Continued infrequent bowel movements. Pt unclear if he's using Movantik - PA was approved will have him double check with his facility RN. If he has been using Movantik, he will message back. At that time, could restart Senna-docusate.       Lymphedema/hypertension: Last BP at goal <140/90. Pulse at goal .         PLAN:                            -Increase Methocarbamol 500 mg to 2 tabs four times daily -   -Start Movantik 25 mg daily, as previously prescribed     Follow-up: Dr. Cramer -  Transferred to front desk to schedule.    Return in about 2 months (around 5/24/2022) for Medication Management Pharmacist, by phone.    SUBJECTIVE/OBJECTIVE:                          Bola Lyon is a 52 year old male called for a follow-up visit. He was referred to me from Dmitriy Cramer MD. Today's visit is a follow-up MTM visit from 1/20/22.     Last visit with Dr. Cramer on 2/22.     Reason for visit: Medication Management -     Allergies/ADRs: Reviewed in chart  Past Medical History:  "Reviewed in chart  Tobacco: He reports that he quit smoking about 5 years ago. His smoking use included cigarettes. He started smoking about 12 years ago. He has a 5.50 pack-year smoking history. He has quit using smokeless tobacco.Tobacco Cessation Action Plan:   Information offered: Patient not interested at this time    Alcohol:  reports no history of alcohol use.        Medication Adherence/Access: Currently in an assisted group home. Has medications managed for him.       Chronic Pain: Prescribed Acetaminophen 650 mg 2 tabs three times daily  lamotrigine 150 mg 3 times daily, lidocaine 5% patch daily, memantine 10 mg twice daily, methocarbamol 750 mg 4 times daily, oxycodone 15 mg 8 times daily, zonisamide 25 mg 3 caps 4 times daily (increased at last visit with Dr. Cramer)     Elective spine surgery at [facility] and December 2021  Dealing with a lot of discomfort throughout the day.     Had follow-up with surgery team on 3/15 - pt had \"lumps\" around the incision site. Was recommended to hold off on PT until he can have some additional imaging done. He's working on scheduling the imaging -should be done within the next week or so.     The recent increase in the Zonisamide was a bit helpful for the nerve pain. Has found this to be helpful, but states it is minimal.     Muscles in the low back into the legs are very achy and sore. Gets so bad that he starts to have painful twitching.     Denies concerns with feeling dizzy/drowsy.       Bipolar disorder: Prescribed lamotrigine 150 mg 3 times daily, quetiapine 300 mg 2 tabs at bedtime + quetiapine 50 mg 2 tabs 4 times daily as needed. Psychiatrist added Hydroxyzine as he was having increased anxiety due to the pain.     Has been upset more recently due to increase pain- pain has been triggering his PTSD episodes and depression.   Increase in Zonisamide wasn't very helpful for the anxiety.       Low Vitamin D: Prescribed Vitamin D2 50,000 units once weekly. "     Vitamin D Deficiency Screening Results:  Lab Results   Component Value Date    VITDT 48 05/05/2014        Constipation: Prescribed glycerin suppositories as needed, Lactulose 20 g twice daily,     Miralax wasn't covered.   Movantik required a PA but pt wasn't aware this got approved.    Not using Senna-S docusate.     Stool pattern has been 1 formed stool(s) every 2-3 days. Defecation has been difficult.       Lymphedema/hypertension: Prescribed furosemide 40 mg daily, metoprolol succinate 50 mg daily, triamterene-hydrochlorothiazide 37.5-25 mg daily    BP Readings from Last 3 Encounters:   09/01/21 120/78   04/30/21 116/68   03/19/21 108/60      Pulse Readings from Last 3 Encounters:   09/01/21 83   04/30/21 74   03/19/21 74             Today's Vitals: There were no vitals taken for this visit.  ----------------      I spent 39 minutes with this patient today. All changes were made via collaborative practice agreement with Dmitriy Cramer MD. A copy of the visit note was provided to the patient's provider(s).    The patient was sent via [employer] a summary of these recommendations.     Shahriar Alanis PharmD  Medication Therapy Management (MTM) Pharmacist  [facility]      Telemedicine Visit Details  Type of service:  Telephone visit  Start Time: 1:39 PM   End Time: 2:46 PM  Originating Location (patient location): [place]  Distant Location (provider location):  [facility]     Medication Therapy Recommendations  No medication therapy recommendations to display

## 2022-03-25 DIAGNOSIS — G89.4 CHRONIC PAIN DISORDER: Primary | ICD-10-CM

## 2022-04-01 ENCOUNTER — TELEPHONE (OUTPATIENT)
Dept: PALLIATIVE MEDICINE | Facility: OTHER | Age: 52
End: 2022-04-01
Payer: COMMERCIAL

## 2022-04-01 DIAGNOSIS — M48.02 FORAMINAL STENOSIS OF CERVICAL REGION: ICD-10-CM

## 2022-04-01 RX ORDER — OXYCODONE HYDROCHLORIDE 15 MG/1
15 TABLET ORAL
Qty: 112 TABLET | Refills: 0 | Status: SHIPPED | OUTPATIENT
Start: 2022-04-01 | End: 2022-04-13

## 2022-04-01 NOTE — TELEPHONE ENCOUNTER
Received call from patient 3/31/22 4:22pm requesting refill(s) of oxyCODONE IR (ROXICODONE) 15 MG tablet for Monday, 4/4/22.      Last dispensed from pharmacy on 03/21/22    Patient's last office/virtual visit by prescribing provider on 02/22/22    Next office/virtual appointment scheduled for 04/13/22    Last urine drug screen date 01/12/21    Current opioid agreement on file (completed within the last year) No Date of opioid agreement: 01/11/21    E-prescribe to   [facility] - [place], [place] - [address]  [address]  [facility] [address]  Phone: [phone] Fax: [phone]    Will route to nursing pool for review and preparation of prescription(s).     Roma Self [facility]

## 2022-04-01 NOTE — TELEPHONE ENCOUNTER
Pending Prescriptions:                       Disp   Refills    oxyCODONE IR (ROXICODONE) 15 MG tablet    112 ta*0            Sig: Take 1 tablet (15 mg) by mouth 8 times daily One           tablet every three hours 8 am, 11 am, 2pm, 5 pm,           8 pm, 11 pm, 2 am, 5 am, can fill today, use           starting 4/4/22    [facility]

## 2022-04-08 DIAGNOSIS — M48.02 FORAMINAL STENOSIS OF CERVICAL REGION: ICD-10-CM

## 2022-04-08 RX ORDER — SENNOSIDES 8.6 MG
1300 CAPSULE ORAL 3 TIMES DAILY
Qty: 168 TABLET | Refills: 1 | Status: SHIPPED | OUTPATIENT
Start: 2022-04-08 | End: 2022-05-04

## 2022-04-08 NOTE — TELEPHONE ENCOUNTER
Received fax request from [facility] - [place], [place] - [address] pharmacy requesting refill(s) for acetaminophen (TYLENOL) 650 MG CR tablet    Last refilled on 02/18/2022    Pt last seen on 02/22/2022  Next appt scheduled for 04/13/2022    Will facilitate refill.    Becca Otero MA  [facility] [place]

## 2022-04-08 NOTE — TELEPHONE ENCOUNTER
Patient requesting refill acetaminophen (TYLENOL) 650 MG  tablet    Pharmacy [facility] - [place], [place] - [address]    Kailyn Carolina  [facility] Patient Facilitator

## 2022-04-13 ENCOUNTER — VIRTUAL VISIT (OUTPATIENT)
Dept: PALLIATIVE MEDICINE | Facility: OTHER | Age: 52
End: 2022-04-13
Payer: COMMERCIAL

## 2022-04-13 DIAGNOSIS — M48.02 FORAMINAL STENOSIS OF CERVICAL REGION: ICD-10-CM

## 2022-04-13 PROCEDURE — 99213 OFFICE O/P EST LOW 20 MIN: CPT | Mod: 95 | Performed by: ANESTHESIOLOGY

## 2022-04-13 RX ORDER — OXYCODONE HYDROCHLORIDE 15 MG/1
15 TABLET ORAL
Qty: 112 TABLET | Refills: 0 | Status: CANCELLED | OUTPATIENT
Start: 2022-04-13

## 2022-04-13 RX ORDER — OXYCODONE HYDROCHLORIDE 15 MG/1
15 TABLET ORAL
Qty: 112 TABLET | Refills: 0 | Status: SHIPPED | OUTPATIENT
Start: 2022-04-18 | End: 2022-04-27

## 2022-04-13 ASSESSMENT — PAIN SCALES - GENERAL: PAINLEVEL: WORST PAIN (10)

## 2022-04-13 NOTE — PROGRESS NOTES
Patient presents to the clinic today for a follow up with JAYME MULLEN MD regarding Pain Management.        Jan is a 52 year old who is being evaluated via a billable video visit.      How would you like to obtain your AVS? MyChart  If the video visit is dropped, the invitation should be resent by: Text to cell phone: [phone]   Will anyone else be joining your video visit? No      Video Start Time:   Video-Visit Details    Type of service:  Video Visit    Video End Time:    Originating Location (pt. Location): [facility]    Distant Location (provider location):  [facility]     Platform used for Video Visit: Doximity            Is [place] currently in [place]? Yes        NOTE: If Pt is not in [place], Appointment needs to be canceled and rescheduled            PEG Score 12/17/2021 2/22/2022 4/13/2022   PEG Total Score 10 9.67 10               UDT/CSA = 1/12/2021            QUESTIONS:              Kailyn MOLINA [facility] Patient Facilitator

## 2022-04-13 NOTE — TELEPHONE ENCOUNTER
Received call from patient requesting refill(s) of Oxycodone 15 mg     Last dispensed from pharmacy on 04/04/2022    Patient's last office/virtual visit by prescribing provider on 02/22/2022  Next office/virtual appointment scheduled for 04/13/2022    Last urine drug screen date 01/12/2021  Current opioid agreement on file (completed within the last year) No Date of opioid agreement: 01/12/2021    E-prescribe to [facility] - [place], [place] - [address]  pharmacy    Will route to nursing [place] for review and preparation of prescription(s).     Becca Otero MA  [facility] [place]

## 2022-04-13 NOTE — PATIENT INSTRUCTIONS
PLAN:  You are meeting with the spine surgeon next week to review your recovery for surgery and your activity of physical therapy.    Continue with the oxycodone 15 mg every 3 hours.    Discussed may make a transition to morphine in the future.    Follow-up with Dr. Cramer in 8 weeks

## 2022-04-13 NOTE — PROGRESS NOTES
"[facility] - Office Visit    ASSESSMENT & PLAN     Bola was seen today for pain.    Diagnoses and all orders for this visit:    Foraminal stenosis of cervical region  -     oxyCODONE IR (ROXICODONE) 15 MG tablet; Take 1 tablet (15 mg) by mouth 8 times daily One tablet every three hours 8 am, 11 am, 2pm, 5 pm, 8 pm, 11 pm, 2 am, 5 am, can fill today, use starting 4/4/22        Patient Instructions   PLAN:  You are meeting with the spine surgeon next week to review your recovery for surgery and your activity of physical therapy.    Continue with the oxycodone 15 mg every 3 hours.    Discussed may make a transition to morphine in the future.    Follow-up with Dr. Mullen in 8 weeks        -----  JAYME MULLEN MD  [facility]       SUBJECTIVE      Bola Lyon Jr. is a 52 year old year old male who presents to clinic today for the following:     With diffuse osteoarthritis with a number of surgeries, presently recovering from lumbar fusion.    Reviewing the record 3/15 met with providers through his spine surgery, describes swelling above and below the levels of his fusion, to hold on physical therapy and repeat imaging.    He reviews today things \"suck, I am miserable\".  Describes being the most difficult of his 22 surgeries to recover.  He has another follow-up next week.  Reviews he had been active in physical therapy moving better, \"hit a wall\" began to have swelling above and below his fusion.  Now his days consist of spending a lot of time on ice, walking a bit 2-3 times a day holding on physical therapy to let the inflammation settle.    Describes his mental health is becoming \"sad and frustrated\".    He does have contact with his parents, couple of bodies, but he cannot ride in a car and go out.    It is affecting his managing his right knee replacement surgery and history of ankle fusion.  He used to elbow work his way through surgery before but he is not able to " this time.    He continues with the oxycodone 15 mg every 3 hours.  Reviewed using other agents.  He does not want to risk the insurance coverage work it takes to get at this dose.  Recalls he did take morphine in the hospital IVN tablets seem to tolerate.  He may consider transition in the future.   reviewed.  Last urine drug test was a year ago January.  He has not been coming to the office and will obtain when able.  We did previously try OxyContin, and oxymorphone, they were not useful alternatives.      Current Outpatient Medications:      acetaminophen (TYLENOL) 650 MG CR tablet, Take 2 tablets (1,300 mg) by mouth 3 times daily, Disp: 168 tablet, Rfl: 1     albuterol (PROAIR HFA/PROVENTIL HFA/VENTOLIN HFA) 108 (90 Base) MCG/ACT inhaler, Inhale 2 puffs into the lungs every 6 hours as needed, Disp: 6.7 g, Rfl: 11     allopurinol (ZYLOPRIM) 300 MG tablet, Take 1 tablet (300 mg) by mouth 2 times daily, Disp: 180 tablet, Rfl: 3     ammonium lactate (LAC-HYDRIN) 12 % external lotion, , Disp: , Rfl:      amphetamine-dextroamphetamine (ADDERALL) 15 MG tablet, Take 1 tablet (15 mg) by mouth daily, Disp: 30 tablet, Rfl: 0     bisacodyl (DULCOLAX) 10 MG suppository, Place 1 suppository (10 mg) rectally daily as needed for constipation, Disp: 60 suppository, Rfl: 1     buPROPion (WELLBUTRIN SR) 150 MG 12 hr tablet, Take 1 tablet (150 mg) by mouth 3 times daily, Disp: 90 tablet, Rfl: 11     CALCIUM ANTACID 500 MG chewable tablet, , Disp: , Rfl:      chlorhexidine (PERIDEX) 0.12 % solution, Swish and spit 15 mLs in mouth 3 times daily as needed (sore throat), Disp: 473 mL, Rfl: 3     ergocalciferol (ERGOCALCIFEROL) 1.25 MG (67545 UT) capsule, Take 1 capsule (50,000 Units) by mouth once a week, Disp: 12 capsule, Rfl: 3     fexofenadine (ALLEGRA) 180 MG tablet, Take 180 mg by mouth daily as needed, Disp: , Rfl:      furosemide (LASIX) 40 MG tablet, Take 1 tablet (40 mg) by mouth daily, Disp: 30 tablet, Rfl: 11      hydrOXYzine (ATARAX) 25 MG tablet, , Disp: , Rfl:      lactulose 20 GM/30ML SOLN, Take 60 mLs by mouth 3 times daily For constipation. To replace senokot, magnesium, miralax if covered by insurance, Disp: 5400 mL, Rfl: 11     lamoTRIgine (LAMICTAL) 150 MG tablet, Take 150 mg by mouth 3 times daily, Disp: , Rfl:      LANsoprazole (PREVACID) 30 MG DR capsule, Take 30 mg by mouth daily, Disp: , Rfl:      levofloxacin (LEVAQUIN) 750 MG tablet, Take 1 tablet (750 mg) by mouth daily, Disp: 42 tablet, Rfl: 0     lidocaine (LIDODERM) 5 % patch, Apply 1 patch topically to painful area of skin once daily and remove per schedule., Disp: , Rfl:      lidocaine (XYLOCAINE) 2 % external gel, Apply 1 inch topically 4 times a day as needed to gums., Disp: , Rfl:      methocarbamol (ROBAXIN) 500 MG tablet, Take 2 tablets (1,000 mg) by mouth 4 times daily, Disp: 120 tablet, Rfl: 1     metoprolol succinate ER (TOPROL-XL) 50 MG 24 hr tablet, Take 1 tablet (50 mg) by mouth daily, Disp: 90 tablet, Rfl: 3     montelukast (SINGULAIR) 10 MG tablet, Take 1 tablet by mouth daily, Disp: , Rfl:      naloxegol (MOVANTIK) 25 MG TABS tablet, Take 1 tablet (25 mg) by mouth every morning (before breakfast), Disp: 30 tablet, Rfl: 4     naloxegol (MOVANTIK) 25 MG TABS tablet, Take 1 tablet (25 mg) by mouth every morning (before breakfast), Disp: 30 tablet, Rfl: 1     naloxone (NARCAN) 4 MG/0.1ML nasal spray, 1 spray (4 mg dose) into one nostril for opioid reversal. Call 911. May repeat if no response in 3 minutes., Disp: , Rfl:      [START ON 4/18/2022] oxyCODONE IR (ROXICODONE) 15 MG tablet, Take 1 tablet (15 mg) by mouth 8 times daily One tablet every three hours 8 am, 11 am, 2pm, 5 pm, 8 pm, 11 pm, 2 am, 5 am, can fill today, use starting 4/4/22, Disp: 112 tablet, Rfl: 0     QUEtiapine (SEROQUEL) 300 MG tablet, Take 600 mg by mouth At Bedtime, Disp: , Rfl:      senna-docusate (SENOKOT-S/PERICOLACE) 8.6-50 MG tablet, Take 2 tablets by mouth 2 times  daily, Disp: 120 tablet, Rfl: 11     triamterene-HCTZ (MAXZIDE-25) 37.5-25 MG tablet, Take 1 tablet by mouth daily, Disp: 30 tablet, Rfl: 11     zonisamide (ZONEGRAN) 25 MG capsule, Take 3 capsules (75 mg) by mouth 4 times daily, Disp: 180 capsule, Rfl: 3     QUEtiapine (SEROQUEL) 50 MG tablet, Take 100 mg by mouth 4 times daily as needed , Disp: , Rfl:     Current Facility-Administered Medications:      cyanocobalamin injection 1,000 mcg, 1,000 mcg, Intramuscular, Q14 Days, Devon Lund MD      Not using ketamine or medical cannabis as could not afford.    Taking some zonisamide which helps his anxiety somewhat.  Reviews methocarbamol was upsetting to his stomach.    Review of Systems   General, psych, musculoskeletal, bowels and bladder otherwise normal other than above.          OBJECTIVE   BP (!) 171/102   Pulse 106   Ht 1.829 m (6')   Wt 103.8 kg (228 lb 14.4 oz)   SpO2 97%   BMI 31.04 kg/m        Physical Exam  General:  Normal appearance,   Cardiovascular: Normal rate  Lungs: Pulmonary effort is normal, speaking in full sentences  MSK:   Skin:. No concerning rashes or lesions.  Neurologic: No focal deficit, alert and oriented x3  Psychiatric: normal mood and affect, cooperative      Assessment: Recovering from lumbar fusion, reviews presently activity level on hold with flareup with pain and swelling noted above below the fusion.  Has been on this opioid regimen in recovery.  Describes he is already awake from pain when he takes the dose during the night.    Total time more than 20 minutes.    Video start time 4: 05.  Video end time 4: 23.  Patient in TCU via [employer].

## 2022-04-13 NOTE — LETTER
4/13/2022         RE: Bola Lyon Jr.  [address]  [place] [address]        Dear Colleague,    Thank you for referring your patient, Bola Lyon Jr., to the [facility]. Please see a copy of my visit note below.      Patient presents to the clinic today for a follow up with JAYME MULLEN MD regarding Pain Management.        Jan is a 52 year old who is being evaluated via a billable video visit.      How would you like to obtain your AVS? MyChart  If the video visit is dropped, the invitation should be resent by: Text to cell phone: [phone]   Will anyone else be joining your video visit? No      Video Start Time:   Video-Visit Details    Type of service:  Video Visit    Video End Time:    Originating Location (pt. Location): [facility]    Distant Location (provider location):  [facility]     Platform used for Video Visit: Doximity            Is Pt currently in [place]? Yes        NOTE: If Pt is not in [place], Appointment needs to be canceled and rescheduled            PEG Score 12/17/2021 2/22/2022 4/13/2022   PEG Total Score 10 9.67 10               UDT/CSA = 1/12/2021            QUESTIONS:              Kailyn Carolina  [facility] Patient Facilitator    [facility] - Office Visit    ASSESSMENT & PLAN     Bola was seen today for pain.    Diagnoses and all orders for this visit:    Foraminal stenosis of cervical region  -     oxyCODONE IR (ROXICODONE) 15 MG tablet; Take 1 tablet (15 mg) by mouth 8 times daily One tablet every three hours 8 am, 11 am, 2pm, 5 pm, 8 pm, 11 pm, 2 am, 5 am, can fill today, use starting 4/4/22        Patient Instructions   PLAN:  You are meeting with the spine surgeon next week to review your recovery for surgery and your activity of physical therapy.    Continue with the oxycodone 15 mg every 3 hours.    Discussed may make a transition to morphine in the future.    Follow-up with Dr. Mullen in 8  "weeks        -----  JAYME MULLEN MD  [facility]       SUBJECTIVE      Bola Lyon Jr. is a 52 year old year old male who presents to clinic today for the following:     With diffuse osteoarthritis with a number of surgeries, presently recovering from lumbar fusion.    Reviewing the record 3/15 met with providers through his spine surgery, describes swelling above and below the levels of his fusion, to hold on physical therapy and repeat imaging.    He reviews today things \"suck, I am miserable\".  Describes being the most difficult of his 22 surgeries to recover.  He has another follow-up next week.  Reviews he had been active in physical therapy moving better, \"hit a wall\" began to have swelling above and below his fusion.  Now his days consist of spending a lot of time on ice, walking a bit 2-3 times a day holding on physical therapy to let the inflammation settle.    Describes his mental health is becoming \"sad and frustrated\".    He does have contact with his parents, couple of bodies, but he cannot ride in a car and go out.    It is affecting his managing his right knee replacement surgery and history of ankle fusion.  He used to elbow work his way through surgery before but he is not able to this time.    He continues with the oxycodone 15 mg every 3 hours.  Reviewed using other agents.  He does not want to risk the insurance coverage work it takes to get at this dose.  Recalls he did take morphine in the hospital IVN tablets seem to tolerate.  He may consider transition in the future.   reviewed.  Last urine drug test was a year ago January.  He has not been coming to the office and will obtain when able.  We did previously try OxyContin, and oxymorphone, they were not useful alternatives.      Current Outpatient Medications:      acetaminophen (TYLENOL) 650 MG CR tablet, Take 2 tablets (1,300 mg) by mouth 3 times daily, Disp: 168 tablet, Rfl: 1     albuterol (PROAIR " HFA/PROVENTIL HFA/VENTOLIN HFA) 108 (90 Base) MCG/ACT inhaler, Inhale 2 puffs into the lungs every 6 hours as needed, Disp: 6.7 g, Rfl: 11     allopurinol (ZYLOPRIM) 300 MG tablet, Take 1 tablet (300 mg) by mouth 2 times daily, Disp: 180 tablet, Rfl: 3     ammonium lactate (LAC-HYDRIN) 12 % external lotion, , Disp: , Rfl:      amphetamine-dextroamphetamine (ADDERALL) 15 MG tablet, Take 1 tablet (15 mg) by mouth daily, Disp: 30 tablet, Rfl: 0     bisacodyl (DULCOLAX) 10 MG suppository, Place 1 suppository (10 mg) rectally daily as needed for constipation, Disp: 60 suppository, Rfl: 1     buPROPion (WELLBUTRIN SR) 150 MG 12 hr tablet, Take 1 tablet (150 mg) by mouth 3 times daily, Disp: 90 tablet, Rfl: 11     CALCIUM ANTACID 500 MG chewable tablet, , Disp: , Rfl:      chlorhexidine (PERIDEX) 0.12 % solution, Swish and spit 15 mLs in mouth 3 times daily as needed (sore throat), Disp: 473 mL, Rfl: 3     ergocalciferol (ERGOCALCIFEROL) 1.25 MG (37901 UT) capsule, Take 1 capsule (50,000 Units) by mouth once a week, Disp: 12 capsule, Rfl: 3     fexofenadine (ALLEGRA) 180 MG tablet, Take 180 mg by mouth daily as needed, Disp: , Rfl:      furosemide (LASIX) 40 MG tablet, Take 1 tablet (40 mg) by mouth daily, Disp: 30 tablet, Rfl: 11     hydrOXYzine (ATARAX) 25 MG tablet, , Disp: , Rfl:      lactulose 20 GM/30ML SOLN, Take 60 mLs by mouth 3 times daily For constipation. To replace senokot, magnesium, miralax if covered by insurance, Disp: 5400 mL, Rfl: 11     lamoTRIgine (LAMICTAL) 150 MG tablet, Take 150 mg by mouth 3 times daily, Disp: , Rfl:      LANsoprazole (PREVACID) 30 MG DR capsule, Take 30 mg by mouth daily, Disp: , Rfl:      levofloxacin (LEVAQUIN) 750 MG tablet, Take 1 tablet (750 mg) by mouth daily, Disp: 42 tablet, Rfl: 0     lidocaine (LIDODERM) 5 % patch, Apply 1 patch topically to painful area of skin once daily and remove per schedule., Disp: , Rfl:      lidocaine (XYLOCAINE) 2 % external gel, Apply 1 inch  topically 4 times a day as needed to gums., Disp: , Rfl:      methocarbamol (ROBAXIN) 500 MG tablet, Take 2 tablets (1,000 mg) by mouth 4 times daily, Disp: 120 tablet, Rfl: 1     metoprolol succinate ER (TOPROL-XL) 50 MG 24 hr tablet, Take 1 tablet (50 mg) by mouth daily, Disp: 90 tablet, Rfl: 3     montelukast (SINGULAIR) 10 MG tablet, Take 1 tablet by mouth daily, Disp: , Rfl:      naloxegol (MOVANTIK) 25 MG TABS tablet, Take 1 tablet (25 mg) by mouth every morning (before breakfast), Disp: 30 tablet, Rfl: 4     naloxegol (MOVANTIK) 25 MG TABS tablet, Take 1 tablet (25 mg) by mouth every morning (before breakfast), Disp: 30 tablet, Rfl: 1     naloxone (NARCAN) 4 MG/0.1ML nasal spray, 1 spray (4 mg dose) into one nostril for opioid reversal. Call 911. May repeat if no response in 3 minutes., Disp: , Rfl:      [START ON 4/18/2022] oxyCODONE IR (ROXICODONE) 15 MG tablet, Take 1 tablet (15 mg) by mouth 8 times daily One tablet every three hours 8 am, 11 am, 2pm, 5 pm, 8 pm, 11 pm, 2 am, 5 am, can fill today, use starting 4/4/22, Disp: 112 tablet, Rfl: 0     QUEtiapine (SEROQUEL) 300 MG tablet, Take 600 mg by mouth At Bedtime, Disp: , Rfl:      senna-docusate (SENOKOT-S/PERICOLACE) 8.6-50 MG tablet, Take 2 tablets by mouth 2 times daily, Disp: 120 tablet, Rfl: 11     triamterene-HCTZ (MAXZIDE-25) 37.5-25 MG tablet, Take 1 tablet by mouth daily, Disp: 30 tablet, Rfl: 11     zonisamide (ZONEGRAN) 25 MG capsule, Take 3 capsules (75 mg) by mouth 4 times daily, Disp: 180 capsule, Rfl: 3     QUEtiapine (SEROQUEL) 50 MG tablet, Take 100 mg by mouth 4 times daily as needed , Disp: , Rfl:     Current Facility-Administered Medications:      cyanocobalamin injection 1,000 mcg, 1,000 mcg, Intramuscular, Q14 Days, Devon Lund MD      Not using ketamine or medical cannabis as could not afford.    Taking some zonisamide which helps his anxiety somewhat.  Reviews methocarbamol was upsetting to his stomach.    Review of  Systems   General, psych, musculoskeletal, bowels and bladder otherwise normal other than above.          OBJECTIVE   BP (!) 171/102   Pulse 106   Ht 1.829 m (6')   Wt 103.8 kg (228 lb 14.4 oz)   SpO2 97%   BMI 31.04 kg/m        Physical Exam  General:  Normal appearance,   Cardiovascular: Normal rate  Lungs: Pulmonary effort is normal, speaking in full sentences  MSK:   Skin:. No concerning rashes or lesions.  Neurologic: No focal deficit, alert and oriented x3  Psychiatric: normal mood and affect, cooperative      Assessment: Recovering from lumbar fusion, reviews presently activity level on hold with flareup with pain and swelling noted above below the fusion.  Has been on this opioid regimen in recovery.  Describes he is already awake from pain when he takes the dose during the night.    Total time more than 20 minutes.    Video start time 4: 05.  Video end time 4: 23.  Patient in TCU via D[employer].            Again, thank you for allowing me to participate in the care of your patient.        Sincerely,        JAYME MULLEN MD

## 2022-04-13 NOTE — TELEPHONE ENCOUNTER
Dr Cramer, please disregard if you'd prefer to wait until pt's follow-up this afternoon.  Pending Prescriptions:                       Disp   Refills    oxyCODONE IR (ROXICODONE) 15 MG tablet    112 ta*0            Sig: Take 1 tablet (15 mg) by mouth 8 times daily One           tablet every three hours 8 am, 11 am, 2pm, 5 pm,           8 pm, 11 pm, 2 am, 5 am

## 2022-04-19 ENCOUNTER — TELEPHONE (OUTPATIENT)
Dept: INTERNAL MEDICINE | Facility: CLINIC | Age: 52
End: 2022-04-19

## 2022-04-19 DIAGNOSIS — F11.90 CHRONIC, CONTINUOUS USE OF OPIOIDS: Primary | ICD-10-CM

## 2022-04-19 DIAGNOSIS — M48.02 FORAMINAL STENOSIS OF CERVICAL REGION: ICD-10-CM

## 2022-04-19 NOTE — TELEPHONE ENCOUNTER
Reason for Call:  Medication or medication refill:    Do you use a [facility]?  Name of the pharmacy and phone number for the current request:  [email or web address]    Name of the medication requested:    pt is having a follow up CT at [place] on Friday, 4/22 and he is needing some meds to help him relax from his PTSD and for the pain to sit still    Valium 10 mg   He states Dr Lund has given him in the past for scans.      Can we leave a detailed message on this number? YES    Phone number patient can be reached at: Cell number on file:    Telephone Information:   Mobile [phone]       Best Time: any    Call taken on 4/19/2022 at 2:57 PM by Pam J. Behr

## 2022-04-19 NOTE — TELEPHONE ENCOUNTER
Pt is calling back and the head nurse also needs to be aware of the medication as well.    The prescription can be  faxed to [phone] [facility]  After it goes to the pharm

## 2022-04-20 RX ORDER — DIAZEPAM 5 MG
TABLET ORAL
Qty: 1 TABLET | Refills: 0 | Status: SHIPPED | OUTPATIENT
Start: 2022-04-20 | End: 2022-06-24

## 2022-04-20 NOTE — TELEPHONE ENCOUNTER
PA is required, needing urgent PA as his imaging is on 4/22.       Staff, please fax prescription order to his nurse:   The prescription can be  faxed to [phone] [facility]  After it goes to the pharm

## 2022-04-21 NOTE — TELEPHONE ENCOUNTER
Thank you.     Staff, please fax prescription order to his nurse:   The prescription can be  faxed to [phone] [facility]

## 2022-04-27 DIAGNOSIS — G89.4 CHRONIC PAIN SYNDROME: ICD-10-CM

## 2022-04-27 DIAGNOSIS — M48.02 FORAMINAL STENOSIS OF CERVICAL REGION: ICD-10-CM

## 2022-04-27 RX ORDER — METHOCARBAMOL 500 MG/1
1000 TABLET, FILM COATED ORAL 4 TIMES DAILY
Qty: 120 TABLET | Refills: 1 | Status: SHIPPED | OUTPATIENT
Start: 2022-04-27 | End: 2022-05-12

## 2022-04-27 RX ORDER — OXYCODONE HYDROCHLORIDE 15 MG/1
15 TABLET ORAL
Qty: 112 TABLET | Refills: 0 | Status: SHIPPED | OUTPATIENT
Start: 2022-04-27 | End: 2022-05-12

## 2022-04-27 NOTE — TELEPHONE ENCOUNTER
Received call from patient requesting refill(s) of Methocarbamol and Oxycodone      Last dispensed from pharmacy; Methocarbamol on 3/24/2022 and Oxycodone on 4/13/2022, 14 days and 112 tablets    Patient's last office/virtual visit by prescribing provider on 4/13/2022  Next office/virtual appointment scheduled for no follow up appointment scheduled    Last urine drug screen date 1/12/2021  Current opioid agreement on file (completed within the last year) No Date of opioid agreement: 1/12/2021    E-prescribe to [facility]  pharmacy    Will route to nursing [place] for review and preparation of prescription(s).

## 2022-04-28 DIAGNOSIS — F31.81 BIPOLAR 2 DISORDER (H): ICD-10-CM

## 2022-04-28 DIAGNOSIS — S06.9X9S TRAUMATIC BRAIN INJURY WITH LOSS OF CONSCIOUSNESS, SEQUELA (H): ICD-10-CM

## 2022-04-28 NOTE — TELEPHONE ENCOUNTER
Medication:   Pending Prescriptions:                       Disp   Refills    amphetamine-dextroamphetamine (ADDERALL) *30 tab*0            Sig: Take 1 tablet (15 mg) by mouth daily          CSA in last year: YES - Expires 4/30/22    Random Utox in last year: YES  (if any of the above answer NO - schedule with PCP)     On opioids in addition to benzodiazepines? YES - Managed by Dr Cramer    (if the above answer YES - schedule with PCP every 6 months)           PROVIDER TO PULL THE FOLLOWING FROM  :    1. Last date filled?  2.   Only PCP Prescribing?  3.   Taken as prescribed from physician notes?

## 2022-04-29 ENCOUNTER — TELEPHONE (OUTPATIENT)
Dept: PALLIATIVE MEDICINE | Facility: OTHER | Age: 52
End: 2022-04-29

## 2022-04-29 RX ORDER — DEXTROAMPHETAMINE SACCHARATE, AMPHETAMINE ASPARTATE, DEXTROAMPHETAMINE SULFATE AND AMPHETAMINE SULFATE 3.75; 3.75; 3.75; 3.75 MG/1; MG/1; MG/1; MG/1
15 TABLET ORAL DAILY
Qty: 30 TABLET | Refills: 0 | Status: SHIPPED | OUTPATIENT
Start: 2022-04-29 | End: 2022-06-07

## 2022-04-29 NOTE — TELEPHONE ENCOUNTER
Patient calls, VM that he is returning a phone call to the provider regarding ongoing back pain after surgery.  Patient reports he was having imaging completed when the provider tried to reach him yesterday.  Patient requesting a return phone call from the provider

## 2022-05-02 ENCOUNTER — TELEPHONE (OUTPATIENT)
Dept: PALLIATIVE MEDICINE | Facility: OTHER | Age: 52
End: 2022-05-02

## 2022-05-02 NOTE — TELEPHONE ENCOUNTER
Patient calls today and provides an update as to the apt with Dr. Sylvester.  This was forwarded to the provider and is available in the chart as well.  Patient would like to continue on oxycodone 15 mg every 3 hours and if there is anything additional for pain that would be appropriate if not, patient would like to remain with oxycodone only.

## 2022-05-04 ENCOUNTER — MEDICAL CORRESPONDENCE (OUTPATIENT)
Dept: HEALTH INFORMATION MANAGEMENT | Facility: CLINIC | Age: 52
End: 2022-05-04
Payer: COMMERCIAL

## 2022-05-04 DIAGNOSIS — M48.02 FORAMINAL STENOSIS OF CERVICAL REGION: ICD-10-CM

## 2022-05-04 RX ORDER — SENNOSIDES 8.6 MG
1300 CAPSULE ORAL 3 TIMES DAILY
Qty: 168 TABLET | Refills: 1 | Status: SHIPPED | OUTPATIENT
Start: 2022-05-04 | End: 2022-07-01

## 2022-05-04 NOTE — TELEPHONE ENCOUNTER
Medication: amphetamine-dextroamphetamine (ADDERALL) 15 MG tablet      CSA in last year: unknown    Random Utox in last year: unknown  (if any of the above answer NO - schedule with PCP)     On opioids in addition to benzodiazepines? unknown  (if the above answer YES - schedule with PCP every 6 months)           PROVIDER TO PULL THE FOLLOWING FROM  :    1. Last date filled?  2.   Only PCP Prescribing?  3.   Taken as prescribed from physician notes?

## 2022-05-06 DIAGNOSIS — G89.4 CHRONIC PAIN DISORDER: ICD-10-CM

## 2022-05-06 RX ORDER — ZONISAMIDE 25 MG/1
75 CAPSULE ORAL 4 TIMES DAILY
Qty: 180 CAPSULE | Refills: 3 | Status: SHIPPED | OUTPATIENT
Start: 2022-05-06 | End: 2022-07-08

## 2022-05-06 NOTE — TELEPHONE ENCOUNTER
Received fax request from [facility] pharmacy requesting refill(s) for Zonisamide    Last refilled on 4/21/2022    Pt last seen on 4/13/2022  Next appt scheduled for 5/12/2022    Will facilitate refill.

## 2022-05-10 ENCOUNTER — TELEPHONE (OUTPATIENT)
Dept: INTERNAL MEDICINE | Facility: CLINIC | Age: 52
End: 2022-05-10
Payer: COMMERCIAL

## 2022-05-10 NOTE — PROGRESS NOTES
Medication Therapy Management (MTM) Encounter    ASSESSMENT:                            Medication Adherence/Access: No issues identified       Chronic Pain: Pain increased with restarting PT. Continue to work with PT. Has scheduled follow-up with Dr. Cramer in June.      History of TBI:   Bipolar disorder: Some depression/frustration related to pain. Will continue to focus on pain control.       Low Vitamin D: Last level within target range 45-60 for chronic pain and mood support.       Constipation: Continued infrequent bowel movements. Unclear why he's not receiving Senna-S. Will fax to home health nurse to get this added in.       Lymphedema/hypertension: Last BP at goal <140/90. Pulse at goal .         PLAN:                            -Pt to restart Senna-S 8.6-50 mg twice daily.       Follow-up: 6/27 with Dr. Cramer   Schedule with PharmD as needed    SUBJECTIVE/OBJECTIVE:                          Bola Lyon is a 52 year old male called for a follow-up visit. He was referred to me from Dmitriy Cramer MD. Today's visit is a follow-up MTM visit from 3/24/22.     Last visit with Dr. Cramer on 4/13.     Reason for visit: Medication Management -     Allergies/ADRs: Reviewed in chart  Past Medical History: Reviewed in chart  Tobacco: He reports that he quit smoking about 5 years ago. His smoking use included cigarettes. He started smoking about 12 years ago. He has a 5.50 pack-year smoking history. He has quit using smokeless tobacco.Tobacco Cessation Action Plan:   Information offered: Patient not interested at this time    Alcohol:  reports no history of alcohol use.        Medication Adherence/Access: Currently in an assisted group home. Has medications managed for him.       Chronic Pain: Prescribed Acetaminophen 650 mg 2 tabs three times daily  lamotrigine 150 mg 3 times daily, lidocaine 5% patch daily, memantine 10 mg twice daily, methocarbamol 1000 mg 4 times daily, oxycodone 15 mg 8 times  daily, zonisamide 25 mg 3 caps 4 times daily     Elective spine surgery at [place] in December 2021  Dealing with a lot of discomfort throughout the day.     Recently started physical therapy again.      Bipolar disorder: Prescribed lamotrigine 150 mg 3 times daily, quetiapine 300 mg 2 tabs at bedtime + quetiapine 50 mg 2 tabs 4 times daily as needed. Psychiatrist added Hydroxyzine as he was having increased anxiety due to the pain.     Following with psychiatry. Next appt in June.     Feeling more depressed with increased pain.       Low Vitamin D: Prescribed Vitamin D2 50,000 units once weekly.     Vitamin D Deficiency Screening Results:  Lab Results   Component Value Date    VITDT 48 05/05/2014        Constipation: Prescribed glycerin suppositories as needed, Lactulose 20 g - 60 mL three times daily, Movantik 25 mg daily, Senna-Docusate 8.6-50 mg 2 tabs twice daily.     *Unclear why, but hasn't been receiving senna-S.     Stool pattern has been 1 formed stool(s) every 4-5 days. Defecation has been difficult. Still needing to use glycerin suppositories.           Lymphedema/hypertension: Prescribed furosemide 40 mg daily, metoprolol succinate 50 mg daily, triamterene-hydrochlorothiazide 37.5-25 mg daily    BP Readings from Last 3 Encounters:   09/01/21 120/78   04/30/21 116/68   03/19/21 108/60      Pulse Readings from Last 3 Encounters:   09/01/21 83   04/30/21 74   03/19/21 74             Today's Vitals: There were no vitals taken for this visit.  ----------------      I spent 22 minutes with this patient today. All changes were made via collaborative practice agreement with Dmitriy Cramer MD. A copy of the visit note was provided to the patient's provider(s).    The patient was sent via [employer] a summary of these recommendations.     Shahriar Alanis PharmD  Medication Therapy Management (MTM) Pharmacist  [facility]      Telemedicine Visit Details  Type of service:  Telephone visit  Start  Time: 1:05 PM   End Time: 1:27 PM   Originating Location (patient location): Home  Distant Location (provider location):  [facility]     Medication Therapy Recommendations  Drug-induced constipation    Current Medication: lactulose 20 GM/30ML SOLN   Rationale: Synergistic therapy - Needs additional medication therapy - Indication   Recommendation: Start Medication - Movantik 25 MG Tabs   Status: Accepted per CPA          Current Medication: senna-docusate (SENOKOT-S/PERICOLACE) 8.6-50 MG tablet   Rationale: Medication product not available - Adherence - Adherence   Recommendation: Provide Adherence Intervention   Status: Accepted per CPA

## 2022-05-10 NOTE — TELEPHONE ENCOUNTER
Reason for Call:  Home Health Care    Carin with [facility] called regarding (reason for call): Verbal order and medication questions.    Orders are needed for this patient.     PT: 2 times a week for 2 weeks.      Medication questions:      1-  She has a list of medications.  This medication is not on her list and wants to make sure its ok to add-relistor 150 mg 3 tablets daily.    2-    3 SEVERE INTERACTIONS:  1-  Seroquel and Levaquin  2- Atarax and Zonegran  3- atarax and Levaquin    Please let her know if ok to take all of thesem together.      Pt Provider: Dr Lund    Phone Number Homecare Nurse can be reached at: [phone]    Can we leave a detailed message on this number? YES      Call taken on 5/10/2022 at 2:31 PM by Pam J. Behr

## 2022-05-11 NOTE — TELEPHONE ENCOUNTER
The interaction between all of the medications is increased risk for QT prolongation. No recent EKG, recommend EKG at next follow up.     Relistor as methylnaltrexone and used for opioid-induced constipation. He has been working with pain clinic pharmD on this. Previous note suggests this was denied by insurance. Please clarify.

## 2022-05-11 NOTE — TELEPHONE ENCOUNTER
Per pain clinic Pharm.D. his current regimen is as follows: Lactulose and Movantik. The PA for relistor was denied, but Movantik got approved.      LVM for home care RN that he is NOT taking relistor and that the interactions are OK. We will monitor EKG at follow up.

## 2022-05-12 ENCOUNTER — VIRTUAL VISIT (OUTPATIENT)
Dept: PHARMACY | Facility: OTHER | Age: 52
End: 2022-05-12
Payer: COMMERCIAL

## 2022-05-12 DIAGNOSIS — M48.02 FORAMINAL STENOSIS OF CERVICAL REGION: ICD-10-CM

## 2022-05-12 DIAGNOSIS — I10 ESSENTIAL HYPERTENSION: ICD-10-CM

## 2022-05-12 DIAGNOSIS — F31.77 BIPOLAR 1 DISORDER, MIXED, PARTIAL REMISSION (H): ICD-10-CM

## 2022-05-12 DIAGNOSIS — G89.4 CHRONIC PAIN SYNDROME: ICD-10-CM

## 2022-05-12 DIAGNOSIS — R79.89 LOW VITAMIN D LEVEL: ICD-10-CM

## 2022-05-12 DIAGNOSIS — K59.03 DRUG-INDUCED CONSTIPATION: ICD-10-CM

## 2022-05-12 DIAGNOSIS — Z98.890 HX OF DECOMPRESSIVE LUMBAR LAMINECTOMY: Primary | ICD-10-CM

## 2022-05-12 PROCEDURE — 99607 MTMS BY PHARM ADDL 15 MIN: CPT | Performed by: PHARMACIST

## 2022-05-12 PROCEDURE — 99606 MTMS BY PHARM EST 15 MIN: CPT | Performed by: PHARMACIST

## 2022-05-12 RX ORDER — METHOCARBAMOL 500 MG/1
1000 TABLET, FILM COATED ORAL 4 TIMES DAILY
Qty: 120 TABLET | Refills: 1 | Status: SHIPPED | OUTPATIENT
Start: 2022-05-12 | End: 2022-08-03

## 2022-05-12 RX ORDER — AMOXICILLIN 250 MG
2 CAPSULE ORAL 2 TIMES DAILY
Qty: 120 TABLET | Refills: 11 | Status: SHIPPED | OUTPATIENT
Start: 2022-05-12 | End: 2022-09-14

## 2022-05-12 RX ORDER — OXYCODONE HYDROCHLORIDE 15 MG/1
15 TABLET ORAL
Qty: 112 TABLET | Refills: 0 | Status: SHIPPED | OUTPATIENT
Start: 2022-05-12 | End: 2022-05-24

## 2022-05-12 NOTE — TELEPHONE ENCOUNTER
Received call from patient requesting refill(s) of Oxycodone and Methocarbamal    Last dispensed from pharmacy on 4/30 for 14 days-Oxycodone  Methocarbamol isn't needed yet, but pt wants to have it at his pharmacy. Last filled 5/10    Patient's last office/virtual visit by prescribing provider on 4/13  Next office/virtual appointment scheduled for 6/27    Last urine drug screen date 1/12/21  Current opioid agreement on file (completed within the last year) Yes Date of opioid agreement: 1/12/21    E-prescribe to [facility] pharmacy  Pending Prescriptions:                       Disp   Refills    oxyCODONE IR (ROXICODONE) 15 MG tablet    112 ta*0            Sig: Take 1 tablet (15 mg) by mouth 8 times daily One           tablet every three hours 8 am, 11 am, 2pm, 5 pm,           8 pm, 11 pm, 2 am, 5 am, can fill today, use           starting 5/13/22

## 2022-05-18 ENCOUNTER — VIRTUAL VISIT (OUTPATIENT)
Dept: INTERNAL MEDICINE | Facility: CLINIC | Age: 52
End: 2022-05-18
Payer: COMMERCIAL

## 2022-05-18 DIAGNOSIS — I10 ESSENTIAL HYPERTENSION: ICD-10-CM

## 2022-05-18 DIAGNOSIS — Z79.899 ENCOUNTER FOR LONG-TERM (CURRENT) USE OF MEDICATIONS: ICD-10-CM

## 2022-05-18 DIAGNOSIS — I89.0 LYMPHEDEMA OF BOTH LOWER EXTREMITIES: ICD-10-CM

## 2022-05-18 DIAGNOSIS — Z98.890 HX OF DECOMPRESSIVE LUMBAR LAMINECTOMY: ICD-10-CM

## 2022-05-18 DIAGNOSIS — F11.90 CHRONIC, CONTINUOUS USE OF OPIOIDS: ICD-10-CM

## 2022-05-18 DIAGNOSIS — K05.00 GINGIVITIS, ACUTE: Primary | ICD-10-CM

## 2022-05-18 DIAGNOSIS — K59.03 DRUG-INDUCED CONSTIPATION: ICD-10-CM

## 2022-05-18 PROBLEM — E79.0 HYPERURICEMIA: Status: ACTIVE | Noted: 2022-05-04

## 2022-05-18 PROBLEM — Z96.652 HISTORY OF TOTAL LEFT KNEE REPLACEMENT: Status: ACTIVE | Noted: 2022-05-04

## 2022-05-18 PROBLEM — G62.9 NEUROPATHY: Status: ACTIVE | Noted: 2022-05-04

## 2022-05-18 PROCEDURE — 99214 OFFICE O/P EST MOD 30 MIN: CPT | Mod: 95 | Performed by: INTERNAL MEDICINE

## 2022-05-18 RX ORDER — LEVOFLOXACIN 750 MG/1
750 TABLET, FILM COATED ORAL DAILY
Qty: 42 TABLET | Refills: 0 | Status: SHIPPED | OUTPATIENT
Start: 2022-05-18 | End: 2022-10-19

## 2022-05-18 ASSESSMENT — PATIENT HEALTH QUESTIONNAIRE - PHQ9: SUM OF ALL RESPONSES TO PHQ QUESTIONS 1-9: 17

## 2022-05-18 ASSESSMENT — ANXIETY QUESTIONNAIRES
GAD7 TOTAL SCORE: 18
GAD7 TOTAL SCORE: 18
IF YOU CHECKED OFF ANY PROBLEMS ON THIS QUESTIONNAIRE, HOW DIFFICULT HAVE THESE PROBLEMS MADE IT FOR YOU TO DO YOUR WORK, TAKE CARE OF THINGS AT HOME, OR GET ALONG WITH OTHER PEOPLE: VERY DIFFICULT
7. FEELING AFRAID AS IF SOMETHING AWFUL MIGHT HAPPEN: MORE THAN HALF THE DAYS
6. BECOMING EASILY ANNOYED OR IRRITABLE: MORE THAN HALF THE DAYS
3. WORRYING TOO MUCH ABOUT DIFFERENT THINGS: NEARLY EVERY DAY
4. TROUBLE RELAXING: NEARLY EVERY DAY
1. FEELING NERVOUS, ANXIOUS, OR ON EDGE: NEARLY EVERY DAY
5. BEING SO RESTLESS THAT IT IS HARD TO SIT STILL: MORE THAN HALF THE DAYS
2. NOT BEING ABLE TO STOP OR CONTROL WORRYING: NEARLY EVERY DAY

## 2022-05-18 ASSESSMENT — ASTHMA QUESTIONNAIRES
ACT_TOTALSCORE: 16
QUESTION_5 LAST FOUR WEEKS HOW WOULD YOU RATE YOUR ASTHMA CONTROL: SOMEWHAT CONTROLLED
QUESTION_3 LAST FOUR WEEKS HOW OFTEN DID YOUR ASTHMA SYMPTOMS (WHEEZING, COUGHING, SHORTNESS OF BREATH, CHEST TIGHTNESS OR PAIN) WAKE YOU UP AT NIGHT OR EARLIER THAN USUAL IN THE MORNING: NOT AT ALL
QUESTION_1 LAST FOUR WEEKS HOW MUCH OF THE TIME DID YOUR ASTHMA KEEP YOU FROM GETTING AS MUCH DONE AT WORK, SCHOOL OR AT HOME: SOME OF THE TIME
ACT_TOTALSCORE: 16
QUESTION_2 LAST FOUR WEEKS HOW OFTEN HAVE YOU HAD SHORTNESS OF BREATH: THREE TO SIX TIMES A WEEK
QUESTION_4 LAST FOUR WEEKS HOW OFTEN HAVE YOU USED YOUR RESCUE INHALER OR NEBULIZER MEDICATION (SUCH AS ALBUTEROL): ONE OR TWO TIMES PER DAY

## 2022-05-18 NOTE — COMMUNITY RESOURCES LIST (ENGLISH)
05/18/2022   [facility] Outpatient Clinics  Tejal Hernandez  For questions about this resource list or additional care needs, please contact your primary care clinic or care manager.  Phone: [phone]   Email: N/A   Address: [address] [place] [address]   Hours: N/A        Hotlines and Helplines       Hotline - Crisis help  1  Mothers Against Drunk Driving - [place] ([place]ADD - [place]) Distance: 1.55 miles      COVID-19 Status: Phone/Virtual   [address] [place] [address]  Language: English  Hours: Mon - Sun Open 24 Hours   Phone: (191) 287-6567 Email: [email or web address] Website: [email or web address]     2  [facility] Crisis [facility] Unit - Suicide and Crisis Response Hotline Distance: 2.42 miles      COVID-19 Status: Phone/Virtual   [address] [facility] [address] [place] [address]  Language: English  Hours: Mon - Sun Open 24 Hours   Phone: (204) 807-3313 Email: [email or web address].[facility] Website: [email or web address]://www.[facility]/HealthFamily/HandlingEmergencies/Help          Mental Health       Individual counseling  3  [facility] Addiction [facility] in [place] Distance: 1.27 miles      COVID-19 Status: Regular Operations, COVID-19 Status: Phone/Virtual   [address] [place] [address]  Language: English  Hours: Mon - Thu 7:30 AM - 9:00 PM , Fri 7:30 AM - 5:00 PM  Fees: Insurance, Self Pay   Phone: (682) 418-5415 Email: [email or web address].org Website: [email or web address]://www.[email or web address]/[facility]     4  Dr. Geovanny Matthew, Ph.D. and Dr. Susanne Matthew, Ph.D. - Individual Therapy Distance: 1.52 miles      COVID-19 Status: Phone/Virtual   [address] [place] [address]  Language: English  Hours: Mon - Fri 9:00 AM - 5:00 PM Appt. Only  Fees: Insurance, Self Pay   Phone: ([phone]) [phone]     Mental health crisis care  5  [employer] - 24-Hour Mental Health Practitioner Distance: 2.78  miles      COVID-19 Status: Regular Operations, COVID-19 Status: Phone/Virtual   [address] [place] [address]  Language: English, Hmong  Hours: Mon - Fri 8:00 AM - 4:30 PM  Fees: Insurance, Self Pay   Phone: (111) 218-4507 Email: info@[employer] Website: http://www.[employer]/     6  [facility] Crisis Stabilization Services ([place]) Distance: 3.64 miles      COVID-19 Status: Regular Operations   [address] [place] [address]  Language: English, Iraqi  Hours: Mon - Sun Open 24 Hours  Fees: Insurance   Phone: (813) 190-5692 Email: info@[employer] Website: https://[employer].org/services-programs/residential-services/bwasagc-d-nafxpf-[facility]-house/     Mental health support group  7  [facility] Community Support Member [place] Distance: 1.95 miles      COVID-19 Status: Regular Operations, COVID-19 Status: Phone/Virtual   [address] [place] [address]  Language: English  Hours: Mon - Fri 12:00 PM - 4:00 PM  Fees: Insurance   Phone: (910) 909-5736 Email: info@[email or web address].org Website: [email or web address]://[email or web address]/mental-health/     8  [facility] Distance: 2.13 miles      COVID-19 Status: Phone/Virtual   [address] [address]  Language: English  Hours: Mon - Fri 8:00 AM - 4:30 PM  Fees: Free   Phone: (486) 477-5563 Website: https://www.[place].gov/find-locations/facility/vc_0416V          Important Numbers & Websites       Emergency Services   911  City Services   311  Poison Control   (299) 148-8504  Suicide Prevention Lifeline   (792) 985-5162 (TALK)  Child Abuse Hotline   (571) 390-2381 (4-A-Child)  Sexual Assault Hotline   (224) 134-8178 (HOPE)  National Runaway Safeline   (676) 162-7517 (RUNAWAY)  All-Options Talkline   (406) 946-5645  Substance Abuse Referral   (206) 282-3695 (HELP)

## 2022-05-18 NOTE — PROGRESS NOTES
Bola is a 52 year old male contacting the clinic today via video, who will use the platform: [employer] for the visit.  Phone # for [employer], or if Amwell drops:   Telephone Information:   Mobile [phone]          ASSESSMENT and PLAN:  1. Gingivitis, acute  Chronic and ongoing now for perhaps greater than 1 year.  Finally has dentist.  Continue levofloxacin.  Consider lower dose but he requests higher dose, so leave the same  - levofloxacin (LEVAQUIN) 750 MG tablet; Take 1 tablet (750 mg) by mouth daily  Dispense: 42 tablet; Refill: 0    2. Hx of decompressive lumbar laminectomy-2021  Complicated surgery last year.  Still undergoing therapy.  Narcotics and anticonvulsants through pain clinic.  Consider trial of amitriptyline.  Consider trial of alendronate    3. Encounter for long-term (current) use of medications  Urine toxicology through pain clinic    4. Chronic, continuous use of opioids  Through pain clinic    5. Lymphedema of both lower extremities  Continue to worry side effect of anticonvulsants    6. Drug-induced constipation  Now resolved    7. Essential hypertension  Stable    8.  Anxiety with depression and smoking.  Stable on bupropion     Patient Instructions   Consider trial of alendronate    Consider repeat trial of low-dose amitriptyline 10 mg    Refill levofloxacin-request for higher dose explained and refused    Encourage lab follow-up     Return in about 3 months (around 8/18/2022) for using a video visit.    CHIEF COMPLAINT:  Chief Complaint   Patient presents with     Medication Follow-up       HISTORY OF PRESENT ILLNESS:  Bola is a 52 year old male contacting the clinic today via video for general review.  He underwent complicated decompressive laminectomy in December of his lower lumbar spine but he also had a previous lumbar spine surgery the year prior and neck surgery.  He remains in [facility] assisted living undergoing physical therapy.  Chronic narcotic use controlled by pain clinic and  "Pharm.D.    He used to be on amitriptyline in 2018.  At some point during a hospitalization amitriptyline was removed.  Elevated liver function tests and confusion were blamed on the amitriptyline but he was on many other medications at the time.  We discussed a repeat trial of 10 mg but he is resistant    We discussed possibly adding alendronate based on the therapy that he has poor bone healing and perhaps a component of reflux sympathetic dystrophy and complex regional pain syndrome.  Notes sent to his pain clinic Pharm.D. to consider these    Constipation has resolved.  Lymphedema is under control.  Blood pressure is occasionally high due to pain    He has been on antibiotics for a dental infection for approximately a year.  He now finally has a dentist and is planning on getting his teeth fixed.  Sinuses are congested from allergies but not infected    REVIEW OF SYSTEMS:   Poor sleep    PFSH:  Social History     Social History Narrative    He is .  He has been a  and also a counselor, and worked with [employer].  He smokes and does not drink alcohol.  He is now on disability due to his brain injury and bipolar disease.       TOBACCO USE:  History   Smoking Status     Former Smoker     Packs/day: 0.50     Years: 11.00     Types: Cigarettes     Start date: 8/20/2009     Quit date: 2/21/2017   Smokeless Tobacco     Former User     Comment: 0.5 pack per day       VITALS:  There were no vitals filed for this visit.  There were no vitals taken for this visit. Estimated body mass index is 32.23 kg/m  as calculated from the following:    Height as of 9/17/21: 1.727 m (5' 8\").    Weight as of 9/1/21: 96.2 kg (212 lb).    PHYSICAL EXAM:  (observations via Video)  Upper tooth partially missing.  Disheveled and bearded.  Anxious    MEDICATIONS:   Current Outpatient Medications   Medication Sig Dispense Refill     levofloxacin (LEVAQUIN) 750 MG tablet Take 1 tablet (750 " mg) by mouth daily 42 tablet 0     acetaminophen (TYLENOL) 650 MG CR tablet Take 2 tablets (1,300 mg) by mouth 3 times daily 168 tablet 1     albuterol (PROAIR HFA/PROVENTIL HFA/VENTOLIN HFA) 108 (90 Base) MCG/ACT inhaler Inhale 2 puffs into the lungs every 6 hours as needed 6.7 g 11     allopurinol (ZYLOPRIM) 300 MG tablet Take 1 tablet (300 mg) by mouth 2 times daily 180 tablet 3     ammonium lactate (LAC-HYDRIN) 12 % external lotion        amphetamine-dextroamphetamine (ADDERALL) 15 MG tablet Take 1 tablet (15 mg) by mouth daily 30 tablet 0     bisacodyl (DULCOLAX) 10 MG suppository Place 1 suppository (10 mg) rectally daily as needed for constipation 60 suppository 1     buPROPion (WELLBUTRIN SR) 150 MG 12 hr tablet Take 1 tablet (150 mg) by mouth 3 times daily 90 tablet 11     CALCIUM ANTACID 500 MG chewable tablet        chlorhexidine (PERIDEX) 0.12 % solution Swish and spit 15 mLs in mouth 3 times daily as needed (sore throat) 473 mL 3     diazepam (VALIUM) 5 MG tablet Take 5mg by mouth 30 minutes prior to imaging. 1 tablet 0     ergocalciferol (ERGOCALCIFEROL) 1.25 MG (93034 UT) capsule Take 1 capsule (50,000 Units) by mouth once a week 12 capsule 3     fexofenadine (ALLEGRA) 180 MG tablet Take 180 mg by mouth daily as needed       furosemide (LASIX) 40 MG tablet Take 1 tablet (40 mg) by mouth daily 30 tablet 11     hydrOXYzine (ATARAX) 25 MG tablet        lactulose 20 GM/30ML SOLN Take 60 mLs by mouth 3 times daily For constipation. To replace senokot, magnesium, miralax if covered by insurance 5400 mL 11     lamoTRIgine (LAMICTAL) 150 MG tablet Take 150 mg by mouth 3 times daily       LANsoprazole (PREVACID) 30 MG DR capsule Take 30 mg by mouth daily       lidocaine (LIDODERM) 5 % patch Apply 1 patch topically to painful area of skin once daily and remove per schedule.       lidocaine (XYLOCAINE) 2 % external gel Apply 1 inch topically 4 times a day as needed to gums.       methocarbamol (ROBAXIN) 500 MG  tablet Take 2 tablets (1,000 mg) by mouth 4 times daily 120 tablet 1     metoprolol succinate ER (TOPROL-XL) 50 MG 24 hr tablet Take 1 tablet (50 mg) by mouth daily 90 tablet 3     montelukast (SINGULAIR) 10 MG tablet Take 1 tablet by mouth daily       naloxegol (MOVANTIK) 25 MG TABS tablet Take 1 tablet (25 mg) by mouth every morning (before breakfast) 30 tablet 1     naloxone (NARCAN) 4 MG/0.1ML nasal spray 1 spray (4 mg dose) into one nostril for opioid reversal. Call 911. May repeat if no response in 3 minutes.       oxyCODONE IR (ROXICODONE) 15 MG tablet Take 1 tablet (15 mg) by mouth 8 times daily One tablet every three hours 8 am, 11 am, 2pm, 5 pm, 8 pm, 11 pm, 2 am, 5 am, can fill today, use starting 5/13/22 112 tablet 0     QUEtiapine (SEROQUEL) 300 MG tablet Take 600 mg by mouth At Bedtime       QUEtiapine (SEROQUEL) 50 MG tablet Take 100 mg by mouth 4 times daily as needed        senna-docusate (SENOKOT-S/PERICOLACE) 8.6-50 MG tablet Take 2 tablets by mouth 2 times daily 120 tablet 11     triamterene-HCTZ (MAXZIDE-25) 37.5-25 MG tablet Take 1 tablet by mouth daily 30 tablet 11     zonisamide (ZONEGRAN) 25 MG capsule Take 3 capsules (75 mg) by mouth 4 times daily 180 capsule 3       Outside Notes summarized: Pharm.D. pain clinic  Labs, x-rays, cardiology, GI tests reviewed: Last labs December at [facility]  No results for input(s): HGB, NA, POTASSIUM, CR, A1C, PSA, URIC, B12, TSH, VITDT in the last 86587 hours.  No results found for: [id number]  Lab Results   Component Value Date    VITDT 48 05/05/2014     Lab Results   Component Value Date    CHOL 183 09/20/2018     New orders: No orders of the defined types were placed in this encounter.      Independent review of:    Supplemental history by:      Patient has given verbal consent to a Video visit?  Yes  How would you like to obtain your AVS?  MyChart  Will anyone else be joining your video visit? No       Video-Visit Details  Type of service:  Video  Visit  Originating Location (pt. Location): Home  Distant Location (provider location):   [facility]    KERRI Lund MD      [facility]    Video Start Time: 2:03 PM  Video End time:  2:31 PM  Face to face plus orders: 28 minutes  Documentation time:  3 minutes     The visit lasted a total of 31 minutes

## 2022-05-18 NOTE — PATIENT INSTRUCTIONS
Consider trial of alendronate    Consider repeat trial of low-dose amitriptyline 10 mg    Refill levofloxacin-request for higher dose explained and refused    Encourage lab follow-up

## 2022-05-20 ENCOUNTER — MEDICAL CORRESPONDENCE (OUTPATIENT)
Dept: INTERNAL MEDICINE | Facility: CLINIC | Age: 52
End: 2022-05-20

## 2022-05-24 DIAGNOSIS — M48.02 FORAMINAL STENOSIS OF CERVICAL REGION: ICD-10-CM

## 2022-05-24 RX ORDER — OXYCODONE HYDROCHLORIDE 15 MG/1
15 TABLET ORAL
Qty: 112 TABLET | Refills: 0 | Status: SHIPPED | OUTPATIENT
Start: 2022-05-24 | End: 2022-05-24

## 2022-05-24 RX ORDER — OXYCODONE HYDROCHLORIDE 15 MG/1
15 TABLET ORAL
Qty: 112 TABLET | Refills: 0 | Status: SHIPPED | OUTPATIENT
Start: 2022-06-10 | End: 2022-06-15

## 2022-05-24 NOTE — TELEPHONE ENCOUNTER
Received call from patient requesting refill(s) of Oxycodone 15 mg     Last dispensed from pharmacy on 5/13    Patient's last office/virtual visit by prescribing provider on 4/13  Next office/virtual appointment scheduled for 6/27    Last urine drug screen date 1/2021  Current opioid agreement on file (completed within the last year) No Date of opioid agreement: 1/2021    E-prescribe to [facility]  Pending Prescriptions:                       Disp   Refills    oxyCODONE IR (ROXICODONE) 15 MG tablet    112 ta*0            Sig: Take 1 tablet (15 mg) by mouth 8 times daily One           tablet every three hours 8 am, 11 am, 2pm, 5 pm,           8 pm, 11 pm, 2 am, 5 am, can fill 5/24, use           starting 5/27/22

## 2022-06-06 DIAGNOSIS — F31.81 BIPOLAR 2 DISORDER (H): ICD-10-CM

## 2022-06-06 DIAGNOSIS — S06.9X9S TRAUMATIC BRAIN INJURY WITH LOSS OF CONSCIOUSNESS, SEQUELA (H): ICD-10-CM

## 2022-06-07 DIAGNOSIS — M48.02 FORAMINAL STENOSIS OF CERVICAL REGION: ICD-10-CM

## 2022-06-07 RX ORDER — DEXTROAMPHETAMINE SACCHARATE, AMPHETAMINE ASPARTATE, DEXTROAMPHETAMINE SULFATE AND AMPHETAMINE SULFATE 3.75; 3.75; 3.75; 3.75 MG/1; MG/1; MG/1; MG/1
15 TABLET ORAL DAILY
Qty: 30 TABLET | Refills: 0 | Status: SHIPPED | OUTPATIENT
Start: 2022-06-07 | End: 2022-06-30

## 2022-06-07 NOTE — TELEPHONE ENCOUNTER
Received call from patient requesting refill(s) of Oxycodone     Last dispensed from pharmacy on 5/26/22    Patient's last office/virtual visit by prescribing provider on 4/13/22  Next office/virtual appointment scheduled for 6/27/22    Last urine drug screen date 1/2021  Current opioid agreement on file (completed within the last year) No Date of opioid agreement: 1/2021    E-prescribe to [facility] pharmacy  Rx already sent to pharmacy

## 2022-06-07 NOTE — TELEPHONE ENCOUNTER
Routing refill request to provider for review/approval because:  Drug not on the [facility] refill protocol     Last Written Prescription Date:  4/29/22  Last Fill Quantity: 30,  # refills: 0   Last office visit provider:  5/18/22     Requested Prescriptions   Pending Prescriptions Disp Refills     amphetamine-dextroamphetamine (ADDERALL) 15 MG tablet 30 tablet 0     Sig: Take 1 tablet (15 mg) by mouth daily       There is no refill protocol information for this order          Mary Jo Ramirez, RN 06/07/22 12:51 PM

## 2022-06-15 RX ORDER — OXYCODONE HYDROCHLORIDE 15 MG/1
15 TABLET ORAL
Qty: 112 TABLET | Refills: 0 | Status: SHIPPED | OUTPATIENT
Start: 2022-06-15 | End: 2022-06-27

## 2022-06-15 NOTE — TELEPHONE ENCOUNTER
Patient calls:  Reports that the pharmacy is reporting to him that they do not have a refill of the oxycodone 15 mg.  Call placed to pharmacy:  This prescription is on the profile and they will get this ready for the patient and rush this as this should have been filled several days ago.  Will also cue the next refill at this time so patient does not have to call again.    Pending Prescriptions:                       Disp   Refills    oxyCODONE IR (ROXICODONE) 15 MG tablet    112 ta*0            Sig: Take 1 tablet (15 mg) by mouth 8 times daily One           tablet every three hours 8 am, 11 am, 2pm, 5 pm,           8 pm, 11 pm, 2 am, 5 am, may fill 6/27/22 for           start 6/29/22    [facility] pharmacy

## 2022-06-23 ENCOUNTER — TELEPHONE (OUTPATIENT)
Dept: INTERNAL MEDICINE | Facility: CLINIC | Age: 52
End: 2022-06-23

## 2022-06-23 NOTE — TELEPHONE ENCOUNTER
Reason for Call:  Other call back    Detailed comments: pt did test positive for COVID on 6/23. Nasal congestion and fatigue  Wondering if a medication can be sent in for pt    [facility]    Fax order if medication is sent in -[phone]     [facility] [phone]    Best Time: any    Can we leave a detailed message on this number? YES    Call taken on 6/23/2022 at 12:59 PM by Pam J. Behr

## 2022-06-24 ENCOUNTER — VIRTUAL VISIT (OUTPATIENT)
Dept: INTERNAL MEDICINE | Facility: CLINIC | Age: 52
End: 2022-06-24
Payer: COMMERCIAL

## 2022-06-24 DIAGNOSIS — S06.9X9S TRAUMATIC BRAIN INJURY WITH LOSS OF CONSCIOUSNESS, SEQUELA (H): ICD-10-CM

## 2022-06-24 DIAGNOSIS — U07.1 COVID-19 VIRUS INFECTION: Primary | ICD-10-CM

## 2022-06-24 DIAGNOSIS — F31.81 BIPOLAR 2 DISORDER (H): ICD-10-CM

## 2022-06-24 DIAGNOSIS — K59.03 DRUG-INDUCED CONSTIPATION: ICD-10-CM

## 2022-06-24 DIAGNOSIS — Z00.00 PREVENTATIVE HEALTH CARE: ICD-10-CM

## 2022-06-24 PROCEDURE — 99214 OFFICE O/P EST MOD 30 MIN: CPT | Mod: 95 | Performed by: INTERNAL MEDICINE

## 2022-06-24 RX ORDER — OXYMETAZOLINE HYDROCHLORIDE 0.05 G/100ML
2 SPRAY NASAL 2 TIMES DAILY
Qty: 30 ML | Refills: 1 | Status: SHIPPED | OUTPATIENT
Start: 2022-06-24 | End: 2022-09-14

## 2022-06-24 RX ORDER — BENZONATATE 200 MG/1
200 CAPSULE ORAL 3 TIMES DAILY PRN
Qty: 20 CAPSULE | Refills: 1 | Status: SHIPPED | OUTPATIENT
Start: 2022-06-24 | End: 2022-09-14

## 2022-06-24 RX ORDER — LAMOTRIGINE 150 MG/1
150 TABLET ORAL 3 TIMES DAILY
COMMUNITY
Start: 2022-06-24 | End: 2024-10-02

## 2022-06-24 RX ORDER — PREDNISONE 10 MG/1
10 TABLET ORAL EVERY MORNING
Qty: 4 TABLET | Refills: 0 | Status: SHIPPED | OUTPATIENT
Start: 2022-06-24 | End: 2022-06-28

## 2022-06-24 RX ORDER — GUAIFENESIN 600 MG/1
1200 TABLET, EXTENDED RELEASE ORAL 2 TIMES DAILY
Qty: 120 TABLET | Refills: 11 | Status: SHIPPED | OUTPATIENT
Start: 2022-06-24 | End: 2023-06-24

## 2022-06-24 RX ORDER — ACETAMINOPHEN 10 MG/ML
INJECTION, SOLUTION INTRAVENOUS
COMMUNITY
End: 2022-08-23

## 2022-06-24 NOTE — PATIENT INSTRUCTIONS
Discussed risks and benefits of Paxlovid, will not prescribe, unable to adjust narcotics downward such that risks far outweigh benefits    Guaifenesin 1200 mg twice daily    Prednisone 10 mg each morning for 4 days    Afrin nasal spray twice daily    Discontinue diazepam    Benzonatate as needed for cough

## 2022-06-24 NOTE — PROGRESS NOTES
Bola is a 52 year old male contacting the clinic today via video, who will use the platform: MeMed for the visit.  Phone # for Doximity, or if Amwell drops:   Telephone Information:   Mobile [phone]          ASSESSMENT and PLAN:  1. COVID-19 virus infection  Approximately day 5 or day 6.  Provide symptomatic treatment.  Risks of Paxlovid outweigh benefits given his Seroquel and oxycodone  - guaiFENesin (MUCINEX) 600 MG 12 hr tablet; Take 2 tablets (1,200 mg) by mouth 2 times daily  Dispense: 120 tablet; Refill: 11  - benzonatate (TESSALON) 200 MG capsule; Take 1 capsule (200 mg) by mouth 3 times daily as needed for cough  Dispense: 20 capsule; Refill: 1  - predniSONE (DELTASONE) 10 MG tablet; Take 1 tablet (10 mg) by mouth every morning for 4 days  Dispense: 4 tablet; Refill: 0  - oxymetazoline (AFRIN) 0.05 % nasal spray; Spray 2 sprays into both nostrils 2 times daily  Dispense: 30 mL; Refill: 1    2. Traumatic brain injury with loss of consciousness, sequela (H)  Stable    3. Preventative health care  - ZOSTER VACCINE RECOMBINANT ADJUVANTED (SHINGRIX); Future    4. Drug-induced constipation  Would need to decrease dose on Paxlovid    5. Bipolar 2 disorder (H)  Clarify dose  - lamoTRIgine (LAMICTAL) 150 MG tablet; Take 1 tablet (150 mg) by mouth 3 times daily     Patient Instructions   Discussed risks and benefits of Paxlovid, will not prescribe, unable to adjust narcotics downward such that risks far outweigh benefits    Guaifenesin 1200 mg twice daily    Prednisone 10 mg each morning for 4 days    Afrin nasal spray twice daily    Discontinue diazepam    Benzonatate as needed for cough            Return in about 4 months (around 10/24/2022).       CHIEF COMPLAINT:  Chief Complaint   Patient presents with     Covid Concern     Tested positive yesterday       HISTORY OF PRESENT ILLNESS:  Bola is a 52 year old male contacting the clinic today via video for complaints of COVID.  He believes he became first  "sick on approximately June 18 although it is difficult to tell with his chronic allergies.  He tested positive yesterday.  He complains of general malaise, sore throat, cough with mucus and head congestion.  He takes daily Levaquin for dental infection.  He takes 15 mg of oxycodone 8 times per day and 600 mg of quetiapine at bed.  Review of drug interactions would indicate that oxycodone dose would need to be decreased by 75% and Seroquel dose decreased by 50%.  He is unwilling to do that but would like symptomatic treatment for his other symptoms      COVID-19 Symptom Review  How many days ago did these symptoms start? 6    Are any of the following symptoms significant for you?    New or worsening difficulty breathing? No    Worsening cough? No    Fever or chills? No, but feels warm    Headache: YES    Sore throat: YES    Chest pain: YES, heaviness    Diarrhea: YES    Body aches? YES    What treatments has patient tried? Acetaminophen   Does patient live in a nursing home, group home, or shelter? YES  Does patient have a way to get food/medications during quarantined? Yes, I have a friend or family member who can help me.    REVIEW OF SYSTEMS:   Severe persisting pain    PFSH:  Social History     Social History Narrative    He is .  He has been a  and also a counselor, and worked with [employer].  He smokes and does not drink alcohol.  He is now on disability due to his brain injury and bipolar disease.       TOBACCO USE:  History   Smoking Status     Former Smoker     Packs/day: 0.50     Years: 11.00     Types: Cigarettes     Start date: 8/20/2009     Quit date: 2/21/2017   Smokeless Tobacco     Former User     Comment: 0.5 pack per day       VITALS:  There were no vitals filed for this visit.  There were no vitals taken for this visit. Estimated body mass index is 32.23 kg/m  as calculated from the following:    Height as of 9/17/21: 1.727 m (5' 8\").    Weight " as of 9/1/21: 96.2 kg (212 lb).    PHYSICAL EXAM:  (observations via Video)  Alert and oriented.  Poor dentition.  Bearded in bed    MEDICATIONS:   Current Outpatient Medications   Medication Sig Dispense Refill     acetaminophen (TYLENOL) 650 MG CR tablet Take 2 tablets (1,300 mg) by mouth 3 times daily 168 tablet 1     albuterol (PROAIR HFA/PROVENTIL HFA/VENTOLIN HFA) 108 (90 Base) MCG/ACT inhaler Inhale 2 puffs into the lungs every 6 hours as needed 6.7 g 11     allopurinol (ZYLOPRIM) 300 MG tablet Take 1 tablet (300 mg) by mouth 2 times daily 180 tablet 3     ammonium lactate (LAC-HYDRIN) 12 % external lotion        amphetamine-dextroamphetamine (ADDERALL) 15 MG tablet Take 1 tablet (15 mg) by mouth daily 30 tablet 0     bisacodyl (DULCOLAX) 10 MG suppository Place 1 suppository (10 mg) rectally daily as needed for constipation 60 suppository 1     buPROPion (WELLBUTRIN SR) 150 MG 12 hr tablet Take 1 tablet (150 mg) by mouth 3 times daily 90 tablet 11     CALCIUM ANTACID 500 MG chewable tablet        chlorhexidine (PERIDEX) 0.12 % solution Swish and spit 15 mLs in mouth 3 times daily as needed (sore throat) 473 mL 3     diazepam (VALIUM) 5 MG tablet Take 5mg by mouth 30 minutes prior to imaging. 1 tablet 0     ergocalciferol (ERGOCALCIFEROL) 1.25 MG (17963 UT) capsule Take 1 capsule (50,000 Units) by mouth once a week 12 capsule 3     fexofenadine (ALLEGRA) 180 MG tablet Take 180 mg by mouth daily as needed       furosemide (LASIX) 40 MG tablet Take 1 tablet (40 mg) by mouth daily 30 tablet 11     hydrOXYzine (ATARAX) 25 MG tablet        lactulose 20 GM/30ML SOLN Take 60 mLs by mouth 3 times daily For constipation. To replace senokot, magnesium, miralax if covered by insurance 5400 mL 11     LANsoprazole (PREVACID) 30 MG DR capsule Take 30 mg by mouth daily       levofloxacin (LEVAQUIN) 750 MG tablet Take 1 tablet (750 mg) by mouth daily 42 tablet 0     lidocaine (LIDODERM) 5 % patch Apply 1 patch topically to  painful area of skin once daily and remove per schedule.       lidocaine (XYLOCAINE) 2 % external gel Apply 1 inch topically 4 times a day as needed to gums.       methocarbamol (ROBAXIN) 500 MG tablet Take 2 tablets (1,000 mg) by mouth 4 times daily 120 tablet 1     metoprolol succinate ER (TOPROL-XL) 50 MG 24 hr tablet Take 1 tablet (50 mg) by mouth daily 90 tablet 3     montelukast (SINGULAIR) 10 MG tablet Take 1 tablet by mouth daily       naloxegol (MOVANTIK) 25 MG TABS tablet Take 1 tablet (25 mg) by mouth every morning (before breakfast) 30 tablet 1     naloxone (NARCAN) 4 MG/0.1ML nasal spray 1 spray (4 mg dose) into one nostril for opioid reversal. Call 911. May repeat if no response in 3 minutes.       oxyCODONE IR (ROXICODONE) 15 MG tablet Take 1 tablet (15 mg) by mouth 8 times daily One tablet every three hours 8 am, 11 am, 2pm, 5 pm, 8 pm, 11 pm, 2 am, 5 am, may fill 6/27/22 for start 6/29/22 112 tablet 0     QUEtiapine (SEROQUEL) 300 MG tablet Take 600 mg by mouth At Bedtime       QUEtiapine (SEROQUEL) 50 MG tablet Take 100 mg by mouth 4 times daily as needed        senna-docusate (SENOKOT-S/PERICOLACE) 8.6-50 MG tablet Take 2 tablets by mouth 2 times daily 120 tablet 11     triamterene-HCTZ (MAXZIDE-25) 37.5-25 MG tablet Take 1 tablet by mouth daily 30 tablet 11     zonisamide (ZONEGRAN) 25 MG capsule Take 3 capsules (75 mg) by mouth 4 times daily 180 capsule 3       Outside Notes summarized:   Labs, x-rays, cardiology, GI tests reviewed:   No results for input(s): HGB, NA, POTASSIUM, CR, A1C, PSA, URIC, B12, TSH, VITDT in the last 08778 hours.  No results found for: [id number]  Lab Results   Component Value Date    VITDT 48 05/05/2014     Lab Results   Component Value Date    CHOL 183 09/20/2018     New orders: No orders of the defined types were placed in this encounter.      Independent review of:    Supplemental history by:      Patient has given verbal consent to a Video visit?  Yes  How would  you like to obtain your AVS?  MyChart  Will anyone else be joining your video visit? No       Video-Visit Details  Type of service:  Video Visit  Originating Location (pt. Location): Home  Distant Location (provider location):   [facility]    [facility]ang[facility]  [facility]    Video Start Time: 2 PM  Video End time:  2:34 PM  Face to face plus orders: 34 minutes  Documentation time:  3 minutes     The visit lasted a total of 37 minutes

## 2022-06-27 ENCOUNTER — VIRTUAL VISIT (OUTPATIENT)
Dept: PALLIATIVE MEDICINE | Facility: OTHER | Age: 52
End: 2022-06-27
Payer: COMMERCIAL

## 2022-06-27 DIAGNOSIS — M48.02 FORAMINAL STENOSIS OF CERVICAL REGION: ICD-10-CM

## 2022-06-27 PROCEDURE — 99213 OFFICE O/P EST LOW 20 MIN: CPT | Mod: 95 | Performed by: ANESTHESIOLOGY

## 2022-06-27 RX ORDER — OXYCODONE HYDROCHLORIDE 15 MG/1
15 TABLET ORAL
Qty: 112 TABLET | Refills: 0 | Status: SHIPPED | OUTPATIENT
Start: 2022-06-27 | End: 2022-07-07

## 2022-06-27 ASSESSMENT — PAIN SCALES - GENERAL: PAINLEVEL: EXTREME PAIN (9)

## 2022-06-27 NOTE — PROGRESS NOTES
Patient presents to the clinic today for a follow up with JAYME MULLEN MD  regarding Pain Management.     Jan is a 52 year old who is being evaluated via a billable video visit.      How would you like to obtain your AVS? Mail a copy  If the video visit is dropped, the invitation should be resent by: Text to cell phone: [phone]  Will anyone else be joining your video visit? No        Video-Visit Details    Video Start Time:     Type of service:  Video Visit    Video End Time:    Originating Location (pt. Location):   Distant Location (provider location):  [facility]     Platform used for Video Visit: Doximity         Is Pt currently in [place]? Yes    NOTE:  If Pt is not in [place], Appointment needs to be canceled and rescheduled         PEG Score 2/22/2022 4/13/2022 6/27/2022   PEG Total Score 9.67 10 9.33        UDT/CSA = 1/12/2021      Becca Rodriguez MA  [facility] [place] [facility]

## 2022-06-27 NOTE — PROGRESS NOTES
"[facility] - Office Visit    ASSESSMENT & PLAN     Bola was seen today for pain.    Diagnoses and all orders for this visit:    Foraminal stenosis of cervical region  -     oxyCODONE IR (ROXICODONE) 15 MG tablet; Take 1 tablet (15 mg) by mouth 8 times daily One tablet every three hours 8 am, 11 am, 2pm, 5 pm, 8 pm, 11 pm, 2 am, 5 am, may fill 7/12 for 7/14        Patient Instructions   PLAN:    May continue oxycodone 15 mg every 3 hours.    Follow-up with Dr. Mullen in 8 weeks.        -----  JAYME MULLEN MD  [facility]       SUBJECTIVE      Bola Lyon Jr. is a 52 year old year old male who presents to clinic today for the following:     Follow-up for multiple orthopedic surgeries, traumatic brain injury with cognitive impairment, bipolar spectrum disorder, and recovering from extensive lumbar fusion in December.    Appointment changed to video as he tested positive for COVID last week.  Notes significant increased \"body aches\".  Has had to hold on physical therapy.  Notes physical therapy was very grueling with very slow improvement.  Has ice on his back today.  Looking forward to resuming physical therapy and moving onto the next phase.    Also struggling with the dental infection findings see the dentist.    Reviews struggling as was not able to get out during the last 2 summers, was hoping to spend time outside and isolated.    Continues on Seroquel lamotrigine and hydroxyzine for his mood helping to some extent.  Has a psychiatrist follows at intervals.  His parents are able to talk to him during this time.    He does continue on the oxycodone 15 mg every 3 hours given around-the-clock.   reviewed.  Last urine drug test more than a year ago, was to be obtained today.      Current Outpatient Medications:      acetaminophen (OFIRMEV) SOLN infusion, acetaminophen  Take 2 tablets (1,300 mg) by mouth 3 times daily, Disp: , Rfl:      acetaminophen (TYLENOL) 650 MG " CR tablet, Take 2 tablets (1,300 mg) by mouth 3 times daily, Disp: 168 tablet, Rfl: 1     albuterol (PROAIR HFA/PROVENTIL HFA/VENTOLIN HFA) 108 (90 Base) MCG/ACT inhaler, Inhale 2 puffs into the lungs every 6 hours as needed, Disp: 6.7 g, Rfl: 11     allopurinol (ZYLOPRIM) 300 MG tablet, Take 1 tablet (300 mg) by mouth 2 times daily, Disp: 180 tablet, Rfl: 3     ammonium lactate (LAC-HYDRIN) 12 % external lotion, , Disp: , Rfl:      amphetamine-dextroamphetamine (ADDERALL) 15 MG tablet, Take 1 tablet (15 mg) by mouth daily, Disp: 30 tablet, Rfl: 0     benzonatate (TESSALON) 200 MG capsule, Take 1 capsule (200 mg) by mouth 3 times daily as needed for cough, Disp: 20 capsule, Rfl: 1     bisacodyl (DULCOLAX) 10 MG suppository, Place 1 suppository (10 mg) rectally daily as needed for constipation, Disp: 60 suppository, Rfl: 1     buPROPion (WELLBUTRIN SR) 150 MG 12 hr tablet, Take 1 tablet (150 mg) by mouth 3 times daily, Disp: 90 tablet, Rfl: 11     CALCIUM ANTACID 500 MG chewable tablet, , Disp: , Rfl:      chlorhexidine (PERIDEX) 0.12 % solution, Swish and spit 15 mLs in mouth 3 times daily as needed (sore throat), Disp: 473 mL, Rfl: 3     ergocalciferol (ERGOCALCIFEROL) 1.25 MG (67573 UT) capsule, Take 1 capsule (50,000 Units) by mouth once a week, Disp: 12 capsule, Rfl: 3     fexofenadine (ALLEGRA) 180 MG tablet, Take 180 mg by mouth daily as needed, Disp: , Rfl:      furosemide (LASIX) 40 MG tablet, Take 1 tablet (40 mg) by mouth daily, Disp: 30 tablet, Rfl: 11     guaiFENesin (MUCINEX) 600 MG 12 hr tablet, Take 2 tablets (1,200 mg) by mouth 2 times daily, Disp: 120 tablet, Rfl: 11     hydrOXYzine (ATARAX) 25 MG tablet, , Disp: , Rfl:      lactulose 20 GM/30ML SOLN, Take 60 mLs by mouth 3 times daily For constipation. To replace senokot, magnesium, miralax if covered by insurance, Disp: 5400 mL, Rfl: 11     lamoTRIgine (LAMICTAL) 150 MG tablet, Take 1 tablet (150 mg) by mouth 3 times daily, Disp: , Rfl:       LANsoprazole (PREVACID) 30 MG DR capsule, Take 30 mg by mouth daily, Disp: , Rfl:      levofloxacin (LEVAQUIN) 750 MG tablet, Take 1 tablet (750 mg) by mouth daily, Disp: 42 tablet, Rfl: 0     lidocaine (LIDODERM) 5 % patch, Apply 1 patch topically to painful area of skin once daily and remove per schedule., Disp: , Rfl:      lidocaine (XYLOCAINE) 2 % external gel, Apply 1 inch topically 4 times a day as needed to gums., Disp: , Rfl:      methocarbamol (ROBAXIN) 500 MG tablet, Take 2 tablets (1,000 mg) by mouth 4 times daily, Disp: 120 tablet, Rfl: 1     metoprolol succinate ER (TOPROL-XL) 50 MG 24 hr tablet, Take 1 tablet (50 mg) by mouth daily, Disp: 90 tablet, Rfl: 3     montelukast (SINGULAIR) 10 MG tablet, Take 1 tablet by mouth daily, Disp: , Rfl:      naloxegol (MOVANTIK) 25 MG TABS tablet, Take 1 tablet (25 mg) by mouth every morning (before breakfast), Disp: 30 tablet, Rfl: 1     naloxone (NARCAN) 4 MG/0.1ML nasal spray, 1 spray (4 mg dose) into one nostril for opioid reversal. Call 911. May repeat if no response in 3 minutes., Disp: , Rfl:      oxyCODONE IR (ROXICODONE) 15 MG tablet, Take 1 tablet (15 mg) by mouth 8 times daily One tablet every three hours 8 am, 11 am, 2pm, 5 pm, 8 pm, 11 pm, 2 am, 5 am, may fill 7/12 for 7/14, Disp: 112 tablet, Rfl: 0     oxymetazoline (AFRIN) 0.05 % nasal spray, Spray 2 sprays into both nostrils 2 times daily, Disp: 30 mL, Rfl: 1     predniSONE (DELTASONE) 10 MG tablet, Take 1 tablet (10 mg) by mouth every morning for 4 days, Disp: 4 tablet, Rfl: 0     QUEtiapine (SEROQUEL) 300 MG tablet, Take 600 mg by mouth At Bedtime, Disp: , Rfl:      QUEtiapine (SEROQUEL) 50 MG tablet, Take 100 mg by mouth 4 times daily as needed , Disp: , Rfl:      senna-docusate (SENOKOT-S/PERICOLACE) 8.6-50 MG tablet, Take 2 tablets by mouth 2 times daily, Disp: 120 tablet, Rfl: 11     triamterene-HCTZ (MAXZIDE-25) 37.5-25 MG tablet, Take 1 tablet by mouth daily, Disp: 30 tablet, Rfl: 11      zonisamide (ZONEGRAN) 25 MG capsule, Take 3 capsules (75 mg) by mouth 4 times daily, Disp: 180 capsule, Rfl: 3    Current Facility-Administered Medications:      cyanocobalamin injection 1,000 mcg, 1,000 mcg, Intramuscular, Q14 Days, Devon Lund MD      Review of Systems   General, psych, musculoskeletal, bowels and bladder otherwise normal other than above.          OBJECTIVE        Physical Exam  General:  Normal appearance, no apparent distress  Cardiovascular: Normal rate  Lungs: Pulmonary effort is normal, speaking in full sentences  MSK:   Skin:. No concerning rashes or lesions.  Neurologic: No focal deficit, alert and oriented x3  Psychiatric: Affect constricted.    Assessment: Recovering from lumbar fusion, active in physical therapy, with COVID has a flareup of muscle aches.    Has been maintained on oxycodone 15 mg every 3 hours which she is noted is tolerated helping to some extent.    Discussed with patient with changes with the [facility] service, expectations to be moving toward 90 morphine milligram equivalents.    Discussed that our next appointment will need to be making a slow taper in that regard.  He indicates his understanding.    Total time more than 20 minutes.    Video start time 1: 10.  Video end time 1: 21.  Total time more than 20 minutes

## 2022-06-30 DIAGNOSIS — M48.02 FORAMINAL STENOSIS OF CERVICAL REGION: ICD-10-CM

## 2022-06-30 DIAGNOSIS — F41.1 GENERALIZED ANXIETY DISORDER: ICD-10-CM

## 2022-06-30 DIAGNOSIS — S06.9X9S TRAUMATIC BRAIN INJURY WITH LOSS OF CONSCIOUSNESS, SEQUELA (H): ICD-10-CM

## 2022-06-30 DIAGNOSIS — F31.81 BIPOLAR 2 DISORDER (H): ICD-10-CM

## 2022-06-30 DIAGNOSIS — F31.81 BIPOLAR 2 DISORDER (H): Primary | ICD-10-CM

## 2022-06-30 RX ORDER — DEXTROAMPHETAMINE SACCHARATE, AMPHETAMINE ASPARTATE, DEXTROAMPHETAMINE SULFATE AND AMPHETAMINE SULFATE 3.75; 3.75; 3.75; 3.75 MG/1; MG/1; MG/1; MG/1
15 TABLET ORAL DAILY
Qty: 30 TABLET | Refills: 0 | Status: SHIPPED | OUTPATIENT
Start: 2022-06-30 | End: 2022-08-10

## 2022-07-01 RX ORDER — QUETIAPINE FUMARATE 300 MG/1
600 TABLET, FILM COATED ORAL AT BEDTIME
Qty: 180 TABLET | Refills: 0 | Status: SHIPPED | OUTPATIENT
Start: 2022-07-01 | End: 2024-09-05

## 2022-07-01 RX ORDER — SENNOSIDES 8.6 MG
1300 CAPSULE ORAL 3 TIMES DAILY
Qty: 540 TABLET | Refills: 1 | Status: SHIPPED | OUTPATIENT
Start: 2022-07-01 | End: 2023-01-16

## 2022-07-01 NOTE — TELEPHONE ENCOUNTER
"Last Written Prescription Date:  5/4/22  Last Fill Quantity: 168,  # refills: 1   Last office visit provider:  6/24/22     Requested Prescriptions   Pending Prescriptions Disp Refills     acetaminophen (TYLENOL) 650 MG CR tablet 168 tablet 1     Sig: Take 2 tablets (1,300 mg) by mouth 3 times daily       Analgesics (Non-Narcotic Tylenol and ASA Only) Passed - 6/30/2022 12:59 PM        Passed - Recent (12 mo) or future (30 days) visit within the authorizing provider's specialty     Patient has had an office visit with the authorizing provider or a provider within the authorizing providers department within the previous 12 mos or has a future within next 30 days. See \"Patient Info\" tab in inbasket, or \"Choose Columns\" in Meds & Orders section of the refill encounter.              Passed - Patient is 7 months old or older     If patient is a peds patient of the age 7 mos -12 years, ok to refill using weight-based dosing.     If >3g daily and/or sig is not \"prn\", check for liver enzymes. If normal in the last year, ok to refill.  If not, refer to the provider.          Passed - Medication is active on med list           QUEtiapine (SEROQUEL) 300 MG tablet       Sig: Take 2 tablets (600 mg) by mouth At Bedtime       Antipsychotic Medications Failed - 7/1/2022  7:49 AM        Failed - Lipid panel on file within the past 12 months     Recent Labs   Lab Test 09/20/18  1150   CHOL 183   TRIG 594*   HDL 36*   LDL 78               Failed - CBC on file in past 12 months     Recent Labs   Lab Test 12/12/19  1706   WBC 13.8*   RBC 4.68   HGB 13.4*   HCT 40.4                    Failed - A1c or Glucose on file in past 12 months     Recent Labs   Lab Test 12/12/19  1706   GLC 96       Please review patients last 3 weights. If a weight gain of >10 lbs exists, you may refill the prescription once after instructing the patient to schedule an appointment within the next 30 days.    Wt Readings from Last 3 Encounters:   09/01/21 " "96.2 kg (212 lb)   08/06/21 95.7 kg (211 lb)   04/30/21 98.1 kg (216 lb 4 oz)             Passed - Blood pressure under 140/90 in past 12 months     BP Readings from Last 3 Encounters:   09/01/21 120/78   04/30/21 116/68   03/19/21 108/60                 Passed - Patient is 12 years of age or older        Passed - Heart Rate on file within past 12 months     Pulse Readings from Last 3 Encounters:   09/01/21 83   04/30/21 74   03/19/21 74               Passed - Medication is active on med list        Passed - Recent (6 mo) or future (30 days) visit within the authorizing provider's specialty     Patient had office visit in the last 6 months or has a visit in the next 30 days with authorizing provider or within the authorizing provider's specialty.  See \"Patient Info\" tab in inbasket, or \"Choose Columns\" in Meds & Orders section of the refill encounter.                 Jamari Sanz RN 07/01/22 7:49 AM  "

## 2022-07-01 NOTE — TELEPHONE ENCOUNTER
Outpatient Medication Detail     Disp Refills Start End MINDY   QUEtiapine (SEROQUEL) 200 MG tablet 90 tablet 11 2021 No   Sig - Route: Take 3 tablets (600 mg total) by mouth at bedtime. - Oral   Sent to pharmacy as: QUEtiapine 200 mg tablet (SEROquel)   E-Prescribing Status: Receipt confirmed by pharmacy (2021  6:16 PM CDT)       QUEtiapine (SEROQUEL) 200 MG tablet [[id number]]    Electronically signed by: Devon Lund MD on 21 Status:    Ordering user: Devon Lund MD 21 Authorized by: Devon Lund MD   Frequency: QHS 21 - 365  days   Diagnoses  Generalized anxiety disorder [F41.1]  Bipolar 2 disorder (H) [F31.81]     Routing refill request to provider for review/approval because:  Labs not current:  Multiple   script    Last office visit provider:  22     Requested Prescriptions   Pending Prescriptions Disp Refills     QUEtiapine (SEROQUEL) 300 MG tablet       Sig: Take 2 tablets (600 mg) by mouth At Bedtime       Antipsychotic Medications Failed - 2022  7:49 AM        Failed - Lipid panel on file within the past 12 months     Recent Labs   Lab Test 18  1150   CHOL 183   TRIG 594*   HDL 36*   LDL 78               Failed - CBC on file in past 12 months     Recent Labs   Lab Test 19  1706   WBC 13.8*   RBC 4.68   HGB 13.4*   HCT 40.4                    Failed - A1c or Glucose on file in past 12 months     Recent Labs   Lab Test 19  1706   GLC 96       Please review patients last 3 weights. If a weight gain of >10 lbs exists, you may refill the prescription once after instructing the patient to schedule an appointment within the next 30 days.    Wt Readings from Last 3 Encounters:   21 96.2 kg (212 lb)   21 95.7 kg (211 lb)   21 98.1 kg (216 lb 4 oz)             Passed - Blood pressure under 140/90 in past 12 months     BP Readings from Last 3 Encounters:   21 120/78   21  "116/68   03/19/21 108/60                 Passed - Patient is 12 years of age or older        Passed - Heart Rate on file within past 12 months     Pulse Readings from Last 3 Encounters:   09/01/21 83   04/30/21 74   03/19/21 74               Passed - Medication is active on med list        Passed - Recent (6 mo) or future (30 days) visit within the authorizing provider's specialty     Patient had office visit in the last 6 months or has a visit in the next 30 days with authorizing provider or within the authorizing provider's specialty.  See \"Patient Info\" tab in inbasket, or \"Choose Columns\" in Meds & Orders section of the refill encounter.             Signed Prescriptions Disp Refills    acetaminophen (TYLENOL) 650 MG CR tablet 540 tablet 1     Sig: Take 2 tablets (1,300 mg) by mouth 3 times daily       Analgesics (Non-Narcotic Tylenol and ASA Only) Passed - 6/30/2022 12:59 PM        Passed - Recent (12 mo) or future (30 days) visit within the authorizing provider's specialty     Patient has had an office visit with the authorizing provider or a provider within the authorizing providers department within the previous 12 mos or has a future within next 30 days. See \"Patient Info\" tab in inbasket, or \"Choose Columns\" in Meds & Orders section of the refill encounter.              Passed - Patient is 7 months old or older     If patient is a peds patient of the age 7 mos -12 years, ok to refill using weight-based dosing.     If >3g daily and/or sig is not \"prn\", check for liver enzymes. If normal in the last year, ok to refill.  If not, refer to the provider.          Passed - Medication is active on med list             Jamari Sanz RN 07/01/22 7:51 AM  "

## 2022-07-07 DIAGNOSIS — G89.4 CHRONIC PAIN DISORDER: ICD-10-CM

## 2022-07-07 DIAGNOSIS — M48.02 FORAMINAL STENOSIS OF CERVICAL REGION: ICD-10-CM

## 2022-07-07 NOTE — TELEPHONE ENCOUNTER
Received call from patient requesting refill(s) of Oxycodone 15 mg and Zonisamide 25 mg    Last dispensed from pharmacy on 6/28,  Zonisamide last filled 7/1     Patient's last office/virtual visit by prescribing provider on 6/27  Next office/virtual appointment scheduled for 8/26    UDT/CSA = 1/12/2021    E-prescribe to [facility]  Pending Prescriptions:                       Disp   Refills    oxyCODONE IR (ROXICODONE) 15 MG tablet    112 ta*0            Sig: Take 1 tablet (15 mg) by mouth 8 times daily One           tablet every three hours 8 am, 11 am, 2pm, 5 pm,           8 pm, 11 pm, 2 am, 5 am, may fill 7/12 for 7/14    Pending Prescriptions:                       Disp   Refills    oxyCODONE IR (ROXICODONE) 15 MG tablet    112 ta*0            Sig: Take 1 tablet (15 mg) by mouth 8 times daily One           tablet every three hours 8 am, 11 am, 2pm, 5 pm,           8 pm, 11 pm, 2 am, 5 am, may fill 7/12 for 7/14    zonisamide (ZONEGRAN) 25 MG capsule       180 ca*3            Sig: Take 3 capsules (75 mg) by mouth 4 times daily

## 2022-07-08 RX ORDER — OXYCODONE HYDROCHLORIDE 15 MG/1
15 TABLET ORAL
Qty: 112 TABLET | Refills: 0 | Status: SHIPPED | OUTPATIENT
Start: 2022-07-08 | End: 2022-07-22

## 2022-07-08 RX ORDER — ZONISAMIDE 25 MG/1
75 CAPSULE ORAL 4 TIMES DAILY
Qty: 180 CAPSULE | Refills: 3 | Status: SHIPPED | OUTPATIENT
Start: 2022-07-08 | End: 2022-10-14

## 2022-07-11 ENCOUNTER — LAB (OUTPATIENT)
Dept: LAB | Facility: HOSPITAL | Age: 52
End: 2022-07-11
Payer: COMMERCIAL

## 2022-07-11 ENCOUNTER — LAB (OUTPATIENT)
Dept: PALLIATIVE MEDICINE | Facility: OTHER | Age: 52
End: 2022-07-11
Payer: COMMERCIAL

## 2022-07-11 DIAGNOSIS — Z79.891 LONG TERM (CURRENT) USE OF OPIATE ANALGESIC: ICD-10-CM

## 2022-07-11 DIAGNOSIS — F31.81 BIPOLAR 2 DISORDER (H): Primary | ICD-10-CM

## 2022-07-11 DIAGNOSIS — Z79.891 LONG TERM (CURRENT) USE OF OPIATE ANALGESIC: Primary | ICD-10-CM

## 2022-07-11 PROCEDURE — 80359 METHYLENEDIOXYAMPHETAMINES: CPT

## 2022-07-11 PROCEDURE — 36415 COLL VENOUS BLD VENIPUNCTURE: CPT

## 2022-07-11 PROCEDURE — 80361 OPIATES 1 OR MORE: CPT

## 2022-07-11 PROCEDURE — 80307 DRUG TEST PRSMV CHEM ANLYZR: CPT

## 2022-07-11 NOTE — PROGRESS NOTES
Obtained urine specimen. Specimen sent to the lab.     oxyCODONE IR (ROXICODONE) 15 MG tablet- Patient reports last taken at 11:00 am on 07/11/2022.     Becca Rodriguez MA  [facility] [place]

## 2022-07-12 NOTE — TELEPHONE ENCOUNTER
Patient does not have a current prescription on file for this medication.  PA is not able to be completed without a current Rx in chart written by a [facility] provider.

## 2022-07-13 NOTE — TELEPHONE ENCOUNTER
Spoke with patient who states this is for his gums. Chart review indicates this medication has been ordered before and patient states he needs a refill     Disp Refills Start End MINDY   lidocaine (XYLOCAINE) 2 % external gel   6/25/2021  --   Sig: Apply 1 inch topically 4 times a day as needed to gums.   Class: Historical   Order: [id number]

## 2022-07-13 NOTE — TELEPHONE ENCOUNTER
Could you call and please clarify with Jan what he is needing this medication for?  I do not see the specific medication ordered and I wonder if he is referring to a different formulation of lidocaine.

## 2022-07-14 LAB
AMPHETAMINES SERPL QL SCN: POSITIVE NG/ML
ANNOTATION COMMENT IMP: NORMAL
BARBITURATES SERPL QL SCN: NEGATIVE NG/ML
BENZODIAZ SERPL QL SCN: NEGATIVE NG/ML
BUPRENORPHINE SERPL-MCNC: NEGATIVE NG/ML
CANNABINOIDS SERPL QL SCN: NEGATIVE NG/ML
COCAINE SERPL QL SCN: NEGATIVE NG/ML
METHADONE SERPL QL SCN: NEGATIVE NG/ML
METHAMPHET SERPL QL: NEGATIVE NG/ML
OPIATES SERPL QL SCN: NEGATIVE NG/ML
OXYCODONE SERPL QL: POSITIVE NG/ML
PCP SERPL QL SCN: NEGATIVE NG/ML

## 2022-07-16 LAB
6MAM SERPL-MCNC: <2 NG/ML
CODEINE SERPL-MCNC: <2 NG/ML
HYDROCODONE SERPL-MCNC: <2 NG/ML
HYDROMORPHONE SERPL-MCNC: <2 NG/ML
MORPHINE SERPL-MCNC: <2 NG/ML
OXYCODONE SERPL-MCNC: 77 NG/ML
OXYMORPHONE SERPL-MCNC: <2 NG/ML

## 2022-07-17 LAB
AMPHET SERPL-MCNC: 44 NG/ML
MDA SERPL-MCNC: <20 NG/ML
MDEA SERPL-MCNC: <20 NG/ML
MDMA SERPL-MCNC: <20 NG/ML
METHAMPHET SERPL-MCNC: <20 NG/ML

## 2022-08-03 DIAGNOSIS — G89.4 CHRONIC PAIN SYNDROME: ICD-10-CM

## 2022-08-03 RX ORDER — METHOCARBAMOL 500 MG/1
1000 TABLET, FILM COATED ORAL 4 TIMES DAILY
Qty: 120 TABLET | Refills: 1 | Status: SHIPPED | OUTPATIENT
Start: 2022-08-03 | End: 2022-08-30

## 2022-08-03 NOTE — TELEPHONE ENCOUNTER
Received fax request from   Methocarbamol 500 mg  Last refilled on 7/1/22    Pt last seen on 6/27/22  Next appt scheduled for 8/26/22    Pending Prescriptions:                       Disp   Refills    methocarbamol (ROBAXIN) 500 MG tablet     120 ta*1            Sig: Take 2 tablets (1,000 mg) by mouth 4 times daily    Valeria

## 2022-08-09 DIAGNOSIS — S06.9X9S TRAUMATIC BRAIN INJURY WITH LOSS OF CONSCIOUSNESS, SEQUELA (H): ICD-10-CM

## 2022-08-09 DIAGNOSIS — F31.81 BIPOLAR 2 DISORDER (H): ICD-10-CM

## 2022-08-09 RX ORDER — QUETIAPINE FUMARATE 100 MG/1
100 TABLET, FILM COATED ORAL
COMMUNITY
Start: 2022-08-06 | End: 2022-08-09

## 2022-08-10 RX ORDER — QUETIAPINE FUMARATE 100 MG/1
100 TABLET, FILM COATED ORAL 4 TIMES DAILY PRN
Qty: 60 TABLET | Refills: 1 | Status: SHIPPED | OUTPATIENT
Start: 2022-08-10 | End: 2022-09-16

## 2022-08-10 RX ORDER — DEXTROAMPHETAMINE SACCHARATE, AMPHETAMINE ASPARTATE, DEXTROAMPHETAMINE SULFATE AND AMPHETAMINE SULFATE 3.75; 3.75; 3.75; 3.75 MG/1; MG/1; MG/1; MG/1
15 TABLET ORAL DAILY
Qty: 30 TABLET | Refills: 0 | Status: SHIPPED | OUTPATIENT
Start: 2022-08-10 | End: 2022-09-15

## 2022-08-10 NOTE — TELEPHONE ENCOUNTER
Routing refill request to provider for review/approval because:  Labs not current:  Lipid, A1c, CBC  Patient reported    Last Written Prescription Date:  n/a  Last Fill Quantity: n/a,  # refills: n/a   Last office visit provider:  6/24/22     Requested Prescriptions   Pending Prescriptions Disp Refills     amphetamine-dextroamphetamine (ADDERALL) 15 MG tablet 30 tablet 0     Sig: Take 1 tablet (15 mg) by mouth daily       There is no refill protocol information for this order        QUEtiapine (SEROQUEL) 100 MG tablet       Sig: Take 1 tablet (100 mg) by mouth       Antipsychotic Medications Failed - 8/9/2022  8:42 AM        Failed - Lipid panel on file within the past 12 months     Recent Labs   Lab Test 09/20/18  1150   CHOL 183   TRIG 594*   HDL 36*   LDL 78               Failed - CBC on file in past 12 months     Recent Labs   Lab Test 12/12/19  1706   WBC 13.8*   RBC 4.68   HGB 13.4*   HCT 40.4                    Failed - A1c or Glucose on file in past 12 months     Recent Labs   Lab Test 12/12/19  1706   GLC 96       Please review patients last 3 weights. If a weight gain of >10 lbs exists, you may refill the prescription once after instructing the patient to schedule an appointment within the next 30 days.    Wt Readings from Last 3 Encounters:   09/01/21 96.2 kg (212 lb)   08/06/21 95.7 kg (211 lb)   04/30/21 98.1 kg (216 lb 4 oz)             Passed - Blood pressure under 140/90 in past 12 months     BP Readings from Last 3 Encounters:   09/01/21 120/78   04/30/21 116/68   03/19/21 108/60                 Passed - Patient is 12 years of age or older        Passed - Heart Rate on file within past 12 months     Pulse Readings from Last 3 Encounters:   09/01/21 83   04/30/21 74   03/19/21 74               Passed - Medication is active on med list        Passed - Recent (6 mo) or future (30 days) visit within the authorizing provider's specialty     Patient had office visit in the last 6 months or has a  "visit in the next 30 days with authorizing provider or within the authorizing provider's specialty.  See \"Patient Info\" tab in inbasket, or \"Choose Columns\" in Meds & Orders section of the refill encounter.             Signed Prescriptions Disp Refills    QUEtiapine (SEROQUEL) 100 MG tablet       Sig: Take 100 mg by mouth       There is no refill protocol information for this order          Jennifer Faye RN 08/09/22 11:37 PM  "

## 2022-08-12 ENCOUNTER — NURSE TRIAGE (OUTPATIENT)
Dept: NURSING | Facility: CLINIC | Age: 52
End: 2022-08-12

## 2022-08-12 NOTE — TELEPHONE ENCOUNTER
"Spoke with patient and relayed information below from Dr Kumar. Patient verbalized understanding of instructions but is apprehensive about going in to UC for evaluation. States he is in transitional care and it is a \"hassle to get things like that done.\" patient states he will have to think about it. Writer encouraged patient to be seen due to potential complications and recommendations from Dr Kumar. Patient still stating he will have to consider his options.  "

## 2022-08-12 NOTE — TELEPHONE ENCOUNTER
Provider Response to 2nd Level Triage Request    I have reviewed the RN documentation. My recommendation is:  may be hydrated, possible c-diff, etc. Needs urgent evaluation   Urgent care    Zachariah Kumar MD on 8/12/2022 at 12:03 PM

## 2022-08-12 NOTE — TELEPHONE ENCOUNTER
Nurse Triage SBAR    Is this a 2nd Level Triage? YES, LICENSED PRACTITIONER REVIEW IS REQUIRED    Situation:   Pt is calling with severe nausea and diarrhea.    Background:   Pt has a peridontal infection, for which he needs dental surgery. He has had to postpone dental repair due to back surgery. Says all of his symptoms seem very similar to when he had an active infection prior to starting the Levaquin Dr. Lund started him on in May. He feels that perhaps the antibiotic is no longer working.    Assessment:   Says he has been very sick to his stomach for the last 2 weeks and having diarrhea (at least 5 stools per day). Also has dry mouth and has no appetite. Is able to keep fluids down and tries to drink as much as possible. 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 He is hoping it will be soon.      Protocol Recommended Disposition:   See in Office Today    Recommendation:   Will route message to provider for second level triage.    Routed to provider    Does the patient meet one of the following criteria for ADS visit consideration? 16+ years old, with an [facility] PCP     TIP  Providers, please consider if this condition is appropriate for management at one of our Acute and Diagnostic Services sites.     If patient is a good candidate, please use dotphrase <dot>triageresponse and select Refer to ADS to document.    Marilee Vazquez RN, BSN  [facility]   Triage Nurse Advisor    Reason for Disposition    MODERATE diarrhea (e.g., 4-6 times / day more than normal) and present > 48 hours (2 days)    Additional Information    Negative: Shock suspected (e.g., cold/pale/clammy skin, too weak to stand, low BP, rapid pulse)    Negative: Difficult to awaken or acting confused (e.g., disoriented, slurred speech)    Negative: Sounds like a life-threatening emergency to the triager    Negative: Vomiting also present and worse than the diarrhea     Negative: Blood in stool and without diarrhea    Negative: SEVERE abdominal pain (e.g., excruciating) and present > 1 hour    Negative: SEVERE abdominal pain and age > 60 years    Negative: Bloody, black, or tarry bowel movements (Exception: chronic-unchanged black-grey bowel movements and is taking iron pills or Pepto-Bismol)    Negative: SEVERE diarrhea (e.g., 7 or more times / day more than normal) and age > 60 years    Negative: Constant abdominal pain lasting > 2 hours    Negative: Drinking very little and has signs of dehydration (e.g., no urine > 12 hours, very dry mouth, very lightheaded)    Negative: Patient sounds very sick or weak to the triager    Negative: SEVERE diarrhea (e.g., 7 or more times / day more than normal) and present > 24 hours (1 day)    Protocols used: DIARRHEA-A-OH

## 2022-08-23 ENCOUNTER — VIRTUAL VISIT (OUTPATIENT)
Dept: INTERNAL MEDICINE | Facility: CLINIC | Age: 52
End: 2022-08-23
Payer: COMMERCIAL

## 2022-08-23 DIAGNOSIS — K05.10 GINGIVITIS, CHRONIC: ICD-10-CM

## 2022-08-23 DIAGNOSIS — R41.89 COGNITIVE DEFICIT DUE TO OLD HEAD INJURY: ICD-10-CM

## 2022-08-23 DIAGNOSIS — T40.2X5A THERAPEUTIC OPIOID INDUCED CONSTIPATION: ICD-10-CM

## 2022-08-23 DIAGNOSIS — I89.0 LYMPHEDEMA OF BOTH LOWER EXTREMITIES: ICD-10-CM

## 2022-08-23 DIAGNOSIS — Z87.891 HISTORY OF TOBACCO ABUSE: ICD-10-CM

## 2022-08-23 DIAGNOSIS — K59.03 THERAPEUTIC OPIOID INDUCED CONSTIPATION: ICD-10-CM

## 2022-08-23 DIAGNOSIS — S09.90XS COGNITIVE DEFICIT DUE TO OLD HEAD INJURY: ICD-10-CM

## 2022-08-23 DIAGNOSIS — R19.7 DIARRHEA OF PRESUMED INFECTIOUS ORIGIN: Primary | ICD-10-CM

## 2022-08-23 DIAGNOSIS — F11.90 CHRONIC, CONTINUOUS USE OF OPIOIDS: ICD-10-CM

## 2022-08-23 DIAGNOSIS — F41.1 GENERALIZED ANXIETY DISORDER: ICD-10-CM

## 2022-08-23 DIAGNOSIS — K59.03 DRUG-INDUCED CONSTIPATION: ICD-10-CM

## 2022-08-23 PROBLEM — S06.9X9S TRAUMATIC BRAIN INJURY WITH LOSS OF CONSCIOUSNESS, SEQUELA (H): Status: RESOLVED | Noted: 2022-06-24 | Resolved: 2022-08-23

## 2022-08-23 PROBLEM — F17.209 TOBACCO USE DISORDER, CONTINUOUS: Status: RESOLVED | Noted: 2021-10-25 | Resolved: 2022-08-23

## 2022-08-23 PROCEDURE — 99214 OFFICE O/P EST MOD 30 MIN: CPT | Mod: 95 | Performed by: INTERNAL MEDICINE

## 2022-08-23 RX ORDER — LACTULOSE 20 G/30ML
30 SOLUTION ORAL 2 TIMES DAILY
Qty: 5400 ML | Refills: 11 | Status: SHIPPED | OUTPATIENT
Start: 2022-08-23 | End: 2022-10-31

## 2022-08-23 NOTE — PROGRESS NOTES
Bola is a 52 year old male contacting the clinic today via video, who will use the platform: Spoondate for the visit.  Phone # for Doximity, or if Amwell drops:   Telephone Information:   Mobile [phone]          ASSESSMENT and PLAN:  1. Diarrhea of presumed infectious origin  On Movantik, senna, lactulose amongst others.  Antibiotics almost continuously for the last 365 days strongly increases risk of C. difficile  - Clostridium difficile Toxin B PCR; Future    2. Gingivitis, chronic  On antibiotics but finally plan for dental fixation    3. Generalized anxiety disorder  Stable on lamotrigine    4. Chronic, continuous use of opioids  Stable dose but back pain improving    5. Lymphedema of both lower extremities  Stable with increased ambulation    6. Therapeutic opioid induced constipation  Decrease lactulose while awaiting C. difficile  - lactulose 20 GM/30ML SOLN; Take 30 mLs by mouth 2 times daily For constipation. To replace senokot, magnesium, miralax if covered by insurance  Dispense: 5400 mL; Refill: 11    7. Drug-induced constipation    8. 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 History of tobacco abuse  Quit smoking in October in preparation for surgery    Preventive Care Assessed: On hold until home from TCU  Patient Instructions   Decrease lactulose to 30 cc twice daily    Stool for C. difficile    If stool positive for C. difficile discontinue levofloxacin and begin vancomycin or metronidazole    If stool negative for C. difficile, continue levofloxacin and decrease senna    Chart updated regarding smoking-quit in October 2021            Return in about 3 months (around 11/23/2022) for using a video visit.       CHIEF COMPLAINT:  Chief Complaint   Patient presents with     Follow Up     Gum infection        HISTORY OF PRESENT ILLNESS:  Bola is a 52 year old male contacting the clinic today via video for reports of diarrhea.  He has had opioid-induced constipation.  " We have worked aggressively for the last several months to improve bowel movements using magnesium, senna, lactulose and Movantik.  Over the last 1 to 2 months he has had a loosening of stools and is now having 4 or 5 bowel movements today.  He is also having abdominal cramping, gurgling and nausea.  No blood.  No recent decrease in narcotics.  He is also been on levofloxacin almost continuously for the last 12 months for a brewing persisting dental infection    He finally saw the orthodontist/dentist last week who is planning some extensive work.  She apparently did not comment about continuing the antibiotics    REVIEW OF SYSTEMS:   Stable pain.  Continues to stay off cigarettes    PFSH:  Social History     Social History Narrative    He is .  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  He is now on disability due to his brain injury and bipolar disease.        Quit smoking October of 2021        Quit drinking 2010        At [facility] since June of 2020     hasn't been home since June of 2020    TOBACCO USE:  History   Smoking Status     Former Smoker     Packs/day: 0.50     Years: 11.00     Types: Cigarettes     Start date: 8/20/2009     Quit date: 2/21/2017   Smokeless Tobacco     Former User     Comment: 0.5 pack per day       VITALS:  There were no vitals filed for this visit.  There were no vitals taken for this visit. Estimated body mass index is 32.23 kg/m  as calculated from the following:    Height as of 9/17/21: 1.727 m (5' 8\").    Weight as of 9/1/21: 96.2 kg (212 lb).    PHYSICAL EXAM:  (observations via Video)  Alert.  Standing in his room.    MEDICATIONS:   Current Outpatient Medications   Medication Sig Dispense Refill     acetaminophen (TYLENOL) 650 MG CR tablet Take 2 tablets (1,300 mg) by mouth 3 times daily 540 tablet 1     albuterol (PROAIR HFA/PROVENTIL HFA/VENTOLIN HFA) 108 (90 Base) MCG/ACT inhaler Inhale 2 puffs into the " lungs every 6 hours as needed 6.7 g 11     allopurinol (ZYLOPRIM) 300 MG tablet Take 1 tablet (300 mg) by mouth 2 times daily 180 tablet 3     ammonium lactate (LAC-HYDRIN) 12 % external lotion        amphetamine-dextroamphetamine (ADDERALL) 15 MG tablet Take 1 tablet (15 mg) by mouth daily 30 tablet 0     benzonatate (TESSALON) 200 MG capsule Take 1 capsule (200 mg) by mouth 3 times daily as needed for cough 20 capsule 1     bisacodyl (DULCOLAX) 10 MG suppository Place 1 suppository (10 mg) rectally daily as needed for constipation 60 suppository 1     buPROPion (WELLBUTRIN SR) 150 MG 12 hr tablet Take 1 tablet (150 mg) by mouth 3 times daily 90 tablet 11     CALCIUM ANTACID 500 MG chewable tablet        chlorhexidine (PERIDEX) 0.12 % solution Swish and spit 15 mLs in mouth 3 times daily as needed (sore throat) 473 mL 3     ergocalciferol (ERGOCALCIFEROL) 1.25 MG (95315 UT) capsule Take 1 capsule (50,000 Units) by mouth once a week 12 capsule 3     fexofenadine (ALLEGRA) 180 MG tablet Take 180 mg by mouth daily as needed       furosemide (LASIX) 40 MG tablet Take 1 tablet (40 mg) by mouth daily 30 tablet 11     guaiFENesin (MUCINEX) 600 MG 12 hr tablet Take 2 tablets (1,200 mg) by mouth 2 times daily 120 tablet 11     hydrOXYzine (ATARAX) 25 MG tablet        lactulose 20 GM/30ML SOLN Take 30 mLs by mouth 2 times daily For constipation. To replace senokot, magnesium, miralax if covered by insurance 5400 mL 11     lamoTRIgine (LAMICTAL) 150 MG tablet Take 1 tablet (150 mg) by mouth 3 times daily       LANsoprazole (PREVACID) 30 MG DR capsule Take 30 mg by mouth daily       levofloxacin (LEVAQUIN) 750 MG tablet Take 1 tablet (750 mg) by mouth daily 42 tablet 0     lidocaine (LIDODERM) 5 % patch Apply 1 patch topically to painful area of skin once daily and remove per schedule.       lidocaine (XYLOCAINE) 2 % external gel Apply 1 inch topically 4 times a day as needed to gums. 85 g 11     methocarbamol (ROBAXIN) 500 MG  tablet Take 2 tablets (1,000 mg) by mouth 4 times daily 120 tablet 1     metoprolol succinate ER (TOPROL-XL) 50 MG 24 hr tablet Take 1 tablet (50 mg) by mouth daily 90 tablet 3     montelukast (SINGULAIR) 10 MG tablet Take 1 tablet by mouth daily       naloxegol (MOVANTIK) 25 MG TABS tablet Take 1 tablet (25 mg) by mouth every morning (before breakfast) 30 tablet 1     naloxone (NARCAN) 4 MG/0.1ML nasal spray 1 spray (4 mg dose) into one nostril for opioid reversal. Call 911. May repeat if no response in 3 minutes.       oxyCODONE IR (ROXICODONE) 15 MG tablet Take 1 tablet (15 mg) by mouth 8 times daily One tablet every three hours 8 am, 11 am, 2pm, 5 pm, 8 pm, 11 pm, 2 am, 5 am, may fill 8/21 for 8/23 112 tablet 0     oxymetazoline (AFRIN) 0.05 % nasal spray Spray 2 sprays into both nostrils 2 times daily 30 mL 1     QUEtiapine (SEROQUEL) 100 MG tablet Take 1 tablet (100 mg) by mouth 4 times daily as needed (anxiety) 60 tablet 1     QUEtiapine (SEROQUEL) 300 MG tablet Take 2 tablets (600 mg) by mouth At Bedtime 180 tablet 0     QUEtiapine (SEROQUEL) 50 MG tablet Take 100 mg by mouth 4 times daily as needed        senna-docusate (SENOKOT-S/PERICOLACE) 8.6-50 MG tablet Take 2 tablets by mouth 2 times daily 120 tablet 11     triamterene-HCTZ (MAXZIDE-25) 37.5-25 MG tablet Take 1 tablet by mouth daily 30 tablet 11     zonisamide (ZONEGRAN) 25 MG capsule Take 3 capsules (75 mg) by mouth 4 times daily 180 capsule 3       Outside Notes summarized: Pain clinic note x2  Labs, x-rays, cardiology, GI tests reviewed: Pending  No results for input(s): HGB, WBC, NA, POTASSIUM, CR, A1C, PSA, URIC, B12, TSH, VITDT, SED, CRP in the last 38114 hours.  No results found for: [id number]  Lab Results   Component Value Date    CHOL 183 09/20/2018     New orders: No orders of the defined types were placed in this encounter.      Independent review of:    Supplemental history by:      Patient has given verbal consent to a Video visit?   Yes  How would you like to obtain your AVS?  MyChart  Will anyone else be joining your video visit? No       Video-Visit Details  Type of service:  Video Visit  Originating Location (pt. Location): Home  Distant Location (provider location):   [facility]    Devon Lund MD  [facility]    Video Start Time: 2:28 PM  Video End time:  2:58 PM  Face to face plus orders: 30 minutes which included some connection time  Documentation time:  3 minutes     The visit lasted a total of 33 minutes

## 2022-08-23 NOTE — PROGRESS NOTES
"Jan is a 52 year old who is being evaluated via a billable video visit.      How would you like to obtain your AVS? MyChart  If the video visit is dropped, the invitation should be resent by: Text to cell phone: [phone]  Will anyone else be joining your video visit? No  {If patient encounters technical issues they should call [phone] :122151}        {PROVIDER CHARTING PREFERENCE:627295}    Subjective   Jan is a 52 year old{ACCOMPANIED BY STATEMENT (Optional):647222}, presenting for the following health issues:  Follow Up (Gum infection )      HPI     {SUPERLIST (Optional):219229}  {additonal problems for provider to add (Optional):329076}    Review of Systems   {ROS COMP (Optional):260100}      Objective           Vitals:  No vitals were obtained today due to virtual visit.    Physical Exam   {video visit exam brief selected:353493::\"GENERAL: Healthy, alert and no distress\",\"EYES: Eyes grossly normal to inspection.  No discharge or erythema, or obvious scleral/conjunctival abnormalities.\",\"RESP: No audible wheeze, cough, or visible cyanosis.  No visible retractions or increased work of breathing.  \",\"SKIN: Visible skin clear. No significant rash, abnormal pigmentation or lesions.\",\"NEURO: Cranial nerves grossly intact.  Mentation and speech appropriate for age.\",\"PSYCH: Mentation appears normal, affect normal/bright, judgement and insight intact, normal speech and appearance well-groomed.\"}    {Diagnostic Test Results (Optional):257139}    {AMBULATORY ATTESTATION (Optional):364711}        Video-Visit Details    Video Start Time: {video visit start/end time for provider to select:673069}    Type of service:  Video Visit    Video End Time:{video visit start/end time for provider to select:591654}    Originating Location (pt. Location): {video visit patient location:086332::\"Home\"}    Distant Location (provider location):  [facility]     Platform used for Video Visit: {Virtual " "Visit Platforms:897805::\"Aura\"}    .  ..  "

## 2022-08-23 NOTE — PATIENT INSTRUCTIONS
Decrease lactulose to 30 cc twice daily    Stool for C. difficile    If stool positive for C. difficile discontinue levofloxacin and begin vancomycin or metronidazole    If stool negative for C. difficile, continue levofloxacin and decrease senna    Chart updated regarding smoking-quit in October 2021

## 2022-08-24 ENCOUNTER — TELEPHONE (OUTPATIENT)
Dept: INTERNAL MEDICINE | Facility: CLINIC | Age: 52
End: 2022-08-24

## 2022-08-24 NOTE — LETTER
[facility]  [address] [place] [address] [phone]        September 7, 2022    Regarding:  Bola Lyon Jr.  [address] [place] [place] [address]              [facility]   [address] [place] [id number]      Nurse @ [facility]  [phone]       Per Pharmacy request       Reduce Lactulose daily dose from 120g to 40g and discontinue Senna-sreduce daily         Was stool tested for C-Diff? If so please fax results [phone]      Sincerely,         Devon Lund MD

## 2022-08-24 NOTE — TELEPHONE ENCOUNTER
Fax from pharmacy:     Pt has been taking Lactulose 60ML(40gm) TID.     Desiring to reduce daily dose from 120g to 40g and discontinue Senna-s (No Magnesium or Miralax on Pt history)

## 2022-08-26 ENCOUNTER — VIRTUAL VISIT (OUTPATIENT)
Dept: PALLIATIVE MEDICINE | Facility: OTHER | Age: 52
End: 2022-08-26
Payer: COMMERCIAL

## 2022-08-26 DIAGNOSIS — M48.02 FORAMINAL STENOSIS OF CERVICAL REGION: ICD-10-CM

## 2022-08-26 PROCEDURE — 99213 OFFICE O/P EST LOW 20 MIN: CPT | Mod: 95 | Performed by: ANESTHESIOLOGY

## 2022-08-26 RX ORDER — BUPRENORPHINE 5 UG/H
1 PATCH TRANSDERMAL
Qty: 4 PATCH | Refills: 1 | Status: SHIPPED | OUTPATIENT
Start: 2022-08-26 | End: 2022-09-21

## 2022-08-26 RX ORDER — OXYCODONE HYDROCHLORIDE 15 MG/1
15 TABLET ORAL
Qty: 112 TABLET | Refills: 0 | Status: SHIPPED | OUTPATIENT
Start: 2022-08-26 | End: 2022-09-21

## 2022-08-26 ASSESSMENT — PAIN SCALES - GENERAL: PAINLEVEL: EXTREME PAIN (9)

## 2022-08-26 NOTE — PATIENT INSTRUCTIONS
PLAN:    You will review with the dentist that the dentist will manage any postprocedural pain.  They may collaborate with Dr. Cramer as needed.    You are working on adjusting bowel meds.    Continue with the oxycodone 15 mg every 3 hours as needed.  You are hoping to start a new course of physical therapy.    Begin Butrans patch 5 mcg apply each week, call after 2 to 3 weeks and we will adjust dosing.    Follow-up with Dr. Cramer in the office in 3 months

## 2022-08-26 NOTE — NURSING NOTE
Jan is a 52 year old who is being evaluated via a billable video visit.      How would you like to obtain your AVS? AppDynamicshart  If the video visit is dropped, the invitation should be resent by: Text to cell phone: [phone]  Will anyone else be joining your video visit? No  Platform used for Video Visit: Doximity    PEG Score 4/13/2022 6/27/2022 8/26/2022   PEG Total Score 10 9.33 9.67   Is patient in [place]? yes  NOTE: If Pt is not in [place], Appointment needs to be canceled and rescheduled     Questions: none    Refills: no    Mary Jo Irvin LPN  [facility]     [facility] Number:  [phone]    Call with any questions about your care and for scheduling assistance.     Calls are returned Monday through Friday between 8 AM and 4:30 PM. We usually get back to you within 2 business days depending on the issue/request.    If we are prescribing your medications:    For opioid medication refills, call the clinic or send a [employer] message 7 days in advance.  Please include:    Name of requested medication    Name of the pharmacy.    For non-opioid medications, call your pharmacy directly to request a refill. Please allow 3-4 days to be processed.     Per [place] State Law:    All controlled substance prescriptions must be filled within 30 days of being written.      For those controlled substances allowing refills, pickup must occur within 30 days of last fill.      We believe regular attendance is key to your success in our program!      Any time you are unable to keep your appointment we ask that you call us at least 24 hours in advance to cancel.This will allow us to offer the appointment time to another patient.     Multiple missed appointments may lead to dismissal from the clinic.

## 2022-08-26 NOTE — PROGRESS NOTES
[facility] - Office Visit    ASSESSMENT & PLAN     Bola was seen today for pain.    Diagnoses and all orders for this visit:    Foraminal stenosis of cervical region  -     oxyCODONE IR (ROXICODONE) 15 MG tablet; Take 1 tablet (15 mg) by mouth 8 times daily One tablet every three hours 8 am, 11 am, 2pm, 5 pm, 8 pm, 11 pm, 2 am, 5 am, may fill 9/18 for 9/20        Patient Instructions   PLAN:    You will review with the dentist that the dentist will manage any postprocedural pain.  They may collaborate with Dr. Mullen as needed.    You are working on adjusting bowel meds.    Continue with the oxycodone 15 mg every 3 hours as needed.  You are hoping to start a new course of physical therapy.    Follow-up with Dr. Mullen in the office in 3 months        -----  JAYME MULLEN MD  [facility]       SUBJECTIVE      Bola Lyon Jr. is a 52 year old year old male who presents via video visit     Follow-up for history of extensive lumbar fusion in December, multiple orthopedic surgeries, traumatic brain injury.    Reviewing the record with his primary care provider regarding loose stools, has been on agents for opioid-induced constipation, many courses of antibiotics, question exacerbated by recent COVID.    Reviews today does have a follow-up with next month with his spine surgeon.  He hopes to have another course of physical therapy advance his goal of weightbearing.    He reviews since getting COVID the second time having more GI issues working with his doctor to adjust.    He did see the dentist, will need to have some repair, root canals.  We discussed that she will manage any postprocedural pain can collaborate.    He continues with the oxycodone 15 mg every 3 hours.  Reviews this is been a challenge wishing he could have more medication sometimes.    This led to discussion of micro dosing with buprenorphine beginning with the Butrans patch.  Discussed the goal to help  augment pain, facilitate decrease use of oxycodone, with caution with not going too quickly for the buprenorphine to displace oxycodone to induce withdrawal    Reviewed its been 2 years since he has been home, looking toward transition into an apartment rather than [facility] as he leaves      Current Outpatient Medications:      acetaminophen (TYLENOL) 650 MG CR tablet, Take 2 tablets (1,300 mg) by mouth 3 times daily, Disp: 540 tablet, Rfl: 1     albuterol (PROAIR HFA/PROVENTIL HFA/VENTOLIN HFA) 108 (90 Base) MCG/ACT inhaler, Inhale 2 puffs into the lungs every 6 hours as needed, Disp: 6.7 g, Rfl: 11     allopurinol (ZYLOPRIM) 300 MG tablet, Take 1 tablet (300 mg) by mouth 2 times daily, Disp: 180 tablet, Rfl: 3     ammonium lactate (LAC-HYDRIN) 12 % external lotion, , Disp: , Rfl:      amphetamine-dextroamphetamine (ADDERALL) 15 MG tablet, Take 1 tablet (15 mg) by mouth daily, Disp: 30 tablet, Rfl: 0     benzonatate (TESSALON) 200 MG capsule, Take 1 capsule (200 mg) by mouth 3 times daily as needed for cough, Disp: 20 capsule, Rfl: 1     bisacodyl (DULCOLAX) 10 MG suppository, Place 1 suppository (10 mg) rectally daily as needed for constipation, Disp: 60 suppository, Rfl: 1     buprenorphine (BUTRANS) 5 MCG/HR WK patch, Place 1 patch onto the skin every 7 days, Disp: 4 patch, Rfl: 1     buPROPion (WELLBUTRIN SR) 150 MG 12 hr tablet, Take 1 tablet (150 mg) by mouth 3 times daily, Disp: 90 tablet, Rfl: 11     CALCIUM ANTACID 500 MG chewable tablet, , Disp: , Rfl:      chlorhexidine (PERIDEX) 0.12 % solution, Swish and spit 15 mLs in mouth 3 times daily as needed (sore throat), Disp: 473 mL, Rfl: 3     ergocalciferol (ERGOCALCIFEROL) 1.25 MG (39392 UT) capsule, Take 1 capsule (50,000 Units) by mouth once a week, Disp: 12 capsule, Rfl: 3     fexofenadine (ALLEGRA) 180 MG tablet, Take 180 mg by mouth daily as needed, Disp: , Rfl:      furosemide (LASIX) 40 MG tablet, Take 1 tablet (40 mg) by mouth daily, Disp: 30  tablet, Rfl: 11     guaiFENesin (MUCINEX) 600 MG 12 hr tablet, Take 2 tablets (1,200 mg) by mouth 2 times daily, Disp: 120 tablet, Rfl: 11     hydrOXYzine (ATARAX) 25 MG tablet, , Disp: , Rfl:      lactulose 20 GM/30ML SOLN, Take 30 mLs by mouth 2 times daily For constipation. To replace senokot, magnesium, miralax if covered by insurance, Disp: 5400 mL, Rfl: 11     lamoTRIgine (LAMICTAL) 150 MG tablet, Take 1 tablet (150 mg) by mouth 3 times daily, Disp: , Rfl:      LANsoprazole (PREVACID) 30 MG DR capsule, Take 30 mg by mouth daily, Disp: , Rfl:      levofloxacin (LEVAQUIN) 750 MG tablet, Take 1 tablet (750 mg) by mouth daily, Disp: 42 tablet, Rfl: 0     lidocaine (LIDODERM) 5 % patch, Apply 1 patch topically to painful area of skin once daily and remove per schedule., Disp: , Rfl:      lidocaine (XYLOCAINE) 2 % external gel, Apply 1 inch topically 4 times a day as needed to gums., Disp: 85 g, Rfl: 11     methocarbamol (ROBAXIN) 500 MG tablet, Take 2 tablets (1,000 mg) by mouth 4 times daily, Disp: 120 tablet, Rfl: 1     metoprolol succinate ER (TOPROL-XL) 50 MG 24 hr tablet, Take 1 tablet (50 mg) by mouth daily, Disp: 90 tablet, Rfl: 3     montelukast (SINGULAIR) 10 MG tablet, Take 1 tablet by mouth daily, Disp: , Rfl:      naloxegol (MOVANTIK) 25 MG TABS tablet, Take 1 tablet (25 mg) by mouth every morning (before breakfast), Disp: 30 tablet, Rfl: 1     naloxone (NARCAN) 4 MG/0.1ML nasal spray, 1 spray (4 mg dose) into one nostril for opioid reversal. Call 911. May repeat if no response in 3 minutes., Disp: , Rfl:      oxyCODONE IR (ROXICODONE) 15 MG tablet, Take 1 tablet (15 mg) by mouth 8 times daily One tablet every three hours 8 am, 11 am, 2pm, 5 pm, 8 pm, 11 pm, 2 am, 5 am, may fill 9/18 for 9/20, Disp: 112 tablet, Rfl: 0     oxymetazoline (AFRIN) 0.05 % nasal spray, Spray 2 sprays into both nostrils 2 times daily, Disp: 30 mL, Rfl: 1     QUEtiapine (SEROQUEL) 100 MG tablet, Take 1 tablet (100 mg) by mouth  4 times daily as needed (anxiety), Disp: 60 tablet, Rfl: 1     QUEtiapine (SEROQUEL) 300 MG tablet, Take 2 tablets (600 mg) by mouth At Bedtime, Disp: 180 tablet, Rfl: 0     QUEtiapine (SEROQUEL) 50 MG tablet, Take 100 mg by mouth 4 times daily as needed , Disp: , Rfl:      senna-docusate (SENOKOT-S/PERICOLACE) 8.6-50 MG tablet, Take 2 tablets by mouth 2 times daily, Disp: 120 tablet, Rfl: 11     triamterene-HCTZ (MAXZIDE-25) 37.5-25 MG tablet, Take 1 tablet by mouth daily, Disp: 30 tablet, Rfl: 11     zonisamide (ZONEGRAN) 25 MG capsule, Take 3 capsules (75 mg) by mouth 4 times daily, Disp: 180 capsule, Rfl: 3    Current Facility-Administered Medications:      cyanocobalamin injection 1,000 mcg, 1,000 mcg, Intramuscular, Q14 Days, Devon Lund MD      Review of Systems   General, psych, musculoskeletal, bowels and bladder otherwise normal other than above.          OBJECTIVE   There were no vitals taken for this visit.       Physical Exam  General:  Normal appearance, no apparent distress  Cardiovascular: Normal rate  Lungs: Pulmonary effort is normal, speaking in full sentences  MSK No concerning rashes or lesions.  Neurologic: No focal deficit, alert and oriented x3  Psychiatric: normal mood and affect, cooperative    Assessment: Recovering from extensive lumbar fusion and revision, discussed augmenting his oxycodone with buprenorphine.    Total time more than 20 minutes      Patient video start time 1: 32.  1: 48.  Patient at home via [employer].    Total time more than 20 minutes

## 2022-08-26 NOTE — PROGRESS NOTES
08/26/22 1306   PEG: A Thee-Item Scale Assessing Pain Intensity and Interference        0 = No pain / No interference    10 = Pain as bad as you can imagine / Completely interferes   What number best describes your pain on average in the past week? 9   What number best describes how, during the past week, pain has interfered with your enjoyment of life? 10   What number best describes how, during the past week, pain has interfered with your general activity? 10   PEG Total Score 9.67

## 2022-08-29 ENCOUNTER — TELEPHONE (OUTPATIENT)
Dept: PALLIATIVE MEDICINE | Facility: OTHER | Age: 52
End: 2022-08-29

## 2022-08-29 NOTE — TELEPHONE ENCOUNTER
Central Prior Authorization Team  Phone: [phone]    PA Initiation    Medication: buprenorphine (BUTRANS) 5 MCG/HR WK patch  Insurance Company: [employer] [place] - Phone [phone] Fax [phone]  Pharmacy Filling the Rx: [facility] - [place], [place] - [address]  Filling Pharmacy Phone: [phone]  Filling Pharmacy Fax:    Start Date: 8/29/2022

## 2022-08-29 NOTE — TELEPHONE ENCOUNTER
PA Initiation 8/29/2022    Medication: Buprenorphine 5 mcg/hr patch  Insurance Company:  [facility]  Pharmacy Filling the Rx:  [facility]  Filling Pharmacy Phone:  [phone]  Filling Pharmacy Fax:  [phone]  Start Date:  8/26/2022

## 2022-08-30 ENCOUNTER — LAB (OUTPATIENT)
Dept: LAB | Facility: CLINIC | Age: 52
End: 2022-08-30
Payer: COMMERCIAL

## 2022-08-30 DIAGNOSIS — R27.0 ATAXIA, UNSPECIFIED: ICD-10-CM

## 2022-08-30 DIAGNOSIS — G89.4 CHRONIC PAIN SYNDROME: ICD-10-CM

## 2022-08-30 DIAGNOSIS — D64.9 ANEMIA, UNSPECIFIED TYPE: ICD-10-CM

## 2022-08-30 DIAGNOSIS — I10 ESSENTIAL HYPERTENSION: ICD-10-CM

## 2022-08-30 DIAGNOSIS — I89.0 LYMPHEDEMA OF BOTH LOWER EXTREMITIES: ICD-10-CM

## 2022-08-30 DIAGNOSIS — M10.9 URIC ACID ARTHROPATHY: ICD-10-CM

## 2022-08-30 DIAGNOSIS — Z12.5 PROSTATE CANCER SCREENING: ICD-10-CM

## 2022-08-30 DIAGNOSIS — Z11.59 ENCOUNTER FOR HEPATITIS C SCREENING TEST FOR LOW RISK PATIENT: ICD-10-CM

## 2022-08-30 DIAGNOSIS — E55.9 VITAMIN D DEFICIENCY: ICD-10-CM

## 2022-08-30 LAB
BASOPHILS # BLD AUTO: 0 10E3/UL (ref 0–0.2)
BASOPHILS NFR BLD AUTO: 0 %
EOSINOPHIL # BLD AUTO: 0.1 10E3/UL (ref 0–0.7)
EOSINOPHIL NFR BLD AUTO: 1 %
ERYTHROCYTE [DISTWIDTH] IN BLOOD BY AUTOMATED COUNT: 13.8 % (ref 10–15)
HCT VFR BLD AUTO: 38.4 % (ref 40–53)
HGB BLD-MCNC: 12.5 G/DL (ref 13.3–17.7)
IMM GRANULOCYTES # BLD: 0.1 10E3/UL
IMM GRANULOCYTES NFR BLD: 1 %
LYMPHOCYTES # BLD AUTO: 1.2 10E3/UL (ref 0.8–5.3)
LYMPHOCYTES NFR BLD AUTO: 16 %
MCH RBC QN AUTO: 24.9 PG (ref 26.5–33)
MCHC RBC AUTO-ENTMCNC: 32.6 G/DL (ref 31.5–36.5)
MCV RBC AUTO: 76 FL (ref 78–100)
MONOCYTES # BLD AUTO: 0.5 10E3/UL (ref 0–1.3)
MONOCYTES NFR BLD AUTO: 7 %
NEUTROPHILS # BLD AUTO: 5.7 10E3/UL (ref 1.6–8.3)
NEUTROPHILS NFR BLD AUTO: 76 %
PLATELET # BLD AUTO: 330 10E3/UL (ref 150–450)
PSA SERPL-MCNC: 0.5 NG/ML (ref 0–3.5)
RBC # BLD AUTO: 5.03 10E6/UL (ref 4.4–5.9)
TSH SERPL DL<=0.005 MIU/L-ACNC: 0.62 UIU/ML (ref 0.3–4.2)
VIT B12 SERPL-MCNC: 336 PG/ML (ref 232–1245)
WBC # BLD AUTO: 7.6 10E3/UL (ref 4–11)

## 2022-08-30 PROCEDURE — 36415 COLL VENOUS BLD VENIPUNCTURE: CPT

## 2022-08-30 PROCEDURE — 80053 COMPREHEN METABOLIC PANEL: CPT

## 2022-08-30 PROCEDURE — 85025 COMPLETE CBC W/AUTO DIFF WBC: CPT

## 2022-08-30 PROCEDURE — 86803 HEPATITIS C AB TEST: CPT

## 2022-08-30 PROCEDURE — G0103 PSA SCREENING: HCPCS

## 2022-08-30 PROCEDURE — 82607 VITAMIN B-12: CPT

## 2022-08-30 PROCEDURE — 84550 ASSAY OF BLOOD/URIC ACID: CPT

## 2022-08-30 PROCEDURE — 82306 VITAMIN D 25 HYDROXY: CPT

## 2022-08-30 PROCEDURE — 80061 LIPID PANEL: CPT

## 2022-08-30 PROCEDURE — 84443 ASSAY THYROID STIM HORMONE: CPT

## 2022-08-30 RX ORDER — METHOCARBAMOL 500 MG/1
1000 TABLET, FILM COATED ORAL 4 TIMES DAILY
Qty: 120 TABLET | Refills: 1 | Status: SHIPPED | OUTPATIENT
Start: 2022-08-30 | End: 2022-12-27

## 2022-08-30 NOTE — TELEPHONE ENCOUNTER
Prior Authorization Approval    Authorization Effective Date: 5/31/2022  Authorization Expiration Date: 8/29/2023  Medication: buprenorphine (BUTRANS) 5 MCG/HR WK patch- APPROVED   Approved Dose/Quantity:   Reference #:     Insurance Company: [employer] [place] - Phone [phone] Fax [phone]  Expected CoPay:       CoPay Card Available:      Foundation Assistance Needed:    Which Pharmacy is filling the prescription (Not needed for infusion/clinic administered): [facility] - [place], [place] - [address]  Pharmacy Notified: Yes  Patient Notified:  **Instructed pharmacy to notify patient when script is ready to /ship.**

## 2022-08-30 NOTE — TELEPHONE ENCOUNTER
Signed Prescriptions:                        Disp   Refills    methocarbamol (ROBAXIN) 500 MG tablet      120 ta*1        Sig: Take 2 tablets (1,000 mg) by mouth 4 times daily  Authorizing Provider: CARL HUBER MD  [facility]

## 2022-08-30 NOTE — TELEPHONE ENCOUNTER
Refill request:  Methocarbamol 500 mg tab    Last dispensed: 8/15/22-15 days    Last apt: 8/26/22  Next apt: 9/22/22    Pending Prescriptions:                       Disp   Refills    methocarbamol (ROBAXIN) 500 MG tablet     120 ta*1            Sig: Take 2 tablets (1,000 mg) by mouth 4 times daily    [facility]

## 2022-08-31 LAB
ALBUMIN SERPL BCG-MCNC: 4.3 G/DL (ref 3.5–5.2)
ALP SERPL-CCNC: 129 U/L (ref 40–129)
ALT SERPL W P-5'-P-CCNC: 13 U/L (ref 10–50)
ANION GAP SERPL CALCULATED.3IONS-SCNC: 16 MMOL/L (ref 7–15)
AST SERPL W P-5'-P-CCNC: 19 U/L (ref 10–50)
BILIRUB SERPL-MCNC: <0.2 MG/DL
BUN SERPL-MCNC: 7.2 MG/DL (ref 6–20)
CALCIUM SERPL-MCNC: 9.5 MG/DL (ref 8.6–10)
CHLORIDE SERPL-SCNC: 101 MMOL/L (ref 98–107)
CHOLEST SERPL-MCNC: 164 MG/DL
CREAT SERPL-MCNC: 0.71 MG/DL (ref 0.67–1.17)
DEPRECATED CALCIDIOL+CALCIFEROL SERPL-MC: 29 UG/L (ref 20–75)
DEPRECATED HCO3 PLAS-SCNC: 18 MMOL/L (ref 22–29)
GFR SERPL CREATININE-BSD FRML MDRD: >90 ML/MIN/1.73M2
GLUCOSE SERPL-MCNC: 107 MG/DL (ref 70–99)
HCV AB SERPL QL IA: NONREACTIVE
HDLC SERPL-MCNC: 37 MG/DL
LDLC SERPL CALC-MCNC: 75 MG/DL
NONHDLC SERPL-MCNC: 127 MG/DL
POTASSIUM SERPL-SCNC: 4 MMOL/L (ref 3.4–5.3)
PROT SERPL-MCNC: 6.8 G/DL (ref 6.4–8.3)
SODIUM SERPL-SCNC: 135 MMOL/L (ref 136–145)
TRIGL SERPL-MCNC: 261 MG/DL
URATE SERPL-MCNC: 2 MG/DL (ref 3.4–7)

## 2022-09-02 ENCOUNTER — TELEPHONE (OUTPATIENT)
Dept: PALLIATIVE MEDICINE | Facility: OTHER | Age: 52
End: 2022-09-02

## 2022-09-02 NOTE — TELEPHONE ENCOUNTER
Received fax from pharmacy requesting Prior Authorization for buprenorphine (BUTRANS) 5 MCG/HR WK patch    Login to go[email or web address]/login and click *Enter a Key*    Enter Patients last name,  and Key provided    KEY: [id number]  Patient last name: GERG  :1970    Pharmacy:      [facility]   Fax [phone]  Ph [phone]    Will route to MA POOL for processing    Kailyn MOLINA [facility] Visit Facilitator

## 2022-09-08 PROCEDURE — 87493 C DIFF AMPLIFIED PROBE: CPT

## 2022-09-09 ENCOUNTER — LAB (OUTPATIENT)
Dept: LAB | Facility: CLINIC | Age: 52
End: 2022-09-09
Payer: COMMERCIAL

## 2022-09-09 DIAGNOSIS — R19.7 DIARRHEA OF PRESUMED INFECTIOUS ORIGIN: ICD-10-CM

## 2022-09-09 LAB — C DIFF TOX B STL QL: NEGATIVE

## 2022-09-12 ENCOUNTER — TELEPHONE (OUTPATIENT)
Dept: PALLIATIVE MEDICINE | Facility: OTHER | Age: 52
End: 2022-09-12

## 2022-09-12 NOTE — TELEPHONE ENCOUNTER
Health Call Center    Phone Message    May a detailed message be left on voicemail: yes     Reason for Call: Medication Question or concern regarding medication   Prescription Clarification  Name of Medication: oxyCODONE IR (ROXICODONE) 15 MG tablet    Prescribing Provider: Dr Cramer     Pharmacy: [facility] - [place] [address]     What on the order needs clarification? Pt is needing a prior authorization done on this medication. His insurance is [employer]. Please call him with any questions that you have. Prior auth states it must be done by 9/20/22.           Action Taken: Message routed to:  Other: [facility]    Travel Screening: Not Applicable

## 2022-09-12 NOTE — TELEPHONE ENCOUNTER
Patient is needing a prior authorization for Oxycodone IR 15mg. His insurance is [employer]. Prior Authorization states it must be done by 9/20/22.

## 2022-09-13 DIAGNOSIS — F31.81 BIPOLAR 2 DISORDER (H): ICD-10-CM

## 2022-09-13 DIAGNOSIS — S06.9X9S TRAUMATIC BRAIN INJURY WITH LOSS OF CONSCIOUSNESS, SEQUELA (H): ICD-10-CM

## 2022-09-13 NOTE — TELEPHONE ENCOUNTER
Central Prior Authorization Team   Phone: [phone]      PA Initiation    Medication: oxyCODONE IR (ROXICODONE) 15 MG tablet--INITIATED  Insurance Company: [employer] [place] - Phone [phone] Fax [phone]  Pharmacy Filling the Rx: [facility] - [place], [place] - [address]  Filling Pharmacy Phone: [phone]  Filling Pharmacy Fax:    Start Date: 9/13/2022

## 2022-09-13 NOTE — TELEPHONE ENCOUNTER
Central Prior Authorization Team   Phone: [phone]      Prior Authorization Approval    Authorization Effective Date: 6/15/2022  Authorization Expiration Date: 9/13/2023  Medication: oxyCODONE IR (ROXICODONE) 15 MG tablet--APPROVED  Approved Dose/Quantity:    Reference #:     Insurance Company: [employer] [place] - Phone [phone] Fax [phone]  Expected CoPay:       CoPay Card Available:      Foundation Assistance Needed:    Which Pharmacy is filling the prescription (Not needed for infusion/clinic administered): [facility] - [place], [place] - [address]  Pharmacy Notified: Yes  Patient Notified: Yes PHARMACY WILL CONTACT WHEN FILLED

## 2022-09-13 NOTE — TELEPHONE ENCOUNTER
Medication Question or Refill        What medication are you calling about (include dose and sig)?:   Adderall  15mg    Controlled Substance Agreement on file:   CSA -- Patient Level:    Controlled Substance Agreement - Non - Opioid - Scan on 5/5/2021  Controlled Substance Agreement - Opioid - Scan on 1/12/2021: OUTSIDE RECORD  Controlled Substance Agreement - Opioid - Scan on 2/19/2020: OUTSIDE RECORD  Controlled Substance Agreement - Opioid - Scan on 12/30/2019: OUTSIDE RECORD  Controlled Substance Agreement - Non - Opioid - Scan on 10/23/2019: NON-OPIOID CONTROLLED SUBSTANCE AGREEMENT  Controlled Substance Agreement - Non - Opioid - Scan on 8/22/2019  Controlled Substance Agreement - Opioid - Scan on 12/27/2018: OPIOID USE  Controlled Substance Agreement - Opioid - Scan on 12/27/2018: CHRONIC PAIN MANAGEMENT  Controlled Substance Agreement - Opioid - Scan on 8/28/2018  Controlled Substance Agreement - Opioid - Scan on 8/24/2017  Controlled Substance Agreement - Opioid - Scan on 8/23/2017  Controlled Substance Agreement - Opioid - Scan on 6/30/2016       Who prescribed the medication?: n/a    Do you need a refill? Yes:   Pt is out of this medication    When did you use the medication last? n/a    Patient offered an appointment? No    Do you have any questions or concerns?  No    Preferred Pharmacy:   [facility] - [place], [place] - [address]  [facility] [address]  Phone: [phone] Fax: [phone]      Could we send this information to you in [facility] or would you prefer to receive a phone call?:   Patient would prefer a phone call   Okay to leave a detailed message?: Yes at Home number on file [phone] (home)

## 2022-09-14 ENCOUNTER — VIRTUAL VISIT (OUTPATIENT)
Dept: INTERNAL MEDICINE | Facility: CLINIC | Age: 52
End: 2022-09-14
Payer: COMMERCIAL

## 2022-09-14 DIAGNOSIS — J01.01 ACUTE RECURRENT MAXILLARY SINUSITIS: Primary | ICD-10-CM

## 2022-09-14 DIAGNOSIS — Z79.899 ENCOUNTER FOR LONG-TERM (CURRENT) USE OF MEDICATIONS: ICD-10-CM

## 2022-09-14 DIAGNOSIS — K05.10 GINGIVITIS, CHRONIC: ICD-10-CM

## 2022-09-14 DIAGNOSIS — Z87.891 HISTORY OF TOBACCO ABUSE: ICD-10-CM

## 2022-09-14 DIAGNOSIS — K59.03 DRUG-INDUCED CONSTIPATION: ICD-10-CM

## 2022-09-14 PROCEDURE — 99214 OFFICE O/P EST MOD 30 MIN: CPT | Mod: 95 | Performed by: INTERNAL MEDICINE

## 2022-09-14 RX ORDER — PREDNISONE 20 MG/1
20 TABLET ORAL EVERY MORNING
Qty: 4 TABLET | Refills: 0 | Status: SHIPPED | OUTPATIENT
Start: 2022-09-14 | End: 2022-09-18

## 2022-09-14 RX ORDER — DOXYCYCLINE HYCLATE 100 MG
100 TABLET ORAL 2 TIMES DAILY
Qty: 28 TABLET | Refills: 0 | Status: SHIPPED | OUTPATIENT
Start: 2022-09-14 | End: 2022-09-28

## 2022-09-14 ASSESSMENT — ANXIETY QUESTIONNAIRES
7. FEELING AFRAID AS IF SOMETHING AWFUL MIGHT HAPPEN: SEVERAL DAYS
1. FEELING NERVOUS, ANXIOUS, OR ON EDGE: NEARLY EVERY DAY
4. TROUBLE RELAXING: NEARLY EVERY DAY
5. BEING SO RESTLESS THAT IT IS HARD TO SIT STILL: SEVERAL DAYS
IF YOU CHECKED OFF ANY PROBLEMS ON THIS QUESTIONNAIRE, HOW DIFFICULT HAVE THESE PROBLEMS MADE IT FOR YOU TO DO YOUR WORK, TAKE CARE OF THINGS AT HOME, OR GET ALONG WITH OTHER PEOPLE: VERY DIFFICULT
GAD7 TOTAL SCORE: 15
3. WORRYING TOO MUCH ABOUT DIFFERENT THINGS: NEARLY EVERY DAY
6. BECOMING EASILY ANNOYED OR IRRITABLE: SEVERAL DAYS
2. NOT BEING ABLE TO STOP OR CONTROL WORRYING: NEARLY EVERY DAY
GAD7 TOTAL SCORE: 15

## 2022-09-14 ASSESSMENT — ASTHMA QUESTIONNAIRES
QUESTION_4 LAST FOUR WEEKS HOW OFTEN HAVE YOU USED YOUR RESCUE INHALER OR NEBULIZER MEDICATION (SUCH AS ALBUTEROL): ONE OR TWO TIMES PER DAY
ACT_TOTALSCORE: 20
QUESTION_1 LAST FOUR WEEKS HOW MUCH OF THE TIME DID YOUR ASTHMA KEEP YOU FROM GETTING AS MUCH DONE AT WORK, SCHOOL OR AT HOME: A LITTLE OF THE TIME
QUESTION_5 LAST FOUR WEEKS HOW WOULD YOU RATE YOUR ASTHMA CONTROL: COMPLETELY CONTROLLED
QUESTION_2 LAST FOUR WEEKS HOW OFTEN HAVE YOU HAD SHORTNESS OF BREATH: ONCE OR TWICE A WEEK
ACT_TOTALSCORE: 20
QUESTION_3 LAST FOUR WEEKS HOW OFTEN DID YOUR ASTHMA SYMPTOMS (WHEEZING, COUGHING, SHORTNESS OF BREATH, CHEST TIGHTNESS OR PAIN) WAKE YOU UP AT NIGHT OR EARLIER THAN USUAL IN THE MORNING: NOT AT ALL

## 2022-09-14 ASSESSMENT — PATIENT HEALTH QUESTIONNAIRE - PHQ9: SUM OF ALL RESPONSES TO PHQ QUESTIONS 1-9: 18

## 2022-09-14 NOTE — PROGRESS NOTES
Bola is a 52 year old male contacting the clinic today via video, who will use the platform: [email or web address] for the visit.  Phone # for [employer], or if Amwell drops:   Telephone Information:   Mobile [phone]          ASSESSMENT and PLAN:  1. Acute recurrent maxillary sinusitis  Symptomatic despite ongoing use of high-dose levofloxacin.  Resistant organisms could be MRSA with multiple hospitalizations.  Add doxycycline to cover and burst of prednisone  - doxycycline hyclate (VIBRA-TABS) 100 MG tablet; Take 1 tablet (100 mg) by mouth 2 times daily for 14 days  Dispense: 28 tablet; Refill: 0  - predniSONE (DELTASONE) 20 MG tablet; Take 1 tablet (20 mg) by mouth every morning for 4 days  Dispense: 4 tablet; Refill: 0    2. Drug-induced constipation  Diarrhea improved.  Now slightly constipated off lactulose.  Resume lactulose    3. Gingivitis, chronic  Dental procedure scheduled October 14 may then allow us to discontinue levofloxacin    4. Encounter for long-term (current) use of medications  Narcotics and urine toxicology through pain clinic    5. History of tobacco abuse  Confirm that he quit smoking in October 2021       Patient Instructions   Doxycycline 100 mg twice daily for 14 days    Prednisone 20 mg daily for 4 days    Continue levofloxacin    Resume lactulose 30 cc twice daily    Continue guaifenesin    Proceed with dental procedures October 14    Continue with levofloxacin since diarrhea improved            Return in about 3 months (around 12/14/2022) for using a video visit.       CHIEF COMPLAINT:  Chief Complaint   Patient presents with     Sinus Problem     Headache       HISTORY OF PRESENT ILLNESS:  Bola is a 52 year old male contacting the clinic today via video for complaints of sinusitis.  He reports for the last 10 to 14 days he complains of mucus, posterior nasal drainage, nasal congestion and tooth ache.  Unfortunately he has been on virtually constant levofloxacin 750 mg daily for approximately  "the last year.  At our last visit in August he had diarrhea and we considered whether we would have to stop the levofloxacin however stool for C. difficile was negative.  He has had multiple surgeries the last 2 years and has been in and out of the hospital and is therefore at high risk for MRSA.  He complains of nausea from the amount of nasal drainage    Loose stools have improved and are now slightly constipated.  He thinks that the lactulose syrup was completely stopped but should be resumed to twice daily    He is scheduled to finally undergo his dental work beginning October 14.  We may then be able to get him off of his [facility]    REVIEW OF SYSTEMS:   Slow recovery after spinal surgery.    PFSH:  Social History     Social History Narrative    He is .  He has been a  and also a counselor, and worked with [employer].  He is now on disability due to his brain injury and bipolar disease.        Quit smoking October of 2021        Quit drinking 2010        At [facility] since June of 2020     Still in the [facility]    TOBACCO USE:  History   Smoking Status     Former Smoker     Packs/day: 0.50     Years: 11.00     Types: Cigarettes     Start date: 8/20/2009     Quit date: 2/21/2017   Smokeless Tobacco     Former User     Comment: 0.5 pack per day       VITALS:  There were no vitals filed for this visit.  There were no vitals taken for this visit. Estimated body mass index is 32.23 kg/m  as calculated from the following:    Height as of 9/17/21: 1.727 m (5' 8\").    Weight as of 9/1/21: 96.2 kg (212 lb).    PHYSICAL EXAM:  (observations via Video)  Alert and oriented.  Poor dentition.  Able to move about his room with some difficulty    MEDICATIONS:   Current Outpatient Medications   Medication Sig Dispense Refill     acetaminophen (TYLENOL) 650 MG CR tablet Take 2 tablets (1,300 mg) by mouth 3 times daily 540 tablet 1     albuterol (PROAIR HFA/PROVENTIL HFA/VENTOLIN HFA) 108 " (90 Base) MCG/ACT inhaler Inhale 2 puffs into the lungs every 6 hours as needed 6.7 g 11     allopurinol (ZYLOPRIM) 300 MG tablet Take 1 tablet (300 mg) by mouth 2 times daily 180 tablet 3     ammonium lactate (LAC-HYDRIN) 12 % external lotion        amphetamine-dextroamphetamine (ADDERALL) 15 MG tablet Take 1 tablet (15 mg) by mouth daily 30 tablet 0     bisacodyl (DULCOLAX) 10 MG suppository Place 1 suppository (10 mg) rectally daily as needed for constipation 60 suppository 1     buprenorphine (BUTRANS) 5 MCG/HR WK patch Place 1 patch onto the skin every 7 days 4 patch 1     buPROPion (WELLBUTRIN SR) 150 MG 12 hr tablet Take 1 tablet (150 mg) by mouth 3 times daily 90 tablet 11     CALCIUM ANTACID 500 MG chewable tablet        doxycycline hyclate (VIBRA-TABS) 100 MG tablet Take 1 tablet (100 mg) by mouth 2 times daily for 14 days 28 tablet 0     ergocalciferol (ERGOCALCIFEROL) 1.25 MG (80008 UT) capsule Take 1 capsule (50,000 Units) by mouth once a week 12 capsule 3     fexofenadine (ALLEGRA) 180 MG tablet Take 180 mg by mouth daily as needed       furosemide (LASIX) 40 MG tablet Take 1 tablet (40 mg) by mouth daily 30 tablet 11     guaiFENesin (MUCINEX) 600 MG 12 hr tablet Take 2 tablets (1,200 mg) by mouth 2 times daily 120 tablet 11     hydrOXYzine (ATARAX) 25 MG tablet        lactulose 20 GM/30ML SOLN Take 30 mLs by mouth 2 times daily For constipation. To replace senokot, magnesium, miralax if covered by insurance 5400 mL 11     lamoTRIgine (LAMICTAL) 150 MG tablet Take 1 tablet (150 mg) by mouth 3 times daily       LANsoprazole (PREVACID) 30 MG DR capsule Take 30 mg by mouth daily       levofloxacin (LEVAQUIN) 750 MG tablet Take 1 tablet (750 mg) by mouth daily 42 tablet 0     lidocaine (LIDODERM) 5 % patch Apply 1 patch topically to painful area of skin once daily and remove per schedule.       lidocaine (XYLOCAINE) 2 % external gel Apply 1 inch topically 4 times a day as needed to gums. 85 g 11      methocarbamol (ROBAXIN) 500 MG tablet Take 2 tablets (1,000 mg) by mouth 4 times daily 120 tablet 1     metoprolol succinate ER (TOPROL-XL) 50 MG 24 hr tablet Take 1 tablet (50 mg) by mouth daily 90 tablet 3     montelukast (SINGULAIR) 10 MG tablet Take 1 tablet by mouth daily       naloxegol (MOVANTIK) 25 MG TABS tablet Take 1 tablet (25 mg) by mouth every morning (before breakfast) 30 tablet 1     naloxone (NARCAN) 4 MG/0.1ML nasal spray 1 spray (4 mg dose) into one nostril for opioid reversal. Call 911. May repeat if no response in 3 minutes.       oxyCODONE IR (ROXICODONE) 15 MG tablet Take 1 tablet (15 mg) by mouth 8 times daily One tablet every three hours 8 am, 11 am, 2pm, 5 pm, 8 pm, 11 pm, 2 am, 5 am, may fill 9/18 for 9/20 112 tablet 0     predniSONE (DELTASONE) 20 MG tablet Take 1 tablet (20 mg) by mouth every morning for 4 days 4 tablet 0     QUEtiapine (SEROQUEL) 100 MG tablet Take 1 tablet (100 mg) by mouth 4 times daily as needed (anxiety) 60 tablet 1     QUEtiapine (SEROQUEL) 300 MG tablet Take 2 tablets (600 mg) by mouth At Bedtime 180 tablet 0     triamterene-HCTZ (MAXZIDE-25) 37.5-25 MG tablet Take 1 tablet by mouth daily 30 tablet 11     zonisamide (ZONEGRAN) 25 MG capsule Take 3 capsules (75 mg) by mouth 4 times daily 180 capsule 3     chlorhexidine (PERIDEX) 0.12 % solution Swish and spit 15 mLs in mouth 3 times daily as needed (sore throat) (Patient not taking: Reported on 9/14/2022) 473 mL 3     oxymetazoline (AFRIN) 0.05 % nasal spray Spray 2 sprays into both nostrils 2 times daily (Patient not taking: Reported on 9/14/2022) 30 mL 1     QUEtiapine (SEROQUEL) 50 MG tablet Take 100 mg by mouth 4 times daily as needed        senna-docusate (SENOKOT-S/PERICOLACE) 8.6-50 MG tablet Take 2 tablets by mouth 2 times daily (Patient not taking: Reported on 9/14/2022) 120 tablet 11       Outside Notes summarized:   Labs, x-rays, cardiology, GI tests reviewed: Needs updated urine toxicolog  Recent Labs    Lab Test 08/30/22  1321   HGB 12.5*   WBC 7.6   *   POTASSIUM 4.0   CR 0.71   PSA 0.50   URIC 2.0*   B12 336   TSH 0.62   VITDT 29     No results found for: [id number]  Lab Results   Component Value Date    CHOL 164 08/30/2022     New orders: No orders of the defined types were placed in this encounter.      Independent review of:    Supplemental history by:      Patient has given verbal consent to a Video visit?  Yes  How would you like to obtain your AVS?  MyChart  Will anyone else be joining your video visit? No       Video-Visit Details  Type of service:  Video Visit  Originating Location (pt. Location): Home  Distant Location (provider location):   [facility]    KERRI Lund MD  [facility]    Video Start Time: 12:06 PM  Video End time:  12:26 PM  Face to face plus orders: 20 minutes  Documentation time:  3 minutes     The visit lasted a total of 21 minutes

## 2022-09-14 NOTE — PATIENT INSTRUCTIONS
Doxycycline 100 mg twice daily for 14 days    Prednisone 20 mg daily for 4 days    Continue levofloxacin    Resume lactulose 30 cc twice daily    Continue guaifenesin    Proceed with dental procedures October 14    Continue with levofloxacin since diarrhea improved

## 2022-09-15 DIAGNOSIS — F31.81 BIPOLAR 2 DISORDER (H): ICD-10-CM

## 2022-09-15 DIAGNOSIS — S06.9X9S TRAUMATIC BRAIN INJURY WITH LOSS OF CONSCIOUSNESS, SEQUELA (H): ICD-10-CM

## 2022-09-15 RX ORDER — DEXTROAMPHETAMINE SACCHARATE, AMPHETAMINE ASPARTATE, DEXTROAMPHETAMINE SULFATE AND AMPHETAMINE SULFATE 3.75; 3.75; 3.75; 3.75 MG/1; MG/1; MG/1; MG/1
15 TABLET ORAL DAILY
Qty: 30 TABLET | Refills: 0 | Status: SHIPPED | OUTPATIENT
Start: 2022-09-15 | End: 2022-09-16

## 2022-09-16 RX ORDER — QUETIAPINE FUMARATE 100 MG/1
100 TABLET, FILM COATED ORAL 4 TIMES DAILY PRN
Qty: 60 TABLET | Refills: 1 | Status: SHIPPED | OUTPATIENT
Start: 2022-09-16 | End: 2024-09-05

## 2022-09-16 RX ORDER — DEXTROAMPHETAMINE SACCHARATE, AMPHETAMINE ASPARTATE, DEXTROAMPHETAMINE SULFATE AND AMPHETAMINE SULFATE 3.75; 3.75; 3.75; 3.75 MG/1; MG/1; MG/1; MG/1
15 TABLET ORAL DAILY
Qty: 30 TABLET | Refills: 0 | Status: SHIPPED | OUTPATIENT
Start: 2022-09-16 | End: 2022-10-18

## 2022-09-16 NOTE — TELEPHONE ENCOUNTER
Routing refill request to provider for review/approval because:  No heart rate on file within 12 months (BP is also not within the last 12 months but passed protocol)    Last Written Prescription Date:  8/10/22  Last Fill Quantity: 60,  # refills: 1   Last office visit provider:   9/14/22    Requested Prescriptions   Pending Prescriptions Disp Refills     QUEtiapine (SEROQUEL) 100 MG tablet 60 tablet 1     Sig: Take 1 tablet (100 mg) by mouth 4 times daily as needed (anxiety)       Antipsychotic Medications Failed - 9/15/2022  9:47 AM        Failed - Heart Rate on file within past 12 months     Pulse Readings from Last 3 Encounters:   09/01/21 83   04/30/21 74   03/19/21 74               Passed - Blood pressure under 140/90 in past 12 months     BP Readings from Last 3 Encounters:   09/01/21 120/78   04/30/21 116/68   03/19/21 108/60                 Passed - Patient is 12 years of age or older        Passed - Lipid panel on file within the past 12 months     Recent Labs   Lab Test 08/30/22  1321   CHOL 164   TRIG 261*   HDL 37*   LDL 75   NHDL 127               Passed - CBC on file in past 12 months     Recent Labs   Lab Test 08/30/22  1321   WBC 7.6   RBC 5.03   HGB 12.5*   HCT 38.4*                    Passed - A1c or Glucose on file in past 12 months     Recent Labs   Lab Test 08/30/22  1321   *       Please review patients last 3 weights. If a weight gain of >10 lbs exists, you may refill the prescription once after instructing the patient to schedule an appointment within the next 30 days.    Wt Readings from Last 3 Encounters:   09/01/21 96.2 kg (212 lb)   08/06/21 95.7 kg (211 lb)   04/30/21 98.1 kg (216 lb 4 oz)             Passed - Medication is active on med list        Passed - Recent (6 mo) or future (30 days) visit within the authorizing provider's specialty     Patient had office visit in the last 6 months or has a visit in the next 30 days with authorizing provider or within the  "authorizing provider's specialty.  See \"Patient Info\" tab in inbasket, or \"Choose Columns\" in Meds & Orders section of the refill encounter.                 Caren Brown RN 09/16/22 9:08 AM  "

## 2022-09-28 DIAGNOSIS — K59.03 DRUG-INDUCED CONSTIPATION: ICD-10-CM

## 2022-09-28 NOTE — TELEPHONE ENCOUNTER
Received fax request from pharmacy:  movantik 25 mg    Last refilled on 8/26/22-28 days    Pt last seen on 8/26/22  Next appt scheduled for 11/28/22    Pending Prescriptions:                       Disp   Refills    naloxegol (MOVANTIK) 25 MG TABS tablet    30 tab*1            Sig: Take 1 tablet (25 mg) by mouth every morning           (before breakfast)    [facility]

## 2022-09-30 ENCOUNTER — TELEPHONE (OUTPATIENT)
Dept: PALLIATIVE MEDICINE | Facility: OTHER | Age: 52
End: 2022-09-30

## 2022-09-30 NOTE — TELEPHONE ENCOUNTER
PA needed for coverage of hydroxyzine 50 mg.  Please review   This patient is a 66-year-old male with a distant history of a left hand injury many years ago patient states he works with his hands and has been working a lot recently.  Patient states he has some mild swelling in his left hand which is happened before with trivial amounts of pain.  Patient is worried that his hand is going to get worse if he continues working extensively.  Patient denies numbness warmth fever or chills.  Patient came in for evaluation.  Patient is not taking any medication at this time

## 2022-10-13 DIAGNOSIS — G89.4 CHRONIC PAIN DISORDER: ICD-10-CM

## 2022-10-13 NOTE — TELEPHONE ENCOUNTER
Received fax from pharmacy requesting refill(s) for     zonisamide (ZONEGRAN) 25 MG capsule    Date last filled 08.26.2022    Last Appt Date:08.26.2022     Next Appt scheduled: 11.26.2022    Pharmacy:   [facility] - [place], [place] - [address],    Will route to MA POOL for processing    Kailyn Carolina  [facility] Visit Facilitator

## 2022-10-14 RX ORDER — ZONISAMIDE 25 MG/1
75 CAPSULE ORAL 4 TIMES DAILY
Qty: 180 CAPSULE | Refills: 3 | Status: SHIPPED | OUTPATIENT
Start: 2022-10-14 | End: 2023-01-16

## 2022-10-14 NOTE — TELEPHONE ENCOUNTER
Pending Prescriptions:                       Disp   Refills    zonisamide (ZONEGRAN) 25 MG capsule       180 ca*3            Sig: Take 3 capsules (75 mg) by mouth 4 times daily    Pt should have been out of this med if taking as prescribed    [facility]

## 2022-10-18 ENCOUNTER — NURSE TRIAGE (OUTPATIENT)
Dept: NURSING | Facility: CLINIC | Age: 52
End: 2022-10-18

## 2022-10-18 DIAGNOSIS — S06.9X9S TRAUMATIC BRAIN INJURY WITH LOSS OF CONSCIOUSNESS, SEQUELA (H): ICD-10-CM

## 2022-10-18 DIAGNOSIS — F31.81 BIPOLAR 2 DISORDER (H): ICD-10-CM

## 2022-10-18 RX ORDER — DEXTROAMPHETAMINE SACCHARATE, AMPHETAMINE ASPARTATE, DEXTROAMPHETAMINE SULFATE AND AMPHETAMINE SULFATE 3.75; 3.75; 3.75; 3.75 MG/1; MG/1; MG/1; MG/1
15 TABLET ORAL DAILY
Qty: 30 TABLET | Refills: 0 | Status: SHIPPED | OUTPATIENT
Start: 2022-10-18 | End: 2022-11-30

## 2022-10-18 NOTE — TELEPHONE ENCOUNTER
Medication Question or Refill        What medication are you calling about (include dose and sig)?:  amphetamine-dextroamphetamine (ADDERALL) 15 MG tablet 30 tablet 0 9/16/2022  No     PAteint also stating he needs refill for chloride-hydroxide      Controlled Substance Agreement on file:   CSA -- Patient Level:     [Media Unavailable] Controlled Substance Agreement - Non - Opioid - Scan on 5/5/2021   [Media Unavailable] Controlled Substance Agreement - Opioid - Scan on 1/12/2021: OUTSIDE RECORD   [Media Unavailable] Controlled Substance Agreement - Opioid - Scan on 2/19/2020: OUTSIDE RECORD   [Media Unavailable] Controlled Substance Agreement - Opioid - Scan on 12/30/2019: OUTSIDE RECORD   [Media Unavailable] Controlled Substance Agreement - Non - Opioid - Scan on 10/23/2019: NON-OPIOID CONTROLLED SUBSTANCE AGREEMENT   [Media Unavailable] Controlled Substance Agreement - Non - Opioid - Scan on 8/22/2019   [Media Unavailable] Controlled Substance Agreement - Opioid - Scan on 12/27/2018: OPIOID USE   [Media Unavailable] Controlled Substance Agreement - Opioid - Scan on 12/27/2018: CHRONIC PAIN MANAGEMENT   [Media Unavailable] Controlled Substance Agreement - Opioid - Scan on 8/28/2018   [Media Unavailable] Controlled Substance Agreement - Opioid - Scan on 8/24/2017   [Media Unavailable] Controlled Substance Agreement - Opioid - Scan on 8/23/2017   [Media Unavailable] Controlled Substance Agreement - Opioid - Scan on 6/30/2016       Who prescribed the medication?: Dr. Lund     Do you need a refill? Yes: Is out of medicaton and will be out of aderrall     When did you use the medication last? N/A    Patient offered an appointment? No    Do you have any questions or concerns?  No    Preferred Pharmacy:   [facility] - [place], [place] - [address]  [address]  [facility] [address]  Phone: [phone] Fax: [phone]      Could we send this information to you in [employer] or would you prefer to  receive a phone call?:   Patient would prefer a phone call   Okay to leave a detailed message?: Yes at Cell number on file:    Telephone Information:   Mobile [phone]

## 2022-10-18 NOTE — TELEPHONE ENCOUNTER
Telephone call:     Steffanie calling from patient's assisted living.   She reports no parameters for blood pressure on patient's metoprolol prescription.  She is requesting clarification.     Please call Steffanie at [phone].     Yudith Scott RN   10/18/22 9:12 AM  [facility] Nurse Advisor

## 2022-10-18 NOTE — TELEPHONE ENCOUNTER
Medication order w/ medication notes updated with parameter faxed to Steffanie at the [facility] (fax: [phone]) at Steffanie's request.

## 2022-10-18 NOTE — TELEPHONE ENCOUNTER
09/01/21 1031 Leodan Sapp MD Modify from Order:[id number]     Outpatient Medication Detail     Disp Refills Start End MINDY   metoprolol succinate ER (TOPROL-XL) 50 MG 24 hr tablet 90 tablet 3 9/1/2021  No   Sig - Route: Take 1 tablet (50 mg) by mouth daily - Oral

## 2022-10-19 ENCOUNTER — TELEPHONE (OUTPATIENT)
Dept: INTERNAL MEDICINE | Facility: CLINIC | Age: 52
End: 2022-10-19

## 2022-10-19 DIAGNOSIS — M48.02 CERVICAL STENOSIS OF SPINAL CANAL: Primary | ICD-10-CM

## 2022-10-19 DIAGNOSIS — G89.4 CHRONIC PAIN DISORDER: ICD-10-CM

## 2022-10-19 DIAGNOSIS — M48.02 FORAMINAL STENOSIS OF CERVICAL REGION: ICD-10-CM

## 2022-10-19 DIAGNOSIS — K05.00 GINGIVITIS, ACUTE: ICD-10-CM

## 2022-10-19 DIAGNOSIS — K04.7 DENTAL INFECTION: ICD-10-CM

## 2022-10-19 DIAGNOSIS — M54.16 LUMBAR RADICULOPATHY: ICD-10-CM

## 2022-10-19 RX ORDER — CHLORHEXIDINE GLUCONATE ORAL RINSE 1.2 MG/ML
SOLUTION DENTAL
Qty: 473 ML | Refills: 11 | Status: SHIPPED | OUTPATIENT
Start: 2022-10-19

## 2022-10-19 RX ORDER — LEVOFLOXACIN 750 MG/1
750 TABLET, FILM COATED ORAL DAILY
Qty: 42 TABLET | Refills: 0 | Status: SHIPPED | OUTPATIENT
Start: 2022-10-19 | End: 2023-01-27

## 2022-10-19 RX ORDER — LIDOCAINE 50 MG/G
PATCH TOPICAL EVERY 24 HOURS
Qty: 30 PATCH | Refills: 11 | Status: SHIPPED | OUTPATIENT
Start: 2022-10-19

## 2022-10-19 RX ORDER — HYDROXYZINE HYDROCHLORIDE 50 MG/1
TABLET, FILM COATED ORAL
COMMUNITY
Start: 2022-10-05

## 2022-10-19 RX ORDER — OXYCODONE HYDROCHLORIDE 15 MG/1
15 TABLET ORAL
Qty: 112 TABLET | Refills: 0 | Status: SHIPPED | OUTPATIENT
Start: 2022-10-19 | End: 2022-11-02

## 2022-10-19 RX ORDER — CHLORHEXIDINE GLUCONATE ORAL RINSE 1.2 MG/ML
SOLUTION DENTAL
COMMUNITY
Start: 2022-09-18 | End: 2022-10-19

## 2022-10-19 NOTE — TELEPHONE ENCOUNTER
Central Prior Authorization Team   Phone: [phone]      Looking at the patient's active mediation list - only contains 5% Lidocaine Patches, which are currently being processed through Epic's EPA Portal: Medication:lidocaine (LIDODERM) 5 % patch

## 2022-10-19 NOTE — CONFIDENTIAL NOTE
Please double check meds    If he has not been taking them does he need to have them refilled    Not sure about Relistor and who prescribed it

## 2022-10-19 NOTE — TELEPHONE ENCOUNTER
Toña with the patient's living facility calling regarding 3 medications. Reports theses were never received from pharmacy. Requesting an alternative or to discontinue the medications.   -Relistor 150mg tab  -Lidocaine ointment 5%  -Fexofenadine 180 mg daily.   Toña reports the patient hasn't been taking any of these as they were never available to him. Requesting orders be signed and faxed over to the facility to # [phone], Attn Steffanie. Requesting these be addressed today.     Caren Quinones RN 10/19/22 10:50 AM   [facility] Triage Nurse Advisor

## 2022-10-19 NOTE — TELEPHONE ENCOUNTER
The prior authorization for Relisor was denied, this was a medication his PCP prescribed for opioid-induced constipation.  I had left a voicemail for home care RN on May 10, 2022 about this.  This is no longer on his medication list.    I called [facility] ask about the following medications:  1. Lidocaine 5% patch uses regularly - needs new refill -this was sent to the pharmacy  2. lidocaine 2% gel on his gums - uses on his gums and has this available already    3. Fexofenadine as needed he gets this over the counter.     4. Confirmed with Jan that he does have refills of his lactulose, he will ask the staff to help him call in refills to the pharmacy     5.  His dental work was restarted last week, as result he would like a refill of his chlorhexidine and is also hoping for a refill of his levofloxacin.  I will forward the message to his PCP for consideration.

## 2022-10-19 NOTE — TELEPHONE ENCOUNTER
Wrote out orders as questioned by facility and faxed over updated medication list to the best of her knowledge.  Okay per PCP.

## 2022-10-19 NOTE — TELEPHONE ENCOUNTER
Received call from patient requesting refill(s) of Oxycodone 15 mg tab     Last dispensed from pharmacy on 10/8/2022, 14 days, 112#, and no refills, per     Patient's last office/virtual visit by prescribing provider on 8/26/2022  Next office/virtual appointment scheduled for 11/28/2022    Last urine drug screen date 7/11/2022  Current opioid agreement on file (completed within the last year) Yes   Date of opioid agreement: 7/11/2022    E-prescribe to [facility] pharmacy    Mary Jo Irvin LPN  [facility] Pain Management

## 2022-10-20 ENCOUNTER — TELEPHONE (OUTPATIENT)
Dept: NURSING | Facility: CLINIC | Age: 52
End: 2022-10-20

## 2022-10-20 NOTE — TELEPHONE ENCOUNTER
Pharmacy calling to verify the antibiotic usage.    Review of the notes state that:     3. Gingivitis, chronic  Dental procedure scheduled October 14 may then allow us to discontinue levofloxacin    Per Devon Lund RN  [place] Nurse Advisor  12:51 PM  10/20/2022

## 2022-10-20 NOTE — TELEPHONE ENCOUNTER
Central Prior Authorization Team   Phone: [phone]      Requested mediation was processed and approved through Epic's EPA Portal. It was released to the pharmacy after it was approved:     room air

## 2022-10-24 ENCOUNTER — VIRTUAL VISIT (OUTPATIENT)
Dept: INTERNAL MEDICINE | Facility: CLINIC | Age: 52
End: 2022-10-24
Payer: COMMERCIAL

## 2022-10-24 DIAGNOSIS — K05.10 GINGIVITIS, CHRONIC: Primary | ICD-10-CM

## 2022-10-24 DIAGNOSIS — J01.01 ACUTE RECURRENT MAXILLARY SINUSITIS: ICD-10-CM

## 2022-10-24 DIAGNOSIS — B37.0 THRUSH: ICD-10-CM

## 2022-10-24 PROCEDURE — 99214 OFFICE O/P EST MOD 30 MIN: CPT | Mod: 95 | Performed by: INTERNAL MEDICINE

## 2022-10-24 RX ORDER — FLUCONAZOLE 100 MG/1
100 TABLET ORAL DAILY
Qty: 14 TABLET | Refills: 0 | Status: SHIPPED | OUTPATIENT
Start: 2022-10-24 | End: 2022-10-31

## 2022-10-24 RX ORDER — NYSTATIN 100000/ML
500000 SUSPENSION, ORAL (FINAL DOSE FORM) ORAL 4 TIMES DAILY
Qty: 400 ML | Refills: 3 | Status: SHIPPED | OUTPATIENT
Start: 2022-10-24 | End: 2022-10-31

## 2022-10-24 ASSESSMENT — ANXIETY QUESTIONNAIRES
7. FEELING AFRAID AS IF SOMETHING AWFUL MIGHT HAPPEN: MORE THAN HALF THE DAYS
GAD7 TOTAL SCORE: 19
IF YOU CHECKED OFF ANY PROBLEMS ON THIS QUESTIONNAIRE, HOW DIFFICULT HAVE THESE PROBLEMS MADE IT FOR YOU TO DO YOUR WORK, TAKE CARE OF THINGS AT HOME, OR GET ALONG WITH OTHER PEOPLE: EXTREMELY DIFFICULT
7. FEELING AFRAID AS IF SOMETHING AWFUL MIGHT HAPPEN: MORE THAN HALF THE DAYS
GAD7 TOTAL SCORE: 19
2. NOT BEING ABLE TO STOP OR CONTROL WORRYING: MORE THAN HALF THE DAYS
4. TROUBLE RELAXING: NEARLY EVERY DAY
6. BECOMING EASILY ANNOYED OR IRRITABLE: NEARLY EVERY DAY
3. WORRYING TOO MUCH ABOUT DIFFERENT THINGS: NEARLY EVERY DAY
8. IF YOU CHECKED OFF ANY PROBLEMS, HOW DIFFICULT HAVE THESE MADE IT FOR YOU TO DO YOUR WORK, TAKE CARE OF THINGS AT HOME, OR GET ALONG WITH OTHER PEOPLE?: EXTREMELY DIFFICULT
5. BEING SO RESTLESS THAT IT IS HARD TO SIT STILL: NEARLY EVERY DAY
1. FEELING NERVOUS, ANXIOUS, OR ON EDGE: NEARLY EVERY DAY

## 2022-10-24 ASSESSMENT — PATIENT HEALTH QUESTIONNAIRE - PHQ9
SUM OF ALL RESPONSES TO PHQ QUESTIONS 1-9: 23
10. IF YOU CHECKED OFF ANY PROBLEMS, HOW DIFFICULT HAVE THESE PROBLEMS MADE IT FOR YOU TO DO YOUR WORK, TAKE CARE OF THINGS AT HOME, OR GET ALONG WITH OTHER PEOPLE: EXTREMELY DIFFICULT
SUM OF ALL RESPONSES TO PHQ QUESTIONS 1-9: 23

## 2022-10-24 NOTE — PROGRESS NOTES
"Jan is a 52 year old who is being evaluated via a billable video visit.      How would you like to obtain your AVS? MyChart  If the video visit is dropped, the invitation should be resent by: Text to cell phone: [phone]  Will anyone else be joining your video visit? No  {If patient encounters technical issues they should call [phone] :193360}        {PROVIDER CHARTING PREFERENCE:808406}    Subjective   Jan is a 52 year old, presenting for the following health issues:  Follow Up (Gum infection )      HPI     {SUPERLIST (Optional):537861}  {additonal problems for provider to add (Optional):850109}    Review of Systems   {ROS COMP (Optional):875951}      Objective    Vitals - Patient Reported  Systolic (Patient Reported): 106  Diastolic (Patient Reported): 74  Weight (Patient Reported): 93 kg (205 lb)  SpO2 (Patient Reported): 99  Pulse (Patient Reported): 75        Physical Exam   {video visit exam brief selected:494878::\"GENERAL: Healthy, alert and no distress\",\"EYES: Eyes grossly normal to inspection.  No discharge or erythema, or obvious scleral/conjunctival abnormalities.\",\"RESP: No audible wheeze, cough, or visible cyanosis.  No visible retractions or increased work of breathing.  \",\"SKIN: Visible skin clear. No significant rash, abnormal pigmentation or lesions.\",\"NEURO: Cranial nerves grossly intact.  Mentation and speech appropriate for age.\",\"PSYCH: Mentation appears normal, affect normal/bright, judgement and insight intact, normal speech and appearance well-groomed.\"}    {Diagnostic Test Results (Optional):033535}    {AMBULATORY ATTESTATION (Optional):944584}        Video-Visit Details    Video Start Time: {video visit start/end time for provider to select:430294}    Type of service:  Video Visit    Video End Time:{video visit start/end time for provider to select:248896}    Originating Location (pt. Location): {video visit patient location:109757::\"Home\"}    {PROVIDER LOCATION On-site should be " "selected for visits conducted from your clinic location or adjoining [facility] hospital, academic office, or other nearby [facility] building. Off-site should be selected for all other provider locations, including home:831727}    Distant Location (provider location):  {virtual location provider:552220}    Platform used for Video Visit: {Virtual Visit Platforms:519876::\"[employer]\"}    "

## 2022-10-24 NOTE — PROGRESS NOTES
Bola is a 52 year old male contacting the clinic today via video, who will use the platform: [employer] for the visit.  Phone # for [employer], or if Amwell drops:   Telephone Information:   Mobile [phone]          ASSESSMENT and PLAN:  1. Gingivitis, chronic  Treat for coexisting fungal rather than adding antibiotics.  Continue to proceed with dental plan  - nystatin (MYCOSTATIN) 355181 UNIT/ML suspension; Take 5 mLs (500,000 Units) by mouth 4 times daily  Dispense: 400 mL; Refill: 3  - fluconazole (DIFLUCAN) 100 MG tablet; Take 1 tablet (100 mg) by mouth daily for 14 days  Dispense: 14 tablet; Refill: 0    2. Thrush  Makes more sense that he has yeast overgrowth after months of uninterrupted antibiotics  - nystatin (MYCOSTATIN) 028691 UNIT/ML suspension; Take 5 mLs (500,000 Units) by mouth 4 times daily  Dispense: 400 mL; Refill: 3  - fluconazole (DIFLUCAN) 100 MG tablet; Take 1 tablet (100 mg) by mouth daily for 14 days  Dispense: 14 tablet; Refill: 0    3. Acute recurrent maxillary sinusitis  Resolved after doxycycline but other symptoms worsen.  Trial of nystatin and fluconazole but consider possibility of adding back low-dose doxycycline  - nystatin (MYCOSTATIN) 003979 UNIT/ML suspension; Take 5 mLs (500,000 Units) by mouth 4 times daily  Dispense: 400 mL; Refill: 3  - fluconazole (DIFLUCAN) 100 MG tablet; Take 1 tablet (100 mg) by mouth daily for 14 days  Dispense: 14 tablet; Refill: 0       Patient Instructions   Nystatin swish and swallow 4 times daily for 10 days to replace current mouthwash    Fluconazole 100 mg daily for 14 days    Consider repeat trial of doxycycline if above ineffective    Pharm.D. to coordinate medicine changes with Aleisha and explore alternatives to lidocaine gel per patient request            Return in about 3 months (around 1/24/2023) for using a video visit.       CHIEF COMPLAINT:  Chief Complaint   Patient presents with     Follow Up     Gum infection        History of  Present Illness       Reason for visit:  Gum infection    He eats 2-3 servings of fruits and vegetables daily.He consumes 4 sweetened beverage(s) daily.He exercises with enough effort to increase his heart rate 30 to 60 minutes per day.  He exercises with enough effort to increase his heart rate 7 days per week.   He is taking medications regularly.    Today's PHQ-9         PHQ-9 Total Score: 23    PHQ-9 Q9 Thoughts of better off dead/self-harm past 2 weeks :   Not at all    How difficult have these problems made it for you to do your work, take care of things at home, or get along with other people: Extremely difficult  Today's CARMEN-7 Score: 19      HISTORY OF PRESENT ILLNESS:  Bola is a 52 year old male contacting the clinic today via video for complaints of significant worsening.  When we talked in mid September he was placed on doxycycline for a sinus infection.  Sinus infection resolved but he is left with increasing mucus, mild pain, nausea, and vomiting every few days.  He has been doing research and wonders whether he should take a second antibiotic on top of the levofloxacin.  He has been taking levofloxacin daily for greater than 1 year while he is attempting to get dental work done.  We discussed further bacterial infection as he suggests or the possibility that he suffers from erosive fungal esophagitis and thrush accounting for his symptoms of brownish mucus.  He has had 1 dental procedure    Beginning buprenorphine patch and tapering of oxycodone through pain clinic    REVIEW OF SYSTEMS:   Vomiting every 3 to 4 days    PFSH:  Social History     Social History Narrative    He is .  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  He is now on disability due to his brain injury and bipolar disease.        Quit smoking October of 2021        Quit drinking 2010        At [facility] since June of 2020       TOBACCO USE:  History   Smoking Status  "    Former     Packs/day: 0.50     Years: 11.00     Types: Cigarettes     Start date: 8/20/2009     Quit date: 2/21/2017   Smokeless Tobacco     Former     Comment: 0.5 pack per day       VITALS:  There were no vitals filed for this visit.  There were no vitals taken for this visit. Estimated body mass index is 32.23 kg/m  as calculated from the following:    Height as of 9/17/21: 1.727 m (5' 8\").    Weight as of 9/1/21: 96.2 kg (212 lb).    PHYSICAL EXAM:  (observations via Video)  Very dark video.  Agitated.  Alert and oriented    MEDICATIONS:   Current Outpatient Medications   Medication Sig Dispense Refill     acetaminophen (TYLENOL) 650 MG CR tablet Take 2 tablets (1,300 mg) by mouth 3 times daily 540 tablet 1     albuterol (PROAIR HFA/PROVENTIL HFA/VENTOLIN HFA) 108 (90 Base) MCG/ACT inhaler Inhale 2 puffs into the lungs every 6 hours as needed 6.7 g 11     allopurinol (ZYLOPRIM) 300 MG tablet Take 1 tablet (300 mg) by mouth 2 times daily 180 tablet 3     ammonium lactate (LAC-HYDRIN) 12 % external lotion        amphetamine-dextroamphetamine (ADDERALL) 15 MG tablet Take 1 tablet (15 mg) by mouth daily 30 tablet 0     bisacodyl (DULCOLAX) 10 MG suppository Place 1 suppository (10 mg) rectally daily as needed for constipation 60 suppository 1     buprenorphine (BUTRANS) 15 MCG/HR Wk patch Place 1 patch onto the skin every 7 days 4 patch 0     buPROPion (WELLBUTRIN SR) 150 MG 12 hr tablet Take 1 tablet (150 mg) by mouth 3 times daily 90 tablet 11     CALCIUM ANTACID 500 MG chewable tablet        chlorhexidine (PERIDEX) 0.12 % solution Swish and spit 15 mLs in mouth 3 times daily as needed (sore throat) - Swish & Spit 473 mL 11     ergocalciferol (ERGOCALCIFEROL) 1.25 MG (38020 UT) capsule Take 1 capsule (50,000 Units) by mouth once a week 12 capsule 3     fexofenadine (ALLEGRA) 180 MG tablet Take 180 mg by mouth daily as needed       fluconazole (DIFLUCAN) 100 MG tablet Take 1 tablet (100 mg) by mouth daily for " 14 days 14 tablet 0     furosemide (LASIX) 40 MG tablet Take 1 tablet (40 mg) by mouth daily 30 tablet 11     guaiFENesin (MUCINEX) 600 MG 12 hr tablet Take 2 tablets (1,200 mg) by mouth 2 times daily 120 tablet 11     hydrOXYzine (ATARAX) 50 MG tablet Per Dr. LOPEZ,OLUKAYODE       lactulose 20 GM/30ML SOLN Take 30 mLs by mouth 2 times daily For constipation. To replace senokot, magnesium, miralax if covered by insurance 5400 mL 11     lamoTRIgine (LAMICTAL) 150 MG tablet Take 1 tablet (150 mg) by mouth 3 times daily       LANsoprazole (PREVACID) 30 MG DR capsule Take 30 mg by mouth daily       levofloxacin (LEVAQUIN) 750 MG tablet Take 1 tablet (750 mg) by mouth daily 42 tablet 0     lidocaine (LIDODERM) 5 % patch Place onto the skin every 24 hours To prevent lidocaine toxicity, patient should be patch free for 12 hrs daily. 30 patch 11     lidocaine (XYLOCAINE) 2 % external gel Apply 1 inch topically 4 times a day as needed to gums. 85 g 11     methocarbamol (ROBAXIN) 500 MG tablet Take 2 tablets (1,000 mg) by mouth 4 times daily 120 tablet 1     metoprolol succinate ER (TOPROL-XL) 50 MG 24 hr tablet Take 1 tablet (50 mg) by mouth daily 90 tablet 3     montelukast (SINGULAIR) 10 MG tablet Take 1 tablet by mouth daily       naloxegol (MOVANTIK) 25 MG TABS tablet Take 1 tablet (25 mg) by mouth every morning (before breakfast) 30 tablet 1     naloxone (NARCAN) 4 MG/0.1ML nasal spray 1 spray (4 mg dose) into one nostril for opioid reversal. Call 911. May repeat if no response in 3 minutes.       nystatin (MYCOSTATIN) 751602 UNIT/ML suspension Take 5 mLs (500,000 Units) by mouth 4 times daily 400 mL 3     oxyCODONE IR (ROXICODONE) 15 MG tablet Take 1 tablet (15 mg) by mouth 8 times daily One tablet every three hours 8 am, 11 am, 2pm, 5 pm, 8 pm, 11 pm, 2 am, 5 am, may fill 10/20 for 10/22 112 tablet 0     QUEtiapine (SEROQUEL) 100 MG tablet Take 1 tablet (100 mg) by mouth 4 times daily as needed (anxiety) 60 tablet 1      QUEtiapine (SEROQUEL) 300 MG tablet Take 2 tablets (600 mg) by mouth At Bedtime 180 tablet 0     triamterene-HCTZ (MAXZIDE-25) 37.5-25 MG tablet Take 1 tablet by mouth daily 30 tablet 11     zonisamide (ZONEGRAN) 25 MG capsule Take 3 capsules (75 mg) by mouth 4 times daily 180 capsule 3       Outside Notes summarized: Pain clinic note.  Pharm.D. discussion  Labs, x-rays, cardiology, GI tests reviewed:   Recent Labs   Lab Test 08/30/22  1321   HGB 12.5*   WBC 7.6   *   POTASSIUM 4.0   CR 0.71   PSA 0.50   URIC 2.0*   B12 336   TSH 0.62   VITDT 29     No results found for: [id number]  Lab Results   Component Value Date    CHOL 164 08/30/2022     New orders: No orders of the defined types were placed in this encounter.      Independent review of:    Supplemental history by:      Patient has given verbal consent to a Video visit?  Yes  How would you like to obtain your AVS?  MyChart  Will anyone else be joining your video visit? No       Video-Visit Details  Type of service:  Video Visit  Originating Location (pt. Location): [facility]    Distant Location (provider location):  Off-site    Devon Lund MD  [facility]    Video Start Time: 5:33 PM  Video End time:  6:00 PM  Face to face plus orders: 27 minutes  Documentation time:  3 minutes     The visit lasted a total of 30 minutes

## 2022-10-24 NOTE — PATIENT INSTRUCTIONS
Nystatin swish and swallow 4 times daily for 10 days to replace current mouthwash    Fluconazole 100 mg daily for 14 days    Consider repeat trial of doxycycline if above ineffective    Pharm.D. to coordinate medicine changes with Aleisha and explore alternatives to lidocaine gel per patient request

## 2022-10-28 ENCOUNTER — TELEPHONE (OUTPATIENT)
Dept: INTERNAL MEDICINE | Facility: CLINIC | Age: 52
End: 2022-10-28

## 2022-10-28 NOTE — TELEPHONE ENCOUNTER
General Call      Reason for Call:  Pt calling on medication that was given to earlier this week -- fluconazole - is working but not as much as patient thought it would -- pt still has some nausea, but it is better    Pt bit his tongue last night - appears that a vein may be exposed       What are your questions or concerns:  n/a    Date of last appointment with provider: n/a    Could we send this information to you in [employer] or would you prefer to receive a phone call?:   Patient would prefer a phone call   Okay to leave a detailed message?: Yes at Home number on file [phone] (home)

## 2022-10-31 ENCOUNTER — VIRTUAL VISIT (OUTPATIENT)
Dept: INTERNAL MEDICINE | Facility: CLINIC | Age: 52
End: 2022-10-31
Payer: COMMERCIAL

## 2022-10-31 DIAGNOSIS — S01.512A LACERATION OF TONGUE, INITIAL ENCOUNTER: ICD-10-CM

## 2022-10-31 DIAGNOSIS — K21.00 GASTROESOPHAGEAL REFLUX DISEASE WITH ESOPHAGITIS WITHOUT HEMORRHAGE: ICD-10-CM

## 2022-10-31 DIAGNOSIS — J01.01 ACUTE RECURRENT MAXILLARY SINUSITIS: ICD-10-CM

## 2022-10-31 DIAGNOSIS — K59.03 THERAPEUTIC OPIOID INDUCED CONSTIPATION: ICD-10-CM

## 2022-10-31 DIAGNOSIS — B37.0 THRUSH: Primary | ICD-10-CM

## 2022-10-31 DIAGNOSIS — T40.2X5A THERAPEUTIC OPIOID INDUCED CONSTIPATION: ICD-10-CM

## 2022-10-31 DIAGNOSIS — K05.10 GINGIVITIS, CHRONIC: ICD-10-CM

## 2022-10-31 PROCEDURE — 99214 OFFICE O/P EST MOD 30 MIN: CPT | Mod: 95 | Performed by: INTERNAL MEDICINE

## 2022-10-31 RX ORDER — LANSOPRAZOLE 30 MG/1
30 CAPSULE, DELAYED RELEASE ORAL 2 TIMES DAILY
Qty: 180 CAPSULE | Refills: 3 | Status: SHIPPED | OUTPATIENT
Start: 2022-10-31 | End: 2023-10-31

## 2022-10-31 RX ORDER — FLUCONAZOLE 100 MG/1
100 TABLET ORAL DAILY
Qty: 90 TABLET | Refills: 3 | Status: SHIPPED | OUTPATIENT
Start: 2022-10-31 | End: 2022-10-31

## 2022-10-31 RX ORDER — NYSTATIN 100000/ML
500000 SUSPENSION, ORAL (FINAL DOSE FORM) ORAL
Qty: 400 ML | Refills: 3 | Status: SHIPPED | OUTPATIENT
Start: 2022-10-31 | End: 2022-11-21

## 2022-10-31 RX ORDER — LACTULOSE 20 G/30ML
30 SOLUTION ORAL 2 TIMES DAILY
Qty: 5400 ML | Refills: 11 | Status: SHIPPED | OUTPATIENT
Start: 2022-10-31 | End: 2022-11-21

## 2022-10-31 RX ORDER — FLUCONAZOLE 200 MG/1
200 TABLET ORAL DAILY
Qty: 90 TABLET | Refills: 3 | Status: SHIPPED | OUTPATIENT
Start: 2022-10-31 | End: 2023-03-13

## 2022-10-31 NOTE — PROGRESS NOTES
"Bola is a 52 year old male contacting the clinic today via video, who will use the platform: iPowerUp for the visit.  Phone # for Doximity, or if Amwell drops:   Telephone Information:   Mobile [phone]          ASSESSMENT and PLAN:  {DX:[id number]::\"***\",\" \",\" \"}    CHIEF COMPLAINT:  Chief Complaint   Patient presents with     Follow Up     Med check on mouth infection, now has a toung issue       HPI    HISTORY OF PRESENT ILLNESS:  Bola is a 52 year old male contacting the clinic today via video for ***    REVIEW OF SYSTEMS:   ***      PFSH:  Social History     Social History Narrative    He is .  He has been a  and also a counselor, and worked with [employer].  He is now on disability due to his brain injury and bipolar disease.        Quit smoking October of 2021        Quit drinking 2010        At [facility] since June of 2020     ***    TOBACCO USE:  History   Smoking Status     Former     Packs/day: 0.50     Years: 11.00     Types: Cigarettes     Start date: 8/20/2009     Quit date: 2/21/2017   Smokeless Tobacco     Former     Comment: 0.5 pack per day       VITALS:  There were no vitals filed for this visit.  There were no vitals taken for this visit. Estimated body mass index is 32.23 kg/m  as calculated from the following:    Height as of 9/17/21: 1.727 m (5' 8\").    Weight as of 9/1/21: 96.2 kg (212 lb).    PHYSICAL EXAM:  (observations via Video)  ***    MEDICATIONS:   Current Outpatient Medications   Medication Sig Dispense Refill     acetaminophen (TYLENOL) 650 MG CR tablet Take 2 tablets (1,300 mg) by mouth 3 times daily 540 tablet 1     albuterol (PROAIR HFA/PROVENTIL HFA/VENTOLIN HFA) 108 (90 Base) MCG/ACT inhaler Inhale 2 puffs into the lungs every 6 hours as needed 6.7 g 11     ammonium lactate (LAC-HYDRIN) 12 % external lotion        amphetamine-dextroamphetamine (ADDERALL) 15 MG tablet Take 1 tablet (15 mg) by mouth daily 30 tablet 0     bisacodyl " (DULCOLAX) 10 MG suppository Place 1 suppository (10 mg) rectally daily as needed for constipation 60 suppository 1     buprenorphine (BUTRANS) 15 MCG/HR Wk patch Place 1 patch onto the skin every 7 days 4 patch 0     buPROPion (WELLBUTRIN SR) 150 MG 12 hr tablet Take 1 tablet (150 mg) by mouth 3 times daily 90 tablet 11     CALCIUM ANTACID 500 MG chewable tablet        chlorhexidine (PERIDEX) 0.12 % solution Swish and spit 15 mLs in mouth 3 times daily as needed (sore throat) - Swish & Spit 473 mL 11     ergocalciferol (ERGOCALCIFEROL) 1.25 MG (54329 UT) capsule Take 1 capsule (50,000 Units) by mouth once a week 12 capsule 3     fexofenadine (ALLEGRA) 180 MG tablet Take 180 mg by mouth daily as needed       fluconazole (DIFLUCAN) 100 MG tablet Take 1 tablet (100 mg) by mouth daily for 14 days 14 tablet 0     furosemide (LASIX) 40 MG tablet Take 1 tablet (40 mg) by mouth daily 30 tablet 11     guaiFENesin (MUCINEX) 600 MG 12 hr tablet Take 2 tablets (1,200 mg) by mouth 2 times daily 120 tablet 11     hydrOXYzine (ATARAX) 50 MG tablet Per Dr. LOPEZ,JACQUELYN       lactulose 20 GM/30ML SOLN Take 30 mLs by mouth 2 times daily For constipation. To replace senokot, magnesium, miralax if covered by insurance 5400 mL 11     lamoTRIgine (LAMICTAL) 150 MG tablet Take 1 tablet (150 mg) by mouth 3 times daily       LANsoprazole (PREVACID) 30 MG DR capsule Take 30 mg by mouth daily       levofloxacin (LEVAQUIN) 750 MG tablet Take 1 tablet (750 mg) by mouth daily 42 tablet 0     lidocaine (LIDODERM) 5 % patch Place onto the skin every 24 hours To prevent lidocaine toxicity, patient should be patch free for 12 hrs daily. 30 patch 11     lidocaine (XYLOCAINE) 2 % external gel Apply 1 inch topically 4 times a day as needed to gums. 85 g 11     methocarbamol (ROBAXIN) 500 MG tablet Take 2 tablets (1,000 mg) by mouth 4 times daily 120 tablet 1     metoprolol succinate ER (TOPROL-XL) 50 MG 24 hr tablet Take 1 tablet (50 mg) by mouth  "daily 90 tablet 3     montelukast (SINGULAIR) 10 MG tablet Take 1 tablet by mouth daily       naloxegol (MOVANTIK) 25 MG TABS tablet Take 1 tablet (25 mg) by mouth every morning (before breakfast) 30 tablet 1     naloxone (NARCAN) 4 MG/0.1ML nasal spray 1 spray (4 mg dose) into one nostril for opioid reversal. Call 911. May repeat if no response in 3 minutes.       nystatin (MYCOSTATIN) 147652 UNIT/ML suspension Take 5 mLs (500,000 Units) by mouth 4 times daily 400 mL 3     oxyCODONE IR (ROXICODONE) 15 MG tablet Take 1 tablet (15 mg) by mouth 8 times daily One tablet every three hours 8 am, 11 am, 2pm, 5 pm, 8 pm, 11 pm, 2 am, 5 am, may fill 10/20 for 10/22 112 tablet 0     QUEtiapine (SEROQUEL) 100 MG tablet Take 1 tablet (100 mg) by mouth 4 times daily as needed (anxiety) 60 tablet 1     QUEtiapine (SEROQUEL) 300 MG tablet Take 2 tablets (600 mg) by mouth At Bedtime 180 tablet 0     triamterene-HCTZ (MAXZIDE-25) 37.5-25 MG tablet Take 1 tablet by mouth daily 30 tablet 11     zonisamide (ZONEGRAN) 25 MG capsule Take 3 capsules (75 mg) by mouth 4 times daily 180 capsule 3       Outside Notes summarized: ***  Labs, x-rays, cardiology, GI tests reviewed: ***  Recent Labs   Lab Test 08/30/22  1321   HGB 12.5*   WBC 7.6   *   POTASSIUM 4.0   CR 0.71   PSA 0.50   URIC 2.0*   B12 336   TSH 0.62   VITDT 29     No results found for: [id number]  Lab Results   Component Value Date    CHOL 164 08/30/2022     New orders: No orders of the defined types were placed in this encounter.      Independent review of:    Supplemental history by:  ***    Patient has given verbal consent to a Video visit?  Yes  How would you like to obtain your AVS?  MyChart  Will anyone else be joining your video visit? No  ***     Video-Visit Details  Type of service:  Video Visit  Originating Location (pt. Location): {video visit patient location:644893::\"Home\"}    Distant Location (provider location):  {virtual location " provider:650408}    [facility]    Video Start Time: {Time now:152948}  Video End time:  {Time now:152948}  Face to face plus orders:  *** minutes  Documentation time:  3 minutes     The visit lasted a total of *** minutes

## 2022-10-31 NOTE — PROGRESS NOTES
Bola is a 52 year old male contacting the clinic today via video, who will use the platform: O-RID for the visit.  Phone # for Doximity, or if Amwell drops:   Telephone Information:   Mobile [phone]          ASSESSMENT and PLAN:  1. Thrush  Marked improvement.  Extend and adjust treatment.  Increase intensity of fluconazole and extend with coexisting levofloxacin  - nystatin (MYCOSTATIN) 129020 UNIT/ML suspension; Take 5 mLs (500,000 Units) by mouth every 3 hours as needed (thrush) To continue while taking levofloxacin and fluconazole  Dispense: 400 mL; Refill: 3  - fluconazole (DIFLUCAN) 200 MG tablet; Take 1 tablet (200 mg) by mouth daily  Dispense: 90 tablet; Refill: 3    2. Laceration of tongue, initial encounter  Referral to oral surgery  - Oral Surgery Referral; Future    3. Acute recurrent maxillary sinusitis  - nystatin (MYCOSTATIN) 682754 UNIT/ML suspension; Take 5 mLs (500,000 Units) by mouth every 3 hours as needed (thrush) To continue while taking levofloxacin and fluconazole  Dispense: 400 mL; Refill: 3  - fluconazole (DIFLUCAN) 200 MG tablet; Take 1 tablet (200 mg) by mouth daily  Dispense: 90 tablet; Refill: 3    4. Gingivitis, chronic  - nystatin (MYCOSTATIN) 033539 UNIT/ML suspension; Take 5 mLs (500,000 Units) by mouth every 3 hours as needed (thrush) To continue while taking levofloxacin and fluconazole  Dispense: 400 mL; Refill: 3  - fluconazole (DIFLUCAN) 200 MG tablet; Take 1 tablet (200 mg) by mouth daily  Dispense: 90 tablet; Refill: 3    5. Gastroesophageal reflux disease with esophagitis without hemorrhage  - LANsoprazole (PREVACID) 30 MG DR capsule; Take 1 capsule (30 mg) by mouth 2 times daily  Dispense: 180 capsule; Refill: 3    6. Therapeutic opioid induced constipation  Resume twice daily  - lactulose 20 GM/30ML SOLN; Take 30 mLs by mouth 2 times daily For constipation.  Dispense: 5400 mL; Refill: 11     Patient Instructions   Continue nystatin but increase frequency to every  "3 hours as needed    Change swish and spit to swish and swallow    Increase fluconazole to 200 mg daily and extend until done with levofloxacin    Continue nystatin until done with levofloxacin    Resume lactulose 30 cc twice daily    Referral to oral surgery for tongue laceration and injury    Proceed with dental work ASAP    Follow-up 2 to 3 weeks            Return in about 3 weeks (around 11/21/2022) for using a video visit.       CHIEF COMPLAINT:  Chief Complaint   Patient presents with     Follow Up     Med check on mouth infection, now has a tongue issue       HPI    HISTORY OF PRESENT ILLNESS:  Bola is a 52 year old male contacting the clinic today via video for discussion of medicine from last week.  Nystatin and fluconazole quickly improved his symptoms of nausea, mouth pain and throat burning.  Sinuses also seen slightly improved    He has been on levofloxacin for over a year due to severe gingivitis and sinusitis.  He is having trouble getting his dental work done but has had 1 treatment    Constipation worsened off lactulose    REVIEW OF SYSTEMS:   Stable chronic back pain    PFSH:  Social History     Social History Narrative    He is .  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  He is now on disability due to his brain injury and bipolar disease.        Quit smoking October of 2021        Quit drinking 2010        At [facility] since June of 2020       TOBACCO USE:  History   Smoking Status     Former     Packs/day: 0.50     Years: 11.00     Types: Cigarettes     Start date: 8/20/2009     Quit date: 2/21/2017   Smokeless Tobacco     Former     Comment: 0.5 pack per day       VITALS:  There were no vitals filed for this visit.  There were no vitals taken for this visit. Estimated body mass index is 32.23 kg/m  as calculated from the following:    Height as of 9/17/21: 1.727 m (5' 8\").    Weight as of 9/1/21: 96.2 kg (212 " lb).    PHYSICAL EXAM:  (observations via Video)  Alert and oriented    MEDICATIONS:   Current Outpatient Medications   Medication Sig Dispense Refill     acetaminophen (TYLENOL) 650 MG CR tablet Take 2 tablets (1,300 mg) by mouth 3 times daily 540 tablet 1     albuterol (PROAIR HFA/PROVENTIL HFA/VENTOLIN HFA) 108 (90 Base) MCG/ACT inhaler Inhale 2 puffs into the lungs every 6 hours as needed 6.7 g 11     ammonium lactate (LAC-HYDRIN) 12 % external lotion        amphetamine-dextroamphetamine (ADDERALL) 15 MG tablet Take 1 tablet (15 mg) by mouth daily 30 tablet 0     bisacodyl (DULCOLAX) 10 MG suppository Place 1 suppository (10 mg) rectally daily as needed for constipation 60 suppository 1     buprenorphine (BUTRANS) 15 MCG/HR Wk patch Place 1 patch onto the skin every 7 days 4 patch 0     buPROPion (WELLBUTRIN SR) 150 MG 12 hr tablet Take 1 tablet (150 mg) by mouth 3 times daily 90 tablet 11     CALCIUM ANTACID 500 MG chewable tablet        chlorhexidine (PERIDEX) 0.12 % solution Swish and spit 15 mLs in mouth 3 times daily as needed (sore throat) - Swish & Spit 473 mL 11     ergocalciferol (ERGOCALCIFEROL) 1.25 MG (98325 UT) capsule Take 1 capsule (50,000 Units) by mouth once a week 12 capsule 3     fexofenadine (ALLEGRA) 180 MG tablet Take 180 mg by mouth daily as needed       fluconazole (DIFLUCAN) 200 MG tablet Take 1 tablet (200 mg) by mouth daily 90 tablet 3     furosemide (LASIX) 40 MG tablet Take 1 tablet (40 mg) by mouth daily 30 tablet 11     guaiFENesin (MUCINEX) 600 MG 12 hr tablet Take 2 tablets (1,200 mg) by mouth 2 times daily 120 tablet 11     hydrOXYzine (ATARAX) 50 MG tablet Per Dr. LOPEZ,OLUKAYODE       lactulose 20 GM/30ML SOLN Take 30 mLs by mouth 2 times daily For constipation. 5400 mL 11     lamoTRIgine (LAMICTAL) 150 MG tablet Take 1 tablet (150 mg) by mouth 3 times daily       LANsoprazole (PREVACID) 30 MG DR capsule Take 1 capsule (30 mg) by mouth 2 times daily 180 capsule 3      levofloxacin (LEVAQUIN) 750 MG tablet Take 1 tablet (750 mg) by mouth daily 42 tablet 0     lidocaine (LIDODERM) 5 % patch Place onto the skin every 24 hours To prevent lidocaine toxicity, patient should be patch free for 12 hrs daily. 30 patch 11     lidocaine (XYLOCAINE) 2 % external gel Apply 1 inch topically 4 times a day as needed to gums. 85 g 11     methocarbamol (ROBAXIN) 500 MG tablet Take 2 tablets (1,000 mg) by mouth 4 times daily 120 tablet 1     metoprolol succinate ER (TOPROL-XL) 50 MG 24 hr tablet Take 1 tablet (50 mg) by mouth daily 90 tablet 3     montelukast (SINGULAIR) 10 MG tablet Take 1 tablet by mouth daily       naloxegol (MOVANTIK) 25 MG TABS tablet Take 1 tablet (25 mg) by mouth every morning (before breakfast) 30 tablet 1     naloxone (NARCAN) 4 MG/0.1ML nasal spray 1 spray (4 mg dose) into one nostril for opioid reversal. Call 911. May repeat if no response in 3 minutes.       nystatin (MYCOSTATIN) 273329 UNIT/ML suspension Take 5 mLs (500,000 Units) by mouth every 3 hours as needed (thrush) To continue while taking levofloxacin and fluconazole 400 mL 3     QUEtiapine (SEROQUEL) 100 MG tablet Take 1 tablet (100 mg) by mouth 4 times daily as needed (anxiety) 60 tablet 1     QUEtiapine (SEROQUEL) 300 MG tablet Take 2 tablets (600 mg) by mouth At Bedtime 180 tablet 0     triamterene-HCTZ (MAXZIDE-25) 37.5-25 MG tablet Take 1 tablet by mouth daily 30 tablet 11     zonisamide (ZONEGRAN) 25 MG capsule Take 3 capsules (75 mg) by mouth 4 times daily 180 capsule 3     oxyCODONE IR (ROXICODONE) 15 MG tablet Take 1 tablet (15 mg) by mouth 8 times daily One tablet every three hours 8 am, 11 am, 2pm, 5 pm, 8 pm, 11 pm, 2 am, 5 am, dispense 11/3/22, start 11/5/22 112 tablet 0       Outside Notes summarized:   Labs, x-rays, cardiology, GI tests reviewed:   Recent Labs   Lab Test 08/30/22  1321   HGB 12.5*   WBC 7.6   *   POTASSIUM 4.0   CR 0.71   PSA 0.50   URIC 2.0*   B12 336   TSH 0.62   VITDT 29      No results found for: [id number]  Lab Results   Component Value Date    CHOL 164 08/30/2022     New orders:   Orders Placed This Encounter   Procedures     Oral Surgery Referral       Independent review of:    Supplemental history by:      Patient has given verbal consent to a Video visit?  Yes  How would you like to obtain your AVS?  MyChart  Will anyone else be joining your video visit? No       Video-Visit Details  Type of service:  Video Visit  Originating Location (pt. Location): Assisted Living    Distant Location (provider location):  Off-site    Devon Lund MD  [facility]    Video Start Time: 12:16 PM  Video End time:  12:39 PM  Face to face plus orders: 23 minutes  Documentation time:  3 minutes     The visit lasted a total of 24 minutes

## 2022-10-31 NOTE — PATIENT INSTRUCTIONS
Continue nystatin but increase frequency to every 3 hours as needed    Change swish and spit to swish and swallow    Increase fluconazole to 200 mg daily and extend until done with levofloxacin    Continue nystatin until done with levofloxacin    Resume lactulose 30 cc twice daily    Referral to oral surgery for tongue laceration and injury    Proceed with dental work ASAP    Follow-up 2 to 3 weeks

## 2022-11-01 ENCOUNTER — TELEPHONE (OUTPATIENT)
Dept: INTERNAL MEDICINE | Facility: CLINIC | Age: 52
End: 2022-11-01

## 2022-11-02 NOTE — TELEPHONE ENCOUNTER
Central Prior Authorization Team   Phone: [phone]    PA Initiation    Medication: Lansoprazole 30MG DR Capsules  Insurance Company: [employer] [place] - Phone [phone] Fax [phone]  Pharmacy Filling the Rx: [facility] - [place], [place] - [address]  Filling Pharmacy Phone: [phone]  Filling Pharmacy Fax:    Start Date: 11/2/2022

## 2022-11-03 NOTE — TELEPHONE ENCOUNTER
Prior Authorization Approval    Authorization Effective Date: 8/4/2022  Authorization Expiration Date: 11/2/2023  Medication: Lansoprazole 30MG DR Capsules  Approved Dose/Quantity:    Reference #:     Insurance Company: [employer] [place] - Phone [phone] Fax [phone]  Expected CoPay:       CoPay Card Available:      Foundation Assistance Needed:    Which Pharmacy is filling the prescription (Not needed for infusion/clinic administered): [facility] - [place], [place] - [address]  Pharmacy Notified: Yes  Patient Notified: Yes

## 2022-11-03 NOTE — TELEPHONE ENCOUNTER
At the time of writing this note, patient has had a follow up appointment with Dr Lund and had addressed this issue.

## 2022-11-09 ENCOUNTER — MEDICAL CORRESPONDENCE (OUTPATIENT)
Dept: HEALTH INFORMATION MANAGEMENT | Facility: CLINIC | Age: 52
End: 2022-11-09

## 2022-11-10 ENCOUNTER — TELEPHONE (OUTPATIENT)
Dept: INTERNAL MEDICINE | Facility: CLINIC | Age: 52
End: 2022-11-10

## 2022-11-10 NOTE — TELEPHONE ENCOUNTER
Prior Authorization Request    1. Prior Authorization for the medication lidocaine 5 % ointment        Requesting Provider: Devon Lund          Pt name: oBla Lyon Jr.        Pt : 1970        Pt MRN: [id number]        Last Office Visit: 2022           Insurance: Payor: [facility] / Plan: [facility] MEDICARE ADVANTAGE / Product Type: Medicare /         Insurance ID Number: [unfilled]        Prior Auth Contact Phone number:     BIN#:   MAC#:         CMM KEY: [id number]

## 2022-11-11 NOTE — TELEPHONE ENCOUNTER
Central Prior Authorization Team  Phone: [phone]    Spoke with [facility]. They did not request a pa and the rx has refills on it, but a pa is also current for this medication through 10/19/2023    Prior Authorization Not Needed per Insurance    Medication: Lidocaine patch 5%  Insurance Company:  prime  Expected CoPay:      Pharmacy Filling the Rx:  [facility]  Pharmacy Notified: yes   Patient Notified:  No, rts until 11/19/22

## 2022-11-11 NOTE — TELEPHONE ENCOUNTER
Patient does not have a RX on file for Lidocaine 5% oint.  In order to complete the PA we will need a current Rx on file written by a [facility] provider.

## 2022-11-18 ENCOUNTER — TELEPHONE (OUTPATIENT)
Dept: PALLIATIVE MEDICINE | Facility: OTHER | Age: 52
End: 2022-11-18

## 2022-11-18 DIAGNOSIS — K59.03 DRUG-INDUCED CONSTIPATION: ICD-10-CM

## 2022-11-18 DIAGNOSIS — I10 ESSENTIAL HYPERTENSION: ICD-10-CM

## 2022-11-18 NOTE — TELEPHONE ENCOUNTER
Received fax request from [facility] pharmacy requesting refill(s) for Movantik 25 mg tab    Last refilled on 10/25/2022    Pt last seen on 8/26/2022  Next appt scheduled for 11/28/2022    Pending Prescriptions:                       Disp   Refills    naloxegol (MOVANTIK) 25 MG TABS tablet    30 tab*1            Sig: Take 1 tablet (25 mg) by mouth every morning           (before breakfast)      Mary Jo Irvin LPN  [facility]

## 2022-11-18 NOTE — TELEPHONE ENCOUNTER
[facility] Call Center    Phone Message    May a detailed message be left on voicemail: yes     Reason for Call: Other: Patient is at an assisted living facility and they need a copy of the ptient prescriptions to enter in their system for dispensing the medication. Please fax prescriptions to Stella [facility] at [phone].     Action Taken: Other: Pain    Travel Screening: Not Applicable

## 2022-11-20 RX ORDER — TRIAMTERENE/HYDROCHLOROTHIAZID 37.5-25 MG
TABLET ORAL
Qty: 28 TABLET | Refills: 5 | Status: SHIPPED | OUTPATIENT
Start: 2022-11-20 | End: 2022-12-09

## 2022-11-20 NOTE — TELEPHONE ENCOUNTER
"Routing refill request to provider for review/approval because:  Labs out of range:  na    Last Written Prescription Date:  21  Last Fill Quantity: 30,  # refills: 11   Last office visit provider:  10/31/22     Requested Prescriptions   Pending Prescriptions Disp Refills     triamterene-HCTZ (MAXZIDE-25) 37.5-25 MG tablet [Pharmacy Med Name: Triamterene-HCTZ 37.5-25 MG Tablet] 28 tablet 5     Si TABLET ORALLY DAILY       Diuretics (Including Combos) Protocol Failed - 2022 11:53 AM        Failed - Normal serum sodium on file in past 12 months     Recent Labs   Lab Test 22  1321   *              Passed - Blood pressure under 140/90 in past 12 months     BP Readings from Last 3 Encounters:   21 120/78   21 116/68   21 108/60                 Passed - Recent (12 mo) or future (30 days) visit within the authorizing provider's specialty     Patient has had an office visit with the authorizing provider or a provider within the authorizing providers department within the previous 12 mos or has a future within next 30 days. See \"Patient Info\" tab in inbasket, or \"Choose Columns\" in Meds & Orders section of the refill encounter.              Passed - Medication is active on med list        Passed - Patient is age 18 or older        Passed - Normal serum creatinine on file in past 12 months     Recent Labs   Lab Test 22  1321   CR 0.71              Passed - Normal serum potassium on file in past 12 months     Recent Labs   Lab Test 22  1321   POTASSIUM 4.0                         Mary Jo Ramirez RN 22 7:25 PM  "

## 2022-11-21 ENCOUNTER — VIRTUAL VISIT (OUTPATIENT)
Dept: INTERNAL MEDICINE | Facility: CLINIC | Age: 52
End: 2022-11-21
Payer: COMMERCIAL

## 2022-11-21 DIAGNOSIS — K05.10 GINGIVITIS, CHRONIC: ICD-10-CM

## 2022-11-21 DIAGNOSIS — R41.89 COGNITIVE DEFICIT DUE TO OLD HEAD INJURY: ICD-10-CM

## 2022-11-21 DIAGNOSIS — M54.16 LUMBAR RADICULOPATHY: ICD-10-CM

## 2022-11-21 DIAGNOSIS — F31.81 BIPOLAR 2 DISORDER (H): ICD-10-CM

## 2022-11-21 DIAGNOSIS — T40.2X5A THERAPEUTIC OPIOID INDUCED CONSTIPATION: ICD-10-CM

## 2022-11-21 DIAGNOSIS — S09.90XS COGNITIVE DEFICIT DUE TO OLD HEAD INJURY: ICD-10-CM

## 2022-11-21 DIAGNOSIS — K59.03 THERAPEUTIC OPIOID INDUCED CONSTIPATION: ICD-10-CM

## 2022-11-21 DIAGNOSIS — J01.01 ACUTE RECURRENT MAXILLARY SINUSITIS: ICD-10-CM

## 2022-11-21 DIAGNOSIS — B37.0 THRUSH: Primary | ICD-10-CM

## 2022-11-21 PROCEDURE — 99214 OFFICE O/P EST MOD 30 MIN: CPT | Mod: 95 | Performed by: INTERNAL MEDICINE

## 2022-11-21 RX ORDER — NYSTATIN 100000/ML
1000000 SUSPENSION, ORAL (FINAL DOSE FORM) ORAL
Qty: 800 ML | Refills: 3 | Status: SHIPPED | OUTPATIENT
Start: 2022-11-21 | End: 2024-04-29

## 2022-11-21 RX ORDER — LACTULOSE 20 G/30ML
45 SOLUTION ORAL 2 TIMES DAILY
Qty: 5400 ML | Refills: 11 | Status: SHIPPED | OUTPATIENT
Start: 2022-11-21 | End: 2023-01-27

## 2022-11-21 RX ORDER — BUPROPION HYDROCHLORIDE 150 MG/1
150 TABLET, EXTENDED RELEASE ORAL 2 TIMES DAILY
Qty: 180 TABLET | Refills: 3 | Status: SHIPPED | OUTPATIENT
Start: 2022-11-21 | End: 2023-11-16

## 2022-11-21 ASSESSMENT — ANXIETY QUESTIONNAIRES
IF YOU CHECKED OFF ANY PROBLEMS ON THIS QUESTIONNAIRE, HOW DIFFICULT HAVE THESE PROBLEMS MADE IT FOR YOU TO DO YOUR WORK, TAKE CARE OF THINGS AT HOME, OR GET ALONG WITH OTHER PEOPLE: EXTREMELY DIFFICULT
8. IF YOU CHECKED OFF ANY PROBLEMS, HOW DIFFICULT HAVE THESE MADE IT FOR YOU TO DO YOUR WORK, TAKE CARE OF THINGS AT HOME, OR GET ALONG WITH OTHER PEOPLE?: EXTREMELY DIFFICULT
3. WORRYING TOO MUCH ABOUT DIFFERENT THINGS: NEARLY EVERY DAY
4. TROUBLE RELAXING: NEARLY EVERY DAY
GAD7 TOTAL SCORE: 21
5. BEING SO RESTLESS THAT IT IS HARD TO SIT STILL: NEARLY EVERY DAY
1. FEELING NERVOUS, ANXIOUS, OR ON EDGE: NEARLY EVERY DAY
7. FEELING AFRAID AS IF SOMETHING AWFUL MIGHT HAPPEN: NEARLY EVERY DAY
5. BEING SO RESTLESS THAT IT IS HARD TO SIT STILL: NEARLY EVERY DAY
2. NOT BEING ABLE TO STOP OR CONTROL WORRYING: NEARLY EVERY DAY
GAD7 TOTAL SCORE: 21
GAD7 TOTAL SCORE: 21
7. FEELING AFRAID AS IF SOMETHING AWFUL MIGHT HAPPEN: NEARLY EVERY DAY
3. WORRYING TOO MUCH ABOUT DIFFERENT THINGS: NEARLY EVERY DAY
8. IF YOU CHECKED OFF ANY PROBLEMS, HOW DIFFICULT HAVE THESE MADE IT FOR YOU TO DO YOUR WORK, TAKE CARE OF THINGS AT HOME, OR GET ALONG WITH OTHER PEOPLE?: EXTREMELY DIFFICULT
6. BECOMING EASILY ANNOYED OR IRRITABLE: NEARLY EVERY DAY
7. FEELING AFRAID AS IF SOMETHING AWFUL MIGHT HAPPEN: NEARLY EVERY DAY
2. NOT BEING ABLE TO STOP OR CONTROL WORRYING: NEARLY EVERY DAY
GAD7 TOTAL SCORE: 21
IF YOU CHECKED OFF ANY PROBLEMS ON THIS QUESTIONNAIRE, HOW DIFFICULT HAVE THESE PROBLEMS MADE IT FOR YOU TO DO YOUR WORK, TAKE CARE OF THINGS AT HOME, OR GET ALONG WITH OTHER PEOPLE: EXTREMELY DIFFICULT
6. BECOMING EASILY ANNOYED OR IRRITABLE: NEARLY EVERY DAY
7. FEELING AFRAID AS IF SOMETHING AWFUL MIGHT HAPPEN: NEARLY EVERY DAY
4. TROUBLE RELAXING: NEARLY EVERY DAY
1. FEELING NERVOUS, ANXIOUS, OR ON EDGE: NEARLY EVERY DAY

## 2022-11-21 NOTE — PATIENT INSTRUCTIONS
Increase nystatin to 10 ml as needed    Increase lactulose to 45 ml bid    Decrease bupropion to 150 mgs bid    Get covid booster bivalent without catching up with prior boosters

## 2022-11-21 NOTE — PROGRESS NOTES
Bola is a 52 year old male being evaluated via a billable phone visit, and would like to be contacted via the following  Home number on file [phone] (home)    ASSESSMENT and PLAN:  1. Thrush  Increase quantity and allow adjustable frequency  - nystatin (MYCOSTATIN) 740854 UNIT/ML suspension; Take 10 mLs (1,000,000 Units) by mouth every 3 hours as needed (thrush) To continue while taking levofloxacin and fluconazole  Dispense: 800 mL; Refill: 3    2. Acute recurrent maxillary sinusitis  As above.  Continue levoflozacin  - nystatin (MYCOSTATIN) 000020 UNIT/ML suspension; Take 10 mLs (1,000,000 Units) by mouth every 3 hours as needed (thrush) To continue while taking levofloxacin and fluconazole  Dispense: 800 mL; Refill: 3    3. Gingivitis, chronic  As above  - nystatin (MYCOSTATIN) 217410 UNIT/ML suspension; Take 10 mLs (1,000,000 Units) by mouth every 3 hours as needed (thrush) To continue while taking levofloxacin and fluconazole  Dispense: 800 mL; Refill: 3    4. Therapeutic opioid induced constipation  Increase strength while leaving same frequency  - lactulose 20 GM/30ML SOLN; Take 45 mLs by mouth 2 times daily For constipation.  Dispense: 5400 mL; Refill: 11    5. Bipolar 2 disorder (H)  Trial of decreased dose now off smoking, but perhaps nausea from high dose  - buPROPion (WELLBUTRIN SR) 150 MG 12 hr tablet; Take 1 tablet (150 mg) by mouth 2 times daily  Dispense: 180 tablet; Refill: 3    6. Lumbar radiculopathy  Continues to slowly heal after surgery    7. 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       CHIEF COMPLAINT:  Chief Complaint   Patient presents with     Office Visit     Review medications for periodontal disease, chronic constipation, puncture on tongue.     Recheck Medication       History of Present Illness       Reason for visit:  Meds questions    He eats 2-3 servings of fruits and vegetables daily.He consumes 1 sweetened beverage(s) daily.He exercises with enough effort to increase his heart rate 20 to 29 minutes per day.  He exercises with enough effort to increase his heart rate 7 days per week.   He is taking medications regularly.  Today's CARMEN-7 Score: 21      HISTORY OF PRESENT ILLNESS:  Bola is a 52 year old male contacting the clinic today via phone for review of nystatin and fluconazole.  After a year of levofloxacin, addition of same helped drainage and tongue burning.  Nausea is better, but still present.  Discussed time frame of bupropion about same time as nausea.  Was able to quit smoking.  Laceration of tongue healing.  Frustrated at speed of dental appointments, wants to get treated and done and home    More constipated    REVIEW OF SYSTEMS:  Pain about the same      PFSH:  Social History     Social History Narrative    He is .  He has been a  and also a counselor, and worked with [employer].  He is now on disability due to his brain injury and bipolar disease.        Quit smoking October of 2021        Quit drinking 2010        At [facility] since June of 2020         TOBACCO USE:  History   Smoking Status     Former     Packs/day: 0.50     Years: 11.00     Types: Cigarettes     Start date: 8/20/2009     Quit date: 2/21/2017   Smokeless Tobacco     Former     Comment: 0.5 pack per day       VITALS:  There were no vitals filed for this visit.  There were no vitals taken for this visit. Estimated body mass index is 32.23 kg/m  as calculated from the following:    Height as of 9/17/21: 1.727 m (5' 8\").    Weight as of " 9/1/21: 96.2 kg (212 lb).    PHYSICAL EXAM:  (observations via Phone)  Alert and oriented    MEDICATIONS  Current Outpatient Medications   Medication Sig Dispense Refill     buPROPion (WELLBUTRIN SR) 150 MG 12 hr tablet Take 1 tablet (150 mg) by mouth 2 times daily 180 tablet 3     lactulose 20 GM/30ML SOLN Take 45 mLs by mouth 2 times daily For constipation. 5400 mL 11     nystatin (MYCOSTATIN) 600138 UNIT/ML suspension Take 10 mLs (1,000,000 Units) by mouth every 3 hours as needed (thrush) To continue while taking levofloxacin and fluconazole 800 mL 3     acetaminophen (TYLENOL) 650 MG CR tablet Take 2 tablets (1,300 mg) by mouth 3 times daily 540 tablet 1     albuterol (PROAIR HFA/PROVENTIL HFA/VENTOLIN HFA) 108 (90 Base) MCG/ACT inhaler Inhale 2 puffs into the lungs every 6 hours as needed 6.7 g 11     ammonium lactate (LAC-HYDRIN) 12 % external lotion        amphetamine-dextroamphetamine (ADDERALL) 15 MG tablet Take 1 tablet (15 mg) by mouth daily 30 tablet 0     bisacodyl (DULCOLAX) 10 MG suppository Place 1 suppository (10 mg) rectally daily as needed for constipation 60 suppository 1     buprenorphine (BUTRANS) 15 MCG/HR Wk patch Place 1 patch onto the skin every 7 days 4 patch 0     CALCIUM ANTACID 500 MG chewable tablet        chlorhexidine (PERIDEX) 0.12 % solution Swish and spit 15 mLs in mouth 3 times daily as needed (sore throat) - Swish & Spit 473 mL 11     ergocalciferol (ERGOCALCIFEROL) 1.25 MG (16099 UT) capsule Take 1 capsule (50,000 Units) by mouth once a week 12 capsule 3     fexofenadine (ALLEGRA) 180 MG tablet Take 180 mg by mouth daily as needed       fluconazole (DIFLUCAN) 200 MG tablet Take 1 tablet (200 mg) by mouth daily 90 tablet 3     furosemide (LASIX) 40 MG tablet Take 1 tablet (40 mg) by mouth daily 30 tablet 11     guaiFENesin (MUCINEX) 600 MG 12 hr tablet Take 2 tablets (1,200 mg) by mouth 2 times daily 120 tablet 11     hydrOXYzine (ATARAX) 50 MG tablet [facility] [place]        lamoTRIgine (LAMICTAL) 150 MG tablet Take 1 tablet (150 mg) by mouth 3 times daily       LANsoprazole (PREVACID) 30 MG DR capsule Take 1 capsule (30 mg) by mouth 2 times daily 180 capsule 3     levofloxacin (LEVAQUIN) 750 MG tablet Take 1 tablet (750 mg) by mouth daily 42 tablet 0     lidocaine (LIDODERM) 5 % patch Place onto the skin every 24 hours To prevent lidocaine toxicity, patient should be patch free for 12 hrs daily. 30 patch 11     lidocaine (XYLOCAINE) 2 % external gel Apply 1 inch topically 4 times a day as needed to gums. 85 g 11     methocarbamol (ROBAXIN) 500 MG tablet Take 2 tablets (1,000 mg) by mouth 4 times daily 120 tablet 1     metoprolol succinate ER (TOPROL-XL) 50 MG 24 hr tablet Take 1 tablet (50 mg) by mouth daily 90 tablet 3     montelukast (SINGULAIR) 10 MG tablet Take 1 tablet by mouth daily       naloxegol (MOVANTIK) 25 MG TABS tablet Take 1 tablet (25 mg) by mouth every morning (before breakfast) 30 tablet 1     naloxone (NARCAN) 4 MG/0.1ML nasal spray 1 spray (4 mg dose) into one nostril for opioid reversal. Call 911. May repeat if no response in 3 minutes.       oxyCODONE IR (ROXICODONE) 15 MG tablet Take 1 tablet (15 mg) by mouth 8 times daily One tablet every three hours 8 am, 11 am, 2pm, 5 pm, 8 pm, 11 pm, 2 am, 5 am, dispense 11/17/22, start 11/19/22 112 tablet 0     QUEtiapine (SEROQUEL) 100 MG tablet Take 1 tablet (100 mg) by mouth 4 times daily as needed (anxiety) 60 tablet 1     QUEtiapine (SEROQUEL) 300 MG tablet Take 2 tablets (600 mg) by mouth At Bedtime 180 tablet 0     triamterene-HCTZ (MAXZIDE-25) 37.5-25 MG tablet 1 TABLET ORALLY DAILY 28 tablet 5     zonisamide (ZONEGRAN) 25 MG capsule Take 3 capsules (75 mg) by mouth 4 times daily 180 capsule 3       Notes summarized:   Labs, x-rays, cardiology, GI tests reviewed:   Recent Labs   Lab Test 08/30/22  1321   HGB 12.5*   WBC 7.6   *   POTASSIUM 4.0   CR 0.71   PSA 0.50   URIC 2.0*   B12 336   TSH 0.62   VITDT 29      No results found for: [id number]  Lab Results   Component Value Date    CHOL 164 08/30/2022     New orders: No orders of the defined types were placed in this encounter.      Independent review of:  Supplemental history by:      Patient would like to receive their AVS by Mail a copy    Devon Lund MD  [facility]    Phone-Visit Details  Type of service:  Phone Visit  Patient has given verbal consent to a Phone visit?  Yes  How would you like to obtain your AVS?  MyChart  Will anyone else be joining your phone visit? No    Originating Location (pt. Location): Home    Distant Location (provider location):  Off-site    Phone Start Time: 3:18 PM  Phone End time:  3:44 PM  Conversation plus orders:  26 minutes  Dictation time:  3 minutes    The visit lasted a total of 29 minutes

## 2022-11-28 ENCOUNTER — OFFICE VISIT (OUTPATIENT)
Dept: PALLIATIVE MEDICINE | Facility: OTHER | Age: 52
End: 2022-11-28
Attending: ANESTHESIOLOGY
Payer: COMMERCIAL

## 2022-11-28 VITALS — DIASTOLIC BLOOD PRESSURE: 75 MMHG | SYSTOLIC BLOOD PRESSURE: 130 MMHG | HEART RATE: 94 BPM | OXYGEN SATURATION: 97 %

## 2022-11-28 DIAGNOSIS — M48.02 FORAMINAL STENOSIS OF CERVICAL REGION: ICD-10-CM

## 2022-11-28 PROCEDURE — 99214 OFFICE O/P EST MOD 30 MIN: CPT | Performed by: ANESTHESIOLOGY

## 2022-11-28 RX ORDER — BUPRENORPHINE 20 UG/H
1 PATCH TRANSDERMAL
Qty: 4 PATCH | Refills: 0 | Status: SHIPPED | OUTPATIENT
Start: 2022-11-28 | End: 2023-01-09

## 2022-11-28 RX ORDER — OXYCODONE HYDROCHLORIDE 15 MG/1
15 TABLET ORAL
Qty: 112 TABLET | Refills: 0 | Status: SHIPPED | OUTPATIENT
Start: 2022-11-28 | End: 2022-12-15

## 2022-11-28 ASSESSMENT — PAIN SCALES - GENERAL: PAINLEVEL: EXTREME PAIN (8)

## 2022-11-28 NOTE — PATIENT INSTRUCTIONS
PLAN:    Continue the oxycodone 15 mg every 3 hours.    Continue the Butrans patch 15 mcg weekly, when due for refill will change to 20 mcg patch weekly.    You will talk to your psychiatrist about establishing with a new psychotherapist.    Discussed the use of an earthing mat and resources provided.    Continue with your physical therapy program as you are.    Follow with Dr. Cramer with a virtual visit in 3 months

## 2022-11-28 NOTE — PROGRESS NOTES
Patient presents to the clinic today for a follow up with JAYME MULLEN MD regarding Pain Management.      PEG Score 6/27/2022 8/26/2022 11/28/2022   PEG Total Score 9.33 9.67 9.33       UDS/CSA-07.11.2022    Medications-Oxycodone 11.28.2022 @11am    Buprenorphine patch Right upper arm day six    QUESTIONS:    Kailyn Carolina  [facility] Visit Facilitator

## 2022-11-29 NOTE — PROGRESS NOTES
[facility] - Office Visit    ASSESSMENT & PLAN     Bola was seen today for pain.    Diagnoses and all orders for this visit:    Foraminal stenosis of cervical region  -     oxyCODONE IR (ROXICODONE) 15 MG tablet; Take 1 tablet (15 mg) by mouth 8 times daily One tablet every three hours 8 am, 11 am, 2pm, 5 pm, 8 pm, 11 pm, 2 am, 5 am, dispense 12/1 for 12/3  -     buprenorphine (BUTRANS) 20 MCG/HR WK patch; Place 1 patch onto the skin every 7 days May fill 12/13        Patient Instructions   PLAN:    Continue the oxycodone 15 mg every 3 hours.    Continue the Butrans patch 15 mcg weekly, when due for refill will change to 20 mcg patch weekly.    You will talk to your psychiatrist about establishing with a new psychotherapist.    Discussed the use of an earthing mat and resources provided.    Continue with your physical therapy program as you are.    Follow with Dr. Mullen with a virtual visit in 3 months        -----  JAYME MULLEN MD  [facility]       SUBJECTIVE      Bola Lyon Jr. is a 52 year old year old male who presents to clinic today for the following:     Followed for extensive lumbar fusion last December, history of orthopedic surgeries, traumatic brain injury.    Jan reviews the struggles he has had in his recovery with the surgery.  Describes is that the toughest time he is ever gone through mentally and physically.  Reviews his victor m is been important.  While he has had goals to work hard in physical therapy which is his experience and recover, he has had setbacks, having COVID twice.  He had problems with liver enzymes elevated related medications which she had a detoxify.  These led to hold on physical therapy.    He is having increased problems with lymphedema around his knee with a history of the knee replacement.  His shoulder surgeries been acting up.    Continues in the transitional care unit with goals to look into an apartment when he  "leaves.    He sees his spine surgeon on 1219.    Reviews working with the dental problems having infections.  He is on antibiotics and antifungals.    Describes challenges with his mental health and PTSD.  Reviews his past history of having some experiences with being incarcerated, and his long recovery with episodes of feeling \"confined\" have exacerbated this.  He does have a psychiatrist he speaks to every few months, has 2 caseworkers, no longer seeing his previous therapist that would like to be reestablished with somebody.    We have been adding buprenorphine to augment the oxycodone.  Using the Butrans patch and the oxycodone continues 15 mg every 3 hours.  He has found his he is try to be more active in physical therapy and out of bed flaring up the pain.  He uses a walker and a cane, though now feels setback again using a 4 wheeled walker with a seat.    Last urine drug test in July  reviewed      Current Outpatient Medications:      acetaminophen (TYLENOL) 650 MG CR tablet, Take 2 tablets (1,300 mg) by mouth 3 times daily, Disp: 540 tablet, Rfl: 1     albuterol (PROAIR HFA/PROVENTIL HFA/VENTOLIN HFA) 108 (90 Base) MCG/ACT inhaler, Inhale 2 puffs into the lungs every 6 hours as needed, Disp: 6.7 g, Rfl: 11     ammonium lactate (LAC-HYDRIN) 12 % external lotion, , Disp: , Rfl:      amphetamine-dextroamphetamine (ADDERALL) 15 MG tablet, Take 1 tablet (15 mg) by mouth daily, Disp: 30 tablet, Rfl: 0     bisacodyl (DULCOLAX) 10 MG suppository, Place 1 suppository (10 mg) rectally daily as needed for constipation, Disp: 60 suppository, Rfl: 1     buprenorphine (BUTRANS) 20 MCG/HR WK patch, Place 1 patch onto the skin every 7 days May fill 12/13, Disp: 4 patch, Rfl: 0     buPROPion (WELLBUTRIN SR) 150 MG 12 hr tablet, Take 1 tablet (150 mg) by mouth 2 times daily, Disp: 180 tablet, Rfl: 3     CALCIUM ANTACID 500 MG chewable tablet, , Disp: , Rfl:      chlorhexidine (PERIDEX) 0.12 % solution, Swish and spit 15 " mLs in mouth 3 times daily as needed (sore throat) - Swish & Spit, Disp: 473 mL, Rfl: 11     ergocalciferol (ERGOCALCIFEROL) 1.25 MG (78642 UT) capsule, Take 1 capsule (50,000 Units) by mouth once a week, Disp: 12 capsule, Rfl: 3     fexofenadine (ALLEGRA) 180 MG tablet, Take 180 mg by mouth daily as needed, Disp: , Rfl:      fluconazole (DIFLUCAN) 200 MG tablet, Take 1 tablet (200 mg) by mouth daily, Disp: 90 tablet, Rfl: 3     guaiFENesin (MUCINEX) 600 MG 12 hr tablet, Take 2 tablets (1,200 mg) by mouth 2 times daily, Disp: 120 tablet, Rfl: 11     hydrOXYzine (ATARAX) 50 MG tablet, Per JACQUELYN Valljeo, Disp: , Rfl:      lactulose 20 GM/30ML SOLN, Take 45 mLs by mouth 2 times daily For constipation., Disp: 5400 mL, Rfl: 11     lamoTRIgine (LAMICTAL) 150 MG tablet, Take 1 tablet (150 mg) by mouth 3 times daily, Disp: , Rfl:      LANsoprazole (PREVACID) 30 MG DR capsule, Take 1 capsule (30 mg) by mouth 2 times daily, Disp: 180 capsule, Rfl: 3     levofloxacin (LEVAQUIN) 750 MG tablet, Take 1 tablet (750 mg) by mouth daily, Disp: 42 tablet, Rfl: 0     lidocaine (LIDODERM) 5 % patch, Place onto the skin every 24 hours To prevent lidocaine toxicity, patient should be patch free for 12 hrs daily., Disp: 30 patch, Rfl: 11     lidocaine (XYLOCAINE) 2 % external gel, Apply 1 inch topically 4 times a day as needed to gums., Disp: 85 g, Rfl: 11     methocarbamol (ROBAXIN) 500 MG tablet, Take 2 tablets (1,000 mg) by mouth 4 times daily, Disp: 120 tablet, Rfl: 1     metoprolol succinate ER (TOPROL-XL) 50 MG 24 hr tablet, Take 1 tablet (50 mg) by mouth daily, Disp: 90 tablet, Rfl: 3     montelukast (SINGULAIR) 10 MG tablet, Take 1 tablet by mouth daily, Disp: , Rfl:      naloxegol (MOVANTIK) 25 MG TABS tablet, Take 1 tablet (25 mg) by mouth every morning (before breakfast), Disp: 30 tablet, Rfl: 1     naloxone (NARCAN) 4 MG/0.1ML nasal spray, 1 spray (4 mg dose) into one nostril for opioid reversal. Call 911. May repeat if  no response in 3 minutes., Disp: , Rfl:      nystatin (MYCOSTATIN) 450361 UNIT/ML suspension, Take 10 mLs (1,000,000 Units) by mouth every 3 hours as needed (thrush) To continue while taking levofloxacin and fluconazole, Disp: 800 mL, Rfl: 3     oxyCODONE IR (ROXICODONE) 15 MG tablet, Take 1 tablet (15 mg) by mouth 8 times daily One tablet every three hours 8 am, 11 am, 2pm, 5 pm, 8 pm, 11 pm, 2 am, 5 am, dispense 12/1 for 12/3, Disp: 112 tablet, Rfl: 0     QUEtiapine (SEROQUEL) 100 MG tablet, Take 1 tablet (100 mg) by mouth 4 times daily as needed (anxiety), Disp: 60 tablet, Rfl: 1     QUEtiapine (SEROQUEL) 300 MG tablet, Take 2 tablets (600 mg) by mouth At Bedtime, Disp: 180 tablet, Rfl: 0     triamterene-HCTZ (MAXZIDE-25) 37.5-25 MG tablet, 1 TABLET ORALLY DAILY, Disp: 28 tablet, Rfl: 5     zonisamide (ZONEGRAN) 25 MG capsule, Take 3 capsules (75 mg) by mouth 4 times daily, Disp: 180 capsule, Rfl: 3     furosemide (LASIX) 40 MG tablet, Take 1 tablet (40 mg) by mouth daily, Disp: 30 tablet, Rfl: 11      Review of Systems   General, psych, musculoskeletal, bowels and bladder otherwise normal other than above.          OBJECTIVE   /75   Pulse 94   SpO2 97%         Physical Exam  General: Alert, clear sensorium.  4 wheeled walker with seat  Cardiovascular: Normal rate  Lungs: Pulmonary effort is normal, speaking in full sentences  MSK  Skin. No concerning rashes or lesions.  Neurologic: No focal deficit, alert and oriented x3  Psychiatric: Affect constricted, anxious      Assessment, extensive cervical fusion and history of arthralgias.    Will increase the Butrans to 20 mcg with next refill, we will need to look toward tapering the opioids.    Encouraged to establish with psychotherapist.  Discussed the use of an Irr thing matte.    Total time more than 30 minutes with moderate complexity decision making    Answers for HPI/ROS submitted by the patient on 11/21/2022  CARMEN 7 TOTAL SCORE: 21

## 2022-11-30 DIAGNOSIS — F31.81 BIPOLAR 2 DISORDER (H): ICD-10-CM

## 2022-11-30 DIAGNOSIS — S06.9X9S TRAUMATIC BRAIN INJURY WITH LOSS OF CONSCIOUSNESS, SEQUELA (H): ICD-10-CM

## 2022-11-30 RX ORDER — DEXTROAMPHETAMINE SACCHARATE, AMPHETAMINE ASPARTATE, DEXTROAMPHETAMINE SULFATE AND AMPHETAMINE SULFATE 3.75; 3.75; 3.75; 3.75 MG/1; MG/1; MG/1; MG/1
15 TABLET ORAL DAILY
Qty: 30 TABLET | Refills: 0 | Status: SHIPPED | OUTPATIENT
Start: 2022-11-30 | End: 2023-01-04

## 2022-12-09 ENCOUNTER — TELEPHONE (OUTPATIENT)
Dept: INTERNAL MEDICINE | Facility: CLINIC | Age: 52
End: 2022-12-09

## 2022-12-09 DIAGNOSIS — I89.0 LYMPHEDEMA OF BOTH LOWER EXTREMITIES: ICD-10-CM

## 2022-12-09 DIAGNOSIS — M10.9 URIC ACID ARTHROPATHY: ICD-10-CM

## 2022-12-09 DIAGNOSIS — K05.10 GINGIVITIS, CHRONIC: Primary | ICD-10-CM

## 2022-12-09 DIAGNOSIS — I10 ESSENTIAL HYPERTENSION: ICD-10-CM

## 2022-12-09 RX ORDER — FUROSEMIDE 40 MG
40 TABLET ORAL DAILY PRN
Qty: 20 TABLET | Refills: 3 | Status: SHIPPED | OUTPATIENT
Start: 2022-12-09 | End: 2023-01-27

## 2022-12-09 RX ORDER — ALLOPURINOL 300 MG/1
300 TABLET ORAL 2 TIMES DAILY
Qty: 180 TABLET | Refills: 3 | Status: SHIPPED | OUTPATIENT
Start: 2022-12-09 | End: 2023-12-09

## 2022-12-09 RX ORDER — TRIAMTERENE/HYDROCHLOROTHIAZID 37.5-25 MG
2 TABLET ORAL DAILY
Qty: 180 TABLET | Refills: 3 | Status: SHIPPED | OUTPATIENT
Start: 2022-12-09 | End: 2023-01-27

## 2022-12-09 NOTE — CONFIDENTIAL NOTE
Increase triamterene HCTZ to 2 tablets daily    Furosemide 40 mg daily as needed for excess fluid    Continue nystatin swish and swallow as needed indefinitely    Continue fluconazole indefinitely    Allopurinol 300 mg twice daily

## 2022-12-09 NOTE — LETTER
December 12, 2022      Bola Lyon   [address] [place] [address]        To Whom It May Concern,      The following medication changes were authorized by Dr. Devon Lund on 12/12/22:     Increase triamterene HCTZ to 2 tablets daily     Furosemide 40 mg daily as needed for excess fluid     Continue nystatin swish and swallow as needed indefinitely     Continue fluconazole indefinitely     Allopurinol 300 mg twice daily    New prescriptions:   New prescriptions for Triamterene hydrochlorothiazide, Furosemide, and Allopurinol were sent to pharmacy: [facility] - [place], [place] - [address]    Please call the [facility] ([phone]) to speak with clinic staff if there are questions regarding these changes.      Sincerely,        Devon Lund MD

## 2022-12-09 NOTE — TELEPHONE ENCOUNTER
Pt reports nystatin mouth rinse seems to be effective. Fluconazole seems to be effective as well. Pt has had one dental appt with three upcoming appts in January.    Regarding L leg, ankle to thigh, edema. Currently wears compression stockings and uses stretching and massage to help but it only provides minimal relief. Reports the current dosage of allopurinol is not helping with symptoms. Would like to adjust it and begin lasix again. Pt feels he's at a point where he needs intervention to get this under control. Please advise.    Pt is in a TCU and needs a copy of any scripts sent to nursing staff at [facility].

## 2022-12-12 ENCOUNTER — TELEPHONE (OUTPATIENT)
Dept: INTERNAL MEDICINE | Facility: CLINIC | Age: 52
End: 2022-12-12

## 2022-12-12 NOTE — TELEPHONE ENCOUNTER
Spoke with [employer] and canceled request for prior auth for medication per 10/25/22 encounter documentation:     Bao Walter PharmD     [place]     8:55 AM  Note  He is not taking Relistor.  Please cancel request for prior authorization.

## 2022-12-12 NOTE — TELEPHONE ENCOUNTER
Attempted to call [facility] [place], no answer. Left message requesting Nursing Staff to call back clinic.     When staff calls back, we need to know whether or not letter generate needs to be signed by Dr. Lund or can we fax the letter without signature with the changes?     Letter faxed without signature on 12/12/22 to Stella Adams fax: [phone]    If needing provider signature, please print copy of letter, have Dr. Lund sign and refax to indicated fax number.

## 2022-12-12 NOTE — TELEPHONE ENCOUNTER
Medication Question or Refill        What medication are you calling about (include dose and sig)?: Relistor - 150MG    Controlled Substance Agreement on file:   CSA -- Patient Level:     [Media Unavailable] Controlled Substance Agreement - Non - Opioid - Scan on 5/5/2021   [Media Unavailable] Controlled Substance Agreement - Opioid - Scan on 1/12/2021: OUTSIDE RECORD   [Media Unavailable] Controlled Substance Agreement - Opioid - Scan on 2/19/2020: OUTSIDE RECORD   [Media Unavailable] Controlled Substance Agreement - Opioid - Scan on 12/30/2019: OUTSIDE RECORD   [Media Unavailable] Controlled Substance Agreement - Non - Opioid - Scan on 10/23/2019: NON-OPIOID CONTROLLED SUBSTANCE AGREEMENT   [Media Unavailable] Controlled Substance Agreement - Non - Opioid - Scan on 8/22/2019   [Media Unavailable] Controlled Substance Agreement - Opioid - Scan on 12/27/2018: OPIOID USE   [Media Unavailable] Controlled Substance Agreement - Opioid - Scan on 12/27/2018: CHRONIC PAIN MANAGEMENT   [Media Unavailable] Controlled Substance Agreement - Opioid - Scan on 8/28/2018   [Media Unavailable] Controlled Substance Agreement - Opioid - Scan on 8/24/2017   [Media Unavailable] Controlled Substance Agreement - Opioid - Scan on 8/23/2017   [Media Unavailable] Controlled Substance Agreement - Opioid - Scan on 6/30/2016         Do you have any questions or concerns?  Yes: [employer] calling stating a form was signed in but diagnosis part was left blank. Will need a diagnosis code.        Could we send this information to you in [facility] or would you prefer to receive a phone call?:   Patient would prefer a phone call   Okay to leave a detailed message?: Yes at Other phone number:      [employer]  [phone] Opt 3  Reference # [id number]

## 2022-12-12 NOTE — TELEPHONE ENCOUNTER
Spoke with patient and gave him the medication change information from Dr. Lund.     Will [facility] updates [facility].     [facility] [address] [address]      Stella Adams fax: [phone]    Letter generated with medication changes and information about what pharmacy the prescriptions were sent to.

## 2022-12-12 NOTE — TELEPHONE ENCOUNTER
Per 10/26/22 telephone encounter, pt is not taking this medication.     Confirmed with patient that he is not taking this medication when speaking with patient in telephone encounter 12/9/22

## 2022-12-26 ENCOUNTER — HEALTH MAINTENANCE LETTER (OUTPATIENT)
Age: 52
End: 2022-12-26

## 2022-12-27 DIAGNOSIS — G89.4 CHRONIC PAIN SYNDROME: ICD-10-CM

## 2022-12-27 RX ORDER — METHOCARBAMOL 500 MG/1
1000 TABLET, FILM COATED ORAL 4 TIMES DAILY
Qty: 120 TABLET | Refills: 1 | Status: SHIPPED | OUTPATIENT
Start: 2022-12-27 | End: 2023-01-19

## 2022-12-27 NOTE — TELEPHONE ENCOUNTER
Received fax request from [facility]  pharmacy requesting refill(s) for Methocarbamol 500 tabs    Last refilled on 11/18/2022, 28 days, 224# per outside meds    Pt last seen on 11/28/2022  Next appt scheduled for 2/28/2022    Pending Prescriptions:                       Disp   Refills    methocarbamol (ROBAXIN) 500 MG tablet     120 ta*1            Sig: Take 2 tablets (1,000 mg) by mouth 4 times daily

## 2023-01-04 DIAGNOSIS — S06.9X9S TRAUMATIC BRAIN INJURY WITH LOSS OF CONSCIOUSNESS, SEQUELA (H): ICD-10-CM

## 2023-01-04 DIAGNOSIS — F31.81 BIPOLAR 2 DISORDER (H): ICD-10-CM

## 2023-01-04 RX ORDER — DEXTROAMPHETAMINE SACCHARATE, AMPHETAMINE ASPARTATE, DEXTROAMPHETAMINE SULFATE AND AMPHETAMINE SULFATE 3.75; 3.75; 3.75; 3.75 MG/1; MG/1; MG/1; MG/1
15 TABLET ORAL DAILY
Qty: 30 TABLET | Refills: 0 | Status: SHIPPED | OUTPATIENT
Start: 2023-01-04 | End: 2023-02-08

## 2023-01-06 ENCOUNTER — MEDICAL CORRESPONDENCE (OUTPATIENT)
Dept: HEALTH INFORMATION MANAGEMENT | Facility: CLINIC | Age: 53
End: 2023-01-06

## 2023-01-09 DIAGNOSIS — M48.02 FORAMINAL STENOSIS OF CERVICAL REGION: ICD-10-CM

## 2023-01-09 RX ORDER — BUPRENORPHINE 20 UG/H
1 PATCH TRANSDERMAL
Qty: 4 PATCH | Refills: 0 | Status: SHIPPED | OUTPATIENT
Start: 2023-01-09 | End: 2023-01-19

## 2023-01-09 NOTE — TELEPHONE ENCOUNTER
Medication Question or Refill    Contacts       Type Contact Phone/Fax    01/09/2023 01:24 PM CST Phone (Incoming) [facility] (Other) [phone]          What medication are you calling about (include dose and sig)?:   Buprenorphine (BUTTRANS) 20 mcg/HR WK patch    Controlled Substance Agreement on file:   CSA -- Patient Level:     [Media Unavailable] Controlled Substance Agreement - Non - Opioid - Scan on 5/5/2021   [Media Unavailable] Controlled Substance Agreement - Opioid - Scan on 1/12/2021: OUTSIDE RECORD   [Media Unavailable] Controlled Substance Agreement - Opioid - Scan on 2/19/2020: OUTSIDE RECORD   [Media Unavailable] Controlled Substance Agreement - Opioid - Scan on 12/30/2019: OUTSIDE RECORD   [Media Unavailable] Controlled Substance Agreement - Non - Opioid - Scan on 10/23/2019: NON-OPIOID CONTROLLED SUBSTANCE AGREEMENT   [Media Unavailable] Controlled Substance Agreement - Non - Opioid - Scan on 8/22/2019   [Media Unavailable] Controlled Substance Agreement - Opioid - Scan on 12/27/2018: OPIOID USE   [Media Unavailable] Controlled Substance Agreement - Opioid - Scan on 12/27/2018: CHRONIC PAIN MANAGEMENT   [Media Unavailable] Controlled Substance Agreement - Opioid - Scan on 8/28/2018   [Media Unavailable] Controlled Substance Agreement - Opioid - Scan on 8/24/2017   [Media Unavailable] Controlled Substance Agreement - Opioid - Scan on 8/23/2017   [Media Unavailable] Controlled Substance Agreement - Opioid - Scan on 6/30/2016       Who prescribed the medication?: Dr. Devon Lund    Do you need a refill? Yes:     Patient offered an appointment? No, RN calling filling in today.  Last appt with PCP = 11/21/2022     Do you have any questions or concerns?  No    Preferred Pharmacy:     [facility] - [place], [place] - [address]  [facility] [address]  Phone: [phone] Fax: [phone]

## 2023-01-10 ENCOUNTER — MEDICAL CORRESPONDENCE (OUTPATIENT)
Dept: HEALTH INFORMATION MANAGEMENT | Facility: CLINIC | Age: 53
End: 2023-01-10

## 2023-01-12 ENCOUNTER — TELEPHONE (OUTPATIENT)
Dept: INTERNAL MEDICINE | Facility: CLINIC | Age: 53
End: 2023-01-12

## 2023-01-12 NOTE — TELEPHONE ENCOUNTER
January 12, 2023    [facility] Forms: Home health care orders were received via fax for [facility] Providers: Dr. Lund to sign.  Patient label was attached to paperwork and placed in provider's inbox to be signed.    Melissa Riggs    
DC instructions

## 2023-01-13 DIAGNOSIS — M48.02 FORAMINAL STENOSIS OF CERVICAL REGION: ICD-10-CM

## 2023-01-16 DIAGNOSIS — G89.4 CHRONIC PAIN DISORDER: ICD-10-CM

## 2023-01-16 RX ORDER — SENNOSIDES 8.6 MG
CAPSULE ORAL
Qty: 540 TABLET | Refills: 1 | Status: SHIPPED | OUTPATIENT
Start: 2023-01-16 | End: 2024-09-05

## 2023-01-16 RX ORDER — ZONISAMIDE 25 MG/1
75 CAPSULE ORAL 4 TIMES DAILY
Qty: 180 CAPSULE | Refills: 3 | Status: SHIPPED | OUTPATIENT
Start: 2023-01-16 | End: 2023-03-10

## 2023-01-16 NOTE — TELEPHONE ENCOUNTER
"Last Written Prescription Date:  22  Last Fill Quantity: 540,  # refills: 1   Last office visit provider:  22     Requested Prescriptions   Pending Prescriptions Disp Refills     acetaminophen (TYLENOL) 650 MG CR tablet [Pharmacy Med Name: Acetaminophen  MG Tablet extended release] 180 tablet 11     Si TABLETS (42061ZN) ORALLY 3 TIMES DAILY (MAX APAP:4GM/24HR)       Analgesics (Non-Narcotic Tylenol and ASA Only) Passed - 2023 11:13 AM        Passed - Recent (12 mo) or future (30 days) visit within the authorizing provider's specialty     Patient has had an office visit with the authorizing provider or a provider within the authorizing providers department within the previous 12 mos or has a future within next 30 days. See \"Patient Info\" tab in inbasket, or \"Choose Columns\" in Meds & Orders section of the refill encounter.              Passed - Patient is 7 months old or older     If patient is a peds patient of the age 7 mos -12 years, ok to refill using weight-based dosing.     If >3g daily and/or sig is not \"prn\", check for liver enzymes. If normal in the last year, ok to refill.  If not, refer to the provider.          Passed - Medication is active on med list             Jamari Sanz RN 23 11:13 AM  "

## 2023-01-16 NOTE — TELEPHONE ENCOUNTER
Received fax from pharmacy requesting refill(s) for     zonisamide (ZONEGRAN) 25 MG capsule    Date last filled 12.16.2022    Last Appt Date:11.28.2022    Next Appt scheduled: 02.28.2023    Pharmacy:   [facility] - [place], [place] - [address],    Will route for processing    Kailynjm Carolina  [facility] Visit Facilitator

## 2023-01-17 ENCOUNTER — TELEPHONE (OUTPATIENT)
Dept: INTERNAL MEDICINE | Facility: CLINIC | Age: 53
End: 2023-01-17
Payer: COMMERCIAL

## 2023-01-17 ENCOUNTER — NURSE TRIAGE (OUTPATIENT)
Dept: NURSING | Facility: CLINIC | Age: 53
End: 2023-01-17
Payer: COMMERCIAL

## 2023-01-17 DIAGNOSIS — N41.0 ACUTE PROSTATITIS: Primary | ICD-10-CM

## 2023-01-17 RX ORDER — TAMSULOSIN HYDROCHLORIDE 0.4 MG/1
0.4 CAPSULE ORAL DAILY
Qty: 30 CAPSULE | Refills: 0 | Status: SHIPPED | OUTPATIENT
Start: 2023-01-17 | End: 2023-01-27

## 2023-01-17 RX ORDER — SULFAMETHOXAZOLE/TRIMETHOPRIM 800-160 MG
1 TABLET ORAL 2 TIMES DAILY
Qty: 28 TABLET | Refills: 0 | Status: SHIPPED | OUTPATIENT
Start: 2023-01-17 | End: 2023-01-27

## 2023-01-17 NOTE — TELEPHONE ENCOUNTER
January 17, 2023    [facility] Forms: Home health care orders were received via fax for [facility] Providers: Dr. Lund to sign.  Patient label was attached to paperwork and placed in provider's inbox to be signed.    Fidencio Panchal III

## 2023-01-17 NOTE — TELEPHONE ENCOUNTER
"Pt calling with concerns about;    States he has a miserable and is has a very painful UTI  Pt sounds like he is in pain during this call.  Pt is at [facility] and states he \"cannot go to ED and he won't go to ED\"    Left side groin area pain for at least 7-10 days 8/10, constant  Nausea/Vomiting  Urine is very dark and has strong odor   Burning urination  Very difficult to urinate.  Feels like he has a fever     According to the protocol, patient should Go to ED now.   Patient verbalizes that he just wants his DrDonto give him something for this because he cannot go to ED and won't go to ED\"    Message routed to PCP/Care Team to advise.    Kira Edward RN, Nurse Advisor 3:38 PM 1/17/2023          Reason for Disposition    Unable to urinate (or only a few drops) > 4 hours and bladder feels very full (e.g., palpable bladder or strong urge to urinate)    Additional Information    Negative: Followed a female genital area injury (e.g., vagina, vulva)    Negative: Followed a male genital area injury (penis, scrotum)    Negative: Vaginal discharge    Negative: Pus (white, yellow) or bloody discharge from end of penis    Negative: Pain or burning with passing urine (urination) and pregnant    Negative: Pain or burning with passing urine (urination) and female    Negative: Pain or burning with passing urine (urination) and male    Negative: Pain or itching in the vulvar area    Negative: Pain in scrotum is main symptom    Negative: Blood in the urine is main symptom    Negative: Symptoms arising from use of a urinary catheter (e.g., coude, Riddle)    Protocols used: URINARY SYMPTOMS-A-OH      "

## 2023-01-17 NOTE — TELEPHONE ENCOUNTER
January 17, 2023    [facility] Forms: Home health care orders were received via fax for [facility] Prov[facility]: Dr. Lund to sign.  Patient label was attached to paperwork and placed in provider's inbox to be signed.    Fidencio Panchal III

## 2023-01-17 NOTE — TELEPHONE ENCOUNTER
A man with bladder symptoms has a prostate infection or a kidney infection, not a bladder infection.  This is an important difference because the treatment requires a higher dose of antibiotic for a longer period of time    The best medication for this is levofloxacin at high dose however, unfortunately, he has been on this medicine continuously for over a year so the odds of a an organism resistant to the levofloxacin are exceptionally high    Recommend urinalysis with culture    After that can begin sulfa antibiotic twice daily pending culture results    Continue the levofloxacin    Add Flomax to relax the prostate    This is with the emergency room would do so let us begin doing it for him without him going

## 2023-01-18 ENCOUNTER — TELEPHONE (OUTPATIENT)
Dept: INTERNAL MEDICINE | Facility: CLINIC | Age: 53
End: 2023-01-18
Payer: COMMERCIAL

## 2023-01-18 NOTE — TELEPHONE ENCOUNTER
January 18, 2023    [facility] Forms: [facility] form received from outbox of [facility] Providers: Dr. Lund.  Paperwork has been reviewed and is complete.  Per initial initial request, this was sent via fax to [phone].     Fidencio Panchal III

## 2023-01-18 NOTE — TELEPHONE ENCOUNTER
January 18, 2023    [facility] Forms: [facility] form received from outbox of [facility] Providers: Dr. Lund.  Paperwork has been reviewed and is complete.  Per initial initial request, this was sent via fax to [phone].     Fidencio Panchal III

## 2023-01-18 NOTE — TELEPHONE ENCOUNTER
January 18, 2023    [facility] Forms: [facility] forms received from outbox of [facility] Providers: Dr. Lund.  Paperwork has been reviewed and is complete.  Per initial initial request, this was sent via fax to [phone].     Fidencio Panchal III

## 2023-01-19 ENCOUNTER — TELEPHONE (OUTPATIENT)
Dept: INTERNAL MEDICINE | Facility: CLINIC | Age: 53
End: 2023-01-19
Payer: COMMERCIAL

## 2023-01-19 DIAGNOSIS — G89.4 CHRONIC PAIN SYNDROME: ICD-10-CM

## 2023-01-19 DIAGNOSIS — M48.02 FORAMINAL STENOSIS OF CERVICAL REGION: ICD-10-CM

## 2023-01-19 DIAGNOSIS — K59.03 DRUG-INDUCED CONSTIPATION: ICD-10-CM

## 2023-01-19 RX ORDER — OXYCODONE HYDROCHLORIDE 15 MG/1
15 TABLET ORAL
Qty: 112 TABLET | Refills: 0 | Status: SHIPPED | OUTPATIENT
Start: 2023-01-19 | End: 2023-01-27

## 2023-01-19 RX ORDER — BUPRENORPHINE 20 UG/H
1 PATCH TRANSDERMAL
Qty: 4 PATCH | Refills: 0 | Status: SHIPPED | OUTPATIENT
Start: 2023-01-19 | End: 2023-02-28

## 2023-01-19 RX ORDER — METHOCARBAMOL 500 MG/1
1000 TABLET, FILM COATED ORAL 4 TIMES DAILY
Qty: 120 TABLET | Refills: 1 | Status: SHIPPED | OUTPATIENT
Start: 2023-01-19 | End: 2023-02-10

## 2023-01-19 NOTE — TELEPHONE ENCOUNTER
January 19, 2023    [facility] Forms: Home health care orders were received via fax for [facility] Prov[facility]: Dr. Lund to sign.  Patient label was attached to paperwork and placed in provider's inbox to be signed.    Fidencio Panchal III

## 2023-01-20 ENCOUNTER — TELEPHONE (OUTPATIENT)
Dept: PALLIATIVE MEDICINE | Facility: OTHER | Age: 53
End: 2023-01-20
Payer: COMMERCIAL

## 2023-01-20 NOTE — TELEPHONE ENCOUNTER
PA request from the pharmacy:  naloxegol (MOVANTIK) 25 MG TABS tablet 30 tablet 1 11/18/2022  No   Sig - Route: Take 1 tablet (25 mg) by mouth every morning (before breakfast) - Oral   Sent to pharmacy as: Naloxegol Oxalate 25 MG Oral Tablet (Movantik)   Class: E-Prescribe   Order: [id number]   E-Prescribing Status: Receipt confirmed by pharmacy (11/18/2022  3:46 PM CST)     [facility]

## 2023-01-24 NOTE — TELEPHONE ENCOUNTER
Central Prior Authorization Team  Phone: [phone]    PA Initiation    Medication: naloxegol (MOVANTIK) 25 MG TABS tablet  Insurance Company: [email or web address] - Phone [phone] Fax [phone]  Pharmacy Filling the Rx: [facility] - [place], [place] - [address]  Filling Pharmacy Phone: [phone]  Filling Pharmacy Fax:    Start Date: 1/24/2023

## 2023-01-25 NOTE — TELEPHONE ENCOUNTER
[place] [id number] [facility] [address]    Phone:  [phone]    Fax:  [phone]       Thank You for choosing us for your health care visit. We are glad to serve you and happy to provide you with this summary of your visit. Please help us to ensure we have accurate records. If you find anything that needs to be changed, please let our staff know as soon as possible.          Your Demographic Information     Patient Name Sex     Clarence Clark Female 1953       Ethnic Group Patient Race    Not of  or  Origin White      Your Visit Details     Date & Time Provider Department    2017 9:30 AM Barbi Temple MD [facility]      Your Upcoming Appointment*(Max 10)     2017  8:00 AM CST   Lab Visit with [facility] LAB 1   [facility] ([facility])    [facility] [address]   [phone]              9:30 AM CST   Complete Physical Exam with Barbi Temple MD   [facility] ([facility])    [facility] [address]   [phone]              Your To Do List     Future Orders Please Complete On or Around Expires    GLYCOHEMOGLOBIN      MICROALBUMIN URINE RANDOM      PROTHROMBIN TIME      URINALYSIS & REFLEX MICRO WITH CULTURE IF INDICATED      Follow-Up    Return if symptoms worsen or fail to improve, for has appt in feb.      We Ordered or Performed the Following     PRE SURGICAL STAPH AUREUS SCREEN WITH MRSA IF INDICATED       Conditions Discussed Today or Order-Related Diagnoses        Comments    Type 2 diabetes mellitus without complication, without long-term current use of insulin    -  Primary     Pre-op exam         Screening for diabetes mellitus (DM)         Primary osteoarthritis of right knee         Central Prior Authorization Team  Phone: [phone]    Prior Authorization Approval    Authorization Effective Date: 1/1/2023  Authorization Expiration Date: 1/24/2024  Medication: naloxegol (MOVANTIK) 25 MG TABS tablet  Approved Dose/Quantity:   Reference #:     Insurance Company: [email or web address] - Phone [phone] Fax [phone]  Expected CoPay:       CoPay Card Available:      Foundation Assistance Needed:    Which Pharmacy is filling the prescription (Not needed for infusion/clinic administered): [facility] - [place], [place] - [address]  Pharmacy Notified: Yes  Patient Notified:            Hypercholesteremia         Acquired hypothyroidism           Your Vitals Were     BP Pulse Height Weight BMI Smoking Status    124/66 (BP Location: [facility], Patient Position: Sitting, Cuff Size: Large Adult) 64 5' 5\" (1.651 m) 214 lb 12.8 oz (97.4 kg) 35.74 kg/m2 Former Smoker      Medications Prescribed or Re-Ordered Today     simvastatin (ZOCOR) 80 MG tablet    Sig - Route: Take 1 tablet by mouth nightly. - Oral    Class: Eprescribe    Pharmacy: [id number] [address] #: [phone]      Your Current Medications Are        Disp Refills Start End    levothyroxine (SYNTHROID, LEVOTHROID) 50 MCG tablet 90 tablet 3 8/8/2016     Sig - Route: Take 1 tablet by mouth daily. - Oral    Class: Eprescribe    metformin (GLUCOPHAGE-XR) 500 MG 24 hr tablet 30 tablet 5 8/8/2016     Sig - Route: Take 1 tablet by mouth daily before dinner. - Oral    Class: Eprescribe    simvastatin (ZOCOR) 80 MG tablet 90 tablet 0 1/13/2017     Sig - Route: Take 1 tablet by mouth nightly. - Oral    Class: Eprescribe    meloxicam (MOBIC) 7.5 MG tablet 30 tablet 1 12/30/2016     Sig - Route: Take 1 tablet by mouth daily. - Oral    Class: Eprescribe    Notes to Pharmacy: Dc rx for celebrex due to insurance    DISPENSE 1 each 0 2/18/2016     Sig: Glucose monitor per insurance preference    DISPENSE 100 each 11 2/18/2016     Sig: Glucose test strips, check blood sugar daily prn.    DISPENSE 100 each 11 2/18/2016     Sig: Check blood sugar daily prn.    Multiple Vitamin (MULTI VITAMIN DAILY PO)        Class: Historical Med    Route: Oral    ibuprofen (MOTRIN) 200 MG tablet        Sig - Route: Take 200 mg by mouth every 6 hours as needed for Pain. - Oral    Class: Historical Med      Allergies     No Known Allergies      Immunizations History as of 1/13/2017     Name Date    INFLUENZA QUADRIVALENT 12/29/2016    Influenza 10/1/2015    Pneumococcal Polysaccharide Adult 2/18/2016    Tdap 9/28/2013      Problem List as of  1/13/2017     Elevated TSH    Primary osteoarthritis of left knee    Primary osteoarthritis of right knee    Frequent PVCs    Family history of diabetes mellitus (DM)    Arthritis of both knees    Type 2 diabetes mellitus without complication    Hypercholesteremia    Acquired hypothyroidism    Thickened nails    Primary osteoarthritis of both knees            Patient Instructions     None

## 2023-01-27 ENCOUNTER — TELEPHONE (OUTPATIENT)
Dept: INTERNAL MEDICINE | Facility: CLINIC | Age: 53
End: 2023-01-27

## 2023-01-27 ENCOUNTER — VIRTUAL VISIT (OUTPATIENT)
Dept: INTERNAL MEDICINE | Facility: CLINIC | Age: 53
End: 2023-01-27
Payer: COMMERCIAL

## 2023-01-27 DIAGNOSIS — T40.2X5A THERAPEUTIC OPIOID INDUCED CONSTIPATION: ICD-10-CM

## 2023-01-27 DIAGNOSIS — N41.0 ACUTE PROSTATITIS: ICD-10-CM

## 2023-01-27 DIAGNOSIS — F11.20 OPIOID DEPENDENCE, UNCOMPLICATED (H): ICD-10-CM

## 2023-01-27 DIAGNOSIS — Z00.00 PREVENTATIVE HEALTH CARE: ICD-10-CM

## 2023-01-27 DIAGNOSIS — K04.7 DENTAL INFECTION: ICD-10-CM

## 2023-01-27 DIAGNOSIS — R33.9 URINARY RETENTION WITH INCOMPLETE BLADDER EMPTYING: Primary | ICD-10-CM

## 2023-01-27 DIAGNOSIS — F31.81 BIPOLAR 2 DISORDER (H): ICD-10-CM

## 2023-01-27 DIAGNOSIS — K59.03 DRUG-INDUCED CONSTIPATION: ICD-10-CM

## 2023-01-27 DIAGNOSIS — K59.03 THERAPEUTIC OPIOID INDUCED CONSTIPATION: ICD-10-CM

## 2023-01-27 PROBLEM — D50.9 MICROCYTIC ANEMIA: Status: ACTIVE | Noted: 2023-01-18

## 2023-01-27 PROBLEM — G95.9 CERVICAL MYELOPATHY WITH CERVICAL RADICULOPATHY (H): Status: RESOLVED | Noted: 2021-09-01 | Resolved: 2023-01-27

## 2023-01-27 PROBLEM — E87.1 HYPONATREMIA: Status: ACTIVE | Noted: 2023-01-18

## 2023-01-27 PROBLEM — M54.12 CERVICAL MYELOPATHY WITH CERVICAL RADICULOPATHY (H): Status: RESOLVED | Noted: 2021-09-01 | Resolved: 2023-01-27

## 2023-01-27 PROBLEM — E87.6 HYPOKALEMIA: Status: ACTIVE | Noted: 2023-01-18

## 2023-01-27 PROCEDURE — 99214 OFFICE O/P EST MOD 30 MIN: CPT | Mod: 95 | Performed by: INTERNAL MEDICINE

## 2023-01-27 RX ORDER — LEVOFLOXACIN 750 MG/1
750 TABLET, FILM COATED ORAL DAILY
Qty: 42 TABLET | Refills: 0 | Status: CANCELLED | OUTPATIENT
Start: 2023-01-27

## 2023-01-27 RX ORDER — LACTULOSE 10 G/15ML
10 SOLUTION ORAL
COMMUNITY
End: 2023-01-27

## 2023-01-27 RX ORDER — POLYETHYLENE GLYCOL 3350 17 G/17G
1 POWDER, FOR SOLUTION ORAL 2 TIMES DAILY
Qty: 3060 G | Refills: 3 | Status: SHIPPED | OUTPATIENT
Start: 2023-01-27 | End: 2024-01-27

## 2023-01-27 RX ORDER — TAMSULOSIN HYDROCHLORIDE 0.4 MG/1
0.4 CAPSULE ORAL 2 TIMES DAILY
Qty: 180 CAPSULE | Refills: 3 | Status: SHIPPED | OUTPATIENT
Start: 2023-01-27 | End: 2023-02-22

## 2023-01-27 RX ORDER — HYDROXYZINE HYDROCHLORIDE 50 MG/1
50 TABLET, FILM COATED ORAL
COMMUNITY
End: 2023-01-27

## 2023-01-27 RX ORDER — FINASTERIDE 5 MG/1
5 TABLET, FILM COATED ORAL DAILY
Qty: 90 TABLET | Refills: 3 | Status: SHIPPED | OUTPATIENT
Start: 2023-01-27 | End: 2024-01-16

## 2023-01-27 RX ORDER — SULFAMETHOXAZOLE/TRIMETHOPRIM 800-160 MG
1 TABLET ORAL 2 TIMES DAILY
Qty: 28 TABLET | Refills: 0 | Status: SHIPPED | OUTPATIENT
Start: 2023-01-27 | End: 2023-02-22

## 2023-01-27 RX ORDER — BUPRENORPHINE 20 UG/H
1 PATCH TRANSDERMAL
COMMUNITY
Start: 2022-12-13 | End: 2023-04-07 | Stop reason: ALTCHOICE

## 2023-01-27 RX ORDER — LACTULOSE 20 G/30ML
45 SOLUTION ORAL 2 TIMES DAILY
Qty: 5400 ML | Refills: 11 | Status: SHIPPED | OUTPATIENT
Start: 2023-01-27 | End: 2024-09-05

## 2023-01-27 ASSESSMENT — PATIENT HEALTH QUESTIONNAIRE - PHQ9
SUM OF ALL RESPONSES TO PHQ QUESTIONS 1-9: 21
10. IF YOU CHECKED OFF ANY PROBLEMS, HOW DIFFICULT HAVE THESE PROBLEMS MADE IT FOR YOU TO DO YOUR WORK, TAKE CARE OF THINGS AT HOME, OR GET ALONG WITH OTHER PEOPLE: NOT DIFFICULT AT ALL
SUM OF ALL RESPONSES TO PHQ QUESTIONS 1-9: 21

## 2023-01-27 ASSESSMENT — ANXIETY QUESTIONNAIRES
1. FEELING NERVOUS, ANXIOUS, OR ON EDGE: NEARLY EVERY DAY
3. WORRYING TOO MUCH ABOUT DIFFERENT THINGS: NEARLY EVERY DAY
8. IF YOU CHECKED OFF ANY PROBLEMS, HOW DIFFICULT HAVE THESE MADE IT FOR YOU TO DO YOUR WORK, TAKE CARE OF THINGS AT HOME, OR GET ALONG WITH OTHER PEOPLE?: NOT DIFFICULT AT ALL
2. NOT BEING ABLE TO STOP OR CONTROL WORRYING: NEARLY EVERY DAY
6. BECOMING EASILY ANNOYED OR IRRITABLE: NEARLY EVERY DAY
4. TROUBLE RELAXING: NEARLY EVERY DAY
IF YOU CHECKED OFF ANY PROBLEMS ON THIS QUESTIONNAIRE, HOW DIFFICULT HAVE THESE PROBLEMS MADE IT FOR YOU TO DO YOUR WORK, TAKE CARE OF THINGS AT HOME, OR GET ALONG WITH OTHER PEOPLE: NOT DIFFICULT AT ALL
7. FEELING AFRAID AS IF SOMETHING AWFUL MIGHT HAPPEN: SEVERAL DAYS
7. FEELING AFRAID AS IF SOMETHING AWFUL MIGHT HAPPEN: SEVERAL DAYS
GAD7 TOTAL SCORE: 19
GAD7 TOTAL SCORE: 19
5. BEING SO RESTLESS THAT IT IS HARD TO SIT STILL: NEARLY EVERY DAY

## 2023-01-27 NOTE — TELEPHONE ENCOUNTER
January 27, 2023    [facility] Forms: [facility] received from outbox of [facility] Providers: Dr. Lund.  Paperwork has been reviewed and is complete.  Per initial initial request, this was sent via fax to [phone].     Pam J. Behr

## 2023-01-27 NOTE — PATIENT INSTRUCTIONS
Discontinue triamterene HCTZ    Discontinue furosemide    Encourage further tapering of narcotics through pain clinic with Dr. Cramer    Finasteride 5 mg daily    Continue Flomax and increase to 0.4 mg twice daily    Continue lactulose twice daily    Add MiraLAX twice daily    Sulfa DS twice daily for 2 weeks    Hold levofloxacin    Resume Movantik 25 mg daily    Urology referral for catheter management and removal    Follow-up 2 weeks

## 2023-01-27 NOTE — TELEPHONE ENCOUNTER
The Home Care/Assisted Living/Nursing Facility is calling regarding an established patient.  Has the patient seen Home Care in the past or is currently residing in Assisted Living or Nursing Facility? Yes.     Gertrude calling from [facility] requesting the following orders that are within the Home Care, Assisted Living or Nursing Home Eval and Treatment standing order and can be signed as standing order signature required by RN.    Preferred Call Back Number: [phone]    Home Care Visits Continuation   Delay start of resumption of care to Saturday 1/28/23    Any additional Orders:  Are there any orders requested, not stated above, that are outside of the standing order and must be routed to a licensed practitioner for approval?    No    Writer has verified Requestor will send fax to have orders signed.

## 2023-01-27 NOTE — PROGRESS NOTES
Bola is a 53 year old male being evaluated via a billable phone visit, and would like to be contacted via the following  Home number [phone] (home)    ASSESSMENT and PLAN:  1. Urinary retention with incomplete bladder emptying  Referral to urology.  Add finasteride, treat for prostatitis and increase Flomax.  Encourage further decrease in narcotics through pain clinic  - Adult Urology  Referral; Future  - finasteride (PROSCAR) 5 MG tablet; Take 1 tablet (5 mg) by mouth daily  Dispense: 90 tablet; Refill: 3    2. Acute prostatitis  Treat with sulfa.  Hold levofloxacin  - sulfamethoxazole-trimethoprim (BACTRIM DS) 800-160 MG tablet; Take 1 tablet by mouth 2 times daily  Dispense: 28 tablet; Refill: 0  - tamsulosin (FLOMAX) 0.4 MG capsule; Take 1 capsule (0.4 mg) by mouth 2 times daily  Dispense: 180 capsule; Refill: 3    3. Dental infection  Use sulfa to replace levofloxacin please 2 weeks    4. Drug-induced constipation  Continue lactulose.  Add back MiraLAX and Movantik  - polyethylene glycol (MIRALAX) 17 GM/Dose powder; Take 17 g (1 capful) by mouth 2 times daily  Dispense: 3060 g; Refill: 3  - naloxegol (MOVANTIK) 25 MG TABS tablet; Take 1 tablet (25 mg) by mouth every morning (before breakfast)  Dispense: 30 tablet; Refill: 1    5. Preventative health care  - REVIEW OF HEALTH MAINTENANCE PROTOCOL ORDERS    6. Therapeutic opioid induced constipation  - lactulose 20 GM/30ML SOLN; Take 45 mLs by mouth 2 times daily For constipation.  Dispense: 5400 mL; Refill: 11    7. Opioid dependence, uncomplicated (H)  Agree with beginning to convert over to Butrans patch although unclear that effect on constipation versus oxycodone    8. Bipolar 2 disorder (H)  Stable on multiple medicines       Patient Instructions   Discontinue triamterene HCTZ    Discontinue furosemide    Encourage further tapering of narcotics through pain clinic with Dr. Cramer    Finasteride 5 mg daily    Continue Flomax and increase to  0.4 mg twice daily    Continue lactulose twice daily    Add MiraLAX twice daily    Sulfa DS twice daily for 2 weeks    Hold levofloxacin    Resume Movantik 25 mg daily    Urology referral for catheter management and removal    Follow-up 2 weeks            Return in about 2 weeks (around 2/10/2023) for using a video visit.       CHIEF COMPLAINT:  Chief Complaint   Patient presents with     Recheck Medication     Hospital F/U     PT ADMITTED TO [facility] 1/19/23 DISCHARGED 1/25/23 INFECTION       HPI     Hospital Follow-up Visit:    Hospital/Nursing Home/IP Rehab Facility: [facility]  Date of Admission: Jan 18, 2023  Date of Discharge: Jan 25, 2023  Reason(s) for Admission: Urinary retention, hyponatremia electrolyte derangement      Was your hospitalization related to COVID-19? No   Problems taking medications regularly:  None  Medication changes since discharge: None  Problems adhering to non-medication therapy:  None    Discharge summary reviewed  Diagnostic Tests/Treatments reviewed.  Follow up needed: Urology  Update since discharge: improved.   Post Discharge Medication Reconciliation: discharge medications reconciled and changed, per note/orders.  Plan of care communicated with patient         HISTORY OF PRESENT ILLNESS:  Bola is a 53 year old male contacting the clinic today via phone for follow-up hospitalization.  He called on January 17 reporting dysuria and burning.  I advised a urinalysis, then initiation of sulfa and Flomax.  He received no reply and went to the emergency room on his own.  He was admitted with urinary retention, hyponatremia, and hypokalemia.  CT scan and ultrasound showed no obvious blockage or evidence of infection and urinalysis was normal.  He Riddle catheter was placed after several straight caths and remains indwelling.  He was placed on Flomax and discharged back to his assisted living    He takes triamterene HCTZ and furosemide for peripheral edema which has been deemed  "lymphedema    Pain clinic with Dr. Cramer have begun tapering oxycodone and beginning Butrans patch and this is encouraged    Constipation continues to be an issue.  Addition of Movantik and MiraLAX, which she has been on before, while in the hospital to the lactulose seem to help    We discussed that the levofloxacin may not be active against his dental infection any further and sulfa might treat prostate and teeth    REVIEW OF SYSTEMS:  Poor appetite.  Severe pain    PFSH:  Social History     Social History Narrative    He is .  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  He is now on disability due to his brain injury and bipolar disease.        Quit smoking October of 2021        Quit drinking 2010        At [facility] since June of 2020       TOBACCO USE:  History   Smoking Status     Former     Packs/day: 0.50     Years: 11.00     Types: Cigarettes     Start date: 8/20/2009     Quit date: 2/21/2017   Smokeless Tobacco     Former     Comment: 0.5 pack per day       VITALS:  There were no vitals filed for this visit.  There were no vitals taken for this visit. Estimated body mass index is 32.23 kg/m  as calculated from the following:    Height as of 9/17/21: 1.727 m (5' 8\").    Weight as of 9/1/21: 96.2 kg (212 lb).    PHYSICAL EXAM:  (observations via Phone)  Alert and oriented.  No evidence of intoxication.  Anxious    MEDICATIONS  Current Outpatient Medications   Medication Sig Dispense Refill     acetaminophen (TYLENOL) 650 MG CR tablet 2 TABLETS (16924ZR) ORALLY 3 TIMES DAILY (MAX APAP:4GM/24HR) 540 tablet 1     albuterol (PROAIR HFA/PROVENTIL HFA/VENTOLIN HFA) 108 (90 Base) MCG/ACT inhaler Inhale 2 puffs into the lungs every 6 hours as needed 6.7 g 11     allopurinol (ZYLOPRIM) 300 MG tablet Take 1 tablet (300 mg) by mouth 2 times daily 180 tablet 3     ammonium lactate (LAC-HYDRIN) 12 % external lotion        " amphetamine-dextroamphetamine (ADDERALL) 15 MG tablet Take 1 tablet (15 mg) by mouth daily 30 tablet 0     bisacodyl (DULCOLAX) 10 MG suppository Place 1 suppository (10 mg) rectally daily as needed for constipation 60 suppository 1     buprenorphine (BUTRANS) 20 MCG/HR WK patch Place 1 patch onto the skin       buprenorphine (BUTRANS) 20 MCG/HR WK patch Place 1 patch onto the skin every 7 days May fill/start 1/19/23 4 patch 0     buPROPion (WELLBUTRIN SR) 150 MG 12 hr tablet Take 1 tablet (150 mg) by mouth 2 times daily 180 tablet 3     CALCIUM ANTACID 500 MG chewable tablet        chlorhexidine (PERIDEX) 0.12 % solution Swish and spit 15 mLs in mouth 3 times daily as needed (sore throat) - Swish & Spit 473 mL 11     ergocalciferol (ERGOCALCIFEROL) 1.25 MG (48284 UT) capsule Take 1 capsule (50,000 Units) by mouth once a week 12 capsule 3     fexofenadine (ALLEGRA) 180 MG tablet Take 180 mg by mouth daily as needed       finasteride (PROSCAR) 5 MG tablet Take 1 tablet (5 mg) by mouth daily 90 tablet 3     fluconazole (DIFLUCAN) 200 MG tablet Take 1 tablet (200 mg) by mouth daily 90 tablet 3     guaiFENesin (MUCINEX) 600 MG 12 hr tablet Take 2 tablets (1,200 mg) by mouth 2 times daily 120 tablet 11     hydrOXYzine (ATARAX) 50 MG tablet Per JACQUELYN Vallejo       lactulose 20 GM/30ML SOLN Take 45 mLs by mouth 2 times daily For constipation. 5400 mL 11     lamoTRIgine (LAMICTAL) 150 MG tablet Take 1 tablet (150 mg) by mouth 3 times daily       LANsoprazole (PREVACID) 30 MG DR capsule Take 1 capsule (30 mg) by mouth 2 times daily 180 capsule 3     lidocaine (LIDODERM) 5 % patch Place onto the skin every 24 hours To prevent lidocaine toxicity, patient should be patch free for 12 hrs daily. 30 patch 11     lidocaine (XYLOCAINE) 2 % external gel Apply 1 inch topically 4 times a day as needed to gums. 85 g 11     methocarbamol (ROBAXIN) 500 MG tablet Take 2 tablets (1,000 mg) by mouth 4 times daily For use beginning  1/26/23 120 tablet 1     metoprolol succinate ER (TOPROL-XL) 50 MG 24 hr tablet Take 1 tablet (50 mg) by mouth daily 90 tablet 3     montelukast (SINGULAIR) 10 MG tablet Take 1 tablet by mouth daily       naloxegol (MOVANTIK) 25 MG TABS tablet Take 1 tablet (25 mg) by mouth every morning (before breakfast) 30 tablet 1     naloxone (NARCAN) 4 MG/0.1ML nasal spray 1 spray (4 mg dose) into one nostril for opioid reversal. Call 911. May repeat if no response in 3 minutes.       nystatin (MYCOSTATIN) 029598 UNIT/ML suspension Take 10 mLs (1,000,000 Units) by mouth every 3 hours as needed (thrush) To continue while taking levofloxacin and fluconazole 800 mL 3     oxyCODONE IR (ROXICODONE) 15 MG tablet Take 1 tablet (15 mg) by mouth 8 times daily One tablet every three hours 8 am, 11 am, 2pm, 5 pm, 8 pm, 11 pm, 2 am, 5 am, dispense 12/30/22, start 12/31/22 112 tablet 0     polyethylene glycol (MIRALAX) 17 GM/Dose powder Take 17 g (1 capful) by mouth 2 times daily 3060 g 3     QUEtiapine (SEROQUEL) 100 MG tablet Take 1 tablet (100 mg) by mouth 4 times daily as needed (anxiety) 60 tablet 1     QUEtiapine (SEROQUEL) 300 MG tablet Take 2 tablets (600 mg) by mouth At Bedtime 180 tablet 0     sulfamethoxazole-trimethoprim (BACTRIM DS) 800-160 MG tablet Take 1 tablet by mouth 2 times daily 28 tablet 0     tamsulosin (FLOMAX) 0.4 MG capsule Take 1 capsule (0.4 mg) by mouth 2 times daily 180 capsule 3     zonisamide (ZONEGRAN) 25 MG capsule Take 3 capsules (75 mg) by mouth 4 times daily 180 capsule 3       Notes summarized: Hospital discharge  Labs, x-rays, cardiology, GI tests reviewed: CT abdomen, ultrasound, labs from hospital  Recent Labs   Lab Test 08/30/22  1321   HGB 12.5*   WBC 7.6   *   POTASSIUM 4.0   CR 0.71   PSA 0.50   URIC 2.0*   B12 336   TSH 0.62   VITDT 29     No results found for: OFAPA07MTK  Lab Results   Component Value Date    CHOL 164 08/30/2022     New orders:   Orders Placed This Encounter   Procedures      REVIEW OF HEALTH MAINTENANCE PROTOCOL ORDERS     Adult Urology  Referral       Independent review of:    Patient would like to receive their AVS by Cuauhtemoc Lund MD  [facility]    Phone-Visit Details  Type of service:  Phone Visit  Patient has given verbal consent to a Phone visit?  Yes  How would you like to obtain your AVS?  Cuauhtemoc  Will anyone else be joining your phone visit, giving supplemental history? No    Originating location (pt location): Assisted Living    Distant Location (provider location):  Off-site    Phone Start Time: 2:01 PM  Phone End time:  2:36 PM  Conversation plus orders: 35 minutes  Dictation time:  3 minutes    The visit lasted a total of 36 minutes

## 2023-01-27 NOTE — PROGRESS NOTES
"Jan is a 53 year old who is being evaluated via a billable telephone visit.      What phone number would you like to be contacted at? [phone]  How would you like to obtain your AVS? MyChart  {PROVIDER LOCATION On-site should be selected for visits conducted from your clinic location or adjoining [facility] hospital, academic office, or other nearby [facility] building. Off-site should be selected for all other provider locations, including home:613216}  Distant Location (provider location):  Off-site    {PROVIDER CHARTING PREFERENCE:463087}    Subjective   Jan is a 53 year old accompanied by his ALONE, presenting for the following health issues:  Recheck Medication and Hospital F/U (PT ADMITTED TO [facility] 1/19/23 DISCHARGED 1/25/23 INFECTION)      HPI     {SUPERLIST (Optional):121948}  {additonal problems for provider to add (Optional):830487}    Review of Systems   {ROS COMP (Optional):723045}      Objective           Vitals:  No vitals were obtained today due to virtual visit.    Physical Exam   {GENERAL APPEARANCE:50::\"healthy\",\"alert\",\"no distress\"}  PSYCH: Alert and oriented times 3; coherent speech, normal   rate and volume, able to articulate logical thoughts, able   to abstract reason, no tangential thoughts, no hallucinations   or delusions  His affect is { :5431364::\"normal\"}  RESP: No cough, no audible wheezing, able to talk in full sentences  Remainder of exam unable to be completed due to telephone visits    {Diagnostic Test Results (Optional):486553}    {AMBULATORY ATTESTATION (Optional):233764}        Phone call duration: *** minutes    "

## 2023-01-27 NOTE — TELEPHONE ENCOUNTER
General Call    Contacts       Type Contact Phone/Fax    01/27/2023 03:28 PM CST Phone (Incoming) Gertrude (Home Care) [phone]     [facility]        Reason for Call:  Verbal order    What are your questions or concerns:      Delay start of resumption of care to Saturday 1/28/23    Ok for detailed msg

## 2023-01-31 ENCOUNTER — TELEPHONE (OUTPATIENT)
Dept: INTERNAL MEDICINE | Facility: CLINIC | Age: 53
End: 2023-01-31
Payer: COMMERCIAL

## 2023-01-31 NOTE — TELEPHONE ENCOUNTER
----- Message from Devon Lund MD sent at 1/27/2023  2:30 PM CST -----  Please call contact assisted living and confirm orders in today's note      Notes from 1/27/23 visit note:     Patient Instructions   Discontinue triamterene HCTZ    Discontinue furosemide    Encourage further tapering of narcotics through pain clinic with Dr. Cramer    Finasteride 5 mg daily    Continue Flomax and increase to 0.4 mg twice daily    Continue lactulose twice daily    Add MiraLAX twice daily    Sulfa DS twice daily for 2 weeks    Hold levofloxacin    Resume Movantik 25 mg daily    Urology referral for catheter management and removal    Follow-up 2 weeks            Return in about 2 weeks (around 2/10/2023) for using a video visit.

## 2023-01-31 NOTE — TELEPHONE ENCOUNTER
Attempted to call Gertrude, no answer (1/3). Left message requesting Gertrude to call back clinic. See recent PCP note for this encounter.     When gertrude calls back please inquire as to whether there is an assisted living facility that also needs the requested orders.

## 2023-02-01 ENCOUNTER — TELEPHONE (OUTPATIENT)
Dept: INTERNAL MEDICINE | Facility: CLINIC | Age: 53
End: 2023-02-01
Payer: COMMERCIAL

## 2023-02-01 ENCOUNTER — TELEPHONE (OUTPATIENT)
Dept: INTERNAL MEDICINE | Facility: CLINIC | Age: 53
End: 2023-02-01

## 2023-02-01 NOTE — TELEPHONE ENCOUNTER
February 1, 2023    [facility] Forms: [facility] orders, new order ID:  [id number] received via fax for [facility] Providers: Dr. Lund to sign.  Patient label was attached to paperwork and placed in provider's inbox to be signed.    Helen Bowen

## 2023-02-01 NOTE — TELEPHONE ENCOUNTER
February 1, 2023    [facility] Forms: [facility] new Order ID:  [id number] orders were received via fax for [facility] Providers: Dr. Lund to sign.  Patient label was attached to paperwork and placed in provider's inbox to be signed.    Helen Bowen

## 2023-02-03 NOTE — TELEPHONE ENCOUNTER
Patient called to clarify medication changes made on 01/25.  Patient also had writer reset password to [facility] [employer].

## 2023-02-03 NOTE — TELEPHONE ENCOUNTER
Attempted to call pt, no answer (2/3). Left message requesting pt to call back clinic. See recent PCP note for this encounter.       When laurita calls back please inquire as to whether there is an assisted living facility that also needs the requested orders.

## 2023-02-06 ENCOUNTER — TELEPHONE (OUTPATIENT)
Dept: INTERNAL MEDICINE | Facility: CLINIC | Age: 53
End: 2023-02-06
Payer: COMMERCIAL

## 2023-02-06 ENCOUNTER — TELEPHONE (OUTPATIENT)
Dept: INTERNAL MEDICINE | Facility: CLINIC | Age: 53
End: 2023-02-06

## 2023-02-06 NOTE — TELEPHONE ENCOUNTER
February 6, 2023    [facility] Forms: [facility] order numbers:  [id number] Home health care orders were received via fax for [facility] Providers: Dr. Lund to sign.  Patient label was attached to paperwork and placed in provider's inbox to be signed.    Helen Bowen

## 2023-02-06 NOTE — TELEPHONE ENCOUNTER
February 6, 2023    [facility] Forms: [facility] order numbers:  [id number] Home health care orders were received via fax for [facility] Providers: Dr. Lund to sign.  Patient label was attached to paperwork and placed in provider's inbox to be signed.    Helen Bowen

## 2023-02-06 NOTE — TELEPHONE ENCOUNTER
General Call    Contacts       Type Contact Phone/Fax    02/06/2023 01:21 PM CST Phone (Incoming) Amairani (Home Care) [phone]     [facility]        Reason for Call:  Calling for verbal orders    What are your questions or concerns:      Physical therapy -1 time in 10 days to complete the evaulation        Okay to leave a detailed message?: Yes at [phone]

## 2023-02-06 NOTE — TELEPHONE ENCOUNTER
The Home Care/Assisted Living/Nursing Facility is calling regarding an established patient.  Has the patient seen Home Care in the past or is currently residing in Assisted Living or Nursing Facility? Yes.     Amairani calling from [facility] requesting the following orders that are within the Home Care, Assisted Living or Nursing Home Eval and Treatment standing order and can be signed as standing order signature required by RN.    Preferred Call Back Number: [phone]    PT/OT/Speech Therapy   PT evaluation    Any additional Orders:  Are there any orders requested, not stated above, that are outside of the standing order and must be routed to a licensed practitioner for approval?    No    Writer has verified [facility] will send fax to have orders signed.

## 2023-02-07 ENCOUNTER — VIRTUAL VISIT (OUTPATIENT)
Dept: UROLOGY | Facility: CLINIC | Age: 53
End: 2023-02-07
Payer: COMMERCIAL

## 2023-02-07 ENCOUNTER — TELEPHONE (OUTPATIENT)
Dept: INTERNAL MEDICINE | Facility: CLINIC | Age: 53
End: 2023-02-07

## 2023-02-07 VITALS — BODY MASS INDEX: 30.31 KG/M2 | HEIGHT: 68 IN | WEIGHT: 200 LBS

## 2023-02-07 DIAGNOSIS — F31.81 BIPOLAR 2 DISORDER (H): ICD-10-CM

## 2023-02-07 DIAGNOSIS — R33.9 URINARY RETENTION: Primary | ICD-10-CM

## 2023-02-07 DIAGNOSIS — K59.03 DRUG-INDUCED CONSTIPATION: ICD-10-CM

## 2023-02-07 DIAGNOSIS — S06.9X9S TRAUMATIC BRAIN INJURY WITH LOSS OF CONSCIOUSNESS, SEQUELA (H): ICD-10-CM

## 2023-02-07 PROCEDURE — 99204 OFFICE O/P NEW MOD 45 MIN: CPT | Mod: 95 | Performed by: PHYSICIAN ASSISTANT

## 2023-02-07 ASSESSMENT — PAIN SCALES - GENERAL: PAINLEVEL: MILD PAIN (2)

## 2023-02-07 NOTE — TELEPHONE ENCOUNTER
February 7, 2023    [facility] Forms: [facility] order number:  [id number] were received via fax for [facility] Providers: Dr. Lund to sign.  Patient label was attached to paperwork and placed in provider's inbox to be signed.    Helen Bowen

## 2023-02-07 NOTE — PATIENT INSTRUCTIONS
Riddle removal today.    Continue on Flomax and finasteride.     See me in the office for urine testing and bladder scan to check how you are emptying.    Keep bowels regular!    Notify me if Riddle needs to go back in!

## 2023-02-07 NOTE — LETTER
"2/7/2023       RE: Bola Lyon Jr.  [address]  [place] [address]     Dear Colleague,    Thank you for referring your patient, Bola Lyon Jr., to the [facility] at [facility]. Please see a copy of my visit note below.    Send link to cell phone    Pt was constipated and that is what caused his retention.  Pt is in a TCU and states a nurse there can take out his cath out.  Pt states he has always had discomfort down from his belly button and to the R.  Pt had hernia repairs on the L side.  Pt got some lymphedema.      Jan is a 53 year old who is being evaluated via a billable video visit.      How would you like to obtain your AVS? MyChart  If the video visit is dropped, the invitation should be resent by: Text to cell phone: [phone]  Will anyone else be joining your video visit? No        Video-Visit Details    Type of service:  Video Visit   Video Start Time: 11:00 AM  Video End Time:11:18 AM    Originating Location (pt. Location): Home    Distant Location (provider location):  On-site  Platform used for Video Visit: [employer]     CC: Urinary retention.    HPI: It is a pleasure to see . Bola Lyon Jr., a pleasant 53 year old male seen today in the urology clinic in consultation from Dr. Lund for evaluation of urinary retention. He was admitted to [facility] for several days (see Tom discharge summary from 1/24) for n/v, HoK, urinary retention (>800cc on first straight cath). This eventually required Riddle catheter placement. This has been draining well with pale, yellow urine. The patient is tolerating the catheter without issue. No clots of hematuria noted.     Narc dependent and was \"severely\" constipated during this time. CT noted bladder distention. Was started on Flomax during hospitalization and then finasteride as well as sulfa for prostatitis after discharge in seeing Dr. Lund in HFU.     The patient " has no prior history of AUR. At baseline, patient does note some slow stream, dribbling. Currently denies fevers, chills, N/V. He has a hx of back pain, TBI and Bipolar.    Past Medical History:   Diagnosis Date     AC (acromioclavicular) arthritis 10/24/2011     Aftercare following surgery of the musculoskeletal system 04/25/2012     Anxiety disorder      Anxiety state, unspecified     Created by Conversion      Arthritis      Biceps tendon rupture, proximal left     Bipolar 2 disorder (H)      Brachial neuritis or radiculitis           Brachial neuritis or radiculitis NOS     Created by Conversion      Cervical radiculopathy at C8 04/30/2018     Claustrophobia      COPD (chronic obstructive pulmonary disease) (H) 12/21/2020     Disturbance of skin sensation 11/30/2011     Encounter for chronic pain management 02/11/2015     GERD (gastroesophageal reflux disease)      Gout      Hernia, abdominal      Hiatal hernia      Hypertension      Impingement syndrome of right shoulder      Lymphedema 02/11/2015    left     Numbness and tingling      Other affections of shoulder region, not elsewhere classified     Created by Conversion       Other chronic pain      Pain in joint, shoulder region 10/10/2011     Panic attacks      PTSD (post-traumatic stress disorder)      TBI (traumatic brain injury)     post concussion syndrome     Traumatic brain injury with loss of consciousness, sequela (H) 06/24/2022     Unspecified essential hypertension     Created by Conversion      Unspecified site of spinal cord injury without evidence of spinal bone injury      Past Surgical History:   Procedure Laterality Date     ANTERIOR / POSTERIOR COMBINED FUSION CERVICAL SPINE  2013, redo in 2014    4 levels     ARTHRODESIS ANKLE  right     ARTHROSCOPY KNEE  01/01/2014     DECOMPRESSION, FUSION CERVICAL ANTERIOR THREE+ LEVELS, COMBINED  05/05/2014    Procedure: COMBINED DECOMPRESSION, FUSION CERVICAL ANTERIOR THREE+ LEVELS;  Surgeon: Jj  Aron Martinez MD;  Location: [facility] OR     EXPLORE SPINE, REMOVE HARDWARE, COMBINED  05/05/2014    Procedure: COMBINED EXPLORE SPINE, REMOVE HARDWARE;  Surgeon: Aron Gardner MD;  Location: [facility] OR     FUSION CERVICAL ANTERIOR THREE+ LEVELS  01/28/2013    Procedure: FUSION CERVICAL ANTERIOR THREE+ LEVELS;  ANTERIOR CERVICAL DISCECTOMY AND FUSION C4-C5, REVISION ANTERIOR CERVICAL DISCECTOMY AND FUSION C5-6, C6-7, RIGHT ANTERIOR ILIAC CREST BONE GRAFT(C-ARM, 3080 TABLE, SYNTHES CSLP SET, TRICORTICAL ALLOGRAFT);  Surgeon: Hermilo Bey MD;  Location: [facility] OR     FUSION CERVICAL ANTERIOR TWO LEVELS       FUSION CERVICAL POSTERIOR THREE+ LEVELS  05/05/2014    Procedure: FUSION CERVICAL POSTERIOR THREE+ LEVELS;  Surgeon: Aron Gardner MD;  Location: [facility] OR     GRAFT BONE FROM ILIAC CREST  01/28/2013    Procedure: GRAFT BONE FROM ILIAC CREST;;  Surgeon: Hermilo Bey MD;  Location:  OR     HAND SURGERY Left 01/01/1997     HERNIA REPAIR      x3     HERNIA REPAIR Left 01/01/2006     HERNIA REPAIR Right 01/01/2008    and middle     HERNIORRHAPHY VENTRAL  01/01/2008     IR CERVICAL EPIDURAL STEROID INJECTION  04/05/2018     IR CERVICAL TRANSFORAMINAL EPIDURAL STRD INJ  12/07/2017     NASAL POLYP SURGERY  01/01/2014    and septal repair, Dr. Isrrael FERNANDEZ LDR ARTHROSCOP,SURG,W/ROTAT CUFF REPR Right 11/25/2015    Procedure: RIGHT SHOULDER ARTHROSCOPY, ROTATOR CUFF REPAIR, DECOMPRESSION, DEBRIDEMENT, DISTAL CLAVICLE EXCISION;  Surgeon: Pilo Bruce MD;  Location: [facility];  Service: Orthopedics     REPLACEMENT TOTAL KNEE Left 03/20/2017     SHOULDER ARTHROSCOPY DISTAL CLAVICLE EXCISION AND OPEN ROTATOR CUFF REPAIR Bilateral 2005 and 2013     SHOULDER SURGERY  left     ZZC ARTHRODESIS,ANKLE,OPEN Right 01/01/2007    Ankle fusion     Current Outpatient Medications   Medication Sig Dispense Refill     acetaminophen (TYLENOL) 650 MG CR tablet 2 TABLETS (35306NG) ORALLY 3 TIMES DAILY (MAX  APAP:4GM/24HR) 540 tablet 1     albuterol (PROAIR HFA/PROVENTIL HFA/VENTOLIN HFA) 108 (90 Base) MCG/ACT inhaler Inhale 2 puffs into the lungs every 6 hours as needed 6.7 g 11     allopurinol (ZYLOPRIM) 300 MG tablet Take 1 tablet (300 mg) by mouth 2 times daily 180 tablet 3     ammonium lactate (LAC-HYDRIN) 12 % external lotion        amphetamine-dextroamphetamine (ADDERALL) 15 MG tablet Take 1 tablet (15 mg) by mouth daily 30 tablet 0     bisacodyl (DULCOLAX) 10 MG suppository Place 1 suppository (10 mg) rectally daily as needed for constipation 60 suppository 1     buprenorphine (BUTRANS) 20 MCG/HR WK patch Place 1 patch onto the skin       buprenorphine (BUTRANS) 20 MCG/HR WK patch Place 1 patch onto the skin every 7 days May fill/start 1/19/23 4 patch 0     buPROPion (WELLBUTRIN SR) 150 MG 12 hr tablet Take 1 tablet (150 mg) by mouth 2 times daily 180 tablet 3     CALCIUM ANTACID 500 MG chewable tablet        chlorhexidine (PERIDEX) 0.12 % solution Swish and spit 15 mLs in mouth 3 times daily as needed (sore throat) - Swish & Spit 473 mL 11     ergocalciferol (ERGOCALCIFEROL) 1.25 MG (50263 UT) capsule Take 1 capsule (50,000 Units) by mouth once a week 12 capsule 3     fexofenadine (ALLEGRA) 180 MG tablet Take 180 mg by mouth daily as needed       finasteride (PROSCAR) 5 MG tablet Take 1 tablet (5 mg) by mouth daily 90 tablet 3     fluconazole (DIFLUCAN) 200 MG tablet Take 1 tablet (200 mg) by mouth daily 90 tablet 3     guaiFENesin (MUCINEX) 600 MG 12 hr tablet Take 2 tablets (1,200 mg) by mouth 2 times daily 120 tablet 11     hydrOXYzine (ATARAX) 50 MG tablet Per Dr. LOPEZ,OLUKAYODE       lactulose 20 GM/30ML SOLN Take 45 mLs by mouth 2 times daily For constipation. 5400 mL 11     lamoTRIgine (LAMICTAL) 150 MG tablet Take 1 tablet (150 mg) by mouth 3 times daily       LANsoprazole (PREVACID) 30 MG DR capsule Take 1 capsule (30 mg) by mouth 2 times daily 180 capsule 3     lidocaine (LIDODERM) 5 % patch Place  onto the skin every 24 hours To prevent lidocaine toxicity, patient should be patch free for 12 hrs daily. 30 patch 11     lidocaine (XYLOCAINE) 2 % external gel Apply 1 inch topically 4 times a day as needed to gums. 85 g 11     methocarbamol (ROBAXIN) 500 MG tablet Take 2 tablets (1,000 mg) by mouth 4 times daily For use beginning 1/26/23 120 tablet 1     metoprolol succinate ER (TOPROL-XL) 50 MG 24 hr tablet Take 1 tablet (50 mg) by mouth daily 90 tablet 3     montelukast (SINGULAIR) 10 MG tablet Take 1 tablet by mouth daily       naloxegol (MOVANTIK) 25 MG TABS tablet Take 1 tablet (25 mg) by mouth every morning (before breakfast) 30 tablet 1     naloxone (NARCAN) 4 MG/0.1ML nasal spray 1 spray (4 mg dose) into one nostril for opioid reversal. Call 911. May repeat if no response in 3 minutes.       nystatin (MYCOSTATIN) 844624 UNIT/ML suspension Take 10 mLs (1,000,000 Units) by mouth every 3 hours as needed (thrush) To continue while taking levofloxacin and fluconazole 800 mL 3     oxyCODONE IR (ROXICODONE) 15 MG tablet Take 1 tablet (15 mg) by mouth 8 times daily One tablet every three hours 8 am, 11 am, 2pm, 5 pm, 8 pm, 11 pm, 2 am, 5 am, dispense 1/31/23 for start 2/2/23 112 tablet 0     polyethylene glycol (MIRALAX) 17 GM/Dose powder Take 17 g (1 capful) by mouth 2 times daily 3060 g 3     QUEtiapine (SEROQUEL) 100 MG tablet Take 1 tablet (100 mg) by mouth 4 times daily as needed (anxiety) 60 tablet 1     QUEtiapine (SEROQUEL) 300 MG tablet Take 2 tablets (600 mg) by mouth At Bedtime 180 tablet 0     sulfamethoxazole-trimethoprim (BACTRIM DS) 800-160 MG tablet Take 1 tablet by mouth 2 times daily 28 tablet 0     tamsulosin (FLOMAX) 0.4 MG capsule Take 1 capsule (0.4 mg) by mouth 2 times daily 180 capsule 3     zonisamide (ZONEGRAN) 25 MG capsule Take 3 capsules (75 mg) by mouth 4 times daily 180 capsule 3     Allergies   Allergen Reactions     Gabapentin Other (See Comments)     Edema     Lyrica [Pregabalin]  "Other (See Comments)     Edema     Amitriptyline Unknown     hallucinations     Seasonal Allergies      Topiramate Unknown     hallucinations     Family History: There is no h/o  malignancy.  There is no h/o urolithiasis.     Social History     Socioeconomic History     Marital status:      Spouse name: Not on file     Number of children: Not on file     Years of education: Not on file     Highest education level: Not on file   Occupational History     Not on file   Tobacco Use     Smoking status: Former     Packs/day: 0.50     Years: 11.00     Pack years: 5.50     Types: Cigarettes     Start date: 2009     Quit date: 2017     Years since quittin.9     Smokeless tobacco: Former     Tobacco comments:     0.5 pack per day   Substance and Sexual Activity     Alcohol use: No     Drug use: No     Sexual activity: Not Currently     Partners: Female   Other Topics Concern     Not on file   Social History Narrative    He is .  He has been a  and also a counselor, and worked with [employer].  He is now on disability due to his brain injury and bipolar disease.        Quit smoking 2021        Quit drinking         At [facility] since 2020     Social Determinants of Health     Financial Resource Strain: Not on file   Food Insecurity: Not on file   Transportation Needs: Not on file   Physical Activity: Not on file   Stress: Not on file   Social Connections: Not on file   Intimate Partner Violence: Not on file   Housing Stability: Not on file       PHYSICAL EXAM:   Vitals:    23 1046   Weight: 90.7 kg (200 lb)   Height: 1.727 m (5' 8\")     GEN: NAD  EYES: EOMI  NEURO: AAO  : Clear per report    ASSESSMENT/PLAN:  53 year old male who developed acute urinary retention requiring Riddle catheter placement. Has been taking tamsulosin BID and finasteride.   -OK for TOV at [facility] today. Notify me if Riddle needs to be replaced.  -Continue " dual therapy for now (suspect he will not need finasteride if able to void well without Riddle. This takes 6-12 mo to be effective).   -follow-up with me in 4 wks for UA/PVR.     Should the patient continue to have voiding difficulty, the next appropriate studies would be cystoscopy to evaluate for bladder outlet obstruction.    Marline Prakash PA-C  [facility]rology      32 minutes spent on the date of the encounter doing chart review, review of outside records, review of test results, interpretation of tests, patient visit and documentation

## 2023-02-07 NOTE — TELEPHONE ENCOUNTER
February 7, 2023    [facility] Forms: [facility] order numbers:  [id number] health care orders were received via fax for [facility] Providers: Dr. Lund to sign.  Patient label was attached to paperwork and placed in provider's inbox to be signed.    Helen Bowen

## 2023-02-07 NOTE — PROGRESS NOTES
"Send link to cell phone    Pt was constipated and that is what caused his retention.  Pt is in a TCU and states a nurse there can take out his cath out.  Pt states he has always had discomfort down from his belly button and to the R.  Pt had hernia repairs on the L side.  Pt got some lymphedema.      Jan is a 53 year old who is being evaluated via a billable video visit.      How would you like to obtain your AVS? MyChart  If the video visit is dropped, the invitation should be resent by: Text to cell phone: [phone]  Will anyone else be joining your video visit? No        Video-Visit Details    Type of service:  Video Visit   Video Start Time: 11:00 AM  Video End Time:11:18 AM    Originating Location (pt. Location): Home    Distant Location (provider location):  On-site  Platform used for Video Visit: Veeva     CC: Urinary retention.    HPI: It is a pleasure to see Mr. Bola Lyon , a pleasant 53 year old male seen today in the urology clinic in consultation from Dr. Lund for evaluation of urinary retention. He was admitted to [facility] for several days (see Jan discharge summary from 1/24) for n/v, HoK, urinary retention (>800cc on first straight cath). This eventually required Riddle catheter placement. This has been draining well with pale, yellow urine. The patient is tolerating the catheter without issue. No clots of hematuria noted.     Narc dependent and was \"severely\" constipated during this time. CT noted bladder distention. Was started on Flomax during hospitalization and then finasteride as well as sulfa for prostatitis after discharge in seeing Dr. Lund in [facility]U.     The patient has no prior history of AUR. At baseline, patient does note some slow stream, dribbling. Currently denies fevers, chills, N/V. He has a hx of back pain, TBI and Bipolar.    Past Medical History:   Diagnosis Date     AC (acromioclavicular) arthritis 10/24/2011     Aftercare following surgery of the musculoskeletal " system 04/25/2012     Anxiety disorder      Anxiety state, unspecified     Created by Conversion      Arthritis      Biceps tendon rupture, proximal left     Bipolar 2 disorder (H)      Brachial neuritis or radiculitis           Brachial neuritis or radiculitis NOS     Created by Conversion      Cervical radiculopathy at C8 04/30/2018     Claustrophobia      COPD (chronic obstructive pulmonary disease) (H) 12/21/2020     Disturbance of skin sensation 11/30/2011     Encounter for chronic pain management 02/11/2015     GERD (gastroesophageal reflux disease)      Gout      Hernia, abdominal      Hiatal hernia      Hypertension      Impingement syndrome of right shoulder      Lymphedema 02/11/2015    left     Numbness and tingling      Other affections of shoulder region, not elsewhere classified     Created by Conversion       Other chronic pain      Pain in joint, shoulder region 10/10/2011     Panic attacks      PTSD (post-traumatic stress disorder)      TBI (traumatic brain injury)     post concussion syndrome     Traumatic brain injury with loss of consciousness, sequela (H) 06/24/2022     Unspecified essential hypertension     Created by Conversion      Unspecified site of spinal cord injury without evidence of spinal bone injury      Past Surgical History:   Procedure Laterality Date     ANTERIOR / POSTERIOR COMBINED FUSION CERVICAL SPINE  2013, redo in 2014    4 levels     ARTHRODESIS ANKLE  right     ARTHROSCOPY KNEE  01/01/2014     DECOMPRESSION, FUSION CERVICAL ANTERIOR THREE+ LEVELS, COMBINED  05/05/2014    Procedure: COMBINED DECOMPRESSION, FUSION CERVICAL ANTERIOR THREE+ LEVELS;  Surgeon: Aron Gardner MD;  Location: [facility]     EXPLORE SPINE, REMOVE HARDWARE, COMBINED  05/05/2014    Procedure: COMBINED EXPLORE SPINE, REMOVE HARDWARE;  Surgeon: Aron Gardner MD;  Location: [facility]     FUSION CERVICAL ANTERIOR THREE+ LEVELS  01/28/2013    Procedure: FUSION CERVICAL ANTERIOR THREE+ LEVELS;   ANTERIOR CERVICAL DISCECTOMY AND FUSION C4-C5, REVISION ANTERIOR CERVICAL DISCECTOMY AND FUSION C5-6, C6-7, RIGHT ANTERIOR ILIAC CREST BONE GRAFT(C-ARM, 3080 TABLE, SYNTHES CSLP SET, TRICORTICAL ALLOGRAFT);  Surgeon: Hermilo Bey MD;  Location: [facility]     FUSION CERVICAL ANTERIOR TWO LEVELS       FUSION CERVICAL POSTERIOR THREE+ LEVELS  05/05/2014    Procedure: FUSION CERVICAL POSTERIOR THREE+ LEVELS;  Surgeon: Aron Gardner MD;  Location: [facility]     GRAFT BONE FROM ILIAC CREST  01/28/2013    Procedure: GRAFT BONE FROM ILIAC CREST;;  Surgeon: Hermilo Bey MD;  Location: [facility]     HAND SURGERY Left 01/01/1997     HERNIA REPAIR      x3     HERNIA REPAIR Left 01/01/2006     HERNIA REPAIR Right 01/01/2008    and middle     HERNIORRHAPHY VENTRAL  01/01/2008     IR CERVICAL EPIDURAL STEROID INJECTION  04/05/2018     IR CERVICAL TRANSFORAMINAL EPIDURAL STRD INJ  12/07/2017     NASAL POLYP SURGERY  01/01/2014    and septal repair, Dr. Arcos     SC LDR ARTHROSCOP,SURG,W/ROTAT CUFF REPR Right 11/25/2015    Procedure: RIGHT SHOULDER ARTHROSCOPY, ROTATOR CUFF REPAIR, DECOMPRESSION, DEBRIDEMENT, DISTAL CLAVICLE EXCISION;  Surgeon: Pilo Bruce MD;  Location: [facility];  Service: Orthopedics     REPLACEMENT TOTAL KNEE Left 03/20/2017     SHOULDER ARTHROSCOPY DISTAL CLAVICLE EXCISION AND OPEN ROTATOR CUFF REPAIR Bilateral 2005 and 2013     SHOULDER SURGERY  left     ZZC ARTHRODESIS,ANKLE,OPEN Right 01/01/2007    Ankle fusion     Current Outpatient Medications   Medication Sig Dispense Refill     acetaminophen (TYLENOL) 650 MG CR tablet 2 TABLETS (30051HQ) ORALLY 3 TIMES DAILY (MAX APAP:4GM/24HR) 540 tablet 1     albuterol (PROAIR HFA/PROVENTIL HFA/VENTOLIN HFA) 108 (90 Base) MCG/ACT inhaler Inhale 2 puffs into the lungs every 6 hours as needed 6.7 g 11     allopurinol (ZYLOPRIM) 300 MG tablet Take 1 tablet (300 mg) by mouth 2 times daily 180 tablet 3     ammonium lactate (LAC-HYDRIN) 12 % external  lotion        amphetamine-dextroamphetamine (ADDERALL) 15 MG tablet Take 1 tablet (15 mg) by mouth daily 30 tablet 0     bisacodyl (DULCOLAX) 10 MG suppository Place 1 suppository (10 mg) rectally daily as needed for constipation 60 suppository 1     buprenorphine (BUTRANS) 20 MCG/HR WK patch Place 1 patch onto the skin       buprenorphine (BUTRANS) 20 MCG/HR WK patch Place 1 patch onto the skin every 7 days May fill/start 1/19/23 4 patch 0     buPROPion (WELLBUTRIN SR) 150 MG 12 hr tablet Take 1 tablet (150 mg) by mouth 2 times daily 180 tablet 3     CALCIUM ANTACID 500 MG chewable tablet        chlorhexidine (PERIDEX) 0.12 % solution Swish and spit 15 mLs in mouth 3 times daily as needed (sore throat) - Swish & Spit 473 mL 11     ergocalciferol (ERGOCALCIFEROL) 1.25 MG (96567 UT) capsule Take 1 capsule (50,000 Units) by mouth once a week 12 capsule 3     fexofenadine (ALLEGRA) 180 MG tablet Take 180 mg by mouth daily as needed       finasteride (PROSCAR) 5 MG tablet Take 1 tablet (5 mg) by mouth daily 90 tablet 3     fluconazole (DIFLUCAN) 200 MG tablet Take 1 tablet (200 mg) by mouth daily 90 tablet 3     guaiFENesin (MUCINEX) 600 MG 12 hr tablet Take 2 tablets (1,200 mg) by mouth 2 times daily 120 tablet 11     hydrOXYzine (ATARAX) 50 MG tablet Per Dr. LOPEZ,JACQUELYN       lactulose 20 GM/30ML SOLN Take 45 mLs by mouth 2 times daily For constipation. 5400 mL 11     lamoTRIgine (LAMICTAL) 150 MG tablet Take 1 tablet (150 mg) by mouth 3 times daily       LANsoprazole (PREVACID) 30 MG DR capsule Take 1 capsule (30 mg) by mouth 2 times daily 180 capsule 3     lidocaine (LIDODERM) 5 % patch Place onto the skin every 24 hours To prevent lidocaine toxicity, patient should be patch free for 12 hrs daily. 30 patch 11     lidocaine (XYLOCAINE) 2 % external gel Apply 1 inch topically 4 times a day as needed to gums. 85 g 11     methocarbamol (ROBAXIN) 500 MG tablet Take 2 tablets (1,000 mg) by mouth 4 times daily For  use beginning 1/26/23 120 tablet 1     metoprolol succinate ER (TOPROL-XL) 50 MG 24 hr tablet Take 1 tablet (50 mg) by mouth daily 90 tablet 3     montelukast (SINGULAIR) 10 MG tablet Take 1 tablet by mouth daily       naloxegol (MOVANTIK) 25 MG TABS tablet Take 1 tablet (25 mg) by mouth every morning (before breakfast) 30 tablet 1     naloxone (NARCAN) 4 MG/0.1ML nasal spray 1 spray (4 mg dose) into one nostril for opioid reversal. Call 911. May repeat if no response in 3 minutes.       nystatin (MYCOSTATIN) 603900 UNIT/ML suspension Take 10 mLs (1,000,000 Units) by mouth every 3 hours as needed (thrush) To continue while taking levofloxacin and fluconazole 800 mL 3     oxyCODONE IR (ROXICODONE) 15 MG tablet Take 1 tablet (15 mg) by mouth 8 times daily One tablet every three hours 8 am, 11 am, 2pm, 5 pm, 8 pm, 11 pm, 2 am, 5 am, dispense 1/31/23 for start 2/2/23 112 tablet 0     polyethylene glycol (MIRALAX) 17 GM/Dose powder Take 17 g (1 capful) by mouth 2 times daily 3060 g 3     QUEtiapine (SEROQUEL) 100 MG tablet Take 1 tablet (100 mg) by mouth 4 times daily as needed (anxiety) 60 tablet 1     QUEtiapine (SEROQUEL) 300 MG tablet Take 2 tablets (600 mg) by mouth At Bedtime 180 tablet 0     sulfamethoxazole-trimethoprim (BACTRIM DS) 800-160 MG tablet Take 1 tablet by mouth 2 times daily 28 tablet 0     tamsulosin (FLOMAX) 0.4 MG capsule Take 1 capsule (0.4 mg) by mouth 2 times daily 180 capsule 3     zonisamide (ZONEGRAN) 25 MG capsule Take 3 capsules (75 mg) by mouth 4 times daily 180 capsule 3     Allergies   Allergen Reactions     Gabapentin Other (See Comments)     Edema     Lyrica [Pregabalin] Other (See Comments)     Edema     Amitriptyline Unknown     hallucinations     Seasonal Allergies      Topiramate Unknown     hallucinations     Family History: There is no h/o  malignancy.  There is no h/o urolithiasis.     Social History     Socioeconomic History     Marital status:      Spouse name: Not  "on file     Number of children: Not on file     Years of education: Not on file     Highest education level: Not on file   Occupational History     Not on file   Tobacco Use     Smoking status: Former     Packs/day: 0.50     Years: 11.00     Pack years: 5.50     Types: Cigarettes     Start date: 2009     Quit date: 2017     Years since quittin.9     Smokeless tobacco: Former     Tobacco comments:     0.5 pack per day   Substance and Sexual Activity     Alcohol use: No     Drug use: No     Sexual activity: Not Currently     Partners: Female   Other Topics Concern     Not on file   Social History Narrative    He is .  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  He is now on disability due to his brain injury and bipolar disease.        Quit smoking 2021        Quit drinking         At [facility] since 2020     Social Determinants of Health     Financial Resource Strain: Not on file   Food Insecurity: Not on file   Transportation Needs: Not on file   Physical Activity: Not on file   Stress: Not on file   Social Connections: Not on file   Intimate Partner Violence: Not on file   Housing Stability: Not on file       PHYSICAL EXAM:   Vitals:    23 1046   Weight: 90.7 kg (200 lb)   Height: 1.727 m (5' 8\")     GEN: NAD  EYES: EOMI  NEURO: AAO  : Clear per report    ASSESSMENT/PLAN:  53 year old male who developed acute urinary retention requiring Riddle catheter placement. Has been taking tamsulosin BID and finasteride.   -OK for TOV at TCU today. Notify me if Riddle needs to be replaced.  -Continue dual therapy for now (suspect he will not need finasteride if able to void well without Riddle. This takes 6-12 mo to be effective).   -follow-up with me in 4 wks for UA/PVR.     Should the patient continue to have voiding difficulty, the next appropriate studies would be cystoscopy to evaluate for bladder outlet " obstruction.    Marline Prakash PA-C  [facility]      32 minutes spent on the date of the encounter doing chart review, review of outside records, review of test results, interpretation of tests, patient visit and documentation

## 2023-02-08 RX ORDER — DEXTROAMPHETAMINE SACCHARATE, AMPHETAMINE ASPARTATE, DEXTROAMPHETAMINE SULFATE AND AMPHETAMINE SULFATE 3.75; 3.75; 3.75; 3.75 MG/1; MG/1; MG/1; MG/1
15 TABLET ORAL DAILY
Qty: 30 TABLET | Refills: 0 | Status: SHIPPED | OUTPATIENT
Start: 2023-02-08 | End: 2023-03-13

## 2023-02-08 NOTE — TELEPHONE ENCOUNTER
February 8, 2023    [facility] Forms: [facility] received from outbox of [facility] Providers: Dr. Lund.  Paperwork has been reviewed and is complete.  Per initial initial request, this was sent via fax to [phone].     Helen Bowen

## 2023-02-08 NOTE — TELEPHONE ENCOUNTER
February 8, 2023    [facility] Forms: [facility] order numbers [id number] received from outbox of [facility] [facility] Providers: Dr. Lund.  Paperwork has been reviewed and is complete.  Per initial initial request, this was sent via fax to [phone].     Helen Bowen

## 2023-02-08 NOTE — TELEPHONE ENCOUNTER
February 8, 2023    [facility] Forms: [facility] order number:  [id number] received from outbox of [facility] Providers: Dr. Lund.  Paperwork has been reviewed and is complete.  Per initial initial request, this was sent via fax to [phone].     Helen Bowen

## 2023-02-09 ENCOUNTER — TELEPHONE (OUTPATIENT)
Dept: INTERNAL MEDICINE | Facility: CLINIC | Age: 53
End: 2023-02-09
Payer: COMMERCIAL

## 2023-02-09 NOTE — TELEPHONE ENCOUNTER
Verbal Order    Continued PT 1 time per week for 2 weeks, for strength, gate, transfer and balance.

## 2023-02-09 NOTE — TELEPHONE ENCOUNTER
February 9, 2023    [facility] Forms: [facility] order number:  [id number]  Home health care orders were received via fax for [facility] Providers: Dr. Lund to sign.  Patient label was attached to paperwork and placed in provider's inbox to be signed.    Helen Bowen

## 2023-02-10 ENCOUNTER — TELEPHONE (OUTPATIENT)
Dept: INTERNAL MEDICINE | Facility: CLINIC | Age: 53
End: 2023-02-10
Payer: COMMERCIAL

## 2023-02-10 DIAGNOSIS — G89.4 CHRONIC PAIN SYNDROME: ICD-10-CM

## 2023-02-10 DIAGNOSIS — M48.02 FORAMINAL STENOSIS OF CERVICAL REGION: ICD-10-CM

## 2023-02-10 RX ORDER — METHOCARBAMOL 500 MG/1
1000 TABLET, FILM COATED ORAL 4 TIMES DAILY
Qty: 120 TABLET | Refills: 1 | Status: SHIPPED | OUTPATIENT
Start: 2023-02-10 | End: 2023-03-13

## 2023-02-10 NOTE — TELEPHONE ENCOUNTER
Received fax from pharmacy requesting refill(s) for     methocarbamol (ROBAXIN) 500 MG tablet    Date last filled 02.03.2023 15 days    Last Appt Date:11.28.2022    Next Appt scheduled: 02.28.2023    Pharmacy:   [facility] - [place], [place] - [address],    Will route for processing    Kailyn Ge  [facility] Visit Facilitator

## 2023-02-10 NOTE — TELEPHONE ENCOUNTER
February 10, 2023    [facility] Forms: [facility] order numbers:  [id number] were received via fax for [place] [facility] Providers: Dr. Lund to sign.  Patient label was attached to paperwork and placed in provider's inbox to be signed.    Helen Bowen

## 2023-02-10 NOTE — TELEPHONE ENCOUNTER
The Home Care/Assisted Living/Nursing Facility is calling regarding an established patient.  Has the patient seen Home Care in the past or is currently residing in Assisted Living or Nursing Facility? Yes.     Carin calling from [facility] requesting the following orders that are within the Home Care, Assisted Living or Nursing Home Eval and Treatment standing order and can be signed as standing order signature required by RN.    Preferred Call Back Number: [phone]    PT/OT/Speech Therapy   PT 1 time per week for 2 weeks, for strength, gate, transfer and balance.      Any additional Orders:  Are there any orders requested, not stated above, that are outside of the standing order and must be routed to a licensed practitioner for approval?    No    Writer has verified [employer] will send fax to have orders signed.  Left secure voicemail as requested.

## 2023-02-11 RX ORDER — SENNOSIDES 8.6 MG
CAPSULE ORAL
Qty: 48 TABLET | Refills: 10 | OUTPATIENT
Start: 2023-02-11

## 2023-02-13 ENCOUNTER — TELEPHONE (OUTPATIENT)
Dept: UROLOGY | Facility: CLINIC | Age: 53
End: 2023-02-13
Payer: COMMERCIAL

## 2023-02-13 NOTE — TELEPHONE ENCOUNTER
[facility] Call Center    Phone Message    May a detailed message be left on voicemail: yes     Reason for Call: Pt had a folly on upon getting out of the hospital and a virtual visit with Marline followed by a nurse at his assisted living having done TOV and has a follow up with nurse at the home. Pt is thinking that they may have removed the folly too soon. He's wondering if the nurse can get orders to put folly back on.   (pt did state nurse would be in around 1:30 today 2/13)  Please follow up with pt at earliest convenience to discuss. Thank you    Action Taken: Message routed to:  Other: Uro    Travel Screening: Not Applicable

## 2023-02-13 NOTE — TELEPHONE ENCOUNTER
Patient's homecare nurse returned a call and informed her to replace wagner per patient's request. Instructed to insert a 16 Palestinian coude-tip catheter with 8cc with balloon. Informed that we would change at his upcoming appointment a month from now in March.     Radha Lutz LPN

## 2023-02-13 NOTE — TELEPHONE ENCOUNTER
Returned phone call to patient and he informed me that his wagner catheter was removed about one week ago. He feels he is not emptying his bladder well and requests wagner catheter be replaced. Instructed patient to have his nurse give us a call to give orders to replace wagner catheter. Gave him our direct dial to clinic.     Radha Lutz LPN

## 2023-02-14 ENCOUNTER — TELEPHONE (OUTPATIENT)
Dept: INTERNAL MEDICINE | Facility: CLINIC | Age: 53
End: 2023-02-14
Payer: COMMERCIAL

## 2023-02-14 NOTE — TELEPHONE ENCOUNTER
February 14, 2023    [facility] Forms: [facility] health care orders were received via fax for [place] [facility] Providers: Dr. Lund to sign.  Patient label was attached to paperwork and placed in provider's inbox to be signed.    Fidencio Panchal III

## 2023-02-15 NOTE — TELEPHONE ENCOUNTER
February 15, 2023    [facility] Forms: [facility] received from outbox of [facility] [facility] Providers: Dr. Lund.  Paperwork has been reviewed and is complete.  Per initial initial request, this was sent via fax to [phone].     Caroline Noriega

## 2023-02-15 NOTE — TELEPHONE ENCOUNTER
February 15, 2023    [facility] Forms: [facility] received from outbox of [facility] Providers: Dr. Lund.  Paperwork has been reviewed and is complete.  Per initial initial request, this was sent via fax to [phone].     Caroline Noriega

## 2023-02-15 NOTE — TELEPHONE ENCOUNTER
February 15, 2023    [facility] Forms: [facility] received from outbox of [place] [facility] Providers: Dr. Lund.  Paperwork has been reviewed and is complete.  Per initial initial request, this was sent via fax to [phone].     Caroline Noriega

## 2023-02-17 ENCOUNTER — TELEPHONE (OUTPATIENT)
Dept: INTERNAL MEDICINE | Facility: CLINIC | Age: 53
End: 2023-02-17
Payer: COMMERCIAL

## 2023-02-17 NOTE — TELEPHONE ENCOUNTER
General Call      Reason for Call:  Pt calling to go over his medications    Please advise    What are your questions or concerns:  n/a    Date of last appointment with provider: n/a    Could we send this information to you in [facility] or would you prefer to receive a phone call?:   Patient would prefer a phone call   Okay to leave a detailed message?: Yes at Home number on file [phone] (home)

## 2023-02-20 ENCOUNTER — NURSE TRIAGE (OUTPATIENT)
Dept: NURSING | Facility: CLINIC | Age: 53
End: 2023-02-20
Payer: COMMERCIAL

## 2023-02-20 NOTE — TELEPHONE ENCOUNTER
Patient wants to reinstate a prescription for Flomax and Proscar and is wondering if he should still be on Lasix.    He has a follow up with urology on 3-14-23 after a wagner was replaced by a nurse from that clinic at [facility].    Nurse spoke with him to get back on these medications, prescribed by his PCP.       There was a new prescription for Flomax 1-27-23 and Proscar 12-27-23 and they are both active with refills.    Patient would need a new prescription for Lasix if he is to continue.  Patient verbalized understanding and will follow up with PCP about the Lasix.    Janice Reynolds RN  [facility]      Reason for Disposition    [1] Caller has NON-URGENT medicine question about med that PCP prescribed AND [2] triager unable to answer question    Protocols used: MEDICATION QUESTION CALL-A-

## 2023-02-20 NOTE — TELEPHONE ENCOUNTER
General Call    Contacts       Type Contact Phone/Fax    02/17/2023 03:57 PM CST Phone (Incoming) Jan Lyon Jr. (Self) [phone] (H)    02/20/2023 12:29 PM CST Phone (Incoming) Jan Lyon Jr. (Self) [phone] (H)        Reason for Call:  Following up on his call from Friday    What are your questions or concerns:  Would like to speak with a nurse regarding his current medications and the possibility of reinstating a couple of old meds       would you prefer to receive a phone call?:   Patient would prefer a phone call     Okay to leave a detailed message?: Yes at Home number on file [phone] ([place])

## 2023-02-21 ENCOUNTER — TELEPHONE (OUTPATIENT)
Dept: INTERNAL MEDICINE | Facility: CLINIC | Age: 53
End: 2023-02-21
Payer: COMMERCIAL

## 2023-02-21 NOTE — TELEPHONE ENCOUNTER
February 21, 2023    [facility] Forms: [facility] Home health care orders were received via fax for [place] [facility] Providers: Dr. Lund to sign.  Patient label was attached to paperwork and placed in provider's inbox to be signed.    Caroline Noriega

## 2023-02-22 ENCOUNTER — VIRTUAL VISIT (OUTPATIENT)
Dept: INTERNAL MEDICINE | Facility: CLINIC | Age: 53
End: 2023-02-22
Payer: COMMERCIAL

## 2023-02-22 DIAGNOSIS — I10 ESSENTIAL HYPERTENSION: ICD-10-CM

## 2023-02-22 DIAGNOSIS — E55.9 VITAMIN D DEFICIENCY: ICD-10-CM

## 2023-02-22 DIAGNOSIS — D64.9 ANEMIA, UNSPECIFIED TYPE: ICD-10-CM

## 2023-02-22 DIAGNOSIS — I89.0 LYMPHEDEMA OF BOTH LOWER EXTREMITIES: ICD-10-CM

## 2023-02-22 DIAGNOSIS — R33.9 URINARY RETENTION WITH INCOMPLETE BLADDER EMPTYING: ICD-10-CM

## 2023-02-22 DIAGNOSIS — N41.0 ACUTE PROSTATITIS: Primary | ICD-10-CM

## 2023-02-22 PROCEDURE — 99214 OFFICE O/P EST MOD 30 MIN: CPT | Mod: VID | Performed by: INTERNAL MEDICINE

## 2023-02-22 RX ORDER — SULFAMETHOXAZOLE/TRIMETHOPRIM 800-160 MG
1 TABLET ORAL 2 TIMES DAILY
Qty: 28 TABLET | Refills: 0 | Status: SHIPPED | OUTPATIENT
Start: 2023-02-22 | End: 2023-03-31

## 2023-02-22 RX ORDER — TAMSULOSIN HYDROCHLORIDE 0.4 MG/1
0.4 CAPSULE ORAL 2 TIMES DAILY
Qty: 180 CAPSULE | Refills: 3 | Status: SHIPPED | OUTPATIENT
Start: 2023-02-22 | End: 2024-09-24

## 2023-02-22 RX ORDER — FUROSEMIDE 40 MG
40 TABLET ORAL DAILY
Qty: 30 TABLET | Refills: 11 | Status: SHIPPED | OUTPATIENT
Start: 2023-02-22 | End: 2023-12-18

## 2023-02-22 ASSESSMENT — ANXIETY QUESTIONNAIRES
5. BEING SO RESTLESS THAT IT IS HARD TO SIT STILL: NEARLY EVERY DAY
1. FEELING NERVOUS, ANXIOUS, OR ON EDGE: NEARLY EVERY DAY
6. BECOMING EASILY ANNOYED OR IRRITABLE: NEARLY EVERY DAY
7. FEELING AFRAID AS IF SOMETHING AWFUL MIGHT HAPPEN: SEVERAL DAYS
7. FEELING AFRAID AS IF SOMETHING AWFUL MIGHT HAPPEN: SEVERAL DAYS
GAD7 TOTAL SCORE: 19
5. BEING SO RESTLESS THAT IT IS HARD TO SIT STILL: NEARLY EVERY DAY
2. NOT BEING ABLE TO STOP OR CONTROL WORRYING: NEARLY EVERY DAY
IF YOU CHECKED OFF ANY PROBLEMS ON THIS QUESTIONNAIRE, HOW DIFFICULT HAVE THESE PROBLEMS MADE IT FOR YOU TO DO YOUR WORK, TAKE CARE OF THINGS AT HOME, OR GET ALONG WITH OTHER PEOPLE: EXTREMELY DIFFICULT
GAD7 TOTAL SCORE: 19
4. TROUBLE RELAXING: NEARLY EVERY DAY
GAD7 TOTAL SCORE: 19
7. FEELING AFRAID AS IF SOMETHING AWFUL MIGHT HAPPEN: SEVERAL DAYS
4. TROUBLE RELAXING: NEARLY EVERY DAY
3. WORRYING TOO MUCH ABOUT DIFFERENT THINGS: NEARLY EVERY DAY
8. IF YOU CHECKED OFF ANY PROBLEMS, HOW DIFFICULT HAVE THESE MADE IT FOR YOU TO DO YOUR WORK, TAKE CARE OF THINGS AT HOME, OR GET ALONG WITH OTHER PEOPLE?: EXTREMELY DIFFICULT
GAD7 TOTAL SCORE: 19
1. FEELING NERVOUS, ANXIOUS, OR ON EDGE: NEARLY EVERY DAY
2. NOT BEING ABLE TO STOP OR CONTROL WORRYING: NEARLY EVERY DAY
3. WORRYING TOO MUCH ABOUT DIFFERENT THINGS: NEARLY EVERY DAY
6. BECOMING EASILY ANNOYED OR IRRITABLE: NEARLY EVERY DAY
8. IF YOU CHECKED OFF ANY PROBLEMS, HOW DIFFICULT HAVE THESE MADE IT FOR YOU TO DO YOUR WORK, TAKE CARE OF THINGS AT HOME, OR GET ALONG WITH OTHER PEOPLE?: EXTREMELY DIFFICULT
7. FEELING AFRAID AS IF SOMETHING AWFUL MIGHT HAPPEN: SEVERAL DAYS
IF YOU CHECKED OFF ANY PROBLEMS ON THIS QUESTIONNAIRE, HOW DIFFICULT HAVE THESE PROBLEMS MADE IT FOR YOU TO DO YOUR WORK, TAKE CARE OF THINGS AT HOME, OR GET ALONG WITH OTHER PEOPLE: EXTREMELY DIFFICULT

## 2023-02-22 NOTE — TELEPHONE ENCOUNTER
Patient asking to get medications renewed and that he was only given short Rxs at his last visit.  Per chart review, Dr. Lund advised a follow up visit on 02/10/23.  Writer scheduled patient for virtual visit today 02/22/23.

## 2023-02-22 NOTE — PROGRESS NOTES
"Jan is a 53 year old who is being evaluated via a billable video visit.      How would you like to obtain your AVS? MyChart  If the video visit is dropped, the invitation should be resent by: Text to cell phone: [phone]  Will anyone else be joining your video visit? No  {If patient encounters technical issues they should call [phone] :688488}        {PROVIDER CHARTING PREFERENCE:466394}    Subjective   Jan is a 53 year old accompanied by his self, presenting for the following health issues:  Office Visit (Abdominal bladder issues and hospital stay. Discuss medications.) and Recheck Medication      History of Present Illness       Reason for visit:  Follow up    He eats 2-3 servings of fruits and vegetables daily.He consumes 6 sweetened beverage(s) daily.He exercises with enough effort to increase his heart rate 30 to 60 minutes per day.  He exercises with enough effort to increase his heart rate 3 or less days per week.   He is taking medications regularly.  Today's CARMEN-7 Score: 19       {SUPERLIST (Optional):050617}  {additonal problems for provider to add (Optional):348874}    Review of Systems   {ROS COMP (Optional):578741}      Objective           Vitals:  No vitals were obtained today due to virtual visit.    Physical Exam   {video visit exam brief selected:295610::\"GENERAL: Healthy, alert and no distress\",\"EYES: Eyes grossly normal to inspection.  No discharge or erythema, or obvious scleral/conjunctival abnormalities.\",\"RESP: No audible wheeze, cough, or visible cyanosis.  No visible retractions or increased work of breathing.  \",\"SKIN: Visible skin clear. No significant rash, abnormal pigmentation or lesions.\",\"NEURO: Cranial nerves grossly intact.  Mentation and speech appropriate for age.\",\"PSYCH: Mentation appears normal, affect normal/bright, judgement and insight intact, normal speech and appearance well-groomed.\"}    {Diagnostic Test Results (Optional):396473}    {AMBULATORY ATTESTATION " (Optional):131552}        Video-Visit Details    Type of service:  Video Visit   Video Start Time: {video visit start/end time for provider to select:378789}  Video End Time:{video visit start/end time for provider to select:919246}    Originating Location (pt. Location): U  {PROVIDER LOCATION On-site should be selected for visits conducted from your clinic location or adjoining [facility] hospital, academic office, or other nearby [facility] building. Off-site should be selected for all other provider locations, including home:622404}  Distant Location (provider location):  {virtual location provider:234097}  Platform used for Video Visit: Doximity

## 2023-02-22 NOTE — PROGRESS NOTES
Bola is a 53 year old male contacting the clinic today via video, who will use the platform: [employer] for the visit.  Phone # for Doximity, or if Amwell drops:   Telephone Information:   Mobile [phone]          ASSESSMENT and PLAN:  1. Acute prostatitis  Repeat course of sulfa with reinsertion of Riddle.  Send new prescription and agree with complaint.  Continue finasteride.  Urology follow-up mid March  - tamsulosin (FLOMAX) 0.4 MG capsule; Take 1 capsule (0.4 mg) by mouth 2 times daily  Dispense: 180 capsule; Refill: 3  - sulfamethoxazole-trimethoprim (BACTRIM DS) 800-160 MG tablet; Take 1 tablet by mouth 2 times daily  Dispense: 28 tablet; Refill: 0    2. Urinary retention with incomplete bladder emptying  Riddle replaced after 6 days suggests retention from narcotics or prostatitis    3. Lymphedema of both lower extremities  Edema worse.  Resume furosemide.  Update labs  - furosemide (LASIX) 40 MG tablet; Take 1 tablet (40 mg) by mouth daily  Dispense: 30 tablet; Refill: 11  - Comprehensive metabolic panel; Future    4. Anemia, unspecified type  Recheck now that condition stabilized.  Bleeding source unclear  - CBC with Platelets & Differential; Future  - Erythrocyte sedimentation rate auto; Future  - Ferritin; Future  - Vitamin B12; Future    5. Essential hypertension  Entered home vitals.  Well-controlled    6. Vitamin D deficiency  - Vitamin D Deficiency; Future       Patient Instructions   Repeat sulfa for an additional 2 weeks    Resume furosemide 40 mg daily    Continue finasteride 5 mg daily    Continue Flomax 0.4 mg twice daily.  Unclear why this was stopped when prescription was sent for 1 year on January 27    Repeat labs to follow-up anemia and resumption of furosemide    Decrease narcotics if able    Follow-up as needed            Return in about 4 months (around 6/22/2023) for using a video visit.       CHIEF COMPLAINT:  Chief Complaint   Patient presents with     Office Visit     Abdominal  bladder issues and hospital stay. Discuss medications.     Recheck Medication       History of Present Illness       Reason for visit:  Follow up    He eats 2-3 servings of fruits and vegetables daily.He consumes 6 sweetened beverage(s) daily.He exercises with enough effort to increase his heart rate 30 to 60 minutes per day.  He exercises with enough effort to increase his heart rate 3 or less days per week.   He is taking medications regularly.  Today's CARMEN-7 Score: 19      HISTORY OF PRESENT ILLNESS:  Bola is a 53 year old male contacting the clinic today via video for review of medication.  When we spoke on January 27 he had had a Riddle catheter placed for urinary retention.  I pointed out the probable cause of prostatitis and narcotic use.  He was placed on 2 weeks of sulfa, Flomax twice daily, and finasteride.  He saw urology on February 7 virtually and catheter was removed.  He was told to focus on constipation.  He tolerated the catheter out for 6 days but felt slow worsening of symptoms and the catheter was replaced on February 13.  He completed 2 weeks of sulfa.  He continues on finasteride.  Somehow the Flomax was discontinued despite a fresh prescription being sent on January 27 which may have contributed.  Constipation has not been a problem    Since stopping the furosemide edema has worsened and he wishes this resumed    Wishes to update COVID-vaccine but does not wish to start over    REVIEW OF SYSTEMS:   Stable blood pressure.  Worsening edema.  Increasing back pain    PFSH:  Social History     Social History Narrative    He is .  He has been a  and also a counselor, and worked with [employer].  He is now on disability due to his brain injury and bipolar disease.        Quit smoking October of 2021        Quit drinking 2010        At [facility] since June of 2020       TOBACCO USE:  History   Smoking Status     Former     Packs/day: 0.50     Years: 11.00  "    Types: Cigarettes     Start date: 8/20/2009     Quit date: 2/21/2017   Smokeless Tobacco     Former       VITALS:  There were no vitals filed for this visit.  There were no vitals taken for this visit. Estimated body mass index is 30.41 kg/m  as calculated from the following:    Height as of 2/7/23: 1.727 m (5' 8\").    Weight as of 2/7/23: 90.7 kg (200 lb).    PHYSICAL EXAM:  (observations via Video)  Alert and oriented.  Poor dentition.  Scraggly beard    MEDICATIONS:   Current Outpatient Medications   Medication Sig Dispense Refill     acetaminophen (TYLENOL) 650 MG CR tablet 2 TABLETS (45578PT) ORALLY 3 TIMES DAILY (MAX APAP:4GM/24HR) 540 tablet 1     albuterol (PROAIR HFA/PROVENTIL HFA/VENTOLIN HFA) 108 (90 Base) MCG/ACT inhaler Inhale 2 puffs into the lungs every 6 hours as needed 6.7 g 11     allopurinol (ZYLOPRIM) 300 MG tablet Take 1 tablet (300 mg) by mouth 2 times daily 180 tablet 3     ammonium lactate (LAC-HYDRIN) 12 % external lotion        amphetamine-dextroamphetamine (ADDERALL) 15 MG tablet Take 1 tablet (15 mg) by mouth daily 30 tablet 0     bisacodyl (DULCOLAX) 10 MG suppository Place 1 suppository (10 mg) rectally daily as needed for constipation 60 suppository 1     buprenorphine (BUTRANS) 20 MCG/HR WK patch Place 1 patch onto the skin       buprenorphine (BUTRANS) 20 MCG/HR WK patch Place 1 patch onto the skin every 7 days May fill/start 1/19/23 4 patch 0     buprenorphine HCl-naloxone HCl (SUBOXONE) 2-0.5 MG per film Place 1 Film under the tongue daily 14 each 2     buPROPion (WELLBUTRIN SR) 150 MG 12 hr tablet Take 1 tablet (150 mg) by mouth 2 times daily 180 tablet 3     CALCIUM ANTACID 500 MG chewable tablet        chlorhexidine (PERIDEX) 0.12 % solution Swish and spit 15 mLs in mouth 3 times daily as needed (sore throat) - Swish & Spit 473 mL 11     ergocalciferol (ERGOCALCIFEROL) 1.25 MG (61980 UT) capsule Take 1 capsule (50,000 Units) by mouth once a week 12 capsule 3     fexofenadine " (ALLEGRA) 180 MG tablet Take 180 mg by mouth daily as needed       finasteride (PROSCAR) 5 MG tablet Take 1 tablet (5 mg) by mouth daily 90 tablet 3     fluconazole (DIFLUCAN) 200 MG tablet Take 1 tablet (200 mg) by mouth daily 90 tablet 3     furosemide (LASIX) 40 MG tablet Take 1 tablet (40 mg) by mouth daily 30 tablet 11     guaiFENesin (MUCINEX) 600 MG 12 hr tablet Take 2 tablets (1,200 mg) by mouth 2 times daily 120 tablet 11     hydrOXYzine (ATARAX) 50 MG tablet Per Dr. LOPEZ,OLUKAYODE       lactulose 20 GM/30ML SOLN Take 45 mLs by mouth 2 times daily For constipation. 5400 mL 11     lamoTRIgine (LAMICTAL) 150 MG tablet Take 1 tablet (150 mg) by mouth 3 times daily       LANsoprazole (PREVACID) 30 MG DR capsule Take 1 capsule (30 mg) by mouth 2 times daily 180 capsule 3     lidocaine (LIDODERM) 5 % patch Place onto the skin every 24 hours To prevent lidocaine toxicity, patient should be patch free for 12 hrs daily. 30 patch 11     lidocaine (XYLOCAINE) 2 % external gel Apply 1 inch topically 4 times a day as needed to gums. 85 g 11     methocarbamol (ROBAXIN) 500 MG tablet Take 2 tablets (1,000 mg) by mouth 4 times daily For use beginning 1/26/23 120 tablet 1     metoprolol succinate ER (TOPROL-XL) 50 MG 24 hr tablet Take 1 tablet (50 mg) by mouth daily 90 tablet 3     montelukast (SINGULAIR) 10 MG tablet Take 1 tablet by mouth daily       naloxegol (MOVANTIK) 25 MG TABS tablet Take 1 tablet (25 mg) by mouth every morning (before breakfast) 30 tablet 1     naloxone (NARCAN) 4 MG/0.1ML nasal spray 1 spray (4 mg dose) into one nostril for opioid reversal. Call 911. May repeat if no response in 3 minutes.       nystatin (MYCOSTATIN) 163285 UNIT/ML suspension Take 10 mLs (1,000,000 Units) by mouth every 3 hours as needed (thrush) To continue while taking levofloxacin and fluconazole 800 mL 3     oxyCODONE IR (ROXICODONE) 15 MG tablet Take 1 tablet (15 mg) by mouth 8 times daily One tablet every three hours 8 am,  11 am, 2pm, 5 pm, 8 pm, 11 pm, 2 am, 5 am, dispense 2/13/23, start 2/16/23 112 tablet 0     polyethylene glycol (MIRALAX) 17 GM/Dose powder Take 17 g (1 capful) by mouth 2 times daily 3060 g 3     QUEtiapine (SEROQUEL) 100 MG tablet Take 1 tablet (100 mg) by mouth 4 times daily as needed (anxiety) 60 tablet 1     QUEtiapine (SEROQUEL) 300 MG tablet Take 2 tablets (600 mg) by mouth At Bedtime 180 tablet 0     sulfamethoxazole-trimethoprim (BACTRIM DS) 800-160 MG tablet Take 1 tablet by mouth 2 times daily 28 tablet 0     tamsulosin (FLOMAX) 0.4 MG capsule Take 1 capsule (0.4 mg) by mouth 2 times daily 180 capsule 3     zonisamide (ZONEGRAN) 25 MG capsule Take 3 capsules (75 mg) by mouth 4 times daily 180 capsule 3       Outside Notes summarized: Urology  Labs, x-rays, cardiology, GI tests reviewed: Hemoglobin 10.  Urine relatively clear  Recent Labs   Lab Test 08/30/22  1321   HGB 12.5*   WBC 7.6   *   POTASSIUM 4.0   CR 0.71   PSA 0.50   URIC 2.0*   B12 336   TSH 0.62   VITDT 29     No results found for: OEAOO04VHO  Lab Results   Component Value Date    CHOL 164 08/30/2022     New orders:   Orders Placed This Encounter   Procedures     Erythrocyte sedimentation rate auto     Ferritin     Vitamin D Deficiency     Vitamin B12     Comprehensive metabolic panel     CBC with Platelets & Differential       Independent review of:     Devon Lund MD  [facility]    Video-Visit Details  Type of service:  Video Visit  Patient has given verbal consent to a Video visit?  Yes  How would you like to obtain your AVS?  MyChart  Will anyone else be joining your video visit, giving supplemental history? No    Originating location (pt location): Assisted Living    Distant Location (provider location):  Off-site    Video Start Time: 3:46 PM  Video End time:  4:18 PM  Face to face plus orders: 32 minutes  Documentation time:  3 minutes     The visit lasted a total of 33 minutes

## 2023-02-22 NOTE — TELEPHONE ENCOUNTER
February 22, 2023    [facility] Forms: [facility] received from outbox of [facility] Providers: Dr. Lund.  Paperwork has been reviewed and is complete.  Per initial initial request, this was sent via fax to [phone].     Caroline Noriega

## 2023-02-28 ENCOUNTER — VIRTUAL VISIT (OUTPATIENT)
Dept: PALLIATIVE MEDICINE | Facility: OTHER | Age: 53
End: 2023-02-28
Payer: COMMERCIAL

## 2023-02-28 DIAGNOSIS — M48.02 FORAMINAL STENOSIS OF CERVICAL REGION: ICD-10-CM

## 2023-02-28 DIAGNOSIS — G89.4 CHRONIC PAIN DISORDER: ICD-10-CM

## 2023-02-28 PROCEDURE — 99213 OFFICE O/P EST LOW 20 MIN: CPT | Mod: VID | Performed by: ANESTHESIOLOGY

## 2023-02-28 PROCEDURE — G0463 HOSPITAL OUTPT CLINIC VISIT: HCPCS | Mod: PN,GT | Performed by: ANESTHESIOLOGY

## 2023-02-28 RX ORDER — BUPRENORPHINE AND NALOXONE 2; .5 MG/1; MG/1
1 FILM, SOLUBLE BUCCAL; SUBLINGUAL 3 TIMES DAILY
Qty: 42 EACH | Refills: 2 | Status: SHIPPED | OUTPATIENT
Start: 2023-02-28 | End: 2023-03-30

## 2023-02-28 RX ORDER — OXYCODONE HYDROCHLORIDE 15 MG/1
15 TABLET ORAL
Qty: 112 TABLET | Refills: 0 | Status: SHIPPED | OUTPATIENT
Start: 2023-02-28 | End: 2023-03-13

## 2023-02-28 ASSESSMENT — PAIN SCALES - GENERAL: PAINLEVEL: SEVERE PAIN (7)

## 2023-02-28 NOTE — PROGRESS NOTES
Patient presents to the clinic today for a follow up with JAYME MULLEN MD regarding Pain Management.    Jan is a 53 year old who is being evaluated via a billable video visit.      How would you like to obtain your AVS? MyChart  If the video visit is dropped, the invitation should be resent by: Text to cell phone: [phone]  Will anyone else be joining your video visit? No        Video-Visit Details    Type of service:  Video Visit   Video Start Time:   Video End Time:    Originating Location (pt. Location): Other transitional care    Distant Location (provider location):  On-site  Platform used for Video Visit: Doximity        Is Pt currently in [place]? Yes  NOTE: If Pt is not in [place], Appointment needs to be canceled and rescheduled      PEG Score 8/26/2022 11/28/2022 2/28/2023   PEG Total Score 9.67 9.33 9       UDS/CSA-07.11. 2022    QUESTIONS:    Kailyn MOLINA [facility] [place] Patient Facilitator

## 2023-02-28 NOTE — PATIENT INSTRUCTIONS
----------------------------------------------------------------  Clinic Number:  [phone]   Call with any questions about your care and for scheduling assistance.   Calls are returned Monday through Friday between 8 AM and 4:30 PM. We usually get back to you within 2 business days depending on the issue/request.    If we are prescribing your medications:  For opioid medication refills, call the clinic or send a Construct message 7 days in advance.  Please include:  Name of requested medication  Name of the pharmacy.  For non-opioid medications, call your pharmacy directly to request a refill. Please allow 3-4 days to be processed.   Per [place] State Law:  All controlled substance prescriptions must be filled within 30 days of being written.    For those controlled substances allowing refills, pickup must occur within 30 days of last fill.      We believe regular attendance is key to your success in our program!    Any time you are unable to keep your appointment we ask that you call us at least 24 hours in advance to cancel.This will allow us to offer the appointment time to another patient.   Multiple missed appointments may lead to dismissal from the clinic.  PLAN:    You may stop the Butrans 20 mcg patch at the end of this week.    Suboxone 2 mg film, 1 film under your tongue twice a day for 4 days, if tolerating and still having pain increase to 1 film 3 times a day.  Call Dr. Cramer after 10 to 12 days with an update.    Continue oxycodone 15 mg every 3 hours.    Discussed the use of a grounding mat, may help with pain and inflammation and swelling.    You are establishing with a new psychotherapist    Follow-up with Dr. Cramer in the office in 2 months

## 2023-03-01 ENCOUNTER — TELEPHONE (OUTPATIENT)
Dept: INTERNAL MEDICINE | Facility: CLINIC | Age: 53
End: 2023-03-01
Payer: COMMERCIAL

## 2023-03-01 NOTE — PROGRESS NOTES
[facility] - Office Visit    ASSESSMENT & PLAN     Bola was seen today for pain.    Diagnoses and all orders for this visit:    Chronic pain disorder  -     buprenorphine HCl-naloxone HCl (SUBOXONE) 2-0.5 MG per film; Place 1 Film under the tongue 3 times daily    Foraminal stenosis of cervical region  -     oxyCODONE IR (ROXICODONE) 15 MG tablet; Take 1 tablet (15 mg) by mouth 8 times daily One tablet every three hours 8 am, 11 am, 2pm, 5 pm, 8 pm, 11 pm, 2 am, 5 am,        Patient Instructions       ----------------------------------------------------------------  Clinic Number:  [phone]     Call with any questions about your care and for scheduling assistance.     Calls are returned Monday through Friday between 8 AM and 4:30 PM. We usually get back to you within 2 business days depending on the issue/request.    If we are prescribing your medications:    For opioid medication refills, call the clinic or send a Workboard message 7 days in advance.  Please include:    Name of requested medication    Name of the pharmacy.    For non-opioid medications, call your pharmacy directly to request a refill. Please allow 3-4 days to be processed.     Per [place] State Law:    All controlled substance prescriptions must be filled within 30 days of being written.      For those controlled substances allowing refills, pickup must occur within 30 days of last fill.      We believe regular attendance is key to your success in our program!      Any time you are unable to keep your appointment we ask that you call us at least 24 hours in advance to cancel.This will allow us to offer the appointment time to another patient.     Multiple missed appointments may lead to dismissal from the clinic.  PLAN:    You may stop the Butrans 20 mcg patch at the end of this week.    Suboxone 2 mg film, 1 film under your tongue twice a day for 4 days, if tolerating and still having pain increase to 1 film 3 times a day.  Call  Dr. Mullen after 10 to 12 days with an update.    Continue oxycodone 15 mg every 3 hours.    Discussed the use of a grounding mat, may help with pain and inflammation and swelling.    You are establishing with a new psychotherapist    Follow-up with Dr. Mullen in the office in 2 months        -----  JAYME MULLEN MD  [facility]       SUBJECTIVE      Bola Lyon Jr. is a 53 year old year old for video visit, followed for more recently extensive lumbar surgery.    Reviews today frustration that he has had return to the Riddle catheter.  Describes there is inflammation in his back possibly relating to being overactive.  This pinches his bladder and has retention.  We will follow-up with his urologist on 3/14.    He has been dealing with a dentist.    Reviews there were remodeling in the area where he lives such that changes were made with his belongings in his room and he may be did too much try to organize them.    Reviews trying to manage his diuretics, follow uric acid through his primary care provider.    His psychiatrist will be setting him up with a new psychotherapist named Mariam.    He has just started on the Suboxone using 2 mg daily for less than a week.  Notes it may temporarily help with a bit of pain, does not seem to have precipitated withdrawal.  Does still have on the Butrans patch.    We had discussed the grounding mat, has not gotten 1.    Acknowledges can still feel depressed sometimes with his slow progress, sleep disruption.    Continues with ice on his back.    Continues with the oxycodone 15 mg every 3 hours.  MP reviewed, last urine drug test in July      Current Outpatient Medications:      acetaminophen (TYLENOL) 650 MG CR tablet, 2 TABLETS (14377IV) ORALLY 3 TIMES DAILY (MAX APAP:4GM/24HR), Disp: 540 tablet, Rfl: 1     albuterol (PROAIR HFA/PROVENTIL HFA/VENTOLIN HFA) 108 (90 Base) MCG/ACT inhaler, Inhale 2 puffs into the lungs every 6 hours as needed, Disp: 6.7  g, Rfl: 11     allopurinol (ZYLOPRIM) 300 MG tablet, Take 1 tablet (300 mg) by mouth 2 times daily, Disp: 180 tablet, Rfl: 3     ammonium lactate (LAC-HYDRIN) 12 % external lotion, , Disp: , Rfl:      amphetamine-dextroamphetamine (ADDERALL) 15 MG tablet, Take 1 tablet (15 mg) by mouth daily, Disp: 30 tablet, Rfl: 0     bisacodyl (DULCOLAX) 10 MG suppository, Place 1 suppository (10 mg) rectally daily as needed for constipation, Disp: 60 suppository, Rfl: 1     buprenorphine (BUTRANS) 20 MCG/HR WK patch, Place 1 patch onto the skin, Disp: , Rfl:      buprenorphine HCl-naloxone HCl (SUBOXONE) 2-0.5 MG per film, Place 1 Film under the tongue 3 times daily, Disp: 42 each, Rfl: 2     buPROPion (WELLBUTRIN SR) 150 MG 12 hr tablet, Take 1 tablet (150 mg) by mouth 2 times daily, Disp: 180 tablet, Rfl: 3     CALCIUM ANTACID 500 MG chewable tablet, , Disp: , Rfl:      chlorhexidine (PERIDEX) 0.12 % solution, Swish and spit 15 mLs in mouth 3 times daily as needed (sore throat) - Swish & Spit, Disp: 473 mL, Rfl: 11     ergocalciferol (ERGOCALCIFEROL) 1.25 MG (37644 UT) capsule, Take 1 capsule (50,000 Units) by mouth once a week, Disp: 12 capsule, Rfl: 3     fexofenadine (ALLEGRA) 180 MG tablet, Take 180 mg by mouth daily as needed, Disp: , Rfl:      finasteride (PROSCAR) 5 MG tablet, Take 1 tablet (5 mg) by mouth daily, Disp: 90 tablet, Rfl: 3     fluconazole (DIFLUCAN) 200 MG tablet, Take 1 tablet (200 mg) by mouth daily, Disp: 90 tablet, Rfl: 3     furosemide (LASIX) 40 MG tablet, Take 1 tablet (40 mg) by mouth daily, Disp: 30 tablet, Rfl: 11     guaiFENesin (MUCINEX) 600 MG 12 hr tablet, Take 2 tablets (1,200 mg) by mouth 2 times daily, Disp: 120 tablet, Rfl: 11     hydrOXYzine (ATARAX) 50 MG tablet, Per JACQUELYN Vallejo, Disp: , Rfl:      lactulose 20 GM/30ML SOLN, Take 45 mLs by mouth 2 times daily For constipation., Disp: 5400 mL, Rfl: 11     lamoTRIgine (LAMICTAL) 150 MG tablet, Take 1 tablet (150 mg) by mouth 3  times daily, Disp: , Rfl:      LANsoprazole (PREVACID) 30 MG DR capsule, Take 1 capsule (30 mg) by mouth 2 times daily, Disp: 180 capsule, Rfl: 3     lidocaine (LIDODERM) 5 % patch, Place onto the skin every 24 hours To prevent lidocaine toxicity, patient should be patch free for 12 hrs daily., Disp: 30 patch, Rfl: 11     lidocaine (XYLOCAINE) 2 % external gel, Apply 1 inch topically 4 times a day as needed to gums., Disp: 85 g, Rfl: 11     methocarbamol (ROBAXIN) 500 MG tablet, Take 2 tablets (1,000 mg) by mouth 4 times daily For use beginning 1/26/23, Disp: 120 tablet, Rfl: 1     metoprolol succinate ER (TOPROL-XL) 50 MG 24 hr tablet, Take 1 tablet (50 mg) by mouth daily, Disp: 90 tablet, Rfl: 3     montelukast (SINGULAIR) 10 MG tablet, Take 1 tablet by mouth daily, Disp: , Rfl:      naloxegol (MOVANTIK) 25 MG TABS tablet, Take 1 tablet (25 mg) by mouth every morning (before breakfast), Disp: 30 tablet, Rfl: 1     naloxone (NARCAN) 4 MG/0.1ML nasal spray, 1 spray (4 mg dose) into one nostril for opioid reversal. Call 911. May repeat if no response in 3 minutes., Disp: , Rfl:      nystatin (MYCOSTATIN) 638259 UNIT/ML suspension, Take 10 mLs (1,000,000 Units) by mouth every 3 hours as needed (thrush) To continue while taking levofloxacin and fluconazole, Disp: 800 mL, Rfl: 3     oxyCODONE IR (ROXICODONE) 15 MG tablet, Take 1 tablet (15 mg) by mouth 8 times daily One tablet every three hours 8 am, 11 am, 2pm, 5 pm, 8 pm, 11 pm, 2 am, 5 am,, Disp: 112 tablet, Rfl: 0     polyethylene glycol (MIRALAX) 17 GM/Dose powder, Take 17 g (1 capful) by mouth 2 times daily, Disp: 3060 g, Rfl: 3     QUEtiapine (SEROQUEL) 100 MG tablet, Take 1 tablet (100 mg) by mouth 4 times daily as needed (anxiety), Disp: 60 tablet, Rfl: 1     QUEtiapine (SEROQUEL) 300 MG tablet, Take 2 tablets (600 mg) by mouth At Bedtime, Disp: 180 tablet, Rfl: 0     sulfamethoxazole-trimethoprim (BACTRIM DS) 800-160 MG tablet, Take 1 tablet by mouth 2 times  daily, Disp: 28 tablet, Rfl: 0     tamsulosin (FLOMAX) 0.4 MG capsule, Take 1 capsule (0.4 mg) by mouth 2 times daily, Disp: 180 capsule, Rfl: 3     zonisamide (ZONEGRAN) 25 MG capsule, Take 3 capsules (75 mg) by mouth 4 times daily, Disp: 180 capsule, Rfl: 3      Review of Systems   General, psych, musculoskeletal, bowels and bladder otherwise normal other than above.          OBJECTIVE          Physical Exam  General: Alert, clear sensorium.  Indeed missing some teeth.  No respiratory distress.  No pain behavior  Cardiovascular  Lungs: Pulmonary effort is normal, speaking in full sentences  MSK  Skin:  No concerning rashes or lesions.  Neurologic: lert and oriented x3  Psychiatric: Constricted, congruent    Assessment: Recovering from significant lumbar fusion, revision.    Reviewed microdosing with buprenorphine.  We will advance the Suboxone as above.  Reviewed expectation we will be tapering the oxycodone as he has been recovering from surgery.    Additional plan as above.    Total time more than 20 minutes  Video start time 1: 02.  Video end time 1: 21.  Patient at home via Bix    Answers for HPI/ROS submitted by the patient on 2/22/2023  CARMEN 7 TOTAL SCORE: 19

## 2023-03-01 NOTE — TELEPHONE ENCOUNTER
March 1, 2023    [facility] Forms: [facility]  received from outbox of [facility] Providers: Dr. Lund.  Paperwork has been reviewed and is complete.  Per initial initial request, this was sent via fax to [phone]    These were a refax to same facility.     Helen Bowen

## 2023-03-02 ENCOUNTER — TELEPHONE (OUTPATIENT)
Dept: INTERNAL MEDICINE | Facility: CLINIC | Age: 53
End: 2023-03-02
Payer: COMMERCIAL

## 2023-03-02 NOTE — TELEPHONE ENCOUNTER
March 2, 2023    [facility] Forms: [facility] orders were received via fax for [facility] Providers: Dr. Lund to sign.  Patient label was attached to paperwork and placed in provider's inbox to be signed.    Helen Bowen

## 2023-03-06 ENCOUNTER — TELEPHONE (OUTPATIENT)
Dept: INTERNAL MEDICINE | Facility: CLINIC | Age: 53
End: 2023-03-06
Payer: COMMERCIAL

## 2023-03-06 NOTE — TELEPHONE ENCOUNTER
March 6, 2023    Home health orders was received via fax for Dr. Lund to sign.  Patient label was attached to paperwork and placed in provider's inbox to be signed.    Helen Bowen

## 2023-03-06 NOTE — TELEPHONE ENCOUNTER
March 6, 2023    Home health orders was received via fax for Dr. Lund to sign.  Patient label was attached to paperwork and placed in provider's inbox to be signed.    Lab results from [facility] placed in inbox of PCP for review    Helen Bowen

## 2023-03-06 NOTE — TELEPHONE ENCOUNTER
General Call      Reason for Call:  Harper from [facility] following up    What are your questions or concerns:  Would like to know what they should do    Date of last appointment with provider: n/a    would you prefer to receive a phone call?:   Harper  would prefer a phone call   Okay to leave a detailed message?: Yes at Other phone number:  [phone]

## 2023-03-07 ENCOUNTER — TELEPHONE (OUTPATIENT)
Dept: INTERNAL MEDICINE | Facility: CLINIC | Age: 53
End: 2023-03-07
Payer: COMMERCIAL

## 2023-03-07 ENCOUNTER — TELEPHONE (OUTPATIENT)
Dept: UROLOGY | Facility: CLINIC | Age: 53
End: 2023-03-07
Payer: COMMERCIAL

## 2023-03-07 NOTE — TELEPHONE ENCOUNTER
March 7, 2023    Home health orders was picked up from outbox of Dr. Lund.  Paperwork has been reviewed and is complete.  Per initial initial request, this was sent via fax to [phone].     Helen Bowen

## 2023-03-07 NOTE — TELEPHONE ENCOUNTER
Catheter was replaced on 2-13-23,he is seeing you next week,on 3-14-23  Do you want to do a TOV at that visit?  Ellie Lock LPN

## 2023-03-07 NOTE — TELEPHONE ENCOUNTER
Test Results    Contacts       Type Contact Phone/Fax    03/07/2023 04:15 PM CST Phone (Incoming) Jan Lyon Jr. (Self) [phone] ()          Who ordered the test:  Dr Lund     Type of test: Lab    Date of test:  March 4 (around there) within the last week    Where was the test performed:  Through home care and then they were taken to [facility] care nurse matt Saleem [phone]    What are your questions/concerns?:  Pt is looking for his results.    Could we send this information to you in ticketea or would you prefer to receive a phone call?:   Patient would prefer a phone call     Okay to leave a detailed message?: Yes at Home number on file [phone] (home)

## 2023-03-07 NOTE — TELEPHONE ENCOUNTER
March 7, 2023    Home health orders was picked up from outbox of Dr. Lund.  Paperwork has been reviewed and is complete.  Per initial initial request, this was sent via fax to [phone]    Helen Bowen

## 2023-03-07 NOTE — CONFIDENTIAL NOTE
Received fax showing labs from March 2 to be hemoglobin of 11.6 and white count of 13.8    Previous hemoglobin was 11.7 so hemoglobin is about the same.  Reason unclear    White blood cell count January 11 was 7.4 so white count of 13.8 is an increase for unclear reasons    Care everywhere updated    Sodium 129, last sodium 132.  Remainder of kidney and liver tests normal    Sed  of inflammation 18    Ferritin level of iron normal 119    Vitamin D level normal 54    Vitamin B12 level normal 448

## 2023-03-08 ENCOUNTER — TELEPHONE (OUTPATIENT)
Dept: INTERNAL MEDICINE | Facility: CLINIC | Age: 53
End: 2023-03-08
Payer: COMMERCIAL

## 2023-03-08 NOTE — TELEPHONE ENCOUNTER
Relayed provider lab results message to patient. Patient states he was seen in Urgent Care for UTI.    Devon Lund MD 17 hours ago (5:07 PM)     WB  Received fax showing labs from March 2 to be hemoglobin of 11.6 and white count of 13.8     Previous hemoglobin was 11.7 so hemoglobin is about the same.  Reason unclear     White blood cell count January 11 was 7.4 so white count of 13.8 is an increase for unclear reasons     Care everywhere updated     Sodium 129, last sodium 132.  Remainder of kidney and liver tests normal     Sed  of inflammation 18     Ferritin level of iron normal 119     Vitamin D level normal 54     Vitamin B12 level normal 448

## 2023-03-08 NOTE — TELEPHONE ENCOUNTER
March 8, 2023    [facility] Home health orders was received via fax for Dr. Lund to sign.  Patient label was attached to paperwork and placed in provider's inbox to be signed.    Fidencio Panchal III

## 2023-03-10 DIAGNOSIS — G89.4 CHRONIC PAIN DISORDER: ICD-10-CM

## 2023-03-10 RX ORDER — ZONISAMIDE 25 MG/1
75 CAPSULE ORAL 4 TIMES DAILY
Qty: 180 CAPSULE | Refills: 3 | Status: SHIPPED | OUTPATIENT
Start: 2023-03-10 | End: 2024-09-17

## 2023-03-10 NOTE — TELEPHONE ENCOUNTER
Received call from patient requesting refill(s) of Zonisamide     Last dispensed from pharmacy on 2/10/23    Patient's last office/virtual visit by prescribing provider on 2/28  Next office/virtual appointment scheduled for not scheduled    Pending Prescriptions:                       Disp   Refills    zonisamide (ZONEGRAN) 25 MG capsule       180 ca*3            Sig: Take 3 capsules (75 mg) by mouth 4 times daily    [facility]

## 2023-03-13 DIAGNOSIS — J01.01 ACUTE RECURRENT MAXILLARY SINUSITIS: ICD-10-CM

## 2023-03-13 DIAGNOSIS — S06.9X9S TRAUMATIC BRAIN INJURY WITH LOSS OF CONSCIOUSNESS, SEQUELA (H): ICD-10-CM

## 2023-03-13 DIAGNOSIS — M48.02 FORAMINAL STENOSIS OF CERVICAL REGION: ICD-10-CM

## 2023-03-13 DIAGNOSIS — B37.0 THRUSH: ICD-10-CM

## 2023-03-13 DIAGNOSIS — K05.10 GINGIVITIS, CHRONIC: ICD-10-CM

## 2023-03-13 DIAGNOSIS — F31.81 BIPOLAR 2 DISORDER (H): ICD-10-CM

## 2023-03-13 DIAGNOSIS — G89.4 CHRONIC PAIN SYNDROME: ICD-10-CM

## 2023-03-13 RX ORDER — DEXTROAMPHETAMINE SACCHARATE, AMPHETAMINE ASPARTATE, DEXTROAMPHETAMINE SULFATE AND AMPHETAMINE SULFATE 3.75; 3.75; 3.75; 3.75 MG/1; MG/1; MG/1; MG/1
15 TABLET ORAL DAILY
Qty: 30 TABLET | Refills: 0 | Status: SHIPPED | OUTPATIENT
Start: 2023-03-13 | End: 2023-03-15

## 2023-03-13 RX ORDER — OXYCODONE HYDROCHLORIDE 15 MG/1
15 TABLET ORAL
Qty: 112 TABLET | Refills: 0 | Status: SHIPPED | OUTPATIENT
Start: 2023-03-13 | End: 2023-04-07

## 2023-03-13 RX ORDER — FLUCONAZOLE 200 MG/1
200 TABLET ORAL DAILY
Qty: 90 TABLET | Refills: 3 | Status: SHIPPED | OUTPATIENT
Start: 2023-03-13 | End: 2024-04-29

## 2023-03-13 RX ORDER — METHOCARBAMOL 500 MG/1
1000 TABLET, FILM COATED ORAL 4 TIMES DAILY
Qty: 120 TABLET | Refills: 1 | Status: SHIPPED | OUTPATIENT
Start: 2023-03-13 | End: 2023-04-14

## 2023-03-13 NOTE — TELEPHONE ENCOUNTER
"Pt also requests Oxycodone refill  Last filled 3/1 for 14 days    Report  UDT/CSA = 07/11/2022- Blood work  Pending Prescriptions:                       Disp   Refills    oxyCODONE IR (ROXICODONE) 15 MG tablet    112 ta*0            Sig: Take 1 tablet (15 mg) by mouth 8 times daily One           tablet every three hours 8 am, 11 am, 2pm, 5 pm,           8 pm, 11 pm, 2 am, 5 am, start 3/15/23    Signed Prescriptions:                        Disp   Refills    methocarbamol (ROBAXIN) 500 MG tablet      120 ta*1        Sig: Take 2 tablets (1,000 mg) by mouth 4 times daily For           use beginning 3/14/23  Authorizing Provider: [facility]    Pt also calls with update regarding Suboxone.  States it's too early to tell if it's very helpful, but he doesn't have any side-effects from it, and it is giving him \"a feeling of well-being.\"  "

## 2023-03-13 NOTE — TELEPHONE ENCOUNTER
Requests Methocarbamol refill for [facility]  Last filled 2/14 for #224-28 day supply    Last visit-2/28  Next visit-not scheduled, but plan is to return in 2 months    Pending Prescriptions:                       Disp   Refills    methocarbamol (ROBAXIN) 500 MG tablet     120 ta*1            Sig: Take 2 tablets (1,000 mg) by mouth 4 times daily           For use beginning 3/14/23  [facility]

## 2023-03-14 DIAGNOSIS — S06.9X9S TRAUMATIC BRAIN INJURY WITH LOSS OF CONSCIOUSNESS, SEQUELA (H): ICD-10-CM

## 2023-03-14 DIAGNOSIS — F31.81 BIPOLAR 2 DISORDER (H): ICD-10-CM

## 2023-03-14 NOTE — TELEPHONE ENCOUNTER
March 14, 2023    Home health orders was picked up from outbox of Dr. Lund.  Paperwork has been reviewed and is complete.  Per initial initial request, this was sent via fax to [phone].     Pam J. Behr

## 2023-03-15 RX ORDER — DEXTROAMPHETAMINE SACCHARATE, AMPHETAMINE ASPARTATE, DEXTROAMPHETAMINE SULFATE AND AMPHETAMINE SULFATE 3.75; 3.75; 3.75; 3.75 MG/1; MG/1; MG/1; MG/1
15 TABLET ORAL DAILY
Qty: 30 TABLET | Refills: 0 | Status: SHIPPED | OUTPATIENT
Start: 2023-03-15 | End: 2023-04-18

## 2023-03-20 ENCOUNTER — OFFICE VISIT (OUTPATIENT)
Dept: UROLOGY | Facility: CLINIC | Age: 53
End: 2023-03-20
Payer: COMMERCIAL

## 2023-03-20 VITALS
OXYGEN SATURATION: 97 % | BODY MASS INDEX: 30.77 KG/M2 | DIASTOLIC BLOOD PRESSURE: 78 MMHG | HEART RATE: 72 BPM | SYSTOLIC BLOOD PRESSURE: 128 MMHG | HEIGHT: 68 IN | WEIGHT: 203 LBS

## 2023-03-20 DIAGNOSIS — R33.9 URINARY RETENTION: Primary | ICD-10-CM

## 2023-03-20 PROCEDURE — 99213 OFFICE O/P EST LOW 20 MIN: CPT | Mod: 25 | Performed by: UROLOGY

## 2023-03-20 PROCEDURE — 52000 CYSTOURETHROSCOPY: CPT | Performed by: UROLOGY

## 2023-03-20 RX ORDER — LIDOCAINE HYDROCHLORIDE 20 MG/ML
JELLY TOPICAL ONCE
Status: DISCONTINUED | OUTPATIENT
Start: 2023-03-20 | End: 2023-03-20 | Stop reason: HOSPADM

## 2023-03-20 RX ORDER — LIDOCAINE HYDROCHLORIDE 20 MG/ML
JELLY TOPICAL ONCE
Status: COMPLETED | OUTPATIENT
Start: 2023-03-20 | End: 2023-03-20

## 2023-03-20 RX ORDER — PENICILLIN V POTASSIUM 500 MG/1
TABLET, FILM COATED ORAL
COMMUNITY
Start: 2023-03-12 | End: 2023-03-31

## 2023-03-20 RX ADMIN — LIDOCAINE HYDROCHLORIDE 5 ML: 20 JELLY TOPICAL at 09:00

## 2023-03-20 ASSESSMENT — PAIN SCALES - GENERAL: PAINLEVEL: EXTREME PAIN (8)

## 2023-03-20 NOTE — PROCEDURES
CYSTOSCOPY PROCEDURE NOTE:    Bola Lyon Jr. is a 53 year old male  who presents with urinary retention for cystoscopy.    Pt ID verified with patient: Yes     Procedure verified with patient: Yes     Procedure confirmed with physician and support staff: Yes     Consent form confirmed with physician and support staff.    Sign In  History and Physical Exam reviewed .  Informed Consent Discussed: Yes   Sign in Communication: Yes   Time Out:  Team Confirms the Correct Patient, Correct Procedure; Yes , Correct Site and Site Marking, Correct Position (if applicable).    Affirmation of Time Out: Yes   Sign Out:  Sign Out Discussion: Yes   Physician: Blayne Rowley MD    A urinalysis was performed revealing no evidence of infection.    The benefits, risks, alternatives of the cystoscopy procedure and personnel were discussed with the patient. The verbal consent was obtained and the patient agrees to proceed.      Description of procedure:   After fully informed, voluntary consent was obtained, the patient was brought into the procedure room, identified and placed in a supine position on the cystoscopy table.  The groin/scrotum were prepped with betadine and draped in a sterile fashion.  Urojet lidocaine gel was introduced.  A 15F flexible cystoscope was inserted into the urethra, and the bladder and urethra wereexamined in a systematic manner.  The patient tolerated the procedure well and there were no complications.      Cystoscopic findings:  The urethra was normal without strictures.  The prostate was 2cm long and demonstrated no bilobar hypertrophy.  There was no median lobe.  The external sphincter coapted normally and the bladder neck was normal. The bladder was  entered and careful pan endoscopy was carried out. The posterior, superior and lateral walls and dome of the bladder were all well visualized and the scope was retroflexed upon itself..  There was no trabeculation.  There were no neoplasms, stones, or  diverticula identifed.  There was a small amount of edema from his prior indwelling Riddle catheter.  The ureteric orifices were normal in position and number and effluxing clear urine.    Assessment/Plan:   Bola yLon Jr. is a 53 year old male with a history of Urinary retention     -See clinic note      Blayne Rowley MD

## 2023-03-20 NOTE — NURSING NOTE
Chief Complaint   Patient presents with     Urinary Retention     Patient here today for Cystoscopy        Prior to the start of the procedure and with procedural staff participation, I verbally confirmed the patient s identity using two indicators, relevant allergies, that the procedure was appropriate and matched the consent or emergent situation, and that the correct equipment/implants were available. Immediately prior to starting the procedure I conducted the Time Out with the procedural staff and re-confirmed the patient s name, procedure, and site/side. I have wiped the patient off with the povidone-Iodine solution, draped them,  used Lidocaine hydrochloride jelly, and instilled sterile water into the bladder. (The Joint Commission universal protocol was followed.)  Yes    Sedation (Moderate or Deep): None    Comes into clinic today at the request of No ref. provider found for TOV / catheter removal.    presented today for a trial of void.  Approximately 300 mL of normal saline instilled into bladder via Cystoscopy   Patient stated he had no urge to urinate and Scope  was removed without difficulty.  Patient was given a cylinder to measure urine output.  Patient voided approximately 0 mL of no urine.      Cipro 500 given per protocol: No Patient already on Abx    Patient did tolerate procedure well.    No teaching was done with patient because he doesn't want to learn due to he not able to stand well on his own and his back is causing him lots of pain.  Patient state that he will not cath his self and want  Nurse staff at his place   To cath him.    5mL 2% lidocaine hydrochloride Urojet instilled into urethra.    NDC# [address]-4  Lot #: [id number]  Expiration Date:  09/24        SOHAN Ignacio

## 2023-03-20 NOTE — LETTER
"3/20/2023       RE: Bola Lyon Jr.  [address] [place] [address]     Dear Colleague,    Thank you for referring your patient, Bola Lyon Jr., to the [facility] [place] [facility]. Please see a copy of my visit note below.    [place]  CHIEF COMPLAINT   It was my pleasure to see Bola Lyon Jr. who is a 53 year old male for follow-up of urinary retention.      HPI   Bola Lyon Jr. is a very pleasant 53 year old male     Initially seen by Marline Prakash 2/7/2023:  It is a pleasure to see Mr. Bola Lyon Jr., a pleasant 53 year old male seen today in the urology clinic in consultation from Dr. Lund for evaluation of urinary retention. [facility] (see Jan discharge summary from 1/24) for n/v, HoK, urinary retention (>800cc on first straight cath). This eventually required Riddle catheter placement. This has been draining well with pale, yellow urine. The patient is tolerating the catheter without issue. No clots of hematuria noted.      Narc dependent and was \"severely\" constipated during this time. CT noted bladder distention. Was started on Flomax during hospitalization and then finasteride as well as sulfa for prostatitis after discharge in seeing Dr. Lund in [facility]U.      The patient has no prior history of AUR. At baseline, patient does note some slow stream, dribbling. Currently denies fevers, chills, N/V. He has a hx of back pain, TBI and Bipolar.    TODAY 3/20/2023:  On tamsulosin 0.4mg (Flomax) and finasteride 5mg (Proscar)   Follow-up today for cystoscopy and trial of void    PHYSICAL EXAM  Patient is a 53 year old  male   Vitals: Blood pressure 128/78, pulse 72, height 1.727 m (5' 8\"), weight 92.1 kg (203 lb), SpO2 97 %.  Body mass index is 30.87 kg/m .  General Appearance Adult:   Alert, no acute distress, oriented  HENT: throat/mouth:normal, good dentition  Lungs: no " respiratory distress, or pursed lip breathing  Heart: No obvious jugular venous distension present  Abdomen: soft, nontender, well-healed surgical incision  Musculoskeltal: extremities normal, no peripheral edema  Skin: no suspicious lesions or rashes  Neuro: Alert, oriented, speech and mentation normal  Psych: affect and mood normal  Gait: Normal  : deferred to cystoscopy      Creatinine   Date Value Ref Range Status   08/30/2022 0.71 0.67 - 1.17 mg/dL Final   05/12/2014 0.80 0.66 - 1.25 mg/dL Final        ASSESSMENT and PLAN  53-year-old man with urinary retention with history of spinal surgery    Urinary retention  - Cystoscopy today with no clear evidence of bladder outlet obstruction  - We will attempt a trial of void  - We discussed that if he is unable to void we will instruct him on intermittent self-catheterization which he may also have done at his care facility every 4-6 hours  - If he continues to need intermittent self-catheterization in the next 1 to 2 months we would then plan for urodynamics  - We will plan for follow-up with Marline Prakash in 2 to 3 months for symptom check      Time spent: 20 minutes spent on the date of the encounter doing chart review, history and exam, documentation and further activities as noted above.  This was in addition to cystoscopy time    Blayne Rowley MD   Urology  [facility]  [facility] Phone: [phone]  [facility] Phone: [phone]

## 2023-03-20 NOTE — PROGRESS NOTES
"[facility]  CHIEF COMPLAINT   It was my pleasure to see Bola Lyon Jr. who is a 53 year old male for follow-up of urinary retention.      HPI   Bola Lyon Jr. is a very pleasant 53 year old male     Initially seen by Marline Prakash 2/7/2023:  It is a pleasure to see Mr. Bola Lyon Jr., a pleasant 53 year old male seen today in the urology clinic in consultation from Dr. Lund for evaluation of urinary retention. He was admitted to [place] for several days (see Jan discharge summary from 1/24) for n/v, HoK, urinary retention (>800cc on first straight cath). This eventually required Riddle catheter placement. This has been draining well with pale, yellow urine. The patient is tolerating the catheter without issue. No clots of hematuria noted.      Narc dependent and was \"severely\" constipated during this time. CT noted bladder distention. Was started on Flomax during hospitalization and then finasteride as well as sulfa for prostatitis after discharge in seeing Dr. Lund in [facility].      The patient has no prior history of AUR. At baseline, patient does note some slow stream, dribbling. Currently denies fevers, chills, N/V. He has a hx of back pain, TBI and Bipolar.    TODAY 3/20/2023:  On tamsulosin 0.4mg (Flomax) and finasteride 5mg (Proscar)   Follow-up today for cystoscopy and trial of void    PHYSICAL EXAM  Patient is a 53 year old  male   Vitals: Blood pressure 128/78, pulse 72, height 1.727 m (5' 8\"), weight 92.1 kg (203 lb), SpO2 97 %.  Body mass index is 30.87 kg/m .  General Appearance Adult:   Alert, no acute distress, oriented  HENT: throat/mouth:normal, good dentition  Lungs: no respiratory distress, or pursed lip breathing  Heart: No obvious jugular venous distension present  Abdomen: soft, nontender, well-healed surgical incision  Musculoskeltal: extremities normal, no peripheral edema  Skin: no suspicious lesions or rashes  Neuro: Alert, oriented, speech and mentation normal  Psych: affect " and mood normal  Gait: Normal  : deferred to cystoscopy      Creatinine   Date Value Ref Range Status   08/30/2022 0.71 0.67 - 1.17 mg/dL Final   05/12/2014 0.80 0.66 - 1.25 mg/dL Final        ASSESSMENT and PLAN  53-year-old man with urinary retention with history of spinal surgery    Urinary retention  - Cystoscopy today with no clear evidence of bladder outlet obstruction  - We will attempt a trial of void  - We discussed that if he is unable to void we will instruct him on intermittent self-catheterization which he may also have done at his care facility every 4-6 hours  - If he continues to need intermittent self-catheterization in the next 1 to 2 months we would then plan for urodynamics  - We will plan for follow-up with Marline Prakash in 2 to 3 months for symptom check      Time spent: 20 minutes spent on the date of the encounter doing chart review, history and exam, documentation and further activities as noted above.  This was in addition to cystoscopy time    Blayne Rowley MD   Urology  [facility]  [facility] Phone: [phone]  [facility] Phone: [phone]

## 2023-03-20 NOTE — PATIENT INSTRUCTIONS
"Clean intermittent catheterization  -Perform clean intermittent catheterization every 4-6 hours with a 14 Mongolian or 16 Mongolian coudé catheter  - Have patient attempt to void prior to catheterization    Blayne Rowley M.D.                                AFTER YOUR CYSTOSCOPY  ?  ?  You have just completed a cystoscopy, or \"cysto\", which allowed your physician to learn more about your bladder (or to remove a stent placed after surgery). We suggest that you continue to avoid caffeine, fruit juice, and alcohol for the next 24 hours, however, you are encouraged to return to your normal activities.  ?  ?  A few things that are considered normal after your cystoscopy:  ?  * small amount of bleeding (or spotting) that clears within the next 24 hours  ?  * slight burning sensation with urination  ?  * sensation of needing to void (urinate) more frequently  ?  * the feeling of \"air\" in your urine  ?  * mild discomfort that is relieved with Tylenol    * bladder spasms  ?  ?  ?  Please contact our office promptly if you:  ?  * develop a fever above 101 degrees  ?  * are unable to urinate  ?  * develop bright red blood that does not stop  ?  * experience severe pain or swelling  ?  ?  ?  And of course, please contact our office with any concerns or questions [phone].  ?    AFTER YOUR CYSTOSCOPY        You have just completed a cystoscopy, or \"cysto\", which allowed your physician to learn more about your bladder (or to remove a stent placed after surgery). We suggest that you continue to avoid caffeine, fruit juice, and alcohol for the next 24 hours, however, you are encouraged to return to your normal activities.         A few things that are considered normal after your cystoscopy:     * Small amount of bleeding (or spotting) that clears within the next 24 hours     * Slight burning sensation with urination     * Sensation to of needing to avoid more frequently     * The feeling of \"air\" in your urine     * Mild discomfort " that is relieved with Tylenol        Please contact our office promptly if you:     * Develop a fever above 101 degrees     * Are unable to urinate     * Develop bright red blood that does not stop     * Severe pain or swelling         Please contact our office with any concerns or questions @[facility].

## 2023-03-21 ENCOUNTER — PRE VISIT (OUTPATIENT)
Dept: UROLOGY | Facility: CLINIC | Age: 53
End: 2023-03-21
Payer: COMMERCIAL

## 2023-03-29 ENCOUNTER — TELEPHONE (OUTPATIENT)
Dept: PALLIATIVE MEDICINE | Facility: OTHER | Age: 53
End: 2023-03-29
Payer: COMMERCIAL

## 2023-03-29 DIAGNOSIS — M54.16 LUMBAR RADICULOPATHY: Primary | ICD-10-CM

## 2023-03-29 NOTE — TELEPHONE ENCOUNTER
[facility] Call Center    Phone Message    May a detailed message be left on voicemail: yes     Reason for Call: Other: In the last 3 weeks he has had extensive dental work done and he didnt get any other pain medication. He was in the ER from complications from spine surgery. he had a wagner placed and seen a urologist and had a cystoscopy done did not show anything serious and wagner was removed after 3 weeks. Patient is still having inflamation and pain. He was unable to do his PT for a few weeks due to pain and issues. He is still having pain even with icing and moving around. He is wondering if there is anything else that can be done for the next week or so till the pain resolves. Please call back to discuss.        Action Taken: Other: Pain    Travel Screening: Not Applicable

## 2023-03-30 RX ORDER — BUPRENORPHINE AND NALOXONE 4; 1 MG/1; MG/1
FILM, SOLUBLE BUCCAL; SUBLINGUAL
Qty: 42 EACH | Refills: 1 | Status: SHIPPED | OUTPATIENT
Start: 2023-03-30 | End: 2023-04-17

## 2023-03-30 NOTE — TELEPHONE ENCOUNTER
See patient's call.  Has not several dental procedures with cavities filled and crowns breaking expecting more.  So far they have not giving him extra pain medication simply noting he is on opioids.  He is struggling.  Using ice packs.  Also being followed by urology.    We have been using buprenorphine to augment the opioids and now increased to Suboxone 2 mg films 3 times a day without benefit or side effects.    We will advance the Suboxone to 4 4 mg twice a day for 5 days, if still tolerating to 3 times a day.  Discussed goal to increase pain control without precipitating withdrawal.

## 2023-03-31 ENCOUNTER — VIRTUAL VISIT (OUTPATIENT)
Dept: INTERNAL MEDICINE | Facility: CLINIC | Age: 53
End: 2023-03-31
Payer: COMMERCIAL

## 2023-03-31 DIAGNOSIS — R33.9 URINARY RETENTION WITH INCOMPLETE BLADDER EMPTYING: ICD-10-CM

## 2023-03-31 DIAGNOSIS — E87.1 HYPONATREMIA: ICD-10-CM

## 2023-03-31 DIAGNOSIS — N41.0 ACUTE PROSTATITIS: ICD-10-CM

## 2023-03-31 DIAGNOSIS — I89.0 LYMPHEDEMA OF BOTH LOWER EXTREMITIES: ICD-10-CM

## 2023-03-31 DIAGNOSIS — K05.10 GINGIVITIS, CHRONIC: ICD-10-CM

## 2023-03-31 DIAGNOSIS — F31.81 BIPOLAR 2 DISORDER (H): ICD-10-CM

## 2023-03-31 DIAGNOSIS — D64.9 ANEMIA, UNSPECIFIED TYPE: Primary | ICD-10-CM

## 2023-03-31 PROCEDURE — 99214 OFFICE O/P EST MOD 30 MIN: CPT | Mod: VID | Performed by: INTERNAL MEDICINE

## 2023-03-31 RX ORDER — FERROUS SULFATE 325(65) MG
325 TABLET ORAL
Qty: 90 TABLET | Refills: 3 | Status: SHIPPED | OUTPATIENT
Start: 2023-03-31 | End: 2024-09-18

## 2023-03-31 NOTE — PROGRESS NOTES
Bola is a 53 year old male contacting the clinic today via video, who will use the platform: [employer] for the visit.  Phone # for [employer], or if Amwell drops:   Telephone Information:   Mobile [phone]          ASSESSMENT and PLAN:  1. Anemia, unspecified type  Other markers normal so presumptive iron deficiency.  Hemoglobin 14 back in 2018.  Begin empiric iron and recheck in 1 month  - ferrous sulfate (FEROSUL) 325 (65 Fe) MG tablet; Take 1 tablet (325 mg) by mouth daily (with breakfast)  Dispense: 90 tablet; Refill: 3  - CBC with Platelets & Differential; Future    2. Urinary retention with incomplete bladder emptying  Catheter out with normal cystoscopy.  Continue finasteride and Flomax a few more months    3. Bipolar 2 disorder (H)  Stable    4. Gingivitis, chronic  Dental work finally begun.  No antibiotics at this time    5. Acute prostatitis  History suggests Enterococcus component.  Has now completed amoxicillin    6. Lymphedema of both lower extremities  Improved on once daily furosemide    7. Hyponatremia  Noted, stable and slightly worse.  Recheck next month  - Basic metabolic panel; Future       Patient Instructions   Labs reviewed    Ferrous sulfate 1 tablet daily for the next 3 months    Continue Flomax and finasteride    Repeat CBC and basic metabolic profile 3 to 4 weeks    Follow-up after labs            Return in about 4 weeks (around 4/28/2023) for using a video visit.       CHIEF COMPLAINT:  No chief complaint on file.      HISTORY OF PRESENT ILLNESS:  Bola is a 53 year old male contacting the clinic today via video for review of medication.  At our last visit Flomax was resumed.  He was treated with sulfa, finasteride, and Flomax to make sure we were treating infectious component of his prostatitis.  He saw urology and catheter was removed on March 20.  He has been urinating without difficulty.  He is bothered that he was accused of having chronic urinary retention.  He may have because  "of narcotics but he is unaware of that and does not wish to pursue that further    He gives a history that he went to urgent care for some reason, urine culture grew Enterococcus, and sulfa was changed to penicillin.  I cannot find that record.  He has completed the antibiotics and tolerated them without difficulty    Resumption of furosemide has helped his peripheral edema but not completely    Labs done earlier this month showed hemoglobin of 10, normal ferritin, normal sed rate, and sodium of 129.  Hemoglobin has been 13-14 several years ago.  We discussed presumptive iron deficiency after his surgeries    HPI    REVIEW OF SYSTEMS:   Pain adjusted through pain clinic    PFS:  Social History     Social History Narrative    He is .  He has been a  and also a counselor, and worked with [employer].  He is now on disability due to his brain injury and bipolar disease.        Quit smoking October of 2021        Quit drinking 2010        At [facility] since June of 2020       TOBACCO USE:  History   Smoking Status     Former     Packs/day: 0.50     Years: 11.00     Types: Cigarettes     Start date: 8/20/2009     Quit date: 2/21/2017   Smokeless Tobacco     Former       VITALS:  There were no vitals filed for this visit.  There were no vitals taken for this visit. Estimated body mass index is 30.87 kg/m  as calculated from the following:    Height as of 3/20/23: 1.727 m (5' 8\").    Weight as of 3/20/23: 92.1 kg (203 lb).    PHYSICAL EXAM:  (observations via Video)  Alert and oriented.  Shows me teeth missing left front but otherwise looking good    MEDICATIONS:   Current Outpatient Medications   Medication Sig Dispense Refill     ferrous sulfate (FEROSUL) 325 (65 Fe) MG tablet Take 1 tablet (325 mg) by mouth daily (with breakfast) 90 tablet 3     acetaminophen (TYLENOL) 650 MG CR tablet 2 TABLETS (10477WS) ORALLY 3 TIMES DAILY (MAX APAP:4GM/24HR) 540 tablet 1     albuterol " (PROAIR HFA/PROVENTIL HFA/VENTOLIN HFA) 108 (90 Base) MCG/ACT inhaler Inhale 2 puffs into the lungs every 6 hours as needed 6.7 g 11     allopurinol (ZYLOPRIM) 300 MG tablet Take 1 tablet (300 mg) by mouth 2 times daily 180 tablet 3     ammonium lactate (LAC-HYDRIN) 12 % external lotion        amphetamine-dextroamphetamine (ADDERALL) 15 MG tablet Take 1 tablet (15 mg) by mouth daily 30 tablet 0     bisacodyl (DULCOLAX) 10 MG suppository Place 1 suppository (10 mg) rectally daily as needed for constipation 60 suppository 1     buprenorphine (BUTRANS) 20 MCG/HR WK patch Place 1 patch onto the skin       buprenorphine HCl-naloxone HCl (SUBOXONE) 4-1 MG per film One film twice a day 5 days, may increase to one 3 times a day 42 each 1     buPROPion (WELLBUTRIN SR) 150 MG 12 hr tablet Take 1 tablet (150 mg) by mouth 2 times daily 180 tablet 3     CALCIUM ANTACID 500 MG chewable tablet        chlorhexidine (PERIDEX) 0.12 % solution Swish and spit 15 mLs in mouth 3 times daily as needed (sore throat) - Swish & Spit 473 mL 11     ergocalciferol (ERGOCALCIFEROL) 1.25 MG (01091 UT) capsule Take 1 capsule (50,000 Units) by mouth once a week 12 capsule 3     fexofenadine (ALLEGRA) 180 MG tablet Take 180 mg by mouth daily as needed       finasteride (PROSCAR) 5 MG tablet Take 1 tablet (5 mg) by mouth daily 90 tablet 3     fluconazole (DIFLUCAN) 200 MG tablet Take 1 tablet (200 mg) by mouth daily 90 tablet 3     furosemide (LASIX) 40 MG tablet Take 1 tablet (40 mg) by mouth daily 30 tablet 11     guaiFENesin (MUCINEX) 600 MG 12 hr tablet Take 2 tablets (1,200 mg) by mouth 2 times daily 120 tablet 11     hydrOXYzine (ATARAX) 50 MG tablet Per Dr. LOPEZ,OLUKAYODE       lactulose 20 GM/30ML SOLN Take 45 mLs by mouth 2 times daily For constipation. 5400 mL 11     lamoTRIgine (LAMICTAL) 150 MG tablet Take 1 tablet (150 mg) by mouth 3 times daily       LANsoprazole (PREVACID) 30 MG DR capsule Take 1 capsule (30 mg) by mouth 2 times  daily 180 capsule 3     lidocaine (LIDODERM) 5 % patch Place onto the skin every 24 hours To prevent lidocaine toxicity, patient should be patch free for 12 hrs daily. 30 patch 11     lidocaine (XYLOCAINE) 2 % external gel Apply 1 inch topically 4 times a day as needed to gums. 85 g 11     methocarbamol (ROBAXIN) 500 MG tablet Take 2 tablets (1,000 mg) by mouth 4 times daily For use beginning 3/14/23 120 tablet 1     metoprolol succinate ER (TOPROL-XL) 50 MG 24 hr tablet Take 1 tablet (50 mg) by mouth daily 90 tablet 3     montelukast (SINGULAIR) 10 MG tablet Take 1 tablet by mouth daily       naloxegol (MOVANTIK) 25 MG TABS tablet Take 1 tablet (25 mg) by mouth every morning (before breakfast) 30 tablet 1     naloxone (NARCAN) 4 MG/0.1ML nasal spray 1 spray (4 mg dose) into one nostril for opioid reversal. Call 911. May repeat if no response in 3 minutes.       nystatin (MYCOSTATIN) 134151 UNIT/ML suspension Take 10 mLs (1,000,000 Units) by mouth every 3 hours as needed (thrush) To continue while taking levofloxacin and fluconazole 800 mL 3     oxyCODONE IR (ROXICODONE) 15 MG tablet Take 1 tablet (15 mg) by mouth 8 times daily One tablet every three hours 8 am, 11 am, 2pm, 5 pm, 8 pm, 11 pm, 2 am, 5 am, start 3/15/23 112 tablet 0     polyethylene glycol (MIRALAX) 17 GM/Dose powder Take 17 g (1 capful) by mouth 2 times daily 3060 g 3     QUEtiapine (SEROQUEL) 100 MG tablet Take 1 tablet (100 mg) by mouth 4 times daily as needed (anxiety) 60 tablet 1     QUEtiapine (SEROQUEL) 300 MG tablet Take 2 tablets (600 mg) by mouth At Bedtime 180 tablet 0     tamsulosin (FLOMAX) 0.4 MG capsule Take 1 capsule (0.4 mg) by mouth 2 times daily 180 capsule 3     zonisamide (ZONEGRAN) 25 MG capsule Take 3 capsules (75 mg) by mouth 4 times daily 180 capsule 3       Outside Notes summarized: Urology  Labs, x-rays, cardiology, GI tests reviewed: Cystoscopy and labs from March 2  Recent Labs   Lab Test 08/30/22  1321   HGB 12.5*   WBC  7.6   *   POTASSIUM 4.0   CR 0.71   PSA 0.50   URIC 2.0*   B12 336   TSH 0.62   VITDT 29     No results found for: [id number]  Lab Results   Component Value Date    CHOL 164 08/30/2022     New orders:   Orders Placed This Encounter   Procedures     Basic metabolic panel     CBC with Platelets & Differential       Independent review of:     Devon Lund MD  [facility]    Video-Visit Details  Type of service:  Video Visit  Patient has given verbal consent to a Video visit?  Yes  How would you like to obtain your AVS?  MyChart  Will anyone else be joining your video visit, giving supplemental history? No    Originating location (pt location): TCU    Distant Location (provider location):  Off-site    Video Start Time: 2:09 PM  Video End time:  2:35 PM  Face to face plus orders: 26 minutes  Documentation time:  3 minutes     The visit lasted a total of 29 minutes

## 2023-03-31 NOTE — PATIENT INSTRUCTIONS
Labs reviewed    Ferrous sulfate 1 tablet daily for the next 3 months    Continue Flomax and finasteride    Repeat CBC and basic metabolic profile 3 to 4 weeks    Follow-up after labs

## 2023-04-03 ENCOUNTER — TELEPHONE (OUTPATIENT)
Dept: INTERNAL MEDICINE | Facility: CLINIC | Age: 53
End: 2023-04-03
Payer: COMMERCIAL

## 2023-04-03 NOTE — TELEPHONE ENCOUNTER
April 3, 2023    Home health orders was received via fax for Dr. Lund to sign.  Patient label was attached to paperwork and placed in provider's inbox to be signed.    Helen Bowen

## 2023-04-04 NOTE — TELEPHONE ENCOUNTER
[facility] Call Center    Phone Message    May a detailed message be left on voicemail: yes     Reason for Call: Other: Patient called regarding buprenorphine HCl-naloxone HCl (SUBOXONE) 4-1 MG per film, stating the transitional care facility is requesting directions.  He states that the way the previous directions were worded, additional clarification is needed.  Please call patient to discuss.     Action Taken: Message routed to:  Other: [facility]    Travel Screening: Not Applicable

## 2023-04-04 NOTE — TELEPHONE ENCOUNTER
Called pt for clarification. He requests that instructions be sent to his facility stating that he tolerated Suboxone 4-1 mg at bid dose, and may now increase to tid at 8 am, 2 pm, 8 pm.    Instructions written as ordered and faxed to Dunia, manager at the facility at [phone].

## 2023-04-05 NOTE — TELEPHONE ENCOUNTER
April 5, 2023    Home health orders was picked up from outbox of Dr. Lund.  Paperwork has been reviewed and is complete.  Per initial initial request, this was sent via fax to [phone].     Caroline Noriega

## 2023-04-07 ENCOUNTER — TELEPHONE (OUTPATIENT)
Dept: PALLIATIVE MEDICINE | Facility: OTHER | Age: 53
End: 2023-04-07
Payer: COMMERCIAL

## 2023-04-07 NOTE — CONFIDENTIAL NOTE
Call back to [facility]:  Reviewed issue with suboxone /insurance coverage.  As of this phone call the insurance issues are resolved and patient will have a supply sent out to his facility.

## 2023-04-07 NOTE — TELEPHONE ENCOUNTER
[facility] Call Center    Phone Message    May a detailed message be left on voicemail: yes     Reason for Call: Medication Question or concern regarding medication   Prescription Clarification  Name of Medication: buprenorphine HCl-naloxone HCl (SUBOXONE) 4-1 MG per film  Prescribing Provider: Shoaib   Pharmacy:    What on the order needs clarification?  Pharmacy is giving him a weekend supply. He is having issues getting his medication with his insurance. Please call to discuss.           Action Taken: Other: Pain    Travel Screening: Not Applicable

## 2023-04-10 NOTE — TELEPHONE ENCOUNTER
[facility] Call Center    Phone Message    May a detailed message be left on voicemail: yes     Reason for Call: Other: Patient is calling stating that insurance only approved enough of the medication to get him through the weekend. Please call patient back to discuss further.     Action Taken: Message routed to:  Other: [facility]    Travel Screening: Not Applicable

## 2023-04-10 NOTE — TELEPHONE ENCOUNTER
[facility] Call Center    Phone Message    May a detailed message be left on voicemail: yes     Reason for Call: Other: Patient called requesting to speak with a member of his care team regarding updates he has from his pharmacy. He states he will be sent approximately two weeks worth of medication and has questions on how to take his medication.   Patient can be reached at [phone]    Action Taken: Message routed to:  Other: Pain    Travel Screening: Not Applicable

## 2023-04-10 NOTE — TELEPHONE ENCOUNTER
Returned call to pt. He requested an order be sent to his home for Suboxone 3 x day. Order faxed to [facility] as requested, at [phone].

## 2023-04-12 ENCOUNTER — TELEPHONE (OUTPATIENT)
Dept: PALLIATIVE MEDICINE | Facility: OTHER | Age: 53
End: 2023-04-12
Payer: COMMERCIAL

## 2023-04-12 DIAGNOSIS — M54.16 LUMBAR RADICULOPATHY: ICD-10-CM

## 2023-04-12 NOTE — TELEPHONE ENCOUNTER
Prior Authorization Specialty Medication Request    Medication/Dose: buprenorphine HCl-naloxone HCl (SUBOXONE) 4-1 MG per film  ICD code (if different than what is on RX):  Lumbar radiculopathy [M54.16]  - Primary   Previously Tried and Failed:      Important Lab Values:   Rationale:     Coverage information:     Subscriber: [id number] RIAZ GARZA JR.     Rel to sub: 01 - Self     Member ID: [id number]     Payor: 16-[employer] [place] Ph: [phone]     Benefit plan: 1419-[employer] [place] Ph: [phone]     Group number: Not given     Member effective dates: from 01/01/22        Pharmacy Information (if different than what is on RX)  Name:  [facility] - [place] - [address]  Phone:  [phone]

## 2023-04-13 ENCOUNTER — TELEPHONE (OUTPATIENT)
Dept: INTERNAL MEDICINE | Facility: CLINIC | Age: 53
End: 2023-04-13
Payer: COMMERCIAL

## 2023-04-13 NOTE — TELEPHONE ENCOUNTER
April 13, 2023    [facility] Home health orders was received via fax for Dr. Lund to sign.  Patient label was attached to paperwork and placed in provider's inbox to be signed.    Fidencio Panchal III

## 2023-04-14 DIAGNOSIS — G89.4 CHRONIC PAIN SYNDROME: ICD-10-CM

## 2023-04-14 RX ORDER — METHOCARBAMOL 500 MG/1
1000 TABLET, FILM COATED ORAL 4 TIMES DAILY
Qty: 120 TABLET | Refills: 1 | Status: SHIPPED | OUTPATIENT
Start: 2023-04-14 | End: 2024-09-05

## 2023-04-14 NOTE — TELEPHONE ENCOUNTER
Received fax from pharmacy requesting refill(s) for    methocarbamol (ROBAXIN) 500 MG tablet    Date last filled 3/13/23    Last Appt Date:2/28/23    Next Appt scheduled: 4/26/23    Pharmacy:  [facility] - [place] - [address],    Will route for processing    Alondra Washburn MA  [facility] [place]

## 2023-04-16 NOTE — TELEPHONE ENCOUNTER
Central Prior Authorization Team   Phone: [phone]    PA Initiation    Medication: buprenorphine HCl-naloxone HCl (SUBOXONE) 4-1 MG per film  Insurance Company:    Pharmacy Filling the Rx: [facility] - [place], [place] - [address]  Filling Pharmacy Phone: [phone]  Filling Pharmacy Fax: [phone]  Start Date: 4/16/2023

## 2023-04-17 ENCOUNTER — TELEPHONE (OUTPATIENT)
Dept: INTERNAL MEDICINE | Facility: CLINIC | Age: 53
End: 2023-04-17
Payer: COMMERCIAL

## 2023-04-17 RX ORDER — BUPRENORPHINE AND NALOXONE 4; 1 MG/1; MG/1
1 FILM, SOLUBLE BUCCAL; SUBLINGUAL 2 TIMES DAILY
Qty: 56 EACH | Refills: 1 | Status: SHIPPED | OUTPATIENT
Start: 2023-04-17 | End: 2023-04-19

## 2023-04-17 RX ORDER — BUPRENORPHINE AND NALOXONE 2; .5 MG/1; MG/1
1 FILM, SOLUBLE BUCCAL; SUBLINGUAL 2 TIMES DAILY
Qty: 56 EACH | Refills: 1 | Status: SHIPPED | OUTPATIENT
Start: 2023-04-17 | End: 2023-04-19

## 2023-04-17 NOTE — TELEPHONE ENCOUNTER
Note from prior authorization, insurance will pay for the 4 mg films only twice a day.  I have sent in a prescription to continue the 4 mg films twice a day alternating with the 2 mg film twice a day.  Patient can determine with his staff what times a day he would like to take pills for maximum coverage, 6 hours between each dose

## 2023-04-17 NOTE — TELEPHONE ENCOUNTER
April 17, 2023    [facility] was received via fax for Dr. Lund to sign.  Patient label was attached to paperwork and placed in provider's inbox to be signed.    Caroline Noriega

## 2023-04-17 NOTE — CONFIDENTIAL NOTE
Pending Prescriptions:                       Disp   Refills    buprenorphine HCl-naloxone HCl (SUBOXONE)*56 each1            Sig: Place 1 film(4mg) under the tongue at 0800 and 1           film(4 mg) under the tongue at 1400    buprenorphine HCl-naloxone HCl (SUBOXONE)*56 each1            Sig: Place 2 films(4 mg) under the tongue at 2000    Signed Prescriptions:                        Disp   Refills    buprenorphine HCl-naloxone HCl (SUBOXONE) *56 each1        Sig: Place 1 Film under the tongue 2 times daily           Alternative with suboxone 2 mg twice a day  Authorizing Provider: JAYME MULLEN    buprenorphine HCl-naloxone HCl (SUBOXONE) *56 each1        Sig: Place 1 Film under the tongue 2 times daily           Alternating with 4 mg film twice a day  Authorizing Provider: JAYME MULLEN    [facility]

## 2023-04-17 NOTE — ORAL ONC MGMT
PRIOR AUTHORIZATION DENIED    Medication: buprenorphine HCl-naloxone HCl (SUBOXONE) 4-1 MG per film - DENIED    Denial Date: 4/16/2023    Denial Rational: INSURANCE STATES PATIENT'S PLAN COVERS THE REQUESTED MEDICATION FOR A QUANTITY UP TO 2 FILMS PER DAY.        Appeal Information:  IF YOU WOULD LIKE TO APPEAL PLEASE SUPPLY PA TEAM WITH A LETTER OF MEDICAL NECESSITY WITH CLINICAL REASON.

## 2023-04-18 DIAGNOSIS — S06.9X9S TRAUMATIC BRAIN INJURY WITH LOSS OF CONSCIOUSNESS, SEQUELA (H): ICD-10-CM

## 2023-04-18 DIAGNOSIS — F31.81 BIPOLAR 2 DISORDER (H): ICD-10-CM

## 2023-04-18 RX ORDER — DEXTROAMPHETAMINE SACCHARATE, AMPHETAMINE ASPARTATE, DEXTROAMPHETAMINE SULFATE AND AMPHETAMINE SULFATE 3.75; 3.75; 3.75; 3.75 MG/1; MG/1; MG/1; MG/1
15 TABLET ORAL DAILY
Qty: 30 TABLET | Refills: 0 | Status: SHIPPED | OUTPATIENT
Start: 2023-04-18 | End: 2023-06-13

## 2023-04-19 RX ORDER — BUPRENORPHINE AND NALOXONE 2; .5 MG/1; MG/1
FILM, SOLUBLE BUCCAL; SUBLINGUAL
Qty: 56 EACH | Refills: 1 | Status: SHIPPED | OUTPATIENT
Start: 2023-04-19 | End: 2023-06-28

## 2023-04-19 RX ORDER — BUPRENORPHINE AND NALOXONE 4; 1 MG/1; MG/1
FILM, SOLUBLE BUCCAL; SUBLINGUAL
Qty: 56 EACH | Refills: 1 | Status: SHIPPED | OUTPATIENT
Start: 2023-04-19 | End: 2023-06-13

## 2023-04-19 NOTE — TELEPHONE ENCOUNTER
[facility] Call Center    Phone Message    May a detailed message be left on voicemail: yes     Reason for Call: Other: Patient is calling regarding his prescription and how it is written. He states it is a decrease from what he was taking. Please call to discuss with patient.      Action Taken: Other: Pain    Travel Screening: Not Applicable

## 2023-04-19 NOTE — TELEPHONE ENCOUNTER
April 19, 2023    [facility] was picked up from outbox of Dr. Lund.  Paperwork has been reviewed and is complete.  Per initial initial request, this was sent via fax to [phone].     Caroline Noriega

## 2023-04-19 NOTE — TELEPHONE ENCOUNTER
[facility] Call Center    Phone Message    May a detailed message be left on voicemail: yes     Reason for Call: Other: Patient is calling regarding the fax being sent to the facility and they have not received anything yet. Please call back to discuss.      Action Taken: Other: Pain    Travel Screening: Not Applicable

## 2023-04-19 NOTE — TELEPHONE ENCOUNTER
Patient had been on Suboxone 4 mg twice a day.  I tried to increase to 3 times a day.  Prior authorization indicates insurance will only pay for the 4 mg twice a day.  I sent in a prescription for 4 mg twice a day and 2 mg twice a day alternating and see if that will help with augmenting his opioids

## 2023-04-19 NOTE — TELEPHONE ENCOUNTER
April 19, 2023    [facility] Order was picked up from outbox of Dr. Lund.  Paperwork has been reviewed and is complete.  Per initial initial request, this was sent via fax to [phone].     Caroline Noriega

## 2023-04-20 ENCOUNTER — PATIENT OUTREACH (OUTPATIENT)
Dept: CARE COORDINATION | Facility: CLINIC | Age: 53
End: 2023-04-20
Payer: COMMERCIAL

## 2023-04-21 ENCOUNTER — TELEPHONE (OUTPATIENT)
Dept: INTERNAL MEDICINE | Facility: CLINIC | Age: 53
End: 2023-04-21
Payer: COMMERCIAL

## 2023-04-21 NOTE — TELEPHONE ENCOUNTER
April 21, 2023    Home health orders was received via fax for Dr. Lund to sign.  Patient label was attached to paperwork and placed in provider's inbox to be signed.    Pam J. Behr

## 2023-04-25 ENCOUNTER — TELEPHONE (OUTPATIENT)
Dept: INTERNAL MEDICINE | Facility: CLINIC | Age: 53
End: 2023-04-25
Payer: COMMERCIAL

## 2023-04-25 ENCOUNTER — MYC REFILL (OUTPATIENT)
Dept: INTERNAL MEDICINE | Facility: CLINIC | Age: 53
End: 2023-04-25
Payer: COMMERCIAL

## 2023-04-25 DIAGNOSIS — F31.81 BIPOLAR 2 DISORDER (H): Primary | ICD-10-CM

## 2023-04-25 NOTE — TELEPHONE ENCOUNTER
April 25, 2023    Home health orders was picked up from outbox of Dr. Lund.  Paperwork has been reviewed and is complete.  Per initial initial request, this was sent via fax to [phone].     Pam J. Behr

## 2023-04-26 ENCOUNTER — OFFICE VISIT (OUTPATIENT)
Dept: PALLIATIVE MEDICINE | Facility: OTHER | Age: 53
End: 2023-04-26
Payer: COMMERCIAL

## 2023-04-26 VITALS
DIASTOLIC BLOOD PRESSURE: 70 MMHG | HEART RATE: 93 BPM | BODY MASS INDEX: 27.83 KG/M2 | SYSTOLIC BLOOD PRESSURE: 128 MMHG | OXYGEN SATURATION: 97 % | WEIGHT: 183 LBS

## 2023-04-26 DIAGNOSIS — M54.16 LUMBAR RADICULOPATHY: Primary | ICD-10-CM

## 2023-04-26 PROCEDURE — 99214 OFFICE O/P EST MOD 30 MIN: CPT | Performed by: ANESTHESIOLOGY

## 2023-04-26 PROCEDURE — G0463 HOSPITAL OUTPT CLINIC VISIT: HCPCS

## 2023-04-26 RX ORDER — AMMONIUM LACTATE 12 G/100G
LOTION TOPICAL DAILY PRN
Qty: 500 G | Refills: 11 | Status: SHIPPED | OUTPATIENT
Start: 2023-04-26

## 2023-04-26 ASSESSMENT — PAIN SCALES - GENERAL: PAINLEVEL: WORST PAIN (10)

## 2023-04-26 NOTE — PROGRESS NOTES
[facility] - Office Visit    ASSESSMENT & PLAN     There are no diagnoses linked to this encounter.    Patient Instructions       ----------------------------------------------------------------  Clinic Number:  [phone]     Call with any questions about your care and for scheduling assistance.     Calls are returned Monday through Friday between 8 AM and 4:30 PM. We usually get back to you within 2 business days depending on the issue/request.    If we are prescribing your medications:    For opioid medication refills, call the clinic or send a [employer] message 7 days in advance.  Please include:    Name of requested medication    Name of the pharmacy.    For non-opioid medications, call your pharmacy directly to request a refill. Please allow 3-4 days to be processed.     Per [place] State Law:    All controlled substance prescriptions must be filled within 30 days of being written.      For those controlled substances allowing refills, pickup must occur within 30 days of last fill.      We believe regular attendance is key to your success in our program!      Any time you are unable to keep your appointment we ask that you call us at least 24 hours in advance to cancel.This will allow us to offer the appointment time to another patient.     Multiple missed appointments may lead to dismissal from the clinic.  PLAN:    After getting the results from your Social Security review, you will call Dr. Cramer.  We discussed changing the Suboxone gradually to 8 mg twice a day and 4 mg twice a day to help with pain coverage more consistently.  We will then look to tapering the oxycodone.    You are scheduled to see your spine surgeon in June.    Continue with dental work.    Continue with the oxycodone 15 mg every 3 hours at present.    Continue the Suboxone 4 mg twice a day and 2 mg two films daily    May schedule virtual visit with Earnest Alanis 10 days after making above change in schedule with   Shoaib in 2 months        -----  JAYME MULLEN MD  [facility]       SUBJECTIVE      Bola Lyon Jr. is a 53 year old year old male who presents to clinic today for the following:     Followed for extensive vertebral surgeries, has comorbid PTSD.  Since last seen followed in urology for retention after the hospitalization with a catheter and was going to address removing the catheter.    He has had calls with dental pain.    We have been advancing buprenorphine to augment the oxycodone, most recently changed to Suboxone 4 mg twice a day and 2 mg 2 daily as the insurance will not cover 3 times a day.    Reviews today on his mind is an evaluation to date for his so security disability 3-year review.  He notes has not been home in 3 years with hospitals a stay at [facility].  This is mentally straining.  Reviews a history when he had legal problems in front of her white female  who said she expected she would see him back.  He feels dismayed about how people you have particularly upper class weight women 40.  He feels anxious again having to prove himself today in court.    Notes that his benefits had been suspended as apparently forms were sent to one of his other addresses so there is been significant financial stresses with them to cover things.  If approved I will be reimbursed.    Holding on physical therapy aside from him doing his home exercises using his walker and then walking on his own.  Not using machines.    He did see urology, he has had the catheter out doing well.    He is in the middle of doing dental procedures, consider use antibiotics.    Describes the pain is still an issue, when he takes the medical bus the bumps and potholes are very difficult to tolerate.    He has been using Suboxone now 4 mg twice a day and 2 mg 2 a day.  Each dose seems to help with pain for couple of hours, and then he feels in some ways even uncovered.  He wonders how much the Suboxone is blocking  the oxycodone.  It does not induce withdrawal symptoms.  He continues on the 50 mg 3 times a day.    We discussed with this consideration could continue advance the buprenorphine and then look at tapering the oxycodone.  However he has significant anxiety about his plan called today and we discussed not making other changes until he has the resolution of that I can focus.    Last urine drug test in July.   reviewed      Current Outpatient Medications:      acetaminophen (TYLENOL) 650 MG CR tablet, 2 TABLETS (11347QU) ORALLY 3 TIMES DAILY (MAX APAP:4GM/24HR), Disp: 540 tablet, Rfl: 1     albuterol (PROAIR HFA/PROVENTIL HFA/VENTOLIN HFA) 108 (90 Base) MCG/ACT inhaler, Inhale 2 puffs into the lungs every 6 hours as needed, Disp: 6.7 g, Rfl: 11     allopurinol (ZYLOPRIM) 300 MG tablet, Take 1 tablet (300 mg) by mouth 2 times daily, Disp: 180 tablet, Rfl: 3     ammonium lactate (LAC-HYDRIN) 12 % external lotion, , Disp: , Rfl:      amphetamine-dextroamphetamine (ADDERALL) 15 MG tablet, Take 1 tablet (15 mg) by mouth daily, Disp: 30 tablet, Rfl: 0     bisacodyl (DULCOLAX) 10 MG suppository, Place 1 suppository (10 mg) rectally daily as needed for constipation, Disp: 60 suppository, Rfl: 1     buprenorphine HCl-naloxone HCl (SUBOXONE) 2-0.5 MG per film, Place 2 films(4 mg) under the tongue at 2000, Disp: 56 each, Rfl: 1     buprenorphine HCl-naloxone HCl (SUBOXONE) 4-1 MG per film, Place 1 film(4mg) under the tongue at 0800 and 1 film(4 mg) under the tongue at 1400, Disp: 56 each, Rfl: 1     buPROPion (WELLBUTRIN SR) 150 MG 12 hr tablet, Take 1 tablet (150 mg) by mouth 2 times daily, Disp: 180 tablet, Rfl: 3     CALCIUM ANTACID 500 MG chewable tablet, , Disp: , Rfl:      chlorhexidine (PERIDEX) 0.12 % solution, Swish and spit 15 mLs in mouth 3 times daily as needed (sore throat) - Swish & Spit, Disp: 473 mL, Rfl: 11     ergocalciferol (ERGOCALCIFEROL) 1.25 MG (81700 UT) capsule, Take 1 capsule (50,000 Units) by mouth  once a week, Disp: 12 capsule, Rfl: 3     ferrous sulfate (FEROSUL) 325 (65 Fe) MG tablet, Take 1 tablet (325 mg) by mouth daily (with breakfast), Disp: 90 tablet, Rfl: 3     fexofenadine (ALLEGRA) 180 MG tablet, Take 180 mg by mouth daily as needed, Disp: , Rfl:      finasteride (PROSCAR) 5 MG tablet, Take 1 tablet (5 mg) by mouth daily, Disp: 90 tablet, Rfl: 3     fluconazole (DIFLUCAN) 200 MG tablet, Take 1 tablet (200 mg) by mouth daily, Disp: 90 tablet, Rfl: 3     furosemide (LASIX) 40 MG tablet, Take 1 tablet (40 mg) by mouth daily, Disp: 30 tablet, Rfl: 11     guaiFENesin (MUCINEX) 600 MG 12 hr tablet, Take 2 tablets (1,200 mg) by mouth 2 times daily, Disp: 120 tablet, Rfl: 11     hydrOXYzine (ATARAX) 50 MG tablet, Per JACQUELYN Vallejo, Disp: , Rfl:      lactulose 20 GM/30ML SOLN, Take 45 mLs by mouth 2 times daily For constipation., Disp: 5400 mL, Rfl: 11     lamoTRIgine (LAMICTAL) 150 MG tablet, Take 1 tablet (150 mg) by mouth 3 times daily, Disp: , Rfl:      LANsoprazole (PREVACID) 30 MG DR capsule, Take 1 capsule (30 mg) by mouth 2 times daily, Disp: 180 capsule, Rfl: 3     lidocaine (LIDODERM) 5 % patch, Place onto the skin every 24 hours To prevent lidocaine toxicity, patient should be patch free for 12 hrs daily., Disp: 30 patch, Rfl: 11     lidocaine (XYLOCAINE) 2 % external gel, Apply 1 inch topically 4 times a day as needed to gums., Disp: 85 g, Rfl: 11     methocarbamol (ROBAXIN) 500 MG tablet, Take 2 tablets (1,000 mg) by mouth 4 times daily For use beginning 3/14/23, Disp: 120 tablet, Rfl: 1     metoprolol succinate ER (TOPROL-XL) 50 MG 24 hr tablet, Take 1 tablet (50 mg) by mouth daily, Disp: 90 tablet, Rfl: 3     montelukast (SINGULAIR) 10 MG tablet, Take 1 tablet by mouth daily, Disp: , Rfl:      naloxegol (MOVANTIK) 25 MG TABS tablet, Take 1 tablet (25 mg) by mouth every morning (before breakfast), Disp: 30 tablet, Rfl: 1     naloxone (NARCAN) 4 MG/0.1ML nasal spray, 1 spray (4 mg dose)  into one nostril for opioid reversal. Call 911. May repeat if no response in 3 minutes., Disp: , Rfl:      nystatin (MYCOSTATIN) 072728 UNIT/ML suspension, Take 10 mLs (1,000,000 Units) by mouth every 3 hours as needed (thrush) To continue while taking levofloxacin and fluconazole, Disp: 800 mL, Rfl: 3     oxyCODONE IR (ROXICODONE) 15 MG tablet, Take 1 tablet (15 mg) by mouth 8 times daily One tablet every three hours 8 am, 11 am, 2pm, 5 pm, 8 pm, 11 pm, 2 am, 5 am, dispense 4/19/23, start 4/21/23, Disp: 112 tablet, Rfl: 0     polyethylene glycol (MIRALAX) 17 GM/Dose powder, Take 17 g (1 capful) by mouth 2 times daily, Disp: 3060 g, Rfl: 3     QUEtiapine (SEROQUEL) 100 MG tablet, Take 1 tablet (100 mg) by mouth 4 times daily as needed (anxiety), Disp: 60 tablet, Rfl: 1     QUEtiapine (SEROQUEL) 300 MG tablet, Take 2 tablets (600 mg) by mouth At Bedtime, Disp: 180 tablet, Rfl: 0     tamsulosin (FLOMAX) 0.4 MG capsule, Take 1 capsule (0.4 mg) by mouth 2 times daily, Disp: 180 capsule, Rfl: 3     zonisamide (ZONEGRAN) 25 MG capsule, Take 3 capsules (75 mg) by mouth 4 times daily, Disp: 180 capsule, Rfl: 3      Review of Systems   General, psych, musculoskeletal, bowels and bladder otherwise normal other than above.          OBJECTIVE   /70   Pulse 93   Wt 83 kg (183 lb)   SpO2 97%   BMI 27.83 kg/m          Physical Exam  General: Alert, clear sensorium.  Ambulates with walker.  Has notebooks for his information.  Ice packs which he uses work as he travels  Cardiovascular: Normal rate  Lungs: Pulmonary effort is normal, speaking in full sentences  MSK:   Skin. No concerning rashes or lesions.  Neurologic: No focal deficit, alert and oriented x3  Psychiatric: Affect constricted, anxious      Assessment: Extensive spinal surgery, continues in TCU, doing physical therapy exercises.    Medical comorbidities with his dental work, bladder.    He has been on a relatively high dose of oxycodone continued since surgery  and we are trying to transition to buprenorphine to augment.    Discussed possible approaches above, total time 35 minutes

## 2023-04-26 NOTE — TELEPHONE ENCOUNTER
"Outpatient Medication Detail     Disp Refills Start End MINDY   ammonium lactate (LAC-HYDRIN) 12 % external lotion     --   Class: Historical   Route: Topical   Order: [id number]     Routing refill request to provider for review/approval because:  Medication is reported/historical    Last office visit provider:  3/31/23     Requested Prescriptions   Pending Prescriptions Disp Refills     ammonium lactate (LAC-HYDRIN) 12 % external lotion         Miscellaneous Dermatologic Agents Passed - 4/26/2023  4:17 PM        Passed - Recent (12 mo) or future (30 days) visit within the authorizing provider's specialty     Patient has had an office visit with the authorizing provider or a provider within the authorizing providers department within the previous 12 mos or has a future within next 30 days. See \"Patient Info\" tab in inbasket, or \"Choose Columns\" in Meds & Orders section of the refill encounter.              Passed - Refill request is not for Imiquimod, 5-Fluorouracil, or Finasteride      If Imiquimod, 5-Fluorouracil, or Finasteride, may refill if indicated in progress notes.           Passed - Medication is active on med list        Passed - Patient is 24 mos old or older             Jamari Sanz RN 04/26/23 4:18 PM  "

## 2023-04-26 NOTE — PROGRESS NOTES
Patient presents to the clinic today for a follow up with JAYME MULLEN MD regarding Pain Management.          11/28/2022     1:21 PM 2/28/2023    12:48 PM 4/26/2023     2:34 PM   PEG Score   PEG Total Score 9.33 9 10       UDS/CSA- 07.11.2022    Medications- Suboxone 2-0.5 04.25.2023 @8pmand 4-1 mg films 04.26.2023 @8am    Oxycodone 15mg 04.26.2023 @12pm    QUESTIONS:    Kailyn MOLINA [facility] Visit Facilitator

## 2023-04-26 NOTE — PATIENT INSTRUCTIONS
----------------------------------------------------------------  Clinic Number:  [phone]   Call with any questions about your care and for scheduling assistance.   Calls are returned Monday through Friday between 8 AM and 4:30 PM. We usually get back to you within 2 business days depending on the issue/request.    If we are prescribing your medications:  For opioid medication refills, call the clinic or send a [employer] message 7 days in advance.  Please include:  Name of requested medication  Name of the pharmacy.  For non-opioid medications, call your pharmacy directly to request a refill. Please allow 3-4 days to be processed.   Per [place] State Law:  All controlled substance prescriptions must be filled within 30 days of being written.    For those controlled substances allowing refills, pickup must occur within 30 days of last fill.      We believe regular attendance is key to your success in our program!    Any time you are unable to keep your appointment we ask that you call us at least 24 hours in advance to cancel.This will allow us to offer the appointment time to another patient.   Multiple missed appointments may lead to dismissal from the clinic.  PLAN:    After getting the results from your Social Security review, you will call Dr. Cramer.  We discussed changing the Suboxone gradually to 8 mg twice a day and 4 mg twice a day to help with pain coverage more consistently.  We will then look to tapering the oxycodone.    You are scheduled to see your spine surgeon in June.    Continue with dental work.    Continue with the oxycodone 15 mg every 3 hours at present.    Continue the Suboxone 4 mg twice a day and 2 mg two films daily    May schedule virtual visit with Earnest Alanis 10 days after making above change in schedule with Dr. Cramer in 2 months

## 2023-05-04 ENCOUNTER — PATIENT OUTREACH (OUTPATIENT)
Dept: CARE COORDINATION | Facility: CLINIC | Age: 53
End: 2023-05-04
Payer: COMMERCIAL

## 2023-05-09 ENCOUNTER — TELEPHONE (OUTPATIENT)
Dept: PALLIATIVE MEDICINE | Facility: OTHER | Age: 53
End: 2023-05-09
Payer: COMMERCIAL

## 2023-05-09 NOTE — TELEPHONE ENCOUNTER
[facility]    Phone Message    May a detailed message be left on voicemail: yes     Reason for Call: Medication Refill Request    Has the patient contacted the pharmacy for the refill? Yes   Name of medication being requested: buprenorphine HCl-naloxone HCl (SUBOXONE) 2-0.5 MG per film  Provider who prescribed the medication:   Pharmacy: [facility] - [place], [place] - [address]  Date medication is needed: 5/10/23 ASAP   Pt was reminded of 5-7 day call ahead      Action Taken: Message routed to:  Other: [facility]    Travel Screening: Not Applicable

## 2023-05-15 ENCOUNTER — VIRTUAL VISIT (OUTPATIENT)
Dept: INTERNAL MEDICINE | Facility: CLINIC | Age: 53
End: 2023-05-15
Payer: COMMERCIAL

## 2023-05-15 ENCOUNTER — TELEPHONE (OUTPATIENT)
Dept: INTERNAL MEDICINE | Facility: CLINIC | Age: 53
End: 2023-05-15

## 2023-05-15 DIAGNOSIS — I89.0 LYMPHEDEMA OF BOTH LOWER EXTREMITIES: ICD-10-CM

## 2023-05-15 DIAGNOSIS — E87.1 HYPONATREMIA: ICD-10-CM

## 2023-05-15 DIAGNOSIS — F41.1 GENERALIZED ANXIETY DISORDER: ICD-10-CM

## 2023-05-15 DIAGNOSIS — D64.9 ANEMIA, UNSPECIFIED TYPE: Primary | ICD-10-CM

## 2023-05-15 DIAGNOSIS — Z00.00 PREVENTATIVE HEALTH CARE: ICD-10-CM

## 2023-05-15 DIAGNOSIS — K05.10 GINGIVITIS, CHRONIC: ICD-10-CM

## 2023-05-15 DIAGNOSIS — R33.9 URINARY RETENTION WITH INCOMPLETE BLADDER EMPTYING: ICD-10-CM

## 2023-05-15 PROCEDURE — 99214 OFFICE O/P EST MOD 30 MIN: CPT | Mod: VID | Performed by: INTERNAL MEDICINE

## 2023-05-15 PROCEDURE — 96127 BRIEF EMOTIONAL/BEHAV ASSMT: CPT | Mod: VID | Performed by: INTERNAL MEDICINE

## 2023-05-15 ASSESSMENT — ASTHMA QUESTIONNAIRES
QUESTION_3 LAST FOUR WEEKS HOW OFTEN DID YOUR ASTHMA SYMPTOMS (WHEEZING, COUGHING, SHORTNESS OF BREATH, CHEST TIGHTNESS OR PAIN) WAKE YOU UP AT NIGHT OR EARLIER THAN USUAL IN THE MORNING: ONCE OR TWICE
QUESTION_5 LAST FOUR WEEKS HOW WOULD YOU RATE YOUR ASTHMA CONTROL: WELL CONTROLLED
QUESTION_1 LAST FOUR WEEKS HOW MUCH OF THE TIME DID YOUR ASTHMA KEEP YOU FROM GETTING AS MUCH DONE AT WORK, SCHOOL OR AT HOME: A LITTLE OF THE TIME
QUESTION_4 LAST FOUR WEEKS HOW OFTEN HAVE YOU USED YOUR RESCUE INHALER OR NEBULIZER MEDICATION (SUCH AS ALBUTEROL): NOT AT ALL
ACT_TOTALSCORE: 21
QUESTION_2 LAST FOUR WEEKS HOW OFTEN HAVE YOU HAD SHORTNESS OF BREATH: ONCE OR TWICE A WEEK
ACT_TOTALSCORE: 21

## 2023-05-15 ASSESSMENT — ANXIETY QUESTIONNAIRES
3. WORRYING TOO MUCH ABOUT DIFFERENT THINGS: NEARLY EVERY DAY
7. FEELING AFRAID AS IF SOMETHING AWFUL MIGHT HAPPEN: NEARLY EVERY DAY
GAD7 TOTAL SCORE: 21
4. TROUBLE RELAXING: NEARLY EVERY DAY
6. BECOMING EASILY ANNOYED OR IRRITABLE: NEARLY EVERY DAY
IF YOU CHECKED OFF ANY PROBLEMS ON THIS QUESTIONNAIRE, HOW DIFFICULT HAVE THESE PROBLEMS MADE IT FOR YOU TO DO YOUR WORK, TAKE CARE OF THINGS AT HOME, OR GET ALONG WITH OTHER PEOPLE: EXTREMELY DIFFICULT
5. BEING SO RESTLESS THAT IT IS HARD TO SIT STILL: NEARLY EVERY DAY
GAD7 TOTAL SCORE: 21
7. FEELING AFRAID AS IF SOMETHING AWFUL MIGHT HAPPEN: NEARLY EVERY DAY
8. IF YOU CHECKED OFF ANY PROBLEMS, HOW DIFFICULT HAVE THESE MADE IT FOR YOU TO DO YOUR WORK, TAKE CARE OF THINGS AT HOME, OR GET ALONG WITH OTHER PEOPLE?: EXTREMELY DIFFICULT
1. FEELING NERVOUS, ANXIOUS, OR ON EDGE: NEARLY EVERY DAY
2. NOT BEING ABLE TO STOP OR CONTROL WORRYING: NEARLY EVERY DAY

## 2023-05-15 ASSESSMENT — PATIENT HEALTH QUESTIONNAIRE - PHQ9
10. IF YOU CHECKED OFF ANY PROBLEMS, HOW DIFFICULT HAVE THESE PROBLEMS MADE IT FOR YOU TO DO YOUR WORK, TAKE CARE OF THINGS AT HOME, OR GET ALONG WITH OTHER PEOPLE: EXTREMELY DIFFICULT
SUM OF ALL RESPONSES TO PHQ QUESTIONS 1-9: 21
SUM OF ALL RESPONSES TO PHQ QUESTIONS 1-9: 21

## 2023-05-15 NOTE — TELEPHONE ENCOUNTER
Spoke with Kim, nurse triage staff member at [facility]. She requested to have lab orders faxed to:     Attn: [facility]  Fax: [phone]    Labs ordered today at 5/15/23 visit w/ Dr. Lund:     CBC with Platelets & Differential [VLB914] (Order [id number])  Comprehensive metabolic panel [LAB17] (Order [id number])     [address] [address]    [phone]        ----- Message from Devon Lund MD sent at 5/15/2023  2:59 PM CDT-----  Please call [facility] and order the labs I ordered today

## 2023-05-15 NOTE — PROGRESS NOTES
Bola is a 53 year old male contacting the clinic today via video, who will use the platform: TMAT for the visit.  Phone # for Doximity, or if Amwell drops:   Telephone Information:-send him the text when ready   Mobile [phone]      Send him the text when ready     ASSESSMENT and PLAN:  1. Anemia, unspecified type  Recheck labs on iron  - CBC with Platelets & Differential; Future    2. Urinary retention with incomplete bladder emptying  Stable on Flomax and finasteride    3. Gingivitis, chronic  No antibiotics    4. Lymphedema of both lower extremities  Update labs.  Add back hydrochlorothiazide if room  - Comprehensive metabolic panel; Future    5. Hyponatremia  Recheck before adding back hydrochlorothiazide  - Comprehensive metabolic panel; Future    6. Generalized anxiety disorder  Care through psychiatrist    7. Preventative health care  Update by valent vaccine  - COVID-19 BIVALENT 18+ (MODERNA)       Patient Instructions   Update labs    COVID divalent shot with Moderna    Medicines reviewed    Preventive care reviewed            Return in about 2 months (around 7/15/2023) for using a video visit.         CHIEF COMPLAINT:  Chief Complaint   Patient presents with     Follow Up     Medication check  He did say the pain and post op pain is tough for him to make it though the day       HISTORY OF PRESENT ILLNESS:  Bola is a 53 year old male contacting the clinic today via video for review of medicines.  Pain medicine handled through pain clinic    Lymphedema fluid seems to be worsening slightly.  Hydrochlorothiazide was stopped due to hyponatremia    Urination is normal on Flomax and finasteride    Mood is worse when he is in pain.  He sees a psychiatrist every 6 weeks    Blood pressure stable    Dental work proceeding slowly    History of Present Illness       Mental Health Follow-up:  Patient presents to follow-up on Depression & Anxiety.Patient's depression since last visit has been:  Worse  The  "patient is having other symptoms associated with depression.  Patient's anxiety since last visit has been:  Worse  The patient is having other symptoms associated with anxiety.  Any significant life events: health concerns  Patient is feeling anxious or having panic attacks.  Patient has no concerns about alcohol or drug use.    He eats 0-1 servings of fruits and vegetables daily.He consumes 1 sweetened beverage(s) daily.He exercises with enough effort to increase his heart rate 30 to 60 minutes per day.  He exercises with enough effort to increase his heart rate 6 days per week.   He is taking medications regularly.    Today's PHQ-9         PHQ-9 Total Score: 21    PHQ-9 Q9 Thoughts of better off dead/self-harm past 2 weeks :   Not at all    How difficult have these problems made it for you to do your work, take care of things at home, or get along with other people: Extremely difficult  Today's CARMEN-7 Score: 21      REVIEW OF SYSTEMS:   Worsening edema of legs    PFSH:  Social History     Social History Narrative    He is .  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  He is now on disability due to his brain injury and bipolar disease.        Quit smoking October of 2021        Quit drinking 2010        At [facility] since June of 2020       TOBACCO USE:  History   Smoking Status     Former     Packs/day: 0.50     Years: 11.00     Types: Cigarettes     Start date: 8/20/2009     Quit date: 2/21/2017   Smokeless Tobacco     Former       VITALS:  There were no vitals filed for this visit.  There were no vitals taken for this visit. Estimated body mass index is 27.83 kg/m  as calculated from the following:    Height as of 3/20/23: 1.727 m (5' 8\").    Weight as of 4/26/23: 83 kg (183 lb).    PHYSICAL EXAM:  (observations via Video)  Alert and oriented.  Poor dentition and sores on his lower gums    MEDICATIONS:   Current Outpatient Medications "   Medication Sig Dispense Refill     acetaminophen (TYLENOL) 650 MG CR tablet 2 TABLETS (73721QN) ORALLY 3 TIMES DAILY (MAX APAP:4GM/24HR) 540 tablet 1     albuterol (PROAIR HFA/PROVENTIL HFA/VENTOLIN HFA) 108 (90 Base) MCG/ACT inhaler Inhale 2 puffs into the lungs every 6 hours as needed 6.7 g 11     allopurinol (ZYLOPRIM) 300 MG tablet Take 1 tablet (300 mg) by mouth 2 times daily 180 tablet 3     ammonium lactate (LAC-HYDRIN) 12 % external lotion Apply topically daily as needed for dry skin 500 g 11     amphetamine-dextroamphetamine (ADDERALL) 15 MG tablet Take 1 tablet (15 mg) by mouth daily 30 tablet 0     bisacodyl (DULCOLAX) 10 MG suppository Place 1 suppository (10 mg) rectally daily as needed for constipation 60 suppository 1     buprenorphine HCl-naloxone HCl (SUBOXONE) 2-0.5 MG per film Place 2 films(4 mg) under the tongue at 2000 56 each 1     buprenorphine HCl-naloxone HCl (SUBOXONE) 4-1 MG per film Place 1 film(4mg) under the tongue at 0800 and 1 film(4 mg) under the tongue at 1400 56 each 1     buPROPion (WELLBUTRIN SR) 150 MG 12 hr tablet Take 1 tablet (150 mg) by mouth 2 times daily 180 tablet 3     CALCIUM ANTACID 500 MG chewable tablet        chlorhexidine (PERIDEX) 0.12 % solution Swish and spit 15 mLs in mouth 3 times daily as needed (sore throat) - Swish & Spit 473 mL 11     ergocalciferol (ERGOCALCIFEROL) 1.25 MG (41637 UT) capsule Take 1 capsule (50,000 Units) by mouth once a week 12 capsule 3     ferrous sulfate (FEROSUL) 325 (65 Fe) MG tablet Take 1 tablet (325 mg) by mouth daily (with breakfast) 90 tablet 3     fexofenadine (ALLEGRA) 180 MG tablet Take 180 mg by mouth daily as needed       finasteride (PROSCAR) 5 MG tablet Take 1 tablet (5 mg) by mouth daily 90 tablet 3     fluconazole (DIFLUCAN) 200 MG tablet Take 1 tablet (200 mg) by mouth daily 90 tablet 3     furosemide (LASIX) 40 MG tablet Take 1 tablet (40 mg) by mouth daily 30 tablet 11     guaiFENesin (MUCINEX) 600 MG 12 hr tablet  Take 2 tablets (1,200 mg) by mouth 2 times daily 120 tablet 11     hydrOXYzine (ATARAX) 50 MG tablet Per Dr. LOPEZ,OLUKAYODE       lactulose 20 GM/30ML SOLN Take 45 mLs by mouth 2 times daily For constipation. 5400 mL 11     lamoTRIgine (LAMICTAL) 150 MG tablet Take 1 tablet (150 mg) by mouth 3 times daily       LANsoprazole (PREVACID) 30 MG DR capsule Take 1 capsule (30 mg) by mouth 2 times daily 180 capsule 3     lidocaine (LIDODERM) 5 % patch Place onto the skin every 24 hours To prevent lidocaine toxicity, patient should be patch free for 12 hrs daily. 30 patch 11     lidocaine (XYLOCAINE) 2 % external gel Apply 1 inch topically 4 times a day as needed to gums. 85 g 11     methocarbamol (ROBAXIN) 500 MG tablet Take 2 tablets (1,000 mg) by mouth 4 times daily For use beginning 3/14/23 120 tablet 1     metoprolol succinate ER (TOPROL-XL) 50 MG 24 hr tablet Take 1 tablet (50 mg) by mouth daily 90 tablet 3     montelukast (SINGULAIR) 10 MG tablet Take 1 tablet by mouth daily       naloxegol (MOVANTIK) 25 MG TABS tablet Take 1 tablet (25 mg) by mouth every morning (before breakfast) 30 tablet 1     naloxone (NARCAN) 4 MG/0.1ML nasal spray 1 spray (4 mg dose) into one nostril for opioid reversal. Call 911. May repeat if no response in 3 minutes.       nystatin (MYCOSTATIN) 231717 UNIT/ML suspension Take 10 mLs (1,000,000 Units) by mouth every 3 hours as needed (thrush) To continue while taking levofloxacin and fluconazole 800 mL 3     oxyCODONE IR (ROXICODONE) 15 MG tablet Take 1 tablet (15 mg) by mouth 8 times daily One tablet every three hours 8 am, 11 am, 2pm, 5 pm, 8 pm, 11 pm, 2 am, 5 am, dispense 5/3/23 for start 5/5/23 112 tablet 0     polyethylene glycol (MIRALAX) 17 GM/Dose powder Take 17 g (1 capful) by mouth 2 times daily 3060 g 3     QUEtiapine (SEROQUEL) 100 MG tablet Take 1 tablet (100 mg) by mouth 4 times daily as needed (anxiety) 60 tablet 1     QUEtiapine (SEROQUEL) 300 MG tablet Take 2 tablets (600  mg) by mouth At Bedtime 180 tablet 0     tamsulosin (FLOMAX) 0.4 MG capsule Take 1 capsule (0.4 mg) by mouth 2 times daily 180 capsule 3     zonisamide (ZONEGRAN) 25 MG capsule Take 3 capsules (75 mg) by mouth 4 times daily 180 capsule 3       Outside Notes summarized:   Labs, x-rays, cardiology, GI tests reviewed: Hyponatremia  Recent Labs   Lab Test 08/30/22  1321   HGB 12.5*   WBC 7.6   *   POTASSIUM 4.0   CR 0.71   PSA 0.50   URIC 2.0*   B12 336   TSH 0.62   VITDT 29     No results found for: [id number]  Lab Results   Component Value Date    CHOL 164 08/30/2022     New orders:   Orders Placed This Encounter   Procedures     COVID-19 BIVALENT 18+ (MODERNA)     Comprehensive metabolic panel     CBC with Platelets & Differential       Independent review of:     Devon Lund MD  [facility]    Video-Visit Details  Type of service:  Video Visit  Patient has given verbal consent to a Video visit?  Yes  How would you like to obtain your AVS?  MyChart  Will anyone else be joining your video visit, giving supplemental history? No    Originating location (pt location): Home    Distant Location (provider location):  Off-site    Video Start Time: 2:39 PM  Video End time:  3:08 PM  Face to face plus orders: 29 minutes  Documentation time:  3 minutes     The visit lasted a total of 30 minutes

## 2023-05-15 NOTE — TELEPHONE ENCOUNTER
5/15 CBC/CMP lab orders faxed to [facility] at [phone]  - faxed labs filed in second floor, to be shredded filing cabinet.

## 2023-05-15 NOTE — TELEPHONE ENCOUNTER
Kim from [facility] called to inform us that Jan is not longer a patient of theirs, that he was discharged on 4/12/23.

## 2023-05-16 NOTE — TELEPHONE ENCOUNTER
Spoke with patient, pt reports that he does not have another home care service provider.     Pt was discharged from [facility] when he had a wagner. Pt had home care services because he had the wagner.     Pt is at [facility].     [facility] [address] [address]    CONTACT  [address] [place] [address]  Office: [phone]  Fax: [phone]      Pt reports that he is going to call his care team to provide them with the information so that they can coordinate the lab draw.     Called [facility]:     To restart home care services, pt would need a whole new referral to refer back to home care for labs.     Called [facility] :     Staff member Joslyn is going to director of nursing review request for a lab draw. Joslyn is unsure if [facility] staff does lab draws. I gave Joslyn the clinic phone number and requested a phone call back with this information.

## 2023-05-19 NOTE — CONFIDENTIAL NOTE
Good idea.  We will ask community paramedics to help draw blood and perhaps visit every other week.  Referral filled out and signed

## 2023-05-19 NOTE — TELEPHONE ENCOUNTER
Spoke with Homer at [facility] [place]    [facility] [place] [facility] for [facility] patients.     Spoke with patient:     He was speaking with gentleman at [facility] - from [facility] Encounter:     Telephone Encounter - Tian Morrison RN - 05/16/2023 5:02 PM CDT  Formatting of this note might be different from the original.  Patient was transferred from [facility] Triage about lab work needed; patient doctor's in the [facility]. Patient was going to call the number of the supervisor he has to attempt to get things figured out as he was told the [facility] had closed the contract.    Tian Morrison RN .................... 5/16/2023 5:11 PM  Electronically signed by Tian Morrison, RN at 05/16/2023 5:14 PM CDT    Spoke with patient about potential for community paramedics to come out to do the lab draw. Pt is interested in learning more about this option as it would be very difficult for him to come into clinic given his history of spinal surgeries.    Pt does wonder if the community paramedics comes at a cost.

## 2023-05-23 ENCOUNTER — PATIENT OUTREACH (OUTPATIENT)
Dept: CARE COORDINATION | Facility: CLINIC | Age: 53
End: 2023-05-23
Payer: COMMERCIAL

## 2023-05-23 ENCOUNTER — TELEPHONE (OUTPATIENT)
Dept: INTERNAL MEDICINE | Facility: CLINIC | Age: 53
End: 2023-05-23
Payer: COMMERCIAL

## 2023-05-23 DIAGNOSIS — Z00.00 PREVENTATIVE HEALTH CARE: Primary | ICD-10-CM

## 2023-05-23 NOTE — TELEPHONE ENCOUNTER
May 23, 2023    Home health orders was received via fax for Dr. Lund to sign.  Patient label was attached to paperwork and placed in provider's inbox to be signed.    Pam J. Behr

## 2023-05-23 NOTE — PROGRESS NOTES
"Community Paramedic Program  Community Health Worker Initial Outreach    Referral source: Pt's PCP, RN  Reason(s) for visit: Labs/Injections (please ensure orders have been placed in Epic)   Goals for visit(s): Other   How often should patient be seen: Every Other Week   Preference on when patient should be seen: 3-7 Days     Comment: PCP:  Devon Lund      Other info that would be helpful: Multiple multiple medical problems but slowly improving after several surgeries.  Need labs to monitor his conditions and medication changes.  He has normally lived at home and would probably appreciate the social interaction of more frequent visits     Lab order(s) placed: yes (5/15/23)    Timed or fasting lab requested: no    Initial visit: Tuesday, May 30th / 10 am / CP Rita (date/time/CP)       Visit location: [facility] ([address] [place] [address], Room [address], [place] [address])    Additional information:   Spoke with pt. Confirmed referral information, and pt said he was aware his clinic would be asking us to reach out. Pt is currently living at the [facility] after \"three spine surgeries in a row.\" He said traveling in a vehicle is difficult and would appreciate a CP visit for labs that his PCP is requesting. Reviewed the lab orders with pt during the call and let him know he can eat and take his medications as he typically would. I asked him to drink water before the visit, which he agreed to do.    Confirmed pt's current address, which is different from the one listed in his chart. Pt said the place where he's currently staying \"looks like a converted house.\" He said the CP should walk up large ramp when she arrives and come in through the door. He said a staff will be there to check her in and asked that she wear a mask during the visit. Pt said there are only 5 other residents there and he is in Room #2. Pt wrote down the visit details during the call, including my contact " "information. I asked him to reach out with questions or to reschedule if needed, and he agreed.     Pt also asked if his PCP ordered \"the new COVID dose.\" I told him no, but I notified pt that our program does provide COVID vaccinations for our patients. I offered to add him to our list, which he agreed to, and I offered to follow up with his PCP. Notified pt that with the recent changes in COVID vaccine guidance, we are waiting for further information from our pharmacy contact until we can start scheduling our traveling clinics again. Pt said he understood, and I let him know that I will reach out to him again in the near future to schedule a visit.     Pt declined having any other questions today or information he wanted us to be aware of. He expressed appreciation again for the call and offer to [facility].     Routing to pt's PCP and CP for awareness. Will ask pt's PCP to place and sign an order for the new bivalent Moderna dose, if in agreement.             "

## 2023-05-24 NOTE — TELEPHONE ENCOUNTER
May 24, 2023    Home health orders was picked up from outbox of Dr. Lund.  Paperwork has been reviewed and is complete.  Per initial initial request, this was sent via fax to [phone].     Pam J. Behr

## 2023-05-31 ENCOUNTER — ALLIED HEALTH/NURSE VISIT (OUTPATIENT)
Dept: OTHER | Facility: CLINIC | Age: 53
End: 2023-05-31
Attending: INTERNAL MEDICINE
Payer: COMMERCIAL

## 2023-05-31 ENCOUNTER — LAB (OUTPATIENT)
Dept: LAB | Facility: CLINIC | Age: 53
End: 2023-05-31
Payer: COMMERCIAL

## 2023-05-31 VITALS
SYSTOLIC BLOOD PRESSURE: 149 MMHG | DIASTOLIC BLOOD PRESSURE: 84 MMHG | TEMPERATURE: 97.6 F | RESPIRATION RATE: 14 BRPM | HEART RATE: 63 BPM | OXYGEN SATURATION: 98 %

## 2023-05-31 DIAGNOSIS — D64.9 ANEMIA, UNSPECIFIED TYPE: ICD-10-CM

## 2023-05-31 DIAGNOSIS — E87.1 HYPONATREMIA: ICD-10-CM

## 2023-05-31 DIAGNOSIS — I89.0 LYMPHEDEMA OF BOTH LOWER EXTREMITIES: ICD-10-CM

## 2023-05-31 DIAGNOSIS — D64.9 ANEMIA, UNSPECIFIED TYPE: Primary | ICD-10-CM

## 2023-05-31 LAB
ALBUMIN SERPL BCG-MCNC: 4.4 G/DL (ref 3.5–5.2)
ALP SERPL-CCNC: 155 U/L (ref 40–129)
ALT SERPL W P-5'-P-CCNC: 13 U/L (ref 10–50)
ANION GAP SERPL CALCULATED.3IONS-SCNC: 12 MMOL/L (ref 7–15)
AST SERPL W P-5'-P-CCNC: 19 U/L (ref 10–50)
BASOPHILS # BLD AUTO: 0 10E3/UL (ref 0–0.2)
BASOPHILS NFR BLD AUTO: 1 %
BILIRUB SERPL-MCNC: 0.2 MG/DL
BUN SERPL-MCNC: 7.1 MG/DL (ref 6–20)
CALCIUM SERPL-MCNC: 9.5 MG/DL (ref 8.6–10)
CHLORIDE SERPL-SCNC: 105 MMOL/L (ref 98–107)
CREAT SERPL-MCNC: 0.78 MG/DL (ref 0.67–1.17)
DEPRECATED HCO3 PLAS-SCNC: 24 MMOL/L (ref 22–29)
EOSINOPHIL # BLD AUTO: 0.2 10E3/UL (ref 0–0.7)
EOSINOPHIL NFR BLD AUTO: 3 %
ERYTHROCYTE [DISTWIDTH] IN BLOOD BY AUTOMATED COUNT: 14.7 % (ref 10–15)
FERRITIN SERPL-MCNC: 70 NG/ML (ref 31–409)
GFR SERPL CREATININE-BSD FRML MDRD: >90 ML/MIN/1.73M2
GLUCOSE SERPL-MCNC: 91 MG/DL (ref 70–99)
HCT VFR BLD AUTO: 38.2 % (ref 40–53)
HGB BLD-MCNC: 12.3 G/DL (ref 13.3–17.7)
IMM GRANULOCYTES # BLD: 0 10E3/UL
IMM GRANULOCYTES NFR BLD: 1 %
LYMPHOCYTES # BLD AUTO: 1.6 10E3/UL (ref 0.8–5.3)
LYMPHOCYTES NFR BLD AUTO: 25 %
MCH RBC QN AUTO: 25.2 PG (ref 26.5–33)
MCHC RBC AUTO-ENTMCNC: 32.2 G/DL (ref 31.5–36.5)
MCV RBC AUTO: 78 FL (ref 78–100)
MONOCYTES # BLD AUTO: 0.5 10E3/UL (ref 0–1.3)
MONOCYTES NFR BLD AUTO: 7 %
NEUTROPHILS # BLD AUTO: 4.2 10E3/UL (ref 1.6–8.3)
NEUTROPHILS NFR BLD AUTO: 65 %
PLATELET # BLD AUTO: 325 10E3/UL (ref 150–450)
POTASSIUM SERPL-SCNC: 3.7 MMOL/L (ref 3.4–5.3)
PROT SERPL-MCNC: 6.9 G/DL (ref 6.4–8.3)
RBC # BLD AUTO: 4.88 10E6/UL (ref 4.4–5.9)
SODIUM SERPL-SCNC: 141 MMOL/L (ref 136–145)
WBC # BLD AUTO: 6.5 10E3/UL (ref 4–11)

## 2023-05-31 PROCEDURE — 82728 ASSAY OF FERRITIN: CPT

## 2023-05-31 PROCEDURE — 99207 PR COMMUNITY PARAMEDIC - PATIENT NOT BILLABLE: CPT

## 2023-05-31 PROCEDURE — 36415 COLL VENOUS BLD VENIPUNCTURE: CPT

## 2023-05-31 PROCEDURE — 85025 COMPLETE CBC W/AUTO DIFF WBC: CPT

## 2023-05-31 PROCEDURE — 80053 COMPREHEN METABOLIC PANEL: CPT

## 2023-05-31 NOTE — PROGRESS NOTES
"Community Paramedic One Time Visit    May 31, 2023; 12:28 PM    Bola Lyon Jr. is a 53 year old year old adult being seen at home visit    Present at appointment:  Pt,     Vitals:  BP Readings from Last 1 Encounters:   05/31/23 (!) 149/84     Pulse Readings from Last 1 Encounters:   05/31/23 63     Wt Readings from Last 1 Encounters:   04/26/23 83 kg (183 lb)     Ht Readings from Last 1 Encounters:   03/20/23 1.727 m (5' 8\")         Clinical Concerns:  Current Medical Concerns:  Need for (Vaccination; Lab draw/sample)    Education Provided to patient: Monitor for signs and symptoms of allergic reaction to vaccine, call 911 if needed   Flu Shot given: No  COVID Vaccine Given: No  Lab draw or specimen collection: Yes      Plan:     Time spent with patient: 30    The patient meets one or more of the following criteria:  * Requires services to prevent readmission to a nursing home or hospital    Acute concern/Follow-up recommendations: One- time visit, will need new CP referral if additional needs arise    Issues for Provider to follow up on: Community Paramedic visited patient in home, provided one-time visit.     A venous blood draw was obtained without issue.    "

## 2023-06-05 DIAGNOSIS — K59.03 DRUG-INDUCED CONSTIPATION: ICD-10-CM

## 2023-06-05 RX ORDER — POLYETHYLENE GLYCOL 3350 17 G/17G
POWDER, FOR SOLUTION ORAL
Refills: 5 | OUTPATIENT
Start: 2023-06-05

## 2023-06-12 ENCOUNTER — PATIENT OUTREACH (OUTPATIENT)
Dept: CARE COORDINATION | Facility: CLINIC | Age: 53
End: 2023-06-12
Payer: COMMERCIAL

## 2023-06-13 DIAGNOSIS — F31.81 BIPOLAR 2 DISORDER (H): ICD-10-CM

## 2023-06-13 DIAGNOSIS — S06.9X9S TRAUMATIC BRAIN INJURY WITH LOSS OF CONSCIOUSNESS, SEQUELA (H): ICD-10-CM

## 2023-06-13 RX ORDER — DEXTROAMPHETAMINE SACCHARATE, AMPHETAMINE ASPARTATE, DEXTROAMPHETAMINE SULFATE AND AMPHETAMINE SULFATE 3.75; 3.75; 3.75; 3.75 MG/1; MG/1; MG/1; MG/1
15 TABLET ORAL DAILY
Qty: 30 TABLET | Refills: 0 | Status: SHIPPED | OUTPATIENT
Start: 2023-06-13 | End: 2023-07-11

## 2023-06-16 ENCOUNTER — TELEPHONE (OUTPATIENT)
Dept: INTERNAL MEDICINE | Facility: CLINIC | Age: 53
End: 2023-06-16
Payer: COMMERCIAL

## 2023-06-16 DIAGNOSIS — M48.02 CERVICAL STENOSIS OF SPINAL CANAL: ICD-10-CM

## 2023-06-16 DIAGNOSIS — M54.16 LUMBAR RADICULOPATHY: ICD-10-CM

## 2023-06-16 DIAGNOSIS — G89.4 CHRONIC PAIN DISORDER: ICD-10-CM

## 2023-06-16 DIAGNOSIS — G62.9 NEUROPATHY: Primary | ICD-10-CM

## 2023-06-16 NOTE — TELEPHONE ENCOUNTER
Order/Referral Request    Who is requesting: Patient     Orders being requested: Neurologist     Reason service is needed/diagnosis: Patient stated he has nerve repair and neuropathy.     When are orders needed by: ASAP    Has this been discussed with Provider: Yes    Does patient have a preference on a Group/Provider/Facility? [facility]  in [place]     Does patient have an appointment scheduled?: No    Where to send orders: Fax [phone]    Could we send this information to you in [facility] or would you prefer to receive a phone call?:   Patient would prefer a phone call   Okay to leave a detailed message?: Yes at Home number on file [phone] (home)

## 2023-06-17 RX ORDER — LIDOCAINE 50 MG/G
PATCH TOPICAL EVERY 24 HOURS
Qty: 30 PATCH | Refills: 11 | OUTPATIENT
Start: 2023-06-17

## 2023-06-20 ENCOUNTER — ALLIED HEALTH/NURSE VISIT (OUTPATIENT)
Dept: OTHER | Facility: CLINIC | Age: 53
End: 2023-06-20
Payer: COMMERCIAL

## 2023-06-20 VITALS
TEMPERATURE: 97.4 F | DIASTOLIC BLOOD PRESSURE: 80 MMHG | HEART RATE: 70 BPM | OXYGEN SATURATION: 99 % | SYSTOLIC BLOOD PRESSURE: 128 MMHG

## 2023-06-20 DIAGNOSIS — Z23 HIGH PRIORITY FOR 2019-NCOV VACCINE: Primary | ICD-10-CM

## 2023-06-20 PROCEDURE — 91313 COVID-19 BIVALENT 18+ (MODERNA): CPT

## 2023-06-20 PROCEDURE — 99207 PR COMMUNITY PARAMEDIC - PATIENT NOT BILLABLE: CPT

## 2023-06-20 PROCEDURE — 0134A COVID-19 BIVALENT 18+ (MODERNA): CPT

## 2023-06-20 NOTE — PROGRESS NOTES
"Community Paramedic One Time Visit    June 20, 2023; 0930 AM    Bola Lyon Jr. is a 53 year old year old adult being seen at home visit    Present at appointment:  patient    Vitals:  BP Readings from Last 1 Encounters:   06/20/23 128/80     Pulse Readings from Last 1 Encounters:   06/20/23 70     Wt Readings from Last 1 Encounters:   04/26/23 83 kg (183 lb)     Ht Readings from Last 1 Encounters:   03/20/23 1.727 m (5' 8\")         Clinical Concerns:  Current Medical Concerns:  Need for (Vaccination; Lab draw/sample)    Education Provided to patient: Monitor for signs and symptoms of allergic reaction to vaccine, call 911 if needed   Flu Shot given: No  COVID Vaccine Given: Yes  Lab draw or specimen collection: No      Plan:     Time spent with patient: 30    The patient meets one or more of the following criteria:  * Requires services to prevent readmission to a nursing home or hospital    Acute concern/Follow-up recommendations: One- time visit, will need new CP referral if additional needs arise    Issues for Provider to follow up on: Community Paramedic visited patient in home, provided one-time visit.     A Covid vaccination was administered and pt observed for 15 minutes afterwards with no adverse effects observed.     "

## 2023-06-28 ENCOUNTER — VIRTUAL VISIT (OUTPATIENT)
Dept: PALLIATIVE MEDICINE | Facility: OTHER | Age: 53
End: 2023-06-28
Payer: COMMERCIAL

## 2023-06-28 DIAGNOSIS — M48.02 FORAMINAL STENOSIS OF CERVICAL REGION: ICD-10-CM

## 2023-06-28 DIAGNOSIS — M54.16 LUMBAR RADICULOPATHY: ICD-10-CM

## 2023-06-28 PROCEDURE — 99214 OFFICE O/P EST MOD 30 MIN: CPT | Mod: VID | Performed by: ANESTHESIOLOGY

## 2023-06-28 RX ORDER — BUPRENORPHINE AND NALOXONE 8; 2 MG/1; MG/1
1 FILM, SOLUBLE BUCCAL; SUBLINGUAL DAILY
Qty: 14 FILM | Refills: 3 | Status: SHIPPED | OUTPATIENT
Start: 2023-06-28 | End: 2023-07-20

## 2023-06-28 RX ORDER — OXYCODONE HYDROCHLORIDE 15 MG/1
15 TABLET ORAL
Qty: 112 TABLET | Refills: 0 | Status: SHIPPED | OUTPATIENT
Start: 2023-06-28 | End: 2023-06-28

## 2023-06-28 RX ORDER — OXYCODONE HYDROCHLORIDE 15 MG/1
15 TABLET ORAL
Qty: 105 TABLET | Refills: 0 | Status: SHIPPED | OUTPATIENT
Start: 2023-06-28 | End: 2023-07-06

## 2023-06-28 RX ORDER — OXYCODONE HYDROCHLORIDE 15 MG/1
15 TABLET ORAL
Qty: 112 TABLET | Refills: 0 | Status: CANCELLED | OUTPATIENT
Start: 2023-06-28

## 2023-06-28 RX ORDER — BUPRENORPHINE AND NALOXONE 4; 1 MG/1; MG/1
FILM, SOLUBLE BUCCAL; SUBLINGUAL
Qty: 56 EACH | Refills: 1 | Status: SHIPPED | OUTPATIENT
Start: 2023-06-28 | End: 2023-08-28

## 2023-06-28 ASSESSMENT — PAIN SCALES - GENERAL: PAINLEVEL: EXTREME PAIN (9)

## 2023-06-28 NOTE — PROGRESS NOTES
06/28/23 1313   PEG: A Thee-Item Scale Assessing Pain Intensity and Interference        0 = No pain / No interference    10 = Pain as bad as you can imagine / Completely interferes   What number best describes your pain on average in the past week? 8   What number best describes how, during the past week, pain has interfered with your enjoyment of life? 10   What number best describes how, during the past week, pain has interfered with your general activity? 10   PEG Total Score 9.33

## 2023-06-28 NOTE — PATIENT INSTRUCTIONS
PLAN:    Use Suboxone to 8 mg 1 each morning at 08 100.    Suboxone 4 mg 1 at 1400 at 2000, may check with your insurance and see if the generic tablets are less expensive.    Oxycodone 15 mg every 3 hours, with change in the 2 AM dose to 1/2 tablet over the next month.  We will decrease by 1/2 tablet each month to the goal of 15 mg 4 times a day.    You are working with the dental program.    You will started with a new psychotherapist to help with anxiety.    You will be starting physical therapy.    You are working with    .    Follow-up with Dr. Cramer in the office in 3 months

## 2023-06-28 NOTE — TELEPHONE ENCOUNTER
Received call from patient requesting refill(s) of oxyCODONE IR (ROXICODONE) 15 MG tablet    Last dispensed from pharmacy on 6/14/23  Due date 6/28/23    Patient's last office/virtual visit by prescribing provider on 4/26/23  Next office/virtual appointment scheduled for 6/28/23    Last urine drug screen 7/11/22  Current opioid agreement on file Yes Date: 7/11/22    Processing (pick one):      E-prescribe to [facility] - [place], [place] - [address] pharmacy    Will facilitate refill.    Pending Prescriptions:                       Disp   Refills    oxyCODONE IR (ROXICODONE) 15 MG tablet    112 ta*0            Sig: Take 1 tablet (15 mg) by mouth 8 times daily One           tablet every three hours 8 am, 11 am, 2pm, 5 pm,           8 pm, 11 pm, 2 am, 5 am, dispense/start 6/28/23    [facility]

## 2023-06-28 NOTE — PROGRESS NOTES
[facility] - Office Visit    ASSESSMENT & PLAN     Bola was seen today for pain.    Diagnoses and all orders for this visit:    Foraminal stenosis of cervical region  -     oxyCODONE IR (ROXICODONE) 15 MG tablet; Take 1 tablet (15 mg) by mouth 8 times daily One tablet every three hours 8 am, 11 am, 2pm, 5 pm, 8 pm, 11 pm, , 5 am,  And  One-half tab 2 amdispense/start 6/28/23    Lumbar radiculopathy  -     buprenorphine HCl-naloxone HCl (SUBOXONE) 4-1 MG per film; Place 1 film(4mg) under the tongue at 1400 and 1 film(4 mg) under the tongue at 2000  -     buprenorphine HCl-naloxone HCl (SUBOXONE) 8-2 MG per film; Place 1 Film under the tongue daily for 14 days At 0800        Patient Instructions   PLAN:    Use Suboxone to 8 mg 1 each morning at 08 100.    Suboxone 4 mg 1 at 1400 at 2000, may check with your insurance and see if the generic tablets are less expensive.    Oxycodone 15 mg every 3 hours, with change in the 2 AM dose to 1/2 tablet over the next month.  We will decrease by 1/2 tablet each month to the goal of 15 mg 4 times a day.    You are working with the dental program.    You will started with a new psychotherapist to help with anxiety.    You will be starting physical therapy.    You are working with    .    Follow-up with Dr. Mullen in the office in 3 months        -----  JAYME MULLEN MD  [facility]       SUBJECTIVE      Bola Lyon Jr. is a 53 year old year old male seen for video visit     Follow-up for history of lumbar fusion, cervical stenosis, other joint surgeries.    Reviewing the record did follow-up with spine surgery 18-month follow-up from his fusion will be advancing physical therapy.    He reviews today starts with a new psychotherapist tomorrow at [facility] the same place he sees a psychiatrist.  Will help with the anxiety and PTSD.    He is working with a dentist, will be having cleaning and having teeth pulled,  demonstrates had a molar break.    Did have his follow-up with the spine surgery, a 2-month follow-up, will be advancing physical therapy.    We meeting with a new vascular surgeon reviewing the lower extremity swelling.    Review follow with urologist, has a catheter.    He will be going to foot and ankle clinic previous ankle surgery and doing ingrown toenails.    Has been concerned about the co-pay cost of Suboxone.  We reviewed as he is now had his follow-up with spine surgery where they will observe how he does, with the possibility of a revision for some of the screws in his pelvis discussed we will begin slow tapering the oxycodone by 1/2 tablet daily each month to a goal of 15 mg for a day.  We will continue advance the Suboxone presently using 4 mg films twice a day, and 2 mg films 2 daily.  Has not noticed much change yet.    Last urine drug test year ago July.   reviewed      Current Outpatient Medications:      acetaminophen (TYLENOL) 650 MG CR tablet, 2 TABLETS (89591DK) ORALLY 3 TIMES DAILY (MAX APAP:4GM/24HR), Disp: 540 tablet, Rfl: 1     albuterol (PROAIR HFA/PROVENTIL HFA/VENTOLIN HFA) 108 (90 Base) MCG/ACT inhaler, Inhale 2 puffs into the lungs every 6 hours as needed, Disp: 6.7 g, Rfl: 11     allopurinol (ZYLOPRIM) 300 MG tablet, Take 1 tablet (300 mg) by mouth 2 times daily, Disp: 180 tablet, Rfl: 3     ammonium lactate (LAC-HYDRIN) 12 % external lotion, Apply topically daily as needed for dry skin, Disp: 500 g, Rfl: 11     amphetamine-dextroamphetamine (ADDERALL) 15 MG tablet, Take 1 tablet (15 mg) by mouth daily, Disp: 30 tablet, Rfl: 0     bisacodyl (DULCOLAX) 10 MG suppository, Place 1 suppository (10 mg) rectally daily as needed for constipation, Disp: 60 suppository, Rfl: 1     buprenorphine HCl-naloxone HCl (SUBOXONE) 4-1 MG per film, Place 1 film(4mg) under the tongue at 1400 and 1 film(4 mg) under the tongue at 2000, Disp: 56 each, Rfl: 1     buprenorphine HCl-naloxone HCl (SUBOXONE)  8-2 MG per film, Place 1 Film under the tongue daily for 14 days At 0800, Disp: 14 Film, Rfl: 3     buPROPion (WELLBUTRIN SR) 150 MG 12 hr tablet, Take 1 tablet (150 mg) by mouth 2 times daily, Disp: 180 tablet, Rfl: 3     CALCIUM ANTACID 500 MG chewable tablet, , Disp: , Rfl:      chlorhexidine (PERIDEX) 0.12 % solution, Swish and spit 15 mLs in mouth 3 times daily as needed (sore throat) - Swish & Spit, Disp: 473 mL, Rfl: 11     ergocalciferol (ERGOCALCIFEROL) 1.25 MG (48473 UT) capsule, Take 1 capsule (50,000 Units) by mouth once a week, Disp: 12 capsule, Rfl: 3     ferrous sulfate (FEROSUL) 325 (65 Fe) MG tablet, Take 1 tablet (325 mg) by mouth daily (with breakfast), Disp: 90 tablet, Rfl: 3     fexofenadine (ALLEGRA) 180 MG tablet, Take 180 mg by mouth daily as needed, Disp: , Rfl:      finasteride (PROSCAR) 5 MG tablet, Take 1 tablet (5 mg) by mouth daily, Disp: 90 tablet, Rfl: 3     fluconazole (DIFLUCAN) 200 MG tablet, Take 1 tablet (200 mg) by mouth daily, Disp: 90 tablet, Rfl: 3     furosemide (LASIX) 40 MG tablet, Take 1 tablet (40 mg) by mouth daily, Disp: 30 tablet, Rfl: 11     hydrOXYzine (ATARAX) 50 MG tablet, Per JACQUELYN Vallejo, Disp: , Rfl:      lactulose 20 GM/30ML SOLN, Take 45 mLs by mouth 2 times daily For constipation., Disp: 5400 mL, Rfl: 11     lamoTRIgine (LAMICTAL) 150 MG tablet, Take 1 tablet (150 mg) by mouth 3 times daily, Disp: , Rfl:      LANsoprazole (PREVACID) 30 MG DR capsule, Take 1 capsule (30 mg) by mouth 2 times daily, Disp: 180 capsule, Rfl: 3     lidocaine (LIDODERM) 5 % patch, Place onto the skin every 24 hours To prevent lidocaine toxicity, patient should be patch free for 12 hrs daily., Disp: 30 patch, Rfl: 11     lidocaine (XYLOCAINE) 2 % external gel, Apply 1 inch topically 4 times a day as needed to gums., Disp: 85 g, Rfl: 11     methocarbamol (ROBAXIN) 500 MG tablet, Take 2 tablets (1,000 mg) by mouth 4 times daily For use beginning 3/14/23, Disp: 120 tablet,  Rfl: 1     metoprolol succinate ER (TOPROL-XL) 50 MG 24 hr tablet, Take 1 tablet (50 mg) by mouth daily, Disp: 90 tablet, Rfl: 3     montelukast (SINGULAIR) 10 MG tablet, Take 1 tablet by mouth daily, Disp: , Rfl:      naloxegol (MOVANTIK) 25 MG TABS tablet, Take 1 tablet (25 mg) by mouth every morning (before breakfast), Disp: 30 tablet, Rfl: 1     naloxone (NARCAN) 4 MG/0.1ML nasal spray, 1 spray (4 mg dose) into one nostril for opioid reversal. Call 911. May repeat if no response in 3 minutes., Disp: , Rfl:      nystatin (MYCOSTATIN) 380113 UNIT/ML suspension, Take 10 mLs (1,000,000 Units) by mouth every 3 hours as needed (thrush) To continue while taking levofloxacin and fluconazole, Disp: 800 mL, Rfl: 3     oxyCODONE IR (ROXICODONE) 15 MG tablet, Take 1 tablet (15 mg) by mouth 8 times daily One tablet every three hours 8 am, 11 am, 2pm, 5 pm, 8 pm, 11 pm, , 5 am,  And  One-half tab 2 amdispense/start 6/28/23, Disp: 105 tablet, Rfl: 0     polyethylene glycol (MIRALAX) 17 GM/Dose powder, Take 17 g (1 capful) by mouth 2 times daily, Disp: 3060 g, Rfl: 3     QUEtiapine (SEROQUEL) 100 MG tablet, Take 1 tablet (100 mg) by mouth 4 times daily as needed (anxiety), Disp: 60 tablet, Rfl: 1     QUEtiapine (SEROQUEL) 300 MG tablet, Take 2 tablets (600 mg) by mouth At Bedtime, Disp: 180 tablet, Rfl: 0     sennosides (SENOKOT) 8.8 MG/5ML syrup, Take 10 mLs by mouth At Bedtime, Disp: 236 mL, Rfl: 1     tamsulosin (FLOMAX) 0.4 MG capsule, Take 1 capsule (0.4 mg) by mouth 2 times daily, Disp: 180 capsule, Rfl: 3     zonisamide (ZONEGRAN) 25 MG capsule, Take 3 capsules (75 mg) by mouth 4 times daily, Disp: 180 capsule, Rfl: 3      Review of Systems   General, psych, musculoskeletal, bowels and bladder otherwise normal other than above.          OBJECTIVE   There were no vitals taken for this visit.      Physical Exam  General:  Normal appearance, no apparent distress  Cardiovascular: Normal rate  Lungs: Pulmonary effort is  normal, speaking in full sentences  MSK:   Skin: No concerning rashes or lesions.  Neurologic: No focal deficit, alert and oriented x3  Psychiatric: Affect congruent    Assessment: Patient status post lumbar fusion, follow-up with surgery recently, to be advancing physical therapy.    Multiple medical problems.  Underlying PTSD/anxiety.    We will advance the Suboxone as above while we begin a slow taper of his oxycodone Toradol level deemed safe for by the [facility] pain service.    Total time 35 minutes.    Video start time 1: 25.  Video end 1: 50.  Patient at home via DoximAudiodraft

## 2023-06-28 NOTE — NURSING NOTE
Jan is a 53 year old who is being evaluated via a billable video visit.      How would you like to obtain your AVS? MyChart  If the video visit is dropped, the invitation should be resent by: Text to cell phone: [phone]  Will anyone else be joining your video visit? No        Video-Visit Details    Type of service:  Video Visit   Video Start Time:   Video End Time:    Originating Location (pt. Location): Home    Distant Location (provider location):  On-site  Platform used for Video Visit: Doximity   Is patient in [place]? yes  NOTE: If Pt is not in [place], Appointment needs to be canceled and rescheduled           2/28/2023    12:48 PM 4/26/2023     2:34 PM 6/28/2023     1:13 PM   PEG Score   PEG Total Score 9 10 9.33        UDS/CSA: not needed      Controlled: Oxycodone at 11 am on 6/28/2023, Suboxone 2 mg on 8pm on 6/27/2023, Suboxone 4 mg at 8am on 6/28/2023      Questions/Concerns:  none      Refills: Oxycodone     Mary Jo Irvin LPN  [facility]

## 2023-07-03 ENCOUNTER — VIRTUAL VISIT (OUTPATIENT)
Dept: UROLOGY | Facility: CLINIC | Age: 53
End: 2023-07-03
Payer: COMMERCIAL

## 2023-07-03 VITALS — WEIGHT: 205 LBS | HEIGHT: 68 IN | BODY MASS INDEX: 31.07 KG/M2

## 2023-07-03 DIAGNOSIS — R33.9 URINARY RETENTION: Primary | ICD-10-CM

## 2023-07-03 PROCEDURE — 99214 OFFICE O/P EST MOD 30 MIN: CPT | Mod: VID | Performed by: PHYSICIAN ASSISTANT

## 2023-07-03 ASSESSMENT — PAIN SCALES - GENERAL: PAINLEVEL: NO PAIN (0)

## 2023-07-03 NOTE — PROGRESS NOTES
"Text to smartphone at [phone]        Jan is a 53 year old who is being evaluated via a billable video visit.          How would you like to obtain your AVS? MyChart  If the video visit is dropped, the invitation should be resent by: Text to cell phone: [phone]  Will anyone else be joining your video visit? No        Video-Visit Details  Type of service:  Video Visit   Video Start Time: 10:33 AM   Unable to connect: phone duration 12 min     Originating Location (pt. Location): Home    Distant Location (provider location):  On-site  Platform used for Video Visit: AgariWILFRED  CHIEF COMPLAINT   It was my pleasure to see Bola Lyon Jr. who is a 53 year old male for follow-up of urinary retention.      HPI   Bola Lyon Jr. is a very pleasant 53 year old male     Initially seen by Marline Prakash 2/7/2023:  It is a pleasure to see Mr. Bola Lyon Jr., a pleasant 53 year old male seen today in the urology clinic in consultation from Dr. Lund for evaluation of urinary retention. He was admitted to [place] for several days (see Jan discharge summary from 1/24) for n/v, HoK, urinary retention (>800cc on first straight cath). This eventually required Riddle catheter placement. This has been draining well with pale, yellow urine. The patient is tolerating the catheter without issue. No clots of hematuria noted.      Narc dependent and was \"severely\" constipated during this time. CT noted bladder distention. Was started on Flomax during hospitalization and then finasteride as well as sulfa for prostatitis after discharge in seeing Dr. Lund in HFU.      The patient has no prior history of AUR. At baseline, patient does note some slow stream, dribbling. Currently denies fevers, chills, N/V. He has a hx of back pain, TBI and Bipolar.    Dr. Rowley 3/20/2023:  On tamsulosin 0.4mg (Flomax) and finasteride 5mg (Proscar)   Follow-up today for cystoscopy and trial of void, failed TOV, prostate " "demonstrated no bilobar hypertrophy and no median lobe.    TODAY: 7/3/23 follow-up CIC, but has not been needing to do this and has been voiding well. Feels as if emptying.   PSA 0.50 8/30/22, due in Aug (this will have a finasteride effect).    PHYSICAL EXAM  Patient is a 53 year old  male   Vitals: Height 1.727 m (5' 8\"), weight 93 kg (205 lb).  Body mass index is 31.17 kg/m .  General Appearance Adult:   Alert, no acute distress, oriented      Creatinine   Date Value Ref Range Status   05/31/2023 0.78 0.67 - 1.17 mg/dL Final   05/12/2014 0.80 0.66 - 1.25 mg/dL Final        ASSESSMENT and PLAN  53-year-old man with urinary retention with history of spinal surgery    Urinary retention  -Seems like he is emptying well. We discussed PVR with nurse and he would like to defer this at this time given lack of LUTS.  -Continue on dual therapy and account for finasteride effect in Aug with PSA draw.   -follow-up PRN    Time spent: 20 minutes spent on the date of the encounter doing chart review, history and exam, documentation and further activities as noted above.  This was in addition to cystoscopy time    Marline Prakash PA-C  [facility]  [phone]  Monday-Wednesday and Friday        "

## 2023-07-03 NOTE — LETTER
"7/3/2023       RE: Bola Lyon Jr.  [address]  [place] [address]     Dear Colleague,    Thank you for referring your patient, Bola Lyon Jr., to the [facility] at [facility]. Please see a copy of my visit note below.    Text to smartphone at [phone]        Jan is a 53 year old who is being evaluated via a billable video visit.          How would you like to obtain your AVS? MyChart  If the video visit is dropped, the invitation should be resent by: Text to cell phone: [phone]  Will anyone else be joining your video visit? No        Video-Visit Details  Type of service:  Video Visit   Video Start Time: 10:33 AM   Unable to connect: phone duration 12 min     Originating Location (pt. Location): Home    Distant Location (provider location):  On-site  [facility]  CHIEF COMPLAINT   It was my pleasure to see Bola Lyon Jr. who is a 53 year old male for follow-up of urinary retention.      HPI   Bola Lyon Jr. is a very pleasant 53 year old male     Initially seen by Marline Prakash 2/7/2023:  It is a pleasure to see Mr. Bola Lyon Jr., a pleasant 53 year old male seen today in the urology clinic in consultation from Dr. Lund for evaluation of urinary retention. He was admitted to [place] for several days (see Tom discharge summary from 1/24) for n/v, HoK, urinary retention (>800cc on first straight cath). This eventually required Riddle catheter placement. This has been draining well with pale, yellow urine. The patient is tolerating the catheter without issue. No clots of hematuria noted.      Narc dependent and was \"severely\" constipated during this time. CT noted bladder distention. Was started on Flomax during hospitalization and then finasteride as well as sulfa for prostatitis after discharge in seeing Dr. Lund in HFU.      The patient has no prior " "history of AUR. At baseline, patient does note some slow stream, dribbling. Currently denies fevers, chills, N/V. He has a hx of back pain, TBI and Bipolar.    Dr. Rowley 3/20/2023:  On tamsulosin 0.4mg (Flomax) and finasteride 5mg (Proscar)   Follow-up today for cystoscopy and trial of void, failed TOV, prostate demonstrated no bilobar hypertrophy and no median lobe.    TODAY: 7/3/23 follow-up CIC, but has not been needing to do this and has been voiding well. Feels as if emptying.   PSA 0.50 8/30/22, due in Aug (this will have a finasteride effect).    PHYSICAL EXAM  Patient is a 53 year old  male   Vitals: Height 1.727 m (5' 8\"), weight 93 kg (205 lb).  Body mass index is 31.17 kg/m .  General Appearance Adult:   Alert, no acute distress, oriented      Creatinine   Date Value Ref Range Status   05/31/2023 0.78 0.67 - 1.17 mg/dL Final   05/12/2014 0.80 0.66 - 1.25 mg/dL Final        ASSESSMENT and PLAN  53-year-old man with urinary retention with history of spinal surgery    Urinary retention  -Seems like he is emptying well. We discussed PVR with nurse and he would like to defer this at this time given lack of LUTS.  -Continue on dual therapy and account for finasteride effect in Aug with PSA draw.   -follow-up PRN    Time spent: 20 minutes spent on the date of the encounter doing chart review, history and exam, documentation and further activities as noted above.  This was in addition to cystoscopy time    Marline Prakash PA-C  [facility]  [phone]  Monday-Wednesday and Friday    "

## 2023-07-06 DIAGNOSIS — M48.02 FORAMINAL STENOSIS OF CERVICAL REGION: ICD-10-CM

## 2023-07-06 RX ORDER — OXYCODONE HYDROCHLORIDE 15 MG/1
15 TABLET ORAL
Qty: 105 TABLET | Refills: 0 | Status: SHIPPED | OUTPATIENT
Start: 2023-07-06 | End: 2023-07-20

## 2023-07-11 DIAGNOSIS — S06.9X9S TRAUMATIC BRAIN INJURY WITH LOSS OF CONSCIOUSNESS, SEQUELA (H): ICD-10-CM

## 2023-07-11 DIAGNOSIS — F31.81 BIPOLAR 2 DISORDER (H): ICD-10-CM

## 2023-07-11 RX ORDER — DEXTROAMPHETAMINE SACCHARATE, AMPHETAMINE ASPARTATE, DEXTROAMPHETAMINE SULFATE AND AMPHETAMINE SULFATE 3.75; 3.75; 3.75; 3.75 MG/1; MG/1; MG/1; MG/1
15 TABLET ORAL DAILY
Qty: 30 TABLET | Refills: 0 | Status: SHIPPED | OUTPATIENT
Start: 2023-07-11 | End: 2023-07-12

## 2023-07-12 RX ORDER — DEXTROAMPHETAMINE SACCHARATE, AMPHETAMINE ASPARTATE, DEXTROAMPHETAMINE SULFATE AND AMPHETAMINE SULFATE 3.75; 3.75; 3.75; 3.75 MG/1; MG/1; MG/1; MG/1
TABLET ORAL
Qty: 30 TABLET | Refills: 0 | Status: SHIPPED | OUTPATIENT
Start: 2023-07-12 | End: 2023-08-09

## 2023-07-14 ENCOUNTER — TELEPHONE (OUTPATIENT)
Dept: PALLIATIVE MEDICINE | Facility: OTHER | Age: 53
End: 2023-07-14

## 2023-07-14 NOTE — TELEPHONE ENCOUNTER
Called patient to clarify that he should be taking 1 8-2mg Suboxone at 0800 and 1 4-1MG Suboxone at 1400 and 2000. Patient states that at the group home he is at, he is getting the 8MG 3 times a day. Given number of the nurse at his group home [phone].    Patient has not been running out of medications early so would assume medications are being taken as prescribed.

## 2023-07-14 NOTE — PROGRESS NOTES
Medication Therapy Management (MTM) Encounter    ASSESSMENT:                            Medication Adherence/Access: No issues identified       Chronic Pain: Hasn't noticed significant improvement with increase in Suboxone dose, but also having worsened jaw/dental pain that he feels has affected overall pain control. Has appointment to have dental work done on Friday therefore will hold on additional adjustments to Oxycodone/Suboxone at this time. Reassess in 3 weeks and consider continuing cross titration at that time.        History of TBI:   Bipolar disorder: Some depression/frustration related to pain, but feels like he's doing well overall. Will continue to focus on pain control.       Low Vitamin D: Last level within target range 45-60 for chronic pain and mood support.       Constipation: Regular bowel movements.      Lymphedema/hypertension: Last BP at goal <140/90. Pulse at goal .       Elevated uric acid: Uric acid at goal <6.     Anemia: Appropriately using Iron supplements. Consider repeat levels with next set of routine labs.       Urinary Retention: Stable per patient report.       PLAN:                            -No medication changes at this time.        Follow-up: 9/27 with Dr. Cramer   Return in about 3 weeks (around 8/7/2023) for Medication Management Pharmacist, by phone.     SUBJECTIVE/OBJECTIVE:                          Bola Lyon is a 52 year old male called for a follow-up visit. He was referred to me from Dmitriy Cramer MD. Today's visit is a follow-up MTM visit from 3/24/22.     Last visit with Dr. Cramer on 4/13.     Reason for visit: Medication Management -     Allergies/ADRs: Reviewed in chart  Past Medical History: Reviewed in chart  Tobacco: He reports that he quit smoking about 6 years ago. His smoking use included cigarettes. He started smoking about 13 years ago. He has a 5.50 pack-year smoking history. He has quit using smokeless tobacco.Tobacco Cessation Action  "Plan:   Information offered: Patient not interested at this time    Alcohol:  reports no history of alcohol use.        Medication Adherence/Access: Currently in an assisted group home. Has medications managed for him.       Chronic Pain: Prescribed Acetaminophen 650 mg 2 tabs three times daily  lamotrigine 150 mg 3 times daily, lidocaine 5% patch daily, memantine 10 mg twice daily, methocarbamol 1000 mg 4 times daily, oxycodone 15 mg 8 times daily with 2 am dose down to 0.5 tabs, zonisamide 25 mg 3 caps 4 times daily     At last visit with Dr. Cramer, increased Suboxone to 8-2 mg in the morning and continue 4-1 mg twice daily, and down 0.5 tab of Oxycodone per day.     Has been having some concerns with \"sour stomach\"   Hasn't seen significant benefit with higher dose of Suboxone.     Working with a dentist for a molar break. This is also causing some pain. Will be having this repair Friday.     Feels like with the molar pain, overall pain has increased.          Bipolar disorder: Prescribed lamotrigine 150 mg 3 times daily, quetiapine 300 mg 2 tabs at bedtime + quetiapine 100 mg 4 times daily as needed, Hydroxyzine 50 mg as needed from psychiatry.     Following with psychiatry.   Has restarted psychotherapy.     Notes that mood is very closely related to his pain, but feels like he's doing well as far as mood right now, but worries about pain affecting mood if not improved.        Low Vitamin D: Prescribed Vitamin D2 50,000 units once weekly.     Vitamin D Deficiency Screening Results:  Lab Results   Component Value Date    VITDT 29 08/30/2022    VITDT 48 05/05/2014        GERD: Prescribed Lansoprazole 30 mg twice daily.     As noted above, having more \"sour stomach\" with Suboxone.         Constipation: Prescribed glycerin suppositories as needed, Lactulose 20 g - 60 mL three times daily, Movantik 25 mg daily, Senna-Docusate 8.6 mg /5mL 10mL at bedtime, Miralax 17 g twice daily.        Stool pattern has been 1-3 " "formed \"semi firm\" stool(s). Defecation has been easy.       Lymphedema/hypertension: Prescribed furosemide 40 mg daily, metoprolol succinate 50 mg daily, triamterene-hydrochlorothiazide 37.5-25 mg daily    BP Readings from Last 3 Encounters:   06/20/23 128/80   05/31/23 (!) 149/84   04/26/23 128/70      Pulse Readings from Last 3 Encounters:   06/20/23 70   05/31/23 63   04/26/23 93           Elevated uric acid: Prescribed allopurinol 300 mg daily.     Uric Acid   Date Value Ref Range Status   08/30/2022 2.0 (L) 3.4 - 7.0 mg/dL Final         Anemia: Prescribed Ferrous Sulfate 325 mg daily     Hemoglobin   Date Value Ref Range Status   05/31/2023 12.3 (L) 13.3 - 17.7 g/dL Final   05/12/2014 10.6 (L) 13.3 - 17.7 g/dL Final   ]       Urinary Retention: Prescribed Finasteride 5 mg daily, Tamsulosin 0.4 mg daily.     Did have a UTI in Jan/Feb. Needed a catheter. Now, feels really stable.           Today's Vitals: There were no vitals taken for this visit.  ----------------      I spent 29 minutes with this patient today. All changes were made via collaborative practice agreement with Dmitriy Cramer MD. A copy of the visit note was provided to the patient's provider(s).    The patient was sent via [employer] a summary of these recommendations.     Shahriar Alanis PharmD  Medication Therapy Management (MTM) Pharmacist  [facility]      Telemedicine Visit Details  Type of service:  Telephone visit  Start Time: 1:11 PM   End Time: 1:40 PM   Originating Location (patient location): Home  Distant Location (provider location):  [facility]     Medication Therapy Recommendations  No medication therapy recommendations to display   "

## 2023-07-14 NOTE — TELEPHONE ENCOUNTER
Pt would a nurse to call him back regarding medication buprenorphine HCl-naloxone HCl (SUBOXONE) 4-1 MG per film    Kailyn NANCE

## 2023-07-15 ENCOUNTER — HEALTH MAINTENANCE LETTER (OUTPATIENT)
Age: 53
End: 2023-07-15

## 2023-07-17 ENCOUNTER — VIRTUAL VISIT (OUTPATIENT)
Dept: PHARMACY | Facility: OTHER | Age: 53
End: 2023-07-17
Payer: COMMERCIAL

## 2023-07-17 DIAGNOSIS — E79.0 ELEVATED URIC ACID IN BLOOD: ICD-10-CM

## 2023-07-17 DIAGNOSIS — I10 ESSENTIAL HYPERTENSION: ICD-10-CM

## 2023-07-17 DIAGNOSIS — K59.03 DRUG-INDUCED CONSTIPATION: ICD-10-CM

## 2023-07-17 DIAGNOSIS — F31.77 BIPOLAR 1 DISORDER, MIXED, PARTIAL REMISSION (H): ICD-10-CM

## 2023-07-17 DIAGNOSIS — R79.89 LOW VITAMIN D LEVEL: ICD-10-CM

## 2023-07-17 DIAGNOSIS — R33.9 RETENTION OF URINE: ICD-10-CM

## 2023-07-17 DIAGNOSIS — Z98.890 HX OF DECOMPRESSIVE LUMBAR LAMINECTOMY: Primary | ICD-10-CM

## 2023-07-17 DIAGNOSIS — D64.9 ANEMIA, UNSPECIFIED TYPE: ICD-10-CM

## 2023-07-17 PROCEDURE — 99607 MTMS BY PHARM ADDL 15 MIN: CPT | Performed by: PHARMACIST

## 2023-07-17 PROCEDURE — 99605 MTMS BY PHARM NP 15 MIN: CPT | Performed by: PHARMACIST

## 2023-07-17 NOTE — LETTER
July 17, 2023  Bola Lyon Jr.  [address] [place] [address]    Dear Mr. Lyon, [facility]     Thank you for talking with me on Jul 17, 2023 about your health and medications. As a follow-up to our conversation, I have included two documents:      1. Your Recommended To-Do List has steps you should take to get the best results from your medications.  2. Your Medication List will help you keep track of your medications and how to take them.    If you want to talk about these documents, please call Shahriar Alanis PharmD at phone: [phone], Monday-Friday 8-4:30pm.    I look forward to working with you and your doctors to make sure your medications work well for you.    Sincerely,  Shahriar Alanis PharmD  [facility] Pharmacist, [facility]

## 2023-07-17 NOTE — PATIENT INSTRUCTIONS
"Recommendations from today's MTM visit:                                                         No changes today. We will re-assess pain regimen after your dental work is completed.     Follow-up: Return in about 3 weeks (around 8/7/2023) for Medication Management Pharmacist, by phone.    It was great speaking with you today.  I value your experience and would be very thankful for your time in providing feedback in our clinic survey. In the next few days, you may receive an email or text message from [employer] with a link to a survey related to your  clinical pharmacist.\"     To schedule another MTM appointment, please call the clinic directly or you may call the MTM scheduling line at [phone] or toll-free at [phone].     My Clinical Pharmacist's contact information:                                                      Please feel free to contact me with any questions or concerns you have.      Shahriar Alanis, PharmD  Medication Therapy Management (MTM) Pharmacist  [facility]      "

## 2023-07-17 NOTE — LETTER
_  Medication List        Prepared on: 07/17/2023     Bring your Medication List when you go to the doctor, hospital, or   emergency room. And, share it with your family or caregivers.     Note any changes to how you take your medications.  Cross out medications when you no longer use them.    Medication How I take it Why I use it Prescriber   acetaminophen (TYLENOL) 650 MG CR tablet 2 TABLETS (99306FL) ORALLY 3 TIMES DAILY (MAX APAP:4GM/24HR) Foraminal stenosis of cervical region Devon Lund MD   albuterol (PROAIR HFA/PROVENTIL HFA/VENTOLIN HFA) 108 (90 Base) MCG/ACT inhaler Inhale 2 puffs into the lungs every 6 hours as needed Mild intermittent asthma without complication Leodan Strange MD   allopurinol (ZYLOPRIM) 300 MG tablet Take 1 tablet (300 mg) by mouth 2 times daily Uric Acid Arthropathy Devon Lund MD   ammonium lactate (LAC-HYDRIN) 12 % external lotion Apply topically daily as needed for dry skin Bipolar 2 Disorder (H) Devon Lund MD   amphetamine-dextroamphetamine (ADDERALL) 15 MG tablet 1 TABLET ORALLY DAILY Bipolar 2 Disorder (H); Traumatic brain injury with loss of consciousness, sequela (H) Devon Lund MD   bisacodyl (DULCOLAX) 10 MG suppository Place 1 suppository (10 mg) rectally daily as needed for constipation Constipation, unspecified constipation type Ana Rosa Daniels MD   buprenorphine HCl-naloxone HCl (SUBOXONE) 4-1 MG per film Place 1 film(4mg) under the tongue at 1400 and 1 film(4 mg) under the tongue at 2000 Lumbar Radiculopathy Dmitriy Cramer MD   buPROPion (WELLBUTRIN SR) 150 MG 12 hr tablet Take 1 tablet (150 mg) by mouth 2 times daily Bipolar 2 Disorder (H); Cognitive deficit due to old head injury Devon Lund MD   CALCIUM ANTACID 500 MG chewable tablet  1-2 tabs three times daily as needed General Health Patient Reported   chlorhexidine (PERIDEX) 0.12 % solution Swish and spit 15 mLs in mouth 3 times daily as needed (sore throat) - Swish &  Spit Dental infection Deovn Lund MD   ergocalciferol (ERGOCALCIFEROL) 1.25 MG (10353 UT) capsule Take 1 capsule (50,000 Units) by mouth once a week Low vitamin D level Devon Lund MD   ferrous sulfate (FEROSUL) 325 (65 Fe) MG tablet Take 1 tablet (325 mg) by mouth daily (with breakfast) Anemia, unspecified type Devon Lund MD   fexofenadine (ALLEGRA) 180 MG tablet Take 180 mg by mouth daily as needed  Allergies Patient Reported   finasteride (PROSCAR) 5 MG tablet Take 1 tablet (5 mg) by mouth daily Urinary Retention with Incomplete Bladder Emptying Devon Lund MD   fluconazole (DIFLUCAN) 200 MG tablet Take 1 tablet (200 mg) by mouth daily Acute recurrent maxillary sinusitis; Gingivitis, Chronic; Thrush Devon Lund MD   furosemide (LASIX) 40 MG tablet Take 1 tablet (40 mg) by mouth daily Lymphedema of both lower extremities Devon Lund MD   hydrOXYzine (ATARAX) 50 MG tablet Take 1 tablet by mouth every 6 hours as needed Anxiety Dr. LOPEZ,OLUKAYODE   lactulose 20 GM/30ML SOLN Take 45 mLs by mouth 2 times daily For constipation. Therapeutic opioid induced constipation Devon Lund MD   lamoTRIgine (LAMICTAL) 150 MG tablet Take 1 tablet (150 mg) by mouth 3 times daily Bipolar 2 Disorder (H) Devon Lund MD   LANsoprazole (PREVACID) 30 MG DR capsule Take 1 capsule (30 mg) by mouth 2 times daily Gastroesophageal reflux disease with esophagitis without hemorrhage Devon Lund MD   lidocaine (LIDODERM) 5 % patch Place onto the skin every 24 hours To prevent lidocaine toxicity, patient should be patch free for 12 hrs daily. Cervical Stenosis of Spinal Canal; Chronic Pain Disorder; Lumbar Radiculopathy Devon Lund MD   lidocaine (XYLOCAINE) 2 % external gel Apply 1 inch topically 4 times a day as needed to gums. Bipolar 2 Disorder (H) Devon Lund MD   methocarbamol (ROBAXIN) 500 MG tablet Take 2 tablets (1,000 mg) by mouth 4 times daily  For use beginning 3/14/23 Chronic Pain Syndrome Dmitriy Cramer MD   metoprolol succinate ER (TOPROL-XL) 50 MG 24 hr tablet Take 1 tablet (50 mg) by mouth daily Essential Hypertension Leodan Strange MD   montelukast (SINGULAIR) 10 MG tablet Take 1 tablet by mouth daily  Allergies Devon Lund MD    naloxegol (MOVANTIK) 25 MG TABS tablet Take 1 tablet (25 mg) by mouth every morning (before breakfast) Drug-Induced Constipation Deovn Lund MD   naloxone (NARCAN) 4 MG/0.1ML nasal spray 1 spray (4 mg dose) into one nostril for opioid reversal. Call 911. May repeat if no response in 3 minutes.  Chronic Pain Dmitriy Cramer   nystatin (MYCOSTATIN) 199775 UNIT/ML suspension Take 10 mLs (1,000,000 Units) by mouth every 3 hours as needed (thrush) To continue while taking levofloxacin and fluconazole Thrush; Acute recurrent maxillary sinusitis; Gingivitis, Chronic Devon Lund MD   oxyCODONE IR (ROXICODONE) 15 MG tablet Take 1 tablet (15 mg) by mouth 8 times daily One tablet every three hours 8 am, 11 am, 2pm, 5 pm, 8 pm, 11 pm, , 5 am,  And  One-half tab 2 am  May fill 7/10 for 7/12 Foraminal stenosis of cervical region Dmitriy Cramer MD   polyethylene glycol (MIRALAX) 17 GM/Dose powder Take 17 g (1 capful) by mouth 2 times daily Drug-Induced Constipation Devon Lund MD   QUEtiapine (SEROQUEL) 100 MG tablet Take 1 tablet (100 mg) by mouth 4 times daily as needed (anxiety) Bipolar 2 Disorder (H); Traumatic brain injury with loss of consciousness, sequela (H) Devon Lund MD   QUEtiapine (SEROQUEL) 300 MG tablet Take 2 tablets (600 mg) by mouth At Bedtime Bipolar 2 Disorder (H); Generalized Anxiety Disorder Devon Lund MD   sennosides (SENOKOT) 8.8 MG/5ML syrup Take 10 mLs by mouth At Bedtime Drug-Induced Constipation Dmitriy Cramer MD   tamsulosin (FLOMAX) 0.4 MG capsule Take 1 capsule (0.4 mg) by mouth 2 times daily Acute Prostatitis Devon Lund MD   zonisamide  (ZONEGRAN) 25 MG capsule Take 3 capsules (75 mg) by mouth 4 times daily Chronic Pain Disorder Dmitriy Cramer MD         Add new medications, over-the-counter drugs, herbals, vitamins, or  minerals in the blank rows below.    Medication How I take it Why I use it Prescriber                                      Allergies:      gabapentin; lyrica [pregabalin]; amitriptyline; seasonal allergies; topiramate        Side effects I have had:               Other Information:              My notes and questions:

## 2023-07-23 NOTE — PROGRESS NOTES
[facility] Daily Progress     Patient Name: Bola Lyon  Date: 3/12/2019  Visit #: 9/10      PTA visit #:       Referral Diagnosis: Chronic pain      Referring provider: Dmitriy Cramer*     Visit Diagnosis:     ICD-10-CM    1. Chronic right shoulder pain M25.511     G89.29    2. Cervicalgia M54.2    3. Cervical radiculitis M54.12    4. Myofascial pain M79.18          Assessment:     Patient is benefitting from skilled physical therapy and is making steady progress toward functional goals.  Patient is appropriate to continue with skilled physical therapy intervention, as indicated by initial plan of care.  Overall he does continue to improve and has shown an increase in ROM. He is ipmroveing towards all fo his goals.      Goal Status:  Pt. will demonstrate/verbalize independence in self-management of condition in : 12 weeks - IMPROVING TOWARDS GOAL  Pt. will report decreased intensity, frequency of : Pain;in 12 weeks;Comment  Comment:: decrease pain from 6-8/10 to 4-7/10 with ADLs. - IMPROVING TOWARDS GOAL  Pt. will decrease use of medication for pain for improved quality of life in : 12 weeks - SLOW IMPROVEMENT  Pt. will have improved quality of sleep: waking less times/night;getting 75-90% of required amount;in 12 weeks - IMPROVING TOWARDS GOAL  Patient will return to: exercise;leisure;in 12 weeks;Comment  Comment: lifting weights withtout dropping anything with B hands - SLOW PROGRESSION  Patient [place]: for driving;for conversation;with less pain;with less difficulty;in 12 weeks;Comment  Comment: increase C-rot to >50 deg B and T-rot to >40 deg. - HAS NOT SHOWN IMPROVEMENT IN THESE ROM MEASUREMENTS  Patient will reach / maintain arm movement: forward;overhead;behind;for home chores;for dressing;with less pain;with less difficulty;in 12 weeks - SHOWING IMPROVEMENT   Patient will decrease : NDI score;by _ points;for improved quality of function;for improved quality of life;in 12  weeks  by ___ points: 15 -  NOT TESTED        Plan / Patient Education:     Plan to con't with manual therapy to decrease fascial tension/tone to normalize ROM/decrease inflammation/decrease mm tone and improve proprioception.      Subjective:     Pain Ratin at worst now, constant more at a 6 (not a bad 6 like it was before)               He recently had a very close friend pass away yesterday.   There is quite a bit of pain still over in the R scalene/upper trap area. It can then shoot down into his arm down into his ulnar nerve area. It will also go down the scapula down into his ribs/thoracic spine. He does get L sided pain but it isn't as bad as the right side.   His upper neck and chest are also sore.   He isn't having nearly as much of the crazy muscle tone like he was having before he started coming.   He does still wake up every night as has to readjust his R arm. This seems to happen 2x/night mostly. He will need to rub his arm to get back to sleep. He feels like this is 50% better than it used to be.   He is still active at home with usually daily exercises.    He does feel like his neck is more loose even if he doesn't have much of an increase in ROM. The neck does still feel like he is getting the increased mm tone like he used to have more often in his arms.     *Pt was 30' late for appt.     Objective:     Art fascial tensions.      C-rot: 31/46  (35/45 at eval) (R/L)  C-flex: 19 deg (5 deg at eval)  C-ext: 34 deg  (25 deg at eval)  T-rot:  27/40 (27/40 at eval) (R/L)  Shoulder abduction:  120/140 with pain B  (110 B at evaluation) (R/L)    Treatment Today   3/12/2019   TREATMENT MINUTES COMMENTS   Evaluation     Self-care/ Home management     Manual therapy 20 Fascial release using Strain-Counterstrain of B cervical-Art, R scap-A   Neuromuscular Re-education     Therapeutic Activity     Therapeutic Exercises 10 AA/A/PROM to BUE, spine, ribs.    Gait training     Modality__________________                 Total 30    Blank areas are intentional and mean the treatment did not include these items.       Jb Ochoa  3/12/2019   Awake/Alert/Cooperative

## 2023-07-31 DIAGNOSIS — M48.02 FORAMINAL STENOSIS OF CERVICAL REGION: ICD-10-CM

## 2023-07-31 RX ORDER — OXYCODONE HYDROCHLORIDE 15 MG/1
15 TABLET ORAL
Qty: 98 TABLET | Refills: 0 | Status: SHIPPED | OUTPATIENT
Start: 2023-07-31 | End: 2023-08-15

## 2023-07-31 NOTE — TELEPHONE ENCOUNTER
Received call from patient requesting refill(s) of   oxyCODONE IR (ROXICODONE) 15 MG tablet last dispensed 07/21/23    Last dispensed from pharmacy on 07/21/23  Due date 08/02/23    Patient's last office/virtual visit by prescribing provider on 06/28/23  Next office/virtual appointment scheduled for 09/27/23    Last urine drug screen 07/11/22  Current opioid agreement on file No Date: 07/11/22    Processing (pick one):      E-prescribe to [facility] - [place], [place] - [address]  pharmacy    Will facilitate refill.    Pending Prescriptions:                       Disp   Refills    oxyCODONE IR (ROXICODONE) 15 MG tablet    105 ta*0            Sig: Take 1 tablet (15 mg) by mouth 8 times daily One           tablet every three hours 8 am, 11 am, 2pm, 5 pm,           8 pm, 11 pm, , 5 am,  And  One-half tab 2 am  May           fill 8/2 for 8/4

## 2023-07-31 NOTE — TELEPHONE ENCOUNTER
Refill oxycodone 15 mg, discussed last appt, decrease by one-half tab each month, will now decrease to 15mg six times a day, one-half tab twice a day

## 2023-08-03 NOTE — PROGRESS NOTES
Medication Therapy Management (MTM) Encounter    ASSESSMENT:                            Medication Adherence/Access: No issues identified       Chronic Pain: Patient is unsure that Suboxone is offering any benefit. Very frustrated. Reviewed with patient that he's on 1 full tab Oxycodone less than he was on a month ago, but patient control hasn't worsened, but has at least maintained control. Patient still doesn't think Suboxone is helping, despite trying to review that change with him.  Reviewed that we also need to continue to be mindful of not overexerting and worsening pain control. Patient was not ready to continue adjusting medications at this time.       History of TBI:   Bipolar disorder: Some depression/frustration related to pain, but feels like he's doing well overall. Will continue to focus on pain control.       Low Vitamin D: Last level within target range 45-60 for chronic pain and mood support.          PLAN:                            -No medication changes at this time.        Follow-up: 9/27 with Dr. Cramer   Schedule with PharmD as needed.     SUBJECTIVE/OBJECTIVE:                          Bola Lyon is a 52 year old male called for a follow-up visit. He was referred to me from Dmitriy Cramer MD. Today's visit is a follow-up MTM visit from 07/17/22.    Reason for visit: Medication Management -     Allergies/ADRs: Reviewed in chart  Past Medical History: Reviewed in chart  Tobacco: He reports that he quit smoking about 6 years ago. His smoking use included cigarettes. He started smoking about 13 years ago. He has a 5.50 pack-year smoking history. He has quit using smokeless tobacco.Tobacco Cessation Action Plan:   Information offered: Patient not interested at this time    Alcohol:  reports no history of alcohol use.        Medication Adherence/Access: Currently in an assisted group home. Has medications managed for him.       Chronic Pain: Prescribed Acetaminophen 650 mg 2 tabs three times  "daily  lamotrigine 150 mg 3 times daily, lidocaine 5% patch daily, memantine 10 mg twice daily, methocarbamol 1000 mg 4 times daily, oxycodone 15 mg 6 times daily + 0.5 tab twice daily, zonisamide 25 mg 3 caps 4 times daily     At last visit with Dr. Cramer, increased Suboxone to 8-2 mg in the morning and continue 4-1 mg twice daily. Continuing to reduce Oxycodone. Facility manages his Oxycodone prescription.     At last [facility] visit, didn't make any changes as patient was having dental pain that he felt was flaring his pain overall. Was supposed to have an appointment several weeks ago, but it was a 2 hour appointment and he had to cancel because he couldn't sit that long with the ride back and forth. Next appointment not scheduled in January.     Having days where he finds he's overexerting and causing pain flares. Then spending the next day \"incapacitated\" and having to ice a lot. Less frequent flares, but the flares are intense. Overall feels like it's minimal improvement.     Patient is unsure how much the Suboxone is helpful.       Bipolar disorder: Prescribed lamotrigine 150 mg 3 times daily, quetiapine 300 mg 2 tabs at bedtime + quetiapine 100 mg 4 times daily as needed, Hydroxyzine 50 mg as needed from psychiatry.     Following with psychiatry.   Has restarted psychotherapy.     Notes that mood is very closely related to his pain, but feels like he's doing well as far as mood right now, feeling frustrated.       Low Vitamin D: Prescribed Vitamin D2 50,000 units once weekly.     Vitamin D Deficiency Screening Results:  Lab Results   Component Value Date    VITDT 29 08/30/2022    VITDT 48 05/05/2014           Today's Vitals: There were no vitals taken for this visit.  ----------------      I spent 36 minutes with this patient today. All changes were made via collaborative practice agreement with Dmitriy Cramer MD. A copy of the visit note was provided to the patient's provider(s).    The patient was sent via " [employer] a summary of these recommendations.     Shahriar Alanis, PharmD  Medication Therapy Management (MTM) Pharmacist  [facility]      Telemedicine Visit Details  Type of service:  Telephone visit  Start Time: 2:25 PM    End Time: 3:01 PM PM         Medication Therapy Recommendations  No medication therapy recommendations to display

## 2023-08-07 ENCOUNTER — VIRTUAL VISIT (OUTPATIENT)
Dept: PHARMACY | Facility: OTHER | Age: 53
End: 2023-08-07
Payer: COMMERCIAL

## 2023-08-07 DIAGNOSIS — F31.77 BIPOLAR 1 DISORDER, MIXED, PARTIAL REMISSION (H): ICD-10-CM

## 2023-08-07 DIAGNOSIS — R79.89 LOW VITAMIN D LEVEL: ICD-10-CM

## 2023-08-07 DIAGNOSIS — Z98.890 HX OF DECOMPRESSIVE LUMBAR LAMINECTOMY: Primary | ICD-10-CM

## 2023-08-07 PROCEDURE — 99607 MTMS BY PHARM ADDL 15 MIN: CPT | Performed by: PHARMACIST

## 2023-08-07 PROCEDURE — 99606 MTMS BY PHARM EST 15 MIN: CPT | Performed by: PHARMACIST

## 2023-08-07 NOTE — PATIENT INSTRUCTIONS
"Recommendations from today's MTM visit:                                                         Continue your current medicines.     Follow-up: Schedule with pharmacist as needed. Reach out if you have questions about your medicines.     It was great speaking with you today.  I value your experience and would be very thankful for your time in providing feedback in our clinic survey. In the next few days, you may receive an email or text message from [employer] [place] with a link to a survey related to your  clinical pharmacist.\"     To schedule another MTM appointment, please call the clinic directly or you may call the MTM scheduling line at [phone] or toll-free at [phone].     My Clinical Pharmacist's contact information:                                                      Please feel free to contact me with any questions or concerns you have.      Shahriar Alanis, PharmD  Medication Therapy Management (MTM) Pharmacist  [facility]    "

## 2023-08-09 DIAGNOSIS — S06.9X9S TRAUMATIC BRAIN INJURY WITH LOSS OF CONSCIOUSNESS, SEQUELA (H): ICD-10-CM

## 2023-08-09 DIAGNOSIS — F31.81 BIPOLAR 2 DISORDER (H): ICD-10-CM

## 2023-08-09 RX ORDER — DEXTROAMPHETAMINE SACCHARATE, AMPHETAMINE ASPARTATE, DEXTROAMPHETAMINE SULFATE AND AMPHETAMINE SULFATE 3.75; 3.75; 3.75; 3.75 MG/1; MG/1; MG/1; MG/1
15 TABLET ORAL DAILY
Qty: 30 TABLET | Refills: 0 | Status: SHIPPED | OUTPATIENT
Start: 2023-08-09 | End: 2023-10-16

## 2023-08-09 NOTE — LETTER
"Recommended To-Do List      Prepared on: 07/17/2023       You can get the best results from your medications by completing the items on this \"To-Do List.\"      Bring your To-Do List when you go to your doctor. And, share it with your family or caregivers.    My To-Do List:  What we talked about: What I should do:    What my medicines are for, how to know if my medicines are working, made sure my medicines are safe for me and reviewed how to take my medicines.      Take my medicines every day                " Changed brief. Applied pure wick. Denies any needs at this time.

## 2023-09-07 ENCOUNTER — TELEPHONE (OUTPATIENT)
Dept: PHARMACY | Facility: OTHER | Age: 53
End: 2023-09-07
Payer: COMMERCIAL

## 2023-09-07 NOTE — TELEPHONE ENCOUNTER
"[facility] Call Center    Phone Message    May a detailed message be left on voicemail: yes     Reason for Call: Pt is requesting a call back from Shahriar for \"medication review\".  Pt is unwilling to schedule a telephone appointment.  Thanks.    "

## 2023-09-07 NOTE — TELEPHONE ENCOUNTER
Returned call to patient. Wants to reassess Oxycodone dosing. Has been tapering.  Seeing Dr Cramer on 9/27.     Currently at Oxycodone 15 mg - 8 doses per day, but 3/8 are down to 7.5 mg. (Ie 6.5 tabs per day)   Also using Suboxone 4-1 mg twice daily + 8-2 mg daily.     Really struggling with pain control.     At last MTM visit, did not make any increases in Suboxone as patient was feeling frustrated with the process. Despite this, patient had continued to decrease Oxycodone. May benefit from increasing Suboxone to 8-2 mg twice daily + 4 -1 mg once daily until next visit with Dr. Cramer. Will hold off on additional decreases of Oxycodone until next visit with Dr. Cramer.

## 2023-09-14 ENCOUNTER — TELEPHONE (OUTPATIENT)
Dept: PALLIATIVE MEDICINE | Facility: OTHER | Age: 53
End: 2023-09-14
Payer: COMMERCIAL

## 2023-09-14 NOTE — CONFIDENTIAL NOTE
Patient calls requesting a prior authorization be completed for this medication:    Disp Refills Start End MINDY    oxyCODONE IR (ROXICODONE) 15 MG tablet 91 tablet 0 9/8/2023  No   Sig - Route: Take 0.5-1 tablets (7.5-15 mg) by mouth every 3 hours One tablet(15 mg) every three hours 8 am/ 11 am /8 pm /11 pm /5 am, and one-half tab (7.5 mg) at 2 am 2 pm and 5 pm  Dispense 9/13/23 - Oral   Sent to pharmacy as: oxyCODONE HCl 15 MG Oral Tablet (ROXICODONE)   Class: E-Prescribe   Earliest Fill Date: 9/8/2023   Notes to Pharmacy: One tablet(15 mg) every three hours 8 am/ 11 am /8 pm /11 pm /5 am, and one-half tab (7.5 mg) at 2 am 2 pm and 5 pm  Dispense 9/13/23   Order: [id number]   E-Prescribing Status: Receipt confirmed by pharmacy (9/8/2023 11:18 AM CDT)

## 2023-09-18 NOTE — TELEPHONE ENCOUNTER
[facility] Call Center    Phone Message    May a detailed message be left on voicemail: yes     Reason for Call: Other: Patient is wondering if the prior authorization has been started for oxyCODONE IR (ROXICODONE) 15 MG tablet, he states [employer] contacted him stating they have not heard back from Dr. Cramer. Patient is requesting a call to discuss this.      Action Taken: Message routed to:  Other: [facility]    Travel Screening: Not Applicable

## 2023-09-18 NOTE — TELEPHONE ENCOUNTER
Central Prior Authorization Team  Phone: [phone]    PA Initiation    Medication: OXYCODONE HCL 15 MG PO TABA  Insurance Company: [employer] - Phone [phone] Fax [phone]  Pharmacy Filling the Rx: [facility] - [place], [place] - [address]  Filling Pharmacy Phone: [phone]  Filling Pharmacy Fax:    Start Date: 9/18/2023

## 2023-09-19 NOTE — TELEPHONE ENCOUNTER
Prior Authorization Approval    Medication: OXYCODONE HCL 15 MG PO TABA  Authorization Effective Date: 6/21/2023  Authorization Expiration Date: 9/19/2024  Approved Dose/Quantity:   Reference #:     Insurance Company: [employer] - Phone [phone] Fax [phone]  Expected CoPay:       CoPay Card Available:      Financial Assistance Needed:   Which Pharmacy is filling the prescription: [facility] - [place], [place] - [address]  Pharmacy Notified: Yes- has already received a paid claim on 9/15.  Patient Notified:  No

## 2023-09-21 NOTE — TELEPHONE ENCOUNTER
[facility] Call Center    Phone Message    May a detailed message be left on voicemail: yes     Reason for Call: Other: Patient is calling on the status of his PA and medication. He also has questions for Shahriar for medication changes. Please call back when available.      Action Taken: Other: Pain    Travel Screening: Not Applicable

## 2023-09-21 NOTE — TELEPHONE ENCOUNTER
Call placed to patient:  Reviewed that the PA has been approved  Patient reports he will wait on questions for pharmacist as he does see provider on 9/27/23  Patient does not relay any specific questions or concerns as of this phone call and would rather wait to meet with the provider.  Advised to call back if he were to change his mind and would like to review something further or pass along a question to pharmacist

## 2023-09-25 DIAGNOSIS — M48.02 FORAMINAL STENOSIS OF CERVICAL REGION: ICD-10-CM

## 2023-09-25 NOTE — TELEPHONE ENCOUNTER
Received call from patient requesting refill(s) oxyCODONE IR (ROXICODONE) 15 MG tablet    Last dispensed from pharmacy on 09/13/2023    Patient's last office/virtual visit by prescribing provider on 06/28/2023  Next office/virtual appointment scheduled for 09/27/2023    Last urine drug screen date 7/11/2022  Current opioid agreement on file (completed within the last year) No Date of opioid agreement: 07/12/2022    E-prescribe to   [facility] - [place], [place] - [address], pharmacy    Will route to nursing [place] for review and preparation of prescription(s).     Becca Rodriguez MA  [facility] [place]

## 2023-09-26 RX ORDER — OXYCODONE HYDROCHLORIDE 15 MG/1
7.5-15 TABLET ORAL
Qty: 84 TABLET | Refills: 0 | Status: SHIPPED | OUTPATIENT
Start: 2023-09-26 | End: 2023-10-10

## 2023-09-26 NOTE — TELEPHONE ENCOUNTER
*Patient will need updated UDT/CSA at appt 9/27/23    Pending Prescriptions:                       Disp   Refills    oxyCODONE IR (ROXICODONE) 15 MG tablet    91 tab*0            Sig: Take 0.5-1 tablets (7.5-15 mg) by mouth every 3           hours One tablet(15 mg) every three hours 8 am/           11 am /8 pm /11 pm /5 am, and one-half tab (7.5           mg) at 2 am 2 pm and 5 pm  Dispense 9/26/23 and           start 9/27/23    [facility] [place]

## 2023-09-27 ENCOUNTER — OFFICE VISIT (OUTPATIENT)
Dept: PALLIATIVE MEDICINE | Facility: OTHER | Age: 53
End: 2023-09-27
Attending: ANESTHESIOLOGY
Payer: COMMERCIAL

## 2023-09-27 VITALS — DIASTOLIC BLOOD PRESSURE: 92 MMHG | HEART RATE: 72 BPM | SYSTOLIC BLOOD PRESSURE: 165 MMHG | OXYGEN SATURATION: 98 %

## 2023-09-27 DIAGNOSIS — M54.16 LUMBAR RADICULOPATHY: ICD-10-CM

## 2023-09-27 DIAGNOSIS — Z79.891 LONG TERM (CURRENT) USE OF OPIATE ANALGESIC: Primary | ICD-10-CM

## 2023-09-27 LAB
CANNABINOIDS UR QL SCN: NORMAL
CREAT UR-MCNC: 24 MG/DL
ETHANOL UR QL SCN: NORMAL

## 2023-09-27 PROCEDURE — 99215 OFFICE O/P EST HI 40 MIN: CPT | Performed by: ANESTHESIOLOGY

## 2023-09-27 PROCEDURE — G0463 HOSPITAL OUTPT CLINIC VISIT: HCPCS | Performed by: ANESTHESIOLOGY

## 2023-09-27 PROCEDURE — 80363 OPIOIDS & OPIATE ANALOGS 3/4: CPT | Performed by: ANESTHESIOLOGY

## 2023-09-27 PROCEDURE — 80307 DRUG TEST PRSMV CHEM ANLYZR: CPT | Performed by: ANESTHESIOLOGY

## 2023-09-27 PROCEDURE — 80357 KETAMINE AND NORKETAMINE: CPT | Performed by: ANESTHESIOLOGY

## 2023-09-27 PROCEDURE — 80359 METHYLENEDIOXYAMPHETAMINES: CPT | Mod: 59 | Performed by: ANESTHESIOLOGY

## 2023-09-27 RX ORDER — OXYMORPHONE HYDROCHLORIDE 5 MG/1
5 TABLET ORAL 3 TIMES DAILY
Qty: 42 TABLET | Refills: 0 | Status: SHIPPED | OUTPATIENT
Start: 2023-09-27 | End: 2023-09-28

## 2023-09-27 ASSESSMENT — PAIN SCALES - GENERAL: PAINLEVEL: EXTREME PAIN (9)

## 2023-09-27 NOTE — PROGRESS NOTES
Patient presents to the clinic today for a visit  with JAYME MULLEN MD            4/26/2023     2:34 PM 6/28/2023     1:13 PM 9/27/2023     1:00 PM   PEG Score   PEG Total Score 10 9.33 8       UDS/CSA-9/27/23    Medications-  buprenorphine   Oxycodone   QUESTIONS:    Alondra Washburn MA  [facility] [place]

## 2023-09-27 NOTE — PROGRESS NOTES
[facility] - Office Visit    ASSESSMENT & PLAN     Bola was seen today for pain and pain management.    Diagnoses and all orders for this visit:    Long term (current) use of opiate analgesic  -     Drug Confirmation Panel Urine with Creatinine; Future  -     Ethanol urine; Future  -     Drug Confirmation Panel Urine with Creatinine  -     Ethanol urine    Lumbar radiculopathy  -     oxyMORphone (OPANA) 5 MG tablet; Take 1 tablet (5 mg) by mouth 3 times daily        Patient Instructions   PLAN:    Begin tapering the Suboxone, 8 mg film twice a day for 1 week, 8 mg film and 4 mg film for 1 week, 4 mg film twice a day for 1 week, 4 mg film daily for 1 week and stop.    Oxycodone has been changed to 15 mg immediate release 4 times a day and 15 mg immediate release 1/2 tablet 4 times a day, alternating every 3 hours    Begin oxymorphone 5 mg 3 times a day.    Contact Dr. Mullen or Earnest Alanis [facility] pharmacist in 10 to 14 days.    You are working to find a dentist.    You will be reenrolled in the [place] medical cannabis program.    Follow-up with Dr. Mullen for virtual visit in 8 weeks          -----  JAYME MULLEN MD  [facility]       SUBJECTIVE      Bola Lyon Jr. is a 53 year old year old male who presents to clinic today for the following:  Follow-up for overlapping pain conditions, having had extensive lumbar fusion, arthralgias with orthopedic surgeries, traumatic brain injury, mood disorder.    Has been working with her [facility] pharmacist, decreasing the oxycodone 15 mg most recently down to 6-1/2 tablets a day, increasing Suboxone to 8 mg twice a day and 4 mg daily.    He reviews today wanting to make sure I am aware of his multiple pain generators that his experience.  He demonstrates that he has 2 cracked teeth on his right lower jaw, 1 on his left upper jaw, feels that the nerves are exposed.  Gingiva are inflamed.  He has antibiotics at his primary care  provider, does not have appoint with a dentist until November.  This is quite painful.  The periodontal infections makes him sick.    He notes with his left leg having lymphedema wearing a compression garment, attributed to scar tissues in his groin from 2 hernia surgeries.  Also had knee replacement surgery with infections.    Demonstrates his left abdominal wall has a hernia, but will not likely be doing surgery.    His right knee has had surgery.  Right ankle is at surgery.  The right ankle can swell at times.  Shoulder surgeries bilaterally.    Reviews increasing the Suboxone has not led to any appreciable benefit in pain.  Also very distressed to learn the cost.    Reviews we have tried ketamine previously did not think it helped.    Would be interested in medical cannabis, concerned about the cost as it is very expensive where he is living.  Reviewed does not have to do the yearly registration fee.    He continues active in physical therapy, with a goal to get stronger to live independently.  He does have some caseworkers helping him find housing does not think he can move until the spring.    He has started with a new psychotherapist through [place] to address anxiety.    He will be able to start getting injections for his back at the end of October.  There is discussed persistent apps in the future would remove some of the hardware.    Notes has did spend a lot of time on ice packs.  Due to the pain he has been isolating at times, missing family gatherings.    Last urine drug test a year ago so will be obtained.   reviewed    Current Outpatient Medications:     acetaminophen (TYLENOL) 650 MG CR tablet, 2 TABLETS (67679SM) ORALLY 3 TIMES DAILY (MAX APAP:4GM/24HR), Disp: 540 tablet, Rfl: 1    albuterol (PROAIR HFA/PROVENTIL HFA/VENTOLIN HFA) 108 (90 Base) MCG/ACT inhaler, Inhale 2 puffs into the lungs every 6 hours as needed, Disp: 6.7 g, Rfl: 11    allopurinol (ZYLOPRIM) 300 MG tablet, Take 1 tablet (300  mg) by mouth 2 times daily, Disp: 180 tablet, Rfl: 3    ammonium lactate (LAC-HYDRIN) 12 % external lotion, Apply topically daily as needed for dry skin, Disp: 500 g, Rfl: 11    amphetamine-dextroamphetamine (ADDERALL) 15 MG tablet, Take 1 tablet (15 mg) by mouth daily, Disp: 30 tablet, Rfl: 0    bisacodyl (DULCOLAX) 10 MG suppository, Place 1 suppository (10 mg) rectally daily as needed for constipation, Disp: 60 suppository, Rfl: 1    buprenorphine HCl-naloxone HCl (SUBOXONE) 4-1 MG per film, Place 1 Film under the tongue daily at 1400, Disp: 28 each, Rfl: 1    buprenorphine HCl-naloxone HCl (SUBOXONE) 8-2 MG per film, Place 1 Film under the tongue 2 times daily At 0800 and 2000, Disp: 56 Film, Rfl: 3    buPROPion (WELLBUTRIN SR) 150 MG 12 hr tablet, Take 1 tablet (150 mg) by mouth 2 times daily, Disp: 180 tablet, Rfl: 3    CALCIUM ANTACID 500 MG chewable tablet, , Disp: , Rfl:     chlorhexidine (PERIDEX) 0.12 % solution, Swish and spit 15 mLs in mouth 3 times daily as needed (sore throat) - Swish & Spit, Disp: 473 mL, Rfl: 11    ergocalciferol (ERGOCALCIFEROL) 1.25 MG (98429 UT) capsule, Take 1 capsule (50,000 Units) by mouth once a week, Disp: 12 capsule, Rfl: 3    ferrous sulfate (FEROSUL) 325 (65 Fe) MG tablet, Take 1 tablet (325 mg) by mouth daily (with breakfast), Disp: 90 tablet, Rfl: 3    fexofenadine (ALLEGRA) 180 MG tablet, Take 180 mg by mouth daily as needed, Disp: , Rfl:     finasteride (PROSCAR) 5 MG tablet, Take 1 tablet (5 mg) by mouth daily, Disp: 90 tablet, Rfl: 3    fluconazole (DIFLUCAN) 200 MG tablet, Take 1 tablet (200 mg) by mouth daily, Disp: 90 tablet, Rfl: 3    furosemide (LASIX) 40 MG tablet, Take 1 tablet (40 mg) by mouth daily, Disp: 30 tablet, Rfl: 11    hydrOXYzine (ATARAX) 50 MG tablet, Per Dr. LOPEZ,JACQUELYN, Disp: , Rfl:     lactulose 20 GM/30ML SOLN, Take 45 mLs by mouth 2 times daily For constipation., Disp: 5400 mL, Rfl: 11    lamoTRIgine (LAMICTAL) 150 MG tablet, Take 1  tablet (150 mg) by mouth 3 times daily, Disp: , Rfl:     LANsoprazole (PREVACID) 30 MG DR capsule, Take 1 capsule (30 mg) by mouth 2 times daily, Disp: 180 capsule, Rfl: 3    lidocaine (LIDODERM) 5 % patch, Place onto the skin every 24 hours To prevent lidocaine toxicity, patient should be patch free for 12 hrs daily., Disp: 30 patch, Rfl: 11    lidocaine (XYLOCAINE) 2 % external gel, Apply 1 inch topically 4 times a day as needed to gums., Disp: 85 g, Rfl: 11    methocarbamol (ROBAXIN) 500 MG tablet, Take 2 tablets (1,000 mg) by mouth 4 times daily For use beginning 3/14/23, Disp: 120 tablet, Rfl: 1    metoprolol succinate ER (TOPROL-XL) 50 MG 24 hr tablet, Take 1 tablet (50 mg) by mouth daily, Disp: 90 tablet, Rfl: 3    montelukast (SINGULAIR) 10 MG tablet, Take 1 tablet by mouth daily, Disp: , Rfl:     naloxegol (MOVANTIK) 25 MG TABS tablet, Take 1 tablet (25 mg) by mouth every morning (before breakfast), Disp: 30 tablet, Rfl: 1    naloxone (NARCAN) 4 MG/0.1ML nasal spray, 1 spray (4 mg dose) into one nostril for opioid reversal. Call 911. May repeat if no response in 3 minutes., Disp: , Rfl:     nystatin (MYCOSTATIN) 711011 UNIT/ML suspension, Take 10 mLs (1,000,000 Units) by mouth every 3 hours as needed (thrush) To continue while taking levofloxacin and fluconazole, Disp: 800 mL, Rfl: 3    oxyCODONE IR (ROXICODONE) 15 MG tablet, Take 0.5-1 tablets (7.5-15 mg) by mouth every 3 hours One tablet(15 mg) every three hours 8 am/ 11 am /8 pm / /5 am, and one-half tab (7.5 mg) at 2 am 2 pm and 5 pm , 11pm Dispense 9/26/23 and start 9/27/23, Disp: 84 tablet, Rfl: 0    oxyMORphone (OPANA) 5 MG tablet, Take 1 tablet (5 mg) by mouth 3 times daily, Disp: 42 tablet, Rfl: 0    polyethylene glycol (MIRALAX) 17 GM/Dose powder, Take 17 g (1 capful) by mouth 2 times daily, Disp: 3060 g, Rfl: 3    QUEtiapine (SEROQUEL) 100 MG tablet, Take 1 tablet (100 mg) by mouth 4 times daily as needed (anxiety), Disp: 60 tablet, Rfl: 1     QUEtiapine (SEROQUEL) 300 MG tablet, Take 2 tablets (600 mg) by mouth At Bedtime, Disp: 180 tablet, Rfl: 0    sennosides (SENOKOT) 8.8 MG/5ML syrup, Take 10 mLs by mouth At Bedtime, Disp: 236 mL, Rfl: 1    tamsulosin (FLOMAX) 0.4 MG capsule, Take 1 capsule (0.4 mg) by mouth 2 times daily, Disp: 180 capsule, Rfl: 3    zonisamide (ZONEGRAN) 25 MG capsule, Take 3 capsules (75 mg) by mouth 4 times daily, Disp: 180 capsule, Rfl: 3    Review of Systems   General, psych, musculoskeletal, bowels and bladder otherwise normal other than above.          OBJECTIVE   BP (!) 165/92   Pulse 72   SpO2 98%       Physical Exam  General: Alert, clear sensorium.  No respiratory distress.  Ambulates with 4 wheeled walker.  As noted demonstrates it is mild, several broken teeth with notably inflamed gums  Cardiovascular: Normal rate  Lungs: Pulmonary effort is normal, speaking in full sentences  MSK: Lower abdomen midline surgical scars with bulging to the left  Skin:  No concerning rashes or lesions.  Neurologic:alert and oriented x3  Psychiatric: Affect constricted, congruent.    Assessment, persistent spinal pain having had extensive spine surgery.  Continues with chronic pain related to his other orthopedic surgeries.    Comorbid traumatic brain injury with cognitive challenges, underlying mood and anxiety disorder.    Discussed a plan to taper the Suboxone as he feels has not been helpful does not want to try a higher dose.    Discussed that he may be a rapid metabolizer of oxycodone and will have a trial of oxymorphone.  Reviewed challenges with it being consistently available.  However may be relatively more potent longer lasting in duration is reviewed again expectations with the [facility] pain service and that a dose closer to 90 morphine milligram equivalents is considered safer.    We will also enroll in the [place] medical cannabis program and he will explore the costs.    Total time 52 minutes with moderate  complexity decision making

## 2023-09-27 NOTE — LETTER
Opioid / Opioid Plus Controlled Substance Agreement    This is an agreement between you and your provider about the safe and appropriate use of controlled substance/opioids prescribed by your care team. Controlled substances are medicines that can cause physical and mental dependence (abuse).    There are strict laws about having and using these medicines. We here at [facility] are committing to working with you in your efforts to get better. To support you in this work, we ll help you schedule regular office appointments for medicine refills. If we must cancel or change your appointment for any reason, we ll make sure you have enough medicine to last until your next appointment.     As a Provider, I will:  Listen carefully to your concerns and treat you with respect.   Recommend a treatment plan that I believe is in your best interest. This plan may involve therapies other than opioid pain medication.   Talk with you often about the possible benefits, and the risk of harm of any medicine that we prescribe for you.   Provide a plan on how to taper (discontinue or go off) using this medicine if the decision is made to stop its use.    As a Patient, I understand that opioid(s):   Are a controlled substance prescribed by my care team to help me function or work and manage my condition(s).   Are strong medicines and can cause serious side effects such as:  Drowsiness, which can seriously affect my driving ability  A lower breathing rate, enough to cause death  Harm to my thinking ability   Depression   Abuse of and addiction to this medicine  Need to be taken exactly as prescribed. Combining opioids with certain medicines or chemicals (such as illegal drugs, sedatives, sleeping pills, and benzodiazepines) can be dangerous or even fatal. If I stop opioids suddenly, I may have severe withdrawal symptoms.  Do not work for all types of pain nor for all patients. If they re not helpful, I may be asked to stop  them.    {Benzo / Stimulant (Optional):914208}    The risks, benefits and side effects of these medicine(s) were explained to me. I agree that:  I will take part in other treatments as advised by my care team. This may be psychiatry or counseling, physical therapy, behavioral therapy, group treatment or a referral to a specialist.     I will keep all my appointments. I understand that this is part of the monitoring of opioids. My care team may require an office visit for EVERY opioid/controlled substance refill. If I miss appointments or don t follow instructions, my care team may stop my medicine.    I will take my medicines as prescribed. I will not change the dose or schedule unless my care team tells me to. There will be no refills if I run out early.     I may be asked to come to the clinic and complete a urine drug test or complete a pill count at any time. If I don t give a urine sample or participate in a pill count, the care team may stop my medicine.    I will only receive prescriptions from this clinic for chronic pain. If I am treated by another provider for acute pain issues, I will tell them that I am taking opioid pain medication for chronic pain and that I have a treatment agreement with this provider. I will inform my [facility] care team within one business day if I am given a prescription for any pain medication by another healthcare provider. My [facility] care team can contact other providers and pharmacists about my use of any medicines.    It is up to me to make sure that I don t run out of my medicines on weekends or holidays. If my care team is willing to refill my opioid prescription without a visit, I must request refills only during office hours. Refills may take up to 3 business days to process. I will use one pharmacy to fill all my opioid and other controlled substance prescriptions. I will notify the clinic about any changes to my insurance or medication  availability.    I am responsible for my prescriptions. If the medicine/prescription is lost, stolen or destroyed, it will not be replaced. I also agree not to share controlled substance medicines with anyone.    I am aware I should not use any illegal or recreational drugs. I agree not to drink alcohol unless my care team says I can.       If I enroll in the [place] Medical Cannabis program, I will tell my care team prior to my next refill.     I will tell my care team right away if I become pregnant, have a new medical problem treated outside of my regular clinic, or have a change in my medications.    I understand that this medicine can affect my thinking, judgment and reaction time. Alcohol and drugs affect the brain and body, which can affect the safety of my driving. Being under the influence of alcohol or drugs can affect my decision-making, behaviors, personal safety, and the safety of others. Driving while impaired (DWI) can occur if a person is driving, operating, or in physical control of a car, motorcycle, boat, snowmobile, ATV, motorbike, off-road vehicle, or any other motor vehicle ([place] Statute 169A.20). I understand the risk if I choose to drive or operate any vehicle or machinery.    I understand that if I do not follow any of the conditions above, my prescriptions or treatment may be stopped or changed.          Opioids  What You Need to Know    What are opioids?   Opioids are pain medicines that must be prescribed by a doctor. They are also known as narcotics.     Examples are:   morphine (MS Contin, Suma)  oxycodone (Oxycontin)  oxycodone and acetaminophen (Percocet)  hydrocodone and acetaminophen (Vicodin, Norco)   fentanyl patch (Duragesic)   hydromorphone (Dilaudid)   methadone  codeine (Tylenol #3)     What do opioids do well?   Opioids are best for severe short-term pain such as after a surgery or injury. They may work well for cancer pain. They may help some people with long-lasting  (chronic) pain.     What do opioids NOT do well?   Opioids never get rid of pain entirely, and they don t work well for most patients with chronic pain. Opioids don t reduce swelling, one of the causes of pain.                                    Other ways to manage chronic pain and improve function include:     Treat the health problem that may be causing pain  Anti-inflammation medicines, which reduce swelling and tenderness, such as ibuprofen (Advil, Motrin) or naproxen (Aleve)  Acetaminophen (Tylenol)  Antidepressants and anti-seizure medicines, especially for nerve pain  Topical treatments such as patches or creams  Injections or nerve blocks  Chiropractic or osteopathic treatment  Acupuncture, massage, deep breathing, meditation, visual imagery, aromatherapy  Use heat or ice at the pain site  Physical therapy   Exercise  Stop smoking  Take part in therapy       Risks and side effects     Talk to your doctor before you start or decide to keep taking opioids. Possible side effects include:    Lowering your breathing rate enough to cause death  Overdose, including death, especially if taking higher than prescribed doses  Worse depression symptoms; less pleasure in things you usually enjoy  Feeling tired or sluggish  Slower thoughts or cloudy thinking  Being more sensitive to pain over time; pain is harder to control  Trouble sleeping or restless sleep  Changes in hormone levels (for example, less testosterone)  Changes in sex drive or ability to have sex  Constipation  Unsafe driving  Itching and sweating  Dizziness  Nausea, throwing up and dry mouth    What else should I know about opioids?    Opioids may lead to dependence, tolerance, or addiction.    Dependence means that if you stop or reduce the medicine too quickly, you will have withdrawal symptoms. These include loose poop (diarrhea), jitters, flu-like symptoms, nervousness and tremors. Dependence is not the same as addiction.                      Tolerance means needing higher doses over time to get the same effect. This may increase the chance of serious side effects.    Addiction is when people improperly use a substance that harms their body, their mind or their relations with others. Use of opiates can cause a relapse of addiction if you have a history of drug or alcohol abuse.    People who have used opioids for a long time may have a lower quality of life, worse depression, higher levels of pain and more visits to doctors.    You can overdose on opioids. Take these steps to lower your risk of overdose:    Recognize the signs:  Signs of overdose include decrease or loss of consciousness (blackout), slowed breathing, trouble waking up and blue lips. If someone is worried about overdose, they should call 911.    Talk to your doctor about Narcan (naloxone).   If you are at risk for overdose, you may be given a prescription for Narcan. This medicine very quickly reverses the effects of opioids.   If you overdose, a friend or family member can give you Narcan while waiting for the ambulance. They need to know the signs of overdose and how to give Narcan.     Don't use alcohol or street drugs.   Taking them with opioids can cause death.    Do not take any of these medicines unless your doctor says it s OK. Taking these with opioids can cause death:  Benzodiazepines, such as lorazepam (Ativan), alprazolam (Xanax) or diazepam (Valium)  Muscle relaxers, such as cyclobenzaprine (Flexeril)  Sleeping pills like zolpidem (Ambien)   Other opioids      How to keep you and other people safe while taking opioids:    Never share your opioids with others.  Opioid medicines are regulated by the Drug Enforcement Agency (NELIDA). Selling or sharing medications is a criminal act.    2. Be sure to store opioids in a secure place, locked up if possible. Young children can easily swallow them and overdose.    3. When you are traveling with your medicines, keep them in the  original bottles. If you use a pill box, be sure you also carry a copy of your medicine list from your clinic or pharmacy.    4. Safe disposal of opioids    Most pharmacies have places to get rid of medicine, called disposal kiosks. Medicine disposal options are also available in every [facility]. Search your county and  medication disposal  to find more options. You can find more details at:  [email or web address]://[email or web address]-green/[email or web address]-[email or web address]-medications     I agree that my provider, clinic care team, and pharmacy may work with any city, state or federal law enforcement agency that investigates the misuse, sale, or other diversion of my controlled medicine. I will allow my provider to discuss my care with, or share a copy of, this agreement with any other treating provider, pharmacy or emergency room where I receive care.    I have read this agreement and have asked questions about anything I did not understand.    _______________________________________________________  Patient Signature - Bola Lyon Jr. _____________________                   Date     _______________________________________________________  Provider Signature - JAYME MULLEN MD   _____________________                   Date     _______________________________________________________  Witness Signature (required if provider not present while patient signing)   _____________________                   Date

## 2023-09-27 NOTE — PATIENT INSTRUCTIONS
PLAN:    Begin tapering the Suboxone, 8 mg film twice a day for 1 week, 8 mg film and 4 mg film for 1 week, 4 mg film twice a day for 1 week, 4 mg film daily for 1 week and stop.    Oxycodone has been changed to 15 mg immediate release 4 times a day and 15 mg immediate release 1/2 tablet 4 times a day, alternating every 3 hours    Begin oxymorphone 5 mg 3 times a day.    Contact Dr. Cramer or Earnest Alanis [facility] pharmacist in 10 to 14 days.    You are working to find a dentist.    You will be reenrolled in the [place] medical cannabis program.    Follow-up with Dr. Cramer for virtual visit in 8 weeks

## 2023-09-28 DIAGNOSIS — M54.16 LUMBAR RADICULOPATHY: ICD-10-CM

## 2023-09-28 NOTE — TELEPHONE ENCOUNTER
[facility] Call Center    Phone Message    May a detailed message be left on voicemail: yes     Reason for Call: Other: Patient is needing a fax sent over to his TCU regarding his oxymorphone. It needs to state the times to take his medication. Please fax to [phone]     Action Taken: Other: Pain    Travel Screening: Not Applicable

## 2023-09-28 NOTE — TELEPHONE ENCOUNTER
Please add specific times to this prescription: this is set to no print so this will not go to pharmacy  Please route back once times are added.  Copy of new directions printed and faxed to the number provided

## 2023-09-29 ENCOUNTER — TELEPHONE (OUTPATIENT)
Dept: PALLIATIVE MEDICINE | Facility: OTHER | Age: 53
End: 2023-09-29
Payer: COMMERCIAL

## 2023-09-29 DIAGNOSIS — M54.16 LUMBAR RADICULOPATHY: Primary | ICD-10-CM

## 2023-09-29 NOTE — TELEPHONE ENCOUNTER
Prior authorization requested for:   Disp Refills Start End MINDY   oxyMORphone (OPANA) 5 MG tablet 42 tablet 0 9/27/2023  No   Sig - Route: Take 1 tablet (5 mg) by mouth 3 times daily - Oral   Sent to pharmacy as: oxyMORphone HCl 5 MG Oral Tablet (OPANA)   Class: E-Prescribe   Earliest Fill Date: 9/27/2023   Order: [id number]   E-Prescribing Status: Receipt confirmed by pharmacy (9/27/2023  1:39 PM CDT)     [facility] [place]

## 2023-09-29 NOTE — TELEPHONE ENCOUNTER
PA Initiation    Medication: OXYMORPHONE HCL 5 MG PO TABS  Insurance Company: [employer] - Phone [phone] Fax [phone]  Pharmacy Filling the Rx: [facility] - [place], [place] - [address]  Filling Pharmacy Phone: [phone]  Filling Pharmacy Fax: [phone]  Start Date: 9/29/2023

## 2023-09-29 NOTE — TELEPHONE ENCOUNTER
Patient called stating the oxyMORphone (OPANA) 5 MG tablet is out of stock at the [facility] pharmacy and said it will be out of stock for the foreseeable future. He will need an alterative since he is in a TCU and has issues getting medications from outside pharmacies.

## 2023-09-29 NOTE — TELEPHONE ENCOUNTER
Prior Authorization Approval    Medication: OXYMORPHONE HCL 5 MG PO TABS  Authorization Effective Date: 7/1/2023  Authorization Expiration Date: 9/29/2024  Approved Dose/Quantity: 90/30ds  Reference #: [id number]   Insurance Company: [employer] - Phone [phone] Fax [phone]  Which Pharmacy is filling the prescription: [facility] - [place], [place] - [address]  Pharmacy Notified: y  Patient Notified: y - pharmacy to notify

## 2023-10-02 RX ORDER — OXYMORPHONE HYDROCHLORIDE 5 MG/1
5 TABLET ORAL EVERY 4 HOURS PRN
Qty: 42 TABLET | Refills: 0 | Status: SHIPPED | OUTPATIENT
Start: 2023-10-02 | End: 2023-10-02

## 2023-10-02 RX ORDER — OXYMORPHONE HYDROCHLORIDE 5 MG/1
5 TABLET ORAL 3 TIMES DAILY
Qty: 42 TABLET | Refills: 0
Start: 2023-10-02 | End: 2023-10-02

## 2023-10-02 RX ORDER — OXYMORPHONE HYDROCHLORIDE 5 MG/1
5 TABLET ORAL 3 TIMES DAILY
Qty: 42 TABLET | Refills: 0 | Status: SHIPPED | OUTPATIENT
Start: 2023-10-02 | End: 2023-10-10

## 2023-10-02 NOTE — TELEPHONE ENCOUNTER
Call placed to patient. Informed him that the oxymorphone was sent top [facility] in [place].

## 2023-10-02 NOTE — TELEPHONE ENCOUNTER
[facility]    Phone Message    May a detailed message be left on voicemail: yes     Reason for Call: Other: Patient is calling regarding his medication and getting it filled. Please call back when available.       Action Taken: Other: Pain    Travel Screening: Not Applicable

## 2023-10-02 NOTE — TELEPHONE ENCOUNTER
[facility] Call Center    Phone Message    May a detailed message be left on voicemail: yes     Reason for Call: Medication Question or concern regarding medication   Prescription Clarification  Name of Medication: oxyMORphone (OPANA) 5 MG tablet   Prescribing Provider: Dr. Cramer   Pharmacy: [facility] [address]    What on the order needs clarification? Dylan with [facility] states he can see the order in epic, but it is not in his pharmacy system. He is requesting it to be re-sent.      Action Taken: Message routed to:  Other: [facility]    Travel Screening: Not Applicable

## 2023-10-04 LAB
AMPHET UR CFM-MCNC: 2260 NG/ML
AMPHET/CREAT UR: 9417 NG/MG {CREAT}
BUPRENORPHINE UR CFM-MCNC: 69 NG/ML
BUPRENORPHINE/CREAT UR: 288 NG/MG {CREAT}
NALOXONE UR CFM-MCNC: 142 NG/ML
NALOXONE: 592 NG/MG {CREAT}
NORBUPRENORPHINE UR CFM-MCNC: ABNORMAL NG/ML
OXYCODONE UR CFM-MCNC: 5720 NG/ML
OXYCODONE/CREAT UR: ABNORMAL NG/MG {CREAT}
OXYMORPHONE UR CFM-MCNC: 796 NG/ML
OXYMORPHONE/CREAT UR: 3317 NG/MG {CREAT}

## 2023-10-10 ENCOUNTER — TELEPHONE (OUTPATIENT)
Dept: PALLIATIVE MEDICINE | Facility: OTHER | Age: 53
End: 2023-10-10
Payer: COMMERCIAL

## 2023-10-10 DIAGNOSIS — M54.16 LUMBAR RADICULOPATHY: ICD-10-CM

## 2023-10-10 DIAGNOSIS — M48.02 FORAMINAL STENOSIS OF CERVICAL REGION: ICD-10-CM

## 2023-10-10 RX ORDER — OXYMORPHONE HYDROCHLORIDE 5 MG/1
5 TABLET ORAL 3 TIMES DAILY
Qty: 42 TABLET | Refills: 0 | Status: SHIPPED | OUTPATIENT
Start: 2023-10-10 | End: 2023-10-27

## 2023-10-10 RX ORDER — OXYCODONE HYDROCHLORIDE 15 MG/1
7.5-15 TABLET ORAL
Qty: 84 TABLET | Refills: 0 | Status: SHIPPED | OUTPATIENT
Start: 2023-10-10 | End: 2023-10-12

## 2023-10-10 NOTE — TELEPHONE ENCOUNTER
Pending Prescriptions:                       Disp   Refills    oxyCODONE IR (ROXICODONE) 15 MG tablet    84 tab*0            Sig: Take 0.5-1 tablets (7.5-15 mg) by mouth every 3           hours One tablet(15 mg) every three hours 8 am/           11 am /8 pm / /5 am, and one-half tab (7.5 mg) at           2 am 2 pm and 5 pm , 11pm Dispense/start 10/10/23    oxyMORphone (OPANA) 5 MG tablet           42 tab*0            Sig: Take 1 tablet (5 mg) by mouth 3 times daily 8 am,           12 noon and 8 pm;    [facility] [place]

## 2023-10-10 NOTE — TELEPHONE ENCOUNTER
[facility]    Phone Message    May a detailed message be left on voicemail: yes     Reason for Call: Medication Refill Request    Has the patient contacted the pharmacy for the refill? Yes   Name of medication being requested:   oxyCODONE IR (ROXICODONE) 15 MG tablet     oxyMORphone (OPANA) 5 MG tablet         Provider who prescribed the medication:   Dmitriy Cramer MD       Pharmacy:   [address]     Date medication is needed: ASAP   Pt is stating that he will be out of this medication by 10/12/2023    Action Taken: Message routed to:  Other: [facility]    Travel Screening: Not Applicable

## 2023-10-10 NOTE — TELEPHONE ENCOUNTER
Received refill request for:    oxyCODONE IR (ROXICODONE) 15 MG tablet   -Last dispensed from pharmacy on 9/27/2023.  oxyMORphone (OPANA) 5 MG tablet    -Last dispensed from pharmacy on 10/2/2023.    Patient's last office/virtual visit by prescribing provider on 9/27/2023.  Next office/virtual appointment scheduled for 11/22/2023.    Last urine drug screen date 9/27/2023.    Current opioid agreement on file? Yes Date of opioid agreement: 9/27/2023.    Pt Comment: Pt is stating that he will be out of this medication by 10/12/2023     E-prescribe to:  [facility] [place], [place] - [address] [facility]     Will route to nursing [place] for review and preparation of prescription(s).

## 2023-10-12 RX ORDER — OXYCODONE HYDROCHLORIDE 15 MG/1
7.5-15 TABLET ORAL
Qty: 84 TABLET | Refills: 0 | Status: SHIPPED | OUTPATIENT
Start: 2023-10-12 | End: 2023-10-27

## 2023-10-12 NOTE — TELEPHONE ENCOUNTER
[facility]    Phone Message    May a detailed message be left on voicemail: yes     Reason for Call: Medication Refill Request    Has the patient contacted the pharmacy for the refill? Yes   Name of medication being requested: oxyMORphone (OPANA) 5 MG tablet   Provider who prescribed the medication: Shoaib  Pharmacy: [facility] - [place], [place] - [address]    Date medication is needed: ASAP Oxycodone/ Roxicodne needs to be sent to [facility] pharmacy.        Action Taken: Other: Pain    Travel Screening: Not Applicable

## 2023-10-12 NOTE — TELEPHONE ENCOUNTER
Sending 15 mg oxycodone IR to [facility] instead of [facility].     Pending Prescriptions:                       Disp   Refills    oxyCODONE IR (ROXICODONE) 15 MG tablet    84 tab*0            Sig: Take 0.5-1 tablets (7.5-15 mg) by mouth every 3           hours One tablet(15 mg) every three hours 8 am/           11 am /8 pm / /5 am, and one-half tab (7.5 mg) at           2 am 2 pm and 5 pm , 11pm Dispense/start 10/12/23    Signed Prescriptions:                        Disp   Refills    oxyMORphone (OPANA) 5 MG tablet            42 tab*0        Sig: Take 1 tablet (5 mg) by mouth 3 times daily 8 am, 12           noon and 8 pm;  Authorizing Provider: JAYME MULLEN [facility]

## 2023-10-16 DIAGNOSIS — S06.9X9S TRAUMATIC BRAIN INJURY WITH LOSS OF CONSCIOUSNESS, SEQUELA (H): ICD-10-CM

## 2023-10-16 DIAGNOSIS — F31.81 BIPOLAR 2 DISORDER (H): ICD-10-CM

## 2023-10-16 RX ORDER — DEXTROAMPHETAMINE SACCHARATE, AMPHETAMINE ASPARTATE, DEXTROAMPHETAMINE SULFATE AND AMPHETAMINE SULFATE 3.75; 3.75; 3.75; 3.75 MG/1; MG/1; MG/1; MG/1
15 TABLET ORAL DAILY
Qty: 30 TABLET | Refills: 0 | Status: SHIPPED | OUTPATIENT
Start: 2023-10-16 | End: 2023-11-10

## 2023-10-19 DIAGNOSIS — F31.81 BIPOLAR 2 DISORDER (H): ICD-10-CM

## 2023-10-19 NOTE — TELEPHONE ENCOUNTER
Pt is callintg and he broke 2 teeth and is hoping to get this refilled today so they can deliver tonight.    Covering for Dr Lund please

## 2023-10-20 ENCOUNTER — TELEPHONE (OUTPATIENT)
Dept: INTERNAL MEDICINE | Facility: CLINIC | Age: 53
End: 2023-10-20

## 2023-10-20 NOTE — TELEPHONE ENCOUNTER
Prior Authorization Request  Who s requesting:  [facility]  Pharm[facility] Name and Location[facility]  Medication Name: lidocaine  Insurance Plan: BC medicaid  Insurance Member ID Number:  [id number]   CoverMyMeds Key:   Informed patient that prior authorizations can take up to 10 business days for response:     Okay to leave a detailed message:

## 2023-10-24 NOTE — TELEPHONE ENCOUNTER
PA Initiation    Medication: GLYDO 2 % EX PRSY  Insurance Company: "[employer] - Phone [phone] Fax [phone]  Pharmacy Filling the Rx: [facility] - [place], [place] - [address]  Filling Pharmacy Phone: [phone]  Filling Pharmacy Fax: [phone]  Start Date: 10/24/2023

## 2023-10-24 NOTE — TELEPHONE ENCOUNTER
Called pharmacy to confirm what they needed PA on since written product is not intended for oral use. Pharmacy switched to [facility] NDC:[phone]

## 2023-10-25 NOTE — TELEPHONE ENCOUNTER
PRIOR AUTHORIZATION DENIED    Medication: GLYDO 2 % EX PRSY  Insurance Company: [email or web address] - Phone [phone] Fax [phone]  Denial Date: 10/25/2023  Denial Rational:     Appeal Information:     Patient Notified: No, care team must notify

## 2023-10-31 ENCOUNTER — TELEPHONE (OUTPATIENT)
Dept: PHARMACY | Facility: CLINIC | Age: 53
End: 2023-10-31
Payer: COMMERCIAL

## 2023-10-31 DIAGNOSIS — M54.16 LUMBAR RADICULOPATHY: ICD-10-CM

## 2023-10-31 NOTE — TELEPHONE ENCOUNTER
Call returned to patient. Patient is having someone  the 20 tablet prescription that is already at the [facility] pharmacy. This will last him 1 week. Writer told the patient to call around and find a pharmacy that has the 5 mg oxymorphone in stock and we can send the rest of the prescription there. [facility] currently does not know when they will have it in stock again.     Will await a call back on which pharmacy the patient will use.

## 2023-10-31 NOTE — TELEPHONE ENCOUNTER
Patient called about a missed phone call yesterday from [facility]. Chart review shows a cancelled appointment from yesterday. Patient was wondering if he needed a call back.

## 2023-10-31 NOTE — TELEPHONE ENCOUNTER
[facility]    Phone Message    May a detailed message be left on voicemail: yes     Reason for Call: Other: Patient is calling on the 22 remaining tablets that he will need by Tuesday. He is not sure what pharmacy to send to. Please call back to discuss.  He is also wondering about a call from Shahri[facility] that he was supposed to get yesterday.      Action Taken: Other: Pain    Travel Screening: Not Applicable

## 2023-10-31 NOTE — TELEPHONE ENCOUNTER
Hello,    This is [facility]. We are unable to fill the full quantity for this patient's Oxymorphone 5mg and filled what we have in stock per patient's request. We only filled 20 tablets out of 42 tablets. The remaining 22 was voided.     If there's any questions, give us a call at [phone].    Thank you,    Milana Miranda, PhT  [facility]

## 2023-11-01 NOTE — TELEPHONE ENCOUNTER
[facility]    Phone Message    May a detailed message be left on voicemail: yes     Reason for Call: Other: Patient called back with a pharmacy that does have the Oxymophone 42 pills in stock. Pharmacy: [facility] [address] [place] [address]  [id number] # [phone].  Patient requesting a call back.      Action Taken: Message routed to:  Other: [facility]    Travel Screening: Not Applicable

## 2023-11-02 ENCOUNTER — MYC MEDICAL ADVICE (OUTPATIENT)
Dept: PALLIATIVE MEDICINE | Facility: OTHER | Age: 53
End: 2023-11-02
Payer: COMMERCIAL

## 2023-11-02 DIAGNOSIS — G89.4 CHRONIC PAIN SYNDROME: Primary | ICD-10-CM

## 2023-11-02 RX ORDER — OXYCODONE HYDROCHLORIDE 10 MG/1
10 TABLET ORAL 3 TIMES DAILY
Qty: 42 TABLET | Refills: 0 | Status: SHIPPED | OUTPATIENT
Start: 2023-11-02 | End: 2023-11-10

## 2023-11-02 RX ORDER — OXYMORPHONE HYDROCHLORIDE 5 MG/1
5 TABLET ORAL 3 TIMES DAILY
Qty: 42 TABLET | Refills: 0 | Status: SHIPPED | OUTPATIENT
Start: 2023-11-02 | End: 2023-11-22

## 2023-11-02 NOTE — TELEPHONE ENCOUNTER
Pending Prescriptions:                       Disp   Refills    oxyCODONE IR (ROXICODONE) 10 MG tablet    42 tab*0            Sig: Take 1 tablet (10 mg) by mouth 3 times daily Take           at 8 am, 12 noon and 8 pm. Start 11/2/23   [facility]    Called pt to review new order for Oxycodone 10 mg tablets to replace Oxymorphone 5 mg tabs. Pt voiced understanding. Order faxed to facility where pt resides as requested by the pt.

## 2023-11-02 NOTE — TELEPHONE ENCOUNTER
[facility] Call [facility]    Phone Message    May a detailed message be left on voicemail: yes     Reason for Call: Other: Patient called regarding his medication. [facility] said it was in stock but they do not have it. Her is calling to see if there is an alternative since he can't find a pharmacy that has it in stock. Please call back to discuss.      Action Taken: Other: Pain    Travel Screening: Not Applicable

## 2023-11-02 NOTE — TELEPHONE ENCOUNTER
Per , oxymorphone last dispensed 10/17/23. Call placed to [facility] to confirm that it was never dispensed and to cancel old prescription.    Pending Prescriptions:                       Disp   Refills    oxyMORphone (OPANA) 5 MG tablet           42 tab*0            Sig: Take 1 tablet (5 mg) by mouth 3 times daily 8 am,           12 noon and 8 pm;    [id number] - [place], [place] - [facility] [address] AT [facility] [employer] [facility]

## 2023-11-10 DIAGNOSIS — F31.81 BIPOLAR 2 DISORDER (H): ICD-10-CM

## 2023-11-10 DIAGNOSIS — S06.9X9S TRAUMATIC BRAIN INJURY WITH LOSS OF CONSCIOUSNESS, SEQUELA (H): ICD-10-CM

## 2023-11-10 RX ORDER — DEXTROAMPHETAMINE SACCHARATE, AMPHETAMINE ASPARTATE, DEXTROAMPHETAMINE SULFATE AND AMPHETAMINE SULFATE 3.75; 3.75; 3.75; 3.75 MG/1; MG/1; MG/1; MG/1
15 TABLET ORAL DAILY
Qty: 30 TABLET | Refills: 0 | Status: SHIPPED | OUTPATIENT
Start: 2023-11-10 | End: 2023-11-13

## 2023-11-13 RX ORDER — DEXTROAMPHETAMINE SACCHARATE, AMPHETAMINE ASPARTATE, DEXTROAMPHETAMINE SULFATE AND AMPHETAMINE SULFATE 3.75; 3.75; 3.75; 3.75 MG/1; MG/1; MG/1; MG/1
TABLET ORAL
Qty: 30 TABLET | Refills: 0 | Status: SHIPPED | OUTPATIENT
Start: 2023-11-13 | End: 2023-12-13

## 2023-11-16 DIAGNOSIS — F31.81 BIPOLAR 2 DISORDER (H): ICD-10-CM

## 2023-11-16 DIAGNOSIS — S09.90XS COGNITIVE DEFICIT DUE TO OLD HEAD INJURY: ICD-10-CM

## 2023-11-16 DIAGNOSIS — R41.89 COGNITIVE DEFICIT DUE TO OLD HEAD INJURY: ICD-10-CM

## 2023-11-16 RX ORDER — BUPROPION HYDROCHLORIDE 150 MG/1
TABLET, EXTENDED RELEASE ORAL
Qty: 56 TABLET | Refills: 10 | Status: SHIPPED | OUTPATIENT
Start: 2023-11-16 | End: 2024-09-17

## 2023-11-22 ENCOUNTER — VIRTUAL VISIT (OUTPATIENT)
Dept: PALLIATIVE MEDICINE | Facility: OTHER | Age: 53
End: 2023-11-22
Attending: ANESTHESIOLOGY
Payer: COMMERCIAL

## 2023-11-22 DIAGNOSIS — M48.02 FORAMINAL STENOSIS OF CERVICAL REGION: ICD-10-CM

## 2023-11-22 DIAGNOSIS — G89.4 CHRONIC PAIN SYNDROME: ICD-10-CM

## 2023-11-22 PROCEDURE — 99214 OFFICE O/P EST MOD 30 MIN: CPT | Mod: VID | Performed by: ANESTHESIOLOGY

## 2023-11-22 RX ORDER — OXYCODONE HYDROCHLORIDE 15 MG/1
15 TABLET ORAL 3 TIMES DAILY
Qty: 42 TABLET | Refills: 0 | Status: SHIPPED | OUTPATIENT
Start: 2023-11-22 | End: 2023-12-08

## 2023-11-22 RX ORDER — OXYCODONE HYDROCHLORIDE 10 MG/1
10 TABLET ORAL
Qty: 70 TABLET | Refills: 0 | Status: SHIPPED | OUTPATIENT
Start: 2023-11-22 | End: 2023-12-08

## 2023-11-22 ASSESSMENT — PAIN SCALES - GENERAL: PAINLEVEL: EXTREME PAIN (8)

## 2023-11-22 NOTE — PATIENT INSTRUCTIONS
PLAN:    Will be activating your enrollment in the [place] medical cannabis program.    Discussed doing the oxycodone to be dispensed every 3 hours has been the system.  This next interval will go to 15 mg 8 times a day and 10 mg 5 times a day, each refill changing of 15 to a 10 mg.    Follow-up with Dr. Cramer in the office in 3 months

## 2023-11-22 NOTE — PROGRESS NOTES
Patient presents to the clinic today for a follow up with JAYME MULLEN MD  regarding Pain Management.     Jan is a 53 year old who is being evaluated via a billable video visit.      How would you like to obtain your AVS? MyChart  If the video visit is dropped, the invitation should be resent by: Text to cell phone: [phone]  Will anyone else be joining your video visit? No        Video-Visit Details    Type of service:  Video Visit     Platform used for Video Visit: Aura         Is Pt currently in [place]? Yes    NOTE:  If Pt is not in [place], Appointment needs to be canceled and rescheduled             6/28/2023     1:13 PM 9/27/2023     1:00 PM 11/22/2023     1:54 PM   PEG Score   PEG Total Score 9.33 8 7.33        UDT/CSA-9/27/23    Becca Rodriguez MA  [facility]

## 2023-11-22 NOTE — PROGRESS NOTES
[facility] - Office Visit    ASSESSMENT & PLAN     There are no diagnoses linked to this encounter.    There are no Patient Instructions on file for this visit.      -----  JAYME MULLEN MD  [facility]       SUBJECTIVE      Bola Lyon Jr. is a 53 year old year old male who is seen for video visit.  Follow-up for extensive spine surgery, history of other joint surgeries.    Reviews he is looking forward to moving from his present living arrangement.  Describes challenges sharing the bathroom with other people.  He has a new  is helping him get some programs.  He is looking at an old who move into a setting by [place].    He is demonstrates he has been working in the dental program, completing 1 phase which involves some gum repair and removing a tooth.  He will continue in January with the process of having some other teeth removed and dentures.    Since last seen we tried oxymorphone with some benefit, though there have been availability issues.  To taper off the oxymorphone and I used oxycodone 10 mg 3 times a day.  He describes since stopping the oxymorphone and stopping the Suboxone since he did not think that was helpful feels the 10 mg are very helpful.  Also less expensive.    Presently using the oxycodone 15 mg 4 times a day alternating with the 7.54 times a day and then the oxycodone 10 mg 3 times a day.  We discussed this is increased his total opioid regimen from where we have been working with he understands our goal to taper.    He continues with his home physical therapy.    He is working on establishing the medical cannabis program again noting cost was an issue.    Reports his mood is doing better as he is finding some prospects for moving out this situation.  Reports the Seroquel Wellbutrin and lamotrigine are doing well in that regard.    Urine drug test in September.   reviewed    Reviews working with his surgeon, there was some  discussion about removing some hard work, she would like to avoid surgery so they have consider doing some injections in the future.      Current Outpatient Medications:     acetaminophen (TYLENOL) 650 MG CR tablet, 2 TABLETS (24445UW) ORALLY 3 TIMES DAILY (MAX APAP:4GM/24HR), Disp: 540 tablet, Rfl: 1    albuterol (PROAIR HFA/PROVENTIL HFA/VENTOLIN HFA) 108 (90 Base) MCG/ACT inhaler, Inhale 2 puffs into the lungs every 6 hours as needed, Disp: 6.7 g, Rfl: 11    allopurinol (ZYLOPRIM) 300 MG tablet, Take 1 tablet (300 mg) by mouth 2 times daily, Disp: 180 tablet, Rfl: 3    ammonium lactate (LAC-HYDRIN) 12 % external lotion, Apply topically daily as needed for dry skin, Disp: 500 g, Rfl: 11    amphetamine-dextroamphetamine (ADDERALL) 15 MG tablet, 1 TABLET ORALLY DAILY (DX: BIPOLAR II), Disp: 30 tablet, Rfl: 0    bisacodyl (DULCOLAX) 10 MG suppository, Place 1 suppository (10 mg) rectally daily as needed for constipation, Disp: 60 suppository, Rfl: 1    buPROPion (WELLBUTRIN SR) 150 MG 12 hr tablet, 1 TABLET ORALLY 2 TIMES DAILY (DX:______________), Disp: 56 tablet, Rfl: 10    CALCIUM ANTACID 500 MG chewable tablet, , Disp: , Rfl:     chlorhexidine (PERIDEX) 0.12 % solution, Swish and spit 15 mLs in mouth 3 times daily as needed (sore throat) - Swish & Spit, Disp: 473 mL, Rfl: 11    ergocalciferol (ERGOCALCIFEROL) 1.25 MG (88900 UT) capsule, Take 1 capsule (50,000 Units) by mouth once a week, Disp: 12 capsule, Rfl: 3    ferrous sulfate (FEROSUL) 325 (65 Fe) MG tablet, Take 1 tablet (325 mg) by mouth daily (with breakfast), Disp: 90 tablet, Rfl: 3    fexofenadine (ALLEGRA) 180 MG tablet, Take 180 mg by mouth daily as needed, Disp: , Rfl:     finasteride (PROSCAR) 5 MG tablet, Take 1 tablet (5 mg) by mouth daily, Disp: 90 tablet, Rfl: 3    fluconazole (DIFLUCAN) 200 MG tablet, Take 1 tablet (200 mg) by mouth daily, Disp: 90 tablet, Rfl: 3    furosemide (LASIX) 40 MG tablet, Take 1 tablet (40 mg) by mouth daily, Disp: 30  tablet, Rfl: 11    hydrOXYzine (ATARAX) 50 MG tablet, Per Dr. LOPEZ,OLUKAYODE, Disp: , Rfl:     lactulose 20 GM/30ML SOLN, Take 45 mLs by mouth 2 times daily For constipation., Disp: 5400 mL, Rfl: 11    lamoTRIgine (LAMICTAL) 150 MG tablet, Take 1 tablet (150 mg) by mouth 3 times daily, Disp: , Rfl:     lidocaine (LIDODERM) 5 % patch, Place onto the skin every 24 hours To prevent lidocaine toxicity, patient should be patch free for 12 hrs daily., Disp: 30 patch, Rfl: 11    lidocaine (XYLOCAINE) 2 % external gel, Apply 1 inch topically 4 times a day as needed to gums., Disp: 85 g, Rfl: 11    methocarbamol (ROBAXIN) 500 MG tablet, Take 2 tablets (1,000 mg) by mouth 4 times daily For use beginning 3/14/23, Disp: 120 tablet, Rfl: 1    metoprolol succinate ER (TOPROL-XL) 50 MG 24 hr tablet, Take 1 tablet (50 mg) by mouth daily, Disp: 90 tablet, Rfl: 3    montelukast (SINGULAIR) 10 MG tablet, Take 1 tablet by mouth daily, Disp: , Rfl:     naloxegol (MOVANTIK) 25 MG TABS tablet, Take 1 tablet (25 mg) by mouth every morning (before breakfast), Disp: 30 tablet, Rfl: 1    naloxone (NARCAN) 4 MG/0.1ML nasal spray, 1 spray (4 mg dose) into one nostril for opioid reversal. Call 911. May repeat if no response in 3 minutes., Disp: , Rfl:     nystatin (MYCOSTATIN) 625550 UNIT/ML suspension, Take 10 mLs (1,000,000 Units) by mouth every 3 hours as needed (thrush) To continue while taking levofloxacin and fluconazole, Disp: 800 mL, Rfl: 3    oxyCODONE IR (ROXICODONE) 10 MG tablet, Take 1 tablet (10 mg) by mouth 3 times daily Take at 8 am, 12 noon and 8 pm. Fill 11/16/23 and start 11/17/23, Disp: 30 tablet, Rfl: 0    oxyCODONE IR (ROXICODONE) 15 MG tablet, Take 0.5-1 tablets (7.5-15 mg) by mouth every 3 hours One tablet(15 mg) every three hours 8 am/ 11 am /8 pm / /5 am, and one-half tab (7.5 mg) at 2 am 2 pm and 5 pm , 11pm Dispense/start 11/13/23, Disp: 84 tablet, Rfl: 0    oxyMORphone (OPANA) 5 MG tablet, Take 1 tablet (5 mg) by  mouth 3 times daily 8 am, 12 noon and 8 pm;, Disp: 42 tablet, Rfl: 0    polyethylene glycol (MIRALAX) 17 GM/Dose powder, Take 17 g (1 capful) by mouth 2 times daily, Disp: 3060 g, Rfl: 3    QUEtiapine (SEROQUEL) 100 MG tablet, Take 1 tablet (100 mg) by mouth 4 times daily as needed (anxiety), Disp: 60 tablet, Rfl: 1    QUEtiapine (SEROQUEL) 300 MG tablet, Take 2 tablets (600 mg) by mouth At Bedtime, Disp: 180 tablet, Rfl: 0    sennosides (SENOKOT) 8.8 MG/5ML syrup, Take 10 mLs by mouth At Bedtime, Disp: 236 mL, Rfl: 1    tamsulosin (FLOMAX) 0.4 MG capsule, Take 1 capsule (0.4 mg) by mouth 2 times daily, Disp: 180 capsule, Rfl: 3    zonisamide (ZONEGRAN) 25 MG capsule, Take 3 capsules (75 mg) by mouth 4 times daily, Disp: 180 capsule, Rfl: 3    buprenorphine HCl-naloxone HCl (SUBOXONE) 4-1 MG per film, Place 1 Film under the tongue daily at 1400 (Patient not taking: Reported on 11/22/2023), Disp: 28 each, Rfl: 1    buprenorphine HCl-naloxone HCl (SUBOXONE) 8-2 MG per film, Place 1 Film under the tongue 2 times daily At 0800 and 2000 (Patient not taking: Reported on 11/22/2023), Disp: 56 Film, Rfl: 3           Review of Systems   General, psych, musculoskeletal, bowels and bladder otherwise normal other than above.          OBJECTIVE   There were no vitals taken for this visit.        Physical Exam  General: Alert, clear sensorium no respiratory distress, no pain behavior.  Demonstrates missing some of his teeth.  Cardiovascular:   Lungs: Pulmonary effort is normal, speaking in full sentences  MSK:   Skin:  No concerning rashes or lesions.  Neurologic:  alert and oriented x3  Psychiatric: Affect brighter than when last seen.    Assessment: Recovering from extensive lumbar fusion, history of other orthopedic surgeries.  Comorbid mood disorder stable with prospects for housing arrangements.    Plans to start with the cannabis program.    Reviewed the plan to move to the goal of decreasing the overall every 8 regimen.   He does feel that an oxycodone 10 mg last longer than the 7.5 perhaps 4 hours.  We discussed continue with the every 3 hour regimen to keep the system where he is.  Will begin decreasing to 15 mg immediate release 3 times a day, with 10 mg 5 times a day, every 2-week interval will decrease until using 10 mg every 3 hours, goal to move to a maximum of 10 mg every 4 hours    Total time 35 minutes, video start time 2: 15, video and 2: 44, at home via goTenna            Total time spent:  minutes

## 2023-12-08 ENCOUNTER — TELEPHONE (OUTPATIENT)
Dept: PALLIATIVE MEDICINE | Facility: OTHER | Age: 53
End: 2023-12-08
Payer: COMMERCIAL

## 2023-12-08 DIAGNOSIS — M48.02 FORAMINAL STENOSIS OF CERVICAL REGION: ICD-10-CM

## 2023-12-08 DIAGNOSIS — G89.4 CHRONIC PAIN SYNDROME: ICD-10-CM

## 2023-12-08 RX ORDER — OXYCODONE HYDROCHLORIDE 15 MG/1
15 TABLET ORAL 3 TIMES DAILY
Qty: 42 TABLET | Refills: 0 | Status: SHIPPED | OUTPATIENT
Start: 2023-12-08 | End: 2023-12-19

## 2023-12-08 RX ORDER — OXYCODONE HYDROCHLORIDE 10 MG/1
10 TABLET ORAL
Qty: 70 TABLET | Refills: 0 | Status: SHIPPED | OUTPATIENT
Start: 2023-12-08 | End: 2023-12-19

## 2023-12-08 NOTE — CONFIDENTIAL NOTE
Refill request:  Oxycodone 10 mg tab: last dispensed 11/24/23  Oxycodone 15 mg tab: last dispensed 11/26/23

## 2023-12-08 NOTE — TELEPHONE ENCOUNTER
*Patient will need a follow-up 3 months after last visit  Pending Prescriptions:                       Disp   Refills    oxyCODONE IR (ROXICODONE) 10 MG tablet    70 tab*0            Sig: Take 1 tablet (10 mg) by mouth 5 times daily At 2           am,, 5 am, 11 am, 2 pm, 11 pm, start 12/11    oxyCODONE IR (ROXICODONE) 15 MG tablet    42 tab*0            Sig: Take 1 tablet (15 mg) by mouth 3 times daily At 8           am, 2 pm, 8 pm, may start 12/11    [facility] [place], [place] - [address]

## 2023-12-08 NOTE — TELEPHONE ENCOUNTER
I left a message for the patient to return call to schedule follow up in February, with Dr Cramer.

## 2023-12-08 NOTE — TELEPHONE ENCOUNTER
Received refill request for:    oxyCODONE IR (ROXICODONE) 15 MG tablet   -Last dispensed from pharmacy on 11/26/2023.  oxyCODONE IR (ROXICODONE) 10 MG tablet    -Last dispensed from pharmacy on 11/24/2023.    Patient's last office/virtual visit by prescribing provider on 11/22/2023.  Next office/virtual appointment scheduled for NONE.     Last urine drug screen date 9/27/2023.  Current opioid agreement on file? Yes Date of opioid agreement: 9/27/2023.    E-prescribe to:     [facility] - [place], [place] - [address]    Will route to nursing pool for review and preparation of prescription(s).

## 2023-12-11 NOTE — TELEPHONE ENCOUNTER
[facility]    Phone Message    May a detailed message be left on voicemail: yes     Reason for Call: Other: Patient is returning phone call, he is scheduled for 2/21 follow up. He states that he accidentally deleted the voicemail while he was listening to it and was unsure if the nurse needed to discuss anything further with him. Please call back.      Action Taken: Message routed to:  Other: [facility]    Travel Screening: Not Applicable

## 2023-12-13 DIAGNOSIS — S06.9X9S TRAUMATIC BRAIN INJURY WITH LOSS OF CONSCIOUSNESS, SEQUELA (H): ICD-10-CM

## 2023-12-13 DIAGNOSIS — F31.81 BIPOLAR 2 DISORDER (H): ICD-10-CM

## 2023-12-13 RX ORDER — DEXTROAMPHETAMINE SACCHARATE, AMPHETAMINE ASPARTATE, DEXTROAMPHETAMINE SULFATE AND AMPHETAMINE SULFATE 3.75; 3.75; 3.75; 3.75 MG/1; MG/1; MG/1; MG/1
TABLET ORAL
Qty: 30 TABLET | Refills: 0 | Status: SHIPPED | OUTPATIENT
Start: 2023-12-13 | End: 2024-01-10

## 2023-12-15 ENCOUNTER — TELEPHONE (OUTPATIENT)
Dept: INTERNAL MEDICINE | Facility: CLINIC | Age: 53
End: 2023-12-15
Payer: COMMERCIAL

## 2023-12-15 DIAGNOSIS — R33.9 URINARY RETENTION WITH INCOMPLETE BLADDER EMPTYING: ICD-10-CM

## 2023-12-15 RX ORDER — FINASTERIDE 5 MG/1
TABLET, FILM COATED ORAL
Qty: 28 TABLET | Refills: 10 | OUTPATIENT
Start: 2023-12-15

## 2023-12-15 NOTE — TELEPHONE ENCOUNTER
General Call        Reason for Call:  Pt requesting to up the dose on his lamotrigine while his Lipedema is acting up. Then put it back to original dose when his Lipedema is better?      lamoTRIgine (LAMICTAL) 150 MG tablet  150 mg, Oral, 3 TIMES DAILY       Date of last appointment with provider: 12/18/23

## 2023-12-18 ENCOUNTER — VIRTUAL VISIT (OUTPATIENT)
Dept: INTERNAL MEDICINE | Facility: CLINIC | Age: 53
End: 2023-12-18
Payer: COMMERCIAL

## 2023-12-18 DIAGNOSIS — I89.0 LYMPHEDEMA OF BOTH LOWER EXTREMITIES: Primary | ICD-10-CM

## 2023-12-18 DIAGNOSIS — M10.9 URIC ACID ARTHROPATHY: ICD-10-CM

## 2023-12-18 DIAGNOSIS — G89.4 CHRONIC PAIN DISORDER: ICD-10-CM

## 2023-12-18 PROCEDURE — 99214 OFFICE O/P EST MOD 30 MIN: CPT | Mod: VID | Performed by: INTERNAL MEDICINE

## 2023-12-18 RX ORDER — ALLOPURINOL 300 MG/1
300 TABLET ORAL 2 TIMES DAILY
Qty: 180 TABLET | Refills: 3 | Status: SHIPPED | OUTPATIENT
Start: 2023-12-18 | End: 2024-12-17

## 2023-12-18 RX ORDER — FUROSEMIDE 40 MG
40 TABLET ORAL 2 TIMES DAILY
Qty: 60 TABLET | Refills: 11 | Status: SHIPPED | OUTPATIENT
Start: 2023-12-18 | End: 2024-01-08

## 2023-12-18 RX ORDER — GUAIFENESIN 600 MG/1
TABLET, EXTENDED RELEASE ORAL
COMMUNITY
Start: 2023-12-15 | End: 2024-09-05

## 2023-12-18 RX ORDER — ALLOPURINOL 300 MG/1
TABLET ORAL
COMMUNITY
Start: 2023-12-15 | End: 2023-12-18

## 2023-12-18 RX ORDER — POTASSIUM CHLORIDE 1500 MG/1
20 TABLET, EXTENDED RELEASE ORAL DAILY
Qty: 90 TABLET | Refills: 3 | Status: SHIPPED | OUTPATIENT
Start: 2023-12-18 | End: 2024-09-17

## 2023-12-18 RX ORDER — LANSOPRAZOLE 30 MG/1
CAPSULE, DELAYED RELEASE ORAL
COMMUNITY
Start: 2023-12-15

## 2023-12-18 ASSESSMENT — ASTHMA QUESTIONNAIRES: ACT_TOTALSCORE: 18

## 2023-12-18 NOTE — PROGRESS NOTES
Jan is a 53 year old who is being evaluated via a billable video visit.      How would you like to obtain your AVS? MyChart  If the video visit is dropped, the invitation should be resent by: Text to cell phone: [phone]  Will anyone else be joining your video visit? No  {If patient encounters technical issues they should call [phone] :366755}        {PROVIDER CHARTING PREFERENCE:676153}    Subjective   Jan is a 53 year old, presenting for the following health issues:  Recheck Medication (Lamotrigine) and Swelling        12/18/2023     2:23 PM   Additional Questions   Roomed by Anais RIDER       History of Present Illness       Reason for visit:  Med check    He eats 0-1 servings of fruits and vegetables daily.He consumes 7 sweetened beverage(s) daily.He exercises with enough effort to increase his heart rate 30 to 60 minutes per day.  He exercises with enough effort to increase his heart rate 7 days per week.   He is taking medications regularly.     {Provider Documentation  Patient had ACT/CACT less than 20- Link to exacerbation documentation :721961}  {SUPERLIST (Optional):794643}  {additonal problems for provider to add (Optional):011841}      Review of Systems   {ROS COMP (Optional):880321}      Objective    Vitals - Patient Reported  Systolic (Patient Reported): 124  Diastolic (Patient Reported): 71  Weight (Patient Reported): 97.1 kg (214 lb)  SpO2 (Patient Reported): 96  Pain Score: Extreme Pain (8)  Pain Loc: Upper Leg (Upper, middle, and lower back due to procedure.)        Physical Exam   {video visit exam brief selected:200987}    {Diagnostic Test Results (Optional):944836}    {AMBULATORY ATTESTATION (Optional):167018}        Video-Visit Details    Type of service:  Video Visit   Video Start Time: {video visit start/end time for provider to select:540066}  Video End Time:{video visit start/end time for provider to select:544584}    Originating Location (pt. Location): {video visit patient  "location:928809::\"Home\"}  {PROVIDER LOCATION On-site should be selected for visits conducted from your clinic location or adjoining [facility] hospital, academic office, or other nearby [facility] building. Off-site should be selected for all other provider locations, including home:853463}  Distant Location (provider location):  {virtual location provider:699439}  Platform used for Video Visit: {Virtual Visit Platforms:818813::\"[facility]\"}      "

## 2023-12-18 NOTE — TELEPHONE ENCOUNTER
Pt has appointment with Dr. Lund this afternoon. Request to discuss this concern was added to patient's appointment notes for this afternoon.

## 2023-12-18 NOTE — PATIENT INSTRUCTIONS
Continue furosemide 40 mg and increase to twice daily    Continue allopurinol twice daily    Continue narcotic taper    Contact me if no improvement over the next week    Goal weight approximately 210 pounds

## 2023-12-18 NOTE — PROGRESS NOTES
Bola is a 53 year old male contacting the clinic today via video, who will use the platform: [employer] for the visit.  Phone # for [employer], or if Amwell drops:   Telephone Information:   Mobile [phone]          ASSESSMENT and PLAN:  1. Lymphedema of both lower extremities  Increase intensity of furosemide by frequency and not dosage yet.  Add potassium  - furosemide (LASIX) 40 MG tablet; Take 1 tablet (40 mg) by mouth 2 times daily  Dispense: 60 tablet; Refill: 11  - potassium chloride ER (KLOR-CON M) 20 MEQ CR tablet; Take 1 tablet (20 mEq) by mouth daily  Dispense: 90 tablet; Refill: 3    2. Chronic pain disorder  Agree with decreasing narcotics per pain clinic    3. Uric acid arthropathy  Continue allopurinol twice daily and reassess when edema improves if pain persists  - allopurinol (ZYLOPRIM) 300 MG tablet; Take 1 tablet (300 mg) by mouth 2 times daily  Dispense: 180 tablet; Refill: 3    4.  Multiple other problems.  Appears stable     Patient Instructions   Continue furosemide 40 mg and increase to twice daily    Continue allopurinol twice daily    Continue narcotic taper    Contact me if no improvement over the next week    Goal weight approximately 210 pounds            Return in about 4 months (around 4/18/2024) for using a video visit.       CHIEF COMPLAINT:  Chief Complaint   Patient presents with    Recheck Medication     Lamotrigine    Swelling           12/18/2023     2:23 PM   Additional Questions   Roomed by Anais RIDER       HISTORY OF PRESENT ILLNESS:  Bola is a 53 year old male contacting the clinic today via video for reports of worsening lymphedema.  He currently takes furosemide 40 mg in the morning with good urinary response.  Edema then worsens throughout the day.  He believes he may have gained up to 12 pounds in the last few months.  Echocardiogram is show normal left ventricular function    Takes allopurinol twice daily with no obvious worsening of gout, but does complain of joint  "pressure from the edema    Through pain clinic Suboxone and oxymorphone have been discontinued.  Oxycodone dosage has been decreased from 120 mg to a current dose of 95 mg    Back pain slowly improving    History of Present Illness       Reason for visit:  Med check    He eats 0-1 servings of fruits and vegetables daily.He consumes 7 sweetened beverage(s) daily.He exercises with enough effort to increase his heart rate 30 to 60 minutes per day.  He exercises with enough effort to increase his heart rate 7 days per week.   He is taking medications regularly.      REVIEW OF SYSTEMS:   Stable mood    PFSH:  Social History     Social History Narrative    He is .  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  He is now on disability due to his brain injury and bipolar disease.        Quit smoking October of 2021        Quit drinking 2010        At [facility] since June of 2020     Plans to go home in a few months    TOBACCO USE:  History   Smoking Status    Former    Packs/day: 0.50    Years: 11.00    Types: Cigarettes    Start date: 8/20/2009    Quit date: 2/21/2017   Smokeless Tobacco    Former       VITALS:  There were no vitals filed for this visit.  There were no vitals taken for this visit. Estimated body mass index is 31.17 kg/m  as calculated from the following:    Height as of 7/3/23: 1.727 m (5' 8\").    Weight as of 7/3/23: 93 kg (205 lb).    PHYSICAL EXAM:  (observations via Video)  Alert and oriented.  Poor dentition    MEDICATIONS:   Current Outpatient Medications   Medication Sig Dispense Refill    acetaminophen (TYLENOL) 650 MG CR tablet 2 TABLETS (06001KC) ORALLY 3 TIMES DAILY (MAX APAP:4GM/24HR) 540 tablet 1    albuterol (PROAIR HFA/PROVENTIL HFA/VENTOLIN HFA) 108 (90 Base) MCG/ACT inhaler Inhale 2 puffs into the lungs every 6 hours as needed 6.7 g 11    allopurinol (ZYLOPRIM) 300 MG tablet Take 1 tablet (300 mg) by mouth 2 times daily 180 " tablet 3    ammonium lactate (LAC-HYDRIN) 12 % external lotion Apply topically daily as needed for dry skin 500 g 11    amphetamine-dextroamphetamine (ADDERALL) 15 MG tablet 1 TABLET ORALLY DAILY (DX: BIPOLAR II) 30 tablet 0    bisacodyl (DULCOLAX) 10 MG suppository Place 1 suppository (10 mg) rectally daily as needed for constipation 60 suppository 1    buPROPion (WELLBUTRIN SR) 150 MG 12 hr tablet 1 TABLET ORALLY 2 TIMES DAILY (DX:______________) 56 tablet 10    CALCIUM ANTACID 500 MG chewable tablet       chlorhexidine (PERIDEX) 0.12 % solution Swish and spit 15 mLs in mouth 3 times daily as needed (sore throat) - Swish & Spit 473 mL 11    ergocalciferol (ERGOCALCIFEROL) 1.25 MG (46403 UT) capsule Take 1 capsule (50,000 Units) by mouth once a week 12 capsule 3    ferrous sulfate (FEROSUL) 325 (65 Fe) MG tablet Take 1 tablet (325 mg) by mouth daily (with breakfast) 90 tablet 3    fexofenadine (ALLEGRA) 180 MG tablet Take 180 mg by mouth daily as needed      finasteride (PROSCAR) 5 MG tablet Take 1 tablet (5 mg) by mouth daily 90 tablet 3    fluconazole (DIFLUCAN) 200 MG tablet Take 1 tablet (200 mg) by mouth daily 90 tablet 3    furosemide (LASIX) 40 MG tablet Take 1 tablet (40 mg) by mouth 2 times daily 60 tablet 11    hydrOXYzine (ATARAX) 50 MG tablet Per JACQUELYN Vallejo      lactulose 20 GM/30ML SOLN Take 45 mLs by mouth 2 times daily For constipation. 5400 mL 11    lamoTRIgine (LAMICTAL) 150 MG tablet Take 1 tablet (150 mg) by mouth 3 times daily      LANsoprazole (PREVACID) 30 MG DR capsule       lidocaine (LIDODERM) 5 % patch Place onto the skin every 24 hours To prevent lidocaine toxicity, patient should be patch free for 12 hrs daily. 30 patch 11    lidocaine (XYLOCAINE) 2 % external gel Apply 1 inch topically 4 times a day as needed to gums. 85 g 11    methocarbamol (ROBAXIN) 500 MG tablet Take 2 tablets (1,000 mg) by mouth 4 times daily For use beginning 3/14/23 120 tablet 1    metoprolol succinate  ER (TOPROL-XL) 50 MG 24 hr tablet Take 1 tablet (50 mg) by mouth daily 90 tablet 3    montelukast (SINGULAIR) 10 MG tablet Take 1 tablet by mouth daily      MUCUS RELIEF 600 MG 12 hr tablet       naloxegol (MOVANTIK) 25 MG TABS tablet Take 1 tablet (25 mg) by mouth every morning (before breakfast) 30 tablet 1    naloxone (NARCAN) 4 MG/0.1ML nasal spray 1 spray (4 mg dose) into one nostril for opioid reversal. Call 911. May repeat if no response in 3 minutes.      nystatin (MYCOSTATIN) 047139 UNIT/ML suspension Take 10 mLs (1,000,000 Units) by mouth every 3 hours as needed (thrush) To continue while taking levofloxacin and fluconazole 800 mL 3    oxyCODONE IR (ROXICODONE) 10 MG tablet Take 1 tablet (10 mg) by mouth 5 times daily At 2 am,, 5 am, 11 am, 2 pm, 11 pm, start 12/11 70 tablet 0    oxyCODONE IR (ROXICODONE) 15 MG tablet Take 1 tablet (15 mg) by mouth 3 times daily At 8 am, 2 pm, 8 pm, may start 12/11 42 tablet 0    polyethylene glycol (MIRALAX) 17 GM/Dose powder Take 17 g (1 capful) by mouth 2 times daily 3060 g 3    potassium chloride ER (KLOR-CON M) 20 MEQ CR tablet Take 1 tablet (20 mEq) by mouth daily 90 tablet 3    QUEtiapine (SEROQUEL) 100 MG tablet Take 1 tablet (100 mg) by mouth 4 times daily as needed (anxiety) 60 tablet 1    QUEtiapine (SEROQUEL) 300 MG tablet Take 2 tablets (600 mg) by mouth At Bedtime 180 tablet 0    sennosides (SENOKOT) 8.8 MG/5ML syrup Take 10 mLs by mouth At Bedtime 236 mL 1    tamsulosin (FLOMAX) 0.4 MG capsule Take 1 capsule (0.4 mg) by mouth 2 times daily 180 capsule 3    zonisamide (ZONEGRAN) 25 MG capsule Take 3 capsules (75 mg) by mouth 4 times daily 180 capsule 3       Outside Notes summarized: Pain clinic x 3  Labs, x-rays, cardiology, GI tests reviewed:   Recent Labs   Lab Test 05/31/23  1045 08/30/22  1321   HGB 12.3* 12.5*   WBC 6.5 7.6    135*   POTASSIUM 3.7 4.0   CR 0.78 0.71   PSA  --  0.50   URIC  --  2.0*   B12  --  336   TSH  --  0.62   VITDT  --  29  "    No results found for: \"HYMDK99BNQ\"  Lab Results   Component Value Date    CHOL 164 08/30/2022     New orders: No orders of the defined types were placed in this encounter.      Independent review of:     Devon Lund MD  [facility]    Video-Visit Details  Type of service:  Video Visit  Patient has given verbal consent to a Video visit?  Yes  How would you like to obtain your AVS?  MyChart  Will anyone else be joining your video visit, giving supplemental history? No    Originating location (pt location): Home    Distant Location (provider location):  Off-site    Video Start Time: 2:37 PM  Video End time:  3:09 PM  Face to face plus orders: 32 minutes  Documentation time:  3 minutes     The visit lasted a total of 35 minutes       "

## 2024-01-02 ENCOUNTER — MYC MEDICAL ADVICE (OUTPATIENT)
Dept: INTERNAL MEDICINE | Facility: CLINIC | Age: 54
End: 2024-01-02
Payer: COMMERCIAL

## 2024-01-02 DIAGNOSIS — I89.0 LYMPHEDEMA OF BOTH LOWER EXTREMITIES: ICD-10-CM

## 2024-01-05 DIAGNOSIS — M54.16 LUMBAR RADICULOPATHY: Primary | ICD-10-CM

## 2024-01-05 RX ORDER — OXYCODONE HYDROCHLORIDE 10 MG/1
10 TABLET ORAL
Qty: 84 TABLET | Refills: 0 | Status: SHIPPED | OUTPATIENT
Start: 2024-01-05 | End: 2024-01-08

## 2024-01-05 RX ORDER — OXYCODONE HYDROCHLORIDE 15 MG/1
15 TABLET ORAL 2 TIMES DAILY
Qty: 28 TABLET | Refills: 0 | Status: SHIPPED | OUTPATIENT
Start: 2024-01-05 | End: 2024-01-08

## 2024-01-08 DIAGNOSIS — M54.16 LUMBAR RADICULOPATHY: ICD-10-CM

## 2024-01-08 RX ORDER — OXYCODONE HYDROCHLORIDE 10 MG/1
10 TABLET ORAL
Qty: 84 TABLET | Refills: 0 | Status: SHIPPED | OUTPATIENT
Start: 2024-01-08 | End: 2024-01-10

## 2024-01-08 RX ORDER — FUROSEMIDE 40 MG
80 TABLET ORAL 2 TIMES DAILY
Qty: 120 TABLET | Refills: 3 | Status: SHIPPED | OUTPATIENT
Start: 2024-01-08 | End: 2024-09-13

## 2024-01-08 RX ORDER — OXYCODONE HYDROCHLORIDE 15 MG/1
15 TABLET ORAL 2 TIMES DAILY
Qty: 28 TABLET | Refills: 0 | Status: SHIPPED | OUTPATIENT
Start: 2024-01-08 | End: 2024-01-10

## 2024-01-08 NOTE — TELEPHONE ENCOUNTER
Refills requested for:  oxyCODONE IR (ROXICODONE) 10 MG tablet  oxyCODONE IR (ROXICODONE) 15 MG tablet    Last filled 1/6/24    [facility] - [place] - [address]

## 2024-01-08 NOTE — TELEPHONE ENCOUNTER
Received call from patient requesting refill(s) of oxyCODONE IR (ROXICODONE) 10 MG tablet  and  oxyCODONE IR (ROXICODONE) 15 MG tablet     Last dispensed from pharmacy on 01/06/24 for both    Patient's last office/virtual visit by prescribing provider on 11/22/23  Next office/virtual appointment scheduled for 02/21/24    Last urine drug screen date 09/27/23  Current opioid agreement on file (completed within the last year) Yes Date of opioid agreement: 09/27/23    E-prescribe to pharmacy-[facility] - [place], [place] - [address]      Will route to nursing pool for review and preparation of prescription(s).

## 2024-01-08 NOTE — TELEPHONE ENCOUNTER
Pending Prescriptions:                       Disp   Refills    oxyCODONE IR (ROXICODONE) 10 MG tablet    84 tab*0            Sig: Take 1 tablet (10 mg) by mouth every 3 hours At 2           am, 5 am, 11am, 2 pm, 5 pm, 8 pm 11 pm ,may fill           1/15/24 for 1/18/24    oxyCODONE IR (ROXICODONE) 15 MG tablet    28 tab*0            Sig: Take 1 tablet (15 mg) by mouth 2 times daily At 8           am and 8 pm, may fill 1/20 for use 1/22   [facility]

## 2024-01-08 NOTE — TELEPHONE ENCOUNTER
Writer was not able to locate nursing staff's phone number.  Writer spoke to patient.  Patient states we need to send a new Rx to his pharmacy ([facility]) as well as  a copy of the Rx to his nursing staff via fax at [phone].

## 2024-01-09 ENCOUNTER — TELEPHONE (OUTPATIENT)
Dept: PALLIATIVE MEDICINE | Facility: OTHER | Age: 54
End: 2024-01-09
Payer: COMMERCIAL

## 2024-01-10 DIAGNOSIS — F31.81 BIPOLAR 2 DISORDER (H): ICD-10-CM

## 2024-01-10 DIAGNOSIS — M54.16 LUMBAR RADICULOPATHY: ICD-10-CM

## 2024-01-10 DIAGNOSIS — S06.9X9S TRAUMATIC BRAIN INJURY WITH LOSS OF CONSCIOUSNESS, SEQUELA (H): ICD-10-CM

## 2024-01-10 RX ORDER — OXYCODONE HYDROCHLORIDE 15 MG/1
15 TABLET ORAL 2 TIMES DAILY
Qty: 28 TABLET | Refills: 0 | Status: SHIPPED | OUTPATIENT
Start: 2024-01-10 | End: 2024-01-18

## 2024-01-10 RX ORDER — DEXTROAMPHETAMINE SACCHARATE, AMPHETAMINE ASPARTATE, DEXTROAMPHETAMINE SULFATE AND AMPHETAMINE SULFATE 3.75; 3.75; 3.75; 3.75 MG/1; MG/1; MG/1; MG/1
TABLET ORAL
Qty: 30 TABLET | Refills: 0 | Status: SHIPPED | OUTPATIENT
Start: 2024-01-10 | End: 2024-02-01

## 2024-01-10 RX ORDER — OXYCODONE HYDROCHLORIDE 10 MG/1
10 TABLET ORAL
Qty: 84 TABLET | Refills: 0 | Status: SHIPPED | OUTPATIENT
Start: 2024-01-10 | End: 2024-01-18

## 2024-01-10 NOTE — CONFIDENTIAL NOTE
Verbal discussion with provider  Prescriptions have been ammended and printed locally for the provider to sign  Will fax to facility

## 2024-01-10 NOTE — TELEPHONE ENCOUNTER
BUBBA left for Raven's office or her direct office number [phone] to call back to the clinic to clarify.     Dialed [phone] number twice. Phone is not in service.

## 2024-01-10 NOTE — TELEPHONE ENCOUNTER
7:09 PM:     Received a call from Amarilis Lewis the Nurse Manager questioning how to interpret Oxycodone orders. There are current orders for two strengths of Oxycodone IR.     The orders are as follows:     Oxycodone 10 mg:  Take 1 tablet (10 mg) by mouth every 3 hours At 2 am, 5 am, 11am, 2 pm, 5 pm, 8 pm 11 pm    Oxycodone 15 mg:  Take 1 tablet (15 mg) by mouth 2 times daily At 8 am and 8 pm    It looks like Jan is supposed to be receiving Oxycodone 15 mg at 8 am and  Oxycodone 25 mg at 8 pm per these instructions.     I reviewed Dr Cramer's last clinic note which details a weaning plan and q3h dosing. After discussing with Amarilis RIDER, we decided that the Oxycodone 10 mg at 8 pm on the directions may be in error and that for tonight, Jan will take 15 mg at 8 PM (25 mg)    Please send prescription with corrected doses & times if Jan is supposed to only get 15 mg at 8 AM and 8 PM and he is NOT to get the additional 10 mg at 8 PM.     If the current prescription is correct and Jan is supposed to receive 25 mg (a 15 mg & 10 mg tab taken together), please call to confirm this.     Please call Estephanie at either [phone] (Secured line, Ok to leave a detailed message) or her direct office number [phone] (just leave name, clinic name and call back number).     KENDRICK Aguila, NP-C  [facility] [place]

## 2024-01-16 ENCOUNTER — TELEPHONE (OUTPATIENT)
Dept: INTERNAL MEDICINE | Facility: CLINIC | Age: 54
End: 2024-01-16
Payer: COMMERCIAL

## 2024-01-16 DIAGNOSIS — R33.9 URINARY RETENTION WITH INCOMPLETE BLADDER EMPTYING: ICD-10-CM

## 2024-01-16 RX ORDER — FINASTERIDE 5 MG/1
TABLET, FILM COATED ORAL
Qty: 28 TABLET | Refills: 5 | Status: SHIPPED | OUTPATIENT
Start: 2024-01-16 | End: 2024-09-13

## 2024-01-16 NOTE — TELEPHONE ENCOUNTER
January 16, 2024    Physician's orders was received via fax for Dr. Lund to sign.  Patient label was attached to paperwork and placed in provider's inbox to be signed.    Agustina Bryan

## 2024-01-17 NOTE — TELEPHONE ENCOUNTER
January 17, 2024    [facility] Physician's Orders was picked up from outbox of Dr. Lund.  Paperwork has been reviewed and is complete.  Per initial initial request, this was sent via fax to [phone].     Helen Bowen

## 2024-01-17 NOTE — PROGRESS NOTES
Patient was a no show for PCP visit on 6/14/19. Unable to meet with patient in the clinic to discuss [facility] waitlists. CHW will await phone contact or [employer] response to pending unread message.  
24-Jan-2024

## 2024-01-22 DIAGNOSIS — M54.16 LUMBAR RADICULOPATHY: ICD-10-CM

## 2024-01-22 NOTE — TELEPHONE ENCOUNTER
Refill request for Oxycodone 15 mg tablets  Last filled 1/19/24 for 14 days- per pharmacy  [facility]

## 2024-01-23 RX ORDER — OXYCODONE HYDROCHLORIDE 15 MG/1
15 TABLET ORAL 2 TIMES DAILY
Qty: 28 TABLET | Refills: 0 | Status: SHIPPED | OUTPATIENT
Start: 2024-01-23 | End: 2024-02-01

## 2024-01-23 NOTE — TELEPHONE ENCOUNTER
Pending Prescriptions:                       Disp   Refills    oxyCODONE IR (ROXICODONE) 15 MG tablet    28 tab*0            Sig: Take 1 tablet (15 mg) by mouth 2 times daily At 8           am and 8 pm    [facility] - [place] - [address]

## 2024-01-23 NOTE — TELEPHONE ENCOUNTER
Received call from patient requesting refill(s) of oxyCODONE IR (ROXICODONE) 15 MG tablet      Last dispensed from pharmacy on 01/19/24    Patient's last office/virtual visit by prescribing provider on 11/22/23  Next office/virtual appointment scheduled for 02/21/24    Last urine drug screen date 09/27/23  Current opioid agreement on file (completed within the last year) Yes Date of opioid agreement: 09/27/23    E-prescribe to pharmacy-  [facility] - [place] - [address]        Will route to nursing pool for review and preparation of prescription(s).

## 2024-02-01 ENCOUNTER — VIRTUAL VISIT (OUTPATIENT)
Dept: INTERNAL MEDICINE | Facility: CLINIC | Age: 54
End: 2024-02-01
Payer: COMMERCIAL

## 2024-02-01 DIAGNOSIS — I89.0 LYMPHEDEMA OF BOTH LOWER EXTREMITIES: Primary | ICD-10-CM

## 2024-02-01 DIAGNOSIS — M79.7 FIBROMYALGIA: ICD-10-CM

## 2024-02-01 DIAGNOSIS — M15.0 PRIMARY OSTEOARTHRITIS INVOLVING MULTIPLE JOINTS: ICD-10-CM

## 2024-02-01 DIAGNOSIS — F31.77 BIPOLAR 1 DISORDER, MIXED, PARTIAL REMISSION (H): ICD-10-CM

## 2024-02-01 DIAGNOSIS — Z00.00 PREVENTATIVE HEALTH CARE: ICD-10-CM

## 2024-02-01 DIAGNOSIS — Z12.5 PROSTATE CANCER SCREENING: ICD-10-CM

## 2024-02-01 DIAGNOSIS — S06.9X9S TRAUMATIC BRAIN INJURY WITH LOSS OF CONSCIOUSNESS, SEQUELA (H): ICD-10-CM

## 2024-02-01 DIAGNOSIS — E66.811 CLASS 1 DRUG-INDUCED OBESITY WITH SERIOUS COMORBIDITY AND BODY MASS INDEX (BMI) OF 32.0 TO 32.9 IN ADULT: ICD-10-CM

## 2024-02-01 DIAGNOSIS — F31.81 BIPOLAR 2 DISORDER (H): ICD-10-CM

## 2024-02-01 DIAGNOSIS — E66.1 CLASS 1 DRUG-INDUCED OBESITY WITH SERIOUS COMORBIDITY AND BODY MASS INDEX (BMI) OF 32.0 TO 32.9 IN ADULT: ICD-10-CM

## 2024-02-01 PROCEDURE — 99215 OFFICE O/P EST HI 40 MIN: CPT | Mod: 95 | Performed by: INTERNAL MEDICINE

## 2024-02-01 RX ORDER — NABUMETONE 500 MG/1
500 TABLET, FILM COATED ORAL 2 TIMES DAILY
Qty: 180 TABLET | Refills: 3 | Status: SHIPPED | OUTPATIENT
Start: 2024-02-01 | End: 2024-08-30

## 2024-02-01 RX ORDER — DEXTROAMPHETAMINE SACCHARATE, AMPHETAMINE ASPARTATE, DEXTROAMPHETAMINE SULFATE AND AMPHETAMINE SULFATE 3.75; 3.75; 3.75; 3.75 MG/1; MG/1; MG/1; MG/1
TABLET ORAL
Qty: 30 TABLET | Refills: 0 | Status: SHIPPED | OUTPATIENT
Start: 2024-02-01 | End: 2024-03-06

## 2024-02-01 RX ORDER — PREDNISONE 10 MG/1
TABLET ORAL
Qty: 21 TABLET | Refills: 0 | Status: SHIPPED | OUTPATIENT
Start: 2024-02-01 | End: 2024-02-15

## 2024-02-01 ASSESSMENT — ASTHMA QUESTIONNAIRES
QUESTION_5 LAST FOUR WEEKS HOW WOULD YOU RATE YOUR ASTHMA CONTROL: WELL CONTROLLED
QUESTION_1 LAST FOUR WEEKS HOW MUCH OF THE TIME DID YOUR ASTHMA KEEP YOU FROM GETTING AS MUCH DONE AT WORK, SCHOOL OR AT HOME: A LITTLE OF THE TIME
ACT_TOTALSCORE: 17
QUESTION_4 LAST FOUR WEEKS HOW OFTEN HAVE YOU USED YOUR RESCUE INHALER OR NEBULIZER MEDICATION (SUCH AS ALBUTEROL): ONE OR TWO TIMES PER DAY
QUESTION_2 LAST FOUR WEEKS HOW OFTEN HAVE YOU HAD SHORTNESS OF BREATH: THREE TO SIX TIMES A WEEK
ACT_TOTALSCORE: 17
QUESTION_3 LAST FOUR WEEKS HOW OFTEN DID YOUR ASTHMA SYMPTOMS (WHEEZING, COUGHING, SHORTNESS OF BREATH, CHEST TIGHTNESS OR PAIN) WAKE YOU UP AT NIGHT OR EARLIER THAN USUAL IN THE MORNING: ONCE OR TWICE

## 2024-02-01 ASSESSMENT — PATIENT HEALTH QUESTIONNAIRE - PHQ9
10. IF YOU CHECKED OFF ANY PROBLEMS, HOW DIFFICULT HAVE THESE PROBLEMS MADE IT FOR YOU TO DO YOUR WORK, TAKE CARE OF THINGS AT HOME, OR GET ALONG WITH OTHER PEOPLE: EXTREMELY DIFFICULT
SUM OF ALL RESPONSES TO PHQ QUESTIONS 1-9: 18
SUM OF ALL RESPONSES TO PHQ QUESTIONS 1-9: 18

## 2024-02-01 NOTE — PROGRESS NOTES
"Jan is a 54 year old who is being evaluated via a billable video visit.      How would you like to obtain your AVS? MyChart  If the video visit is dropped, the invitation should be resent by: Text to cell phone: [phone]  Will anyone else be joining your video visit? No  {If patient encounters technical issues they should call [phone] :653909}        {PROVIDER CHARTING PREFERENCE:941192}    Subjective   Jan is a 54 year old, presenting for the following health issues:  Recheck Medication and Lymphedema        2/1/2024    11:07 AM   Additional Questions   Roomed by Jaclyn GUTIERREZ CMA     History of Present Illness       Reason for visit:  Lymphedema    He eats 0-1 servings of fruits and vegetables daily.He consumes 4 sweetened beverage(s) daily.He exercises with enough effort to increase his heart rate 30 to 60 minutes per day.  He exercises with enough effort to increase his heart rate 7 days per week.   He is taking medications regularly.     {Provider Documentation  Patient had ACT/CACT less than 20- Link to exacerbation documentation :115879}  {SUPERLIST (Optional):107015}  {additonal problems for provider to add (Optional):950412}    {ROS Picklists (Optional):891918}      Objective    Vitals - Patient Reported  Systolic (Patient Reported): 135  Diastolic (Patient Reported): 70  Weight (Patient Reported): 97.1 kg (214 lb)  SpO2 (Patient Reported): 98      Vitals:  No vitals were obtained today due to virtual visit.    Physical Exam   {video visit exam brief selected:734539}    {Diagnostic Test Results (Optional):934594}      Video-Visit Details    Type of service:  Video Visit   Video Start Time: {video visit start/end time for provider to select:791405}  Video End Time:{video visit start/end time for provider to select:981815}    Originating Location (pt. Location): {video visit patient location:648225::\"Home\"}  {PROVIDER LOCATION On-site should be selected for visits conducted from your clinic location or " "adjoining [facility] hospital, academic office, or other nearby [facility] building. Off-site should be selected for all other provider locations, including home:277129}  Distant Location (provider location):  {virtual location provider:831689}  Platform used for Video Visit: {Virtual Visit Platforms:239283::\"[employer]\"}  Signed Electronically by: Devon Lund MD  {Email feedback regarding this note to [email or web address]   :954507}  "

## 2024-02-01 NOTE — PROGRESS NOTES
Bola is a 54 year old male contacting the clinic today via video, who will use the platform: [employer] for the visit.  Phone # for [employer], or if Amwell drops:   Telephone Information:   Mobile [phone]          ASSESSMENT and PLAN:  1. Lymphedema of both lower extremities  Previous diagnosis however consider coexisting factors such as inflammatory arthritis, blood clot, or heart failure.  Simple diuresis poorly effective.  Begin screening before intensifying treatment further  - D dimer quantitative; Future    2. Bipolar 1 disorder, mixed, partial remission (H)  Stable on current medication.  Hopefully narcotic taper proceeding soon    3. Primary osteoarthritis involving multiple joints  Update labs.  Once labs complete trial of nabumetone and prednisone  - CBC with Platelets & Differential; Future  - Comprehensive metabolic panel; Future  - Lyme Disease Total Abs Bld with Reflex to Confirm CLIA; Future  - Vitamin B12; Future  - Vitamin D Deficiency; Future  - Uric acid; Future  - Erythrocyte sedimentation rate auto; Future  - CRP inflammation; Future  - Tissue transglutaminase elo IgA and IgG; Future  - Cyclic Citrullinated Peptide Antibody IgG; Future  - Rheumatoid factor; Future  - Anti Nuclear Elo IgG by IFA with Reflex; Future  - nabumetone (RELAFEN) 500 MG tablet; Take 1 tablet (500 mg) by mouth 2 times daily  Dispense: 180 tablet; Refill: 3  - predniSONE (DELTASONE) 10 MG tablet; Take 2 tablets (20 mg) by mouth every morning for 7 days, THEN 1 tablet (10 mg) every morning for 7 days.  Dispense: 21 tablet; Refill: 0    4. Preventative health care  Update PSA and cholesterol profile  - REVIEW OF HEALTH MAINTENANCE PROTOCOL ORDERS  - Lipid Profile; Future    5. Prostate cancer screening  Update PSA  - Prostate Specific Antigen Screen; Future    6. Bipolar 2 disorder (H)  Okay to refill  - amphetamine-dextroamphetamine (ADDERALL) 15 MG tablet; 1 TABLET ORALLY DAILY (DX: BIPOLAR II)  Dispense: 30 tablet;  Refill: 0    7. Traumatic brain injury with loss of consciousness, sequela (H24)  Okay to refill  - amphetamine-dextroamphetamine (ADDERALL) 15 MG tablet; 1 TABLET ORALLY DAILY (DX: BIPOLAR II)  Dispense: 30 tablet; Refill: 0    8. Fibromyalgia  - CBC with Platelets & Differential; Future    9. Class 1 drug-induced obesity with serious comorbidity and body mass index (BMI) of 32.0 to 32.9 in adult  - Vitamin D Deficiency; Future     Patient Instructions   Call [facility] in [place] [facility] and Mad[facility] to arrange labs, then begin meds after labs done          Patient Instructions   Call [facility] in [facility], and [facility] to arrange labs, then begin meds after labs done    Check labs for inflammatory arthritis    D-dimer to rule out DVT    Preventive care labs including PSA and cholesterol profile    After labs done, begin Relafen 500 mg twice daily    After labs done begin prednisone 20 mg daily for 1 week then 10 mg daily    Increase furosemide to 80 mg twice daily-ordered January 3 but delay implemented    Refill Adderall       Return in about 4 months (around 6/1/2024) for using a video visit.       CHIEF COMPLAINT:  Chief Complaint   Patient presents with    Recheck Medication    Lymphedema           12/18/2023     2:23 PM   Additional Questions   Roomed by Anais RIDER       HISTORY OF PRESENT ILLNESS:  Bola is a 54 year old male contacting the clinic today via video for complaints of persisting peripheral edema.  When we spoke last, furosemide was increased from once daily to twice daily.  He contacted me back on January 3 by email reporting no response.  Furosemide was increased to 80 mg twice daily.  He has lost a few pounds but this is stabilized to 215 pounds    He has been essentially at bedrest with some therapy for the last year and with multiple surgeries prior.  He has a previous diagnosis of lymphedema on the left side from previous surgeries and trauma.  He has never had a blood clot.   "Cardiac status has been normal presumably but no echocardiogram.  Prior labs have shown hyponatremia.  Last inflammatory workup 2019 was normal showing noninflammatory arthritis.  He feels that he might have a mixture of lymphedema type swelling but also diffuse aches and pains consistent with inflammatory arthritis.  Prednisone has been helpful in the past as was at least 1 year of nabumetone    Due for Adderall refill next week    [facility] [place] [facility] [place]  Instructions will need to be called to Madai    No new medication from pain clinic.  Medicine list reviewed and nothing obvious causing peripheral edema    History of Present Illness       Reason for visit:  Lymphedema    He eats 0-1 servings of fruits and vegetables daily.He consumes 4 sweetened beverage(s) daily.He exercises with enough effort to increase his heart rate 30 to 60 minutes per day.  He exercises with enough effort to increase his heart rate 7 days per week.   He is taking medications regularly.      REVIEW OF SYSTEMS:   Slowly improving back pain    PFSH:  Social History     Social History Narrative    He is .  He has been a  and also a counselor, and worked with [employer].  He is now on disability due to his brain injury and bipolar disease.        Quit smoking October of 2021        Quit drinking 2010        At [facility] since June of 2020     Stable mood    TOBACCO USE:  History   Smoking Status    Former    Packs/day: 0.50    Years: 11.00    Types: Cigarettes    Start date: 8/20/2009    Quit date: 2/21/2017   Smokeless Tobacco    Former       VITALS:  There were no vitals filed for this visit.  There were no vitals taken for this visit. Estimated body mass index is 31.17 kg/m  as calculated from the following:    Height as of 7/3/23: 1.727 m (5' 8\").    Weight as of 7/3/23: 93 kg (205 lb).    PHYSICAL EXAM:  (observations via Video)  Alert and oriented.  Upper left " side approximately canine    MEDICATIONS:   Current Outpatient Medications   Medication Sig Dispense Refill    acetaminophen (TYLENOL) 650 MG CR tablet 2 TABLETS (52446QK) ORALLY 3 TIMES DAILY (MAX APAP:4GM/24HR) 540 tablet 1    albuterol (PROAIR HFA/PROVENTIL HFA/VENTOLIN HFA) 108 (90 Base) MCG/ACT inhaler Inhale 2 puffs into the lungs every 6 hours as needed 6.7 g 11    allopurinol (ZYLOPRIM) 300 MG tablet Take 1 tablet (300 mg) by mouth 2 times daily 180 tablet 3    ammonium lactate (LAC-HYDRIN) 12 % external lotion Apply topically daily as needed for dry skin 500 g 11    amphetamine-dextroamphetamine (ADDERALL) 15 MG tablet 1 TABLET ORALLY DAILY (DX: BIPOLAR II) 30 tablet 0    bisacodyl (DULCOLAX) 10 MG suppository Place 1 suppository (10 mg) rectally daily as needed for constipation 60 suppository 1    buPROPion (WELLBUTRIN SR) 150 MG 12 hr tablet 1 TABLET ORALLY 2 TIMES DAILY (DX:______________) 56 tablet 10    CALCIUM ANTACID 500 MG chewable tablet       chlorhexidine (PERIDEX) 0.12 % solution Swish and spit 15 mLs in mouth 3 times daily as needed (sore throat) - Swish & Spit 473 mL 11    ergocalciferol (ERGOCALCIFEROL) 1.25 MG (36506 UT) capsule Take 1 capsule (50,000 Units) by mouth once a week 12 capsule 3    ferrous sulfate (FEROSUL) 325 (65 Fe) MG tablet Take 1 tablet (325 mg) by mouth daily (with breakfast) 90 tablet 3    fexofenadine (ALLEGRA) 180 MG tablet Take 180 mg by mouth daily as needed      finasteride (PROSCAR) 5 MG tablet 1 TABLET ORALLY DAILY (DX: URINARY RETENTION) 28 tablet 5    fluconazole (DIFLUCAN) 200 MG tablet Take 1 tablet (200 mg) by mouth daily 90 tablet 3    furosemide (LASIX) 40 MG tablet Take 2 tablets (80 mg) by mouth 2 times daily 120 tablet 3    hydrOXYzine (ATARAX) 50 MG tablet Per Dr. LOPEZ,OLUKAYODE      lactulose 20 GM/30ML SOLN Take 45 mLs by mouth 2 times daily For constipation. 5400 mL 11    lamoTRIgine (LAMICTAL) 150 MG tablet Take 1 tablet (150 mg) by mouth 3 times  daily      LANsoprazole (PREVACID) 30 MG DR capsule       lidocaine (LIDODERM) 5 % patch Place onto the skin every 24 hours To prevent lidocaine toxicity, patient should be patch free for 12 hrs daily. 30 patch 11    lidocaine (XYLOCAINE) 2 % external gel Apply 1 inch topically 4 times a day as needed to gums. 85 g 11    methocarbamol (ROBAXIN) 500 MG tablet Take 2 tablets (1,000 mg) by mouth 4 times daily For use beginning 3/14/23 120 tablet 1    metoprolol succinate ER (TOPROL-XL) 50 MG 24 hr tablet Take 1 tablet (50 mg) by mouth daily 90 tablet 3    montelukast (SINGULAIR) 10 MG tablet Take 1 tablet by mouth daily      MUCUS RELIEF 600 MG 12 hr tablet       nabumetone (RELAFEN) 500 MG tablet Take 1 tablet (500 mg) by mouth 2 times daily 180 tablet 3    naloxegol (MOVANTIK) 25 MG TABS tablet Take 1 tablet (25 mg) by mouth every morning (before breakfast) 30 tablet 1    naloxone (NARCAN) 4 MG/0.1ML nasal spray 1 spray (4 mg dose) into one nostril for opioid reversal. Call 911. May repeat if no response in 3 minutes.      nystatin (MYCOSTATIN) 464562 UNIT/ML suspension Take 10 mLs (1,000,000 Units) by mouth every 3 hours as needed (thrush) To continue while taking levofloxacin and fluconazole 800 mL 3    oxyCODONE IR (ROXICODONE) 10 MG tablet Take 1 tablet (10 mg) by mouth every 3 hours At 2 am, 5 am, 11am, 2 pm, 5 pm, 8 pm. 11 pm 98 tablet 0    oxyCODONE IR (ROXICODONE) 15 MG tablet Take 1 tablet (15 mg) by mouth daily for 14 days At 8 am 14 tablet 0    potassium chloride ER (KLOR-CON M) 20 MEQ CR tablet Take 1 tablet (20 mEq) by mouth daily 90 tablet 3    predniSONE (DELTASONE) 10 MG tablet Take 2 tablets (20 mg) by mouth every morning for 7 days, THEN 1 tablet (10 mg) every morning for 7 days. 21 tablet 0    QUEtiapine (SEROQUEL) 100 MG tablet Take 1 tablet (100 mg) by mouth 4 times daily as needed (anxiety) 60 tablet 1    QUEtiapine (SEROQUEL) 300 MG tablet Take 2 tablets (600 mg) by mouth At Bedtime 180 tablet  "0    sennosides (SENOKOT) 8.8 MG/5ML syrup Take 10 mLs by mouth At Bedtime 236 mL 1    tamsulosin (FLOMAX) 0.4 MG capsule Take 1 capsule (0.4 mg) by mouth 2 times daily 180 capsule 3    zonisamide (ZONEGRAN) 25 MG capsule Take 3 capsules (75 mg) by mouth 4 times daily 180 capsule 3       Outside Notes summarized: Pain clinic note x 1  Labs, x-rays, cardiology, GI tests reviewed: Last labs in May.  Inflammatory workup 2019     Recent Labs   Lab Test 05/31/23  1045 08/30/22  1321   HGB 12.3* 12.5*   WBC 6.5 7.6    135*   POTASSIUM 3.7 4.0   CR 0.78 0.71   PSA  --  0.50   URIC  --  2.0*   B12  --  336   TSH  --  0.62   VITDT  --  29     No results found for: \"[id number]\"  Lab Results   Component Value Date    CHOL 164 08/30/2022     New orders:   Orders Placed This Encounter   Procedures    REVIEW OF HEALTH MAINTENANCE PROTOCOL ORDERS    Comprehensive metabolic panel    Lyme Disease Total Abs Bld with Reflex to Confirm CLIA    Vitamin B12    Vitamin D Deficiency    Uric acid    Erythrocyte sedimentation rate auto    CRP inflammation    Tissue transglutaminase elo IgA and IgG    Cyclic Citrullinated Peptide Antibody IgG    Rheumatoid factor    Anti Nuclear Elo IgG by IFA with Reflex    D dimer quantitative    Prostate Specific Antigen Screen    Lipid Profile    CBC with Platelets & Differential       Independent review of:     Devon Lund MD  [facility]    Video-Visit Details  Type of service:  Video Visit  Patient has given verbal consent to a Video visit?  Yes  How would you like to obtain your AVS?  MyChart  Will anyone else be joining your video visit, giving supplemental history? No    Originating location (pt location): TCU    Distant Location (provider location):  Off-site    Video Start Time: 11:05 AM  Video End time:  11:44 AM  Face to face plus orders: 39 minutes  Documentation time:  3 minutes     The visit lasted a total of 42 minutes       "

## 2024-02-02 ENCOUNTER — TELEPHONE (OUTPATIENT)
Dept: INTERNAL MEDICINE | Facility: CLINIC | Age: 54
End: 2024-02-02
Payer: COMMERCIAL

## 2024-02-02 NOTE — TELEPHONE ENCOUNTER
Spoke with patient after several attempts and voicemail's to get ahold of [facility] in [place] staff who provided the following fax and phone number for Madai (patient nurse):    Fax: [phone]    Phone- [phone]      Attempted to contact [facility] to set up lab draws for patient- no answer. Left message to return call to clinic for  follow up.    Patient requesting just labs and medication be faxed over. Instruction required and not able to relay information to nurse. Requested return call.

## 2024-02-02 NOTE — TELEPHONE ENCOUNTER
Contacted patient to inform that I was not able to get ahjosé miguel of [facility] but did request a return call. Patient had me speak with Sheila- nursing assistant who states that typically [facility] will organize lab draws.     Sheila at [facility] provided the following number for nurse at [facility]- [phone].    Contacted phone number and Spoke with Carolina BARON at [facility].     Mer requested labs be faxed. Relayed message from provider discussing when to start Relafen and Prednisone.     Mer verbalized understanding of when patient can start medication and stated the pharmacy has patients medications.     Spoke with Dr. Lund regarding Furosemide 80mg twice daily. Dr. Lund stated the following regarding medication:    we are continuing with furosemide same, and now labs, and then relafen/prednisone.    Faxed labs as requested.    Attempted to contact Mer to confirm receipt of labs faxed over. No answer, left detailed message on secure voicemail with instructions for labs and when to start Relafen and Prednisone. No other changes. Left return call number if any questions arise.     Patient also aware of these instructions.

## 2024-02-02 NOTE — TELEPHONE ENCOUNTER
Per chart review of 2/1 office visit note with PCP:      3. Primary osteoarthritis involving multiple joints  Update labs.  Once labs complete trial of nabumetone and prednisone  - CBC with Platelets & Differential; Future  - Comprehensive metabolic panel; Future  - Lyme Disease Total Abs Bld with Reflex to Confirm CLIA; Future  - Vitamin B12; Future  - Vitamin D Deficiency; Future  - Uric acid; Future  - Erythrocyte sedimentation rate auto; Future  - CRP inflammation; Future  - Tissue transglutaminase elo IgA and IgG; Future  - Cyclic Citrullinated Peptide Antibody IgG; Future  - Rheumatoid factor; Future  - Anti Nuclear Elo IgG by IFA with Reflex; Future  - nabumetone (RELAFEN) 500 MG tablet; Take 1 tablet (500 mg) by mouth 2 times daily  Dispense: 180 tablet; Refill: 3  - predniSONE (DELTASONE) 10 MG tablet; Take 2 tablets (20 mg) by mouth every morning for 7 days, THEN 1 tablet (10 mg) every morning for 7 days.  Dispense: 21 tablet; Refill: 0        Patient Instructions   [facility], then begin meds after labs done     Check labs for inflammatory arthritis     D-dimer to rule out DVT     Preventive care labs including PSA and cholesterol profile     After labs done, begin Relafen 500 mg twice daily     After labs done begin prednisone 20 mg daily for 1 week then 10 mg daily     Increase furosemide to 80 mg twice daily-ordered January 3 but delay implemented     Refill Adderall       Return in about 4 months (around 6/1/2024) for using a video visit.

## 2024-02-21 ENCOUNTER — OFFICE VISIT (OUTPATIENT)
Dept: PALLIATIVE MEDICINE | Facility: OTHER | Age: 54
End: 2024-02-21
Attending: ANESTHESIOLOGY
Payer: COMMERCIAL

## 2024-02-21 VITALS — SYSTOLIC BLOOD PRESSURE: 91 MMHG | DIASTOLIC BLOOD PRESSURE: 58 MMHG | OXYGEN SATURATION: 97 % | HEART RATE: 77 BPM

## 2024-02-21 DIAGNOSIS — M54.12 CERVICAL RADICULOPATHY AT C8: Primary | ICD-10-CM

## 2024-02-21 PROCEDURE — G0463 HOSPITAL OUTPT CLINIC VISIT: HCPCS | Performed by: ANESTHESIOLOGY

## 2024-02-21 PROCEDURE — 99215 OFFICE O/P EST HI 40 MIN: CPT | Performed by: ANESTHESIOLOGY

## 2024-02-21 ASSESSMENT — PAIN SCALES - GENERAL: PAINLEVEL: EXTREME PAIN (8)

## 2024-02-21 NOTE — NURSING NOTE
Patient presents to the clinic today for a follow up with JAYME MULLEN MD  regarding Pain Management.           9/27/2023     1:00 PM 11/22/2023     1:54 PM 2/21/2024     2:06 PM   PEG Score   PEG Total Score 8 7.33 9.33        UDT/CSA-9/27/23        Becca Rodriguez MA  [facility] [place]

## 2024-02-21 NOTE — PATIENT INSTRUCTIONS
[facility] Number:  [phone]  Call with any questions about your care and for scheduling assistance.   Calls are returned Monday through Friday between 8 AM and 4:30 PM. We usually get back to you within 2 business days depending on the issue/request.    If we are prescribing your medications:  For opioid medication refills, call the clinic or send a [employer] message 7 days in advance.  Please include:  Name of requested medication  Name of the pharmacy.  For non-opioid medications, call your pharmacy directly to request a refill. Please allow 3-4 days to be processed.   Per [place] State Law:  All controlled substance prescriptions must be filled within 30 days of being written.    For those controlled substances allowing refills, pickup must occur within 30 days of last fill.      We believe regular attendance is key to your success in our program!    Any time you are unable to keep your appointment we ask that you call us at least 24 hours in advance to cancel.This will allow us to offer the appointment time to another patient.   Multiple missed appointments may lead to dismissal from the clinic.    PLAN:    You are continue to work on your documentation to pursue the medical cannabis program.    Discussed the off label use of methylene blue to help with your mouth sores.  May obtain online through [employer] 1% USP (pharmaceutical grade.    Begin with 5 drops in a small glass of water and swish around your mouth morning and afternoon, after 5 days may increase to 7 drops morning and afternoon, and after 5 more days to 10 drops morning and noon.  May then do 5 days on 1 day off after that    Continue with the oxycodone 15 mg 1 in the morning and then 10 mg every 3 hours for 7 a day.    Discussed you are working with the dentists.    Continue working with your psychotherapist and your psychotropic medications.    May follow-up with Dr. Cramer with a video visit in  3 weeks and a return visit in the clinic after that in 6 weeks
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
## 2024-03-06 DIAGNOSIS — S06.9X9S TRAUMATIC BRAIN INJURY WITH LOSS OF CONSCIOUSNESS, SEQUELA (H): ICD-10-CM

## 2024-03-06 DIAGNOSIS — F31.81 BIPOLAR 2 DISORDER (H): ICD-10-CM

## 2024-03-06 RX ORDER — DEXTROAMPHETAMINE SACCHARATE, AMPHETAMINE ASPARTATE, DEXTROAMPHETAMINE SULFATE AND AMPHETAMINE SULFATE 3.75; 3.75; 3.75; 3.75 MG/1; MG/1; MG/1; MG/1
15 TABLET ORAL DAILY
Qty: 30 TABLET | Refills: 0 | Status: SHIPPED | OUTPATIENT
Start: 2024-05-03 | End: 2024-06-02

## 2024-03-06 RX ORDER — DEXTROAMPHETAMINE SACCHARATE, AMPHETAMINE ASPARTATE, DEXTROAMPHETAMINE SULFATE AND AMPHETAMINE SULFATE 3.75; 3.75; 3.75; 3.75 MG/1; MG/1; MG/1; MG/1
15 TABLET ORAL DAILY
Qty: 30 TABLET | Refills: 0 | Status: SHIPPED | OUTPATIENT
Start: 2024-04-05 | End: 2024-04-29

## 2024-03-06 RX ORDER — DEXTROAMPHETAMINE SACCHARATE, AMPHETAMINE ASPARTATE, DEXTROAMPHETAMINE SULFATE AND AMPHETAMINE SULFATE 3.75; 3.75; 3.75; 3.75 MG/1; MG/1; MG/1; MG/1
15 TABLET ORAL DAILY
Qty: 30 TABLET | Refills: 0 | Status: SHIPPED | OUTPATIENT
Start: 2024-03-06 | End: 2024-04-05

## 2024-03-06 RX ORDER — DEXTROAMPHETAMINE SACCHARATE, AMPHETAMINE ASPARTATE, DEXTROAMPHETAMINE SULFATE AND AMPHETAMINE SULFATE 3.75; 3.75; 3.75; 3.75 MG/1; MG/1; MG/1; MG/1
15 TABLET ORAL DAILY
Qty: 30 TABLET | Refills: 0 | Status: SHIPPED | OUTPATIENT
Start: 2024-06-01 | End: 2024-06-10

## 2024-03-28 DIAGNOSIS — M54.16 LUMBAR RADICULOPATHY: ICD-10-CM

## 2024-03-28 RX ORDER — OXYCODONE HYDROCHLORIDE 10 MG/1
10 TABLET ORAL
Qty: 98 TABLET | Refills: 0 | Status: SHIPPED | OUTPATIENT
Start: 2024-03-28 | End: 2024-04-03

## 2024-03-28 RX ORDER — OXYCODONE HYDROCHLORIDE 15 MG/1
15 TABLET ORAL DAILY
Qty: 28 TABLET | Refills: 0 | Status: SHIPPED | OUTPATIENT
Start: 2024-03-28 | End: 2024-04-03

## 2024-03-28 NOTE — TELEPHONE ENCOUNTER
[facility] Call Center    Phone Message    May a detailed message be left on voicemail: yes     Reason for Call: Medication Refill Request    Has the patient contacted the pharmacy for the refill? Yes   Name of medication being requested: oxyCODONE IR (ROXICODONE) 10 MG tablet  oxyCODONE IR (ROXICODONE) 15 MG tablet  Provider who prescribed the medication: Dmitriy Cramer MD [facility]  Pharmacy: [facility] [address] [place], [place] [address]  Date medication is needed: 3/29/2024    Patient is aware of the 7 day refill policy     Action Taken: Message routed to:  Other: [facility]    Travel Screening: Not Applicable

## 2024-03-28 NOTE — TELEPHONE ENCOUNTER
Received call from patient requesting refill(s) of   oxyCODONE IR (ROXICODONE) 10 MG tablet  Last dispensed from pharmacy on: mar. 24, 2024       Patient's last office/virtual visit by prescribing provider on: feb. 21, 2024   Next office/virtual appointment scheduled for: Apr. 3, 2024     UDT: Sep. 27, 2023   CSA: Sep. 27, 2023     E-prescribe to    [facility] - [place] - [address]    Will route to nursing pool for review and preparation of prescription(s).

## 2024-03-28 NOTE — TELEPHONE ENCOUNTER
Patient also requested refill for oxyCODONE IR (ROXICODONE) 15 MG tablet which was last dispensed 3/24/24 per .    10 mg oxycodone was dispensed on 3/13/24 per .     Pending Prescriptions:                       Disp   Refills    oxyCODONE IR (ROXICODONE) 10 MG tablet    98 tab*0            Sig: Take 1 tablet (10 mg) by mouth every 3 hours At 2           am, 5 am, 11am, 2 pm, 5 pm, 8 pm, 11 pm    oxyCODONE IR (ROXICODONE) 15 MG tablet    28 tab*0            Sig: Take 1 tablet (15 mg) by mouth daily At 8 am    [facility] - [place], [place] - [address]

## 2024-04-03 ENCOUNTER — VIRTUAL VISIT (OUTPATIENT)
Dept: PALLIATIVE MEDICINE | Facility: OTHER | Age: 54
End: 2024-04-03
Payer: COMMERCIAL

## 2024-04-03 DIAGNOSIS — M54.16 LUMBAR RADICULOPATHY: ICD-10-CM

## 2024-04-03 PROCEDURE — 99213 OFFICE O/P EST LOW 20 MIN: CPT | Mod: 95 | Performed by: ANESTHESIOLOGY

## 2024-04-03 RX ORDER — OXYCODONE HYDROCHLORIDE 10 MG/1
10 TABLET ORAL
Qty: 98 TABLET | Refills: 0 | Status: SHIPPED | OUTPATIENT
Start: 2024-04-03 | End: 2024-04-22

## 2024-04-03 RX ORDER — OXYCODONE HYDROCHLORIDE 15 MG/1
15 TABLET ORAL DAILY
Qty: 28 TABLET | Refills: 0 | Status: SHIPPED | OUTPATIENT
Start: 2024-04-03 | End: 2024-04-22

## 2024-04-03 NOTE — PROGRESS NOTES
Patient presents to the clinic today for a follow up with JAYME MULLEN MD regarding Pain Management.    Jan is a 54 year old who is being evaluated via a billable video visit.    How would you like to obtain your AVS? MyChart  If the video visit is dropped, the invitation should be resent by: Text to cell phone: [phone]  Will anyone else be joining your video visit? No    Video-Visit Details    Type of service:  Video Visit     Originating Location (pt. Location): Home    Distant Location (provider location):  On-site  Platform used for Video Visit: Doximity        Is Pt currently in [place]? Yes  NOTE: If Pt is not in [place], Appointment needs to be canceled and rescheduled          11/22/2023     1:54 PM 2/21/2024     2:06 PM 4/3/2024    12:52 PM   PEG Score   PEG Total Score 7.33 9.33 9.33       UDS/CSA-09/27/2023        Tiffany Callaway MA  [facility]

## 2024-04-03 NOTE — PROGRESS NOTES
"[facility] Follow-up    Date of visit: 4/3/2024    Chief complaint:   Chief Complaint   Patient presents with    Pain     Seen for video visit follow-up for stents of spine surgery  Interval history:    Reviews today has been work with orthopedics regarding his right biceps tendon rupture, will be seeing orthopedics in May, he can feel that the hardware on the previous shoulder surgery is broken.    Regarding his knee, had a left knee surgery 2017.  The right knee is \"gone\", has evaluation next week, would like to get a more significant brace and postpone till next summer replacement.    He is follow-up with the dental providers next week, has significant cleaning scheduled next week, then will be off antibiotics will have a crown.    When seen last we discussed the methylene blue.  He was concerned about the number of medication interactions he read about.    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    He reviews with the recovery from his spine surgeries, having had a diagnosis of \"incomplete quadriplegia\" and he is now able to walk more normally, uses arms, he feels he has been making a very significant recovery.  Describes some spiritual component of this also related to in the past when he was concern developing an alcohol addiction and relationship with his ex-wife and was able to manage that and with another previous significant experience in his life.  He is planning to meet with a  to review some of the spiritual significance.    He does continue the oxycodone 15 mg in the morning, and 10 mg 7 times a day.  We discussed that as he transitions into his new apartment, and gets established with the medical cannabis program.  Will look to taper.    Last urine drug test in September.   reviewed            Medications:  Current Outpatient Medications   Medication Sig " Dispense Refill    acetaminophen (TYLENOL) 650 MG CR tablet 2 TABLETS (70718GF) ORALLY 3 TIMES DAILY (MAX APAP:4GM/24HR) 540 tablet 1    albuterol (PROAIR HFA/PROVENTIL HFA/VENTOLIN HFA) 108 (90 Base) MCG/ACT inhaler Inhale 2 puffs into the lungs every 6 hours as needed 6.7 g 11    allopurinol (ZYLOPRIM) 300 MG tablet Take 1 tablet (300 mg) by mouth 2 times daily 180 tablet 3    ammonium lactate (LAC-HYDRIN) 12 % external lotion Apply topically daily as needed for dry skin 500 g 11    amphetamine-dextroamphetamine (ADDERALL) 15 MG tablet Take 1 tablet (15 mg) by mouth daily for 30 days 30 tablet 0    [START ON 4/5/2024] amphetamine-dextroamphetamine (ADDERALL) 15 MG tablet Take 1 tablet (15 mg) by mouth daily for 30 days 30 tablet 0    [START ON 5/3/2024] amphetamine-dextroamphetamine (ADDERALL) 15 MG tablet Take 1 tablet (15 mg) by mouth daily for 30 days 30 tablet 0    [START ON 6/1/2024] amphetamine-dextroamphetamine (ADDERALL) 15 MG tablet Take 1 tablet (15 mg) by mouth daily for 30 days 30 tablet 0    bisacodyl (DULCOLAX) 10 MG suppository Place 1 suppository (10 mg) rectally daily as needed for constipation 60 suppository 1    buPROPion (WELLBUTRIN SR) 150 MG 12 hr tablet 1 TABLET ORALLY 2 TIMES DAILY (DX:______________) 56 tablet 10    CALCIUM ANTACID 500 MG chewable tablet       chlorhexidine (PERIDEX) 0.12 % solution Swish and spit 15 mLs in mouth 3 times daily as needed (sore throat) - Swish & Spit 473 mL 11    ergocalciferol (ERGOCALCIFEROL) 1.25 MG (23948 UT) capsule Take 1 capsule (50,000 Units) by mouth once a week 12 capsule 3    fexofenadine (ALLEGRA) 180 MG tablet Take 180 mg by mouth daily as needed      finasteride (PROSCAR) 5 MG tablet 1 TABLET ORALLY DAILY (DX: URINARY RETENTION) 28 tablet 5    fluconazole (DIFLUCAN) 200 MG tablet Take 1 tablet (200 mg) by mouth daily 90 tablet 3    furosemide (LASIX) 40 MG tablet Take 2 tablets (80 mg) by mouth 2 times daily 120 tablet 3    hydrOXYzine (ATARAX)  50 MG tablet Per Dr. LOPEZ,OLUKAYODE      lactulose 20 GM/30ML SOLN Take 45 mLs by mouth 2 times daily For constipation. 5400 mL 11    lamoTRIgine (LAMICTAL) 150 MG tablet Take 1 tablet (150 mg) by mouth 3 times daily      LANsoprazole (PREVACID) 30 MG DR capsule       lidocaine (LIDODERM) 5 % patch Place onto the skin every 24 hours To prevent lidocaine toxicity, patient should be patch free for 12 hrs daily. 30 patch 11    lidocaine (XYLOCAINE) 2 % external gel Apply 1 inch topically 4 times a day as needed to gums. 85 g 11    methocarbamol (ROBAXIN) 500 MG tablet Take 2 tablets (1,000 mg) by mouth 4 times daily For use beginning 3/14/23 120 tablet 1    metoprolol succinate ER (TOPROL-XL) 50 MG 24 hr tablet Take 1 tablet (50 mg) by mouth daily 90 tablet 3    montelukast (SINGULAIR) 10 MG tablet Take 1 tablet by mouth daily      MUCUS RELIEF 600 MG 12 hr tablet       nabumetone (RELAFEN) 500 MG tablet Take 1 tablet (500 mg) by mouth 2 times daily 180 tablet 3    naloxegol (MOVANTIK) 25 MG TABS tablet Take 1 tablet (25 mg) by mouth every morning (before breakfast) 30 tablet 1    naloxone (NARCAN) 4 MG/0.1ML nasal spray 1 spray (4 mg dose) into one nostril for opioid reversal. Call 911. May repeat if no response in 3 minutes.      nystatin (MYCOSTATIN) 640246 UNIT/ML suspension Take 10 mLs (1,000,000 Units) by mouth every 3 hours as needed (thrush) To continue while taking levofloxacin and fluconazole 800 mL 3    oxyCODONE IR (ROXICODONE) 10 MG tablet Take 1 tablet (10 mg) by mouth every 3 hours At 2 am, 5 am, 11am, 2 pm, 5 pm, 8 pm, 11 pm, may fill 4/9 for 4/11 98 tablet 0    oxyCODONE IR (ROXICODONE) 15 MG tablet Take 1 tablet (15 mg) by mouth daily At 8 am 28 tablet 0    potassium chloride ER (KLOR-CON M) 20 MEQ CR tablet Take 1 tablet (20 mEq) by mouth daily 90 tablet 3    QUEtiapine (SEROQUEL) 100 MG tablet Take 1 tablet (100 mg) by mouth 4 times daily as needed (anxiety) 60 tablet 1    QUEtiapine (SEROQUEL)  300 MG tablet Take 2 tablets (600 mg) by mouth At Bedtime 180 tablet 0    sennosides (SENOKOT) 8.8 MG/5ML syrup Take 10 mLs by mouth At Bedtime 236 mL 1    tamsulosin (FLOMAX) 0.4 MG capsule Take 1 capsule (0.4 mg) by mouth 2 times daily 180 capsule 3    zonisamide (ZONEGRAN) 25 MG capsule Take 3 capsules (75 mg) by mouth 4 times daily 180 capsule 3           Physical Exam:  There were no vitals taken for this visit.      Video alert, clear sensorium's, no respiratory distress, no pain behavior.    Affect congruent        Assessment:   Extensive spinal surgery and revision, other orthopedic surgeries as above.    We have been decreasing the oxycodone since his post surgery.  At this doses he is struggling with other active problems and anticipating moving in the next couple months, we will slow the taper.    Plan as above.    Total time 25 minutes.  Video start time 1: 07.  Video end 1: 22.  At home via Doximity       minutes spent on the date of encounter doing chart review, history, and exam documentation and further activities as noted above.     Dmitriy Cramer MD  [facility]

## 2024-04-03 NOTE — PATIENT INSTRUCTIONS
PLAN:    Discussedyou are meeting with orthopedist regarding your knee next week.    You are meeting with orthopedist regarding your shoulder and bicep tear in May.    You are working with the dentist next week.    You are meeting with your  to review some experiences.    You are working on getting the medical cannabis enrollment completed.    Continue the oxycodone 15 mg in the morning and 10 mg every 3 hours for 7 total in a day.    Follow-up with Dr. Cramer in the clinic in 3 months for return visit

## 2024-04-29 ENCOUNTER — VIRTUAL VISIT (OUTPATIENT)
Dept: INTERNAL MEDICINE | Facility: CLINIC | Age: 54
End: 2024-04-29
Payer: COMMERCIAL

## 2024-04-29 DIAGNOSIS — Z87.820 HISTORY OF TRAUMATIC BRAIN INJURY: ICD-10-CM

## 2024-04-29 DIAGNOSIS — J01.01 ACUTE RECURRENT MAXILLARY SINUSITIS: ICD-10-CM

## 2024-04-29 DIAGNOSIS — F31.81 BIPOLAR 2 DISORDER (H): ICD-10-CM

## 2024-04-29 DIAGNOSIS — R41.89 COGNITIVE DEFICIT DUE TO OLD HEAD INJURY: ICD-10-CM

## 2024-04-29 DIAGNOSIS — M25.561 CHRONIC PAIN OF RIGHT KNEE: Primary | ICD-10-CM

## 2024-04-29 DIAGNOSIS — F11.90 CHRONIC, CONTINUOUS USE OF OPIOIDS: ICD-10-CM

## 2024-04-29 DIAGNOSIS — S09.90XS COGNITIVE DEFICIT DUE TO OLD HEAD INJURY: ICD-10-CM

## 2024-04-29 DIAGNOSIS — B37.0 THRUSH: ICD-10-CM

## 2024-04-29 DIAGNOSIS — G89.29 CHRONIC PAIN OF RIGHT KNEE: Primary | ICD-10-CM

## 2024-04-29 DIAGNOSIS — F41.1 GENERALIZED ANXIETY DISORDER: ICD-10-CM

## 2024-04-29 DIAGNOSIS — J32.0 CHRONIC MAXILLARY SINUSITIS: ICD-10-CM

## 2024-04-29 DIAGNOSIS — K05.10 GINGIVITIS, CHRONIC: ICD-10-CM

## 2024-04-29 PROCEDURE — 99215 OFFICE O/P EST HI 40 MIN: CPT | Mod: 95 | Performed by: INTERNAL MEDICINE

## 2024-04-29 RX ORDER — FEXOFENADINE HCL AND PSEUDOEPHEDRINE HCL 180; 240 MG/1; MG/1
1 TABLET, EXTENDED RELEASE ORAL DAILY
Qty: 90 TABLET | Refills: 1 | Status: SHIPPED | OUTPATIENT
Start: 2024-04-29 | End: 2025-04-29

## 2024-04-29 RX ORDER — DEXTROAMPHETAMINE SACCHARATE, AMPHETAMINE ASPARTATE, DEXTROAMPHETAMINE SULFATE AND AMPHETAMINE SULFATE 3.75; 3.75; 3.75; 3.75 MG/1; MG/1; MG/1; MG/1
15 TABLET ORAL DAILY
Qty: 30 TABLET | Refills: 0 | Status: SHIPPED | OUTPATIENT
Start: 2024-04-29 | End: 2024-09-04

## 2024-04-29 RX ORDER — FLUCONAZOLE 200 MG/1
200 TABLET ORAL DAILY
Qty: 90 TABLET | Refills: 3 | Status: SHIPPED | OUTPATIENT
Start: 2024-04-29 | End: 2024-08-30

## 2024-04-29 RX ORDER — NYSTATIN 100000/ML
1000000 SUSPENSION, ORAL (FINAL DOSE FORM) ORAL 2 TIMES DAILY
Qty: 800 ML | Refills: 3 | Status: SHIPPED | OUTPATIENT
Start: 2024-04-29

## 2024-04-29 ASSESSMENT — ASTHMA QUESTIONNAIRES
QUESTION_5 LAST FOUR WEEKS HOW WOULD YOU RATE YOUR ASTHMA CONTROL: WELL CONTROLLED
ACT_TOTALSCORE: 17
QUESTION_3 LAST FOUR WEEKS HOW OFTEN DID YOUR ASTHMA SYMPTOMS (WHEEZING, COUGHING, SHORTNESS OF BREATH, CHEST TIGHTNESS OR PAIN) WAKE YOU UP AT NIGHT OR EARLIER THAN USUAL IN THE MORNING: NOT AT ALL
ACT_TOTALSCORE: 17
QUESTION_1 LAST FOUR WEEKS HOW MUCH OF THE TIME DID YOUR ASTHMA KEEP YOU FROM GETTING AS MUCH DONE AT WORK, SCHOOL OR AT HOME: A LITTLE OF THE TIME
QUESTION_4 LAST FOUR WEEKS HOW OFTEN HAVE YOU USED YOUR RESCUE INHALER OR NEBULIZER MEDICATION (SUCH AS ALBUTEROL): THREE OR MORE TIMES PER DAY
QUESTION_2 LAST FOUR WEEKS HOW OFTEN HAVE YOU HAD SHORTNESS OF BREATH: THREE TO SIX TIMES A WEEK

## 2024-04-29 NOTE — PATIENT INSTRUCTIONS
Refill fluconazole 200 mg daily    Refill nystatin once or twice daily    Change plain Allegra to Allegra-D 180 mg daily    Refill Adderall    Discussed 50% decrease in narcotics from almost 200 MME per day last year to currently about 75 MME per day    Update labs ordered in February    Paperwork and support for service dog discussed

## 2024-04-29 NOTE — PROGRESS NOTES
Jan is a 54 year old who is being evaluated via a billable video visit.    What phone number would you like to be contacted at? [phone]  How would you like to obtain your AVS? Madonnat  {If patient encounters technical issues they should call [phone] :030231}    {PROVIDER CHARTING PREFERENCE:702856}    Subjective   Jan is a 54 year old, presenting for the following health issues:  Follow Up (General check in with refills request) and Forms (For Service Dog Disability Certification)      4/29/2024     1:59 PM   Additional Questions   Roomed by TRAVON Slater   Accompanied by alone     Video Start Time: {video visit start/end time for provider to select:039846}    History of Present Illness     Asthma:  He presents for follow up of asthma.  He has no cough, no wheezing, and some shortness of breath.  He is using a relief medication 2-3 times per day. He does not miss any doses of his controller medication throughout the week. Patient is aware of the following triggers: dust mites, mold and pollens. The patient has not had a visit to the Emergency Room, Urgent Care or Hospital due to asthma since the last clinic visit.     Back Pain:  He presents for follow up of back pain. Patient's back pain is a chronic problem.  Location of back pain:  Right lower back, left lower back, right middle of back, left middle of back, right upper back, left upper back, right side of neck, left side of neck, right shoulder, left shoulder, right buttock, left buttock, right hip, left hip, right side of waist and left side of waist  Description of back pain: burning, fullness, gnawing, sharp, stabbing and other  Back pain spreads: right buttocks, left buttocks, right thigh, left thigh, right shoulder, left shoulder, right side of neck and left side of neck    Since patient first noticed back pain, pain is: always present, but gets better and worse  Does back pain interfere with his job:  Not applicable       Hypertension: He presents  for follow up of hypertension.  He does check blood pressure  regularly outside of the clinic. Outpatient blood pressures have not been over 140/90. He does not follow a low salt diet.     Reason for visit:  Medication Check Up and Service Dog Disability Certification    He eats 2-3 servings of fruits and vegetables daily.He consumes 7 sweetened beverage(s) daily.He exercises with enough effort to increase his heart rate 20 to 29 minutes per day.  He exercises with enough effort to increase his heart rate 7 days per week.   He is taking medications regularly.       {SUPERLIST (Optional):607928}  {additonal problems for provider to add (Optional):551487}    {ROS Picklists (Optional):216405}      Objective           Vitals:  No vitals were obtained today due to virtual visit.    Physical Exam   {video visit exam brief selected:010728}    {Diagnostic Test Results (Optional):475689}      Video-Visit Details    Type of service:  Video Visit   Video End Time:{video visit start/end time for provider to select:766021}  Originating Location (pt. Location): Home  {PROVIDER LOCATION On-site should be selected for visits conducted from your clinic location or adjoining [facility] hospital, academic office, or other nearby [facility] building. Off-site should be selected for all other provider locations, including home:675099}  Distant Location (provider location):  Off-site  Platform used for Video Visit: Aura  Signed Electronically by: Devon Lund MD  {Email feedback regarding this note to [email or web address]   :705834}

## 2024-04-29 NOTE — PROGRESS NOTES
OFFICE VISIT--Video    Bola is a 54 year old male contacting the clinic today via video, who will use the platform: [employer] or [employer] for the visit.  Phone # for [employer], or if Amwell drops:   Telephone Information:   Mobile [phone]          ASSESSMENT and PLAN:  1. Chronic pain of right knee  Agree with plans for knee brace while narcotics continue to de-escalate    2. Bipolar 2 disorder (H)  Okay to refill  - amphetamine-dextroamphetamine (ADDERALL) 15 MG tablet; Take 1 tablet (15 mg) by mouth daily  Dispense: 30 tablet; Refill: 0    3. Chronic, continuous use of opioids  MME decreased down to approximately 75 MME's per day reviewed    4. 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 Generalized anxiety disorder  May need increased and anxiety medicines as narcotics decrease    6. History of traumatic brain injury    7. Chronic maxillary sinusitis  Trial of Allegra-D rather than separately  - fexofenadine-pseudoePHEDrine (ALLEGRA-D 24) 180-240 MG 24 hr tablet; Take 1 tablet by mouth daily  Dispense: 90 tablet; Refill: 1    8. Gingivitis, chronic  - fluconazole (DIFLUCAN) 200 MG tablet; Take 1 tablet (200 mg) by mouth daily  Dispense: 90 tablet; Refill: 3  - nystatin (MYCOSTATIN) 907291 UNIT/ML suspension; Take 10 mLs (1,000,000 Units) by mouth 2 times daily  Dispense: 800 mL; Refill: 3    9. Acute recurrent maxillary sinusitis  - fluconazole (DIFLUCAN) 200 MG tablet; Take 1 tablet (200 mg) by mouth daily  Dispense: 90 tablet; Refill: 3  - nystatin (MYCOSTATIN) 370253 UNIT/ML suspension; Take 10 mLs (1,000,000 Units) by mouth 2 times daily  Dispense: 800 mL; Refill: 3    10. Thrush  - fluconazole (DIFLUCAN) 200 MG tablet; Take 1 tablet (200 mg) by mouth daily  Dispense: 90 tablet; Refill: 3  - nystatin (MYCOSTATIN) 299120 UNIT/ML suspension; Take 10 mLs (1,000,000 Units) by mouth 2 times daily  Dispense: 800 mL; Refill: 3       Patient Instructions   Refill fluconazole 200 mg  daily    Refill nystatin once or twice daily    Change plain Allegra to Allegra-D 180 mg daily    Refill Adderall    Discussed 50% decrease in narcotics from almost 200 MME per day last year to currently about 75 MME per day    Update labs ordered in February    Paperwork and support for service dog discussed            Return in about 3 months (around 7/29/2024) for using a video visit.       CHIEF COMPLAINT:  Chief Complaint   Patient presents with    Follow Up     General check in with refills request    Forms     For Service Dog Disability Certification       HISTORY OF PRESENT ILLNESS:  Bola is a 54 year old male contacting the clinic today via video for review and update.  He reports injuring and twisting his right knee greater than 18 months ago.  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  Physical therapy is advising a brace which is helping    Fluconazole for the last year for deep-seated thrush after about a year of antibiotics.  Feels that it continues to help.  Going to see the dentist.  Root canals and pulled teeth are completed and waiting on partials    Has torn both biceps and is seeing orthopedic surgery    Qualifies for service dog for claustrophobia, disability and PTSD.  Discussed paperwork    Weight declining now to 12 pounds.  Still has some fluid.  Has not yet updated blood work ordered in February    Dr. Cramer in the pain clinic have decreased his opioids by about 50%    History of Present Illness     Asthma:  He presents for follow up of asthma.  He has no cough, no wheezing, and some shortness of breath.  He is using a relief medication 2-3 times per day. He does not miss any doses of his controller medication throughout the week. Patient is aware of the following triggers: dust mites, mold and pollens. The patient has not had a visit to the Emergency Room, Urgent Care or Hospital due to asthma since the last clinic visit.     Back Pain:  He presents for follow up of back pain.  Patient's back pain is a chronic problem.  Location of back pain:  Right lower back, left lower back, right middle of back, left middle of back, right upper back, left upper back, right side of neck, left side of neck, right shoulder, left shoulder, right buttock, left buttock, right hip, left hip, right side of waist and left side of waist  Description of back pain: burning, fullness, gnawing, sharp, stabbing and other  Back pain spreads: right buttocks, left buttocks, right thigh, left thigh, right shoulder, left shoulder, right side of neck and left side of neck    Since patient first noticed back pain, pain is: always present, but gets better and worse  Does back pain interfere with his job:  Not applicable       Hypertension: He presents for follow up of hypertension.  He does check blood pressure  regularly outside of the clinic. Outpatient blood pressures have not been over 140/90. He does not follow a low salt diet.     Reason for visit:  Medication Check Up and Service Dog Disability Certification    He eats 2-3 servings of fruits and vegetables daily.He consumes 7 sweetened beverage(s) daily.He exercises with enough effort to increase his heart rate 20 to 29 minutes per day.  He exercises with enough effort to increase his heart rate 7 days per week.   He is taking medications regularly.      REVIEW OF SYSTEMS:   Continued fluid retention    Today's PHQ-2 Score:       2/1/2024    11:03 AM   PHQ-2 ( 1999 [employer])   Q1: Little interest or pleasure in doing things 3   Q2: Feeling down, depressed or hopeless 3   PHQ-2 Score 6   Q1: Little interest or pleasure in doing things Nearly every day   Q2: Feeling down, depressed or hopeless Nearly every day   PHQ-2 Score 6       PFSH:  Social History     Social History Narrative    He is .  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  He is now on disability due to his brain injury and bipolar  "disease.        Quit smoking October of 2021        Quit drinking 2010        At [facility] since June of 2020     Excited to be leaving the [facility] soon    TOBACCO USE:  History   Smoking Status    Former    Packs/day: 0.50    Years: 11.00    Types: Cigarettes    Start date: 8/20/2009    Quit date: 2/21/2017   Smokeless Tobacco    Former       VITALS:  There were no vitals filed for this visit.  There were no vitals taken for this visit. Estimated body mass index is 31.17 kg/m  as calculated from the following:    Height as of 7/3/23: 1.727 m (5' 8\").    Weight as of 7/3/23: 93 kg (205 lb).    PHYSICAL EXAM:  (observations via Video)  Alert and oriented, talkative and anxious    MEDICATIONS:   Current Outpatient Medications   Medication Sig Dispense Refill    acetaminophen (TYLENOL) 650 MG CR tablet 2 TABLETS (02057LR) ORALLY 3 TIMES DAILY (MAX APAP:4GM/24HR) 540 tablet 1    albuterol (PROAIR HFA/PROVENTIL HFA/VENTOLIN HFA) 108 (90 Base) MCG/ACT inhaler Inhale 2 puffs into the lungs every 6 hours as needed 6.7 g 11    allopurinol (ZYLOPRIM) 300 MG tablet Take 1 tablet (300 mg) by mouth 2 times daily 180 tablet 3    ammonium lactate (LAC-HYDRIN) 12 % external lotion Apply topically daily as needed for dry skin 500 g 11    amphetamine-dextroamphetamine (ADDERALL) 15 MG tablet Take 1 tablet (15 mg) by mouth daily 30 tablet 0    [START ON 5/3/2024] amphetamine-dextroamphetamine (ADDERALL) 15 MG tablet Take 1 tablet (15 mg) by mouth daily for 30 days 30 tablet 0    [START ON 6/1/2024] amphetamine-dextroamphetamine (ADDERALL) 15 MG tablet Take 1 tablet (15 mg) by mouth daily for 30 days 30 tablet 0    bisacodyl (DULCOLAX) 10 MG suppository Place 1 suppository (10 mg) rectally daily as needed for constipation 60 suppository 1    buPROPion (WELLBUTRIN SR) 150 MG 12 hr tablet 1 TABLET ORALLY 2 TIMES DAILY (DX:______________) 56 tablet 10    CALCIUM ANTACID 500 MG chewable tablet       chlorhexidine (PERIDEX) 0.12 % solution Swish and " spit 15 mLs in mouth 3 times daily as needed (sore throat) - Swish & Spit 473 mL 11    ergocalciferol (ERGOCALCIFEROL) 1.25 MG (99520 UT) capsule Take 1 capsule (50,000 Units) by mouth once a week 12 capsule 3    fexofenadine (ALLEGRA) 180 MG tablet Take 180 mg by mouth daily as needed      fexofenadine-pseudoePHEDrine (ALLEGRA-D 24) 180-240 MG 24 hr tablet Take 1 tablet by mouth daily 90 tablet 1    finasteride (PROSCAR) 5 MG tablet 1 TABLET ORALLY DAILY (DX: URINARY RETENTION) 28 tablet 5    fluconazole (DIFLUCAN) 200 MG tablet Take 1 tablet (200 mg) by mouth daily 90 tablet 3    furosemide (LASIX) 40 MG tablet Take 2 tablets (80 mg) by mouth 2 times daily 120 tablet 3    hydrOXYzine (ATARAX) 50 MG tablet Per Dr. LOPEZ,OLUKAYODE      lactulose 20 GM/30ML SOLN Take 45 mLs by mouth 2 times daily For constipation. 5400 mL 11    lamoTRIgine (LAMICTAL) 150 MG tablet Take 1 tablet (150 mg) by mouth 3 times daily      LANsoprazole (PREVACID) 30 MG DR capsule       lidocaine (LIDODERM) 5 % patch Place onto the skin every 24 hours To prevent lidocaine toxicity, patient should be patch free for 12 hrs daily. 30 patch 11    lidocaine (XYLOCAINE) 2 % external gel Apply 1 inch topically 4 times a day as needed to gums. 85 g 11    methocarbamol (ROBAXIN) 500 MG tablet Take 2 tablets (1,000 mg) by mouth 4 times daily For use beginning 3/14/23 120 tablet 1    metoprolol succinate ER (TOPROL-XL) 50 MG 24 hr tablet Take 1 tablet (50 mg) by mouth daily 90 tablet 3    montelukast (SINGULAIR) 10 MG tablet Take 1 tablet by mouth daily      MUCUS RELIEF 600 MG 12 hr tablet       nabumetone (RELAFEN) 500 MG tablet Take 1 tablet (500 mg) by mouth 2 times daily 180 tablet 3    naloxegol (MOVANTIK) 25 MG TABS tablet Take 1 tablet (25 mg) by mouth every morning (before breakfast) 30 tablet 1    naloxone (NARCAN) 4 MG/0.1ML nasal spray 1 spray (4 mg dose) into one nostril for opioid reversal. Call 911. May repeat if no response in 3  "minutes.      nystatin (MYCOSTATIN) 659900 UNIT/ML suspension Take 10 mLs (1,000,000 Units) by mouth 2 times daily 800 mL 3    oxyCODONE IR (ROXICODONE) 10 MG tablet Take 1 tablet (10 mg) by mouth every 3 hours At 2 am, 5 am, 11am, 2 pm, 5 pm, 8 pm, 11 pm, may fill 4/23 for 4/25 98 tablet 0    oxyCODONE IR (ROXICODONE) 15 MG tablet Take 1 tablet (15 mg) by mouth daily At 8 am 28 tablet 0    potassium chloride ER (KLOR-CON M) 20 MEQ CR tablet Take 1 tablet (20 mEq) by mouth daily 90 tablet 3    QUEtiapine (SEROQUEL) 100 MG tablet Take 1 tablet (100 mg) by mouth 4 times daily as needed (anxiety) 60 tablet 1    QUEtiapine (SEROQUEL) 300 MG tablet Take 2 tablets (600 mg) by mouth At Bedtime 180 tablet 0    sennosides (SENOKOT) 8.8 MG/5ML syrup Take 10 mLs by mouth At Bedtime 236 mL 1    tamsulosin (FLOMAX) 0.4 MG capsule Take 1 capsule (0.4 mg) by mouth 2 times daily 180 capsule 3    zonisamide (ZONEGRAN) 25 MG capsule Take 3 capsules (75 mg) by mouth 4 times daily 180 capsule 3       Outside Notes summarized:   Labs, x-rays, cardiology, GI tests reviewed:   Recent Labs   Lab Test 05/31/23  1045 08/30/22  1321   HGB 12.3* 12.5*   WBC 6.5 7.6    135*   POTASSIUM 3.7 4.0   CR 0.78 0.71   PSA  --  0.50   URIC  --  2.0*   B12  --  336   TSH  --  0.62   VITDT  --  29     No results found for: \"MYWNP06URV\"  Lab Results   Component Value Date    CHOL 164 08/30/2022     New orders: No orders of the defined types were placed in this encounter.      Independent review of:         4/29/2024     1:59 PM   Additional Questions   Roomed by TRAVON Slater   Accompanied by alone       Video-Visit Details  Type of service:  Video Visit  Patient has given verbal consent to a Video visit?  Yes  How would you like to obtain your AVS?  MyChart  Will anyone else be joining your video visit, giving supplemental history? No    Originating location (pt location): TCU    Distant Location (provider location):  Off-site    Video Start Time: " 2:38 PM  Video End time:  3:21 PM  Face to face plus orders: 43 minutes  Documentation time:  3 minutes     The visit lasted a total of 46     minutes    Devon Lund MD  [facility]

## 2024-05-06 ENCOUNTER — TRANSFERRED RECORDS (OUTPATIENT)
Dept: HEALTH INFORMATION MANAGEMENT | Facility: CLINIC | Age: 54
End: 2024-05-06
Payer: COMMERCIAL

## 2024-05-08 ENCOUNTER — MYC MEDICAL ADVICE (OUTPATIENT)
Dept: PALLIATIVE MEDICINE | Facility: OTHER | Age: 54
End: 2024-05-08
Payer: COMMERCIAL

## 2024-05-08 DIAGNOSIS — M54.16 LUMBAR RADICULOPATHY: ICD-10-CM

## 2024-05-08 RX ORDER — OXYCODONE HYDROCHLORIDE 10 MG/1
10 TABLET ORAL
Qty: 98 TABLET | Refills: 0 | Status: SHIPPED | OUTPATIENT
Start: 2024-05-08 | End: 2024-05-14

## 2024-05-08 RX ORDER — OXYCODONE HYDROCHLORIDE 15 MG/1
15 TABLET ORAL DAILY
Qty: 28 TABLET | Refills: 0 | Status: SHIPPED | OUTPATIENT
Start: 2024-05-08 | End: 2024-05-14

## 2024-05-08 NOTE — TELEPHONE ENCOUNTER
Received call from patient requesting refill(s) of   oxyCODONE IR (ROXICODONE) 10 MG tablet  last filled 4/22/24  oxyCODONE IR (ROXICODONE) 15 MG tablet last filled 4/18/24    Patient's last office/virtual visit by prescribing provider on 4/3/24  Next office/virtual appointment scheduled for 5/14/24    Last urine drug screen 9/27/23  Current opioid agreement on file Yes Date: 9/27/23    Processing (pick one):      E-prescribe to [facility] - [place], [place] - [address]   pharmacy    Will facilitate refill.    Pending Prescriptions:                       Disp   Refills    oxyCODONE IR (ROXICODONE) 15 MG tablet    28 tab*0            Sig: Take 1 tablet (15 mg) by mouth daily At 8 am    oxyCODONE IR (ROXICODONE) 10 MG tablet    98 tab*0            Sig: Take 1 tablet (10 mg) by mouth every 3 hours At 2           am, 5 am, 11am, 2 pm, 5 pm, 8 pm, 11 pm, may fill           5/8 for 5/9

## 2024-05-14 ENCOUNTER — OFFICE VISIT (OUTPATIENT)
Dept: PALLIATIVE MEDICINE | Facility: OTHER | Age: 54
End: 2024-05-14
Attending: ANESTHESIOLOGY
Payer: COMMERCIAL

## 2024-05-14 VITALS — SYSTOLIC BLOOD PRESSURE: 131 MMHG | HEART RATE: 64 BPM | OXYGEN SATURATION: 97 % | DIASTOLIC BLOOD PRESSURE: 84 MMHG

## 2024-05-14 DIAGNOSIS — M54.16 LUMBAR RADICULOPATHY: ICD-10-CM

## 2024-05-14 PROCEDURE — 99214 OFFICE O/P EST MOD 30 MIN: CPT | Performed by: ANESTHESIOLOGY

## 2024-05-14 PROCEDURE — G0463 HOSPITAL OUTPT CLINIC VISIT: HCPCS | Performed by: ANESTHESIOLOGY

## 2024-05-14 RX ORDER — OXYCODONE HYDROCHLORIDE 10 MG/1
10 TABLET ORAL
Qty: 98 TABLET | Refills: 0 | Status: SHIPPED | OUTPATIENT
Start: 2024-05-14 | End: 2024-05-31

## 2024-05-14 RX ORDER — OXYCODONE HYDROCHLORIDE 15 MG/1
15 TABLET ORAL DAILY
Qty: 28 TABLET | Refills: 0 | Status: SHIPPED | OUTPATIENT
Start: 2024-05-14 | End: 2024-06-19

## 2024-05-14 ASSESSMENT — PAIN SCALES - GENERAL: PAINLEVEL: EXTREME PAIN (9)

## 2024-05-14 NOTE — NURSING NOTE
Patient presents to the clinic today for a follow up with JAYME MULLEN MD  regarding Pain Management.           2/21/2024     2:06 PM 4/3/2024    12:52 PM 5/14/2024     1:22 PM   PEG Score   PEG Total Score 9.33 9.33 9.33      UDT/CSA-9/27/23    Becca Rodriguez MA  [facility]

## 2024-05-14 NOTE — PATIENT INSTRUCTIONS
[facility] Number:  [phone]  Call with any questions about your care and for scheduling assistance.   Calls are returned Monday through Friday between 8 AM and 4:30 PM. We usually get back to you within 2 business days depending on the issue/request.    If we are prescribing your medications:  For opioid medication refills, call the clinic or send a [employer] message 7 days in advance.  Please include:  Name of requested medication  Name of the pharmacy.  For non-opioid medications, call your pharmacy directly to request a refill. Please allow 3-4 days to be processed.   Per [place] State Law:  All controlled substance prescriptions must be filled within 30 days of being written.    For those controlled substances allowing refills, pickup must occur within 30 days of last fill.      We believe regular attendance is key to your success in our program!    Any time you are unable to keep your appointment we ask that you call us at least 24 hours in advance to cancel.This will allow us to offer the appointment time to another patient.   Multiple missed appointments may lead to dismissal from the clinic.     PLAN:    Discussed your work with orthopedics regarding the shoulder and biceps, and with your knee brace.    You are working with the dentist.    Continue the medical cannabis program.    Continue the oxycodone 15 mg in the morning, and 10 mg 7 times a day.  Discussed with the medical cannabis she may be able to gradually decrease the oxycodone frequency.    Follow-up with Dr. Cramer for return visit in 3 months in the clinic

## 2024-05-15 NOTE — PROGRESS NOTES
"                          [facility] Follow-up    Date of visit: 5/15/2024    Chief complaint:   Chief Complaint   Patient presents with    Pain     Patient followed for history of lumbar surgeries, joint surgeries.  Interval history:    Reviews was progressing with his recovery from his back surgery.  This weekend \"tweaked\" his back Icenia for a couple of days getting more active.    He is looking forward to getting new brace this week.  He notes as he is limping that can throw off his back.    Also followed by orthopedics having injections into his biceps and this could be followed as needed, surgery to be postponed as he recovers.    He did meet with the pharmacist last week for medical cannabis and did just start using some different vaporizers too soon to tell.  They do find that it helps with \"my mind\" decreasing racing thoughts.    Swelling his leg is much improved, usually uses compression garments.    He is looking forward to doing more cardio work once he has his knee brace to support as some as I can go out.    Working with dentist, will be doing some periodontal scraping.  Still on antibiotics.    When last seen he describes some discussion with the , reviews again things he is addressing.    He describes the place he is living, informing that he is the first person that we have ever seen come in in a wheelchair anticipating walking out.    Has been using the oxycodone 15 mg in the morning which helps him get going, 10 mg 3 times a day total of 7 a day, 85 mg of oxycodone equal to 127 mg of morphine, significant decreased forward bending.  Reviewed the goal to be consistent with the [facility]'s pain service is to be closer to 90 MME's and he feels as he is gradually recovering, with the use of medical cannabis we can be looking toward that.      Last urine drug test in September  reviewed          Medications:  Current Outpatient Medications   Medication Sig " Dispense Refill    acetaminophen (TYLENOL) 650 MG CR tablet 2 TABLETS (47042MI) ORALLY 3 TIMES DAILY (MAX APAP:4GM/24HR) 540 tablet 1    albuterol (PROAIR HFA/PROVENTIL HFA/VENTOLIN HFA) 108 (90 Base) MCG/ACT inhaler Inhale 2 puffs into the lungs every 6 hours as needed 6.7 g 11    allopurinol (ZYLOPRIM) 300 MG tablet Take 1 tablet (300 mg) by mouth 2 times daily 180 tablet 3    ammonium lactate (LAC-HYDRIN) 12 % external lotion Apply topically daily as needed for dry skin 500 g 11    amphetamine-dextroamphetamine (ADDERALL) 15 MG tablet Take 1 tablet (15 mg) by mouth daily 30 tablet 0    amphetamine-dextroamphetamine (ADDERALL) 15 MG tablet Take 1 tablet (15 mg) by mouth daily for 30 days 30 tablet 0    [START ON 6/1/2024] amphetamine-dextroamphetamine (ADDERALL) 15 MG tablet Take 1 tablet (15 mg) by mouth daily for 30 days 30 tablet 0    bisacodyl (DULCOLAX) 10 MG suppository Place 1 suppository (10 mg) rectally daily as needed for constipation 60 suppository 1    buPROPion (WELLBUTRIN SR) 150 MG 12 hr tablet 1 TABLET ORALLY 2 TIMES DAILY (DX:______________) 56 tablet 10    CALCIUM ANTACID 500 MG chewable tablet       chlorhexidine (PERIDEX) 0.12 % solution Swish and spit 15 mLs in mouth 3 times daily as needed (sore throat) - Swish & Spit 473 mL 11    ergocalciferol (ERGOCALCIFEROL) 1.25 MG (45749 UT) capsule Take 1 capsule (50,000 Units) by mouth once a week 12 capsule 3    fexofenadine (ALLEGRA) 180 MG tablet Take 180 mg by mouth daily as needed      fexofenadine-pseudoePHEDrine (ALLEGRA-D 24) 180-240 MG 24 hr tablet Take 1 tablet by mouth daily 90 tablet 1    finasteride (PROSCAR) 5 MG tablet 1 TABLET ORALLY DAILY (DX: URINARY RETENTION) 28 tablet 5    fluconazole (DIFLUCAN) 200 MG tablet Take 1 tablet (200 mg) by mouth daily 90 tablet 3    furosemide (LASIX) 40 MG tablet Take 2 tablets (80 mg) by mouth 2 times daily 120 tablet 3    hydrOXYzine (ATARAX) 50 MG tablet Per JACQUELYN Vallejo 20  GM/30ML SOLN Take 45 mLs by mouth 2 times daily For constipation. 5400 mL 11    lamoTRIgine (LAMICTAL) 150 MG tablet Take 1 tablet (150 mg) by mouth 3 times daily      LANsoprazole (PREVACID) 30 MG DR capsule       lidocaine (LIDODERM) 5 % patch Place onto the skin every 24 hours To prevent lidocaine toxicity, patient should be patch free for 12 hrs daily. 30 patch 11    lidocaine (XYLOCAINE) 2 % external gel Apply 1 inch topically 4 times a day as needed to gums. 85 g 11    medical cannabis (Patient's own supply) See Admin Instructions (The purpose of this order is to document that the patient reports taking medical cannabis.  This is not a prescription, and is not used to certify that the patient has a qualifying medical condition.)      methocarbamol (ROBAXIN) 500 MG tablet Take 2 tablets (1,000 mg) by mouth 4 times daily For use beginning 3/14/23 120 tablet 1    metoprolol succinate ER (TOPROL-XL) 50 MG 24 hr tablet Take 1 tablet (50 mg) by mouth daily 90 tablet 3    montelukast (SINGULAIR) 10 MG tablet Take 1 tablet by mouth daily      MUCUS RELIEF 600 MG 12 hr tablet       nabumetone (RELAFEN) 500 MG tablet Take 1 tablet (500 mg) by mouth 2 times daily 180 tablet 3    naloxegol (MOVANTIK) 25 MG TABS tablet Take 1 tablet (25 mg) by mouth every morning (before breakfast) 30 tablet 1    naloxone (NARCAN) 4 MG/0.1ML nasal spray 1 spray (4 mg dose) into one nostril for opioid reversal. Call 911. May repeat if no response in 3 minutes.      nystatin (MYCOSTATIN) 408412 UNIT/ML suspension Take 10 mLs (1,000,000 Units) by mouth 2 times daily 800 mL 3    oxyCODONE IR (ROXICODONE) 10 MG tablet Take 1 tablet (10 mg) by mouth every 3 hours At 2 am, 5 am, 11am, 2 pm, 5 pm, 8 pm, 11 pm, may fill 5/22 for 5/23 98 tablet 0    oxyCODONE IR (ROXICODONE) 15 MG tablet Take 1 tablet (15 mg) by mouth daily At 8 am, may fill 6/3 for 6/5 28 tablet 0    potassium chloride ER (KLOR-CON M) 20 MEQ CR tablet Take 1 tablet (20 mEq) by  mouth daily 90 tablet 3    QUEtiapine (SEROQUEL) 100 MG tablet Take 1 tablet (100 mg) by mouth 4 times daily as needed (anxiety) 60 tablet 1    QUEtiapine (SEROQUEL) 300 MG tablet Take 2 tablets (600 mg) by mouth At Bedtime 180 tablet 0    sennosides (SENOKOT) 8.8 MG/5ML syrup Take 10 mLs by mouth At Bedtime 236 mL 1    tamsulosin (FLOMAX) 0.4 MG capsule Take 1 capsule (0.4 mg) by mouth 2 times daily 180 capsule 3    zonisamide (ZONEGRAN) 25 MG capsule Take 3 capsules (75 mg) by mouth 4 times daily 180 capsule 3           Physical Exam:  Blood pressure 131/84, pulse 64, SpO2 97%.    Alert, clear sensorium.  No respiratory distress, no pain behavior.    Copper compression sleeve on his right knee.  Ambulating without a walker.                Assessment:   Complex overlapping pain syndromes, extensive back surgery, orthopedic surgeries as above.    Comorbid mood disorder stable on psychotropic medications.    He is looking toward transitioning to independent living over the summer and we discussed that we will involve more activity with moving.    Plan is to continue advance the medical cannabis regimen finding things are most helpful.    Will review tapering further at the next appointment.    Total time 35 minutes moderate complexity    The longitudinal plan of care for the diagnosis(es)/condition(s) as documented were addressed during this visit. Due to the added complexity in care, I will continue to support Jan in the subsequent management and with ongoing continuity of care.   minutes spent on the date of encounter doing chart review, history, and exam documentation and further activities as noted above.     Dmitriy Cramer MD  [facility]

## 2024-05-20 ENCOUNTER — MYC MEDICAL ADVICE (OUTPATIENT)
Dept: INTERNAL MEDICINE | Facility: CLINIC | Age: 54
End: 2024-05-20
Payer: COMMERCIAL

## 2024-05-22 NOTE — TELEPHONE ENCOUNTER
Service Dog forms received from Dr Lund and faxed to:  [phone]  Attention:  Ricardo Moctezuma

## 2024-05-30 ENCOUNTER — MYC MEDICAL ADVICE (OUTPATIENT)
Dept: PALLIATIVE MEDICINE | Facility: OTHER | Age: 54
End: 2024-05-30
Payer: COMMERCIAL

## 2024-05-30 DIAGNOSIS — M54.16 LUMBAR RADICULOPATHY: ICD-10-CM

## 2024-05-30 NOTE — TELEPHONE ENCOUNTER
Refills requested for:  oxyCODONE IR (ROXICODONE) 10 MG tablet  oxyCODONE IR (ROXICODONE) 15 MG tablet    [facility] [place] - [address]

## 2024-05-31 NOTE — TELEPHONE ENCOUNTER
Received call from patient requesting refill(s) of oxyCODONE IR (ROXICODONE) 10 MG tablet   -Last dispensed from pharmacy on 5/22/2024.  oxyCODONE IR (ROXICODONE) 15 MG tablet  -Last dispensed from pharmacy - Refill available at the pharmacy fill 6/3/2024, start 6/5/2024.    Patient's last office/virtual visit by prescribing provider on 5/14/2024.  Next office/virtual appointment scheduled for 8/27/2024.    Last urine drug screen date 9/27/2023.  Current opioid agreement on file (completed within the last year) Yes Date of opioid agreement: 9/27/2023.    E-prescribe to:    [facility] [place] - [address]    Will route to nursing pool for review and preparation of prescription(s).

## 2024-05-31 NOTE — TELEPHONE ENCOUNTER
Pending Prescriptions:                       Disp   Refills    oxyCODONE IR (ROXICODONE) 10 MG tablet    98 tab*0            Sig: Take 1 tablet (10 mg) by mouth every 3 hours At 2           am, 5 am, 11am, 2 pm, 5 pm, 8 pm, 11 pm, may fill           6/3 for 6/5    [facility] - [place], [place] - [address]

## 2024-06-02 RX ORDER — OXYCODONE HYDROCHLORIDE 10 MG/1
10 TABLET ORAL
Qty: 98 TABLET | Refills: 0 | Status: SHIPPED | OUTPATIENT
Start: 2024-06-02 | End: 2024-06-19

## 2024-06-10 ENCOUNTER — MYC MEDICAL ADVICE (OUTPATIENT)
Dept: INTERNAL MEDICINE | Facility: CLINIC | Age: 54
End: 2024-06-10
Payer: COMMERCIAL

## 2024-06-10 DIAGNOSIS — S06.9X9S TRAUMATIC BRAIN INJURY WITH LOSS OF CONSCIOUSNESS, SEQUELA (H): ICD-10-CM

## 2024-06-10 DIAGNOSIS — F31.81 BIPOLAR 2 DISORDER (H): ICD-10-CM

## 2024-06-10 RX ORDER — DEXTROAMPHETAMINE SACCHARATE, AMPHETAMINE ASPARTATE, DEXTROAMPHETAMINE SULFATE AND AMPHETAMINE SULFATE 3.75; 3.75; 3.75; 3.75 MG/1; MG/1; MG/1; MG/1
15 TABLET ORAL DAILY
Qty: 30 TABLET | Refills: 0 | Status: SHIPPED | OUTPATIENT
Start: 2024-06-10 | End: 2024-07-08

## 2024-06-18 DIAGNOSIS — M54.16 LUMBAR RADICULOPATHY: ICD-10-CM

## 2024-06-18 NOTE — TELEPHONE ENCOUNTER
Refill requested for oxyCODONE IR (ROXICODONE) 15 MG tablet   Last filled 6/7/24  oxyCODONE IR (ROXICODONE) 10 MG tablet  Last filled 6/2/24     [facility] - [place] - [address]

## 2024-06-19 RX ORDER — OXYCODONE HYDROCHLORIDE 10 MG/1
10 TABLET ORAL
Qty: 98 TABLET | Refills: 0 | Status: SHIPPED | OUTPATIENT
Start: 2024-06-19 | End: 2024-07-05

## 2024-06-19 RX ORDER — OXYCODONE HYDROCHLORIDE 15 MG/1
15 TABLET ORAL DAILY
Qty: 28 TABLET | Refills: 0 | Status: SHIPPED | OUTPATIENT
Start: 2024-06-19 | End: 2024-07-26

## 2024-06-19 NOTE — TELEPHONE ENCOUNTER
Pending Prescriptions:                       Disp   Refills    oxyCODONE IR (ROXICODONE) 10 MG tablet    98 tab*0            Sig: Take 1 tablet (10 mg) by mouth every 3 hours At 2           am, 5 am, 11am, 2 pm, 5 pm, 8 pm, 11 pm, may fill           6/14 for 6/16    oxyCODONE IR (ROXICODONE) 15 MG tablet    28 tab*0            Sig: Take 1 tablet (15 mg) by mouth daily At 8 am, may           fill 7/3 for 7/5    [facility] [place], [place] - [address] [facility] [address]

## 2024-06-19 NOTE — TELEPHONE ENCOUNTER
Received request for a refill(s) of     oxyCODONE IR (ROXICODONE) 15 MG tablet  Last dispensed from pharmacy on 06/07/24  oxyCODONE IR (ROXICODONE) 10 MG tablet   Last dispensed from pharmacy on 06/02/24    Patient's last office/virtual visit by prescribing provider on 05/14/24  Next office/virtual appointment scheduled for 08/27/24    Last urine drug screen date 09/27/23  Current opioid agreement on file (completed within the last year) Yes Date of opioid agreement: 09/27/23    E-prescribe to     [facility] - [place], [place] - [address]  [facility] [id number]  Phone: [phone] Fax: [phone]    Will route to nursing [place] for review and preparation of prescription(s).       Ghazala George MA  [facility] [place]

## 2024-07-05 ENCOUNTER — MYC MEDICAL ADVICE (OUTPATIENT)
Dept: INTERNAL MEDICINE | Facility: CLINIC | Age: 54
End: 2024-07-05
Payer: COMMERCIAL

## 2024-07-05 ENCOUNTER — MYC MEDICAL ADVICE (OUTPATIENT)
Dept: PALLIATIVE MEDICINE | Facility: OTHER | Age: 54
End: 2024-07-05
Payer: COMMERCIAL

## 2024-07-05 DIAGNOSIS — F31.81 BIPOLAR 2 DISORDER (H): ICD-10-CM

## 2024-07-05 DIAGNOSIS — S06.9X9S TRAUMATIC BRAIN INJURY WITH LOSS OF CONSCIOUSNESS, SEQUELA (H): ICD-10-CM

## 2024-07-05 DIAGNOSIS — M54.16 LUMBAR RADICULOPATHY: ICD-10-CM

## 2024-07-05 RX ORDER — OXYCODONE HYDROCHLORIDE 10 MG/1
10 TABLET ORAL
Qty: 98 TABLET | Refills: 0 | Status: SHIPPED | OUTPATIENT
Start: 2024-07-05 | End: 2024-07-18

## 2024-07-05 NOTE — TELEPHONE ENCOUNTER
7/5/2024 9:40 AM     REFILL REQUEST:  I am signing this on behalf of my colleague Dmitriy Cramer MD who is out of the office. Please see their previous documentation regarding the appropriateness of these prescriptions. Chart reviewed - Request appears appropriate. PDMP reviewed for all controlled substance refills. If any concerns are found, it will be noted.     Refilled for 14 day supply.  Script e-Prescribed to pharmacy    KENDRICK Adames, DNP, FNP-C

## 2024-07-08 RX ORDER — DEXTROAMPHETAMINE SACCHARATE, AMPHETAMINE ASPARTATE, DEXTROAMPHETAMINE SULFATE AND AMPHETAMINE SULFATE 3.75; 3.75; 3.75; 3.75 MG/1; MG/1; MG/1; MG/1
15 TABLET ORAL DAILY
Qty: 30 TABLET | Refills: 0 | Status: SHIPPED | OUTPATIENT
Start: 2024-07-08 | End: 2024-08-06

## 2024-08-05 ENCOUNTER — TELEPHONE (OUTPATIENT)
Dept: INTERNAL MEDICINE | Facility: CLINIC | Age: 54
End: 2024-08-05
Payer: COMMERCIAL

## 2024-08-05 NOTE — TELEPHONE ENCOUNTER
August 5, 2024    [facility] Emergency Resuscitation Guidelines was received via fax for Dr. Lund.  Patient label was attached to paperwork and placed in provider's inbox to be signed.    Helen Bowen

## 2024-08-06 DIAGNOSIS — S06.9X9S TRAUMATIC BRAIN INJURY WITH LOSS OF CONSCIOUSNESS, SEQUELA (H): ICD-10-CM

## 2024-08-06 DIAGNOSIS — F31.81 BIPOLAR 2 DISORDER (H): ICD-10-CM

## 2024-08-06 RX ORDER — DEXTROAMPHETAMINE SACCHARATE, AMPHETAMINE ASPARTATE, DEXTROAMPHETAMINE SULFATE AND AMPHETAMINE SULFATE 3.75; 3.75; 3.75; 3.75 MG/1; MG/1; MG/1; MG/1
15 TABLET ORAL DAILY
Qty: 30 TABLET | Refills: 0 | Status: SHIPPED | OUTPATIENT
Start: 2024-08-06 | End: 2024-09-13

## 2024-08-14 NOTE — TELEPHONE ENCOUNTER
August 14, 2024    [facility] Emergency Resuscitatoin Guidelines was picked up from outbox of Dr. Lund and sent via fax to [phone].    Helen Bowen

## 2024-08-23 ENCOUNTER — TELEPHONE (OUTPATIENT)
Dept: INTERNAL MEDICINE | Facility: CLINIC | Age: 54
End: 2024-08-23
Payer: COMMERCIAL

## 2024-08-23 DIAGNOSIS — I89.0 LYMPHEDEMA OF BOTH LOWER EXTREMITIES: ICD-10-CM

## 2024-08-23 DIAGNOSIS — G89.29 CHRONIC PAIN OF RIGHT KNEE: ICD-10-CM

## 2024-08-23 DIAGNOSIS — F31.81 BIPOLAR 2 DISORDER (H): Primary | ICD-10-CM

## 2024-08-23 DIAGNOSIS — F11.90 CHRONIC, CONTINUOUS USE OF OPIOIDS: ICD-10-CM

## 2024-08-23 DIAGNOSIS — M25.561 CHRONIC PAIN OF RIGHT KNEE: ICD-10-CM

## 2024-08-23 NOTE — TELEPHONE ENCOUNTER
Reason for Call:  Referral    Detailed comments: Caller is requesting referral to [facility] Fax [phone] needed for RN medication management.    Phone Number Patient can be reached at: Other phone number:  [phone]     Best Time: 8 am to 5 pm    Can we leave a detailed message on this number? YES    Call taken on 8/23/2024 at 11:34 AM by Alisa Nash

## 2024-08-27 ENCOUNTER — LAB (OUTPATIENT)
Dept: LAB | Facility: HOSPITAL | Age: 54
End: 2024-08-27
Attending: ANESTHESIOLOGY
Payer: COMMERCIAL

## 2024-08-27 ENCOUNTER — MYC MEDICAL ADVICE (OUTPATIENT)
Dept: INTERNAL MEDICINE | Facility: CLINIC | Age: 54
End: 2024-08-27

## 2024-08-27 ENCOUNTER — OFFICE VISIT (OUTPATIENT)
Dept: PALLIATIVE MEDICINE | Facility: OTHER | Age: 54
End: 2024-08-27
Attending: ANESTHESIOLOGY
Payer: COMMERCIAL

## 2024-08-27 VITALS
WEIGHT: 194 LBS | HEART RATE: 80 BPM | OXYGEN SATURATION: 97 % | SYSTOLIC BLOOD PRESSURE: 119 MMHG | BODY MASS INDEX: 29.5 KG/M2 | DIASTOLIC BLOOD PRESSURE: 70 MMHG

## 2024-08-27 DIAGNOSIS — M15.0 PRIMARY OSTEOARTHRITIS INVOLVING MULTIPLE JOINTS: ICD-10-CM

## 2024-08-27 DIAGNOSIS — Z79.891 USE OF OPIATES FOR THERAPEUTIC PURPOSES: Primary | ICD-10-CM

## 2024-08-27 DIAGNOSIS — Z79.891 USE OF OPIATES FOR THERAPEUTIC PURPOSES: ICD-10-CM

## 2024-08-27 DIAGNOSIS — I89.0 LYMPHEDEMA OF BOTH LOWER EXTREMITIES: ICD-10-CM

## 2024-08-27 DIAGNOSIS — M79.7 FIBROMYALGIA: ICD-10-CM

## 2024-08-27 DIAGNOSIS — Z12.5 PROSTATE CANCER SCREENING: ICD-10-CM

## 2024-08-27 DIAGNOSIS — E66.1 CLASS 1 DRUG-INDUCED OBESITY WITH SERIOUS COMORBIDITY AND BODY MASS INDEX (BMI) OF 32.0 TO 32.9 IN ADULT: ICD-10-CM

## 2024-08-27 DIAGNOSIS — M54.16 LUMBAR RADICULOPATHY: ICD-10-CM

## 2024-08-27 DIAGNOSIS — E66.811 CLASS 1 DRUG-INDUCED OBESITY WITH SERIOUS COMORBIDITY AND BODY MASS INDEX (BMI) OF 32.0 TO 32.9 IN ADULT: ICD-10-CM

## 2024-08-27 DIAGNOSIS — Z00.00 PREVENTATIVE HEALTH CARE: ICD-10-CM

## 2024-08-27 LAB
ALBUMIN SERPL BCG-MCNC: 4.3 G/DL (ref 3.5–5.2)
ALP SERPL-CCNC: 149 U/L (ref 40–150)
ALT SERPL W P-5'-P-CCNC: 22 U/L (ref 0–70)
ANION GAP SERPL CALCULATED.3IONS-SCNC: 14 MMOL/L (ref 7–15)
AST SERPL W P-5'-P-CCNC: 18 U/L (ref 0–45)
B BURGDOR IGG+IGM SER QL: 0.06
BASOPHILS # BLD AUTO: 0.1 10E3/UL (ref 0–0.2)
BASOPHILS NFR BLD AUTO: 1 %
BILIRUB SERPL-MCNC: 0.2 MG/DL
BUN SERPL-MCNC: 12.9 MG/DL (ref 6–20)
CALCIUM SERPL-MCNC: 9 MG/DL (ref 8.8–10.4)
CHLORIDE SERPL-SCNC: 102 MMOL/L (ref 98–107)
CHOLEST SERPL-MCNC: 144 MG/DL
CREAT SERPL-MCNC: 0.92 MG/DL (ref 0.67–1.17)
CRP SERPL-MCNC: 3.5 MG/L
D DIMER PPP FEU-MCNC: <0.27 UG/ML FEU (ref 0–0.5)
EGFRCR SERPLBLD CKD-EPI 2021: >90 ML/MIN/1.73M2
EOSINOPHIL # BLD AUTO: 0.1 10E3/UL (ref 0–0.7)
EOSINOPHIL NFR BLD AUTO: 1 %
ERYTHROCYTE [DISTWIDTH] IN BLOOD BY AUTOMATED COUNT: 14 % (ref 10–15)
ERYTHROCYTE [SEDIMENTATION RATE] IN BLOOD BY WESTERGREN METHOD: 11 MM/HR (ref 0–20)
ETHANOL SERPL-MCNC: <0.01 G/DL
FASTING STATUS PATIENT QL REPORTED: NO
FASTING STATUS PATIENT QL REPORTED: NO
GLUCOSE SERPL-MCNC: 135 MG/DL (ref 70–99)
HCO3 SERPL-SCNC: 23 MMOL/L (ref 22–29)
HCT VFR BLD AUTO: 40.2 % (ref 40–53)
HDLC SERPL-MCNC: 40 MG/DL
HGB BLD-MCNC: 13.2 G/DL (ref 13.3–17.7)
IMM GRANULOCYTES # BLD: 0.1 10E3/UL
IMM GRANULOCYTES NFR BLD: 1 %
LDLC SERPL CALC-MCNC: 44 MG/DL
LYMPHOCYTES # BLD AUTO: 1.2 10E3/UL (ref 0.8–5.3)
LYMPHOCYTES NFR BLD AUTO: 13 %
MCH RBC QN AUTO: 29.3 PG (ref 26.5–33)
MCHC RBC AUTO-ENTMCNC: 32.8 G/DL (ref 31.5–36.5)
MCV RBC AUTO: 89 FL (ref 78–100)
MONOCYTES # BLD AUTO: 0.5 10E3/UL (ref 0–1.3)
MONOCYTES NFR BLD AUTO: 5 %
NEUTROPHILS # BLD AUTO: 7.9 10E3/UL (ref 1.6–8.3)
NEUTROPHILS NFR BLD AUTO: 80 %
NONHDLC SERPL-MCNC: 104 MG/DL
NRBC # BLD AUTO: 0 10E3/UL
NRBC BLD AUTO-RTO: 0 /100
PLATELET # BLD AUTO: 312 10E3/UL (ref 150–450)
POTASSIUM SERPL-SCNC: 3.7 MMOL/L (ref 3.4–5.3)
PROT SERPL-MCNC: 6.7 G/DL (ref 6.4–8.3)
PSA SERPL DL<=0.01 NG/ML-MCNC: 0.15 NG/ML (ref 0–3.5)
RBC # BLD AUTO: 4.5 10E6/UL (ref 4.4–5.9)
RHEUMATOID FACT SERPL-ACNC: <10 IU/ML
SODIUM SERPL-SCNC: 139 MMOL/L (ref 135–145)
TRIGL SERPL-MCNC: 302 MG/DL
URATE SERPL-MCNC: 2.6 MG/DL (ref 3.4–7)
VIT B12 SERPL-MCNC: 454 PG/ML (ref 232–1245)
VIT D+METAB SERPL-MCNC: 66 NG/ML (ref 20–50)
WBC # BLD AUTO: 9.9 10E3/UL (ref 4–11)

## 2024-08-27 PROCEDURE — 85379 FIBRIN DEGRADATION QUANT: CPT

## 2024-08-27 PROCEDURE — 84295 ASSAY OF SERUM SODIUM: CPT

## 2024-08-27 PROCEDURE — G0103 PSA SCREENING: HCPCS

## 2024-08-27 PROCEDURE — G0463 HOSPITAL OUTPT CLINIC VISIT: HCPCS | Performed by: ANESTHESIOLOGY

## 2024-08-27 PROCEDURE — 86140 C-REACTIVE PROTEIN: CPT

## 2024-08-27 PROCEDURE — 86364 TISS TRNSGLTMNASE EA IG CLAS: CPT

## 2024-08-27 PROCEDURE — 83718 ASSAY OF LIPOPROTEIN: CPT

## 2024-08-27 PROCEDURE — 85652 RBC SED RATE AUTOMATED: CPT

## 2024-08-27 PROCEDURE — 86038 ANTINUCLEAR ANTIBODIES: CPT

## 2024-08-27 PROCEDURE — 84550 ASSAY OF BLOOD/URIC ACID: CPT

## 2024-08-27 PROCEDURE — 36415 COLL VENOUS BLD VENIPUNCTURE: CPT

## 2024-08-27 PROCEDURE — 80365 DRUG SCREENING OXYCODONE: CPT

## 2024-08-27 PROCEDURE — 80349 CANNABINOIDS NATURAL: CPT

## 2024-08-27 PROCEDURE — 85025 COMPLETE CBC W/AUTO DIFF WBC: CPT

## 2024-08-27 PROCEDURE — 82465 ASSAY BLD/SERUM CHOLESTEROL: CPT

## 2024-08-27 PROCEDURE — 86431 RHEUMATOID FACTOR QUANT: CPT

## 2024-08-27 PROCEDURE — 99214 OFFICE O/P EST MOD 30 MIN: CPT | Performed by: ANESTHESIOLOGY

## 2024-08-27 PROCEDURE — 82607 VITAMIN B-12: CPT

## 2024-08-27 PROCEDURE — 80307 DRUG TEST PRSMV CHEM ANLYZR: CPT

## 2024-08-27 PROCEDURE — 86618 LYME DISEASE ANTIBODY: CPT

## 2024-08-27 PROCEDURE — 82077 ASSAY SPEC XCP UR&BREATH IA: CPT

## 2024-08-27 PROCEDURE — 82306 VITAMIN D 25 HYDROXY: CPT

## 2024-08-27 PROCEDURE — 86200 CCP ANTIBODY: CPT

## 2024-08-27 PROCEDURE — 80359 METHYLENEDIOXYAMPHETAMINES: CPT

## 2024-08-27 RX ORDER — OXYCODONE HYDROCHLORIDE 15 MG/1
15 TABLET ORAL DAILY
Qty: 28 TABLET | Refills: 0 | Status: SHIPPED | OUTPATIENT
Start: 2024-08-27 | End: 2024-09-26

## 2024-08-27 RX ORDER — OXYCODONE HYDROCHLORIDE 10 MG/1
10 TABLET ORAL
Qty: 98 TABLET | Refills: 0 | Status: SHIPPED | OUTPATIENT
Start: 2024-08-27 | End: 2024-09-12

## 2024-08-27 ASSESSMENT — PAIN SCALES - GENERAL: PAINLEVEL: WORST PAIN (10)

## 2024-08-27 NOTE — LETTER

## 2024-08-27 NOTE — PROGRESS NOTES
Patient presents to the clinic today for a visit with JAYME MULLEN MD regarding Pain Management.          4/3/2024    12:52 PM 5/14/2024     1:22 PM 8/27/2024     1:33 PM   PEG Score   PEG Total Score 9.33 9.33 10       UDS/CSA- 09.27.2023    Medications- Oxycodone 15 @8am    Oxycodone 10 mg 2am    Notes He doesn't have his Oxycodone because of the rules of transitioning from his care.     Kaiyln Carolina  [facility] Clinical Assistant

## 2024-08-27 NOTE — PROGRESS NOTES
[facility] Follow-up    Date of visit: 8/27/2024    Chief complaint:   Chief Complaint   Patient presents with    Pain     Seen for history of lumbar surgeries, orthopedic surgeries interval history:    Chart reflects [facility], concerned with having upper lumbar radicular symptoms, to have an MRI with sedation and follow-up with Dr. Aleksey Ya.    He reviews today several matters, working with dentist, having an old root canal that had an abscess, into the bone, then sinus problems.  At the dental surgeon cleaning out the sinus.  This affected his ability to eat losing 5 pounds.    Describes another matter, noting pain into his abdominal and chest area, can affect his breathing and eating.  Has history of C3-C7 fusion, about a T7-8 fusion, and L4-S1 fusion.  Was told the area above that lumbar fusion could be a problem and feels something occurred over the last month where that is a factor.  He is to have an MRI and follow-up with surgery.    Feels that this kind of intervention if surgery needed will be a much simpler recovery than his other extensive fusions and he is trying not to get discouraged.    He is moved in his own apartment over the last week and feels some ways making progress and recovery after living in the previous facility for years.    Review some of his goals and what he wants to address with previous.    Reviews pain still is constant, has 7 ice packs in his freezer.  Wakes with pain.    He is going to establish with a new psychotherapist.  Sees a psychiatrist at intervals.    Has right knee pain wearing a brace, will postpone that till addressing his back.    Similarly having injections in his right shoulder, and the biceps is torn and that we will address that later.    Has used medical cannabis, using different preparations which seem to help some decrease his PTSD possibly some of his pain.    He will be getting a home  health nurse and has and home health care support.    He does continue with the oxycodone 15 mg in the morning and 10 mg 7 times a day.  Last urine drug test in September, PMP reviewed.    He will be having a physical therapist come to the home to help and continue working on his musculoskeletal system.            Medications:  Current Outpatient Medications   Medication Sig Dispense Refill    acetaminophen (TYLENOL) 650 MG CR tablet 2 TABLETS (41657NO) ORALLY 3 TIMES DAILY (MAX APAP:4GM/24HR) 540 tablet 1    albuterol (PROAIR HFA/PROVENTIL HFA/VENTOLIN HFA) 108 (90 Base) MCG/ACT inhaler Inhale 2 puffs into the lungs every 6 hours as needed 6.7 g 11    allopurinol (ZYLOPRIM) 300 MG tablet Take 1 tablet (300 mg) by mouth 2 times daily 180 tablet 3    ammonium lactate (LAC-HYDRIN) 12 % external lotion Apply topically daily as needed for dry skin 500 g 11    amphetamine-dextroamphetamine (ADDERALL) 15 MG tablet Take 1 tablet (15 mg) by mouth daily 30 tablet 0    amphetamine-dextroamphetamine (ADDERALL) 15 MG tablet Take 1 tablet (15 mg) by mouth daily 30 tablet 0    bisacodyl (DULCOLAX) 10 MG suppository Place 1 suppository (10 mg) rectally daily as needed for constipation 60 suppository 1    buPROPion (WELLBUTRIN SR) 150 MG 12 hr tablet 1 TABLET ORALLY 2 TIMES DAILY (DX:______________) 56 tablet 10    CALCIUM ANTACID 500 MG chewable tablet       chlorhexidine (PERIDEX) 0.12 % solution Swish and spit 15 mLs in mouth 3 times daily as needed (sore throat) - Swish & Spit 473 mL 11    ergocalciferol (ERGOCALCIFEROL) 1.25 MG (05700 UT) capsule Take 1 capsule (50,000 Units) by mouth once a week 12 capsule 3    fexofenadine (ALLEGRA) 180 MG tablet Take 180 mg by mouth daily as needed      fexofenadine-pseudoePHEDrine (ALLEGRA-D 24) 180-240 MG 24 hr tablet Take 1 tablet by mouth daily 90 tablet 1    finasteride (PROSCAR) 5 MG tablet 1 TABLET ORALLY DAILY (DX: URINARY RETENTION) 28 tablet 5    fluconazole (DIFLUCAN) 200 MG  tablet Take 1 tablet (200 mg) by mouth daily 90 tablet 3    furosemide (LASIX) 40 MG tablet Take 2 tablets (80 mg) by mouth 2 times daily 120 tablet 3    hydrOXYzine (ATARAX) 50 MG tablet Per Dr. LOPEZ,LEANNEYODE      lactulose 20 GM/30ML SOLN Take 45 mLs by mouth 2 times daily For constipation. 5400 mL 11    lamoTRIgine (LAMICTAL) 150 MG tablet Take 1 tablet (150 mg) by mouth 3 times daily      LANsoprazole (PREVACID) 30 MG DR capsule       lidocaine (LIDODERM) 5 % patch Place onto the skin every 24 hours To prevent lidocaine toxicity, patient should be patch free for 12 hrs daily. 30 patch 11    lidocaine (XYLOCAINE) 2 % external gel Apply 1 inch topically 4 times a day as needed to gums. 85 g 11    medical cannabis (Patient's own supply) See Admin Instructions (The purpose of this order is to document that the patient reports taking medical cannabis.  This is not a prescription, and is not used to certify that the patient has a qualifying medical condition.)      methocarbamol (ROBAXIN) 500 MG tablet Take 2 tablets (1,000 mg) by mouth 4 times daily For use beginning 3/14/23 120 tablet 1    metoprolol succinate ER (TOPROL-XL) 50 MG 24 hr tablet Take 1 tablet (50 mg) by mouth daily 90 tablet 3    montelukast (SINGULAIR) 10 MG tablet Take 1 tablet by mouth daily      MUCUS RELIEF 600 MG 12 hr tablet       nabumetone (RELAFEN) 500 MG tablet Take 1 tablet (500 mg) by mouth 2 times daily 180 tablet 3    naloxegol (MOVANTIK) 25 MG TABS tablet Take 1 tablet (25 mg) by mouth every morning (before breakfast) 30 tablet 1    naloxone (NARCAN) 4 MG/0.1ML nasal spray 1 spray (4 mg dose) into one nostril for opioid reversal. Call 911. May repeat if no response in 3 minutes.      nystatin (MYCOSTATIN) 448757 UNIT/ML suspension Take 10 mLs (1,000,000 Units) by mouth 2 times daily 800 mL 3    oxyCODONE IR (ROXICODONE) 10 MG tablet Take 1 tablet (10 mg) by mouth every 3 hours. At 2 am, 5 am, 11am, 2 pm, 5 pm, 8 pm, 11 pm, may fill  7/31/24 and start 8/1/24 (14 day supply) 98 tablet 0    oxyCODONE IR (ROXICODONE) 15 MG tablet Take 1 tablet (15 mg) by mouth daily. At 8 am, may fill 8/28 for 8/29 28 tablet 0    potassium chloride ER (KLOR-CON M) 20 MEQ CR tablet Take 1 tablet (20 mEq) by mouth daily 90 tablet 3    QUEtiapine (SEROQUEL) 100 MG tablet Take 1 tablet (100 mg) by mouth 4 times daily as needed (anxiety) 60 tablet 1    QUEtiapine (SEROQUEL) 300 MG tablet Take 2 tablets (600 mg) by mouth At Bedtime 180 tablet 0    sennosides (SENOKOT) 8.8 MG/5ML syrup Take 10 mLs by mouth At Bedtime 236 mL 1    tamsulosin (FLOMAX) 0.4 MG capsule Take 1 capsule (0.4 mg) by mouth 2 times daily 180 capsule 3    zonisamide (ZONEGRAN) 25 MG capsule Take 3 capsules (75 mg) by mouth 4 times daily 180 capsule 3           Physical Exam:  Blood pressure 119/70, pulse 80, weight 88 kg (194 lb), SpO2 97%.    Alert, missing some teeth as he points out that his dental work.  Wearing a brace on his right knee.  Surgical scars evident left knee and right foot.    Organized with his notebook, reviews with his traumatic brain injury has to be organized.    Affect congruent.          Assessment:   Spinal pain with cervical thoracic and lumbar fusions, now with concern for involvement above his lumbar fusion, being evaluated by surgery.    We have been tapering down the opioids slowly.  He now has a more acute spinal problem and continues with his dental concerns, and has moved into his own apartment doing more activity in the transition.  Will make no changes in the opioids presently.  Will continue with the medical cannabis program as well.    Total time 32 minutes       minutes spent on the date of encounter doing chart review, history, and exam documentation and further activities as noted above.     Dmitriy Cramer MD  [facility]

## 2024-08-27 NOTE — PATIENT INSTRUCTIONS
PLAN:    Continue Oxycodone 15 mg one daily , and oxycodone 10 mg 7 times a day    You are to have MRI and review with your back surgeon    You are working with your dentist..    Continue the Medical Cannabis program to find best products for pain       Follow-up with Dr. Cramer in 8-10 weeks

## 2024-08-28 LAB
CCP AB SER IA-ACNC: <0.4 U/ML
TTG IGA SER-ACNC: <0.2 U/ML
TTG IGG SER-ACNC: <0.6 U/ML

## 2024-08-29 LAB
AMPHETAMINES SERPL QL SCN: POSITIVE NG/ML
ANA SER QL IF: NEGATIVE
ANNOTATION COMMENT IMP: NORMAL
BARBITURATES SERPL QL SCN: NEGATIVE NG/ML
BENZODIAZ SERPL QL SCN: NEGATIVE NG/ML
BUPRENORPHINE SERPL-MCNC: NEGATIVE NG/ML
CANNABINOIDS SERPL QL SCN: POSITIVE NG/ML
COCAINE SERPL QL SCN: NEGATIVE NG/ML
METHADONE SERPL QL SCN: NEGATIVE NG/ML
METHAMPHET SERPL QL: NEGATIVE NG/ML
OPIATES SERPL QL SCN: NEGATIVE NG/ML
OXYCODONE SERPL QL: POSITIVE NG/ML
PCP SERPL QL SCN: NEGATIVE NG/ML

## 2024-08-30 DIAGNOSIS — M15.0 PRIMARY OSTEOARTHRITIS INVOLVING MULTIPLE JOINTS: ICD-10-CM

## 2024-08-30 RX ORDER — NABUMETONE 500 MG/1
500 TABLET, FILM COATED ORAL 2 TIMES DAILY
Qty: 180 TABLET | Refills: 3 | Status: SHIPPED | OUTPATIENT
Start: 2024-08-30

## 2024-08-31 ENCOUNTER — TELEPHONE (OUTPATIENT)
Dept: INTERNAL MEDICINE | Facility: CLINIC | Age: 54
End: 2024-08-31
Payer: COMMERCIAL

## 2024-08-31 LAB
6MAM SERPL-MCNC: <2 NG/ML
AMPHET SERPL-MCNC: 43 NG/ML
CODEINE SERPL-MCNC: <2 NG/ML
HYDROCODONE SERPL-MCNC: <2 NG/ML
HYDROMORPHONE SERPL-MCNC: <2 NG/ML
MDA SERPL-MCNC: <20 NG/ML
MDEA SERPL-MCNC: <20 NG/ML
MDMA SERPL-MCNC: <20 NG/ML
METHAMPHET SERPL-MCNC: <20 NG/ML
MORPHINE SERPL-MCNC: <2 NG/ML
OXYCODONE SERPL-MCNC: 49 NG/ML
OXYMORPHONE SERPL-MCNC: <2 NG/ML

## 2024-08-31 NOTE — TELEPHONE ENCOUNTER
Order/Referral Request    Who is requesting: delores    Orders being requested: delay order for PT and skill nursing    Reason service is needed/diagnosis: patient will start on 9/3/2024 and original order was for medication management and bipolar. Also need  to have confirmation of pcp. It needs to be separate from order.    When are orders needed by: 9/3/2024    Has this been discussed with Provider: No    Does patient have a preference on a Group/Provider/Facility? [facility]    Does patient have an appointment scheduled?: No    Where to send orders:  verbal order would great    Could we send this information to you in [facility] or would you prefer to receive a phone call?:   Patient would prefer a phone call   Okay to leave a detailed message?: Yes at Other phone number: [phone]

## 2024-09-01 ENCOUNTER — HEALTH MAINTENANCE LETTER (OUTPATIENT)
Age: 54
End: 2024-09-01

## 2024-09-01 LAB — CANNABINOIDS SERPL-MCNC: 111 NG/ML

## 2024-09-03 ENCOUNTER — TELEPHONE (OUTPATIENT)
Dept: INTERNAL MEDICINE | Facility: CLINIC | Age: 54
End: 2024-09-03
Payer: COMMERCIAL

## 2024-09-03 ENCOUNTER — MEDICAL CORRESPONDENCE (OUTPATIENT)
Dept: HEALTH INFORMATION MANAGEMENT | Facility: CLINIC | Age: 54
End: 2024-09-03

## 2024-09-03 NOTE — TELEPHONE ENCOUNTER
Attempted to contact Mandy at [phone]. Received general nurse line for [facility]. Left message requesting return call from Mandy for orders.

## 2024-09-04 DIAGNOSIS — F31.81 BIPOLAR 2 DISORDER (H): ICD-10-CM

## 2024-09-04 RX ORDER — DEXTROAMPHETAMINE SACCHARATE, AMPHETAMINE ASPARTATE, DEXTROAMPHETAMINE SULFATE AND AMPHETAMINE SULFATE 3.75; 3.75; 3.75; 3.75 MG/1; MG/1; MG/1; MG/1
15 TABLET ORAL DAILY
Qty: 30 TABLET | Refills: 0 | Status: SHIPPED | OUTPATIENT
Start: 2024-09-04

## 2024-09-04 NOTE — TELEPHONE ENCOUNTER
Home Care is calling regarding an established patient with  [facility].        9/4/2024    12:38 PM   Home Care Information   Current following provider [facility]     Requesting orders from: Devon Lund  Provider is following patient: Yes  Is this a 60-day recertification request?  No    Orders Requested    Skilled Nursing  Request for delay in care, service is not able to be provided within same scheduled day.   Start of care delay until 9/3    Physical Therapy  Request for delay in care, service is not able to be provided within same scheduled day.   Start of care delay until 9/3      Verbal orders given.  Home Care will send orders for provider to sign.  Information was gathered and will be sent to provider for review.  RN will contact Home Care with information after provider review.  Confirmed ok to leave a detailed message with call back.  Contact information confirmed and updated as needed.  See below for provider approval.    Chip Obrien RN

## 2024-09-05 ENCOUNTER — TELEPHONE (OUTPATIENT)
Dept: INTERNAL MEDICINE | Facility: CLINIC | Age: 54
End: 2024-09-05
Payer: COMMERCIAL

## 2024-09-05 DIAGNOSIS — T40.2X5A THERAPEUTIC OPIOID INDUCED CONSTIPATION: ICD-10-CM

## 2024-09-05 DIAGNOSIS — F31.81 BIPOLAR 2 DISORDER (H): ICD-10-CM

## 2024-09-05 DIAGNOSIS — K59.03 THERAPEUTIC OPIOID INDUCED CONSTIPATION: ICD-10-CM

## 2024-09-05 DIAGNOSIS — K59.03 DRUG-INDUCED CONSTIPATION: ICD-10-CM

## 2024-09-05 DIAGNOSIS — F41.1 GENERALIZED ANXIETY DISORDER: ICD-10-CM

## 2024-09-05 DIAGNOSIS — G89.4 CHRONIC PAIN SYNDROME: ICD-10-CM

## 2024-09-05 DIAGNOSIS — J45.20 MILD INTERMITTENT ASTHMA WITHOUT COMPLICATION: ICD-10-CM

## 2024-09-05 DIAGNOSIS — S06.9X9S TRAUMATIC BRAIN INJURY WITH LOSS OF CONSCIOUSNESS, SEQUELA (H): ICD-10-CM

## 2024-09-05 DIAGNOSIS — M48.02 FORAMINAL STENOSIS OF CERVICAL REGION: ICD-10-CM

## 2024-09-05 NOTE — TELEPHONE ENCOUNTER
Home Care is calling regarding an established patient with [facility].        9/5/2024     3:21 PM 9/4/2024    12:38 PM   Home Care Information   Date of Home Care episode start 9/3/2024    Current following provider Ashely Lund   [facility]     Requesting orders from: Devon Lund  Provider is following patient: Yes  Is this a 60-day recertification request?  No    Orders Requested    Skilled Nursing  Request for initial certification (first set of orders)   Frequency:  1x/week for 9 weeks for medication management      Information was gathered and will be sent to provider for review.  RN will contact Home Care with information after provider review.  Confirmed ok to leave a detailed message with call back.  Contact information confirmed and updated as needed.      Patient is also needing refills of multiple medications, pended and routed to PCP.    Caren Beaver RN

## 2024-09-05 NOTE — TELEPHONE ENCOUNTER
September 5, 2024    Home health orders was picked up from outbox of Dr. Lund and sent via fax to [phone].    Helen Bowen

## 2024-09-09 ENCOUNTER — TELEPHONE (OUTPATIENT)
Dept: INTERNAL MEDICINE | Facility: CLINIC | Age: 54
End: 2024-09-09
Payer: COMMERCIAL

## 2024-09-09 RX ORDER — SENNOSIDES 8.6 MG
1300 CAPSULE ORAL 3 TIMES DAILY
Qty: 540 TABLET | Refills: 3 | Status: SHIPPED | OUTPATIENT
Start: 2024-09-09

## 2024-09-09 RX ORDER — QUETIAPINE FUMARATE 300 MG/1
600 TABLET, FILM COATED ORAL AT BEDTIME
Qty: 180 TABLET | Refills: 11 | Status: SHIPPED | OUTPATIENT
Start: 2024-09-09

## 2024-09-09 RX ORDER — QUETIAPINE FUMARATE 100 MG/1
100 TABLET, FILM COATED ORAL 4 TIMES DAILY PRN
Qty: 60 TABLET | Refills: 1 | Status: SHIPPED | OUTPATIENT
Start: 2024-09-09

## 2024-09-09 RX ORDER — ALBUTEROL SULFATE 90 UG/1
2 AEROSOL, METERED RESPIRATORY (INHALATION) EVERY 6 HOURS PRN
Qty: 6.7 G | Refills: 11 | Status: SHIPPED | OUTPATIENT
Start: 2024-09-09

## 2024-09-09 RX ORDER — LACTULOSE 20 G/30ML
45 SOLUTION ORAL 2 TIMES DAILY
Qty: 5400 ML | Refills: 11 | Status: SHIPPED | OUTPATIENT
Start: 2024-09-09

## 2024-09-09 RX ORDER — METHOCARBAMOL 500 MG/1
1000 TABLET, FILM COATED ORAL 4 TIMES DAILY
Qty: 120 TABLET | Refills: 11 | Status: SHIPPED | OUTPATIENT
Start: 2024-09-09 | End: 2024-09-16

## 2024-09-09 RX ORDER — GUAIFENESIN 600 MG/1
1200 TABLET, EXTENDED RELEASE ORAL 2 TIMES DAILY
Qty: 180 TABLET | Refills: 0 | Status: SHIPPED | OUTPATIENT
Start: 2024-09-09

## 2024-09-09 RX ORDER — CALCIUM CARBONATE 500 MG/1
1 TABLET, CHEWABLE ORAL 2 TIMES DAILY
Qty: 180 TABLET | Refills: 3 | Status: SHIPPED | OUTPATIENT
Start: 2024-09-09

## 2024-09-09 NOTE — TELEPHONE ENCOUNTER
Order/Referral Request    Who is requesting: [facility]    Orders being requested:   Continuation of OT   2 times weekly 2 weeks  1 time weekly for 2 weeks    Social work - Eval for community resources    Reason service is needed/diagnosis: n/a    When are orders needed by: n/a    Has this been discussed with Provider: No    Does patient have a preference on a Group/Provider/Facility? N/a    Does patient have an appointment scheduled?: No    Where to send orders: N/A    Could we send this information to you in [facility] or would you prefer to receive a phone call?:   No preference   Okay to leave a detailed message?: Yes at Other phone number:  [phone]

## 2024-09-09 NOTE — TELEPHONE ENCOUNTER
Left detailed voicemail on Harper's confidential voicemail (RN with [facility]). Left verbal orders on voicemail for skilled nursing 1 time a week for 9 weeks for medication management.     Will forward request for refills back to PCP for review.

## 2024-09-09 NOTE — TELEPHONE ENCOUNTER
Home Care is calling regarding an established patient with  [facility].        9/5/2024     3:21 PM 9/4/2024    12:38 PM   Home Care Information   Date of Home Care episode start 9/3/2024    Current following provider Ashely Lund   [facility] Care agency [id number]     Requesting orders from: Devon Lund  Provider is following patient: Yes  Is this a 60-day recertification request?  No    Orders Requested    Occupational Therapy    Orders being requested:   Continuation of OT   2 times weekly 2 weeks  1 time weekly for 2 weeks       Social Work  Request for initial evaluation and treatment (one time)       Verbal orders given.  Home Care will send orders for provider to sign.  See provider ok in documentation below.     Rita Amador RN

## 2024-09-11 ENCOUNTER — TELEPHONE (OUTPATIENT)
Dept: INTERNAL MEDICINE | Facility: CLINIC | Age: 54
End: 2024-09-11
Payer: COMMERCIAL

## 2024-09-11 NOTE — TELEPHONE ENCOUNTER
Patient calls with concern as home care nurse is helping him manage his medications and she requested multiple refills to come from Dr. Lund. He states they were not all to come from Dr. Lund and insurance is denying some of them. Attempted to determine which ones insurance was denying multiple times without a clear answer.     Patient requests to be told previous prescriber of all medications prescribed by Dr. Lund 9/9/24. Went through medication list and listed off previous prescribed. Narcan, sennosides syrup, methocarbamol, and Seroquel all prescribed by different providers prior to current prescription. Patient fine with Narcan, sennosides syrup, and methocarbamol coming from Dr. Lund. Patient states Seroquel needs to come from Dr. Perez, psychiatry provider with [facility]. Informed patient that if he already picked up the Seroquel prescribed by Dr. Lund and Dr. Perez sends in new prescription, he likely will not be able to get another refill as it will be too early. Patient states he has not picked up Seroquel prescription prescribed by Ashely. Patient requests the Seroquel be prescribed by Dr. Perez in our system, described to patient that since it is an outside facility, Dr. Perez cannot prescribe within our system. Patient states this is incorrect. He states on his MyChart previously, Dr. Perez was the prescriber. Informed patient that this would be looked into.     Additionally, patient states pharmacy will not refill methocarbamol as it is on hold. Informed patient pharmacy would be contacted regarding this. Spoke with Pharmacy staff who states that methocarbomol will not be able to be filled until 9/13/24, as it is an early refill. Additionally, pharmacy states Seroquel prescription last picked up was from Dr. Perez. They will close the prescription from Dr. Lund on their end and put a note that Seroquel prescription comes from Dr. Perez.

## 2024-09-12 DIAGNOSIS — M54.16 LUMBAR RADICULOPATHY: ICD-10-CM

## 2024-09-12 RX ORDER — OXYCODONE HYDROCHLORIDE 10 MG/1
10 TABLET ORAL
Qty: 98 TABLET | Refills: 0 | Status: SHIPPED | OUTPATIENT
Start: 2024-09-12 | End: 2024-09-16

## 2024-09-12 NOTE — TELEPHONE ENCOUNTER
Received call from patient requesting refill(s) of oxyCODONE IR (ROXICODONE) 10 MG tablet      Last dispensed from pharmacy on 8/28/24    Patient's last office/virtual visit by prescribing provider on 8/27/24  Next office/virtual appointment scheduled for 10/29/24    Last urine drug screen 9/27/23  Current opioid agreement on file Yes Date: 9/27/23    Processing (pick one):      E-prescribe to [id number] [place], [place] - [facility] [address] [facility]    Will facilitate refill.    Pending Prescriptions:                       Disp   Refills    oxyCODONE IR (ROXICODONE) 10 MG tablet    98 tab*0            Sig: Take 1 tablet (10 mg) by mouth every 3 hours. At           2 am, 5 am, 11am, 2 pm, 5 pm, 8 pm, 11 pm, may           fill/start 9/12/24 (14 day supply)

## 2024-09-13 ENCOUNTER — VIRTUAL VISIT (OUTPATIENT)
Dept: INTERNAL MEDICINE | Facility: CLINIC | Age: 54
End: 2024-09-13
Payer: COMMERCIAL

## 2024-09-13 ENCOUNTER — MYC MEDICAL ADVICE (OUTPATIENT)
Dept: INTERNAL MEDICINE | Facility: CLINIC | Age: 54
End: 2024-09-13

## 2024-09-13 ENCOUNTER — TELEPHONE (OUTPATIENT)
Dept: INTERNAL MEDICINE | Facility: CLINIC | Age: 54
End: 2024-09-13

## 2024-09-13 DIAGNOSIS — S09.90XS COGNITIVE DEFICIT DUE TO OLD HEAD INJURY: ICD-10-CM

## 2024-09-13 DIAGNOSIS — M54.16 LUMBAR RADICULOPATHY: ICD-10-CM

## 2024-09-13 DIAGNOSIS — Z98.890 S/P LUMBAR LAMINECTOMY: ICD-10-CM

## 2024-09-13 DIAGNOSIS — I89.0 LYMPHEDEMA OF BOTH LOWER EXTREMITIES: ICD-10-CM

## 2024-09-13 DIAGNOSIS — R33.9 URINARY RETENTION WITH INCOMPLETE BLADDER EMPTYING: ICD-10-CM

## 2024-09-13 DIAGNOSIS — F31.81 BIPOLAR 2 DISORDER (H): ICD-10-CM

## 2024-09-13 DIAGNOSIS — Z98.890 HX OF DECOMPRESSIVE LUMBAR LAMINECTOMY: ICD-10-CM

## 2024-09-13 DIAGNOSIS — F31.77 BIPOLAR 1 DISORDER, MIXED, PARTIAL REMISSION (H): ICD-10-CM

## 2024-09-13 DIAGNOSIS — R41.89 COGNITIVE DEFICIT DUE TO OLD HEAD INJURY: ICD-10-CM

## 2024-09-13 DIAGNOSIS — F11.20 OPIOID DEPENDENCE, UNCOMPLICATED (H): ICD-10-CM

## 2024-09-13 DIAGNOSIS — Z01.818 PRE-OPERATIVE EXAMINATION FOR INTERNAL MEDICINE: Primary | ICD-10-CM

## 2024-09-13 DIAGNOSIS — R79.89 LOW VITAMIN D LEVEL: ICD-10-CM

## 2024-09-13 PROCEDURE — G2211 COMPLEX E/M VISIT ADD ON: HCPCS | Mod: 95 | Performed by: INTERNAL MEDICINE

## 2024-09-13 PROCEDURE — 99214 OFFICE O/P EST MOD 30 MIN: CPT | Mod: 95 | Performed by: INTERNAL MEDICINE

## 2024-09-13 RX ORDER — FUROSEMIDE 80 MG
TABLET ORAL
COMMUNITY
Start: 2024-08-23 | End: 2024-09-17

## 2024-09-13 RX ORDER — ERGOCALCIFEROL 1.25 MG/1
CAPSULE ORAL
COMMUNITY
Start: 2024-09-13 | End: 2024-09-18

## 2024-09-13 RX ORDER — FINASTERIDE 5 MG/1
5 TABLET, FILM COATED ORAL DAILY
Qty: 90 TABLET | Refills: 3 | Status: SHIPPED | OUTPATIENT
Start: 2024-09-13 | End: 2025-09-13

## 2024-09-13 ASSESSMENT — ANXIETY QUESTIONNAIRES
8. IF YOU CHECKED OFF ANY PROBLEMS, HOW DIFFICULT HAVE THESE MADE IT FOR YOU TO DO YOUR WORK, TAKE CARE OF THINGS AT HOME, OR GET ALONG WITH OTHER PEOPLE?: VERY DIFFICULT
7. FEELING AFRAID AS IF SOMETHING AWFUL MIGHT HAPPEN: NEARLY EVERY DAY
GAD7 TOTAL SCORE: 21

## 2024-09-13 ASSESSMENT — ASTHMA QUESTIONNAIRES
QUESTION_5 LAST FOUR WEEKS HOW WOULD YOU RATE YOUR ASTHMA CONTROL: COMPLETELY CONTROLLED
QUESTION_4 LAST FOUR WEEKS HOW OFTEN HAVE YOU USED YOUR RESCUE INHALER OR NEBULIZER MEDICATION (SUCH AS ALBUTEROL): ONE OR TWO TIMES PER DAY
QUESTION_1 LAST FOUR WEEKS HOW MUCH OF THE TIME DID YOUR ASTHMA KEEP YOU FROM GETTING AS MUCH DONE AT WORK, SCHOOL OR AT HOME: A LITTLE OF THE TIME
QUESTION_2 LAST FOUR WEEKS HOW OFTEN HAVE YOU HAD SHORTNESS OF BREATH: THREE TO SIX TIMES A WEEK
ACT_TOTALSCORE: 18
ACT_TOTALSCORE: 18
QUESTION_3 LAST FOUR WEEKS HOW OFTEN DID YOUR ASTHMA SYMPTOMS (WHEEZING, COUGHING, SHORTNESS OF BREATH, CHEST TIGHTNESS OR PAIN) WAKE YOU UP AT NIGHT OR EARLIER THAN USUAL IN THE MORNING: ONCE OR TWICE

## 2024-09-13 NOTE — PROGRESS NOTES
Jan is a 54 year old who is being evaluated via a billable video visit.    How would you like to obtain your AVS? JaneevaharPROnoise  If the video visit is dropped, the invitation should be resent by: Text to cell phone: [phone]  Will anyone else be joining your video visit? {:172612}  {If patient encounters technical issues they should call [phone] :079893}    {PROVIDER CHARTING PREFERENCE:220641}    Subjective   Jan is a 54 year old, presenting for the following health issues:  Follow Up (Follow up from all the Aiotra messages that the patient and doctor have exchanged.)      9/13/2024    11:52 AM   Additional Questions   Roomed by Niurka MOLINA     Video Start Time: {video visit start/end time for provider to select:739544}    History of Present Illness       Reason for visit:  Follow up    He eats 2-3 servings of fruits and vegetables daily.He consumes 0 sweetened beverage(s) daily.He exercises with enough effort to increase his heart rate 9 or less minutes per day.  He exercises with enough effort to increase his heart rate 3 or less days per week.   He is taking medications regularly.       {SUPERLIST (Optional):226097}  {additonal problems for provider to add (Optional):346840}    {ROS Picklists (Optional):453043}      Objective    Vitals - Patient Reported  Systolic (Patient Reported):  (has not monotored)      Vitals:  No vitals were obtained today due to virtual visit.    Physical Exam   {video visit exam brief selected:765915}    {Diagnostic Test Results (Optional):161777}      Video-Visit Details    Type of service:  Video Visit   Video End Time:{video visit start/end time for provider to select:741719}  Originating Location (pt. Location): Home  {PROVIDER LOCATION On-site should be selected for visits conducted from your clinic location or adjoining [facility] hospital, academic office, or other nearby [facility] building. Off-site should be selected for all other provider locations, including home:721950}  Distant  Location (provider location):  On-site  Platform used for Video Visit: Aura  Signed Electronically by: Devon Lund MD  {Email feedback regarding this note to [email or web address]   :605365}

## 2024-09-13 NOTE — PROGRESS NOTES
"OFFICE VISIT--Video    Bola is a 54 year old male contacting the clinic today via video, who will use the platform: [employer] for the visit.  Phone # for [employer], or if [facility] drops:   Telephone Information:   Mobile [phone]          ASSESSMENT and PLAN:  {DX:[id number]::\"***\",\" \",\" \"}    CHIEF COMPLAINT:  Chief Complaint   Patient presents with    Follow Up     Follow up from all the [facility] messages that the patient and doctor have exchanged.       HISTORY OF PRESENT ILLNESS:  Bola is a 54 year old male contacting the clinic today via video for ***    Dental infection and teeth pulled and cleaned  Friday sept 27  [facility], mri under anesthesia  Home 2 weeks ago    HPI    REVIEW OF SYSTEMS:   ***      Today's PHQ-2 Score:       2/1/2024    11:03 AM   PHQ-2 ( 1999 [employer])   Q1: Little interest or pleasure in doing things 3   Q2: Feeling down, depressed or hopeless 3   PHQ-2 Score 6   Q1: Little interest or pleasure in doing things Nearly every day   Q2: Feeling down, depressed or hopeless Nearly every day   PHQ-2 Score 6       PFSH:  Social History     Social History Narrative    He is .  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  He is now on disability due to his brain injury and bipolar disease.        Quit smoking October of 2021        Quit drinking 2010        At [facility] since June of 2020     ***    TOBACCO USE:  History   Smoking Status    Former    Types: Cigarettes   Smokeless Tobacco    Former       VITALS:  There were no vitals filed for this visit.  There were no vitals taken for this visit. Estimated body mass index is 29.5 kg/m  as calculated from the following:    Height as of 7/3/23: 1.727 m (5' 8\").    Weight as of 8/27/24: 88 kg (194 lb).    PHYSICAL EXAM:  (observations via Video)  ***    MEDICATIONS:   Current Outpatient Medications   Medication Sig Dispense Refill    acetaminophen (TYLENOL) 650 MG CR tablet Take 2 " tablets (1,300 mg) by mouth 3 times daily. 540 tablet 3    albuterol (PROAIR HFA/PROVENTIL HFA/VENTOLIN HFA) 108 (90 Base) MCG/ACT inhaler Inhale 2 puffs into the lungs every 6 hours as needed for wheezing or shortness of breath. 6.7 g 11    allopurinol (ZYLOPRIM) 300 MG tablet Take 1 tablet (300 mg) by mouth 2 times daily 180 tablet 3    ammonium lactate (LAC-HYDRIN) 12 % external lotion Apply topically daily as needed for dry skin 500 g 11    amphetamine-dextroamphetamine (ADDERALL) 15 MG tablet Take 1 tablet (15 mg) by mouth daily. 30 tablet 0    amphetamine-dextroamphetamine (ADDERALL) 15 MG tablet Take 1 tablet (15 mg) by mouth daily 30 tablet 0    bisacodyl (DULCOLAX) 10 MG suppository Place 1 suppository (10 mg) rectally daily as needed for constipation 60 suppository 1    buPROPion (WELLBUTRIN SR) 150 MG 12 hr tablet 1 TABLET ORALLY 2 TIMES DAILY (DX:______________) 56 tablet 10    CALCIUM ANTACID 500 MG chewable tablet Take 1 tablet (500 mg) by mouth 2 times daily. 180 tablet 3    chlorhexidine (PERIDEX) 0.12 % solution Swish and spit 15 mLs in mouth 3 times daily as needed (sore throat) - Swish & Spit 473 mL 11    ergocalciferol (ERGOCALCIFEROL) 1.25 MG (39341 UT) capsule Take 1 capsule (50,000 Units) by mouth once a week 12 capsule 3    fexofenadine (ALLEGRA) 180 MG tablet Take 180 mg by mouth daily as needed      fexofenadine-pseudoePHEDrine (ALLEGRA-D 24) 180-240 MG 24 hr tablet Take 1 tablet by mouth daily 90 tablet 1    finasteride (PROSCAR) 5 MG tablet 1 TABLET ORALLY DAILY (DX: URINARY RETENTION) 28 tablet 5    furosemide (LASIX) 40 MG tablet Take 2 tablets (80 mg) by mouth 2 times daily 120 tablet 3    hydrOXYzine (ATARAX) 50 MG tablet Per Dr. LOPEZ,OLUKAYODE      lactulose 20 GM/30ML solution Take 45 mLs by mouth 2 times daily. For constipation. 5400 mL 11    lamoTRIgine (LAMICTAL) 150 MG tablet Take 1 tablet (150 mg) by mouth 3 times daily      LANsoprazole (PREVACID) 30 MG DR capsule        lidocaine (LIDODERM) 5 % patch Place onto the skin every 24 hours To prevent lidocaine toxicity, patient should be patch free for 12 hrs daily. 30 patch 11    lidocaine (XYLOCAINE) 2 % external gel Apply 1 inch topically 4 times a day as needed to gums. 85 g 11    medical cannabis (Patient's own supply) See Admin Instructions (The purpose of this order is to document that the patient reports taking medical cannabis.  This is not a prescription, and is not used to certify that the patient has a qualifying medical condition.)      methocarbamol (ROBAXIN) 500 MG tablet Take 2 tablets (1,000 mg) by mouth 4 times daily. For use beginning 3/14/23 120 tablet 11    metoprolol succinate ER (TOPROL-XL) 50 MG 24 hr tablet Take 1 tablet (50 mg) by mouth daily 90 tablet 3    montelukast (SINGULAIR) 10 MG tablet Take 1 tablet by mouth daily      MUCUS RELIEF 600 MG 12 hr tablet Take 2 tablets (1,200 mg) by mouth 2 times daily. 180 tablet 0    nabumetone (RELAFEN) 500 MG tablet Take 1 tablet (500 mg) by mouth 2 times daily. 180 tablet 3    naloxegol (MOVANTIK) 25 MG TABS tablet Take 1 tablet (25 mg) by mouth every morning (before breakfast) 30 tablet 1    naloxone (NARCAN) 4 MG/0.1ML nasal spray Spray 1 spray (4 mg) into one nostril alternating nostrils once as needed for opioid reversal. every 2-3 minutes until assistance arrives 2 each 0    nystatin (MYCOSTATIN) 941364 UNIT/ML suspension Take 10 mLs (1,000,000 Units) by mouth 2 times daily 800 mL 3    oxyCODONE IR (ROXICODONE) 10 MG tablet Take 1 tablet (10 mg) by mouth every 3 hours. At 2 am, 5 am, 11am, 2 pm, 5 pm, 8 pm, 11 pm, may fill/start 9/12/24 (14 day supply) 98 tablet 0    oxyCODONE IR (ROXICODONE) 15 MG tablet Take 1 tablet (15 mg) by mouth daily. At 8 am, may fill 8/28 for 8/29 28 tablet 0    potassium chloride ER (KLOR-CON M) 20 MEQ CR tablet Take 1 tablet (20 mEq) by mouth daily 90 tablet 3    QUEtiapine (SEROQUEL) 100 MG tablet Take 1 tablet (100 mg) by mouth 4  "times daily as needed (anxiety). 60 tablet 1    QUEtiapine (SEROQUEL) 300 MG tablet Take 2 tablets (600 mg) by mouth at bedtime. 180 tablet 11    sennosides (SENOKOT) 8.8 MG/5ML syrup Take 10 mLs by mouth at bedtime. 236 mL 1    tamsulosin (FLOMAX) 0.4 MG capsule Take 1 capsule (0.4 mg) by mouth 2 times daily 180 capsule 3    zonisamide (ZONEGRAN) 25 MG capsule Take 3 capsules (75 mg) by mouth 4 times daily 180 capsule 3       Outside Notes summarized: ***  Labs, x-rays, cardiology, GI tests reviewed: ***  Recent Labs   Lab Test 08/27/24  1516 05/31/23  1045   HGB 13.2* 12.3*   WBC 9.9 6.5    141   POTASSIUM 3.7 3.7   CR 0.92 0.78   PSA 0.15  --    URIC 2.6*  --    B12 454  --    VITDT 66*  --    SED 11  --    CRPI 3.50  --      No results found for: \"MKTPQ84VBP\"  Lab Results   Component Value Date    CHOL 144 08/27/2024     New orders: No orders of the defined types were placed in this encounter.      Independent review of:   Patient would like to receive their AVS by [employer]sm[employer]    Video-Visit Details  Type of service:  Video Visit      9/13/2024    11:52 AM   Additional Questions   Roomed by Niurka MOLINA     Patient has given verbal consent to a Video visit?  Yes  How would you like to obtain your AVS?  Osmopure  Will anyone else be joining your video visit, giving supplemental history? No  ***  Originating location (pt location): {patient location:947890::\"Home\"}    Distant Location (provider location):  Off-site    Video Start Time: 12:49 PM  Video End time:  {Time now:384838}  Face to face plus orders:  *** minutes  Documentation time:  3 minutes     The visit lasted a total of *** minutes    Devon Lund MD  Internal Medicine  [facility]     "

## 2024-09-13 NOTE — TELEPHONE ENCOUNTER
[facility] OT is calling to report elevated BP during visit today. BP was 151/92 today at OT visit, patient is asymptomatic. OT denies any concerns at this time.     SCHUYLER BallN, RN  [facility]

## 2024-09-13 NOTE — PROGRESS NOTES
PREOPERATIVE EVALUATION:  Today's date: 9/13/2024  [facility]  [address] [place] [address]  Phone: [phone]  Fax: [phone]  Primary Provider: Devon Lund MD  Pre-op Performing Provider: Devon Lund MD            9/13/2024   Surgical Information   What procedure is being done? General Anesthesia required: MRI of  Recent (12/2022) LUMBAR, Pelvis Surgery   Facility or Hospital where procedure/surgery will be performed: [place], [place]   Who is doing the procedure / surgery? MRI will be reviewed by my surgeon, Dr. Morgan Sylvester, Spine Ortho. Surgeon   Date of surgery / procedure: MRI. FRIDAY, 9/27/24   Time of surgery / procedure: 9:00 am   Where do you plan to recover after surgery? at home with family      Fax number for surgical facility: Note does not need to be faxed, will be available electronically in Epic.    Type of Anesthesia Anticipated: General        9/13/2024    11:52 AM   Additional Questions   Roomed by Niurka MOLINA       Assessment/Plan:     ASSESSMENT and PLAN:    1. Pre-operative examination for internal medicine  Stable for MRI under light general anesthesia.  Is tolerated much more complicated procedures within the last 3 years    2. Lumbar radiculopathy  Persisting status post 2 surgeries    3. Hx of decompressive lumbar laminectomy-2021  As noted    4. Opioid dependence, uncomplicated (H)  Chronic but slightly decreased from last year    5. S/P lumbar laminectomy-2020  As noted    6. Bipolar 1 disorder, mixed, partial remission (H)  Mood stable    7. Urinary retention with incomplete bladder emptying  Okay to refill  - finasteride (PROSCAR) 5 MG tablet; Take 1 tablet (5 mg) by mouth daily.  Dispense: 90 tablet; Refill: 3    8. Lymphedema of both lower extremities  Stable on furosemide    9. Low vitamin D level  - ergocalciferol (ERGOCALCIFEROL) 1.25 MG (34388 UT) capsule; Take 1/2  capsule weekly    Stable for procedure    Take metoprolol the morning of surgery          Return in about 3 months (around 12/13/2024) for using a video visit.       Subjective     Bola Lyon Jr. is a 54 year old male who presents for a preoperative evaluation.    HPI: Jan has finally returned home after a prolonged stay at a TCU after 2 complicated back surgeries.  He went home last week.  Now that he is healing and recovery is scheduled undergo an MRI of his remaining lumbar spine, orchestrated by his primary surgeon, to determine if any more surgery is needed.  The MRI will be done on light general anesthesia.  He has tolerated complicated surgeries without bleeding or anesthesia difficulties          9/13/2024     6:25 PM   Preop Questions   Have you ever had a heart attack or stroke? No   Have you ever had surgery on your heart or blood vessels, such as a stent placement, a coronary artery bypass, or surgery on an artery in your head, neck, heart, or legs? No   Do you have chest pain with activity? No   Do you have a history of heart failure? No   Do you currently have a cold, bronchitis or symptoms of other infection? No   Do you have a cough, shortness of breath, or wheezing? No   Do you or anyone in your family have previous history of blood clots? No   Do you or does anyone in your family have a serious bleeding problem such as prolonged bleeding following surgeries or cuts? No   Have you ever had problems with anemia or been told to take iron pills? Yes   Have you had any abnormal blood loss such as black, tarry or bloody stools? No   Have you ever had a blood transfusion? No   Are you willing to have a blood transfusion if it is medically needed before, during, or after your surgery? Yes   Have you or any of your relatives ever had problems with anesthesia? No   Do you have sleep apnea, excessive snoring or daytime drowsiness? No   Do you have any artifical heart valves or other implanted medical  devices like a pacemaker, defibrillator, or continuous glucose monitor? No   Do you have artificial joints? Yes   Are you allergic to latex? No     Further interval history and review of symptoms: Now home.  Needs medicines reviewed and finasteride refilled.  Pain improving.  Mood stable.  Has recently completed some of his dental work    Today's PHQ-2 Score:       2/1/2024    11:03 AM   PHQ-2 ( 1999 [employer])   Q1: Little interest or pleasure in doing things 3   Q2: Feeling down, depressed or hopeless 3   PHQ-2 Score 6   Q1: Little interest or pleasure in doing things Nearly every day   Q2: Feeling down, depressed or hopeless Nearly every day   PHQ-2 Score 6       Health Care Directive:  Patient does not have a Health Care Directive or Living Will: Patient states has Advance Directive and will bring in a copy to clinic.    Preoperative Review of :   reviewed - controlled substances reflected in medication list.    Patient Active Problem List    Diagnosis Date Noted    Retention of urine 01/22/2023     Priority: Medium    Hypokalemia 01/18/2023     Priority: Medium    Hyponatremia 01/18/2023     Priority: Medium    Microcytic anemia 01/18/2023     Priority: Medium    Drug-induced constipation 05/04/2022     Priority: Medium    Hyperuricemia 05/04/2022     Priority: Medium    Neuropathy 05/04/2022     Priority: Medium    History of total left knee replacement 05/04/2022     Priority: Medium    Opioid dependence, uncomplicated (H) 12/14/2021     Priority: Medium    Hx of decompressive lumbar laminectomy-2021 12/01/2021     Priority: Medium     PROCEDURES PERFORMED  1. Anterior spinal fusion, L3-L4.   2. Insertion of intervertebral biomechanical device, L3-L4 (CORELINK Titanium device).   3. Anterior lumbar instrumentation, L3-L4, with screw of fixation and associated plate.   4. Anterior spinal fusion, L4-L5.   5. Insertion of intervertebral biomechanical device, L4-L5 (CORELINK Titanium device).   6. Anterior  lumbar instrumentation L4-L5 with screw fixation and associated plate.   7. Anterior spinal fusion, L5-S1.   8. Insertion of intervertebral biomechanical device, L5-S1 (CORELINK Titanium device).   9. Anterior lumbar instrumentation L5-S1 with screw instrumentation  10. [place] of local autograft bone for bony endplates.   11. Aspiration of vertebral bone marrow from the L4 vertebral body.   12. Posterior spinal fusion, L3-L4.   13. Posterior spinal fusion, L4-L5.   14. Posterior spinal fusion, L5-S1.   15. Segmental pedicle screw instrumentation, L3-S1, bilaterally.   16. Bilateral pelvic screw instrumentation  17. Decompression L3-4 Right   18. Monitoring of SSEPs, EMGs and screw stimulation without any complications noted throughout.           Lumbar radiculopathy 11/19/2021     Priority: Medium    Chronic lower back pain 11/19/2021     Priority: Medium    Bipolar 1 disorder, mixed, partial remission (H) 01/04/2021     Priority: Medium    S/P lumbar laminectomy-2020 06/22/2020     Priority: Medium    History of cervical discectomy 06/09/2020     Priority: Medium    Gastroesophageal reflux disease with esophagitis 08/19/2019     Priority: Medium    Iron deficiency anemia due to chronic blood loss 08/19/2019     Priority: Medium    Chronic, continuous use of opioids 12/11/2017     Priority: Medium    Generalized anxiety disorder 11/13/2017     Priority: Medium    Cognitive deficit due to old head injury 03/22/2017     Priority: Medium    Chronic pain disorder 03/20/2017     Priority: Medium     Formatting of this note might be different from the original.  Due to multiple surgeries and brain injury      Essential hypertension 03/20/2017     Priority: Medium     Formatting of this note might be different from the original.  Created by Conversion    Replacement Utility updated for latest IMO load      History of tobacco abuse 03/20/2017     Priority: Medium    History of traumatic brain injury 03/06/2017      Priority: Medium     Formatting of this note might be different from the original.  With Pseudo Bulbar Affect (PBA), moderate TBI      PTSD (post-traumatic stress disorder) 08/22/2016     Priority: Medium     Formatting of this note might be different from the original.  Claustrophobia from closed in places      Lymphedema of both lower extremities 09/11/2015     Priority: Medium    Cervical stenosis of spinal canal 05/05/2014     Priority: Medium    Foraminal stenosis of cervical region 01/28/2013     Priority: Medium      Past Medical History:   Diagnosis Date    AC (acromioclavicular) arthritis 10/24/2011    Aftercare following surgery of the musculoskeletal system 04/25/2012    Anxiety disorder     Anxiety state, unspecified     Created by Conversion     Arthritis     Biceps tendon rupture, proximal left    Bipolar 2 disorder (H)     Brachial neuritis or radiculitis          Brachial neuritis or radiculitis NOS     Created by Conversion     Cervical radiculopathy at C8 04/30/2018    Claustrophobia     COPD (chronic obstructive pulmonary disease) (H) 12/21/2020    Disturbance of skin sensation 11/30/2011    Encounter for chronic pain management 02/11/2015    GERD (gastroesophageal reflux disease)     Gout     Hernia, abdominal     Hiatal hernia     Hypertension     Impingement syndrome of right shoulder     Lymphedema 02/11/2015    left    Numbness and tingling     Other affections of shoulder region, not elsewhere classified     Created by Conversion      Other chronic pain     Pain in joint, shoulder region 10/10/2011    Panic attacks     PTSD (post-traumatic stress disorder)     TBI (traumatic brain injury) (H)     post concussion syndrome    Traumatic brain injury with loss of consciousness, sequela (H24) 06/24/2022    Unspecified essential hypertension     Created by Conversion     Unspecified site of spinal cord injury without evidence of spinal bone injury      Past Surgical History:   Procedure Laterality  Date    ANTERIOR / POSTERIOR COMBINED FUSION CERVICAL SPINE  2013, redo in 2014    4 levels    ARTHRODESIS ANKLE  right    ARTHROSCOPY KNEE  01/01/2014    CYSTOSCOPY      DECOMPRESSION, FUSION CERVICAL ANTERIOR THREE+ LEVELS, COMBINED  05/05/2014    Procedure: COMBINED DECOMPRESSION, FUSION CERVICAL ANTERIOR THREE+ LEVELS;  Surgeon: Aron Gardner MD;  Location:  OR    EXPLORE SPINE, REMOVE HARDWARE, COMBINED  05/05/2014    Procedure: COMBINED EXPLORE SPINE, REMOVE HARDWARE;  Surgeon: Aron Gardner MD;  Location: [facility] OR    FUSION CERVICAL ANTERIOR THREE+ LEVELS  01/28/2013    Procedure: FUSION CERVICAL ANTERIOR THREE+ LEVELS;  ANTERIOR CERVICAL DISCECTOMY AND FUSION C4-C5, REVISION ANTERIOR CERVICAL DISCECTOMY AND FUSION C5-6, C6-7, RIGHT ANTERIOR ILIAC CREST BONE GRAFT(C-ARM, 3080 TABLE, SYNTHES CSLP SET, TRICORTICAL ALLOGRAFT);  Surgeon: Hermilo Bey MD;  Location: [facility] OR    FUSION CERVICAL ANTERIOR TWO LEVELS      FUSION CERVICAL POSTERIOR THREE+ LEVELS  05/05/2014    Procedure: FUSION CERVICAL POSTERIOR THREE+ LEVELS;  Surgeon: Aron Gardner MD;  Location:  OR    GRAFT BONE FROM ILIAC CREST  01/28/2013    Procedure: GRAFT BONE FROM ILIAC CREST;;  Surgeon: Hermilo Bey MD;  Location:  OR    HAND SURGERY Left 01/01/1997    HERNIA REPAIR      x3    HERNIA REPAIR Left 01/01/2006    HERNIA REPAIR Right 01/01/2008    and middle    HERNIORRHAPHY VENTRAL  01/01/2008    IR CERVICAL EPIDURAL STEROID INJECTION  04/05/2018    IR CERVICAL TRANSFORAMINAL EPIDURAL STRD INJ  12/07/2017    NASAL POLYP SURGERY  01/01/2014    and septal repair, Dr. Isrrael FERNANDEZ LDR ARTHROSCOP,SURG,W/ROTAT CUFF REPR Right 11/25/2015    Procedure: RIGHT SHOULDER ARTHROSCOPY, ROTATOR CUFF REPAIR, DECOMPRESSION, DEBRIDEMENT, DISTAL CLAVICLE EXCISION;  Surgeon: Pilo Bruce MD;  Location: [facility];  Service: Orthopedics    REPLACEMENT TOTAL KNEE Left 03/20/2017    SHOULDER ARTHROSCOPY DISTAL  CLAVICLE EXCISION AND OPEN ROTATOR CUFF REPAIR Bilateral 2005 and 2013    SHOULDER SURGERY  left    ZZC ARTHRODESIS,ANKLE,OPEN Right 01/01/2007    Ankle fusion     Allergies   Allergen Reactions    Gabapentin Other (See Comments)     Edema    Lyrica [Pregabalin] Other (See Comments)     Edema    Amitriptyline Unknown     hallucinations    Seasonal Allergies     Topiramate Unknown     hallucinations     Current Outpatient Medications   Medication Sig Dispense Refill    acetaminophen (TYLENOL) 650 MG CR tablet Take 2 tablets (1,300 mg) by mouth 3 times daily. 540 tablet 3    albuterol (PROAIR HFA/PROVENTIL HFA/VENTOLIN HFA) 108 (90 Base) MCG/ACT inhaler Inhale 2 puffs into the lungs every 6 hours as needed for wheezing or shortness of breath. 6.7 g 11    allopurinol (ZYLOPRIM) 300 MG tablet Take 1 tablet (300 mg) by mouth 2 times daily 180 tablet 3    ammonium lactate (LAC-HYDRIN) 12 % external lotion Apply topically daily as needed for dry skin 500 g 11    amphetamine-dextroamphetamine (ADDERALL) 15 MG tablet Take 1 tablet (15 mg) by mouth daily. 30 tablet 0    bisacodyl (DULCOLAX) 10 MG suppository Place 1 suppository (10 mg) rectally daily as needed for constipation 60 suppository 1    buPROPion (WELLBUTRIN SR) 150 MG 12 hr tablet 1 TABLET ORALLY 2 TIMES DAILY (DX:______________) 56 tablet 10    CALCIUM ANTACID 500 MG chewable tablet Take 1 tablet (500 mg) by mouth 2 times daily. 180 tablet 3    chlorhexidine (PERIDEX) 0.12 % solution Swish and spit 15 mLs in mouth 3 times daily as needed (sore throat) - Swish & Spit 473 mL 11    ergocalciferol (ERGOCALCIFEROL) 1.25 MG (82431 UT) capsule Take 1/2 capsule weekly      fexofenadine (ALLEGRA) 180 MG tablet Take 180 mg by mouth daily as needed      fexofenadine-pseudoePHEDrine (ALLEGRA-D 24) 180-240 MG 24 hr tablet Take 1 tablet by mouth daily 90 tablet 1    finasteride (PROSCAR) 5 MG tablet Take 1 tablet (5 mg) by mouth daily. 90 tablet 3    furosemide (LASIX) 80 MG  tablet       hydrOXYzine (ATARAX) 50 MG tablet Per Dr. LOPEZ,OLUKAYODE      lactulose 20 GM/30ML solution Take 45 mLs by mouth 2 times daily. For constipation. 5400 mL 11    lamoTRIgine (LAMICTAL) 150 MG tablet Take 1 tablet (150 mg) by mouth 3 times daily      LANsoprazole (PREVACID) 30 MG DR capsule       lidocaine (LIDODERM) 5 % patch Place onto the skin every 24 hours To prevent lidocaine toxicity, patient should be patch free for 12 hrs daily. 30 patch 11    lidocaine (XYLOCAINE) 2 % external gel Apply 1 inch topically 4 times a day as needed to gums. 85 g 11    medical cannabis (Patient's own supply) See Admin Instructions (The purpose of this order is to document that the patient reports taking medical cannabis.  This is not a prescription, and is not used to certify that the patient has a qualifying medical condition.)      metoprolol succinate ER (TOPROL-XL) 50 MG 24 hr tablet Take 1 tablet (50 mg) by mouth daily 90 tablet 3    montelukast (SINGULAIR) 10 MG tablet Take 1 tablet by mouth daily      MUCUS RELIEF 600 MG 12 hr tablet Take 2 tablets (1,200 mg) by mouth 2 times daily. 180 tablet 0    nabumetone (RELAFEN) 500 MG tablet Take 1 tablet (500 mg) by mouth 2 times daily. 180 tablet 3    naloxegol (MOVANTIK) 25 MG TABS tablet Take 1 tablet (25 mg) by mouth every morning (before breakfast) 30 tablet 1    naloxone (NARCAN) 4 MG/0.1ML nasal spray Spray 1 spray (4 mg) into one nostril alternating nostrils once as needed for opioid reversal. every 2-3 minutes until assistance arrives 2 each 0    nystatin (MYCOSTATIN) 470830 UNIT/ML suspension Take 10 mLs (1,000,000 Units) by mouth 2 times daily 800 mL 3    oxyCODONE IR (ROXICODONE) 15 MG tablet Take 1 tablet (15 mg) by mouth daily. At 8 am, may fill 8/28 for 8/29 28 tablet 0    potassium chloride ER (KLOR-CON M) 20 MEQ CR tablet Take 1 tablet (20 mEq) by mouth daily 90 tablet 3    QUEtiapine (SEROQUEL) 100 MG tablet Take 1 tablet (100 mg) by mouth 4 times  "daily as needed (anxiety). 60 tablet 1    QUEtiapine (SEROQUEL) 300 MG tablet Take 2 tablets (600 mg) by mouth at bedtime. 180 tablet 11    sennosides (SENOKOT) 8.8 MG/5ML syrup Take 10 mLs by mouth at bedtime. 236 mL 1    tamsulosin (FLOMAX) 0.4 MG capsule Take 1 capsule (0.4 mg) by mouth 2 times daily 180 capsule 3    zonisamide (ZONEGRAN) 25 MG capsule Take 3 capsules (75 mg) by mouth 4 times daily 180 capsule 3    methocarbamol (ROBAXIN) 500 MG tablet Take 2 tablets (1,000 mg) by mouth 4 times daily. For use beginning 3/14/23 120 tablet 11    oxyCODONE IR (ROXICODONE) 10 MG tablet Take 1 tablet (10 mg) by mouth every 3 hours. At 2 am, 5 am, 11am, 2 pm, 5 pm, 8 pm, 11 pm, 98 tablet 0       Social History     Tobacco Use    Smoking status: Former     Current packs/day: 0.00     Average packs/day: 0.5 packs/day for 11.0 years (5.5 ttl pk-yrs)     Types: Cigarettes     Start date: 2009     Quit date: 2017     Years since quittin.5    Smokeless tobacco: Former    Tobacco comments:     0.5 pack per day   Substance Use Topics    Alcohol use: No     History   Drug Use No           Objective     OBJECTIVE:     PHYSICAL EXAM:  There were no vitals taken for this visit.   Estimated body mass index is 29.5 kg/m  as calculated from the following:    Height as of 7/3/23: 1.727 m (5' 8\").    Weight as of 24: 88 kg (194 lb).    Constitutional:  Reveals pleasant anxious man   Partially edentulous        Recent Labs   Lab Test 24  1516 23  1045   HGB 13.2* 12.3*    325    141   POTASSIUM 3.7 3.7   CR 0.92 0.78   PSA 0.15  --    URIC 2.6*  --    B12 454  --    VITDT 66*  --        Lab Results   Component Value Date    CHOL 144 2024        Diagnostics:  No orders of the defined types were placed in this encounter.      No EKG required for low risk surgery (cataract, skin procedure, breast biopsy, etc).    Revised Cardiac Risk Index (RCRI):  The patient has the following serious " cardiovascular risks for perioperative complications:   - No serious cardiac risks = 0 points     RCRI Interpretation: 0 points: Class I (very low risk - 0.4% complication rate)         Start Time:  1249 p.m.  End time:  1:21 PM  Visit plus orders: 32 minutes  Dictation time:  3 minutes    The visit lasted a total of 35 minutes     Devon Lund MD  Internal Medicine  [facility]    Answers submitted by the patient for this visit:  Patient Health Questionnaire (G7) (Submitted on 9/13/2024)  CARMEN 7 TOTAL SCORE: 21  General Questionnaire (Submitted on 9/13/2024)  Chief Complaint: Chronic problems general questions HPI Form  What is the reason for your visit today? : follow up  How many servings of fruits and vegetables do you eat daily?: 2-3  On average, how many sweetened beverages do you drink each day (Examples: soda, juice, sweet tea, etc.  Do NOT count diet or artificially sweetened beverages)?: 0  How many minutes a day do you exercise enough to make your heart beat faster?: 9 or less  How many days a week do you exercise enough to make your heart beat faster?: 3 or less  How many days per week do you miss taking your medication?: 0

## 2024-09-16 ENCOUNTER — TELEPHONE (OUTPATIENT)
Dept: INTERNAL MEDICINE | Facility: CLINIC | Age: 54
End: 2024-09-16
Payer: COMMERCIAL

## 2024-09-16 ENCOUNTER — MYC MEDICAL ADVICE (OUTPATIENT)
Dept: PALLIATIVE MEDICINE | Facility: OTHER | Age: 54
End: 2024-09-16
Payer: COMMERCIAL

## 2024-09-16 DIAGNOSIS — G89.4 CHRONIC PAIN DISORDER: ICD-10-CM

## 2024-09-16 DIAGNOSIS — G89.4 CHRONIC PAIN SYNDROME: ICD-10-CM

## 2024-09-16 RX ORDER — METHOCARBAMOL 500 MG/1
1000 TABLET, FILM COATED ORAL 4 TIMES DAILY
Qty: 120 TABLET | Refills: 11 | Status: SHIPPED | OUTPATIENT
Start: 2024-09-16

## 2024-09-16 NOTE — TELEPHONE ENCOUNTER
September 16, 2024    Home health orders was received via fax for Dr. Lund.  Patient label was attached to paperwork and placed in provider's inbox to be signed.    Helen Bowen

## 2024-09-16 NOTE — TELEPHONE ENCOUNTER
Medication Question or Refill    Contacts       Contact Date/Time Type Contact Phone/Fax    09/16/2024 10:16 AM CDT Phone (Incoming) Jan Lyon Jr. (Self) [phone] (H)     Rx provider needs to be corrected for patient to obtain Methocarbamol 500mg            What medication are you calling about (include dose and sig)?: Methocarbamol 500mg    Preferred Pharmacy:   [id number] - [place], [place] - [address] AT [facility] [address]  [address]  [facility] [address]  Phone: [phone] Fax: [phone]  ** PATIENT CONFIRMED THIS PHARMACY **      Controlled Substance Agreement on file:   CSA -- Patient Level:     [Media Unavailable] Controlled Substance Agreement - Opioid - Scan on 8/28/2024 11:23 AM   [Media Unavailable] Controlled Substance Agreement - Opioid - Scan on 9/27/2023  2:20 PM   [Media Unavailable] Controlled Substance Agreement - Non - Opioid - Scan on 5/5/2021   [Media Unavailable] Controlled Substance Agreement - Opioid - Scan on 1/12/2021: OUTSIDE RECORD   [Media Unavailable] Controlled Substance Agreement - Opioid - Scan on 2/19/2020: OUTSIDE RECORD   [Media Unavailable] Controlled Substance Agreement - Opioid - Scan on 12/30/2019: OUTSIDE RECORD   [Media Unavailable] Controlled Substance Agreement - Non - Opioid - Scan on 10/23/2019: NON-OPIOID CONTROLLED SUBSTANCE AGREEMENT   [Media Unavailable] Controlled Substance Agreement - Non - Opioid - Scan on 8/22/2019   [Media Unavailable] Controlled Substance Agreement - Opioid - Scan on 12/27/2018: OPIOID USE   [Media Unavailable] Controlled Substance Agreement - Opioid - Scan on 12/27/2018: CHRONIC PAIN MANAGEMENT   [Media Unavailable] Controlled Substance Agreement - Opioid - Scan on 8/28/2018   [Media Unavailable] Controlled Substance Agreement - Opioid - Scan on 8/24/2017   [Media Unavailable] Controlled Substance Agreement - Opioid - Scan on 8/23/2017   [Media Unavailable] Controlled Substance  Agreement - Opioid - Scan on 6/30/2016       Who prescribed the medication?: Sajan Cramer    Do you need a refill? Yes    When did you use the medication last? N/A    Patient offered an appointment? No    Do you have any questions or concerns?  Yes: Rx provider is listed as [facility] but s/b Shoa[facility]      Could we send this information to you in [facility] [place] or would you prefer to receive a phone call?:   Patient would prefer a phone call   Okay to leave a detailed message?: Yes at Cell number on file:    Telephone Information:   Mobile [phone]

## 2024-09-16 NOTE — PATIENT INSTRUCTIONS
Stable for MRI under anesthesia    Refill finasteride    Medicine list clarified    Labs clarified and reviewed    The morning of procedure take metoprolol

## 2024-09-16 NOTE — TELEPHONE ENCOUNTER
Medication Question or Refill        What medication are you calling about (include dose and sig)?:    Disp Refills Start End MINDY   methocarbamol (ROBAXIN) 500 MG tablet 120 tablet 11 9/9/2024 -- No   Sig - Route: Take 2 tablets (1,000 mg) by mouth 4 times daily. For use beginning 3/14/23 - Oral       Preferred Pharmacy:   [id number] - [place], [place] - [address] AT [address] OF [employer]  [address]  [facility] [address]  Phone: [phone] Fax: [phone]    Do you have any questions or concerns?  This medication should only be prescribed by Dmitriy Cramer MD. Patient is asking for this to be changed in his record / MyChart.    Could we send this information to you in MyChart or would you prefer to receive a phone call?:   Patient would prefer a phone call     Okay to leave a detailed message?: Yes at Cell number on file:    Telephone Information:   Mobile [phone]       Controlled Substance Agreement on file:   CSA -- Patient Level:     [Media Unavailable] Controlled Substance Agreement - Opioid - Scan on 8/28/2024 11:23 AM   [Media Unavailable] Controlled Substance Agreement - Opioid - Scan on 9/27/2023  2:20 PM   [Media Unavailable] Controlled Substance Agreement - Non - Opioid - Scan on 5/5/2021   [Media Unavailable] Controlled Substance Agreement - Opioid - Scan on 1/12/2021: OUTSIDE RECORD   [Media Unavailable] Controlled Substance Agreement - Opioid - Scan on 2/19/2020: OUTSIDE RECORD   [Media Unavailable] Controlled Substance Agreement - Opioid - Scan on 12/30/2019: OUTSIDE RECORD   [Media Unavailable] Controlled Substance Agreement - Non - Opioid - Scan on 10/23/2019: NON-OPIOID CONTROLLED SUBSTANCE AGREEMENT   [Media Unavailable] Controlled Substance Agreement - Non - Opioid - Scan on 8/22/2019   [Media Unavailable] Controlled Substance Agreement - Opioid - Scan on 12/27/2018: OPIOID USE   [Media Unavailable] Controlled Substance Agreement - Opioid -  Scan on 12/27/2018: CHRONIC PAIN MANAGEMENT   [Media Unavailable] Controlled Substance Agreement - Opioid - Scan on 8/28/2018   [Media Unavailable] Controlled Substance Agreement - Opioid - Scan on 8/24/2017   [Media Unavailable] Controlled Substance Agreement - Opioid - Scan on 8/23/2017   [Media Unavailable] Controlled Substance Agreement - Opioid - Scan on 6/30/2016

## 2024-09-17 DIAGNOSIS — Z53.9 DIAGNOSIS NOT YET DEFINED: Primary | ICD-10-CM

## 2024-09-17 PROCEDURE — G0180 MD CERTIFICATION HHA PATIENT: HCPCS | Performed by: INTERNAL MEDICINE

## 2024-09-17 RX ORDER — ZONISAMIDE 25 MG/1
75 CAPSULE ORAL 4 TIMES DAILY
Qty: 180 CAPSULE | Refills: 3 | Status: SHIPPED | OUTPATIENT
Start: 2024-09-17

## 2024-09-17 RX ORDER — POTASSIUM CHLORIDE 1500 MG/1
20 TABLET, EXTENDED RELEASE ORAL DAILY
Qty: 90 TABLET | Refills: 3 | Status: SHIPPED | OUTPATIENT
Start: 2024-09-17

## 2024-09-17 RX ORDER — BUPROPION HYDROCHLORIDE 150 MG/1
150 TABLET, EXTENDED RELEASE ORAL 2 TIMES DAILY
Qty: 180 TABLET | Refills: 2 | Status: SHIPPED | OUTPATIENT
Start: 2024-09-17

## 2024-09-17 RX ORDER — FUROSEMIDE 80 MG
80 TABLET ORAL DAILY
Qty: 90 TABLET | Refills: 2 | Status: SHIPPED | OUTPATIENT
Start: 2024-09-17

## 2024-09-17 NOTE — TELEPHONE ENCOUNTER
Received fax from pharmacy requesting refill(s) for     zonisamide (ZONEGRAN) 25 MG capsule     Date last filled 8/23/24    Last Appt Date:8/27/24    Next Appt scheduled: 10/29/24    Pharmacy:   [facility] #[id number] [place], [place] - [facility] [address] AT [facility] [address],    Pending Prescriptions:                       Disp   Refills    zonisamide (ZONEGRAN) 25 MG capsule       180 ca*3            Sig: Take 3 capsules (75 mg) by mouth 4 times daily.

## 2024-09-18 ENCOUNTER — TELEPHONE (OUTPATIENT)
Dept: INTERNAL MEDICINE | Facility: CLINIC | Age: 54
End: 2024-09-18
Payer: COMMERCIAL

## 2024-09-18 DIAGNOSIS — R79.89 LOW VITAMIN D LEVEL: ICD-10-CM

## 2024-09-18 DIAGNOSIS — I10 ESSENTIAL HYPERTENSION: ICD-10-CM

## 2024-09-18 DIAGNOSIS — D64.9 ANEMIA, UNSPECIFIED TYPE: ICD-10-CM

## 2024-09-18 RX ORDER — FERROUS SULFATE 325(65) MG
325 TABLET ORAL
Qty: 90 TABLET | Refills: 3 | Status: SHIPPED | OUTPATIENT
Start: 2024-09-18

## 2024-09-18 RX ORDER — METOPROLOL SUCCINATE 50 MG/1
50 TABLET, EXTENDED RELEASE ORAL DAILY
Qty: 90 TABLET | Refills: 3 | Status: SHIPPED | OUTPATIENT
Start: 2024-09-18

## 2024-09-18 RX ORDER — ERGOCALCIFEROL 1.25 MG/1
CAPSULE ORAL
Qty: 6 CAPSULE | Refills: 5 | Status: SHIPPED | OUTPATIENT
Start: 2024-09-18 | End: 2024-10-02

## 2024-09-18 NOTE — TELEPHONE ENCOUNTER
Calling to clarify the following medications:    1.) Fluconazole-  should this be discontinued?    Informed Flucanozale not active on med list. Nursing needs updated medication list.      2.) Vitamin D- Need prescription. Confirmed pharmacy. Currently only has gel capsules     9. Low vitamin D level  - ergocalciferol (ERGOCALCIFEROL) 1.25 MG (48107 UT) capsule; Take 1/2 capsule weekly      3.) Needing refill of metoprolol.  Chart review indicates medication has not been filled since . Jazmyne thinks this may be because of length of stays between hospital and TCU. Confirmed dosage and frequency with Jazmyne       4.) Needing refill of Ferrous Sulfate. Rx  on medication list. Confirmed dosage an frequency.      Nursing- please fax updated medication list once med list is updated/prescriptions signed.    Fax number: [phone].   Patient/Caregiver provided printed discharge information.

## 2024-09-18 NOTE — TELEPHONE ENCOUNTER
September 18, 2024    Home health orders was received via fax for Dr. Lund.  Patient label was attached to paperwork and placed in provider's inbox to be signed.    Helen Bowen

## 2024-09-18 NOTE — TELEPHONE ENCOUNTER
September 18, 2024    Home health orders was picked up from outbox of Dr. Lund and sent via fax to [phone].    Helen Bowen

## 2024-09-19 ENCOUNTER — TELEPHONE (OUTPATIENT)
Dept: INTERNAL MEDICINE | Facility: CLINIC | Age: 54
End: 2024-09-19
Payer: COMMERCIAL

## 2024-09-19 NOTE — TELEPHONE ENCOUNTER
September 19, 2024    Home health orders was received via fax for Dr. Lund.  Patient label was attached to paperwork and placed in the inbox for  Dr. Phan (covering provider) to review and sign.    Helen Bowen

## 2024-09-20 ENCOUNTER — TELEPHONE (OUTPATIENT)
Dept: INTERNAL MEDICINE | Facility: CLINIC | Age: 54
End: 2024-09-20
Payer: COMMERCIAL

## 2024-09-20 NOTE — TELEPHONE ENCOUNTER
Home Care is calling regarding an established patient with  [facility].        9/5/2024     3:21 PM 9/4/2024    12:38 PM   Home Care Information   Date of Home Care episode start 9/3/2024    Current following provider Ashely Lund   [facility]     Requesting orders from: Devon Lund  Provider is following patient: Yes  Is this a 60-day recertification request?  Yes    Orders Requested    Social Work  Request for recertification   Frequency:  2x/month for 1 months      Confirmed ok to leave a detailed message with call back.  Contact information confirmed and updated as needed.    Becca Gilliland RN

## 2024-09-20 NOTE — TELEPHONE ENCOUNTER
Devon Lund MD         9/18/24  2:30 PM  Unsigned Note  Medicines refilled.  Yes, fluconazole should be discontinued          Medication list printed and faxed as requested to Fax number: [phone].

## 2024-09-23 ENCOUNTER — MYC MEDICAL ADVICE (OUTPATIENT)
Dept: INTERNAL MEDICINE | Facility: CLINIC | Age: 54
End: 2024-09-23
Payer: COMMERCIAL

## 2024-09-23 DIAGNOSIS — N41.0 ACUTE PROSTATITIS: ICD-10-CM

## 2024-09-23 NOTE — TELEPHONE ENCOUNTER
September 23, 2024    Home health orders was picked up from outbox of Dr. Phan (covering provider) and sent via fax to [phone].    Bao Nino

## 2024-09-23 NOTE — TELEPHONE ENCOUNTER
Relayed verbal orders to Kailyn with [facility] for social work recertification for 2 times a month for 1 month. Kailyn verbalized understanding.

## 2024-09-24 ENCOUNTER — TELEPHONE (OUTPATIENT)
Dept: INTERNAL MEDICINE | Facility: CLINIC | Age: 54
End: 2024-09-24
Payer: COMMERCIAL

## 2024-09-24 RX ORDER — TAMSULOSIN HYDROCHLORIDE 0.4 MG/1
0.4 CAPSULE ORAL 2 TIMES DAILY
Qty: 180 CAPSULE | Refills: 3 | Status: SHIPPED | OUTPATIENT
Start: 2024-09-24

## 2024-09-24 NOTE — TELEPHONE ENCOUNTER
September 24, 2024    Home health orders was received via fax for Dr. Lund.  Patient label was attached to paperwork and placed in provider's inbox to be signed.    Helen Bowen

## 2024-09-24 NOTE — TELEPHONE ENCOUNTER
September 24, 2024    Home health orders was picked up from outbox of Dr. Lund and sent via fax to [phone].    Helen Bowen

## 2024-09-24 NOTE — TELEPHONE ENCOUNTER
September 24, 2024    Home health orders was picked up from outbox of Dr. Lund and sent via fax to [phone].    Helen Bowen

## 2024-09-25 NOTE — TELEPHONE ENCOUNTER
September 25, 2024    Home health orders was picked up from outbox of Dr. Lund and sent via fax to [phone].    Helen Bowen

## 2024-09-25 NOTE — TELEPHONE ENCOUNTER
September 25, 2024    Home health orders was picked up from outbox of Dr. Lund and sent via fax to [phone].    Helen Bowen

## 2024-09-26 ENCOUNTER — TELEPHONE (OUTPATIENT)
Dept: INTERNAL MEDICINE | Facility: CLINIC | Age: 54
End: 2024-09-26
Payer: COMMERCIAL

## 2024-10-01 ENCOUNTER — TELEPHONE (OUTPATIENT)
Dept: INTERNAL MEDICINE | Facility: CLINIC | Age: 54
End: 2024-10-01
Payer: COMMERCIAL

## 2024-10-01 NOTE — TELEPHONE ENCOUNTER
October 1, 2024    [facility] Client Coordination [facility] Report of 09/28/2024 was received via fax for Dr. Lund.  Patient label was attached to paperwork and placed in provider's inbox to be signed.    Helen Bowen

## 2024-10-02 ENCOUNTER — TELEPHONE (OUTPATIENT)
Dept: PALLIATIVE MEDICINE | Facility: OTHER | Age: 54
End: 2024-10-02
Payer: COMMERCIAL

## 2024-10-02 ENCOUNTER — TELEPHONE (OUTPATIENT)
Dept: INTERNAL MEDICINE | Facility: CLINIC | Age: 54
End: 2024-10-02
Payer: COMMERCIAL

## 2024-10-02 DIAGNOSIS — R79.89 LOW VITAMIN D LEVEL: ICD-10-CM

## 2024-10-02 DIAGNOSIS — F31.81 BIPOLAR 2 DISORDER (H): ICD-10-CM

## 2024-10-02 RX ORDER — ERGOCALCIFEROL 1.25 MG/1
50000 CAPSULE ORAL
Qty: 6 CAPSULE | Refills: 3 | Status: SHIPPED | OUTPATIENT
Start: 2024-10-02 | End: 2025-10-02

## 2024-10-02 RX ORDER — LAMOTRIGINE 150 MG/1
150 TABLET ORAL 3 TIMES DAILY
Qty: 270 TABLET | Refills: 3 | Status: SHIPPED | OUTPATIENT
Start: 2024-10-02 | End: 2025-10-02

## 2024-10-02 NOTE — TELEPHONE ENCOUNTER
Prior Authorization Retail Medication Request    Medication/Dose: oxyCODONE IR (ROXICODONE) 10 MG tablet    Diagnosis and ICD code (if different than what is on RX):    Lumbar radiculopathy [M54.16]     New/renewal/insurance change PA/secondary ins. PA:  Previously Tried and Failed:    Rationale:      Insurance   Primary: O  8  [place]  Subscriber:  Jan Lyon Jr.  Subscriber ID:[id number]  Relationship:Self  Member:Jan Lyon Jr.  Member ID:[id number]  LOB:None  Plan year:  1/1/2024 - [place]  Effective dates:  7/1/2020 - [place]  FO  7  BCBS - BCBS MEDICARE ADVANTAGE  Subscriber:  Jan Lyon Jr.  Subscriber ID:[id number]  Relationship:Self  Member:Jan Lyon Jr.  Member ID:[id number]  LOB:None  Plan year:  1/1/2024 - [place]  Effective dates:  1/1/2020 - [place]  Group number:  [id number]  Insurance ID:      Secondary (if applicable):  Insurance ID:

## 2024-10-02 NOTE — TELEPHONE ENCOUNTER
Jazmyne with [facility] home care calling regarding medications:    1.) Refill of Lamotrigine- confirmed patient taking 150 mg tablet 3 times daily.    2.)Refill of ferrous sulfate- informed refills on file.    3.) Vitamin D prescription- sent in as capsules and tablets needed as capsules can not be broken.

## 2024-10-03 NOTE — TELEPHONE ENCOUNTER
[facility] Call Center    Phone Message    May a detailed message be left on voicemail: yes     Reason for Call: Other: Patient is needing PA for this medication due to the medication dosage and he states that he is out. Please call back with updates.     Action Taken: Message routed to:  Other: [facility]    Travel Screening: Not Applicable

## 2024-10-04 ENCOUNTER — TELEPHONE (OUTPATIENT)
Dept: INTERNAL MEDICINE | Facility: CLINIC | Age: 54
End: 2024-10-04
Payer: COMMERCIAL

## 2024-10-04 NOTE — TELEPHONE ENCOUNTER
Home Care is calling regarding an established patient with  [facility].        9/5/2024     3:21 PM 9/4/2024    12:38 PM   Home Care Information   Date of Home Care episode start 9/3/2024    Current following provider Ashely Lund   [email or web address]     Requesting orders from: Devon Lund  Provider is following patient: Yes  Is this a 60-day recertification request?  No    Orders Requested    Occupational Therapy  Request for delay in care, service is not able to be provided within same scheduled day.   OT delay in care for discharge to next week due to patient availability       Information was gathered and will be sent to provider for review.  RN will contact Home Care with information after provider review.  Confirmed ok to leave a detailed message with call back.  Contact information confirmed and updated as needed.    Arlene Bruce RN

## 2024-10-04 NOTE — TELEPHONE ENCOUNTER
PA Initiation    Medication: OXYCODONE HCL 10 MG PO TABS  Insurance Company: [employer] - Phone [phone] Fax [phone]  Pharmacy Filling the Rx: [id number] [place], [place] - [address] AT [facility] OF [address]  Filling Pharmacy Phone: [phone]  Filling Pharmacy Fax:    Start Date: 10/4/2024

## 2024-10-04 NOTE — TELEPHONE ENCOUNTER
Appears the PA is in process  Will send this message along to the group to see if any updates on this PA

## 2024-10-04 NOTE — TELEPHONE ENCOUNTER
Prior Authorization Approval    Medication: OXYCODONE HCL 10 MG PO TABS  Authorization Effective Date: 7/6/2024  Authorization Expiration Date: 10/4/2025  Approved Dose/Quantity: 98/14  Reference #: [id number]   Insurance Company: [employer] - Phone [phone] Fax [phone]  Expected CoPay: $    CoPay Card Available:      Financial Assistance Needed:   Which [facility] the prescription[id number] [place] [address] [address] AT [facility]  Pharmacy Notified: Yes  Patient Notified: Yes

## 2024-10-07 ENCOUNTER — TELEPHONE (OUTPATIENT)
Dept: INTERNAL MEDICINE | Facility: CLINIC | Age: 54
End: 2024-10-07
Payer: COMMERCIAL

## 2024-10-07 NOTE — TELEPHONE ENCOUNTER
October 7, 2024    [facility] Client Coordination Note Report Missed Visit of 10/04/2024  was received via fax for Dr. Lund.  Patient label was attached to paperwork and placed in provider's inbox to be signed.    Helen Bowen

## 2024-10-08 ENCOUNTER — TELEPHONE (OUTPATIENT)
Dept: INTERNAL MEDICINE | Facility: CLINIC | Age: 54
End: 2024-10-08
Payer: COMMERCIAL

## 2024-10-08 NOTE — TELEPHONE ENCOUNTER
Left voicemail with verbal orders for GERMAN Akhtar with [facility] on confidential voicemail.    Verbal orders left on voicemail for: Extending physical therapy -   2 times weekly for 4 weeks.

## 2024-10-08 NOTE — TELEPHONE ENCOUNTER
October 8, 2024    Home health orders was received via fax for Dr. Lund.  Patient label was attached to paperwork and placed in provider's inbox to be signed.    Helen Bowen

## 2024-10-08 NOTE — TELEPHONE ENCOUNTER
Order/Referral Request    Who is requesting: [facility]    Orders being requested:   Extending physical therapy -   2 times weekly for 4 weeks    Reason service is needed/diagnosis: n/a    When are orders needed by: n/a    Has this been discussed with Provider: No    Does patient have a preference on a Group/Provider/Facility? N/a    Does patient have an appointment scheduled?: No    Where to send orders: N/A    Could we send this information to you in [facility] or would you prefer to receive a phone call?:   No preference   Okay to leave a detailed message?: Yes at Other phone number:  [phone]

## 2024-10-09 ENCOUNTER — TELEPHONE (OUTPATIENT)
Dept: INTERNAL MEDICINE | Facility: CLINIC | Age: 54
End: 2024-10-09
Payer: COMMERCIAL

## 2024-10-09 NOTE — TELEPHONE ENCOUNTER
October 9, 2024    [facility] Client Coordination Note Report Missed Visit of 10/04/2024 was picked up from outbox of Dr. Lund and sent via fax to [phone].    Helen Bowen

## 2024-10-09 NOTE — TELEPHONE ENCOUNTER
October 9, 2024    Durable medical equipment/medical supply order was received via fax for Dr. Lund.  Patient label was attached to paperwork and placed in provider's inbox to be signed.    Helen Bowen

## 2024-10-09 NOTE — TELEPHONE ENCOUNTER
October 9, 2024    Home health orders was received via fax for Dr. Lund.  Patient label was attached to paperwork and placed in provider's inbox to be signed.    Helen Bowen

## 2024-10-09 NOTE — TELEPHONE ENCOUNTER
October 2, 2024    Durable medical equipment/medical supply order was picked up from outbox of Dr. Lund and sent via fax to [phone].    Helen Bowen    
Order refaxed to [facility] [phone]  
September 26, 2024    Durable medical equipment/medical supply order was received via fax for Dr. Lund.  Patient label was attached to paperwork and placed in provider's inbox to be signed.    Helen Bowen    
ambulatory

## 2024-10-10 ENCOUNTER — MYC MEDICAL ADVICE (OUTPATIENT)
Dept: INTERNAL MEDICINE | Facility: CLINIC | Age: 54
End: 2024-10-10
Payer: COMMERCIAL

## 2024-10-10 DIAGNOSIS — F31.81 BIPOLAR 2 DISORDER (H): ICD-10-CM

## 2024-10-10 DIAGNOSIS — K21.00 GASTROESOPHAGEAL REFLUX DISEASE WITH ESOPHAGITIS WITHOUT HEMORRHAGE: Primary | ICD-10-CM

## 2024-10-10 RX ORDER — LANSOPRAZOLE 30 MG/1
30 CAPSULE, DELAYED RELEASE ORAL
Qty: 90 CAPSULE | Refills: 3 | Status: SHIPPED | OUTPATIENT
Start: 2024-10-10 | End: 2025-10-10

## 2024-10-10 RX ORDER — DEXTROAMPHETAMINE SACCHARATE, AMPHETAMINE ASPARTATE, DEXTROAMPHETAMINE SULFATE AND AMPHETAMINE SULFATE 3.75; 3.75; 3.75; 3.75 MG/1; MG/1; MG/1; MG/1
15 TABLET ORAL DAILY
Qty: 30 TABLET | Refills: 0 | Status: SHIPPED | OUTPATIENT
Start: 2024-10-10 | End: 2024-10-23

## 2024-10-11 ENCOUNTER — TELEPHONE (OUTPATIENT)
Dept: INTERNAL MEDICINE | Facility: CLINIC | Age: 54
End: 2024-10-11
Payer: COMMERCIAL

## 2024-10-11 NOTE — TELEPHONE ENCOUNTER
Left detailed message for Kailyn  with [facility]. Left verbal orders for social work 2x/month for 1 month.

## 2024-10-11 NOTE — TELEPHONE ENCOUNTER
Home Care is calling regarding an established patient with [facility].        9/5/2024     3:21 PM 9/4/2024    12:38 PM   Home Care Information   Date of Home Care episode start 9/3/2024    Current following provider Ashely Lund   [facility] agency [facility]     Requesting orders from: Devon Lund  Provider is following patient: No       Orders Requested    Social Work  Request for initial certification (first set of orders)   Frequency:  2x/month for 1 months for financial resources      Information was gathered and will be sent to provider for review.  RN will contact Home Care with information after provider review.  Information was gathered and will be sent to provider to confirm provider will be following patient.  RN will contact Home Care with information after provider review.  Confirmed ok to leave a detailed message with call back.  Contact information confirmed and updated as needed.    Belen Bello RN

## 2024-10-11 NOTE — TELEPHONE ENCOUNTER
October 11, 2024    Durable medical equipment/medical supply order was picked up from outbox of Dr. Lund and sent via fax to [phone].    Urszula Garner

## 2024-10-14 ENCOUNTER — TELEPHONE (OUTPATIENT)
Dept: INTERNAL MEDICINE | Facility: CLINIC | Age: 54
End: 2024-10-14
Payer: COMMERCIAL

## 2024-10-14 NOTE — TELEPHONE ENCOUNTER
October 14, 2024    Home health orders was received via fax for Dr. Lund.  Patient label was attached to paperwork and placed in provider's inbox to be signed.    Helen Bowen

## 2024-10-18 DIAGNOSIS — K59.03 DRUG-INDUCED CONSTIPATION: ICD-10-CM

## 2024-10-18 DIAGNOSIS — D64.9 ANEMIA, UNSPECIFIED TYPE: ICD-10-CM

## 2024-10-18 NOTE — TELEPHONE ENCOUNTER
Medication Question or Refill        What medication are you calling about (include dose and sig)?: miralax , ferrous sulfate 325 1 in AM    Preferred Pharmacy:   [facility] #[id number] - [place], [place] - [address] AT [facility] OF [address]  [address]  [facility] [address]  Phone: [phone] Fax: [phone]      Controlled Substance Agreement on file:   CSA -- Patient Level:     [Media Unavailable] Controlled Substance Agreement - Opioid - Scan on 8/28/2024 11:23 AM   [Media Unavailable] Controlled Substance Agreement - Opioid - Scan on 9/27/2023  2:20 PM   [Media Unavailable] Controlled Substance Agreement - Non - Opioid - Scan on 5/5/2021   [Media Unavailable] Controlled Substance Agreement - Opioid - Scan on 1/12/2021: OUTSIDE RECORD   [Media Unavailable] Controlled Substance Agreement - Opioid - Scan on 2/19/2020: OUTSIDE RECORD   [Media Unavailable] Controlled Substance Agreement - Opioid - Scan on 12/30/2019: OUTSIDE RECORD   [Media Unavailable] Controlled Substance Agreement - Non - Opioid - Scan on 10/23/2019: NON-OPIOID CONTROLLED SUBSTANCE AGREEMENT   [Media Unavailable] Controlled Substance Agreement - Non - Opioid - Scan on 8/22/2019   [Media Unavailable] Controlled Substance Agreement - Opioid - Scan on 12/27/2018: OPIOID USE   [Media Unavailable] Controlled Substance Agreement - Opioid - Scan on 12/27/2018: CHRONIC PAIN MANAGEMENT   [Media Unavailable] Controlled Substance Agreement - Opioid - Scan on 8/28/2018   [Media Unavailable] Controlled Substance Agreement - Opioid - Scan on 8/24/2017   [Media Unavailable] Controlled Substance Agreement - Opioid - Scan on 8/23/2017   [Media Unavailable] Controlled Substance Agreement - Opioid - Scan on 6/30/2016       Who prescribed the medication?: Dr Lund     Do you need a refill? Yes    When did you use the medication last? N/a     Patient offered an appointment? No    Do you have any questions or concerns?   No      Could we send this information to you in MyRefers or would you prefer to receive a phone call?:   Patient would prefer a phone call   Okay to leave a detailed message?: Yes at Home number on file [phone] (home)

## 2024-10-18 NOTE — TELEPHONE ENCOUNTER
October 18, 2024    Home health orders was picked up from outbox of Dr. Lund and sent via fax to [phone].    Bao Nino

## 2024-10-18 NOTE — TELEPHONE ENCOUNTER
October 18, 2024    Home health orders was picked up from outbox of Dr. Lund and sent via fax to [phone].    Bao Nino

## 2024-10-21 RX ORDER — POLYETHYLENE GLYCOL 3350 17 G/17G
17 POWDER, FOR SOLUTION ORAL 2 TIMES DAILY
Qty: 3060 G | Refills: 3 | Status: SHIPPED | OUTPATIENT
Start: 2024-10-21

## 2024-10-21 RX ORDER — FERROUS SULFATE 325(65) MG
325 TABLET ORAL
Qty: 90 TABLET | Refills: 3 | Status: SHIPPED | OUTPATIENT
Start: 2024-10-21

## 2024-10-23 ENCOUNTER — MYC MEDICAL ADVICE (OUTPATIENT)
Dept: INTERNAL MEDICINE | Facility: CLINIC | Age: 54
End: 2024-10-23
Payer: COMMERCIAL

## 2024-10-23 DIAGNOSIS — J45.20 MILD INTERMITTENT ASTHMA WITHOUT COMPLICATION: Primary | ICD-10-CM

## 2024-10-23 DIAGNOSIS — M10.9 URIC ACID ARTHROPATHY: ICD-10-CM

## 2024-10-23 DIAGNOSIS — F31.81 BIPOLAR 2 DISORDER (H): ICD-10-CM

## 2024-10-23 DIAGNOSIS — K59.03 DRUG-INDUCED CONSTIPATION: ICD-10-CM

## 2024-10-23 RX ORDER — SENNOSIDES 8.8 MG/5ML
LIQUID ORAL
Qty: 236 ML | Refills: 1 | Status: SHIPPED | OUTPATIENT
Start: 2024-10-23

## 2024-10-23 RX ORDER — GUAIFENESIN 600 MG/1
TABLET, EXTENDED RELEASE ORAL
Qty: 180 TABLET | Refills: 0 | Status: SHIPPED | OUTPATIENT
Start: 2024-10-23

## 2024-10-23 NOTE — TELEPHONE ENCOUNTER
Medication Question or Refill    Contacts       Contact Date/Time Type Contact Phone/Fax    10/23/2024 03:55 PM CDT Phone (Incoming) Jan Lyon Jr. (Self) [phone] (H)            What medication are you calling about (include dose and sig)?:     amphetamine-dextroamphetamine (ADDERALL) 15 MG tablet       Preferred Pharmacy:   [facility][id number] [place] [address] AT [facility] OF [address]  [facility] [address]  Phone: [phone] Fax: [phone]      Controlled Substance Agreement on file:   CSA -- Patient Level:     [Media Unavailable] Controlled Substance Agreement - Opioid - Scan on 8/28/2024 11:23 AM   [Media Unavailable] Controlled Substance Agreement - Opioid - Scan on 9/27/2023  2:20 PM   [Media Unavailable] Controlled Substance Agreement - Non - Opioid - Scan on 5/5/2021   [Media Unavailable] Controlled Substance Agreement - Opioid - Scan on 1/12/2021: OUTSIDE RECORD   [Media Unavailable] Controlled Substance Agreement - Opioid - Scan on 2/19/2020: OUTSIDE RECORD   [Media Unavailable] Controlled Substance Agreement - Opioid - Scan on 12/30/2019: OUTSIDE RECORD   [Media Unavailable] Controlled Substance Agreement - Non - Opioid - Scan on 10/23/2019: NON-OPIOID CONTROLLED SUBSTANCE AGREEMENT   [Media Unavailable] Controlled Substance Agreement - Non - Opioid - Scan on 8/22/2019   [Media Unavailable] Controlled Substance Agreement - Opioid - Scan on 12/27/2018: OPIOID USE   [Media Unavailable] Controlled Substance Agreement - Opioid - Scan on 12/27/2018: CHRONIC PAIN MANAGEMENT   [Media Unavailable] Controlled Substance Agreement - Opioid - Scan on 8/28/2018   [Media Unavailable] Controlled Substance Agreement - Opioid - Scan on 8/24/2017   [Media Unavailable] Controlled Substance Agreement - Opioid - Scan on 8/23/2017   [Media Unavailable] Controlled Substance Agreement - Opioid - Scan on 6/30/2016       Who prescribed the medication?:  mishel    Do you need a refill? Yes    When did you use the medication last? 10/23    Patient offered an appointment? No:    Do you have any questions or concerns?  Yes: will be out of med this friday      Could we send this information to you in [facility] or would you prefer to receive a phone call?:   Patient would prefer a phone call   Okay to leave a detailed message?: Yes at Cell number on file:    Telephone Information:   Mobile [phone]

## 2024-10-23 NOTE — TELEPHONE ENCOUNTER
October 23, 2024    Home health orders was picked up from outbox of Dr. Lund and sent via fax to [phone].    Helen Bowen

## 2024-10-23 NOTE — TELEPHONE ENCOUNTER
October 23, 2024    Home health orders was picked up from outbox of Dr. uLnd and sent via fax to [phone].    Helen Bowen

## 2024-10-24 RX ORDER — DEXTROAMPHETAMINE SACCHARATE, AMPHETAMINE ASPARTATE, DEXTROAMPHETAMINE SULFATE AND AMPHETAMINE SULFATE 3.75; 3.75; 3.75; 3.75 MG/1; MG/1; MG/1; MG/1
15 TABLET ORAL DAILY
Qty: 30 TABLET | Refills: 0 | Status: SHIPPED | OUTPATIENT
Start: 2024-10-24 | End: 2024-11-11

## 2024-10-24 RX ORDER — MONTELUKAST SODIUM 10 MG/1
1 TABLET ORAL DAILY
Qty: 90 TABLET | Refills: 3 | Status: SHIPPED | OUTPATIENT
Start: 2024-10-24

## 2024-10-24 RX ORDER — ALLOPURINOL 300 MG/1
300 TABLET ORAL 2 TIMES DAILY
Qty: 180 TABLET | Refills: 3 | Status: SHIPPED | OUTPATIENT
Start: 2024-10-24

## 2024-10-27 ENCOUNTER — TELEPHONE (OUTPATIENT)
Dept: INTERNAL MEDICINE | Facility: CLINIC | Age: 54
End: 2024-10-27
Payer: COMMERCIAL

## 2024-10-27 NOTE — TELEPHONE ENCOUNTER
Medication Question or Refill    Contacts       Contact Date/Time Type Contact Phone/Fax    10/27/2024 11:43 AM CDT Phone (Incoming) Jan Lyon Jr. (Self) [phone] (H)            What medication are you calling about (include dose and sig)?: Addarol    Preferred Pharmacy:   [facility] #[id number] - [place], [place] - [address] AT [facility] OF [facility]  [address]  [facility] [address]  Phone: [phone] Fax: [phone]      Controlled Substance Agreement on file:   CSA -- Patient Level:     [Media Unavailable] Controlled Substance Agreement - Opioid - Scan on 8/28/2024 11:23 AM   [Media Unavailable] Controlled Substance Agreement - Opioid - Scan on 9/27/2023  2:20 PM   [Media Unavailable] Controlled Substance Agreement - Non - Opioid - Scan on 5/5/2021   [Media Unavailable] Controlled Substance Agreement - Opioid - Scan on 1/12/2021: OUTSIDE RECORD   [Media Unavailable] Controlled Substance Agreement - Opioid - Scan on 2/19/2020: OUTSIDE RECORD   [Media Unavailable] Controlled Substance Agreement - Opioid - Scan on 12/30/2019: OUTSIDE RECORD   [Media Unavailable] Controlled Substance Agreement - Non - Opioid - Scan on 10/23/2019: NON-OPIOID CONTROLLED SUBSTANCE AGREEMENT   [Media Unavailable] Controlled Substance Agreement - Non - Opioid - Scan on 8/22/2019   [Media Unavailable] Controlled Substance Agreement - Opioid - Scan on 12/27/2018: OPIOID USE   [Media Unavailable] Controlled Substance Agreement - Opioid - Scan on 12/27/2018: CHRONIC PAIN MANAGEMENT   [Media Unavailable] Controlled Substance Agreement - Opioid - Scan on 8/28/2018   [Media Unavailable] Controlled Substance Agreement - Opioid - Scan on 8/24/2017   [Media Unavailable] Controlled Substance Agreement - Opioid - Scan on 8/23/2017   [Media Unavailable] Controlled Substance Agreement - Opioid - Scan on 6/30/2016       Who prescribed the medication?: Dr. Lund    Do you need a refill? Yes    When did you  use the medication last? 10/27/2024    Patient offered an appointment? No    Do you have any questions or concerns?  Has about 6 pills left      Could we send this information to you in YABUY or would you prefer to receive a phone call?:   Patient would prefer a phone call   Okay to leave a detailed message?: Yes at Cell number on file:    Telephone Information:   Mobile [phone]

## 2024-10-29 ENCOUNTER — VIRTUAL VISIT (OUTPATIENT)
Dept: PALLIATIVE MEDICINE | Facility: OTHER | Age: 54
End: 2024-10-29
Attending: ANESTHESIOLOGY
Payer: COMMERCIAL

## 2024-10-29 DIAGNOSIS — M54.16 LUMBAR RADICULOPATHY: ICD-10-CM

## 2024-10-29 DIAGNOSIS — G62.9 NEUROPATHY: Primary | ICD-10-CM

## 2024-10-29 PROCEDURE — 99213 OFFICE O/P EST LOW 20 MIN: CPT | Mod: 95 | Performed by: ANESTHESIOLOGY

## 2024-10-29 PROCEDURE — G2211 COMPLEX E/M VISIT ADD ON: HCPCS | Mod: 95 | Performed by: ANESTHESIOLOGY

## 2024-10-29 RX ORDER — TIAGABINE HYDROCHLORIDE 2 MG/1
2 TABLET, FILM COATED ORAL 4 TIMES DAILY
Qty: 112 TABLET | Refills: 2 | Status: SHIPPED | OUTPATIENT
Start: 2024-10-29

## 2024-10-29 RX ORDER — OXYCODONE HYDROCHLORIDE 10 MG/1
10 TABLET ORAL
Qty: 98 TABLET | Refills: 0 | Status: SHIPPED | OUTPATIENT
Start: 2024-10-29

## 2024-10-29 RX ORDER — OXYCODONE HYDROCHLORIDE 15 MG/1
15 TABLET ORAL DAILY
Qty: 28 TABLET | Refills: 0 | Status: SHIPPED | OUTPATIENT
Start: 2024-10-29

## 2024-10-29 ASSESSMENT — PAIN SCALES - GENERAL: PAINLEVEL_OUTOF10: WORST PAIN (10)

## 2024-10-29 NOTE — PATIENT INSTRUCTIONS
[facility] Number:  [phone]  Call with any questions about your care and for scheduling assistance.   Calls are returned Monday through Friday between 8 AM and 4:30 PM. We usually get back to you within 2 business days depending on the issue/request.    If we are prescribing your medications:  For opioid medication refills, call the clinic or send a INFOGRAPHIQSt message 7 days in advance.  Please include:  Name of requested medication  Name of the pharmacy.  For non-opioid medications, call your pharmacy directly to request a refill. Please allow 3-4 days to be processed.   Per [place] State Law:  All controlled substance prescriptions must be filled within 30 days of being written.    For those controlled substances allowing refills, pickup must occur within 30 days of last fill.      We believe regular attendance is key to your success in our program!    Any time you are unable to keep your appointment we ask that you call us at least 24 hours in advance to cancel.This will allow us to offer the appointment time to another patient.   Multiple missed appointments may lead to dismissal from the clinic.   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 Cramer in the clinic in 2 months

## 2024-10-29 NOTE — PROGRESS NOTES
(PCCVMEXPIRED) Left a brief message with no patient identifiers. Sent a Cloudyn message as well       [facility]      Bola Lyon Jr. is a 54 year old male who is being evaluated via a billable virtual visit.      VIDEO VISIT   How would you like to obtain your AVS? MyChart  If you are dropped from the video visit, the video invite should be resent to: Text to cell phone: [phone]  Will anyone else be joining your video visit? NO,  Is patient CURRENTLY in [place]? YES  If patient encounters technical issues they should call [phone]    Video-Visit Details  Type of service:  Video Visit  Originating Location (pt. Location): Home  Distant Location (provider location):  [facility]   Platform used for Video Visit: Paolo GAMING/WYATT 08.27.2024

## 2024-10-29 NOTE — PROGRESS NOTES
"Follow-up for history of cervical surgery                          [facility] Follow-up    Date of visit: 10/29/2024    Chief complaint:   Chief Complaint   Patient presents with    Pain     Chart reflects will be having a MRI for his low back.    He describes 10 days ago he dropped a battery, reaching into the couch to get it injured his wrist.  Described feeling as if there was his wrist getting \"crushed\", this is in the arm with a torn bicep and feels it is other nerve damage.    You will be getting the MRI to address the status of concern for herniated disc in his lumbar area.    He continues with physical therapy 3 times a week.    Reviews working with his psychiatric provider will start with a therapist.  Also describes some of the spiritual support resources.    Continues active working with the dentist to get dentures.    Continues with medical cannabis find it so far seem to help more with the mental aspects of pain, reviewed changes in the sense of suffering.    Insurance no longer covers does not miss a lot which was helpful for anxiety, and there is too high of a co-pay.  SEs been off of but he does feel more anxious and his  nerves feel more irritated.  We discussed another agent I have used in this case is Gabitril.  Will see if his insurance will cover.    He continues on the Oxy codon 15 mg in the morning and then 10 mg every 3 hours or 7 a day.  We discussed we will continue this regimen until his MRI and determine next steps with neurosurgery, with a goal to be tapering.      Last urine drug test in August.   reviewed          Medications:  Current Outpatient Medications   Medication Sig Dispense Refill    acetaminophen (TYLENOL) 650 MG CR tablet Take 2 tablets (1,300 mg) by mouth 3 times daily. 540 tablet 3    albuterol (PROAIR HFA/PROVENTIL HFA/VENTOLIN HFA) 108 (90 Base) MCG/ACT inhaler Inhale 2 puffs into the lungs every 6 hours as needed for wheezing or " shortness of breath. 6.7 g 11    allopurinol (ZYLOPRIM) 300 MG tablet Take 1 tablet (300 mg) by mouth 2 times daily. 180 tablet 3    ammonium lactate (LAC-HYDRIN) 12 % external lotion Apply topically daily as needed for dry skin 500 g 11    amphetamine-dextroamphetamine (ADDERALL) 15 MG tablet Take 1 tablet (15 mg) by mouth daily. 30 tablet 0    bisacodyl (DULCOLAX) 10 MG suppository Place 1 suppository (10 mg) rectally daily as needed for constipation 60 suppository 1    buPROPion (WELLBUTRIN SR) 150 MG 12 hr tablet Take 1 tablet (150 mg) by mouth 2 times daily. 180 tablet 2    CALCIUM ANTACID 500 MG chewable tablet Take 1 tablet (500 mg) by mouth 2 times daily. 180 tablet 3    chlorhexidine (PERIDEX) 0.12 % solution Swish and spit 15 mLs in mouth 3 times daily as needed (sore throat) - Swish & Spit 473 mL 11    ergocalciferol (ERGOCALCIFEROL) 1.25 MG (80402 UT) capsule Take 1 capsule (50,000 Units) by mouth every 14 days. 6 capsule 3    ferrous sulfate (FEROSUL) 325 (65 Fe) MG tablet Take 1 tablet (325 mg) by mouth daily (with breakfast). 90 tablet 3    fexofenadine (ALLEGRA) 180 MG tablet Take 180 mg by mouth daily as needed      fexofenadine-pseudoePHEDrine (ALLEGRA-D 24) 180-240 MG 24 hr tablet Take 1 tablet by mouth daily 90 tablet 1    finasteride (PROSCAR) 5 MG tablet Take 1 tablet (5 mg) by mouth daily. 90 tablet 3    furosemide (LASIX) 80 MG tablet Take 1 tablet (80 mg) by mouth daily. 90 tablet 2    guaiFENesin (MUCINEX) 600 MG 12 hr tablet TAKE 2 TABLETS(1200 MG) BY MOUTH TWICE DAILY 180 tablet 0    hydrOXYzine (ATARAX) 50 MG tablet Per JACQUELYN Vallejo      lactulose 20 GM/30ML solution Take 45 mLs by mouth 2 times daily. For constipation. 5400 mL 11    lamoTRIgine (LAMICTAL) 150 MG tablet Take 1 tablet (150 mg) by mouth 3 times daily. 270 tablet 3    LANsoprazole (PREVACID) 30 MG DR capsule Take 1 capsule (30 mg) by mouth every morning (before breakfast). 90 capsule 3    lidocaine (LIDODERM) 5 %  patch Place onto the skin every 24 hours To prevent lidocaine toxicity, patient should be patch free for 12 hrs daily. 30 patch 11    lidocaine (XYLOCAINE) 2 % external gel Apply 1 inch topically 4 times a day as needed to gums. 85 g 11    medical cannabis (Patient's own supply) See Admin Instructions (The purpose of this order is to document that the patient reports taking medical cannabis.  This is not a prescription, and is not used to certify that the patient has a qualifying medical condition.)      methocarbamol (ROBAXIN) 500 MG tablet Take 2 tablets (1,000 mg) by mouth 4 times daily. For use beginning 3/14/23 120 tablet 11    metoprolol succinate ER (TOPROL XL) 50 MG 24 hr tablet Take 1 tablet (50 mg) by mouth daily. 90 tablet 3    montelukast (SINGULAIR) 10 MG tablet Take 1 tablet (10 mg) by mouth daily. 90 tablet 3    nabumetone (RELAFEN) 500 MG tablet Take 1 tablet (500 mg) by mouth 2 times daily. 180 tablet 3    naloxegol (MOVANTIK) 25 MG TABS tablet Take 1 tablet (25 mg) by mouth every morning (before breakfast). 30 tablet 1    naloxone (NARCAN) 4 MG/0.1ML nasal spray Spray 1 spray (4 mg) into one nostril alternating nostrils once as needed for opioid reversal. every 2-3 minutes until assistance arrives 2 each 0    nystatin (MYCOSTATIN) 751885 UNIT/ML suspension Take 10 mLs (1,000,000 Units) by mouth 2 times daily 800 mL 3    oxyCODONE IR (ROXICODONE) 10 MG tablet Take 1 tablet (10 mg) by mouth every 3 hours. At 2 am, 5 am, 11am, 2 pm, 5 pm, 8 pm, 11 pm, May fill 11/1 for 11/3 98 tablet 0    oxyCODONE IR (ROXICODONE) 15 MG tablet Take 1 tablet (15 mg) by mouth daily. At 8 am, may fill 11/1 for 11/3 28 tablet 0    polyethylene glycol (MIRALAX) 17 GM/Dose powder Take 17 g by mouth 2 times daily. 3060 g 3    potassium chloride sourav ER (KLOR-CON M20) 20 MEQ CR tablet Take 1 tablet (20 mEq) by mouth daily. 90 tablet 3    QUEtiapine (SEROQUEL) 100 MG tablet Take 1 tablet (100 mg) by mouth 4 times daily as  needed (anxiety). 60 tablet 1    QUEtiapine (SEROQUEL) 300 MG tablet Take 2 tablets (600 mg) by mouth at bedtime. 180 tablet 11    Sennosides (SENNA) 8.8 MG/5ML LIQD TAKE 10 ML BY MOUTH AT BEDTIME 236 mL 1    tamsulosin (FLOMAX) 0.4 MG capsule Take 1 capsule (0.4 mg) by mouth 2 times daily. 180 capsule 3    tiaGABine (GABITRIL) 2 MG tablet Take 1 tablet (2 mg) by mouth 4 times daily. 112 tablet 2           Physical Exam:  There were no vitals taken for this visit.      By video alert, clear sensorium, no respiratory distress, no pain behavior.    Demonstrates missing some teeth looking forward to having addressed    Affect fairly full range      Extensive spinal surgery, will be having a MRI to review    Assessment:   Status of his low back surgery.    His continue this opioid regimen and medical cannabis.    Significant comorbid PTSD, actively with a variety of resources.    Will have a trial of Gabitril to see if that is helpful as visit this might and if covered.    Total time 22 minutes.  Video start time 2: 34, and 2: 45, at home via [employer]       minutes spent on the date of encounter doing chart review, history, and exam documentation and further activities as noted above.     Dmitriy Cramer MD  [facility]

## 2024-11-04 ENCOUNTER — TELEPHONE (OUTPATIENT)
Dept: INTERNAL MEDICINE | Facility: CLINIC | Age: 54
End: 2024-11-04

## 2024-11-04 NOTE — TELEPHONE ENCOUNTER
polyethylene glycol (MIRALAX) 17 GM/Dose powder 3060 g 3 10/21/2024 -- No  Sig - Route: Take 17 g by mouth 2 times daily. - Oral  Sent to pharmacy as: Polyethylene Glycol 3350 17 GM/SCOOP Oral Powder (MIRALAX)  Class: E-Prescribe  Order: [id number]  E-Prescribing Status: Receipt confirmed by pharmacy (10/21/2024  4:02 PM CDT)    Printout Tracking    External Result Report    Pharmacy    [facility] #[id number] - [place], [place] - [address] AT [facility] [address]    Associated Diagnoses    Drug-induced constipation [K59.03]      Source Order Set    Order Set Name Order ID   [id number]     Prescribing Provider's NPI: [id number]  Devon Lund

## 2024-11-05 ENCOUNTER — TRANSFERRED RECORDS (OUTPATIENT)
Dept: HEALTH INFORMATION MANAGEMENT | Facility: CLINIC | Age: 54
End: 2024-11-05
Payer: COMMERCIAL

## 2024-11-05 NOTE — TELEPHONE ENCOUNTER
Central Prior Authorization Team   Phone: [phone]    PA Initiation    Medication: polyethylene glycol (MIRALAX) 17 GM/Dose powder  Insurance Company: [employer] - Phone [phone] Fax [phone]  Pharmacy Filling the Rx: [id number] [place], [place] - [address] AT [facility] OF [address]  Filling Pharmacy Phone: [phone]  Filling Pharmacy Fax:    Start Date: 11/5/2024

## 2024-11-06 ENCOUNTER — TELEPHONE (OUTPATIENT)
Dept: PALLIATIVE MEDICINE | Facility: OTHER | Age: 54
End: 2024-11-06
Payer: COMMERCIAL

## 2024-11-06 DIAGNOSIS — M54.16 LUMBAR RADICULOPATHY: ICD-10-CM

## 2024-11-06 RX ORDER — OXYCODONE HYDROCHLORIDE 10 MG/1
10 TABLET ORAL
Qty: 98 TABLET | Refills: 0 | Status: CANCELLED | OUTPATIENT
Start: 2024-11-06

## 2024-11-06 NOTE — TELEPHONE ENCOUNTER
Received call from patient requesting refill(s) oxyCODONE IR (ROXICODONE) 10 MG tablet     Last dispensed from pharmacy on 10/18/2024    Patient's last office/virtual visit by prescribing provider on 10/29/2024  Next office/virtual appointment scheduled for 01/14/2024    Last urine drug screen date 08/27/2024  Current opioid agreement on file (completed within the last year) Yes Date of opioid agreement: 08/27/2024    E-prescribe to   [id number] [place], [place] - [address] AT [facility] [address], pharmacy    Will route to nursing pool for review and preparation of prescription(s).     Becca Rodriguez MA  [facility]

## 2024-11-06 NOTE — TELEPHONE ENCOUNTER
[facility] Call Center    Phone Message    May a detailed message be left on voicemail: yes     Reason for Call: Medication Refill Request    Has the patient contacted the pharmacy for the refill? Yes   Name of medication being requested:     oxyCODONE IR (ROXICODONE) 10 MG tablet     Provider who prescribed the medication: Dmitriy Cramer    Pharmacy: [facility]    Date medication is needed: ASAP

## 2024-11-06 NOTE — TELEPHONE ENCOUNTER
Call placed to pt to notify script was already sent to [facility] and can be started any time.

## 2024-11-08 NOTE — TELEPHONE ENCOUNTER
PRIOR AUTHORIZATION DENIED    Medication: polyethylene glycol (MIRALAX) 17 GM/Dose powder    Denial Date: 11/8/2024    Denial Rational: Medication is excluded

## 2024-11-11 ENCOUNTER — VIRTUAL VISIT (OUTPATIENT)
Dept: INTERNAL MEDICINE | Facility: CLINIC | Age: 54
End: 2024-11-11
Payer: COMMERCIAL

## 2024-11-11 DIAGNOSIS — J32.0 CHRONIC MAXILLARY SINUSITIS: ICD-10-CM

## 2024-11-11 DIAGNOSIS — M54.16 LUMBAR RADICULOPATHY: ICD-10-CM

## 2024-11-11 DIAGNOSIS — F11.20 OPIOID DEPENDENCE, UNCOMPLICATED (H): ICD-10-CM

## 2024-11-11 DIAGNOSIS — I10 ESSENTIAL HYPERTENSION: ICD-10-CM

## 2024-11-11 DIAGNOSIS — Z01.818 PRE-OPERATIVE EXAMINATION FOR INTERNAL MEDICINE: Primary | ICD-10-CM

## 2024-11-11 DIAGNOSIS — F10.21 ALCOHOL DEPENDENCE IN REMISSION (H): ICD-10-CM

## 2024-11-11 DIAGNOSIS — F31.81 BIPOLAR 2 DISORDER (H): ICD-10-CM

## 2024-11-11 DIAGNOSIS — J45.20 MILD INTERMITTENT ASTHMA WITHOUT COMPLICATION: ICD-10-CM

## 2024-11-11 PROCEDURE — 99214 OFFICE O/P EST MOD 30 MIN: CPT | Mod: 95 | Performed by: INTERNAL MEDICINE

## 2024-11-11 PROCEDURE — G2211 COMPLEX E/M VISIT ADD ON: HCPCS | Mod: 95 | Performed by: INTERNAL MEDICINE

## 2024-11-11 RX ORDER — DEXTROAMPHETAMINE SACCHARATE, AMPHETAMINE ASPARTATE, DEXTROAMPHETAMINE SULFATE AND AMPHETAMINE SULFATE 3.75; 3.75; 3.75; 3.75 MG/1; MG/1; MG/1; MG/1
15 TABLET ORAL DAILY
Qty: 30 TABLET | Refills: 0 | Status: SHIPPED | OUTPATIENT
Start: 2024-11-11

## 2024-11-11 ASSESSMENT — ASTHMA QUESTIONNAIRES
QUESTION_5 LAST FOUR WEEKS HOW WOULD YOU RATE YOUR ASTHMA CONTROL: WELL CONTROLLED
ACT_TOTALSCORE: 19
QUESTION_4 LAST FOUR WEEKS HOW OFTEN HAVE YOU USED YOUR RESCUE INHALER OR NEBULIZER MEDICATION (SUCH AS ALBUTEROL): ONE OR TWO TIMES PER DAY
QUESTION_1 LAST FOUR WEEKS HOW MUCH OF THE TIME DID YOUR ASTHMA KEEP YOU FROM GETTING AS MUCH DONE AT WORK, SCHOOL OR AT HOME: A LITTLE OF THE TIME
QUESTION_3 LAST FOUR WEEKS HOW OFTEN DID YOUR ASTHMA SYMPTOMS (WHEEZING, COUGHING, SHORTNESS OF BREATH, CHEST TIGHTNESS OR PAIN) WAKE YOU UP AT NIGHT OR EARLIER THAN USUAL IN THE MORNING: NOT AT ALL
ACT_TOTALSCORE: 19
QUESTION_2 LAST FOUR WEEKS HOW OFTEN HAVE YOU HAD SHORTNESS OF BREATH: ONCE OR TWICE A WEEK

## 2024-11-11 NOTE — PROGRESS NOTES
Jan is a 54 year old who is being evaluated via a billable video visit.    What phone number would you like to be contacted at? [phone]  How would you like to obtain your AVS? MyChart  {If patient encounters technical issues they should call [phone] :671135}    {PROVIDER CHARTING PREFERENCE:610588}    Subjective   Jan is a 54 year old, presenting for the following health issues:  office visit (Pt reports that he is having this visit to discuss Pre-Op questions.)      11/11/2024     3:03 PM   Additional Questions   Roomed by debra   Accompanied by alone         11/11/2024     3:03 PM   Patient Reported Additional Medications   Patient reports taking the following new medications none     Video Start Time: {video visit start/end time for provider to select:582482}    History of Present Illness       Reason for visit:  Health questions   He is taking medications regularly.       {SUPERLIST (Optional):339360}  {additonal problems for provider to add (Optional):474927}    {ROS Picklists (Optional):963100}      Objective           Vitals:  No vitals were obtained today due to virtual visit.    Physical Exam   {video visit exam brief selected:682043}    {Diagnostic Test Results (Optional):419843}      Video-Visit Details    Type of service:  Video Visit   Video End Time:{video visit start/end time for provider to select:638157}  Originating Location (pt. Location): Home  {PROVIDER LOCATION On-site should be selected for visits conducted from your clinic location or adjoining [facility] hospital, academic office, or other nearby [facility] building. Off-site should be selected for all other provider locations, including home:990604}  Distant Location (provider location):  Off-site  Platform used for Video Visit: Aura  Signed Electronically by: Devon Lund MD  {Email feedback regarding this note to [email or web address]   :480473}

## 2024-11-11 NOTE — PROGRESS NOTES
OFFICE VISIT--Video    Bola is a 54 year old male contacting the clinic today via video, who will use the platform: M[employer] for the visit.  Phone # for D[employer], or if Amwell drops:   Telephone Information:   Mobile [phone]          ASSESSMENT and PLAN:  1. Pre-operative examination for internal medicine (Primary)  Stable for MRI under anesthesia.  No significant interval change since last preop September 13.  Okay to proceed    2. Lumbar radiculopathy  Okay to proceed with MRI under light anesthesia    3. Chronic maxillary sinusitis  With dental infections despite prolonged antibiotic course.  Recommend CT sinuses when convenient  - CT Sinus w/o Contrast; Future    4. Bipolar 2 disorder (H)  Okay to refill.  PDMP reviewed  - amphetamine-dextroamphetamine (ADDERALL) 15 MG tablet; Take 1 tablet (15 mg) by mouth daily.  Dispense: 30 tablet; Refill: 0    5. Mild intermittent asthma without complication  Stable but with sinus congestion    6. Alcohol dependence in remission (H)  Discussed sobriety since 2010    7. Essential hypertension  Stable    8. Opioid dependence, uncomplicated (H)  Per pain clinic.  Anticonvulsant changed 2 months ago       Patient Instructions   Stable for MRI under general anesthesia on November 19    CT scan of sinuses when convenient    Refill Adderall    Medication changes reviewed    Past medical history reviewed and updated            Return in about 3 months (around 2/11/2025) for using a video visit.       CHIEF COMPLAINT:  Chief Complaint   Patient presents with     office visit     Pt reports that he is having this visit to discuss Pre-Op questions.       HISTORY OF PRESENT ILLNESS:  Bola is a 54 year old male contacting the clinic today via video for review of preoperative exam.  Was seen on September 14 for preop and authorized to undergo anesthesia for MRI.  Has tolerated significant cervical and spinal surgeries and fusions without difficulty.  After prolonged rehabilitation  "is now home.  Preop authorized September 14 but MRI postponed and scheduled now for next week.  No significant interval changes.  Labs reviewed    Sprained right wrist and seen by orthopedics.  Now wearing a splint    Home care comes in and checks vitals    Complains of continued recurrent chronic sinus and dental issues.  This makes asthma slightly worse but remains controlled    History of Present Illness       Reason for visit:  Health questions   He is taking medications regularly.    REVIEW OF SYSTEMS:   Takes Adderall for mood.  Pain controlled    Today's PHQ-2 Score:       2/1/2024    11:03 AM   PHQ-2 ( 1999 [employer])   Q1: Little interest or pleasure in doing things 3    Q2: Feeling down, depressed or hopeless 3    PHQ-2 Score 6   Q1: Little interest or pleasure in doing things Nearly every day   Q2: Feeling down, depressed or hopeless Nearly every day   PHQ-2 Score 6       Patient-reported       PFSH:  Social History     Social History Narrative    He is .  He has been a  and also a counselor, and worked with [employer].  He is now on disability due to his brain injury and bipolar disease.        Quit smoking October of 2021        Quit drinking 2010        At [facility] since June of 2020     Home for 2 months    TOBACCO USE:  History   Smoking Status     Former     Types: Cigarettes   Smokeless Tobacco     Former       VITALS:  There were no vitals filed for this visit.  There were no vitals taken for this visit. Estimated body mass index is 29.5 kg/m  as calculated from the following:    Height as of 7/3/23: 1.727 m (5' 8\").    Weight as of 8/27/24: 88 kg (194 lb).    PHYSICAL EXAM:  (observations via Video)  Alert and oriented.  Poor dentition.  No trouble breathing coughing or wheezing    MEDICATIONS:   Current Outpatient Medications   Medication Sig Dispense Refill     acetaminophen (TYLENOL) 650 MG CR tablet Take 2 tablets (1,300 mg) by mouth 3 times daily. " 540 tablet 3     albuterol (PROAIR HFA/PROVENTIL HFA/VENTOLIN HFA) 108 (90 Base) MCG/ACT inhaler Inhale 2 puffs into the lungs every 6 hours as needed for wheezing or shortness of breath. 6.7 g 11     allopurinol (ZYLOPRIM) 300 MG tablet Take 1 tablet (300 mg) by mouth 2 times daily. 180 tablet 3     ammonium lactate (LAC-HYDRIN) 12 % external lotion Apply topically daily as needed for dry skin 500 g 11     amphetamine-dextroamphetamine (ADDERALL) 15 MG tablet Take 1 tablet (15 mg) by mouth daily. 30 tablet 0     bisacodyl (DULCOLAX) 10 MG suppository Place 1 suppository (10 mg) rectally daily as needed for constipation 60 suppository 1     buPROPion (WELLBUTRIN SR) 150 MG 12 hr tablet Take 1 tablet (150 mg) by mouth 2 times daily. 180 tablet 2     CALCIUM ANTACID 500 MG chewable tablet Take 1 tablet (500 mg) by mouth 2 times daily. 180 tablet 3     chlorhexidine (PERIDEX) 0.12 % solution Swish and spit 15 mLs in mouth 3 times daily as needed (sore throat) - Swish & Spit 473 mL 11     ergocalciferol (ERGOCALCIFEROL) 1.25 MG (04873 UT) capsule Take 1 capsule (50,000 Units) by mouth every 14 days. 6 capsule 3     ferrous sulfate (FEROSUL) 325 (65 Fe) MG tablet Take 1 tablet (325 mg) by mouth daily (with breakfast). 90 tablet 3     fexofenadine (ALLEGRA) 180 MG tablet Take 180 mg by mouth daily as needed       fexofenadine-pseudoePHEDrine (ALLEGRA-D 24) 180-240 MG 24 hr tablet Take 1 tablet by mouth daily 90 tablet 1     finasteride (PROSCAR) 5 MG tablet Take 1 tablet (5 mg) by mouth daily. 90 tablet 3     furosemide (LASIX) 80 MG tablet Take 1 tablet (80 mg) by mouth daily. 90 tablet 2     guaiFENesin (MUCINEX) 600 MG 12 hr tablet TAKE 2 TABLETS(1200 MG) BY MOUTH TWICE DAILY 180 tablet 0     hydrOXYzine (ATARAX) 50 MG tablet Per JACQUELYN Vallejo       lactulose 20 GM/30ML solution Take 45 mLs by mouth 2 times daily. For constipation. 5400 mL 11     lamoTRIgine (LAMICTAL) 150 MG tablet Take 1 tablet (150 mg) by  mouth 3 times daily. 270 tablet 3     LANsoprazole (PREVACID) 30 MG DR capsule Take 1 capsule (30 mg) by mouth every morning (before breakfast). 90 capsule 3     lidocaine (LIDODERM) 5 % patch Place onto the skin every 24 hours To prevent lidocaine toxicity, patient should be patch free for 12 hrs daily. 30 patch 11     lidocaine (XYLOCAINE) 2 % external gel Apply 1 inch topically 4 times a day as needed to gums. 85 g 11     medical cannabis (Patient's own supply) See Admin Instructions (The purpose of this order is to document that the patient reports taking medical cannabis.  This is not a prescription, and is not used to certify that the patient has a qualifying medical condition.)       methocarbamol (ROBAXIN) 500 MG tablet Take 2 tablets (1,000 mg) by mouth 4 times daily. For use beginning 3/14/23 120 tablet 11     metoprolol succinate ER (TOPROL XL) 50 MG 24 hr tablet Take 1 tablet (50 mg) by mouth daily. 90 tablet 3     montelukast (SINGULAIR) 10 MG tablet Take 1 tablet (10 mg) by mouth daily. 90 tablet 3     nabumetone (RELAFEN) 500 MG tablet Take 1 tablet (500 mg) by mouth 2 times daily. 180 tablet 3     naloxegol (MOVANTIK) 25 MG TABS tablet Take 1 tablet (25 mg) by mouth every morning (before breakfast). 30 tablet 1     naloxone (NARCAN) 4 MG/0.1ML nasal spray Spray 1 spray (4 mg) into one nostril alternating nostrils once as needed for opioid reversal. every 2-3 minutes until assistance arrives 2 each 0     nystatin (MYCOSTATIN) 431545 UNIT/ML suspension Take 10 mLs (1,000,000 Units) by mouth 2 times daily 800 mL 3     oxyCODONE IR (ROXICODONE) 10 MG tablet Take 1 tablet (10 mg) by mouth every 3 hours. At 2 am, 5 am, 11am, 2 pm, 5 pm, 8 pm, 11 pm, May fill 11/1 for 11/3 98 tablet 0     oxyCODONE IR (ROXICODONE) 15 MG tablet Take 1 tablet (15 mg) by mouth daily. At 8 am, may fill 11/1 for 11/3 28 tablet 0     polyethylene glycol (MIRALAX) 17 GM/Dose powder Take 17 g by mouth 2 times daily. 3060 g 3      "potassium chloride sourav ER (KLOR-CON M20) 20 MEQ CR tablet Take 1 tablet (20 mEq) by mouth daily. 90 tablet 3     QUEtiapine (SEROQUEL) 100 MG tablet Take 1 tablet (100 mg) by mouth 4 times daily as needed (anxiety). 60 tablet 1     QUEtiapine (SEROQUEL) 300 MG tablet Take 2 tablets (600 mg) by mouth at bedtime. 180 tablet 11     Sennosides (SENNA) 8.8 MG/5ML LIQD TAKE 10 ML BY MOUTH AT BEDTIME 236 mL 1     tamsulosin (FLOMAX) 0.4 MG capsule Take 1 capsule (0.4 mg) by mouth 2 times daily. 180 capsule 3     tiaGABine (GABITRIL) 2 MG tablet Take 1 tablet (2 mg) by mouth 4 times daily. 112 tablet 2       Outside Notes summarized: Note from September 14  Labs, x-rays, cardiology, GI tests reviewed: MRI pending.  Other labs done in August  Imaging:   No results found for this or any previous visit (from the past 744 hours).   Recent Labs   Lab Test 08/27/24  1516 05/31/23  1045   HGB 13.2* 12.3*   WBC 9.9 6.5    141   POTASSIUM 3.7 3.7   CR 0.92 0.78   PSA 0.15  --    URIC 2.6*  --    B12 454  --    VITDT 66*  --    SED 11  --    CRPI 3.50  --      No results found for: \"HRSSI04NSF\"  Lab Results   Component Value Date    CHOL 144 08/27/2024     New orders:   Orders Placed This Encounter   Procedures     CT Sinus w/o Contrast       Independent review of:   Patient would like to receive their AVS by [employer]    Video-Visit Details  Type of service:  Video Visit      9/13/2024    11:52 AM   Additional Questions   Roomed by Niurka MOLINA     Patient has given verbal consent to a Video visit?  Yes  How would you like to obtain your AVS?  Perzotammy  Will anyone else be joining your video visit, giving supplemental history? No    Originating location (pt location): Home    Distant Location (provider location):  Off-site    Video Start Time: 2:58 PM  Video End time:  3:26 PM  Face to face plus orders: 28 minutes  Documentation time:  3 minutes     The visit lasted a total of 31 minutes    Devon Lund MD  Internal " [facility]

## 2024-11-11 NOTE — PATIENT INSTRUCTIONS
Stable for MRI under general anesthesia on November 19    CT scan of sinuses when convenient    Refill Adderall    Medication changes reviewed    Past medical history reviewed and updated

## 2024-11-12 ENCOUNTER — TELEPHONE (OUTPATIENT)
Dept: INTERNAL MEDICINE | Facility: CLINIC | Age: 54
End: 2024-11-12
Payer: COMMERCIAL

## 2024-11-12 NOTE — TELEPHONE ENCOUNTER
November 12, 2024    Patient's pre-operative physical documentation and labs have been completed by Dr. Lund.  The pre-operative paperwork was faxed to Jania at [phone] per instructions from the surgeon.    Note from 9/13/24 and 11/11/24    Pam J. Behr

## 2024-11-24 DIAGNOSIS — G89.4 CHRONIC PAIN SYNDROME: ICD-10-CM

## 2024-12-03 ENCOUNTER — TELEPHONE (OUTPATIENT)
Dept: INTERNAL MEDICINE | Facility: CLINIC | Age: 54
End: 2024-12-03
Payer: COMMERCIAL

## 2024-12-03 NOTE — TELEPHONE ENCOUNTER
Called and informed pt why Dr. Lund's appts are on hold for the next 2 weeks, pt will stick to appt on 12/16/24.

## 2024-12-03 NOTE — TELEPHONE ENCOUNTER
Reason for Call:  Appointment Request    Patient requesting this type of appt:  med follow up    Requested provider: Devon Lund    Reason patient unable to be scheduled: Not within requested timeframe    When does patient want to be seen/preferred time:  patient would like to be seen 12/9/24-12/13/24    Comments: patient was hoping to get in sooner    Could we send this information to you in [facility] or would you prefer to receive a phone call?:   Patient would prefer a phone call   Okay to leave a detailed message?: Yes at Cell number on file:    Telephone Information:   Mobile [phone]       Call taken on 12/3/2024 at 4:53 PM by Olga Haines

## 2024-12-10 ENCOUNTER — MYC MEDICAL ADVICE (OUTPATIENT)
Dept: INTERNAL MEDICINE | Facility: CLINIC | Age: 54
End: 2024-12-10
Payer: COMMERCIAL

## 2024-12-10 DIAGNOSIS — R33.9 URINARY RETENTION WITH INCOMPLETE BLADDER EMPTYING: ICD-10-CM

## 2024-12-10 DIAGNOSIS — F31.81 BIPOLAR 2 DISORDER (H): ICD-10-CM

## 2024-12-10 DIAGNOSIS — K59.03 DRUG-INDUCED CONSTIPATION: ICD-10-CM

## 2024-12-10 RX ORDER — FINASTERIDE 5 MG/1
5 TABLET, FILM COATED ORAL DAILY
Qty: 90 TABLET | Refills: 3 | OUTPATIENT
Start: 2024-12-10

## 2024-12-10 RX ORDER — DEXTROAMPHETAMINE SACCHARATE, AMPHETAMINE ASPARTATE, DEXTROAMPHETAMINE SULFATE AND AMPHETAMINE SULFATE 3.75; 3.75; 3.75; 3.75 MG/1; MG/1; MG/1; MG/1
15 TABLET ORAL DAILY
Qty: 30 TABLET | Refills: 0 | Status: SHIPPED | OUTPATIENT
Start: 2024-12-10

## 2024-12-10 RX ORDER — SENNOSIDES 8.8 MG/5ML
LIQUID ORAL
Qty: 236 ML | Refills: 1 | Status: SHIPPED | OUTPATIENT
Start: 2024-12-10

## 2024-12-11 ENCOUNTER — MYC REFILL (OUTPATIENT)
Dept: INTERNAL MEDICINE | Facility: CLINIC | Age: 54
End: 2024-12-11
Payer: COMMERCIAL

## 2024-12-11 DIAGNOSIS — F31.81 BIPOLAR 2 DISORDER (H): ICD-10-CM

## 2024-12-11 DIAGNOSIS — K59.03 DRUG-INDUCED CONSTIPATION: ICD-10-CM

## 2024-12-11 RX ORDER — DEXTROAMPHETAMINE SACCHARATE, AMPHETAMINE ASPARTATE, DEXTROAMPHETAMINE SULFATE AND AMPHETAMINE SULFATE 3.75; 3.75; 3.75; 3.75 MG/1; MG/1; MG/1; MG/1
15 TABLET ORAL DAILY
Qty: 30 TABLET | Refills: 0 | OUTPATIENT
Start: 2024-12-11

## 2024-12-11 RX ORDER — SENNOSIDES 8.8 MG/5ML
LIQUID ORAL
Qty: 236 ML | Refills: 1 | OUTPATIENT
Start: 2024-12-11

## 2024-12-13 DIAGNOSIS — R33.9 URINARY RETENTION WITH INCOMPLETE BLADDER EMPTYING: ICD-10-CM

## 2024-12-13 RX ORDER — FINASTERIDE 5 MG/1
5 TABLET, FILM COATED ORAL DAILY
Qty: 90 TABLET | Refills: 3 | Status: CANCELLED | OUTPATIENT
Start: 2024-12-13

## 2024-12-13 NOTE — TELEPHONE ENCOUNTER
Pt called and talked to the pharmacy and they state they do not have any refills on file or even the script on file.    It was already denied by refill team.    Please send in new script for patient.

## 2024-12-16 ENCOUNTER — TELEPHONE (OUTPATIENT)
Dept: PALLIATIVE MEDICINE | Facility: OTHER | Age: 54
End: 2024-12-16

## 2024-12-16 ENCOUNTER — VIRTUAL VISIT (OUTPATIENT)
Dept: INTERNAL MEDICINE | Facility: CLINIC | Age: 54
End: 2024-12-16
Payer: COMMERCIAL

## 2024-12-16 DIAGNOSIS — F31.81 BIPOLAR 2 DISORDER (H): ICD-10-CM

## 2024-12-16 DIAGNOSIS — Z01.818 PRE-OPERATIVE EXAMINATION FOR INTERNAL MEDICINE: Primary | ICD-10-CM

## 2024-12-16 DIAGNOSIS — K59.03 THERAPEUTIC OPIOID INDUCED CONSTIPATION: ICD-10-CM

## 2024-12-16 DIAGNOSIS — M54.16 LUMBAR RADICULOPATHY: ICD-10-CM

## 2024-12-16 DIAGNOSIS — K59.03 DRUG-INDUCED CONSTIPATION: ICD-10-CM

## 2024-12-16 DIAGNOSIS — T40.2X5A THERAPEUTIC OPIOID INDUCED CONSTIPATION: ICD-10-CM

## 2024-12-16 DIAGNOSIS — J32.0 CHRONIC MAXILLARY SINUSITIS: ICD-10-CM

## 2024-12-16 DIAGNOSIS — M15.0 PRIMARY OSTEOARTHRITIS INVOLVING MULTIPLE JOINTS: ICD-10-CM

## 2024-12-16 DIAGNOSIS — F41.1 GENERALIZED ANXIETY DISORDER: ICD-10-CM

## 2024-12-16 DIAGNOSIS — R33.9 URINARY RETENTION WITH INCOMPLETE BLADDER EMPTYING: ICD-10-CM

## 2024-12-16 PROCEDURE — 99215 OFFICE O/P EST HI 40 MIN: CPT | Mod: 95 | Performed by: INTERNAL MEDICINE

## 2024-12-16 PROCEDURE — G2211 COMPLEX E/M VISIT ADD ON: HCPCS | Mod: 95 | Performed by: INTERNAL MEDICINE

## 2024-12-16 RX ORDER — QUETIAPINE FUMARATE 200 MG/1
200 TABLET, FILM COATED ORAL AT BEDTIME
Qty: 90 TABLET | Refills: 3 | Status: SHIPPED | OUTPATIENT
Start: 2024-12-16 | End: 2025-12-16

## 2024-12-16 RX ORDER — FINASTERIDE 5 MG/1
5 TABLET, FILM COATED ORAL DAILY
Qty: 90 TABLET | Refills: 3 | Status: SHIPPED | OUTPATIENT
Start: 2024-12-16

## 2024-12-16 RX ORDER — FINASTERIDE 5 MG/1
5 TABLET, FILM COATED ORAL DAILY
Qty: 90 TABLET | Refills: 3 | OUTPATIENT
Start: 2024-12-16

## 2024-12-16 RX ORDER — POLYETHYLENE GLYCOL 3350 17 G/17G
17 POWDER, FOR SOLUTION ORAL 2 TIMES DAILY
Qty: 3060 G | Refills: 3 | Status: SHIPPED | OUTPATIENT
Start: 2024-12-16

## 2024-12-16 RX ORDER — SENNOSIDES 8.8 MG/5ML
10 LIQUID ORAL 2 TIMES DAILY
Qty: 1800 ML | Refills: 3 | Status: SHIPPED | OUTPATIENT
Start: 2024-12-16 | End: 2025-12-16

## 2024-12-16 ASSESSMENT — ASTHMA QUESTIONNAIRES
ACT_TOTALSCORE: 20
QUESTION_3 LAST FOUR WEEKS HOW OFTEN DID YOUR ASTHMA SYMPTOMS (WHEEZING, COUGHING, SHORTNESS OF BREATH, CHEST TIGHTNESS OR PAIN) WAKE YOU UP AT NIGHT OR EARLIER THAN USUAL IN THE MORNING: ONCE OR TWICE
QUESTION_5 LAST FOUR WEEKS HOW WOULD YOU RATE YOUR ASTHMA CONTROL: WELL CONTROLLED
ACT_TOTALSCORE: 20
QUESTION_4 LAST FOUR WEEKS HOW OFTEN HAVE YOU USED YOUR RESCUE INHALER OR NEBULIZER MEDICATION (SUCH AS ALBUTEROL): TWO OR THREE TIMES PER WEEK
QUESTION_1 LAST FOUR WEEKS HOW MUCH OF THE TIME DID YOUR ASTHMA KEEP YOU FROM GETTING AS MUCH DONE AT WORK, SCHOOL OR AT HOME: NONE OF THE TIME
QUESTION_2 LAST FOUR WEEKS HOW OFTEN HAVE YOU HAD SHORTNESS OF BREATH: ONCE OR TWICE A WEEK

## 2024-12-16 NOTE — PROGRESS NOTES
OFFICE VISIT--Video    Bola is a 54 year old male contacting the clinic today via video, who will use the platform: [employer] for the visit.  Phone # for [employer], or if Amwell drops:   Telephone Information:   Mobile [phone]          ASSESSMENT and PLAN:  1. Pre-operative examination for internal medicine (Primary)  Tolerated MRI under anesthesia normally x 2 already.  Has tolerated significant spinal surgery.  No new problems or symptoms.  Stable for planned radiology procedures under general anesthesia    2. Lumbar radiculopathy  Reviewed epidural steroid injection.  Stable for updated MRIs    3. Therapeutic opioid induced constipation  Now nauseated on lactulose.  Continue Movantik.  Push senna and continue MiraLAX  - naloxegol (MOVANTIK) 25 MG TABS tablet; Take 1 tablet (25 mg) by mouth every morning (before breakfast).  Dispense: 90 tablet; Refill: 3  - Sennosides (SENNA) 8.8 MG/5ML SYRP; Take 10 mLs (17.6 mg) by mouth 2 times daily.  Dispense: 1800 mL; Refill: 3  - polyethylene glycol (MIRALAX) 17 GM/Dose powder; Take 17 g by mouth 2 times daily.  Dispense: 3060 g; Refill: 3    4. Urinary retention with incomplete bladder emptying  Continue finasteride    5. Drug-induced constipation  - naloxegol (MOVANTIK) 25 MG TABS tablet; Take 1 tablet (25 mg) by mouth every morning (before breakfast).  Dispense: 90 tablet; Refill: 3  - Sennosides (SENNA) 8.8 MG/5ML SYRP; Take 10 mLs (17.6 mg) by mouth 2 times daily.  Dispense: 1800 mL; Refill: 3  - polyethylene glycol (MIRALAX) 17 GM/Dose powder; Take 17 g by mouth 2 times daily.  Dispense: 3060 g; Refill: 3    6. Bipolar 2 disorder (H)  Trial of lower quetiapine in bed  - QUEtiapine (SEROQUEL) 200 MG tablet; Take 1 tablet (200 mg) by mouth at bedtime.  Dispense: 90 tablet; Refill: 3    7. Generalized anxiety disorder  Support lower quetiapine at bed  - QUEtiapine (SEROQUEL) 200 MG tablet; Take 1 tablet (200 mg) by mouth at bedtime.  Dispense: 90 tablet; Refill:  3    8. Primary osteoarthritis involving multiple joints  Increase nabumetone  - nabumetone (RELAFEN) 750 MG tablet; Take 1 tablet (750 mg) by mouth 2 times daily.  Dispense: 180 tablet; Refill: 3    9. Chronic maxillary sinusitis  CT sinuses pending       Patient Instructions   Stable for repeat lumbar spine MRI and CT scan abdomen under anesthesia-tolerated well on November 19    Recommend Prevnar    Handicapped parking    Discontinue lactulose    PDMP reviewed    Refill finasteride    Adjust Seroquel to 200 mg at bed +100 mg during the day as needed    Increase nabumetone to 750 mg twice daily    Continue Movantik    Continue MiraLAX    Increase senna to 10 cc twice daily            Return in about 3 months (around 3/16/2025) for using a video visit.       CHIEF COMPLAINT:  Chief Complaint   Patient presents with    Medication Follow-up     Lactulose  makes him nauseas       HISTORY OF PRESENT ILLNESS:  Bola is a 54 year old male contacting the clinic today via video for preoperative clearance.  He has undergone extensive neck and back surgical fusion over the last few years.  He has now returned home.  We did a preoperative review last month in order for him to undergo MRI of lumbar spine under anesthesia.  He tolerated this well, and a previous MRI under anesthesia 1 month before that.  He then had a lumbar 2 lumbar 3 epidural steroid injection with partial improvement.  An abnormality was noted in the lumbar spine and his spine surgeon wishes an MRI repeat above and below, and a CAT scan of the abdomen to further evaluate.  He has experienced no bleeding or anesthesia difficulties with his exams under anesthesia    Takes finasteride for multiple urinary prostate problems and wishes this refilled    Chronic narcotics have been decreased from 171 morphine milliequivalents to current dose of 127 with continued slow taper through pain clinic    Planning to get CT scan of sinuses in order to proceed with the  evaluation of chronic sinusitis.  Also having dental work done    Has become nauseated taking lactulose for chronic constipation.  Takes MiraLAX and senna also Movantik.  Discussed increasing doses to compensate for loss of lactulose    History of Present Illness       Back Pain:  He presents for follow up of back pain. Patient's back pain is a chronic problem.  Location of back pain:  Right lower back, left lower back, right shoulder, left shoulder, right hip, left hip, right side of waist and left side of waist  Description of back pain: burning, cramping, gnawing, shooting and other  Back pain spreads: right thigh, left thigh, right knee, left knee, right shoulder and left shoulder    Since patient first noticed back pain, pain is: rapidly worsening  Does back pain interfere with his job:  Not applicable      He is taking medications regularly.      REVIEW OF SYSTEMS:   No peripheral edema.  Improved neuropathy.  Persisting occasional muscle spasm    Today's PHQ-2 Score:       2/1/2024    11:03 AM   PHQ-2 ( 1999 [employer])   Q1: Little interest or pleasure in doing things 3    Q2: Feeling down, depressed or hopeless 3    PHQ-2 Score 6   Q1: Little interest or pleasure in doing things Nearly every day   Q2: Feeling down, depressed or hopeless Nearly every day   PHQ-2 Score 6       Patient-reported       PFSH:  Social History     Social History Narrative    He is .  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  He is now on disability due to his brain injury and bipolar disease.        Quit smoking October of 2021        Quit drinking 2010        At [facility] since June of 2020     Has home care.  Needs handicap sticker updated    TOBACCO USE:  History   Smoking Status    Former    Types: Cigarettes   Smokeless Tobacco    Former       VITALS:  There were no vitals filed for this visit.  There were no vitals taken for this visit. Estimated body mass  "index is 29.5 kg/m  as calculated from the following:    Height as of 7/3/23: 1.727 m (5' 8\").    Weight as of 8/27/24: 88 kg (194 lb).    PHYSICAL EXAM:  (observations via Video)  Alert and oriented.  Missing 1 tooth on the top on the left and 1 tooth on the bottom on the right.  Talkative.  No cough or shortness of breath.  Alert and anxious.  Previous cardiopulmonary exam normal    MEDICATIONS:   Current Outpatient Medications   Medication Sig Dispense Refill    acetaminophen (TYLENOL) 650 MG CR tablet Take 2 tablets (1,300 mg) by mouth 3 times daily. 540 tablet 3    albuterol (PROAIR HFA/PROVENTIL HFA/VENTOLIN HFA) 108 (90 Base) MCG/ACT inhaler Inhale 2 puffs into the lungs every 6 hours as needed for wheezing or shortness of breath. 6.7 g 11    allopurinol (ZYLOPRIM) 300 MG tablet Take 1 tablet (300 mg) by mouth 2 times daily. 180 tablet 3    ammonium lactate (LAC-HYDRIN) 12 % external lotion Apply topically daily as needed for dry skin 500 g 11    amphetamine-dextroamphetamine (ADDERALL) 15 MG tablet Take 1 tablet (15 mg) by mouth daily. 30 tablet 0    bisacodyl (DULCOLAX) 10 MG suppository Place 1 suppository (10 mg) rectally daily as needed for constipation 60 suppository 1    buPROPion (WELLBUTRIN SR) 150 MG 12 hr tablet Take 1 tablet (150 mg) by mouth 2 times daily. 180 tablet 2    CALCIUM ANTACID 500 MG chewable tablet Take 1 tablet (500 mg) by mouth 2 times daily. 180 tablet 3    chlorhexidine (PERIDEX) 0.12 % solution Swish and spit 15 mLs in mouth 3 times daily as needed (sore throat) - Swish & Spit 473 mL 11    ergocalciferol (ERGOCALCIFEROL) 1.25 MG (65855 UT) capsule Take 1 capsule (50,000 Units) by mouth every 14 days. 6 capsule 3    ferrous sulfate (FEROSUL) 325 (65 Fe) MG tablet Take 1 tablet (325 mg) by mouth daily (with breakfast). 90 tablet 3    fexofenadine (ALLEGRA) 180 MG tablet Take 180 mg by mouth daily as needed      fexofenadine-pseudoePHEDrine (ALLEGRA-D 24) 180-240 MG 24 hr tablet " Take 1 tablet by mouth daily 90 tablet 1    finasteride (PROSCAR) 5 MG tablet Take 1 tablet (5 mg) by mouth daily. 90 tablet 3    furosemide (LASIX) 80 MG tablet Take 1 tablet (80 mg) by mouth daily. 90 tablet 2    guaiFENesin (MUCINEX) 600 MG 12 hr tablet TAKE 2 TABLETS(1200 MG) BY MOUTH TWICE DAILY 180 tablet 0    hydrOXYzine (ATARAX) 50 MG tablet Per DrDon LOPEZ,OLUKAYODE      lamoTRIgine (LAMICTAL) 150 MG tablet Take 1 tablet (150 mg) by mouth 3 times daily. 270 tablet 3    LANsoprazole (PREVACID) 30 MG DR capsule Take 1 capsule (30 mg) by mouth every morning (before breakfast). 90 capsule 3    lidocaine (LIDODERM) 5 % patch Place onto the skin every 24 hours To prevent lidocaine toxicity, patient should be patch free for 12 hrs daily. 30 patch 11    lidocaine (XYLOCAINE) 2 % external gel Apply 1 inch topically 4 times a day as needed to gums. 85 g 11    medical cannabis (Patient's own supply) See Admin Instructions (The purpose of this order is to document that the patient reports taking medical cannabis.  This is not a prescription, and is not used to certify that the patient has a qualifying medical condition.)      methocarbamol (ROBAXIN) 500 MG tablet Take 2 tablets (1,000 mg) by mouth 4 times daily. For use beginning 3/14/23 120 tablet 11    metoprolol succinate ER (TOPROL XL) 50 MG 24 hr tablet Take 1 tablet (50 mg) by mouth daily. 90 tablet 3    montelukast (SINGULAIR) 10 MG tablet Take 1 tablet (10 mg) by mouth daily. 90 tablet 3    nabumetone (RELAFEN) 750 MG tablet Take 1 tablet (750 mg) by mouth 2 times daily. 180 tablet 3    naloxegol (MOVANTIK) 25 MG TABS tablet Take 1 tablet (25 mg) by mouth every morning (before breakfast). 90 tablet 3    naloxone (NARCAN) 4 MG/0.1ML nasal spray SPRAY 1 SPRAY INTO ONE NOSTRIL ALTERNATING NOSTRILS ONCE AS NEEDED FOR OPIOID REVERSAL REPEAT EVER 2-3 MINUTES UNTIL HELP ARRIVES 2 mL 1    nystatin (MYCOSTATIN) 281737 UNIT/ML suspension Take 10 mLs (1,000,000 Units) by  mouth 2 times daily 800 mL 3    oxyCODONE IR (ROXICODONE) 10 MG tablet Take 1 tablet (10 mg) by mouth every 3 hours. At 2 am, 5 am, 11am, 2 pm, 5 pm, 8 pm, 11 pm, May fill/start 12/13/24 98 tablet 0    polyethylene glycol (MIRALAX) 17 GM/Dose powder Take 17 g by mouth 2 times daily. 3060 g 3    potassium chloride sourav ER (KLOR-CON M20) 20 MEQ CR tablet Take 1 tablet (20 mEq) by mouth daily. 90 tablet 3    QUEtiapine (SEROQUEL) 100 MG tablet Take 1 tablet (100 mg) by mouth 4 times daily as needed (anxiety). 60 tablet 1    QUEtiapine (SEROQUEL) 200 MG tablet Take 1 tablet (200 mg) by mouth at bedtime. 90 tablet 3    Sennosides (SENNA) 8.8 MG/5ML SYRP Take 10 mLs (17.6 mg) by mouth 2 times daily. 1800 mL 3    tamsulosin (FLOMAX) 0.4 MG capsule Take 1 capsule (0.4 mg) by mouth 2 times daily. 180 capsule 3    tiaGABine (GABITRIL) 2 MG tablet Take 1 tablet (2 mg) by mouth 4 times daily. 112 tablet 2    oxyCODONE IR (ROXICODONE) 15 MG tablet Take 1 tablet (15 mg) by mouth daily. At 8 am, may fill/start 12/20 28 tablet 0       Outside Notes summarized: MyChart x 4  Labs, x-rays, cardiology, GI tests reviewed:   Imaging:   Recent Results (from the past 744 hours)   MR Lumbar Spine w/o Contrast    Narrative    For Patients:  As a result of the 21st Century Cures Act, medical imaging exams and procedure reports are released immediately into your electronic medical record.  You may view this report before your referring provider.  If you have questions, please contact your health care provider.      INDICATION:  Low back pain. Lumbar fusion.    COMPARISON:  07/08/2024 and 04/20/2022.  Technique Sagittal T1, T2, and STIR sequences. Axial T1 and T2 weighted sequences.     FINDINGS:  Stable degenerative retrolisthesis of L2 on L3 measures approximately 4 mm. Otherwise, normal alignment. No fractures.  Stable mature postoperative changes of diskectomy interbody fusion L3-4, L4-5 and L5-S1. Laminectomies and posterior fusion  "hardware L3-S1. Edema and likely granulation tissue within the posterior soft tissues of the operative bed. Associated surrounding metallic susceptibility artifact.  T12-L1 L1-2: No spinal canal or neural foraminal narrowing.  L2-3: Grade 1 retrolisthesis. Disc degeneration. Posterior disc bulge. Flattening of ventral thecal sac and mild narrowing of the spinal canal. Mild narrowing of the bilateral foramina.  L3-4: Postop changes. No narrowing of the spinal canal. No neural foraminal narrowing.  L4-5: Postoperative changes. No narrowing of spinal canal. Allowing for artifact, no neural foraminal narrowing.  L5-S1: Postoperative changes. No narrowing of the spinal canal. No impingement of the traversing S1 nerve roots. Allowing for artifact, there may be mild narrowing of the left neural foramen. No narrowing of the right neural foramen.  Degenerative changes visualized SI joints.  Normal paraspinal soft tissues.    Impression    1. Stable degenerative retrolisthesis of L2 on L3. Otherwise normal alignment. No fractures   2. Postop changes L3-4, L4-5 and L5-S1 laminectomies. Posterior fusion hardware L3-S1. Edema likely granulation tissue within the posterior soft tissues.   3. At L2-3, disc degeneration posterior disc bulge. Mild narrowing of the spinal canal and bilateral neural foramina   4. At L5-S1, mild narrowing of the left neural foramen        Dictated by Pilo Bryan MD @ 11/19/2024 2:46:07 PM    (Electronically Signed)      Recent Labs   Lab Test 08/27/24  1516 05/31/23  1045   HGB 13.2* 12.3*   WBC 9.9 6.5    141   POTASSIUM 3.7 3.7   CR 0.92 0.78   PSA 0.15  --    URIC 2.6*  --    B12 454  --    VITDT 66*  --    SED 11  --    CRPI 3.50  --      No results found for: \"[id number]\"  Lab Results   Component Value Date    CHOL 144 08/27/2024     New orders: No orders of the defined types were placed in this encounter.      Independent review of:   Patient would like to receive their AVS by " Cuauhtemoc    Video-Visit Details  Type of service:  Video Visit      12/16/2024    11:34 AM   Additional Questions   Roomed by leila hylton     Patient has given verbal consent to a Video visit?  Yes  How would you like to obtain your AVS?  Cuau[place]emoc  Will anyone else be joining your video visit, giving supplemental history? No    Originating location (pt location): Home    Distant Location (provider location):  Off-site    Video Start Time: 3:46 PM  Video End time:  4:29 PM  Face to face plus orders: 43 minutes  Documentation time:  3 minutes     The visit lasted a total of 46 minutes    Devon Lund MD  Internal Medicine  [facility]

## 2024-12-16 NOTE — TELEPHONE ENCOUNTER
Please PA Oxycodone 10 mg tablets     Disp Refills Start End MINDY   oxyCODONE IR (ROXICODONE) 10 MG tablet 98 tablet 0 12/13/2024 -- No   Sig - Route: Take 1 tablet (10 mg) by mouth every 3 hours. At 2 am, 5 am, 11am, 2 pm, 5 pm, 8 pm, 11 pm, May fill/start 12/13/24 - Oral   Sent to pharmacy as: oxyCODONE HCl 10 MG Oral Tablet (ROXICODONE)   Class: E-Prescribe   Earliest Fill Date: 12/13/2024   Order: [id number]   E-Prescribing Status: Receipt confirmed by pharmacy (12/13/2024  4:31 PM CST)   Carolyn

## 2024-12-16 NOTE — TELEPHONE ENCOUNTER
Spoke with Sebastian Antunez at [facility] who states that the finasteride prescription was closed for unknown reasons. They will add the refills back to the prescription. Nothing further needed.

## 2024-12-16 NOTE — TELEPHONE ENCOUNTER
Retail Pharmacy Prior Authorization Team   Phone: [phone]      Per filling pharmacy, they are getting a rejection for the 10 mg dose stating refill too soon until 12/20/24.  Patient last picked up Rx 11/25/24, 98 tablets per 14 days.

## 2024-12-16 NOTE — TELEPHONE ENCOUNTER
Retail Pharmacy Prior Authorization Team   Phone: [phone]      Prior Authorization Not Needed per Insurance    Medication: OXYCODONE HCL 10 MG PO TABS  Insurance Company: [employer] [place] - Phone [phone] Fax [phone]  Expected CoPay: $    Pharmacy Filling the Rx: [id number] [place], [place] - [address] AT [facility] [employer]  Pharmacy Notified: Yes  Patient Notified: **Instructed pharmacy to notify patient when script is ready to /ship.**    I called [employer] PA Rep from the Medicare side.    She did see a paid claim for the date of 12/13/2024.    I did wait on-hold while she was calling the pharmacy.  She went in an reversed the claim from that date as it was a glitch in the computer system.    I am calling the pharmacy to make sure they get a paid claim and are able to fill the Rx.

## 2024-12-16 NOTE — PROGRESS NOTES
"Jan is a 54 year old who is being evaluated via a billable video visit.    How would you like to obtain your AVS? MyChart  If the video visit is dropped, the invitation should be resent by: Text to cell phone: [phone]  Will anyone else be joining your video visit? No  {If patient encounters technical issues they should call [phone] :325047}    {PROVIDER CHARTING PREFERENCE:008737}    Subjective   Jan is a 54 year old, presenting for the following health issues:  Medication Follow-up (Lactulose  makes him nauseas)      12/16/2024    11:34 AM   Additional Questions   Roomed by leila hylton     Video Start Time: {video visit start/end time for provider to select:242953}    History of Present Illness       Back Pain:  He presents for follow up of back pain. Patient's back pain is a chronic problem.  Location of back pain:  Right lower back, left lower back, right shoulder, left shoulder, right hip, left hip, right side of waist and left side of waist  Description of back pain: burning, cramping, gnawing, shooting and other  Back pain spreads: right thigh, left thigh, right knee, left knee, right shoulder and left shoulder    Since patient first noticed back pain, pain is: rapidly worsening  Does back pain interfere with his job:  Not applicable      He is taking medications regularly.       {SUPERLIST (Optional):987623}  {additonal problems for provider to add (Optional):969737}    {ROS Picklists (Optional):977159}      Objective           Vitals:  No vitals were obtained today due to virtual visit.    Physical Exam   {video visit exam brief selected:220219}    {Diagnostic Test Results (Optional):162909}      Video-Visit Details    Type of service:  Video Visit   Video End Time:{video visit start/end time for provider to select:241704}  Originating Location (pt. Location): {video visit patient location:805062::\"Home\"}  {PROVIDER LOCATION On-site should be selected for visits conducted from your clinic location or " "adjoining [facility] hospital, academic office, or other nearby [facility] building. Off-site should be selected for all other provider locations, including home:706327}  Distant Location (provider location):  {virtual location provider:592401}  Platform used for Video Visit: {Virtual Visit Platforms:293168::\"[employer]s\"}  Signed Electronically by: Devon Lund MD  {Email feedback regarding this note to [email or web address]   :865225}  "

## 2024-12-16 NOTE — PATIENT INSTRUCTIONS
Stable for repeat lumbar spine MRI and CT scan abdomen under anesthesia-tolerated well on November 19    Recommend Prevnar    Handicapped parking    Discontinue lactulose    PDMP reviewed    Refill finasteride    Adjust Seroquel to 200 mg at bed +100 mg during the day as needed    Increase nabumetone to 750 mg twice daily    Continue Movantik    Continue MiraLAX    Increase senna to 10 cc twice daily

## 2024-12-17 ENCOUNTER — TELEPHONE (OUTPATIENT)
Dept: INTERNAL MEDICINE | Facility: CLINIC | Age: 54
End: 2024-12-17
Payer: COMMERCIAL

## 2024-12-17 NOTE — TELEPHONE ENCOUNTER
December 17, 2024    Handicap parking permit was picked up from outbox of Dr. Lund and placed at the  for pickup.    Pt notified    Helen Bowen

## 2024-12-26 ENCOUNTER — TELEPHONE (OUTPATIENT)
Dept: INTERNAL MEDICINE | Facility: CLINIC | Age: 54
End: 2024-12-26
Payer: COMMERCIAL

## 2024-12-26 NOTE — TELEPHONE ENCOUNTER
December 26, 2024    Home health orders was received via fax for Dr. Lund.  Patient label was attached to paperwork and placed in provider's inbox to be signed.    Urszula Garner

## 2024-12-31 ENCOUNTER — TELEPHONE (OUTPATIENT)
Dept: INTERNAL MEDICINE | Facility: CLINIC | Age: 54
End: 2024-12-31

## 2024-12-31 ENCOUNTER — VIRTUAL VISIT (OUTPATIENT)
Dept: INTERNAL MEDICINE | Facility: CLINIC | Age: 54
End: 2024-12-31
Payer: COMMERCIAL

## 2024-12-31 DIAGNOSIS — K59.03 THERAPEUTIC OPIOID INDUCED CONSTIPATION: ICD-10-CM

## 2024-12-31 DIAGNOSIS — R93.89 ABNORMAL CHEST X-RAY: ICD-10-CM

## 2024-12-31 DIAGNOSIS — Z00.00 PREVENTATIVE HEALTH CARE: ICD-10-CM

## 2024-12-31 DIAGNOSIS — F31.81 BIPOLAR 2 DISORDER (H): ICD-10-CM

## 2024-12-31 DIAGNOSIS — Z53.9 DIAGNOSIS NOT YET DEFINED: Primary | ICD-10-CM

## 2024-12-31 DIAGNOSIS — K59.03 DRUG-INDUCED CONSTIPATION: ICD-10-CM

## 2024-12-31 DIAGNOSIS — D36.10 SCHWANNOMA: Primary | ICD-10-CM

## 2024-12-31 DIAGNOSIS — T40.2X5A THERAPEUTIC OPIOID INDUCED CONSTIPATION: ICD-10-CM

## 2024-12-31 PROCEDURE — G0180 MD CERTIFICATION HHA PATIENT: HCPCS | Performed by: INTERNAL MEDICINE

## 2024-12-31 PROCEDURE — G2211 COMPLEX E/M VISIT ADD ON: HCPCS | Mod: 95 | Performed by: INTERNAL MEDICINE

## 2024-12-31 PROCEDURE — 99214 OFFICE O/P EST MOD 30 MIN: CPT | Mod: 95 | Performed by: INTERNAL MEDICINE

## 2024-12-31 RX ORDER — GUAIFENESIN 600 MG/1
600 TABLET, EXTENDED RELEASE ORAL 2 TIMES DAILY
Qty: 180 TABLET | Refills: 3 | Status: SHIPPED | OUTPATIENT
Start: 2024-12-31 | End: 2025-12-31

## 2024-12-31 RX ORDER — DEXTROAMPHETAMINE SACCHARATE, AMPHETAMINE ASPARTATE, DEXTROAMPHETAMINE SULFATE AND AMPHETAMINE SULFATE 3.75; 3.75; 3.75; 3.75 MG/1; MG/1; MG/1; MG/1
15 TABLET ORAL DAILY
Qty: 30 TABLET | Refills: 0 | Status: SHIPPED | OUTPATIENT
Start: 2024-12-31

## 2024-12-31 RX ORDER — SENNOSIDES 8.8 MG/5ML
15 LIQUID ORAL 2 TIMES DAILY
Qty: 1800 ML | Refills: 3 | Status: SHIPPED | OUTPATIENT
Start: 2024-12-31

## 2024-12-31 NOTE — PATIENT INSTRUCTIONS
Refill Adderall    Refill guaifenesin    Increase senna to 15 cc twice daily    Refill Movantik    CT chest when convenient at next imaging    Findings and schwannoma discussed

## 2024-12-31 NOTE — PROGRESS NOTES
Jan is a 54 year old who is being evaluated via a billable video visit.    How would you like to obtain your AVS? MyChart  If the video visit is dropped, the invitation should be resent by: Text to cell phone: [phone]  Will anyone else be joining your video visit? No  {If patient encounters technical issues they should call [phone] :429837}    {PROVIDER CHARTING PREFERENCE:613387}    Subjective   Jan is a 54 year old, presenting for the following health issues:  Pre-Op Exam (Pt report this is a follow up from past pre - op appt. ) and Follow Up        12/31/2024     3:07 PM   Additional Questions   Roomed by Unique     Video Start Time: {video visit start/end time for provider to select:824031}    History of Present Illness       Reason for visit:  Follow up for prior pre op appt.   He is taking medications regularly.       {SUPERLIST (Optional):684755}  {additonal problems for provider to add (Optional):725257}    {ROS Picklists (Optional):732265}      Objective    Vitals - Patient Reported  Weight (Patient Reported): 88.5 kg (195 lb)  Pain Score: Worst Pain (10)  Pain Loc: Abdomen (back)        Physical Exam   {video visit exam brief selected:949764}    {Diagnostic Test Results (Optional):073774}      Video-Visit Details    Type of service:  Video Visit   Video End Time:{video visit start/end time for provider to select:258637}  Originating Location (pt. Location): Home  {PROVIDER LOCATION On-site should be selected for visits conducted from your clinic location or adjoining [facility] hospital, academic office, or other nearby [facility] building. Off-site should be selected for all other provider locations, including home:854557}  Distant Location (provider location):  On-site  Platform used for Video Visit: Aura  Signed Electronically by: Devon Lund MD  {Email feedback regarding this note to [email or web address]   :236845}

## 2024-12-31 NOTE — TELEPHONE ENCOUNTER
Patient calling in regards to his appt that was scheduled today. Patient states he thought this was a virtual visit. Appt originally scheduled as a pre-op. Patient states no scheduled procedure date at this time. However, patient would like to follow up and discuss with his PCP.     Writer reached out to PCP if ok to change this appt to a VV. PCP responds that this was ok. Appt has been reflected to a VV instead.

## 2024-12-31 NOTE — PROGRESS NOTES
OFFICE VISIT--Video    Bola is a 54 year old male contacting the clinic today via video, who will use the platform: [employer] for the visit.  Phone # for [employer], or if Amwell drops:   Telephone Information:   Mobile [phone]          ASSESSMENT and PLAN:  1. Schwannoma (Primary)  Reviewed findings of MRI and discussed upcoming spine surgical visit    2. Drug-induced constipation  Push senna.  Refill Movantik  - naloxegol (MOVANTIK) 25 MG TABS tablet; Take 1 tablet (25 mg) by mouth every morning (before breakfast).  Dispense: 90 tablet; Refill: 3  - Sennosides (SENNA) 8.8 MG/5ML SYRP; Take 15 mLs (26.4 mg) by mouth 2 times daily.  Dispense: 1800 mL; Refill: 3    3. Bipolar 2 disorder (H)  - guaiFENesin (MUCINEX) 600 MG 12 hr tablet; Take 1 tablet (600 mg) by mouth 2 times daily.  Dispense: 180 tablet; Refill: 3  - amphetamine-dextroamphetamine (ADDERALL) 15 MG tablet; Take 1 tablet (15 mg) by mouth daily.  Dispense: 30 tablet; Refill: 0    4. Therapeutic opioid induced constipation  - naloxegol (MOVANTIK) 25 MG TABS tablet; Take 1 tablet (25 mg) by mouth every morning (before breakfast).  Dispense: 90 tablet; Refill: 3  - Sennosides (SENNA) 8.8 MG/5ML SYRP; Take 15 mLs (26.4 mg) by mouth 2 times daily.  Dispense: 1800 mL; Refill: 3    5. Preventative health care  - REVIEW OF HEALTH MAINTENANCE PROTOCOL ORDERS    6. Abnormal chest x-ray  Reviewed finding of CT scan.  Asymptomatic.  Check CT scan when convenient  - CT Chest w/o Contrast; Future       Patient Instructions   Refill Adderall    Refill guaifenesin    Increase senna to 15 cc twice daily    Refill Movantik    CT chest when convenient at next imaging    Findings and schwannoma discussed            Return in about 3 months (around 3/31/2025) for using a video visit.       CHIEF COMPLAINT:  Chief Complaint   Patient presents with    Pre-Op Exam     Pt report this is a follow up from past pre - op appt.     Follow Up       HISTORY OF PRESENT  "ILLNESS:  Bola is a 54 year old male contacting the clinic today via video for review of findings.  Continues to complain of severe back and flank pain which is lower thoracic and upper lumbar.  Reviewed findings on MRI including some bulging and ruptured disks and a schwannoma which is higher approximately T3 or T4.    Constipation noted by CT scan.  This was worse when he ran out of Movantik.  Discussed increasing senna further    Was successfully able to undergo anesthesia to get his CAT scan and MRI.  CT chest shows abnormal lung markings of unclear etiology    Went to the emergency room for severe back pain    Worried about cancer    History of Present Illness       Reason for visit:  Follow up for prior pre op appt.   He is taking medications regularly.    REVIEW OF SYSTEMS:         Today's PHQ-2 Score:       2/1/2024    11:03 AM   PHQ-2 ( 1999 [employer])   Q1: Little interest or pleasure in doing things 3    Q2: Feeling down, depressed or hopeless 3    PHQ-2 Score 6   Q1: Little interest or pleasure in doing things Nearly every day   Q2: Feeling down, depressed or hopeless Nearly every day   PHQ-2 Score 6       Proxy-reported       PFSH:  Social History     Social History Narrative    He is .  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  He is now on disability due to his brain injury and bipolar disease.        Quit smoking October of 2021        Quit drinking 2010        At [facility] since June of 2020       TOBACCO USE:  History   Smoking Status    Former    Types: Cigarettes   Smokeless Tobacco    Former       VITALS:  There were no vitals filed for this visit.  There were no vitals taken for this visit. Estimated body mass index is 29.5 kg/m  as calculated from the following:    Height as of 7/3/23: 1.727 m (5' 8\").    Weight as of 8/27/24: 88 kg (194 lb).    PHYSICAL EXAM:  (observations via Video)  Anxious.  Manic.  Alert and " oriented    MEDICATIONS:   Current Outpatient Medications   Medication Sig Dispense Refill    acetaminophen (TYLENOL) 650 MG CR tablet Take 2 tablets (1,300 mg) by mouth 3 times daily. 540 tablet 3    albuterol (PROAIR HFA/PROVENTIL HFA/VENTOLIN HFA) 108 (90 Base) MCG/ACT inhaler Inhale 2 puffs into the lungs every 6 hours as needed for wheezing or shortness of breath. 6.7 g 11    allopurinol (ZYLOPRIM) 300 MG tablet Take 1 tablet (300 mg) by mouth 2 times daily. 180 tablet 3    ammonium lactate (LAC-HYDRIN) 12 % external lotion Apply topically daily as needed for dry skin 500 g 11    amphetamine-dextroamphetamine (ADDERALL) 15 MG tablet Take 1 tablet (15 mg) by mouth daily. 30 tablet 0    bisacodyl (DULCOLAX) 10 MG suppository Place 1 suppository (10 mg) rectally daily as needed for constipation 60 suppository 1    buPROPion (WELLBUTRIN SR) 150 MG 12 hr tablet Take 1 tablet (150 mg) by mouth 2 times daily. 180 tablet 2    CALCIUM ANTACID 500 MG chewable tablet Take 1 tablet (500 mg) by mouth 2 times daily. 180 tablet 3    chlorhexidine (PERIDEX) 0.12 % solution Swish and spit 15 mLs in mouth 3 times daily as needed (sore throat) - Swish & Spit 473 mL 11    ergocalciferol (ERGOCALCIFEROL) 1.25 MG (58791 UT) capsule Take 1 capsule (50,000 Units) by mouth every 14 days. 6 capsule 3    ferrous sulfate (FEROSUL) 325 (65 Fe) MG tablet Take 1 tablet (325 mg) by mouth daily (with breakfast). 90 tablet 3    fexofenadine (ALLEGRA) 180 MG tablet Take 180 mg by mouth daily as needed      fexofenadine-pseudoePHEDrine (ALLEGRA-D 24) 180-240 MG 24 hr tablet Take 1 tablet by mouth daily 90 tablet 1    finasteride (PROSCAR) 5 MG tablet Take 1 tablet (5 mg) by mouth daily. 90 tablet 3    furosemide (LASIX) 80 MG tablet Take 1 tablet (80 mg) by mouth daily. 90 tablet 2    guaiFENesin (MUCINEX) 600 MG 12 hr tablet Take 1 tablet (600 mg) by mouth 2 times daily. 180 tablet 3    hydrOXYzine (ATARAX) 50 MG tablet Per JACQUELYN Vallejo       lamoTRIgine (LAMICTAL) 150 MG tablet Take 1 tablet (150 mg) by mouth 3 times daily. 270 tablet 3    LANsoprazole (PREVACID) 30 MG DR capsule Take 1 capsule (30 mg) by mouth every morning (before breakfast). 90 capsule 3    lidocaine (LIDODERM) 5 % patch Place onto the skin every 24 hours To prevent lidocaine toxicity, patient should be patch free for 12 hrs daily. 30 patch 11    medical cannabis (Patient's own supply) See Admin Instructions (The purpose of this order is to document that the patient reports taking medical cannabis.  This is not a prescription, and is not used to certify that the patient has a qualifying medical condition.)      methocarbamol (ROBAXIN) 500 MG tablet Take 2 tablets (1,000 mg) by mouth 4 times daily. For use beginning 3/14/23 120 tablet 11    metoprolol succinate ER (TOPROL XL) 50 MG 24 hr tablet Take 1 tablet (50 mg) by mouth daily. 90 tablet 3    montelukast (SINGULAIR) 10 MG tablet Take 1 tablet (10 mg) by mouth daily. 90 tablet 3    nabumetone (RELAFEN) 750 MG tablet Take 1 tablet (750 mg) by mouth 2 times daily. 180 tablet 3    naloxegol (MOVANTIK) 25 MG TABS tablet Take 1 tablet (25 mg) by mouth every morning (before breakfast). 90 tablet 3    naloxone (NARCAN) 4 MG/0.1ML nasal spray SPRAY 1 SPRAY INTO ONE NOSTRIL ALTERNATING NOSTRILS ONCE AS NEEDED FOR OPIOID REVERSAL REPEAT EVER 2-3 MINUTES UNTIL HELP ARRIVES 2 mL 1    oxyCODONE IR (ROXICODONE) 10 MG tablet Take 1 tablet (10 mg) by mouth every 3 hours. At 2 am, 5 am, 11am, 2 pm, 5 pm, 8 pm, 11 pm, May fill/start 12/30/24 98 tablet 0    oxyCODONE IR (ROXICODONE) 15 MG tablet Take 1 tablet (15 mg) by mouth daily. At 8 am, may fill/start 12/20 28 tablet 0    polyethylene glycol (MIRALAX) 17 GM/Dose powder Take 17 g by mouth 2 times daily. 3060 g 3    potassium chloride sourav ER (KLOR-CON M20) 20 MEQ CR tablet Take 1 tablet (20 mEq) by mouth daily. 90 tablet 3    QUEtiapine (SEROQUEL) 100 MG tablet Take 1 tablet (100 mg) by  mouth 4 times daily as needed (anxiety). 60 tablet 1    QUEtiapine (SEROQUEL) 200 MG tablet Take 1 tablet (200 mg) by mouth at bedtime. 90 tablet 3    Sennosides (SENNA) 8.8 MG/5ML SYRP Take 15 mLs (26.4 mg) by mouth 2 times daily. 1800 mL 3    tamsulosin (FLOMAX) 0.4 MG capsule Take 1 capsule (0.4 mg) by mouth 2 times daily. 180 capsule 3    tiaGABine (GABITRIL) 2 MG tablet Take 1 tablet (2 mg) by mouth 4 times daily. 112 tablet 2       Outside Notes summarized: Emergency room note  Labs, x-rays, cardiology, GI tests reviewed: MRI thoracic spine.  CT abdomen showing abnormal bronchial markings  Imaging:   Recent Results (from the past 744 hours)   MR Thoracic Spine w/o Contrast    Narrative    For Patients:  As a result of the 21st Century Cures Act, medical imaging exams and procedure reports are released immediately into your electronic medical record.  You may view this report before your referring provider.  If you have questions, please contact your health care provider.      Indication:  Thoracic spine pain    Technique:  Noncontrast sagittal T1, T2, STIR and axial T2 sequences are provided. Examination performed under anesthesia.     Comparison:  MRI 8/19/2020 [facility]    Findings:  Localizer images demonstrate anterior and posterior instrumented fusion from C3-C7 levels as well as anterior and posterior instrumented fusion at L3-S1 levels. Mild dextrocurvature of the thoracic spine on the supine nonweightbearing study. No spondylolisthesis. No aggressive osseous lesions. Benign intraosseous hemangioma in the T5 and T10 vertebral bodies.   T1-T2: Moderate interspace narrowing, type 2 Modic endplate degenerative changes, disc bulge, endplate osteophytic ridging and facet arthrosis. Mild spinal canal narrowing. Moderate-severe neural foramen narrowing bilaterally.   T2-3: No significant spinal canal stenosis or neural foramen narrowing.   T3-4: There is a T2 hyperintense T1 hypointense structure extending  laterally from the left neural foramen that measures 1.7 cm TR by 1.4 cm AP.  T4-5, T5-6: No significant spinal canal stenosis or neural foramen narrowing.   T6-7: Small left central disc protrusion indents the thecal sac. No significant spinal canal stenosis or neural foramen narrowing.   T7-8: Small left central disc protrusion indents the thecal sac without significant spinal canal stenosis or neural foramen narrowing.   T8-T9: Mild disc bulge and facet arthrosis. No significant spinal canal stenosis or neural foramen narrowing.   T9-10: Mild annular bulge and bilateral facet arthrosis. Small left central disc protrusion. No significant spinal canal stenosis or neural foramen narrowing.   T10-T11: Mild disc bulge and moderate facet arthrosis. Marked regression of the right paracentral disc protrusion. No significant spinal canal stenosis. Mild neural foramen narrowing bilaterally   T11-12: Mild disc bulge and facet arthrosis. No significant spinal canal stenosis. Mild neural foramen narrowing bilaterally.   T12-L1: No significant spinal canal stenosis or neural foramen narrowing.   No abnormal spinal cord signal.     Impression:  1. Normal alignment. No acute osseous abnormality.     2. At the T3-4 level, there is a T2 hyperintense T1 hypointense structure extending from the left neural foramen that measures up to 1.7 cm that appears to be slightly larger than the prior study. The lesion demonstrated enhancement on the prior study. The current study was performed without contrast limiting evaluation. 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 Further evaluation with MRI with contrast and CT to evaluate for any bony changes that would suggest a more sinister pathology.    3. Multilevel mild thoracic spondylosis. No high-grade spinal canal stenosis or neural foramen narrowing.    4. No abnormal intramedullary spinal cord signal.        Dictated by Hermilo Goodrich MD @ 12/24/2024 2:17:13  PM    (Electronically Signed)   CT ABDOMEN PELVIS WO    Narrative    For Patients:  As a result of the 21st Century Cures Act, medical imaging exams and procedure reports are released immediately into your electronic medical record.  You may view this report before your referring provider.  If you have questions, please contact your health care provider.      INDICATION:  Abdominal wall hernia.    TECHNIQUE:  CT abdomen and pelvis without contrast.    COMPARISON:  January 18, 2023.     FINDINGS:  Lower chest: Scattered atelectasis. Focal right lower lobe ground-glass/tree-in-bud consolidation, likely infectious/inflammatory in etiology.     Liver: Normal in size and attenuation. No suspicious masses.     Gallbladder and bile ducts: No stones or inflammation. No biliary dilatation.     Pancreas: Unremarkable. No mass or inflammation.     Spleen: Normal in size. No masses.     Adrenal glands: Normal in size. No nodules.     Kidneys: Normal in size. No suspicious masses, stones, or hydronephrosis.     GI tract: Moderate colonic stool burden. Normal in caliber. No sign of mass or inflammation. Normal appendix.     Vasculature: Abdominal aorta is normal in caliber.     Lymph nodes: No lymphadenopathy.     Peritoneum/Abdominal Wall: Unremarkable. No sign of mass or infiltration. No free air or significant free fluid.     Pelvis: Moderately distended bladder.. No pelvic masses.     Bones: Postsurgical changes of posterior lumbosacral hardware fixation with intervertebral disc spacers.     Impression    No acute intra-abdominal/pelvic abnormalities, including abdominal wall hernia as questioned.     Moderate colonic stool burden.      Please note that all CT scans at this facility use dose modulation, iterative reconstruction, and/or weight-based dosing when appropriate to reduce radiation dose to as low as reasonably achievable.    Dictated by Bola Felder MD @ 12/24/2024 3:39:21 PM    (Electronically Signed)     "  Recent Labs   Lab Test 08/27/24  1516 05/31/23  1045   HGB 13.2* 12.3*   WBC 9.9 6.5    141   POTASSIUM 3.7 3.7   CR 0.92 0.78   PSA 0.15  --    URIC 2.6*  --    B12 454  --    VITDT 66*  --    SED 11  --    CRPI 3.50  --      No results found for: \"NYRPU19HAK\"  Lab Results   Component Value Date    CHOL 144 08/27/2024     New orders:   Orders Placed This Encounter   Procedures    REVIEW OF HEALTH MAINTENANCE PROTOCOL ORDERS    CT Chest w/o Contrast       Independent review of:   Patient would like to receive their AVS by [employer]    Video-Visit Details  Type of service:  Video Visit      12/31/2024     3:07 PM   Additional Questions   Roomed by [facility]     Patient has given verbal consent to a Video visit?  Yes  How would you like to obtain your AVS?  [employer]  Will anyone else be joining your video visit, giving supplemental history? No    Originating location (pt location): Home    Distant Location (provider location):  Off-site    Video Start Time: 3:17 PM  Video End time:  3:38 PM  Face to face plus orders: 21 minutes  Documentation time:  3 minutes     The visit lasted a total of 24 minutes    Devon Lund MD  Internal Medicine  [facility]     "

## 2025-01-14 ENCOUNTER — OFFICE VISIT (OUTPATIENT)
Dept: PALLIATIVE MEDICINE | Facility: OTHER | Age: 55
End: 2025-01-14
Attending: ANESTHESIOLOGY
Payer: COMMERCIAL

## 2025-01-14 VITALS — OXYGEN SATURATION: 98 % | SYSTOLIC BLOOD PRESSURE: 123 MMHG | HEART RATE: 82 BPM | DIASTOLIC BLOOD PRESSURE: 77 MMHG

## 2025-01-14 DIAGNOSIS — T40.2X5A THERAPEUTIC OPIOID INDUCED CONSTIPATION: ICD-10-CM

## 2025-01-14 DIAGNOSIS — G62.9 NEUROPATHY: ICD-10-CM

## 2025-01-14 DIAGNOSIS — K59.03 DRUG-INDUCED CONSTIPATION: ICD-10-CM

## 2025-01-14 DIAGNOSIS — M54.16 LUMBAR RADICULOPATHY: ICD-10-CM

## 2025-01-14 DIAGNOSIS — K59.03 THERAPEUTIC OPIOID INDUCED CONSTIPATION: ICD-10-CM

## 2025-01-14 PROCEDURE — G2211 COMPLEX E/M VISIT ADD ON: HCPCS | Performed by: ANESTHESIOLOGY

## 2025-01-14 PROCEDURE — 99214 OFFICE O/P EST MOD 30 MIN: CPT | Performed by: ANESTHESIOLOGY

## 2025-01-14 PROCEDURE — G0463 HOSPITAL OUTPT CLINIC VISIT: HCPCS | Performed by: ANESTHESIOLOGY

## 2025-01-14 RX ORDER — TIAGABINE HYDROCHLORIDE 2 MG/1
2 TABLET, FILM COATED ORAL
Qty: 168 TABLET | Refills: 2 | Status: SHIPPED | OUTPATIENT
Start: 2025-01-14

## 2025-01-14 RX ORDER — OXYCODONE HYDROCHLORIDE 15 MG/1
15 TABLET ORAL DAILY
Qty: 28 TABLET | Refills: 0 | Status: SHIPPED | OUTPATIENT
Start: 2025-01-14

## 2025-01-14 ASSESSMENT — PAIN SCALES - GENERAL: PAINLEVEL_OUTOF10: EXTREME PAIN (9)

## 2025-01-14 NOTE — PROGRESS NOTES
"                          [facility] Follow-up    Date of visit: 1/14/2025    Chief complaint:   Chief Complaint   Patient presents with    Pain     Follow-up for history of cervical thoracic lumbar fusion.    Reviews today has had significant imaging of his spine.  Found to have several herniated disks.  Working with [facility] radiology having had several injections for the herniated disks.  He sees a spine surgeon next week to review the herniated disc, the finding of a schwannoma along the thoracic spine.  There is also retrolisthesis above his lumbar fusion at L2.    He is also been working with orthopedics around his shoulder and found to have a \"tumor\" under his right scapula, understands usually benign.  Has had episodes with significant tremor in his right arm that can last several hours, few times a week.    Also being evaluated for concern for findings in his lung.  Has had episodes of pneumonia with COVID.    Reviews this has been a struggle, Thanksgiving dealing with dental work and could not go out to see family.  He has been set up for implant soon and that will be resolved.    [place] was difficult as that was much as imaging.    He reviews during this time working with his  and significant spiritual aspects of his life and how he is coping with this and becomes quite emotional.  Also has a psychotherapist.    He has his own apartment, [facility] worker helps with his needs and a nurse comes in twice a week.    He did find the Gabitril 2 mg 4 times a day helps to some extent, each dose lasted about an hour and a half.  Seems to help some with anxiety perhaps some with nerve pain.    Continues with the oxycodone 15 mg 1 in the morning and a 10 mg every 3 hours or 7 of the day.  Has hope to be able to decrease that but with new findings and will not make changes.  This is Movantik and other bowel meds.    Last urine drug test in August.   " reviewed            Medications:  Current Outpatient Medications   Medication Sig Dispense Refill    acetaminophen (TYLENOL) 650 MG CR tablet Take 2 tablets (1,300 mg) by mouth 3 times daily. 540 tablet 3    albuterol (PROAIR HFA/PROVENTIL HFA/VENTOLIN HFA) 108 (90 Base) MCG/ACT inhaler Inhale 2 puffs into the lungs every 6 hours as needed for wheezing or shortness of breath. 6.7 g 11    allopurinol (ZYLOPRIM) 300 MG tablet Take 1 tablet (300 mg) by mouth 2 times daily. 180 tablet 3    ammonium lactate (LAC-HYDRIN) 12 % external lotion Apply topically daily as needed for dry skin 500 g 11    amphetamine-dextroamphetamine (ADDERALL) 15 MG tablet Take 1 tablet (15 mg) by mouth daily. 30 tablet 0    bisacodyl (DULCOLAX) 10 MG suppository Place 1 suppository (10 mg) rectally daily as needed for constipation 60 suppository 1    buPROPion (WELLBUTRIN SR) 150 MG 12 hr tablet Take 1 tablet (150 mg) by mouth 2 times daily. 180 tablet 2    CALCIUM ANTACID 500 MG chewable tablet Take 1 tablet (500 mg) by mouth 2 times daily. 180 tablet 3    chlorhexidine (PERIDEX) 0.12 % solution Swish and spit 15 mLs in mouth 3 times daily as needed (sore throat) - Swish & Spit 473 mL 11    ergocalciferol (ERGOCALCIFEROL) 1.25 MG (45423 UT) capsule Take 1 capsule (50,000 Units) by mouth every 14 days. 6 capsule 3    ferrous sulfate (FEROSUL) 325 (65 Fe) MG tablet Take 1 tablet (325 mg) by mouth daily (with breakfast). 90 tablet 3    fexofenadine (ALLEGRA) 180 MG tablet Take 180 mg by mouth daily as needed      fexofenadine-pseudoePHEDrine (ALLEGRA-D 24) 180-240 MG 24 hr tablet Take 1 tablet by mouth daily 90 tablet 1    finasteride (PROSCAR) 5 MG tablet Take 1 tablet (5 mg) by mouth daily. 90 tablet 3    furosemide (LASIX) 80 MG tablet Take 1 tablet (80 mg) by mouth daily. 90 tablet 2    guaiFENesin (MUCINEX) 600 MG 12 hr tablet Take 1 tablet (600 mg) by mouth 2 times daily. 180 tablet 3    hydrOXYzine (ATARAX) 50 MG tablet Per   AWOSIKA,OLUKAYODE      lamoTRIgine (LAMICTAL) 150 MG tablet Take 1 tablet (150 mg) by mouth 3 times daily. 270 tablet 3    LANsoprazole (PREVACID) 30 MG DR capsule Take 1 capsule (30 mg) by mouth every morning (before breakfast). 90 capsule 3    lidocaine (LIDODERM) 5 % patch Place onto the skin every 24 hours To prevent lidocaine toxicity, patient should be patch free for 12 hrs daily. 30 patch 11    medical cannabis (Patient's own supply) See Admin Instructions (The purpose of this order is to document that the patient reports taking medical cannabis.  This is not a prescription, and is not used to certify that the patient has a qualifying medical condition.)      methocarbamol (ROBAXIN) 500 MG tablet Take 2 tablets (1,000 mg) by mouth 4 times daily. For use beginning 3/14/23 120 tablet 11    metoprolol succinate ER (TOPROL XL) 50 MG 24 hr tablet Take 1 tablet (50 mg) by mouth daily. 90 tablet 3    montelukast (SINGULAIR) 10 MG tablet Take 1 tablet (10 mg) by mouth daily. 90 tablet 3    nabumetone (RELAFEN) 750 MG tablet Take 1 tablet (750 mg) by mouth 2 times daily. 180 tablet 3    naloxegol (MOVANTIK) 25 MG TABS tablet Take 1 tablet (25 mg) by mouth every morning (before breakfast). 90 tablet 3    naloxone (NARCAN) 4 MG/0.1ML nasal spray SPRAY 1 SPRAY INTO ONE NOSTRIL ALTERNATING NOSTRILS ONCE AS NEEDED FOR OPIOID REVERSAL REPEAT EVER 2-3 MINUTES UNTIL HELP ARRIVES 2 mL 1    oxyCODONE IR (ROXICODONE) 10 MG tablet Take 1 tablet (10 mg) by mouth every 3 hours. At 2 am, 5 am, 11am, 2 pm, 5 pm, 8 pm, 11 pm, May fill 1/12/25 and start 1/13/25 98 tablet 0    oxyCODONE IR (ROXICODONE) 15 MG tablet Take 1 tablet (15 mg) by mouth daily. At 8 am, 28 tablet 0    polyethylene glycol (MIRALAX) 17 GM/Dose powder Take 17 g by mouth 2 times daily. 3060 g 3    potassium chloride sourav ER (KLOR-CON M20) 20 MEQ CR tablet Take 1 tablet (20 mEq) by mouth daily. 90 tablet 3    QUEtiapine (SEROQUEL) 100 MG tablet Take 1 tablet (100 mg)  "by mouth 4 times daily as needed (anxiety). 60 tablet 1    QUEtiapine (SEROQUEL) 200 MG tablet Take 1 tablet (200 mg) by mouth at bedtime. 90 tablet 3    Sennosides (SENNA) 8.8 MG/5ML SYRP Take 15 mLs (26.4 mg) by mouth 2 times daily. 1800 mL 3    tamsulosin (FLOMAX) 0.4 MG capsule Take 1 capsule (0.4 mg) by mouth 2 times daily. 180 capsule 3    tiaGABine (GABITRIL) 2 MG tablet Take 1 tablet (2 mg) by mouth 6 times daily. 168 tablet 2           Physical Exam:  Blood pressure 123/77, pulse 82, SpO2 98%.    Alert, clear sensorium.  Missing several teeth looking forward to the implants.    Ice bag along his back.  On exam points out in the left lower thoracic area findings he attributes to the \"schwannoma, area in his right scapular tender to deep palpation.  Ambulates with cane.    Affect congruent with above              Assessment:   Spinal pain with cervical thoracic and lumbar fusions now with new herniations, finding of a schwannoma, to see a spine surgeon next week.    Shoulder problems as noted and medical problems being evaluated.    We have been using his a regimen of oxycodone at this point helping him manage as he is struggling with ongoing evaluations.    Plan: Will increase the Gabitril to 2 mg every 4 hours to help with neuropathic pain and will be using to help with anxiety rather than benzodiazepines given the oxycodone.    Continue the opioids as above.    Reviewed spiritual dimensions to his experience.    Total time 35 minutes.  He will follow-up in 8 weeks.The longitudinal plan of care for the diagnosis(es)/condition(s) as documented were addressed during this visit. Due to the added complexity in care, I will continue to support Jan in the subsequent management and with ongoing continuity of care.     minutes spent on the date of encounter doing chart review, history, and exam documentation and further activities as noted above.     Dmitriy Cramer MD  [facility]        "

## 2025-01-14 NOTE — PROGRESS NOTES
Patient presents to the clinic today for a visit with JAYME MULLEN MD regarding Pain Management.          8/27/2024     1:33 PM 12/16/2024    11:31 AM 1/14/2025     8:17 AM   PEG Score   PEG Total Score 10 10 10       UDS/CSA- 08.27.2024    Medications- Oxy 15 mg 8am    Oxycodone 10 mg 5am    Notes    Kailyn Carolina  [facility] Clinical Assistant

## 2025-01-14 NOTE — PATIENT INSTRUCTIONS
PLAN:    Increase Gabitril to 2 mg every 4 hours.    Continue the oxycodone 15 mg in the morning and 10 mg 1 every 3 hours or 7 in a day.    You are working with the dentist with implants.    You are scheduled to see your spine surgeon next week.    You are evaluations for findings in your lungs.    Continue working with your psychotherapist and your .    Follow-up with Dr. Cramer for return visit in 8 weeks, may be video visit

## 2025-01-21 DIAGNOSIS — M54.16 LUMBAR RADICULOPATHY: ICD-10-CM

## 2025-01-21 RX ORDER — OXYCODONE HYDROCHLORIDE 15 MG/1
15 TABLET ORAL DAILY
Qty: 28 TABLET | Refills: 0 | Status: SHIPPED | OUTPATIENT
Start: 2025-01-21

## 2025-01-21 RX ORDER — OXYCODONE HYDROCHLORIDE 10 MG/1
10 TABLET ORAL
Qty: 98 TABLET | Refills: 0 | Status: SHIPPED | OUTPATIENT
Start: 2025-01-21

## 2025-01-21 NOTE — TELEPHONE ENCOUNTER
Received request for a refill(s) of     oxyCODONE IR (ROXICODONE) 15 MG tablet    Last dispensed from pharmacy on 01/14/25  oxyCODONE IR (ROXICODONE) 10 MG tablet   Last dispensed from pharmacy on 01/12/25    Patient's last office/virtual visit by prescribing provider on 01/14/25  Next office/virtual appointment scheduled for 04/07/25    Last urine drug screen date 08/27/24  Current opioid agreement on file (completed within the last year) Yes Date of opioid agreement: 08/27/24    E-prescribe to     [id number] - [place], [place] - [address] AT [address] OF [address]  [address] [facility] [address]  Phone: [phone] Fax: [phone]    Will route to nursing [place] for review and preparation of prescription(s).       Ghazala George MA  [facility] [place]

## 2025-01-27 ENCOUNTER — TELEPHONE (OUTPATIENT)
Dept: INTERNAL MEDICINE | Facility: CLINIC | Age: 55
End: 2025-01-27
Payer: COMMERCIAL

## 2025-01-27 DIAGNOSIS — M10.9 URIC ACID ARTHROPATHY: ICD-10-CM

## 2025-01-27 DIAGNOSIS — R93.89 ABNORMAL CT OF THE CHEST: Primary | ICD-10-CM

## 2025-01-27 RX ORDER — ALLOPURINOL 300 MG/1
300 TABLET ORAL 2 TIMES DAILY
Qty: 180 TABLET | Refills: 3 | OUTPATIENT
Start: 2025-01-27

## 2025-01-30 NOTE — TELEPHONE ENCOUNTER
Retail Pharmacy Prior Authorization Team   Phone: [phone]    Prior Authorization Not Needed per Insurance    Medication: ALLOPURINOL 300 MG PO TABS  Insurance Company: [employer] [place] - Phone [phone] Fax [phone]  Expected CoPay: $    Pharmacy Filling the Rx: [facility] #[id number] [place], [place] - [address] AT [facility] OF [facility]  Pharmacy Notified: YES  Patient Notified: YES (faxed letter to pharmacy and the pharmacy will notify the patient when ready)       Uncontrolled   Continue to use the Dexcom as it was helping  with self awareness and self accountability  She is been off the Ozempic??  Misunderstanding  Needs to get back on GLP 1 as it helps with prandial excursions      -- Medication Changes:   Continue Metformin 1000 mg daily      Continue Farxiga 10 mg daily   -- Please let us know if you have nausea, vomiting, or weakness with a normal BG. This could be euglycemic DKA.  -- please hold medication 3 days prior to surgery or if you are sick and have decreased intake.   -- Please drink lots of water daily while on this medication. -- 6-8 glasses of water per day   --This medication could also give you a yeast infection- please let us know if this occurs and we can treat it.   -- NO KETO DIET      Start Mounjaro weekly   This is taking the place of ozempic   Start with 2.5 mg weekly x4 weeks-- then we will increase to 5 mg weekly -- from there we will alk about increasing if needed       Continue the VGo30 with Fiasp insulin   Change the VGO every 24 hours!!!!! -- same time every day   6 clicks with meals      Use the dexcom G6 consistently       -- Reviewed goals of therapy are to get the best control we can without hypoglycemia.  -- Reviewed patient's current insulin regimen. Clarified proper insulin dose and timing in relation to meals, etc. Insulin injection sites and proper rotation instructed.    -- Advised frequent self blood glucose monitoring.  Patient encouraged to document glucose results and bring them to every clinic visit. Get back on dexcom personal CGM -- use the dexcom G6 consistently   -- Hypoglycemia precautions discussed. Instructed on precautions before driving.    -- Call for Bg repeatedly < 70 or > 200.   -- Close adherence to lifestyle changes recommended.   -- Periodic follow ups for eye evaluations, foot care and dental care suggested.  -- diabetes education- Schedule with zoltan in 4-5 weeks to make sure her blood sugar readings are  improving       patient will benefit from a Dexcom G6 to prevent hypoglycemia due to patient experiences level 2 or hypoglycemic event <54   Patient has diabetes mellitus and manages diabetes with intensive insulin regimen and uses prandial and basal insulin daily-- on the VGO   Patient requires a therapeutic CGM and is willing to use therapeutic CGM for the necessary frequent adjustments of insulin therapy.  I have completed an in-person visit during the previous 6 months and will continue to have in-person visits every 6 months to assess adherence to their CGM regimen and diabetes treatment plan.  Due to COVID pandemic and need for remote monitoring this tool is medically necessary

## 2025-02-03 ENCOUNTER — TELEPHONE (OUTPATIENT)
Dept: INTERNAL MEDICINE | Facility: CLINIC | Age: 55
End: 2025-02-03
Payer: COMMERCIAL

## 2025-02-03 DIAGNOSIS — M10.9 URIC ACID ARTHROPATHY: ICD-10-CM

## 2025-02-03 RX ORDER — ALLOPURINOL 300 MG/1
300 TABLET ORAL 2 TIMES DAILY
Qty: 180 TABLET | Refills: 3 | OUTPATIENT
Start: 2025-02-03

## 2025-02-03 RX ORDER — ALLOPURINOL 300 MG/1
300 TABLET ORAL 2 TIMES DAILY
Qty: 180 TABLET | Refills: 3 | Status: SHIPPED | OUTPATIENT
Start: 2025-02-03

## 2025-02-03 NOTE — TELEPHONE ENCOUNTER
February 3, 2025    Home health orders was received via fax for Dr. Lund.  Patient label was attached to paperwork and placed in provider's inbox to be signed.    Urszula Garner

## 2025-02-05 NOTE — TELEPHONE ENCOUNTER
February 5, 2025    Home health orders was picked up from outbox of Dr. Lund and sent via fax to [phone].    Urszula Garner

## 2025-02-09 ENCOUNTER — MYC MEDICAL ADVICE (OUTPATIENT)
Dept: PALLIATIVE MEDICINE | Facility: OTHER | Age: 55
End: 2025-02-09
Payer: COMMERCIAL

## 2025-02-09 DIAGNOSIS — M54.16 LUMBAR RADICULOPATHY: ICD-10-CM

## 2025-02-10 RX ORDER — OXYCODONE HYDROCHLORIDE 15 MG/1
15 TABLET ORAL DAILY
Qty: 28 TABLET | Refills: 0 | Status: SHIPPED | OUTPATIENT
Start: 2025-02-10

## 2025-02-10 RX ORDER — OXYCODONE HYDROCHLORIDE 10 MG/1
10 TABLET ORAL
Qty: 98 TABLET | Refills: 0 | Status: SHIPPED | OUTPATIENT
Start: 2025-02-10

## 2025-02-10 NOTE — TELEPHONE ENCOUNTER
Received request for a refill(s) of     oxyCODONE IR (ROXICODONE) 15 MG tablet    Last dispensed from pharmacy on 01/21/25  oxyCODONE IR (ROXICODONE) 10 MG tablet   Last dispensed from pharmacy on 01/21/25    Patient's last office/virtual visit by prescribing provider on 01/14/25  Next office/virtual appointment scheduled for 04/07/25    Last urine drug screen date 08/27/24  Current opioid agreement on file (completed within the last year) Yes Date of opioid agreement: 08/27/24    E-prescribe to     [facility] #[id number] - [place], [place] - [address] AT [address]  [address] [facility] [address]  Phone: [phone] Fax: [phone]    Will route to nursing [place] for review and preparation of prescription(s).       Ghazala George MA  [facility] [place]

## 2025-02-11 ENCOUNTER — MYC MEDICAL ADVICE (OUTPATIENT)
Dept: INTERNAL MEDICINE | Facility: CLINIC | Age: 55
End: 2025-02-11
Payer: COMMERCIAL

## 2025-02-11 DIAGNOSIS — F31.81 BIPOLAR 2 DISORDER (H): ICD-10-CM

## 2025-02-11 RX ORDER — DEXTROAMPHETAMINE SACCHARATE, AMPHETAMINE ASPARTATE, DEXTROAMPHETAMINE SULFATE AND AMPHETAMINE SULFATE 3.75; 3.75; 3.75; 3.75 MG/1; MG/1; MG/1; MG/1
15 TABLET ORAL DAILY
Qty: 30 TABLET | Refills: 0 | Status: SHIPPED | OUTPATIENT
Start: 2025-02-11

## 2025-03-07 DIAGNOSIS — M54.16 LUMBAR RADICULOPATHY: ICD-10-CM

## 2025-03-07 NOTE — TELEPHONE ENCOUNTER
Received call from patient requesting refill(s) oxyCODONE IR (ROXICODONE) 10 MG tablet    Last dispensed from pharmacy on 02/25/2025    Patient's last office/virtual visit by prescribing provider on 01/14/2024  Next office/virtual appointment scheduled for 04/07/2025    Last urine drug screen date 08/27/2024  Current opioid agreement on file (completed within the last year) Yes Date of opioid agreement: 08/27/2024    E-prescribe to   [facility] #[id number] [place], [place] - [address] AT [facility] OF [facility], pharmacy    Will route to nursing pool for review and preparation of prescription(s).     Becca Rodriguez MA  [facility]

## 2025-03-07 NOTE — TELEPHONE ENCOUNTER
Refill request:  Oxycodone 10 mg    [facility] #[id number] - [place], [place] - [address]

## 2025-03-10 RX ORDER — OXYCODONE HYDROCHLORIDE 10 MG/1
10 TABLET ORAL
Qty: 98 TABLET | Refills: 0 | Status: SHIPPED | OUTPATIENT
Start: 2025-03-10

## 2025-03-14 ENCOUNTER — MYC MEDICAL ADVICE (OUTPATIENT)
Dept: PALLIATIVE MEDICINE | Facility: OTHER | Age: 55
End: 2025-03-14
Payer: COMMERCIAL

## 2025-03-14 DIAGNOSIS — M54.16 LUMBAR RADICULOPATHY: ICD-10-CM

## 2025-03-17 RX ORDER — OXYCODONE HYDROCHLORIDE 15 MG/1
15 TABLET ORAL DAILY
Qty: 28 TABLET | Refills: 0 | Status: SHIPPED | OUTPATIENT
Start: 2025-03-17

## 2025-03-19 ENCOUNTER — OFFICE VISIT (OUTPATIENT)
Dept: FAMILY MEDICINE | Facility: CLINIC | Age: 55
End: 2025-03-19
Payer: COMMERCIAL

## 2025-03-19 VITALS
HEART RATE: 92 BPM | HEIGHT: 68 IN | DIASTOLIC BLOOD PRESSURE: 65 MMHG | WEIGHT: 184 LBS | OXYGEN SATURATION: 97 % | SYSTOLIC BLOOD PRESSURE: 95 MMHG | RESPIRATION RATE: 20 BRPM | TEMPERATURE: 97.8 F | BODY MASS INDEX: 27.89 KG/M2

## 2025-03-19 DIAGNOSIS — D50.0 IRON DEFICIENCY ANEMIA DUE TO CHRONIC BLOOD LOSS: ICD-10-CM

## 2025-03-19 DIAGNOSIS — Z01.818 PREOP GENERAL PHYSICAL EXAM: Primary | ICD-10-CM

## 2025-03-19 DIAGNOSIS — E87.6 HYPOKALEMIA: ICD-10-CM

## 2025-03-19 DIAGNOSIS — E87.1 HYPONATREMIA: ICD-10-CM

## 2025-03-19 LAB
ATRIAL RATE - MUSE: 68 BPM
BASOPHILS # BLD AUTO: 0.1 10E3/UL (ref 0–0.2)
BASOPHILS NFR BLD AUTO: 1 %
DIASTOLIC BLOOD PRESSURE - MUSE: NORMAL MMHG
EOSINOPHIL # BLD AUTO: 0.1 10E3/UL (ref 0–0.7)
EOSINOPHIL NFR BLD AUTO: 1 %
ERYTHROCYTE [DISTWIDTH] IN BLOOD BY AUTOMATED COUNT: 12.2 % (ref 10–15)
HCT VFR BLD AUTO: 40.8 % (ref 40–53)
HGB BLD-MCNC: 13.5 G/DL (ref 13.3–17.7)
IMM GRANULOCYTES # BLD: 0 10E3/UL
IMM GRANULOCYTES NFR BLD: 0 %
INR PPP: 0.91 (ref 0.85–1.15)
INTERPRETATION ECG - MUSE: NORMAL
LYMPHOCYTES # BLD AUTO: 2 10E3/UL (ref 0.8–5.3)
LYMPHOCYTES NFR BLD AUTO: 19 %
MCH RBC QN AUTO: 28.5 PG (ref 26.5–33)
MCHC RBC AUTO-ENTMCNC: 33.1 G/DL (ref 31.5–36.5)
MCV RBC AUTO: 86 FL (ref 78–100)
MONOCYTES # BLD AUTO: 0.6 10E3/UL (ref 0–1.3)
MONOCYTES NFR BLD AUTO: 6 %
NEUTROPHILS # BLD AUTO: 8 10E3/UL (ref 1.6–8.3)
NEUTROPHILS NFR BLD AUTO: 74 %
P AXIS - MUSE: 17 DEGREES
PLATELET # BLD AUTO: 312 10E3/UL (ref 150–450)
PR INTERVAL - MUSE: 154 MS
QRS DURATION - MUSE: 92 MS
QT - MUSE: 408 MS
QTC - MUSE: 433 MS
R AXIS - MUSE: -23 DEGREES
RBC # BLD AUTO: 4.74 10E6/UL (ref 4.4–5.9)
SYSTOLIC BLOOD PRESSURE - MUSE: NORMAL MMHG
T AXIS - MUSE: 10 DEGREES
VENTRICULAR RATE- MUSE: 68 BPM
WBC # BLD AUTO: 10.8 10E3/UL (ref 4–11)

## 2025-03-19 PROCEDURE — 93005 ELECTROCARDIOGRAM TRACING: CPT | Performed by: FAMILY MEDICINE

## 2025-03-19 PROCEDURE — 1125F AMNT PAIN NOTED PAIN PRSNT: CPT | Performed by: FAMILY MEDICINE

## 2025-03-19 PROCEDURE — 3074F SYST BP LT 130 MM HG: CPT | Performed by: FAMILY MEDICINE

## 2025-03-19 PROCEDURE — 85025 COMPLETE CBC W/AUTO DIFF WBC: CPT | Performed by: FAMILY MEDICINE

## 2025-03-19 PROCEDURE — 99214 OFFICE O/P EST MOD 30 MIN: CPT | Performed by: FAMILY MEDICINE

## 2025-03-19 PROCEDURE — 3078F DIAST BP <80 MM HG: CPT | Performed by: FAMILY MEDICINE

## 2025-03-19 RX ORDER — ALBUTEROL SULFATE 90 UG/1
2 INHALANT RESPIRATORY (INHALATION) EVERY 4 HOURS PRN
COMMUNITY
Start: 2024-09-04 | End: 2025-03-19

## 2025-03-19 ASSESSMENT — PAIN SCALES - GENERAL: PAINLEVEL_OUTOF10: SEVERE PAIN (10)

## 2025-03-19 NOTE — PROGRESS NOTES
Preoperative Evaluation  [facility]  [address] [place] [address]  Phone: [phone]  Fax: [phone]  Primary Provider: Devon Lund MD  Pre-op Performing Provider: SHAHANA GRANADOS MD  Mar 19, 2025             3/19/2025   Surgical Information   What procedure is being done? shwannoma nerve and tumr repait T2 and T4   Facility or Hospital where procedure/surgery will be performed: [facility]   Who is doing the procedure / surgery? Dr. Vitaliy Yousif   Date of surgery / procedure: 3/21/25   Time of surgery / procedure: TBD   Where do you plan to recover after surgery? at home alone     Fax number for surgical facility: [phone]    Assessment & Plan     The proposed surgical procedure is considered HIGH risk.    Preop general physical exam  - CBC with platelets and differential; Future  - Basic metabolic panel  (Ca, Cl, CO2, Creat, Gluc, K, Na, BUN); Future  - INR; Future  - EKG 12-lead, tracing only    Hypokalemia  - Basic metabolic panel  (Ca, Cl, CO2, Creat, Gluc, K, Na, BUN)    Hyponatremia  - Basic metabolic panel  (Ca, Cl, CO2, Creat, Gluc, K, Na, BUN)    Iron deficiency anemia due to chronic blood loss  - CBC with platelets and differential      3/19/2025    11:14 AM   PHQ   PHQ-9 Total Score 23    Q9: Thoughts of better off dead/self-harm past 2 weeks Not at all       Patient-reported      - No identified additional risk factors other than previously addressed    Preoperative Medication Instructions  Antiplatelet or Anticoagulation Medication Instructions   - We reviewed the medication list and the patient is not on an antiplatelet or anticoagulation medications.    Additional Medication Instructions  Take all scheduled medications on the day of surgery EXCEPT for modifications listed below:   - Herbal medications and vitamins: DO NOT TAKE 14 days prior to surgery.   - naloxegol (Movantik): Continue without  "modification.   - Antiepileptics: Continue without modification.   - Opioids: Continue without modification.   956}   - nabumetone (Relafen): DO NOT TAKE 10 days before surgery.    - Psychostimulants: Hold the day of surgery Adderall   - SSRIs, SNRIs, TCAs, Antipsychotics: Continue without modification. Mental health   - cannabis, marijuana: DO NOT TAKE night before surgery.   - leukotriene Inhibitors: Continue without modifcation. Monteluklast   - pseudoephedrine, phenylephrine: DO NOT TAKE on day of surgery.   Allegra D   - rescue Inhaler: Continue PRN. Bring to hospital on the day of surgery.    Recommendation  Approval given to proceed with proposed procedure, without further diagnostic evaluation.    Gladis Montoya is a 55 year old, presenting for the following:  Pre-Op Exam (Pre op Schwannoma nerve and tumor repair T2 and T4 bilateral. Date 3/21/25 at [facility]/)          3/19/2025    11:52 AM   Additional Questions   Roomed by Niurka MANNING: Schwannoma in the thoracic spine \"blew up.\" He is hopeful that it will help a lot of the discomforts, such as the arm.  He had dental work and had to wait for spine surgery.  Uses albuterol 2 puffs four times a week. No preventative.   Got aspiration pneumonia after his last surgery (4 years ago, cervical fusion.)          3/19/2025   Pre-Op Questionnaire   Have you ever had a heart attack or stroke? No   Have you ever had surgery on your heart or blood vessels, such as a stent placement, a coronary artery bypass, or surgery on an artery in your head, neck, heart, or legs? No   Do you have chest pain with activity? (!) YES attributed to the Schwannoma.   Do you have a history of heart failure? No   Do you currently have a cold, bronchitis or symptoms of other infection? No   Do you have a cough, shortness of breath, or wheezing? (!) YES Cannot take a deep breath. Unchanged.   Do you or anyone in your family have previous history of blood " clots? No   Do you or does anyone in your family have a serious bleeding problem such as prolonged bleeding following surgeries or cuts? No   Have you ever had problems with anemia or been told to take iron pills? (!) YES Takes iron. Told he had iron deficiency 10-15 years ago.   Have you had any abnormal blood loss such as black, tarry or bloody stools? No   Have you ever had a blood transfusion? No   Are you willing to have a blood transfusion if it is medically needed before, during, or after your surgery? Yes   Have you or any of your relatives ever had problems with anesthesia? No   Do you have sleep apnea, excessive snoring or daytime drowsiness? No   Do you have any artifical heart valves or other implanted medical devices like a pacemaker, defibrillator, or continuous glucose monitor? No   Do you have artificial joints? (!) YES   Are you allergic to latex? No     Health Care Directive  Patient does not have a Health Care Directive: Patient states has Advance Directive and will bring in a copy to clinic.  Done with the social work.  Preoperative Review of    reviewed - controlled substances reflected in medication list.      Patient Active Problem List    Diagnosis Date Noted    Retention of urine 01/22/2023     Priority: Medium    Hypokalemia 01/18/2023     Priority: Medium    Hyponatremia 01/18/2023     Priority: Medium    Microcytic anemia 01/18/2023     Priority: Medium    Drug-induced constipation 05/04/2022     Priority: Medium    Hyperuricemia 05/04/2022     Priority: Medium    Neuropathy 05/04/2022     Priority: Medium    History of total left knee replacement 05/04/2022     Priority: Medium    Opioid dependence, uncomplicated (H) 12/14/2021     Priority: Medium    Hx of decompressive lumbar laminectomy-2021 12/01/2021     Priority: Medium     PROCEDURES PERFORMED  1. Anterior spinal fusion, L3-L4.   2. Insertion of intervertebral biomechanical device, L3-L4 (CORELINK Titanium device).   3.  Anterior lumbar instrumentation, L3-L4, with screw of fixation and associated plate.   4. Anterior spinal fusion, L4-L5.   5. Insertion of intervertebral biomechanical device, L4-L5 (CORELINK Titanium device).   6. Anterior lumbar instrumentation L4-L5 with screw fixation and associated plate.   7. Anterior spinal fusion, L5-S1.   8. Insertion of intervertebral biomechanical device, L5-S1 (CORELINK Titanium device).   9. Anterior lumbar instrumentation L5-S1 with screw instrumentation  10. [place] of local autograft bone for bony endplates.   11. Aspiration of vertebral bone marrow from the L4 vertebral body.   12. Posterior spinal fusion, L3-L4.   13. Posterior spinal fusion, L4-L5.   14. Posterior spinal fusion, L5-S1.   15. Segmental pedicle screw instrumentation, L3-S1, bilaterally.   16. Bilateral pelvic screw instrumentation  17. Decompression L3-4 Right   18. Monitoring of SSEPs, EMGs and screw stimulation without any complications noted throughout.           Lumbar radiculopathy 11/19/2021     Priority: Medium    Chronic lower back pain 11/19/2021     Priority: Medium    Bipolar 1 disorder, mixed, partial remission (H) 01/04/2021     Priority: Medium    S/P lumbar laminectomy-2020 06/22/2020     Priority: Medium    History of cervical discectomy 06/09/2020     Priority: Medium    Gastroesophageal reflux disease with esophagitis 08/19/2019     Priority: Medium    Iron deficiency anemia due to chronic blood loss 08/19/2019     Priority: Medium    Chronic, continuous use of opioids 12/11/2017     Priority: Medium    Generalized anxiety disorder 11/13/2017     Priority: Medium    Cognitive deficit due to old head injury 03/22/2017     Priority: Medium    Chronic pain disorder 03/20/2017     Priority: Medium     Formatting of this note might be different from the original.  Due to multiple surgeries and brain injury      Essential hypertension 03/20/2017     Priority: Medium     Formatting of this note might be  different from the original.  Created by Conversion    Replacement Utility updated for latest IMO load      History of tobacco abuse 03/20/2017     Priority: Medium    History of traumatic brain injury 03/06/2017     Priority: Medium     Formatting of this note might be different from the original.  With Pseudo Bulbar Affect (PBA), moderate TBI      PTSD (post-traumatic stress disorder) 08/22/2016     Priority: Medium     Formatting of this note might be different from the original.  Claustrophobia from closed in places      Lymphedema of both lower extremities 09/11/2015     Priority: Medium    Foraminal stenosis of cervical region 01/28/2013     Priority: Medium    Alcohol dependence in remission (H) 01/29/2010     Priority: Medium     Epic      S/P ankle fusion 12/13/2003     Priority: Medium     Dr. Kailyn Ortiz. Prior football trauma/injury. Rt Ankle Joint (plates/screws) surgical repairs.        Past Medical History:   Diagnosis Date    AC (acromioclavicular) arthritis 10/24/2011    Aftercare following surgery of the musculoskeletal system 04/25/2012    Anxiety disorder     Anxiety state, unspecified     Created by Conversion     Arthritis     Biceps tendon rupture, proximal left    Bipolar 2 disorder (H)     Brachial neuritis or radiculitis          Brachial neuritis or radiculitis NOS     Created by Conversion     Cervical radiculopathy at C8 04/30/2018    Cervical stenosis of spinal canal 05/05/2014    Claustrophobia     COPD (chronic obstructive pulmonary disease) (H) 12/21/2020    Disturbance of skin sensation 11/30/2011    Encounter for chronic pain management 02/11/2015    GERD (gastroesophageal reflux disease)     Gout     Hernia, abdominal     Hiatal hernia     Hypertension     Impingement syndrome of right shoulder     Lymphedema 02/11/2015    left    Numbness and tingling     Other affections of shoulder region, not elsewhere classified     Created by Conversion      Other chronic pain     Pain in  joint, shoulder region 10/10/2011    Panic attacks     PTSD (post-traumatic stress disorder)     TBI (traumatic brain injury) (H)     post concussion syndrome    Traumatic brain injury with loss of consciousness, sequela 06/24/2022    Unspecified essential hypertension     Created by Conversion     Unspecified site of spinal cord injury without evidence of spinal bone injury      Past Surgical History:   Procedure Laterality Date    ANTERIOR / POSTERIOR COMBINED FUSION CERVICAL SPINE  2013, redo in 2014    4 levels    ARTHRODESIS ANKLE  right    ARTHROSCOPY KNEE  01/01/2014    CYSTOSCOPY      DECOMPRESSION, FUSION CERVICAL ANTERIOR THREE+ LEVELS, COMBINED  05/05/2014    Procedure: COMBINED DECOMPRESSION, FUSION CERVICAL ANTERIOR THREE+ LEVELS;  Surgeon: Aron Gardner MD;  Location: [facility] OR    EXPLORE SPINE, REMOVE HARDWARE, COMBINED  05/05/2014    Procedure: COMBINED EXPLORE SPINE, REMOVE HARDWARE;  Surgeon: Aron Gardner MD;  Location: [facility] OR    FUSION CERVICAL ANTERIOR THREE+ LEVELS  01/28/2013    Procedure: FUSION CERVICAL ANTERIOR THREE+ LEVELS;  ANTERIOR CERVICAL DISCECTOMY AND FUSION C4-C5, REVISION ANTERIOR CERVICAL DISCECTOMY AND FUSION C5-6, C6-7, RIGHT ANTERIOR ILIAC CREST BONE GRAFT(C-ARM, 3080 TABLE, SYNTHES CSLP SET, TRICORTICAL ALLOGRAFT);  Surgeon: Hermilo Bey MD;  Location: [facility] OR    FUSION CERVICAL ANTERIOR TWO LEVELS      FUSION CERVICAL POSTERIOR THREE+ LEVELS  05/05/2014    Procedure: FUSION CERVICAL POSTERIOR THREE+ LEVELS;  Surgeon: Aron Gardner MD;  Location: [facility] OR    GRAFT BONE FROM ILIAC CREST  01/28/2013    Procedure: GRAFT BONE FROM ILIAC CREST;;  Surgeon: Hermilo Bey MD;  Location:  OR    HAND SURGERY Left 01/01/1997    HERNIA REPAIR      x3    HERNIA REPAIR Left 01/01/2006    HERNIA REPAIR Right 01/01/2008    and middle    HERNIORRHAPHY VENTRAL  01/01/2008    IR CERVICAL EPIDURAL STEROID INJECTION  04/05/2018    IR CERVICAL TRANSFORAMINAL EPIDURAL  STRD INJ  12/07/2017    NASAL POLYP SURGERY  01/01/2014    and septal repair, Dr. Isrrael FERNANDEZ SHLDR ARTHROSCOP,SURG,W/ROTAT CUFF REPR Right 11/25/2015    Procedure: RIGHT SHOULDER ARTHROSCOPY, ROTATOR CUFF REPAIR, DECOMPRESSION, DEBRIDEMENT, DISTAL CLAVICLE EXCISION;  Surgeon: Pilo Bruce MD;  Location: [facility];  Service: Orthopedics    REPLACEMENT TOTAL KNEE Left 03/20/2017    SHOULDER ARTHROSCOPY DISTAL CLAVICLE EXCISION AND OPEN ROTATOR CUFF REPAIR Bilateral 2005 and 2013    SHOULDER SURGERY  left    ZZC ARTHRODESIS,ANKLE,OPEN Right 01/01/2007    Ankle fusion     Current Outpatient Medications   Medication Sig Dispense Refill    acetaminophen (TYLENOL) 650 MG CR tablet Take 2 tablets (1,300 mg) by mouth 3 times daily. 540 tablet 3    albuterol (PROAIR HFA/PROVENTIL HFA/VENTOLIN HFA) 108 (90 Base) MCG/ACT inhaler Inhale 2 puffs into the lungs every 6 hours as needed for wheezing or shortness of breath. 6.7 g 11    allopurinol (ZYLOPRIM) 300 MG tablet Take 1 tablet (300 mg) by mouth 2 times daily. 180 tablet 3    ammonium lactate (LAC-HYDRIN) 12 % external lotion Apply topically daily as needed for dry skin 500 g 11    amphetamine-dextroamphetamine (ADDERALL) 15 MG tablet Take 1 tablet (15 mg) by mouth daily. 30 tablet 0    bisacodyl (DULCOLAX) 10 MG suppository Place 1 suppository (10 mg) rectally daily as needed for constipation 60 suppository 1    buPROPion (WELLBUTRIN SR) 150 MG 12 hr tablet Take 1 tablet (150 mg) by mouth 2 times daily. 180 tablet 2    CALCIUM ANTACID 500 MG chewable tablet Take 1 tablet (500 mg) by mouth 2 times daily. 180 tablet 3    chlorhexidine (PERIDEX) 0.12 % solution Swish and spit 15 mLs in mouth 3 times daily as needed (sore throat) - Swish & Spit 473 mL 11    ergocalciferol (ERGOCALCIFEROL) 1.25 MG (94175 UT) capsule Take 1 capsule (50,000 Units) by mouth every 14 days. 6 capsule 3    ferrous sulfate (FEROSUL) 325 (65 Fe) MG tablet Take 1 tablet (325 mg) by mouth  daily (with breakfast). 90 tablet 3    fexofenadine (ALLEGRA) 180 MG tablet Take 180 mg by mouth daily as needed      fexofenadine-pseudoePHEDrine (ALLEGRA-D 24) 180-240 MG 24 hr tablet Take 1 tablet by mouth daily 90 tablet 1    finasteride (PROSCAR) 5 MG tablet Take 1 tablet (5 mg) by mouth daily. 90 tablet 3    furosemide (LASIX) 80 MG tablet Take 1 tablet (80 mg) by mouth daily. 90 tablet 2    guaiFENesin (MUCINEX) 600 MG 12 hr tablet Take 1 tablet (600 mg) by mouth 2 times daily. 180 tablet 3    hydrOXYzine (ATARAX) 50 MG tablet Per Dr. LOPEZ,OLUKAYODE      lamoTRIgine (LAMICTAL) 150 MG tablet Take 1 tablet (150 mg) by mouth 3 times daily. 270 tablet 3    LANsoprazole (PREVACID) 30 MG DR capsule Take 1 capsule (30 mg) by mouth every morning (before breakfast). 90 capsule 3    lidocaine (LIDODERM) 5 % patch Place onto the skin every 24 hours To prevent lidocaine toxicity, patient should be patch free for 12 hrs daily. 30 patch 11    medical cannabis (Patient's own supply) See Admin Instructions (The purpose of this order is to document that the patient reports taking medical cannabis.  This is not a prescription, and is not used to certify that the patient has a qualifying medical condition.)      methocarbamol (ROBAXIN) 500 MG tablet Take 2 tablets (1,000 mg) by mouth 4 times daily. For use beginning 3/14/23 120 tablet 11    metoprolol succinate ER (TOPROL XL) 50 MG 24 hr tablet Take 1 tablet (50 mg) by mouth daily. 90 tablet 3    montelukast (SINGULAIR) 10 MG tablet Take 1 tablet (10 mg) by mouth daily. 90 tablet 3    nabumetone (RELAFEN) 750 MG tablet Take 1 tablet (750 mg) by mouth 2 times daily. 180 tablet 3    naloxegol (MOVANTIK) 25 MG TABS tablet Take 1 tablet (25 mg) by mouth every morning (before breakfast). 90 tablet 3    naloxone (NARCAN) 4 MG/0.1ML nasal spray SPRAY 1 SPRAY INTO ONE NOSTRIL ALTERNATING NOSTRILS ONCE AS NEEDED FOR OPIOID REVERSAL REPEAT EVER 2-3 MINUTES UNTIL HELP ARRIVES 2 mL 1     oxyCODONE IR (ROXICODONE) 10 MG tablet Take 1 tablet (10 mg) by mouth every 3 hours. At 2 am, 5 am, 11am, 2 pm, 5 pm, 8 pm, 11 pm. 98 tablet 0    oxyCODONE IR (ROXICODONE) 15 MG tablet Take 1 tablet (15 mg) by mouth daily. At 8 am, 28 tablet 0    polyethylene glycol (MIRALAX) 17 GM/Dose powder Take 17 g by mouth 2 times daily. 3060 g 3    potassium chloride sourav ER (KLOR-CON M20) 20 MEQ CR tablet Take 1 tablet (20 mEq) by mouth daily. 90 tablet 3    QUEtiapine (SEROQUEL) 100 MG tablet Take 1 tablet (100 mg) by mouth 4 times daily as needed (anxiety). 60 tablet 1    QUEtiapine (SEROQUEL) 200 MG tablet Take 1 tablet (200 mg) by mouth at bedtime. 90 tablet 3    Sennosides (SENNA) 8.8 MG/5ML SYRP Take 15 mLs (26.4 mg) by mouth 2 times daily. 1800 mL 3    tamsulosin (FLOMAX) 0.4 MG capsule Take 1 capsule (0.4 mg) by mouth 2 times daily. 180 capsule 3    tiaGABine (GABITRIL) 2 MG tablet Take 1 tablet (2 mg) by mouth 6 times daily. 168 tablet 2       Allergies   Allergen Reactions    Gabapentin Other (See Comments)     Edema    Lyrica [Pregabalin] Other (See Comments)     Edema    Amitriptyline Unknown     hallucinations    Seasonal Allergies     Topiramate Unknown     hallucinations        Social History     Tobacco Use    Smoking status: Former     Current packs/day: 0.00     Average packs/day: 0.5 packs/day for 11.0 years (5.5 ttl pk-yrs)     Types: Cigarettes     Start date: 2009     Quit date: 2017     Years since quittin.0    Smokeless tobacco: Former    Tobacco comments:     0.5 pack per day   Substance Use Topics    Alcohol use: No     History   Drug Use No       Review of Systems  Constitutional, HEENT, cardiovascular, pulmonary, GI, , musculoskeletal, neuro, skin, endocrine and psych systems are negative, except as otherwise noted.    Objective    BP 95/65 (BP Location: Left arm, Patient Position: Sitting, Cuff Size: Adult Regular)   Pulse 92   Temp 97.8  F (36.6  C) (Temporal)   Resp 20   Ht  "1.727 m (5' 7.99\")   Wt 83.5 kg (184 lb)   SpO2 97%   BMI 27.98 kg/m     Estimated body mass index is 27.98 kg/m  as calculated from the following:    Height as of this encounter: 1.727 m (5' 7.99\").    Weight as of this encounter: 83.5 kg (184 lb).  Physical Exam  GENERAL: alert and no distress. Using a cane  EYES: Eyes grossly normal to inspection, PERRL and conjunctivae and sclerae normal  HENT: ear canals and TM's normal, nose and mouth without ulcers or lesions  NECK: no adenopathy, no asymmetry, masses, or scars  RESP: lungs clear to auscultation - no rales, rhonchi or wheezes  CV: regular rate and rhythm, normal S1 S2, no S3 or S4, no murmur, click or rub, no peripheral edema  ABDOMEN: He declined to get on the exam table for the exam.  MS: no gross musculoskeletal defects noted, no edema  SKIN: no suspicious lesions or rashes  NEURO: Normal strength and tone, mentation intact and speech normal  PSYCH: mentation appears normal, affect normal/bright    Recent Labs   Lab Test 08/27/24  1516   HGB 13.2*         POTASSIUM 3.7   CR 0.92     Diagnostics  Recent Results (from the past 720 hours)   EKG 12-lead, tracing only    Collection Time: 03/19/25 12:57 PM   Result Value Ref Range    Systolic Blood Pressure  mmHg    Diastolic Blood Pressure  mmHg    Ventricular Rate 68 BPM    Atrial Rate 68 BPM    MN Interval 154 ms    QRS Duration 92 ms     ms    QTc 433 ms    P Axis 17 degrees    R AXIS -23 degrees    T Axis 10 degrees    Interpretation ECG       Sinus rhythm  Normal ECG  When compared with ECG of 05-May-2014 21:48,  No significant change was found     INR    Collection Time: 03/19/25  1:08 PM   Result Value Ref Range    INR 0.91 0.85 - 1.15   CBC with platelets and differential    Collection Time: 03/19/25  1:08 PM   Result Value Ref Range    WBC Count 10.8 4.0 - 11.0 10e3/uL    RBC Count 4.74 4.40 - 5.90 10e6/uL    Hemoglobin 13.5 13.3 - 17.7 g/dL    Hematocrit 40.8 40.0 - 53.0 %    MCV " 86 78 - 100 fL    MCH 28.5 26.5 - 33.0 pg    MCHC 33.1 31.5 - 36.5 g/dL    RDW 12.2 10.0 - 15.0 %    Platelet Count 312 150 - 450 10e3/uL    % Neutrophils 74 %    % Lymphocytes 19 %    % Monocytes 6 %    % Eosinophils 1 %    % Basophils 1 %    % Immature Granulocytes 0 %    Absolute Neutrophils 8.0 1.6 - 8.3 10e3/uL    Absolute Lymphocytes 2.0 0.8 - 5.3 10e3/uL    Absolute Monocytes 0.6 0.0 - 1.3 10e3/uL    Absolute Eosinophils 0.1 0.0 - 0.7 10e3/uL    Absolute Basophils 0.1 0.0 - 0.2 10e3/uL    Absolute Immature Granulocytes 0.0 <=0.4 10e3/uL      EKG: appears normal, NSR, normal axis, normal intervals, no acute ST/T changes c/w ischemia, no LVH by voltage criteria, unchanged from previous tracings    Revised Cardiac Risk Index (RCRI)  The patient has the following serious cardiovascular risks for perioperative complications:   - No serious cardiac risks = 0 points     RCRI Interpretation: 0 points: Class I (very low risk - 0.4% complication rate)  Signed Electronically by: SHAHANA GRANADOS MD  A copy of this evaluation report is provided to the requesting physician.

## 2025-03-19 NOTE — PATIENT INSTRUCTIONS
How to Take Your Medication Before Surgery  Preoperative Medication Instructions   Antiplatelet or Anticoagulation Medication Instructions   - We reviewed the medication list and the patient is not on an antiplatelet or anticoagulation medications.    Additional Medication Instructions  Take all scheduled medications on the day of surgery EXCEPT for modifications listed below:   - Herbal medications and vitamins: DO NOT TAKE 14 days prior to surgery.   - naloxegol (Movantik): Continue without modification.   - Antiepileptics: Continue without modification.   - Opioids: Continue without modification.   956}   - nabumetone (Relafen): DO NOT TAKE 10 days before surgery.    - Psychostimulants: Hold the day of surgery Adderall   - SSRIs, SNRIs, TCAs, Antipsychotics: Continue without modification. Mental health   - cannabis, marijuana: DO NOT TAKE night before surgery.   - leukotriene Inhibitors: Continue without modifcation. Monteluklast   - pseudoephedrine, phenylephrine: DO NOT TAKE on day of surgery.   Allegra D   - rescue Inhaler: Continue PRN. Bring to hospital on the day of surgery.       Patient Education   Preparing for Your Surgery  For Adults  Getting started  In most cases, a nurse will call to review your health history and instructions. They will give you an arrival time based on your scheduled surgery time. Please be ready to share:  Your doctor's clinic name and phone number  Your medical, surgical, and anesthesia history  A list of allergies and sensitivities  A list of medicines, including herbal treatments and over-the-counter drugs  Whether the patient has a legal guardian (ask how to send us the papers in advance)  Note: You may not receive a call if you were seen at our PAC (Preoperative Assessment Center).  Please tell us if you're pregnant--or if there's any chance you might be pregnant. Some surgeries may injure a fetus (unborn baby), so they require a pregnancy test. Surgeries that are safe for a  fetus don't always need a test, and you can choose whether to have one.   Preparing for surgery  Within 10 to 30 days of surgery: Have a pre-op exam (sometimes called an H&P, or History and Physical). This can be done at a clinic or pre-operative center.  If you're having a , you may not need this exam. Talk to your care team.  At your pre-op exam, talk to your care team about all medicines you take. (This includes CBD oil and any drugs, such as THC, marijuana, and other forms of cannabis.) If you need to stop any medicine before surgery, ask when to start taking it again.  This is for your safety. Many medicines and drugs can make you bleed too much during surgery. Some change how well surgery (anesthesia) drugs work.  Call your insurance company to let them know you're having surgery. (If you don't have insurance, call [phone].)  Call your clinic if there's any change in your health. This includes a scrape or scratch near the surgery site, or any signs of a cold (sore throat, runny nose, cough, rash, fever).  Eating and drinking guidelines  For your safety: Unless your surgeon tells you otherwise, follow the guidelines below.  Eat and drink as normal until 8 hours before you arrive for surgery. After that, no food or milk. You can spit out gum when you arrive.  Drink clear liquids until 2 hours before you arrive. These are liquids you can see through, like water, Gatorade, and Propel Water. They also include plain black coffee and tea (no cream or milk).  No alcohol for 24 hours before you arrive. The night before surgery, stop any drinks that contain THC.  If your care team tells you to take medicine on the morning of surgery, it's okay to take it with a sip of water. No other medicines or drugs are allowed (including CBD oil)--follow your care team's instructions.  If you have questions the day of surgery, call your hospital or surgery center.   Preventing infection  Shower or bathe the night  before and the morning of surgery. Follow the instructions your clinic gave you. (If no instructions, use regular soap.)  Don't shave or clip hair near your surgery site. We'll remove the hair if needed.  Don't smoke or vape the morning of surgery. No chewing tobacco for 6 hours before you arrive. A nicotine patch is okay. You may spit out nicotine gum when you arrive.  For some surgeries, the surgeon will tell you to fully quit smoking and nicotine.  We will make every effort to keep you safe from infection. We will:  Clean our hands often with soap and water (or an alcohol-based hand rub).  Clean the skin at your surgery site with a special soap that kills germs.  Give you a special gown to keep you warm. (Cold raises the risk of infection.)  Wear hair covers, masks, gowns, and gloves during surgery.  Give antibiotic medicine, if prescribed. Not all surgeries need this medicine.  What to bring on the day of surgery  Photo ID and insurance card  Copy of your health care directive, if you have one  Glasses and hearing aids (bring cases)  You can't wear contacts during surgery  Inhaler and eye drops, if you use them (tell us about these when you arrive)  CPAP machine or breathing device, if you use them  A few personal items, if spending the night  If you have . . .  A pacemaker, ICD (cardiac defibrillator), or other implant: Bring the ID card.  An implanted stimulator: Bring the remote control.  A legal guardian: Bring a copy of the certified (court-stamped) guardianship papers.  Please remove any jewelry, including body piercings. Leave jewelry and other valuables at home.  If you're going home the day of surgery  You must have a responsible adult drive you home. They should stay with you overnight as well.  If you don't have someone to stay with you, and you aren't safe to go home alone, we may keep you overnight. Insurance often won't pay for this.  After surgery  If it's hard to control your pain or you need  more pain medicine, please call your surgeon's office.  Questions?   If you have any questions for your care team, list them here:   ____________________________________________________________________________________________________________________________________________________________________________________________________________________________________________________________  For informational purposes only. Not to replace the advice of your health care provider. Copyright   2003, 2019 [facility]. All rights reserved. Clinically reviewed by Rogelio Austin MD. [employer] 852153 - REV 08/24.

## 2025-03-20 LAB
ANION GAP SERPL CALCULATED.3IONS-SCNC: 14 MMOL/L (ref 7–15)
BUN SERPL-MCNC: 12.4 MG/DL (ref 6–20)
CALCIUM SERPL-MCNC: 9.5 MG/DL (ref 8.8–10.4)
CHLORIDE SERPL-SCNC: 104 MMOL/L (ref 98–107)
CREAT SERPL-MCNC: 0.87 MG/DL (ref 0.67–1.17)
EGFRCR SERPLBLD CKD-EPI 2021: >90 ML/MIN/1.73M2
GLUCOSE SERPL-MCNC: 107 MG/DL (ref 70–99)
HCO3 SERPL-SCNC: 22 MMOL/L (ref 22–29)
POTASSIUM SERPL-SCNC: 4.3 MMOL/L (ref 3.4–5.3)
SODIUM SERPL-SCNC: 140 MMOL/L (ref 135–145)

## 2025-04-04 ASSESSMENT — PAIN SCALES - PAIN ENJOYMENT GENERAL ACTIVITY SCALE (PEG)
INTERFERED_ENJOYMENT_LIFE: 10 - COMPLETELY INTERFERES
PEG_TOTALSCORE: 10
AVG_PAIN_PASTWEEK: 10
INTERFERED_GENERAL_ACTIVITY: 10
INTERFERED_ENJOYMENT_LIFE: 10
INTERFERED_GENERAL_ACTIVITY: 10 - COMPLETELY INTERFERES
AVG_PAIN_PASTWEEK: 10 - PAIN AS BAD AS YOU CAN IMAGINE

## 2025-04-07 ENCOUNTER — OFFICE VISIT (OUTPATIENT)
Dept: PALLIATIVE MEDICINE | Facility: OTHER | Age: 55
End: 2025-04-07
Attending: ANESTHESIOLOGY
Payer: COMMERCIAL

## 2025-04-07 ENCOUNTER — TELEPHONE (OUTPATIENT)
Dept: INTERNAL MEDICINE | Facility: CLINIC | Age: 55
End: 2025-04-07
Payer: COMMERCIAL

## 2025-04-07 VITALS
BODY MASS INDEX: 28.74 KG/M2 | WEIGHT: 189 LBS | SYSTOLIC BLOOD PRESSURE: 117 MMHG | HEART RATE: 75 BPM | DIASTOLIC BLOOD PRESSURE: 70 MMHG | OXYGEN SATURATION: 98 %

## 2025-04-07 DIAGNOSIS — M54.16 LUMBAR RADICULOPATHY: ICD-10-CM

## 2025-04-07 PROCEDURE — G0463 HOSPITAL OUTPT CLINIC VISIT: HCPCS | Performed by: ANESTHESIOLOGY

## 2025-04-07 PROCEDURE — 99214 OFFICE O/P EST MOD 30 MIN: CPT | Performed by: ANESTHESIOLOGY

## 2025-04-07 PROCEDURE — 3078F DIAST BP <80 MM HG: CPT | Performed by: ANESTHESIOLOGY

## 2025-04-07 PROCEDURE — 3074F SYST BP LT 130 MM HG: CPT | Performed by: ANESTHESIOLOGY

## 2025-04-07 PROCEDURE — G2211 COMPLEX E/M VISIT ADD ON: HCPCS | Performed by: ANESTHESIOLOGY

## 2025-04-07 RX ORDER — OXYCODONE HYDROCHLORIDE 10 MG/1
10 TABLET ORAL
Qty: 98 TABLET | Refills: 0 | Status: SHIPPED | OUTPATIENT
Start: 2025-04-07

## 2025-04-07 RX ORDER — OXYCODONE HYDROCHLORIDE 15 MG/1
15 TABLET ORAL DAILY
Qty: 28 TABLET | Refills: 0 | Status: SHIPPED | OUTPATIENT
Start: 2025-04-07

## 2025-04-07 NOTE — PROGRESS NOTES
[facility] Follow-up    Date of visit: 4/7/2025    Chief complaint:   Chief Complaint   Patient presents with    Pain       Follow-up for persistent spinal pain having had several surgeries.    Reviewing the record meeting with orthopedic surgery and will be working with neurosurgery with a Schwann Francine in his thoracic level.    Reviews today he is getting used to his new dentures please do of gotten that process completed.    Scheduled later this month for a schwannoma is in his thoracic area.  Concerned he is getting more tingling down his arms, some atrophy of the muscle.  Reviews concern for the abdominal pain of her hernia was related to the muscle pressure.    Will be having his home health nurse see him several times a week after he is briefly in a TCU.    He has been using the medical cannabis, Gummies and vaporizer and would like that renewed.    He looks forward to returning to physical therapy was just about to get back to the gym before.    Continues on the oxycodone 10 mg 7 doses in a day and 15 mg in the morning.    Reviews postsurgically has not done as well with agents such as hydromorphone and rather does better with increasing oxycodone to 15 mg doses several through the day on top of his oxycodone.    Looking forward to getting service dog later this summer and be walking and training.    He shares today some of his spiritual experiences that he has been sharing last time.    Last urine drug test in August.   reviewed            Medications:  Current Outpatient Medications   Medication Sig Dispense Refill    acetaminophen (TYLENOL) 650 MG CR tablet Take 2 tablets (1,300 mg) by mouth 3 times daily. 540 tablet 3    albuterol (PROAIR HFA/PROVENTIL HFA/VENTOLIN HFA) 108 (90 Base) MCG/ACT inhaler Inhale 2 puffs into the lungs every 6 hours as needed for wheezing or shortness of breath. 6.7 g 11    allopurinol (ZYLOPRIM) 300 MG tablet Take 1  tablet (300 mg) by mouth 2 times daily. 180 tablet 3    ammonium lactate (LAC-HYDRIN) 12 % external lotion Apply topically daily as needed for dry skin 500 g 11    amphetamine-dextroamphetamine (ADDERALL) 15 MG tablet Take 1 tablet (15 mg) by mouth daily. 30 tablet 0    buPROPion (WELLBUTRIN SR) 150 MG 12 hr tablet Take 1 tablet (150 mg) by mouth 2 times daily. 180 tablet 2    CALCIUM ANTACID 500 MG chewable tablet Take 1 tablet (500 mg) by mouth 2 times daily. 180 tablet 3    ergocalciferol (ERGOCALCIFEROL) 1.25 MG (23527 UT) capsule Take 1 capsule (50,000 Units) by mouth every 14 days. 6 capsule 3    ferrous sulfate (FEROSUL) 325 (65 Fe) MG tablet Take 1 tablet (325 mg) by mouth daily (with breakfast). 90 tablet 3    fexofenadine (ALLEGRA) 180 MG tablet Take 180 mg by mouth daily as needed      fexofenadine-pseudoePHEDrine (ALLEGRA-D 24) 180-240 MG 24 hr tablet Take 1 tablet by mouth daily 90 tablet 1    finasteride (PROSCAR) 5 MG tablet Take 1 tablet (5 mg) by mouth daily. 90 tablet 3    furosemide (LASIX) 80 MG tablet Take 1 tablet (80 mg) by mouth daily. 90 tablet 2    guaiFENesin (MUCINEX) 600 MG 12 hr tablet Take 1 tablet (600 mg) by mouth 2 times daily. 180 tablet 3    hydrOXYzine (ATARAX) 50 MG tablet Per Dr. LOPEZOLUKAYODE      lamoTRIgine (LAMICTAL) 150 MG tablet Take 1 tablet (150 mg) by mouth 3 times daily. 270 tablet 3    LANsoprazole (PREVACID) 30 MG DR capsule Take 1 capsule (30 mg) by mouth every morning (before breakfast). 90 capsule 3    lidocaine (LIDODERM) 5 % patch Place onto the skin every 24 hours To prevent lidocaine toxicity, patient should be patch free for 12 hrs daily. 30 patch 11    medical cannabis (Patient's own supply) See Admin Instructions (The purpose of this order is to document that the patient reports taking medical cannabis.  This is not a prescription, and is not used to certify that the patient has a qualifying medical condition.)      methocarbamol (ROBAXIN) 500 MG  tablet Take 2 tablets (1,000 mg) by mouth 4 times daily. For use beginning 3/14/23 120 tablet 11    metoprolol succinate ER (TOPROL XL) 50 MG 24 hr tablet Take 1 tablet (50 mg) by mouth daily. 90 tablet 3    montelukast (SINGULAIR) 10 MG tablet Take 1 tablet (10 mg) by mouth daily. 90 tablet 3    nabumetone (RELAFEN) 750 MG tablet Take 1 tablet (750 mg) by mouth 2 times daily. 180 tablet 3    naloxegol (MOVANTIK) 25 MG TABS tablet Take 1 tablet (25 mg) by mouth every morning (before breakfast). 90 tablet 3    naloxone (NARCAN) 4 MG/0.1ML nasal spray SPRAY 1 SPRAY INTO ONE NOSTRIL ALTERNATING NOSTRILS ONCE AS NEEDED FOR OPIOID REVERSAL REPEAT EVER 2-3 MINUTES UNTIL HELP ARRIVES 2 mL 1    oxyCODONE IR (ROXICODONE) 10 MG tablet Take 1 tablet (10 mg) by mouth every 3 hours. At 2 am, 5 am, 11am, 2 pm, 5 pm, 8 pm, 11 pm. 98 tablet 0    oxyCODONE IR (ROXICODONE) 15 MG tablet Take 1 tablet (15 mg) by mouth daily. At 8 am, 28 tablet 0    polyethylene glycol (MIRALAX) 17 GM/Dose powder Take 17 g by mouth 2 times daily. 3060 g 3    potassium chloride sourav ER (KLOR-CON M20) 20 MEQ CR tablet Take 1 tablet (20 mEq) by mouth daily. 90 tablet 3    QUEtiapine (SEROQUEL) 100 MG tablet Take 1 tablet (100 mg) by mouth 4 times daily as needed (anxiety). 60 tablet 1    QUEtiapine (SEROQUEL) 200 MG tablet Take 1 tablet (200 mg) by mouth at bedtime. 90 tablet 3    Sennosides (SENNA) 8.8 MG/5ML SYRP Take 15 mLs (26.4 mg) by mouth 2 times daily. 1800 mL 3    tamsulosin (FLOMAX) 0.4 MG capsule Take 1 capsule (0.4 mg) by mouth 2 times daily. 180 capsule 3    tiaGABine (GABITRIL) 2 MG tablet Take 1 tablet (2 mg) by mouth 6 times daily. 168 tablet 2           Physical Exam:  Blood pressure 117/70, pulse 75, weight 85.7 kg (189 lb), SpO2 98%.      Alert, clear sensorium, no respiratory distress, no pain behavior.    Ambulates with a cane.  Uses the cane to show areas in his right thoracic area where gregor quite tender midline to the scapula  palpation.    Affect congruent        Assessment:   History of several lumbar's thoracic and cervical surgeries, presently found to have a schwannoma in the thoracic area accounting said for some symptoms to be addressed.    Plan: Will be renewing the medical cannabis program.    Continue the oxycodone 10 mg 7 in a day and the 15 mg in the morning.    We can collaborate with the surgeon postoperatively.  She will follow-up in 2 to 3 months.    Total time 32 minutes.The longitudinal plan of care for the diagnosis(es)/condition(s) as documented were addressed during this visit. Due to the added complexity in care, I will continue to support Jan in the subsequent management and with ongoing continuity of care.     minutes spent on the date of encounter doing chart review, history, and exam documentation and further activities as noted above.     Dmitriy Cramer MD  [facility]

## 2025-04-07 NOTE — PATIENT INSTRUCTIONS
PLAN:    You are scheduled for surgery this month to address Schwannoma.    You will be renewed for the [place] medical cannabis program.    Continue the baseline oxycodone 15 mg in the morning and 10 mg 7 times a day.    Discussed postsurgically you do better with increasing oxycodone to 15 mg doses several times through the day.    Follow-up for return visit with Dr. Cramer in 2 to 3 months

## 2025-04-07 NOTE — TELEPHONE ENCOUNTER
April 7, 2025    Home health orders was received via fax for Dr. Lund.  Patient label was attached to paperwork and placed in provider's inbox to be signed.    Helen Bowen

## 2025-04-07 NOTE — PROGRESS NOTES
Patient presents to the clinic today for a visit with JAYME MULLEN MD regarding Pain Management.          12/16/2024    11:31 AM 1/14/2025     8:17 AM 4/7/2025     8:23 AM   PEG Score   PEG Total Score 10 10 10       UDS/CSA- 08.27.2024    Medications- Oxycodone 10 mg this am    15 mg this am    Oxycodone     Notes medical cannabis due to be refilled    [facility] Clinical Assistant

## 2025-04-09 ENCOUNTER — TELEPHONE (OUTPATIENT)
Dept: INTERNAL MEDICINE | Facility: CLINIC | Age: 55
End: 2025-04-09
Payer: COMMERCIAL

## 2025-04-09 NOTE — TELEPHONE ENCOUNTER
April 9, 2025    Home health orders was picked up from outbox of Dr. Lund and sent via fax to [phone].    Helen Bowen

## 2025-04-09 NOTE — TELEPHONE ENCOUNTER
April 9, 2025    Home health orders was received via fax for Dr. Lund.  Patient label was attached to paperwork and placed in provider's inbox to be signed.    Helen Bowen

## 2025-04-15 DIAGNOSIS — Z53.9 DIAGNOSIS NOT YET DEFINED: Primary | ICD-10-CM

## 2025-04-15 PROCEDURE — G0179 MD RECERTIFICATION HHA PT: HCPCS | Performed by: INTERNAL MEDICINE

## 2025-04-18 ENCOUNTER — MYC MEDICAL ADVICE (OUTPATIENT)
Dept: INTERNAL MEDICINE | Facility: CLINIC | Age: 55
End: 2025-04-18
Payer: COMMERCIAL

## 2025-04-18 DIAGNOSIS — M54.16 LUMBAR RADICULOPATHY: ICD-10-CM

## 2025-04-21 NOTE — TELEPHONE ENCOUNTER
EXAM NOTE    Chief Complaint   Patient presents with   • Dry Mouth     Patient is here to discuss dry mouth and mouth pain for about 1 week now.         HISTORY    The patient is a 41-year-old female here for evaluation of dry mouth and mouth pain.    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 Despite increasing her water intake, the symptoms persisted and even intensified over the weekend, with the onset of dry mouth, lip burning, and an unusual sensation in her tongue. A consultation at a free clinic on 4/14/25 suggested a possible diagnosis of thrush, leading to a week-long course of Diflucan and a change in toothpaste. However, these interventions did not alleviate her symptoms. She has been scraping her tongue, but it remains white with no noticeable debris.    No recent use of antibiotics or steroids is reported. She has recently reduced her sugar intake and discontinued vitamin D supplementation. Her current regimen includes two gummy vitamins and two elderberry supplements in the morning. No new food introductions, acid reflux, heartburn, allergy issues, recent illness, fever, chills, cough, cold, or congestion are reported. She has not experienced any colds this year. The Diflucan treatment did not result in any noticeable improvement. Increased nut consumption is noted, but she does not believe this is causing her symptoms. No thirst is reported, but dry mouth continues to be experienced. She has attempted to alleviate her symptoms by using a new mouthguard.        Current Outpatient Medications   Medication Sig   • Calcium Carb-Cholecalciferol 500-10 MG-MCG Chew Tab Chew 1 tablet by mouth.   • ELDERBERRY PO Take 1 capsule by mouth daily.   • Bacillus Coagulans-Inulin (PROBIOTIC FORMULA PO) Take 2 capsules by mouth.   • Multiple Vitamins-Minerals (MULTIVITAMIN ADULT) Chew Tab Chew 1 tablet by mouth daily.     No current  Medication being requested: OxyContin  Last visit date:11/4/20       Provider:BE  Next visit date:1/11/21      Provider:BE  Expected follow up: 8 weeks  MTM visit (Pain Center) date- No    UDT date: 2/2020  Agreement date:2/2020  - (Medication name/ last date filled or purchased/Strength and Quantity, provider)   12/04/2020 Oxycontin Er 20 Mg Tablet Qty: 40 for 10 days Br Zaida    Pertinent between visit information about requested medication (telephone, mychart, prior authorization, concerns, red flags, comments):   No  Script being sent to provider by nurse- dates and quantity:  Requested Prescriptions     Pending Prescriptions Disp Refills     oxyCODONE (OXYCONTIN) 20 mg 12 hr tablet 40 each 0     Sig: Take 1 tablet (20 mg total) by mouth 4 (four) times a day for 10 days.     Date that the medication is expected to be refilled-  12/22/20-1/1/21  Pharmacy cued:  [facility]  Standing orders for withdrawal protocol implemented or requested- N/A      facility-administered medications for this visit.       ALLERGIES:   Allergen Reactions   • Black [place] Flavor   (Food Or Med) Other (See Comments)   • Cat Dander Other (See Comments)     Allergic rhinitis and allergic conjunctivitis.    • Dog Dander Other (See Comments)     Allergic rhinitis and allergic conjunctivitis.      • Mold   (Environmental) Other (See Comments)     Allergic rhinitis and allergic conjunctivitis.      • Pollen Other (See Comments)     Tree pollens.  Allergic rhinitis and allergic conjunctivitis.        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     PHYSICAL EXAMINATION    Vital Signs:    Vitals:    04/22/25 0924   BP: 122/74   Pulse: (!) 104   Resp: 20   Temp: 97.8 °F (36.6 °C)   SpO2: 99%   Weight: 104.3 kg (229 lb 15 oz)   Height: 5' 7\" (1.702 m)   LMP: 08/12/2024     Constitutional:  No acute distress. Acting and behaving appropriately.   Integument:  Warm. Dry. No erythema. No rash.    Eyes:  PERRL, EOMI (Pupils equal, round, reactive to light, extraocular movements intact). Conjunctivae normal. No discharge.    HENT:  Normocephalic. Atraumatic. Tympanic membranes pearly gray. External auditory canals clear. Nares patent, no obvious rhinorrhea. Posterior nasopharynx and oropharynx are clear without exudates. Uvula midline. Oral mucosa pink, moist, intact, without lesions. Tongue with white film noted. Dentition adequate.   Neck: Supple, nontender. No lymphadenopathy. Normal range of motion.    Respiratory:  Lungs clear to auscultation, bilaterally equal rise and fall. Respirations non-labored.  Cardiovascular:  S1, S2, regular rate and rhythm. No murmurs, rubs, or gallops.    Neurologic:  Alert and oriented times 3.    ASSESSMENT AND PLAN    1. Burning sensation of mouth      1.  Xerostomia.  - Persistent dry mouth despite adequate hydration.  - No external signs of rashes or scars, but the tongue appears white.  - No improvement with Diflucan.  - Laboratory tests ordered to investigate the cause of symptoms.    2. Oral pain.  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  - Pain persists despite completing a course of Diflucan.  - Laboratory tests ordered to investigate the cause of symptoms.        Orders Placed This Encounter   • CBC with Automated Differential   • Comprehensive Metabolic Panel   • Thyroid Stimulating Hormone Reflex   • Zinc   • Vitamin D -25 Hydroxy   • Vitamin B12 And Folate   • Iron And total Iron Binding Capacity   • Ferritin   • Glycohemoglobin     See Patient Instruction section.  Return if symptoms worsen or fail to improve.

## 2025-04-22 NOTE — CONFIDENTIAL NOTE
Preop performed by colleague on March 19.  Labs normal.  EKG normal.  Cleared for surgery    Surgery scheduled April 25, 2025    Coordinated with patient and home health worker.  Health has been stable since preop March 19.  No new medication.  Medicine list correct    Stable for surgery.  Authorize clearance to proceed with surgery with preop form done March 19 and this note saying no interval change

## 2025-04-23 ENCOUNTER — TELEPHONE (OUTPATIENT)
Dept: INTERNAL MEDICINE | Facility: CLINIC | Age: 55
End: 2025-04-23
Payer: COMMERCIAL

## 2025-04-23 DIAGNOSIS — K59.03 DRUG-INDUCED CONSTIPATION: ICD-10-CM

## 2025-04-23 DIAGNOSIS — K59.03 THERAPEUTIC OPIOID INDUCED CONSTIPATION: ICD-10-CM

## 2025-04-23 DIAGNOSIS — T40.2X5A THERAPEUTIC OPIOID INDUCED CONSTIPATION: ICD-10-CM

## 2025-04-23 RX ORDER — SENNOSIDES 8.8 MG/5ML
30 LIQUID ORAL 2 TIMES DAILY
Qty: 1800 ML | Refills: 11 | Status: SHIPPED | OUTPATIENT
Start: 2025-04-23 | End: 2026-04-23

## 2025-04-23 NOTE — TELEPHONE ENCOUNTER
April 23, 2025    Patient's pre-operative physical documentation and labs have been completed by Dr. Simpson. The pre-operative paperwork was faxed to [facility] at [phone] per instructions from the surgeon.    Bao Nino

## 2025-04-24 RX ORDER — OXYCODONE HYDROCHLORIDE 10 MG/1
10 TABLET ORAL
Qty: 98 TABLET | Refills: 0 | Status: SHIPPED | OUTPATIENT
Start: 2025-04-24

## 2025-04-24 NOTE — TELEPHONE ENCOUNTER
Last note Dr FERNANDEZ. We will need to increase my liquid Senna Rx. Now 15 ml/2xs. My other post ops I got bloated w constipation BAD. I'm off Lactose. Got me sick. We slightly increased Senna. With my system I'd strongly recommend at least doubling the current Senna dosing. Thanks for everything. I'll keep you posted.   
Will forward on to provider regarding request to fill oxycodone 10 mg prescription.    
18

## 2025-05-02 ENCOUNTER — TRANSITIONAL CARE UNIT VISIT (OUTPATIENT)
Dept: GERIATRICS | Facility: CLINIC | Age: 55
End: 2025-05-02
Payer: COMMERCIAL

## 2025-05-02 VITALS
HEIGHT: 68 IN | OXYGEN SATURATION: 100 % | SYSTOLIC BLOOD PRESSURE: 143 MMHG | HEART RATE: 83 BPM | TEMPERATURE: 96.6 F | WEIGHT: 194 LBS | RESPIRATION RATE: 18 BRPM | DIASTOLIC BLOOD PRESSURE: 83 MMHG | BODY MASS INDEX: 29.4 KG/M2

## 2025-05-02 DIAGNOSIS — M47.14 THORACIC SPONDYLOSIS WITH MYELOPATHY: Primary | ICD-10-CM

## 2025-05-02 DIAGNOSIS — F31.77 BIPOLAR 1 DISORDER, MIXED, PARTIAL REMISSION (H): ICD-10-CM

## 2025-05-02 DIAGNOSIS — F43.10 PTSD (POST-TRAUMATIC STRESS DISORDER): ICD-10-CM

## 2025-05-02 DIAGNOSIS — M48.02 FORAMINAL STENOSIS OF CERVICAL REGION: ICD-10-CM

## 2025-05-02 DIAGNOSIS — S09.90XS COGNITIVE DEFICIT DUE TO OLD HEAD INJURY: ICD-10-CM

## 2025-05-02 DIAGNOSIS — K59.01 SLOW TRANSIT CONSTIPATION: ICD-10-CM

## 2025-05-02 DIAGNOSIS — R41.89 COGNITIVE DEFICIT DUE TO OLD HEAD INJURY: ICD-10-CM

## 2025-05-02 DIAGNOSIS — G89.4 CHRONIC PAIN DISORDER: ICD-10-CM

## 2025-05-02 RX ORDER — ACETAMINOPHEN 325 MG/1
650 TABLET ORAL EVERY 8 HOURS PRN
COMMUNITY

## 2025-05-02 RX ORDER — HYDROXYZINE HYDROCHLORIDE 25 MG/1
25 TABLET, FILM COATED ORAL EVERY 6 HOURS PRN
COMMUNITY

## 2025-05-02 RX ORDER — FUROSEMIDE 20 MG/1
20 TABLET ORAL DAILY
COMMUNITY

## 2025-05-02 NOTE — LETTER
5/2/2025      Bola Lyon Jr.  [address] [place] [address]        [facility] GERIATRICS    PRIMARY CARE PROVIDER AND CLINIC:  Devon Lund MD, [address] [facility] [address]  Chief Complaint   Patient presents with     Hospital F/U      [place] Medical Record Number:  [id number]  Place of Service where encounter took place:  [facility] (U) [[id number]]    Bola Lyon Jr.  is a 55 year old  (1970), admitted to the above facility from  [facility]. Hospital stay 4/25/25 through 5/1/2. Discharge summary not completed.  Patient with PMH chronic pain, multiple back surgeries, TBI with mild cognitive impairment, PTSD, bipolar disorder was admitted for C3-7 revision, C7-T4 fusion, T3 nerve tumor removal. Other than issues with pain management, there are no post-op complications noted.     HPI obtained from patient visit, review of nursing home record, discussion with facility staff, and Epic review.     HPI:    Provider introduces self and role, reviews hospital stay and typical TCU course. Patient details some of his history regarding pain and anxiety. He says that he works closely with his pain doctor. He has a nurse that sets up his medications for him at home. The plan is to use diazepam just short term for increased anxiety. He does prefer to have the senna syrup, it is more effective for him. He does not have any acute concerns today. He thinks his medications are all correct now. He hopes to be here maybe 2 weeks.     CODE STATUS/ADVANCE DIRECTIVES DISCUSSION:  Full Code    ALLERGIES:   Allergies   Allergen Reactions     Gabapentin Other (See Comments)     Edema     Lyrica [Pregabalin] Other (See Comments)     Edema     Amitriptyline Unknown     hallucinations     Lithium Hallucination     Seasonal Allergies      Topiramate Unknown     hallucinations      PAST MEDICAL HISTORY:   Past Medical History:   Diagnosis Date     AC  (acromioclavicular) arthritis 10/24/2011     Aftercare following surgery of the musculoskeletal system 04/25/2012     Anxiety disorder      Anxiety state, unspecified     Created by Conversion      Arthritis      Biceps tendon rupture, proximal left     Bipolar 2 disorder (H)      Brachial neuritis or radiculitis           Brachial neuritis or radiculitis NOS     Created by Conversion      Cervical radiculopathy at C8 04/30/2018     Cervical stenosis of spinal canal 05/05/2014     Claustrophobia      COPD (chronic obstructive pulmonary disease) (H) 12/21/2020     Disturbance of skin sensation 11/30/2011     Encounter for chronic pain management 02/11/2015     GERD (gastroesophageal reflux disease)      Gout      Hernia, abdominal      Hiatal hernia      Hypertension      Impingement syndrome of right shoulder      Lymphedema 02/11/2015    left     Numbness and tingling      Other affections of shoulder region, not elsewhere classified     Created by Conversion       Other chronic pain      Pain in joint, shoulder region 10/10/2011     Panic attacks      PTSD (post-traumatic stress disorder)      TBI (traumatic brain injury) (H)     post concussion syndrome     Traumatic brain injury with loss of consciousness, sequela 06/24/2022     Unspecified essential hypertension     Created by Conversion      Unspecified site of spinal cord injury without evidence of spinal bone injury       PAST SURGICAL HISTORY:   has a past surgical history that includes shoulder surgery (left); Fusion cervical anterior two levels; Arthrodesis ankle (right); hernia repair; Fusion cervical anterior three+ levels (01/28/2013); Graft bone from iliac crest (01/28/2013); Explore spine, remove hardware, combined (05/05/2014); Decompression, fusion cervical anterior three+ levels, combined (05/05/2014); Fusion cervical posterior three+ levels (05/05/2014); IR Cervical Transforaminal Epidural Strd Inj (12/07/2017); IR Cervical Epidural Steroid  Injection (04/05/2018); ARTHRODESIS,ANKLE,OPEN (Right, 01/01/2007); Anterior / Posterior Combined Fusion Cervical Spine (2013, redo in 2014); Nasal Polyp Surgery (01/01/2014); Arthroscopy knee (01/01/2014); hernia repair (Left, 01/01/2006); hernia repair (Right, 01/01/2008); Shoulder Arthroscopy Distal Clavicle Excision And Open Rotator Cuff Repair (Bilateral, 2005 and 2013); Hand surgery (Left, 01/01/1997); Pr Shldr Arthroscop,Surg,W/Rotat Cuff Repr (Right, 11/25/2015); Replacement Total Knee (Left, 03/20/2017); Herniorrhaphy ventral (01/01/2008); and Cystoscopy.  FAMILY HISTORY: family history includes Bipolar Disorder in his mother; Colon Cancer in his paternal grandmother; Diabetes in his maternal grandfather, maternal grandmother, paternal grandfather, and paternal grandmother; Hypertension in his father; Lymphoma in his father.  SOCIAL HISTORY:   reports that he quit smoking about 8 years ago. His smoking use included cigarettes. He started smoking about 15 years ago. He has a 5.5 pack-year smoking history. He has quit using smokeless tobacco. He reports that he does not drink alcohol and does not use drugs.  Patient's living condition: lives alone    Post Discharge Medication Reconciliation Status:   MED REC REQUIRED  Post Medication Reconciliation Status:  Discharge medications reconciled, continue medications without change       Current Outpatient Medications   Medication Sig Dispense Refill     acetaminophen (TYLENOL) 325 MG tablet Take 650 mg by mouth every 8 hours as needed for mild pain.       albuterol (PROAIR HFA/PROVENTIL HFA/VENTOLIN HFA) 108 (90 Base) MCG/ACT inhaler Inhale 2 puffs into the lungs every 6 hours as needed for wheezing or shortness of breath. 6.7 g 11     allopurinol (ZYLOPRIM) 300 MG tablet Take 1 tablet (300 mg) by mouth 2 times daily. 180 tablet 3     amphetamine-dextroamphetamine (ADDERALL) 15 MG tablet Take 1 tablet (15 mg) by mouth daily. 30 tablet 0     buPROPion (WELLBUTRIN  SR) 150 MG 12 hr tablet Take 1 tablet (150 mg) by mouth 2 times daily. 180 tablet 2     CALCIUM ANTACID 500 MG chewable tablet Take 1 tablet (500 mg) by mouth 2 times daily. 180 tablet 3     diazepam (VALIUM) 5 MG tablet Take 1 tablet (5 mg) by mouth daily as needed for anxiety. 5 tablet 0     ergocalciferol (ERGOCALCIFEROL) 1.25 MG (22265 UT) capsule Take 1 capsule (50,000 Units) by mouth every 14 days. 6 capsule 3     ferrous sulfate (FEROSUL) 325 (65 Fe) MG tablet Take 1 tablet (325 mg) by mouth daily (with breakfast). 90 tablet 3     fexofenadine (ALLEGRA) 180 MG tablet Take 180 mg by mouth daily as needed       furosemide (LASIX) 20 MG tablet Take 20 mg by mouth daily.       guaiFENesin (MUCINEX) 600 MG 12 hr tablet Take 1 tablet (600 mg) by mouth 2 times daily. 180 tablet 3     hydrOXYzine HCl (ATARAX) 25 MG tablet Take 25 mg by mouth every 6 hours as needed for itching.       lamoTRIgine (LAMICTAL) 150 MG tablet Take 1 tablet (150 mg) by mouth 3 times daily. 270 tablet 3     LANsoprazole (PREVACID) 30 MG DR capsule Take 1 capsule (30 mg) by mouth every morning (before breakfast). (Patient taking differently: Take 30 mg by mouth 2 times daily.) 90 capsule 3     lidocaine (LIDODERM) 5 % patch Place onto the skin every 24 hours To prevent lidocaine toxicity, patient should be patch free for 12 hrs daily. 30 patch 11     methocarbamol (ROBAXIN) 500 MG tablet Take 2 tablets (1,000 mg) by mouth 4 times daily. For use beginning 3/14/23 120 tablet 11     metoprolol succinate ER (TOPROL XL) 50 MG 24 hr tablet Take 1 tablet (50 mg) by mouth daily. 90 tablet 3     montelukast (SINGULAIR) 10 MG tablet Take 1 tablet (10 mg) by mouth daily. 90 tablet 3     nabumetone (RELAFEN) 750 MG tablet Take 1 tablet (750 mg) by mouth 2 times daily. (Patient taking differently: Take 750 mg by mouth daily.) 180 tablet 3     naloxegol (MOVANTIK) 25 MG TABS tablet Take 1 tablet (25 mg) by mouth every morning (before breakfast). 90 tablet  "3     naloxone (NARCAN) 4 MG/0.1ML nasal spray SPRAY 1 SPRAY INTO ONE NOSTRIL ALTERNATING NOSTRILS ONCE AS NEEDED FOR OPIOID REVERSAL REPEAT EVER 2-3 MINUTES UNTIL HELP ARRIVES 2 mL 1     oxyCODONE (OXYCONTIN) 15 MG 12 hr tablet Take 1 tablet (15 mg) by mouth every 12 hours. 30 tablet 0     oxyCODONE IR (ROXICODONE) 10 MG tablet Take 1 tablet (10 mg) by mouth every 3 hours. At 2 am, 5 am, 11am, 2 pm, 5 pm, 8 pm, 11 pm. 98 tablet 0     polyethylene glycol (MIRALAX) 17 GM/Dose powder Take 17 g by mouth 2 times daily. 3060 g 3     potassium chloride sourav ER (KLOR-CON M20) 20 MEQ CR tablet Take 1 tablet (20 mEq) by mouth daily. 90 tablet 3     QUEtiapine (SEROQUEL) 100 MG tablet Take 1 tablet (100 mg) by mouth 4 times daily as needed (anxiety). 60 tablet 1     QUEtiapine (SEROQUEL) 200 MG tablet Take 1 tablet (200 mg) by mouth at bedtime. 90 tablet 3     Sennosides (SENNA) 8.8 MG/5ML SYRP Take 30 mLs (52.8 mg) by mouth 2 times daily. (Patient taking differently: Take 15 mLs by mouth 2 times daily.) 1800 mL 11     tamsulosin (FLOMAX) 0.4 MG capsule Take 1 capsule (0.4 mg) by mouth 2 times daily. 180 capsule 3     tiaGABine (GABITRIL) 2 MG tablet Take 1 tablet (2 mg) by mouth 6 times daily. 168 tablet 2     finasteride (PROSCAR) 5 MG tablet Take 1 tablet (5 mg) by mouth daily. (Patient not taking: Reported on 5/2/2025) 90 tablet 3     medical cannabis (Patient's own supply) See Admin Instructions (The purpose of this order is to document that the patient reports taking medical cannabis.  This is not a prescription, and is not used to certify that the patient has a qualifying medical condition.) (Patient not taking: Reported on 5/2/2025)       No current facility-administered medications for this visit.       ROS:  4 point ROS including Respiratory, CV, GI and , other than that noted in the HPI,  is negative    Vitals:  BP (!) 143/83   Pulse 83   Temp (!) 96.6  F (35.9  C)   Resp 18   Ht 1.727 m (5' 7.99\")   Wt 88 " kg (194 lb)   SpO2 100%   BMI 29.51 kg/m    Exam:  GENERAL APPEARANCE:  Alert, in no distress  EYES:  EOM normal, conjunctiva and lids normal  RESP:  no respiratory distress  CV:  no edema  NEURO:   Cranial nerves 2-12 are normal tested and grossly at patient's baseline  PSYCH:  oriented X 3, affect and mood normal    Lab/Diagnostic data:  Recent labs in [facility] reviewed by me today.     ASSESSMENT/PLAN:  (M47.14) Thoracic spondylosis with myelopathy  (primary encounter diagnosis)  (M48.02) Foraminal stenosis of cervical region  (G89.4) Chronic pain disorder  Comment: Pain controlled per patient. His oxycodone BID was in Epic as the IR formulation, but he insisted it was oxycontin. Will change to this. He is on an extensive pain regimen, but tolerates this and is followed closely by Dr. Cramer. No s/sx poor wound healing. Goal is to discharge home when PT/OT goals met.  Plan: PT/OT eval and treat, discharge planning per their recommendation. Continue methocarbamol, oxycodone, oxycontin, tiagabine, hydroxyzine, and acetaminophen for pain, monitor pain severity, monitor for sedation. Monitor incision. F/u neurosurgery as scheduled.    (F31.77) Bipolar 1 disorder, mixed, partial remission (H)  (F43.10) PTSD (post-traumatic stress disorder)  Comment: Chronic condition being managed with medications. It is reasonable to use diazepam short term for increased anxiety related to surgery and rehab  Plan: Continue current POC with no changes at this time and adjustments as needed.    (R41.89,  S09.90XS) Cognitive deficit due to old head injury  Comment: Mild, no acute concerns, but will monitor for encephalopathy    (K59.01) Slow transit constipation  Comment: Due to medications, decreased activity. No evidence of bowel obstruction.  Plan: Continue Miralax, Senna, naloxegol. Monitor bowel function. Adjust medication as clinically indicated      Total time spent with patient visit was 45 min including patient visit and review  of past records.     Electronically signed by:  KENDRICK Baca CNP                     Sincerely,        KENDRICK Baca CNP    Electronically signed

## 2025-05-02 NOTE — PROGRESS NOTES
[facility] GERIATRICS    PRIMARY CARE PROVIDER AND CLINIC:  Devon Lund MD, [address] [facility] [address]  Chief Complaint   Patient presents with    Hospital F/U      [place] Medical Record Number:  [id number]  Place of Service where encounter took place:  [facility] (U) [[id number]]    Bola Lyon Jr.  is a 55 year old  (1970), admitted to the above facility from  [facility]. Hospital stay 4/25/25 through 5/1/2. Discharge summary not completed.  Patient with PMH chronic pain, multiple back surgeries, TBI with mild cognitive impairment, PTSD, bipolar disorder was admitted for C3-7 revision, C7-T4 fusion, T3 nerve tumor removal. Other than issues with pain management, there are no post-op complications noted.     HPI obtained from patient visit, review of nursing home record, discussion with facility staff, and Epic review.     HPI:    Provider introduces self and role, reviews hospital stay and typical TCU course. Patient details some of his history regarding pain and anxiety. He says that he works closely with his pain doctor. He has a nurse that sets up his medications for him at home. The plan is to use diazepam just short term for increased anxiety. He does prefer to have the senna syrup, it is more effective for him. He does not have any acute concerns today. He thinks his medications are all correct now. He hopes to be here maybe 2 weeks.     CODE STATUS/ADVANCE DIRECTIVES DISCUSSION:  Full Code    ALLERGIES:   Allergies   Allergen Reactions    Gabapentin Other (See Comments)     Edema    Lyrica [Pregabalin] Other (See Comments)     Edema    Amitriptyline Unknown     hallucinations    Lithium Hallucination    Seasonal Allergies     Topiramate Unknown     hallucinations      PAST MEDICAL HISTORY:   Past Medical History:   Diagnosis Date    AC (acromioclavicular) arthritis 10/24/2011    Aftercare following surgery of the musculoskeletal system  04/25/2012    Anxiety disorder     Anxiety state, unspecified     Created by Conversion     Arthritis     Biceps tendon rupture, proximal left    Bipolar 2 disorder (H)     Brachial neuritis or radiculitis          Brachial neuritis or radiculitis NOS     Created by Conversion     Cervical radiculopathy at C8 04/30/2018    Cervical stenosis of spinal canal 05/05/2014    Claustrophobia     COPD (chronic obstructive pulmonary disease) (H) 12/21/2020    Disturbance of skin sensation 11/30/2011    Encounter for chronic pain management 02/11/2015    GERD (gastroesophageal reflux disease)     Gout     Hernia, abdominal     Hiatal hernia     Hypertension     Impingement syndrome of right shoulder     Lymphedema 02/11/2015    left    Numbness and tingling     Other affections of shoulder region, not elsewhere classified     Created by Conversion      Other chronic pain     Pain in joint, shoulder region 10/10/2011    Panic attacks     PTSD (post-traumatic stress disorder)     TBI (traumatic brain injury) (H)     post concussion syndrome    Traumatic brain injury with loss of consciousness, sequela 06/24/2022    Unspecified essential hypertension     Created by Conversion     Unspecified site of spinal cord injury without evidence of spinal bone injury       PAST SURGICAL HISTORY:   has a past surgical history that includes shoulder surgery (left); Fusion cervical anterior two levels; Arthrodesis ankle (right); hernia repair; Fusion cervical anterior three+ levels (01/28/2013); Graft bone from iliac crest (01/28/2013); Explore spine, remove hardware, combined (05/05/2014); Decompression, fusion cervical anterior three+ levels, combined (05/05/2014); Fusion cervical posterior three+ levels (05/05/2014); IR Cervical Transforaminal Epidural Strd Inj (12/07/2017); IR Cervical Epidural Steroid Injection (04/05/2018); ARTHRODESIS,ANKLE,OPEN (Right, 01/01/2007); Anterior / Posterior Combined Fusion Cervical Spine (2013, redo in  2014); Nasal Polyp Surgery (01/01/2014); Arthroscopy knee (01/01/2014); hernia repair (Left, 01/01/2006); hernia repair (Right, 01/01/2008); Shoulder Arthroscopy Distal Clavicle Excision And Open Rotator Cuff Repair (Bilateral, 2005 and 2013); Hand surgery (Left, 01/01/1997); Pr Shldr Arthroscop,Surg,W/Rotat Cuff Repr (Right, 11/25/2015); Replacement Total Knee (Left, 03/20/2017); Herniorrhaphy ventral (01/01/2008); and Cystoscopy.  FAMILY HISTORY: family history includes Bipolar Disorder in his mother; Colon Cancer in his paternal grandmother; Diabetes in his maternal grandfather, maternal grandmother, paternal grandfather, and paternal grandmother; Hypertension in his father; Lymphoma in his father.  SOCIAL HISTORY:   reports that he quit smoking about 8 years ago. His smoking use included cigarettes. He started smoking about 15 years ago. He has a 5.5 pack-year smoking history. He has quit using smokeless tobacco. He reports that he does not drink alcohol and does not use drugs.  Patient's living condition: lives alone    Post Discharge Medication Reconciliation Status:   MED REC REQUIRED  Post Medication Reconciliation Status:  Discharge medications reconciled, continue medications without change       Current Outpatient Medications   Medication Sig Dispense Refill    acetaminophen (TYLENOL) 325 MG tablet Take 650 mg by mouth every 8 hours as needed for mild pain.      albuterol (PROAIR HFA/PROVENTIL HFA/VENTOLIN HFA) 108 (90 Base) MCG/ACT inhaler Inhale 2 puffs into the lungs every 6 hours as needed for wheezing or shortness of breath. 6.7 g 11    allopurinol (ZYLOPRIM) 300 MG tablet Take 1 tablet (300 mg) by mouth 2 times daily. 180 tablet 3    amphetamine-dextroamphetamine (ADDERALL) 15 MG tablet Take 1 tablet (15 mg) by mouth daily. 30 tablet 0    buPROPion (WELLBUTRIN SR) 150 MG 12 hr tablet Take 1 tablet (150 mg) by mouth 2 times daily. 180 tablet 2    CALCIUM ANTACID 500 MG chewable tablet Take 1 tablet  (500 mg) by mouth 2 times daily. 180 tablet 3    diazepam (VALIUM) 5 MG tablet Take 1 tablet (5 mg) by mouth daily as needed for anxiety. 5 tablet 0    ergocalciferol (ERGOCALCIFEROL) 1.25 MG (29325 UT) capsule Take 1 capsule (50,000 Units) by mouth every 14 days. 6 capsule 3    ferrous sulfate (FEROSUL) 325 (65 Fe) MG tablet Take 1 tablet (325 mg) by mouth daily (with breakfast). 90 tablet 3    fexofenadine (ALLEGRA) 180 MG tablet Take 180 mg by mouth daily as needed      furosemide (LASIX) 20 MG tablet Take 20 mg by mouth daily.      guaiFENesin (MUCINEX) 600 MG 12 hr tablet Take 1 tablet (600 mg) by mouth 2 times daily. 180 tablet 3    hydrOXYzine HCl (ATARAX) 25 MG tablet Take 25 mg by mouth every 6 hours as needed for itching.      lamoTRIgine (LAMICTAL) 150 MG tablet Take 1 tablet (150 mg) by mouth 3 times daily. 270 tablet 3    LANsoprazole (PREVACID) 30 MG DR capsule Take 1 capsule (30 mg) by mouth every morning (before breakfast). (Patient taking differently: Take 30 mg by mouth 2 times daily.) 90 capsule 3    lidocaine (LIDODERM) 5 % patch Place onto the skin every 24 hours To prevent lidocaine toxicity, patient should be patch free for 12 hrs daily. 30 patch 11    methocarbamol (ROBAXIN) 500 MG tablet Take 2 tablets (1,000 mg) by mouth 4 times daily. For use beginning 3/14/23 120 tablet 11    metoprolol succinate ER (TOPROL XL) 50 MG 24 hr tablet Take 1 tablet (50 mg) by mouth daily. 90 tablet 3    montelukast (SINGULAIR) 10 MG tablet Take 1 tablet (10 mg) by mouth daily. 90 tablet 3    nabumetone (RELAFEN) 750 MG tablet Take 1 tablet (750 mg) by mouth 2 times daily. (Patient taking differently: Take 750 mg by mouth daily.) 180 tablet 3    naloxegol (MOVANTIK) 25 MG TABS tablet Take 1 tablet (25 mg) by mouth every morning (before breakfast). 90 tablet 3    naloxone (NARCAN) 4 MG/0.1ML nasal spray SPRAY 1 SPRAY INTO ONE NOSTRIL ALTERNATING NOSTRILS ONCE AS NEEDED FOR OPIOID REVERSAL REPEAT EVER 2-3 MINUTES  "UNTIL HELP ARRIVES 2 mL 1    oxyCODONE (OXYCONTIN) 15 MG 12 hr tablet Take 1 tablet (15 mg) by mouth every 12 hours. 30 tablet 0    oxyCODONE IR (ROXICODONE) 10 MG tablet Take 1 tablet (10 mg) by mouth every 3 hours. At 2 am, 5 am, 11am, 2 pm, 5 pm, 8 pm, 11 pm. 98 tablet 0    polyethylene glycol (MIRALAX) 17 GM/Dose powder Take 17 g by mouth 2 times daily. 3060 g 3    potassium chloride sourav ER (KLOR-CON M20) 20 MEQ CR tablet Take 1 tablet (20 mEq) by mouth daily. 90 tablet 3    QUEtiapine (SEROQUEL) 100 MG tablet Take 1 tablet (100 mg) by mouth 4 times daily as needed (anxiety). 60 tablet 1    QUEtiapine (SEROQUEL) 200 MG tablet Take 1 tablet (200 mg) by mouth at bedtime. 90 tablet 3    Sennosides (SENNA) 8.8 MG/5ML SYRP Take 30 mLs (52.8 mg) by mouth 2 times daily. (Patient taking differently: Take 15 mLs by mouth 2 times daily.) 1800 mL 11    tamsulosin (FLOMAX) 0.4 MG capsule Take 1 capsule (0.4 mg) by mouth 2 times daily. 180 capsule 3    tiaGABine (GABITRIL) 2 MG tablet Take 1 tablet (2 mg) by mouth 6 times daily. 168 tablet 2    finasteride (PROSCAR) 5 MG tablet Take 1 tablet (5 mg) by mouth daily. (Patient not taking: Reported on 5/2/2025) 90 tablet 3    medical cannabis (Patient's own supply) See Admin Instructions (The purpose of this order is to document that the patient reports taking medical cannabis.  This is not a prescription, and is not used to certify that the patient has a qualifying medical condition.) (Patient not taking: Reported on 5/2/2025)       No current facility-administered medications for this visit.       ROS:  4 point ROS including Respiratory, CV, GI and , other than that noted in the HPI,  is negative    Vitals:  BP (!) 143/83   Pulse 83   Temp (!) 96.6  F (35.9  C)   Resp 18   Ht 1.727 m (5' 7.99\")   Wt 88 kg (194 lb)   SpO2 100%   BMI 29.51 kg/m    Exam:  GENERAL APPEARANCE:  Alert, in no distress  EYES:  EOM normal, conjunctiva and lids normal  RESP:  no respiratory " distress  CV:  no edema  NEURO:   Cranial nerves 2-12 are normal tested and grossly at patient's baseline  PSYCH:  oriented X 3, affect and mood normal    Lab/Diagnostic data:  Recent labs in [facility] reviewed by me today.     ASSESSMENT/PLAN:  (M47.14) Thoracic spondylosis with myelopathy  (primary encounter diagnosis)  (M48.02) Foraminal stenosis of cervical region  (G89.4) Chronic pain disorder  Comment: Pain controlled per patient. His oxycodone BID was in Epic as the IR formulation, but he insisted it was oxycontin. Will change to this. He is on an extensive pain regimen, but tolerates this and is followed closely by Dr. Cramer. No s/sx poor wound healing. Goal is to discharge home when PT/OT goals met.  Plan: PT/OT eval and treat, discharge planning per their recommendation. Continue methocarbamol, oxycodone, oxycontin, tiagabine, hydroxyzine, and acetaminophen for pain, monitor pain severity, monitor for sedation. Monitor incision. F/u neurosurgery as scheduled.    (F31.77) Bipolar 1 disorder, mixed, partial remission (H)  (F43.10) PTSD (post-traumatic stress disorder)  Comment: Chronic condition being managed with medications. It is reasonable to use diazepam short term for increased anxiety related to surgery and rehab  Plan: Continue current POC with no changes at this time and adjustments as needed.    (R41.89,  S09.90XS) Cognitive deficit due to old head injury  Comment: Mild, no acute concerns, but will monitor for encephalopathy    (K59.01) Slow transit constipation  Comment: Due to medications, decreased activity. No evidence of bowel obstruction.  Plan: Continue Miralax, Senna, naloxegol. Monitor bowel function. Adjust medication as clinically indicated      Total time spent with patient visit was 45 min including patient visit and review of past records.     Electronically signed by:  KENDRICK Baca CNP

## 2025-05-05 ENCOUNTER — TRANSITIONAL CARE UNIT VISIT (OUTPATIENT)
Dept: GERIATRICS | Facility: CLINIC | Age: 55
End: 2025-05-05
Payer: COMMERCIAL

## 2025-05-05 VITALS
SYSTOLIC BLOOD PRESSURE: 114 MMHG | WEIGHT: 194 LBS | HEIGHT: 68 IN | BODY MASS INDEX: 29.4 KG/M2 | OXYGEN SATURATION: 100 % | RESPIRATION RATE: 16 BRPM | DIASTOLIC BLOOD PRESSURE: 71 MMHG | HEART RATE: 101 BPM | TEMPERATURE: 97.4 F

## 2025-05-05 DIAGNOSIS — G62.9 NEUROPATHY: ICD-10-CM

## 2025-05-05 DIAGNOSIS — F43.10 PTSD (POST-TRAUMATIC STRESS DISORDER): ICD-10-CM

## 2025-05-05 DIAGNOSIS — G89.4 CHRONIC PAIN SYNDROME: ICD-10-CM

## 2025-05-05 DIAGNOSIS — F31.77 BIPOLAR 1 DISORDER, MIXED, PARTIAL REMISSION (H): ICD-10-CM

## 2025-05-05 DIAGNOSIS — G89.4 CHRONIC PAIN DISORDER: ICD-10-CM

## 2025-05-05 DIAGNOSIS — M54.16 LUMBAR RADICULOPATHY: ICD-10-CM

## 2025-05-05 DIAGNOSIS — M47.14 THORACIC SPONDYLOSIS WITH MYELOPATHY: Primary | ICD-10-CM

## 2025-05-05 DIAGNOSIS — K59.01 SLOW TRANSIT CONSTIPATION: ICD-10-CM

## 2025-05-05 DIAGNOSIS — M48.02 FORAMINAL STENOSIS OF CERVICAL REGION: ICD-10-CM

## 2025-05-05 RX ORDER — DIAZEPAM 5 MG/1
5 TABLET ORAL EVERY 8 HOURS PRN
Qty: 30 TABLET | Refills: 0 | Status: SHIPPED | OUTPATIENT
Start: 2025-05-05 | End: 2025-05-19

## 2025-05-05 RX ORDER — LANSOPRAZOLE 30 MG/1
30 CAPSULE, DELAYED RELEASE ORAL 2 TIMES DAILY
Status: SHIPPED
Start: 2025-05-05

## 2025-05-05 RX ORDER — TIAGABINE HYDROCHLORIDE 2 MG/1
4 TABLET, FILM COATED ORAL
Status: SHIPPED
Start: 2025-05-05

## 2025-05-05 RX ORDER — OXYCODONE HYDROCHLORIDE 15 MG/1
15 TABLET ORAL
Qty: 240 TABLET | Refills: 0 | Status: SHIPPED | OUTPATIENT
Start: 2025-05-05

## 2025-05-05 RX ORDER — METHOCARBAMOL 500 MG/1
1500 TABLET, FILM COATED ORAL 4 TIMES DAILY
Status: SHIPPED
Start: 2025-05-05

## 2025-05-05 NOTE — PROGRESS NOTES
[facility] GERIATRICS    Chief Complaint   Patient presents with    RECHECK    Pain     HPI:  Bola Lyon Jr. is a 55 year old  (1970), who is being seen today for an episodic care visit at: [facility] ([facility]) [[id number]]. Patient admitted to the above facility from  [facility]. Hospital stay 4/25/25 through 5/1/2. Discharge summary not completed.  Patient with PMH chronic pain, multiple back surgeries, TBI with mild cognitive impairment, PTSD, bipolar disorder was admitted for C3-7 revision, C7-T4 fusion, T3 nerve tumor removal. Other than issues with pain management, there are no post-op complications noted.     Today's concern is:   Patient reports that he had a very challenging weekend due to pain and anxiety. He is on his usual chronic medication doses and he has started having more acute pain related to surgery. He has several different types of pain; structural, neuropathy, muscle spasm, as well as increased anxiety. There are several medications he would like increased for now. He fully expects to go back to his usual doses when he gets home. He follows closely with a pain management specialist, Dr. Cramer. In his notes, he did expect patient to require higher doses after surgery. The patient says that he is able to tolerate all these medications without side effects or sedation. He vomited this morning because his pain was out of control.   These are the changes we discuss:  Increase oxycodone to 15mg q3h  Increase hydroxyzine to 50mg q4h prn  Increase methocarbamol to 1500mg QID  Increase tiagabine 4mg q4h  Increase diazepam to q8h prn    He also notes that he was given senna pills, but the syrup is much more effective for him. He would like 30mL BID    Allergies, and PMH/PSH reviewed in [employer] today.  REVIEW OF SYSTEMS:  4 point ROS including Respiratory, CV, GI and , other than that noted in the HPI,  is negative    Objective:   /71   Pulse 101   Temp  "97.4  F (36.3  C)   Resp 16   Ht 1.727 m (5' 7.99\")   Wt 88 kg (194 lb)   SpO2 100%   BMI 29.51 kg/m    GENERAL APPEARANCE:  Alert, anxious  EYES:  EOM normal, conjunctiva and lids normal  RESP:  no respiratory distress  CV:  no edema  ABDOMEN:  soft, non-tender  PSYCH:  oriented X 3, affect and mood normal    Recent labs in [facility] reviewed by me today.     Assessment/Plan:  (M47.14) Thoracic spondylosis with myelopathy  (primary encounter diagnosis)  (M48.02) Foraminal stenosis of cervical region  (G89.4) Chronic pain disorder  Comment: Patient is very reasonable with his medication requests. It is expected that he would require higher doses temporarily for acute surgical pain. Message sent to Dr. Cramer with plan and he was in agreement. This is a safe environment to increase the medication due to 24 hour supervision. Will need to monitor closely for sedation or encephalopathy    (F31.77) Bipolar 1 disorder, mixed, partial remission (H)  (F43.10) PTSD (post-traumatic stress disorder)  Comment: Increased anxiety due to surgery. Patient advised of risks related to diazepam and long half-life. Will increase to q8h x 14 days    (K59.01) Slow transit constipation  Comment: Chronic due to medications. Order written to supply senna syrup and give 30mL BID      Orders:  Increase oxycodone to 15mg q3h  Increase hydroxyzine to 50mg q4h prn  Increase methocarbamol to 1500mg QID  Increase tiagabine 4mg q4h  Increase diazepam to q8h prn  Zofran 4mg q6h prn      Total time spent with patient visit was 45 min including patient visit and coordination with care team.     Electronically signed by: KENDRICK Baca CNP       "

## 2025-05-05 NOTE — LETTER
5/5/2025      Bola Lyon Jr.  [address] [place] [address]        [facility] GERIATRICS    Chief Complaint   Patient presents with     RECHECK     Pain     HPI:  Bola Lyon Jr. is a 55 year old  (1970), who is being seen today for an episodic care visit at: [facility] ([facility]) [[id number]]. Patient admitted to the above facility from  [facility]. Hospital stay 4/25/25 through 5/1/2. Discharge summary not completed.  Patient with PMH chronic pain, multiple back surgeries, TBI with mild cognitive impairment, PTSD, bipolar disorder was admitted for C3-7 revision, C7-T4 fusion, T3 nerve tumor removal. Other than issues with pain management, there are no post-op complications noted.     Today's concern is:   Patient reports that he had a very challenging weekend due to pain and anxiety. He is on his usual chronic medication doses and he has started having more acute pain related to surgery. He has several different types of pain; structural, neuropathy, muscle spasm, as well as increased anxiety. There are several medications he would like increased for now. He fully expects to go back to his usual doses when he gets home. He follows closely with a pain management specialist, Dr. Cramer. In his notes, he did expect patient to require higher doses after surgery. The patient says that he is able to tolerate all these medications without side effects or sedation. He vomited this morning because his pain was out of control.   These are the changes we discuss:  Increase oxycodone to 15mg q3h  Increase hydroxyzine to 50mg q4h prn  Increase methocarbamol to 1500mg QID  Increase tiagabine 4mg q4h  Increase diazepam to q8h prn    He also notes that he was given senna pills, but the syrup is much more effective for him. He would like 30mL BID    Allergies, and PMH/PSH reviewed in [employer] today.  REVIEW OF SYSTEMS:  4 point ROS including Respiratory, CV, GI and , other than  "that noted in the HPI,  is negative    Objective:   /71   Pulse 101   Temp 97.4  F (36.3  C)   Resp 16   Ht 1.727 m (5' 7.99\")   Wt 88 kg (194 lb)   SpO2 100%   BMI 29.51 kg/m    GENERAL APPEARANCE:  Alert, anxious  EYES:  EOM normal, conjunctiva and lids normal  RESP:  no respiratory distress  CV:  no edema  ABDOMEN:  soft, non-tender  PSYCH:  oriented X 3, affect and mood normal    Recent labs in [facility] reviewed by me today.     Assessment/Plan:  (M47.14) Thoracic spondylosis with myelopathy  (primary encounter diagnosis)  (M48.02) Foraminal stenosis of cervical region  (G89.4) Chronic pain disorder  Comment: Patient is very reasonable with his medication requests. It is expected that he would require higher doses temporarily for acute surgical pain. Message sent to Dr. Cramer with plan and he was in agreement. This is a safe environment to increase the medication due to 24 hour supervision. Will need to monitor closely for sedation or encephalopathy    (F31.77) Bipolar 1 disorder, mixed, partial remission (H)  (F43.10) PTSD (post-traumatic stress disorder)  Comment: Increased anxiety due to surgery. Patient advised of risks related to diazepam and long half-life. Will increase to q8h x 14 days    (K59.01) Slow transit constipation  Comment: Chronic due to medications. Order written to supply senna syrup and give 30mL BID      Orders:  Increase oxycodone to 15mg q3h  Increase hydroxyzine to 50mg q4h prn  Increase methocarbamol to 1500mg QID  Increase tiagabine 4mg q4h  Increase diazepam to q8h prn  Zofran 4mg q6h prn      Total time spent with patient visit was 45 min including patient visit and coordination with care team.     Electronically signed by: KENDRICK Baca CNP           Sincerely,        KENDRICK Baca CNP    Electronically signed  "

## 2025-05-07 ENCOUNTER — TRANSITIONAL CARE UNIT VISIT (OUTPATIENT)
Dept: GERIATRICS | Facility: CLINIC | Age: 55
End: 2025-05-07
Payer: COMMERCIAL

## 2025-05-07 VITALS
RESPIRATION RATE: 16 BRPM | HEART RATE: 75 BPM | TEMPERATURE: 97.8 F | DIASTOLIC BLOOD PRESSURE: 77 MMHG | WEIGHT: 189 LBS | BODY MASS INDEX: 28.64 KG/M2 | HEIGHT: 68 IN | OXYGEN SATURATION: 98 % | SYSTOLIC BLOOD PRESSURE: 126 MMHG

## 2025-05-07 DIAGNOSIS — M48.02 FORAMINAL STENOSIS OF CERVICAL REGION: ICD-10-CM

## 2025-05-07 DIAGNOSIS — F31.77 BIPOLAR 1 DISORDER, MIXED, PARTIAL REMISSION (H): ICD-10-CM

## 2025-05-07 DIAGNOSIS — M47.14 THORACIC SPONDYLOSIS WITH MYELOPATHY: Primary | ICD-10-CM

## 2025-05-07 DIAGNOSIS — F43.10 PTSD (POST-TRAUMATIC STRESS DISORDER): ICD-10-CM

## 2025-05-07 DIAGNOSIS — G89.4 CHRONIC PAIN DISORDER: ICD-10-CM

## 2025-05-07 RX ORDER — ONDANSETRON 4 MG/1
4 TABLET, FILM COATED ORAL EVERY 6 HOURS PRN
COMMUNITY

## 2025-05-07 NOTE — LETTER
" 5/7/2025      Bola Lyon Jr.  [address] [place] [address]        [facility] GERIATRICS    Chief Complaint   Patient presents with     RECHECK     HPI:  Bola Lyon Jr. is a 55 year old  (1970), who is being seen today for an episodic care visit at: [facility] ([facility]) [[id number]].     Today's concern is:   Patient reports improved pain control with the medication changes. He does not feel groggy, no dizziness. Bowels are moving fine. His only concern today is that he would like to have 2 lidocaine patches instead of just one. He sees his surgeon 5/12 and then has a care conference the next day. There is no discharge date at this time. We discuss that when there is a discharge date, we will need to determine what medication doses he will discharge on and he will need follow up with his PCP or pain clinic for further management.     Allergies, and PMH/PSH reviewed in [facility] today.  REVIEW OF SYSTEMS:  4 point ROS including Respiratory, CV, GI and , other than that noted in the HPI,  is negative    Objective:   /77   Pulse 75   Temp 97.8  F (36.6  C)   Resp 16   Ht 1.727 m (5' 7.99\")   Wt 85.7 kg (189 lb)   SpO2 98%   BMI 28.75 kg/m    GENERAL APPEARANCE:  Alert, in no distress  EYES:  EOM normal, conjunctiva and lids normal  RESP:  no respiratory distress  CV:  no edema  PSYCH:  oriented X 3, affect and mood normal    Recent labs in [facility] reviewed by me today.     Assessment/Plan:  (M47.14) Thoracic spondylosis with myelopathy  (primary encounter diagnosis)  (M48.02) Foraminal stenosis of cervical region  (G89.4) Chronic pain disorder  Comment: Pain adequately controlled. No evidence of side effects with complex medication regimen. He is progressing with therapy.  Plan: PT/OT eval and treat, discharge planning per their recommendation. Monitor incision. F/u with surgeon as scheduled.    (F31.77) Bipolar 1 disorder, mixed, partial remission (H)  (F43.10) PTSD " (post-traumatic stress disorder)  Comment: Continue current POC with no changes at this time and adjustments as needed.  Plan: Continue current POC with no changes at this time and adjustments as needed.      Orders:  Change lidocaine patches to 2      Electronically signed by: KENDRICK Baca CNP           Sincerely,        KENDRICK Baca CNP    Electronically signed

## 2025-05-07 NOTE — PROGRESS NOTES
"[facility] GERIATRICS    Chief Complaint   Patient presents with    RECHECK     HPI:  Bola Lyon Jr. is a 55 year old  (1970), who is being seen today for an episodic care visit at: [facility] ([facility]) [[id number]].     Today's concern is:   Patient reports improved pain control with the medication changes. He does not feel groggy, no dizziness. Bowels are moving fine. His only concern today is that he would like to have 2 lidocaine patches instead of just one. He sees his surgeon 5/12 and then has a care conference the next day. There is no discharge date at this time. We discuss that when there is a discharge date, we will need to determine what medication doses he will discharge on and he will need follow up with his PCP or pain clinic for further management.     Allergies, and PMH/PSH reviewed in [facility] today.  REVIEW OF SYSTEMS:  4 point ROS including Respiratory, CV, GI and , other than that noted in the HPI,  is negative    Objective:   /77   Pulse 75   Temp 97.8  F (36.6  C)   Resp 16   Ht 1.727 m (5' 7.99\")   Wt 85.7 kg (189 lb)   SpO2 98%   BMI 28.75 kg/m    GENERAL APPEARANCE:  Alert, in no distress  EYES:  EOM normal, conjunctiva and lids normal  RESP:  no respiratory distress  CV:  no edema  PSYCH:  oriented X 3, affect and mood normal    Recent labs in [facility] reviewed by me today.     Assessment/Plan:  (M47.14) Thoracic spondylosis with myelopathy  (primary encounter diagnosis)  (M48.02) Foraminal stenosis of cervical region  (G89.4) Chronic pain disorder  Comment: Pain adequately controlled. No evidence of side effects with complex medication regimen. He is progressing with therapy.  Plan: PT/OT eval and treat, discharge planning per their recommendation. Monitor incision. F/u with surgeon as scheduled.    (F31.77) Bipolar 1 disorder, mixed, partial remission (H)  (F43.10) PTSD (post-traumatic stress disorder)  Comment: Continue current POC with no changes at this time " and adjustments as needed.  Plan: Continue current POC with no changes at this time and adjustments as needed.      Orders:  Change lidocaine patches to 2      Electronically signed by: KENDRICK Baca CNP

## 2025-05-13 ENCOUNTER — DISCHARGE SUMMARY NURSING HOME (OUTPATIENT)
Dept: GERIATRICS | Facility: CLINIC | Age: 55
End: 2025-05-13
Payer: COMMERCIAL

## 2025-05-13 VITALS
OXYGEN SATURATION: 97 % | RESPIRATION RATE: 18 BRPM | HEART RATE: 79 BPM | HEIGHT: 67 IN | SYSTOLIC BLOOD PRESSURE: 113 MMHG | WEIGHT: 189 LBS | DIASTOLIC BLOOD PRESSURE: 71 MMHG | TEMPERATURE: 98.1 F | BODY MASS INDEX: 29.66 KG/M2

## 2025-05-13 DIAGNOSIS — F31.77 BIPOLAR 1 DISORDER, MIXED, PARTIAL REMISSION (H): ICD-10-CM

## 2025-05-13 DIAGNOSIS — G89.4 CHRONIC PAIN DISORDER: ICD-10-CM

## 2025-05-13 DIAGNOSIS — M48.02 FORAMINAL STENOSIS OF CERVICAL REGION: ICD-10-CM

## 2025-05-13 DIAGNOSIS — F43.10 PTSD (POST-TRAUMATIC STRESS DISORDER): ICD-10-CM

## 2025-05-13 DIAGNOSIS — M47.14 THORACIC SPONDYLOSIS WITH MYELOPATHY: Primary | ICD-10-CM

## 2025-05-13 NOTE — PROGRESS NOTES
"[facility] GERIATRICS    Chief Complaint   Patient presents with    RECHECK     HPI:  Bola Lyon Jr. is a 55 year old  (1970), who is being seen today for an episodic care visit at: [facility] (TCU) [[id number]]. Patient admitted to the above facility from  [facility]. Hospital stay 4/25/25 through 5/1/2. Discharge summary not completed.  Patient with PMH chronic pain, multiple back surgeries, TBI with mild cognitive impairment, PTSD, bipolar disorder was admitted for C3-7 revision, C7-T4 fusion, T3 nerve tumor removal. Other than issues with pain management, there are no post-op complications noted.     Today's concern is:   Patient was going to be seen today for a discharge summary visit for patient driven discharge 5/14/25. As usual, he talks quickly and changes subjects, but essentially says that he was upset with some of the staff and how they communicated with him. His friend owns [email or web address], so he was going to leave here and go to that TCU. He does not feel quite ready to go home. After much debate with himself and some input from provider, he ultimately decides he will probably stay here 1 more week and then be ready to go home. Tentative discharge date is 5/22. He acknowledges that starting at a new TCU would be challenging as well. He has no concerns with his medications today. As long as he gets everything on schedule, his anxiety and pain are controlled.     Allergies, and PMH/PSH reviewed in [facility] today.  REVIEW OF SYSTEMS:  4 point ROS including Respiratory, CV, GI and , other than that noted in the HPI,  is negative    Objective:   /71   Pulse 79   Temp 98.1  F (36.7  C)   Resp 18   Ht 1.702 m (5' 7\")   Wt 85.7 kg (189 lb)   SpO2 97%   BMI 29.60 kg/m    GENERAL APPEARANCE:  Alert, anxious  EYES:  EOM normal, conjunctiva and lids normal  RESP:  no respiratory distress  CV:  no edema  SKIN:  patient was concerned about area on left great toe, " but this now appears to be a small blister that had popped. Very small open area, no erythema or drainage  PSYCH:  oriented X 3, anxious      Assessment/Plan:  (M47.14) Thoracic spondylosis with myelopathy  (primary encounter diagnosis)  (M48.02) Foraminal stenosis of cervical region  (G89.4) Chronic pain disorder  Comment: Patient has been progressing with therapy. Pain is controlled with no s/sx side effects, but once he discharges recommend tapering quickly back to PTA doses  Plan: Continue current POC with no changes at this time and adjustments as needed.    (F31.77) Bipolar 1 disorder, mixed, partial remission (H)  (F43.10) PTSD (post-traumatic stress disorder)  Comment: Chronic condition being managed with medications and continues to have expected and unavoidable exacerbations with current therapy. Using prn diazepam while at TCU. This can be discontinued at home  Plan: Continue current POC with no changes at this time and adjustments as needed.      Orders:  Discharge orders signed in case he changes his mind      Electronically signed by: KENDRICK Baca CNP       Documentation of Face to Face and Certification for Home Health Services    I certify that services are/were furnished while this patient was under the care of a physician and that a physician or an allowed non-physician practitioner (NPP), had a face-to-face encounter that meets the physician face-to-face encounter requirements. The encounter was in whole, or in part, related to the primary reason for home health. The patient is confined to his/her home and needs intermittent skilled nursing, physical therapy, speech-language pathology, or the continued need for occupational therapy. A plan of care has been established by a physician and is periodically reviewed by a physician.  Date of Face-to-Face Encounter: 5/13/2025.    I certify that, based on my findings, the following services are medically necessary home health services: Nursing,  Occupational Therapy, and Physical Therapy.    My clinical findings support the need for the above skilled services because: Mental health symptoms including: Mental health condition is exacerbated by exposure to crowds, unfamiliar environment or new stressful situations...    Patient to re-establish plan of care with their PCP within 7-10 days after leaving the facility to reestablish care.  Medicare certified [employer] provider: KENDRICK Baca CNP  Date: May 13, 2025

## 2025-05-13 NOTE — LETTER
" 5/13/2025      Bola Lyon Jr.  [address] [place] [address]        [facility] GERIATRICS    Chief Complaint   Patient presents with     RECHECK     HPI:  Bola Lyon Jr. is a 55 year old  (1970), who is being seen today for an episodic care visit at: [facility] ([facility]) [[id number]]. Patient admitted to the above facility from  [facility]. Hospital stay 4/25/25 through 5/1/2. Discharge summary not completed.  Patient with PMH chronic pain, multiple back surgeries, TBI with mild cognitive impairment, PTSD, bipolar disorder was admitted for C3-7 revision, C7-T4 fusion, T3 nerve tumor removal. Other than issues with pain management, there are no post-op complications noted.     Today's concern is:   Patient was going to be seen today for a discharge summary visit for patient driven discharge 5/14/25. As usual, he talks quickly and changes subjects, but essentially says that he was upset with some of the staff and how they communicated with him. His friend owns [employer], so he was going to leave here and go to that TCU. He does not feel quite ready to go home. After much debate with himself and some input from provider, he ultimately decides he will probably stay here 1 more week and then be ready to go home. Tentative discharge date is 5/22. He acknowledges that starting at a new TCU would be challenging as well. He has no concerns with his medications today. As long as he gets everything on schedule, his anxiety and pain are controlled.     Allergies, and PMH/PSH reviewed in [facility] today.  REVIEW OF SYSTEMS:  4 point ROS including Respiratory, CV, GI and , other than that noted in the HPI,  is negative    Objective:   /71   Pulse 79   Temp 98.1  F (36.7  C)   Resp 18   Ht 1.702 m (5' 7\")   Wt 85.7 kg (189 lb)   SpO2 97%   BMI 29.60 kg/m    GENERAL APPEARANCE:  Alert, anxious  EYES:  EOM normal, conjunctiva and lids normal  RESP:  no " respiratory distress  CV:  no edema  SKIN:  patient was concerned about area on left great toe, but this now appears to be a small blister that had popped. Very small open area, no erythema or drainage  PSYCH:  oriented X 3, anxious      Assessment/Plan:  (M47.14) Thoracic spondylosis with myelopathy  (primary encounter diagnosis)  (M48.02) Foraminal stenosis of cervical region  (G89.4) Chronic pain disorder  Comment: Patient has been progressing with therapy. Pain is controlled with no s/sx side effects, but once he discharges recommend tapering quickly back to PTA doses  Plan: Continue current POC with no changes at this time and adjustments as needed.    (F31.77) Bipolar 1 disorder, mixed, partial remission (H)  (F43.10) PTSD (post-traumatic stress disorder)  Comment: Chronic condition being managed with medications and continues to have expected and unavoidable exacerbations with current therapy. Using prn diazepam while at TCU. This can be discontinued at home  Plan: Continue current POC with no changes at this time and adjustments as needed.      Orders:  Discharge orders signed in case he changes his mind      Electronically signed by: Adriana Laird, KENDRICK CNP       Documentation of Face to Face and Certification for Home Health Services    I certify that services are/were furnished while this patient was under the care of a physician and that a physician or an allowed non-physician practitioner (NPP), had a face-to-face encounter that meets the physician face-to-face encounter requirements. The encounter was in whole, or in part, related to the primary reason for home health. The patient is confined to his/her home and needs intermittent skilled nursing, physical therapy, speech-language pathology, or the continued need for occupational therapy. A plan of care has been established by a physician and is periodically reviewed by a physician.  Date of Face-to-Face Encounter: 5/13/2025.    I certify that, based  on my findings, the following services are medically necessary home health services: Nursing, Occupational Therapy, and Physical Therapy.    My clinical findings support the need for the above skilled services because: Mental health symptoms including: Mental health condition is exacerbated by exposure to crowds, unfamiliar environment or new stressful situations...    Patient to re-establish plan of care with their PCP within 7-10 days after leaving the facility to reestablish care.  Medicare certified PECOS provider: KENDRICK Baca CNP  Date: May 13, 2025            Sincerely,        KENDRICK Baca CNP    Electronically signed

## 2025-05-18 NOTE — PROGRESS NOTES
[facility] GERIATRICS  INITIAL VISIT NOTE  May 19, 2025    PRIMARY CARE PROVIDER AND CLINIC:  Devon Lund [address] [facility] [address]    [facility] Medical Record Number:  [id number]  Place of Service where encounter took place:  [facility] [address] ([facility]) [[id number]]    Chief Complaint   Patient presents with    Hospital F/U       HPI:    Bola Lyon Jr. is a 55 year old  (1970) male seen today at [facility]. Medical history is notable for HTN, LE edema, bipolar disorder, ADHD, seizure disorder and chronic pain followed by pain clinic. 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 Surgery without complication.    He was admitted to this facility for  rehab, medical management, and nursing care.      History obtained from: facility chart records, facility staff, patient report, [facility] chart review, and [facility] chart review.      Today, Mr. Lyon is seen in his room after breakfast.  He was ambulating about without assistive device and doing quite well.  Plan is for discharge Friday at 11 AM.  He has arranged home care as well as additional help at home.  He is very happy with the results of the surgery.    He is worried about his feet, specifically his left great toe.  He is on a course of doxycycline and plans to send photos to his podiatrist.  Discussed that his toe looks great, I am not concerned about infection and my recommendation is that he allow his feet to get some air and breathe as they are quite moist and skin is wrinkled from this.    No acute concerns today per discussion with nursing.  He continues to work with therapies.    CODE STATUS: CPR/Full code     ALLERGIES:  Allergies   Allergen Reactions    Gabapentin Other (See  Comments)     Edema    Lyrica [Pregabalin] Other (See Comments)     Edema    Amitriptyline Unknown     hallucinations    Lithium Hallucination    Seasonal Allergies     Topiramate Unknown     hallucinations       PAST MEDICAL HISTORY:   Past Medical History:   Diagnosis Date    AC (acromioclavicular) arthritis 10/24/2011    Aftercare following surgery of the musculoskeletal system 04/25/2012    Anxiety disorder     Anxiety state, unspecified     Created by Conversion     Arthritis     Biceps tendon rupture, proximal left    Bipolar 2 disorder (H)     Brachial neuritis or radiculitis          Brachial neuritis or radiculitis NOS     Created by Conversion     Cervical radiculopathy at C8 04/30/2018    Cervical stenosis of spinal canal 05/05/2014    Claustrophobia     COPD (chronic obstructive pulmonary disease) (H) 12/21/2020    Disturbance of skin sensation 11/30/2011    Encounter for chronic pain management 02/11/2015    GERD (gastroesophageal reflux disease)     Gout     Hernia, abdominal     Hiatal hernia     Hypertension     Impingement syndrome of right shoulder     Lymphedema 02/11/2015    left    Numbness and tingling     Other affections of shoulder region, not elsewhere classified     Created by Conversion      Other chronic pain     Pain in joint, shoulder region 10/10/2011    Panic attacks     PTSD (post-traumatic stress disorder)     TBI (traumatic brain injury) (H)     post concussion syndrome    Traumatic brain injury with loss of consciousness, sequela 06/24/2022    Unspecified essential hypertension     Created by Conversion     Unspecified site of spinal cord injury without evidence of spinal bone injury        PAST SURGICAL HISTORY:   Past Surgical History:   Procedure Laterality Date    ANTERIOR / POSTERIOR COMBINED FUSION CERVICAL SPINE  2013, redo in 2014    4 levels    ARTHRODESIS ANKLE  right    ARTHROSCOPY KNEE  01/01/2014    CYSTOSCOPY      DECOMPRESSION, FUSION CERVICAL ANTERIOR THREE+  LEVELS, COMBINED  05/05/2014    Procedure: COMBINED DECOMPRESSION, FUSION CERVICAL ANTERIOR THREE+ LEVELS;  Surgeon: Aron Gardner MD;  Location:  OR    EXPLORE SPINE, REMOVE HARDWARE, COMBINED  05/05/2014    Procedure: COMBINED EXPLORE SPINE, REMOVE HARDWARE;  Surgeon: Aron Gardner MD;  Location: [facility] OR    FUSION CERVICAL ANTERIOR THREE+ LEVELS  01/28/2013    Procedure: FUSION CERVICAL ANTERIOR THREE+ LEVELS;  ANTERIOR CERVICAL DISCECTOMY AND FUSION C4-C5, REVISION ANTERIOR CERVICAL DISCECTOMY AND FUSION C5-6, C6-7, RIGHT ANTERIOR ILIAC CREST BONE GRAFT(C-ARM, 3080 TABLE, SYNTHES CSLP SET, TRICORTICAL ALLOGRAFT);  Surgeon: Hermilo Bey MD;  Location: [facility] OR    FUSION CERVICAL ANTERIOR TWO LEVELS      FUSION CERVICAL POSTERIOR THREE+ LEVELS  05/05/2014    Procedure: FUSION CERVICAL POSTERIOR THREE+ LEVELS;  Surgeon: Aron Gardner MD;  Location:  OR    GRAFT BONE FROM ILIAC CREST  01/28/2013    Procedure: GRAFT BONE FROM ILIAC CREST;;  Surgeon: Hermilo Bey MD;  Location:  OR    HAND SURGERY Left 01/01/1997    HERNIA REPAIR      x3    HERNIA REPAIR Left 01/01/2006    HERNIA REPAIR Right 01/01/2008    and middle    HERNIORRHAPHY VENTRAL  01/01/2008    IR CERVICAL EPIDURAL STEROID INJECTION  04/05/2018    IR CERVICAL TRANSFORAMINAL EPIDURAL STRD INJ  12/07/2017    NASAL POLYP SURGERY  01/01/2014    and septal repair, Dr. Isrrael FERNANDEZ LDR ARTHROSCOP,SURG,W/ROTAT CUFF REPR Right 11/25/2015    Procedure: RIGHT SHOULDER ARTHROSCOPY, ROTATOR CUFF REPAIR, DECOMPRESSION, DEBRIDEMENT, DISTAL CLAVICLE EXCISION;  Surgeon: Pilo Bruce MD;  Location: [facility];  Service: Orthopedics    REPLACEMENT TOTAL KNEE Left 03/20/2017    SHOULDER ARTHROSCOPY DISTAL CLAVICLE EXCISION AND OPEN ROTATOR CUFF REPAIR Bilateral 2005 and 2013    SHOULDER SURGERY  left    ZZC ARTHRODESIS,ANKLE,OPEN Right 01/01/2007    Ankle fusion       FAMILY HISTORY:   Family History   Problem Relation Age  of Onset    Hypertension Father     Lymphoma Father     Bipolar Disorder Mother     Diabetes Maternal Grandmother     Diabetes Maternal Grandfather     Colon Cancer Paternal Grandmother     Diabetes Paternal Grandmother     Diabetes Paternal Grandfather     Anesthesia Reaction No family hx of      SOCIAL HISTORY:   Patient's living condition: lives alone    MEDICATIONS:  Post Discharge Medication Reconciliation Status: discharge medications reconciled and changed, per note/orders.  Current Outpatient Medications   Medication Sig Dispense Refill    acetaminophen (TYLENOL) 325 MG tablet Take 650 mg by mouth every 8 hours as needed for mild pain.      albuterol (PROAIR HFA/PROVENTIL HFA/VENTOLIN HFA) 108 (90 Base) MCG/ACT inhaler Inhale 2 puffs into the lungs every 6 hours as needed for wheezing or shortness of breath. 6.7 g 11    allopurinol (ZYLOPRIM) 300 MG tablet Take 1 tablet (300 mg) by mouth 2 times daily. 180 tablet 3    amphetamine-dextroamphetamine (ADDERALL) 15 MG tablet Take 1 tablet (15 mg) by mouth daily. 30 tablet 0    buPROPion (WELLBUTRIN SR) 150 MG 12 hr tablet Take 1 tablet (150 mg) by mouth 2 times daily. 180 tablet 2    diazepam (VALIUM) 5 MG tablet Take 1 tablet (5 mg) by mouth every 8 hours as needed for anxiety. 30 tablet 0    doxycycline hyclate (VIBRAMYCIN) 100 MG capsule Take 1 capsule (100 mg) by mouth 2 times daily for 5 days.      ergocalciferol (ERGOCALCIFEROL) 1.25 MG (97806 UT) capsule Take 1 capsule (50,000 Units) by mouth every 14 days. 6 capsule 3    ferrous sulfate (FEROSUL) 325 (65 Fe) MG tablet Take 1 tablet (325 mg) by mouth daily (with breakfast). 90 tablet 3    fexofenadine (ALLEGRA) 180 MG tablet Take 180 mg by mouth daily as needed      finasteride (PROSCAR) 5 MG tablet Take 1 tablet (5 mg) by mouth daily. 90 tablet 3    furosemide (LASIX) 20 MG tablet Take 20 mg by mouth daily.      guaiFENesin (MUCINEX) 600 MG 12 hr tablet Take 1 tablet (600 mg) by mouth 2 times daily. 180  tablet 3    hydrOXYzine HCl (ATARAX) 25 MG tablet Take 25 mg by mouth every 6 hours as needed for itching.      lamoTRIgine (LAMICTAL) 150 MG tablet Take 1 tablet (150 mg) by mouth 3 times daily. 270 tablet 3    LANsoprazole (PREVACID) 30 MG DR capsule Take 1 capsule (30 mg) by mouth 2 times daily.      lidocaine (LIDODERM) 5 % patch Place onto the skin every 24 hours To prevent lidocaine toxicity, patient should be patch free for 12 hrs daily. 30 patch 11    methocarbamol (ROBAXIN) 500 MG tablet Take 3 tablets (1,500 mg) by mouth 4 times daily.      metoprolol succinate ER (TOPROL XL) 50 MG 24 hr tablet Take 1 tablet (50 mg) by mouth daily. 90 tablet 3    montelukast (SINGULAIR) 10 MG tablet Take 1 tablet (10 mg) by mouth daily. 90 tablet 3    nabumetone (RELAFEN) 750 MG tablet Take 1 tablet (750 mg) by mouth 2 times daily. 180 tablet 3    naloxegol (MOVANTIK) 25 MG TABS tablet Take 1 tablet (25 mg) by mouth every morning (before breakfast). 90 tablet 3    naloxone (NARCAN) 4 MG/0.1ML nasal spray SPRAY 1 SPRAY INTO ONE NOSTRIL ALTERNATING NOSTRILS ONCE AS NEEDED FOR OPIOID REVERSAL REPEAT EVER 2-3 MINUTES UNTIL HELP ARRIVES 2 mL 1    ondansetron (ZOFRAN) 4 MG tablet Take 4 mg by mouth every 6 hours as needed for nausea.      oxyCODONE (OXYCONTIN) 15 MG 12 hr tablet Take 1 tablet (15 mg) by mouth every 12 hours. 30 tablet 0    oxyCODONE IR (ROXICODONE) 15 MG tablet Take 1 tablet (15 mg) by mouth every 3 hours. At 2 am, 5 am, 11am, 2 pm, 5 pm, 8 pm, 11 pm. 240 tablet 0    polyethylene glycol (MIRALAX) 17 GM/Dose powder Take 17 g by mouth 2 times daily. 3060 g 3    potassium chloride sourav ER (KLOR-CON M20) 20 MEQ CR tablet Take 1 tablet (20 mEq) by mouth daily. 90 tablet 3    QUEtiapine (SEROQUEL) 100 MG tablet Take 1 tablet (100 mg) by mouth 4 times daily as needed (anxiety). 60 tablet 1    QUEtiapine (SEROQUEL) 200 MG tablet Take 1 tablet (200 mg) by mouth at bedtime. 90 tablet 3    Sennosides (SENNA) 8.8 MG/5ML  "SYRP Take 30 mLs (52.8 mg) by mouth 2 times daily. 1800 mL 11    tamsulosin (FLOMAX) 0.4 MG capsule Take 1 capsule (0.4 mg) by mouth 2 times daily. 180 capsule 3    tiaGABine (GABITRIL) 2 MG tablet Take 2 tablets (4 mg) by mouth 6 times daily.      CALCIUM ANTACID 500 MG chewable tablet Take 1 tablet (500 mg) by mouth 2 times daily. 180 tablet 3    medical cannabis (Patient's own supply) See Admin Instructions. (The purpose of this order is to document that the patient reports taking medical cannabis.  This is not a prescription, and is not used to certify that the patient has a qualifying medical condition.) (Patient not taking: Reported on 5/13/2025)         ROS:  4 point ROS neg other than the symptoms noted above in the HPI.    PHYSICAL EXAM:  /65   Pulse 73   Temp 98.1  F (36.7  C)   Resp 18   Ht 1.702 m (5' 7\")   Wt 85.7 kg (189 lb)   SpO2 96%   BMI 29.60 kg/m    Gen: ambulating about room then laying in bed, alert, cooperative and in no acute distress  Resp: breathing non labored, no tachypnea   Ext: trace dependent LE edema  Neuro: CX II-XII grossly in tact; ROM in all four extremities grossly in tact  Psych: alert and oriented x3; normal affect   Skin: L great toe with 2 mm x 2 mm shallow based sore over PIP without any drainage or erythema     LABORATORY/IMAGING DATA:  Reviewed as per [facility] and/or [facility]    ASSESSMENT/PLAN:    s/p C3-C7 revision  s/p Posterior Fusion T1-2,  S/p T3-T4 Bilateral Foraminectomies  Chronic Pain Syndrome  Surgery without complication.  Followed by the pain clinic.  -- APAP 650 mg q8h PRN  -- Lidoderm patch on q12h / off q12h   -- Methocarbamol 1500 mg QID, nabumetone 750 mg BID, naloxegol 25 mg daily, OxyContin 15 mg q12h, oxycodone 15 mg q3h (0200, 0500, 1100, 1400, 1700, 2000 and 2300)  -- Medical cannabis at home  -- PT/OT  -- follow up with surgeon and with pain clinic as scheduled     Acute Blood Los Anemia   Fe Deficiency Anemia   Baseline Hgb 12-13 --> " stable high 8s.   -- FeSO4 325 mg daily  -- follow clinically     Left Great Toe Wound  Small 2 mm x 2 mm shallow clean based sore on PIP. No signs of infection.   -- doxycycline 100 mg BID (last day 5/21/25)    HTN  SBPs 110s-140s. HR 70s.   -- Metoprolol XL 50 mg daily  -- follow BPs and adjust medications as needed     Chronic Bilateral LE Edema  Trace edema today.   --Furosemide 20 mg daily    Bipolar Disorder  ADHD  Major Recurrent Depression  Anxiety  Mood and spirits were good today.   -- Adderall 15 mg daily, bupropion 150 mg BID, quetiapine 200 mg at bedtime  -- Diazepam 5 mg q8h PRN for anxiety  -- Quetiapine 100 mg QID PRN for anxiety  -- Hydroxyzine 25 mg q6h PRN  -- Supportive cares    Seizure Disorder  -- Lamotrigine 150 mg TID, tiagabine 4 mg 6x/day    Mild Intermittent Asthma  No signs of exacerbation.   -- Albuterol MDI PRN  -- Montelukast 10 mg daily  -- Guaifenesin 60 mg BID    Gout  -- Allopurinol 300 mg BID    BPH  -- Finasteride 5 mg daily, tamsulosin 0.4 mg daily    GERD  -- Lansoprazole 30 mg BID    Slow Transit Constipation  -- Miralax 17g BID and Senna 52.8 mg and BID  -- adjust bowel regimen as needed    Physical Deconditioning  In setting of hospitalization and underlying medical conditions  -- ongoing PT/OT        Electronically signed by:  Charlene Luis MD

## 2025-05-19 ENCOUNTER — TRANSITIONAL CARE UNIT VISIT (OUTPATIENT)
Dept: GERIATRICS | Facility: CLINIC | Age: 55
End: 2025-05-19
Payer: COMMERCIAL

## 2025-05-19 VITALS
SYSTOLIC BLOOD PRESSURE: 113 MMHG | BODY MASS INDEX: 29.66 KG/M2 | DIASTOLIC BLOOD PRESSURE: 65 MMHG | TEMPERATURE: 98.1 F | HEART RATE: 73 BPM | WEIGHT: 189 LBS | RESPIRATION RATE: 18 BRPM | HEIGHT: 67 IN | OXYGEN SATURATION: 96 %

## 2025-05-19 DIAGNOSIS — M47.14 THORACIC SPONDYLOSIS WITH MYELOPATHY: ICD-10-CM

## 2025-05-19 DIAGNOSIS — R53.81 PHYSICAL DECONDITIONING: ICD-10-CM

## 2025-05-19 DIAGNOSIS — M10.9 GOUT, UNSPECIFIED CAUSE, UNSPECIFIED CHRONICITY, UNSPECIFIED SITE: ICD-10-CM

## 2025-05-19 DIAGNOSIS — D50.9 IRON DEFICIENCY ANEMIA, UNSPECIFIED IRON DEFICIENCY ANEMIA TYPE: ICD-10-CM

## 2025-05-19 DIAGNOSIS — K59.01 SLOW TRANSIT CONSTIPATION: ICD-10-CM

## 2025-05-19 DIAGNOSIS — G89.4 CHRONIC PAIN DISORDER: ICD-10-CM

## 2025-05-19 DIAGNOSIS — J45.20 MILD INTERMITTENT ASTHMA WITHOUT COMPLICATION: ICD-10-CM

## 2025-05-19 DIAGNOSIS — S91.102S OPEN WOUND OF LEFT GREAT TOE, SEQUELA: ICD-10-CM

## 2025-05-19 DIAGNOSIS — R60.0 EDEMA OF BOTH LOWER LEGS: ICD-10-CM

## 2025-05-19 DIAGNOSIS — M48.02 FORAMINAL STENOSIS OF CERVICAL REGION: ICD-10-CM

## 2025-05-19 DIAGNOSIS — Z47.89 AFTERCARE FOLLOWING SURGERY OF THE MUSCULOSKELETAL SYSTEM: Primary | ICD-10-CM

## 2025-05-19 DIAGNOSIS — D62 ANEMIA DUE TO BLOOD LOSS, ACUTE: ICD-10-CM

## 2025-05-19 DIAGNOSIS — I10 PRIMARY HYPERTENSION: ICD-10-CM

## 2025-05-19 DIAGNOSIS — G40.909 SEIZURE DISORDER (H): ICD-10-CM

## 2025-05-19 DIAGNOSIS — F31.77 BIPOLAR 1 DISORDER, MIXED, PARTIAL REMISSION (H): ICD-10-CM

## 2025-05-19 NOTE — LETTER
5/19/2025      Bola Lyon Jr.  [address] [place] [address]        [facility] GERIATRICS  INITIAL VISIT NOTE  May 19, 2025    PRIMARY CARE PROVIDER AND CLINIC:  Devon Lund [address] [facility] [address]    [facility] Medical Record Number:  [id number]  Place of Service where encounter took place:  [facility] ([facility]) [[id number]]    Chief Complaint   Patient presents with     Hospital F/U       HPI:    Bola Lyon Jr. is a 55 year old  (1970) male seen today at [facility]. Medical history is notable for HTN, LE edema, bipolar disorder, ADHD, seizure disorder and chronic pain followed by pain clinic. 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 Surgery without complication.    He was admitted to this facility for  rehab, medical management, and nursing care.      History obtained from: facility chart records, facility staff, patient report, [facility] chart review, and [facility].      Today, Mr. Lyon is seen in his room after breakfast.  He was ambulating about without assistive device and doing quite well.  Plan is for discharge Friday at 11 AM.  He has arranged home care as well as additional help at home.  He is very happy with the results of the surgery.    He is worried about his feet, specifically his left great toe.  He is on a course of doxycycline and plans to send photos to his podiatrist.  Discussed that his toe looks great, I am not concerned about infection and my recommendation is that he allow his feet to get some air and breathe as they are quite moist and skin is wrinkled from this.    No acute concerns today per discussion with nursing.  He continues to work with therapies.    CODE STATUS:  CPR/Full code     ALLERGIES:  Allergies   Allergen Reactions     Gabapentin Other (See Comments)     Edema     Lyrica [Pregabalin] Other (See Comments)     Edema     Amitriptyline Unknown     hallucinations     Lithium Hallucination     Seasonal Allergies      Topiramate Unknown     hallucinations       PAST MEDICAL HISTORY:   Past Medical History:   Diagnosis Date     AC (acromioclavicular) arthritis 10/24/2011     Aftercare following surgery of the musculoskeletal system 04/25/2012     Anxiety disorder      Anxiety state, unspecified     Created by Conversion      Arthritis      Biceps tendon rupture, proximal left     Bipolar 2 disorder (H)      Brachial neuritis or radiculitis           Brachial neuritis or radiculitis NOS     Created by Conversion      Cervical radiculopathy at C8 04/30/2018     Cervical stenosis of spinal canal 05/05/2014     Claustrophobia      COPD (chronic obstructive pulmonary disease) (H) 12/21/2020     Disturbance of skin sensation 11/30/2011     Encounter for chronic pain management 02/11/2015     GERD (gastroesophageal reflux disease)      Gout      Hernia, abdominal      Hiatal hernia      Hypertension      Impingement syndrome of right shoulder      Lymphedema 02/11/2015    left     Numbness and tingling      Other affections of shoulder region, not elsewhere classified     Created by Conversion       Other chronic pain      Pain in joint, shoulder region 10/10/2011     Panic attacks      PTSD (post-traumatic stress disorder)      TBI (traumatic brain injury) (H)     post concussion syndrome     Traumatic brain injury with loss of consciousness, sequela 06/24/2022     Unspecified essential hypertension     Created by Conversion      Unspecified site of spinal cord injury without evidence of spinal bone injury        PAST SURGICAL HISTORY:   Past Surgical History:   Procedure Laterality Date     ANTERIOR / POSTERIOR COMBINED FUSION CERVICAL SPINE  2013, redo in 2014    4 levels      ARTHRODESIS ANKLE  right     ARTHROSCOPY KNEE  01/01/2014     CYSTOSCOPY       DECOMPRESSION, FUSION CERVICAL ANTERIOR THREE+ LEVELS, COMBINED  05/05/2014    Procedure: COMBINED DECOMPRESSION, FUSION CERVICAL ANTERIOR THREE+ LEVELS;  Surgeon: Aron Gardner MD;  Location:  OR     EXPLORE SPINE, REMOVE HARDWARE, COMBINED  05/05/2014    Procedure: COMBINED EXPLORE SPINE, REMOVE HARDWARE;  Surgeon: Aron Gardner MD;  Location: [facility] OR     FUSION CERVICAL ANTERIOR THREE+ LEVELS  01/28/2013    Procedure: FUSION CERVICAL ANTERIOR THREE+ LEVELS;  ANTERIOR CERVICAL DISCECTOMY AND FUSION C4-C5, REVISION ANTERIOR CERVICAL DISCECTOMY AND FUSION C5-6, C6-7, RIGHT ANTERIOR ILIAC CREST BONE GRAFT(C-ARM, 3080 TABLE, SYNTHES CSLP SET, TRICORTICAL ALLOGRAFT);  Surgeon: Hermilo Bey MD;  Location: [facility] OR     FUSION CERVICAL ANTERIOR TWO LEVELS       FUSION CERVICAL POSTERIOR THREE+ LEVELS  05/05/2014    Procedure: FUSION CERVICAL POSTERIOR THREE+ LEVELS;  Surgeon: Aron Gardner MD;  Location:  OR     GRAFT BONE FROM ILIAC CREST  01/28/2013    Procedure: GRAFT BONE FROM ILIAC CREST;;  Surgeon: Hermilo Bey MD;  Location:  OR     HAND SURGERY Left 01/01/1997     HERNIA REPAIR      x3     HERNIA REPAIR Left 01/01/2006     HERNIA REPAIR Right 01/01/2008    and middle     HERNIORRHAPHY VENTRAL  01/01/2008     IR CERVICAL EPIDURAL STEROID INJECTION  04/05/2018     IR CERVICAL TRANSFORAMINAL EPIDURAL STRD INJ  12/07/2017     NASAL POLYP SURGERY  01/01/2014    and septal repair, Dr. Isrrael FERNANDEZ LDR ARTHROSCOP,SURG,W/ROTAT CUFF REPR Right 11/25/2015    Procedure: RIGHT SHOULDER ARTHROSCOPY, ROTATOR CUFF REPAIR, DECOMPRESSION, DEBRIDEMENT, DISTAL CLAVICLE EXCISION;  Surgeon: Pilo Bruce MD;  Location: [facility];  Service: Orthopedics     REPLACEMENT TOTAL KNEE Left 03/20/2017     SHOULDER ARTHROSCOPY DISTAL CLAVICLE EXCISION AND OPEN ROTATOR CUFF REPAIR Bilateral 2005 and 2013      SHOULDER SURGERY  left     [facility] ARTHRODESIS,ANKLE,OPEN Right 01/01/2007    Ankle fusion       FAMILY HISTORY:   Family History   Problem Relation Age of Onset     Hypertension Father      Lymphoma Father      Bipolar Disorder Mother      Diabetes Maternal Grandmother      Diabetes Maternal Grandfather      Colon Cancer Paternal Grandmother      Diabetes Paternal Grandmother      Diabetes Paternal Grandfather      Anesthesia Reaction No family hx of      SOCIAL HISTORY:   Patient's living condition: lives alone    MEDICATIONS:  Post Discharge Medication Reconciliation Status: discharge medications reconciled and changed, per note/orders.  Current Outpatient Medications   Medication Sig Dispense Refill     acetaminophen (TYLENOL) 325 MG tablet Take 650 mg by mouth every 8 hours as needed for mild pain.       albuterol (PROAIR HFA/PROVENTIL HFA/VENTOLIN HFA) 108 (90 Base) MCG/ACT inhaler Inhale 2 puffs into the lungs every 6 hours as needed for wheezing or shortness of breath. 6.7 g 11     allopurinol (ZYLOPRIM) 300 MG tablet Take 1 tablet (300 mg) by mouth 2 times daily. 180 tablet 3     amphetamine-dextroamphetamine (ADDERALL) 15 MG tablet Take 1 tablet (15 mg) by mouth daily. 30 tablet 0     buPROPion (WELLBUTRIN SR) 150 MG 12 hr tablet Take 1 tablet (150 mg) by mouth 2 times daily. 180 tablet 2     diazepam (VALIUM) 5 MG tablet Take 1 tablet (5 mg) by mouth every 8 hours as needed for anxiety. 30 tablet 0     doxycycline hyclate (VIBRAMYCIN) 100 MG capsule Take 1 capsule (100 mg) by mouth 2 times daily for 5 days.       ergocalciferol (ERGOCALCIFEROL) 1.25 MG (76502 UT) capsule Take 1 capsule (50,000 Units) by mouth every 14 days. 6 capsule 3     ferrous sulfate (FEROSUL) 325 (65 Fe) MG tablet Take 1 tablet (325 mg) by mouth daily (with breakfast). 90 tablet 3     fexofenadine (ALLEGRA) 180 MG tablet Take 180 mg by mouth daily as needed       finasteride (PROSCAR) 5 MG tablet Take 1 tablet (5 mg) by mouth daily. 90  tablet 3     furosemide (LASIX) 20 MG tablet Take 20 mg by mouth daily.       guaiFENesin (MUCINEX) 600 MG 12 hr tablet Take 1 tablet (600 mg) by mouth 2 times daily. 180 tablet 3     hydrOXYzine HCl (ATARAX) 25 MG tablet Take 25 mg by mouth every 6 hours as needed for itching.       lamoTRIgine (LAMICTAL) 150 MG tablet Take 1 tablet (150 mg) by mouth 3 times daily. 270 tablet 3     LANsoprazole (PREVACID) 30 MG DR capsule Take 1 capsule (30 mg) by mouth 2 times daily.       lidocaine (LIDODERM) 5 % patch Place onto the skin every 24 hours To prevent lidocaine toxicity, patient should be patch free for 12 hrs daily. 30 patch 11     methocarbamol (ROBAXIN) 500 MG tablet Take 3 tablets (1,500 mg) by mouth 4 times daily.       metoprolol succinate ER (TOPROL XL) 50 MG 24 hr tablet Take 1 tablet (50 mg) by mouth daily. 90 tablet 3     montelukast (SINGULAIR) 10 MG tablet Take 1 tablet (10 mg) by mouth daily. 90 tablet 3     nabumetone (RELAFEN) 750 MG tablet Take 1 tablet (750 mg) by mouth 2 times daily. 180 tablet 3     naloxegol (MOVANTIK) 25 MG TABS tablet Take 1 tablet (25 mg) by mouth every morning (before breakfast). 90 tablet 3     naloxone (NARCAN) 4 MG/0.1ML nasal spray SPRAY 1 SPRAY INTO ONE NOSTRIL ALTERNATING NOSTRILS ONCE AS NEEDED FOR OPIOID REVERSAL REPEAT EVER 2-3 MINUTES UNTIL HELP ARRIVES 2 mL 1     ondansetron (ZOFRAN) 4 MG tablet Take 4 mg by mouth every 6 hours as needed for nausea.       oxyCODONE (OXYCONTIN) 15 MG 12 hr tablet Take 1 tablet (15 mg) by mouth every 12 hours. 30 tablet 0     oxyCODONE IR (ROXICODONE) 15 MG tablet Take 1 tablet (15 mg) by mouth every 3 hours. At 2 am, 5 am, 11am, 2 pm, 5 pm, 8 pm, 11 pm. 240 tablet 0     polyethylene glycol (MIRALAX) 17 GM/Dose powder Take 17 g by mouth 2 times daily. 3060 g 3     potassium chloride sourav ER (KLOR-CON M20) 20 MEQ CR tablet Take 1 tablet (20 mEq) by mouth daily. 90 tablet 3     QUEtiapine (SEROQUEL) 100 MG tablet Take 1 tablet (100 mg)  "by mouth 4 times daily as needed (anxiety). 60 tablet 1     QUEtiapine (SEROQUEL) 200 MG tablet Take 1 tablet (200 mg) by mouth at bedtime. 90 tablet 3     Sennosides (SENNA) 8.8 MG/5ML SYRP Take 30 mLs (52.8 mg) by mouth 2 times daily. 1800 mL 11     tamsulosin (FLOMAX) 0.4 MG capsule Take 1 capsule (0.4 mg) by mouth 2 times daily. 180 capsule 3     tiaGABine (GABITRIL) 2 MG tablet Take 2 tablets (4 mg) by mouth 6 times daily.       CALCIUM ANTACID 500 MG chewable tablet Take 1 tablet (500 mg) by mouth 2 times daily. 180 tablet 3     medical cannabis (Patient's own supply) See Admin Instructions. (The purpose of this order is to document that the patient reports taking medical cannabis.  This is not a prescription, and is not used to certify that the patient has a qualifying medical condition.) (Patient not taking: Reported on 5/13/2025)         ROS:  4 point ROS neg other than the symptoms noted above in the HPI.    PHYSICAL EXAM:  /65   Pulse 73   Temp 98.1  F (36.7  C)   Resp 18   Ht 1.702 m (5' 7\")   Wt 85.7 kg (189 lb)   SpO2 96%   BMI 29.60 kg/m    Gen: ambulating about room then laying in bed, alert, cooperative and in no acute distress  Resp: breathing non labored, no tachypnea   Ext: trace dependent LE edema  Neuro: CX II-XII grossly in tact; ROM in all four extremities grossly in tact  Psych: alert and oriented x3; normal affect   Skin: L great toe with 2 mm x 2 mm shallow based sore over PIP without any drainage or erythema     LABORATORY/IMAGING DATA:  Reviewed as per Epic and/or CareEverywhere    ASSESSMENT/PLAN:    s/p C3-C7 revision  s/p Posterior Fusion T1-2,  S/p T3-T4 Bilateral Foraminectomies  Chronic Pain Syndrome  Surgery without complication.  Followed by the pain clinic.  -- APAP 650 mg q8h PRN  -- Lidoderm patch on q12h / off q12h   -- Methocarbamol 1500 mg QID, nabumetone 750 mg BID, naloxegol 25 mg daily, OxyContin 15 mg q12h, oxycodone 15 mg q3h (0200, 0500, 1100, 1400, 1700, " 2000 and 2300)  -- Medical cannabis at home  -- PT/OT  -- follow up with surgeon and with pain clinic as scheduled     Acute Blood Los Anemia   Fe Deficiency Anemia   Baseline Hgb 12-13 --> stable high 8s.   -- FeSO4 325 mg daily  -- follow clinically     Left Great Toe Wound  Small 2 mm x 2 mm shallow clean based sore on PIP. No signs of infection.   -- doxycycline 100 mg BID (last day 5/21/25)    HTN  SBPs 110s-140s. HR 70s.   -- Metoprolol XL 50 mg daily  -- follow BPs and adjust medications as needed     Chronic Bilateral LE Edema  Trace edema today.   --Furosemide 20 mg daily    Bipolar Disorder  ADHD  Major Recurrent Depression  Anxiety  Mood and spirits were good today.   -- Adderall 15 mg daily, bupropion 150 mg BID, quetiapine 200 mg at bedtime  -- Diazepam 5 mg q8h PRN for anxiety  -- Quetiapine 100 mg QID PRN for anxiety  -- Hydroxyzine 25 mg q6h PRN  -- Supportive cares    Seizure Disorder  -- Lamotrigine 150 mg TID, tiagabine 4 mg 6x/day    Mild Intermittent Asthma  No signs of exacerbation.   -- Albuterol MDI PRN  -- Montelukast 10 mg daily  -- Guaifenesin 60 mg BID    Gout  -- Allopurinol 300 mg BID    BPH  -- Finasteride 5 mg daily, tamsulosin 0.4 mg daily    GERD  -- Lansoprazole 30 mg BID    Slow Transit Constipation  -- Miralax 17g BID and Senna 52.8 mg and BID  -- adjust bowel regimen as needed    Physical Deconditioning  In setting of hospitalization and underlying medical conditions  -- ongoing PT/OT        Electronically signed by:  Charlene Luis MD                              Sincerely,        Charlene Luis MD    Electronically signed

## 2025-05-20 DIAGNOSIS — M54.16 LUMBAR RADICULOPATHY: ICD-10-CM

## 2025-05-20 RX ORDER — OXYCODONE HYDROCHLORIDE 15 MG/1
15 TABLET ORAL
Qty: 240 TABLET | Refills: 0 | Status: SHIPPED | OUTPATIENT
Start: 2025-05-20

## 2025-05-22 ENCOUNTER — DISCHARGE SUMMARY NURSING HOME (OUTPATIENT)
Dept: GERIATRICS | Facility: CLINIC | Age: 55
End: 2025-05-22
Payer: COMMERCIAL

## 2025-05-22 VITALS
DIASTOLIC BLOOD PRESSURE: 75 MMHG | RESPIRATION RATE: 18 BRPM | OXYGEN SATURATION: 100 % | HEART RATE: 84 BPM | SYSTOLIC BLOOD PRESSURE: 141 MMHG | TEMPERATURE: 97.7 F | BODY MASS INDEX: 29.29 KG/M2 | WEIGHT: 187 LBS

## 2025-05-22 NOTE — LETTER
5/22/2025      Bola Lyon Jr.  [address] [place] [address]        No notes on file      Sincerely,        KENDRICK Laura CNP    Electronically signed

## 2025-05-22 NOTE — PROGRESS NOTES
[facility] [place] TCU DISCHARGE SUMMARY  PATIENT'S NAME: Bola Lyon Jr. : 1970 MRN: [id number] Place of Service where encounter took place:  [facility] ([facility]) [[id number]] PRIMARY CARE PROVIDER AND CLINIC RESPONSIBLE AFTER TRANSFER: Devon Lund MD, [address] [facility] [address] *** {FV GERIATRIC DISCHARGE DISPOSITION:082902}     Transferring providers: Dr. Tahira Moreau, APRN CNP DNP.  Recent Hospitalization/ED: *** stay *** to ***.  Date of TCU Admission: ***  Date of TCU (anticipated) Discharge: ***  Discharged to: previous independent home  Cognitive Scores: {fgscog1:250343}  Physical Function: {fgsphysicalfunction:204111}  DME: {fgsdmedc:617350}  CODE STATUS/ADVANCE DIRECTIVES DISCUSSION:  {fgscodestatus:348655} {Provider, verify code status is accurate as an order in Epic}  ALLERGIES: Gabapentin, Lyrica [pregabalin], Amitriptyline, Lithium, Seasonal allergies, and Topiramate    DISCHARGE DIAGNOSIS/NURSING FACILITY COURSE:   {fgsdia}    Hospitalization: ***    Rehab: ***    Today, ***    Recommendations to PCP:  ***  ***  ***    Past Medical History:  has a past medical history of AC (acromioclavicular) arthritis (10/24/2011), Aftercare following surgery of the musculoskeletal system (2012), Anxiety disorder, Anxiety state, unspecified, Arthritis, Biceps tendon rupture, proximal (left), Bipolar 2 disorder (H), Brachial neuritis or radiculitis, Brachial neuritis or radiculitis NOS, Cervical radiculopathy at C8 (2018), Cervical stenosis of spinal canal (2014), Claustrophobia, COPD (chronic obstructive pulmonary disease) (H) (2020), Disturbance of skin sensation (2011), Encounter for chronic pain management (2015), GERD (gastroesophageal reflux disease), Gout, Hernia, abdominal, Hiatal hernia, Hypertension, Impingement syndrome of right shoulder, Lymphedema (2015), Numbness and tingling, Other affections of shoulder region,  not elsewhere classified, Other chronic pain, Pain in joint, shoulder region (10/10/2011), Panic attacks, PTSD (post-traumatic stress disorder), TBI (traumatic brain injury) (H), Traumatic brain injury with loss of consciousness, sequela (06/24/2022), Unspecified essential hypertension, and Unspecified site of spinal cord injury without evidence of spinal bone injury.    He has no past medical history of Asymptomatic human immunodeficiency virus (HIV) infection status (H), Blood in semen, Cerebral palsy (H), Congenital renal agenesis and dysgenesis, Epididymitis, bilateral, Epididymitis, left, Epididymitis, right, Goiter, History of spinal cord injury, History of thrombophlebitis, Horseshoe kidney, Hydrocephalus (H), Malignant hyperthermia, Mumps, Orchitis, epididymitis, and epididymo-orchitis, with abscess, Palpitations, Parkinsons disease (H), Penile discharge, Prostate infection, Spider veins, Spina bifida (H), STD (sexually transmitted disease), Swelling of testicle, Tethered cord (H), or Tuberculosis.  Discharge Medications:  Current Outpatient Medications   Medication Sig Dispense Refill    acetaminophen (TYLENOL) 325 MG tablet Take 650 mg by mouth every 8 hours as needed for mild pain.      albuterol (PROAIR HFA/PROVENTIL HFA/VENTOLIN HFA) 108 (90 Base) MCG/ACT inhaler Inhale 2 puffs into the lungs every 6 hours as needed for wheezing or shortness of breath. 6.7 g 11    allopurinol (ZYLOPRIM) 300 MG tablet Take 1 tablet (300 mg) by mouth 2 times daily. 180 tablet 3    amphetamine-dextroamphetamine (ADDERALL) 15 MG tablet Take 1 tablet (15 mg) by mouth daily. 30 tablet 0    buPROPion (WELLBUTRIN SR) 150 MG 12 hr tablet Take 1 tablet (150 mg) by mouth 2 times daily. 180 tablet 2    CALCIUM ANTACID 500 MG chewable tablet Take 1 tablet (500 mg) by mouth 2 times daily. 180 tablet 3    ergocalciferol (ERGOCALCIFEROL) 1.25 MG (38155 UT) capsule Take 1 capsule (50,000 Units) by mouth every 14 days. 6 capsule 3     ferrous sulfate (FEROSUL) 325 (65 Fe) MG tablet Take 1 tablet (325 mg) by mouth daily (with breakfast). 90 tablet 3    fexofenadine (ALLEGRA) 180 MG tablet Take 180 mg by mouth daily as needed      finasteride (PROSCAR) 5 MG tablet Take 1 tablet (5 mg) by mouth daily. 90 tablet 3    furosemide (LASIX) 20 MG tablet Take 20 mg by mouth daily.      guaiFENesin (MUCINEX) 600 MG 12 hr tablet Take 1 tablet (600 mg) by mouth 2 times daily. 180 tablet 3    hydrOXYzine HCl (ATARAX) 25 MG tablet Take 25 mg by mouth every 6 hours as needed for itching.      lamoTRIgine (LAMICTAL) 150 MG tablet Take 1 tablet (150 mg) by mouth 3 times daily. 270 tablet 3    LANsoprazole (PREVACID) 30 MG DR capsule Take 1 capsule (30 mg) by mouth 2 times daily.      lidocaine (LIDODERM) 5 % patch Place onto the skin every 24 hours To prevent lidocaine toxicity, patient should be patch free for 12 hrs daily. 30 patch 11    medical cannabis (Patient's own supply) See Admin Instructions. (The purpose of this order is to document that the patient reports taking medical cannabis.  This is not a prescription, and is not used to certify that the patient has a qualifying medical condition.)      methocarbamol (ROBAXIN) 500 MG tablet Take 3 tablets (1,500 mg) by mouth 4 times daily.      metoprolol succinate ER (TOPROL XL) 50 MG 24 hr tablet Take 1 tablet (50 mg) by mouth daily. 90 tablet 3    montelukast (SINGULAIR) 10 MG tablet Take 1 tablet (10 mg) by mouth daily. 90 tablet 3    nabumetone (RELAFEN) 750 MG tablet Take 1 tablet (750 mg) by mouth 2 times daily. 180 tablet 3    naloxegol (MOVANTIK) 25 MG TABS tablet Take 1 tablet (25 mg) by mouth every morning (before breakfast). 90 tablet 3    naloxone (NARCAN) 4 MG/0.1ML nasal spray SPRAY 1 SPRAY INTO ONE NOSTRIL ALTERNATING NOSTRILS ONCE AS NEEDED FOR OPIOID REVERSAL REPEAT EVER 2-3 MINUTES UNTIL HELP ARRIVES 2 mL 1    ondansetron (ZOFRAN) 4 MG tablet Take 4 mg by mouth every 6 hours as needed for  nausea.      oxyCODONE (OXYCONTIN) 15 MG 12 hr tablet Take 1 tablet (15 mg) by mouth every 12 hours. 30 tablet 0    oxyCODONE IR (ROXICODONE) 15 MG tablet Take 1 tablet (15 mg) by mouth every 3 hours. At 2 am, 5 am, 11am, 2 pm, 5 pm, 8 pm, 11 pm. 240 tablet 0    polyethylene glycol (MIRALAX) 17 GM/Dose powder Take 17 g by mouth 2 times daily. 3060 g 3    potassium chloride sourav ER (KLOR-CON M20) 20 MEQ CR tablet Take 1 tablet (20 mEq) by mouth daily. 90 tablet 3    QUEtiapine (SEROQUEL) 100 MG tablet Take 1 tablet (100 mg) by mouth 4 times daily as needed (anxiety). 60 tablet 1    QUEtiapine (SEROQUEL) 200 MG tablet Take 1 tablet (200 mg) by mouth at bedtime. 90 tablet 3    Sennosides (SENNA) 8.8 MG/5ML SYRP Take 30 mLs (52.8 mg) by mouth 2 times daily. 1800 mL 11    tamsulosin (FLOMAX) 0.4 MG capsule Take 1 capsule (0.4 mg) by mouth 2 times daily. 180 capsule 3    tiaGABine (GABITRIL) 2 MG tablet Take 2 tablets (4 mg) by mouth 6 times daily.       ROS: {ROS FGS:010030:::0}    Physical Exam: Vitals: There were no vitals taken for this visit.  GENERAL APPEARANCE: Alert, in no distress, cooperative.   ENT: Mouth/posterior oropharynx intact w/ moist mucous membranes, hearing acuity [place]***.  EYES: EOM, conjunctivae, lids, pupils and irises normal, PERRL2.   RESP: Respiratory effort ***, no respiratory distress, Lung sounds clear.*** On RA. ***  CV: Auscultation of heart reveals S1, S2, rate*** and rhythm***, no*** murmur, no rub or gallop, Edema ***+ BLE. Peripheral pulses are 2+***.  ABDOMEN: Normal bowel sounds, soft, non-tender abdomen, and no masses palpated.  SKIN: Inspection/Palpation of skin and subcutaneous tissue baseline w/ fragility. No wounds/rashes noted. ***  NEURO: CN II-XII at patient's baseline, sensation baseline PPS.  PSYCH: Insight, judgement, and memory are ***, affect and mood are ***.    Facility Labs: Labs done in SNF are in [facility] EPIC. Please refer to them using [facility]/Care  "Everywhere.    DISCHARGE PLAN:  Follow up labs: {fgsfuturelabs1:261542}  Medical Follow Up:  {fgsdischargefollowup:230598}  MTM referral needed: Yes, recommended.   Current [facility] scheduled appointments:  ***  Discharge Services: {fgsdcservices1:086758}    Orders:  No new orders. Continue plan of care.}    TOTAL DISCHARGE TIME: Greater than 30 minutes    Electronically signed by:  KENDRICK Shook CNP [facility]  ______________      Documentation of Face-to-Face and Certification for Home Health Services   Patient: Bola Lyon Jr. YOB: 1970  MRN: [id number]  Today's Date: 5/22/2025  I certify that patient: Bola Lyon Jr. is under my care and that I had a face-to-face encounter that meets the provider  face-to-face encounter requirements with this patient on: {Date choice:610802::\"***\"}. This encounter with the patient was in whole, or in part, for the following medical condition, which is the primary reason for home health care: ***. I certify that, based on my findings, the following services are medically necessary home health services: {Modalities- choose all that apply:464758}. My clinical findings support the need for the above services because: {Homecare Options- Choose all that apply:066785}    Further, I certify that my clinical findings support that this patient is homebound (i.e. absences from home require considerable and taxing effort and are for medical reasons or [employer] services or infrequently or of short duration when for other reasons) because: {Homebound - Must include information about the medical condition and why it is a considerable and taxing effort for the patient to leave the home. Per CMS; diagnoses alone do not substantiate homebound.:936417}.    Based on the above findings. I certify that this patient is confined to the home and needs intermittent skilled nursing care, physical therapy and/or speech therapy.  The patient is under my care, and I have initiated " the establishment of the plan of care.  This patient will be followed by a provider who will periodically review the plan of care.    Provider to give follow up care: Devon Lund    Responsible Medicare certified PECOS Provider: KENDRICK Shook CNP, DNP  Provider Signature: See electronic signature associated with these discharge orders.  Date: 5/22/2025

## 2025-05-28 DIAGNOSIS — Z09 HOSPITAL DISCHARGE FOLLOW-UP: ICD-10-CM

## 2025-05-29 ENCOUNTER — TELEPHONE (OUTPATIENT)
Dept: INTERNAL MEDICINE | Facility: CLINIC | Age: 55
End: 2025-05-29
Payer: COMMERCIAL

## 2025-05-29 NOTE — TELEPHONE ENCOUNTER
MTM referral from: Transitions of Care (recent hospital discharge, TCU discharge, or ED visit)    MTM referral outreach attempt #2 on May 29, 2025 at 1:50 PM      Outcome: Spoke with patient patient has a home nurse that is coming in 3 times a week.     Use bcbs part d map, Discharge: 5/23/25.   for the carrier/Plan on the flowsheet          FELICITAS Hughse

## 2025-06-02 ENCOUNTER — TELEPHONE (OUTPATIENT)
Dept: INTERNAL MEDICINE | Facility: CLINIC | Age: 55
End: 2025-06-02
Payer: COMMERCIAL

## 2025-06-02 LAB
ATRIAL RATE - MUSE: 68 BPM
DIASTOLIC BLOOD PRESSURE - MUSE: NORMAL MMHG
INTERPRETATION ECG - MUSE: NORMAL
P AXIS - MUSE: 17 DEGREES
PR INTERVAL - MUSE: 154 MS
QRS DURATION - MUSE: 92 MS
QT - MUSE: 408 MS
QTC - MUSE: 433 MS
R AXIS - MUSE: -23 DEGREES
SYSTOLIC BLOOD PRESSURE - MUSE: NORMAL MMHG
T AXIS - MUSE: 10 DEGREES
VENTRICULAR RATE- MUSE: 68 BPM

## 2025-06-02 NOTE — TELEPHONE ENCOUNTER
June 2, 2025    Home health orders was received via fax for Dr. Lund.  Patient label was attached to paperwork and placed in provider's inbox to be signed.    Helen Bowen

## 2025-06-03 ENCOUNTER — TELEPHONE (OUTPATIENT)
Dept: INTERNAL MEDICINE | Facility: CLINIC | Age: 55
End: 2025-06-03
Payer: COMMERCIAL

## 2025-06-03 DIAGNOSIS — Z53.9 DIAGNOSIS NOT YET DEFINED: Primary | ICD-10-CM

## 2025-06-03 NOTE — TELEPHONE ENCOUNTER
Jenelle 3, 2025    Home health orders was received via fax for Dr. Lund.  Patient label was attached to paperwork and placed in provider's inbox to be signed.    Helen Bowen

## 2025-06-03 NOTE — TELEPHONE ENCOUNTER
Jenelle 3, 2025    Home health orders was picked up from outbox of Dr. Lund and sent via fax to [phone].    Helen Bowen

## 2025-06-04 ENCOUNTER — TELEPHONE (OUTPATIENT)
Dept: INTERNAL MEDICINE | Facility: CLINIC | Age: 55
End: 2025-06-04
Payer: COMMERCIAL

## 2025-06-04 NOTE — TELEPHONE ENCOUNTER
June 4, 2025    Home health orders was received via fax for Dr. Lund.  Patient label was attached to paperwork and placed in provider's inbox to be signed.    Helen Bowen

## 2025-06-04 NOTE — TELEPHONE ENCOUNTER
June 4, 2025    Home health orders was picked up from outbox of Dr. Lund and sent via fax to [phone].    Helen Bowen

## 2025-06-05 ENCOUNTER — MYC MEDICAL ADVICE (OUTPATIENT)
Dept: PALLIATIVE MEDICINE | Facility: OTHER | Age: 55
End: 2025-06-05
Payer: COMMERCIAL

## 2025-06-05 NOTE — TELEPHONE ENCOUNTER
Call placed to pt to confirm he needs Tiagabine and Oxycodone. Pt states he does still need both medications. Request for both medications were already sent to provider in a different encounter.

## 2025-06-10 ENCOUNTER — OFFICE VISIT (OUTPATIENT)
Dept: INTERNAL MEDICINE | Facility: CLINIC | Age: 55
End: 2025-06-10
Payer: COMMERCIAL

## 2025-06-10 ENCOUNTER — ANCILLARY PROCEDURE (OUTPATIENT)
Dept: GENERAL RADIOLOGY | Facility: CLINIC | Age: 55
End: 2025-06-10
Attending: PHYSICIAN ASSISTANT
Payer: COMMERCIAL

## 2025-06-10 ENCOUNTER — RESULTS FOLLOW-UP (OUTPATIENT)
Dept: FAMILY MEDICINE | Facility: CLINIC | Age: 55
End: 2025-06-10

## 2025-06-10 VITALS
OXYGEN SATURATION: 98 % | RESPIRATION RATE: 16 BRPM | SYSTOLIC BLOOD PRESSURE: 132 MMHG | HEART RATE: 71 BPM | TEMPERATURE: 98.1 F | DIASTOLIC BLOOD PRESSURE: 68 MMHG | HEIGHT: 67 IN | BODY MASS INDEX: 31.56 KG/M2 | WEIGHT: 201.1 LBS

## 2025-06-10 DIAGNOSIS — I10 ESSENTIAL HYPERTENSION: ICD-10-CM

## 2025-06-10 DIAGNOSIS — R79.89 LOW VITAMIN D LEVEL: ICD-10-CM

## 2025-06-10 DIAGNOSIS — G89.4 CHRONIC PAIN DISORDER: ICD-10-CM

## 2025-06-10 DIAGNOSIS — D64.9 ANEMIA, UNSPECIFIED TYPE: ICD-10-CM

## 2025-06-10 DIAGNOSIS — Z79.891 LONG TERM (CURRENT) USE OF OPIATE ANALGESIC: ICD-10-CM

## 2025-06-10 DIAGNOSIS — J32.0 CHRONIC MAXILLARY SINUSITIS: ICD-10-CM

## 2025-06-10 DIAGNOSIS — E53.8 LOW SERUM VITAMIN B12: Primary | ICD-10-CM

## 2025-06-10 DIAGNOSIS — K59.03 THERAPEUTIC OPIOID INDUCED CONSTIPATION: ICD-10-CM

## 2025-06-10 DIAGNOSIS — M54.2 CERVICALGIA: Primary | ICD-10-CM

## 2025-06-10 DIAGNOSIS — R73.9 HYPERGLYCEMIA: ICD-10-CM

## 2025-06-10 DIAGNOSIS — K59.03 DRUG-INDUCED CONSTIPATION: ICD-10-CM

## 2025-06-10 DIAGNOSIS — Z98.1 S/P FUSION OF THORACIC SPINE: ICD-10-CM

## 2025-06-10 DIAGNOSIS — T40.2X5A THERAPEUTIC OPIOID INDUCED CONSTIPATION: ICD-10-CM

## 2025-06-10 DIAGNOSIS — I89.0 LYMPHEDEMA OF BOTH LOWER EXTREMITIES: ICD-10-CM

## 2025-06-10 DIAGNOSIS — F41.1 GENERALIZED ANXIETY DISORDER: ICD-10-CM

## 2025-06-10 DIAGNOSIS — Z12.5 PROSTATE CANCER SCREENING: ICD-10-CM

## 2025-06-10 DIAGNOSIS — Z98.1 S/P CERVICAL SPINAL FUSION: ICD-10-CM

## 2025-06-10 DIAGNOSIS — F10.21 ALCOHOL DEPENDENCE IN REMISSION (H): ICD-10-CM

## 2025-06-10 DIAGNOSIS — M10.9 URIC ACID ARTHROPATHY: ICD-10-CM

## 2025-06-10 PROBLEM — R33.9 RETENTION OF URINE: Status: RESOLVED | Noted: 2023-01-22 | Resolved: 2025-06-10

## 2025-06-10 PROBLEM — M47.814 THORACIC SPONDYLOSIS: Status: ACTIVE | Noted: 2025-04-25

## 2025-06-10 LAB
ALBUMIN SERPL BCG-MCNC: 4.4 G/DL (ref 3.5–5.2)
ALP SERPL-CCNC: 144 U/L (ref 40–150)
ALT SERPL W P-5'-P-CCNC: 8 U/L (ref 0–70)
ANION GAP SERPL CALCULATED.3IONS-SCNC: 12 MMOL/L (ref 7–15)
AST SERPL W P-5'-P-CCNC: 14 U/L (ref 0–45)
BASOPHILS # BLD AUTO: 0.1 10E3/UL (ref 0–0.2)
BASOPHILS NFR BLD AUTO: 1 %
BILIRUB SERPL-MCNC: <0.2 MG/DL
BUN SERPL-MCNC: 14.2 MG/DL (ref 6–20)
CALCIUM SERPL-MCNC: 9.1 MG/DL (ref 8.8–10.4)
CHLORIDE SERPL-SCNC: 106 MMOL/L (ref 98–107)
CHOLEST SERPL-MCNC: 154 MG/DL
CREAT SERPL-MCNC: 0.86 MG/DL (ref 0.67–1.17)
CRP SERPL-MCNC: <3 MG/L
EGFRCR SERPLBLD CKD-EPI 2021: >90 ML/MIN/1.73M2
EOSINOPHIL # BLD AUTO: 0.2 10E3/UL (ref 0–0.7)
EOSINOPHIL NFR BLD AUTO: 3 %
ERYTHROCYTE [DISTWIDTH] IN BLOOD BY AUTOMATED COUNT: 13.2 % (ref 10–15)
ERYTHROCYTE [SEDIMENTATION RATE] IN BLOOD BY WESTERGREN METHOD: 13 MM/HR (ref 0–20)
EST. AVERAGE GLUCOSE BLD GHB EST-MCNC: 91 MG/DL
FASTING STATUS PATIENT QL REPORTED: NO
FASTING STATUS PATIENT QL REPORTED: NO
FERRITIN SERPL-MCNC: 54 NG/ML (ref 31–409)
GLUCOSE SERPL-MCNC: 93 MG/DL (ref 70–99)
HBA1C MFR BLD: 4.8 % (ref 0–5.6)
HCO3 SERPL-SCNC: 23 MMOL/L (ref 22–29)
HCT VFR BLD AUTO: 36.6 % (ref 40–53)
HDLC SERPL-MCNC: 40 MG/DL
HGB BLD-MCNC: 11.8 G/DL (ref 13.3–17.7)
IMM GRANULOCYTES # BLD: 0 10E3/UL
IMM GRANULOCYTES NFR BLD: 0 %
IRON BINDING CAPACITY (ROCHE): 325 UG/DL (ref 240–430)
IRON SATN MFR SERPL: 25 % (ref 15–46)
IRON SERPL-MCNC: 80 UG/DL (ref 61–157)
LDLC SERPL CALC-MCNC: 61 MG/DL
LYMPHOCYTES # BLD AUTO: 1.8 10E3/UL (ref 0.8–5.3)
LYMPHOCYTES NFR BLD AUTO: 25 %
MCH RBC QN AUTO: 27.7 PG (ref 26.5–33)
MCHC RBC AUTO-ENTMCNC: 32.2 G/DL (ref 31.5–36.5)
MCV RBC AUTO: 86 FL (ref 78–100)
MONOCYTES # BLD AUTO: 0.6 10E3/UL (ref 0–1.3)
MONOCYTES NFR BLD AUTO: 8 %
NEUTROPHILS # BLD AUTO: 4.4 10E3/UL (ref 1.6–8.3)
NEUTROPHILS NFR BLD AUTO: 63 %
NONHDLC SERPL-MCNC: 114 MG/DL
PLATELET # BLD AUTO: 340 10E3/UL (ref 150–450)
POTASSIUM SERPL-SCNC: 3.8 MMOL/L (ref 3.4–5.3)
PROT SERPL-MCNC: 6.6 G/DL (ref 6.4–8.3)
PSA SERPL DL<=0.01 NG/ML-MCNC: 0.29 NG/ML (ref 0–3.5)
RBC # BLD AUTO: 4.26 10E6/UL (ref 4.4–5.9)
SODIUM SERPL-SCNC: 141 MMOL/L (ref 135–145)
TRIGL SERPL-MCNC: 264 MG/DL
TSH SERPL DL<=0.005 MIU/L-ACNC: 1 UIU/ML (ref 0.3–4.2)
URATE SERPL-MCNC: 3.2 MG/DL (ref 3.4–7)
VIT B12 SERPL-MCNC: 294 PG/ML (ref 232–1245)
VIT D+METAB SERPL-MCNC: 36 NG/ML (ref 20–50)
WBC # BLD AUTO: 6.9 10E3/UL (ref 4–11)

## 2025-06-10 PROCEDURE — 72040 X-RAY EXAM NECK SPINE 2-3 VW: CPT | Mod: TC | Performed by: RADIOLOGY

## 2025-06-10 PROCEDURE — 85025 COMPLETE CBC W/AUTO DIFF WBC: CPT | Performed by: INTERNAL MEDICINE

## 2025-06-10 PROCEDURE — G0103 PSA SCREENING: HCPCS | Performed by: INTERNAL MEDICINE

## 2025-06-10 PROCEDURE — 85652 RBC SED RATE AUTOMATED: CPT | Performed by: INTERNAL MEDICINE

## 2025-06-10 PROCEDURE — 82607 VITAMIN B-12: CPT | Performed by: INTERNAL MEDICINE

## 2025-06-10 PROCEDURE — 36415 COLL VENOUS BLD VENIPUNCTURE: CPT | Performed by: INTERNAL MEDICINE

## 2025-06-10 PROCEDURE — 86140 C-REACTIVE PROTEIN: CPT | Performed by: INTERNAL MEDICINE

## 2025-06-10 PROCEDURE — 80061 LIPID PANEL: CPT | Performed by: INTERNAL MEDICINE

## 2025-06-10 PROCEDURE — 3044F HG A1C LEVEL LT 7.0%: CPT | Performed by: INTERNAL MEDICINE

## 2025-06-10 PROCEDURE — 80053 COMPREHEN METABOLIC PANEL: CPT | Performed by: INTERNAL MEDICINE

## 2025-06-10 PROCEDURE — 82306 VITAMIN D 25 HYDROXY: CPT | Performed by: INTERNAL MEDICINE

## 2025-06-10 PROCEDURE — 83550 IRON BINDING TEST: CPT | Performed by: INTERNAL MEDICINE

## 2025-06-10 PROCEDURE — 3075F SYST BP GE 130 - 139MM HG: CPT | Performed by: INTERNAL MEDICINE

## 2025-06-10 PROCEDURE — 84550 ASSAY OF BLOOD/URIC ACID: CPT | Performed by: INTERNAL MEDICINE

## 2025-06-10 PROCEDURE — 83036 HEMOGLOBIN GLYCOSYLATED A1C: CPT | Performed by: INTERNAL MEDICINE

## 2025-06-10 PROCEDURE — 3078F DIAST BP <80 MM HG: CPT | Performed by: INTERNAL MEDICINE

## 2025-06-10 PROCEDURE — 99214 OFFICE O/P EST MOD 30 MIN: CPT | Performed by: INTERNAL MEDICINE

## 2025-06-10 PROCEDURE — 82728 ASSAY OF FERRITIN: CPT | Performed by: INTERNAL MEDICINE

## 2025-06-10 PROCEDURE — 83540 ASSAY OF IRON: CPT | Performed by: INTERNAL MEDICINE

## 2025-06-10 PROCEDURE — 84443 ASSAY THYROID STIM HORMONE: CPT | Performed by: INTERNAL MEDICINE

## 2025-06-10 PROCEDURE — G2211 COMPLEX E/M VISIT ADD ON: HCPCS | Performed by: INTERNAL MEDICINE

## 2025-06-10 PROCEDURE — 72070 X-RAY EXAM THORAC SPINE 2VWS: CPT | Mod: TC | Performed by: RADIOLOGY

## 2025-06-10 RX ORDER — FUROSEMIDE 80 MG/1
80 TABLET ORAL EVERY MORNING
Qty: 90 TABLET | Refills: 3 | Status: SHIPPED | OUTPATIENT
Start: 2025-06-10 | End: 2026-06-10

## 2025-06-10 RX ORDER — HYDROXYZINE PAMOATE 25 MG/1
25-50 CAPSULE ORAL 3 TIMES DAILY PRN
Qty: 100 CAPSULE | Refills: 3 | Status: SHIPPED | OUTPATIENT
Start: 2025-06-10

## 2025-06-10 RX ORDER — FEXOFENADINE HCL AND PSEUDOEPHEDRINE HCL 180; 240 MG/1; MG/1
TABLET, EXTENDED RELEASE ORAL
COMMUNITY
End: 2025-06-10

## 2025-06-10 RX ORDER — DOXYCYCLINE 100 MG/1
CAPSULE ORAL
COMMUNITY
Start: 2025-05-16

## 2025-06-10 RX ORDER — LIDOCAINE 50 MG/G
OINTMENT TOPICAL 3 TIMES DAILY
Qty: 240 G | Refills: 11 | Status: SHIPPED | OUTPATIENT
Start: 2025-06-10

## 2025-06-10 RX ORDER — FEXOFENADINE HCL 180 MG/1
180 TABLET ORAL DAILY
Qty: 90 TABLET | Refills: 3 | Status: SHIPPED | OUTPATIENT
Start: 2025-06-10 | End: 2026-06-10

## 2025-06-10 RX ORDER — HYDROXYZINE PAMOATE 25 MG/1
CAPSULE ORAL
COMMUNITY
Start: 2025-04-15 | End: 2025-06-10

## 2025-06-10 RX ORDER — METHOCARBAMOL 750 MG/1
TABLET, FILM COATED ORAL
COMMUNITY
Start: 2025-05-12

## 2025-06-10 RX ORDER — PSEUDOEPHEDRINE HCL 120 MG/1
120 TABLET, FILM COATED, EXTENDED RELEASE ORAL EVERY 12 HOURS
Qty: 180 TABLET | Refills: 0 | Status: SHIPPED | OUTPATIENT
Start: 2025-06-10 | End: 2025-09-08

## 2025-06-10 RX ORDER — SENNOSIDES 8.6 MG
3 CAPSULE ORAL 2 TIMES DAILY
Qty: 180 CAPSULE | Refills: 11 | Status: SHIPPED | OUTPATIENT
Start: 2025-06-10

## 2025-06-10 RX ORDER — TIAGABINE HYDROCHLORIDE 4 MG/1
TABLET, FILM COATED ORAL
COMMUNITY
Start: 2025-05-12

## 2025-06-10 ASSESSMENT — ASTHMA QUESTIONNAIRES
ACT_TOTALSCORE: 10
QUESTION_5 LAST FOUR WEEKS HOW WOULD YOU RATE YOUR ASTHMA CONTROL: SOMEWHAT CONTROLLED
QUESTION_3 LAST FOUR WEEKS HOW OFTEN DID YOUR ASTHMA SYMPTOMS (WHEEZING, COUGHING, SHORTNESS OF BREATH, CHEST TIGHTNESS OR PAIN) WAKE YOU UP AT NIGHT OR EARLIER THAN USUAL IN THE MORNING: ONCE OR TWICE
ACUTE_EXACERBATION_TODAY: NO
QUESTION_1 LAST FOUR WEEKS HOW MUCH OF THE TIME DID YOUR ASTHMA KEEP YOU FROM GETTING AS MUCH DONE AT WORK, SCHOOL OR AT HOME: ALL OF THE TIME
QUESTION_2 LAST FOUR WEEKS HOW OFTEN HAVE YOU HAD SHORTNESS OF BREATH: MORE THAN ONCE A DAY
QUESTION_4 LAST FOUR WEEKS HOW OFTEN HAVE YOU USED YOUR RESCUE INHALER OR NEBULIZER MEDICATION (SUCH AS ALBUTEROL): THREE OR MORE TIMES PER DAY
ACT_TOTALSCORE: 10

## 2025-06-10 NOTE — PROGRESS NOTES
OFFICE VISIT--Face to face    ASSESSMENT and PLAN:  1. Cervicalgia (Primary)  Status post revision and fusion of new levels.  Readjust meds and trial of topical lidocaine.  Now finally definitely improved  - Lidocaine 7.5 % CREA; Externally apply 1 g topically 3 times daily.  Dispense: 60 g; Refill: 11  - lidocaine (XYLOCAINE) 5 % external ointment; Apply topically 3 times daily.  Dispense: 240 g; Refill: 11    2. Alcohol dependence in remission (H)  Clarify no longer drinking.  Back at home    3. Uric acid arthropathy  - Comprehensive metabolic panel; Future  - CRP inflammation; Future  - Erythrocyte sedimentation rate auto; Future  - Uric acid; Future  - Comprehensive metabolic panel  - CRP inflammation  - Erythrocyte sedimentation rate auto  - Uric acid    4. Anemia, unspecified type  Recheck with blood loss  - CBC with Platelets & Differential; Future  - Ferritin; Future  - Iron & Iron Binding Capacity; Future  - Vitamin B12; Future  - CBC with Platelets & Differential  - Ferritin  - Iron & Iron Binding Capacity  - Vitamin B12    5. Low vitamin D level  - Vitamin D Deficiency; Future  - Vitamin D Deficiency    6. Prostate cancer screening  - Prostate Specific Antigen Screen; Future  - Prostate Specific Antigen Screen    7. Hyperglycemia  - Hemoglobin A1c; Future  - Hemoglobin A1c    8. Essential hypertension  - Comprehensive metabolic panel; Future  - Lipid Profile; Future  - TSH; Future  - Comprehensive metabolic panel  - Lipid Profile  - TSH    9. Generalized anxiety disorder  Okay to change  - hydrOXYzine kana (VISTARIL) 25 MG capsule; Take 1-2 capsules (25-50 mg) by mouth 3 times daily as needed for itching.  Dispense: 100 capsule; Refill: 3    10. Lymphedema of both lower extremities  Okay to increase dose  - furosemide (LASIX) 80 MG tablet; Take 1 tablet (80 mg) by mouth every morning.  Dispense: 90 tablet; Refill: 3    11. Chronic pain disorder  - Lidocaine 7.5 % CREA; Externally apply 1 g topically 3  times daily.  Dispense: 60 g; Refill: 11  - lidocaine (XYLOCAINE) 5 % external ointment; Apply topically 3 times daily.  Dispense: 240 g; Refill: 11    12. Long term (current) use of opiate analgesic  - Vitamin D Deficiency; Future  - Vitamin D Deficiency    13. Drug-induced constipation  Okay to switch to capsules instead of liquid  - Sennosides (SENNA) 8.6 MG CAPS; Take 3 capsules by mouth 2 times daily.  Dispense: 180 capsule; Refill: 11    14. Therapeutic opioid induced constipation  - Sennosides (SENNA) 8.6 MG CAPS; Take 3 capsules by mouth 2 times daily.  Dispense: 180 capsule; Refill: 11    15. Chronic maxillary sinusitis  - pseudoePHEDrine (SUDAFED) 120 MG 12 hr tablet; Take 1 tablet (120 mg) by mouth every 12 hours.  Dispense: 180 tablet; Refill: 0  - fexofenadine (ALLEGRA) 180 MG tablet; Take 1 tablet (180 mg) by mouth daily.  Dispense: 90 tablet; Refill: 3       Patient Instructions   Update labs    Try lidocaine cream/ointment stronger    Change senna liquid to senna pills    Change hydroxyzine to kana     Allegra once a day    Pseudafed twice a day    Furosemide 80 mgs each morning            Return in about 3 months (around 9/10/2025) for using a video visit.       CHIEF COMPLAINT:  Chief Complaint   Patient presents with    Hospital F/U     Follow up after surgery           6/10/2025    11:03 AM   Additional Questions   Roomed by Stone BRADY RN       HISTORY OF PRESENT ILLNESS:  Bola is a 55 year old male presenting to the clinic today for general review.  Since he was last seen he was admitted to the hospital for elective surgery to work on the schwannoma of his neck on April 25 to May 1.  Had revision of several levels and has now had revision or fusion from C3 to thoracic 4.  Feels much better for the first time in a long time    Requests trial of senna capsules to replace liquid due to availability    Request Sudafed and Allegra separately due to cost    Has tried lidocaine ointment with some  "improvement    Mood stable but slightly more anxious.  Planning to see psychiatrist later this week    Lost some blood in the hospital during surgery    Narcotics managed through pain clinic    HPI    REVIEW OF SYSTEMS:   Peripheral edema controlled with increased furosemide    Today's PHQ-2 Score:       1/14/2025     8:17 AM   PHQ-2 ( 1999 Pf[employer])   Q1: Little interest or pleasure in doing things 0   Q2: Feeling down, depressed or hopeless 1   PHQ-2 Score 1       PFSH:  Social History     Social History Narrative    He is .  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  He is now on disability due to his brain injury and bipolar disease.        Quit smoking October of 2021        Quit drinking 2010        At [facility] since June of 2020     Living at home.  Has home care    TOBACCO USE:  History   Smoking Status    Former    Types: Cigarettes   Smokeless Tobacco    Former       VITALS:  Vitals:    06/10/25 1109   BP: 132/68   BP Location: Left arm   Patient Position: Sitting   Cuff Size: Adult Regular   Pulse: 71   Resp: 16   Temp: 98.1  F (36.7  C)   TempSrc: Tympanic   SpO2: 98%   Weight: 91.2 kg (201 lb 1.6 oz)   Height: 1.702 m (5' 7\")     Wt Readings from Last 3 Encounters:   06/10/25 91.2 kg (201 lb 1.6 oz)   05/22/25 84.8 kg (187 lb)   05/19/25 85.7 kg (189 lb)     Body mass index is 31.5 kg/m .    PHYSICAL EXAM:  Wearing mask when entering room?  No  Constitutional:  Reveals pleasant talkative man who ambulates with a slight limp    Wearing neck brace which he takes off to show me.  Multiple tattoos on the back.  Incision cervical healing well.  Legs trace edema with old scars.  Heart regular rate and rhythm    MEDICATIONS:  Current Outpatient Medications   Medication Sig Dispense Refill    acetaminophen (TYLENOL) 325 MG tablet Take 650 mg by mouth every 8 hours as needed for mild pain.      albuterol (PROAIR HFA/PROVENTIL HFA/VENTOLIN HFA) " 108 (90 Base) MCG/ACT inhaler Inhale 2 puffs into the lungs every 6 hours as needed for wheezing or shortness of breath. 6.7 g 11    allopurinol (ZYLOPRIM) 300 MG tablet Take 1 tablet (300 mg) by mouth 2 times daily. 180 tablet 3    amphetamine-dextroamphetamine (ADDERALL) 15 MG tablet Take 1 tablet (15 mg) by mouth daily. 30 tablet 0    buPROPion (WELLBUTRIN SR) 150 MG 12 hr tablet Take 1 tablet (150 mg) by mouth 2 times daily. 180 tablet 2    CALCIUM ANTACID 500 MG chewable tablet Take 1 tablet (500 mg) by mouth 2 times daily. 180 tablet 3    doxycycline monohydrate (MONODOX) 100 MG capsule       ergocalciferol (ERGOCALCIFEROL) 1.25 MG (38142 UT) capsule Take 1 capsule (50,000 Units) by mouth every 14 days. 6 capsule 3    ferrous sulfate (FEROSUL) 325 (65 Fe) MG tablet Take 1 tablet (325 mg) by mouth daily (with breakfast). 90 tablet 3    fexofenadine (ALLEGRA) 180 MG tablet Take 1 tablet (180 mg) by mouth daily. 90 tablet 3    finasteride (PROSCAR) 5 MG tablet Take 1 tablet (5 mg) by mouth daily. 90 tablet 3    furosemide (LASIX) 80 MG tablet Take 1 tablet (80 mg) by mouth every morning. 90 tablet 3    guaiFENesin (MUCINEX) 600 MG 12 hr tablet Take 1 tablet (600 mg) by mouth 2 times daily. 180 tablet 3    hydrOXYzine kana (VISTARIL) 25 MG capsule Take 1-2 capsules (25-50 mg) by mouth 3 times daily as needed for itching. 100 capsule 3    lamoTRIgine (LAMICTAL) 150 MG tablet Take 1 tablet (150 mg) by mouth 3 times daily. 270 tablet 3    LANsoprazole (PREVACID) 30 MG DR capsule Take 1 capsule (30 mg) by mouth 2 times daily.      lidocaine (LIDODERM) 5 % patch Place onto the skin every 24 hours To prevent lidocaine toxicity, patient should be patch free for 12 hrs daily. 30 patch 11    lidocaine (XYLOCAINE) 5 % external ointment Apply topically 3 times daily. 240 g 11    Lidocaine 7.5 % CREA Externally apply 1 g topically 3 times daily. 60 g 11    medical cannabis (Patient's own supply) See Admin Instructions. (The  purpose of this order is to document that the patient reports taking medical cannabis.  This is not a prescription, and is not used to certify that the patient has a qualifying medical condition.)      methocarbamol (ROBAXIN) 500 MG tablet Take 3 tablets (1,500 mg) by mouth 4 times daily.      methocarbamol (ROBAXIN) 750 MG tablet       metoprolol succinate ER (TOPROL XL) 50 MG 24 hr tablet Take 1 tablet (50 mg) by mouth daily. 90 tablet 3    montelukast (SINGULAIR) 10 MG tablet Take 1 tablet (10 mg) by mouth daily. 90 tablet 3    nabumetone (RELAFEN) 750 MG tablet Take 1 tablet (750 mg) by mouth 2 times daily. 180 tablet 3    naloxegol (MOVANTIK) 25 MG TABS tablet Take 1 tablet (25 mg) by mouth every morning (before breakfast). 90 tablet 3    naloxone (NARCAN) 4 MG/0.1ML nasal spray SPRAY 1 SPRAY INTO ONE NOSTRIL ALTERNATING NOSTRILS ONCE AS NEEDED FOR OPIOID REVERSAL REPEAT EVER 2-3 MINUTES UNTIL HELP ARRIVES 2 mL 1    ondansetron (ZOFRAN) 4 MG tablet Take 4 mg by mouth every 6 hours as needed for nausea.      oxyCODONE (OXYCONTIN) 15 MG 12 hr tablet Take 1 tablet (15 mg) by mouth every 12 hours. 30 tablet 0    oxyCODONE IR (ROXICODONE) 15 MG tablet Take 1 tablet (15 mg) by mouth every 3 hours. At 2 am, 5 am, 11am, 2 pm, 5 pm, 8 pm, 11 pm. 240 tablet 0    polyethylene glycol (MIRALAX) 17 GM/Dose powder Take 17 g by mouth 2 times daily. 3060 g 3    potassium chloride sourav ER (KLOR-CON M20) 20 MEQ CR tablet Take 1 tablet (20 mEq) by mouth daily. 90 tablet 3    pseudoePHEDrine (SUDAFED) 120 MG 12 hr tablet Take 1 tablet (120 mg) by mouth every 12 hours. 180 tablet 0    QUEtiapine (SEROQUEL) 100 MG tablet Take 1 tablet (100 mg) by mouth 4 times daily as needed (anxiety). 60 tablet 1    Sennosides (SENNA) 8.6 MG CAPS Take 3 capsules by mouth 2 times daily. 180 capsule 11    tamsulosin (FLOMAX) 0.4 MG capsule Take 1 capsule (0.4 mg) by mouth 2 times daily. 180 capsule 3    tiaGABine (GABITRIL) 2 MG tablet Take 2 tablets  (4 mg) by mouth 6 times daily. 168 tablet 4    tiaGABine (GABITRIL) 4 MG tablet       QUEtiapine (SEROQUEL) 200 MG tablet Take 1 tablet (200 mg) by mouth at bedtime. (Patient not taking: Reported on 6/10/2025) 90 tablet 3       Notes summarized:   Labs, x-rays, cardiology, GI tests reviewed:   Recent Labs   Lab Test 03/19/25  1308 08/27/24  1516   HGB 13.5 13.2*    139   POTASSIUM 4.3 3.7   CR 0.87 0.92   PSA  --  0.15   URIC  --  2.6*   B12  --  454   VITDT  --  66*     Lab Results   Component Value Date    CHOL 144 08/27/2024     New orders:   Orders Placed This Encounter   Procedures    Prostate Specific Antigen Screen    Hemoglobin A1c    Comprehensive metabolic panel    CRP inflammation    Erythrocyte sedimentation rate auto    Ferritin    Iron & Iron Binding Capacity    Lipid Profile    TSH    Uric acid    Vitamin B12    Vitamin D Deficiency    CBC with platelets and differential    CBC with Platelets & Differential       Independent review of:  Supplemental history by:      Start Time: 11:14 AM  End time:  11:43 AM  Conversation plus orders:  29 minutes  Dictation time:  3 minutes    The visit lasted a total of 32 minutes     Devon Lund MD  Internal Medicine  [facility]    The ongoing plan of care, for the conditions documented in the note above, were addressed during this visit.  Due to the chronic nature of the issues and uncertain outcome of changes made today, I will continue to support Jan in the ongoing management of these conditions and continuity of care by access to [employer] messages, phone calls, and follow-up appointments.

## 2025-06-10 NOTE — PATIENT INSTRUCTIONS
Update labs    Try lidocaine cream/ointment stronger    Change senna liquid to senna pills    Change hydroxyzine to kana     Allegra once a day    Pseudafed twice a day    Furosemide 80 mgs each morning

## 2025-06-10 NOTE — PROGRESS NOTES
"  {PROVIDER CHARTING PREFERENCE:075568}    Subjective   Jan is a 55 year old, presenting for the following health issues:  Hospital F/U (Follow up after surgery)      6/10/2025    11:03 AM   Additional Questions   Roomed by Stone BRADY RN     HPI      {MA/LPN/RN Pre-Provider Visit Orders- hCG/UA/Strep (Optional):291638}  {SUPERLIST (Optional):678801}  {additonal problems for provider to add (Optional):700406}    {ROS Picklists (Optional):862833}      Objective    /68 (BP Location: Left arm, Patient Position: Sitting, Cuff Size: Adult Regular)   Pulse 71   Temp 98.1  F (36.7  C) (Tympanic)   Resp 16   Ht 1.702 m (5' 7\")   Wt 91.2 kg (201 lb 1.6 oz)   SpO2 98%   BMI 31.50 kg/m    Body mass index is 31.5 kg/m .  Physical Exam   {Exam List (Optional):013033}    {Diagnostic Test Results (Optional):222814}        Signed Electronically by: Devon Lund MD  {Email feedback regarding this note to [email or web address]   :[id number]}  "

## 2025-06-11 RX ORDER — FERROUS SULFATE 325(65) MG
325 TABLET ORAL 2 TIMES DAILY
Qty: 180 TABLET | Refills: 3 | Status: SHIPPED | OUTPATIENT
Start: 2025-06-11 | End: 2026-06-11

## 2025-06-17 DIAGNOSIS — M54.16 LUMBAR RADICULOPATHY: Primary | ICD-10-CM

## 2025-06-17 DIAGNOSIS — F31.81 BIPOLAR 2 DISORDER (H): ICD-10-CM

## 2025-06-17 DIAGNOSIS — E53.8 LOW SERUM VITAMIN B12: Primary | ICD-10-CM

## 2025-06-17 RX ORDER — ACETAMINOPHEN 325 MG/1
650 TABLET ORAL 3 TIMES DAILY
Qty: 180 TABLET | Refills: 11 | Status: SHIPPED | OUTPATIENT
Start: 2025-06-17

## 2025-06-17 RX ORDER — DEXTROAMPHETAMINE SACCHARATE, AMPHETAMINE ASPARTATE, DEXTROAMPHETAMINE SULFATE AND AMPHETAMINE SULFATE 3.75; 3.75; 3.75; 3.75 MG/1; MG/1; MG/1; MG/1
15 TABLET ORAL DAILY
Qty: 30 TABLET | Refills: 0 | Status: SHIPPED | OUTPATIENT
Start: 2025-06-17

## 2025-06-17 RX ORDER — CYANOCOBALAMIN 1000 UG/ML
1 INJECTION, SOLUTION INTRAMUSCULAR; SUBCUTANEOUS
Qty: 6 ML | Refills: 3 | Status: SHIPPED | OUTPATIENT
Start: 2025-06-17 | End: 2026-06-17

## 2025-07-02 ENCOUNTER — MYC MEDICAL ADVICE (OUTPATIENT)
Dept: PALLIATIVE MEDICINE | Facility: OTHER | Age: 55
End: 2025-07-02
Payer: COMMERCIAL

## 2025-07-02 DIAGNOSIS — M54.16 LUMBAR RADICULOPATHY: ICD-10-CM

## 2025-07-02 DIAGNOSIS — G89.4 CHRONIC PAIN SYNDROME: ICD-10-CM

## 2025-07-02 RX ORDER — METHOCARBAMOL 500 MG/1
1500 TABLET, FILM COATED ORAL 4 TIMES DAILY
Qty: 228 TABLET | Refills: 3 | Status: SHIPPED | OUTPATIENT
Start: 2025-07-02

## 2025-07-02 RX ORDER — OXYCODONE HYDROCHLORIDE 15 MG/1
15 TABLET ORAL
Qty: 240 TABLET | Refills: 0 | Status: SHIPPED | OUTPATIENT
Start: 2025-07-02

## 2025-07-09 ENCOUNTER — TELEPHONE (OUTPATIENT)
Dept: INTERNAL MEDICINE | Facility: CLINIC | Age: 55
End: 2025-07-09

## 2025-07-10 ENCOUNTER — MYC MEDICAL ADVICE (OUTPATIENT)
Dept: PALLIATIVE MEDICINE | Facility: OTHER | Age: 55
End: 2025-07-10
Payer: COMMERCIAL

## 2025-07-10 DIAGNOSIS — M48.02 FORAMINAL STENOSIS OF CERVICAL REGION: ICD-10-CM

## 2025-07-10 RX ORDER — OXYCODONE HYDROCHLORIDE 15 MG/1
15 TABLET, FILM COATED, EXTENDED RELEASE ORAL EVERY 12 HOURS
Qty: 30 TABLET | Refills: 0 | Status: SHIPPED | OUTPATIENT
Start: 2025-07-10

## 2025-07-10 NOTE — TELEPHONE ENCOUNTER
Received request for a refill(s) of OXYCODONE HCL ER 15 MG PO T12A       Last dispensed from pharmacy on 06/25/25    Patient's last office/virtual visit by prescribing provider on 04/07/25  Next office/virtual appointment scheduled for 07/15/25    Last urine drug screen date 08/27/24  Current opioid agreement on file (completed within the last year) Yes Date of opioid agreement: 08/27/24    E-prescribe to pharmacy-[id number] - [place], [place] - [facility] [address] AT [facility] OF [address] [employer] [address]     Will route to nursing [place] for review and preparation of prescription(s).        97.8

## 2025-07-11 DIAGNOSIS — G62.9 NEUROPATHY: ICD-10-CM

## 2025-07-11 NOTE — TELEPHONE ENCOUNTER
Received fax from pharmacy requesting refill(s) for     tiaGABine (GABITRIL) 2 MG tablet    Date last filled 07/10/2025    Last Appt Date:01/14/2025    Next Appt scheduled: 07/15/2025    Pharmacy:   [id number] [place], [place] - [address] AT [facility] OF [employer],    [facility] route for processing    Becca Rodriguez MA  [facility] [place]

## 2025-07-11 NOTE — TELEPHONE ENCOUNTER
Please process a refill of tiaGABine (GABITRIL) 2 MG tablet  to     [facility] #[id number] - [place] [facility], [place] - [address] AT [address]  [address] [place] [address]  Phone: [phone] Fax: [phone]

## 2025-07-14 RX ORDER — TIAGABINE HYDROCHLORIDE 2 MG/1
TABLET, FILM COATED ORAL
Qty: 168 TABLET | Refills: 4 | Status: SHIPPED | OUTPATIENT
Start: 2025-07-14

## 2025-07-15 ENCOUNTER — TELEPHONE (OUTPATIENT)
Dept: INTERNAL MEDICINE | Facility: CLINIC | Age: 55
End: 2025-07-15
Payer: COMMERCIAL

## 2025-07-15 ENCOUNTER — OFFICE VISIT (OUTPATIENT)
Dept: PALLIATIVE MEDICINE | Facility: OTHER | Age: 55
End: 2025-07-15
Attending: ANESTHESIOLOGY
Payer: COMMERCIAL

## 2025-07-15 VITALS — SYSTOLIC BLOOD PRESSURE: 133 MMHG | OXYGEN SATURATION: 97 % | HEART RATE: 75 BPM | DIASTOLIC BLOOD PRESSURE: 82 MMHG

## 2025-07-15 DIAGNOSIS — M48.02 FORAMINAL STENOSIS OF CERVICAL REGION: ICD-10-CM

## 2025-07-15 DIAGNOSIS — Z79.891 LONG TERM (CURRENT) USE OF OPIATE ANALGESIC: Primary | ICD-10-CM

## 2025-07-15 DIAGNOSIS — Z53.9 DIAGNOSIS NOT YET DEFINED: Primary | ICD-10-CM

## 2025-07-15 LAB
CANNABINOIDS UR QL SCN: ABNORMAL
CREAT UR-MCNC: 26 MG/DL
ETHANOL UR QL SCN: NORMAL

## 2025-07-15 PROCEDURE — 80307 DRUG TEST PRSMV CHEM ANLYZR: CPT | Performed by: ANESTHESIOLOGY

## 2025-07-15 PROCEDURE — G0179 MD RECERTIFICATION HHA PT: HCPCS | Mod: 4MD | Performed by: INTERNAL MEDICINE

## 2025-07-15 PROCEDURE — G0463 HOSPITAL OUTPT CLINIC VISIT: HCPCS | Performed by: ANESTHESIOLOGY

## 2025-07-15 PROCEDURE — 3075F SYST BP GE 130 - 139MM HG: CPT | Performed by: ANESTHESIOLOGY

## 2025-07-15 PROCEDURE — 99214 OFFICE O/P EST MOD 30 MIN: CPT | Performed by: ANESTHESIOLOGY

## 2025-07-15 PROCEDURE — 1125F AMNT PAIN NOTED PAIN PRSNT: CPT | Performed by: ANESTHESIOLOGY

## 2025-07-15 PROCEDURE — G2211 COMPLEX E/M VISIT ADD ON: HCPCS | Performed by: ANESTHESIOLOGY

## 2025-07-15 PROCEDURE — 3079F DIAST BP 80-89 MM HG: CPT | Performed by: ANESTHESIOLOGY

## 2025-07-15 PROCEDURE — 80353 DRUG SCREENING COCAINE: CPT | Performed by: ANESTHESIOLOGY

## 2025-07-15 RX ORDER — OXYCODONE HYDROCHLORIDE 15 MG/1
15 TABLET, FILM COATED, EXTENDED RELEASE ORAL EVERY 12 HOURS
Qty: 30 TABLET | Refills: 0 | Status: SHIPPED | OUTPATIENT
Start: 2025-07-15

## 2025-07-15 ASSESSMENT — PAIN SCALES - GENERAL: PAINLEVEL_OUTOF10: SEVERE PAIN (10)

## 2025-07-15 NOTE — PROGRESS NOTES
Patient presents to the clinic today for a visit with JAYME MULLEN MD regarding Pain Management.      UDS/CSA- 08.27.2024    Medications- Oxycodone IR today    Oxycodone ER today    Notes Eating mostly fruit and vegetables and rice    Kailyn Carolina  [facility] Clinical Assistant

## 2025-07-15 NOTE — TELEPHONE ENCOUNTER
July 15, 2025    Home health orders was received via fax for Dr. Lund.  Patient label was attached to paperwork and placed in provider's inbox to be signed.    Helen Bowen

## 2025-07-15 NOTE — LETTER

## 2025-07-15 NOTE — PROGRESS NOTES
[facility] Follow-up    Date of visit: 7/15/2025    Chief complaint:   Chief Complaint   Patient presents with    Pain    Pain Management     Follow-up for persistent spinal pain.    Reviewing the record on 6/7 had a's 7-week follow-up after a C3 through was 3 7 revision and fusion, C7-T4 fusion and foraminotomy, at T3 schwannoma tumor removal.    Reviews today having had a complicated surgery, told to expect 5 and half hours and was 8 hours, with more blood loss than expected for the Schwann Francine, and they determined to reinforce the surgery for his neck at C3-C7.  He was in a TCU for 2 weeks, now home.    Has done home physical therapy, will be starting clinic physical therapy and hopes to pool therapy soon.    Uses a bone stimulator, and TENS unit regularly.  He had a torso brace now using the cervical brace.  He will follow-up 8/28 with his neurosurgeon.    Reviews also using B12 shots, vitamin D replacement, potassium replacement.    Has a right knee brace.  Is dealing with orthopedics for his right torn bicep tear.    Working with a podiatrist for a left second toe ligament tear.    Opioids we have been coordinating postsurgically increased to 15 mg extended release oxycodone twice a day and 15 mg immediate release every 3 hours.  He is tolerating that.  Pain is still a challenge.  He does ultimately have a goal to be decreasing.    Now that he is home using medical cannabis.    Using tiagabine and finding that helps better for anxiety than the Zyprexa.    Is keeping up his spiritual support system reviews that has been important.    Comments has not been a 5-year process since this fall in June 2020 and hopes this is the last surgery that he gradually recover with his wife.      Last urine drug test in August will be updated today.        Medications:  Current Outpatient Medications   Medication Sig Dispense Refill    acetaminophen (TYLENOL) 325 MG tablet  Take 2 tablets (650 mg) by mouth 3 times daily. 180 tablet 11    albuterol (PROAIR HFA/PROVENTIL HFA/VENTOLIN HFA) 108 (90 Base) MCG/ACT inhaler Inhale 2 puffs into the lungs every 6 hours as needed for wheezing or shortness of breath. 6.7 g 11    allopurinol (ZYLOPRIM) 300 MG tablet Take 1 tablet (300 mg) by mouth 2 times daily. 180 tablet 3    amphetamine-dextroamphetamine (ADDERALL) 15 MG tablet Take 1 tablet (15 mg) by mouth daily. 30 tablet 0    buPROPion (WELLBUTRIN SR) 150 MG 12 hr tablet TAKE 1 TABLET(150 MG) BY MOUTH TWICE DAILY 180 tablet 2    CALCIUM ANTACID 500 MG chewable tablet Take 1 tablet (500 mg) by mouth 2 times daily. 180 tablet 3    cyanocobalamin (CYANOCOBALAMIN) 1000 mcg/mL injection Inject 1 mL (1,000 mcg) into the muscle every 14 days. 6 mL 3    doxycycline monohydrate (MONODOX) 100 MG capsule       ergocalciferol (ERGOCALCIFEROL) 1.25 MG (23281 UT) capsule Take 1 capsule (50,000 Units) by mouth every 14 days. 6 capsule 3    ferrous sulfate (FEROSUL) 325 (65 Fe) MG tablet Take 1 tablet (325 mg) by mouth 2 times daily. 180 tablet 3    fexofenadine (ALLEGRA) 180 MG tablet Take 1 tablet (180 mg) by mouth daily. 90 tablet 3    finasteride (PROSCAR) 5 MG tablet Take 1 tablet (5 mg) by mouth daily. 90 tablet 3    furosemide (LASIX) 80 MG tablet Take 1 tablet (80 mg) by mouth every morning. 90 tablet 3    guaiFENesin (MUCINEX) 600 MG 12 hr tablet Take 1 tablet (600 mg) by mouth 2 times daily. 180 tablet 3    hydrOXYzine kana (VISTARIL) 25 MG capsule Take 1-2 capsules (25-50 mg) by mouth 3 times daily as needed for itching. 100 capsule 3    lamoTRIgine (LAMICTAL) 150 MG tablet Take 1 tablet (150 mg) by mouth 3 times daily. 270 tablet 3    LANsoprazole (PREVACID) 30 MG DR capsule Take 1 capsule (30 mg) by mouth 2 times daily.      lidocaine (LIDODERM) 5 % patch Place onto the skin every 24 hours To prevent lidocaine toxicity, patient should be patch free for 12 hrs daily. 30 patch 11    lidocaine  (XYLOCAINE) 5 % external ointment Apply topically 3 times daily. 240 g 11    Lidocaine 7.5 % CREA Externally apply 1 g topically 3 times daily. 60 g 11    medical cannabis (Patient's own supply) See Admin Instructions. (The purpose of this order is to document that the patient reports taking medical cannabis.  This is not a prescription, and is not used to certify that the patient has a qualifying medical condition.)      methocarbamol (ROBAXIN) 500 MG tablet Take 3 tablets (1,500 mg) by mouth 4 times daily. 228 tablet 3    methocarbamol (ROBAXIN) 750 MG tablet       metoprolol succinate ER (TOPROL XL) 50 MG 24 hr tablet Take 1 tablet (50 mg) by mouth daily. 90 tablet 3    montelukast (SINGULAIR) 10 MG tablet Take 1 tablet (10 mg) by mouth daily. 90 tablet 3    nabumetone (RELAFEN) 750 MG tablet Take 1 tablet (750 mg) by mouth 2 times daily. 180 tablet 3    naloxegol (MOVANTIK) 25 MG TABS tablet Take 1 tablet (25 mg) by mouth every morning (before breakfast). 90 tablet 3    naloxone (NARCAN) 4 MG/0.1ML nasal spray SPRAY 1 SPRAY INTO ONE NOSTRIL ALTERNATING NOSTRILS ONCE AS NEEDED FOR OPIOID REVERSAL REPEAT EVER 2-3 MINUTES UNTIL HELP ARRIVES 2 mL 1    ondansetron (ZOFRAN) 4 MG tablet Take 4 mg by mouth every 6 hours as needed for nausea.      oxyCODONE (OXYCONTIN) 15 MG 12 hr tablet Take 1 tablet (15 mg) by mouth every 12 hours. Dispense 7/22 for 7/24 30 tablet 0    oxyCODONE IR (ROXICODONE) 15 MG tablet Take 1 tablet (15 mg) by mouth every 3 hours. At 2 am, 5 am, 11am, 2 pm, 5 pm, 8 pm, 11 pm. 240 tablet 0    polyethylene glycol (MIRALAX) 17 GM/Dose powder Take 17 g by mouth 2 times daily. 3060 g 3    potassium chloride sourav ER (KLOR-CON M20) 20 MEQ CR tablet Take 1 tablet (20 mEq) by mouth daily. 90 tablet 3    pseudoePHEDrine (SUDAFED) 120 MG 12 hr tablet Take 1 tablet (120 mg) by mouth every 12 hours. 180 tablet 0    QUEtiapine (SEROQUEL) 100 MG tablet Take 1 tablet (100 mg) by mouth 4 times daily as needed  "(anxiety). 60 tablet 1    QUEtiapine (SEROQUEL) 200 MG tablet Take 1 tablet (200 mg) by mouth at bedtime. 90 tablet 3    Sennosides (SENNA) 8.6 MG CAPS Take 3 capsules by mouth 2 times daily. 180 capsule 11    syringe/needle, disp, 25G X 1\" 1 ML MISC Use as directed to administer intramuscular B 12 injections monthly 20 each 1    tamsulosin (FLOMAX) 0.4 MG capsule Take 1 capsule (0.4 mg) by mouth 2 times daily. 180 capsule 3    tiaGABine (GABITRIL) 2 MG tablet TAKE 2 TABLETS BY MOUTH 6 TIMES DAILY 168 tablet 4    tiaGABine (GABITRIL) 4 MG tablet       vitamin B-12 (CYANOCOBALAMIN) 2000 MCG tablet Take 1 tablet (2,000 mcg) by mouth daily. 90 tablet 3           Physical Exam:  Blood pressure 133/82, pulse 75, SpO2 97%.    Alert, clear sensorium, wearing the neck brace, ambulating with cane, shows his cervical to thoracic surgery scar healing well.    Left second toe has a healed ulcer contracture.          Assessment:   Had extensive spine surgery with fusion, schwannoma removal.    As a TCU doing home physical therapy advancing.    I have taken over management of postsurgical opioids complex as above.  He has been able to decrease in the past after his surgeries and will await his follow-up next month to clarify.    Needing adjuvant pain medications and antianxiety medications as above.    He will follow-up here in several weeks.    Total time 32 minutes   minutes spent on the date of encounter doing chart review, history, and exam documentation and further activities as noted above.     Dmitriy Cramer MD  [facility]    The longitudinal plan of care for the diagnosis(es)/condition(s) as documented were addressed during this visit. Due to the added complexity in care, I will continue to support Jan in the subsequent management and with ongoing continuity of care.    "

## 2025-07-15 NOTE — PATIENT INSTRUCTIONS
[facility] Number:  [phone]  Call with any questions about your care and for scheduling assistance.   Calls are returned Monday through Friday between 8 AM and 4:30 PM. We usually get back to you within 2 business days depending on the issue/request.    If we are prescribing your medications:  For opioid medication refills, call the clinic or send a [employer] message 7 days in advance.  Please include:  Name of requested medication  Name of the pharmacy.  For non-opioid medications, call your pharmacy directly to request a refill. Please allow 3-4 days to be processed.   Per [place] State Law:  All controlled substance prescriptions must be filled within 30 days of being written.    For those controlled substances allowing refills, pickup must occur within 30 days of last fill.      We believe regular attendance is key to your success in our program!    Any time you are unable to keep your appointment we ask that you call us at least 24 hours in advance to cancel.This will allow us to offer the appointment time to another patient.   Multiple missed appointments may lead to dismissal from the clinic.       PLAN:    Continue the OxyContin 15 mg twice a day with the oxycodone 15 mg every 3 hours.    You anticipated follow-up with the neurosurgeon in 8/28, will look toward decreasing the opioids at that time.    Continue the medical cannabis program.    Continue the tiagabine for anxiety.    You will be advancing your physical therapy.    Follow-up with Dr. Cramer for return visit in 3 months

## 2025-07-16 NOTE — TELEPHONE ENCOUNTER
July 16, 2025    Home health orders was picked up from outbox of Dr. Lund and sent via fax to [phone].    Helen Bowen

## 2025-07-17 LAB
AMPHET UR CFM-MCNC: 934 NG/ML
AMPHET/CREAT UR: 3592 NG/MG {CREAT}
NALOXONE UR CFM-MCNC: 8 NG/ML
NALOXONE: 31 NG/MG {CREAT}
OXYCODONE UR CFM-MCNC: 4040 NG/ML
OXYCODONE/CREAT UR: ABNORMAL NG/MG {CREAT}
OXYMORPHONE UR CFM-MCNC: 290 NG/ML
OXYMORPHONE/CREAT UR: 1115 NG/MG {CREAT}

## 2025-08-03 ENCOUNTER — MYC MEDICAL ADVICE (OUTPATIENT)
Dept: PALLIATIVE MEDICINE | Facility: OTHER | Age: 55
End: 2025-08-03
Payer: COMMERCIAL

## 2025-08-03 ENCOUNTER — MYC MEDICAL ADVICE (OUTPATIENT)
Dept: INTERNAL MEDICINE | Facility: CLINIC | Age: 55
End: 2025-08-03
Payer: COMMERCIAL

## 2025-08-03 DIAGNOSIS — K21.00 GASTROESOPHAGEAL REFLUX DISEASE WITH ESOPHAGITIS WITHOUT HEMORRHAGE: ICD-10-CM

## 2025-08-03 DIAGNOSIS — M54.16 LUMBAR RADICULOPATHY: ICD-10-CM

## 2025-08-03 DIAGNOSIS — J45.20 MILD INTERMITTENT ASTHMA WITHOUT COMPLICATION: Primary | ICD-10-CM

## 2025-08-04 ENCOUNTER — TELEPHONE (OUTPATIENT)
Dept: INTERNAL MEDICINE | Facility: CLINIC | Age: 55
End: 2025-08-04
Payer: COMMERCIAL

## 2025-08-04 RX ORDER — OXYCODONE HYDROCHLORIDE 15 MG/1
15 TABLET ORAL
Qty: 240 TABLET | Refills: 0 | Status: SHIPPED | OUTPATIENT
Start: 2025-08-04

## 2025-08-04 RX ORDER — LANSOPRAZOLE 30 MG/1
30 CAPSULE, DELAYED RELEASE ORAL 2 TIMES DAILY
Qty: 180 CAPSULE | Refills: 3 | Status: SHIPPED | OUTPATIENT
Start: 2025-08-04 | End: 2026-08-04

## 2025-08-11 ENCOUNTER — ANCILLARY PROCEDURE (OUTPATIENT)
Dept: GENERAL RADIOLOGY | Facility: CLINIC | Age: 55
End: 2025-08-11
Attending: PHYSICIAN ASSISTANT
Payer: COMMERCIAL

## 2025-08-11 DIAGNOSIS — Z98.890 S/P SPINAL SURGERY: ICD-10-CM

## 2025-08-11 DIAGNOSIS — Z98.1 S/P CERVICAL SPINAL FUSION: ICD-10-CM

## 2025-08-11 DIAGNOSIS — Z98.1 S/P FUSION OF THORACIC SPINE: ICD-10-CM

## 2025-08-11 PROCEDURE — 72070 X-RAY EXAM THORAC SPINE 2VWS: CPT | Mod: TC | Performed by: RADIOLOGY

## 2025-08-11 PROCEDURE — 72040 X-RAY EXAM NECK SPINE 2-3 VW: CPT | Mod: TC | Performed by: RADIOLOGY

## 2025-08-19 DIAGNOSIS — J45.20 MILD INTERMITTENT ASTHMA WITHOUT COMPLICATION: ICD-10-CM

## 2025-08-19 RX ORDER — ALBUTEROL SULFATE 90 UG/1
2 INHALANT RESPIRATORY (INHALATION) EVERY 6 HOURS PRN
Qty: 6.7 G | Refills: 11 | Status: SHIPPED | OUTPATIENT
Start: 2025-08-19

## 2025-08-25 ENCOUNTER — MYC MEDICAL ADVICE (OUTPATIENT)
Dept: PALLIATIVE MEDICINE | Facility: OTHER | Age: 55
End: 2025-08-25
Payer: COMMERCIAL

## 2025-08-25 DIAGNOSIS — M54.16 LUMBAR RADICULOPATHY: ICD-10-CM

## 2025-08-25 DIAGNOSIS — M48.02 FORAMINAL STENOSIS OF CERVICAL REGION: ICD-10-CM

## 2025-08-25 RX ORDER — OXYCODONE HYDROCHLORIDE 15 MG/1
15 TABLET, FILM COATED, EXTENDED RELEASE ORAL EVERY 12 HOURS
Qty: 30 TABLET | Refills: 0 | Status: SHIPPED | OUTPATIENT
Start: 2025-08-25

## 2025-08-25 RX ORDER — OXYCODONE HYDROCHLORIDE 15 MG/1
15 TABLET ORAL
Qty: 240 TABLET | Refills: 0 | Status: SHIPPED | OUTPATIENT
Start: 2025-08-25

## 2025-08-28 ENCOUNTER — MYC MEDICAL ADVICE (OUTPATIENT)
Dept: INTERNAL MEDICINE | Facility: CLINIC | Age: 55
End: 2025-08-28
Payer: COMMERCIAL

## 2025-08-28 DIAGNOSIS — F31.81 BIPOLAR 2 DISORDER (H): ICD-10-CM

## 2025-08-28 DIAGNOSIS — F41.1 GENERALIZED ANXIETY DISORDER: ICD-10-CM

## 2025-08-28 RX ORDER — DEXTROAMPHETAMINE SACCHARATE, AMPHETAMINE ASPARTATE, DEXTROAMPHETAMINE SULFATE AND AMPHETAMINE SULFATE 3.75; 3.75; 3.75; 3.75 MG/1; MG/1; MG/1; MG/1
15 TABLET ORAL DAILY
Qty: 30 TABLET | Refills: 0 | Status: SHIPPED | OUTPATIENT
Start: 2025-08-28

## 2025-08-28 RX ORDER — HYDROXYZINE PAMOATE 25 MG/1
CAPSULE ORAL
Qty: 100 CAPSULE | Refills: 3 | Status: SHIPPED | OUTPATIENT
Start: 2025-08-28